# Patient Record
Sex: MALE | Race: WHITE | NOT HISPANIC OR LATINO | Employment: OTHER | URBAN - METROPOLITAN AREA
[De-identification: names, ages, dates, MRNs, and addresses within clinical notes are randomized per-mention and may not be internally consistent; named-entity substitution may affect disease eponyms.]

---

## 2017-11-21 ENCOUNTER — HOSPITAL ENCOUNTER (INPATIENT)
Facility: HOSPITAL | Age: 77
LOS: 6 days | Discharge: HOME HEALTH | End: 2017-11-27
Attending: EMERGENCY MEDICINE | Admitting: HOSPITALIST

## 2017-11-21 ENCOUNTER — APPOINTMENT (OUTPATIENT)
Dept: CT IMAGING | Facility: HOSPITAL | Age: 77
End: 2017-11-21

## 2017-11-21 ENCOUNTER — APPOINTMENT (OUTPATIENT)
Dept: GENERAL RADIOLOGY | Facility: HOSPITAL | Age: 77
End: 2017-11-21
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
## 2017-11-21 DIAGNOSIS — N17.9 ACUTE KIDNEY INJURY (HCC): ICD-10-CM

## 2017-11-21 DIAGNOSIS — L03.115 CELLULITIS OF RIGHT LEG: ICD-10-CM

## 2017-11-21 DIAGNOSIS — E11.22 TYPE 2 DIABETES MELLITUS WITH STAGE 3 CHRONIC KIDNEY DISEASE, WITHOUT LONG-TERM CURRENT USE OF INSULIN (HCC): ICD-10-CM

## 2017-11-21 DIAGNOSIS — R41.82 ALTERED MENTAL STATUS, UNSPECIFIED ALTERED MENTAL STATUS TYPE: ICD-10-CM

## 2017-11-21 DIAGNOSIS — S00.81XA ABRASION OF FACE, INITIAL ENCOUNTER: ICD-10-CM

## 2017-11-21 DIAGNOSIS — N18.30 TYPE 2 DIABETES MELLITUS WITH STAGE 3 CHRONIC KIDNEY DISEASE, WITHOUT LONG-TERM CURRENT USE OF INSULIN (HCC): ICD-10-CM

## 2017-11-21 DIAGNOSIS — A41.9 SEPSIS, DUE TO UNSPECIFIED ORGANISM: Primary | ICD-10-CM

## 2017-11-21 DIAGNOSIS — E11.65 POORLY CONTROLLED DIABETES MELLITUS (HCC): ICD-10-CM

## 2017-11-21 LAB
ALBUMIN SERPL-MCNC: 3.9 G/DL (ref 3.5–5.2)
ALBUMIN/GLOB SERPL: 1.1 G/DL
ALP SERPL-CCNC: 62 U/L (ref 40–129)
ALT SERPL W P-5'-P-CCNC: 17 U/L (ref 5–41)
AMPHET+METHAMPHET UR QL: NEGATIVE
AMPHETAMINES UR QL: NEGATIVE
ANION GAP SERPL CALCULATED.3IONS-SCNC: 18.4 MMOL/L
APTT PPP: 33.3 SECONDS (ref 24.3–38.1)
AST SERPL-CCNC: 34 U/L (ref 5–40)
BACTERIA UR QL AUTO: ABNORMAL /HPF
BARBITURATES UR QL SCN: NEGATIVE
BASOPHILS # BLD AUTO: 0.04 10*3/MM3 (ref 0–0.2)
BASOPHILS NFR BLD AUTO: 0.2 % (ref 0–2)
BENZODIAZ UR QL SCN: NEGATIVE
BILIRUB SERPL-MCNC: 0.6 MG/DL (ref 0.2–1.2)
BILIRUB UR QL STRIP: NEGATIVE
BUN BLD-MCNC: 15 MG/DL (ref 8–23)
BUN/CREAT SERPL: 8.1 (ref 7–25)
BUPRENORPHINE SERPL-MCNC: NEGATIVE NG/ML
CALCIUM SPEC-SCNC: 9.6 MG/DL (ref 8.8–10.5)
CANNABINOIDS SERPL QL: NEGATIVE
CHLORIDE SERPL-SCNC: 93 MMOL/L (ref 98–107)
CLARITY UR: CLEAR
CO2 SERPL-SCNC: 24.6 MMOL/L (ref 22–29)
COCAINE UR QL: NEGATIVE
COLOR UR: YELLOW
CREAT BLD-MCNC: 1.85 MG/DL (ref 0.76–1.27)
D-LACTATE SERPL-SCNC: 2.9 MMOL/L (ref 0.5–2)
D-LACTATE SERPL-SCNC: 3.1 MMOL/L (ref 0.5–2)
DEPRECATED RDW RBC AUTO: 41.8 FL (ref 37–54)
EOSINOPHIL # BLD AUTO: 0 10*3/MM3 (ref 0.1–0.3)
EOSINOPHIL NFR BLD AUTO: 0 % (ref 0–4)
ERYTHROCYTE [DISTWIDTH] IN BLOOD BY AUTOMATED COUNT: 13.2 % (ref 11.5–14.5)
FLUAV AG NPH QL: NEGATIVE
FLUBV AG NPH QL IA: NEGATIVE
GFR SERPL CREATININE-BSD FRML MDRD: 36 ML/MIN/1.73
GLOBULIN UR ELPH-MCNC: 3.7 GM/DL
GLUCOSE BLD-MCNC: 330 MG/DL (ref 65–99)
GLUCOSE BLDC GLUCOMTR-MCNC: 312 MG/DL (ref 70–130)
GLUCOSE UR STRIP-MCNC: ABNORMAL MG/DL
HCT VFR BLD AUTO: 43 % (ref 42–52)
HGB BLD-MCNC: 14.3 G/DL (ref 14–18)
HGB UR QL STRIP.AUTO: ABNORMAL
HOLD SPECIMEN: NORMAL
HOLD SPECIMEN: NORMAL
HYALINE CASTS UR QL AUTO: ABNORMAL /LPF
IMM GRANULOCYTES # BLD: 0.27 10*3/MM3 (ref 0–0.03)
IMM GRANULOCYTES NFR BLD: 1.2 % (ref 0–0.5)
INR PPP: 1.11 (ref 0.9–1.1)
KETONES UR QL STRIP: ABNORMAL
LEUKOCYTE ESTERASE UR QL STRIP.AUTO: NEGATIVE
LYMPHOCYTES # BLD AUTO: 0.5 10*3/MM3 (ref 0.6–4.8)
LYMPHOCYTES NFR BLD AUTO: 2.3 % (ref 20–45)
MCH RBC QN AUTO: 29.1 PG (ref 27–31)
MCHC RBC AUTO-ENTMCNC: 33.3 G/DL (ref 31–37)
MCV RBC AUTO: 87.6 FL (ref 80–94)
METHADONE UR QL SCN: NEGATIVE
MONOCYTES # BLD AUTO: 1.1 10*3/MM3 (ref 0–1)
MONOCYTES NFR BLD AUTO: 5 % (ref 3–8)
NEUTROPHILS # BLD AUTO: 19.97 10*3/MM3 (ref 1.5–8.3)
NEUTROPHILS NFR BLD AUTO: 91.3 % (ref 45–70)
NITRITE UR QL STRIP: NEGATIVE
NRBC BLD MANUAL-RTO: 0 /100 WBC (ref 0–0)
OPIATES UR QL: NEGATIVE
OXYCODONE UR QL SCN: NEGATIVE
PCP UR QL SCN: NEGATIVE
PH UR STRIP.AUTO: 8.5 [PH] (ref 4.5–8)
PLATELET # BLD AUTO: 169 10*3/MM3 (ref 140–500)
PMV BLD AUTO: 10.8 FL (ref 7.4–10.4)
POTASSIUM BLD-SCNC: 4.6 MMOL/L (ref 3.5–5.2)
PROCALCITONIN SERPL-MCNC: 2.33 NG/ML (ref 0.1–0.25)
PROPOXYPH UR QL: NEGATIVE
PROT SERPL-MCNC: 7.6 G/DL (ref 6–8.5)
PROT UR QL STRIP: ABNORMAL
PROTHROMBIN TIME: 14.3 SECONDS (ref 12.1–15)
RBC # BLD AUTO: 4.91 10*6/MM3 (ref 4.7–6.1)
RBC # UR: ABNORMAL /HPF
REF LAB TEST METHOD: ABNORMAL
SODIUM BLD-SCNC: 136 MMOL/L (ref 136–145)
SP GR UR STRIP: 1.02 (ref 1–1.03)
SQUAMOUS #/AREA URNS HPF: ABNORMAL /HPF
TRICYCLICS UR QL SCN: NEGATIVE
TROPONIN T SERPL-MCNC: 0.01 NG/ML (ref 0–0.03)
TSH SERPL DL<=0.05 MIU/L-ACNC: 2.95 MIU/ML (ref 0.27–4.2)
UROBILINOGEN UR QL STRIP: ABNORMAL
WBC NRBC COR # BLD: 21.88 10*3/MM3 (ref 4.8–10.8)
WBC UR QL AUTO: ABNORMAL /HPF
WHOLE BLOOD HOLD SPECIMEN: NORMAL

## 2017-11-21 PROCEDURE — 25010000002 TDAP 5-2.5-18.5 LF-MCG/0.5 SUSPENSION: Performed by: EMERGENCY MEDICINE

## 2017-11-21 PROCEDURE — 93010 ELECTROCARDIOGRAM REPORT: CPT | Performed by: INTERNAL MEDICINE

## 2017-11-21 PROCEDURE — 84484 ASSAY OF TROPONIN QUANT: CPT | Performed by: EMERGENCY MEDICINE

## 2017-11-21 PROCEDURE — 87186 SC STD MICRODIL/AGAR DIL: CPT | Performed by: EMERGENCY MEDICINE

## 2017-11-21 PROCEDURE — 25010000002 ENOXAPARIN PER 10 MG

## 2017-11-21 PROCEDURE — 84145 PROCALCITONIN (PCT): CPT | Performed by: HOSPITALIST

## 2017-11-21 PROCEDURE — 87798 DETECT AGENT NOS DNA AMP: CPT | Performed by: HOSPITALIST

## 2017-11-21 PROCEDURE — 71250 CT THORAX DX C-: CPT

## 2017-11-21 PROCEDURE — 85610 PROTHROMBIN TIME: CPT | Performed by: EMERGENCY MEDICINE

## 2017-11-21 PROCEDURE — 87040 BLOOD CULTURE FOR BACTERIA: CPT | Performed by: EMERGENCY MEDICINE

## 2017-11-21 PROCEDURE — 87086 URINE CULTURE/COLONY COUNT: CPT | Performed by: EMERGENCY MEDICINE

## 2017-11-21 PROCEDURE — 87581 M.PNEUMON DNA AMP PROBE: CPT | Performed by: HOSPITALIST

## 2017-11-21 PROCEDURE — 80053 COMPREHEN METABOLIC PANEL: CPT | Performed by: EMERGENCY MEDICINE

## 2017-11-21 PROCEDURE — 85730 THROMBOPLASTIN TIME PARTIAL: CPT | Performed by: EMERGENCY MEDICINE

## 2017-11-21 PROCEDURE — 80306 DRUG TEST PRSMV INSTRMNT: CPT | Performed by: EMERGENCY MEDICINE

## 2017-11-21 PROCEDURE — 85025 COMPLETE CBC W/AUTO DIFF WBC: CPT | Performed by: EMERGENCY MEDICINE

## 2017-11-21 PROCEDURE — 99291 CRITICAL CARE FIRST HOUR: CPT | Performed by: EMERGENCY MEDICINE

## 2017-11-21 PROCEDURE — 90715 TDAP VACCINE 7 YRS/> IM: CPT | Performed by: EMERGENCY MEDICINE

## 2017-11-21 PROCEDURE — 82962 GLUCOSE BLOOD TEST: CPT

## 2017-11-21 PROCEDURE — P9612 CATHETERIZE FOR URINE SPEC: HCPCS

## 2017-11-21 PROCEDURE — 25010000002 VANCOMYCIN PER 500 MG: Performed by: EMERGENCY MEDICINE

## 2017-11-21 PROCEDURE — 25010000002 CEFTRIAXONE PER 250 MG

## 2017-11-21 PROCEDURE — 72125 CT NECK SPINE W/O DYE: CPT

## 2017-11-21 PROCEDURE — 94799 UNLISTED PULMONARY SVC/PX: CPT

## 2017-11-21 PROCEDURE — 99285 EMERGENCY DEPT VISIT HI MDM: CPT

## 2017-11-21 PROCEDURE — 93005 ELECTROCARDIOGRAM TRACING: CPT | Performed by: EMERGENCY MEDICINE

## 2017-11-21 PROCEDURE — 87804 INFLUENZA ASSAY W/OPTIC: CPT | Performed by: EMERGENCY MEDICINE

## 2017-11-21 PROCEDURE — 83605 ASSAY OF LACTIC ACID: CPT | Performed by: EMERGENCY MEDICINE

## 2017-11-21 PROCEDURE — 99223 1ST HOSP IP/OBS HIGH 75: CPT | Performed by: INTERNAL MEDICINE

## 2017-11-21 PROCEDURE — 81001 URINALYSIS AUTO W/SCOPE: CPT | Performed by: EMERGENCY MEDICINE

## 2017-11-21 PROCEDURE — 87633 RESP VIRUS 12-25 TARGETS: CPT | Performed by: HOSPITALIST

## 2017-11-21 PROCEDURE — 71010 HC CHEST PA OR AP: CPT

## 2017-11-21 PROCEDURE — 84443 ASSAY THYROID STIM HORMONE: CPT | Performed by: EMERGENCY MEDICINE

## 2017-11-21 PROCEDURE — 70450 CT HEAD/BRAIN W/O DYE: CPT

## 2017-11-21 PROCEDURE — 74176 CT ABD & PELVIS W/O CONTRAST: CPT

## 2017-11-21 PROCEDURE — 87486 CHLMYD PNEUM DNA AMP PROBE: CPT | Performed by: HOSPITALIST

## 2017-11-21 PROCEDURE — 25010000002 PIPERACILLIN-TAZOBACTAM: Performed by: EMERGENCY MEDICINE

## 2017-11-21 PROCEDURE — 90471 IMMUNIZATION ADMIN: CPT | Performed by: EMERGENCY MEDICINE

## 2017-11-21 PROCEDURE — 87150 DNA/RNA AMPLIFIED PROBE: CPT | Performed by: EMERGENCY MEDICINE

## 2017-11-21 RX ORDER — SODIUM CHLORIDE 0.9 % (FLUSH) 0.9 %
10 SYRINGE (ML) INJECTION AS NEEDED
Status: DISCONTINUED | OUTPATIENT
Start: 2017-11-21 | End: 2017-11-27 | Stop reason: HOSPADM

## 2017-11-21 RX ORDER — ACETAMINOPHEN 325 MG/1
650 TABLET ORAL EVERY 6 HOURS PRN
Status: DISCONTINUED | OUTPATIENT
Start: 2017-11-21 | End: 2017-11-27 | Stop reason: HOSPADM

## 2017-11-21 RX ORDER — IPRATROPIUM BROMIDE AND ALBUTEROL SULFATE 2.5; .5 MG/3ML; MG/3ML
3 SOLUTION RESPIRATORY (INHALATION) EVERY 6 HOURS PRN
Status: DISCONTINUED | OUTPATIENT
Start: 2017-11-21 | End: 2017-11-27 | Stop reason: HOSPADM

## 2017-11-21 RX ORDER — NICOTINE POLACRILEX 4 MG
15 LOZENGE BUCCAL
Status: DISCONTINUED | OUTPATIENT
Start: 2017-11-21 | End: 2017-11-27 | Stop reason: HOSPADM

## 2017-11-21 RX ORDER — SODIUM CHLORIDE 0.9 % (FLUSH) 0.9 %
1-10 SYRINGE (ML) INJECTION AS NEEDED
Status: DISCONTINUED | OUTPATIENT
Start: 2017-11-21 | End: 2017-11-27 | Stop reason: HOSPADM

## 2017-11-21 RX ORDER — DEXTROSE MONOHYDRATE 25 G/50ML
25 INJECTION, SOLUTION INTRAVENOUS
Status: DISCONTINUED | OUTPATIENT
Start: 2017-11-21 | End: 2017-11-27 | Stop reason: HOSPADM

## 2017-11-21 RX ORDER — FUROSEMIDE 40 MG/1
40 TABLET ORAL 2 TIMES DAILY
Status: ON HOLD | COMMUNITY
End: 2017-11-27

## 2017-11-21 RX ORDER — PRAMIPEXOLE DIHYDROCHLORIDE 0.5 MG/1
0.5 TABLET ORAL 2 TIMES DAILY
Status: ON HOLD | COMMUNITY
End: 2019-08-28 | Stop reason: SDDI

## 2017-11-21 RX ORDER — GABAPENTIN 600 MG/1
600 TABLET ORAL DAILY
Status: ON HOLD | COMMUNITY
End: 2019-06-27

## 2017-11-21 RX ORDER — LISINOPRIL 20 MG/1
20 TABLET ORAL DAILY
Status: ON HOLD | COMMUNITY
End: 2019-08-28 | Stop reason: SDDI

## 2017-11-21 RX ORDER — DONEPEZIL HYDROCHLORIDE 5 MG/1
2.5 TABLET, FILM COATED ORAL NIGHTLY
Status: ON HOLD | COMMUNITY
End: 2019-08-27

## 2017-11-21 RX ORDER — ACETAMINOPHEN 650 MG/1
650 SUPPOSITORY RECTAL ONCE
Status: COMPLETED | OUTPATIENT
Start: 2017-11-21 | End: 2017-11-21

## 2017-11-21 RX ORDER — ONDANSETRON 2 MG/ML
4 INJECTION INTRAMUSCULAR; INTRAVENOUS EVERY 6 HOURS PRN
Status: DISCONTINUED | OUTPATIENT
Start: 2017-11-21 | End: 2017-11-27 | Stop reason: HOSPADM

## 2017-11-21 RX ORDER — SODIUM CHLORIDE 9 MG/ML
125 INJECTION, SOLUTION INTRAVENOUS CONTINUOUS
Status: DISCONTINUED | OUTPATIENT
Start: 2017-11-21 | End: 2017-11-23

## 2017-11-21 RX ORDER — SODIUM CHLORIDE 9 MG/ML
40 INJECTION, SOLUTION INTRAVENOUS AS NEEDED
Status: DISCONTINUED | OUTPATIENT
Start: 2017-11-21 | End: 2017-11-27 | Stop reason: HOSPADM

## 2017-11-21 RX ORDER — ERGOCALCIFEROL 1.25 MG/1
50000 CAPSULE ORAL WEEKLY
COMMUNITY
End: 2019-02-16

## 2017-11-21 RX ORDER — PREGABALIN 100 MG/1
150 CAPSULE ORAL 3 TIMES DAILY
Status: ON HOLD | COMMUNITY
End: 2019-10-15

## 2017-11-21 RX ORDER — LEVOTHYROXINE SODIUM 0.1 MG/1
88 TABLET ORAL DAILY
Status: ON HOLD | COMMUNITY
End: 2019-08-29 | Stop reason: SDUPTHER

## 2017-11-21 RX ORDER — PRAVASTATIN SODIUM 20 MG
20 TABLET ORAL DAILY
COMMUNITY
End: 2017-11-27 | Stop reason: HOSPADM

## 2017-11-21 RX ORDER — CEFTRIAXONE 1 G/1
INJECTION, POWDER, FOR SOLUTION INTRAMUSCULAR; INTRAVENOUS
Status: COMPLETED
Start: 2017-11-21 | End: 2017-11-21

## 2017-11-21 RX ADMIN — CEFTRIAXONE 1000 MG: 1 INJECTION, POWDER, FOR SOLUTION INTRAMUSCULAR; INTRAVENOUS at 22:46

## 2017-11-21 RX ADMIN — ACETAMINOPHEN 650 MG: 650 SUPPOSITORY RECTAL at 15:45

## 2017-11-21 RX ADMIN — VANCOMYCIN HYDROCHLORIDE 2000 MG: 1 INJECTION, POWDER, LYOPHILIZED, FOR SOLUTION INTRAVENOUS at 17:35

## 2017-11-21 RX ADMIN — SODIUM CHLORIDE 100 ML/HR: 9 INJECTION, SOLUTION INTRAVENOUS at 20:46

## 2017-11-21 RX ADMIN — SODIUM CHLORIDE 1000 ML: 9 INJECTION, SOLUTION INTRAVENOUS at 16:13

## 2017-11-21 RX ADMIN — TETANUS TOXOID, REDUCED DIPHTHERIA TOXOID AND ACELLULAR PERTUSSIS VACCINE, ADSORBED 0.5 ML: 5; 2.5; 8; 8; 2.5 SUSPENSION INTRAMUSCULAR at 17:49

## 2017-11-21 RX ADMIN — TAZOBACTAM SODIUM AND PIPERACILLIN SODIUM 4.5 G: .5; 4 INJECTION, POWDER, LYOPHILIZED, FOR SOLUTION INTRAVENOUS at 17:02

## 2017-11-21 RX ADMIN — ENOXAPARIN SODIUM 30 MG: 30 INJECTION SUBCUTANEOUS at 22:47

## 2017-11-21 NOTE — ED PROVIDER NOTES
Subjective   History of Present Illness  History of Present Illness    Chief complaint: Fall and fever    Location: Home    Quality/Severity:  MAXIMUM TEMPERATURE of 104.4    Timing/Onset/Duration: The patient fell this morning    Modifying Factors: The patient is unable to give any modifying factors as he is unresponsive    Associated Symptoms: Patient unable to give associated symptoms    Narrative: This 77-year-old white male went to the toilet 7:30 this morning.  His wife states that he stayed on the toilet all day until he fell off the toilet about 1430.  The patient was noted to have altered mental status by EMS.  His blood sugar was 370.  The patient is unable to give any other history, the wife is not present, and EMS his left.    PCP:  Gordy Correa      Review of Systems   Unable to perform ROS: Patient unresponsive        Medication List      Notice     You have not been prescribed any medications.        No past medical history on file.    Allergies not on file    No past surgical history on file.    No family history on file.    Social History     Social History   • Marital status: Unknown     Spouse name: N/A   • Number of children: N/A   • Years of education: N/A     Social History Main Topics   • Smoking status: Not on file   • Smokeless tobacco: Not on file   • Alcohol use Not on file   • Drug use: Not on file   • Sexual activity: Not on file     Other Topics Concern   • Not on file     Social History Narrative           Objective   Physical Exam   Constitutional: He appears well-developed and well-nourished. Distressed: tachypneic and unresponsive.   ED Triage Vitals:  Temp: 104.4 °F (40.2 °C) (11/21/17 1540)  Heart Rate: 126 (11/21/17 1540)  Resp: 28 (11/21/17 1540)  BP: 113/90 (11/21/17 1540)  SpO2: 91 % (11/21/17 1540)  Temp src: Rectal (11/21/17 1540)  Heart Rate Source: n/a  Patient Position: n/a  BP Location: n/a  FiO2 (%): n/a    The patient's vitals were reviewed by me.  Unless otherwise  noted they are within normal limits.  The patient is febrile with temperature of 104.4.  The patient is tachycardic with a heart rate of 126.  The patient is tachypneic with respiratory rate of 28.  The room air sats are 91%.     HENT:   Head: Normocephalic.   Right Ear: External ear normal.   Left Ear: External ear normal.   Nose: Nose normal.   There is abrasion to the left temple and left cheek.    The mucous membranes are dry.       Eyes: Conjunctivae and EOM are normal. Pupils are equal, round, and reactive to light. Right eye exhibits no discharge. Left eye exhibits no discharge.   Neck: Normal range of motion. Neck supple. No JVD present. No tracheal deviation present. No thyromegaly present.   No meningeal signs   Cardiovascular: Regular rhythm, normal heart sounds and intact distal pulses.  Exam reveals no gallop and no friction rub.    No murmur heard.  Pulmonary/Chest: Effort normal. No stridor. No respiratory distress. He has no wheezes. He has rales (right lung base). He exhibits no tenderness.   Abdominal: Soft. Bowel sounds are normal. He exhibits no distension and no mass. There is no tenderness. There is no rebound and no guarding. No hernia.   Green bruising to the epigastric region, likely due to insulin injection.   Musculoskeletal: Normal range of motion. He exhibits no edema or deformity.   There is red dry skin on the dorsal aspect of the left foot at the region of the MTP joints.  There is no purulent drainage.   Lymphadenopathy:     He has no cervical adenopathy.   Neurological:   The pupils are equal round and reactive to light.  The patient localizes pain.   Skin: Skin is dry. Rash (there is a petechial rash noted on the left arm) noted. He is not diaphoretic. No erythema. No pallor.   Nursing note and vitals reviewed.      Procedures         ED Course  ED Course   Comment By Time   The laboratory values were reviewed by me.  The glucose is 3:30.  The creatinines 1.5.  The chloride is 93.   The GFR 36.  The lactic acid is 3.1.  The white blood cell count is 21.88.  The neutrophil percent is 91%.  The absolute neutrophil count is 19,000.  The INR is 1.1.  The red blood cells in the urine are too numerous to count.  The white blood cells are 3-5, bacteria 1+, swims up to the cells 3-5.  The catheter was somewhat traumatic.  The urine glucose is 500.  The serum ketones are 40.  The urine blood is large.  The protein is greater than 300. Keven Steven MD 11/21 1639   The influenza is negative. Keven Steven MD 11/21 1646   The TSH is 2.95. Keven Steven MD 11/21 1725      4:40 PM, 11/21/17:  The EKG was obtained at 1638.  EKG was read by me at 1639.  EKG shows a sinus tachycardia with a rate of 114.  Is a normal axis with no hypertrophy.  The SC, and QRS intervals are unremarkable.  The QT intervals are unremarkable.  There is a normal axis with no hypertrophy.  There is no ectopy.  There are inferior Q waves noted.  There is no acute ST elevation.  There is some ST depression noted in V3 through the 5.    4:56 PM, 11/21/17:  Spoke with pharmacy regarding the loading dose of the vancomycin and they recommended 2 g IV.  The patient will also be given Zosyn 4.5 g IV.  The patient has sepsis of unknown source.    5:42 PM, 11/21/17:  The patient is awake and alert.  He has no complaints.  His vital signs were reviewed.  His temperature has improved.  He is less tachycardic and his blood pressure has improved.  Abdominal exam: Soft nontender no masses positive bowel sounds.  I am awaiting the CT abdomen and pelvis and CT chest report.    5:43 PM, 11/21/17: I spoke with the patient's wife.  She indicates patient has had a mild to moderate headache for last week.  He has otherwise not been complaining of anything.  He went to sit on the toilet today and when she went to check on him later in the afternoon he had fallen off the toilet.  He was awake and talking when she went to check on him.    6:44  PM, 11/21/17:  I spoke with Dr. Chaudhry, on-call for the hospitalists, she will admit the patient to [facility] telemetry.    The total critical care time spent on this patient exclusive of separately billable procedures was 32 minutes.        MDM  XR Chest 1 View    (Results Pending)   CT Head Without Contrast    (Results Pending)   CT Cervical Spine Without Contrast    (Results Pending)     Labs Reviewed   CBC WITH AUTO DIFFERENTIAL - Abnormal; Notable for the following:        Result Value    WBC 21.88 (*)     MPV 10.8 (*)     Neutrophil % 91.3 (*)     Lymphocyte % 2.3 (*)     Immature Grans % 1.2 (*)     Neutrophils, Absolute 19.97 (*)     Lymphocytes, Absolute 0.50 (*)     Monocytes, Absolute 1.10 (*)     Eosinophils, Absolute 0.00 (*)     Immature Grans, Absolute 0.27 (*)     All other components within normal limits   BLOOD CULTURE   BLOOD CULTURE   COMPREHENSIVE METABOLIC PANEL   LACTIC ACID, PLASMA   RAINBOW DRAW    Narrative:     The following orders were created for panel order [facility] Draw.  Procedure                               Abnormality         Status                     ---------                               -----------         ------                     Light Blue Top[[id number]]                                                              Green Top (Gel)[[id number]]                                  In process                 Lavender Top[[id number]]                                     In process                 Gold Top - SST[[id number]]                                                                Please view results for these tests on the individual orders.   URINALYSIS W/ CULTURE IF INDICATED   URINALYSIS, MICROSCOPIC ONLY   POCT GLUCOSE FINGERSTICK   CBC AND DIFFERENTIAL    Narrative:     The following orders were created for panel order CBC & Differential.  Procedure                               Abnormality         Status                     ---------                                -----------         ------                     CBC Auto Differential[[id number]]        Abnormal            Final result                 Please view results for these tests on the individual orders.   LIGHT BLUE TOP   GREEN TOP   LAVENDER TOP   GOLD TOP - SST     No results found.    Final diagnoses:   None         ED Medications:  Medications   sodium chloride 0.9 % flush 10 mL (not administered)   acetaminophen (TYLENOL) suppository 650 mg (650 mg Rectal Given 11/21/17 1545)       New Medications:     Medication List      Notice     You have not been prescribed any medications.        Stopped Medications:     Medication List      Notice     You have not been prescribed any medications.          Final diagnoses:   Sepsis, due to unspecified organism   Altered mental status, unspecified altered mental status type   Poorly controlled diabetes mellitus   Acute kidney injury   Cellulitis of right leg   Fall, initial encounter   Abrasion of face, initial encounter            Keven Steven MD  11/21/17 1642       Keven Steven MD  11/21/17 1657       Keven Steven MD  11/21/17 1744       Keven Steven MD  11/21/17 1745       Keven Steven MD  11/21/17 1844       Keven Steven MD  11/24/17 2026

## 2017-11-22 ENCOUNTER — APPOINTMENT (OUTPATIENT)
Dept: ULTRASOUND IMAGING | Facility: HOSPITAL | Age: 77
End: 2017-11-22

## 2017-11-22 ENCOUNTER — APPOINTMENT (OUTPATIENT)
Dept: CARDIOLOGY | Facility: HOSPITAL | Age: 77
End: 2017-11-22
Attending: INTERNAL MEDICINE

## 2017-11-22 PROBLEM — R79.89 ELEVATED PROCALCITONIN: Status: ACTIVE | Noted: 2017-11-22

## 2017-11-22 PROBLEM — D72.829 LEUKOCYTOSIS: Status: ACTIVE | Noted: 2017-11-22

## 2017-11-22 PROBLEM — E87.20 LACTIC ACID ACIDOSIS: Status: ACTIVE | Noted: 2017-11-22

## 2017-11-22 LAB
ANION GAP SERPL CALCULATED.3IONS-SCNC: 12.2 MMOL/L
B PERT DNA SPEC QL NAA+PROBE: NOT DETECTED
BACTERIA BLD CULT: ABNORMAL
BASOPHILS # BLD AUTO: 0.04 10*3/MM3 (ref 0–0.2)
BASOPHILS NFR BLD AUTO: 0.2 % (ref 0–2)
BH CV ECHO MEAS - ACS: 1.6 CM
BH CV ECHO MEAS - AI DEC SLOPE: 274 CM/SEC^2
BH CV ECHO MEAS - AI MAX PG: 57.1 MMHG
BH CV ECHO MEAS - AI MAX VEL: 377.5 CM/SEC
BH CV ECHO MEAS - AI P1/2T: 403.5 MSEC
BH CV ECHO MEAS - AO MAX PG (FULL): 8.2 MMHG
BH CV ECHO MEAS - AO MAX PG: 13 MMHG
BH CV ECHO MEAS - AO MEAN PG (FULL): 4 MMHG
BH CV ECHO MEAS - AO MEAN PG: 7 MMHG
BH CV ECHO MEAS - AO ROOT AREA (BSA CORRECTED): 1.7
BH CV ECHO MEAS - AO ROOT AREA: 15.9 CM^2
BH CV ECHO MEAS - AO ROOT DIAM: 4.5 CM
BH CV ECHO MEAS - AO V2 MAX: 178 CM/SEC
BH CV ECHO MEAS - AO V2 MEAN: 117 CM/SEC
BH CV ECHO MEAS - AO V2 VTI: 40 CM
BH CV ECHO MEAS - ASC AORTA: 3.4 CM
BH CV ECHO MEAS - AVA(I,A): 1.9 CM^2
BH CV ECHO MEAS - AVA(I,D): 1.9 CM^2
BH CV ECHO MEAS - AVA(V,A): 1.8 CM^2
BH CV ECHO MEAS - AVA(V,D): 1.8 CM^2
BH CV ECHO MEAS - BSA(HAYCOCK): 2.7 M^2
BH CV ECHO MEAS - BSA: 2.6 M^2
BH CV ECHO MEAS - BZI_BMI: 38.6 KILOGRAMS/M^2
BH CV ECHO MEAS - BZI_METRIC_HEIGHT: 188 CM
BH CV ECHO MEAS - BZI_METRIC_WEIGHT: 136.5 KG
BH CV ECHO MEAS - CONTRAST EF (2CH): 48.8 ML/M^2
BH CV ECHO MEAS - CONTRAST EF 4CH: 51.2 ML/M^2
BH CV ECHO MEAS - EDV(CUBED): 236 ML
BH CV ECHO MEAS - EDV(MOD-SP2): 171 ML
BH CV ECHO MEAS - EDV(MOD-SP4): 193 ML
BH CV ECHO MEAS - EDV(TEICH): 192.6 ML
BH CV ECHO MEAS - EF(CUBED): 61.9 %
BH CV ECHO MEAS - EF(MOD-SP2): 48.8 %
BH CV ECHO MEAS - EF(MOD-SP4): 51.2 %
BH CV ECHO MEAS - EF(TEICH): 52.5 %
BH CV ECHO MEAS - ESV(CUBED): 89.9 ML
BH CV ECHO MEAS - ESV(MOD-SP2): 87.5 ML
BH CV ECHO MEAS - ESV(MOD-SP4): 94.2 ML
BH CV ECHO MEAS - ESV(TEICH): 91.5 ML
BH CV ECHO MEAS - FS: 27.5 %
BH CV ECHO MEAS - IVS/LVPW: 1
BH CV ECHO MEAS - IVSD: 1.1 CM
BH CV ECHO MEAS - LA DIMENSION: 3.3 CM
BH CV ECHO MEAS - LA/AO: 0.73
BH CV ECHO MEAS - LAT PEAK E' VEL: 10 CM/SEC
BH CV ECHO MEAS - LV DIASTOLIC VOL/BSA (35-75): 74.6 ML/M^2
BH CV ECHO MEAS - LV MASS(C)D: 300.9 GRAMS
BH CV ECHO MEAS - LV MASS(C)DI: 116.4 GRAMS/M^2
BH CV ECHO MEAS - LV MAX PG: 3 MMHG
BH CV ECHO MEAS - LV MEAN PG: 2 MMHG
BH CV ECHO MEAS - LV SYSTOLIC VOL/BSA (12-30): 36.4 ML/M^2
BH CV ECHO MEAS - LV V1 MAX: 86.5 CM/SEC
BH CV ECHO MEAS - LV V1 MEAN: 58.2 CM/SEC
BH CV ECHO MEAS - LV V1 VTI: 21.1 CM
BH CV ECHO MEAS - LVIDD: 6.2 CM
BH CV ECHO MEAS - LVIDS: 4.5 CM
BH CV ECHO MEAS - LVLD AP2: 9.3 CM
BH CV ECHO MEAS - LVLD AP4: 9.6 CM
BH CV ECHO MEAS - LVLS AP2: 8.3 CM
BH CV ECHO MEAS - LVLS AP4: 8.9 CM
BH CV ECHO MEAS - LVOT AREA (M): 3.5 CM^2
BH CV ECHO MEAS - LVOT AREA: 3.5 CM^2
BH CV ECHO MEAS - LVOT DIAM: 2.1 CM
BH CV ECHO MEAS - LVPWD: 1.1 CM
BH CV ECHO MEAS - MED PEAK E' VEL: 6 CM/SEC
BH CV ECHO MEAS - MV A DUR: 0.2 SEC
BH CV ECHO MEAS - MV A MAX VEL: 105 CM/SEC
BH CV ECHO MEAS - MV DEC SLOPE: 528 CM/SEC^2
BH CV ECHO MEAS - MV DEC TIME: 0.22 SEC
BH CV ECHO MEAS - MV E MAX VEL: 78.1 CM/SEC
BH CV ECHO MEAS - MV E/A: 0.74
BH CV ECHO MEAS - MV MAX PG: 4.8 MMHG
BH CV ECHO MEAS - MV MEAN PG: 2 MMHG
BH CV ECHO MEAS - MV P1/2T MAX VEL: 95.7 CM/SEC
BH CV ECHO MEAS - MV P1/2T: 53.1 MSEC
BH CV ECHO MEAS - MV V2 MAX: 109 CM/SEC
BH CV ECHO MEAS - MV V2 MEAN: 59.2 CM/SEC
BH CV ECHO MEAS - MV V2 VTI: 30.6 CM
BH CV ECHO MEAS - MVA P1/2T LCG: 2.3 CM^2
BH CV ECHO MEAS - MVA(P1/2T): 4.1 CM^2
BH CV ECHO MEAS - MVA(VTI): 2.4 CM^2
BH CV ECHO MEAS - PA ACC TIME: 0.14 SEC
BH CV ECHO MEAS - PA MAX PG (FULL): 1.3 MMHG
BH CV ECHO MEAS - PA MAX PG: 3.1 MMHG
BH CV ECHO MEAS - PA PR(ACCEL): 14.2 MMHG
BH CV ECHO MEAS - PA V2 MAX: 87.7 CM/SEC
BH CV ECHO MEAS - PULM A REVS DUR: 0.14 SEC
BH CV ECHO MEAS - PULM A REVS VEL: 27 CM/SEC
BH CV ECHO MEAS - PULM DIAS VEL: 40.1 CM/SEC
BH CV ECHO MEAS - PULM S/D: 2.1
BH CV ECHO MEAS - PULM SYS VEL: 84.5 CM/SEC
BH CV ECHO MEAS - PVA(V,A): 4.3 CM^2
BH CV ECHO MEAS - PVA(V,D): 4.3 CM^2
BH CV ECHO MEAS - QP/QS: 0.99
BH CV ECHO MEAS - RV MAX PG: 1.8 MMHG
BH CV ECHO MEAS - RV MEAN PG: 1 MMHG
BH CV ECHO MEAS - RV V1 MAX: 66.5 CM/SEC
BH CV ECHO MEAS - RV V1 MEAN: 46 CM/SEC
BH CV ECHO MEAS - RV V1 VTI: 12.6 CM
BH CV ECHO MEAS - RVOT AREA: 5.7 CM^2
BH CV ECHO MEAS - RVOT DIAM: 2.7 CM
BH CV ECHO MEAS - SI(AO): 232.2 ML/M^2
BH CV ECHO MEAS - SI(CUBED): 56.5 ML/M^2
BH CV ECHO MEAS - SI(LVOT): 28.2 ML/M^2
BH CV ECHO MEAS - SI(MOD-SP2): 32.3 ML/M^2
BH CV ECHO MEAS - SI(MOD-SP4): 38.2 ML/M^2
BH CV ECHO MEAS - SI(TEICH): 39.1 ML/M^2
BH CV ECHO MEAS - SV(AO): 600.4 ML
BH CV ECHO MEAS - SV(CUBED): 146.1 ML
BH CV ECHO MEAS - SV(LVOT): 72.9 ML
BH CV ECHO MEAS - SV(MOD-SP2): 83.5 ML
BH CV ECHO MEAS - SV(MOD-SP4): 98.8 ML
BH CV ECHO MEAS - SV(RVOT): 72.1 ML
BH CV ECHO MEAS - SV(TEICH): 101.1 ML
BH CV ECHO MEAS - TAPSE (>1.6): 1.8 CM2
BH CV VAS BP RIGHT ARM: NORMAL MMHG
BH CV XLRA - RV BASE: 3.9 CM
BH CV XLRA - RV LENGTH: 7.8 CM
BH CV XLRA - RV MID: 3.2 CM
BH CV XLRA - TDI S': 14 CM/SEC
BUN BLD-MCNC: 17 MG/DL (ref 8–23)
BUN/CREAT SERPL: 10.4 (ref 7–25)
C PNEUM DNA NPH QL NAA+NON-PROBE: NOT DETECTED
CALCIUM SPEC-SCNC: 8.6 MG/DL (ref 8.8–10.5)
CHLORIDE SERPL-SCNC: 99 MMOL/L (ref 98–107)
CO2 SERPL-SCNC: 27.8 MMOL/L (ref 22–29)
CREAT BLD-MCNC: 1.64 MG/DL (ref 0.76–1.27)
D-LACTATE SERPL-SCNC: 2.8 MMOL/L (ref 0.5–2)
DEPRECATED RDW RBC AUTO: 43.6 FL (ref 37–54)
E/E' RATIO: 11
EOSINOPHIL # BLD AUTO: 0 10*3/MM3 (ref 0.1–0.3)
EOSINOPHIL NFR BLD AUTO: 0 % (ref 0–4)
ERYTHROCYTE [DISTWIDTH] IN BLOOD BY AUTOMATED COUNT: 13.4 % (ref 11.5–14.5)
FLUAV H1 2009 PAND RNA NPH QL NAA+PROBE: NOT DETECTED
FLUAV H1 HA GENE NPH QL NAA+PROBE: NOT DETECTED
FLUAV H3 RNA NPH QL NAA+PROBE: NOT DETECTED
FLUAV SUBTYP SPEC NAA+PROBE: NOT DETECTED
FLUBV RNA ISLT QL NAA+PROBE: NOT DETECTED
GFR SERPL CREATININE-BSD FRML MDRD: 41 ML/MIN/1.73
GLUCOSE BLD-MCNC: 312 MG/DL (ref 65–99)
GLUCOSE BLDC GLUCOMTR-MCNC: 208 MG/DL (ref 70–130)
GLUCOSE BLDC GLUCOMTR-MCNC: 238 MG/DL (ref 70–130)
GLUCOSE BLDC GLUCOMTR-MCNC: 264 MG/DL (ref 70–130)
GLUCOSE BLDC GLUCOMTR-MCNC: 294 MG/DL (ref 70–130)
HADV DNA SPEC NAA+PROBE: NOT DETECTED
HBA1C MFR BLD: 9.2 % (ref 4.8–5.6)
HCOV 229E RNA SPEC QL NAA+PROBE: NOT DETECTED
HCOV HKU1 RNA SPEC QL NAA+PROBE: NOT DETECTED
HCOV NL63 RNA SPEC QL NAA+PROBE: NOT DETECTED
HCOV OC43 RNA SPEC QL NAA+PROBE: NOT DETECTED
HCT VFR BLD AUTO: 39 % (ref 42–52)
HGB BLD-MCNC: 12.9 G/DL (ref 14–18)
HMPV RNA NPH QL NAA+NON-PROBE: NOT DETECTED
HPIV1 RNA SPEC QL NAA+PROBE: NOT DETECTED
HPIV2 RNA SPEC QL NAA+PROBE: NOT DETECTED
HPIV3 RNA NPH QL NAA+PROBE: NOT DETECTED
HPIV4 P GENE NPH QL NAA+PROBE: NOT DETECTED
IMM GRANULOCYTES # BLD: 0.22 10*3/MM3 (ref 0–0.03)
IMM GRANULOCYTES NFR BLD: 1.2 % (ref 0–0.5)
LEFT ATRIUM VOLUME INDEX: 30 ML/M2
LEFT ATRIUM VOLUME: 68 CM3
LV EF 2D ECHO EST: 52 %
LYMPHOCYTES # BLD AUTO: 0.82 10*3/MM3 (ref 0.6–4.8)
LYMPHOCYTES NFR BLD AUTO: 4.5 % (ref 20–45)
M PNEUMO IGG SER IA-ACNC: NOT DETECTED
MAXIMAL PREDICTED HEART RATE: 144 BPM
MCH RBC QN AUTO: 29.3 PG (ref 27–31)
MCHC RBC AUTO-ENTMCNC: 33.1 G/DL (ref 31–37)
MCV RBC AUTO: 88.6 FL (ref 80–94)
MONOCYTES # BLD AUTO: 0.91 10*3/MM3 (ref 0–1)
MONOCYTES NFR BLD AUTO: 5 % (ref 3–8)
NEUTROPHILS # BLD AUTO: 16.08 10*3/MM3 (ref 1.5–8.3)
NEUTROPHILS NFR BLD AUTO: 89.1 % (ref 45–70)
NRBC BLD MANUAL-RTO: 0 /100 WBC (ref 0–0)
PLATELET # BLD AUTO: 154 10*3/MM3 (ref 140–500)
PMV BLD AUTO: 10.8 FL (ref 7.4–10.4)
POTASSIUM BLD-SCNC: 4.1 MMOL/L (ref 3.5–5.2)
RBC # BLD AUTO: 4.4 10*6/MM3 (ref 4.7–6.1)
RHINOVIRUS RNA SPEC NAA+PROBE: NOT DETECTED
RSV RNA NPH QL NAA+NON-PROBE: NOT DETECTED
SODIUM BLD-SCNC: 139 MMOL/L (ref 136–145)
STRESS TARGET HR: 122 BPM
TROPONIN T SERPL-MCNC: 0.12 NG/ML (ref 0–0.03)
TROPONIN T SERPL-MCNC: 0.14 NG/ML (ref 0–0.03)
WBC NRBC COR # BLD: 18.07 10*3/MM3 (ref 4.8–10.8)

## 2017-11-22 PROCEDURE — 93010 ELECTROCARDIOGRAM REPORT: CPT | Performed by: INTERNAL MEDICINE

## 2017-11-22 PROCEDURE — 25010000002 ENOXAPARIN PER 10 MG: Performed by: HOSPITALIST

## 2017-11-22 PROCEDURE — 83036 HEMOGLOBIN GLYCOSYLATED A1C: CPT | Performed by: INTERNAL MEDICINE

## 2017-11-22 PROCEDURE — 84484 ASSAY OF TROPONIN QUANT: CPT | Performed by: HOSPITALIST

## 2017-11-22 PROCEDURE — 63710000001 INSULIN DETEMIR PER 5 UNITS

## 2017-11-22 PROCEDURE — 84484 ASSAY OF TROPONIN QUANT: CPT | Performed by: NURSE PRACTITIONER

## 2017-11-22 PROCEDURE — 25010000002 CEFTRIAXONE PER 250 MG: Performed by: HOSPITALIST

## 2017-11-22 PROCEDURE — 99233 SBSQ HOSP IP/OBS HIGH 50: CPT | Performed by: NURSE PRACTITIONER

## 2017-11-22 PROCEDURE — 85025 COMPLETE CBC W/AUTO DIFF WBC: CPT | Performed by: HOSPITALIST

## 2017-11-22 PROCEDURE — 80048 BASIC METABOLIC PNL TOTAL CA: CPT | Performed by: HOSPITALIST

## 2017-11-22 PROCEDURE — 93306 TTE W/DOPPLER COMPLETE: CPT | Performed by: INTERNAL MEDICINE

## 2017-11-22 PROCEDURE — 83605 ASSAY OF LACTIC ACID: CPT | Performed by: HOSPITALIST

## 2017-11-22 PROCEDURE — 93005 ELECTROCARDIOGRAM TRACING: CPT | Performed by: INTERNAL MEDICINE

## 2017-11-22 PROCEDURE — 63710000001 INSULIN DETEMIR PER 5 UNITS: Performed by: INTERNAL MEDICINE

## 2017-11-22 PROCEDURE — 93971 EXTREMITY STUDY: CPT

## 2017-11-22 PROCEDURE — 25010000002 VANCOMYCIN PER 500 MG: Performed by: HOSPITALIST

## 2017-11-22 PROCEDURE — 93306 TTE W/DOPPLER COMPLETE: CPT

## 2017-11-22 PROCEDURE — 63710000001 INSULIN ASPART PER 5 UNITS: Performed by: HOSPITALIST

## 2017-11-22 PROCEDURE — 76775 US EXAM ABDO BACK WALL LIM: CPT

## 2017-11-22 PROCEDURE — 82962 GLUCOSE BLOOD TEST: CPT

## 2017-11-22 PROCEDURE — 63710000001 INSULIN ASPART PER 5 UNITS

## 2017-11-22 PROCEDURE — 63710000001 INSULIN DETEMIR PER 5 UNITS: Performed by: NURSE PRACTITIONER

## 2017-11-22 PROCEDURE — 0399T ADULT TRANSTHORACIC ECHO COMPLETE W/ CONT IF NECESSARY PER PROTOCOL: CPT | Performed by: INTERNAL MEDICINE

## 2017-11-22 PROCEDURE — 0399T HC MYOCARDL STRAIN IMAG QUAN ASSMT PER SESS: CPT

## 2017-11-22 RX ORDER — DOCUSATE SODIUM 100 MG/1
100 CAPSULE, LIQUID FILLED ORAL 2 TIMES DAILY
Status: DISCONTINUED | OUTPATIENT
Start: 2017-11-22 | End: 2017-11-27 | Stop reason: HOSPADM

## 2017-11-22 RX ORDER — DONEPEZIL HYDROCHLORIDE 5 MG/1
5 TABLET, FILM COATED ORAL NIGHTLY
Status: DISCONTINUED | OUTPATIENT
Start: 2017-11-22 | End: 2017-11-27 | Stop reason: HOSPADM

## 2017-11-22 RX ORDER — LEVOTHYROXINE SODIUM 0.1 MG/1
100 TABLET ORAL DAILY
Status: DISCONTINUED | OUTPATIENT
Start: 2017-11-22 | End: 2017-11-27 | Stop reason: HOSPADM

## 2017-11-22 RX ORDER — LISINOPRIL 20 MG/1
20 TABLET ORAL DAILY
Status: DISCONTINUED | OUTPATIENT
Start: 2017-11-22 | End: 2017-11-27 | Stop reason: HOSPADM

## 2017-11-22 RX ORDER — ASPIRIN 325 MG
325 TABLET, DELAYED RELEASE (ENTERIC COATED) ORAL DAILY
Status: DISCONTINUED | OUTPATIENT
Start: 2017-11-22 | End: 2017-11-27 | Stop reason: HOSPADM

## 2017-11-22 RX ORDER — CHOLECALCIFEROL (VITAMIN D3) 1250 MCG
50000 CAPSULE ORAL WEEKLY
Status: DISCONTINUED | OUTPATIENT
Start: 2017-11-22 | End: 2017-11-27 | Stop reason: HOSPADM

## 2017-11-22 RX ORDER — GABAPENTIN 300 MG/1
600 CAPSULE ORAL EVERY 8 HOURS SCHEDULED
Status: DISCONTINUED | OUTPATIENT
Start: 2017-11-22 | End: 2017-11-27 | Stop reason: HOSPADM

## 2017-11-22 RX ORDER — ATORVASTATIN CALCIUM 10 MG/1
10 TABLET, FILM COATED ORAL DAILY
Status: DISCONTINUED | OUTPATIENT
Start: 2017-11-22 | End: 2017-11-27 | Stop reason: HOSPADM

## 2017-11-22 RX ADMIN — GABAPENTIN 600 MG: 300 CAPSULE ORAL at 13:57

## 2017-11-22 RX ADMIN — VANCOMYCIN HYDROCHLORIDE 2000 MG: 1 INJECTION, POWDER, LYOPHILIZED, FOR SOLUTION INTRAVENOUS at 14:59

## 2017-11-22 RX ADMIN — MICONAZOLE NITRATE: 20 CREAM TOPICAL at 20:54

## 2017-11-22 RX ADMIN — SODIUM CHLORIDE 125 ML/HR: 9 INJECTION, SOLUTION INTRAVENOUS at 07:02

## 2017-11-22 RX ADMIN — ACETAMINOPHEN 650 MG: 325 TABLET, FILM COATED ORAL at 20:48

## 2017-11-22 RX ADMIN — MICONAZOLE NITRATE: 20 CREAM TOPICAL at 13:57

## 2017-11-22 RX ADMIN — LINAGLIPTIN 5 MG: 5 TABLET, FILM COATED ORAL at 13:19

## 2017-11-22 RX ADMIN — ASPIRIN 325 MG: 325 TABLET, DELAYED RELEASE ORAL at 18:31

## 2017-11-22 RX ADMIN — INSULIN ASPART 4 UNITS: 100 INJECTION, SOLUTION INTRAVENOUS; SUBCUTANEOUS at 17:27

## 2017-11-22 RX ADMIN — INSULIN DETEMIR 20 UNITS: 100 INJECTION, SOLUTION SUBCUTANEOUS at 00:03

## 2017-11-22 RX ADMIN — INSULIN ASPART 6 UNITS: 100 INJECTION, SOLUTION INTRAVENOUS; SUBCUTANEOUS at 08:11

## 2017-11-22 RX ADMIN — ENOXAPARIN SODIUM 30 MG: 30 INJECTION SUBCUTANEOUS at 20:53

## 2017-11-22 RX ADMIN — INSULIN ASPART 4 UNITS: 100 INJECTION, SOLUTION INTRAVENOUS; SUBCUTANEOUS at 20:48

## 2017-11-22 RX ADMIN — ATORVASTATIN CALCIUM 10 MG: 10 TABLET, FILM COATED ORAL at 13:19

## 2017-11-22 RX ADMIN — INSULIN ASPART 7 UNITS: 100 INJECTION, SOLUTION INTRAVENOUS; SUBCUTANEOUS at 00:03

## 2017-11-22 RX ADMIN — INSULIN DETEMIR 20 UNITS: 100 INJECTION, SOLUTION SUBCUTANEOUS at 08:11

## 2017-11-22 RX ADMIN — DONEPEZIL HYDROCHLORIDE 5 MG: 5 TABLET, FILM COATED ORAL at 20:55

## 2017-11-22 RX ADMIN — SODIUM CHLORIDE 125 ML/HR: 9 INJECTION, SOLUTION INTRAVENOUS at 18:44

## 2017-11-22 RX ADMIN — OFLOXACIN 50000 UNITS: 300 TABLET, COATED ORAL at 13:18

## 2017-11-22 RX ADMIN — LISINOPRIL 20 MG: 20 TABLET ORAL at 13:18

## 2017-11-22 RX ADMIN — DOCUSATE SODIUM 100 MG: 100 CAPSULE, LIQUID FILLED ORAL at 13:19

## 2017-11-22 RX ADMIN — CEFTRIAXONE 1 G: 1 INJECTION, SOLUTION INTRAVENOUS at 20:49

## 2017-11-22 RX ADMIN — GABAPENTIN 600 MG: 300 CAPSULE ORAL at 23:17

## 2017-11-22 RX ADMIN — LEVOTHYROXINE SODIUM 100 MCG: 100 TABLET ORAL at 13:19

## 2017-11-22 RX ADMIN — DOCUSATE SODIUM 100 MG: 100 CAPSULE, LIQUID FILLED ORAL at 17:27

## 2017-11-22 RX ADMIN — INSULIN ASPART 6 UNITS: 100 INJECTION, SOLUTION INTRAVENOUS; SUBCUTANEOUS at 12:30

## 2017-11-22 RX ADMIN — POLYETHYLENE GLYCOL 3350 17 G: 17 POWDER, FOR SOLUTION ORAL at 13:19

## 2017-11-22 RX ADMIN — INSULIN DETEMIR 22 UNITS: 100 INJECTION, SOLUTION SUBCUTANEOUS at 20:48

## 2017-11-22 NOTE — NURSING NOTE
Consult received for sdti and rle redness.  Patient states he had a fall off the toilet.  After assessing buttocks/sacrum it appears there is a small bruised area but I would not consider this a DTI.  Z guard was ordered to apply to buttocks/sacrum for barrier protection as well as waffle cushion/mattress for pressure reduction.  Right foot and leg are with erythema.  I would recommend to continue what has been ordered, topical antifungal cream and IV abx for cellulitis.  Thank you for consult and if any questions, please do not hesitate to re-consult or give me a call.

## 2017-11-22 NOTE — SIGNIFICANT NOTE
11/22/17 1328   Rehab Treatment   Discipline occupational therapist   Rehab Evaluation   Evaluation Not Performed (pt receiving ultrasound, unavailable)

## 2017-11-22 NOTE — PROGRESS NOTES
"SERVICE: [facility] HOSPITALIST    CHIEF COMPLAINT: f/u sepsis/UTI/    SUBJECTIVE: The patient reports that his concerns today are a mild headache and left knee pain that is chronic. He reports the swelling in his legs is also chronic and that his right lower extremity has been reddened for \"a while\". He reports severe pain with walking that is unchanged from previous and that he was supposed to get LTKA. He reports he was constipated at home, took an OTC laxative and had multiple BMs yesterday with syncope while having a BM. He reported no prior symptoms/chest pain/soa. He reports he has COPD and chronic mild soa/dry cough that is unchanged from his baseline. He denies sore throat/neck pain/chest pain/n/v/d/abdominal pain or other new concerns.    OBJECTIVE:    /64 (BP Location: Left arm, Patient Position: Lying)  Pulse 74  Temp 98.8 °F (37.1 °C) (Oral)   Resp 20  Ht 74\" (188 cm)  Wt (!) 301 lb 5 oz (137 kg)  SpO2 98%  BMI 38.69 kg/m2    MEDS/LABS REVIEWED AND ORDERED    atorvastatin 10 mg Oral Daily   ceftriaxone 1 g Intravenous Q24H   docusate sodium 100 mg Oral BID   donepezil 5 mg Oral Nightly   enoxaparin 30 mg Subcutaneous Q24H   gabapentin 600 mg Oral Q8H   insulin aspart 0-9 Units Subcutaneous 4x Daily AC & at Bedtime   insulin detemir 22 Units Subcutaneous BID   levothyroxine 100 mcg Oral Daily   linagliptin 5 mg Oral Daily   lisinopril 20 mg Oral Daily   miconazole  Topical Q12H   polyethylene glycol 17 g Oral Daily   vancomycin 2,000 mg Intravenous Q18H   Vitamin D3 50,000 Units Oral Weekly     Physical Exam   Constitutional: He is oriented to person, place, and time. He appears well-developed and well-nourished.   obese   HENT:   Head: Normocephalic.   Left temporal area abrasion  No nuccal rigidity noted   Eyes: EOM are normal. Pupils are equal, round, and reactive to light.   Cardiovascular: Normal rate, regular rhythm and normal heart sounds.  Exam reveals no gallop and " no friction rub.    No murmur heard.  Pulmonary/Chest: Effort normal and breath sounds normal. No respiratory distress. He has no wheezes. He has no rales.   Abdominal: Soft. Bowel sounds are normal. He exhibits no distension. There is no tenderness.   Musculoskeletal:   R>L lower extremity 2+ edema, trace pitting   Neurological: He is alert and oriented to person, place, and time.   Skin: Skin is warm and dry.   Erythema RLE mid tibia to foot with lichenification and fungal appearance surrounding all toes, cracked without purulent drainage noted.    Psychiatric: He has a normal mood and affect. His behavior is normal.   Vitals reviewed.    LAB/DIAGNOSTICS:    Lab Results (last 24 hours)     Procedure Component Value Units Date/Time    CBC & Differential [[id number]] Collected:  11/21/17 1557    Specimen:  Blood Updated:  11/21/17 1604    Narrative:       The following orders were created for panel order CBC & Differential.  Procedure                               Abnormality         Status                     ---------                               -----------         ------                     CBC Auto Differential[[id number]]        Abnormal            Final result                 Please view results for these tests on the individual orders.    CBC Auto Differential [[id number]]  (Abnormal) Collected:  11/21/17 1557    Specimen:  Blood Updated:  11/21/17 1604     WBC 21.88 (H) 10*3/mm3      RBC 4.91 10*6/mm3      Hemoglobin 14.3 g/dL      Hematocrit 43.0 %      MCV 87.6 fL      MCH 29.1 pg      MCHC 33.3 g/dL      RDW 13.2 %      RDW-SD 41.8 fl      MPV 10.8 (H) fL      Platelets 169 10*3/mm3      Neutrophil % 91.3 (H) %      Lymphocyte % 2.3 (L) %      Monocyte % 5.0 %      Eosinophil % 0.0 %      Basophil % 0.2 %      Immature Grans % 1.2 (H) %      Neutrophils, Absolute 19.97 (H) 10*3/mm3      Lymphocytes, Absolute 0.50 (L) 10*3/mm3      Monocytes, Absolute 1.10 (H) 10*3/mm3      Eosinophils, Absolute 0.00 (L)  10*3/mm3      Basophils, Absolute 0.04 10*3/mm3      Immature Grans, Absolute 0.27 (H) 10*3/mm3      nRBC 0.0 /100 WBC     Protime-INR [[id number]]  (Abnormal) Collected:  11/21/17 1557    Specimen:  Blood Updated:  11/21/17 1624     Protime 14.3 Seconds      INR 1.11 (H)    Narrative:       Therapeutic Ranges for INR: 2.0-3.0 (PT 20-30)                              2.5-3.5 (PT 25-34)    aPTT [[id number]]  (Normal) Collected:  11/21/17 1557    Specimen:  Blood Updated:  11/21/17 1624     PTT 33.3 seconds     Narrative:       PTT = The equivalent PTT values for the therapeutic range of heparin levels at 0.1 to 0.7 U/ml are 53 to 110 seconds.    Urinalysis With / Culture If Indicated - Urine, Catheter [[id number]]  (Abnormal) Collected:  11/21/17 1552    Specimen:  Urine from Urine, Catheter Updated:  11/21/17 1624     Color, UA Yellow     Appearance, UA Clear     pH, UA 8.5 (H)     Specific Gravity, UA 1.020     Glucose,  mg/dL (2+) (A)     Ketones, UA 40 mg/dL (2+) (A)     Bilirubin, UA Negative     Blood, UA Large (3+) (A)     Protein, UA >=300 mg/dL (3+) (A)     Leuk Esterase, UA Negative     Nitrite, UA Negative     Urobilinogen, UA 0.2 E.U./dL    Urinalysis, Microscopic Only - Urine, Clean Catch [[id number]]  (Abnormal) Collected:  11/21/17 1552    Specimen:  Urine from Urine, Catheter Updated:  11/21/17 1624     RBC, UA Too Numerous to Count (A) /HPF      WBC, UA 3-5 (A) /HPF      Bacteria, UA 1+ (A) /HPF      Squamous Epithelial Cells, UA 3-6 (A) /HPF      Hyaline Casts, UA None Seen /LPF      Methodology Manual Light Microscopy    Lactic Acid, Plasma [[id number]]  (Abnormal) Collected:  11/21/17 1557    Specimen:  Blood Updated:  11/21/17 1631     Lactate 3.1 (C) mmol/L     Comprehensive Metabolic Panel [[id number]]  (Abnormal) Collected:  11/21/17 1555    Specimen:  Blood Updated:  11/21/17 1631     Glucose 330 (H) mg/dL      BUN 15 mg/dL      Creatinine 1.85 (H) mg/dL      Sodium 136 mmol/L      Potassium  4.6 mmol/L      Chloride 93 (L) mmol/L      CO2 24.6 mmol/L      Calcium 9.6 mg/dL      Total Protein 7.6 g/dL      Albumin 3.90 g/dL      ALT (SGPT) 17 U/L      AST (SGOT) 34 U/L       Specimen hemolyzed.  Results may be affected.        Alkaline Phosphatase 62 U/L      Total Bilirubin 0.6 mg/dL      eGFR Non African Amer 36 (L) mL/min/1.73      Globulin 3.7 gm/dL      A/G Ratio 1.1 g/dL      BUN/Creatinine Ratio 8.1     Anion Gap 18.4 mmol/L     Narrative:       The MDRD GFR formula is only valid for adults with stable renal function between ages 18 and 70.    Troponin [[id number]]  (Normal) Collected:  11/21/17 1557    Specimen:  Blood Updated:  11/21/17 1631     Troponin T 0.014 ng/mL     Narrative:       Troponin T Reference Ranges:  Less than 0.03 ng/mL:    Negative for AMI  0.03 to 0.09 ng/mL:      Indeterminant for AMI  Greater than 0.09 ng/mL: Positive for AMI    Influenza Antigen, Rapid - Swab, Nasopharynx [[id number]]  (Normal) Collected:  11/21/17 1610    Specimen:  Swab from Nasopharynx Updated:  11/21/17 1643     Influenza A Ag, EIA Negative     Influenza B Ag, EIA Negative    Kirby Draw [[id number]] Collected:  11/21/17 1557    Specimen:  Blood Updated:  11/21/17 1701    Narrative:       The following orders were created for panel order Kirby Draw.  Procedure                               Abnormality         Status                     ---------                               -----------         ------                     Light Blue Top[[id number]]                                                              Green Top (Gel)[[id number]]                                  Final result               Lavender Top[[id number]]                                     Final result               Gold Top - SST[[id number]]                                                                Please view results for these tests on the individual orders.    Green Top (Gel) [[id number]] Collected:  11/21/17 1557    Specimen:  Blood  Updated:  11/21/17 1701     Extra Tube Hold for add-ons.      Auto resulted.       Lavender Top [[id number]] Collected:  11/21/17 1557    Specimen:  Blood Updated:  11/21/17 1701     Extra Tube hold for add-on      Auto resulted       TSH [[id number]]  (Normal) Collected:  11/21/17 1557    Specimen:  Blood Updated:  11/21/17 1711     TSH 2.950 mIU/mL     Urine Drug Screen - [[id number]]  (Normal) Collected:  11/21/17 1756    Specimen:  Urine Updated:  11/21/17 1809     THC, Screen, Urine Negative     Phencyclidine (PCP), Urine Negative     Cocaine Screen, Urine Negative     Methamphetamine, Urine Negative     Opiate Screen Negative     Amphetamine Screen, Urine Negative     Benzodiazepine Screen, Urine Negative     Tricyclic Antidepressants Screen Negative     Methadone Screen, Urine Negative     Barbiturates Screen, Urine Negative     Oxycodone Screen, Urine Negative     Propoxyphene Screen Negative     Buprenorphine, Screen, Urine Negative    Narrative:       Urine drug screen results are to be used for medical purposes only.  They are not to be used for legal purposes such as employment testing.  Negative results do not necessarily mean the complete absence of a subtance, but rather that the result is less than the cutoff for that substance.  Positive results are unconfirmed and considered Preliminary Positive.  [facility] does not automatically confirm Postitive Unconfirmed results.  The physician may request (order) an Unconfirmed Positive result to be sent out for confirmation.      Negative Thresholds for Drugs Screened:    THC screen, urine                          50 ng/ml  Phenycyclidine (PCP), urine                25 ng/ml  Cocaine screen, urine                     150 ng/ml  Methamphetamine, urine                    500 ng/ml  Opiate screen, urine                      100 ng/ml  Amphetamine screen, urine                 500 ng/ml  Benzodiazepine screen, urine              150 ng/ml  Tricyclic  Antidepressants screen, urine   300 ng/ml  Methadone screen, urine                   200 ng/ml  Barbiturates screen, urine                200 ng/ml  Oxycodone screen, urine                   100 ng/ml  Propoxyphene screen, urine                300 ng/ml  Buprenorphine screen, urine                10 ng/ml    Lactic Acid, Reflex Timer [[id number]] Collected:  11/21/17 1557    Specimen:  Blood Updated:  11/21/17 1946     Extra Tube Hold for add-ons.      Auto resulted.       Lactic Acid, Reflex [[id number]]  (Abnormal) Collected:  11/21/17 2011    Specimen:  Blood Updated:  11/21/17 2050     Lactate 2.9 (C) mmol/L     POC Glucose Fingerstick [[id number]]  (Abnormal) Collected:  11/21/17 2107    Specimen:  Blood Updated:  11/21/17 2113     Glucose 312 (H) mg/dL     Narrative:       Meter: [id number] : [id number] Casi LIVINGSTON    Procalcitonin [[id number]]  (Abnormal) Collected:  11/21/17 2058    Specimen:  Blood Updated:  11/21/17 2130     Procalcitonin 2.33 (C) ng/mL     Narrative:       As a Marker for Sepsis (Non-Neonates):   1. <0.5 ng/mL represents a low risk of severe sepsis and/or septic shock.  2. >2 ng/mL represents a high risk of severe sepsis and/or septic shock.    As a Marker for Lower Respiratory Tract Infections that require antibiotic therapy:    PCT on Admission     Antibiotic Therapy       6-12 Hrs later  > 0.5                Strongly Recommended             >0.25 - <0.5         Recommended  0.1 - 0.25           Discouraged              Remeasure/reassess PCT  <0.1                 Strongly Discouraged     Remeasure/reassess PCT                     PCT values of < 0.5 ng/mL do not exclude an infection, because localized infections (without systemic signs) may be associated with such low concentrations, or a systemic infection in its initial stages (< 6 hours). Furthermore, increased PCT can occur without infection. PCT concentrations between 0.5 and 2.0 ng/mL should be interpreted taking  into account the patient's history. It is recommended to retest PCT within 6-24 hours if any concentrations < 2 ng/mL are obtained.    Blood Culture - Blood, [[id number]]  (Normal) Collected:  11/21/17 1606    Specimen:  Blood from Wrist, Right Updated:  11/22/17 0416     Blood Culture No growth at less than 24 hours    Blood Culture - Blood, [[id number]]  (Normal) Collected:  11/21/17 1557    Specimen:  Blood from Arm, Right Updated:  11/22/17 0416     Blood Culture No growth at less than 24 hours    Lactic Acid, Plasma [[id number]]  (Abnormal) Collected:  11/22/17 0415    Specimen:  Blood Updated:  11/22/17 0444     Lactate 2.8 (C) mmol/L     CBC Auto Differential [[id number]]  (Abnormal) Collected:  11/22/17 0415    Specimen:  Blood Updated:  11/22/17 0450     WBC 18.07 (H) 10*3/mm3      RBC 4.40 (L) 10*6/mm3      Hemoglobin 12.9 (L) g/dL      Hematocrit 39.0 (L) %      MCV 88.6 fL      MCH 29.3 pg      MCHC 33.1 g/dL      RDW 13.4 %      RDW-SD 43.6 fl      MPV 10.8 (H) fL      Platelets 154 10*3/mm3      Neutrophil % 89.1 (H) %      Lymphocyte % 4.5 (L) %      Monocyte % 5.0 %      Eosinophil % 0.0 %      Basophil % 0.2 %      Immature Grans % 1.2 (H) %      Neutrophils, Absolute 16.08 (H) 10*3/mm3      Lymphocytes, Absolute 0.82 10*3/mm3      Monocytes, Absolute 0.91 10*3/mm3      Eosinophils, Absolute 0.00 (L) 10*3/mm3      Basophils, Absolute 0.04 10*3/mm3      Immature Grans, Absolute 0.22 (H) 10*3/mm3      nRBC 0.0 /100 WBC     Hemoglobin A1c [[id number]]  (Abnormal) Collected:  11/22/17 0415    Specimen:  Blood Updated:  11/22/17 0529     Hemoglobin A1C 9.20 (H) %     Narrative:       Hemoglobin A1C Ranges:    Increased Risk for Diabetes  5.7% to 6.4%  Diabetes                     >= 6.5%  Diabetic Goal                < 7.0%    Basic Metabolic Panel [[id number]]  (Abnormal) Collected:  11/22/17 0415    Specimen:  Blood Updated:  11/22/17 0534     Glucose 312 (H) mg/dL      BUN 17 mg/dL      Creatinine 1.64 (H)  mg/dL      Sodium 139 mmol/L      Potassium 4.1 mmol/L      Chloride 99 mmol/L      CO2 27.8 mmol/L      Calcium 8.6 (L) mg/dL      eGFR Non African Amer 41 (L) mL/min/1.73      BUN/Creatinine Ratio 10.4     Anion Gap 12.2 mmol/L     Narrative:       The MDRD GFR formula is only valid for adults with stable renal function between ages 18 and 70.    Urine Culture - Urine, Urine, Clean Catch [[id number]]  (Normal) Collected:  11/21/17 1552    Specimen:  Urine from Urine, Catheter Updated:  11/22/17 0759     Urine Culture No growth    POC Glucose Fingerstick [[id number]]  (Abnormal) Collected:  11/22/17 0804    Specimen:  Blood Updated:  11/22/17 0810     Glucose 294 (H) mg/dL     Narrative:       Meter: [id number] : [id number] Allen Foxtany Nursing Assistant    Respiratory Panel, PCR - Swab, Nasopharynx [[id number]]  (Normal) Collected:  11/21/17 2014    Specimen:  Swab from Nasopharynx Updated:  11/22/17 0905     ADENOVIRUS, PCR Not Detected     Coronavirus 229E Not Detected     Coronavirus HKU1 Not Detected     Coronavirus NL63 Not Detected     Coronavirus OC43 Not Detected     Human Metapneumovirus Not Detected     Human Rhinovirus/Enterovirus Not Detected     Influenza B PCR Not Detected     Parainfluenza Virus 1 Not Detected     Parainfluenza Virus 2 Not Detected     Parainfluenza Virus 3 Not Detected     Parainfluenza Virus 4 Not Detected     Bordetella pertussis pcr Not Detected     Influenza 2009 H1N1 by PCR Not Detected     Chlamydophila pneumoniae PCR Not Detected     Mycoplasma pneumo by PCR Not Detected     Influenza A PCR Not Detected     Influenza A H3 Not Detected     Influenza A H1 Not Detected     RSV, PCR Not Detected    Blood Culture ID, PCR - Blood, [[id number]]  (Abnormal) Collected:  11/21/17 1557    Specimen:  Blood from Arm, Right Updated:  11/22/17 1041     BCID, PCR Streptococcus spp, not A, B, or pneumoniae. Identification by BCID PCR. (C)    POC Glucose Fingerstick [[id number]]   "(Abnormal) Collected:  11/22/17 1157    Specimen:  Blood Updated:  11/22/17 1206     Glucose 264 (H) mg/dL     Narrative:       Meter: [id number] : [id number] Americo Andres RN Validator    Troponin [[id number]]  (Abnormal) Collected:  11/22/17 1256    Specimen:  Blood Updated:  11/22/17 1334     Troponin T 0.141 (C) ng/mL     Narrative:       Troponin T Reference Ranges:  Less than 0.03 ng/mL:    Negative for AMI  0.03 to 0.09 ng/mL:      Indeterminant for AMI  Greater than 0.09 ng/mL: Positive for AMI        ASSESSMENT/PLAN:  1. Sepsis secondary to:  2. Possible UTI: prelim culture shows no growth  3. Possible bacteremia: 1/2 BC +strep species  4. Cellulitis RLE: consult wound RN, add miconazole cream   Check strep pneumo urinary antigen  Temp 104.4 in ER, WBCC trending down at 18.07  Procal 2.33, trend  Initially received doses of Vanc/Zosyn/Rocephin with fever resolved  Continue Vanc/Rocephin pending further culture data  Clinically much improved, only source of infection appears to be RLE that is not that impressive  Otherwise, has OA with multiple joint pains    5. Vasovagal syncope: consult cardiology  occurred while having a BM  CT head negative for acute findings  Mild headache today  PT/OT consulted    6. Positive troponin/Inferior infarct: noted on EKG, consult cardiology  Echo report:  · \"Left ventricular systolic function is normal. Estimated EF = 52%.  · Left ventricular diastolic dysfunction (grade I) consistent with impaired relaxation.  · calcification of the aortic valve  · Mild dilation of the aortic root is present.\"  EKG with new inferior changes  Troponin 0.141  Consult cardiology  Continue cardiac monitoring, no active chest pain    7. Uncontrolled DM2: A1C 9.2%, last A1C 12.4% per Dr. Mata  Add home levemir 20 units every 12 hours/tradjenta 5 mg daily (substitute for januvia)  Hold metformin and 70/30 insulin (reports he takes both 70/30 and levemir)  Accuchecks ac/hs with moderate dose " "SSI  CCC/renal diet    8. CKD 3: unknown baseline creatinine  Creatinine per Dr. Mata 1/4/17 was 1.4  Creatinine down from 1.85 on admit to 1.64 today, monitor, continue IVFs  Check renal ultrasound, reports h/o \"left kidney damage\"  Monitor    9. COPD: no acute issues on duonebs every 6 hours as needed    10. Hypothyroidism: TSH normal, add home levothyroxine 100 mcg daily    11. Hypertension: BP elevated, add home lisinopril 20 mg daily  Monitor renal function    12. Dementia: add home aricept 5 mg nightly  At baseline today per wife and son    13. Obesity:  Body mass index is 38.69 kg/(m^2).     14. Constipation: add miralax/colace  Monitor     "

## 2017-11-22 NOTE — PLAN OF CARE
Problem: Patient Care Overview (Adult)  Goal: Plan of Care Review  Outcome: Ongoing (interventions implemented as appropriate)    11/22/17 1625   Coping/Psychosocial Response Interventions   Plan Of Care Reviewed With patient;spouse;family   Patient Care Overview   Progress progress toward functional goals as expected   Outcome Evaluation   Outcome Summary/Follow up Plan Pt. up OOB in chair this shift. Multiple diagnostic studies done with negative results. Troponin slightly elevated at 0.014. Blood cx with gm+ coccxi in clusters. Removed from Droplet Isolation d/t neg. Respiratory panel. PT to work with post-Cardiology workup. Wound consult in for RLE. Pt. on IV abx.        Goal: Adult Individualization and Mutuality  Outcome: Ongoing (interventions implemented as appropriate)    11/22/17 1625   Mutuality/Individual Preferences   What Anxieties, Fears or Concerns Do You Have About Your Health or Care? States he doesn't understand the need for all the different diagnostic exams   Individualization   Patient Specific Preferences none stated         Problem: Sepsis (Adult)  Goal: Signs and Symptoms of Listed Potential Problems Will be Absent or Manageable (Sepsis)  Outcome: Ongoing (interventions implemented as appropriate)    11/22/17 1625   Sepsis   Problems Assessed (Sepsis) glycemic control impaired;hypoxia/hypoxemia;infection progression;situational response;hypoperfusion/hemodynamic instability   Problems Present (Sepsis) glycemic control, impaired;progression of infection         Problem: Fall Risk (Adult)  Goal: Identify Related Risk Factors and Signs and Symptoms  Outcome: Ongoing (interventions implemented as appropriate)    11/22/17 1625   Fall Risk   Fall Risk: Related Risk Factors age-related changes;confusion/agitation;fatigue/slow reaction;gait/mobility problems;history of falls;homeostatic imbalance;environment unfamiliar   Fall Risk: Signs and Symptoms presence of risk factors       Goal: Absence of  Falls  Outcome: Ongoing (interventions implemented as appropriate)    11/22/17 1625   Fall Risk (Adult)   Absence of Falls making progress toward outcome         Problem: Infection, Risk/Actual (Adult)  Goal: Identify Related Risk Factors and Signs and Symptoms  Outcome: Ongoing (interventions implemented as appropriate)    11/22/17 1625   Infection, Risk/Actual   Infection, Risk/Actual: Related Risk Factors age extremes;chronic illness/condition;exposure to microbes;skin integrity impairment;tissue perfusion altered   Signs and Symptoms (Infection, Risk/Actual) blood glucose changes;lab value changes;mental/behavioral changes;cultures positive;weakness       Goal: Infection Prevention/Resolution  Outcome: Ongoing (interventions implemented as appropriate)    11/22/17 1625   Infection, Risk/Actual (Adult)   Infection Prevention/Resolution making progress toward outcome         Problem: Confusion, Acute (Adult)  Goal: Identify Related Risk Factors and Signs and Symptoms  Outcome: Ongoing (interventions implemented as appropriate)    11/22/17 1625   Confusion, Acute   Related Risk Factors (Acute Confusion) advanced age;cognitive impairment;infection;mobility decreased;neurological impairment   Signs and Symptoms (Acute Confusion) acute onset of symptoms;disorientation;memory disturbed;perceptions disturbed;reasoning/planning disturbed;thought process diminished/disorganized;understanding disturbed       Goal: Cognitive/Functional Impairments Minimized  Outcome: Ongoing (interventions implemented as appropriate)    11/22/17 1625   Confusion, Acute (Adult)   Cognitive/Functional Impairments Minimized other (see comments)  (IV abx started, BS managed with insulin, Wound consult RLE)       Goal: Safety  Outcome: Ongoing (interventions implemented as appropriate)    11/22/17 1625   Confusion, Acute (Adult)   Safety making progress toward outcome

## 2017-11-22 NOTE — NURSING NOTE
"Discharge Planning Assessment   Temitope Castillo     Patient Name: Froilan Helton  MRN: [id number]  Today's Date: 11/22/2017    Admit Date: 11/21/2017          Discharge Needs Assessment       11/22/17 1431    Living Environment    Lives With spouse    Home Accessibility no concerns    Transportation Available car    Living Environment    Provides Primary Care For no one    Quality Of Family Relationships supportive    Able to Return to Prior Living Arrangements yes    Discharge Needs Assessment    Readmission Within The Last 30 Days no previous admission in last 30 days    Equipment Currently Used at Home cane, straight;walker, rolling    Discharge Planning Comments Spoke with patient at bedside, permission given to speak with family present. Face sheet verified. Patient states he is going to change his PCP back to Dr Yovanny Clarke in [facility], face sheet updated. Patient states he is indepndent of ADLs including driving prior to admission. He says he uses a straight cane but also has a rolling walker and scooter that he does not use. He denies use of  additional DME, home 02,cpap/bipap. He states he thinks he needs oxygen \" but only at night\".  Patients wife states she has used [facility] in the past, but the patient has not used HH or rehab services in the past. The patient does not have a living will and would like information about creating one, spiritual consult entered.Patient uses [facility] [place] and denies issues obtaining medications. He plans to return home at [place] and does not anticipate additional needs. Ashley RODRIGUES informed that patient thinks he may need oxygen at night she will  inform MD of patient concern. Will continue to follow.            Discharge Plan       11/22/17 1451    Case Management/Social Work Plan    Plan plan home, continue to assess needs    Additional Comments Spoke with patient at bedside, permission given to speak with family present. Face sheet verified. Patient states he " "is going to change his PCP back to Dr Akin Clarke in [facility]. Patient states he is indepndent of ADLs including driving prior to admission. He says he uses a straight cane but also has a rolling walker and scooter that he does not use. He denies use of  additional DME, home 02,cpap/bipap. He states he thinks he needs oxygen \" but only at night\".  Patients wife states she has used [facility] in the past, but the patient has not used HH or rehab services in the past. The patient does not have a living will and would like information about creating one, spiritual consult entered.Patient uses [facility] [place] and denies issues obtaining medications. He plans to return home at [place] and does not anticipate additional needs. Ashley RODRIGUES informed that patient thinks he may need oxygen at night she will  inform MD of patient concern. Will continue to follow        Discharge Placement     No information found                Demographic Summary       11/22/17 3228    Referral Information    Admission Type inpatient    Arrived From home or self-care    Referral Source admission list    Reason For Consult discharge planning    Record Reviewed medical record    Contact Information    Permission Granted to Share Information With ;family/designee    Primary Care Physician Information    Name Yovanny Clarke MD            Functional Status     None            Psychosocial     None            Abuse/Neglect     None            Legal     None            Substance Abuse     None            Patient Forms     None          Titi Ramirez, RAVI    "

## 2017-11-22 NOTE — NURSING NOTE
Called [facility] and requested medical records, medications, labs and diagnostics on pt. They are to fax records from the past year to [phone].     Ashley North RN 11/22/2017

## 2017-11-22 NOTE — H&P
Hospitalist Team      Patient Care Team:  LEATHA Darden as PCP - General (Nurse Practitioner)    History of Present Illness  History of Present Illness  Chief complaint: Fall and fever  Location: Home  Quality/Severity:  MAXIMUM TEMPERATURE of 104.4  Timing/Onset/Duration: The patient fell this morning  Modifying Factors: The patient is unable to give any modifying factors as he is unresponsive  Associated Symptoms: Patient unable to give associated symptoms  Narrative: This 77-year-old white male went to the toilet 7:30 this morning.  His wife states that he stayed on the toilet all day until he fell off the toilet about 1430.  The patient was noted to have altered mental status by EMS.  His blood sugar was 370.  The patient is unable to give any other history, the wife is not present, and EMS was not present initially in ED for interview per ED notes.        Tonight: pt still unable to give reliable history, simply repeats what I say and answers yes to every question asked. Exam nonfocal other than right lower extremity warmth and erythema with possible cellulitis but not terribly impressive. No abd tenderness or lung findings.   ROS unable to obtain    PCP:  Gordy Correa    Past Medical History:   Diagnosis Date   • Arthritis    • Asthma    • Cancer     skin   • COPD (chronic obstructive pulmonary disease)    • Diabetes mellitus    • Disease of thyroid gland    • Hypertension    • Renal disorder      Past Surgical History:   Procedure Laterality Date   • COLONOSCOPY     • JOINT REPLACEMENT      right   • REPLACEMENT TOTAL KNEE       History reviewed. No pertinent family history.  Social History   Substance Use Topics   • Smoking status: Former Smoker   • Smokeless tobacco: Never Used      Comment: quit 1993   • Alcohol use No     Prescriptions Prior to Admission   Medication Sig Dispense Refill Last Dose   • donepezil (ARICEPT) 5 MG tablet Take 5 mg by mouth Every Night.   11/20/2017 at 2000   • furosemide  "(LASIX) 40 MG tablet Take 40 mg by mouth 2 (Two) Times a Day.   11/20/2017 at 0700   • gabapentin (NEURONTIN) 600 MG tablet Take 600 mg by mouth 3 (Three) Times a Day.   11/20/2017 at 2000   • insulin detemir (LEVEMIR) 100 UNIT/ML injection Inject 22 Units under the skin 2 (Two) Times a Day.   11/20/2017 at Unknown time   • insulin NPH-insulin regular (humuLIN 70/30,novoLIN 70/30) (70-30) 100 UNIT/ML injection Inject 30 Units under the skin 2 (Two) Times a Day With Meals.   11/20/2017 at Unknown time   • levothyroxine (SYNTHROID, LEVOTHROID) 100 MCG tablet Take 100 mcg by mouth Daily.   11/20/2017 at Unknown time   • lisinopril (PRINIVIL,ZESTRIL) 20 MG tablet Take 20 mg by mouth Daily.   11/20/2017 at 0700   • metFORMIN (GLUCOPHAGE) 1000 MG tablet Take 1,000 mg by mouth 3 (Three) Times a Day With Meals.   11/20/2017 at Unknown time   • pramipexole (MIRAPEX) 0.5 MG tablet Take 0.5 mg by mouth 3 (Three) Times a Day.   11/20/2017 at 2000   • pravastatin (PRAVACHOL) 20 MG tablet Take 20 mg by mouth Daily.   11/20/2017 at 0700   • pregabalin (LYRICA) 100 MG capsule Take 100 mg by mouth 2 (Two) Times a Day.   11/20/2017 at 2000   • SITagliptin (JANUVIA) 100 MG tablet Take 100 mg by mouth Daily.   11/20/2017 at 0700   • vitamin D (ERGOCALCIFEROL) 60270 units capsule capsule Take 50,000 Units by mouth 1 (One) Time Per Week.   Unknown at Unknown time     Allergies:  Oxycontin [oxycodone hcl]    REVIEW OF SYSTEMS:  Please see the above history of present illness for pertinent positives and negatives.  The remainder of the patient's systems have been reviewed and are negative other than above.    Vital Signs  Temp:  [98.9 °F (37.2 °C)-104.4 °F (40.2 °C)] 98.9 °F (37.2 °C)  Heart Rate:  [] 93  Resp:  [20-28] 20  BP: (113-207)/() 149/74    Flowsheet Rows         First Filed Value    Admission Height  74\" (188 cm) Documented at 11/21/2017 1540    Admission Weight  (!)  302 lb 3.2 oz (137 kg) Documented at 11/21/2017 " 1540           Physical Exam:  Physical Exam   Constitutional: Patient appears well-developed and well-nourished and in no acute distress , confused and slow to respond.  HEENT:   Head: Normocephalic and atraumatic.   Eyes:  Pupils are equal, round, and reactive to light. EOM are intact. Sclera are anicteric and non-injected.  Mouth and Throat: Patient has moist mucous membranes. Oropharynx is clear of any erythema or exudate.     Neck: Neck supple. No JVD present. No thyromegaly present. No lymphadenopathy present.  Cardiovascular: Regular rate, regular rhythm, S1 normal and S2 normal.  Exam reveals no gallop and no friction rub.  No murmur heard.  Pulmonary/Chest: Lungs are clear to auscultation bilaterally. No respiratory distress. No wheezes. No rhonchi. No rales.   Abdominal: Soft. Bowel sounds are normal. No distension and no mass. There is no hepatosplenomegaly. There is no tenderness.   Musculoskeletal: Normal Muscle tone  Extremities: 1+ BLE edema. RLE warm and mildly red disproportionately. Pulses are palpable in all 4 extremities.  Neurological: Patient is alert and oriented to person, place, and time. Cranial nerves II-XII are grossly intact with no focal deficits. No dysarthria       Results Review:    I reviewed the patient's new clinical results.  Lab Results (most recent)     Procedure Component Value Units Date/Time    Blood Culture - Blood, [[id number]] Collected:  11/21/17 1557    Specimen:  Blood from Arm, Right Updated:  11/21/17 1602    CBC & Differential [[id number]] Collected:  11/21/17 1557    Specimen:  Blood Updated:  11/21/17 1604    Narrative:       The following orders were created for panel order CBC & Differential.  Procedure                               Abnormality         Status                     ---------                               -----------         ------                     CBC Auto Differential[[id number]]        Abnormal            Final result                 Please view  results for these tests on the individual orders.    CBC Auto Differential [[id number]]  (Abnormal) Collected:  11/21/17 1557    Specimen:  Blood Updated:  11/21/17 1604     WBC 21.88 (H) 10*3/mm3      RBC 4.91 10*6/mm3      Hemoglobin 14.3 g/dL      Hematocrit 43.0 %      MCV 87.6 fL      MCH 29.1 pg      MCHC 33.3 g/dL      RDW 13.2 %      RDW-SD 41.8 fl      MPV 10.8 (H) fL      Platelets 169 10*3/mm3      Neutrophil % 91.3 (H) %      Lymphocyte % 2.3 (L) %      Monocyte % 5.0 %      Eosinophil % 0.0 %      Basophil % 0.2 %      Immature Grans % 1.2 (H) %      Neutrophils, Absolute 19.97 (H) 10*3/mm3      Lymphocytes, Absolute 0.50 (L) 10*3/mm3      Monocytes, Absolute 1.10 (H) 10*3/mm3      Eosinophils, Absolute 0.00 (L) 10*3/mm3      Basophils, Absolute 0.04 10*3/mm3      Immature Grans, Absolute 0.27 (H) 10*3/mm3      nRBC 0.0 /100 WBC     Blood Culture - Blood, [[id number]] Collected:  11/21/17 1606    Specimen:  Blood from Wrist, Right Updated:  11/21/17 1606    Urine Culture - Urine, Urine, Clean Catch [[id number]] Collected:  11/21/17 1552    Specimen:  Urine from Urine, Catheter Updated:  11/21/17 1623    Protime-INR [[id number]]  (Abnormal) Collected:  11/21/17 1557    Specimen:  Blood Updated:  11/21/17 1624     Protime 14.3 Seconds      INR 1.11 (H)    Narrative:       Therapeutic Ranges for INR: 2.0-3.0 (PT 20-30)                              2.5-3.5 (PT 25-34)    aPTT [[id number]]  (Normal) Collected:  11/21/17 1557    Specimen:  Blood Updated:  11/21/17 1624     PTT 33.3 seconds     Narrative:       PTT = The equivalent PTT values for the therapeutic range of heparin levels at 0.1 to 0.7 U/ml are 53 to 110 seconds.    Urinalysis With / Culture If Indicated - Urine, Catheter [[id number]]  (Abnormal) Collected:  11/21/17 1552    Specimen:  Urine from Urine, Catheter Updated:  11/21/17 1624     Color, UA Yellow     Appearance, UA Clear     pH, UA 8.5 (H)     Specific Gravity, UA 1.020     Glucose,   mg/dL (2+) (A)     Ketones, UA 40 mg/dL (2+) (A)     Bilirubin, UA Negative     Blood, UA Large (3+) (A)     Protein, UA >=300 mg/dL (3+) (A)     Leuk Esterase, UA Negative     Nitrite, UA Negative     Urobilinogen, UA 0.2 E.U./dL    Urinalysis, Microscopic Only - Urine, Clean Catch [[id number]]  (Abnormal) Collected:  11/21/17 1552    Specimen:  Urine from Urine, Catheter Updated:  11/21/17 1624     RBC, UA Too Numerous to Count (A) /HPF      WBC, UA 3-5 (A) /HPF      Bacteria, UA 1+ (A) /HPF      Squamous Epithelial Cells, UA 3-6 (A) /HPF      Hyaline Casts, UA None Seen /LPF      Methodology Manual Light Microscopy    Lactic Acid, Plasma [[id number]]  (Abnormal) Collected:  11/21/17 1557    Specimen:  Blood Updated:  11/21/17 1631     Lactate 3.1 (C) mmol/L     Comprehensive Metabolic Panel [[id number]]  (Abnormal) Collected:  11/21/17 1557    Specimen:  Blood Updated:  11/21/17 1631     Glucose 330 (H) mg/dL      BUN 15 mg/dL      Creatinine 1.85 (H) mg/dL      Sodium 136 mmol/L      Potassium 4.6 mmol/L      Chloride 93 (L) mmol/L      CO2 24.6 mmol/L      Calcium 9.6 mg/dL      Total Protein 7.6 g/dL      Albumin 3.90 g/dL      ALT (SGPT) 17 U/L      AST (SGOT) 34 U/L       Specimen hemolyzed.  Results may be affected.        Alkaline Phosphatase 62 U/L      Total Bilirubin 0.6 mg/dL      eGFR Non African Amer 36 (L) mL/min/1.73      Globulin 3.7 gm/dL      A/G Ratio 1.1 g/dL      BUN/Creatinine Ratio 8.1     Anion Gap 18.4 mmol/L     Narrative:       The MDRD GFR formula is only valid for adults with stable renal function between ages 18 and 70.    Troponin [[id number]]  (Normal) Collected:  11/21/17 1557    Specimen:  Blood Updated:  11/21/17 1631     Troponin T 0.014 ng/mL     Narrative:       Troponin T Reference Ranges:  Less than 0.03 ng/mL:    Negative for AMI  0.03 to 0.09 ng/mL:      Indeterminant for AMI  Greater than 0.09 ng/mL: Positive for AMI    Influenza Antigen, Rapid - Swab, Nasopharynx  [[id number]]  (Normal) Collected:  11/21/17 1610    Specimen:  Swab from Nasopharynx Updated:  11/21/17 1643     Influenza A Ag, EIA Negative     Influenza B Ag, EIA Negative    [place] Draw [[id number]] Collected:  11/21/17 1557    Specimen:  Blood Updated:  11/21/17 1701    Narrative:       The following orders were created for panel order [place] Draw.  Procedure                               Abnormality         Status                     ---------                               -----------         ------                     Light Blue Top[[id number]]                                                              Green Top (Gel)[[id number]]                                  Final result               Lavender Top[[id number]]                                     Final result               Gold Top - SST[[id number]]                                                                Please view results for these tests on the individual orders.    Green Top (Gel) [[id number]] Collected:  11/21/17 1557    Specimen:  Blood Updated:  11/21/17 1701     Extra Tube Hold for add-ons.      Auto resulted.       Lavender Top [[id number]] Collected:  11/21/17 1557    Specimen:  Blood Updated:  11/21/17 1701     Extra Tube hold for add-on      Auto resulted       TSH [[id number]]  (Normal) Collected:  11/21/17 1557    Specimen:  Blood Updated:  11/21/17 1711     TSH 2.950 mIU/mL     Urine Drug Screen - [[id number]]  (Normal) Collected:  11/21/17 1756    Specimen:  Urine Updated:  11/21/17 1809     THC, Screen, Urine Negative     Phencyclidine (PCP), Urine Negative     Cocaine Screen, Urine Negative     Methamphetamine, Urine Negative     Opiate Screen Negative     Amphetamine Screen, Urine Negative     Benzodiazepine Screen, Urine Negative     Tricyclic Antidepressants Screen Negative     Methadone Screen, Urine Negative     Barbiturates Screen, Urine Negative     Oxycodone Screen, Urine Negative     Propoxyphene Screen Negative     Buprenorphine,  Screen, Urine Negative    Narrative:       Urine drug screen results are to be used for medical purposes only.  They are not to be used for legal purposes such as employment testing.  Negative results do not necessarily mean the complete absence of a subtance, but rather that the result is less than the cutoff for that substance.  Positive results are unconfirmed and considered Preliminary Positive.  [facility] does not automatically confirm Postitive Unconfirmed results.  The physician may request (order) an Unconfirmed Positive result to be sent out for confirmation.      Negative Thresholds for Drugs Screened:    THC screen, urine                          50 ng/ml  Phenycyclidine (PCP), urine                25 ng/ml  Cocaine screen, urine                     150 ng/ml  Methamphetamine, urine                    500 ng/ml  Opiate screen, urine                      100 ng/ml  Amphetamine screen, urine                 500 ng/ml  Benzodiazepine screen, urine              150 ng/ml  Tricyclic Antidepressants screen, urine   300 ng/ml  Methadone screen, urine                   200 ng/ml  Barbiturates screen, urine                200 ng/ml  Oxycodone screen, urine                   100 ng/ml  Propoxyphene screen, urine                300 ng/ml  Buprenorphine screen, urine                10 ng/ml    Lactic Acid, Reflex Timer [[id number]] Collected:  11/21/17 1557    Specimen:  Blood Updated:  11/21/17 1946     Extra Tube Hold for add-ons.      Auto resulted.       Lactic Acid, Reflex [[id number]]  (Abnormal) Collected:  11/21/17 2011    Specimen:  Blood Updated:  11/21/17 2050     Lactate 2.9 (C) mmol/L     Respiratory Panel, PCR - Swab, Nasopharynx [[id number]] Collected:  11/21/17 2014    Specimen:  Swab from Nasopharynx Updated:  11/21/17 2055    POC Glucose Fingerstick [[id number]]  (Abnormal) Collected:  11/21/17 2107    Specimen:  Blood Updated:  11/21/17 2113     Glucose 312 (H) mg/dL     Narrative:        Meter: [id number] : [id number] Casi LIV[place]    Procalcitonin [[id number]]  (Abnormal) Collected:  11/21/17 2058    Specimen:  Blood Updated:  11/21/17 2130     Procalcitonin 2.33 (C) ng/mL     Narrative:       As a Marker for Sepsis (Non-Neonates):   1. <0.5 ng/mL represents a low risk of severe sepsis and/or septic shock.  2. >2 ng/mL represents a high risk of severe sepsis and/or septic shock.    As a Marker for Lower Respiratory Tract Infections that require antibiotic therapy:    PCT on Admission     Antibiotic Therapy       6-12 Hrs later  > 0.5                Strongly Recommended             >0.25 - <0.5         Recommended  0.1 - 0.25           Discouraged              Remeasure/reassess PCT  <0.1                 Strongly Discouraged     Remeasure/reassess PCT                     PCT values of < 0.5 ng/mL do not exclude an infection, because localized infections (without systemic signs) may be associated with such low concentrations, or a systemic infection in its initial stages (< 6 hours). Furthermore, increased PCT can occur without infection. PCT concentrations between 0.5 and 2.0 ng/mL should be interpreted taking into account the patient's history. It is recommended to retest PCT within 6-24 hours if any concentrations < 2 ng/mL are obtained.          Imaging Results (most recent)     Procedure Component Value Units Date/Time    CT Cervical Spine Without Contrast [[id number]] Collected:  11/21/17 1641     Updated:  11/21/17 1706    Narrative:       CERVICAL CT     HISTORY:  Patient fell off toilet today to 2:30 this afternoon. Patient has  altered mental status and is a poor historian. Patient currently  unresponsive.     TECHNIQUE:  Axial images were obtained through the cervical spine without contrast.  Multiplanar reformats were obtained. No comparison. Radiation dose  reduction techniques were utilized, including automated exposure control  and exposure modulation based on body  size.     FINDINGS:  The exam is motion degraded. Alignment is within normal limits. There is  multilevel hypertrophic facet arthropathy. There are no acute fractures.  There are multilevel disc bulges and disc osteophyte complexes. The  findings are most pronounced at C6-7 with bilateral foraminal stenosis  and central canal narrowing.       Impression:       Motion degraded exam. Multilevel degenerative disease. No  acute fracture.     This report was finalized on 11/21/2017 4:47 PM by Dr. Jaylen Gudino MD.       CT Head Without Contrast [[id number]] Collected:  11/21/17 1630     Updated:  11/21/17 1707    Narrative:       Head CT     INDICATION: Patient sat on 12 old day until he fell off at 2:30 this  afternoon. Subsequent mental status changes. Patient unresponsive and  unable to give history.     FINDINGS: Axial images were obtained from the base to the vertex without  contrast. COMPARISON made with 06/13/2010. Radiation dose reduction  techniques were utilized, including automated exposure control and  exposure modulation based on body size.     Ventricular size and configuration are normal. No acute infarct or  hemorrhage is seen. No masses. Atherosclerotic calcifications are  present in the carotid siphons. No skull fracture.       Impression:       No acute findings.     This report was finalized on 11/21/2017 4:35 PM by Dr. Jaylen Gudino MD.       XR Chest 1 View [[id number]] Collected:  11/21/17 1641     Updated:  11/21/17 1708    Narrative:       Chest x-ray     INDICATION: Patient sat on toilet all day until he fell off at 2:30 this  afternoon. Mental status changes and fever. Limited history as patient  is poor historian.     FINDINGS: AP upright view of the chest is compared with 01/26/2012. Lung  volumes are low. There is mild left base atelectasis or infiltrate.  Lungs are otherwise clear and no pneumothorax. Mild cardiomegaly.       Impression:       Mild cardiomegaly. Low volume inspiration  mild infiltrate or  atelectasis at the left base.     This report was finalized on 11/21/2017 4:43 PM by Dr. Jaylen Gudino MD.       CT Abdomen Pelvis Without Contrast [[id number]] Resulted:  11/21/17 1755     Updated:  11/21/17 1755    CT Chest Without Contrast [[id number]] Resulted:  11/21/17 1755     Updated:  11/21/17 1755        reviewed  ECG/EMG Results (most recent)     Procedure Component Value Units Date/Time    ECG 12 Lead [[id number]] Collected:  11/21/17 1638     Updated:  11/21/17 1708    Narrative:       RR Interval= 526 ms  NM Interval= 168 ms  QRSD Interval= 104 ms  QT Interval= 336 ms  QTc Interval= 463 ms  Heart Rate= 114 ms  P Axis= 51 deg  QRS Axis= 1 deg  T Wave Axis= 29 deg  I: 40 Axis= -31 deg  T: 40 Axis= 68 deg  ST Axis= 152 deg  SINUS TACHYCARDIA ew compared to the previous ECG  INFERIOR INFARCT, AGE INDETERMINATE new compared to the previous ECG  Electronically Signed by:  Crow Chris ([facility]) 21-Nov-2017 17:03:13  Date and Time of Study: 2017-11-21 16:38:12            Assessment/Plan :    Sepsis: (without shock or hypotension), procal 2.3. Unclear source. CXR unremarkable, urine not terribly abnormal.  - vanc/zosyn, cultures pending  - NS at 125cc/hr  -repeat lactic in AM  - tylenol for fever  - echo as no obvious source for sepsis.    AMS: toxic metabolic encephalopathy vs infectious.  hold lyrica and other neuroleptic meds.  - abx as above and IVF and will see if he clears neurologically.  - if not or continues to spike fevers despite abx consult neurology for possible spinal tap if needed. No aura/HA/floaters/halos/photosensitivity per pt.    ROLA: likely 2/2 sepsis , IVF, repeat creat in AM.   Hold ACEi/metformin    DM with severe glucosuria  - SSI with detemir 20 units BID, adjust per AC/HS accuchecks  - A1c pending  - off metformin    HTN: PRN meds, labetalol vs hydral as Creat 1.8, unknown baseline, off ACEi 2/2 ROLA    H/o COPD: PRN nebs    dvt proph with lovenox 30mg sq daily  and SCDs.    HLD: pravastatin        I discussed the patients findings and my recommendations with patient and staff.    Sree Morley MD  11/21/17  9:31 PM    Time: 30 min

## 2017-11-22 NOTE — SIGNIFICANT NOTE
11/22/17 1327   Rehab Treatment   Discipline physical therapist   Rehab Evaluation   Evaluation Not Performed patient unavailable for evaluation  (Patient unavailable. Receiving a Kidney US. Will check back. )

## 2017-11-22 NOTE — PLAN OF CARE
Problem: Patient Care Overview (Adult)  Goal: Plan of Care Review  Outcome: Ongoing (interventions implemented as appropriate)    11/22/17 0530   Coping/Psychosocial Response Interventions   Plan Of Care Reviewed With patient;spouse;family   Patient Care Overview   Progress progress toward functional goals as expected       Goal: Adult Individualization and Mutuality  Outcome: Ongoing (interventions implemented as appropriate)  Goal: Discharge Needs Assessment  Outcome: Ongoing (interventions implemented as appropriate)    11/22/17 0530   Discharge Needs Assessment   Discharge Planning Comments Antibiotics given as scheduled. Denies pain. On bed alarm. NS infusing at 125. has remained afebrile this shift. on telemetry.          Problem: Sepsis (Adult)  Goal: Signs and Symptoms of Listed Potential Problems Will be Absent or Manageable (Sepsis)  Outcome: Ongoing (interventions implemented as appropriate)    Problem: Fall Risk (Adult)  Goal: Identify Related Risk Factors and Signs and Symptoms  Outcome: Ongoing (interventions implemented as appropriate)  Goal: Absence of Falls  Outcome: Ongoing (interventions implemented as appropriate)    11/22/17 0530   Fall Risk (Adult)   Absence of Falls making progress toward outcome         Problem: Infection, Risk/Actual (Adult)  Goal: Identify Related Risk Factors and Signs and Symptoms  Outcome: Ongoing (interventions implemented as appropriate)  Goal: Infection Prevention/Resolution  Outcome: Ongoing (interventions implemented as appropriate)    11/22/17 0530   Infection, Risk/Actual (Adult)   Infection Prevention/Resolution making progress toward outcome

## 2017-11-22 NOTE — PLAN OF CARE
"Problem: Patient Care Overview (Adult)  Goal: Discharge Needs Assessment  Outcome: Ongoing (interventions implemented as appropriate)    11/22/17 1431   Discharge Needs Assessment   Readmission Within The Last 30 Days no previous admission in last 30 days   Discharge Planning Comments Spoke with patient at bedside, permission given to speak with family present. Face sheet verified. Patient states he is going to change his PCP back to Dr Akin Clarke in [facility]. Patient states he is indepndent of ADLs including driving prior to admission. He says he uses a straight cane but also has a rolling walker and scooter that he does not use. He denies use of additional DME, home 02,cpap/bipap. He states he thinks he needs oxygen \" but only at night\". Patients wife states she has used [facility] in the past, but the patient has not used HH or rehab services in the past. The patient does not have a living will and would like information about creating one, spiritual consult entered.Patient uses [facility] [place] and denies issues obtaining medications. He plans to return home at [place] and does not anticipate additional needs. Ashley RODRIGUES informed that patient thinks he may need oxygen at night she will inform MD of patient concern. Will continue to follow.   Living Environment   Transportation Available car   Self-Care   Equipment Currently Used at Home cane, straight;walker, rolling           "

## 2017-11-22 NOTE — SIGNIFICANT NOTE
11/22/17 1354   Rehab Treatment   Discipline physical therapist   Rehab Evaluation   Evaluation Not Performed other (see comments)  (Hold per APRN pending cariology consult and troponin results. )

## 2017-11-23 LAB
ALBUMIN SERPL-MCNC: 2.9 G/DL (ref 3.5–5.2)
ALBUMIN/GLOB SERPL: 0.9 G/DL
ALP SERPL-CCNC: 44 U/L (ref 40–129)
ALT SERPL W P-5'-P-CCNC: 22 U/L (ref 5–41)
ANION GAP SERPL CALCULATED.3IONS-SCNC: 8.4 MMOL/L
AST SERPL-CCNC: 59 U/L (ref 5–40)
BACTERIA SPEC AEROBE CULT: NO GROWTH
BASOPHILS # BLD AUTO: 0.03 10*3/MM3 (ref 0–0.2)
BASOPHILS NFR BLD AUTO: 0.2 % (ref 0–2)
BILIRUB SERPL-MCNC: 0.4 MG/DL (ref 0.2–1.2)
BUN BLD-MCNC: 19 MG/DL (ref 8–23)
BUN/CREAT SERPL: 11.1 (ref 7–25)
CALCIUM SPEC-SCNC: 8.2 MG/DL (ref 8.8–10.5)
CHLORIDE SERPL-SCNC: 102 MMOL/L (ref 98–107)
CO2 SERPL-SCNC: 26.6 MMOL/L (ref 22–29)
CREAT BLD-MCNC: 1.71 MG/DL (ref 0.76–1.27)
D-LACTATE SERPL-SCNC: 1.3 MMOL/L (ref 0.5–2)
DEPRECATED RDW RBC AUTO: 44.2 FL (ref 37–54)
EOSINOPHIL # BLD AUTO: 0.36 10*3/MM3 (ref 0.1–0.3)
EOSINOPHIL NFR BLD AUTO: 2.9 % (ref 0–4)
ERYTHROCYTE [DISTWIDTH] IN BLOOD BY AUTOMATED COUNT: 13.5 % (ref 11.5–14.5)
GFR SERPL CREATININE-BSD FRML MDRD: 39 ML/MIN/1.73
GLOBULIN UR ELPH-MCNC: 3.1 GM/DL
GLUCOSE BLD-MCNC: 161 MG/DL (ref 65–99)
GLUCOSE BLDC GLUCOMTR-MCNC: 182 MG/DL (ref 70–130)
GLUCOSE BLDC GLUCOMTR-MCNC: 229 MG/DL (ref 70–130)
GLUCOSE BLDC GLUCOMTR-MCNC: 266 MG/DL (ref 70–130)
GLUCOSE BLDC GLUCOMTR-MCNC: 270 MG/DL (ref 70–130)
HCT VFR BLD AUTO: 34.2 % (ref 42–52)
HGB BLD-MCNC: 11.3 G/DL (ref 14–18)
IMM GRANULOCYTES # BLD: 0.06 10*3/MM3 (ref 0–0.03)
IMM GRANULOCYTES NFR BLD: 0.5 % (ref 0–0.5)
LYMPHOCYTES # BLD AUTO: 1.46 10*3/MM3 (ref 0.6–4.8)
LYMPHOCYTES NFR BLD AUTO: 11.6 % (ref 20–45)
MCH RBC QN AUTO: 29.6 PG (ref 27–31)
MCHC RBC AUTO-ENTMCNC: 33 G/DL (ref 31–37)
MCV RBC AUTO: 89.5 FL (ref 80–94)
MONOCYTES # BLD AUTO: 0.95 10*3/MM3 (ref 0–1)
MONOCYTES NFR BLD AUTO: 7.6 % (ref 3–8)
NEUTROPHILS # BLD AUTO: 9.7 10*3/MM3 (ref 1.5–8.3)
NEUTROPHILS NFR BLD AUTO: 77.2 % (ref 45–70)
NRBC BLD MANUAL-RTO: 0 /100 WBC (ref 0–0)
PLATELET # BLD AUTO: 148 10*3/MM3 (ref 140–500)
PMV BLD AUTO: 10.8 FL (ref 7.4–10.4)
POTASSIUM BLD-SCNC: 3.6 MMOL/L (ref 3.5–5.2)
PROCALCITONIN SERPL-MCNC: 7.81 NG/ML (ref 0.1–0.25)
PROT SERPL-MCNC: 6 G/DL (ref 6–8.5)
RBC # BLD AUTO: 3.82 10*6/MM3 (ref 4.7–6.1)
S PNEUM AG SPEC QL LA: NEGATIVE
SODIUM BLD-SCNC: 137 MMOL/L (ref 136–145)
TROPONIN T SERPL-MCNC: 0.07 NG/ML (ref 0–0.03)
TROPONIN T SERPL-MCNC: 0.1 NG/ML (ref 0–0.03)
WBC NRBC COR # BLD: 12.56 10*3/MM3 (ref 4.8–10.8)

## 2017-11-23 PROCEDURE — 63710000001 INSULIN DETEMIR PER 5 UNITS: Performed by: HOSPITALIST

## 2017-11-23 PROCEDURE — 83605 ASSAY OF LACTIC ACID: CPT | Performed by: HOSPITALIST

## 2017-11-23 PROCEDURE — 99222 1ST HOSP IP/OBS MODERATE 55: CPT | Performed by: INTERNAL MEDICINE

## 2017-11-23 PROCEDURE — 99232 SBSQ HOSP IP/OBS MODERATE 35: CPT | Performed by: HOSPITALIST

## 2017-11-23 PROCEDURE — 25010000002 ENOXAPARIN PER 10 MG: Performed by: HOSPITALIST

## 2017-11-23 PROCEDURE — 84145 PROCALCITONIN (PCT): CPT | Performed by: HOSPITALIST

## 2017-11-23 PROCEDURE — 25010000002 FUROSEMIDE PER 20 MG

## 2017-11-23 PROCEDURE — 63710000001 INSULIN ASPART PER 5 UNITS: Performed by: HOSPITALIST

## 2017-11-23 PROCEDURE — 82962 GLUCOSE BLOOD TEST: CPT

## 2017-11-23 PROCEDURE — 93010 ELECTROCARDIOGRAM REPORT: CPT | Performed by: INTERNAL MEDICINE

## 2017-11-23 PROCEDURE — 25010000002 VANCOMYCIN PER 500 MG: Performed by: HOSPITALIST

## 2017-11-23 PROCEDURE — 80053 COMPREHEN METABOLIC PANEL: CPT | Performed by: NURSE PRACTITIONER

## 2017-11-23 PROCEDURE — 93005 ELECTROCARDIOGRAM TRACING: CPT | Performed by: INTERNAL MEDICINE

## 2017-11-23 PROCEDURE — 87899 AGENT NOS ASSAY W/OPTIC: CPT | Performed by: HOSPITALIST

## 2017-11-23 PROCEDURE — 84484 ASSAY OF TROPONIN QUANT: CPT | Performed by: HOSPITALIST

## 2017-11-23 PROCEDURE — 25010000002 ERTAPENEM PER 500 MG: Performed by: HOSPITALIST

## 2017-11-23 PROCEDURE — 63710000001 INSULIN DETEMIR PER 5 UNITS: Performed by: NURSE PRACTITIONER

## 2017-11-23 PROCEDURE — 85025 COMPLETE CBC W/AUTO DIFF WBC: CPT | Performed by: NURSE PRACTITIONER

## 2017-11-23 PROCEDURE — 97116 GAIT TRAINING THERAPY: CPT

## 2017-11-23 PROCEDURE — 97162 PT EVAL MOD COMPLEX 30 MIN: CPT

## 2017-11-23 RX ORDER — FUROSEMIDE 10 MG/ML
INJECTION INTRAMUSCULAR; INTRAVENOUS
Status: COMPLETED
Start: 2017-11-23 | End: 2017-11-23

## 2017-11-23 RX ORDER — FUROSEMIDE 10 MG/ML
40 INJECTION INTRAMUSCULAR; INTRAVENOUS ONCE
Status: COMPLETED | OUTPATIENT
Start: 2017-11-23 | End: 2017-11-23

## 2017-11-23 RX ADMIN — INSULIN ASPART 2 UNITS: 100 INJECTION, SOLUTION INTRAVENOUS; SUBCUTANEOUS at 08:16

## 2017-11-23 RX ADMIN — ENOXAPARIN SODIUM 30 MG: 30 INJECTION SUBCUTANEOUS at 22:05

## 2017-11-23 RX ADMIN — LISINOPRIL 20 MG: 20 TABLET ORAL at 08:32

## 2017-11-23 RX ADMIN — LINAGLIPTIN 5 MG: 5 TABLET, FILM COATED ORAL at 08:32

## 2017-11-23 RX ADMIN — GABAPENTIN 600 MG: 300 CAPSULE ORAL at 06:47

## 2017-11-23 RX ADMIN — INSULIN ASPART 6 UNITS: 100 INJECTION, SOLUTION INTRAVENOUS; SUBCUTANEOUS at 22:03

## 2017-11-23 RX ADMIN — INSULIN DETEMIR 22 UNITS: 100 INJECTION, SOLUTION SUBCUTANEOUS at 08:16

## 2017-11-23 RX ADMIN — ASPIRIN 325 MG: 325 TABLET, DELAYED RELEASE ORAL at 08:32

## 2017-11-23 RX ADMIN — FUROSEMIDE 40 MG: 10 INJECTION, SOLUTION INTRAMUSCULAR; INTRAVENOUS at 15:12

## 2017-11-23 RX ADMIN — GABAPENTIN 600 MG: 300 CAPSULE ORAL at 22:03

## 2017-11-23 RX ADMIN — LEVOTHYROXINE SODIUM 100 MCG: 100 TABLET ORAL at 08:32

## 2017-11-23 RX ADMIN — MICONAZOLE NITRATE: 20 CREAM TOPICAL at 08:32

## 2017-11-23 RX ADMIN — FUROSEMIDE 40 MG: 10 INJECTION INTRAMUSCULAR; INTRAVENOUS at 15:12

## 2017-11-23 RX ADMIN — SODIUM CHLORIDE 1 G: 9 INJECTION, SOLUTION INTRAVENOUS at 16:16

## 2017-11-23 RX ADMIN — DONEPEZIL HYDROCHLORIDE 5 MG: 5 TABLET, FILM COATED ORAL at 22:04

## 2017-11-23 RX ADMIN — SODIUM CHLORIDE 125 ML/HR: 9 INJECTION, SOLUTION INTRAVENOUS at 03:37

## 2017-11-23 RX ADMIN — VANCOMYCIN HYDROCHLORIDE 2000 MG: 1 INJECTION, POWDER, LYOPHILIZED, FOR SOLUTION INTRAVENOUS at 08:22

## 2017-11-23 RX ADMIN — INSULIN DETEMIR 30 UNITS: 100 INJECTION, SOLUTION SUBCUTANEOUS at 22:02

## 2017-11-23 RX ADMIN — DOCUSATE SODIUM 100 MG: 100 CAPSULE, LIQUID FILLED ORAL at 08:32

## 2017-11-23 RX ADMIN — GABAPENTIN 600 MG: 300 CAPSULE ORAL at 14:38

## 2017-11-23 RX ADMIN — INSULIN ASPART 6 UNITS: 100 INJECTION, SOLUTION INTRAVENOUS; SUBCUTANEOUS at 12:05

## 2017-11-23 RX ADMIN — ATORVASTATIN CALCIUM 10 MG: 10 TABLET, FILM COATED ORAL at 08:32

## 2017-11-23 RX ADMIN — INSULIN ASPART 6 UNITS: 100 INJECTION, SOLUTION INTRAVENOUS; SUBCUTANEOUS at 17:14

## 2017-11-23 RX ADMIN — ACETAMINOPHEN 650 MG: 325 TABLET, FILM COATED ORAL at 08:38

## 2017-11-23 NOTE — PLAN OF CARE
Problem: Patient Care Overview (Adult)  Goal: Plan of Care Review    11/23/17 1112   Coping/Psychosocial Response Interventions   Plan Of Care Reviewed With patient   Outcome Evaluation   Outcome Summary/Follow up Plan PT note: Evaluation completed this am. Patient noted to have generalized weakness and c/o pain left hip/knee/hand/forehead after a fall at home and landing on left side. He also c/o chronic right knee pain s/p TKA 2 years ago. Patient reports that his knee has never stopped hurting and has limited his mobility and gait. Patient needed minimal assist of 2 to stand from chair (low surface) with RWX and he ambulated 80 feet with RWX with CGA of 1 and cueing. He will benefit from PT daily for functional mobility and gait training and therapeutic exercise for LE as tolerated. He may benefit from home health PT upon discharge home with spouse. Patient had been using a SPC at home prior to admission but feel he would benefit from a walker at this time until he gets stronger and feels better. Patient reports that he does have a RWX at home.

## 2017-11-23 NOTE — PLAN OF CARE
Problem: Inpatient Physical Therapy  Goal: Transfer Training Goal 1 STG- PT    11/23/17 1030   Transfer Training PT STG   Transfer Training PT STG, Date Established 11/23/17   Transfer Training PT STG, Time to Achieve 5 days   Transfer Training PT STG, Activity Type sit to stand/stand to sit   Transfer Training PT STG, [place] Level independent   Transfer Training PT STG, Assist Device walker, rolling       Goal: Gait Training Goal STG- PT    11/23/17 1030   Gait Training PT STG   Gait Training Goal PT STG, Date Established 11/23/17   Gait Training Goal PT STG, Time to Achieve 5 days   Gait Training Goal PT STG, [place] Level contact guard assist;supervision required   Gait Training Goal PT STG, Assist Device walker, rolling   Gait Training Goal PT STG, Distance to Achieve 120 feet   Gait Training Goal PT STG, Additional Goal so he can safely ambulate in his home.        Goal: Patient Education Goal STG- PT    11/23/17 1030   Patient Education PT STG   Patient Education PT STG, Date Established 11/23/17   Patient Education PT STG, Time to Achieve 5 days   Patient Education PT STG, Education Type HEP;gait;transfers   Patient Education PT STG, Education Understanding demonstrate adequately;verbalize understanding

## 2017-11-23 NOTE — THERAPY EVALUATION
Acute Care - Physical Therapy Initial Evaluation   [place]     Patient Name: Froilan Helton  : 1940  MRN: [id number]  Today's Date: 2017   Onset of Illness/Injury or Date of Surgery Date: 17  Date of Referral to PT: 17  Referring Physician: LEATHA Ford      Admit Date: 2017     Visit Dx:    ICD-10-CM ICD-9-CM   1. Sepsis, due to unspecified organism A41.9 038.9     995.91   2. Altered mental status, unspecified altered mental status type R41.82 780.97   3. Poorly controlled diabetes mellitus E11.65 250.00   4. Acute kidney injury N17.9 584.9   5. Cellulitis of right leg L03.115 682.6   6. Fall, initial encounter W19.XXXA E888.9   7. Abrasion of face, initial encounter S00.81XA 910.0     Patient Active Problem List   Diagnosis   • Sepsis   • COPD (chronic obstructive pulmonary disease)   • Diabetes mellitus   • Hypertension   • Acute renal failure   • Elevated procalcitonin   • Lactic acid acidosis   • Leukocytosis     Past Medical History:   Diagnosis Date   • Arthritis    • Asthma    • Cancer     skin   • COPD (chronic obstructive pulmonary disease)    • Diabetes mellitus    • Disease of thyroid gland    • Hypertension    • Renal disorder      Past Surgical History:   Procedure Laterality Date   • COLONOSCOPY     • JOINT REPLACEMENT      right   • REPLACEMENT TOTAL KNEE            PT ASSESSMENT (last 72 hours)      PT Evaluation       17 0938 17 1431    Rehab Evaluation    Document Type evaluation  -LN     Subjective Information agree to therapy;complains of;pain  -LN     Patient Effort, Rehab Treatment good  -LN     Symptoms Noted During/After Treatment increased pain  -LN     Symptoms Noted Comment Patient c/o pain in left hip and knee secondary to landing on left side with fall at home; also c/o pain in left hand and a little left forehead. He reports history of chronic pain right knee; s/p right TKA 2 years ago. He also reports that his hips feel weak.    -LN     General Information    Patient Profile Review yes  -LN     Onset of Illness/Injury or Date of Surgery Date 11/21/17  -LN     Referring Physician LEATHA Ford   Patient did have cardiologist consult this am and no contraindication to therapy noted and nursing okayed evaluation. -LN     General Observations Patient reclined in chair in his room with telemetry and pulse oximeter.   -LN     Pertinent History Of Current Problem Patient admitted to hospital on 11/21/2017 with altered mental status. Wife reported that he sat on toilet from 7:30 am until 1430 when she found him lying on the bathroom floor.  Blood sugar was found to be 340.  Patient diagnosed with sepsis.   -LN     Precautions/Limitations cardiac precautions  -LN     Prior Level of Function independent:;all household mobility;gait;transfer;bed mobility   Patient reports only ambulates short distances in home with SPC; has been limited with mobility and gait since having right TKA 2 years ago and has persistent knee pain.  -LN     Equipment Currently Used at Home cane, straight;walker, rolling;ramp;wheelchair, motorized   uses motorized w/c when at stores  -LN cane, straight;walker, rolling  -JA    Plans/Goals Discussed With patient;agreed upon  -LN     Risks Reviewed patient:;LOB;nausea/vomiting;dizziness;increased discomfort  -LN     Benefits Reviewed patient:;improve function;increase independence;increase strength;decrease risk of DVT;increase knowledge  -LN     Barriers to Rehab none identified  -LN     Living Environment    Lives With spouse  -LN spouse  -JA    Living Arrangements mobile home  -LN     Home Accessibility no concerns;ramps present at home  -LN no concerns  -JA    Transportation Available  car  -JA    Living Environment Comment has 3 small steps to enter home in back of house but has ramp that he uses.   -LN     Clinical Impression    Date of Referral to PT 11/22/17  -LN     PT Diagnosis generalized weakness; chronic pain   -LN     Patient/Family Goals Statement to get stronger and go home  -LN     Criteria for Skilled Therapeutic Interventions Met yes  -LN     Pathology/Pathophysiology Noted (Describe Specifically for Each System) musculoskeletal;cardiovascular  -LN     Impairments Found (describe specific impairments) gait, locomotion, and balance;ROM  -LN     Rehab Potential good, to achieve stated therapy goals  -LN     Vital Signs    Pre SpO2 (%) 97  -LN     O2 Delivery Pre Treatment room air  -LN     Post SpO2 (%) 99  -LN     O2 Delivery Post Treatment room air  -LN     Pre Patient Position Sitting  -LN     Post Patient Position Sitting  -LN     Pain Assessment    Pain Assessment --   not specifically rated  -LN     Pain Score --   not specifically rated  -LN     Pain Type Acute pain;Chronic pain  -LN     Pain Location Knee   bilateral knees, left hip, left hand, left forehead   Chronic pain right knee -LN     Multiple Pain Sites Yes  -LN     Cognitive Assessment/Intervention    Current Cognitive/Communication Assessment functional  -LN     Orientation Status oriented x 4  -LN     Follows Commands/Answers Questions 100% of the time;able to follow single-step instructions;needs cueing  -LN     Personal Safety WNL/WFL  -LN     Personal Safety Interventions fall prevention program maintained;gait belt;nonskid shoes/slippers when out of bed;supervised activity  -LN     ROM (Range of Motion)    General ROM Detail Bilateral UE WFL; Bilateral LE WFL with some stiffness reported in right knee.  -LN     MMT (Manual Muscle Testing)    General MMT Assessment Detail Bilateral UE grossly 3+/5; Bilateral LE 4/5 except hips 3+/5.   -LN     Bed Mobility, Assessment/Treatment    Bed Mobility, Comment not tested; patient up in chair. Patient reports that he does not sleep in a bed at home; sleeps in a recliner chair.  -LN     Transfer Assessment/Treatment    Transfers, Sit-Stand [place] minimum assist (75% patient effort);contact guard  "assist;2 person assist required;verbal cues required  -LN     Transfers, Stand-Sit [place] contact guard assist;2 person assist required;verbal cues required  -LN     Transfers, Sit-Stand-Sit, Assist Device rolling walker  -LN     Gait Assessment/Treatment    Gait, [place] Level contact guard assist;1 person + 1 person to manage equipment;verbal cues required  -LN     Gait, Assistive Device rolling walker  -LN     Gait, Distance (Feet) 80  -LN     Gait, Gait Deviations antalgic;sanjeev decreased  -LN     Gait, Impairments ROM decreased;strength decreased;pain  -LN     Gait, Comment Patient did c/o some increased pain in left hip and knee with ambulation. \"I hit my left side when I fell at home.\"   -LN     Stairs Assessment/Treatment    Stairs, Comment Patient has a ramp at home.   -LN     Positioning and Restraints    Pre-Treatment Position sitting in chair/recliner  -LN     Post Treatment Position chair  -LN     In Chair notified nsg;reclined;call light within reach;encouraged to call for assist;legs elevated  -LN       11/22/17 1400 11/22/17 1354    Rehab Evaluation    Evaluation Not Performed other (see comments)   Hold per APRN pending cariology consult and troponin results  -SD other (see comments)   Hold per APRN pending cariology consult and troponin results.   -BP      11/22/17 1328 11/22/17 1327    Rehab Evaluation    Evaluation Not Performed --   pt receiving ultrasound, unavailable  -JJ patient unavailable for evaluation   Patient unavailable. Receiving a Kidney US. Will check back.   -BP      11/21/17 2037 11/21/17 2024    General Information    Equipment Currently Used at Home  cane, straight  -DM    Living Environment    Lives With spouse  -DM     Living Arrangements mobile home  -DM     Home Accessibility no concerns  -DM     Stair Railings at Home none  -DM     Type of Financial/Environmental Concern none  -DM     Transportation Available car  -DM       User Key  (r) = Recorded By, (t) = " Taken By, (c) = Cosigned By    Initials Name Provider Type    DM Margoth Lance, RN Registered Nurse    ANDREIA Ramirez, RN Case Manager    SD Sebastian Wright, OTR Occupational Therapist    DOROTA Aponte, OTR Occupational Therapist    LN Juhi Proctor, PT Physical Therapist    BP Chloe Coffman, PT Physical Therapist          Physical Therapy Education     Title: PT OT SLP Therapies (Active)     Topic: Physical Therapy (Active)     Point: Mobility training (Done)    Learning Progress Summary    Learner Readiness Method Response Comment Documented by Status   Patient Acceptance E VU Education provided on functional mobility and gait with RWX.  11/23/17 1029 Done               Point: Precautions (Done)    Learning Progress Summary    Learner Readiness Method Response Comment Documented by Status   Patient Acceptance E VU Education provided on functional mobility and gait with RWX.  11/23/17 1029 Done                      User Key     Initials Effective Dates Name Provider Type Discipline    LN 06/22/16 -  Juhi Proctor, PT Physical Therapist PT                PT Recommendation and Plan  Anticipated Discharge Disposition: home with home health  PT Frequency: daily  Plan of Care Review  Plan Of Care Reviewed With: patient  Outcome Summary/Follow up Plan: PT note:  Evaluation completed this am. Patient noted to have generalized weakness and c/o pain left hip/knee/hand/forehead after a fall at home and landing on left side. He also c/o chronic right knee pain s/p TKA  2 years ago. Patient reports that his knee has never stopped hurting and has limited his mobility and gait. Patient needed  minimal assist of 2 to stand from chair (low surface) with RWX and he ambulated 80 feet with RWX with CGA of 1 and cueing. He will benefit from PT daily for functional mobility and gait training and therapeutic exercise for LE as tolerated. He may benefit from home health PT upon  discharge home with spouse.  Patient had been using a SPC at home prior to admission but feel he would benefit from a walker at this time until he gets stronger and feels better.  Patient reports that he does have a RWX at home.          IP PT Goals       11/23/17 1030          Transfer Training PT STG    Transfer Training PT STG, Date Established 11/23/17  -LN      Transfer Training PT STG, Time to Achieve 5 days  -LN      Transfer Training PT STG, Activity Type sit to stand/stand to sit  -LN      Transfer Training PT STG, [place] Level independent  -LN      Transfer Training PT STG, Assist Device walker, rolling  -LN      Gait Training PT STG    Gait Training Goal PT STG, Date Established 11/23/17  -LN      Gait Training Goal PT STG, Time to Achieve 5 days  -LN      Gait Training Goal PT STG, [place] Level contact guard assist;supervision required  -LN      Gait Training Goal PT STG, Assist Device walker, rolling  -LN      Gait Training Goal PT STG, Distance to Achieve 120 feet  -LN      Gait Training Goal PT STG, Additional Goal so he can safely ambulate in his home.   -LN      Patient Education PT STG    Patient Education PT STG, Date Established 11/23/17  -LN      Patient Education PT STG, Time to Achieve 5 days  -LN      Patient Education PT STG, Education Type HEP;gait;transfers  -LN      Patient Education PT STG, Education Understanding demonstrate adequately;verbalize understanding  -LN        User Key  (r) = Recorded By, (t) = Taken By, (c) = Cosigned By    Initials Name Provider Type    DEEPA Proctor, PT Physical Therapist                Outcome Measures       11/23/17 1000          How much help from another person do you currently need...    Turning from your back to your side while in flat bed without using bedrails? 3  -LN      Moving from lying on back to sitting on the side of a flat bed without bedrails? 2  -LN      Moving to and from a bed to a chair (including a wheelchair)?  3  -LN      Standing up from a chair using your arms (e.g., wheelchair, bedside chair)? 3  -LN      Climbing 3-5 steps with a railing? 2  -LN      To walk in hospital room? 3  -LN      AM-PAC 6 Clicks Score 16  -LN      Functional Assessment    Outcome Measure Options AM-PAC 6 Clicks Basic Mobility (PT)  -LN        User Key  (r) = Recorded By, (t) = Taken By, (c) = Cosigned By    Initials Name Provider Type    DEEPA Proctor PT Physical Therapist           Time Calculation:         PT Charges       11/23/17 1034          Time Calculation    Start Time 0935  -LN      Stop Time 0956  -LN      Time Calculation (min) 21 min  -LN        User Key  (r) = Recorded By, (t) = Taken By, (c) = Cosigned By    Initials Name Provider Type    DEEPA Proctor PT Physical Therapist          Therapy Charges for Today     Code Description Service Date Service Provider Modifiers Qty    46403670661 HC PT EVAL MOD COMPLEXITY 1 11/23/2017 Juhi Proctor, PT GP 1    79368873479 HC GAIT TRAINING EA 15 MIN 11/23/2017 Juhi Proctor, PT GP 1    77083384730 HC PT THER SUPP EA 15 MIN 11/23/2017 Juhi Proctor, PT GP 1          PT G-Codes  Outcome Measure Options: AM-PAC 6 Clicks Basic Mobility (PT)      Juhi Proctor, PT  11/23/2017

## 2017-11-23 NOTE — PLAN OF CARE
Problem: Patient Care Overview (Adult)  Goal: Plan of Care Review  Outcome: Ongoing (interventions implemented as appropriate)    11/23/17 0250   Coping/Psychosocial Response Interventions   Plan Of Care Reviewed With patient   Patient Care Overview   Progress progress toward functional goals as expected   Outcome Evaluation   Outcome Summary/Follow up Plan pt slept well in chair, had 100.2 fever at beginning of shift, gave 650 mg of Tylenol, and fever came down, VSS, no complaints of pain       Goal: Adult Individualization and Mutuality  Outcome: Ongoing (interventions implemented as appropriate)  Goal: Discharge Needs Assessment  Outcome: Ongoing (interventions implemented as appropriate)    Problem: Sepsis (Adult)  Goal: Signs and Symptoms of Listed Potential Problems Will be Absent or Manageable (Sepsis)  Outcome: Ongoing (interventions implemented as appropriate)    Problem: Fall Risk (Adult)  Goal: Identify Related Risk Factors and Signs and Symptoms  Outcome: Ongoing (interventions implemented as appropriate)  Goal: Absence of Falls  Outcome: Ongoing (interventions implemented as appropriate)    Problem: Infection, Risk/Actual (Adult)  Goal: Identify Related Risk Factors and Signs and Symptoms  Outcome: Ongoing (interventions implemented as appropriate)  Goal: Infection Prevention/Resolution  Outcome: Ongoing (interventions implemented as appropriate)    Problem: Confusion, Acute (Adult)  Goal: Identify Related Risk Factors and Signs and Symptoms  Outcome: Ongoing (interventions implemented as appropriate)  Goal: Cognitive/Functional Impairments Minimized  Outcome: Ongoing (interventions implemented as appropriate)  Goal: Safety  Outcome: Ongoing (interventions implemented as appropriate)

## 2017-11-23 NOTE — CONSULTS
Patient Name: Froilan Helton  :1940  77 y.o.    Date of Admission: 2017  Date of Consultation:  17  Encounter Provider: Darwin Monaco III, MD  Place of Service: [facility] CARDIOLOGY  Referring Provider: No ref. provider found  Patient Care Team:  LEATHA Darden as PCP - General (Nurse Practitioner)      Chief complaint: sepsis, syncope, troponin above reference    History of Present Illness:    This is a 77-year-old male who presented to the emergency room after being found on the floor of the toilet.  According the ER note, his wife stated that he went to the toilet at approximately 07:30.  She went to check on him at 1430 and found him lying on the floor the bathroom.  He had a hematoma on his forehead where he says he struck his forehead when he passed out while sitting on the toilet.  He also has some soreness in his left hand and he thinks he hit that too.  He doesn't recall passing out.  He denied any sensation of chest pain, pressure, tightness, squeezing, or heartburn.  He had no sensation of palpitations or heart racing.  On arrival in the emergency room he had a temperature of 104.4.  Lactic acid was elevated as was procalcitonin, consistent with acute infectious process.  His blood sugar was elevated at 370.  Initial troponin was undetectable, second troponin was 0.114, and we've been asked to consult.    Today patient is sitting up in a chair.  He denies any chest discomfort.  He states he's worried because he hasn't been given diuretics which he usually takes.  He states he has daily lower extremity edema and he takes Lasix twice a day.  He denies any shortness of breath.  No heart racing or palpitations.  No presyncope or syncope.  No orthopnea or PND.  He is currently afebrile.    Echo today:  Interpretation Summary   · Left ventricular systolic function is normal. Estimated EF = 52%.  · Left ventricular diastolic dysfunction (grade I)  consistent with impaired relaxation.  · calcification of the aortic valve  · Mild dilation of the aortic root is present.         Past Medical History:   Diagnosis Date   • Arthritis    • Asthma    • Cancer     skin   • COPD (chronic obstructive pulmonary disease)    • Diabetes mellitus    • Disease of thyroid gland    • Hypertension    • Renal disorder        Past Surgical History:   Procedure Laterality Date   • COLONOSCOPY     • JOINT REPLACEMENT      right   • REPLACEMENT TOTAL KNEE           Prior to Admission medications    Medication Sig Start Date End Date Taking? Authorizing Provider   donepezil (ARICEPT) 5 MG tablet Take 5 mg by mouth Every Night.   Yes Historical Provider, MD   furosemide (LASIX) 40 MG tablet Take 40 mg by mouth 2 (Two) Times a Day.   Yes Historical Provider, MD   gabapentin (NEURONTIN) 600 MG tablet Take 600 mg by mouth 3 (Three) Times a Day.   Yes Historical Provider, MD   insulin detemir (LEVEMIR) 100 UNIT/ML injection Inject 22 Units under the skin 2 (Two) Times a Day.   Yes Historical Provider, MD   insulin NPH-insulin regular (humuLIN 70/30,novoLIN 70/30) (70-30) 100 UNIT/ML injection Inject 30 Units under the skin 2 (Two) Times a Day With Meals.   Yes Historical Provider, MD   levothyroxine (SYNTHROID, LEVOTHROID) 100 MCG tablet Take 100 mcg by mouth Daily.   Yes Historical Provider, MD   lisinopril (PRINIVIL,ZESTRIL) 20 MG tablet Take 20 mg by mouth Daily.   Yes Historical Provider, MD   metFORMIN (GLUCOPHAGE) 1000 MG tablet Take 1,000 mg by mouth 3 (Three) Times a Day With Meals.   Yes Historical Provider, MD   pramipexole (MIRAPEX) 0.5 MG tablet Take 0.5 mg by mouth 3 (Three) Times a Day.   Yes Historical Provider, MD   pravastatin (PRAVACHOL) 20 MG tablet Take 20 mg by mouth Daily.   Yes Historical Provider, MD   pregabalin (LYRICA) 100 MG capsule Take 100 mg by mouth 2 (Two) Times a Day.   Yes Historical Provider, MD   SITagliptin (JANUVIA) 100 MG tablet Take 100 mg by mouth  Daily.   Yes Historical Provider, MD   vitamin D (ERGOCALCIFEROL) 11001 units capsule capsule Take 50,000 Units by mouth 1 (One) Time Per Week.    Historical Provider, MD       Allergies   Allergen Reactions   • Oxycontin [Oxycodone Hcl]      'Makes me crazy and stand on my head'       Social History     Social History   • Marital status: Unknown     Spouse name: N/A   • Number of children: N/A   • Years of education: N/A     Social History Main Topics   • Smoking status: Former Smoker   • Smokeless tobacco: Never Used      Comment: quit 1993   • Alcohol use No   • Drug use: No   • Sexual activity: Not Asked     Other Topics Concern   • None     Social History Narrative   • None       History reviewed. No pertinent family history.    REVIEW OF SYSTEMS:   All systems reviewed.  Pertinent positives identified in HPI.  All other systems are negative.      Objective:     Vitals:    11/22/17 0352 11/22/17 0656 11/22/17 1553 11/22/17 1900   BP: 132/68 134/64 146/77 132/61   BP Location: Left arm Left arm Right arm    Patient Position: Lying Lying Sitting    Pulse: 76 74 75 68   Resp: 20 20 18 18   Temp: 98.7 °F (37.1 °C) 98.8 °F (37.1 °C) 99.1 °F (37.3 °C) 100.2 °F (37.9 °C)   TempSrc: Oral Oral Oral Oral   SpO2: 97% 98% 98% 96%   Weight: (!) 301 lb 5 oz (137 kg)      Height:         Body mass index is 38.69 kg/(m^2).    General Appearance:    Alert, cooperative, in no acute distress   Head:    Normocephalic, without obvious abnormality, atraumatic   Eyes:            Lids and lashes normal, conjunctivae and sclerae normal, no   icterus, no pallor, corneas clear, PERRLA   Ears:    Ears appear intact with no abnormalities noted   Throat:   No oral lesions, no thrush, oral mucosa moist   Neck:   No adenopathy, supple, trachea midline, no thyromegaly, no   carotid bruit, no JVD   Back:     No kyphosis present, no scoliosis present, no skin lesions, erythema or scars, no tenderness to percussion or palpation, range of motion  normal   Lungs:     Clear to auscultation,respirations regular, even and unlabored    Heart:    Regular rhythm and normal rate, normal S1 and S2, no murmur, no gallop, no rub, no click   Chest Wall:    No abnormalities observed   Abdomen:     Normal bowel sounds, no masses, no organomegaly, soft        non-tender, non-distended, no guarding, no rebound  tenderness   Extremities:   Moves all extremities well, no edema, no cyanosis, no redness   Pulses:   Pulses palpable and equal bilaterally. Normal radial, carotid, femoral, dorsalis pedis and posterior tibial pulses bilaterally. Normal abdominal aorta   Skin:  Psychiatric:   No bleeding, bruising or rash    Alert and oriented x 3, normal mood and affect   Lab Review:       Results from last 7 days  Lab Units 11/22/17  0415 11/21/17  1557   SODIUM mmol/L 139 136   POTASSIUM mmol/L 4.1 4.6   CHLORIDE mmol/L 99 93*   CO2 mmol/L 27.8 24.6   BUN mg/dL 17 15   CREATININE mg/dL 1.64* 1.85*   CALCIUM mg/dL 8.6* 9.6   BILIRUBIN mg/dL  --  0.6   ALK PHOS U/L  --  62   ALT (SGPT) U/L  --  17   AST (SGOT) U/L  --  34   GLUCOSE mg/dL 312* 330*       Results from last 7 days  Lab Units 11/22/17  1805 11/22/17  1256 11/21/17  1557   TROPONIN T ng/mL 0.115* 0.141* 0.014       Results from last 7 days  Lab Units 11/22/17  0415   WBC 10*3/mm3 18.07*   HEMOGLOBIN g/dL 12.9*   HEMATOCRIT % 39.0*   PLATELETS 10*3/mm3 154       Results from last 7 days  Lab Units 11/21/17  1557   INR  1.11*   APTT seconds 33.3                       I personally viewed and interpreted the patient's EKG/Telemetry data.            Assessment and Plan:       Active Hospital Problems (** Indicates Principal Problem)    Diagnosis Date Noted   • **Sepsis [A41.9] 11/21/2017   • Elevated procalcitonin [R79.89] 11/22/2017   • Lactic acid acidosis [E87.2] 11/22/2017   • Leukocytosis [D72.829] 11/22/2017   • COPD (chronic obstructive pulmonary disease) [J44.9]    • Diabetes mellitus [E11.9]    • Hypertension [I10]     • Acute renal failure [N17.9]       Resolved Hospital Problems    Diagnosis Date Noted Date Resolved   No resolved problems to display.     1.  Acute infectious process with lactic acidosis and sepsis  2.  Elevated troponin-this is consistent with a non-ST segment elevation myocardial infarction.  As either could be a type II, secondary to the acute infection, or also represent underlying CAD.  At this point would simply pursue supportive therapy, the patient has no evidence of acute coronary syndrome.  EKG shows no evolution  3.  Patient reports a history of lower extremity edema.  Echocardiogram shows normal function.  This is consistent with chronic diastolic CHF  4.  Diabetes mellitus  5.  History of hypertension  6.  Acute renal insufficiency-diuretics are being held, IV fluid is being administered for his acute sepsis      Darwin Monaco III, MD  11/22/17  7:56 PM

## 2017-11-23 NOTE — PROGRESS NOTES
"Hospitalist Team      Patient Care Team:  LEATHA Darden as PCP - General (Nurse Practitioner)        Chief Complaint:  F/U sepsis and probable UTI, bacteremia and cellulitis, syncope    Subjective    Interval History and ROS:     Patient notes his legs have become extremely swollen and the RLE has increased redness. He notes weakness in his LEs which he states is not new (he notes multiple joint issues and injuries to his LEs in the past).  The patient had a temp to 100.2 yesterday evening but none since. He denies any CP, SOA, N/V/D or lightheadedness. He denies any significant cough or sore throat.       Objective    Vital Signs  Temp:  [97.4 °F (36.3 °C)-100.2 °F (37.9 °C)] 97.6 °F (36.4 °C)  Heart Rate:  [64-81] 68  Resp:  [18] 18  BP: (121-146)/(57-77) 132/62  Oxygen Therapy  SpO2: 95 %  Pulse Oximetry Type: Intermittent  O2 Device: room air  Body mass index is 38.69 kg/(m^2).    Flowsheet Rows         First Filed Value    Admission Height  74\" (188 cm) Documented at 11/21/2017 1540    Admission Weight  (!)  302 lb 3.2 oz (137 kg) Documented at 11/21/2017 1540          Physical Exam:  Constitutional: Patient appears well-developed, obese and in no acute distress   HEENT:   Head: Normocephalic, left temporal abrasion noted.   Eyes:  EOM are intact. Sclera are anicteric and non-injected.  Mouth and Throat: Patient has moist mucous membranes. Oropharynx is clear of any erythema or exudate.  Poor dentition.   Neck: Neck supple. No JVD noted. No lymphadenopathy present.  Cardiovascular: Regular rate, regular rhythm, S1 normal and S2 normal.  Exam reveals no gallop and no friction rub.  No murmur heard.  Pulmonary/Chest: Lungs are clear to auscultation bilaterally. No respiratory distress. No wheezes. No rhonchi. No rales.   Abdominal: Obese. Soft. Bowel sounds are normal. There is no tenderness.   Musculoskeletal: Normal Muscle tone  Extremities: 3+ bilateral LE pitting edema R>L. Pulses are palpable in all 4 " extremities.  Neurological: Patient is alert and oriented to person, place, and time. Cranial nerves II-XII are grossly intact with no focal deficits.  Skin: Skin is warm. Nails show no clubbing.  No cyanosis. Patient now with significant erythema of the RLE extending up to just below the knee and onto the dorsum of the right foot. (patient notes worsened from enmanuel day).  Small scabbed lesion on left chin with no surrounding erythema. Lichenification and fungal appearance surrounding toes.       Results Review:     I reviewed the patient's new clinical results.    Lab Results (last 24 hours)     Procedure Component Value Units Date/Time    Troponin [[id number]]  (Abnormal) Collected:  11/22/17 1256    Specimen:  Blood Updated:  11/22/17 1334     Troponin T 0.141 (C) ng/mL     Narrative:       Troponin T Reference Ranges:  Less than 0.03 ng/mL:    Negative for AMI  0.03 to 0.09 ng/mL:      Indeterminant for AMI  Greater than 0.09 ng/mL: Positive for AMI    Blood Culture - Blood, [[id number]]  (Normal) Collected:  11/21/17 1606    Specimen:  Blood from Wrist, Right Updated:  11/22/17 1616     Blood Culture No growth at 24 hours    POC Glucose Fingerstick [[id number]]  (Abnormal) Collected:  11/22/17 1652    Specimen:  Blood Updated:  11/22/17 1659     Glucose 238 (H) mg/dL     Narrative:       Meter: [id number] : [id number] Allen Menchaca Nursing Assistant    Troponin [[id number]]  (Abnormal) Collected:  11/22/17 1805    Specimen:  Blood Updated:  11/22/17 1836     Troponin T 0.115 (C) ng/mL     Narrative:       Troponin T Reference Ranges:  Less than 0.03 ng/mL:    Negative for AMI  0.03 to 0.09 ng/mL:      Indeterminant for AMI  Greater than 0.09 ng/mL: Positive for AMI    POC Glucose Fingerstick [[id number]]  (Abnormal) Collected:  11/22/17 2039    Specimen:  Blood Updated:  11/22/17 2046     Glucose 208 (H) mg/dL     Narrative:       Meter: [id number] : [id number] Pati LIVNIGSTON    Troponin  [[id number]]  (Abnormal) Collected:  11/22/17 9475    Specimen:  Blood Updated:  11/23/17 0026     Troponin T 0.099 (H) ng/mL     Narrative:       Troponin T Reference Ranges:  Less than 0.03 ng/mL:    Negative for AMI  0.03 to 0.09 ng/mL:      Indeterminant for AMI  Greater than 0.09 ng/mL: Positive for AMI    CBC & Differential [[id number]] Collected:  11/23/17 0420    Specimen:  Blood Updated:  11/23/17 0442    Narrative:       The following orders were created for panel order CBC & Differential.  Procedure                               Abnormality         Status                     ---------                               -----------         ------                     CBC Auto Differential[[id number]]        Abnormal            Final result                 Please view results for these tests on the individual orders.    CBC Auto Differential [[id number]]  (Abnormal) Collected:  11/23/17 0420    Specimen:  Blood Updated:  11/23/17 0442     WBC 12.56 (H) 10*3/mm3      RBC 3.82 (L) 10*6/mm3      Hemoglobin 11.3 (L) g/dL      Hematocrit 34.2 (L) %      MCV 89.5 fL      MCH 29.6 pg      MCHC 33.0 g/dL      RDW 13.5 %      RDW-SD 44.2 fl      MPV 10.8 (H) fL      Platelets 148 10*3/mm3      Neutrophil % 77.2 (H) %      Lymphocyte % 11.6 (L) %      Monocyte % 7.6 %      Eosinophil % 2.9 %      Basophil % 0.2 %      Immature Grans % 0.5 %      Neutrophils, Absolute 9.70 (H) 10*3/mm3      Lymphocytes, Absolute 1.46 10*3/mm3      Monocytes, Absolute 0.95 10*3/mm3      Eosinophils, Absolute 0.36 (H) 10*3/mm3      Basophils, Absolute 0.03 10*3/mm3      Immature Grans, Absolute 0.06 (H) 10*3/mm3      nRBC 0.0 /100 WBC     Comprehensive Metabolic Panel [[id number]]  (Abnormal) Collected:  11/23/17 0420    Specimen:  Blood Updated:  11/23/17 0512     Glucose 161 (H) mg/dL      BUN 19 mg/dL      Creatinine 1.71 (H) mg/dL      Sodium 137 mmol/L      Potassium 3.6 mmol/L      Chloride 102 mmol/L      CO2 26.6 mmol/L      Calcium 8.2  (L) mg/dL      Total Protein 6.0 g/dL      Albumin 2.90 (L) g/dL      ALT (SGPT) 22 U/L      AST (SGOT) 59 (H) U/L      Alkaline Phosphatase 44 U/L      Total Bilirubin 0.4 mg/dL      eGFR Non African Amer 39 (L) mL/min/1.73      Globulin 3.1 gm/dL      A/G Ratio 0.9 g/dL      BUN/Creatinine Ratio 11.1     Anion Gap 8.4 mmol/L     Narrative:       The MDRD GFR formula is only valid for adults with stable renal function between ages 18 and 70.    Blood Culture - Blood, [[id number]]  (Abnormal) Collected:  11/21/17 1557    Specimen:  Blood from Arm, Right Updated:  11/23/17 0722     Blood Culture Abnormal Stain (A)     Gram Stain Result Aerobic Bottle Gram positive cocci in chains    POC Glucose Fingerstick [[id number]]  (Abnormal) Collected:  11/23/17 0736    Specimen:  Blood Updated:  11/23/17 0743     Glucose 182 (H) mg/dL     Narrative:       Meter: [id number] : [id number] Meeta Finn NURSING ASSISTANT    Urine Culture - Urine, Urine, Clean Catch [[id number]]  (Normal) Collected:  11/21/17 1552    Specimen:  Urine from Urine, Catheter Updated:  11/23/17 1000     Urine Culture No growth    POC Glucose Fingerstick [[id number]]  (Abnormal) Collected:  11/23/17 1145    Specimen:  Blood Updated:  11/23/17 1151     Glucose 229 (H) mg/dL     Narrative:       Meter: [id number] : [id number] Meeta Finn NURSING ASSISTANT          Imaging Results (last 24 hours)     Procedure Component Value Units Date/Time    US Renal Bilateral [[id number]] Collected:  11/22/17 1505     Updated:  11/22/17 1508    Narrative:       Renal ultrasound     INDICATION: Elevated creatinine of 1.6. Sepsis. Fever of unknown origin.     FINDINGS: Sonographic evaluation is performed of the kidneys in multiple  planes. A comparison is made with CT abdomen pelvis from 11/21/2017.     There is fatty infiltration of the liver. Right kidney measures 10.6 cm  in [id number] length. Left kidney measures 11.2 cm in [id number]-hk-rhnx  length. Both are  morphologically normal and nonobstructed. Urinary  bladder is not fully distended but it does appear grossly normal.       Impression:       Normal renal ultrasound.     This report was finalized on 11/22/2017 3:06 PM by Dr. Jaylen Gudino MD.             ECG/EMG Results (most recent)     Procedure Component Value Units Date/Time    ECG 12 Lead [[id number]] Collected:  11/21/17 1638     Updated:  11/21/17 1708    Narrative:       RR Interval= 526 ms  CA Interval= 168 ms  QRSD Interval= 104 ms  QT Interval= 336 ms  QTc Interval= 463 ms  Heart Rate= 114 ms  P Axis= 51 deg  QRS Axis= 1 deg  T Wave Axis= 29 deg  I: 40 Axis= -31 deg  T: 40 Axis= 68 deg  ST Axis= 152 deg  SINUS TACHYCARDIA ew compared to the previous ECG  INFERIOR INFARCT, AGE INDETERMINATE new compared to the previous ECG  Electronically Signed by:  Crow Chris ([facility]) 21-Nov-2017 17:03:13  Date and Time of Study: 2017-11-21 16:38:12    Adult Transthoracic Echo Complete W/ Cont if Necessary Per Protocol [[id number]] Collected:  11/22/17 0716     Updated:  11/22/17 1101     BSA 2.6 m^2      IVSd 1.1 cm      LVIDd 6.2 cm      LVIDs 4.5 cm      LVPWd 1.1 cm      IVS/LVPW 1.0     FS 27.5 %      EDV(Teich) 192.6 ml      ESV(Teich) 91.5 ml      EF(Teich) 52.5 %      EDV(cubed) 236.0 ml      ESV(cubed) 89.9 ml      EF(cubed) 61.9 %      LV mass(C)d 300.9 grams      LV mass(C)dI 116.4 grams/m^2      SV(Teich) 101.1 ml      SI(Teich) 39.1 ml/m^2      SV(cubed) 146.1 ml      SI(cubed) 56.5 ml/m^2      Ao root diam 4.5 cm      Ao root area 15.9 cm^2      ACS 1.6 cm      LA dimension 3.3 cm      asc Aorta Diam 3.4 cm      LA/Ao 0.73     LVOT diam 2.1 cm      LVOT area 3.5 cm^2      LVOT area(traced) 3.5 cm^2      RVOT diam 2.7 cm      RVOT area 5.7 cm^2      LVLd ap4 9.6 cm      EDV(MOD-sp4) 193.0 ml      LVLs ap4 8.9 cm      ESV(MOD-sp4) 94.2 ml      EF(MOD-sp4) 51.2 %      LVLd ap2 9.3 cm      EDV(MOD-sp2) 171.0 ml      LVLs ap2 8.3 cm      ESV(MOD-sp2) 87.5  ml      EF(MOD-sp2) 48.8 %      SV(MOD-sp4) 98.8 ml      SI(MOD-sp4) 38.2 ml/m^2      SV(MOD-sp2) 83.5 ml      SI(MOD-sp2) 32.3 ml/m^2      Ao root area (BSA corrected) 1.7     Ao root area (BSA corrected) 48.8 ml/m^2      CONTRAST EF 4CH 51.2 ml/m^2      LV Diastolic corrected for BSA 74.6 ml/m^2      LV Systolic corrected for BSA 36.4 ml/m^2      MV A dur 0.2 sec      MV E max joao 78.1 cm/sec      MV A max joao 105.0 cm/sec      MV E/A 0.74     MV V2 max 109.0 cm/sec      MV max PG 4.8 mmHg      MV V2 mean 59.2 cm/sec      MV mean PG 2.0 mmHg      MV V2 VTI 30.6 cm      MVA(VTI) 2.4 cm^2      MV P1/2t max joao 95.7 cm/sec      MV P1/2t 53.1 msec      MVA(P1/2t) 4.1 cm^2      MV dec slope 528.0 cm/sec^2      MV dec time 0.22 sec      Ao pk joao 178.0 cm/sec      Ao max PG 13.0 mmHg      Ao max PG (full) 8.2 mmHg      Ao V2 mean 117.0 cm/sec      Ao mean PG 7.0 mmHg      Ao mean PG (full) 4.0 mmHg      Ao V2 VTI 40 cm      MAXX(I,A) 1.9 cm^2      MAXX(I,D) 1.9 cm^2      MAXX(V,A) 1.8 cm^2      MAXX(V,D) 1.8 cm^2      AI max joao 377.5 cm/sec      AI max PG 57.1 mmHg      AI dec slope 274.0 cm/sec^2      AI P1/2t 403.5 msec      LV V1 max PG 3.0 mmHg      LV V1 mean PG 2.0 mmHg      LV V1 max 86.5 cm/sec      LV V1 mean 58.2 cm/sec      LV V1 VTI 21.1 cm      SV(Ao) 600.4 ml      SI(Ao) 232.2 ml/m^2      SV(LVOT) 72.9 ml      SV(RVOT) 72.1 ml      SI(LVOT) 28.2 ml/m^2      PA V2 max 87.7 cm/sec      PA max PG 3.1 mmHg      PA max PG (full) 1.3 mmHg      BH CV ECHO BISHNU - PVA(V,A) 4.3 cm^2      BH CV ECHO BISHNU - PVA(V,D) 4.3 cm^2      PA acc time 0.14 sec      RV V1 max PG 1.8 mmHg      RV V1 mean PG 1.0 mmHg      RV V1 max 66.5 cm/sec      RV V1 mean 46.0 cm/sec      RV V1 VTI 12.6 cm      PA pr(Accel) 14.2 mmHg      Pulm Sys Joao 84.5 cm/sec      Pulm Fernandez Joao 40.1 cm/sec      Pulm S/D 2.1     Qp/Qs 0.99     Pulm A Revs Dur 0.14 sec      Pulm A Revs Joao 27.0 cm/sec      MVA P1/2T LCG 2.3 cm^2      BH CV ECHO BISHNU - BZI_BMI  38.6 kilograms/m^2       CV ECHO BISHNU - BSA(NATALY) 2.7 m^2       CV ECHO BISHNU - BZI_METRIC_WEIGHT 136.5 kg       CV ECHO BISHNU - BZI_METRIC_HEIGHT 188.0 cm      Target HR (85%) 122 bpm      Max. Pred. HR (100%) 144 bpm       CV VAS BP RIGHT /64 mmHg      TDI S' 14.00 cm/sec      RV Base 3.90 cm      RV Length 7.80 cm      RV Mid 3.20 cm      LA volume 68.0 cm3      E/E' ratio 11.0     Lat Peak E' Joao 10.0 cm/sec      Med Peak E' Joao 6.00 cm/sec      TAPSE (>1.6) 1.80 cm2      LA Volume Index 30.0 mL/m2      Echo EF Estimated 52 %     Narrative:       · Left ventricular systolic function is normal. Estimated EF = 52%.  · Left ventricular diastolic dysfunction (grade I) consistent with   impaired relaxation.  · calcification of the aortic valve  · Mild dilation of the aortic root is present.       ECG 12 Lead [[id number]] Collected:  11/22/17 1441     Updated:  11/22/17 1509    Narrative:       RR Interval= 822 ms  VT Interval= 188 ms  QRSD Interval= 106 ms  QT Interval= 416 ms  QTc Interval= 459 ms  Heart Rate= 73 ms  P Axis= 41 deg  QRS Axis= 9 deg  T Wave Axis= 107 deg  I: 40 Axis= -17 deg  T: 40 Axis= 61 deg  ST Axis= 158 deg  SINUS RHYTHM  PROBABLE INFERIOR INFARCT, OLD  LATERAL LEADS ARE ALSO INVOLVED  NO SIGNIFICANT CHANGE FROM PREVIOUS ECG  Electronically Signed by:  Aaron Abdi) ([facility]) 22-Nov-2017 15:06:34  Date and Time of Study: 2017-11-22 14:41:22    ECG 12 Lead [[id number]] Collected:  11/23/17 0824     Updated:  11/23/17 0829    Narrative:       RR Interval= 984 ms  VT Interval= 195 ms  QRSD Interval= 112 ms  QT Interval= 456 ms  QTc Interval= 460 ms  Heart Rate= 61 ms  P Axis= 25 deg  QRS Axis= 0 deg  T Wave Axis= 49 deg  I: 40 Axis= -16 deg  T: 40 Axis= 5 deg  ST Axis= 169 deg  SINUS RHYTHM  NONSPECIFIC INTRAVENTRICULAR CONDUCTION DELAY  MINIMAL ST DEPRESSION, LATERAL LEADS  Electronically Signed by:  Date and Time of Study: 2017-11-23 08:24:19          Medication Review:   I have  reviewed the patient's current medication list    Current Facility-Administered Medications:   •  acetaminophen (TYLENOL) tablet 650 mg, 650 mg, Oral, Q6H PRN, Terri Chaudhry MD, 650 mg at 11/23/17 0838  •  aspirin EC tablet 325 mg, 325 mg, Oral, Daily, Terri Chaudhry MD, 325 mg at 11/23/17 0832  •  atorvastatin (LIPITOR) tablet 10 mg, 10 mg, Oral, Daily, Liat Hood, APRN, 10 mg at 11/23/17 0832  •  cefTRIAXone (ROCEPHIN) IVPB 1 g/50ml dextrose (premix), 1 g, Intravenous, Q24H, Terri Chaudhry MD, Last Rate: 100 mL/hr at 11/22/17 2049, 1 g at 11/22/17 2049  •  dextrose (D50W) solution 25 g, 25 g, Intravenous, Q15 Min PRN, Terri Chaudhry MD  •  dextrose (GLUTOSE) oral gel 15 g, 15 g, Oral, Q15 Min PRN, Terri Chaudhry MD  •  docusate sodium (COLACE) capsule 100 mg, 100 mg, Oral, BID, Liat Hood, APRN, 100 mg at 11/23/17 0832  •  donepezil (ARICEPT) tablet 5 mg, 5 mg, Oral, Nightly, Liat Hood, APRN, 5 mg at 11/22/17 2055  •  enoxaparin (LOVENOX) syringe 30 mg, 30 mg, Subcutaneous, Q24H, Terri Chaudhry MD, 30 mg at 11/22/17 2053  •  gabapentin (NEURONTIN) capsule 600 mg, 600 mg, Oral, Q8H, Liat Hodo, APRN, 600 mg at 11/23/17 0647  •  glucagon (GLUCAGEN) injection 1 mg, 1 mg, Subcutaneous, Q15 Min PRN, Terri Chaudhry MD  •  insulin aspart (novoLOG) injection 0-9 Units, 0-9 Units, Subcutaneous, 4x Daily AC & at Bedtime, Terri Chaudhry MD, 6 Units at 11/23/17 1205  •  insulin detemir (LEVEMIR) injection 22 Units, 22 Units, Subcutaneous, BID, LEATHA Simpson, 22 Units at 11/23/17 0816  •  ipratropium-albuterol (DUO-NEB) nebulizer solution 3 mL, 3 mL, Nebulization, Q6H PRN, Sree Morley MD  •  levothyroxine (SYNTHROID, LEVOTHROID) tablet 100 mcg, 100 mcg, Oral, Daily, LEATHA Simpson, 100 mcg at 11/23/17 0832  •  linagliptin (TRADJENTA) tablet 5 mg, 5 mg,  Oral, Daily, Liat R Erwin, APRN, 5 mg at 11/23/17 0832  •  lisinopril (PRINIVIL,ZESTRIL) tablet 20 mg, 20 mg, Oral, Daily, Liat R Erwin, APRN, 20 mg at 11/23/17 0832  •  miconazole (MICOTIN) 2 % cream, , Topical, Q12H, Liat R Erwin, APRN  •  ondansetron (ZOFRAN) injection 4 mg, 4 mg, Intravenous, Q6H PRN, Terri Chaudhry MD  •  Pharmacy to dose vancomycin, , Does not apply, Continuous PRN, Liat R [place], APRN  •  polyethylene glycol 3350 powder (packet), 17 g, Oral, Daily, Liat R [place], APRN, 17 g at 11/22/17 1319  •  sodium chloride 0.9 % flush 1-10 mL, 1-10 mL, Intravenous, PRN, Terri Chaudhry MD  •  sodium chloride 0.9 % flush 10 mL, 10 mL, Intravenous, PRN, Keven Steven MD  •  sodium chloride 0.9 % infusion 40 mL, 40 mL, Intravenous, PRN, Terri Chaudhry MD  •  sodium chloride 0.9 % infusion, 125 mL/hr, Intravenous, Continuous, Sree Morley MD, Last Rate: 125 mL/hr at 11/23/17 0337, 125 mL/hr at 11/23/17 0337  •  vancomycin (VANCOCIN) 2,000 mg in sodium chloride 0.9 % 500 mL IVPB, 2,000 mg, Intravenous, Q18H, Terri Chaudhry MD, 2,000 mg at 11/23/17 0822  •  Vitamin D3 50,000 Units, 50,000 Units, Oral, Weekly, Liat R [place], APRN, 50,000 Units at 11/22/17 1318      Assessment/Plan     1. Sepsis secondary to Cellulitis with probable bacteremia: Patient is on Vanco and rocephin and his WBC is improving but clinically his cellulitis has worsened today.  One of blood cultures growing strep species, awaiting ID and sensetivities. Will change Rocephin to Invanz today and continue Vanco for now until blood cultures are final and will de-escalate.  Will D/C IVFs and patient is getting overloaded. Continue miconazole cream. Wound care has seen the patient . Will recheck PCT and lactic acid level. Monitor. LE doppler negative.    2. No UTI: Urine culture showed no growth.    3. NSTEMI:  Unclear if type II secondary to sepsis  vs CAD.  Cardiology is following, started patient on ASA yesterday.  troponins are trending down and EKG with no evolution.  Supportive care for now per cardiology. Monitor. Patient with no c/o CP. Patient on a statin.    4. Increased LE edema and Chronic diastolic CHF: I will D/C IVFs today as the patient has had a significant increase in edema here and will slowly start to diurese. O2 Sats good on RA and patient denies any SOA. Monitor.     5. Vasovagal syncope: occurred while having a BM  CT head negative for acute findings, HA resolved, cardiology saw patient today, see above.     6. Uncontrolled DM-2: A1C 9.2%, last A1C 12.4% per Dr. Mata  On home levemir 20 units every 12 hours and tradjenta 5 mg daily (substituted for januvia), will increase levemir dose as bedsides are elevated  Holding metformin (likely will not resume with patient's renal function) and 70/30 insulin (reports he takes both 70/30 and levemir at home).  Patient can be titrated up on a single insulin if needed and should not need both.  Continue Accuchecks ac/hs with moderate dose SSI  CCC/renal diet     7. CKD stage 3: Creatinine per Dr. Mata 1/4/17 was 1.4, no other records available.  Creatinine ranging form 1.6-1.8 here with IVFs.  May be a new baseline for this patient. BUN is NL, stopping IVFs today as noted above. Renal ultrasound NL.  Monitor     8. COPD: No acute issues on duonebs every 6 hours as needed     9. Hypothyroidism: TSH normal, On home levothyroxine 100 mcg daily     10. Hypertension: BP mostly WNL on lisinopril 20 mg daily.  Monitor renal function.     11. Dementia: On home aricept 5 mg nightly, Patient A&O x3 on exam today.     12. Obesity:  Body mass index is 38.69 kg/(m^2).      13. Constipation: Miralax and colace added here.  Monitor     14. Immobility and LE weakness: Patient notes chronic issues with LEs with DJD and previous injuries and now increased weakness with illness and edema.  PT/OT following and patient  will need HH for PT/OT at discharge.     15. DVT prophy: lovenox.    Plan for disposition: Home when able with HH.    Terri Chaudhry MD  11/23/17  1:11 PM

## 2017-11-24 ENCOUNTER — APPOINTMENT (OUTPATIENT)
Dept: GENERAL RADIOLOGY | Facility: HOSPITAL | Age: 77
End: 2017-11-24

## 2017-11-24 LAB
ANION GAP SERPL CALCULATED.3IONS-SCNC: 10.7 MMOL/L
BACTERIA SPEC AEROBE CULT: ABNORMAL
BACTERIA SPEC AEROBE CULT: ABNORMAL
BASOPHILS # BLD AUTO: 0.02 10*3/MM3 (ref 0–0.2)
BASOPHILS NFR BLD AUTO: 0.3 % (ref 0–2)
BUN BLD-MCNC: 16 MG/DL (ref 8–23)
BUN/CREAT SERPL: 10.7 (ref 7–25)
CALCIUM SPEC-SCNC: 8.3 MG/DL (ref 8.8–10.5)
CHLORIDE SERPL-SCNC: 102 MMOL/L (ref 98–107)
CO2 SERPL-SCNC: 24.3 MMOL/L (ref 22–29)
CREAT BLD-MCNC: 1.5 MG/DL (ref 0.76–1.27)
DEPRECATED RDW RBC AUTO: 42.5 FL (ref 37–54)
EOSINOPHIL # BLD AUTO: 0.42 10*3/MM3 (ref 0.1–0.3)
EOSINOPHIL NFR BLD AUTO: 5.4 % (ref 0–4)
ERYTHROCYTE [DISTWIDTH] IN BLOOD BY AUTOMATED COUNT: 13.2 % (ref 11.5–14.5)
GFR SERPL CREATININE-BSD FRML MDRD: 46 ML/MIN/1.73
GLUCOSE BLD-MCNC: 140 MG/DL (ref 65–99)
GLUCOSE BLDC GLUCOMTR-MCNC: 137 MG/DL (ref 70–130)
GLUCOSE BLDC GLUCOMTR-MCNC: 190 MG/DL (ref 70–130)
GLUCOSE BLDC GLUCOMTR-MCNC: 225 MG/DL (ref 70–130)
GLUCOSE BLDC GLUCOMTR-MCNC: 286 MG/DL (ref 70–130)
GRAM STN SPEC: ABNORMAL
HCT VFR BLD AUTO: 34.5 % (ref 42–52)
HGB BLD-MCNC: 11.6 G/DL (ref 14–18)
IMM GRANULOCYTES # BLD: 0.03 10*3/MM3 (ref 0–0.03)
IMM GRANULOCYTES NFR BLD: 0.4 % (ref 0–0.5)
LYMPHOCYTES # BLD AUTO: 1.13 10*3/MM3 (ref 0.6–4.8)
LYMPHOCYTES NFR BLD AUTO: 14.6 % (ref 20–45)
MAGNESIUM SERPL-MCNC: 1.9 MG/DL (ref 1.7–2.5)
MCH RBC QN AUTO: 29.6 PG (ref 27–31)
MCHC RBC AUTO-ENTMCNC: 33.6 G/DL (ref 31–37)
MCV RBC AUTO: 88 FL (ref 80–94)
MONOCYTES # BLD AUTO: 0.62 10*3/MM3 (ref 0–1)
MONOCYTES NFR BLD AUTO: 8 % (ref 3–8)
NEUTROPHILS # BLD AUTO: 5.54 10*3/MM3 (ref 1.5–8.3)
NEUTROPHILS NFR BLD AUTO: 71.3 % (ref 45–70)
NRBC BLD MANUAL-RTO: 0 /100 WBC (ref 0–0)
PLATELET # BLD AUTO: 140 10*3/MM3 (ref 140–500)
PMV BLD AUTO: 10.6 FL (ref 7.4–10.4)
POTASSIUM BLD-SCNC: 3.3 MMOL/L (ref 3.5–5.2)
RBC # BLD AUTO: 3.92 10*6/MM3 (ref 4.7–6.1)
SODIUM BLD-SCNC: 137 MMOL/L (ref 136–145)
WBC NRBC COR # BLD: 7.76 10*3/MM3 (ref 4.8–10.8)

## 2017-11-24 PROCEDURE — 97116 GAIT TRAINING THERAPY: CPT | Performed by: PHYSICAL THERAPIST

## 2017-11-24 PROCEDURE — 71020 HC CHEST PA AND LATERAL: CPT

## 2017-11-24 PROCEDURE — 85025 COMPLETE CBC W/AUTO DIFF WBC: CPT | Performed by: HOSPITALIST

## 2017-11-24 PROCEDURE — 63710000001 INSULIN DETEMIR PER 5 UNITS: Performed by: HOSPITALIST

## 2017-11-24 PROCEDURE — 25010000002 ENOXAPARIN PER 10 MG: Performed by: HOSPITALIST

## 2017-11-24 PROCEDURE — 25010000002 AMPICILLIN PER 500 MG: Performed by: INTERNAL MEDICINE

## 2017-11-24 PROCEDURE — 25010000002 FUROSEMIDE PER 20 MG: Performed by: HOSPITALIST

## 2017-11-24 PROCEDURE — 80048 BASIC METABOLIC PNL TOTAL CA: CPT | Performed by: HOSPITALIST

## 2017-11-24 PROCEDURE — 99232 SBSQ HOSP IP/OBS MODERATE 35: CPT | Performed by: HOSPITALIST

## 2017-11-24 PROCEDURE — 99232 SBSQ HOSP IP/OBS MODERATE 35: CPT | Performed by: INTERNAL MEDICINE

## 2017-11-24 PROCEDURE — 63710000001 INSULIN ASPART PER 5 UNITS: Performed by: HOSPITALIST

## 2017-11-24 PROCEDURE — 83735 ASSAY OF MAGNESIUM: CPT | Performed by: HOSPITALIST

## 2017-11-24 PROCEDURE — 25010000002 VANCOMYCIN PER 500 MG: Performed by: HOSPITALIST

## 2017-11-24 PROCEDURE — 97165 OT EVAL LOW COMPLEX 30 MIN: CPT

## 2017-11-24 PROCEDURE — 82962 GLUCOSE BLOOD TEST: CPT

## 2017-11-24 RX ORDER — CLOTRIMAZOLE 1 %
CREAM (GRAM) TOPICAL EVERY 12 HOURS SCHEDULED
Status: DISCONTINUED | OUTPATIENT
Start: 2017-11-24 | End: 2017-11-27 | Stop reason: HOSPADM

## 2017-11-24 RX ORDER — POTASSIUM CHLORIDE 20 MEQ/1
40 TABLET, EXTENDED RELEASE ORAL ONCE
Status: COMPLETED | OUTPATIENT
Start: 2017-11-24 | End: 2017-11-24

## 2017-11-24 RX ORDER — AMLODIPINE BESYLATE 2.5 MG/1
2.5 TABLET ORAL
Status: DISCONTINUED | OUTPATIENT
Start: 2017-11-24 | End: 2017-11-27 | Stop reason: HOSPADM

## 2017-11-24 RX ORDER — FUROSEMIDE 10 MG/ML
40 INJECTION INTRAMUSCULAR; INTRAVENOUS ONCE
Status: COMPLETED | OUTPATIENT
Start: 2017-11-24 | End: 2017-11-24

## 2017-11-24 RX ORDER — LEVOFLOXACIN 5 MG/ML
750 INJECTION, SOLUTION INTRAVENOUS EVERY 24 HOURS
Status: DISCONTINUED | OUTPATIENT
Start: 2017-11-24 | End: 2017-11-24

## 2017-11-24 RX ADMIN — AMPICILLIN SODIUM 1 G: 1 INJECTION, POWDER, FOR SOLUTION INTRAMUSCULAR; INTRAVENOUS at 17:35

## 2017-11-24 RX ADMIN — FUROSEMIDE 40 MG: 10 INJECTION, SOLUTION INTRAMUSCULAR; INTRAVENOUS at 09:26

## 2017-11-24 RX ADMIN — ASPIRIN 325 MG: 325 TABLET, DELAYED RELEASE ORAL at 09:23

## 2017-11-24 RX ADMIN — LEVOTHYROXINE SODIUM 100 MCG: 100 TABLET ORAL at 09:23

## 2017-11-24 RX ADMIN — LINAGLIPTIN 5 MG: 5 TABLET, FILM COATED ORAL at 09:23

## 2017-11-24 RX ADMIN — LISINOPRIL 20 MG: 20 TABLET ORAL at 09:23

## 2017-11-24 RX ADMIN — ATORVASTATIN CALCIUM 10 MG: 10 TABLET, FILM COATED ORAL at 09:23

## 2017-11-24 RX ADMIN — POTASSIUM CHLORIDE 40 MEQ: 1500 TABLET, EXTENDED RELEASE ORAL at 12:23

## 2017-11-24 RX ADMIN — CLOTRIMAZOLE: 1 CREAM TOPICAL at 11:53

## 2017-11-24 RX ADMIN — VANCOMYCIN HYDROCHLORIDE 2000 MG: 1 INJECTION, POWDER, LYOPHILIZED, FOR SOLUTION INTRAVENOUS at 02:11

## 2017-11-24 RX ADMIN — AMPICILLIN SODIUM 1 G: 1 INJECTION, POWDER, FOR SOLUTION INTRAMUSCULAR; INTRAVENOUS at 12:23

## 2017-11-24 RX ADMIN — GABAPENTIN 600 MG: 300 CAPSULE ORAL at 15:01

## 2017-11-24 RX ADMIN — GABAPENTIN 600 MG: 300 CAPSULE ORAL at 06:30

## 2017-11-24 RX ADMIN — INSULIN ASPART 6 UNITS: 100 INJECTION, SOLUTION INTRAVENOUS; SUBCUTANEOUS at 21:46

## 2017-11-24 RX ADMIN — INSULIN DETEMIR 30 UNITS: 100 INJECTION, SOLUTION SUBCUTANEOUS at 21:52

## 2017-11-24 RX ADMIN — INSULIN ASPART 2 UNITS: 100 INJECTION, SOLUTION INTRAVENOUS; SUBCUTANEOUS at 11:53

## 2017-11-24 RX ADMIN — ENOXAPARIN SODIUM 30 MG: 30 INJECTION SUBCUTANEOUS at 21:47

## 2017-11-24 RX ADMIN — GABAPENTIN 600 MG: 300 CAPSULE ORAL at 21:47

## 2017-11-24 RX ADMIN — ACETAMINOPHEN 650 MG: 325 TABLET, FILM COATED ORAL at 06:50

## 2017-11-24 RX ADMIN — INSULIN ASPART 4 UNITS: 100 INJECTION, SOLUTION INTRAVENOUS; SUBCUTANEOUS at 17:35

## 2017-11-24 RX ADMIN — MICONAZOLE NITRATE: 20 CREAM TOPICAL at 11:53

## 2017-11-24 RX ADMIN — INSULIN DETEMIR 30 UNITS: 100 INJECTION, SOLUTION SUBCUTANEOUS at 09:23

## 2017-11-24 RX ADMIN — FUROSEMIDE 40 MG: 10 INJECTION, SOLUTION INTRAMUSCULAR; INTRAVENOUS at 20:12

## 2017-11-24 RX ADMIN — AMLODIPINE BESYLATE 2.5 MG: 2.5 TABLET ORAL at 15:01

## 2017-11-24 RX ADMIN — CLOTRIMAZOLE: 1 CREAM TOPICAL at 20:13

## 2017-11-24 RX ADMIN — POTASSIUM CHLORIDE 40 MEQ: 1500 TABLET, EXTENDED RELEASE ORAL at 09:23

## 2017-11-24 RX ADMIN — DONEPEZIL HYDROCHLORIDE 5 MG: 5 TABLET, FILM COATED ORAL at 20:13

## 2017-11-24 NOTE — PLAN OF CARE
Problem: Patient Care Overview (Adult)  Goal: Plan of Care Review  Outcome: Ongoing (interventions implemented as appropriate)    Problem: Sepsis (Adult)  Goal: Signs and Symptoms of Listed Potential Problems Will be Absent or Manageable (Sepsis)  Outcome: Ongoing (interventions implemented as appropriate)    11/24/17 0324   Sepsis   Problems Assessed (Sepsis) glycemic control impaired;infection progression   Problems Present (Sepsis) glycemic control, impaired;progression of infection         Problem: Fall Risk (Adult)  Goal: Identify Related Risk Factors and Signs and Symptoms  Outcome: Ongoing (interventions implemented as appropriate)    11/24/17 0324   Fall Risk   Fall Risk: Related Risk Factors age-related changes;homeostatic imbalance   Fall Risk: Signs and Symptoms presence of risk factors       Goal: Absence of Falls  Outcome: Ongoing (interventions implemented as appropriate)    11/24/17 0324   Fall Risk (Adult)   Absence of Falls making progress toward outcome         Problem: Infection, Risk/Actual (Adult)  Goal: Identify Related Risk Factors and Signs and Symptoms  Outcome: Ongoing (interventions implemented as appropriate)    11/24/17 0324   Infection, Risk/Actual   Infection, Risk/Actual: Related Risk Factors age extremes;chronic illness/condition;exposure to microbes;skin integrity impairment;tissue perfusion altered   Signs and Symptoms (Infection, Risk/Actual) blood glucose changes;lab value changes;weakness

## 2017-11-24 NOTE — NURSING NOTE
Continued Stay Note  MICHAEL Kincaid     Patient Name: Froilan Helton  MRN: [id number]  Today's Date: 11/24/2017    Admit Date: 11/21/2017          Discharge Plan       11/24/17 1034    Case Management/Social Work Plan    Plan Plan remains going home with family assistance.     Additional Comments Patient in agreement with speaking to  with son and daughter in law at bedside.  He says the plans remains going home with assistance of family as needed.  He says he has a rolling walker at home;  encouraged patient to use per recommendation of physical therapist.  Patient says the M.D. said he would be here for next few days.  Gave patient home health list to review if he would need upon discharge.  He voices no other questions or concerns at this time.   will  continue to follow.               Discharge Codes     None            RAVI Luciano patient; referral made to [facility] in Pharmacy.

## 2017-11-24 NOTE — PLAN OF CARE
Problem: Patient Care Overview (Adult)  Goal: Adult Individualization and Mutuality  Outcome: Ongoing (interventions implemented as appropriate)    11/24/17 7306   Individualization   Patient Specific Preferences Likes to sleep in the recliner

## 2017-11-24 NOTE — PROGRESS NOTES
"Adult Nutrition  Assessment/PES    Patient Name:  Froilan Helton  YOB: 1940  MRN: [id number]  Admit Date:  11/21/2017    Assessment Date:  11/24/2017    Comments:  Pt declined education, expect no compliance at home with diet restrictions.  Spoke with  about living will request,  previously notified.   Recommend: D/C Renal restriction at this time, labs reviewed            Reason for Assessment       11/24/17 1343    Reason for Assessment    Reason For Assessment/Visit identified at risk by screening criteria   BG list 180, CHF likely per notes    Cardiac CHF;HTN    Endocrine DM    Infectious Disease Sepsis    Neurological Other (comment)   orientation limited     Pulmonary/Critical Care COPD    Renal CKD    Skin Cellulitis            Data Eval/ Notes       11/24/17 1345     Notes    Note Spoke w wife, pt, won, & DIL. Reports \"see food diet\". Checks BG at home, knows what hga1c means but thinks 9 is good b/c used to be 12. Pt loves salt per wife, pt declines edu. States everyone has their own \"BG\" level that need.            Nutrition/Diet History       11/24/17 1346    Nutrition/Diet History    Patient Reported Diet/Restrictions/Preferences regular            Anthropometrics       11/24/17 1347    Anthropometrics    RD Documented Current Weight  137 kg (302 lb 0.5 oz)    Anthropometrics (Special Considerations)    RD Calculated BMI (kg/m2) 38.6    Body Mass Index (BMI)    BMI Grade 35 - 39.9 - obesity - grade II            Labs/Tests/Procedures/Meds       11/24/17 1347    Labs/Tests/Procedures/Meds    Labs/Tests Review Reviewed;Hgb A1C;K+;Creat    Medication Review Reviewed, pertinent;Insulin            Physical Findings       11/24/17 1347    Physical Findings/Assessment    Additional Documentation Physical Appearance (Group)    Physical Appearance    Overall Physical Appearance obese            Estimated/Assessed Needs       11/24/17 1347    Calculation " "Measurements    Weight Used For Calculations 137 kg (302 lb 0.5 oz)    Height Used for Calculations 1.88 m (6' 2\")    Estimated/Assessed Energy Needs    Energy Need Method [place]    Age 77    RMR ([facility] [employer] [facility] Equation) 2169.75    Estimated Kcal Range  1919 kcal (mifflin - 250 kcal for obese, no activity factor)   216 gm CHO, 45% kcal     Estimated/Assessed Protein Needs    Weight Used for Protein Calculation 137 kg (302 lb 0.5 oz)    Protein (gm/kg) 0.8    0.8 Gm Protein (gm) 109.6    Estimated/Assessed Fluid Needs    Fluid Need Method Other (comment)   3991-7666 ml CHF guidelines            Nutrition Prescription Ordered       11/24/17 1349    Nutrition Prescription PO    Common Modifiers Cardiac;Consistent Carbohydrate;Renal            Evaluation of Received Nutrient/Fluid Intake       11/24/17 1349    Evaluation of Received Nutrient/Fluid Intake    Nutrition Delivered Fluid Evaluation    Fluid Intake Evaluation    Oral Fluid (mL) 920    Total Fluid Intake (mL) 920    % Fluid Needs 48 (upper end)    PO Evaluation    Number of Meals 6    % PO Intake 88            Problem/Interventions:        Problem 1       11/24/17 1350    Nutrition Diagnoses Problem 1    Problem 1 Knowledge Deficit    Etiology (related to) Medical Diagnosis    Signs/Symptoms (evidenced by) Report/Observation                    Intervention Goal       11/24/17 1350    Intervention Goal    General Provide information regarding MNT for treatment/condition    PO Maintain intake    Weight Appropriate weight loss            Nutrition Intervention       11/24/17 1350    Nutrition Intervention    RD/Tech Action Interview for preference;Follow Tx progress            Nutrition Prescription       11/24/17 1350    Other Orders    Other d/c Renal restriction not indicated at this time            Education/Evaluation       11/24/17 1351    Education    Education Education offered and refused   Pt & wife refuse edu, \"he loves salt\", wife is " willing to have materials left at bedside : Simple Rules Na+ & DM, HF sheet.     Monitor/Evaluation    Monitor Per protocol;I&O;PO intake;Pertinent labs;Weight;Symptoms    Education Follow-up Other (comment)   Expect no compliance, pt declines edu.         Electronically signed by:  Laura Varma RD  11/24/17 1:52 PM

## 2017-11-24 NOTE — PROGRESS NOTES
"Hospitalist Team      Patient Care Team:  LEATHA Darden as PCP - General (Nurse Practitioner)        Chief Complaint:  F/U sepsis and bacteremia and cellulitis, syncope    Subjective    Interval History and ROS:     Patient notes he is feeling ok today.  Notes swelling in legs in not much changed but redness in RLE has improved. Patient is eating well (although he does not like the food here) and denies any N/V. He is afebrile. Denies any SOA or CP. I spoke with Dr. Joe and patient has a dog that licks him frequently including his feet.      Objective    Vital Signs  Temp:  [97.5 °F (36.4 °C)-98.3 °F (36.8 °C)] 98.1 °F (36.7 °C)  Heart Rate:  [63-77] 65  Resp:  [18] 18  BP: (132-161)/(62-80) 161/80  Oxygen Therapy  SpO2: 97 %  Pulse Oximetry Type: Intermittent  O2 Device: room air    Flowsheet Rows         First Filed Value    Admission Height  74\" (188 cm) Documented at 11/21/2017 1540    Admission Weight  (!)  302 lb 3.2 oz (137 kg) Documented at 11/21/2017 1540            Physical Exam:  Constitutional: Patient appears well-developed, obese and in no acute distress   HEENT:   Head: Normocephalic, left frontal/temporal abrasion noted.   Eyes:  EOM are intact. Sclera are anicteric and non-injected.  Mouth and Throat: Patient has moist mucous membranes. Poor dentition.   Neck: Neck supple. No JVD noted. No lymphadenopathy present.  Cardiovascular: Regular rate, regular rhythm, S1 normal and S2 normal.  Exam reveals no gallop and no friction rub.  No murmur heard.  Pulmonary/Chest: Lungs are clear to auscultation bilaterally. No respiratory distress. No wheezes. No rhonchi. No rales.   Abdominal: Obese. Soft. Bowel sounds are normal. There is no tenderness.   Extremities: 2-3+ bilateral LE pitting edema. Pulses are palpable in all 4 extremities.  Neurological: Patient is alert and oriented to person, place, and time.   Skin: Skin is warm. Nails show no clubbing.  No cyanosis. Erythema of RLE has significantly " improved today, has regressed to the mid-lower chin and area on dorsum of foot is better, much lighter in color. Small scabbed lesion on left chin with minimal surrounding erythema. Lichenification and fungal appearance surrounding toes.     Results Review:     I reviewed the patient's new clinical results.    Lab Results (last 24 hours)     Procedure Component Value Units Date/Time    Urine Culture - Urine, Urine, Clean Catch [[id number]]  (Normal) Collected:  11/21/17 1552    Specimen:  Urine from Urine, Catheter Updated:  11/23/17 1000     Urine Culture No growth    POC Glucose Fingerstick [[id number]]  (Abnormal) Collected:  11/23/17 1145    Specimen:  Blood Updated:  11/23/17 1151     Glucose 229 (H) mg/dL     Narrative:       Meter: [id number] : [id number] Meeta Finn NURSING ASSISTANT    Troponin [[id number]]  (Abnormal) Collected:  11/23/17 1327    Specimen:  Blood Updated:  11/23/17 1400     Troponin T 0.068 (H) ng/mL     Narrative:       Troponin T Reference Ranges:  Less than 0.03 ng/mL:    Negative for AMI  0.03 to 0.09 ng/mL:      Indeterminant for AMI  Greater than 0.09 ng/mL: Positive for AMI    Blood Culture - Blood, [[id number]]  (Normal) Collected:  11/21/17 1606    Specimen:  Blood from Wrist, Right Updated:  11/23/17 1616     Blood Culture No growth at 2 days    Lactic Acid, Plasma [[id number]]  (Normal) Collected:  11/23/17 1605    Specimen:  Blood Updated:  11/23/17 1623     Lactate 1.3 mmol/L     S. Pneumo Ag Urine or CSF - Urine, Urine, Clean Catch [[id number]]  (Normal) Collected:  11/23/17 1552    Specimen:  Urine from Urine, Clean Catch Updated:  11/23/17 1627     Strep Pneumo Ag Negative    POC Glucose Fingerstick [[id number]]  (Abnormal) Collected:  11/23/17 1634    Specimen:  Blood Updated:  11/23/17 1642     Glucose 266 (H) mg/dL     Narrative:       Meter: [id number] : [id number] Meeta Finn NURSING ASSISTANT    Procalcitonin [[id number]]  (Abnormal) Collected:  11/23/17 1327     Specimen:  Blood Updated:  11/23/17 1708     Procalcitonin 7.81 (C) ng/mL     Narrative:       As a Marker for Sepsis (Non-Neonates):   1. <0.5 ng/mL represents a low risk of severe sepsis and/or septic shock.  2. >2 ng/mL represents a high risk of severe sepsis and/or septic shock.    As a Marker for Lower Respiratory Tract Infections that require antibiotic therapy:    PCT on Admission     Antibiotic Therapy       6-12 Hrs later  > 0.5                Strongly Recommended             >0.25 - <0.5         Recommended  0.1 - 0.25           Discouraged              Remeasure/reassess PCT  <0.1                 Strongly Discouraged     Remeasure/reassess PCT                     PCT values of < 0.5 ng/mL do not exclude an infection, because localized infections (without systemic signs) may be associated with such low concentrations, or a systemic infection in its initial stages (< 6 hours). Furthermore, increased PCT can occur without infection. PCT concentrations between 0.5 and 2.0 ng/mL should be interpreted taking into account the patient's history. It is recommended to retest PCT within 6-24 hours if any concentrations < 2 ng/mL are obtained.    POC Glucose Fingerstick [[id number]]  (Abnormal) Collected:  11/23/17 2006    Specimen:  Blood Updated:  11/23/17 2015     Glucose 270 (H) mg/dL     Narrative:       Meter: [id number] : [id number] Ishaan Piedra NURSING ASSISTANT    CBC & Differential [[id number]] Collected:  11/24/17 0623    Specimen:  Blood Updated:  11/24/17 0631    Narrative:       The following orders were created for panel order CBC & Differential.  Procedure                               Abnormality         Status                     ---------                               -----------         ------                     CBC Auto Differential[[id number]]        Abnormal            Final result                 Please view results for these tests on the individual orders.    CBC Auto Differential  [[id number]]  (Abnormal) Collected:  11/24/17 0623    Specimen:  Blood Updated:  11/24/17 0631     WBC 7.76 10*3/mm3      RBC 3.92 (L) 10*6/mm3      Hemoglobin 11.6 (L) g/dL      Hematocrit 34.5 (L) %      MCV 88.0 fL      MCH 29.6 pg      MCHC 33.6 g/dL      RDW 13.2 %      RDW-SD 42.5 fl      MPV 10.6 (H) fL      Platelets 140 10*3/mm3      Neutrophil % 71.3 (H) %      Lymphocyte % 14.6 (L) %      Monocyte % 8.0 %      Eosinophil % 5.4 (H) %      Basophil % 0.3 %      Immature Grans % 0.4 %      Neutrophils, Absolute 5.54 10*3/mm3      Lymphocytes, Absolute 1.13 10*3/mm3      Monocytes, Absolute 0.62 10*3/mm3      Eosinophils, Absolute 0.42 (H) 10*3/mm3      Basophils, Absolute 0.02 10*3/mm3      Immature Grans, Absolute 0.03 10*3/mm3      nRBC 0.0 /100 WBC     Blood Culture - Blood, [[id number]]  (Abnormal)  (Susceptibility) Collected:  11/21/17 1557    Specimen:  Blood from Arm, Right Updated:  11/24/17 0640     Blood Culture --      Streptococcus dysgalactiae ssp equisimilis (A)     Gram Stain Result Aerobic Bottle Gram positive cocci in chains    Susceptibility      Streptococcus dysgalactiae ssp equisimilis     ALINE     Ceftriaxone <=0.12 ug/ml Susceptible     Clindamycin <=0.25 ug/ml Susceptible     Erythromycin <=0.12 ug/ml Susceptible     Levofloxacin 0.5 ug/ml Susceptible     Penicillin G <=0.06 ug/ml Susceptible     Vancomycin 0.5 ug/ml Susceptible                    Basic Metabolic Panel [[id number]]  (Abnormal) Collected:  11/24/17 0622    Specimen:  Blood Updated:  11/24/17 0645     Glucose 140 (H) mg/dL      BUN 16 mg/dL      Creatinine 1.50 (H) mg/dL      Sodium 137 mmol/L      Potassium 3.3 (L) mmol/L      Chloride 102 mmol/L      CO2 24.3 mmol/L      Calcium 8.3 (L) mg/dL      eGFR Non African Amer 46 (L) mL/min/1.73      BUN/Creatinine Ratio 10.7     Anion Gap 10.7 mmol/L     Narrative:       The MDRD GFR formula is only valid for adults with stable renal function between ages 18 and 70.    POC  Glucose Fingerstick [[id number]]  (Abnormal) Collected:  11/24/17 0707    Specimen:  Blood Updated:  11/24/17 0714     Glucose 137 (H) mg/dL     Narrative:       Meter: [id number] : [id number] Meeta Finn NURSING ASSISTANT          Imaging Results (last 24 hours)     ** No results found for the last 24 hours. **        ECG/EMG Results (most recent)     Procedure Component Value Units Date/Time    ECG 12 Lead [[id number]] Collected:  11/21/17 1638     Updated:  11/21/17 1708    Narrative:       RR Interval= 526 ms  MD Interval= 168 ms  QRSD Interval= 104 ms  QT Interval= 336 ms  QTc Interval= 463 ms  Heart Rate= 114 ms  P Axis= 51 deg  QRS Axis= 1 deg  T Wave Axis= 29 deg  I: 40 Axis= -31 deg  T: 40 Axis= 68 deg  ST Axis= 152 deg  SINUS TACHYCARDIA ew compared to the previous ECG  INFERIOR INFARCT, AGE INDETERMINATE new compared to the previous ECG  Electronically Signed by:  Crow Chris ([facility]) 21-Nov-2017 17:03:13  Date and Time of Study: 2017-11-21 16:38:12    Adult Transthoracic Echo Complete W/ Cont if Necessary Per Protocol [[id number]] Collected:  11/22/17 0716     Updated:  11/22/17 1101     BSA 2.6 m^2      IVSd 1.1 cm      LVIDd 6.2 cm      LVIDs 4.5 cm      LVPWd 1.1 cm      IVS/LVPW 1.0     FS 27.5 %      EDV(Teich) 192.6 ml      ESV(Teich) 91.5 ml      EF(Teich) 52.5 %      EDV(cubed) 236.0 ml      ESV(cubed) 89.9 ml      EF(cubed) 61.9 %      LV mass(C)d 300.9 grams      LV mass(C)dI 116.4 grams/m^2      SV(Teich) 101.1 ml      SI(Teich) 39.1 ml/m^2      SV(cubed) 146.1 ml      SI(cubed) 56.5 ml/m^2      Ao root diam 4.5 cm      Ao root area 15.9 cm^2      ACS 1.6 cm      LA dimension 3.3 cm      asc Aorta Diam 3.4 cm      LA/Ao 0.73     LVOT diam 2.1 cm      LVOT area 3.5 cm^2      LVOT area(traced) 3.5 cm^2      RVOT diam 2.7 cm      RVOT area 5.7 cm^2      LVLd ap4 9.6 cm      EDV(MOD-sp4) 193.0 ml      LVLs ap4 8.9 cm      ESV(MOD-sp4) 94.2 ml      EF(MOD-sp4) 51.2 %      LVLd ap2 9.3 cm       EDV(MOD-sp2) 171.0 ml      LVLs ap2 8.3 cm      ESV(MOD-sp2) 87.5 ml      EF(MOD-sp2) 48.8 %      SV(MOD-sp4) 98.8 ml      SI(MOD-sp4) 38.2 ml/m^2      SV(MOD-sp2) 83.5 ml      SI(MOD-sp2) 32.3 ml/m^2      Ao root area (BSA corrected) 1.7     Ao root area (BSA corrected) 48.8 ml/m^2      CONTRAST EF 4CH 51.2 ml/m^2      LV Diastolic corrected for BSA 74.6 ml/m^2      LV Systolic corrected for BSA 36.4 ml/m^2      MV A dur 0.2 sec      MV E max joao 78.1 cm/sec      MV A max joao 105.0 cm/sec      MV E/A 0.74     MV V2 max 109.0 cm/sec      MV max PG 4.8 mmHg      MV V2 mean 59.2 cm/sec      MV mean PG 2.0 mmHg      MV V2 VTI 30.6 cm      MVA(VTI) 2.4 cm^2      MV P1/2t max joao 95.7 cm/sec      MV P1/2t 53.1 msec      MVA(P1/2t) 4.1 cm^2      MV dec slope 528.0 cm/sec^2      MV dec time 0.22 sec      Ao pk joao 178.0 cm/sec      Ao max PG 13.0 mmHg      Ao max PG (full) 8.2 mmHg      Ao V2 mean 117.0 cm/sec      Ao mean PG 7.0 mmHg      Ao mean PG (full) 4.0 mmHg      Ao V2 VTI 40 cm      MAXX(I,A) 1.9 cm^2      MAXX(I,D) 1.9 cm^2      MAXX(V,A) 1.8 cm^2      MAXX(V,D) 1.8 cm^2      AI max joao 377.5 cm/sec      AI max PG 57.1 mmHg      AI dec slope 274.0 cm/sec^2      AI P1/2t 403.5 msec      LV V1 max PG 3.0 mmHg      LV V1 mean PG 2.0 mmHg      LV V1 max 86.5 cm/sec      LV V1 mean 58.2 cm/sec      LV V1 VTI 21.1 cm      SV(Ao) 600.4 ml      SI(Ao) 232.2 ml/m^2      SV(LVOT) 72.9 ml      SV(RVOT) 72.1 ml      SI(LVOT) 28.2 ml/m^2      PA V2 max 87.7 cm/sec      PA max PG 3.1 mmHg      PA max PG (full) 1.3 mmHg      BH CV ECHO BISHNU - PVA(V,A) 4.3 cm^2      BH CV ECHO BISHNU - PVA(V,D) 4.3 cm^2      PA acc time 0.14 sec      RV V1 max PG 1.8 mmHg      RV V1 mean PG 1.0 mmHg      RV V1 max 66.5 cm/sec      RV V1 mean 46.0 cm/sec      RV V1 VTI 12.6 cm      PA pr(Accel) 14.2 mmHg      Pulm Sys Joao 84.5 cm/sec      Pulm Fernandez Joao 40.1 cm/sec      Pulm S/D 2.1     Qp/Qs 0.99     Pulm A Revs Dur 0.14 sec      Pulm A Revs Joao 27.0  cm/sec      MVA P1/2T LCG 2.3 cm^2       CV ECHO BISHNU - BZI_BMI 38.6 kilograms/m^2       CV ECHO BISHNU - BSA(HAYCOCK) 2.7 m^2       CV ECHO BISHNU - BZI_METRIC_WEIGHT 136.5 kg       CV ECHO BISHNU - BZI_METRIC_HEIGHT 188.0 cm      Target HR (85%) 122 bpm      Max. Pred. HR (100%) 144 bpm       CV VAS BP RIGHT /64 mmHg      TDI S' 14.00 cm/sec      RV Base 3.90 cm      RV Length 7.80 cm      RV Mid 3.20 cm      LA volume 68.0 cm3      E/E' ratio 11.0     Lat Peak E' Joao 10.0 cm/sec      Med Peak E' Joao 6.00 cm/sec      TAPSE (>1.6) 1.80 cm2      LA Volume Index 30.0 mL/m2      Echo EF Estimated 52 %     Narrative:       · Left ventricular systolic function is normal. Estimated EF = 52%.  · Left ventricular diastolic dysfunction (grade I) consistent with   impaired relaxation.  · calcification of the aortic valve  · Mild dilation of the aortic root is present.       ECG 12 Lead [[id number]] Collected:  11/22/17 1441     Updated:  11/22/17 1509    Narrative:       RR Interval= 822 ms  SD Interval= 188 ms  QRSD Interval= 106 ms  QT Interval= 416 ms  QTc Interval= 459 ms  Heart Rate= 73 ms  P Axis= 41 deg  QRS Axis= 9 deg  T Wave Axis= 107 deg  I: 40 Axis= -17 deg  T: 40 Axis= 61 deg  ST Axis= 158 deg  SINUS RHYTHM  PROBABLE INFERIOR INFARCT, OLD  LATERAL LEADS ARE ALSO INVOLVED  NO SIGNIFICANT CHANGE FROM PREVIOUS ECG  Electronically Signed by:  Aaron Abdi (MARCIO) ([facility]) 22-Nov-2017 15:06:34  Date and Time of Study: 2017-11-22 14:41:22    ECG 12 Lead [[id number]] Collected:  11/23/17 0824     Updated:  11/23/17 1911    Narrative:       RR Interval= 984 ms  SD Interval= 195 ms  QRSD Interval= 112 ms  QT Interval= 456 ms  QTc Interval= 460 ms  Heart Rate= 61 ms  P Axis= 25 deg  QRS Axis= 0 deg  T Wave Axis= 49 deg  I: 40 Axis= -16 deg  T: 40 Axis= 5 deg  ST Axis= 169 deg  SINUS RHYTHM  NONSPECIFIC INTRAVENTRICULAR CONDUCTION DELAY  NO SIGNIFICANT CHANGE FROM PREVIOUS ECG  Electronically Signed by:  Jannet  Sharri ([facility]) 23-Nov-2017 19:08:29  Date and Time of Study: 2017-11-23 08:24:19          Medication Review:   I have reviewed the patient's current medication list    Current Facility-Administered Medications:   •  acetaminophen (TYLENOL) tablet 650 mg, 650 mg, Oral, Q6H PRN, Terri Chaudhry MD, 650 mg at 11/24/17 0650  •  aspirin EC tablet 325 mg, 325 mg, Oral, Daily, Terri Chaudhry MD, 325 mg at 11/23/17 0832  •  atorvastatin (LIPITOR) tablet 10 mg, 10 mg, Oral, Daily, Liat Hodo, APRN, 10 mg at 11/23/17 0832  •  dextrose (D50W) solution 25 g, 25 g, Intravenous, Q15 Min PRN, Terri Chaudhry MD  •  dextrose (GLUTOSE) oral gel 15 g, 15 g, Oral, Q15 Min PRN, Terri Chaudhry MD  •  docusate sodium (COLACE) capsule 100 mg, 100 mg, Oral, BID, Liat Hood, APRN, 100 mg at 11/23/17 0832  •  donepezil (ARICEPT) tablet 5 mg, 5 mg, Oral, Nightly, Liat Hood, APRN, 5 mg at 11/23/17 2204  •  enoxaparin (LOVENOX) syringe 30 mg, 30 mg, Subcutaneous, Q24H, Terri Chaudhry MD, 30 mg at 11/23/17 2205  •  ertapenem (INVanz) 1 g in sodium chloride 0.9 % 100 mL IVPB, 1 g, Intravenous, Q24H, Terri Chaudhry MD, Stopped at 11/23/17 1800  •  gabapentin (NEURONTIN) capsule 600 mg, 600 mg, Oral, Q8H, Liat Hood APRN, 600 mg at 11/24/17 0630  •  glucagon (GLUCAGEN) injection 1 mg, 1 mg, Subcutaneous, Q15 Min PRN, Terri Chaudhry MD  •  insulin aspart (novoLOG) injection 0-9 Units, 0-9 Units, Subcutaneous, 4x Daily AC & at Bedtime, Terri Chaudhry MD, 6 Units at 11/23/17 2203  •  insulin detemir (LEVEMIR) injection 30 Units, 30 Units, Subcutaneous, BID, Terri Chaudhry MD, 30 Units at 11/23/17 2202  •  ipratropium-albuterol (DUO-NEB) nebulizer solution 3 mL, 3 mL, Nebulization, Q6H PRN, Sree Morley MD  •  levothyroxine (SYNTHROID, LEVOTHROID) tablet 100 mcg, 100 mcg, Oral, Daily,  Liat R [place], APRN, 100 mcg at 11/23/17 0832  •  linagliptin (TRADJENTA) tablet 5 mg, 5 mg, Oral, Daily, Liat R Erwin, APRN, 5 mg at 11/23/17 0832  •  lisinopril (PRINIVIL,ZESTRIL) tablet 20 mg, 20 mg, Oral, Daily, Liat R Erwin, APRN, 20 mg at 11/23/17 0832  •  miconazole (MICOTIN) 2 % cream, , Topical, Q12H, Liat R [place], APRN  •  ondansetron (ZOFRAN) injection 4 mg, 4 mg, Intravenous, Q6H PRN, Terri Chaudhry MD  •  Pharmacy Consult - Pharmacy to dose, , Does not apply, Continuous PRN, Terri Chaudhry MD  •  Pharmacy to dose vancomycin, , Does not apply, Continuous PRN, Liat R Erwin, APRN  •  polyethylene glycol 3350 powder (packet), 17 g, Oral, Daily, Liat R Erwin, APRN, 17 g at 11/22/17 1319  •  sodium chloride 0.9 % flush 1-10 mL, 1-10 mL, Intravenous, PRN, Terri Chaudhry MD  •  sodium chloride 0.9 % flush 10 mL, 10 mL, Intravenous, PRN, Keven Steven MD  •  sodium chloride 0.9 % infusion 40 mL, 40 mL, Intravenous, PRN, Terri Chaudhry MD  •  vancomycin (VANCOCIN) 2,000 mg in sodium chloride 0.9 % 500 mL IVPB, 2,000 mg, Intravenous, Q18H, Terri Chaudhry MD, Stopped at 11/24/17 0420  •  Vitamin D3 50,000 Units, 50,000 Units, Oral, Weekly, Liat R Erwin, APRN, 50,000 Units at 11/22/17 1318      Assessment/Plan     1. Sepsis secondary to Cellulitis with Strep dysgalactiae bacteremia: Patient is on Vanco and Invanz and cellulitis is much improved today. His WBC is NL this AM.  Blood culture with Strep dysgalactiae.  I consulted Dr. Joe as this bacteria is found more frequently in animals.  I spoke with Dr. Joe who has seen the patient and recommends IV ampicillin for 1-2 more days then change to po amoxil 500 mg TID to complete his Abx course.  Suspects the patient obtained the infection form his dog licking his feet which are cracked with tinea. Dr. Joe started clotrimazole cream to feet and groin so I  will D/C miconazole cream. Wound care has seen the patient . LE doppler negative. LA now NL.  PCT further elevated yesterday, will recheck in AM to trend.     2. No UTI: Urine culture showed no growth.     3. NSTEMI:  Unclear if type II secondary to sepsis vs CAD.  Cardiology is following, started patient on ASA.  Troponins are trending down and EKG with no evolution.  Supportive care for now per cardiology. Monitor. Patient with no c/o CP. Patient on a statin. Plan for stress test outpatient (per cardiology's recs).     4. Increased LE edema and suspected Chronic diastolic CHF: Patient is off IVFs.  Diuresing with IV lasix, resume po once edema is improving. O2 Sats good on RA and patient denies any SOA. Monitor.      5. Vasovagal syncope: occurred while having a BM  CT head negative for acute findings, HA resolved, cardiology saw patient, see above.      6. Uncontrolled DM-2: A1C 9.2%, last A1C 12.4% per Dr. Mata  On home levemir 30 units every 12 hours and tradjenta 5 mg daily (substituted for januvia), and blood sugars this AM are improved. Holding metformin (likely will not resume with patient's renal function) and 70/30 insulin (reports he takes both 70/30 and levemir at home).  Patient can be titrated up on a single insulin if needed and should not need both levemir and 70/30. Levemir dose increased yesterday.  Continue Accuchecks ac/hs with moderate dose SSI  CCC/renal diet      7. ROLA on CKD stage 3: Nearly resolved. Creatinine per Dr. Mata 1/4/17 was 1.4, was 1.85 on admission here. No other records available.  Creatinine better today s/p diuresis yesterday.  BUN is NL. Renal ultrasound NL.  Monitor      8. COPD: No acute issues, on duonebs every 6 hours as needed      9. Hypothyroidism: TSH normal, On home levothyroxine 100 mcg daily      10. Hypertension: BP with mild persistent elevation on home lisinopril 20 mg daily. Will add very low dose of Norvasc 2.5mg daily today.  Monitor.      11. Dementia: On  home aricept 5 mg nightly, Patient A&O x3 on exam today.      12. Obesity:  Body mass index is 38.69 kg/(m^2).       13. Constipation: Miralax and colace added here.  Monitor      14. Immobility and LE weakness: Patient notes chronic issues with LEs with DJD and previous injuries and now increased weakness with illness and edema.  PT/OT following and patient will need HH for PT/OT at discharge.     15. Hypokalemia: replace po, likely secondary to IV lasix, check mag. Monitor.     16. DVT prophy: lovenox.    Plan for disposition: Home with HH when able.    Terri Chaudhry MD  11/24/17  8:28 AM

## 2017-11-24 NOTE — THERAPY TREATMENT NOTE
Acute Care - Physical Therapy Treatment Note   [place]     Patient Name: Froilan Helton  : 1940  MRN: [id number]  Today's Date: 2017  Onset of Illness/Injury or Date of Surgery Date: 17  Date of Referral to PT: 17  Referring Physician: LEATHA Ford    Admit Date: 2017    Visit Dx:    ICD-10-CM ICD-9-CM   1. Sepsis, due to unspecified organism A41.9 038.9     995.91   2. Altered mental status, unspecified altered mental status type R41.82 780.97   3. Poorly controlled diabetes mellitus E11.65 250.00   4. Acute kidney injury N17.9 584.9   5. Cellulitis of right leg L03.115 682.6   6. Fall, initial encounter W19.XXXA E888.9   7. Abrasion of face, initial encounter S00.81XA 910.0     Patient Active Problem List   Diagnosis   • Sepsis   • COPD (chronic obstructive pulmonary disease)   • Diabetes mellitus   • Hypertension   • Acute renal failure   • Elevated procalcitonin   • Lactic acid acidosis   • Leukocytosis               Adult Rehabilitation Note       17 0925          Rehab Assessment/Intervention    Document Type therapy note (daily note)  -GC      Subjective Information agree to therapy;complains of;pain;swelling   pain left side of his body, swelling right LE  -GC      Patient Effort, Rehab Treatment good  -GC      Precautions/Limitations fall precautions  -GC      Recorded by [GC] Darrel Alves PT      Vital Signs    Pre SpO2 (%) 98  -GC      O2 Delivery Pre Treatment room air  -GC      Post SpO2 (%) 99  -GC      O2 Delivery Post Treatment room air  -GC      Pre Patient Position Sitting  -GC      Post Patient Position Sitting  -GC      Recorded by [GC] Darrel Alves PT      Pain Assessment    Pain Type Acute pain  -GC      Pain Location Knee   bilateral knees, left shoulder  -GC      Multiple Pain Sites Yes  -GC      Recorded by [GC] Darrel Alves PT      Cognitive Assessment/Intervention    Current Cognitive/Communication Assessment functional  -GC       Orientation Status oriented x 4  -GC      Follows Commands/Answers Questions 100% of the time;able to follow multi-step instructions  -      Personal Safety WNL/WFL  -      Personal Safety Interventions fall prevention program maintained;gait belt;nonskid shoes/slippers when out of bed  -GC      Recorded by [GC] Darrel Alves, PT      Transfer Assessment/Treatment    Transfers, Sit-Stand [place] contact guard assist  -GC      Transfers, Stand-Sit [place] contact guard assist  -GC      Transfers, Sit-Stand-Sit, Assist Device rolling walker  -      Recorded by [GC] Darrel Alves, PT      Gait Assessment/Treatment    Gait, [place] Level stand by assist  -GC      Gait, Assistive Device rolling walker  -GC      Gait, Distance (Feet) 120  -GC      Gait, Gait Deviations antalgic;sanjeev decreased  -GC      Recorded by [GC] Darrel Alves, PT      Positioning and Restraints    Pre-Treatment Position sitting in chair/recliner  -      Post Treatment Position chair  -GC      In Chair reclined;call light within reach;with nsg  -GC      Recorded by [GC] Darrel Alves, PT        User Key  (r) = Recorded By, (t) = Taken By, (c) = Cosigned By    Initials Name Effective Dates     Darrel Alves, PT 04/06/17 -                 IP PT Goals       11/23/17 1030          Transfer Training PT STG    Transfer Training PT STG, Date Established 11/23/17  -LN      Transfer Training PT STG, Time to Achieve 5 days  -LN      Transfer Training PT STG, Activity Type sit to stand/stand to sit  -LN      Transfer Training PT STG, [place] Level independent  -LN      Transfer Training PT STG, Assist Device walker, rolling  -LN      Gait Training PT STG    Gait Training Goal PT STG, Date Established 11/23/17  -LN      Gait Training Goal PT STG, Time to Achieve 5 days  -LN      Gait Training Goal PT STG, [place] Level contact guard assist;supervision required  -LN      Gait Training Goal PT STG, Assist Device walker,  rolling  -LN      Gait Training Goal PT STG, Distance to Achieve 120 feet  -LN      Gait Training Goal PT STG, Additional Goal so he can safely ambulate in his home.   -LN      Patient Education PT STG    Patient Education PT STG, Date Established 11/23/17  -LN      Patient Education PT STG, Time to Achieve 5 days  -LN      Patient Education PT STG, Education Type HEP;gait;transfers  -LN      Patient Education PT STG, Education Understanding demonstrate adequately;verbalize understanding  -LN        User Key  (r) = Recorded By, (t) = Taken By, (c) = Cosigned By    Initials Name Provider Type    LN Juhi Proctor, PT Physical Therapist          Physical Therapy Education     Title: PT OT SLP Therapies (Active)     Topic: Physical Therapy (Active)     Point: Mobility training (Done)    Learning Progress Summary    Learner Readiness Method Response Comment Documented by Status   Patient Acceptance E VU Education provided on functional mobility and gait with RWX. LN 11/23/17 1029 Done               Point: Precautions (Done)    Learning Progress Summary    Learner Readiness Method Response Comment Documented by Status   Patient Acceptance E VU Education provided on functional mobility and gait with RWX. LN 11/23/17 1029 Done                      User Key     Initials Effective Dates Name Provider Type Discipline    LN 06/22/16 -  Juhi Proctor, PT Physical Therapist PT                    PT Recommendation and Plan  Anticipated Discharge Disposition: home with home health  PT Frequency: daily             Outcome Measures       11/23/17 1000          How much help from another person do you currently need...    Turning from your back to your side while in flat bed without using bedrails? 3  -LN      Moving from lying on back to sitting on the side of a flat bed without bedrails? 2  -LN      Moving to and from a bed to a chair (including a wheelchair)? 3  -LN      Standing up from a chair using your arms  (e.g., wheelchair, bedside chair)? 3  -LN      Climbing 3-5 steps with a railing? 2  -LN      To walk in hospital room? 3  -LN      AM-PAC 6 Clicks Score 16  -LN      Functional Assessment    Outcome Measure Options AM-PAC 6 Clicks Basic Mobility (PT)  -LN        User Key  (r) = Recorded By, (t) = Taken By, (c) = Cosigned By    Initials Name Provider Type    LN Juhi Proctor, PT Physical Therapist           Time Calculation:         PT Charges       11/24/17 1006          Time Calculation    Start Time 0925  -      Stop Time 0914  -GC      Time Calculation (min) 1429 min  -GC        User Key  (r) = Recorded By, (t) = Taken By, (c) = Cosigned By    Initials Name Provider Type     Darrel Alves, PT Physical Therapist          Therapy Charges for Today     Code Description Service Date Service Provider Modifiers Qty    06175515799 HC GAIT TRAINING EA 15 MIN 11/24/2017 Darrel Alves, PT GP 1          PT G-Codes  Outcome Measure Options: AM-PAC 6 Clicks Basic Mobility (PT)    Darrel Alves, PT  11/24/2017

## 2017-11-24 NOTE — THERAPY EVALUATION
Acute Care - Occupational Therapy Initial Evaluation   [place]     Patient Name: Froilan Helton  : 1940  MRN: [id number]  Today's Date: 2017  Onset of Illness/Injury or Date of Surgery Date: 17  Date of Referral to OT: 17  Referring Physician: Liat DELGADILLO    Admit Date: 2017       ICD-10-CM ICD-9-CM   1. Sepsis, due to unspecified organism A41.9 038.9     995.91   2. Altered mental status, unspecified altered mental status type R41.82 780.97   3. Poorly controlled diabetes mellitus E11.65 250.00   4. Acute kidney injury N17.9 584.9   5. Cellulitis of right leg L03.115 682.6   6. Fall, initial encounter W19.XXXA E888.9   7. Abrasion of face, initial encounter S00.81XA 910.0     Patient Active Problem List   Diagnosis   • Sepsis   • COPD (chronic obstructive pulmonary disease)   • Diabetes mellitus   • Hypertension   • Acute renal failure   • Elevated procalcitonin   • Lactic acid acidosis   • Leukocytosis     Past Medical History:   Diagnosis Date   • Arthritis    • Asthma    • Cancer     skin   • COPD (chronic obstructive pulmonary disease)    • Diabetes mellitus    • Disease of thyroid gland    • Hypertension    • Renal disorder      Past Surgical History:   Procedure Laterality Date   • COLONOSCOPY     • JOINT REPLACEMENT      right   • REPLACEMENT TOTAL KNEE            OT ASSESSMENT FLOWSHEET (last 72 hours)      OT Evaluation       17 1049 17 0935 17 0925 17 0938 17 1431    Rehab Evaluation    Document Type  evaluation  -JJ therapy note (daily note)  -GC evaluation  -LN     Subjective Information  agree to therapy;complains of;pain  -JJ agree to therapy;complains of;pain;swelling   pain left side of his body, swelling right LE  -GC agree to therapy;complains of;pain  -LN     Patient Effort, Rehab Treatment  good  -JJ good  -GC good  -LN     Symptoms Noted During/After Treatment    increased pain  -LN     Symptoms Noted Comment    Patient  c/o pain in left hip and knee secondary to landing on left side with fall at home; also c/o pain in left hand and a little left forehead. He reports history of chronic pain right knee; s/p right TKA 2 years ago. He also reports that his hips feel weakn.  -LN     General Information    Patient Profile Review  yes  -J  yes  -LN     Onset of Illness/Injury or Date of Surgery Date  11/21/17  -JJ  11/21/17  -LN     Referring Physician  Liat DELGADILLO  -J  LEATHA Ford  -LN     General Observations  pt reclined in chair in room agreed to evaluation  -  Patient reclined in chair in his room with telemetry and pulse oximeter.   -LN     Pertinent History Of Current Problem  pt admitted to hospital on 11/21 with altered mental status. spouse reports pt sat on toilet at 730 am until 1430 when she found him laying on the bathroom floor. blood sugar 340 on arrival to hospital. pt dx with sepsis  -  Patient admitted to hospital on 11/21/2017 with altered mental status. Wife reported that he sat on toilet from 7:30 am until 1430 when she found him lying on the bathroom floor.  Blood sugar was found to be 340.  Patient diagnosed with sepsis.   -LN     Precautions/Limitations  fall precautions  -JJ fall precautions  -GC cardiac precautions  -LN     Prior Level of Function  independent:;all household mobility;transfer;ADL's;bed mobility   pt reports only amb short distances at home due to knee pain  -  independent:;all household mobility;gait;transfer;bed mobility   Patient reports only ambulates short distances in home.  -LN     Equipment Currently Used at Home  cane, straight;walker, rolling;ramp;wheelchair, motorized  -J  cane, straight;walker, rolling;ramp;wheelchair, motorized   uses motorized w/c when at stores  -LN cane, straight;walker, rolling  -JA    Plans/Goals Discussed With  patient;agreed upon  -JJ  patient;agreed upon  -LN     Risks Reviewed  patient:;increased discomfort  -J   patient:;LOB;nausea/vomiting;dizziness;increased discomfort  -LN     Benefits Reviewed  patient:;improve function;increase independence  -  patient:;improve function;increase independence;increase strength;decrease risk of DVT;increase knowledge  -     Barriers to Rehab  none identified  -J  none identified  -     Living Environment    Lives With  spouse  -  spouse  -LN spouse  -    Living Arrangements  mobile home  -  mobile home  -     Home Accessibility  no concerns;bed and bath on same level;ramps present at home  -J  no concerns;ramps present at home  -LN no concerns  -    Transportation Available     car  -    Living Environment Comment    has 3 small steps to enter home in back of house but has ramp that he uses.   -     Clinical Impression    Date of Referral to OT  11/22/17  -       OT Diagnosis  weakness  -       Prognosis  good  -       Impairments Found (describe specific impairments)  muscle performance  -       Patient/Family Goals Statement  pt states plan to go home with family upon discharge from the hospital  -       Criteria for Skilled Therapeutic Interventions Met  yes;treatment indicated  -       Rehab Potential  fair, will monitor progress closely  -       Therapy Frequency  2-3 times/wk  -       Predicted Duration of Therapy Intervention (days/wks)   during hospital stay x 3-4 days  -       Anticipated Equipment Needs At Discharge  --   pt may benefit from long handled sponge and reacher  -       Anticipated Discharge Disposition home with assist;home with home health  - home with assist;home with home health  -       Vital Signs    Pre SpO2 (%)  98  -JJ 98  -GC 97  -LN     O2 Delivery Pre Treatment  room air  -JJ room air  -GC room air  -LN     Post SpO2 (%)   99  -GC 99  -LN     O2 Delivery Post Treatment   room air  -GC room air  -LN     Pre Patient Position   Sitting  -GC Sitting  -LN     Post Patient Position   Sitting  -GC Sitting  -LN      Pain Assessment    Pain Assessment  0-10  -JJ  --   not specifically rated  -LN     Pain Score  --   not rated hurts with movement  -JJ  --   not specifically rated  -LN     Pain Type  Chronic pain;Acute pain   L knee chronic, L shoulder from fall  -JJ Acute pain  -GC Acute pain;Chronic pain  -LN     Pain Location  Knee   B knees, L shoulder  -JJ Knee   bilateral knees, left shoulder  -GC Knee   bilateral knees, left hip, left hand, left forehead  -LN     Multiple Pain Sites  Yes  -JJ Yes  -GC Yes  -LN     Cognitive Assessment/Intervention    Current Cognitive/Communication Assessment  functional  -JJ functional  -GC functional  -LN     Orientation Status  oriented x 4  -JJ oriented x 4  -GC oriented x 4  -LN     Follows Commands/Answers Questions  100% of the time;able to follow single-step instructions  -% of the time;able to follow multi-step instructions  -% of the time;able to follow single-step instructions;needs cueing  -LN     Personal Safety  WNL/WFL  -JJ WNL/WFL  -GC WNL/WFL  -LN     Personal Safety Interventions  gait belt;nonskid shoes/slippers when out of bed  -JJ fall prevention program maintained;gait belt;nonskid shoes/slippers when out of bed  -GC fall prevention program maintained;gait belt;nonskid shoes/slippers when out of bed;supervised activity  -LN     ROM (Range of Motion)    General ROM Detail  B UE AROM WFL   co pain in L shoulder w arom  -JJ  Bilateral UE WFL; Bilateral LE WFL with some stiffness reported in right knee.  -LN     MMT (Manual Muscle Testing)    General MMT Assessment Detail  B UE MMT WFL  -JJ  Bilateral UE grossly 3+/5; Bilateral LE 4/5 except hips 3+/5.   -LN     Bed Mobility, Assessment/Treatment    Bed Mobility, Comment  deferred up in chair, pt states sleeps in recliner chair at home  -JJ  not tested; patient up in chair. Patient reports that he does not sleep in a bed at home; sleeps in a recliner chair.  -LN     Transfer Assessment/Treatment    Transfers,  Sit-Stand [place]  contact guard assist  -JJ contact guard assist  -GC minimum assist (75% patient effort);contact guard assist;2 person assist required;verbal cues required  -LN     Transfers, Stand-Sit [place]  contact guard assist  -JJ contact guard assist  -GC contact guard assist;2 person assist required;verbal cues required  -LN     Transfers, Sit-Stand-Sit, Assist Device  rolling walker  -JJ rolling walker  -GC rolling walker  -LN     Transfer, Comment  requires verbal cues for hand placement  -       Functional Mobility    Functional Mobility- Ind. Level  contact guard assist  -       Functional Mobility- Device  rolling walker  -       Functional Mobility-Distance (Feet)  --   in hallway  -       Stairs Assessment/Treatment    Stairs, Comment    Patient has a ramp at home.   -LN     Lower Body Bathing Assessment/Training    LB Bathing Assess/Train, Comment  pt states has difficulty with washing feet  -       Lower Body Dressing Assessment/Training    LB Dressing Assess/Train, Comment  pt states does not wear socks and wears slip on shoes without difficulty  -       General Therapy Interventions    Planned Therapy Interventions  adaptive equipment training  -       Positioning and Restraints    Pre-Treatment Position  sitting in chair/recliner  -JJ sitting in chair/recliner  -GC sitting in chair/recliner  -LN     Post Treatment Position  chair  -JJ chair  -GC chair  -LN     In Chair  reclined;call light within reach;encouraged to call for assist  -JJ reclined;call light within reach;with nsg  -GC notified nsg;reclined;call light within reach;encouraged to call for assist;legs elevated  -LN       11/22/17 1400 11/22/17 1354 11/22/17 1328 11/22/17 1327 11/21/17 2037    Rehab Evaluation    Evaluation Not Performed other (see comments)   Hold per APRN pending cariology consult and troponin results  -SD other (see comments)   Hold per APRN pending cariology consult and troponin results.    -BP --   pt receiving ultrasound, unavailable  - patient unavailable for evaluation   Patient unavailable. Receiving a Kidney US. Will check back.   -BP     Living Environment    Lives With     spouse  -DM    Living Arrangements     mobile home  -DM    Home Accessibility     no concerns  -DM    Stair Railings at Home     none  -DM    Type of Financial/Environmental Concern     none  -DM    Transportation Available     car  -      11/21/17 2024                General Information    Equipment Currently Used at Home cane, straight  -DM        Functional Level Prior    Ambulation 1-->assistive equipment  -DM        Transferring 1-->assistive equipment  -DM        Toileting 1-->assistive equipment  -DM        Bathing 0-->independent  -DM        Dressing 0-->independent  -DM        Eating 0-->independent  -DM        Communication 0-->understands/communicates without difficulty  -DM        Swallowing 0-->swallows foods/liquids without difficulty  -DM          User Key  (r) = Recorded By, (t) = Taken By, (c) = Cosigned By    Initials Name Effective Dates    GC Darrel Alves, PT 04/06/17 -     DM Margoth Lance, RN 06/16/16 -     ANDREIA Ramirez, RN 02/24/17 -     SD Sebastian Wright, OTR 06/22/16 -     JJ Aislinn Aponte, OTR 06/22/16 -     LN Juhi Proctor, PT 06/22/16 -     BP Chloe Coffman, PT 12/01/15 -            Occupational Therapy Education     Title: PT OT SLP Therapies (Active)     Topic: Occupational Therapy (Done)     Point: ADL training (Done)    Description: Instruct learner(s) on proper safety adaptation and remediation techniques during self care or transfers.   Instruct in proper use of assistive devices.    Learning Progress Summary    Learner Readiness Method Response Comment Documented by Status   Patient Acceptance E VU pt educated on benefits of activity, and safety with functional transfers and adls J 11/24/17 1044 Done               Point: Precautions (Done)     Description: Instruct learner(s) on prescribed precautions during self-care and functional transfers.    Learning Progress Summary    Learner Readiness Method Response Comment Documented by Status   Patient Acceptance E VU pt educated on benefits of activity, and safety with functional transfers and adls  11/24/17 1044 Done                      User Key     Initials Effective Dates Name Provider Type Discipline     06/22/16 -  Aislinn Aponte, OTR Occupational Therapist OT                  OT Recommendation and Plan  Anticipated Equipment Needs At Discharge:  (pt may benefit from long handled sponge and reacher)  Anticipated Discharge Disposition: home with assist, home with home health  Planned Therapy Interventions: adaptive equipment training  Therapy Frequency: 2-3 times/wk  Plan of Care Review  Plan Of Care Reviewed With: patient  Outcome Summary/Follow up Plan: OT evaluation completed. pt requires cga for functional transfers and functional mobility with rolling walker. pt states has difficulty bathing les at home may benefit from long handled sponge and reacher for home safety. pt would benfit from additional skilled ot services to address adl retraining, education, and functional transfers with anticipated discharge home with home health          OT Goals       11/24/17 1045          Transfer Training OT STG    Transfer Training OT STG, Date Established 11/24/17  -      Transfer Training OT STG, Time to Achieve 4 days  -      Transfer Training OT STG, Activity Type toilet  -J      Transfer Training OT STG, [place] Level supervision required  -      Transfer Training OT STG, Assist Device walker, rolling;commode, bedside  -J      Patient Education OT STG    Patient Education OT STG, Date Established 11/24/17  -J      Patient Education OT STG, Time to Achieve 4 days  -      Patient Education OT STG, Education Type HEP   B UE AROM HEP  -JJ      Patient Education OT STG, Education  Understanding verbalizes understanding  -      Bathing OT STG    Bathing Goal OT STG, Date Established 11/24/17  -      Bathing Goal OT STG, Time to Achieve 4 days  -      Bathing Goal OT STG, Activity Type lower body bathing  -      Bathing Goal OT STG, [place] Level supervision required;verbal cues required  -      Bathing Goal OT STG, Assist Device sponge, long handled  -        User Key  (r) = Recorded By, (t) = Taken By, (c) = Cosigned By    Initials Name Provider Type    DOROTA Aponte, OTR Occupational Therapist                Outcome Measures       11/24/17 0930 11/23/17 1000       How much help from another person do you currently need...    Turning from your back to your side while in flat bed without using bedrails?  3  -LN     Moving from lying on back to sitting on the side of a flat bed without bedrails?  2  -LN     Moving to and from a bed to a chair (including a wheelchair)?  3  -LN     Standing up from a chair using your arms (e.g., wheelchair, bedside chair)?  3  -LN     Climbing 3-5 steps with a railing?  2  -LN     To walk in hospital room?  3  -LN     AM-PAC 6 Clicks Score  16  -LN     How much help from another is currently needed...    Putting on and taking off regular lower body clothing? 3  -JJ      Bathing (including washing, rinsing, and drying) 3  -JJ      Toileting (which includes using toilet bed pan or urinal) 3  -JJ      Putting on and taking off regular upper body clothing 4  -JJ      Taking care of personal grooming (such as brushing teeth) 4  -JJ      Eating meals 4  -JJ      Score 21  -J      Functional Assessment    Outcome Measure Options AM-PAC 6 Clicks Daily Activity (OT)  -J AM-PAC 6 Clicks Basic Mobility (PT)  -LN       User Key  (r) = Recorded By, (t) = Taken By, (c) = Cosigned By    Initials Name Provider Type    DOROTA Aponte, OTR Occupational Therapist    LN Juhi Proctor, PT Physical Therapist          Time Calculation:    OT Start Time: 0930    Therapy Charges for Today     Code Description Service Date Service Provider Modifiers Qty    57238030974 HC OT EVAL LOW COMPLEXITY 1 11/24/2017 Aislinn Aponte, OTR GO 1               Aislinn Aponte, OTR  11/24/2017

## 2017-11-24 NOTE — CONSULTS
LOS: 3 days   Patient Care Team:  LEATHA Darden as PCP - General (Nurse Practitioner)        Subjective       Attending MD : Terri Joshua*    Patient Complaints: fever R leg rash         History of Present Illness  :     Patient Denies:  NVD    PMH : Principal Problem:    Sepsis  Active Problems:    COPD (chronic obstructive pulmonary disease)    Diabetes mellitus    Hypertension    Acute renal failure    Elevated procalcitonin    Lactic acid acidosis    Leukocytosis      Review of Systems:    12 point ROS done     Objective     Vital Signs  Temp:  [97.5 °F (36.4 °C)-98.3 °F (36.8 °C)] 98.1 °F (36.7 °C)  Heart Rate:  [63-77] 65  Resp:  [18] 18  BP: (148-161)/(73-80) 161/80    Physical Exam:     General Appearance:    Alert, cooperative, in no acute distress   Head:    Normocephalic, without obvious abnormality, atraumatic   Eyes:            Lids and lashes normal, conjunctivae and sclerae normal, no   icterus, no pallor, corneas clear, PERRLA   Ears:    Ears appear intact with no abnormalities noted   Throat:   No oral lesions, no thrush, oral mucosa moist   Neck:   No adenopathy, supple, trachea midline, no thyromegaly, no   carotid bruit, no JVD   Back:     No kyphosis present, no scoliosis present, no skin lesions,      erythema or scars, no tenderness to percussion or                   palpation,   range of motion normal   Lungs:     Clear to auscultation,respirations regular, even and                  unlabored    Heart:    Regular rhythm and normal rate, normal S1 and S2, no            murmur, no gallop, no rub, no click   Chest Wall:    No abnormalities observed   Abdomen:     Normal bowel sounds, no masses, no organomegaly, soft        non-tender, non-distended, no guarding, no rebound                tenderness   Rectal:     Deferred   Extremities: R leg cellulitis and tenia noted    Pulses:    Skin:   No bleeding, bruising or rash   Lymph nodes:   No palpable adenopathy   Neurologic:    Cranial nerves 2 - 12 grossly intact, sensation intact, DTR       present and equal bilaterally            Results Review:    Lab Results (last 72 hours)     Procedure Component Value Units Date/Time    CBC & Differential [[id number]] Collected:  11/21/17 1557    Specimen:  Blood Updated:  11/21/17 1604    Narrative:       The following orders were created for panel order CBC & Differential.  Procedure                               Abnormality         Status                     ---------                               -----------         ------                     CBC Auto Differential[[id number]]        Abnormal            Final result                 Please view results for these tests on the individual orders.    CBC Auto Differential [[id number]]  (Abnormal) Collected:  11/21/17 1557    Specimen:  Blood Updated:  11/21/17 1604     WBC 21.88 (H) 10*3/mm3      RBC 4.91 10*6/mm3      Hemoglobin 14.3 g/dL      Hematocrit 43.0 %      MCV 87.6 fL      MCH 29.1 pg      MCHC 33.3 g/dL      RDW 13.2 %      RDW-SD 41.8 fl      MPV 10.8 (H) fL      Platelets 169 10*3/mm3      Neutrophil % 91.3 (H) %      Lymphocyte % 2.3 (L) %      Monocyte % 5.0 %      Eosinophil % 0.0 %      Basophil % 0.2 %      Immature Grans % 1.2 (H) %      Neutrophils, Absolute 19.97 (H) 10*3/mm3      Lymphocytes, Absolute 0.50 (L) 10*3/mm3      Monocytes, Absolute 1.10 (H) 10*3/mm3      Eosinophils, Absolute 0.00 (L) 10*3/mm3      Basophils, Absolute 0.04 10*3/mm3      Immature Grans, Absolute 0.27 (H) 10*3/mm3      nRBC 0.0 /100 WBC     Protime-INR [[id number]]  (Abnormal) Collected:  11/21/17 1557    Specimen:  Blood Updated:  11/21/17 1624     Protime 14.3 Seconds      INR 1.11 (H)    Narrative:       Therapeutic Ranges for INR: 2.0-3.0 (PT 20-30)                              2.5-3.5 (PT 25-34)    aPTT [[id number]]  (Normal) Collected:  11/21/17 1557    Specimen:  Blood Updated:  11/21/17 1624     PTT 33.3 seconds     Narrative:       PTT = The  equivalent PTT values for the therapeutic range of heparin levels at 0.1 to 0.7 U/ml are 53 to 110 seconds.    Urinalysis With / Culture If Indicated - Urine, Catheter [[id number]]  (Abnormal) Collected:  11/21/17 1552    Specimen:  Urine from Urine, Catheter Updated:  11/21/17 1624     Color, UA Yellow     Appearance, UA Clear     pH, UA 8.5 (H)     Specific Gravity, UA 1.020     Glucose,  mg/dL (2+) (A)     Ketones, UA 40 mg/dL (2+) (A)     Bilirubin, UA Negative     Blood, UA Large (3+) (A)     Protein, UA >=300 mg/dL (3+) (A)     Leuk Esterase, UA Negative     Nitrite, UA Negative     Urobilinogen, UA 0.2 E.U./dL    Urinalysis, Microscopic Only - Urine, Clean Catch [[id number]]  (Abnormal) Collected:  11/21/17 1552    Specimen:  Urine from Urine, Catheter Updated:  11/21/17 1624     RBC, UA Too Numerous to Count (A) /HPF      WBC, UA 3-5 (A) /HPF      Bacteria, UA 1+ (A) /HPF      Squamous Epithelial Cells, UA 3-6 (A) /HPF      Hyaline Casts, UA None Seen /LPF      Methodology Manual Light Microscopy    Lactic Acid, Plasma [[id number]]  (Abnormal) Collected:  11/21/17 1557    Specimen:  Blood Updated:  11/21/17 1631     Lactate 3.1 (C) mmol/L     Comprehensive Metabolic Panel [[id number]]  (Abnormal) Collected:  11/21/17 1557    Specimen:  Blood Updated:  11/21/17 1631     Glucose 330 (H) mg/dL      BUN 15 mg/dL      Creatinine 1.85 (H) mg/dL      Sodium 136 mmol/L      Potassium 4.6 mmol/L      Chloride 93 (L) mmol/L      CO2 24.6 mmol/L      Calcium 9.6 mg/dL      Total Protein 7.6 g/dL      Albumin 3.90 g/dL      ALT (SGPT) 17 U/L      AST (SGOT) 34 U/L       Specimen hemolyzed.  Results may be affected.        Alkaline Phosphatase 62 U/L      Total Bilirubin 0.6 mg/dL      eGFR Non African Amer 36 (L) mL/min/1.73      Globulin 3.7 gm/dL      A/G Ratio 1.1 g/dL      BUN/Creatinine Ratio 8.1     Anion Gap 18.4 mmol/L     Narrative:       The MDRD GFR formula is only valid for adults with stable renal  function between ages 18 and 70.    Troponin [[id number]]  (Normal) Collected:  11/21/17 1557    Specimen:  Blood Updated:  11/21/17 1631     Troponin T 0.014 ng/mL     Narrative:       Troponin T Reference Ranges:  Less than 0.03 ng/mL:    Negative for AMI  0.03 to 0.09 ng/mL:      Indeterminant for AMI  Greater than 0.09 ng/mL: Positive for AMI    Influenza Antigen, Rapid - Swab, Nasopharynx [[id number]]  (Normal) Collected:  11/21/17 1610    Specimen:  Swab from Nasopharynx Updated:  11/21/17 1643     Influenza A Ag, EIA Negative     Influenza B Ag, EIA Negative    [place] Draw [[id number]] Collected:  11/21/17 1557    Specimen:  Blood Updated:  11/21/17 1701    Narrative:       The following orders were created for panel order [place] Draw.  Procedure                               Abnormality         Status                     ---------                               -----------         ------                     Light Blue Top[[id number]]                                                              Green Top (Gel)[[id number]]                                  Final result               Lavender Top[[id number]]                                     Final result               Gold Top - SST[[id number]]                                                                Please view results for these tests on the individual orders.    Green Top (Gel) [[id number]] Collected:  11/21/17 1557    Specimen:  Blood Updated:  11/21/17 1701     Extra Tube Hold for add-ons.      Auto resulted.       Lavender Top [[id number]] Collected:  11/21/17 1557    Specimen:  Blood Updated:  11/21/17 1701     Extra Tube hold for add-on      Auto resulted       TSH [[id number]]  (Normal) Collected:  11/21/17 1557    Specimen:  Blood Updated:  11/21/17 1711     TSH 2.950 mIU/mL     Urine Drug Screen - [[id number]]  (Normal) Collected:  11/21/17 1756    Specimen:  Urine Updated:  11/21/17 1809     THC, Screen, Urine Negative     Phencyclidine (PCP), Urine  Negative     Cocaine Screen, Urine Negative     Methamphetamine, Urine Negative     Opiate Screen Negative     Amphetamine Screen, Urine Negative     Benzodiazepine Screen, Urine Negative     Tricyclic Antidepressants Screen Negative     Methadone Screen, Urine Negative     Barbiturates Screen, Urine Negative     Oxycodone Screen, Urine Negative     Propoxyphene Screen Negative     Buprenorphine, Screen, Urine Negative    Narrative:       Urine drug screen results are to be used for medical purposes only.  They are not to be used for legal purposes such as employment testing.  Negative results do not necessarily mean the complete absence of a subtance, but rather that the result is less than the cutoff for that substance.  Positive results are unconfirmed and considered Preliminary Positive.  [facility] does not automatically confirm Postitive Unconfirmed results.  The physician may request (order) an Unconfirmed Positive result to be sent out for confirmation.      Negative Thresholds for Drugs Screened:    THC screen, urine                          50 ng/ml  Phenycyclidine (PCP), urine                25 ng/ml  Cocaine screen, urine                     150 ng/ml  Methamphetamine, urine                    500 ng/ml  Opiate screen, urine                      100 ng/ml  Amphetamine screen, urine                 500 ng/ml  Benzodiazepine screen, urine              150 ng/ml  Tricyclic Antidepressants screen, urine   300 ng/ml  Methadone screen, urine                   200 ng/ml  Barbiturates screen, urine                200 ng/ml  Oxycodone screen, urine                   100 ng/ml  Propoxyphene screen, urine                300 ng/ml  Buprenorphine screen, urine                10 ng/ml    Lactic Acid, Reflex Timer [[id number]] Collected:  11/21/17 1557    Specimen:  Blood Updated:  11/21/17 1946     Extra Tube Hold for add-ons.      Auto resulted.       Lactic Acid, Reflex [[id number]]  (Abnormal)  Collected:  11/21/17 2011    Specimen:  Blood Updated:  11/21/17 2050     Lactate 2.9 (C) mmol/L     POC Glucose Fingerstick [[id number]]  (Abnormal) Collected:  11/21/17 2107    Specimen:  Blood Updated:  11/21/17 2113     Glucose 312 (H) mg/dL     Narrative:       Meter: [id number] : [id number] Casi LIVINSGTON    Procalcitonin [[id number]]  (Abnormal) Collected:  11/21/17 2058    Specimen:  Blood Updated:  11/21/17 2130     Procalcitonin 2.33 (C) ng/mL     Narrative:       As a Marker for Sepsis (Non-Neonates):   1. <0.5 ng/mL represents a low risk of severe sepsis and/or septic shock.  2. >2 ng/mL represents a high risk of severe sepsis and/or septic shock.    As a Marker for Lower Respiratory Tract Infections that require antibiotic therapy:    PCT on Admission     Antibiotic Therapy       6-12 Hrs later  > 0.5                Strongly Recommended             >0.25 - <0.5         Recommended  0.1 - 0.25           Discouraged              Remeasure/reassess PCT  <0.1                 Strongly Discouraged     Remeasure/reassess PCT                     PCT values of < 0.5 ng/mL do not exclude an infection, because localized infections (without systemic signs) may be associated with such low concentrations, or a systemic infection in its initial stages (< 6 hours). Furthermore, increased PCT can occur without infection. PCT concentrations between 0.5 and 2.0 ng/mL should be interpreted taking into account the patient's history. It is recommended to retest PCT within 6-24 hours if any concentrations < 2 ng/mL are obtained.    Lactic Acid, Plasma [[id number]]  (Abnormal) Collected:  11/22/17 0415    Specimen:  Blood Updated:  11/22/17 0444     Lactate 2.8 (C) mmol/L     CBC Auto Differential [[id number]]  (Abnormal) Collected:  11/22/17 0415    Specimen:  Blood Updated:  11/22/17 0450     WBC 18.07 (H) 10*3/mm3      RBC 4.40 (L) 10*6/mm3      Hemoglobin 12.9 (L) g/dL      Hematocrit 39.0 (L) %      MCV 88.6 fL       MCH 29.3 pg      MCHC 33.1 g/dL      RDW 13.4 %      RDW-SD 43.6 fl      MPV 10.8 (H) fL      Platelets 154 10*3/mm3      Neutrophil % 89.1 (H) %      Lymphocyte % 4.5 (L) %      Monocyte % 5.0 %      Eosinophil % 0.0 %      Basophil % 0.2 %      Immature Grans % 1.2 (H) %      Neutrophils, Absolute 16.08 (H) 10*3/mm3      Lymphocytes, Absolute 0.82 10*3/mm3      Monocytes, Absolute 0.91 10*3/mm3      Eosinophils, Absolute 0.00 (L) 10*3/mm3      Basophils, Absolute 0.04 10*3/mm3      Immature Grans, Absolute 0.22 (H) 10*3/mm3      nRBC 0.0 /100 WBC     Hemoglobin A1c [[id number]]  (Abnormal) Collected:  11/22/17 0415    Specimen:  Blood Updated:  11/22/17 0529     Hemoglobin A1C 9.20 (H) %     Narrative:       Hemoglobin A1C Ranges:    Increased Risk for Diabetes  5.7% to 6.4%  Diabetes                     >= 6.5%  Diabetic Goal                < 7.0%    Basic Metabolic Panel [[id number]]  (Abnormal) Collected:  11/22/17 0415    Specimen:  Blood Updated:  11/22/17 0534     Glucose 312 (H) mg/dL      BUN 17 mg/dL      Creatinine 1.64 (H) mg/dL      Sodium 139 mmol/L      Potassium 4.1 mmol/L      Chloride 99 mmol/L      CO2 27.8 mmol/L      Calcium 8.6 (L) mg/dL      eGFR Non African Amer 41 (L) mL/min/1.73      BUN/Creatinine Ratio 10.4     Anion Gap 12.2 mmol/L     Narrative:       The MDRD GFR formula is only valid for adults with stable renal function between ages 18 and 70.    POC Glucose Fingerstick [[id number]]  (Abnormal) Collected:  11/22/17 0804    Specimen:  Blood Updated:  11/22/17 0810     Glucose 294 (H) mg/dL     Narrative:       Meter: [id number] : [id number] Allen Menchaca Nursing Assistant    Respiratory Panel, PCR - Swab, Nasopharynx [[id number]]  (Normal) Collected:  11/21/17 2014    Specimen:  Swab from Nasopharynx Updated:  11/22/17 0905     ADENOVIRUS, PCR Not Detected     Coronavirus 229E Not Detected     Coronavirus HKU1 Not Detected     Coronavirus NL63 Not Detected     Coronavirus  OC43 Not Detected     Human Metapneumovirus Not Detected     Human Rhinovirus/Enterovirus Not Detected     Influenza B PCR Not Detected     Parainfluenza Virus 1 Not Detected     Parainfluenza Virus 2 Not Detected     Parainfluenza Virus 3 Not Detected     Parainfluenza Virus 4 Not Detected     Bordetella pertussis pcr Not Detected     Influenza 2009 H1N1 by PCR Not Detected     Chlamydophila pneumoniae PCR Not Detected     Mycoplasma pneumo by PCR Not Detected     Influenza A PCR Not Detected     Influenza A H3 Not Detected     Influenza A H1 Not Detected     RSV, PCR Not Detected    Blood Culture ID, PCR - Blood, [[id number]]  (Abnormal) Collected:  11/21/17 1557    Specimen:  Blood from Arm, Right Updated:  11/22/17 1041     BCID, PCR Streptococcus spp, not A, B, or pneumoniae. Identification by BCID PCR. (C)    POC Glucose Fingerstick [[id number]]  (Abnormal) Collected:  11/22/17 1157    Specimen:  Blood Updated:  11/22/17 1206     Glucose 264 (H) mg/dL     Narrative:       Meter: [id number] : [id number] Americo Andres RN Validator    Troponin [[id number]]  (Abnormal) Collected:  11/22/17 1256    Specimen:  Blood Updated:  11/22/17 1334     Troponin T 0.141 (C) ng/mL     Narrative:       Troponin T Reference Ranges:  Less than 0.03 ng/mL:    Negative for AMI  0.03 to 0.09 ng/mL:      Indeterminant for AMI  Greater than 0.09 ng/mL: Positive for AMI    POC Glucose Fingerstick [[id number]]  (Abnormal) Collected:  11/22/17 1652    Specimen:  Blood Updated:  11/22/17 1659     Glucose 238 (H) mg/dL     Narrative:       Meter: [id number] : [id number] Allen Menchaca Nursing Assistant    Troponin [[id number]]  (Abnormal) Collected:  11/22/17 1805    Specimen:  Blood Updated:  11/22/17 1836     Troponin T 0.115 (C) ng/mL     Narrative:       Troponin T Reference Ranges:  Less than 0.03 ng/mL:    Negative for AMI  0.03 to 0.09 ng/mL:      Indeterminant for AMI  Greater than 0.09 ng/mL: Positive for AMI    POC Glucose  Fingerstick [[id number]]  (Abnormal) Collected:  11/22/17 2039    Specimen:  Blood Updated:  11/22/17 2046     Glucose 208 (H) mg/dL     Narrative:       Meter: [id number] : [id number] Pati LIVINGSTON    Troponin [[id number]]  (Abnormal) Collected:  11/22/17 2355    Specimen:  Blood Updated:  11/23/17 0026     Troponin T 0.099 (H) ng/mL     Narrative:       Troponin T Reference Ranges:  Less than 0.03 ng/mL:    Negative for AMI  0.03 to 0.09 ng/mL:      Indeterminant for AMI  Greater than 0.09 ng/mL: Positive for AMI    CBC & Differential [[id number]] Collected:  11/23/17 0420    Specimen:  Blood Updated:  11/23/17 0442    Narrative:       The following orders were created for panel order CBC & Differential.  Procedure                               Abnormality         Status                     ---------                               -----------         ------                     CBC Auto Differential[[id number]]        Abnormal            Final result                 Please view results for these tests on the individual orders.    CBC Auto Differential [[id number]]  (Abnormal) Collected:  11/23/17 0420    Specimen:  Blood Updated:  11/23/17 0442     WBC 12.56 (H) 10*3/mm3      RBC 3.82 (L) 10*6/mm3      Hemoglobin 11.3 (L) g/dL      Hematocrit 34.2 (L) %      MCV 89.5 fL      MCH 29.6 pg      MCHC 33.0 g/dL      RDW 13.5 %      RDW-SD 44.2 fl      MPV 10.8 (H) fL      Platelets 148 10*3/mm3      Neutrophil % 77.2 (H) %      Lymphocyte % 11.6 (L) %      Monocyte % 7.6 %      Eosinophil % 2.9 %      Basophil % 0.2 %      Immature Grans % 0.5 %      Neutrophils, Absolute 9.70 (H) 10*3/mm3      Lymphocytes, Absolute 1.46 10*3/mm3      Monocytes, Absolute 0.95 10*3/mm3      Eosinophils, Absolute 0.36 (H) 10*3/mm3      Basophils, Absolute 0.03 10*3/mm3      Immature Grans, Absolute 0.06 (H) 10*3/mm3      nRBC 0.0 /100 WBC     Comprehensive Metabolic Panel [[id number]]  (Abnormal) Collected:  11/23/17 0425     Specimen:  Blood Updated:  11/23/17 0512     Glucose 161 (H) mg/dL      BUN 19 mg/dL      Creatinine 1.71 (H) mg/dL      Sodium 137 mmol/L      Potassium 3.6 mmol/L      Chloride 102 mmol/L      CO2 26.6 mmol/L      Calcium 8.2 (L) mg/dL      Total Protein 6.0 g/dL      Albumin 2.90 (L) g/dL      ALT (SGPT) 22 U/L      AST (SGOT) 59 (H) U/L      Alkaline Phosphatase 44 U/L      Total Bilirubin 0.4 mg/dL      eGFR Non African Amer 39 (L) mL/min/1.73      Globulin 3.1 gm/dL      A/G Ratio 0.9 g/dL      BUN/Creatinine Ratio 11.1     Anion Gap 8.4 mmol/L     Narrative:       The MDRD GFR formula is only valid for adults with stable renal function between ages 18 and 70.    POC Glucose Fingerstick [[id number]]  (Abnormal) Collected:  11/23/17 0736    Specimen:  Blood Updated:  11/23/17 0743     Glucose 182 (H) mg/dL     Narrative:       Meter: [id number] : [id number] Meeta Finn NURSING ASSISTANT    Urine Culture - Urine, Urine, Clean Catch [[id number]]  (Normal) Collected:  11/21/17 1552    Specimen:  Urine from Urine, Catheter Updated:  11/23/17 1000     Urine Culture No growth    POC Glucose Fingerstick [[id number]]  (Abnormal) Collected:  11/23/17 1145    Specimen:  Blood Updated:  11/23/17 1151     Glucose 229 (H) mg/dL     Narrative:       Meter: [id number] : [id number] Meeta Finn NURSING ASSISTANT    Troponin [[id number]]  (Abnormal) Collected:  11/23/17 1327    Specimen:  Blood Updated:  11/23/17 1400     Troponin T 0.068 (H) ng/mL     Narrative:       Troponin T Reference Ranges:  Less than 0.03 ng/mL:    Negative for AMI  0.03 to 0.09 ng/mL:      Indeterminant for AMI  Greater than 0.09 ng/mL: Positive for AMI    Blood Culture - Blood, [[id number]]  (Normal) Collected:  11/21/17 1606    Specimen:  Blood from Wrist, Right Updated:  11/23/17 1616     Blood Culture No growth at 2 days    Lactic Acid, Plasma [[id number]]  (Normal) Collected:  11/23/17 1602    Specimen:  Blood Updated:  11/23/17 162      Lactate 1.3 mmol/L     S. Pneumo Ag Urine or CSF - Urine, Urine, Clean Catch [[id number]]  (Normal) Collected:  11/23/17 1552    Specimen:  Urine from Urine, Clean Catch Updated:  11/23/17 1627     Strep Pneumo Ag Negative    POC Glucose Fingerstick [[id number]]  (Abnormal) Collected:  11/23/17 1634    Specimen:  Blood Updated:  11/23/17 1642     Glucose 266 (H) mg/dL     Narrative:       Meter: [id number] : [id number] Meeta Finn NURSING ASSISTANT    Procalcitonin [[id number]]  (Abnormal) Collected:  11/23/17 1327    Specimen:  Blood Updated:  11/23/17 1708     Procalcitonin 7.81 (C) ng/mL     Narrative:       As a Marker for Sepsis (Non-Neonates):   1. <0.5 ng/mL represents a low risk of severe sepsis and/or septic shock.  2. >2 ng/mL represents a high risk of severe sepsis and/or septic shock.    As a Marker for Lower Respiratory Tract Infections that require antibiotic therapy:    PCT on Admission     Antibiotic Therapy       6-12 Hrs later  > 0.5                Strongly Recommended             >0.25 - <0.5         Recommended  0.1 - 0.25           Discouraged              Remeasure/reassess PCT  <0.1                 Strongly Discouraged     Remeasure/reassess PCT                     PCT values of < 0.5 ng/mL do not exclude an infection, because localized infections (without systemic signs) may be associated with such low concentrations, or a systemic infection in its initial stages (< 6 hours). Furthermore, increased PCT can occur without infection. PCT concentrations between 0.5 and 2.0 ng/mL should be interpreted taking into account the patient's history. It is recommended to retest PCT within 6-24 hours if any concentrations < 2 ng/mL are obtained.    POC Glucose Fingerstick [[id number]]  (Abnormal) Collected:  11/23/17 2006    Specimen:  Blood Updated:  11/23/17 2015     Glucose 270 (H) mg/dL     Narrative:       Meter: [id number] : [id number] Ishaan Piedra NURSING ASSISTANT    CBC & Differential  [[id number]] Collected:  11/24/17 0623    Specimen:  Blood Updated:  11/24/17 0631    Narrative:       The following orders were created for panel order CBC & Differential.  Procedure                               Abnormality         Status                     ---------                               -----------         ------                     CBC Auto Differential[[id number]]        Abnormal            Final result                 Please view results for these tests on the individual orders.    CBC Auto Differential [[id number]]  (Abnormal) Collected:  11/24/17 0623    Specimen:  Blood Updated:  11/24/17 0631     WBC 7.76 10*3/mm3      RBC 3.92 (L) 10*6/mm3      Hemoglobin 11.6 (L) g/dL      Hematocrit 34.5 (L) %      MCV 88.0 fL      MCH 29.6 pg      MCHC 33.6 g/dL      RDW 13.2 %      RDW-SD 42.5 fl      MPV 10.6 (H) fL      Platelets 140 10*3/mm3      Neutrophil % 71.3 (H) %      Lymphocyte % 14.6 (L) %      Monocyte % 8.0 %      Eosinophil % 5.4 (H) %      Basophil % 0.3 %      Immature Grans % 0.4 %      Neutrophils, Absolute 5.54 10*3/mm3      Lymphocytes, Absolute 1.13 10*3/mm3      Monocytes, Absolute 0.62 10*3/mm3      Eosinophils, Absolute 0.42 (H) 10*3/mm3      Basophils, Absolute 0.02 10*3/mm3      Immature Grans, Absolute 0.03 10*3/mm3      nRBC 0.0 /100 WBC     Blood Culture - Blood, [[id number]]  (Abnormal)  (Susceptibility) Collected:  11/21/17 1557    Specimen:  Blood from Arm, Right Updated:  11/24/17 0640     Blood Culture --      Streptococcus dysgalactiae ssp equisimilis (A)     Gram Stain Result Aerobic Bottle Gram positive cocci in chains    Susceptibility      Streptococcus dysgalactiae ssp equisimilis     ALINE     Ceftriaxone <=0.12 ug/ml Susceptible     Clindamycin <=0.25 ug/ml Susceptible     Erythromycin <=0.12 ug/ml Susceptible     Levofloxacin 0.5 ug/ml Susceptible     Penicillin G <=0.06 ug/ml Susceptible     Vancomycin 0.5 ug/ml Susceptible                    Basic Metabolic Panel  [[id number]]  (Abnormal) Collected:  11/24/17 0622    Specimen:  Blood Updated:  11/24/17 0645     Glucose 140 (H) mg/dL      BUN 16 mg/dL      Creatinine 1.50 (H) mg/dL      Sodium 137 mmol/L      Potassium 3.3 (L) mmol/L      Chloride 102 mmol/L      CO2 24.3 mmol/L      Calcium 8.3 (L) mg/dL      eGFR Non African Amer 46 (L) mL/min/1.73      BUN/Creatinine Ratio 10.7     Anion Gap 10.7 mmol/L     Narrative:       The MDRD GFR formula is only valid for adults with stable renal function between ages 18 and 70.    POC Glucose Fingerstick [[id number]]  (Abnormal) Collected:  11/24/17 0707    Specimen:  Blood Updated:  11/24/17 0714     Glucose 137 (H) mg/dL     Narrative:       Meter: [id number] : [id number] Meeta Finn NURSING ASSISTANT              Imaging Results (last 72 hours)     Procedure Component Value Units Date/Time    CT Cervical Spine Without Contrast [[id number]] Collected:  11/21/17 1641     Updated:  11/21/17 1706    Narrative:       CERVICAL CT     HISTORY:  Patient fell off toilet today to 2:30 this afternoon. Patient has  altered mental status and is a poor historian. Patient currently  unresponsive.     TECHNIQUE:  Axial images were obtained through the cervical spine without contrast.  Multiplanar reformats were obtained. No comparison. Radiation dose  reduction techniques were utilized, including automated exposure control  and exposure modulation based on body size.     FINDINGS:  The exam is motion degraded. Alignment is within normal limits. There is  multilevel hypertrophic facet arthropathy. There are no acute fractures.  There are multilevel disc bulges and disc osteophyte complexes. The  findings are most pronounced at C6-7 with bilateral foraminal stenosis  and central canal narrowing.       Impression:       Motion degraded exam. Multilevel degenerative disease. No  acute fracture.     This report was finalized on 11/21/2017 4:47 PM by Dr. Jaylen Gudino MD.       CT Head Without  Contrast [[id number]] Collected:  11/21/17 1630     Updated:  11/21/17 1707    Narrative:       Head CT     INDICATION: Patient sat on 12 old day until he fell off at 2:30 this  afternoon. Subsequent mental status changes. Patient unresponsive and  unable to give history.     FINDINGS: Axial images were obtained from the base to the vertex without  contrast. COMPARISON made with 06/13/2010. Radiation dose reduction  techniques were utilized, including automated exposure control and  exposure modulation based on body size.     Ventricular size and configuration are normal. No acute infarct or  hemorrhage is seen. No masses. Atherosclerotic calcifications are  present in the carotid siphons. No skull fracture.       Impression:       No acute findings.     This report was finalized on 11/21/2017 4:35 PM by Dr. Jaylen Gudino MD.       XR Chest 1 View [[id number]] Collected:  11/21/17 1641     Updated:  11/21/17 1708    Narrative:       Chest x-ray     INDICATION: Patient sat on toilet all day until he fell off at 2:30 this  afternoon. Mental status changes and fever. Limited history as patient  is poor historian.     FINDINGS: AP upright view of the chest is compared with 01/26/2012. Lung  volumes are low. There is mild left base atelectasis or infiltrate.  Lungs are otherwise clear and no pneumothorax. Mild cardiomegaly.       Impression:       Mild cardiomegaly. Low volume inspiration mild infiltrate or  atelectasis at the left base.     This report was finalized on 11/21/2017 4:43 PM by Dr. Jaylen Gudino MD.       CT Chest Without Contrast [[id number]] Collected:  11/22/17 0633     Updated:  11/22/17 0640    Narrative:       CHEST CT     HISTORY:  Fever of unknown origin. Sepsis. Patient has history of COPD. Patient  sat on toilet all day and then fell off at 2:30 PM this afternoon.  Patient unresponsive and unable to give history.     TECHNIQUE:  Axial noncontrast images were obtained through the chest.  Multiplanar  reformats were obtained. No comparison chest CT. Radiation dose  reduction techniques were utilized, including automated exposure control  and exposure modulation based on body size.     FINDINGS:  There is atherosclerotic disease and coronary artery disease. No pleural  or pericardial effusion. No adenopathy. There is cardiomegaly. Lung  windows demonstrate emphysema. There is dependent atelectasis in both  lungs. There is more confluent left lower lobe atelectasis. No  convincing evidence of pneumonia. No fractures are identified. For  description of findings in the upper abdomen, please see the abdomen and  pelvis CT report dictated separately.       Impression:       1. Atherosclerotic disease and coronary artery disease.  2. The lungs are clear except for atelectasis as above. No convincing  pneumonia.     A preliminary report was provided by Dr. Roche at 1800 hours on  11/21/2017.     This report was finalized on 11/22/2017 6:38 AM by Dr. Jaylen Gudino MD.       CT Abdomen Pelvis Without Contrast [[id number]] Collected:  11/22/17 0639     Updated:  11/22/17 0647    Narrative:       CT ABDOMEN PELVIS     HISTORY:  Fever of unknown origin. Sepsis. Patient has history of COPD. Patient  sat on toilet all day and then fell off at 2:30 PM this afternoon.  Patient unresponsive and unable to give history        TECHNIQUE:  Axial noncontrast images were obtained through the abdomen and pelvis.  Multiplanar reformats were obtained. No comparison. Radiation dose  reduction techniques were utilized, including automated exposure control  and exposure modulation based on body size.     ABDOMEN FINDINGS:  For a description of findings in the lung bases, see the chest CT report  dictated separately. Gallstones are present within an otherwise  normal-appearing gallbladder. There is no biliary obstruction. There is  fatty infiltration of the liver. No renal or ureteral stones are seen.  There is no  hydronephrosis. Unenhanced solid organs otherwise are  normal. No free fluid or adenopathy is seen. There is atherosclerotic  disease, but there is no aortic aneurysm. The unopacified GI tract is  grossly normal.     PELVIS FINDINGS:  There is some fat stranding and skin thickening in the midline of the  ventral abdominal wall just above the umbilicus. Please correlate  clinically for etiology. This could reflect contusion or cellulitis.  Similar, but less pronounced, changes are noted just below the  umbilicus. Urinary bladder is normal. There are no lower ureteral  stones. There is no free fluid. The appendix is normal. The remainder of  the unopacified GI tract is normal as well. There is some fat stranding  in the right groin adjacent to the common femoral vasculature. This is  nonspecific but could indicate DVT. Consider follow-up with a Doppler  ultrasound. There is diffuse degenerative disease in the lumbar spine.       Impression:       1. Cholelithiasis.  2. No renal or ureteral stones. No hydronephrosis.  3. Hepatic steatosis.  4. Normal unopacified GI tract, including the appendix.  5. Fat stranding adjacent to the right common femoral vasculature. This  is nonspecific but could potentially indicate DVT. Consider follow-up  with right lower extremity venous Doppler ultrasound.  6. Areas of fat stranding in the ventral abdominal wall with skin  thickening above and below the umbilicus. Please correlate clinically  for evidence of contusion or cellulitis.     A preliminary report was provided by Dr. Roche at 1800 hours on  11/21/2017.     This report was finalized on 11/22/2017 6:45 AM by Dr. Jaylen Gudino MD.       US Venous Doppler Lower Extremity Right (duplex) [[id number]] Collected:  11/22/17 0858     Updated:  11/22/17 0901    Narrative:       Right lower and mid venous Doppler     INDICATION: Bilateral leg swelling right greater than left for 4 weeks.  Abnormal CT demonstrating some fat stranding  around the common femoral  vasculature on the right.     FINDINGS: Grayscale, color flow, and spectral Doppler waveform analysis  is performed of the right lower extremity venous system. All the  visualized venous structures demonstrate normal compressibility and  color flow. No superficial or deep venous thrombosis is seen. Calf vein  evaluation is limited due to patient body habitus.       Impression:       Negative right lower extremity venous Doppler. Please note  that there is limited visualization of the calf veins.     This report was finalized on 11/22/2017 8:59 AM by Dr. Jaylen Gudino MD.       US Renal Bilateral [[id number]] Collected:  11/22/17 1505     Updated:  11/22/17 1508    Narrative:       Renal ultrasound     INDICATION: Elevated creatinine of 1.6. Sepsis. Fever of unknown origin.     FINDINGS: Sonographic evaluation is performed of the kidneys in multiple  planes. A comparison is made with CT abdomen pelvis from 11/21/2017.     There is fatty infiltration of the liver. Right kidney measures 10.6 cm  in [id number] length. Left kidney measures 11.2 cm in aqsa-vf-mbsl  length. Both are morphologically normal and nonobstructed. Urinary  bladder is not fully distended but it does appear grossly normal.       Impression:       Normal renal ultrasound.     This report was finalized on 11/22/2017 3:06 PM by Dr. Jaylen Gudino MD.       XR Chest PA & Lateral [[id number]] Collected:  11/24/17 0910     Updated:  11/24/17 0913    Narrative:       INDICATION:  Positive smoking history. Shortness of air.     COMPARISON:  11/21/2017     FINDINGS: PA and lateral views of the chest.  Heart and mediastinal  contours are normal.  Mild linear basilar atelectasis is similar to the  prior study.  No pneumothorax or pleural effusion.       Impression:       No acute findings. Mild basilar atelectasis/scarring.     This report was finalized on 11/24/2017 9:11 AM by Dr. Lester Hugo MD.               Medication  Review:      Hospital Medications (active)       Dose Frequency Start End    acetaminophen (TYLENOL) tablet 650 mg 650 mg Every 6 Hours PRN 11/21/2017     Sig - Route: Take 2 tablets by mouth Every 6 (Six) Hours As Needed for Mild Pain  or Fever. - Oral    ampicillin 1 g IVPB in 100 mL NS 1 g Every 6 Hours 11/24/2017 11/29/2017    Sig - Route: Infuse 1 g into a venous catheter Every 6 (Six) Hours. - Intravenous    aspirin EC tablet 325 mg 325 mg Daily 11/22/2017     Sig - Route: Take 1 tablet by mouth Daily. - Oral    atorvastatin (LIPITOR) tablet 10 mg 10 mg Daily 11/22/2017     Sig - Route: Take 1 tablet by mouth Daily. - Oral    clotrimazole (LOTRIMIN) 1 % cream  Every 12 Hours Scheduled 11/24/2017     Sig - Route: Apply  topically Every 12 (Twelve) Hours. - Topical    dextrose (D50W) solution 25 g 25 g Every 15 Minutes PRN 11/21/2017     Sig - Route: Infuse 50 mL into a venous catheter Every 15 (Fifteen) Minutes As Needed for Low Blood Sugar (Blood Sugar Less Than 70, Patient Has IV Access - Unresponsive, NPO or Unable To Safely Swallow). - Intravenous    dextrose (GLUTOSE) oral gel 15 g 15 g Every 15 Minutes PRN 11/21/2017     Sig - Route: Take 15 g by mouth Every 15 (Fifteen) Minutes As Needed for Low Blood Sugar (Blood Sugar Less Than 70, Patient Alert, Is Not NPO & Can Safely Swallow). - Oral    docusate sodium (COLACE) capsule 100 mg 100 mg 2 Times Daily 11/22/2017     Sig - Route: Take 1 capsule by mouth 2 (Two) Times a Day. - Oral    donepezil (ARICEPT) tablet 5 mg 5 mg Nightly 11/22/2017     Sig - Route: Take 1 tablet by mouth Every Night. - Oral    enoxaparin (LOVENOX) syringe 30 mg 30 mg Every 24 Hours 11/21/2017     Sig - Route: Inject 0.3 mL under the skin Daily. - Subcutaneous    furosemide (LASIX) injection 40 mg 40 mg Once 11/23/2017 11/23/2017    Sig - Route: Infuse 4 mL into a venous catheter 1 (One) Time. - Intravenous    furosemide (LASIX) injection 40 mg 40 mg Once 11/24/2017 11/24/2017     Sig - Route: Infuse 4 mL into a venous catheter 1 (One) Time. - Intravenous    gabapentin (NEURONTIN) capsule 600 mg 600 mg Every 8 Hours Scheduled 11/22/2017     Sig - Route: Take 2 capsules by mouth Every 8 (Eight) Hours. - Oral    glucagon (GLUCAGEN) injection 1 mg 1 mg Every 15 Minutes PRN 11/21/2017     Sig - Route: Inject 1 mg under the skin Every 15 (Fifteen) Minutes As Needed (Blood Glucose Less Than 70 - Patient Without IV Access - Unresponsive, NPO or Unable To Safely Swallow). - Subcutaneous    insulin aspart (novoLOG) injection 0-9 Units 0-9 Units 4 Times Daily Before Meals & Nightly 11/21/2017     Sig - Route: Inject 0-9 Units under the skin 4 (Four) Times a Day Before Meals & at Bedtime. - Subcutaneous    insulin detemir (LEVEMIR) injection 30 Units 30 Units 2 Times Daily 11/23/2017     Sig - Route: Inject 30 Units under the skin 2 (Two) Times a Day. - Subcutaneous    ipratropium-albuterol (DUO-NEB) nebulizer solution 3 mL 3 mL Every 6 Hours PRN 11/21/2017     Sig - Route: Take 3 mL by nebulization Every 6 (Six) Hours As Needed for Wheezing or Shortness of Air. - Nebulization    levothyroxine (SYNTHROID, LEVOTHROID) tablet 100 mcg 100 mcg Daily 11/22/2017     Sig - Route: Take 1 tablet by mouth Daily. - Oral    linagliptin (TRADJENTA) tablet 5 mg 5 mg Daily 11/22/2017     Sig - Route: Take 1 tablet by mouth Daily. - Oral    lisinopril (PRINIVIL,ZESTRIL) tablet 20 mg 20 mg Daily 11/22/2017     Sig - Route: Take 1 tablet by mouth Daily. - Oral    miconazole (MICOTIN) 2 % cream  Every 12 Hours Scheduled 11/22/2017     Sig - Route: Apply  topically Every 12 (Twelve) Hours. - Topical    ondansetron (ZOFRAN) injection 4 mg 4 mg Every 6 Hours PRN 11/21/2017     Sig - Route: Infuse 2 mL into a venous catheter Every 6 (Six) Hours As Needed for Nausea or Vomiting. - Intravenous    Pharmacy Consult - Pharmacy to dose  Continuous PRN 11/24/2017     Sig - Route: Continuous As Needed for Consult. - Does not apply     polyethylene glycol 3350 powder (packet) 17 g Daily 11/22/2017     Sig - Route: Take 17 g by mouth Daily. - Oral    potassium chloride (K-DUR,KLOR-CON) CR tablet 40 mEq 40 mEq Once 11/24/2017 11/24/2017    Sig - Route: Take 2 tablets by mouth 1 (One) Time. - Oral    potassium chloride (K-DUR,KLOR-CON) CR tablet 40 mEq 40 mEq Once 11/24/2017     Sig - Route: Take 2 tablets by mouth 1 (One) Time. - Oral    sodium chloride 0.9 % flush 1-10 mL 1-10 mL As Needed 11/21/2017     Sig - Route: Infuse 1-10 mL into a venous catheter As Needed for Line Care. - Intravenous    sodium chloride 0.9 % flush 10 mL 10 mL As Needed 11/21/2017     Sig - Route: Infuse 10 mL into a venous catheter As Needed for Line Care. - Intravenous    Cosign for Ordering: Accepted by Keven Steven MD on 11/21/2017  6:55 PM    sodium chloride 0.9 % infusion 40 mL 40 mL As Needed 11/21/2017     Sig - Route: Infuse 40 mL into a venous catheter As Needed for Line Care. - Intravenous    Vitamin D3 50,000 Units 50,000 Units Weekly 11/22/2017     Sig - Route: Take 1 capsule by mouth 1 (One) Time Per Week. - Oral    ampicillin 1 g IVPB in 100 mL NS (Discontinued) 1 g Every 6 Hours 11/24/2017 11/24/2017    Sig - Route: Infuse 1 g into a venous catheter Every 6 (Six) Hours. - Intravenous    cefTRIAXone (ROCEPHIN) IVPB 1 g/50ml dextrose (premix) (Discontinued) 1 g Every 24 Hours 11/21/2017 11/23/2017    Sig - Route: Infuse 50 mL into a venous catheter Daily. - Intravenous    ertapenem (INVanz) 1 g in sodium chloride 0.9 % 100 mL IVPB (Discontinued) 1 g Every 24 Hours 11/23/2017 11/24/2017    Sig - Route: Infuse 1 g into a venous catheter Daily. - Intravenous    Cosign for Ordering: Accepted by Terri Chaudhry MD on 11/23/2017  5:35 PM    ertapenem (INVanz) 1 g in sodium chloride 0.9 % 50 mL IVPB (Discontinued) 1 g Every 24 Hours 11/23/2017 11/23/2017    Sig - Route: Infuse 1 g into a venous catheter Daily. - Intravenous    Reason for  Discontinue: Formulary change    insulin detemir (LEVEMIR) injection 22 Units (Discontinued) 22 Units 2 Times Daily 11/22/2017 11/23/2017    Sig - Route: Inject 22 Units under the skin 2 (Two) Times a Day. - Subcutaneous    levoFLOXacin (LEVAQUIN) 750 mg/150 mL D5W (premix) (LEVAQUIN) 750 mg (Discontinued) 750 mg Every 24 Hours 11/24/2017 11/24/2017    Sig - Route: Infuse 150 mL into a venous catheter Daily. - Intravenous    Pharmacy Consult - Pharmacy to dose (Discontinued)  Continuous PRN 11/23/2017 11/24/2017    Sig - Route: Continuous As Needed for Consult. - Does not apply    Reason for Discontinue: Therapy completed    Pharmacy to dose vancomycin (Discontinued)  Continuous PRN 11/22/2017 11/24/2017    Sig - Route: Continuous As Needed for Consult. - Does not apply    Reason for Discontinue: Therapy completed    sodium chloride 0.9 % infusion (Discontinued) 125 mL/hr Continuous 11/21/2017 11/23/2017    Sig - Route: Infuse 125 mL/hr into a venous catheter Continuous. - Intravenous    vancomycin (VANCOCIN) 2,000 mg in sodium chloride 0.9 % 500 mL IVPB (Discontinued) 2,000 mg Every 18 Hours 11/22/2017 11/24/2017    Sig - Route: Infuse 2,000 mg into a venous catheter Every 18 (Eighteen) Hours. - Intravenous    Cosign for Ordering: Required by [facility]         Assessment/Plan       Principal Problem:    Sepsis  Active Problems:    COPD (chronic obstructive pulmonary disease)    Diabetes mellitus    Hypertension    Acute renal failure    Elevated procalcitonin    Lactic acid acidosis    Leukocytosis  R leg Cellulitis  Bacteremia  Echo -  Tenia pedis and Cruris         Plan :    IV ampicillin for 1-2 more days then 10 days of PO amoxil 500 TID  Lotrimin cream to groin and feet  Thank you    Rocky Joe MD  11/24/17  11:12 AM

## 2017-11-24 NOTE — PLAN OF CARE
Problem: Patient Care Overview (Adult)  Goal: Plan of Care Review    11/24/17 1045   Coping/Psychosocial Response Interventions   Plan Of Care Reviewed With patient   Outcome Evaluation   Outcome Summary/Follow up Plan OT evaluation completed. pt requires cga for functional transfers and functional mobility with rolling walker. pt states has difficulty bathing les at home may benefit from long handled sponge and reacher for home safety. pt would benfit from additional skilled ot services to address adl retraining, education, and functional transfers with anticipated discharge home with home health         Problem: Inpatient Occupational Therapy  Goal: Transfer Training Goal 1 STG- OT    11/24/17 1045   Transfer Training OT STG   Transfer Training OT STG, Date Established 11/24/17   Transfer Training OT STG, Time to Achieve 4 days   Transfer Training OT STG, Activity Type toilet   Transfer Training OT STG, [place] Level supervision required   Transfer Training OT STG, Assist Device walker, rolling;commode, bedside       Goal: Patient Education Goal STG- OT    11/24/17 1045   Patient Education OT STG   Patient Education OT STG, Date Established 11/24/17   Patient Education OT STG, Time to Achieve 4 days   Patient Education OT STG, Education Type HEP  (B UE AROM HEP)   Patient Education OT STG, Education Understanding verbalizes understanding       Goal: Bathing Goal STG- OT    11/24/17 1045   Bathing OT STG   Bathing Goal OT STG, Date Established 11/24/17   Bathing Goal OT STG, Time to Achieve 4 days   Bathing Goal OT STG, Activity Type lower body bathing   Bathing Goal OT STG, [place] Level supervision required;verbal cues required   Bathing Goal OT STG, Assist Device sponge, long handled

## 2017-11-24 NOTE — PLAN OF CARE
Problem: Patient Care Overview (Adult)  Goal: Plan of Care Review  Outcome: Ongoing (interventions implemented as appropriate)    11/24/17 8735   Coping/Psychosocial Response Interventions   Plan Of Care Reviewed With patient   Patient Care Overview   Progress improving   Outcome Evaluation   Outcome Summary/Follow up Plan Patient started Ampicillin today as well as Norvasc. Education on medications provided. Blood sugars monitored. Wife at bedside.        Goal: Discharge Needs Assessment  Outcome: Ongoing (interventions implemented as appropriate)    Problem: Fall Risk (Adult)  Goal: Absence of Falls  Outcome: Ongoing (interventions implemented as appropriate)    Problem: Infection, Risk/Actual (Adult)  Goal: Infection Prevention/Resolution  Outcome: Ongoing (interventions implemented as appropriate)    Problem: Confusion, Acute (Adult)  Goal: Cognitive/Functional Impairments Minimized  Outcome: Ongoing (interventions implemented as appropriate)  Goal: Safety  Outcome: Ongoing (interventions implemented as appropriate)

## 2017-11-24 NOTE — PROGRESS NOTES
"    Patient Name: Froilan Helton  :1940  77 y.o.      Patient Care Team:  LEATHA Darden as PCP - General (Nurse Practitioner)    Chief Complaint: troponin above reference, sepsis    Interval History: No CP, SOB, feeling better, concerned about LE edema       Objective   Vital Signs  Temp:  [97.5 °F (36.4 °C)-98.3 °F (36.8 °C)] 98.1 °F (36.7 °C)  Heart Rate:  [63-77] 65  Resp:  [18] 18  BP: (132-161)/(62-80) 161/80    Intake/Output Summary (Last 24 hours) at 17 0745  Last data filed at 17 0420   Gross per 24 hour   Intake             3300 ml   Output                0 ml   Net             3300 ml     Flowsheet Rows         First Filed Value    Admission Height  74\" (188 cm) Documented at 2017 1540    Admission Weight  (!)  302 lb 3.2 oz (137 kg) Documented at 2017 1540          Physical Exam:   General Appearance:    Alert, cooperative, in no acute distress   Lungs:     Clear to auscultation.  Normal respiratory effort and rate.      Heart:    Regular rhythm and normal rate, normal S1 and S2, no murmurs, gallops or rubs.     Chest Wall:    No abnormalities observed   Abdomen:     Soft, nontender, positive bowel sounds.     Extremities:   no cyanosis, clubbing, 1+ edema.  No marked joint deformities.  Adequate musculoskeletal strength.       Results Review:      Results from last 7 days  Lab Units 17  0622   SODIUM mmol/L 137   POTASSIUM mmol/L 3.3*   CHLORIDE mmol/L 102   CO2 mmol/L 24.3   BUN mg/dL 16   CREATININE mg/dL 1.50*   GLUCOSE mg/dL 140*   CALCIUM mg/dL 8.3*       Results from last 7 days  Lab Units 17  1327 17  2355 17  1805   TROPONIN T ng/mL 0.068* 0.099* 0.115*       Results from last 7 days  Lab Units 17  0623   WBC 10*3/mm3 7.76   HEMOGLOBIN g/dL 11.6*   HEMATOCRIT % 34.5*   PLATELETS 10*3/mm3 140       Results from last 7 days  Lab Units 17  1557   INR  1.11*   APTT seconds 33.3                       Medication Review:     aspirin " 325 mg Oral Daily   atorvastatin 10 mg Oral Daily   docusate sodium 100 mg Oral BID   donepezil 5 mg Oral Nightly   enoxaparin 30 mg Subcutaneous Q24H   ertapenem 1 g Intravenous Q24H   gabapentin 600 mg Oral Q8H   insulin aspart 0-9 Units Subcutaneous 4x Daily AC & at Bedtime   insulin detemir 30 Units Subcutaneous BID   levothyroxine 100 mcg Oral Daily   linagliptin 5 mg Oral Daily   lisinopril 20 mg Oral Daily   miconazole  Topical Q12H   polyethylene glycol 17 g Oral Daily   vancomycin 2,000 mg Intravenous Q18H   Vitamin D3 50,000 Units Oral Weekly          Pharmacy Consult - Pharmacy to dose    Pharmacy to dose vancomycin        Assessment/Plan   Active Hospital Problems (** Indicates Principal Problem)    Diagnosis Date Noted   • **Sepsis [A41.9] 11/21/2017   • Elevated procalcitonin [R79.89] 11/22/2017   • Lactic acid acidosis [E87.2] 11/22/2017   • Leukocytosis [D72.829] 11/22/2017   • COPD (chronic obstructive pulmonary disease) [J44.9]    • Diabetes mellitus [E11.9]    • Hypertension [I10]    • Acute renal failure [N17.9]       Resolved Hospital Problems    Diagnosis Date Noted Date Resolved   No resolved problems to display.     1.  Acute infectious process with lactic acidosis and sepsis  2.  Elevated troponin-this is consistent with a non-ST segment elevation myocardial infarction.  This is most consistent with a type II MI, secondary to his acute sepsis, although I cannot exclude underlying CAD.  At this point would simply pursue supportive therapy, the patient has no evidence of acute coronary syndrome.  EKG shows no evolution.  Anticipate that patient will likely need a subsequent stress evaluation to evaluate for occult CAD.  Unless he shows signs of acute cardiac issues, we could anticipate this be performed as an outpatient.  3.  Patient reports a history of lower extremity edema.  Echocardiogram shows normal function.  This is consistent with chronic diastolic CHF  4.  Diabetes mellitus  5.   History of hypertension  6.  Acute renal insufficiency-diuretics are being held, IV fluid is being administered for his acute sepsis.  Improving  Darwin Monaco III, MD  [facility]  11/24/17  7:45 AM

## 2017-11-25 LAB
ANION GAP SERPL CALCULATED.3IONS-SCNC: 11 MMOL/L
BASOPHILS # BLD AUTO: 0.03 10*3/MM3 (ref 0–0.2)
BASOPHILS NFR BLD AUTO: 0.4 % (ref 0–2)
BUN BLD-MCNC: 17 MG/DL (ref 8–23)
BUN/CREAT SERPL: 10.6 (ref 7–25)
CALCIUM SPEC-SCNC: 9 MG/DL (ref 8.8–10.5)
CHLORIDE SERPL-SCNC: 99 MMOL/L (ref 98–107)
CO2 SERPL-SCNC: 27 MMOL/L (ref 22–29)
CREAT BLD-MCNC: 1.6 MG/DL (ref 0.76–1.27)
DEPRECATED RDW RBC AUTO: 41.9 FL (ref 37–54)
EOSINOPHIL # BLD AUTO: 0.5 10*3/MM3 (ref 0.1–0.3)
EOSINOPHIL NFR BLD AUTO: 6.1 % (ref 0–4)
ERYTHROCYTE [DISTWIDTH] IN BLOOD BY AUTOMATED COUNT: 13.2 % (ref 11.5–14.5)
GFR SERPL CREATININE-BSD FRML MDRD: 42 ML/MIN/1.73
GLUCOSE BLD-MCNC: 163 MG/DL (ref 65–99)
GLUCOSE BLDC GLUCOMTR-MCNC: 148 MG/DL (ref 70–130)
GLUCOSE BLDC GLUCOMTR-MCNC: 222 MG/DL (ref 70–130)
GLUCOSE BLDC GLUCOMTR-MCNC: 236 MG/DL (ref 70–130)
GLUCOSE BLDC GLUCOMTR-MCNC: 238 MG/DL (ref 70–130)
HCT VFR BLD AUTO: 37.3 % (ref 42–52)
HGB BLD-MCNC: 12.4 G/DL (ref 14–18)
IMM GRANULOCYTES # BLD: 0.06 10*3/MM3 (ref 0–0.03)
IMM GRANULOCYTES NFR BLD: 0.7 % (ref 0–0.5)
LYMPHOCYTES # BLD AUTO: 1.48 10*3/MM3 (ref 0.6–4.8)
LYMPHOCYTES NFR BLD AUTO: 18.1 % (ref 20–45)
MCH RBC QN AUTO: 29.2 PG (ref 27–31)
MCHC RBC AUTO-ENTMCNC: 33.2 G/DL (ref 31–37)
MCV RBC AUTO: 87.8 FL (ref 80–94)
MONOCYTES # BLD AUTO: 0.71 10*3/MM3 (ref 0–1)
MONOCYTES NFR BLD AUTO: 8.7 % (ref 3–8)
NEUTROPHILS # BLD AUTO: 5.4 10*3/MM3 (ref 1.5–8.3)
NEUTROPHILS NFR BLD AUTO: 66 % (ref 45–70)
NRBC BLD MANUAL-RTO: 0 /100 WBC (ref 0–0)
PLATELET # BLD AUTO: 187 10*3/MM3 (ref 140–500)
PMV BLD AUTO: 10.3 FL (ref 7.4–10.4)
POTASSIUM BLD-SCNC: 3.9 MMOL/L (ref 3.5–5.2)
PROCALCITONIN SERPL-MCNC: 3.27 NG/ML (ref 0.1–0.25)
RBC # BLD AUTO: 4.25 10*6/MM3 (ref 4.7–6.1)
SODIUM BLD-SCNC: 137 MMOL/L (ref 136–145)
WBC NRBC COR # BLD: 8.18 10*3/MM3 (ref 4.8–10.8)

## 2017-11-25 PROCEDURE — 97116 GAIT TRAINING THERAPY: CPT | Performed by: PHYSICAL THERAPIST

## 2017-11-25 PROCEDURE — 80048 BASIC METABOLIC PNL TOTAL CA: CPT | Performed by: HOSPITALIST

## 2017-11-25 PROCEDURE — 82962 GLUCOSE BLOOD TEST: CPT

## 2017-11-25 PROCEDURE — 94799 UNLISTED PULMONARY SVC/PX: CPT

## 2017-11-25 PROCEDURE — 84145 PROCALCITONIN (PCT): CPT | Performed by: HOSPITALIST

## 2017-11-25 PROCEDURE — 85025 COMPLETE CBC W/AUTO DIFF WBC: CPT | Performed by: HOSPITALIST

## 2017-11-25 PROCEDURE — 99232 SBSQ HOSP IP/OBS MODERATE 35: CPT | Performed by: HOSPITALIST

## 2017-11-25 PROCEDURE — 63710000001 INSULIN ASPART PER 5 UNITS: Performed by: HOSPITALIST

## 2017-11-25 PROCEDURE — 25010000002 AMPICILLIN PER 500 MG: Performed by: INTERNAL MEDICINE

## 2017-11-25 PROCEDURE — 25010000002 ENOXAPARIN PER 10 MG: Performed by: HOSPITALIST

## 2017-11-25 PROCEDURE — 63710000001 INSULIN DETEMIR PER 5 UNITS: Performed by: HOSPITALIST

## 2017-11-25 RX ORDER — PREGABALIN 50 MG/1
100 CAPSULE ORAL EVERY 12 HOURS SCHEDULED
Status: DISCONTINUED | OUTPATIENT
Start: 2017-11-25 | End: 2017-11-27 | Stop reason: HOSPADM

## 2017-11-25 RX ADMIN — CLOTRIMAZOLE: 1 CREAM TOPICAL at 09:38

## 2017-11-25 RX ADMIN — AMPICILLIN SODIUM 1 G: 1 INJECTION, POWDER, FOR SOLUTION INTRAMUSCULAR; INTRAVENOUS at 05:34

## 2017-11-25 RX ADMIN — DONEPEZIL HYDROCHLORIDE 5 MG: 5 TABLET, FILM COATED ORAL at 20:21

## 2017-11-25 RX ADMIN — PREGABALIN 100 MG: 50 CAPSULE ORAL at 12:03

## 2017-11-25 RX ADMIN — AMPICILLIN SODIUM 1 G: 1 INJECTION, POWDER, FOR SOLUTION INTRAMUSCULAR; INTRAVENOUS at 00:09

## 2017-11-25 RX ADMIN — GABAPENTIN 600 MG: 300 CAPSULE ORAL at 13:27

## 2017-11-25 RX ADMIN — GABAPENTIN 600 MG: 300 CAPSULE ORAL at 22:24

## 2017-11-25 RX ADMIN — AMPICILLIN SODIUM 1 G: 1 INJECTION, POWDER, FOR SOLUTION INTRAMUSCULAR; INTRAVENOUS at 23:30

## 2017-11-25 RX ADMIN — ASPIRIN 325 MG: 325 TABLET, DELAYED RELEASE ORAL at 09:36

## 2017-11-25 RX ADMIN — INSULIN ASPART 4 UNITS: 100 INJECTION, SOLUTION INTRAVENOUS; SUBCUTANEOUS at 20:20

## 2017-11-25 RX ADMIN — AMLODIPINE BESYLATE 2.5 MG: 2.5 TABLET ORAL at 09:36

## 2017-11-25 RX ADMIN — INSULIN ASPART 4 UNITS: 100 INJECTION, SOLUTION INTRAVENOUS; SUBCUTANEOUS at 17:17

## 2017-11-25 RX ADMIN — LINAGLIPTIN 5 MG: 5 TABLET, FILM COATED ORAL at 09:36

## 2017-11-25 RX ADMIN — AMPICILLIN SODIUM 1 G: 1 INJECTION, POWDER, FOR SOLUTION INTRAMUSCULAR; INTRAVENOUS at 17:17

## 2017-11-25 RX ADMIN — ATORVASTATIN CALCIUM 10 MG: 10 TABLET, FILM COATED ORAL at 09:36

## 2017-11-25 RX ADMIN — GABAPENTIN 600 MG: 300 CAPSULE ORAL at 05:34

## 2017-11-25 RX ADMIN — LISINOPRIL 20 MG: 20 TABLET ORAL at 09:35

## 2017-11-25 RX ADMIN — ENOXAPARIN SODIUM 30 MG: 30 INJECTION SUBCUTANEOUS at 20:20

## 2017-11-25 RX ADMIN — LEVOTHYROXINE SODIUM 100 MCG: 100 TABLET ORAL at 09:36

## 2017-11-25 RX ADMIN — INSULIN DETEMIR 30 UNITS: 100 INJECTION, SOLUTION SUBCUTANEOUS at 20:21

## 2017-11-25 RX ADMIN — AMPICILLIN SODIUM 1 G: 1 INJECTION, POWDER, FOR SOLUTION INTRAMUSCULAR; INTRAVENOUS at 12:04

## 2017-11-25 RX ADMIN — CLOTRIMAZOLE: 1 CREAM TOPICAL at 20:22

## 2017-11-25 RX ADMIN — INSULIN DETEMIR 30 UNITS: 100 INJECTION, SOLUTION SUBCUTANEOUS at 09:33

## 2017-11-25 RX ADMIN — PREGABALIN 100 MG: 50 CAPSULE ORAL at 20:19

## 2017-11-25 RX ADMIN — INSULIN ASPART 4 UNITS: 100 INJECTION, SOLUTION INTRAVENOUS; SUBCUTANEOUS at 12:03

## 2017-11-25 NOTE — PROGRESS NOTES
"Hospitalist Team      Patient Care Team:  LEATHA Darden as PCP - General (Nurse Practitioner)        Chief Complaint:  Sepsis, bacteremia and cellulitis with uncontrolled DM2 and noncompliance    Subjective    Interval History and ROS:     Patient States feeling better but coughing  Patient Complaints: cough,  States the redness from cellulitis is slowly receding  Patient Denies:  Production with his cough.  Thinks it came from his heat going out in his other room the other night  History taken from: patient and wife      Objective    Vital Signs  Temp:  [96.9 °F (36.1 °C)-98.2 °F (36.8 °C)] 96.9 °F (36.1 °C)  Heart Rate:  [65-75] 69  Resp:  [18] 18  BP: (134-195)/(48-74) 134/73  Oxygen Therapy  SpO2: 95 %  Pulse Oximetry Type: Continuous  O2 Device: room air  Flow (L/min): 1}  Flowsheet Rows         First Filed Value    Admission Height  74\" (188 cm) Documented at 11/21/2017 1540    Admission Weight  (!)  302 lb 3.2 oz (137 kg) Documented at 11/21/2017 1540        Body mass index is 38.05 kg/(m^2).    Physical Exam:    Physical Exam   Constitutional: Patient appears well-developed and well-nourished and in no acute distress .  Obese.    HEENT:   Head: Normocephalic and atraumatic.   Eyes:  Pupils are equal, round, and reactive to light. EOM are intact. Sclera are anicteric and non-injected.  Mouth and Throat: Patient has moist mucous membranes. Oropharynx is clear of any erythema or exudate.     Neck: Neck supple. No JVD present. No thyromegaly present. No lymphadenopathy present.  Cardiovascular: Regular rate, regular rhythm, S1 normal and S2 normal.  Exam reveals no gallop and no friction rub.  No murmur heard.  Pulmonary/Chest: Lungs are clear to auscultation bilaterally. No respiratory distress. No wheezes. No rhonchi. No rales.   Abdominal: Soft. Bowel sounds are normal. No distension and no mass. There is no hepatosplenomegaly. There is no tenderness.   Musculoskeletal: Normal Muscle tone  Extremities: No " edema. Pulses are palpable in all 4 extremities.  RLE is red but patient states improved.  LLE with a lesion red around it from a bite of some kind when in the yard.  Healing slowly.  Neurological: Patient is alert and oriented to person, place, and time. Cranial nerves II-XII are grossly intact with no focal deficits.  Skin: Skin is warm and red both lower extremities.  Nails show no clubbing.  No cyanosis          Results Review:     I reviewed the patient's new clinical results.    Lab Results (last 24 hours)     Procedure Component Value Units Date/Time    Magnesium [[id number]]  (Normal) Collected:  11/24/17 0622    Specimen:  Blood Updated:  11/24/17 1316     Magnesium 1.9 mg/dL     Blood Culture - Blood, [[id number]]  (Normal) Collected:  11/21/17 1606    Specimen:  Blood from Wrist, Right Updated:  11/24/17 1616     Blood Culture No growth at 3 days    POC Glucose Fingerstick [[id number]]  (Abnormal) Collected:  11/24/17 1612    Specimen:  Blood Updated:  11/24/17 1618     Glucose 225 (H) mg/dL     Narrative:       Meter: [id number] : [id number] Meeta Finn NURSING ASSISTANT    POC Glucose Fingerstick [[id number]]  (Abnormal) Collected:  11/24/17 2015    Specimen:  Blood Updated:  11/24/17 2021     Glucose 286 (H) mg/dL     Narrative:       Meter: [id number] : [id number] Casi LIVINGSTON    CBC & Differential [[id number]] Collected:  11/25/17 0419    Specimen:  Blood Updated:  11/25/17 4194    Narrative:       The following orders were created for panel order CBC & Differential.  Procedure                               Abnormality         Status                     ---------                               -----------         ------                     CBC Auto Differential[[id number]]        Abnormal            Final result                 Please view results for these tests on the individual orders.    CBC Auto Differential [[id number]]  (Abnormal) Collected:  11/25/17 0419    Specimen:  Blood  Updated:  11/25/17 0434     WBC 8.18 10*3/mm3      RBC 4.25 (L) 10*6/mm3      Hemoglobin 12.4 (L) g/dL      Hematocrit 37.3 (L) %      MCV 87.8 fL      MCH 29.2 pg      MCHC 33.2 g/dL      RDW 13.2 %      RDW-SD 41.9 fl      MPV 10.3 fL      Platelets 187 10*3/mm3      Neutrophil % 66.0 %      Lymphocyte % 18.1 (L) %      Monocyte % 8.7 (H) %      Eosinophil % 6.1 (H) %      Basophil % 0.4 %      Immature Grans % 0.7 (H) %      Neutrophils, Absolute 5.40 10*3/mm3      Lymphocytes, Absolute 1.48 10*3/mm3      Monocytes, Absolute 0.71 10*3/mm3      Eosinophils, Absolute 0.50 (H) 10*3/mm3      Basophils, Absolute 0.03 10*3/mm3      Immature Grans, Absolute 0.06 (H) 10*3/mm3      nRBC 0.0 /100 WBC     Procalcitonin [[id number]]  (Abnormal) Collected:  11/25/17 0419    Specimen:  Blood Updated:  11/25/17 0508     Procalcitonin 3.27 (C) ng/mL     Narrative:       As a Marker for Sepsis (Non-Neonates):   1. <0.5 ng/mL represents a low risk of severe sepsis and/or septic shock.  2. >2 ng/mL represents a high risk of severe sepsis and/or septic shock.    As a Marker for Lower Respiratory Tract Infections that require antibiotic therapy:    PCT on Admission     Antibiotic Therapy       6-12 Hrs later  > 0.5                Strongly Recommended             >0.25 - <0.5         Recommended  0.1 - 0.25           Discouraged              Remeasure/reassess PCT  <0.1                 Strongly Discouraged     Remeasure/reassess PCT                     PCT values of < 0.5 ng/mL do not exclude an infection, because localized infections (without systemic signs) may be associated with such low concentrations, or a systemic infection in its initial stages (< 6 hours). Furthermore, increased PCT can occur without infection. PCT concentrations between 0.5 and 2.0 ng/mL should be interpreted taking into account the patient's history. It is recommended to retest PCT within 6-24 hours if any concentrations < 2 ng/mL are obtained.    Basic  Metabolic Panel [[id number]]  (Abnormal) Collected:  11/25/17 0419    Specimen:  Blood Updated:  11/25/17 0515     Glucose 163 (H) mg/dL      BUN 17 mg/dL      Creatinine 1.60 (H) mg/dL      Sodium 137 mmol/L      Potassium 3.9 mmol/L      Chloride 99 mmol/L      CO2 27.0 mmol/L      Calcium 9.0 mg/dL      eGFR Non African Amer 42 (L) mL/min/1.73      BUN/Creatinine Ratio 10.6     Anion Gap 11.0 mmol/L     Narrative:       The MDRD GFR formula is only valid for adults with stable renal function between ages 18 and 70.    POC Glucose Fingerstick [[id number]]  (Abnormal) Collected:  11/25/17 0718    Specimen:  Blood Updated:  11/25/17 0731     Glucose 148 (H) mg/dL     Narrative:       Meter: [id number] : [id number] Meeta Finn NURSING ASSISTANT          Imaging Results (last 24 hours)     ** No results found for the last 24 hours. **          Xray not reviewed personally by physician.      ECG not reviewed personally by physician  ECG/EMG Results (most recent)     Procedure Component Value Units Date/Time    ECG 12 Lead [[id number]] Collected:  11/21/17 1638     Updated:  11/21/17 1708    Narrative:       RR Interval= 526 ms  CO Interval= 168 ms  QRSD Interval= 104 ms  QT Interval= 336 ms  QTc Interval= 463 ms  Heart Rate= 114 ms  P Axis= 51 deg  QRS Axis= 1 deg  T Wave Axis= 29 deg  I: 40 Axis= -31 deg  T: 40 Axis= 68 deg  ST Axis= 152 deg  SINUS TACHYCARDIA ew compared to the previous ECG  INFERIOR INFARCT, AGE INDETERMINATE new compared to the previous ECG  Electronically Signed by:  Crow Chris ([facility]) 21-Nov-2017 17:03:13  Date and Time of Study: 2017-11-21 16:38:12    Adult Transthoracic Echo Complete W/ Cont if Necessary Per Protocol [[id number]] Collected:  11/22/17 0716     Updated:  11/22/17 1101     BSA 2.6 m^2      IVSd 1.1 cm      LVIDd 6.2 cm      LVIDs 4.5 cm      LVPWd 1.1 cm      IVS/LVPW 1.0     FS 27.5 %      EDV(Teich) 192.6 ml      ESV(Teich) 91.5 ml      EF(Teich) 52.5 %      EDV(cubed)  236.0 ml      ESV(cubed) 89.9 ml      EF(cubed) 61.9 %      LV mass(C)d 300.9 grams      LV mass(C)dI 116.4 grams/m^2      SV(Teich) 101.1 ml      SI(Teich) 39.1 ml/m^2      SV(cubed) 146.1 ml      SI(cubed) 56.5 ml/m^2      Ao root diam 4.5 cm      Ao root area 15.9 cm^2      ACS 1.6 cm      LA dimension 3.3 cm      asc Aorta Diam 3.4 cm      LA/Ao 0.73     LVOT diam 2.1 cm      LVOT area 3.5 cm^2      LVOT area(traced) 3.5 cm^2      RVOT diam 2.7 cm      RVOT area 5.7 cm^2      LVLd ap4 9.6 cm      EDV(MOD-sp4) 193.0 ml      LVLs ap4 8.9 cm      ESV(MOD-sp4) 94.2 ml      EF(MOD-sp4) 51.2 %      LVLd ap2 9.3 cm      EDV(MOD-sp2) 171.0 ml      LVLs ap2 8.3 cm      ESV(MOD-sp2) 87.5 ml      EF(MOD-sp2) 48.8 %      SV(MOD-sp4) 98.8 ml      SI(MOD-sp4) 38.2 ml/m^2      SV(MOD-sp2) 83.5 ml      SI(MOD-sp2) 32.3 ml/m^2      Ao root area (BSA corrected) 1.7     Ao root area (BSA corrected) 48.8 ml/m^2      CONTRAST EF 4CH 51.2 ml/m^2      LV Diastolic corrected for BSA 74.6 ml/m^2      LV Systolic corrected for BSA 36.4 ml/m^2      MV A dur 0.2 sec      MV E max pacheco 78.1 cm/sec      MV A max pacheco 105.0 cm/sec      MV E/A 0.74     MV V2 max 109.0 cm/sec      MV max PG 4.8 mmHg      MV V2 mean 59.2 cm/sec      MV mean PG 2.0 mmHg      MV V2 VTI 30.6 cm      MVA(VTI) 2.4 cm^2      MV P1/2t max pacheco 95.7 cm/sec      MV P1/2t 53.1 msec      MVA(P1/2t) 4.1 cm^2      MV dec slope 528.0 cm/sec^2      MV dec time 0.22 sec      Ao pk pacheco 178.0 cm/sec      Ao max PG 13.0 mmHg      Ao max PG (full) 8.2 mmHg      Ao V2 mean 117.0 cm/sec      Ao mean PG 7.0 mmHg      Ao mean PG (full) 4.0 mmHg      Ao V2 VTI 40 cm      MAXX(I,A) 1.9 cm^2      MAXX(I,D) 1.9 cm^2      MAXX(V,A) 1.8 cm^2      MAXX(V,D) 1.8 cm^2      AI max pacheco 377.5 cm/sec      AI max PG 57.1 mmHg      AI dec slope 274.0 cm/sec^2      AI P1/2t 403.5 msec      LV V1 max PG 3.0 mmHg      LV V1 mean PG 2.0 mmHg      LV V1 max 86.5 cm/sec      LV V1 mean 58.2 cm/sec      LV V1 VTI  21.1 cm      SV(Ao) 600.4 ml      SI(Ao) 232.2 ml/m^2      SV(LVOT) 72.9 ml      SV(RVOT) 72.1 ml      SI(LVOT) 28.2 ml/m^2      PA V2 max 87.7 cm/sec      PA max PG 3.1 mmHg      PA max PG (full) 1.3 mmHg       CV ECHO BISHNU - PVA(V,A) 4.3 cm^2       CV ECHO BISHNU - PVA(V,D) 4.3 cm^2      PA acc time 0.14 sec      RV V1 max PG 1.8 mmHg      RV V1 mean PG 1.0 mmHg      RV V1 max 66.5 cm/sec      RV V1 mean 46.0 cm/sec      RV V1 VTI 12.6 cm      PA pr(Accel) 14.2 mmHg      Pulm Sys Joao 84.5 cm/sec      Pulm Fernandez Joao 40.1 cm/sec      Pulm S/D 2.1     Qp/Qs 0.99     Pulm A Revs Dur 0.14 sec      Pulm A Revs Joao 27.0 cm/sec      MVA P1/2T LCG 2.3 cm^2       CV ECHO BISHNU - BZI_BMI 38.6 kilograms/m^2       CV ECHO BISHNU - BSA(HAYCOCK) 2.7 m^2       CV ECHO BISHNU - BZI_METRIC_WEIGHT 136.5 kg       CV ECHO BISHNU - BZI_METRIC_HEIGHT 188.0 cm      Target HR (85%) 122 bpm      Max. Pred. HR (100%) 144 bpm       CV VAS BP RIGHT /64 mmHg      TDI S' 14.00 cm/sec      RV Base 3.90 cm      RV Length 7.80 cm      RV Mid 3.20 cm      LA volume 68.0 cm3      E/E' ratio 11.0     Lat Peak E' Joao 10.0 cm/sec      Med Peak E' Joao 6.00 cm/sec      TAPSE (>1.6) 1.80 cm2      LA Volume Index 30.0 mL/m2      Echo EF Estimated 52 %     Narrative:       · Left ventricular systolic function is normal. Estimated EF = 52%.  · Left ventricular diastolic dysfunction (grade I) consistent with   impaired relaxation.  · calcification of the aortic valve  · Mild dilation of the aortic root is present.       ECG 12 Lead [[id number]] Collected:  11/22/17 1441     Updated:  11/22/17 6045    Narrative:       RR Interval= 822 ms  AK Interval= 188 ms  QRSD Interval= 106 ms  QT Interval= 416 ms  QTc Interval= 459 ms  Heart Rate= 73 ms  P Axis= 41 deg  QRS Axis= 9 deg  T Wave Axis= 107 deg  I: 40 Axis= -17 deg  T: 40 Axis= 61 deg  ST Axis= 158 deg  SINUS RHYTHM  PROBABLE INFERIOR INFARCT, OLD  LATERAL LEADS ARE ALSO INVOLVED  NO SIGNIFICANT  CHANGE FROM PREVIOUS ECG  Electronically Signed by:  Aaron Abdi) ([facility]) 22-Nov-2017 15:06:34  Date and Time of Study: 2017-11-22 14:41:22    ECG 12 Lead [[id number]] Collected:  11/23/17 0824     Updated:  11/23/17 1911    Narrative:       RR Interval= 984 ms  IN Interval= 195 ms  QRSD Interval= 112 ms  QT Interval= 456 ms  QTc Interval= 460 ms  Heart Rate= 61 ms  P Axis= 25 deg  QRS Axis= 0 deg  T Wave Axis= 49 deg  I: 40 Axis= -16 deg  T: 40 Axis= 5 deg  ST Axis= 169 deg  SINUS RHYTHM  NONSPECIFIC INTRAVENTRICULAR CONDUCTION DELAY  NO SIGNIFICANT CHANGE FROM PREVIOUS ECG  Electronically Signed by:  Sharri Power ([facility]) 23-Nov-2017 19:08:29  Date and Time of Study: 2017-11-23 08:24:19          Medication Review:   I have reviewed the patient's current medication list    Current Facility-Administered Medications:   •  acetaminophen (TYLENOL) tablet 650 mg, 650 mg, Oral, Q6H PRN, Terri Chaudhry MD, 650 mg at 11/24/17 0650  •  amLODIPine (NORVASC) tablet 2.5 mg, 2.5 mg, Oral, Q24H, Terri Chaudhry MD, 2.5 mg at 11/25/17 0936  •  ampicillin 1 g IVPB in 100 mL NS, 1 g, Intravenous, Q6H, Rocky Joe MD, Stopped at 11/25/17 0610  •  aspirin EC tablet 325 mg, 325 mg, Oral, Daily, Terri Chaudhry MD, 325 mg at 11/25/17 0936  •  atorvastatin (LIPITOR) tablet 10 mg, 10 mg, Oral, Daily, Liat Hood, APRN, 10 mg at 11/25/17 0936  •  clotrimazole (LOTRIMIN) 1 % cream, , Topical, Q12H, Rocky Joe MD  •  dextrose (D50W) solution 25 g, 25 g, Intravenous, Q15 Min PRN, Terri Chaudhry MD  •  dextrose (GLUTOSE) oral gel 15 g, 15 g, Oral, Q15 Min PRN, Terri Chaudhry, MD  •  docusate sodium (COLACE) capsule 100 mg, 100 mg, Oral, BID, LEATHA Simpson, 100 mg at 11/23/17 0832  •  donepezil (ARICEPT) tablet 5 mg, 5 mg, Oral, Nightly, LEATHA Simpson, 5 mg at 11/24/17 2013  •  enoxaparin (LOVENOX) syringe 30 mg, 30 mg, Subcutaneous, Q24H,  Terri Chaudhry MD, 30 mg at 11/24/17 2147  •  gabapentin (NEURONTIN) capsule 600 mg, 600 mg, Oral, Q8H, Liat Hood, APRN, 600 mg at 11/25/17 0534  •  glucagon (GLUCAGEN) injection 1 mg, 1 mg, Subcutaneous, Q15 Min PRN, Terri Chaudhry MD  •  insulin aspart (novoLOG) injection 0-9 Units, 0-9 Units, Subcutaneous, 4x Daily AC & at Bedtime, Terri Chaudhry MD, 6 Units at 11/24/17 2146  •  insulin detemir (LEVEMIR) injection 30 Units, 30 Units, Subcutaneous, BID, Terri Chaudhry MD, 30 Units at 11/25/17 0933  •  ipratropium-albuterol (DUO-NEB) nebulizer solution 3 mL, 3 mL, Nebulization, Q6H PRN, Sere Morley MD  •  levothyroxine (SYNTHROID, LEVOTHROID) tablet 100 mcg, 100 mcg, Oral, Daily, Liat Hood, APRN, 100 mcg at 11/25/17 0936  •  linagliptin (TRADJENTA) tablet 5 mg, 5 mg, Oral, Daily, Liat Hood, APRN, 5 mg at 11/25/17 0936  •  lisinopril (PRINIVIL,ZESTRIL) tablet 20 mg, 20 mg, Oral, Daily, Liat Hood, APRN, 20 mg at 11/25/17 0935  •  ondansetron (ZOFRAN) injection 4 mg, 4 mg, Intravenous, Q6H PRN, Terri hCaudhry MD  •  polyethylene glycol 3350 powder (packet), 17 g, Oral, Daily, Liat Hood, APRN, 17 g at 11/22/17 1319  •  pregabalin (LYRICA) capsule 100 mg, 100 mg, Oral, Q12H, Sonya Daniel DO  •  sodium chloride 0.9 % flush 1-10 mL, 1-10 mL, Intravenous, PRN, Terri Chaudhry MD  •  sodium chloride 0.9 % flush 10 mL, 10 mL, Intravenous, PRN, Keven Steven MD  •  sodium chloride 0.9 % infusion 40 mL, 40 mL, Intravenous, PRN, Terri Chaudhry MD  •  Vitamin D3 50,000 Units, 50,000 Units, Oral, Weekly, Liat Hood, APRN, 50,000 Units at 11/22/17 1318      Assessment/Plan        Sepsis secondary to Cellulitis with Strep dysgalactiae bacteremia:  Continue antibiotics  NSTEMI:  Unclear if type II secondary to sepsis vs CAD.   Increased LE edema and suspected Chronic  diastolic CHF: Patient is off IVFs.    Vasovagal syncope  Uncontrolled DM-2:  ROLA on CKD stage 3  COPD  HYPOthyroidism  Hypertension  Dementia  Obesity  Constipation  Immobility and LE weakness/ deconditioning  Electrolyte imbalance        Non compliance/ Per nutritionist:    Pt declined education, expect no compliance at home with diet restrictions.  Spoke with  about living will request,  previously notified.   Recommend: D/C Renal restriction at this time, labs reviewed  I have discontinued renal restriction.        Plan for disposition:Home with home health when ready.  30 minutes of discussion concerning diabetes and diabetic diet.  We discussed infection and diabetes, diet and grocery store shopping, and weight loss.  Patient and wife understand.    Sonya Daniel DO  11/25/17  11:22 AM      Time: 35 min

## 2017-11-25 NOTE — PROGRESS NOTES
PHARMACY CONSULT :  [facility]    1/25 LABS:    SCr = 1.6    Est CrCl = 57.2ml/min    BUN = 16   Continue as ordered.    Thank-you,  Reshma Fontaine [facility].  11/25/17  7:35 AM.

## 2017-11-25 NOTE — THERAPY TREATMENT NOTE
Acute Care - Physical Therapy Treatment Note   [place]     Patient Name: Froilan Helton  : 1940  MRN: [id number]  Today's Date: 2017  Onset of Illness/Injury or Date of Surgery Date: 17  Date of Referral to PT: 17  Referring Physician: Liat DELGADILLO    Admit Date: 2017    Visit Dx:    ICD-10-CM ICD-9-CM   1. Sepsis, due to unspecified organism A41.9 038.9     995.91   2. Altered mental status, unspecified altered mental status type R41.82 780.97   3. Poorly controlled diabetes mellitus E11.65 250.00   4. Acute kidney injury N17.9 584.9   5. Cellulitis of right leg L03.115 682.6   6. Fall, initial encounter W19.XXXA E888.9   7. Abrasion of face, initial encounter S00.81XA 910.0     Patient Active Problem List   Diagnosis   • Sepsis   • COPD (chronic obstructive pulmonary disease)   • Diabetes mellitus   • Hypertension   • Acute renal failure   • Elevated procalcitonin   • Lactic acid acidosis   • Leukocytosis               Adult Rehabilitation Note       17 0855 17 0925       Rehab Assessment/Intervention    Discipline physical therapist  -GC      Document Type therapy note (daily note)  -GC therapy note (daily note)  -     Subjective Information agree to therapy;complains of;pain   Pain left leg  -GC agree to therapy;complains of;pain;swelling   pain left side of his body, swelling right LE  -GC     Patient Effort, Rehab Treatment good  -GC good  -GC     Precautions/Limitations fall precautions  -GC fall precautions  -GC     Recorded by [GC] Darrel Alves, PT [GC] Darrel Alves, PT     Vital Signs    Pre SpO2 (%) 98  -GC 98  -GC     O2 Delivery Pre Treatment room air  -GC room air  -GC     Post SpO2 (%) 98  -GC 99  -GC     O2 Delivery Post Treatment room air  -GC room air  -GC     Pre Patient Position Sitting  -GC Sitting  -GC     Post Patient Position Sitting  -GC Sitting  -GC     Recorded by [GC] Darrel Alves, PT [GC] Darrel Alves, PT     Pain Assessment     Pain Assessment 0-10  -GC      Pain Score 4  -GC      Pain Type Acute pain  -GC Acute pain  -GC     Pain Location Knee  -GC Knee   bilateral knees, left shoulder  -GC     Pain Orientation Left  -GC      Multiple Pain Sites  Yes  -GC     Recorded by [GC] Darrel Alves, PT [GC] Darrel Alves, PT     Cognitive Assessment/Intervention    Current Cognitive/Communication Assessment functional  -GC functional  -GC     Orientation Status oriented x 4  -GC oriented x 4  -GC     Follows Commands/Answers Questions 100% of the time;able to follow multi-step instructions  -% of the time;able to follow multi-step instructions  -     Personal Safety WNL/WFL  -GC WNL/WFL  -GC     Personal Safety Interventions fall prevention program maintained;gait belt;nonskid shoes/slippers when out of bed  -GC fall prevention program maintained;gait belt;nonskid shoes/slippers when out of bed  -     Recorded by [GC] Darrel Alves, PT [GC] Darrel Alves, PT     Bed Mobility, Assessment/Treatment    Bed Mobility, Comment deferred, pt up in chair  -      Recorded by [GC] Darrel Alves PT      Transfer Assessment/Treatment    Transfers, Sit-Stand Webb contact guard assist  -GC contact guard assist  -GC     Transfers, Stand-Sit Webb contact guard assist  -GC contact guard assist  -GC     Transfers, Sit-Stand-Sit, Assist Device rolling walker  - rolling walker  -     Recorded by [GC] Darrel Alves, PT [GC] Darrel Alves, PT     Gait Assessment/Treatment    Gait, Webb Level stand by assist  - stand by assist  -     Gait, Assistive Device rolling walker  - rolling walker  -     Gait, Distance (Feet) 120  -  -GC     Gait, Gait Deviations antalgic;sanjeev decreased  -GC antalgic;sanjeev decreased  -     Gait, Comment Pt also ambulated 20 feet to bathroom prior to the above mentioned walk  -      Recorded by [GC] Darrel Alves, PT [GC] Darrel Alves, PT     Positioning and Restraints     Pre-Treatment Position sitting in chair/recliner  -GC sitting in chair/recliner  -GC     Post Treatment Position chair  -GC chair  -GC     In Chair reclined;call light within reach;encouraged to call for assist;exit alarm on  -GC reclined;call light within reach;with nsg  -GC     Recorded by [GC] Darrel Alves, PT [GC] Darrel Alves, PT       User Key  (r) = Recorded By, (t) = Taken By, (c) = Cosigned By    Initials Name Effective Dates    GC Darrel Alves, PT 04/06/17 -                 IP PT Goals       11/23/17 1030          Transfer Training PT STG    Transfer Training PT STG, Date Established 11/23/17  -LN      Transfer Training PT STG, Time to Achieve 5 days  -LN      Transfer Training PT STG, Activity Type sit to stand/stand to sit  -LN      Transfer Training PT STG, Honor Level independent  -LN      Transfer Training PT STG, Assist Device walker, rolling  -LN      Gait Training PT STG    Gait Training Goal PT STG, Date Established 11/23/17  -LN      Gait Training Goal PT STG, Time to Achieve 5 days  -LN      Gait Training Goal PT STG, Honor Level contact guard assist;supervision required  -LN      Gait Training Goal PT STG, Assist Device walker, rolling  -LN      Gait Training Goal PT STG, Distance to Achieve 120 feet  -LN      Gait Training Goal PT STG, Additional Goal so he can safely ambulate in his home.   -LN      Patient Education PT STG    Patient Education PT STG, Date Established 11/23/17  -LN      Patient Education PT STG, Time to Achieve 5 days  -LN      Patient Education PT STG, Education Type HEP;gait;transfers  -LN      Patient Education PT STG, Education Understanding demonstrate adequately;verbalize understanding  -LN        User Key  (r) = Recorded By, (t) = Taken By, (c) = Cosigned By    Initials Name Provider Type    LN Juhi Proctor, PT Physical Therapist          Physical Therapy Education     Title: PT OT SLP Therapies (Active)     Topic: Physical Therapy (Active)      Point: Mobility training (Done)    Learning Progress Summary    Learner Readiness Method Response Comment Documented by Status   Patient Acceptance E VU   11/25/17 0926 Done    Acceptance E VU Education provided on functional mobility and gait with RWX. LN 11/23/17 1029 Done               Point: Precautions (Done)    Learning Progress Summary    Learner Readiness Method Response Comment Documented by Status   Patient Acceptance E VU   11/25/17 0926 Done    Acceptance E VU Education provided on functional mobility and gait with RWX. LN 11/23/17 1029 Done                      User Key     Initials Effective Dates Name Provider Type Discipline     04/06/17 -  Darrel Alves, PT Physical Therapist PT     06/22/16 -  Juhi Proctor, PT Physical Therapist PT                    PT Recommendation and Plan  Anticipated Discharge Disposition: home with home health  PT Frequency: daily             Outcome Measures       11/24/17 0930 11/23/17 1000       How much help from another person do you currently need...    Turning from your back to your side while in flat bed without using bedrails?  3  -LN     Moving from lying on back to sitting on the side of a flat bed without bedrails?  2  -LN     Moving to and from a bed to a chair (including a wheelchair)?  3  -LN     Standing up from a chair using your arms (e.g., wheelchair, bedside chair)?  3  -LN     Climbing 3-5 steps with a railing?  2  -LN     To walk in hospital room?  3  -LN     AM-PAC 6 Clicks Score  16  -LN     How much help from another is currently needed...    Putting on and taking off regular lower body clothing? 3  -JJ      Bathing (including washing, rinsing, and drying) 3  -JJ      Toileting (which includes using toilet bed pan or urinal) 3  -JJ      Putting on and taking off regular upper body clothing 4  -JJ      Taking care of personal grooming (such as brushing teeth) 4  -JJ      Eating meals 4  -JJ      Score 21  -JJ      Functional  Assessment    Outcome Measure Options AM-PAC 6 Clicks Daily Activity (OT)  -DOROTA AM-PAC 6 Clicks Basic Mobility (PT)  -LN       User Key  (r) = Recorded By, (t) = Taken By, (c) = Cosigned By    Initials Name Provider Type    JMICHELLE Aponte, OTR Occupational Therapist    LN Juhi Proctor, PT Physical Therapist           Time Calculation:         PT Charges       11/25/17 0926          Time Calculation    Start Time 0855  -GC      Stop Time 0916  -GC      Time Calculation (min) 21 min  -GC        User Key  (r) = Recorded By, (t) = Taken By, (c) = Cosigned By    Initials Name Provider Type     Darrel Alves, PT Physical Therapist          Therapy Charges for Today     Code Description Service Date Service Provider Modifiers Qty    83895607950 HC GAIT TRAINING EA 15 MIN 11/24/2017 Darrel Alves, PT GP 1    91538732305 HC GAIT TRAINING EA 15 MIN 11/25/2017 Darrel Alves, PT GP 1          PT G-Codes  Outcome Measure Options: AM-PAC 6 Clicks Daily Activity (OT)    Darrel Alves, PT  11/25/2017

## 2017-11-25 NOTE — PLAN OF CARE
Problem: Patient Care Overview (Adult)  Goal: Plan of Care Review  Outcome: Ongoing (interventions implemented as appropriate)    11/25/17 0230   Coping/Psychosocial Response Interventions   Plan Of Care Reviewed With patient   Patient Care Overview   Progress improving   Outcome Evaluation   Outcome Summary/Follow up Plan ampicillin well tolerated this shift. BP reading was high but when recheck manually it was much lower. Asymptomatic.        Goal: Adult Individualization and Mutuality  Outcome: Ongoing (interventions implemented as appropriate)    11/25/17 0230   Individualization   Patient Specific Preferences Likes Jello as snack         Problem: Sepsis (Adult)  Goal: Signs and Symptoms of Listed Potential Problems Will be Absent or Manageable (Sepsis)  Outcome: Ongoing (interventions implemented as appropriate)    11/25/17 0230   Sepsis   Problems Assessed (Sepsis) glycemic control impaired;infection progression   Problems Present (Sepsis) glycemic control, impaired;progression of infection         Problem: Fall Risk (Adult)  Goal: Absence of Falls  Outcome: Ongoing (interventions implemented as appropriate)    11/25/17 0230   Fall Risk (Adult)   Absence of Falls making progress toward outcome         Problem: Infection, Risk/Actual (Adult)  Goal: Identify Related Risk Factors and Signs and Symptoms  Outcome: Ongoing (interventions implemented as appropriate)    11/25/17 0230   Infection, Risk/Actual   Infection, Risk/Actual: Related Risk Factors age extremes;chronic illness/condition   Signs and Symptoms (Infection, Risk/Actual) blood glucose changes;edema;weakness       Goal: Infection Prevention/Resolution  Outcome: Ongoing (interventions implemented as appropriate)    11/25/17 0230   Infection, Risk/Actual (Adult)   Infection Prevention/Resolution making progress toward outcome         Problem: Confusion, Acute (Adult)  Goal: Cognitive/Functional Impairments Minimized  Outcome: Ongoing (interventions  implemented as appropriate)    11/25/17 3066   Confusion, Acute (Adult)   Cognitive/Functional Impairments Minimized making progress toward outcome

## 2017-11-26 LAB
BACTERIA SPEC AEROBE CULT: NORMAL
GLUCOSE BLDC GLUCOMTR-MCNC: 133 MG/DL (ref 70–130)
GLUCOSE BLDC GLUCOMTR-MCNC: 173 MG/DL (ref 70–130)
GLUCOSE BLDC GLUCOMTR-MCNC: 223 MG/DL (ref 70–130)
GLUCOSE BLDC GLUCOMTR-MCNC: 241 MG/DL (ref 70–130)

## 2017-11-26 PROCEDURE — 94799 UNLISTED PULMONARY SVC/PX: CPT

## 2017-11-26 PROCEDURE — 82962 GLUCOSE BLOOD TEST: CPT

## 2017-11-26 PROCEDURE — 63710000001 INSULIN DETEMIR PER 5 UNITS: Performed by: HOSPITALIST

## 2017-11-26 PROCEDURE — 99232 SBSQ HOSP IP/OBS MODERATE 35: CPT | Performed by: HOSPITALIST

## 2017-11-26 PROCEDURE — 63710000001 INSULIN ASPART PER 5 UNITS: Performed by: HOSPITALIST

## 2017-11-26 PROCEDURE — 25010000002 AMPICILLIN PER 500 MG: Performed by: INTERNAL MEDICINE

## 2017-11-26 PROCEDURE — 25010000002 ENOXAPARIN PER 10 MG: Performed by: HOSPITALIST

## 2017-11-26 RX ADMIN — PREGABALIN 100 MG: 50 CAPSULE ORAL at 08:45

## 2017-11-26 RX ADMIN — CLOTRIMAZOLE 1 APPLICATION: 1 CREAM TOPICAL at 08:46

## 2017-11-26 RX ADMIN — LINAGLIPTIN 5 MG: 5 TABLET, FILM COATED ORAL at 08:45

## 2017-11-26 RX ADMIN — ENOXAPARIN SODIUM 30 MG: 30 INJECTION SUBCUTANEOUS at 20:11

## 2017-11-26 RX ADMIN — AMPICILLIN SODIUM 1 G: 1 INJECTION, POWDER, FOR SOLUTION INTRAMUSCULAR; INTRAVENOUS at 18:44

## 2017-11-26 RX ADMIN — GABAPENTIN 600 MG: 300 CAPSULE ORAL at 14:48

## 2017-11-26 RX ADMIN — ATORVASTATIN CALCIUM 10 MG: 10 TABLET, FILM COATED ORAL at 08:45

## 2017-11-26 RX ADMIN — INSULIN ASPART 2 UNITS: 100 INJECTION, SOLUTION INTRAVENOUS; SUBCUTANEOUS at 18:44

## 2017-11-26 RX ADMIN — AMLODIPINE BESYLATE 2.5 MG: 2.5 TABLET ORAL at 08:45

## 2017-11-26 RX ADMIN — ASPIRIN 325 MG: 325 TABLET, DELAYED RELEASE ORAL at 08:45

## 2017-11-26 RX ADMIN — LISINOPRIL 20 MG: 20 TABLET ORAL at 08:45

## 2017-11-26 RX ADMIN — AMPICILLIN SODIUM 1 G: 1 INJECTION, POWDER, FOR SOLUTION INTRAMUSCULAR; INTRAVENOUS at 23:37

## 2017-11-26 RX ADMIN — AMPICILLIN SODIUM 1 G: 1 INJECTION, POWDER, FOR SOLUTION INTRAMUSCULAR; INTRAVENOUS at 05:22

## 2017-11-26 RX ADMIN — GABAPENTIN 600 MG: 300 CAPSULE ORAL at 06:59

## 2017-11-26 RX ADMIN — GABAPENTIN 600 MG: 300 CAPSULE ORAL at 21:54

## 2017-11-26 RX ADMIN — INSULIN DETEMIR 30 UNITS: 100 INJECTION, SOLUTION SUBCUTANEOUS at 08:45

## 2017-11-26 RX ADMIN — DONEPEZIL HYDROCHLORIDE 5 MG: 5 TABLET, FILM COATED ORAL at 20:12

## 2017-11-26 RX ADMIN — AMPICILLIN SODIUM 1 G: 1 INJECTION, POWDER, FOR SOLUTION INTRAMUSCULAR; INTRAVENOUS at 12:56

## 2017-11-26 RX ADMIN — INSULIN DETEMIR 30 UNITS: 100 INJECTION, SOLUTION SUBCUTANEOUS at 21:55

## 2017-11-26 RX ADMIN — CLOTRIMAZOLE: 1 CREAM TOPICAL at 20:12

## 2017-11-26 RX ADMIN — INSULIN ASPART 4 UNITS: 100 INJECTION, SOLUTION INTRAVENOUS; SUBCUTANEOUS at 21:54

## 2017-11-26 RX ADMIN — INSULIN ASPART 4 UNITS: 100 INJECTION, SOLUTION INTRAVENOUS; SUBCUTANEOUS at 12:56

## 2017-11-26 RX ADMIN — LEVOTHYROXINE SODIUM 100 MCG: 100 TABLET ORAL at 08:45

## 2017-11-26 RX ADMIN — PREGABALIN 100 MG: 50 CAPSULE ORAL at 20:11

## 2017-11-26 NOTE — PLAN OF CARE
Problem: Patient Care Overview (Adult)  Goal: Plan of Care Review  Outcome: Ongoing (interventions implemented as appropriate)    11/26/17 1803   Coping/Psychosocial Response Interventions   Plan Of Care Reviewed With patient   Patient Care Overview   Progress improving   Outcome Evaluation   Outcome Summary/Follow up Plan Patient up in chair all day. Orthostatic blood pressures taken and blood pressure elevated. Ampicillin given q6h. Patient states chronic edema in right leg. VSS. Possible discharge tomorrow.       Goal: Adult Individualization and Mutuality  Outcome: Ongoing (interventions implemented as appropriate)    11/26/17 1803   Individualization   Patient Specific Goals Go home   Patient Specific Interventions Possible discharge tomorrow       Goal: Discharge Needs Assessment  Outcome: Ongoing (interventions implemented as appropriate)    11/26/17 1803   Discharge Needs Assessment   Concerns To Be Addressed denies needs/concerns at this time         Problem: Sepsis (Adult)  Goal: Signs and Symptoms of Listed Potential Problems Will be Absent or Manageable (Sepsis)  Outcome: Ongoing (interventions implemented as appropriate)    11/26/17 1803   Sepsis   Problems Assessed (Sepsis) all   Problems Present (Sepsis) glycemic control, impaired         Problem: Fall Risk (Adult)  Goal: Identify Related Risk Factors and Signs and Symptoms  Outcome: Outcome(s) achieved Date Met:  11/26/17 11/26/17 0318   Fall Risk   Fall Risk: Related Risk Factors age-related changes;fatigue/slow reaction   Fall Risk: Signs and Symptoms presence of risk factors       Goal: Absence of Falls  Outcome: Ongoing (interventions implemented as appropriate)    11/26/17 1803   Fall Risk (Adult)   Absence of Falls making progress toward outcome         Problem: Infection, Risk/Actual (Adult)  Goal: Identify Related Risk Factors and Signs and Symptoms  Outcome: Outcome(s) achieved Date Met:  11/26/17 11/26/17 0318   Infection, Risk/Actual    Infection, Risk/Actual: Related Risk Factors age extremes;chronic illness/condition   Signs and Symptoms (Infection, Risk/Actual) blood glucose changes;weakness       Goal: Infection Prevention/Resolution  Outcome: Ongoing (interventions implemented as appropriate)    11/26/17 1803   Infection, Risk/Actual (Adult)   Infection Prevention/Resolution making progress toward outcome         Problem: Confusion, Acute (Adult)  Goal: Identify Related Risk Factors and Signs and Symptoms  Outcome: Outcome(s) achieved Date Met:  11/26/17 11/23/17 0250   Confusion, Acute   Related Risk Factors (Acute Confusion) advanced age;cognitive impairment;infection;mobility decreased;neurological impairment   Signs and Symptoms (Acute Confusion) acute onset of symptoms;disorientation;memory disturbed;perceptions disturbed;reasoning/planning disturbed;thought process diminished/disorganized;understanding disturbed       Goal: Cognitive/Functional Impairments Minimized  Outcome: Ongoing (interventions implemented as appropriate)    11/26/17 1803   Confusion, Acute (Adult)   Cognitive/Functional Impairments Minimized making progress toward outcome       Goal: Safety  Outcome: Ongoing (interventions implemented as appropriate)    11/26/17 1803   Confusion, Acute (Adult)   Safety making progress toward outcome         Problem: Pressure Ulcer Risk (Bernardino Scale) (Adult,Obstetrics,Pediatric)  Goal: Identify Related Risk Factors and Signs and Symptoms  Outcome: Outcome(s) achieved Date Met:  11/26/17 11/26/17 1803   Pressure Ulcer Risk (Bernardino Scale)   Related Risk Factors (Pressure Ulcer Risk (Bernardino Scale)) age extremes;nutritional deficiencies;mobility impaired       Goal: Skin Integrity  Outcome: Ongoing (interventions implemented as appropriate)    11/26/17 1803   Pressure Ulcer Risk (Bernardino Scale) (Adult,Obstetrics,Pediatric)   Skin Integrity making progress toward outcome

## 2017-11-26 NOTE — PLAN OF CARE
Problem: Patient Care Overview (Adult)  Goal: Plan of Care Review  Outcome: Ongoing (interventions implemented as appropriate)    11/25/17 1800   Coping/Psychosocial Response Interventions   Plan Of Care Reviewed With patient   Patient Care Overview   Progress improving   Outcome Evaluation   Outcome Summary/Follow up Plan Patient up in chair all day. IV ABX given as ordered. VSS. No complaints of pain.          Problem: Fall Risk (Adult)  Goal: Absence of Falls  Outcome: Ongoing (interventions implemented as appropriate)    Problem: Infection, Risk/Actual (Adult)  Goal: Infection Prevention/Resolution  Outcome: Ongoing (interventions implemented as appropriate)    Problem: Confusion, Acute (Adult)  Goal: Cognitive/Functional Impairments Minimized  Outcome: Ongoing (interventions implemented as appropriate)  Goal: Safety  Outcome: Ongoing (interventions implemented as appropriate)

## 2017-11-26 NOTE — PROGRESS NOTES
"Hospitalist Team      Patient Care Team:  LEATHA Darden as PCP - General (Nurse Practitioner)        Chief Complaint: Follow-up Strep cellulitis    Subjective    C/o of some dizziness only when standing.  Denies vertigo.  Denies chest pain.  Tolerating PO.    Objective    Vital Signs  Temp:  [97 °F (36.1 °C)-97.7 °F (36.5 °C)] 97.7 °F (36.5 °C)  Heart Rate:  [62-77] 69  Resp:  [14-18] 16  BP: (139-152)/(68-77) 152/74  Oxygen Therapy  SpO2: 96 %  Pulse Oximetry Type: Intermittent  O2 Device: room air  Flow (L/min): 1}    Flowsheet Rows         First Filed Value    Admission Height  74\" (188 cm) Documented at 11/21/2017 1540    Admission Weight  (!)  302 lb 3.2 oz (137 kg) Documented at 11/21/2017 1540          Physical Exam:    General Appearance:    Alert, cooperative, in no acute distress   Lungs:     Clear to auscultation,respirations regular, even and                   unlabored    Heart:    Regular rhythm and normal rate, normal S1 and S2, no            murmur, no gallop, no rub, no click   Abdomen:     Morbidly obese, soft and non-tender w/ active bowel sounds   Extremities:   Moves all extremities well, no cyanosis   Pulses:   Radial pulses palpable and equal bilaterally   Skin:   Crimson rash w/ associated edema right mid shin to foot.   Neurologic:   Cranial nerves 2 - 12 grossly intact         Results Review:     I reviewed the patient's new clinical results.  Lab Results (last 24 hours)     Procedure Component Value Units Date/Time    Blood Culture - Blood, [[id number]]  (Normal) Collected:  11/21/17 1606    Specimen:  Blood from Wrist, Right Updated:  11/25/17 1616     Blood Culture No growth at 4 days    POC Glucose Fingerstick [[id number]]  (Abnormal) Collected:  11/25/17 1617    Specimen:  Blood Updated:  11/25/17 1626     Glucose 236 (H) mg/dL     Narrative:       Meter: [id number] : [id number] Meeta Finn NURSING ASSISTANT    POC Glucose Fingerstick [[id number]]  (Abnormal) Collected:  " 11/25/17 1958    Specimen:  Blood Updated:  11/25/17 2004     Glucose 238 (H) mg/dL     Narrative:       Meter: [id number] : [id number] Edie Juarez EFREM    POC Glucose Fingerstick [[id number]]  (Abnormal) Collected:  11/26/17 0724    Specimen:  Blood Updated:  11/26/17 0729     Glucose 133 (H) mg/dL     Narrative:       Meter: [id number] : [id number] Mary Dorsey NURSING ASSISTANT    POC Glucose Fingerstick [[id number]]  (Abnormal) Collected:  11/26/17 1119    Specimen:  Blood Updated:  11/26/17 1125     Glucose 223 (H) mg/dL     Narrative:       Meter: [id number] : [id number] Guerrero Concepcion RN          Imaging Results (last 24 hours)     ** No results found for the last 24 hours. **              ECG/EMG Results (most recent)     Procedure Component Value Units Date/Time    ECG 12 Lead [[id number]] Collected:  11/21/17 1638     Updated:  11/21/17 1708    Narrative:       RR Interval= 526 ms  MA Interval= 168 ms  QRSD Interval= 104 ms  QT Interval= 336 ms  QTc Interval= 463 ms  Heart Rate= 114 ms  P Axis= 51 deg  QRS Axis= 1 deg  T Wave Axis= 29 deg  I: 40 Axis= -31 deg  T: 40 Axis= 68 deg  ST Axis= 152 deg  SINUS TACHYCARDIA ew compared to the previous ECG  INFERIOR INFARCT, AGE INDETERMINATE new compared to the previous ECG  Electronically Signed by:  [facility] 21-Nov-2017 17:03:13  Date and Time of Study: 2017-11-21 16:38:12    Adult Transthoracic Echo Complete W/ Cont if Necessary Per Protocol [[id number]] Collected:  11/22/17 0716     Updated:  11/22/17 1101     BSA 2.6 m^2      IVSd 1.1 cm      LVIDd 6.2 cm      LVIDs 4.5 cm      LVPWd 1.1 cm      IVS/LVPW 1.0     FS 27.5 %      EDV(Teich) 192.6 ml      ESV(Teich) 91.5 ml      EF(Teich) 52.5 %      EDV(cubed) 236.0 ml      ESV(cubed) 89.9 ml      EF(cubed) 61.9 %      LV mass(C)d 300.9 grams      LV mass(C)dI 116.4 grams/m^2      SV(Teich) 101.1 ml      SI(Teich) 39.1 ml/m^2      SV(cubed) 146.1 ml      SI(cubed) 56.5 ml/m^2      Ao root  diam 4.5 cm      Ao root area 15.9 cm^2      ACS 1.6 cm      LA dimension 3.3 cm      asc Aorta Diam 3.4 cm      LA/Ao 0.73     LVOT diam 2.1 cm      LVOT area 3.5 cm^2      LVOT area(traced) 3.5 cm^2      RVOT diam 2.7 cm      RVOT area 5.7 cm^2      LVLd ap4 9.6 cm      EDV(MOD-sp4) 193.0 ml      LVLs ap4 8.9 cm      ESV(MOD-sp4) 94.2 ml      EF(MOD-sp4) 51.2 %      LVLd ap2 9.3 cm      EDV(MOD-sp2) 171.0 ml      LVLs ap2 8.3 cm      ESV(MOD-sp2) 87.5 ml      EF(MOD-sp2) 48.8 %      SV(MOD-sp4) 98.8 ml      SI(MOD-sp4) 38.2 ml/m^2      SV(MOD-sp2) 83.5 ml      SI(MOD-sp2) 32.3 ml/m^2      Ao root area (BSA corrected) 1.7     Ao root area (BSA corrected) 48.8 ml/m^2      CONTRAST EF 4CH 51.2 ml/m^2      LV Diastolic corrected for BSA 74.6 ml/m^2      LV Systolic corrected for BSA 36.4 ml/m^2      MV A dur 0.2 sec      MV E max pacheco 78.1 cm/sec      MV A max pacheco 105.0 cm/sec      MV E/A 0.74     MV V2 max 109.0 cm/sec      MV max PG 4.8 mmHg      MV V2 mean 59.2 cm/sec      MV mean PG 2.0 mmHg      MV V2 VTI 30.6 cm      MVA(VTI) 2.4 cm^2      MV P1/2t max pacheco 95.7 cm/sec      MV P1/2t 53.1 msec      MVA(P1/2t) 4.1 cm^2      MV dec slope 528.0 cm/sec^2      MV dec time 0.22 sec      Ao pk pacheco 178.0 cm/sec      Ao max PG 13.0 mmHg      Ao max PG (full) 8.2 mmHg      Ao V2 mean 117.0 cm/sec      Ao mean PG 7.0 mmHg      Ao mean PG (full) 4.0 mmHg      Ao V2 VTI 40 cm      MAXX(I,A) 1.9 cm^2      MAXX(I,D) 1.9 cm^2      MAXX(V,A) 1.8 cm^2      MAXX(V,D) 1.8 cm^2      AI max pacheco 377.5 cm/sec      AI max PG 57.1 mmHg      AI dec slope 274.0 cm/sec^2      AI P1/2t 403.5 msec      LV V1 max PG 3.0 mmHg      LV V1 mean PG 2.0 mmHg      LV V1 max 86.5 cm/sec      LV V1 mean 58.2 cm/sec      LV V1 VTI 21.1 cm      SV(Ao) 600.4 ml      SI(Ao) 232.2 ml/m^2      SV(LVOT) 72.9 ml      SV(RVOT) 72.1 ml      SI(LVOT) 28.2 ml/m^2      PA V2 max 87.7 cm/sec      PA max PG 3.1 mmHg      PA max PG (full) 1.3 mmHg      BH CV ECHO BISHNU -  PVA(V,A) 4.3 cm^2       CV ECHO BISHNU - PVA(V,D) 4.3 cm^2      PA acc time 0.14 sec      RV V1 max PG 1.8 mmHg      RV V1 mean PG 1.0 mmHg      RV V1 max 66.5 cm/sec      RV V1 mean 46.0 cm/sec      RV V1 VTI 12.6 cm      PA pr(Accel) 14.2 mmHg      Pulm Sys Joao 84.5 cm/sec      Pulm Fernandez Joao 40.1 cm/sec      Pulm S/D 2.1     Qp/Qs 0.99     Pulm A Revs Dur 0.14 sec      Pulm A Revs Joao 27.0 cm/sec      MVA P1/2T LCG 2.3 cm^2       CV ECHO BISHNU - BZI_BMI 38.6 kilograms/m^2       CV ECHO BISHNU - BSA(HAYCOCK) 2.7 m^2       CV ECHO BISHNU - BZI_METRIC_WEIGHT 136.5 kg       CV ECHO BISHNU - BZI_METRIC_HEIGHT 188.0 cm      Target HR (85%) 122 bpm      Max. Pred. HR (100%) 144 bpm       CV VAS BP RIGHT /64 mmHg      TDI S' 14.00 cm/sec      RV Base 3.90 cm      RV Length 7.80 cm      RV Mid 3.20 cm      LA volume 68.0 cm3      E/E' ratio 11.0     Lat Peak E' Joao 10.0 cm/sec      Med Peak E' Joao 6.00 cm/sec      TAPSE (>1.6) 1.80 cm2      LA Volume Index 30.0 mL/m2      Echo EF Estimated 52 %     Narrative:       · Left ventricular systolic function is normal. Estimated EF = 52%.  · Left ventricular diastolic dysfunction (grade I) consistent with   impaired relaxation.  · calcification of the aortic valve  · Mild dilation of the aortic root is present.       ECG 12 Lead [[id number]] Collected:  11/22/17 1441     Updated:  11/22/17 1509    Narrative:       RR Interval= 822 ms  WV Interval= 188 ms  QRSD Interval= 106 ms  QT Interval= 416 ms  QTc Interval= 459 ms  Heart Rate= 73 ms  P Axis= 41 deg  QRS Axis= 9 deg  T Wave Axis= 107 deg  I: 40 Axis= -17 deg  T: 40 Axis= 61 deg  ST Axis= 158 deg  SINUS RHYTHM  PROBABLE INFERIOR INFARCT, OLD  LATERAL LEADS ARE ALSO INVOLVED  NO SIGNIFICANT CHANGE FROM PREVIOUS ECG  Electronically Signed by:  Aaron AbdiPHILIP) ([facility]) 22-Nov-2017 15:06:34  Date and Time of Study: 2017-11-22 14:41:22    ECG 12 Lead [[id number]] Collected:  11/23/17 0824     Updated:  11/23/17 1911     Narrative:       RR Interval= 984 ms  CT Interval= 195 ms  QRSD Interval= 112 ms  QT Interval= 456 ms  QTc Interval= 460 ms  Heart Rate= 61 ms  P Axis= 25 deg  QRS Axis= 0 deg  T Wave Axis= 49 deg  I: 40 Axis= -16 deg  T: 40 Axis= 5 deg  ST Axis= 169 deg  SINUS RHYTHM  NONSPECIFIC INTRAVENTRICULAR CONDUCTION DELAY  NO SIGNIFICANT CHANGE FROM PREVIOUS ECG  Electronically Signed by:  Sharri Power ([facility]) 23-Nov-2017 19:08:29  Date and Time of Study: 2017-11-23 08:24:19          Medication Review:   I have reviewed the patient's current medication list    Current Facility-Administered Medications:   •  acetaminophen (TYLENOL) tablet 650 mg, 650 mg, Oral, Q6H PRN, Terri Chaudhry MD, 650 mg at 11/24/17 0650  •  amLODIPine (NORVASC) tablet 2.5 mg, 2.5 mg, Oral, Q24H, Terri Chaudhry MD, 2.5 mg at 11/26/17 0845  •  ampicillin 1 g IVPB in 100 mL NS, 1 g, Intravenous, Q6H, Rocky Joe MD, Last Rate: 200 mL/hr at 11/26/17 0522, 1 g at 11/26/17 0522  •  aspirin EC tablet 325 mg, 325 mg, Oral, Daily, Terri Chaudhry MD, 325 mg at 11/26/17 0845  •  atorvastatin (LIPITOR) tablet 10 mg, 10 mg, Oral, Daily, Liat Hood APRN, 10 mg at 11/26/17 0845  •  clotrimazole (LOTRIMIN) 1 % cream, , Topical, Q12H, Rocky Joe MD, 1 application at 11/26/17 0846  •  dextrose (D50W) solution 25 g, 25 g, Intravenous, Q15 Min PRN, Terri Chaudhry MD  •  dextrose (GLUTOSE) oral gel 15 g, 15 g, Oral, Q15 Min PRN, Terri Chaudhry MD  •  docusate sodium (COLACE) capsule 100 mg, 100 mg, Oral, BID, Liat Hood APRN, 100 mg at 11/23/17 0832  •  donepezil (ARICEPT) tablet 5 mg, 5 mg, Oral, Nightly, LEATHA Simpson, 5 mg at 11/25/17 2021  •  enoxaparin (LOVENOX) syringe 30 mg, 30 mg, Subcutaneous, Q24H, Terri Chaudhry MD, 30 mg at 11/25/17 2020  •  gabapentin (NEURONTIN) capsule 600 mg, 600 mg, Oral, Q8H, LEATHA Simpson, 600 mg at 11/26/17  0659  •  glucagon (GLUCAGEN) injection 1 mg, 1 mg, Subcutaneous, Q15 Min PRN, Terri Chaudhry MD  •  insulin aspart (novoLOG) injection 0-9 Units, 0-9 Units, Subcutaneous, 4x Daily AC & at Bedtime, Terri Chaudhry MD, 4 Units at 11/25/17 2020  •  insulin detemir (LEVEMIR) injection 30 Units, 30 Units, Subcutaneous, BID, Terri Chaudhry MD, 30 Units at 11/26/17 0845  •  ipratropium-albuterol (DUO-NEB) nebulizer solution 3 mL, 3 mL, Nebulization, Q6H PRN, Sree Morley MD  •  levothyroxine (SYNTHROID, LEVOTHROID) tablet 100 mcg, 100 mcg, Oral, Daily, Liat Hood, APRN, 100 mcg at 11/26/17 0845  •  linagliptin (TRADJENTA) tablet 5 mg, 5 mg, Oral, Daily, Liat Hood, APRN, 5 mg at 11/26/17 0845  •  lisinopril (PRINIVIL,ZESTRIL) tablet 20 mg, 20 mg, Oral, Daily, Lait Hood, APRN, 20 mg at 11/26/17 0845  •  ondansetron (ZOFRAN) injection 4 mg, 4 mg, Intravenous, Q6H PRN, Terri Chaudhry MD  •  polyethylene glycol 3350 powder (packet), 17 g, Oral, Daily, Liat Hood, APRN, 17 g at 11/22/17 1319  •  pregabalin (LYRICA) capsule 100 mg, 100 mg, Oral, Q12H, Sonya Daniel, DO, 100 mg at 11/26/17 0845  •  sodium chloride 0.9 % flush 1-10 mL, 1-10 mL, Intravenous, PRN, Terri Chaudhry MD  •  sodium chloride 0.9 % flush 10 mL, 10 mL, Intravenous, PRN, Keven Steven MD  •  sodium chloride 0.9 % infusion 40 mL, 40 mL, Intravenous, PRN, Terri Chaudhry MD  •  Vitamin D3 50,000 Units, 50,000 Units, Oral, Weekly, LEATHA Simpson, 50,000 Units at 11/22/17 1318      Assessment/Plan     1.  Dizziness:  Suspect orthostasis.  Added check to vitals signs qshift.  Could also be due to some autonomic dysfunction from poor diabetes control.    2.  Strep cellulitis:  Continue ampicillin.  Will check CBC and inflammatory markers in the morning.    3.  Diabetes Mellitus, Type 2:  Bedsides not at goal.  Notes made of  patient non-compliance.  Will increase basal dosing.    4.  ROLA on CKD III:  Check BMP in AM.    5.  Morbid Obesity:  Appears to have declined dietary counseling.    6.  Hypothyroidism:  Continue current dose replacement.    7.  Chronic Diastolic Dysfunction:  Nothing acute.    8.  HTN:  Not at goal on exam, but trend at goal.  No change.    Plan for disposition: Home tomorrow.    Ozzie Briggs MD  11/26/17  12:44 PM

## 2017-11-26 NOTE — PLAN OF CARE
Problem: Patient Care Overview (Adult)  Goal: Plan of Care Review  Outcome: Ongoing (interventions implemented as appropriate)    11/26/17 0318   Coping/Psychosocial Response Interventions   Plan Of Care Reviewed With patient   Patient Care Overview   Progress improving   Outcome Evaluation   Outcome Summary/Follow up Plan Pt in the chair to sleep, ABX given as ordered, no pain        Goal: Adult Individualization and Mutuality  Outcome: Ongoing (interventions implemented as appropriate)    11/26/17 0318   Individualization   Patient Specific Goals Go home soon         Problem: Sepsis (Adult)  Goal: Signs and Symptoms of Listed Potential Problems Will be Absent or Manageable (Sepsis)  Outcome: Ongoing (interventions implemented as appropriate)    11/26/17 0318   Sepsis   Problems Assessed (Sepsis) glycemic control impaired;infection progression   Problems Present (Sepsis) glycemic control, impaired;progression of infection         Problem: Fall Risk (Adult)  Goal: Identify Related Risk Factors and Signs and Symptoms  Outcome: Ongoing (interventions implemented as appropriate)    11/26/17 0318   Fall Risk   Fall Risk: Related Risk Factors age-related changes;fatigue/slow reaction   Fall Risk: Signs and Symptoms presence of risk factors       Goal: Absence of Falls  Outcome: Ongoing (interventions implemented as appropriate)    11/26/17 0318   Fall Risk (Adult)   Absence of Falls making progress toward outcome         Problem: Infection, Risk/Actual (Adult)  Goal: Identify Related Risk Factors and Signs and Symptoms  Outcome: Ongoing (interventions implemented as appropriate)    11/26/17 0318   Infection, Risk/Actual   Infection, Risk/Actual: Related Risk Factors age extremes;chronic illness/condition   Signs and Symptoms (Infection, Risk/Actual) blood glucose changes;weakness       Goal: Infection Prevention/Resolution  Outcome: Ongoing (interventions implemented as appropriate)    11/26/17 0318   Infection,  Risk/Actual (Adult)   Infection Prevention/Resolution making progress toward outcome

## 2017-11-27 VITALS
HEIGHT: 74 IN | RESPIRATION RATE: 18 BRPM | TEMPERATURE: 97.4 F | HEART RATE: 72 BPM | BODY MASS INDEX: 38.12 KG/M2 | DIASTOLIC BLOOD PRESSURE: 78 MMHG | OXYGEN SATURATION: 96 % | WEIGHT: 297 LBS | SYSTOLIC BLOOD PRESSURE: 144 MMHG

## 2017-11-27 LAB
ANION GAP SERPL CALCULATED.3IONS-SCNC: 13 MMOL/L
BASOPHILS # BLD AUTO: 0.07 10*3/MM3 (ref 0–0.2)
BASOPHILS NFR BLD AUTO: 0.8 % (ref 0–2)
BUN BLD-MCNC: 16 MG/DL (ref 8–23)
BUN/CREAT SERPL: 11.6 (ref 7–25)
CALCIUM SPEC-SCNC: 9.1 MG/DL (ref 8.8–10.5)
CHLORIDE SERPL-SCNC: 98 MMOL/L (ref 98–107)
CO2 SERPL-SCNC: 25 MMOL/L (ref 22–29)
CREAT BLD-MCNC: 1.38 MG/DL (ref 0.76–1.27)
CRP SERPL-MCNC: 1.8 MG/DL (ref 0–0.5)
DEPRECATED RDW RBC AUTO: 41.6 FL (ref 37–54)
EOSINOPHIL # BLD AUTO: 0.44 10*3/MM3 (ref 0.1–0.3)
EOSINOPHIL NFR BLD AUTO: 5.1 % (ref 0–4)
ERYTHROCYTE [DISTWIDTH] IN BLOOD BY AUTOMATED COUNT: 13.1 % (ref 11.5–14.5)
ERYTHROCYTE [SEDIMENTATION RATE] IN BLOOD: 30 MM/HR (ref 0–20)
GFR SERPL CREATININE-BSD FRML MDRD: 50 ML/MIN/1.73
GLUCOSE BLD-MCNC: 239 MG/DL (ref 65–99)
GLUCOSE BLDC GLUCOMTR-MCNC: 175 MG/DL (ref 70–130)
HCT VFR BLD AUTO: 37.1 % (ref 42–52)
HGB BLD-MCNC: 12.2 G/DL (ref 14–18)
IMM GRANULOCYTES # BLD: 0.15 10*3/MM3 (ref 0–0.03)
IMM GRANULOCYTES NFR BLD: 1.7 % (ref 0–0.5)
LYMPHOCYTES # BLD AUTO: 1.42 10*3/MM3 (ref 0.6–4.8)
LYMPHOCYTES NFR BLD AUTO: 16.4 % (ref 20–45)
MCH RBC QN AUTO: 29.1 PG (ref 27–31)
MCHC RBC AUTO-ENTMCNC: 32.9 G/DL (ref 31–37)
MCV RBC AUTO: 88.5 FL (ref 80–94)
MONOCYTES # BLD AUTO: 0.81 10*3/MM3 (ref 0–1)
MONOCYTES NFR BLD AUTO: 9.4 % (ref 3–8)
NEUTROPHILS # BLD AUTO: 5.75 10*3/MM3 (ref 1.5–8.3)
NEUTROPHILS NFR BLD AUTO: 66.6 % (ref 45–70)
NRBC BLD MANUAL-RTO: 0 /100 WBC (ref 0–0)
PLATELET # BLD AUTO: 193 10*3/MM3 (ref 140–500)
PMV BLD AUTO: 10.5 FL (ref 7.4–10.4)
POTASSIUM BLD-SCNC: 3.8 MMOL/L (ref 3.5–5.2)
RBC # BLD AUTO: 4.19 10*6/MM3 (ref 4.7–6.1)
SODIUM BLD-SCNC: 136 MMOL/L (ref 136–145)
WBC NRBC COR # BLD: 8.64 10*3/MM3 (ref 4.8–10.8)

## 2017-11-27 PROCEDURE — 86140 C-REACTIVE PROTEIN: CPT | Performed by: HOSPITALIST

## 2017-11-27 PROCEDURE — 82962 GLUCOSE BLOOD TEST: CPT

## 2017-11-27 PROCEDURE — 97110 THERAPEUTIC EXERCISES: CPT

## 2017-11-27 PROCEDURE — 80048 BASIC METABOLIC PNL TOTAL CA: CPT | Performed by: HOSPITALIST

## 2017-11-27 PROCEDURE — 63710000001 INSULIN ASPART PER 5 UNITS: Performed by: HOSPITALIST

## 2017-11-27 PROCEDURE — 99239 HOSP IP/OBS DSCHRG MGMT >30: CPT | Performed by: NURSE PRACTITIONER

## 2017-11-27 PROCEDURE — 25010000002 AMPICILLIN PER 500 MG: Performed by: INTERNAL MEDICINE

## 2017-11-27 PROCEDURE — 85652 RBC SED RATE AUTOMATED: CPT | Performed by: HOSPITALIST

## 2017-11-27 PROCEDURE — 63710000001 INSULIN DETEMIR PER 5 UNITS: Performed by: HOSPITALIST

## 2017-11-27 PROCEDURE — 85025 COMPLETE CBC W/AUTO DIFF WBC: CPT | Performed by: HOSPITALIST

## 2017-11-27 PROCEDURE — 99232 SBSQ HOSP IP/OBS MODERATE 35: CPT | Performed by: INTERNAL MEDICINE

## 2017-11-27 RX ORDER — FUROSEMIDE 40 MG/1
40 TABLET ORAL DAILY
Qty: 30 TABLET | Refills: 0 | Status: SHIPPED | OUTPATIENT
Start: 2017-11-27 | End: 2018-08-03

## 2017-11-27 RX ORDER — METOPROLOL SUCCINATE 25 MG/1
12.5 TABLET, EXTENDED RELEASE ORAL NIGHTLY
Status: DISCONTINUED | OUTPATIENT
Start: 2017-11-27 | End: 2017-11-27 | Stop reason: HOSPADM

## 2017-11-27 RX ORDER — ATORVASTATIN CALCIUM 10 MG/1
10 TABLET, FILM COATED ORAL DAILY
Qty: 30 TABLET | Refills: 0 | Status: ON HOLD | OUTPATIENT
Start: 2017-11-28 | End: 2019-08-27 | Stop reason: SDDI

## 2017-11-27 RX ORDER — AMLODIPINE BESYLATE 2.5 MG/1
2.5 TABLET ORAL
Qty: 30 TABLET | Refills: 0 | Status: SHIPPED | OUTPATIENT
Start: 2017-11-28 | End: 2019-08-29 | Stop reason: HOSPADM

## 2017-11-27 RX ORDER — METOPROLOL SUCCINATE 25 MG/1
12.5 TABLET, EXTENDED RELEASE ORAL NIGHTLY
Qty: 30 TABLET | Refills: 0 | Status: ON HOLD | OUTPATIENT
Start: 2017-11-27 | End: 2019-05-08 | Stop reason: SDUPTHER

## 2017-11-27 RX ORDER — AMOXICILLIN 500 MG/1
500 CAPSULE ORAL 3 TIMES DAILY
Qty: 30 CAPSULE | Refills: 0 | Status: SHIPPED | OUTPATIENT
Start: 2017-11-27 | End: 2018-08-03

## 2017-11-27 RX ORDER — PSEUDOEPHEDRINE HCL 30 MG
100 TABLET ORAL 2 TIMES DAILY
Qty: 60 CAPSULE | Refills: 0 | Status: ON HOLD | OUTPATIENT
Start: 2017-11-27 | End: 2019-10-15

## 2017-11-27 RX ORDER — L.ACID,PARA/B.BIFIDUM/S.THERM 8B CELL
1 CAPSULE ORAL DAILY
Qty: 30 CAPSULE | Refills: 0 | Status: ON HOLD | OUTPATIENT
Start: 2017-11-27 | End: 2019-10-15

## 2017-11-27 RX ORDER — CLOTRIMAZOLE 1 %
CREAM (GRAM) TOPICAL EVERY 12 HOURS SCHEDULED
Qty: 60 G | Refills: 0 | Status: ON HOLD | OUTPATIENT
Start: 2017-11-27 | End: 2019-08-27

## 2017-11-27 RX ADMIN — GABAPENTIN 600 MG: 300 CAPSULE ORAL at 06:16

## 2017-11-27 RX ADMIN — LEVOTHYROXINE SODIUM 100 MCG: 100 TABLET ORAL at 08:44

## 2017-11-27 RX ADMIN — LINAGLIPTIN 5 MG: 5 TABLET, FILM COATED ORAL at 08:44

## 2017-11-27 RX ADMIN — PREGABALIN 100 MG: 50 CAPSULE ORAL at 08:44

## 2017-11-27 RX ADMIN — CLOTRIMAZOLE: 1 CREAM TOPICAL at 09:55

## 2017-11-27 RX ADMIN — AMPICILLIN SODIUM 1 G: 1 INJECTION, POWDER, FOR SOLUTION INTRAMUSCULAR; INTRAVENOUS at 05:20

## 2017-11-27 RX ADMIN — DOCUSATE SODIUM 100 MG: 100 CAPSULE, LIQUID FILLED ORAL at 08:44

## 2017-11-27 RX ADMIN — ATORVASTATIN CALCIUM 10 MG: 10 TABLET, FILM COATED ORAL at 08:44

## 2017-11-27 RX ADMIN — LISINOPRIL 20 MG: 20 TABLET ORAL at 08:44

## 2017-11-27 RX ADMIN — ASPIRIN 325 MG: 325 TABLET, DELAYED RELEASE ORAL at 08:44

## 2017-11-27 RX ADMIN — INSULIN ASPART 2 UNITS: 100 INJECTION, SOLUTION INTRAVENOUS; SUBCUTANEOUS at 08:39

## 2017-11-27 RX ADMIN — AMLODIPINE BESYLATE 2.5 MG: 2.5 TABLET ORAL at 08:44

## 2017-11-27 RX ADMIN — INSULIN DETEMIR 30 UNITS: 100 INJECTION, SOLUTION SUBCUTANEOUS at 08:39

## 2017-11-27 NOTE — PROGRESS NOTES
Nutrition Services    Patient Name:  Froilan Helton  YOB: 1940  MRN: [id number]  Admit Date:  11/21/2017    Education provided per wife request via RN.   Edu on cardiac, DM, CHF & NA+. See education note/careplan.    Card provided for questions.    Electronically signed by:  Laura Varma RD  11/27/17 11:50 AM

## 2017-11-27 NOTE — DISCHARGE INSTR - APPOINTMENTS
Patient has discharge follow-up on December 12,2017@2:20pm.  Dr. Yovanny Clarke MD  Internist in [place], [place]  Address: [address] [place], [place] [address]  Phone: [phone]

## 2017-11-27 NOTE — NURSING NOTE
Case Management Discharge Note         Discharge Placement     Facility/Agency Request Status Selected? Address Phone Number Fax Number    [facility] Accepted    Yes [address] [place] [address], [facility] [address] [phone]             Discharge Codes: 06  Discharged/transferred to home under care of organized home health service organization in anticipation of skilled care

## 2017-11-27 NOTE — PLAN OF CARE
Problem: Patient Care Overview (Adult)  Goal: Plan of Care Review  Outcome: Ongoing (interventions implemented as appropriate)    11/27/17 1018   Coping/Psychosocial Response Interventions   Plan Of Care Reviewed With patient   Outcome Evaluation   Outcome Summary/Follow up Plan PT: pt requires SBA x1 with sit to/from stand transfers, verbal cues for proper hand placement. pt complains of lightheadedness/dizziness upon coming to standing position but only lasts a few seconds. pt ambulates x285 feet SBA x1 with use of rolling walker, verbal cues for proper positioning within walker. Recommend home health PT upon return home, no assistive device needed at this time since pt already has equipment at home.

## 2017-11-27 NOTE — DISCHARGE SUMMARY
Froilan Helton  1940  [id number]    Hospitalists Discharge Summary    Date of Admission: 11/21/2017  Date of Discharge:  11/27/2017    Primary Discharge Diagnoses:   1. Sepsis secondary to RLE Cellulitis with Strep dysgalactiae bacteremia    2. NSTEMI Type II  3. Vasovagal syncope    Secondary Discharge Diagnoses:   4. Mild orthostatic hypotension  5. Increased LE edema and suspected Chronic diastolic CHF    6. Uncontrolled DM-2  7. ROLA on CKD stage 3    8. COPD    9. Hypothyroidism    10. Hypertension    11. Dementia    12. Obesity  13. Constipation    14. Immobility and LE weakness  15. Hypokalemia  16. No UTI  PCP  Patient Care Team:  LEATHA Darden as PCP - General (Nurse Practitioner)    Consults:   Consults     Date and Time Order Name Status Description    11/24/2017 0934 Inpatient Consult to Infectious Diseases Completed     11/22/2017 1336 Inpatient Consult to Cardiology Completed         Operations and Procedures Performed:     Ct Abdomen Pelvis Without Contrast    Result Date: 11/22/2017  Narrative: CT ABDOMEN PELVIS  HISTORY: Fever of unknown origin. Sepsis. Patient has history of COPD. Patient sat on toilet all day and then fell off at 2:30 PM this afternoon. Patient unresponsive and unable to give history    TECHNIQUE: Axial noncontrast images were obtained through the abdomen and pelvis. Multiplanar reformats were obtained. No comparison. Radiation dose reduction techniques were utilized, including automated exposure control and exposure modulation based on body size.  ABDOMEN FINDINGS: For a description of findings in the lung bases, see the chest CT report dictated separately. Gallstones are present within an otherwise normal-appearing gallbladder. There is no biliary obstruction. There is fatty infiltration of the liver. No renal or ureteral stones are seen. There is no hydronephrosis. Unenhanced solid organs otherwise are normal. No free fluid or adenopathy is seen. There is atherosclerotic  disease, but there is no aortic aneurysm. The unopacified GI tract is grossly normal.  PELVIS FINDINGS: There is some fat stranding and skin thickening in the midline of the ventral abdominal wall just above the umbilicus. Please correlate clinically for etiology. This could reflect contusion or cellulitis. Similar, but less pronounced, changes are noted just below the umbilicus. Urinary bladder is normal. There are no lower ureteral stones. There is no free fluid. The appendix is normal. The remainder of the unopacified GI tract is normal as well. There is some fat stranding in the right groin adjacent to the common femoral vasculature. This is nonspecific but could indicate DVT. Consider follow-up with a Doppler ultrasound. There is diffuse degenerative disease in the lumbar spine.      Impression: 1. Cholelithiasis. 2. No renal or ureteral stones. No hydronephrosis. 3. Hepatic steatosis. 4. Normal unopacified GI tract, including the appendix. 5. Fat stranding adjacent to the right common femoral vasculature. This is nonspecific but could potentially indicate DVT. Consider follow-up with right lower extremity venous Doppler ultrasound. 6. Areas of fat stranding in the ventral abdominal wall with skin thickening above and below the umbilicus. Please correlate clinically for evidence of contusion or cellulitis.  A preliminary report was provided by Dr. Roche at 1800 hours on 11/21/2017.  This report was finalized on 11/22/2017 6:45 AM by Dr. Jaylen Gudino MD.      Ct Head Without Contrast    Result Date: 11/21/2017  Narrative: Head CT  INDICATION: Patient sat on 12 old day until he fell off at 2:30 this afternoon. Subsequent mental status changes. Patient unresponsive and unable to give history.  FINDINGS: Axial images were obtained from the base to the vertex without contrast. COMPARISON made with 06/13/2010. Radiation dose reduction techniques were utilized, including automated exposure control and exposure  modulation based on body size.  Ventricular size and configuration are normal. No acute infarct or hemorrhage is seen. No masses. Atherosclerotic calcifications are present in the carotid siphons. No skull fracture.      Impression: No acute findings.  This report was finalized on 11/21/2017 4:35 PM by Dr. Jaylen Gudino MD.      Ct Chest Without Contrast    Result Date: 11/22/2017  Narrative: CHEST CT  HISTORY: Fever of unknown origin. Sepsis. Patient has history of COPD. Patient sat on toilet all day and then fell off at 2:30 PM this afternoon. Patient unresponsive and unable to give history.  TECHNIQUE: Axial noncontrast images were obtained through the chest. Multiplanar reformats were obtained. No comparison chest CT. Radiation dose reduction techniques were utilized, including automated exposure control and exposure modulation based on body size.  FINDINGS: There is atherosclerotic disease and coronary artery disease. No pleural or pericardial effusion. No adenopathy. There is cardiomegaly. Lung windows demonstrate emphysema. There is dependent atelectasis in both lungs. There is more confluent left lower lobe atelectasis. No convincing evidence of pneumonia. No fractures are identified. For description of findings in the upper abdomen, please see the abdomen and pelvis CT report dictated separately.      Impression: 1. Atherosclerotic disease and coronary artery disease. 2. The lungs are clear except for atelectasis as above. No convincing pneumonia.  A preliminary report was provided by Dr. Roche at 1800 hours on 11/21/2017.  This report was finalized on 11/22/2017 6:38 AM by Dr. Jaylen Gudino MD.      Ct Cervical Spine Without Contrast    Result Date: 11/21/2017  Narrative: CERVICAL CT  HISTORY: Patient fell off toilet today to 2:30 this afternoon. Patient has altered mental status and is a poor historian. Patient currently unresponsive.  TECHNIQUE: Axial images were obtained through the cervical spine  without contrast. Multiplanar reformats were obtained. No comparison. Radiation dose reduction techniques were utilized, including automated exposure control and exposure modulation based on body size.  FINDINGS: The exam is motion degraded. Alignment is within normal limits. There is multilevel hypertrophic facet arthropathy. There are no acute fractures. There are multilevel disc bulges and disc osteophyte complexes. The findings are most pronounced at C6-7 with bilateral foraminal stenosis and central canal narrowing.      Impression: Motion degraded exam. Multilevel degenerative disease. No acute fracture.  This report was finalized on 11/21/2017 4:47 PM by Dr. Jaylen Gudino MD.      Xr Chest 1 View    Result Date: 11/21/2017  Narrative: Chest x-ray  INDICATION: Patient sat on toilet all day until he fell off at 2:30 this afternoon. Mental status changes and fever. Limited history as patient is poor historian.  FINDINGS: AP upright view of the chest is compared with 01/26/2012. Lung volumes are low. There is mild left base atelectasis or infiltrate. Lungs are otherwise clear and no pneumothorax. Mild cardiomegaly.      Impression: Mild cardiomegaly. Low volume inspiration mild infiltrate or atelectasis at the left base.  This report was finalized on 11/21/2017 4:43 PM by Dr. Jaylen Gudino MD.      Us Renal Bilateral    Result Date: 11/22/2017  Narrative: Renal ultrasound  INDICATION: Elevated creatinine of 1.6. Sepsis. Fever of unknown origin.  FINDINGS: Sonographic evaluation is performed of the kidneys in multiple planes. A comparison is made with CT abdomen pelvis from 11/21/2017.  There is fatty infiltration of the liver. Right kidney measures 10.6 cm in asjm-eq-edpc length. Left kidney measures 11.2 cm in bqky-ni-ojay length. Both are morphologically normal and nonobstructed. Urinary bladder is not fully distended but it does appear grossly normal.      Impression: Normal renal ultrasound.  This report  was finalized on 11/22/2017 3:06 PM by Dr. Jaylen Gudino MD.      Us Venous Doppler Lower Extremity Right (duplex)    Result Date: 11/22/2017  Narrative: Right lower and mid venous Doppler  INDICATION: Bilateral leg swelling right greater than left for 4 weeks. Abnormal CT demonstrating some fat stranding around the common femoral vasculature on the right.  FINDINGS: Grayscale, color flow, and spectral Doppler waveform analysis is performed of the right lower extremity venous system. All the visualized venous structures demonstrate normal compressibility and color flow. No superficial or deep venous thrombosis is seen. Calf vein evaluation is limited due to patient body habitus.      Impression: Negative right lower extremity venous Doppler. Please note that there is limited visualization of the calf veins.  This report was finalized on 11/22/2017 8:59 AM by Dr. Jaylen Gudino MD.      Xr Chest Pa & Lateral    Result Date: 11/24/2017  Narrative: INDICATION:  Positive smoking history. Shortness of air.  COMPARISON:  11/21/2017  FINDINGS: PA and lateral views of the chest.  Heart and mediastinal contours are normal.  Mild linear basilar atelectasis is similar to the prior study.  No pneumothorax or pleural effusion.      Impression: No acute findings. Mild basilar atelectasis/scarring.  This report was finalized on 11/24/2017 9:11 AM by Dr. Lester Hugo MD.      Allergies:  is allergic to oxycontin [oxycodone hcl].    Joseph  Gabapentin 11/2017, lyrica 10/2017 per report of 11/21/17    Discharge Medications:   Froilan Helton   Home Medication Instructions ELIANA:[id number]    Printed on:11/27/17 1007   Medication Information                      amLODIPine (NORVASC) 2.5 MG tablet  Take 1 tablet by mouth Daily.             amoxicillin (AMOXIL) 500 MG capsule  Take 1 capsule by mouth 3 (Three) Times a Day.             aspirin  MG EC tablet  Take 1 tablet by mouth Daily.             atorvastatin (LIPITOR) 10 MG  "tablet  Take 1 tablet by mouth Daily.             clotrimazole (LOTRIMIN) 1 % cream  Apply  topically Every 12 (Twelve) Hours.             docusate sodium 100 MG capsule  Take 100 mg by mouth 2 (Two) Times a Day.             donepezil (ARICEPT) 5 MG tablet  Take 5 mg by mouth Every Night.             furosemide (LASIX) 40 MG tablet  Take 1 tablet by mouth Daily.             gabapentin (NEURONTIN) 600 MG tablet  Take 600 mg by mouth 3 (Three) Times a Day.             insulin detemir (LEVEMIR) 100 UNIT/ML injection  Inject 30 Units under the skin 2 (Two) Times a Day.             lactobacillus acidophilus (RISAQUAD) capsule capsule  Take 1 capsule by mouth Daily.             levothyroxine (SYNTHROID, LEVOTHROID) 100 MCG tablet  Take 100 mcg by mouth Daily.             lisinopril (PRINIVIL,ZESTRIL) 20 MG tablet  Take 20 mg by mouth Daily.             metoprolol succinate XL (TOPROL-XL) 25 MG 24 hr tablet  Take 0.5 tablets by mouth Every Night.             polyethylene glycol (MIRALAX) pack packet  Take 17 g by mouth Daily.             pramipexole (MIRAPEX) 0.5 MG tablet  Take 0.5 mg by mouth 3 (Three) Times a Day.             pregabalin (LYRICA) 100 MG capsule  Take 100 mg by mouth 2 (Two) Times a Day.             SITagliptin (JANUVIA) 100 MG tablet  Take 100 mg by mouth Daily.             vitamin D (ERGOCALCIFEROL) 17570 units capsule capsule  Take 50,000 Units by mouth 1 (One) Time Per Week.               History of Present Illness: Taken from [facility] on admit:  \"Chief complaint: Fall and fever  Location: Home  Quality/Severity:  MAXIMUM TEMPERATURE of 104.4  Timing/Onset/Duration: The patient fell this morning  Modifying Factors: The patient is unable to give any modifying factors as he is unresponsive  Associated Symptoms: Patient unable to give associated symptoms  Narrative: This 77-year-old white male went to the toilet 7:30 this morning.  His wife states that he stayed on the toilet all day until he fell off the " "toilet about 1430.  The patient was noted to have altered mental status by EMS.  His blood sugar was 370.  The patient is unable to give any other history, the wife is not present, and EMS was not present initially in ED for interview per ED notes.     Tonight: pt still unable to give reliable history, simply repeats what I say and answers yes to every question asked. Exam nonfocal other than right lower extremity warmth and erythema with possible cellulitis but not terribly impressive. No abd tenderness or lung findings.   ROS unable to obtain.\"    Hospital Course  1. Sepsis secondary to RLE Cellulitis with Strep dysgalactiae bacteremia: ID followed  -Initially on IV antibiotics  -Will change today to oral amoxicillin 500 mg 3 times daily x 10 days  -Procalcitonin trending down with WBCC normal and afebrile  -Sed rate/CRP also trending down  -Per notes from Dr. Joe, he suspects the patient obtained the infection form  his dog licking his feet which are cracked with tinea.   -Continue clotrimazole cream to feet, continue wound care at home with HH  -add probiotic  F/U Gordy Correa, LEATHA 1 week      2. NSTEMI Type II:   3. Vasovagal syncope: Cardiology followed  -Syncope occurred while having a BM  -CT head negative for acute findings, HA resolved  -Caused by sepsis versus CAD versus combo of both  -No chest pain, supportive care recommended with outpatient stress test in  future once clear of infection  -Started on  mg daily and changed to atorvastatin 10 mg nightly  secondary to renal function   F/U Dr. Monaco 4 weeks  F/U Gordy Correa, LEATHA 1 week    4. Mild orthostatic hypotension:  Likely secondary to autonomic dysfunction secondary to uncontrolled DM2  Dizziness only with standing, then resolves  SBP remains at or slightly above goal with standing, no intervention for now.   Education regarding sitting up and waiting prior to standing    5. Increased LE edema and suspected Chronic diastolic CHF:   Echo " "this admit:  · \"Left ventricular systolic function is normal. Estimated EF = 52%.  · Left ventricular diastolic dysfunction (grade I) consistent with impaired relaxation.  · calcification of the aortic valve   Mild dilation of the aortic root is present\"  Received IV lasix dosing with good diuresis, continue daily lasix 40 mg  As per number 2,3   F/U Dr. Monaco 4 weeks      6. Uncontrolled DM-2: A1C 9.2%, last A1C 12.4% per Dr. Mata  -The patient reported that he took both 7030 insulin and Levemir at home as ell  as Januvia and metformin  -While here the patient remained on Levemir 30 units twice daily and Tradjenta  5 mg daily (substituted for januvia)  -AM glucose remained variable, will continue this dosing, continue to hold  metformin and likely adjust after infection is cleared  -Continue [facility] diet  F/U LEATHA Darden 1 week    7. ROLA on CKD stage 3:  Resolved with hydration  Baseline creatinine per Dr. Mata 1/4/17 was 1.4, 1.85 on admit, now 1.38  F/U LEATHA Darden 1 week      8. COPD: No acute issues, on duonebs every 6 hours as needed      9. Hypothyroidism: TSH normal on home levothyroxine 100 mcg daily      10. Hypertension: As per number 4, BP will likely need to stay on higher side to accommodate orthostasis  Overall at goal on home lisinopril 20 mg daily/ Norvasc 2.5mg daily  Metoprolol succinate 12.5 mg nightly started today per cardiology  Continue to monitor closely at home by HH  F/U LEATHA Darden with BP log  F/U Dr. Monaco 4 weeks  Monitor.      11. Dementia: no acute issue on home aricept 5 mg nightly      12. Obesity:  Body mass index is 38.13 kg/(m^2).   Nutrition followed, continued on [facility] diet with prostat twice daily  Continue monitoring by HH    13. Constipation: Miralax and colace added here, continue.  F/U LEATHA Darden      14. 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 " Hypokalemia: resolved with oral replacement  Likely secondary to IV lasix    16. No UTI: Urine culture showed no growth.      Last Lab Results:   Lab Results (most recent)     Procedure Component Value Units Date/Time    CBC & Differential [[id number]] Collected:  11/21/17 1557    Specimen:  Blood Updated:  11/21/17 1604    Narrative:       The following orders were created for panel order CBC & Differential.  Procedure                               Abnormality         Status                     ---------                               -----------         ------                     CBC Auto Differential[[id number]]        Abnormal            Final result                 Please view results for these tests on the individual orders.    CBC Auto Differential [[id number]]  (Abnormal) Collected:  11/21/17 1557    Specimen:  Blood Updated:  11/21/17 1604     WBC 21.88 (H) 10*3/mm3      RBC 4.91 10*6/mm3      Hemoglobin 14.3 g/dL      Hematocrit 43.0 %      MCV 87.6 fL      MCH 29.1 pg      MCHC 33.3 g/dL      RDW 13.2 %      RDW-SD 41.8 fl      MPV 10.8 (H) fL      Platelets 169 10*3/mm3      Neutrophil % 91.3 (H) %      Lymphocyte % 2.3 (L) %      Monocyte % 5.0 %      Eosinophil % 0.0 %      Basophil % 0.2 %      Immature Grans % 1.2 (H) %      Neutrophils, Absolute 19.97 (H) 10*3/mm3      Lymphocytes, Absolute 0.50 (L) 10*3/mm3      Monocytes, Absolute 1.10 (H) 10*3/mm3      Eosinophils, Absolute 0.00 (L) 10*3/mm3      Basophils, Absolute 0.04 10*3/mm3      Immature Grans, Absolute 0.27 (H) 10*3/mm3      nRBC 0.0 /100 WBC     Protime-INR [[id number]]  (Abnormal) Collected:  11/21/17 1557    Specimen:  Blood Updated:  11/21/17 1624     Protime 14.3 Seconds      INR 1.11 (H)    Narrative:       Therapeutic Ranges for INR: 2.0-3.0 (PT 20-30)                              2.5-3.5 (PT 25-34)    aPTT [[id number]]  (Normal) Collected:  11/21/17 1557    Specimen:  Blood Updated:  11/21/17 1624     PTT 33.3 seconds     Narrative:        PTT = The equivalent PTT values for the therapeutic range of heparin levels at 0.1 to 0.7 U/ml are 53 to 110 seconds.    Urinalysis With / Culture If Indicated - Urine, Catheter [[id number]]  (Abnormal) Collected:  11/21/17 1552    Specimen:  Urine from Urine, Catheter Updated:  11/21/17 1624     Color, UA Yellow     Appearance, UA Clear     pH, UA 8.5 (H)     Specific Gravity, UA 1.020     Glucose,  mg/dL (2+) (A)     Ketones, UA 40 mg/dL (2+) (A)     Bilirubin, UA Negative     Blood, UA Large (3+) (A)     Protein, UA >=300 mg/dL (3+) (A)     Leuk Esterase, UA Negative     Nitrite, UA Negative     Urobilinogen, UA 0.2 E.U./dL    Urinalysis, Microscopic Only - Urine, Clean Catch [[id number]]  (Abnormal) Collected:  11/21/17 1552    Specimen:  Urine from Urine, Catheter Updated:  11/21/17 1624     RBC, UA Too Numerous to Count (A) /HPF      WBC, UA 3-5 (A) /HPF      Bacteria, UA 1+ (A) /HPF      Squamous Epithelial Cells, UA 3-6 (A) /HPF      Hyaline Casts, UA None Seen /LPF      Methodology Manual Light Microscopy    Lactic Acid, Plasma [[id number]]  (Abnormal) Collected:  11/21/17 1557    Specimen:  Blood Updated:  11/21/17 1631     Lactate 3.1 (C) mmol/L     Comprehensive Metabolic Panel [[id number]]  (Abnormal) Collected:  11/21/17 1557    Specimen:  Blood Updated:  11/21/17 1631     Glucose 330 (H) mg/dL      BUN 15 mg/dL      Creatinine 1.85 (H) mg/dL      Sodium 136 mmol/L      Potassium 4.6 mmol/L      Chloride 93 (L) mmol/L      CO2 24.6 mmol/L      Calcium 9.6 mg/dL      Total Protein 7.6 g/dL      Albumin 3.90 g/dL      ALT (SGPT) 17 U/L      AST (SGOT) 34 U/L       Specimen hemolyzed.  Results may be affected.        Alkaline Phosphatase 62 U/L      Total Bilirubin 0.6 mg/dL      eGFR Non African Amer 36 (L) mL/min/1.73      Globulin 3.7 gm/dL      A/G Ratio 1.1 g/dL      BUN/Creatinine Ratio 8.1     Anion Gap 18.4 mmol/L     Narrative:       The MDRD GFR formula is only valid for adults with stable  renal function between ages 18 and 70.    Troponin [[id number]]  (Normal) Collected:  11/21/17 1557    Specimen:  Blood Updated:  11/21/17 1631     Troponin T 0.014 ng/mL     Narrative:       Troponin T Reference Ranges:  Less than 0.03 ng/mL:    Negative for AMI  0.03 to 0.09 ng/mL:      Indeterminant for AMI  Greater than 0.09 ng/mL: Positive for AMI    Influenza Antigen, Rapid - Swab, Nasopharynx [[id number]]  (Normal) Collected:  11/21/17 1610    Specimen:  Swab from Nasopharynx Updated:  11/21/17 1643     Influenza A Ag, EIA Negative     Influenza B Ag, EIA Negative    [place] Draw [[id number]] Collected:  11/21/17 1557    Specimen:  Blood Updated:  11/21/17 1701    Narrative:       The following orders were created for panel order [place] Draw.  Procedure                               Abnormality         Status                     ---------                               -----------         ------                     Light Blue Top[[id number]]                                                              Green Top (Gel)[[id number]]                                  Final result               Lavender Top[[id number]]                                     Final result               Gold Top - SST[[id number]]                                                                Please view results for these tests on the individual orders.    Green Top (Gel) [[id number]] Collected:  11/21/17 1557    Specimen:  Blood Updated:  11/21/17 1701     Extra Tube Hold for add-ons.      Auto resulted.       Lavender Top [[id number]] Collected:  11/21/17 1557    Specimen:  Blood Updated:  11/21/17 1701     Extra Tube hold for add-on      Auto resulted       TSH [[id number]]  (Normal) Collected:  11/21/17 1557    Specimen:  Blood Updated:  11/21/17 1711     TSH 2.950 mIU/mL     Urine Drug Screen - [[id number]]  (Normal) Collected:  11/21/17 1756    Specimen:  Urine Updated:  11/21/17 1809     THC, Screen, Urine Negative     Phencyclidine (PCP),  Urine Negative     Cocaine Screen, Urine Negative     Methamphetamine, Urine Negative     Opiate Screen Negative     Amphetamine Screen, Urine Negative     Benzodiazepine Screen, Urine Negative     Tricyclic Antidepressants Screen Negative     Methadone Screen, Urine Negative     Barbiturates Screen, Urine Negative     Oxycodone Screen, Urine Negative     Propoxyphene Screen Negative     Buprenorphine, Screen, Urine Negative    Narrative:       Urine drug screen results are to be used for medical purposes only.  They are not to be used for legal purposes such as employment testing.  Negative results do not necessarily mean the complete absence of a subtance, but rather that the result is less than the cutoff for that substance.  Positive results are unconfirmed and considered Preliminary Positive.  [facility] does not automatically confirm Postitive Unconfirmed results.  The physician may request (order) an Unconfirmed Positive result to be sent out for confirmation.      Negative Thresholds for Drugs Screened:    THC screen, urine                          50 ng/ml  Phenycyclidine (PCP), urine                25 ng/ml  Cocaine screen, urine                     150 ng/ml  Methamphetamine, urine                    500 ng/ml  Opiate screen, urine                      100 ng/ml  Amphetamine screen, urine                 500 ng/ml  Benzodiazepine screen, urine              150 ng/ml  Tricyclic Antidepressants screen, urine   300 ng/ml  Methadone screen, urine                   200 ng/ml  Barbiturates screen, urine                200 ng/ml  Oxycodone screen, urine                   100 ng/ml  Propoxyphene screen, urine                300 ng/ml  Buprenorphine screen, urine                10 ng/ml    Lactic Acid, Reflex Timer [[id number]] Collected:  11/21/17 1557    Specimen:  Blood Updated:  11/21/17 1946     Extra Tube Hold for add-ons.      Auto resulted.       Lactic Acid, Reflex [[id number]]  (Abnormal)  Collected:  11/21/17 2011    Specimen:  Blood Updated:  11/21/17 2050     Lactate 2.9 (C) mmol/L     POC Glucose Fingerstick [[id number]]  (Abnormal) Collected:  11/21/17 2107    Specimen:  Blood Updated:  11/21/17 2113     Glucose 312 (H) mg/dL     Narrative:       Meter: [id number] : [id number] Casi LIV[place]    Procalcitonin [[id number]]  (Abnormal) Collected:  11/21/17 2058    Specimen:  Blood Updated:  11/21/17 2130     Procalcitonin 2.33 (C) ng/mL     Narrative:       As a Marker for Sepsis (Non-Neonates):   1. <0.5 ng/mL represents a low risk of severe sepsis and/or septic shock.  2. >2 ng/mL represents a high risk of severe sepsis and/or septic shock.    As a Marker for Lower Respiratory Tract Infections that require antibiotic therapy:    PCT on Admission     Antibiotic Therapy       6-12 Hrs later  > 0.5                Strongly Recommended             >0.25 - <0.5         Recommended  0.1 - 0.25           Discouraged              Remeasure/reassess PCT  <0.1                 Strongly Discouraged     Remeasure/reassess PCT                     PCT values of < 0.5 ng/mL do not exclude an infection, because localized infections (without systemic signs) may be associated with such low concentrations, or a systemic infection in its initial stages (< 6 hours). Furthermore, increased PCT can occur without infection. PCT concentrations between 0.5 and 2.0 ng/mL should be interpreted taking into account the patient's history. It is recommended to retest PCT within 6-24 hours if any concentrations < 2 ng/mL are obtained.    Lactic Acid, Plasma [[id number]]  (Abnormal) Collected:  11/22/17 0415    Specimen:  Blood Updated:  11/22/17 0444     Lactate 2.8 (C) mmol/L     CBC Auto Differential [[id number]]  (Abnormal) Collected:  11/22/17 0415    Specimen:  Blood Updated:  11/22/17 0450     WBC 18.07 (H) 10*3/mm3      RBC 4.40 (L) 10*6/mm3      Hemoglobin 12.9 (L) g/dL      Hematocrit 39.0 (L) %      MCV 88.6 fL       MCH 29.3 pg      MCHC 33.1 g/dL      RDW 13.4 %      RDW-SD 43.6 fl      MPV 10.8 (H) fL      Platelets 154 10*3/mm3      Neutrophil % 89.1 (H) %      Lymphocyte % 4.5 (L) %      Monocyte % 5.0 %      Eosinophil % 0.0 %      Basophil % 0.2 %      Immature Grans % 1.2 (H) %      Neutrophils, Absolute 16.08 (H) 10*3/mm3      Lymphocytes, Absolute 0.82 10*3/mm3      Monocytes, Absolute 0.91 10*3/mm3      Eosinophils, Absolute 0.00 (L) 10*3/mm3      Basophils, Absolute 0.04 10*3/mm3      Immature Grans, Absolute 0.22 (H) 10*3/mm3      nRBC 0.0 /100 WBC     Hemoglobin A1c [[id number]]  (Abnormal) Collected:  11/22/17 0415    Specimen:  Blood Updated:  11/22/17 0529     Hemoglobin A1C 9.20 (H) %     Narrative:       Hemoglobin A1C Ranges:    Increased Risk for Diabetes  5.7% to 6.4%  Diabetes                     >= 6.5%  Diabetic Goal                < 7.0%    Basic Metabolic Panel [[id number]]  (Abnormal) Collected:  11/22/17 0415    Specimen:  Blood Updated:  11/22/17 0534     Glucose 312 (H) mg/dL      BUN 17 mg/dL      Creatinine 1.64 (H) mg/dL      Sodium 139 mmol/L      Potassium 4.1 mmol/L      Chloride 99 mmol/L      CO2 27.8 mmol/L      Calcium 8.6 (L) mg/dL      eGFR Non African Amer 41 (L) mL/min/1.73      BUN/Creatinine Ratio 10.4     Anion Gap 12.2 mmol/L     Narrative:       The MDRD GFR formula is only valid for adults with stable renal function between ages 18 and 70.    POC Glucose Fingerstick [[id number]]  (Abnormal) Collected:  11/22/17 0804    Specimen:  Blood Updated:  11/22/17 0810     Glucose 294 (H) mg/dL     Narrative:       Meter: [id number] : [id number] Allen Menchaca Nursing Assistant    Respiratory Panel, PCR - Swab, Nasopharynx [[id number]]  (Normal) Collected:  11/21/17 2014    Specimen:  Swab from Nasopharynx Updated:  11/22/17 0905     ADENOVIRUS, PCR Not Detected     Coronavirus 229E Not Detected     Coronavirus HKU1 Not Detected     Coronavirus NL63 Not Detected     Coronavirus  OC43 Not Detected     Human Metapneumovirus Not Detected     Human Rhinovirus/Enterovirus Not Detected     Influenza B PCR Not Detected     Parainfluenza Virus 1 Not Detected     Parainfluenza Virus 2 Not Detected     Parainfluenza Virus 3 Not Detected     Parainfluenza Virus 4 Not Detected     Bordetella pertussis pcr Not Detected     Influenza 2009 H1N1 by PCR Not Detected     Chlamydophila pneumoniae PCR Not Detected     Mycoplasma pneumo by PCR Not Detected     Influenza A PCR Not Detected     Influenza A H3 Not Detected     Influenza A H1 Not Detected     RSV, PCR Not Detected    Blood Culture ID, PCR - Blood, [[id number]]  (Abnormal) Collected:  11/21/17 1557    Specimen:  Blood from Arm, Right Updated:  11/22/17 1041     BCID, PCR Streptococcus spp, not A, B, or pneumoniae. Identification by BCID PCR. (C)    POC Glucose Fingerstick [[id number]]  (Abnormal) Collected:  11/22/17 1157    Specimen:  Blood Updated:  11/22/17 1206     Glucose 264 (H) mg/dL     Narrative:       Meter: [id number] : [id number] Americo Andres RN Validator    Troponin [[id number]]  (Abnormal) Collected:  11/22/17 1256    Specimen:  Blood Updated:  11/22/17 1334     Troponin T 0.141 (C) ng/mL     Narrative:       Troponin T Reference Ranges:  Less than 0.03 ng/mL:    Negative for AMI  0.03 to 0.09 ng/mL:      Indeterminant for AMI  Greater than 0.09 ng/mL: Positive for AMI    POC Glucose Fingerstick [[id number]]  (Abnormal) Collected:  11/22/17 1652    Specimen:  Blood Updated:  11/22/17 1659     Glucose 238 (H) mg/dL     Narrative:       Meter: [id number] : [id number] Allen Menchaca Nursing Assistant    Troponin [[id number]]  (Abnormal) Collected:  11/22/17 1805    Specimen:  Blood Updated:  11/22/17 1836     Troponin T 0.115 (C) ng/mL     Narrative:       Troponin T Reference Ranges:  Less than 0.03 ng/mL:    Negative for AMI  0.03 to 0.09 ng/mL:      Indeterminant for AMI  Greater than 0.09 ng/mL: Positive for AMI    POC Glucose  Fingerstick [[id number]]  (Abnormal) Collected:  11/22/17 2039    Specimen:  Blood Updated:  11/22/17 2046     Glucose 208 (H) mg/dL     Narrative:       Meter: [id number] : [id number] Pati LIVINGSTON    Troponin [[id number]]  (Abnormal) Collected:  11/22/17 2355    Specimen:  Blood Updated:  11/23/17 0026     Troponin T 0.099 (H) ng/mL     Narrative:       Troponin T Reference Ranges:  Less than 0.03 ng/mL:    Negative for AMI  0.03 to 0.09 ng/mL:      Indeterminant for AMI  Greater than 0.09 ng/mL: Positive for AMI    CBC & Differential [[id number]] Collected:  11/23/17 0420    Specimen:  Blood Updated:  11/23/17 0442    Narrative:       The following orders were created for panel order CBC & Differential.  Procedure                               Abnormality         Status                     ---------                               -----------         ------                     CBC Auto Differential[[id number]]        Abnormal            Final result                 Please view results for these tests on the individual orders.    CBC Auto Differential [[id number]]  (Abnormal) Collected:  11/23/17 0420    Specimen:  Blood Updated:  11/23/17 0442     WBC 12.56 (H) 10*3/mm3      RBC 3.82 (L) 10*6/mm3      Hemoglobin 11.3 (L) g/dL      Hematocrit 34.2 (L) %      MCV 89.5 fL      MCH 29.6 pg      MCHC 33.0 g/dL      RDW 13.5 %      RDW-SD 44.2 fl      MPV 10.8 (H) fL      Platelets 148 10*3/mm3      Neutrophil % 77.2 (H) %      Lymphocyte % 11.6 (L) %      Monocyte % 7.6 %      Eosinophil % 2.9 %      Basophil % 0.2 %      Immature Grans % 0.5 %      Neutrophils, Absolute 9.70 (H) 10*3/mm3      Lymphocytes, Absolute 1.46 10*3/mm3      Monocytes, Absolute 0.95 10*3/mm3      Eosinophils, Absolute 0.36 (H) 10*3/mm3      Basophils, Absolute 0.03 10*3/mm3      Immature Grans, Absolute 0.06 (H) 10*3/mm3      nRBC 0.0 /100 WBC     Comprehensive Metabolic Panel [[id number]]  (Abnormal) Collected:  11/23/17 0424     Specimen:  Blood Updated:  11/23/17 0512     Glucose 161 (H) mg/dL      BUN 19 mg/dL      Creatinine 1.71 (H) mg/dL      Sodium 137 mmol/L      Potassium 3.6 mmol/L      Chloride 102 mmol/L      CO2 26.6 mmol/L      Calcium 8.2 (L) mg/dL      Total Protein 6.0 g/dL      Albumin 2.90 (L) g/dL      ALT (SGPT) 22 U/L      AST (SGOT) 59 (H) U/L      Alkaline Phosphatase 44 U/L      Total Bilirubin 0.4 mg/dL      eGFR Non African Amer 39 (L) mL/min/1.73      Globulin 3.1 gm/dL      A/G Ratio 0.9 g/dL      BUN/Creatinine Ratio 11.1     Anion Gap 8.4 mmol/L     Narrative:       The MDRD GFR formula is only valid for adults with stable renal function between ages 18 and 70.    POC Glucose Fingerstick [[id number]]  (Abnormal) Collected:  11/23/17 0736    Specimen:  Blood Updated:  11/23/17 0743     Glucose 182 (H) mg/dL     Narrative:       Meter: [id number] : [id number] Meeta Finn NURSING ASSISTANT    Urine Culture - Urine, Urine, Clean Catch [[id number]]  (Normal) Collected:  11/21/17 1552    Specimen:  Urine from Urine, Catheter Updated:  11/23/17 1000     Urine Culture No growth    POC Glucose Fingerstick [[id number]]  (Abnormal) Collected:  11/23/17 1145    Specimen:  Blood Updated:  11/23/17 1151     Glucose 229 (H) mg/dL     Narrative:       Meter: [id number] : [id number] Meeta Finn NURSING ASSISTANT    Troponin [[id number]]  (Abnormal) Collected:  11/23/17 1327    Specimen:  Blood Updated:  11/23/17 1400     Troponin T 0.068 (H) ng/mL     Narrative:       Troponin T Reference Ranges:  Less than 0.03 ng/mL:    Negative for AMI  0.03 to 0.09 ng/mL:      Indeterminant for AMI  Greater than 0.09 ng/mL: Positive for AMI    Lactic Acid, Plasma [[id number]]  (Normal) Collected:  11/23/17 1605    Specimen:  Blood Updated:  11/23/17 1623     Lactate 1.3 mmol/L     S. Pneumo Ag Urine or CSF - Urine, Urine, Clean Catch [[id number]]  (Normal) Collected:  11/23/17 1552    Specimen:  Urine from Urine, Clean Catch Updated:   11/23/17 1627     Strep Pneumo Ag Negative    POC Glucose Fingerstick [[id number]]  (Abnormal) Collected:  11/23/17 1634    Specimen:  Blood Updated:  11/23/17 1642     Glucose 266 (H) mg/dL     Narrative:       Meter: [id number] : [id number] Meeta Finn NURSING ASSISTANT    Procalcitonin [[id number]]  (Abnormal) Collected:  11/23/17 1327    Specimen:  Blood Updated:  11/23/17 1708     Procalcitonin 7.81 (C) ng/mL     Narrative:       As a Marker for Sepsis (Non-Neonates):   1. <0.5 ng/mL represents a low risk of severe sepsis and/or septic shock.  2. >2 ng/mL represents a high risk of severe sepsis and/or septic shock.    As a Marker for Lower Respiratory Tract Infections that require antibiotic therapy:    PCT on Admission     Antibiotic Therapy       6-12 Hrs later  > 0.5                Strongly Recommended             >0.25 - <0.5         Recommended  0.1 - 0.25           Discouraged              Remeasure/reassess PCT  <0.1                 Strongly Discouraged     Remeasure/reassess PCT                     PCT values of < 0.5 ng/mL do not exclude an infection, because localized infections (without systemic signs) may be associated with such low concentrations, or a systemic infection in its initial stages (< 6 hours). Furthermore, increased PCT can occur without infection. PCT concentrations between 0.5 and 2.0 ng/mL should be interpreted taking into account the patient's history. It is recommended to retest PCT within 6-24 hours if any concentrations < 2 ng/mL are obtained.    POC Glucose Fingerstick [[id number]]  (Abnormal) Collected:  11/23/17 2006    Specimen:  Blood Updated:  11/23/17 2015     Glucose 270 (H) mg/dL     Narrative:       Meter: [id number] : [id number] Ishaan Piedra NURSING ASSISTANT    CBC & Differential [[id number]] Collected:  11/24/17 0623    Specimen:  Blood Updated:  11/24/17 0631    Narrative:       The following orders were created for panel order CBC & Differential.  Procedure                                Abnormality         Status                     ---------                               -----------         ------                     CBC Auto Differential[[id number]]        Abnormal            Final result                 Please view results for these tests on the individual orders.    CBC Auto Differential [[id number]]  (Abnormal) Collected:  11/24/17 0623    Specimen:  Blood Updated:  11/24/17 0631     WBC 7.76 10*3/mm3      RBC 3.92 (L) 10*6/mm3      Hemoglobin 11.6 (L) g/dL      Hematocrit 34.5 (L) %      MCV 88.0 fL      MCH 29.6 pg      MCHC 33.6 g/dL      RDW 13.2 %      RDW-SD 42.5 fl      MPV 10.6 (H) fL      Platelets 140 10*3/mm3      Neutrophil % 71.3 (H) %      Lymphocyte % 14.6 (L) %      Monocyte % 8.0 %      Eosinophil % 5.4 (H) %      Basophil % 0.3 %      Immature Grans % 0.4 %      Neutrophils, Absolute 5.54 10*3/mm3      Lymphocytes, Absolute 1.13 10*3/mm3      Monocytes, Absolute 0.62 10*3/mm3      Eosinophils, Absolute 0.42 (H) 10*3/mm3      Basophils, Absolute 0.02 10*3/mm3      Immature Grans, Absolute 0.03 10*3/mm3      nRBC 0.0 /100 WBC     Blood Culture - Blood, [[id number]]  (Abnormal)  (Susceptibility) Collected:  11/21/17 1557    Specimen:  Blood from Arm, Right Updated:  11/24/17 0640     Blood Culture --      Streptococcus dysgalactiae ssp equisimilis (A)     Gram Stain Result Aerobic Bottle Gram positive cocci in chains    Susceptibility      Streptococcus dysgalactiae ssp equisimilis     ALINE     Ceftriaxone <=0.12 ug/ml Susceptible     Clindamycin <=0.25 ug/ml Susceptible     Erythromycin <=0.12 ug/ml Susceptible     Levofloxacin 0.5 ug/ml Susceptible     Penicillin G <=0.06 ug/ml Susceptible     Vancomycin 0.5 ug/ml Susceptible                    Basic Metabolic Panel [[id number]]  (Abnormal) Collected:  11/24/17 0622    Specimen:  Blood Updated:  11/24/17 0645     Glucose 140 (H) mg/dL      BUN 16 mg/dL      Creatinine 1.50 (H) mg/dL      Sodium 137  mmol/L      Potassium 3.3 (L) mmol/L      Chloride 102 mmol/L      CO2 24.3 mmol/L      Calcium 8.3 (L) mg/dL      eGFR Non African Amer 46 (L) mL/min/1.73      BUN/Creatinine Ratio 10.7     Anion Gap 10.7 mmol/L     Narrative:       The MDRD GFR formula is only valid for adults with stable renal function between ages 18 and 70.    POC Glucose Fingerstick [[id number]]  (Abnormal) Collected:  11/24/17 0707    Specimen:  Blood Updated:  11/24/17 0714     Glucose 137 (H) mg/dL     Narrative:       Meter: [id number] : [id number] Meeta Wattie NURSING ASSISTANT    POC Glucose Fingerstick [[id number]]  (Abnormal) Collected:  11/24/17 1115    Specimen:  Blood Updated:  11/24/17 1122     Glucose 190 (H) mg/dL     Narrative:       Meter: [id number] : [id number] Meeta Wattie NURSING ASSISTANT    Magnesium [[id number]]  (Normal) Collected:  11/24/17 0622    Specimen:  Blood Updated:  11/24/17 1316     Magnesium 1.9 mg/dL     POC Glucose Fingerstick [[id number]]  (Abnormal) Collected:  11/24/17 1612    Specimen:  Blood Updated:  11/24/17 1618     Glucose 225 (H) mg/dL     Narrative:       Meter: [id number] : [id number] Meeta Wattie NURSING ASSISTANT    POC Glucose Fingerstick [[id number]]  (Abnormal) Collected:  11/24/17 2015    Specimen:  Blood Updated:  11/24/17 2021     Glucose 286 (H) mg/dL     Narrative:       Meter: [id number] : [id number] Casi LIVINGSTON    CBC & Differential [[id number]] Collected:  11/25/17 0419    Specimen:  Blood Updated:  11/25/17 0434    Narrative:       The following orders were created for panel order CBC & Differential.  Procedure                               Abnormality         Status                     ---------                               -----------         ------                     CBC Auto Differential[[id number]]        Abnormal            Final result                 Please view results for these tests on the individual orders.    CBC Auto Differential  [[id number]]  (Abnormal) Collected:  11/25/17 0419    Specimen:  Blood Updated:  11/25/17 0434     WBC 8.18 10*3/mm3      RBC 4.25 (L) 10*6/mm3      Hemoglobin 12.4 (L) g/dL      Hematocrit 37.3 (L) %      MCV 87.8 fL      MCH 29.2 pg      MCHC 33.2 g/dL      RDW 13.2 %      RDW-SD 41.9 fl      MPV 10.3 fL      Platelets 187 10*3/mm3      Neutrophil % 66.0 %      Lymphocyte % 18.1 (L) %      Monocyte % 8.7 (H) %      Eosinophil % 6.1 (H) %      Basophil % 0.4 %      Immature Grans % 0.7 (H) %      Neutrophils, Absolute 5.40 10*3/mm3      Lymphocytes, Absolute 1.48 10*3/mm3      Monocytes, Absolute 0.71 10*3/mm3      Eosinophils, Absolute 0.50 (H) 10*3/mm3      Basophils, Absolute 0.03 10*3/mm3      Immature Grans, Absolute 0.06 (H) 10*3/mm3      nRBC 0.0 /100 WBC     Procalcitonin [[id number]]  (Abnormal) Collected:  11/25/17 0419    Specimen:  Blood Updated:  11/25/17 0508     Procalcitonin 3.27 (C) ng/mL     Narrative:       As a Marker for Sepsis (Non-Neonates):   1. <0.5 ng/mL represents a low risk of severe sepsis and/or septic shock.  2. >2 ng/mL represents a high risk of severe sepsis and/or septic shock.    As a Marker for Lower Respiratory Tract Infections that require antibiotic therapy:    PCT on Admission     Antibiotic Therapy       6-12 Hrs later  > 0.5                Strongly Recommended             >0.25 - <0.5         Recommended  0.1 - 0.25           Discouraged              Remeasure/reassess PCT  <0.1                 Strongly Discouraged     Remeasure/reassess PCT                     PCT values of < 0.5 ng/mL do not exclude an infection, because localized infections (without systemic signs) may be associated with such low concentrations, or a systemic infection in its initial stages (< 6 hours). Furthermore, increased PCT can occur without infection. PCT concentrations between 0.5 and 2.0 ng/mL should be interpreted taking into account the patient's history. It is recommended to retest PCT within  6-24 hours if any concentrations < 2 ng/mL are obtained.    Basic Metabolic Panel [[id number]]  (Abnormal) Collected:  11/25/17 0419    Specimen:  Blood Updated:  11/25/17 0515     Glucose 163 (H) mg/dL      BUN 17 mg/dL      Creatinine 1.60 (H) mg/dL      Sodium 137 mmol/L      Potassium 3.9 mmol/L      Chloride 99 mmol/L      CO2 27.0 mmol/L      Calcium 9.0 mg/dL      eGFR Non African Amer 42 (L) mL/min/1.73      BUN/Creatinine Ratio 10.6     Anion Gap 11.0 mmol/L     Narrative:       The MDRD GFR formula is only valid for adults with stable renal function between ages 18 and 70.    POC Glucose Fingerstick [[id number]]  (Abnormal) Collected:  11/25/17 0718    Specimen:  Blood Updated:  11/25/17 0731     Glucose 148 (H) mg/dL     Narrative:       Meter: [id number] : [id number] Meeta Finn NURSING ASSISTANT    POC Glucose Fingerstick [[id number]]  (Abnormal) Collected:  11/25/17 1152    Specimen:  Blood Updated:  11/25/17 1159     Glucose 222 (H) mg/dL     Narrative:       Meter: [id number] : [id number] Meeta Finn NURSING ASSISTANT    POC Glucose Fingerstick [[id number]]  (Abnormal) Collected:  11/25/17 1617    Specimen:  Blood Updated:  11/25/17 1626     Glucose 236 (H) mg/dL     Narrative:       Meter: [id number] : [id number] Meeta Finn NURSING ASSISTANT    POC Glucose Fingerstick [[id number]]  (Abnormal) Collected:  11/25/17 1958    Specimen:  Blood Updated:  11/25/17 2004     Glucose 238 (H) mg/dL     Narrative:       Meter: [id number] : [id number] Edie Juarez CNA    POC Glucose Fingerstick [[id number]]  (Abnormal) Collected:  11/26/17 0724    Specimen:  Blood Updated:  11/26/17 0729     Glucose 133 (H) mg/dL     Narrative:       Meter: [id number] : [id number] Mary Dorsey NURSING ASSISTANT    POC Glucose Fingerstick [[id number]]  (Abnormal) Collected:  11/26/17 1119    Specimen:  Blood Updated:  11/26/17 1125     Glucose 223 (H) mg/dL     Narrative:       Meter: [id number] :  [id number] Guerrero Concepcion RN    Blood Culture - Blood, [[id number]]  (Normal) Collected:  11/21/17 1606    Specimen:  Blood from Wrist, Right Updated:  11/26/17 1616     Blood Culture No growth at 5 days    POC Glucose Fingerstick [[id number]]  (Abnormal) Collected:  11/26/17 1657    Specimen:  Blood Updated:  11/26/17 1703     Glucose 173 (H) mg/dL     Narrative:       Meter: [id number] : [id number] Guerrero Concepcion RN    POC Glucose Fingerstick [[id number]]  (Abnormal) Collected:  11/26/17 2053    Specimen:  Blood Updated:  11/26/17 2102     Glucose 241 (H) mg/dL     Narrative:       Meter: [id number] : [id number] Lance KELLY    CBC & Differential [[id number]] Collected:  11/27/17 0400    Specimen:  Blood Updated:  11/27/17 0452    Narrative:       The following orders were created for panel order CBC & Differential.  Procedure                               Abnormality         Status                     ---------                               -----------         ------                     CBC Auto Differential[[id number]]        Abnormal            Final result                 Please view results for these tests on the individual orders.    CBC Auto Differential [[id number]]  (Abnormal) Collected:  11/27/17 0400    Specimen:  Blood Updated:  11/27/17 0452     WBC 8.64 10*3/mm3      RBC 4.19 (L) 10*6/mm3      Hemoglobin 12.2 (L) g/dL      Hematocrit 37.1 (L) %      MCV 88.5 fL      MCH 29.1 pg      MCHC 32.9 g/dL      RDW 13.1 %      RDW-SD 41.6 fl      MPV 10.5 (H) fL      Platelets 193 10*3/mm3      Neutrophil % 66.6 %      Lymphocyte % 16.4 (L) %      Monocyte % 9.4 (H) %      Eosinophil % 5.1 (H) %      Basophil % 0.8 %      Immature Grans % 1.7 (H) %      Neutrophils, Absolute 5.75 10*3/mm3      Lymphocytes, Absolute 1.42 10*3/mm3      Monocytes, Absolute 0.81 10*3/mm3      Eosinophils, Absolute 0.44 (H) 10*3/mm3      Basophils, Absolute 0.07 10*3/mm3      Immature Grans, Absolute 0.15 (H) 10*3/mm3      nRBC 0.0  /100 WBC     Basic Metabolic Panel [[id number]]  (Abnormal) Collected:  11/27/17 0400    Specimen:  Blood Updated:  11/27/17 0536     Glucose 239 (H) mg/dL      BUN 16 mg/dL      Creatinine 1.38 (H) mg/dL      Sodium 136 mmol/L      Potassium 3.8 mmol/L      Chloride 98 mmol/L      CO2 25.0 mmol/L      Calcium 9.1 mg/dL      eGFR Non African Amer 50 (L) mL/min/1.73      BUN/Creatinine Ratio 11.6     Anion Gap 13.0 mmol/L     Narrative:       The MDRD GFR formula is only valid for adults with stable renal function between ages 18 and 70.    Sedimentation Rate [[id number]]  (Abnormal) Collected:  11/27/17 0400    Specimen:  Blood Updated:  11/27/17 0545     Sed Rate 30 (H) mm/hr     C-reactive Protein [[id number]]  (Abnormal) Collected:  11/27/17 0400    Specimen:  Blood Updated:  11/27/17 0715     C-Reactive Protein 1.80 (H) mg/dL         Imaging Results (most recent)     Procedure Component Value Units Date/Time    CT Cervical Spine Without Contrast [[id number]] Collected:  11/21/17 1641     Updated:  11/21/17 1706    Narrative:       CERVICAL CT     HISTORY:  Patient fell off toilet today to 2:30 this afternoon. Patient has  altered mental status and is a poor historian. Patient currently  unresponsive.     TECHNIQUE:  Axial images were obtained through the cervical spine without contrast.  Multiplanar reformats were obtained. No comparison. Radiation dose  reduction techniques were utilized, including automated exposure control  and exposure modulation based on body size.     FINDINGS:  The exam is motion degraded. Alignment is within normal limits. There is  multilevel hypertrophic facet arthropathy. There are no acute fractures.  There are multilevel disc bulges and disc osteophyte complexes. The  findings are most pronounced at C6-7 with bilateral foraminal stenosis  and central canal narrowing.       Impression:       Motion degraded exam. Multilevel degenerative disease. No  acute fracture.     This report was  finalized on 11/21/2017 4:47 PM by Dr. Jaylen Gudino MD.       CT Head Without Contrast [[id number]] Collected:  11/21/17 1630     Updated:  11/21/17 1707    Narrative:       Head CT     INDICATION: Patient sat on 12 old day until he fell off at 2:30 this  afternoon. Subsequent mental status changes. Patient unresponsive and  unable to give history.     FINDINGS: Axial images were obtained from the base to the vertex without  contrast. COMPARISON made with 06/13/2010. Radiation dose reduction  techniques were utilized, including automated exposure control and  exposure modulation based on body size.     Ventricular size and configuration are normal. No acute infarct or  hemorrhage is seen. No masses. Atherosclerotic calcifications are  present in the carotid siphons. No skull fracture.       Impression:       No acute findings.     This report was finalized on 11/21/2017 4:35 PM by Dr. Jaylen Gudino MD.       XR Chest 1 View [[id number]] Collected:  11/21/17 1641     Updated:  11/21/17 1708    Narrative:       Chest x-ray     INDICATION: Patient sat on toilet all day until he fell off at 2:30 this  afternoon. Mental status changes and fever. Limited history as patient  is poor historian.     FINDINGS: AP upright view of the chest is compared with 01/26/2012. Lung  volumes are low. There is mild left base atelectasis or infiltrate.  Lungs are otherwise clear and no pneumothorax. Mild cardiomegaly.       Impression:       Mild cardiomegaly. Low volume inspiration mild infiltrate or  atelectasis at the left base.     This report was finalized on 11/21/2017 4:43 PM by Dr. Jaylen Gudino MD.       CT Chest Without Contrast [[id number]] Collected:  11/22/17 0633     Updated:  11/22/17 0640    Narrative:       CHEST CT     HISTORY:  Fever of unknown origin. Sepsis. Patient has history of COPD. Patient  sat on toilet all day and then fell off at 2:30 PM this afternoon.  Patient unresponsive and unable to give  history.     TECHNIQUE:  Axial noncontrast images were obtained through the chest. Multiplanar  reformats were obtained. No comparison chest CT. Radiation dose  reduction techniques were utilized, including automated exposure control  and exposure modulation based on body size.     FINDINGS:  There is atherosclerotic disease and coronary artery disease. No pleural  or pericardial effusion. No adenopathy. There is cardiomegaly. Lung  windows demonstrate emphysema. There is dependent atelectasis in both  lungs. There is more confluent left lower lobe atelectasis. No  convincing evidence of pneumonia. No fractures are identified. For  description of findings in the upper abdomen, please see the abdomen and  pelvis CT report dictated separately.       Impression:       1. Atherosclerotic disease and coronary artery disease.  2. The lungs are clear except for atelectasis as above. No convincing  pneumonia.     A preliminary report was provided by Dr. Roche at 1800 hours on  11/21/2017.     This report was finalized on 11/22/2017 6:38 AM by Dr. Jaylen Gudino MD.       CT Abdomen Pelvis Without Contrast [[id number]] Collected:  11/22/17 0639     Updated:  11/22/17 0647    Narrative:       CT ABDOMEN PELVIS     HISTORY:  Fever of unknown origin. Sepsis. Patient has history of COPD. Patient  sat on toilet all day and then fell off at 2:30 PM this afternoon.  Patient unresponsive and unable to give history        TECHNIQUE:  Axial noncontrast images were obtained through the abdomen and pelvis.  Multiplanar reformats were obtained. No comparison. Radiation dose  reduction techniques were utilized, including automated exposure control  and exposure modulation based on body size.     ABDOMEN FINDINGS:  For a description of findings in the lung bases, see the chest CT report  dictated separately. Gallstones are present within an otherwise  normal-appearing gallbladder. There is no biliary obstruction. There is  fatty  infiltration of the liver. No renal or ureteral stones are seen.  There is no hydronephrosis. Unenhanced solid organs otherwise are  normal. No free fluid or adenopathy is seen. There is atherosclerotic  disease, but there is no aortic aneurysm. The unopacified GI tract is  grossly normal.     PELVIS FINDINGS:  There is some fat stranding and skin thickening in the midline of the  ventral abdominal wall just above the umbilicus. Please correlate  clinically for etiology. This could reflect contusion or cellulitis.  Similar, but less pronounced, changes are noted just below the  umbilicus. Urinary bladder is normal. There are no lower ureteral  stones. There is no free fluid. The appendix is normal. The remainder of  the unopacified GI tract is normal as well. There is some fat stranding  in the right groin adjacent to the common femoral vasculature. This is  nonspecific but could indicate DVT. Consider follow-up with a Doppler  ultrasound. There is diffuse degenerative disease in the lumbar spine.       Impression:       1. Cholelithiasis.  2. No renal or ureteral stones. No hydronephrosis.  3. Hepatic steatosis.  4. Normal unopacified GI tract, including the appendix.  5. Fat stranding adjacent to the right common femoral vasculature. This  is nonspecific but could potentially indicate DVT. Consider follow-up  with right lower extremity venous Doppler ultrasound.  6. Areas of fat stranding in the ventral abdominal wall with skin  thickening above and below the umbilicus. Please correlate clinically  for evidence of contusion or cellulitis.     A preliminary report was provided by Dr. Roche at 1800 hours on  11/21/2017.     This report was finalized on 11/22/2017 6:45 AM by Dr. Jaylen Gudino MD.       US Venous Doppler Lower Extremity Right (duplex) [[id number]] Collected:  11/22/17 0858     Updated:  11/22/17 0901    Narrative:       Right lower and mid venous Doppler     INDICATION: Bilateral leg swelling right  greater than left for 4 weeks.  Abnormal CT demonstrating some fat stranding around the common femoral  vasculature on the right.     FINDINGS: Grayscale, color flow, and spectral Doppler waveform analysis  is performed of the right lower extremity venous system. All the  visualized venous structures demonstrate normal compressibility and  color flow. No superficial or deep venous thrombosis is seen. Calf vein  evaluation is limited due to patient body habitus.       Impression:       Negative right lower extremity venous Doppler. Please note  that there is limited visualization of the calf veins.     This report was finalized on 11/22/2017 8:59 AM by Dr. Jaylen Gudino MD.       US Renal Bilateral [[id number]] Collected:  11/22/17 1505     Updated:  11/22/17 1508    Narrative:       Renal ultrasound     INDICATION: Elevated creatinine of 1.6. Sepsis. Fever of unknown origin.     FINDINGS: Sonographic evaluation is performed of the kidneys in multiple  planes. A comparison is made with CT abdomen pelvis from 11/21/2017.     There is fatty infiltration of the liver. Right kidney measures 10.6 cm  in [id number] length. Left kidney measures 11.2 cm in [id number]-[id number]  length. Both are morphologically normal and nonobstructed. Urinary  bladder is not fully distended but it does appear grossly normal.       Impression:       Normal renal ultrasound.     This report was finalized on 11/22/2017 3:06 PM by Dr. Jaylen Gudino MD.       XR Chest PA & Lateral [[id number]] Collected:  11/24/17 0910     Updated:  11/24/17 0913    Narrative:       INDICATION:  Positive smoking history. Shortness of air.     COMPARISON:  11/21/2017     FINDINGS: PA and lateral views of the chest.  Heart and mediastinal  contours are normal.  Mild linear basilar atelectasis is similar to the  prior study.  No pneumothorax or pleural effusion.       Impression:       No acute findings. Mild basilar atelectasis/scarring.     This report was  finalized on 11/24/2017 9:11 AM by Dr. Lester Hugo MD.           PROCEDURES: NONE    Condition on Discharge:  Stable    Physical Exam at Discharge  Vital Signs  Temp:  [97.4 °F (36.3 °C)-98.1 °F (36.7 °C)] 97.4 °F (36.3 °C)  Heart Rate:  [63-78] 72  Resp:  [16-18] 18  BP: (136-184)/(67-82) 144/78    Physical Exam:  Physical Exam   Constitutional: Patient appears well-developed and well-nourished and in no acute distress, obese  HEENT:   Head: Normocephalic and atraumatic.   Eyes:  Pupils are equal, round, and reactive to light. EOM are intact. Sclera are anicteric and non-injected.  Mouth and Throat: Patient has moist mucous membranes. Oropharynx is clear of any erythema or exudate.     Neck: Neck supple. No JVD present. No thyromegaly present. No lymphadenopathy present.  Cardiovascular: Regular rate, regular rhythm, S1 normal and S2 normal.  Exam reveals no gallop and no friction rub.  No murmur heard.  Pulmonary/Chest: Lungs are clear to auscultation bilaterally. No respiratory distress. No wheezes. No rhonchi. No rales.   Abdominal: Obese, Soft. Bowel sounds are normal. No distension and no mass. There is no hepatosplenomegaly. There is no tenderness.   Musculoskeletal: Normal Muscle tone  Extremities:3+ bilateral LE edema, R>L. Pulses are palpable in all 4 extremities.  Neurological: Patient is alert and oriented to person, place, and time. Cranial nerves II-XII are grossly intact with no focal deficits.  Skin: Skin is warm. Red macular rash noted RLE mid shin to foot, mild erythema lower left leg as well, improving slowly. Nails show no clubbing. Bottom of feet/toes with excoriation, flakiness and cracked skin with several scabbed areas    Discharge Disposition  Home    Visiting Nurse:    Yes     Home PT/OT:  Yes     Home Safety Evaluation:  Yes     DME  None new    Discharge Diet:         Dietary Orders            Start     Ordered    11/27/17 0800  Dietary Nutrition Supplement: Prostat 101  Daily With  Breakfast & Lunch     Question:  Select Supplement:  Answer:  Prostat 101    11/27/17 0717    11/25/17 1124  Diet Regular; Cardiac, Consistent Carbohydrate  Diet Effective Now     Question Answer Comment   Diet Texture / Consistency Regular    Common Modifiers Cardiac    Common Modifiers Consistent Carbohydrate        11/25/17 1123        Activity at Discharge:  As tolerated    Pre-discharge education  Diabetic, Cardiac, Wound Care, medications, follow up    Follow-up Appointments  No future appointments.  Additional Instructions for the Follow-ups that You Need to Schedule     Discharge Follow-up with PCP    As directed    Follow Up Details:  1 week       Discharge Follow-up with Specified Provider: Dr. Monaco; 1 Month    As directed    To:  Dr. Monaco   Follow Up:  1 Month                 Test Results Pending at Discharge: NONE     LEATHA Hernandez  11/27/17  10:07 AM    Time: Discharge over 30 min (if over 30 minutes give explanation as to why it took greater than 30 minutes)  Secondary to:  Coordination of care/follow up  Medication reconciliation  D/W patient and family

## 2017-11-27 NOTE — PROGRESS NOTES
LOS: 6 days   Patient Care Team:  LEATHA Darden as PCP - General (Nurse Practitioner)    Chief Complaint:  F/u htn     Interval History:      No cp, breathing stable. Mild dizziness, no nausea. No tachy or palps.     Objective   Vital Signs  Temp:  [97.4 °F (36.3 °C)-98.1 °F (36.7 °C)] 97.4 °F (36.3 °C)  Heart Rate:  [63-78] 72  Resp:  [16-18] 18  BP: (136-184)/(67-82) 144/78    Intake/Output Summary (Last 24 hours) at 11/27/17 0808  Last data filed at 11/27/17 0520   Gross per 24 hour   Intake             1360 ml   Output                0 ml   Net             1360 ml       Comfortable NAD  Neck supple, no JVD or thyromegaly appreciated  S1/S2 RRR, no m/r/g  Lungs CTA B, normal effort  Abdomen S/NT/ND (+) BS, no HSM appreciated  Extremities warm, no clubbing, cyanosis, 2+ LE edema with erythema  No visible or palpable skin lesions  A/Ox4, mood and affect appropriate    Results Review:        Results from last 7 days  Lab Units 11/27/17  0400 11/25/17  0419 11/24/17  0622   SODIUM mmol/L 136 137 137   POTASSIUM mmol/L 3.8 3.9 3.3*   CHLORIDE mmol/L 98 99 102   CO2 mmol/L 25.0 27.0 24.3   BUN mg/dL 16 17 16   CREATININE mg/dL 1.38* 1.60* 1.50*   GLUCOSE mg/dL 239* 163* 140*   CALCIUM mg/dL 9.1 9.0 8.3*       Results from last 7 days  Lab Units 11/23/17  1327 11/22/17  2355 11/22/17  1805   TROPONIN T ng/mL 0.068* 0.099* 0.115*       Results from last 7 days  Lab Units 11/27/17  0400 11/25/17  0419 11/24/17  0623   WBC 10*3/mm3 8.64 8.18 7.76   HEMOGLOBIN g/dL 12.2* 12.4* 11.6*   HEMATOCRIT % 37.1* 37.3* 34.5*   PLATELETS 10*3/mm3 193 187 140       Results from last 7 days  Lab Units 11/21/17  1557   INR  1.11*   APTT seconds 33.3           Results from last 7 days  Lab Units 11/24/17  0622   MAGNESIUM mg/dL 1.9           I reviewed the patient's new clinical results.  I personally viewed and interpreted the patient's EKG/Telemetry data        Medication Review:     amLODIPine 2.5 mg Oral Q24H   ampicillin 1 g  Intravenous Q6H   aspirin 325 mg Oral Daily   atorvastatin 10 mg Oral Daily   clotrimazole  Topical Q12H   docusate sodium 100 mg Oral BID   donepezil 5 mg Oral Nightly   enoxaparin 30 mg Subcutaneous Q24H   gabapentin 600 mg Oral Q8H   insulin aspart 0-9 Units Subcutaneous 4x Daily AC & at Bedtime   insulin detemir 30 Units Subcutaneous BID   levothyroxine 100 mcg Oral Daily   linagliptin 5 mg Oral Daily   lisinopril 20 mg Oral Daily   polyethylene glycol 17 g Oral Daily   pregabalin 100 mg Oral Q12H   Vitamin D3 50,000 Units Oral Weekly            Assessment/Plan     Principal Problem:    Sepsis  Active Problems:    COPD (chronic obstructive pulmonary disease)    Diabetes mellitus    Hypertension    Acute renal failure    Elevated procalcitonin    Lactic acid acidosis    Leukocytosis       1.  Dizziness:  mildly orthostatic.  Could also be due to some autonomic dysfunction from poor diabetes control.     2.  Strep cellulitis:  Continue ampicillin.       3.  Diabetes Mellitus, Type 2: per medicine.      4.  ROLA on CKD III:  resolving.      5.  Morbid Obesity:  Appears to have declined dietary counseling.     6.  Hypothyroidism:  Continue current dose replacement.     7.  Chronic Diastolic Dysfunction:  Nothing acute.     8.  HTN:  add small dose of toprol xl to take at night.  Should not worsen orthostasis.     9. Troponin positive - stress as outpt.     F/u with DR. Monaco in 1 month.     Aline Lopez MD  11/27/17  8:08 AM

## 2017-11-27 NOTE — PLAN OF CARE
Problem: Inpatient Physical Therapy  Goal: Transfer Training Goal 1 STG- PT  Outcome: Unable to achieve outcome(s) by discharge Date Met:  11/27/17 11/23/17 1030 11/27/17 1114   Transfer Training PT STG   Transfer Training PT STG, Date Established 11/23/17 --    Transfer Training PT STG, Time to Achieve 5 days --    Transfer Training PT STG, Activity Type sit to stand/stand to sit --    Transfer Training PT STG, [place] Level independent --    Transfer Training PT STG, Assist Device walker, rolling --    Transfer Training PT STG, Date Goal Reviewed --  11/27/17   Transfer Training PT STG, Outcome --  goal not met  (pt requires SBA)   Transfer Training PT STG, Reason Goal Not Met --  discharged from facility       Goal: Gait Training Goal STG- PT  Outcome: Outcome(s) achieved Date Met:  11/27/17 11/23/17 1030 11/27/17 1114   Gait Training PT STG   Gait Training Goal PT STG, Date Established 11/23/17 --    Gait Training Goal PT STG, Time to Achieve 5 days --    Gait Training Goal PT STG, [place] Level contact guard assist;supervision required --    Gait Training Goal PT STG, Assist Device walker, rolling --    Gait Training Goal PT STG, Distance to Achieve 120 feet --    Gait Training Goal PT STG, Additional Goal so he can safely ambulate in his home.  --    Gait Training Goal PT STG, Date Goal Reviewed --  11/27/17   Gait Training Goal PT STG, Outcome --  goal met       Goal: Patient Education Goal STG- PT  Outcome: Outcome(s) achieved Date Met:  11/27/17 11/23/17 1030 11/27/17 1114   Patient Education PT STG   Patient Education PT STG, Date Established 11/23/17 --    Patient Education PT STG, Time to Achieve 5 days --    Patient Education PT STG, Education Type HEP;gait;transfers --    Patient Education PT STG, Education Understanding demonstrate adequately;verbalize understanding --    Patient Education PT STG, Date Goal Reviewed --  11/27/17   Patient Education PT STG Outcome --  goal met

## 2017-11-27 NOTE — PLAN OF CARE
Problem: Patient Care Overview (Adult)  Goal: Plan of Care Review  Outcome: Ongoing (interventions implemented as appropriate)    11/27/17 0213   Coping/Psychosocial Response Interventions   Plan Of Care Reviewed With patient   Outcome Evaluation   Outcome Summary/Follow up Plan Patient sleeping in chair. Ampicillin given q6 hrs. No reports of pain       Goal: Adult Individualization and Mutuality  Outcome: Ongoing (interventions implemented as appropriate)    11/27/17 0213   Individualization   Patient Specific Preferences Likes to tell stories    Patient Specific Goals D/C IV site         Problem: Sepsis (Adult)  Goal: Signs and Symptoms of Listed Potential Problems Will be Absent or Manageable (Sepsis)  Outcome: Ongoing (interventions implemented as appropriate)    11/27/17 0213   Sepsis   Problems Assessed (Sepsis) all   Problems Present (Sepsis) glycemic control, impaired         Problem: Fall Risk (Adult)  Goal: Absence of Falls  Outcome: Ongoing (interventions implemented as appropriate)    11/27/17 0213   Fall Risk (Adult)   Absence of Falls making progress toward outcome         Problem: Infection, Risk/Actual (Adult)  Goal: Infection Prevention/Resolution  Outcome: Ongoing (interventions implemented as appropriate)    11/27/17 0213   Infection, Risk/Actual (Adult)   Infection Prevention/Resolution making progress toward outcome         Problem: Pressure Ulcer Risk (Bernardino Scale) (Adult,Obstetrics,Pediatric)  Goal: Skin Integrity  Outcome: Ongoing (interventions implemented as appropriate)    11/27/17 0213   Pressure Ulcer Risk (Bernardino Scale) (Adult,Obstetrics,Pediatric)   Skin Integrity making progress toward outcome

## 2017-11-27 NOTE — THERAPY TREATMENT NOTE
Acute Care - Physical Therapy Treatment Note   [place]     Patient Name: Froilan Helton  : 1940  MRN: [id number]  Today's Date: 2017  Onset of Illness/Injury or Date of Surgery Date: 17  Date of Referral to PT: 17  Referring Physician: Liat DELGADILLO    Admit Date: 2017    Visit Dx:    ICD-10-CM ICD-9-CM   1. Sepsis, due to unspecified organism A41.9 038.9     995.91   2. Altered mental status, unspecified altered mental status type R41.82 780.97   3. Poorly controlled diabetes mellitus E11.65 250.00   4. Acute kidney injury N17.9 584.9   5. Cellulitis of right leg L03.115 682.6   6. Fall, initial encounter W19.XXXA E888.9   7. Abrasion of face, initial encounter S00.81XA 910.0   8. Type 2 diabetes mellitus with stage 3 chronic kidney disease, without long-term current use of insulin E11.22 250.40    N18.3 585.3     Patient Active Problem List   Diagnosis   • Sepsis   • COPD (chronic obstructive pulmonary disease)   • Diabetes mellitus   • Hypertension   • Acute renal failure   • Elevated procalcitonin   • Lactic acid acidosis   • Leukocytosis               Adult Rehabilitation Note       17 0855 17 0855       Rehab Assessment/Intervention    Discipline physical therapist  -ONEAL COSME JW2 physical therapist  -     Document Type therapy note (daily note)  -ONEAL COSME JW2 therapy note (daily note)  -GC     Subjective Information agree to therapy;no complaints  -ONEAL COSME JW2 agree to therapy;complains of;pain   Pain left leg  -GC     Patient Effort, Rehab Treatment good  -ONEAL COSME JW2 good  -GC     Precautions/Limitations fall precautions  -ONEAL COSME JW2 fall precautions  -GC     Recorded by [ONEAL COSME JW2] Zoila Estrella, PT (r) Cruzito Lombardi, PT Student (t) Zoila Estrella, PT (c) [GC] Darrel Alves, PT     Vital Signs    Pre SpO2 (%)  98  -GC     O2 Delivery Pre Treatment  room air  -GC     Post SpO2 (%)  98  -GC     O2 Delivery Post Treatment  room air  -GC     Pre Patient Position   Sitting  -GC     Post Patient Position  Sitting  -GC     Recorded by  [GC] Darrel Alves, PT     Pain Assessment    Pain Assessment 0-10  -ONEAL COSME,JW2 0-10  -GC     Pain Score 0  -ONEAL COSME,JW2 4  -GC     Post Pain Score 5  -ONEAL COSME,JW2      Pain Type Chronic pain  -ONEAL COSME,JW2 Acute pain  -     Pain Location Knee  -BA,ONEAL,JW2 Knee  -GC     Pain Orientation Left  -ONEAL COSME,JW2 Left  -GC     Recorded by [BA,ONEAL,JW2] Zoila Estrella, PT (r) Cruzito Lombardi, PT Student (t) Zoila Estrella, PT (c) [GC] Darrel Alves, PT     Cognitive Assessment/Intervention    Current Cognitive/Communication Assessment  functional  -GC     Orientation Status  oriented x 4  -GC     Follows Commands/Answers Questions  100% of the time;able to follow multi-step instructions  -     Personal Safety  WNL/WFL  -GC     Personal Safety Interventions gait belt;nonskid shoes/slippers when out of bed;fall prevention program maintained  -BA,ONEAL,JW2 fall prevention program maintained;gait belt;nonskid shoes/slippers when out of bed  -GC     Recorded by [BA,ONEAL,JW2] Zoila Estrella, PT (r) Cruzito Lombardi, PT Student (t) Zoila Estrella, PT (c) [GC] Darrel Alves, PT     Bed Mobility, Assessment/Treatment    Bed Mobility, Comment deferred - up in chair  -ONEAL COSME,JW2 deferred, pt up in chair  -GC     Recorded by [BA,ONEAL,JW2] Zoila Estrella, PT (r) Cruzito Lombardi, PT Student (t) Zoila Estrella, PT (c) [GC] Darrel Alves, PT     Transfer Assessment/Treatment    Transfers, Sit-Stand Victor stand by assist  -ONEAL COSME,JW2 contact guard assist  -     Transfers, Stand-Sit Victor stand by assist;verbal cues required  -ONEAL COSME,JW2 contact guard assist  -     Transfers, Sit-Stand-Sit, Assist Device rolling walker  -ONEAL COSME,JW2 rolling walker  -     Recorded by [BA,ONEAL,JW2] Zoila Estrella, PT (r) Cruzito Lombardi PT Student (t) Zoila Estrella, PT (c) [GC] Darrel Alves, PT     Gait Assessment/Treatment    Gait, [place] Level stand by assist;verbal cues required  -BA,ONEAL,JW2  stand by assist  -GC     Gait, Assistive Device rolling walker  -ONEAL COSME,JW2 rolling walker  -GC     Gait, Distance (Feet) 285  -BA,ONEAL,JW2 120  -GC     Gait, Gait Deviations forward flexed posture;step length decreased;limb motion velocity decreased  -BA,ONEAL,JW2 antalgic;sanjeev decreased  -GC     Gait, Comment verbal cues for proper positioning within walker  -BA,ONEAL,JW2 Pt also ambulated 20 feet to bathroom prior to the above mentioned walk  -GC     Recorded by [BA,ONEAL,JW2] Zoila Estrella, PT (r) Cruzito Lombardi, PT Student (t) Zoila Estrella, PT (c) [GC] Darrel Alves, PT     Positioning and Restraints    Pre-Treatment Position sitting in chair/recliner  -BAONEAL,BA2 sitting in chair/recliner  -GC     Post Treatment Position chair  -BAONEAL,JW2 chair  -GC     In Chair sitting;call light within reach;encouraged to call for assist  -BAONEAL,BA2 reclined;call light within reach;encouraged to call for assist;exit alarm on  -GC     Recorded by [BAONEAL,JW2] Zoila Estrella, PT (r) Cruzito Lombardi, PT Student (t) Zoila Estrella, PT (c) [GC] Darrel Alves, PT       User Key  (r) = Recorded By, (t) = Taken By, (c) = Cosigned By    Initials Name Effective Dates     Darrel Alves, PT 04/06/17 -     BA Estrella, PT 12/01/15 -     ONEAL Lombardi, PT Student 10/12/17 -                 IP PT Goals       11/23/17 1030          Transfer Training PT STG    Transfer Training PT STG, Date Established 11/23/17  -LN      Transfer Training PT STG, Time to Achieve 5 days  -LN      Transfer Training PT STG, Activity Type sit to stand/stand to sit  -LN      Transfer Training PT STG, [place] Level independent  -LN      Transfer Training PT STG, Assist Device walker, rolling  -LN      Gait Training PT STG    Gait Training Goal PT STG, Date Established 11/23/17  -LN      Gait Training Goal PT STG, Time to Achieve 5 days  -LN      Gait Training Goal PT STG, [place] Level contact guard assist;supervision required  -LN      Gait Training  Goal PT STG, Assist Device walker, rolling  -LN      Gait Training Goal PT STG, Distance to Achieve 120 feet  -LN      Gait Training Goal PT STG, Additional Goal so he can safely ambulate in his home.   -LN      Patient Education PT STG    Patient Education PT STG, Date Established 11/23/17  -LN      Patient Education PT STG, Time to Achieve 5 days  -LN      Patient Education PT STG, Education Type HEP;gait;transfers  -LN      Patient Education PT STG, Education Understanding demonstrate adequately;verbalize understanding  -LN        User Key  (r) = Recorded By, (t) = Taken By, (c) = Cosigned By    Initials Name Provider Type    LN Juhi Proctor, PT Physical Therapist          Physical Therapy Education     Title: PT OT SLP Therapies (Active)     Topic: Physical Therapy (Active)     Point: Mobility training (Done)    Learning Progress Summary    Learner Readiness Method Response Comment Documented by Status   Patient Acceptance E VU,DU   11/27/17 1018 Done    Acceptance E VU   11/25/17 0926 Done    Acceptance E VU Education provided on functional mobility and gait with RWX.  11/23/17 1029 Done               Point: Precautions (Done)    Learning Progress Summary    Learner Readiness Method Response Comment Documented by Status   Patient Acceptance E VU   11/25/17 0926 Done    Acceptance E VU Education provided on functional mobility and gait with RWX.  11/23/17 1029 Done                      User Key     Initials Effective Dates Name Provider Type Discipline     04/06/17 -  Darrel Alves, PT Physical Therapist PT     06/22/16 -  Juhi Proctor, PT Physical Therapist PT     10/12/17 -  Cruzito Lombardi, PT Student PT Student PT                    PT Recommendation and Plan  Anticipated Discharge Disposition: home with home health  PT Frequency: daily  Plan of Care Review  Plan Of Care Reviewed With: patient  Outcome Summary/Follow up Plan: PT: pt requires SBA x1 with sit to/from stand  transfers, verbal cues for proper hand placement. pt complains of lightheadedness/dizziness upon coming to standing position but only lasts a few seconds. pt ambulates x285 feet SBA x1 with use of rolling walker, verbal cues for proper positioning within walker. Recommend home health PT upon return home, no assistive device needed at this time since pt already has equipment at home.           Outcome Measures       11/27/17 1000          How much help from another person do you currently need...    Turning from your back to your side while in flat bed without using bedrails? 3  -JW (r) ONEAL (t) JW (c)      Moving from lying on back to sitting on the side of a flat bed without bedrails? 3  -JW (r) ONEAL (t) JW (c)      Moving to and from a bed to a chair (including a wheelchair)? 3  -JW (r) ONEAL (t) JW (c)      Standing up from a chair using your arms (e.g., wheelchair, bedside chair)? 3  -JW (r) ONEAL (t) JW (c)      Climbing 3-5 steps with a railing? 2  -JW (r) ONEAL (t) JW (c)      To walk in hospital room? 3  -JW (r) ONEAL (t) JW (c)      AM-PAC 6 Clicks Score 17  -JW (r) ONEAL (t)      Functional Assessment    Outcome Measure Options AM-PAC 6 Clicks Basic Mobility (PT)  -JW (r) ONEAL (t) JW (c)        User Key  (r) = Recorded By, (t) = Taken By, (c) = Cosigned By    Initials Name Provider Type    BA Estrella PT Physical Therapist    ONEAL Lombardi, PT Student PT Student           Time Calculation:         PT Charges       11/27/17 1023          Time Calculation    Start Time 0855  -JW (r) ONEAL (t) JW (c)      Stop Time 0915  -JW (r) ONEAL (t) JW (c)      Time Calculation (min) 20 min  -JW (r) ONEAL (t)      PT Received On 11/27/17  -JW (r) ONEAL (t) JW (c)        User Key  (r) = Recorded By, (t) = Taken By, (c) = Cosigned By    Initials Name Provider Type    BA Estrella PT Physical Therapist    ONEAL Lombardi, PT Student PT Student          Therapy Charges for Today     Code Description Service Date Service Provider Modifiers  Qty    [id number]  PT THER PROC EA 15 MIN 11/27/2017 Cruzito Lombardi, PT Student GP 1          PT G-Codes  Outcome Measure Options: AM-PAC 6 Clicks Basic Mobility (PT)    Cruzito Lombardi PT Student  11/27/2017

## 2017-11-27 NOTE — NURSING NOTE
Continued Stay Note  [facility]     Patient Name: Froilan Helton  MRN: [id number]  Today's Date: 11/27/2017    Admit Date: 11/21/2017          Discharge Plan       11/27/17 0840    Case Management/Social Work Plan    Plan Plan home with home health services.    Additional Comments After reviewing home health list referral made to Therese @[facility] [place] [facility] per patient's request.  Family will transport patient home.  He says he has a rolling walker at home.  Updated Important Message from Medicare with patient.  Patient voices no other questions or concerns at this time.               Discharge Codes     None            Kristen Simms RN

## 2017-11-30 LAB — GLUCOSE BLDC GLUCOMTR-MCNC: 316 MG/DL (ref 70–130)

## 2017-12-06 ENCOUNTER — TELEPHONE (OUTPATIENT)
Dept: SOCIAL WORK | Facility: HOSPITAL | Age: 77
End: 2017-12-06

## 2017-12-06 NOTE — TELEPHONE ENCOUNTER
Received call from Therese at [facility].  PCP wanted to see patient prior to giving a referral for home health.  Patient saw PCP on 12/4.  No referral made to home health.  Sharon called PCP on 12/5 and he states home health is up to patient.  Sharon called patient on 12/6 and he does not feel the need for home health at this time.

## 2017-12-08 ENCOUNTER — TRANSCRIBE ORDERS (OUTPATIENT)
Dept: ADMINISTRATIVE | Facility: HOSPITAL | Age: 77
End: 2017-12-08

## 2017-12-08 DIAGNOSIS — G44.52 NEW DAILY PERSISTENT HEADACHE: Primary | ICD-10-CM

## 2017-12-18 ENCOUNTER — HOSPITAL ENCOUNTER (OUTPATIENT)
Dept: CT IMAGING | Facility: HOSPITAL | Age: 77
Discharge: HOME | End: 2017-12-18
Admitting: NURSE PRACTITIONER

## 2017-12-18 DIAGNOSIS — G44.52 NEW DAILY PERSISTENT HEADACHE: ICD-10-CM

## 2017-12-18 PROCEDURE — 70450 CT HEAD/BRAIN W/O DYE: CPT

## 2018-01-10 ENCOUNTER — OFFICE VISIT (OUTPATIENT)
Dept: CARDIOLOGY | Facility: CLINIC | Age: 78
End: 2018-01-10

## 2018-01-10 VITALS
SYSTOLIC BLOOD PRESSURE: 130 MMHG | DIASTOLIC BLOOD PRESSURE: 62 MMHG | BODY MASS INDEX: 37.73 KG/M2 | WEIGHT: 294 LBS | HEART RATE: 64 BPM | HEIGHT: 74 IN

## 2018-01-10 DIAGNOSIS — E11.22 TYPE 2 DIABETES MELLITUS WITH STAGE 3 CHRONIC KIDNEY DISEASE, WITHOUT LONG-TERM CURRENT USE OF INSULIN (HCC): ICD-10-CM

## 2018-01-10 DIAGNOSIS — I21.4 NSTEMI (NON-ST ELEVATED MYOCARDIAL INFARCTION) (HCC): Primary | ICD-10-CM

## 2018-01-10 DIAGNOSIS — N18.30 TYPE 2 DIABETES MELLITUS WITH STAGE 3 CHRONIC KIDNEY DISEASE, WITHOUT LONG-TERM CURRENT USE OF INSULIN (HCC): ICD-10-CM

## 2018-01-10 DIAGNOSIS — I10 ESSENTIAL HYPERTENSION: ICD-10-CM

## 2018-01-10 PROCEDURE — 99213 OFFICE O/P EST LOW 20 MIN: CPT | Performed by: INTERNAL MEDICINE

## 2018-01-10 PROCEDURE — 93000 ELECTROCARDIOGRAM COMPLETE: CPT | Performed by: INTERNAL MEDICINE

## 2018-01-10 NOTE — PROGRESS NOTES
Subjective:     Encounter Date:01/10/2018      Patient ID: Froilan Helton is a 77 y.o. male.    Chief Complaint:  History of Present Illness    Dear Dr. Clarke,    The pleasure of seeing this patient in the office today for follow-up after his recent hospitalization.    He presented with an episode of syncope and was found to have sepsis.  He did have low level troponin positivity there was felt to be consistent with a non-ST segment elevation myocardial infarction, either type I or type II.  Because of his acute illness, and the lack of any evidence of acute coronary syndrome, it was recommended that they focus on treating his acute infectious process and then he will be seen to arrange for an outpatient stress test.    He did have an echocardiogram that showed normal function:  Echo today:  Interpretation Summary   · Left ventricular systolic function is normal. Estimated EF = 52%.  · Left ventricular diastolic dysfunction (grade I) consistent with impaired relaxation.  · calcification of the aortic valve  · Mild dilation of the aortic root is present.     He states that he has still felt very weak but he still didn't have any cardiac issues.  He's had no chest pain, pressure, or tightness with activity.  He has had a you episodes of some epigastric and lower precordial discomfort.  It is not associated with activity or exercise.  There is no associated nausea, vomiting, or diaphoresis.    The following portions of the patient's history were reviewed and updated as appropriate: allergies, current medications, past family history, past medical history, past social history, past surgical history and problem list.    Past Medical History:   Diagnosis Date   • Arthritis    • Asthma    • Cancer     skin   • COPD (chronic obstructive pulmonary disease)    • Diabetes mellitus    • Disease of thyroid gland    • Hypertension    • Renal disorder        Past Surgical History:   Procedure Laterality Date   • COLONOSCOPY      • JOINT REPLACEMENT      right   • REPLACEMENT TOTAL KNEE         Social History     Social History   • Marital status: Unknown     Spouse name: N/A   • Number of children: N/A   • Years of education: N/A     Occupational History   • Not on file.     Social History Main Topics   • Smoking status: Former Smoker   • Smokeless tobacco: Never Used      Comment: quit 1993   • Alcohol use No   • Drug use: No   • Sexual activity: Not on file     Other Topics Concern   • Not on file     Social History Narrative       Review of Systems   Constitution: Negative for chills, decreased appetite, fever and night sweats.   HENT: Negative for ear discharge, ear pain, hearing loss, nosebleeds and sore throat.    Eyes: Negative for blurred vision, double vision and pain.   Cardiovascular: Negative for cyanosis.   Respiratory: Negative for hemoptysis and sputum production.    Endocrine: Negative for cold intolerance and heat intolerance.   Hematologic/Lymphatic: Negative for adenopathy.   Skin: Negative for dry skin, itching, nail changes, rash and suspicious lesions.   Musculoskeletal: Positive for back pain, joint pain and joint swelling. Negative for arthritis, gout, muscle cramps, muscle weakness, myalgias and neck pain.   Gastrointestinal: Negative for anorexia, bowel incontinence, constipation, diarrhea, dysphagia, hematemesis and jaundice.   Genitourinary: Negative for bladder incontinence, dysuria, flank pain, frequency, hematuria and nocturia.   Neurological: Negative for focal weakness, numbness, paresthesias and seizures.   Psychiatric/Behavioral: Negative for altered mental status, hallucinations, hypervigilance, suicidal ideas and thoughts of violence.   Allergic/Immunologic: Negative for persistent infections.         ECG 12 Lead  Date/Time: 1/10/2018 1:35 PM  Performed by: KUMAR BLEVINS III  Authorized by: KUMAR BLEVINS III   Comparison: compared with previous ECG   Similar to previous ECG  Rhythm: sinus  "rhythm  Rate: normal  Conduction: conduction normal  ST Segments: ST segments normal  T Waves: T waves normal  QRS axis: normal  Other: no other findings  Clinical impression: normal ECG               Objective:     Vitals:    01/10/18 1159   BP: 130/62   Pulse: 64   Weight: 133 kg (294 lb)   Height: 188 cm (74.02\")         Physical Exam   Constitutional: He is oriented to person, place, and time. He appears well-developed and well-nourished. No distress.   HENT:   Head: Normocephalic and atraumatic.   Nose: Nose normal.   Mouth/Throat: Oropharynx is clear and moist.   Eyes: Conjunctivae and EOM are normal. Pupils are equal, round, and reactive to light. Right eye exhibits no discharge. Left eye exhibits no discharge.   Neck: Normal range of motion. Neck supple. No tracheal deviation present. No thyromegaly present.   Cardiovascular: Normal rate, regular rhythm, S1 normal, S2 normal, normal heart sounds and normal pulses.  Exam reveals no S3.    Pulmonary/Chest: Effort normal and breath sounds normal. No stridor. No respiratory distress. He exhibits no tenderness.   Abdominal: Soft. Bowel sounds are normal. He exhibits no distension and no mass. There is no tenderness. There is no rebound and no guarding.   Musculoskeletal: Normal range of motion. He exhibits no tenderness or deformity.   Lymphadenopathy:     He has no cervical adenopathy.   Neurological: He is alert and oriented to person, place, and time. He has normal reflexes.   Skin: Skin is warm and dry. No rash noted. He is not diaphoretic. No erythema.   Psychiatric: He has a normal mood and affect. Thought content normal.       Lab Review:             Performed        Assessment:          Diagnosis Plan   1. NSTEMI (non-ST elevated myocardial infarction)  Stress Test With Myocardial Perfusion One Day    ECG 12 Lead   2. Essential hypertension  Stress Test With Myocardial Perfusion One Day    ECG 12 Lead   3. Type 2 diabetes mellitus with stage 3 chronic " kidney disease, without long-term current use of insulin  ECG 12 Lead          Plan:       This patient had elevated troponin levels in the setting of an acute infectious illness.  Since discharge he has had no overt cardiac issues, although he has had some precordial and epigastric discomfort.  He recovered from his acute sepsis, and we [facility].  Thank you very much for allowing us to participate in the care of this pleasant patient.  Please don't hesitate to call if I can be of assistance in any way.      Current Outpatient Prescriptions:   •  amLODIPine (NORVASC) 2.5 MG tablet, Take 1 tablet by mouth Daily., Disp: 30 tablet, Rfl: 0  •  amoxicillin (AMOXIL) 500 MG capsule, Take 1 capsule by mouth 3 (Three) Times a Day., Disp: 30 capsule, Rfl: 0  •  aspirin  MG EC tablet, Take 1 tablet by mouth Daily., Disp: 30 tablet, Rfl: 0  •  atorvastatin (LIPITOR) 10 MG tablet, Take 1 tablet by mouth Daily., Disp: 30 tablet, Rfl: 0  •  clotrimazole (LOTRIMIN) 1 % cream, Apply  topically Every 12 (Twelve) Hours., Disp: 60 g, Rfl: 0  •  docusate sodium 100 MG capsule, Take 100 mg by mouth 2 (Two) Times a Day., Disp: 60 capsule, Rfl: 0  •  donepezil (ARICEPT) 5 MG tablet, Take 5 mg by mouth Every Night., Disp: , Rfl:   •  furosemide (LASIX) 40 MG tablet, Take 1 tablet by mouth Daily., Disp: 30 tablet, Rfl: 0  •  gabapentin (NEURONTIN) 600 MG tablet, Take 600 mg by mouth 3 (Three) Times a Day., Disp: , Rfl:   •  insulin detemir (LEVEMIR) 100 UNIT/ML injection, Inject 30 Units under the skin 2 (Two) Times a Day., Disp: 10 mL, Rfl: 0  •  lactobacillus acidophilus (RISAQUAD) capsule capsule, Take 1 capsule by mouth Daily., Disp: 30 capsule, Rfl: 0  •  levothyroxine (SYNTHROID, LEVOTHROID) 100 MCG tablet, Take 100 mcg by mouth Daily., Disp: , Rfl:   •  lisinopril (PRINIVIL,ZESTRIL) 20 MG tablet, Take 20 mg by mouth Daily., Disp: , Rfl:   •  metoprolol succinate XL (TOPROL-XL) 25 MG 24 hr tablet,  Take 0.5 tablets by mouth Every Night., Disp: 30 tablet, Rfl: 0  •  polyethylene glycol (MIRALAX) pack packet, Take 17 g by mouth Daily., Disp: 30 each, Rfl: 0  •  pramipexole (MIRAPEX) 0.5 MG tablet, Take 0.5 mg by mouth 3 (Three) Times a Day., Disp: , Rfl:   •  pregabalin (LYRICA) 100 MG capsule, Take 100 mg by mouth 2 (Two) Times a Day., Disp: , Rfl:   •  SITagliptin (JANUVIA) 100 MG tablet, Take 100 mg by mouth Daily., Disp: , Rfl:   •  vitamin D (ERGOCALCIFEROL) 91684 units capsule capsule, Take 50,000 Units by mouth 1 (One) Time Per Week., Disp: , Rfl:

## 2018-01-15 ENCOUNTER — APPOINTMENT (OUTPATIENT)
Dept: GENERAL RADIOLOGY | Facility: HOSPITAL | Age: 78
End: 2018-01-15

## 2018-01-15 ENCOUNTER — HOSPITAL ENCOUNTER (EMERGENCY)
Facility: HOSPITAL | Age: 78
Discharge: HOME | End: 2018-01-15
Attending: EMERGENCY MEDICINE | Admitting: EMERGENCY MEDICINE

## 2018-01-15 VITALS
SYSTOLIC BLOOD PRESSURE: 153 MMHG | HEIGHT: 72 IN | HEART RATE: 73 BPM | BODY MASS INDEX: 40.63 KG/M2 | DIASTOLIC BLOOD PRESSURE: 76 MMHG | WEIGHT: 300 LBS | TEMPERATURE: 97.9 F | RESPIRATION RATE: 20 BRPM | OXYGEN SATURATION: 96 %

## 2018-01-15 DIAGNOSIS — M25.511 ACUTE PAIN OF RIGHT SHOULDER: Primary | ICD-10-CM

## 2018-01-15 DIAGNOSIS — J44.9 CHRONIC OBSTRUCTIVE PULMONARY DISEASE, UNSPECIFIED COPD TYPE (HCC): ICD-10-CM

## 2018-01-15 DIAGNOSIS — L03.115 CELLULITIS OF RIGHT LEG: ICD-10-CM

## 2018-01-15 PROCEDURE — 94640 AIRWAY INHALATION TREATMENT: CPT

## 2018-01-15 PROCEDURE — 99284 EMERGENCY DEPT VISIT MOD MDM: CPT

## 2018-01-15 PROCEDURE — 73030 X-RAY EXAM OF SHOULDER: CPT

## 2018-01-15 PROCEDURE — 99284 EMERGENCY DEPT VISIT MOD MDM: CPT | Performed by: EMERGENCY MEDICINE

## 2018-01-15 RX ORDER — ACETAMINOPHEN 500 MG
500 TABLET ORAL ONCE
Status: COMPLETED | OUTPATIENT
Start: 2018-01-15 | End: 2018-01-15

## 2018-01-15 RX ORDER — CEPHALEXIN 500 MG/1
500 CAPSULE ORAL 4 TIMES DAILY
Qty: 28 CAPSULE | Refills: 0 | Status: SHIPPED | OUTPATIENT
Start: 2018-01-15 | End: 2018-01-22

## 2018-01-15 RX ORDER — IPRATROPIUM BROMIDE AND ALBUTEROL SULFATE 2.5; .5 MG/3ML; MG/3ML
3 SOLUTION RESPIRATORY (INHALATION) ONCE
Status: COMPLETED | OUTPATIENT
Start: 2018-01-15 | End: 2018-01-15

## 2018-01-15 RX ADMIN — ACETAMINOPHEN 500 MG: 500 TABLET, FILM COATED ORAL at 17:59

## 2018-01-15 RX ADMIN — IPRATROPIUM BROMIDE AND ALBUTEROL SULFATE 3 ML: .5; 3 SOLUTION RESPIRATORY (INHALATION) at 19:12

## 2018-01-16 NOTE — ED PROVIDER NOTES
Subjective   History of Present Illness  History of Present Illness    Chief complaint: Fall, right shoulder pain    Location: Right shoulder    Quality/Severity:  Moderate pain    Timing/Duration: Occurred about 30 minutes prior to arrival    Modifying Factors: Worse with movement of the arm    Narrative: This patient arrives via EMS for evaluation after an accidental fall.  He went outside to feed his birds this afternoon but accidentally slipped on an icy surface.  This caused him to fall to the ground immediately and strike the right shoulder against the ground.  He says he did not hit his head or have any loss of consciousness.  Unfortunately because of his shoulder pain, he was unable to push himself back up right and stand on his own.  He sat on the ground for several minutes and called for his wife.  When she finally heard him she would not to check on him but she could not get him up either.  Therefore they called EMS and paramedics arrived and loaded him on a cot.  He denies any pain to his chest or abdomen or lower legs.  He says his pain is really only in his right shoulder area.  He is right-hand dominant.  He notes that while he is here he also wants to have his right lower leg looked at.  He tells me that in the past couple days he has had some return of redness in the lower leg.  About one month ago he was in the hospital for cellulitis of the same leg.  He is not currently taking any antibiotics.  He has not had any fevers.    Associated Symptoms: As above    Review of Systems   Constitutional: Negative for activity change, diaphoresis, fatigue and fever.   HENT: Negative.    Respiratory: Positive for wheezing. Negative for cough and shortness of breath.    Cardiovascular: Positive for leg swelling (both). Negative for chest pain.   Gastrointestinal: Negative for abdominal pain, diarrhea and vomiting.   Musculoskeletal: Positive for arthralgias and myalgias. Negative for gait problem.   Skin:  Positive for rash (redness in leg). Negative for color change.   Neurological: Negative for syncope and headaches.   Psychiatric/Behavioral: Negative for confusion.   All other systems reviewed and are negative.      Past Medical History:   Diagnosis Date   • Arthritis    • Asthma    • Cancer     skin   • COPD (chronic obstructive pulmonary disease)    • Diabetes mellitus    • Disease of thyroid gland    • Hypertension    • Renal disorder        Allergies   Allergen Reactions   • Oxycontin [Oxycodone Hcl]      'Makes me crazy and stand on my head'       Past Surgical History:   Procedure Laterality Date   • COLONOSCOPY     • JOINT REPLACEMENT      right   • REPLACEMENT TOTAL KNEE         History reviewed. No pertinent family history.    Social History     Social History   • Marital status: Unknown     Spouse name: N/A   • Number of children: N/A   • Years of education: N/A     Social History Main Topics   • Smoking status: Former Smoker   • Smokeless tobacco: Never Used      Comment: quit 1993   • Alcohol use No   • Drug use: No   • Sexual activity: Not Asked     Other Topics Concern   • None     Social History Narrative       ED Triage Vitals   Temp Heart Rate Resp BP SpO2   01/15/18 1737 01/15/18 1737 01/15/18 1737 01/15/18 1737 01/15/18 1737   97.9 °F (36.6 °C) 92 22 125/84 89 %      Temp src Heart Rate Source Patient Position BP Location FiO2 (%)   01/15/18 1737 -- -- -- --   Oral             Objective   Physical Exam   Constitutional: He is oriented to person, place, and time. He appears well-developed and well-nourished. No distress.   HENT:   Head: Normocephalic and atraumatic.   Eyes: EOM are normal. Pupils are equal, round, and reactive to light. Right eye exhibits no discharge. Left eye exhibits no discharge.   Neck: Normal range of motion. Neck supple. No tracheal deviation present.   Cardiovascular: Normal rate, regular rhythm, normal heart sounds and intact distal pulses.    Pulmonary/Chest: Effort  normal. No respiratory distress. He has wheezes (scattered bilaterally).   Normal rate and effort.     Abdominal: Soft. He exhibits no mass. There is no tenderness. There is no rebound and no guarding. No hernia.   Musculoskeletal: Normal range of motion. He exhibits tenderness. He exhibits no edema or deformity.   Moderate tenderness noted throughout the entire right shoulder.  No gross deformity evident.  Patient has some decreased range of motion with abduction and external rotation due to his discomfort.  The remainder of the arm is normal in appearance.  There is normal distal sensation throughout the entire right arm.  Radial pulse is palpable and normal.   Neurological: He is alert and oriented to person, place, and time. He exhibits normal muscle tone.   Skin: Skin is warm and dry. No rash noted. He is not diaphoretic. No erythema. No pallor.   Psychiatric: He has a normal mood and affect. His behavior is normal. Judgment and thought content normal.   Nursing note and vitals reviewed.    RADIOLOGY        Study: X-ray right shoulder    Findings: No acute fracture or dislocation    Interpreted contemporaneously with treatment by myself, independently viewed by me        Procedures         ED Course  ED Course   Comment By Time   I have reviewed the x-ray.  There does not appear to be any acute osseous injury to me.  The patient does not appear to be in any distress.  While he was here being evaluated for his shoulder injury, he also wanted to have his right lower leg looked at.  He said there is some redness returning in that area.  Indeed the clinical findings were consistent with cellulitis so I wrote a prescription for him regarding that.  Finally, it was noted that his oxygenation was borderline without nasal cannula oxygen here.  He has a long history of COPD.  He tells me that he was once instructed to take oxygen at home but he refused it.  Therefore I suspect that a saturation of 90% on room air is  probably around his baseline.  I did give him 1 DuoNeb treatment here and that cleared out some of his wheezes.  He had no signs of respiratory distress at all.  I did not feel like there was any need for further diagnostics at that time.  We discharged him home in good condition.  I encouraged him to follow up with his primary care doctor for repeat evaluation this week of his leg and COPD type of concerns. Iam Charlton MD 01/16 1729                  MDM  Number of Diagnoses or Management Options  Acute pain of right shoulder:   Cellulitis of right leg:   Chronic obstructive pulmonary disease, unspecified COPD type:      Amount and/or Complexity of Data Reviewed  Tests in the radiology section of CPT®: ordered and reviewed  Independent visualization of images, tracings, or specimens: yes    Risk of Complications, Morbidity, and/or Mortality  Presenting problems: moderate  Diagnostic procedures: low  Management options: moderate        Final diagnoses:   Acute pain of right shoulder   Cellulitis of right leg   Chronic obstructive pulmonary disease, unspecified COPD type            Iam Charlton MD  01/16/18 0721

## 2018-01-16 NOTE — DISCHARGE INSTRUCTIONS
Take antibiotics as directed.  Rest your right shoulder in the sling that we provided.  Follow up with her doctors for further evaluation as instructed.  Please return to the emergency room for any worsening pain, swelling, redness, fevers, weakness, numbness or any other concerns.    Cellulitis, Adult  Introduction  Cellulitis is a skin infection. The infected area is usually red and sore. This condition occurs most often in the arms and lower legs. It is very important to get treated for this condition.  Follow these instructions at home:  · Take over-the-counter and prescription medicines only as told by your doctor.  · If you were prescribed an antibiotic medicine, take it as told by your doctor. Do not stop taking the antibiotic even if you start to feel better.  · Drink enough fluid to keep your pee (urine) clear or pale yellow.  · Do not touch or rub the infected area.  · Raise (elevate) the infected area above the level of your heart while you are sitting or lying down.  · Place warm or cold wet cloths (warm or cold compresses) on the infected area. Do this as told by your doctor.  · Keep all follow-up visits as told by your doctor. This is important. These visits let your doctor make sure your infection is not getting worse.  Contact a doctor if:  · You have a fever.  · Your symptoms do not get better after 1-2 days of treatment.  · Your bone or joint under the infected area starts to hurt after the skin has healed.  · Your infection comes back. This can happen in the same area or another area.  · You have a swollen bump in the infected area.  · You have new symptoms.  · You feel ill and also have muscle aches and pains.  Get help right away if:  · Your symptoms get worse.  · You feel very sleepy.  · You throw up (vomit) or have watery poop (diarrhea) for a long time.  · There are red streaks coming from the infected area.  · Your red area gets larger.  · Your red area turns darker.  This information is not  intended to replace advice given to you by your health care provider. Make sure you discuss any questions you have with your health care provider.  Document Released: 06/05/2009 Document Revised: 05/25/2017 Document Reviewed: 10/26/2016  © 2017 [employer]

## 2018-03-08 ENCOUNTER — HOSPITAL ENCOUNTER (EMERGENCY)
Facility: HOSPITAL | Age: 78
Discharge: HOME | End: 2018-03-09
Attending: EMERGENCY MEDICINE | Admitting: EMERGENCY MEDICINE

## 2018-03-08 ENCOUNTER — APPOINTMENT (OUTPATIENT)
Dept: GENERAL RADIOLOGY | Facility: HOSPITAL | Age: 78
End: 2018-03-08

## 2018-03-08 DIAGNOSIS — J44.1 COPD WITH ACUTE EXACERBATION (HCC): ICD-10-CM

## 2018-03-08 DIAGNOSIS — J20.9 ACUTE BRONCHITIS, UNSPECIFIED ORGANISM: Primary | ICD-10-CM

## 2018-03-08 DIAGNOSIS — I50.9 ACUTE ON CHRONIC CONGESTIVE HEART FAILURE, UNSPECIFIED CONGESTIVE HEART FAILURE TYPE: ICD-10-CM

## 2018-03-08 LAB
ALBUMIN SERPL-MCNC: 3.8 G/DL (ref 3.5–5.2)
ALBUMIN/GLOB SERPL: 1.3 G/DL
ALP SERPL-CCNC: 77 U/L (ref 40–129)
ALT SERPL W P-5'-P-CCNC: 15 U/L (ref 5–41)
ANION GAP SERPL CALCULATED.3IONS-SCNC: 12 MMOL/L
AST SERPL-CCNC: 12 U/L (ref 5–40)
BASOPHILS # BLD AUTO: 0.03 10*3/MM3 (ref 0–0.2)
BASOPHILS NFR BLD AUTO: 0.3 % (ref 0–2)
BILIRUB SERPL-MCNC: 0.7 MG/DL (ref 0.2–1.2)
BUN BLD-MCNC: 22 MG/DL (ref 8–23)
BUN/CREAT SERPL: 15.2 (ref 7–25)
CALCIUM SPEC-SCNC: 9.6 MG/DL (ref 8.8–10.5)
CHLORIDE SERPL-SCNC: 97 MMOL/L (ref 98–107)
CO2 SERPL-SCNC: 25 MMOL/L (ref 22–29)
CREAT BLD-MCNC: 1.45 MG/DL (ref 0.76–1.27)
D-LACTATE SERPL-SCNC: 1.4 MMOL/L (ref 0.5–2)
DEPRECATED RDW RBC AUTO: 41.4 FL (ref 37–54)
EOSINOPHIL # BLD AUTO: 0.18 10*3/MM3 (ref 0.1–0.3)
EOSINOPHIL NFR BLD AUTO: 1.5 % (ref 0–4)
ERYTHROCYTE [DISTWIDTH] IN BLOOD BY AUTOMATED COUNT: 13.4 % (ref 11.5–14.5)
FLUAV AG NPH QL: NEGATIVE
FLUBV AG NPH QL IA: NEGATIVE
GFR SERPL CREATININE-BSD FRML MDRD: 47 ML/MIN/1.73
GLOBULIN UR ELPH-MCNC: 2.9 GM/DL
GLUCOSE BLD-MCNC: 162 MG/DL (ref 65–99)
HCT VFR BLD AUTO: 43.7 % (ref 42–52)
HGB BLD-MCNC: 14.3 G/DL (ref 14–18)
IMM GRANULOCYTES # BLD: 0.06 10*3/MM3 (ref 0–0.03)
IMM GRANULOCYTES NFR BLD: 0.5 % (ref 0–0.5)
LYMPHOCYTES # BLD AUTO: 1.18 10*3/MM3 (ref 0.6–4.8)
LYMPHOCYTES NFR BLD AUTO: 9.9 % (ref 20–45)
MCH RBC QN AUTO: 28 PG (ref 27–31)
MCHC RBC AUTO-ENTMCNC: 32.7 G/DL (ref 31–37)
MCV RBC AUTO: 85.5 FL (ref 80–94)
MONOCYTES # BLD AUTO: 1.71 10*3/MM3 (ref 0–1)
MONOCYTES NFR BLD AUTO: 14.3 % (ref 3–8)
NEUTROPHILS # BLD AUTO: 8.78 10*3/MM3 (ref 1.5–8.3)
NEUTROPHILS NFR BLD AUTO: 73.5 % (ref 45–70)
NRBC BLD MANUAL-RTO: 0 /100 WBC (ref 0–0)
NT-PROBNP SERPL-MCNC: 2214 PG/ML (ref 5–450)
PLATELET # BLD AUTO: 219 10*3/MM3 (ref 140–500)
PMV BLD AUTO: 10.1 FL (ref 7.4–10.4)
POTASSIUM BLD-SCNC: 4.4 MMOL/L (ref 3.5–5.2)
PROT SERPL-MCNC: 6.7 G/DL (ref 6–8.5)
RBC # BLD AUTO: 5.11 10*6/MM3 (ref 4.7–6.1)
SODIUM BLD-SCNC: 134 MMOL/L (ref 136–145)
TROPONIN T SERPL-MCNC: 0.03 NG/ML (ref 0–0.03)
WBC NRBC COR # BLD: 11.94 10*3/MM3 (ref 4.8–10.8)

## 2018-03-08 PROCEDURE — 94640 AIRWAY INHALATION TREATMENT: CPT

## 2018-03-08 PROCEDURE — 94799 UNLISTED PULMONARY SVC/PX: CPT

## 2018-03-08 PROCEDURE — 87804 INFLUENZA ASSAY W/OPTIC: CPT | Performed by: EMERGENCY MEDICINE

## 2018-03-08 PROCEDURE — 83880 ASSAY OF NATRIURETIC PEPTIDE: CPT | Performed by: EMERGENCY MEDICINE

## 2018-03-08 PROCEDURE — 99284 EMERGENCY DEPT VISIT MOD MDM: CPT | Performed by: EMERGENCY MEDICINE

## 2018-03-08 PROCEDURE — 93005 ELECTROCARDIOGRAM TRACING: CPT | Performed by: EMERGENCY MEDICINE

## 2018-03-08 PROCEDURE — 99284 EMERGENCY DEPT VISIT MOD MDM: CPT

## 2018-03-08 PROCEDURE — 71046 X-RAY EXAM CHEST 2 VIEWS: CPT

## 2018-03-08 PROCEDURE — 83605 ASSAY OF LACTIC ACID: CPT | Performed by: EMERGENCY MEDICINE

## 2018-03-08 PROCEDURE — 93010 ELECTROCARDIOGRAM REPORT: CPT | Performed by: INTERNAL MEDICINE

## 2018-03-08 PROCEDURE — 80053 COMPREHEN METABOLIC PANEL: CPT | Performed by: EMERGENCY MEDICINE

## 2018-03-08 PROCEDURE — 85025 COMPLETE CBC W/AUTO DIFF WBC: CPT | Performed by: EMERGENCY MEDICINE

## 2018-03-08 PROCEDURE — 84484 ASSAY OF TROPONIN QUANT: CPT | Performed by: EMERGENCY MEDICINE

## 2018-03-08 RX ORDER — SODIUM CHLORIDE 0.9 % (FLUSH) 0.9 %
10 SYRINGE (ML) INJECTION AS NEEDED
Status: DISCONTINUED | OUTPATIENT
Start: 2018-03-08 | End: 2018-03-09 | Stop reason: HOSPADM

## 2018-03-08 RX ORDER — IPRATROPIUM BROMIDE AND ALBUTEROL SULFATE 2.5; .5 MG/3ML; MG/3ML
3 SOLUTION RESPIRATORY (INHALATION) ONCE
Status: COMPLETED | OUTPATIENT
Start: 2018-03-08 | End: 2018-03-08

## 2018-03-08 RX ADMIN — IPRATROPIUM BROMIDE AND ALBUTEROL SULFATE 3 ML: .5; 3 SOLUTION RESPIRATORY (INHALATION) at 23:15

## 2018-03-08 RX ADMIN — IPRATROPIUM BROMIDE AND ALBUTEROL SULFATE 3 ML: .5; 3 SOLUTION RESPIRATORY (INHALATION) at 23:45

## 2018-03-09 VITALS
DIASTOLIC BLOOD PRESSURE: 70 MMHG | HEART RATE: 96 BPM | RESPIRATION RATE: 16 BRPM | WEIGHT: 300 LBS | BODY MASS INDEX: 39.76 KG/M2 | HEIGHT: 73 IN | OXYGEN SATURATION: 94 % | SYSTOLIC BLOOD PRESSURE: 153 MMHG | TEMPERATURE: 98.3 F

## 2018-03-09 RX ORDER — ALBUTEROL SULFATE 90 UG/1
2 AEROSOL, METERED RESPIRATORY (INHALATION) EVERY 4 HOURS PRN
Qty: 1 INHALER | Refills: 0 | Status: SHIPPED | OUTPATIENT
Start: 2018-03-09 | End: 2018-08-03

## 2018-03-09 RX ORDER — AZITHROMYCIN 250 MG/1
500 TABLET, FILM COATED ORAL ONCE
Status: COMPLETED | OUTPATIENT
Start: 2018-03-09 | End: 2018-03-09

## 2018-03-09 RX ORDER — AZITHROMYCIN 250 MG/1
TABLET, FILM COATED ORAL
Qty: 4 TABLET | Refills: 0 | Status: SHIPPED | OUTPATIENT
Start: 2018-03-09 | End: 2018-08-03

## 2018-03-09 RX ORDER — BENZONATATE 200 MG/1
200 CAPSULE ORAL 3 TIMES DAILY PRN
Qty: 21 CAPSULE | Refills: 0 | Status: SHIPPED | OUTPATIENT
Start: 2018-03-09 | End: 2018-03-16

## 2018-03-09 RX ORDER — BENZONATATE 100 MG/1
200 CAPSULE ORAL ONCE
Status: COMPLETED | OUTPATIENT
Start: 2018-03-09 | End: 2018-03-09

## 2018-03-09 RX ORDER — ALBUTEROL SULFATE 1.25 MG/3ML
1 SOLUTION RESPIRATORY (INHALATION) EVERY 6 HOURS PRN
Qty: 30 VIAL | Refills: 0 | Status: SHIPPED | OUTPATIENT
Start: 2018-03-09 | End: 2018-08-03

## 2018-03-09 RX ADMIN — BENZONATATE 200 MG: 100 CAPSULE ORAL at 00:15

## 2018-03-09 RX ADMIN — AZITHROMYCIN 500 MG: 250 TABLET, FILM COATED ORAL at 00:09

## 2018-03-09 NOTE — ED PROVIDER NOTES
"Subjective   Patient is a 77 y.o. male presenting with URI.   History provided by:  Patient  URI   Presenting symptoms: congestion, cough and fever    Presenting symptoms: no ear pain and no sore throat    Congestion:     Location:  Nasal  Cough:     Cough characteristics:  Productive    Sputum characteristics:  Green    Severity:  Moderate    Timing:  Intermittent    Chronicity:  New  Severity: subjective.  Associated symptoms: no arthralgias, no headaches, no myalgias, no neck pain, no sinus pain, no sneezing, no swollen glands and no wheezing    Risk factors: being elderly, chronic respiratory disease and diabetes mellitus    Risk factors: no recent illness, no recent travel and no sick contacts    Risk factors comment:  CAD - \"mild heart attack in November\" - 2017.  No CABG or stint    HPI Narrative:Mr. Froilan Helton is a 78 yo WM who presents stating I think I have pneumonia.  Patient reports onset of productive cough and shortness of breath 2-3 days ago.  However triage she told the nurse symptoms started 1 week ago.  He was seen by his PMD 3 days ago.  He was not prescribed any medications.  Patient has had subjective fever as well as chills.  The cough is productive with green sputum.  Associated shortness of breath.  Patient has a history of COPD.  He is prescribed an albuterol inhaler however he ran out of medication yesterday.  Patient states he feels he needs to stay in the hospital due to his shortness of breath to \"get better\". Patient presents for evaluation.        Review of Systems   Constitutional: Positive for fever. Negative for chills and diaphoresis.   HENT: Positive for congestion. Negative for ear pain, sinus pain, sneezing and sore throat.    Eyes: Negative for pain and discharge.   Respiratory: Positive for cough. Negative for chest tightness, shortness of breath, wheezing and stridor.    Cardiovascular: Negative for chest pain, palpitations and leg swelling.   Gastrointestinal: Negative for " abdominal pain, diarrhea, nausea and vomiting.   Genitourinary: Negative for dysuria, flank pain, frequency and hematuria.   Musculoskeletal: Negative for arthralgias, back pain, myalgias, neck pain and neck stiffness.   Skin: Negative for color change, pallor and rash.   Neurological: Negative for dizziness, seizures, syncope and headaches.   Psychiatric/Behavioral: Negative for agitation and confusion. The patient is not nervous/anxious.    All other systems reviewed and are negative.      Past Medical History:   Diagnosis Date   • Arthritis    • Asthma    • Cancer     skin   • COPD (chronic obstructive pulmonary disease)    • Diabetes mellitus    • Disease of thyroid gland    • Hypertension    • Renal disorder        Allergies   Allergen Reactions   • Oxycontin [Oxycodone Hcl]      'Makes me crazy and stand on my head'       Past Surgical History:   Procedure Laterality Date   • COLONOSCOPY     • JOINT REPLACEMENT      right   • REPLACEMENT TOTAL KNEE         History reviewed. No pertinent family history.    Social History     Social History   • Marital status: Unknown     Social History Main Topics   • Smoking status: Former Smoker   • Smokeless tobacco: Never Used      Comment: quit 1993   • Alcohol use No   • Drug use: No           Objective   Physical Exam   Constitutional: He is oriented to person, place, and time. He appears well-developed and well-nourished. No distress.   76 yo WM lying in bed. Patient is speaking full sentences without any difficulty.  He is not tachypneic. Mildly obese.  He appears in good health reach.  He is accompanied by his wife.   HENT:   Head: Normocephalic and atraumatic.   Right Ear: External ear normal.   Left Ear: External ear normal.   Nose: Nose normal.   Mouth/Throat: Oropharynx is clear and moist.   Eyes: Conjunctivae and EOM are normal. Pupils are equal, round, and reactive to light.   Neck: Normal range of motion. Neck supple.   Cardiovascular: Normal rate, regular  rhythm, normal heart sounds and intact distal pulses.  Exam reveals no gallop and no friction rub.    No murmur heard.  Pulmonary/Chest: Effort normal and breath sounds normal. No stridor. No respiratory distress. He has no wheezes. He has no rales.   Abdominal: Soft. He exhibits no distension. There is no tenderness.   Musculoskeletal: Normal range of motion. He exhibits no edema.   Neurological: He is alert and oriented to person, place, and time. No cranial nerve deficit.   Skin: Skin is warm and dry. No rash noted. He is not diaphoretic. No erythema.   Psychiatric: He has a normal mood and affect. His behavior is normal.   Nursing note and vitals reviewed.      ECG 12 Lead    Date/Time: 3/8/2018 11:09 PM  Performed by: MARCIE RAMIREZ  Authorized by: MARCIE RAMIREZ   Interpreted by physician  Comparison: compared with previous ECG from 1/10/2018  Similar to previous ECG  Rhythm: sinus rhythm  Rate: normal  QRS axis: left  ST Depression: I and aVL  T depression: aVL, aVF and V6  Other findings: LVH  Q waves: III and aVF  Clinical impression: abnormal ECG               ED Course  ED Course   Comment By Time   03/08/18  11:00 PM  Patient reports SOA for 2-3 days.  He thinks he has pneumonia and that he needs to be hospitalized.  Patient has had subjective fever.  Productive cough.  Lungs are clear to auscultation.  I will obtain full set of lab work including EKG and chest x-ray.  Given breathing treatments ×2.  Patient has metered-dose inhaler at home however he ran out of medication yesterday. Marcie Ramirez MD 03/08 2301 03/09/18  12:03 AM  The patient has increased air movement after mini neb ×2.  Lab work shows minimal leukocytosis with white count 11.9 4K, chronic renal dysfunction with BUN 22 and creatinine 1.45.  Hyperglycemia with glucose 162.  Troponin is unremarkable.  Lactic acid is negative.  Flu shot was negative.  ProBNP is elevated at 2214.  Chest x-ray shows left basilar atelectasis  versus small platelike infiltrate.  I will start oral antibiotics tonight.  Will refill patient's prescriptions for albuterol metered-dose inhaler and solution.  Prescribed cough medication.  Also instructed patient to take additional dose of Lasix for the next 2 days. Anurag Stover MD 03/09 0004      Labs Reviewed   COMPREHENSIVE METABOLIC PANEL - Abnormal; Notable for the following:        Result Value    Glucose 162 (*)     Creatinine 1.45 (*)     Sodium 134 (*)     Chloride 97 (*)     eGFR Non  Amer 47 (*)     All other components within normal limits    Narrative:     The MDRD GFR formula is only valid for adults with stable renal function between ages 18 and 70.   BNP (IN-HOUSE) - Abnormal; Notable for the following:     proBNP 2214.0 (*)     All other components within normal limits    Narrative:     Among patients with dyspnea, NT-proBNP is highly sensitive for the detection of acute congestive heart failure. In addition NT-proBNP of <300 pg/ml effectively rules out acute congestive heart failure with 99% negative predictive value.   CBC WITH AUTO DIFFERENTIAL - Abnormal; Notable for the following:     WBC 11.94 (*)     Neutrophil % 73.5 (*)     Lymphocyte % 9.9 (*)     Monocyte % 14.3 (*)     Neutrophils, Absolute 8.78 (*)     Monocytes, Absolute 1.71 (*)     Immature Grans, Absolute 0.06 (*)     All other components within normal limits   INFLUENZA ANTIGEN, RAPID - Normal   LACTIC ACID, PLASMA - Normal   TROPONIN (IN-HOUSE) - Normal    Narrative:     Troponin T Reference Ranges:  Less than 0.03 ng/mL:    Negative for AMI  0.03 to 0.09 ng/mL:      Indeterminant for AMI  Greater than 0.09 ng/mL: Positive for AMI   CBC AND DIFFERENTIAL    Narrative:     The following orders were created for panel order CBC & Differential.  Procedure                               Abnormality         Status                     ---------                               -----------         ------                     CBC  Auto Differential[[id number]]        Abnormal            Final result                 Please view results for these tests on the individual orders.     My differential diagnosis for dyspnea includes but is not limited to:  Asthma, COPD, pneumonia, pulmonary embolus, acute respiratory distress syndrome, pneumothorax, pleural effusion, pulmonary fibrosis, congestive heart failure, myocardial infarction, DKA, uremia, acidosis, sepsis, anemia, drug related, hyperventilation, CNS disease            MDM  Number of Diagnoses or Management Options  Acute bronchitis, unspecified organism: new and requires workup  Acute on chronic congestive heart failure, unspecified congestive heart failure type: new and requires workup  COPD with acute exacerbation: new and requires workup     Amount and/or Complexity of Data Reviewed  Clinical lab tests: reviewed and ordered  Tests in the radiology section of CPT®: reviewed and ordered  Tests in the medicine section of CPT®: reviewed and ordered  Independent visualization of images, tracings, or specimens: yes    Risk of Complications, Morbidity, and/or Mortality  Presenting problems: moderate  Diagnostic procedures: high  Management options: moderate    Patient Progress  Patient progress: improved      Final diagnoses:   Acute bronchitis, unspecified organism   COPD with acute exacerbation   Acute on chronic congestive heart failure, unspecified congestive heart failure type              Anurag Stover MD  03/09/18 0036

## 2018-03-09 NOTE — DISCHARGE INSTRUCTIONS
"Medication as directed.  Use albuterol mini nebulizer 4 times daily for the next 3-4 days.  Increase Lasix dosage one additional tablet per the next 2 days then return to normal dosage.  Plenty of rest.  Tylenol or ibuprofen as needed for fever.  Follow-up with PMD as above.  Return to ED for worsening symptoms, medical emergency.    Hypertension  Hypertension, commonly called high blood pressure, is when the force of blood pumping through the arteries is too strong. The arteries are the blood vessels that carry blood from the heart throughout the body. Hypertension forces the heart to work harder to pump blood and may cause arteries to become narrow or stiff. Having untreated or uncontrolled hypertension can cause heart attacks, strokes, kidney disease, and other problems.  A blood pressure reading consists of a higher number over a lower number. Ideally, your blood pressure should be below 120/80. The first (\"top\") number is called the systolic pressure. It is a measure of the pressure in your arteries as your heart beats. The second (\"bottom\") number is called the diastolic pressure. It is a measure of the pressure in your arteries as the heart relaxes.  What are the causes?  The cause of this condition is not known.  What increases the risk?  Some risk factors for high blood pressure are under your control. Others are not.  Factors you can change   · Smoking.  · Having type 2 diabetes mellitus, high cholesterol, or both.  · Not getting enough exercise or physical activity.  · Being overweight.  · Having too much fat, sugar, calories, or salt (sodium) in your diet.  · Drinking too much alcohol.  Factors that are difficult or impossible to change   · Having chronic kidney disease.  · Having a family history of high blood pressure.  · Age. Risk increases with age.  · Race. You may be at higher risk if you are -American.  · Gender. Men are at higher risk than women before age 45. After age 65, women are at " higher risk than men.  · Having obstructive sleep apnea.  · Stress.  What are the signs or symptoms?  Extremely high blood pressure (hypertensive crisis) may cause:  · Headache.  · Anxiety.  · Shortness of breath.  · Nosebleed.  · Nausea and vomiting.  · Severe chest pain.  · Jerky movements you cannot control (seizures).  How is this diagnosed?  This condition is diagnosed by measuring your blood pressure while you are seated, with your arm resting on a surface. The cuff of the blood pressure monitor will be placed directly against the skin of your upper arm at the level of your heart. It should be measured at least twice using the same arm. Certain conditions can cause a difference in blood pressure between your right and left arms.  Certain factors can cause blood pressure readings to be lower or higher than normal (elevated) for a short period of time:  · When your blood pressure is higher when you are in a health care provider's office than when you are at home, this is called white coat hypertension. Most people with this condition do not need medicines.  · When your blood pressure is higher at home than when you are in a health care provider's office, this is called masked hypertension. Most people with this condition may need medicines to control blood pressure.  If you have a high blood pressure reading during one visit or you have normal blood pressure with other risk factors:  · You may be asked to return on a different day to have your blood pressure checked again.  · You may be asked to monitor your blood pressure at home for 1 week or longer.  If you are diagnosed with hypertension, you may have other blood or imaging tests to help your health care provider understand your overall risk for other conditions.  How is this treated?  This condition is treated by making healthy lifestyle changes, such as eating healthy foods, exercising more, and reducing your alcohol intake. Your health care provider may  prescribe medicine if lifestyle changes are not enough to get your blood pressure under control, and if:  · Your systolic blood pressure is above 130.  · Your diastolic blood pressure is above 80.  Your personal target blood pressure may vary depending on your medical conditions, your age, and other factors.  Follow these instructions at home:  Eating and drinking   · Eat a diet that is high in fiber and potassium, and low in sodium, added sugar, and fat. An example eating plan is called the DASH (Dietary Approaches to Stop Hypertension) diet. To eat this way:  ¨ Eat plenty of fresh fruits and vegetables. Try to fill half of your plate at each meal with fruits and vegetables.  ¨ Eat whole grains, such as whole wheat pasta, brown rice, or whole grain bread. Fill about one quarter of your plate with whole grains.  ¨ Eat or drink low-fat dairy products, such as skim milk or low-fat yogurt.  ¨ Avoid fatty cuts of meat, processed or cured meats, and poultry with skin. Fill about one quarter of your plate with lean proteins, such as fish, chicken without skin, beans, eggs, and tofu.  ¨ Avoid premade and processed foods. These tend to be higher in sodium, added sugar, and fat.  · Reduce your daily sodium intake. Most people with hypertension should eat less than 1,500 mg of sodium a day.  · Limit alcohol intake to no more than 1 drink a day for nonpregnant women and 2 drinks a day for men. One drink equals 12 oz of beer, 5 oz of wine, or 1½ oz of hard liquor.  Lifestyle   · Work with your health care provider to maintain a healthy body weight or to lose weight. Ask what an ideal weight is for you.  · Get at least 30 minutes of exercise that causes your heart to beat faster (aerobic exercise) most days of the week. Activities may include walking, swimming, or biking.  · Include exercise to strengthen your muscles (resistance exercise), such as pilates or lifting weights, as part of your weekly exercise routine. Try to do  these types of exercises for 30 minutes at least 3 days a week.  · Do not use any products that contain nicotine or tobacco, such as cigarettes and e-cigarettes. If you need help quitting, ask your health care provider.  · Monitor your blood pressure at home as told by your health care provider.  · Keep all follow-up visits as told by your health care provider. This is important.  Medicines   · Take over-the-counter and prescription medicines only as told by your health care provider. Follow directions carefully. Blood pressure medicines must be taken as prescribed.  · Do not skip doses of blood pressure medicine. Doing this puts you at risk for problems and can make the medicine less effective.  · Ask your health care provider about side effects or reactions to medicines that you should watch for.  Contact a health care provider if:  · You think you are having a reaction to a medicine you are taking.  · You have headaches that keep coming back (recurring).  · You feel dizzy.  · You have swelling in your ankles.  · You have trouble with your vision.  Get help right away if:  · You develop a severe headache or confusion.  · You have unusual weakness or numbness.  · You feel faint.  · You have severe pain in your chest or abdomen.  · You vomit repeatedly.  · You have trouble breathing.  Summary  · Hypertension is when the force of blood pumping through your arteries is too strong. If this condition is not controlled, it may put you at risk for serious complications.  · Your personal target blood pressure may vary depending on your medical conditions, your age, and other factors. For most people, a normal blood pressure is less than 120/80.  · Hypertension is treated with lifestyle changes, medicines, or a combination of both. Lifestyle changes include weight loss, eating a healthy, low-sodium diet, exercising more, and limiting alcohol.  This information is not intended to replace advice given to you by your health  care provider. Make sure you discuss any questions you have with your health care provider.  Document Released: 12/18/2006 Document Revised: 11/15/2017 Document Reviewed: 11/15/2017  [employer] Interactive Patient Education © 2017 [employer]

## 2018-08-03 ENCOUNTER — APPOINTMENT (OUTPATIENT)
Dept: GENERAL RADIOLOGY | Facility: HOSPITAL | Age: 78
End: 2018-08-03

## 2018-08-03 ENCOUNTER — APPOINTMENT (OUTPATIENT)
Dept: CT IMAGING | Facility: HOSPITAL | Age: 78
End: 2018-08-03

## 2018-08-03 ENCOUNTER — HOSPITAL ENCOUNTER (EMERGENCY)
Facility: HOSPITAL | Age: 78
Discharge: HOME | End: 2018-08-03
Attending: EMERGENCY MEDICINE | Admitting: EMERGENCY MEDICINE

## 2018-08-03 VITALS
HEART RATE: 85 BPM | OXYGEN SATURATION: 93 % | SYSTOLIC BLOOD PRESSURE: 151 MMHG | WEIGHT: 315 LBS | RESPIRATION RATE: 20 BRPM | BODY MASS INDEX: 41.75 KG/M2 | TEMPERATURE: 98.6 F | HEIGHT: 73 IN | DIASTOLIC BLOOD PRESSURE: 82 MMHG

## 2018-08-03 DIAGNOSIS — I50.9 ACUTE ON CHRONIC CONGESTIVE HEART FAILURE, UNSPECIFIED CONGESTIVE HEART FAILURE TYPE: Primary | ICD-10-CM

## 2018-08-03 DIAGNOSIS — J18.9 PNEUMONIA OF LEFT LUNG DUE TO INFECTIOUS ORGANISM, UNSPECIFIED PART OF LUNG: ICD-10-CM

## 2018-08-03 LAB
ALBUMIN SERPL-MCNC: 3.8 G/DL (ref 3.5–5.2)
ALBUMIN/GLOB SERPL: 1.3 G/DL
ALP SERPL-CCNC: 69 U/L (ref 40–129)
ALT SERPL W P-5'-P-CCNC: 14 U/L (ref 5–41)
ANION GAP SERPL CALCULATED.3IONS-SCNC: 13 MMOL/L
AST SERPL-CCNC: 15 U/L (ref 5–40)
BASOPHILS # BLD AUTO: 0.03 10*3/MM3 (ref 0–0.2)
BASOPHILS NFR BLD AUTO: 0.3 % (ref 0–2)
BILIRUB SERPL-MCNC: 0.3 MG/DL (ref 0.2–1.2)
BUN BLD-MCNC: 21 MG/DL (ref 8–23)
BUN/CREAT SERPL: 13.2 (ref 7–25)
CALCIUM SPEC-SCNC: 9.1 MG/DL (ref 8.8–10.5)
CHLORIDE SERPL-SCNC: 99 MMOL/L (ref 98–107)
CO2 SERPL-SCNC: 27 MMOL/L (ref 22–29)
CREAT BLD-MCNC: 1.59 MG/DL (ref 0.76–1.27)
D DIMER PPP FEU-MCNC: 0.69 MCGFEU/ML (ref 0–0.46)
DEPRECATED RDW RBC AUTO: 43.8 FL (ref 37–54)
EOSINOPHIL # BLD AUTO: 0.24 10*3/MM3 (ref 0.1–0.3)
EOSINOPHIL NFR BLD AUTO: 2.4 % (ref 0–4)
ERYTHROCYTE [DISTWIDTH] IN BLOOD BY AUTOMATED COUNT: 13.5 % (ref 11.5–14.5)
GFR SERPL CREATININE-BSD FRML MDRD: 43 ML/MIN/1.73
GLOBULIN UR ELPH-MCNC: 3 GM/DL
GLUCOSE BLD-MCNC: 259 MG/DL (ref 65–99)
HCT VFR BLD AUTO: 38.2 % (ref 42–52)
HGB BLD-MCNC: 12.4 G/DL (ref 14–18)
HOLD SPECIMEN: NORMAL
HOLD SPECIMEN: NORMAL
IMM GRANULOCYTES # BLD: 0.06 10*3/MM3 (ref 0–0.03)
IMM GRANULOCYTES NFR BLD: 0.6 % (ref 0–0.5)
LYMPHOCYTES # BLD AUTO: 1.22 10*3/MM3 (ref 0.6–4.8)
LYMPHOCYTES NFR BLD AUTO: 12.2 % (ref 20–45)
MCH RBC QN AUTO: 28.8 PG (ref 27–31)
MCHC RBC AUTO-ENTMCNC: 32.5 G/DL (ref 31–37)
MCV RBC AUTO: 88.6 FL (ref 80–94)
MONOCYTES # BLD AUTO: 0.89 10*3/MM3 (ref 0–1)
MONOCYTES NFR BLD AUTO: 8.9 % (ref 3–8)
NEUTROPHILS # BLD AUTO: 7.53 10*3/MM3 (ref 1.5–8.3)
NEUTROPHILS NFR BLD AUTO: 75.6 % (ref 45–70)
NRBC BLD MANUAL-RTO: 0 /100 WBC (ref 0–0)
NT-PROBNP SERPL-MCNC: 2338 PG/ML (ref 5–450)
PLATELET # BLD AUTO: 173 10*3/MM3 (ref 140–500)
PMV BLD AUTO: 10.9 FL (ref 7.4–10.4)
POTASSIUM BLD-SCNC: 4 MMOL/L (ref 3.5–5.2)
PROT SERPL-MCNC: 6.8 G/DL (ref 6–8.5)
RBC # BLD AUTO: 4.31 10*6/MM3 (ref 4.7–6.1)
SODIUM BLD-SCNC: 139 MMOL/L (ref 136–145)
TROPONIN T SERPL-MCNC: 0.01 NG/ML (ref 0–0.03)
WBC NRBC COR # BLD: 9.97 10*3/MM3 (ref 4.8–10.8)
WHOLE BLOOD HOLD SPECIMEN: NORMAL
WHOLE BLOOD HOLD SPECIMEN: NORMAL

## 2018-08-03 PROCEDURE — 99284 EMERGENCY DEPT VISIT MOD MDM: CPT | Performed by: EMERGENCY MEDICINE

## 2018-08-03 PROCEDURE — 96374 THER/PROPH/DIAG INJ IV PUSH: CPT

## 2018-08-03 PROCEDURE — 71046 X-RAY EXAM CHEST 2 VIEWS: CPT

## 2018-08-03 PROCEDURE — 84484 ASSAY OF TROPONIN QUANT: CPT | Performed by: EMERGENCY MEDICINE

## 2018-08-03 PROCEDURE — 85379 FIBRIN DEGRADATION QUANT: CPT | Performed by: EMERGENCY MEDICINE

## 2018-08-03 PROCEDURE — 0 IOPAMIDOL PER 1 ML: Performed by: EMERGENCY MEDICINE

## 2018-08-03 PROCEDURE — 25010000002 FUROSEMIDE PER 20 MG: Performed by: EMERGENCY MEDICINE

## 2018-08-03 PROCEDURE — 93010 ELECTROCARDIOGRAM REPORT: CPT | Performed by: INTERNAL MEDICINE

## 2018-08-03 PROCEDURE — 83880 ASSAY OF NATRIURETIC PEPTIDE: CPT | Performed by: EMERGENCY MEDICINE

## 2018-08-03 PROCEDURE — 99285 EMERGENCY DEPT VISIT HI MDM: CPT

## 2018-08-03 PROCEDURE — 71275 CT ANGIOGRAPHY CHEST: CPT

## 2018-08-03 PROCEDURE — 93005 ELECTROCARDIOGRAM TRACING: CPT | Performed by: EMERGENCY MEDICINE

## 2018-08-03 PROCEDURE — 80053 COMPREHEN METABOLIC PANEL: CPT | Performed by: EMERGENCY MEDICINE

## 2018-08-03 PROCEDURE — 85025 COMPLETE CBC W/AUTO DIFF WBC: CPT | Performed by: EMERGENCY MEDICINE

## 2018-08-03 RX ORDER — LEVOFLOXACIN 750 MG/1
750 TABLET ORAL EVERY 24 HOURS
Status: COMPLETED | OUTPATIENT
Start: 2018-08-03 | End: 2018-08-03

## 2018-08-03 RX ORDER — SODIUM CHLORIDE 0.9 % (FLUSH) 0.9 %
10 SYRINGE (ML) INJECTION AS NEEDED
Status: DISCONTINUED | OUTPATIENT
Start: 2018-08-03 | End: 2018-08-03 | Stop reason: HOSPADM

## 2018-08-03 RX ORDER — LEVOFLOXACIN 750 MG/1
750 TABLET ORAL DAILY
Qty: 10 TABLET | Refills: 0 | Status: SHIPPED | OUTPATIENT
Start: 2018-08-03 | End: 2018-08-24

## 2018-08-03 RX ORDER — ALBUTEROL SULFATE 1.25 MG/3ML
1 SOLUTION RESPIRATORY (INHALATION) EVERY 6 HOURS PRN
Qty: 30 VIAL | Refills: 0 | Status: SHIPPED | OUTPATIENT
Start: 2018-08-03 | End: 2019-05-08 | Stop reason: HOSPADM

## 2018-08-03 RX ORDER — FUROSEMIDE 40 MG/1
40 TABLET ORAL DAILY
Qty: 30 TABLET | Refills: 0 | Status: SHIPPED | OUTPATIENT
Start: 2018-08-03 | End: 2019-08-29 | Stop reason: HOSPADM

## 2018-08-03 RX ORDER — POTASSIUM CHLORIDE 750 MG/1
10 TABLET, FILM COATED, EXTENDED RELEASE ORAL 2 TIMES DAILY
Qty: 6 TABLET | Refills: 0 | Status: SHIPPED | OUTPATIENT
Start: 2018-08-03 | End: 2019-02-16

## 2018-08-03 RX ORDER — FUROSEMIDE 10 MG/ML
40 INJECTION INTRAMUSCULAR; INTRAVENOUS ONCE
Status: COMPLETED | OUTPATIENT
Start: 2018-08-03 | End: 2018-08-03

## 2018-08-03 RX ADMIN — FUROSEMIDE 40 MG: 10 INJECTION, SOLUTION INTRAMUSCULAR; INTRAVENOUS at 18:45

## 2018-08-03 RX ADMIN — LEVOFLOXACIN 750 MG: 750 TABLET, FILM COATED ORAL at 19:48

## 2018-08-03 RX ADMIN — IOPAMIDOL 127 ML: 755 INJECTION, SOLUTION INTRAVENOUS at 18:40

## 2018-08-03 RX ADMIN — Medication 10 ML: at 19:06

## 2018-08-03 NOTE — ED PROVIDER NOTES
"Subjective   History of Present Illness  History of Present Illness    Chief complaint: Shortness of air    Location: Home    Quality/Severity:  Moderate    Timing/Onset/Duration: Gradual onset over last few days    Modifying Factors: Worse with exertion, better with rest    Associated Symptoms: She complains of a frontal headache, that is chronic.  It is no worse than usual.  No sore throat or earache.  He has nasal congestion.  No chest pain.  The patient does have shortness of breath.  He has paroxysmal nocturnal dyspnea.  Sleeps sitting up in a chair.  The patient states that he feels like his abdomen is \"drawing\".  No diarrhea or burning when he urinates.  No nausea or vomiting.  The patient's feet are more swollen.  Patient complains of some intermittent blurred vision that he attributes to the fact that his blood sugars high.    Narrative: This 77-year-old white male presents with increasing shortness of breath.  His been going on for a few days.  He complains of paroxysmal nocturnal dyspnea.  He sleeps sitting up in a chair.  Patient denies any chest pain.  There is been no fever.  He's had a cough productive of yellowish sputum.  The patient complains of \"abdominal drawing\".  He has no abdominal pain now.  No diarrhea or burning when he urinates.  No nausea or vomiting.  Feet are swollen.  He has a headache in the frontal region is chronic and no worse than usual.  Does have nasal congestion.    PCP:Josiane    Cardiology:  Carlos Enrique      Review of Systems   Constitutional: Negative for chills and fever.   HENT: Positive for congestion. Negative for ear pain and sore throat.    Eyes: Positive for visual disturbance. Negative for discharge and redness.   Respiratory: Positive for shortness of breath. Negative for cough, chest tightness and wheezing.    Cardiovascular: Positive for leg swelling. Negative for chest pain and palpitations.   Gastrointestinal: Negative for abdominal pain, blood in stool, constipation, " diarrhea, nausea and vomiting.   Endocrine: Negative for polyuria.   Genitourinary: Positive for frequency. Negative for decreased urine volume, difficulty urinating, dysuria, hematuria and urgency.   Musculoskeletal: Negative for back pain.   Skin: Negative for rash.   Neurological: Positive for headaches. Negative for dizziness, speech difficulty, weakness, light-headedness and numbness.   Hematological: Negative for adenopathy.   Psychiatric/Behavioral: Negative.  Negative for agitation and confusion.        Medication List      ASK your doctor about these medications    * albuterol 1.25 MG/3ML nebulizer solution  Commonly known as:  ACCUNEB  Take 3 mL by nebulization Every 6 (Six) Hours As Needed for Wheezing or   Shortness of Air (or cough).     * albuterol 108 (90 Base) MCG/ACT inhaler  Commonly known as:  PROVENTIL HFA;VENTOLIN HFA  Inhale 2 puffs Every 4 (Four) Hours As Needed for Wheezing or Shortness of   Air.     amLODIPine 2.5 MG tablet  Commonly known as:  NORVASC  Take 1 tablet by mouth Daily.     amoxicillin 500 MG capsule  Commonly known as:  AMOXIL  Take 1 capsule by mouth 3 (Three) Times a Day.     aspirin 325 MG EC tablet  Take 1 tablet by mouth Daily.     atorvastatin 10 MG tablet  Commonly known as:  LIPITOR  Take 1 tablet by mouth Daily.     azithromycin 250 MG tablet  Commonly known as:  ZITHROMAX Z-MARILEE  Take 1 tablet daily for 4 days.     clotrimazole 1 % cream  Commonly known as:  LOTRIMIN  Apply  topically Every 12 (Twelve) Hours.     docusate sodium 100 MG capsule  Take 100 mg by mouth 2 (Two) Times a Day.     donepezil 5 MG tablet  Commonly known as:  ARICEPT     furosemide 40 MG tablet  Commonly known as:  LASIX  Take 1 tablet by mouth Daily.     gabapentin 600 MG tablet  Commonly known as:  NEURONTIN     insulin aspart 100 UNIT/ML injection  Commonly known as:  novoLOG     insulin detemir 100 UNIT/ML injection  Commonly known as:  LEVEMIR  Inject 30 Units under the skin 2 (Two) Times a  Day.     insulin NPH-insulin regular (70-30) 100 UNIT/ML injection  Commonly known as:  humuLIN 70/30,novoLIN 70/30     lactobacillus acidophilus capsule capsule  Take 1 capsule by mouth Daily.     levothyroxine 100 MCG tablet  Commonly known as:  SYNTHROID, LEVOTHROID     lisinopril 20 MG tablet  Commonly known as:  PRINIVIL,ZESTRIL     metoprolol succinate XL 25 MG 24 hr tablet  Commonly known as:  TOPROL-XL  Take 0.5 tablets by mouth Every Night.     polyethylene glycol pack packet  Commonly known as:  MIRALAX  Take 17 g by mouth Daily.     pramipexole 0.5 MG tablet  Commonly known as:  MIRAPEX     pregabalin 100 MG capsule  Commonly known as:  LYRICA     SITagliptin 100 MG tablet  Commonly known as:  JANUVIA     vitamin D 71984 units capsule capsule  Commonly known as:  ERGOCALCIFEROL        * This list has 2 medication(s) that are the same as other medications   prescribed for you. Read the directions carefully, and ask your doctor or   other care provider to review them with you.              Past Medical History:   Diagnosis Date   • Arthritis    • Asthma    • Cancer (CMS/HCC)     skin   • COPD (chronic obstructive pulmonary disease) (CMS/HCC)    • Diabetes mellitus (CMS/HCC)    • Disease of thyroid gland    • Hypertension    • Renal disorder        Allergies   Allergen Reactions   • Oxycontin [Oxycodone Hcl]      'Makes me crazy and stand on my head'       Past Surgical History:   Procedure Laterality Date   • COLONOSCOPY     • JOINT REPLACEMENT      right   • REPLACEMENT TOTAL KNEE         History reviewed. No pertinent family history.    Social History     Social History   • Marital status: Unknown     Social History Main Topics   • Smoking status: Former Smoker   • Smokeless tobacco: Never Used      Comment: quit 1993   • Alcohol use No   • Drug use: No     Other Topics Concern   • Not on file           Objective   Physical Exam   Constitutional: He is oriented to person, place, and time. He appears  well-developed and well-nourished. No distress.   ED Triage Vitals  Temp: 98.6 °F (37 °C) (08/03/18 1703)  Heart Rate: 101 (08/03/18 1703)  Resp: 26 (08/03/18 1703)  BP: 166/84 (08/03/18 1705)  SpO2: 95 % (08/03/18 1703)  Temp src: Temporal Art (08/03/18 1703)  Heart Rate Source: Monitor (08/03/18 1703)  Patient Position: Lying (08/03/18 1705)  BP Location: Right arm (08/03/18 1705)  FiO2 (%): n/a    The patient's vitals were reviewed by me.  Unless otherwise noted they are within normal limits.  The patient is hypertensive with a blood pressure 151/74.  He has a hypertension.  The patient is to get adequate rest for rate of 26.  Saturations are 93% on room air.  The patient is afebrile.     HENT:   Head: Normocephalic and atraumatic.   Right Ear: External ear normal.   Left Ear: External ear normal.   Nose: Nose normal.   Mouth/Throat: Oropharynx is clear and moist.   Eyes: Pupils are equal, round, and reactive to light. Conjunctivae and EOM are normal. Right eye exhibits no discharge. Left eye exhibits no discharge. No scleral icterus.   Neck: Normal range of motion. Neck supple. No JVD present. No tracheal deviation present. No thyromegaly present.   Cardiovascular: Normal rate, regular rhythm, normal heart sounds and intact distal pulses.  Exam reveals no gallop and no friction rub.    No murmur heard.  Pulmonary/Chest: Effort normal. No stridor. No respiratory distress. He has no wheezes. He has rales (Fine rales noted in the bases.). He exhibits no tenderness.   Abdominal: Soft. Bowel sounds are normal. He exhibits no distension and no mass. There is no tenderness. There is no rebound and no guarding. No hernia.   Musculoskeletal: Normal range of motion. He exhibits edema (There is 3+ pitting edema in the feet and legs bilaterally.). He exhibits no tenderness or deformity.   Lymphadenopathy:     He has no cervical adenopathy.   Neurological: He is alert and oriented to person, place, and time. No cranial nerve  deficit or sensory deficit. He exhibits normal muscle tone. Coordination normal.   Skin: Skin is warm and dry. No rash noted. He is not diaphoretic. No erythema. No pallor.   Psychiatric: His behavior is normal.   Nursing note and vitals reviewed.      Procedures           ED Course  ED Course as of Aug 03 1926   Fri Aug 03, 2018   1825 The laboratory values were reviewed by me.  The d-dimer was 0.69.  The glucose to 59.  The creatinine 1.59.  GFR 43.  The BNP is 2338.  The hemoglobin is 12.4.  The hematocrit is 30.  The neutrophil percent 75%.  Laboratory values are otherwise unremarkable.  [RC]      ED Course User Index  [RC] Keven Steven MD      5:33 PM, 08/03/18:  The EKG was obtained at 1730.  EKG was read by me at 1732.  EKG shows a normal sinus rhythm with rate of 90.  There is a normal axis with no hypertrophy.  The MA is prolonged at 208 ms.  The QRS is 102 ms.  QT interval is 372 ms.  There is no acute ST elevation or depression.  There are PACs.  There is inferior Q waves.  His borderline R wave progression anteriorly.  The EKG today was compared to an EKG dated March 8, 2018.  It is essentially unchanged.    7:29 PM, 08/03/18:  Patient was reassessed.  He feels better.  Vital signs were reviewed and are stable.  Exam: Clear to auscultation equal bilaterally.  Patient states that he had urinary output with the Lasix but he urinated while he was having a bowel movement.  The urine was not measured.    7:29 PM, 08/03/18:  Patient's diagnosis of congestive heart failure and pneumonia was discussed with the patient.  Patient is afebrile.  The CAT scan is showing infiltrates.  He has rails on exam and swelling in his feet.  I will increase his Lasix dose.  I will also place him on an antibiotic to cover him for pneumonia.  Patient should follow-up with  and Dr. Monaco next week.  Vision should return to emergency department if there is increasing shortness of breath, chest pain, worse in any way  at all.  The patient states that he is out of his albuterol nebulizer.  All the patient's questions were answered he will be discharged in good condition.          MDM    XR Chest 2 View    (Results Pending)     Labs Reviewed   RAINBOW DRAW    Narrative:     The following orders were created for panel order [facility] Draw.  Procedure                               Abnormality         Status                     ---------                               -----------         ------                     Light Blue Top[[id number]]                                                              Green Top (Gel)[[id number]]                                                             Lavender Top[[id number]]                                                                Gold Top - SST[[id number]]                                                                Please view results for these tests on the individual orders.   COMPREHENSIVE METABOLIC PANEL   BNP (IN-HOUSE)   TROPONIN (IN-HOUSE)   CBC WITH AUTO DIFFERENTIAL   CBC AND DIFFERENTIAL    Narrative:     The following orders were created for panel order CBC & Differential.  Procedure                               Abnormality         Status                     ---------                               -----------         ------                     CBC Auto Differential[[id number]]                                                         Please view results for these tests on the individual orders.   LIGHT BLUE TOP   GREEN TOP   LAVENDER TOP   GOLD TOP - SST     No results found.    Final diagnoses:   Acute on chronic congestive heart failure, unspecified congestive heart failure type (CMS/[facility])   Pneumonia of left lung due to infectious organism, unspecified part of lung         ED Medications:  Medications   sodium chloride 0.9 % flush 10 mL (not administered)       New Medications:     Medication List      ASK your doctor about these medications    * albuterol 1.25 MG/3ML nebulizer  solution  Commonly known as:  ACCUNEB  Take 3 mL by nebulization Every 6 (Six) Hours As Needed for Wheezing or   Shortness of Air (or cough).     * albuterol 108 (90 Base) MCG/ACT inhaler  Commonly known as:  PROVENTIL HFA;VENTOLIN HFA  Inhale 2 puffs Every 4 (Four) Hours As Needed for Wheezing or Shortness of   Air.     amLODIPine 2.5 MG tablet  Commonly known as:  NORVASC  Take 1 tablet by mouth Daily.     amoxicillin 500 MG capsule  Commonly known as:  AMOXIL  Take 1 capsule by mouth 3 (Three) Times a Day.     aspirin 325 MG EC tablet  Take 1 tablet by mouth Daily.     atorvastatin 10 MG tablet  Commonly known as:  LIPITOR  Take 1 tablet by mouth Daily.     azithromycin 250 MG tablet  Commonly known as:  ZITHROMAX Z-MARILEE  Take 1 tablet daily for 4 days.     clotrimazole 1 % cream  Commonly known as:  LOTRIMIN  Apply  topically Every 12 (Twelve) Hours.     docusate sodium 100 MG capsule  Take 100 mg by mouth 2 (Two) Times a Day.     donepezil 5 MG tablet  Commonly known as:  ARICEPT     furosemide 40 MG tablet  Commonly known as:  LASIX  Take 1 tablet by mouth Daily.     gabapentin 600 MG tablet  Commonly known as:  NEURONTIN     insulin aspart 100 UNIT/ML injection  Commonly known as:  novoLOG     insulin detemir 100 UNIT/ML injection  Commonly known as:  LEVEMIR  Inject 30 Units under the skin 2 (Two) Times a Day.     insulin NPH-insulin regular (70-30) 100 UNIT/ML injection  Commonly known as:  humuLIN 70/30,novoLIN 70/30     lactobacillus acidophilus capsule capsule  Take 1 capsule by mouth Daily.     levothyroxine 100 MCG tablet  Commonly known as:  SYNTHROID, LEVOTHROID     lisinopril 20 MG tablet  Commonly known as:  PRINIVIL,ZESTRIL     metoprolol succinate XL 25 MG 24 hr tablet  Commonly known as:  TOPROL-XL  Take 0.5 tablets by mouth Every Night.     polyethylene glycol pack packet  Commonly known as:  MIRALAX  Take 17 g by mouth Daily.     pramipexole 0.5 MG tablet  Commonly known as:  MIRAPEX      pregabalin 100 MG capsule  Commonly known as:  LYRICA     SITagliptin 100 MG tablet  Commonly known as:  JANUVIA     vitamin D 72261 units capsule capsule  Commonly known as:  ERGOCALCIFEROL        * This list has 2 medication(s) that are the same as other medications   prescribed for you. Read the directions carefully, and ask your doctor or   other care provider to review them with you.              Stopped Medications:     Medication List      ASK your doctor about these medications    * albuterol 1.25 MG/3ML nebulizer solution  Commonly known as:  ACCUNEB  Take 3 mL by nebulization Every 6 (Six) Hours As Needed for Wheezing or   Shortness of Air (or cough).     * albuterol 108 (90 Base) MCG/ACT inhaler  Commonly known as:  PROVENTIL HFA;VENTOLIN HFA  Inhale 2 puffs Every 4 (Four) Hours As Needed for Wheezing or Shortness of   Air.     amLODIPine 2.5 MG tablet  Commonly known as:  NORVASC  Take 1 tablet by mouth Daily.     amoxicillin 500 MG capsule  Commonly known as:  AMOXIL  Take 1 capsule by mouth 3 (Three) Times a Day.     aspirin 325 MG EC tablet  Take 1 tablet by mouth Daily.     atorvastatin 10 MG tablet  Commonly known as:  LIPITOR  Take 1 tablet by mouth Daily.     azithromycin 250 MG tablet  Commonly known as:  ZITHROMAX Z-MARILEE  Take 1 tablet daily for 4 days.     clotrimazole 1 % cream  Commonly known as:  LOTRIMIN  Apply  topically Every 12 (Twelve) Hours.     docusate sodium 100 MG capsule  Take 100 mg by mouth 2 (Two) Times a Day.     donepezil 5 MG tablet  Commonly known as:  ARICEPT     furosemide 40 MG tablet  Commonly known as:  LASIX  Take 1 tablet by mouth Daily.     gabapentin 600 MG tablet  Commonly known as:  NEURONTIN     insulin aspart 100 UNIT/ML injection  Commonly known as:  novoLOG     insulin detemir 100 UNIT/ML injection  Commonly known as:  LEVEMIR  Inject 30 Units under the skin 2 (Two) Times a Day.     insulin NPH-insulin regular (70-30) 100 UNIT/ML injection  Commonly known  as:  humuLIN 70/30,novoLIN 70/30     lactobacillus acidophilus capsule capsule  Take 1 capsule by mouth Daily.     levothyroxine 100 MCG tablet  Commonly known as:  SYNTHROID, LEVOTHROID     lisinopril 20 MG tablet  Commonly known as:  PRINIVIL,ZESTRIL     metoprolol succinate XL 25 MG 24 hr tablet  Commonly known as:  TOPROL-XL  Take 0.5 tablets by mouth Every Night.     polyethylene glycol pack packet  Commonly known as:  MIRALAX  Take 17 g by mouth Daily.     pramipexole 0.5 MG tablet  Commonly known as:  MIRAPEX     pregabalin 100 MG capsule  Commonly known as:  LYRICA     SITagliptin 100 MG tablet  Commonly known as:  JANUVIA     vitamin D 98769 units capsule capsule  Commonly known as:  ERGOCALCIFEROL        * This list has 2 medication(s) that are the same as other medications   prescribed for you. Read the directions carefully, and ask your doctor or   other care provider to review them with you.                Final diagnoses:   Acute on chronic congestive heart failure, unspecified congestive heart failure type (CMS/HCC)   Pneumonia of left lung due to infectious organism, unspecified part of lung            Keven Steven MD  08/03/18 1938

## 2018-08-09 ENCOUNTER — OFFICE VISIT (OUTPATIENT)
Dept: CARDIOLOGY | Facility: CLINIC | Age: 78
End: 2018-08-09

## 2018-08-09 VITALS
HEIGHT: 73 IN | HEART RATE: 85 BPM | BODY MASS INDEX: 38.57 KG/M2 | SYSTOLIC BLOOD PRESSURE: 150 MMHG | DIASTOLIC BLOOD PRESSURE: 80 MMHG | WEIGHT: 291 LBS

## 2018-08-09 DIAGNOSIS — R06.02 SOB (SHORTNESS OF BREATH): Primary | ICD-10-CM

## 2018-08-09 DIAGNOSIS — I10 ESSENTIAL HYPERTENSION: Chronic | ICD-10-CM

## 2018-08-09 DIAGNOSIS — I50.32 CHRONIC DIASTOLIC CHF (CONGESTIVE HEART FAILURE) (HCC): ICD-10-CM

## 2018-08-09 PROCEDURE — 99214 OFFICE O/P EST MOD 30 MIN: CPT | Performed by: INTERNAL MEDICINE

## 2018-08-09 NOTE — PROGRESS NOTES
Subjective:     Encounter Date:08/9/2018      Patient ID: Froilan Helton is a 77 y.o. male.    Chief Complaint:SOB  History of Present Illness    Dear Dr. Clarke,    The pleasure of seeing this patient in the office today for follow-up after his recent ER visit.    He presented with shortness of breath.  He had some increased weight gain.  Lab work, chest x-ray, and CT scan of his chest were performed.  He was diagnosed with both pneumonia, for which she was placed on antibiotics, as well as acute on chronic diastolic heart failure.  Furosemide was increased and he was scheduled for follow-up appointment.    Patient states she's been going to the bathroom a lot and is lost weight.  According to his weight in the emergency room, which was 317 pounds using the bed scale, and his weight today in our office at 291 pounds, he is lost 28 pounds in total.  He states that he was in your office on Tuesday and weight about 300.  He still feels short of breath.  He still has a cough.  He still having frequent chills but as far as he can tell he is not having any fever.  He has a cough that's nonproductive.  He still has lower extremity edema; he's had that chronically for a long time.  No feeling of palpitations or heart racing.    He did have an echocardiogram 11/2017 that showed normal function:  Echo today:  Interpretation Summary   · Left ventricular systolic function is normal. Estimated EF = 52%.  · Left ventricular diastolic dysfunction (grade I) consistent with impaired relaxation.  · calcification of the aortic valve  · Mild dilation of the aortic root is present.     When I saw him in January 2018 we have ordered a stress Cardiolite with Lexiscan.  He did not follow through on that.    The following portions of the patient's history were reviewed and updated as appropriate: allergies, current medications, past family history, past medical history, past social history, past surgical history and problem  list.    Past Medical History:   Diagnosis Date   • Arthritis    • Asthma    • Cancer (CMS/HCC)     skin   • COPD (chronic obstructive pulmonary disease) (CMS/HCC)    • Diabetes mellitus (CMS/HCC)    • Disease of thyroid gland    • Hypertension    • Renal disorder        Past Surgical History:   Procedure Laterality Date   • COLONOSCOPY     • JOINT REPLACEMENT      right   • REPLACEMENT TOTAL KNEE         Social History     Social History   • Marital status: Unknown     Spouse name: N/A   • Number of children: N/A   • Years of education: N/A     Occupational History   • Not on file.     Social History Main Topics   • Smoking status: Former Smoker   • Smokeless tobacco: Never Used      Comment: quit 1993   • Alcohol use No   • Drug use: No   • Sexual activity: Not on file     Other Topics Concern   • Not on file     Social History Narrative   • No narrative on file       Review of Systems   Constitution: Negative for chills, decreased appetite, fever and night sweats.   HENT: Negative for ear discharge, ear pain, hearing loss, nosebleeds and sore throat.    Eyes: Negative for blurred vision, double vision and pain.   Cardiovascular: Negative for cyanosis.   Respiratory: Negative for hemoptysis and sputum production.    Endocrine: Negative for cold intolerance and heat intolerance.   Hematologic/Lymphatic: Negative for adenopathy.   Skin: Negative for dry skin, itching, nail changes, rash and suspicious lesions.   Musculoskeletal: Positive for back pain, joint pain and joint swelling. Negative for arthritis, gout, muscle cramps, muscle weakness, myalgias and neck pain.   Gastrointestinal: Negative for anorexia, bowel incontinence, constipation, diarrhea, dysphagia, hematemesis and jaundice.   Genitourinary: Negative for bladder incontinence, dysuria, flank pain, frequency, hematuria and nocturia.   Neurological: Negative for focal weakness, numbness, paresthesias and seizures.   Psychiatric/Behavioral: Negative for  "altered mental status, hallucinations, hypervigilance, suicidal ideas and thoughts of violence.   Allergic/Immunologic: Negative for persistent infections.       Procedures       Objective:     Vitals:    08/09/18 1306   BP: 150/80   Pulse: 85   Weight: 132 kg (291 lb)   Height: 185.4 cm (73\")         Physical Exam   Constitutional: He is oriented to person, place, and time. He appears well-developed and well-nourished. No distress.   HENT:   Head: Normocephalic and atraumatic.   Nose: Nose normal.   Mouth/Throat: Oropharynx is clear and moist.   Eyes: Pupils are equal, round, and reactive to light. Conjunctivae and EOM are normal. Right eye exhibits no discharge. Left eye exhibits no discharge.   Neck: Normal range of motion. Neck supple. No tracheal deviation present. No thyromegaly present.   Cardiovascular: Normal rate, regular rhythm, S1 normal, S2 normal, normal heart sounds and normal pulses.  Exam reveals no S3.    Pulmonary/Chest: Effort normal and breath sounds normal. No stridor. No respiratory distress. He exhibits no tenderness.   Abdominal: Soft. Bowel sounds are normal. He exhibits no distension and no mass. There is no tenderness. There is no rebound and no guarding.   Musculoskeletal: Normal range of motion. He exhibits no tenderness or deformity.   Lymphadenopathy:     He has no cervical adenopathy.   Neurological: He is alert and oriented to person, place, and time. He has normal reflexes.   Skin: Skin is warm and dry. No rash noted. He is not diaphoretic. No erythema.   Psychiatric: He has a normal mood and affect. Thought content normal.       Lab Review:     Results from last 7 days  Lab Units 08/03/18  1719   SODIUM mmol/L 139   POTASSIUM mmol/L 4.0   CHLORIDE mmol/L 99   CO2 mmol/L 27.0   BUN mg/dL 21   CREATININE mg/dL 1.59*   GLUCOSE mg/dL 259*   CALCIUM mg/dL 9.1           Performed        Assessment:          Diagnosis Plan   1. SOB (shortness of breath)     2. Chronic diastolic CHF " (congestive heart failure) (CMS/[facility])     3. Essential hypertension            Plan:       This patient has shortness breath that appear to be multifactorial.  He still having chills, cough, and some shortness of breath.  He is diuresed significantly.  I would have him continue his current medical regimen and then I will see him back in 3-4 weeks.  At this point were not given a reorder the stress test, but we will consider that at his next visit.    Thank you very much for allowing us to participate in the care of this pleasant patient.  Please don't hesitate to call if I can be of assistance in any way.      Current Outpatient Prescriptions:   •  albuterol (ACCUNEB) 1.25 MG/3ML nebulizer solution, Take 3 mL by nebulization Every 6 (Six) Hours As Needed for Wheezing., Disp: 30 vial, Rfl: 0  •  amLODIPine (NORVASC) 2.5 MG tablet, Take 1 tablet by mouth Daily., Disp: 30 tablet, Rfl: 0  •  aspirin  MG EC tablet, Take 1 tablet by mouth Daily., Disp: 30 tablet, Rfl: 0  •  atorvastatin (LIPITOR) 10 MG tablet, Take 1 tablet by mouth Daily., Disp: 30 tablet, Rfl: 0  •  clotrimazole (LOTRIMIN) 1 % cream, Apply  topically Every 12 (Twelve) Hours., Disp: 60 g, Rfl: 0  •  docusate sodium 100 MG capsule, Take 100 mg by mouth 2 (Two) Times a Day., Disp: 60 capsule, Rfl: 0  •  donepezil (ARICEPT) 5 MG tablet, Take 5 mg by mouth Every Night., Disp: , Rfl:   •  furosemide (LASIX) 40 MG tablet, Take 1 tablet by mouth Daily. Take 1 tablet by mouth twice per day for 2 days. Then resume 1 per day, Disp: 30 tablet, Rfl: 0  •  gabapentin (NEURONTIN) 600 MG tablet, Take 600 mg by mouth 3 (Three) Times a Day., Disp: , Rfl:   •  insulin aspart (novoLOG) 100 UNIT/ML injection, Inject  under the skin into the appropriate area as directed 3 (Three) Times a Day Before Meals., Disp: , Rfl:   •  insulin detemir (LEVEMIR) 100 UNIT/ML injection, Inject 30 Units under the skin 2 (Two) Times a Day., Disp: 10 mL, Rfl: 0  •  insulin NPH-insulin  regular (humuLIN 70/30,novoLIN 70/30) (70-30) 100 UNIT/ML injection, Inject  under the skin into the appropriate area as directed 2 (Two) Times a Day With Meals., Disp: , Rfl:   •  lactobacillus acidophilus (RISAQUAD) capsule capsule, Take 1 capsule by mouth Daily., Disp: 30 capsule, Rfl: 0  •  levoFLOXacin (LEVAQUIN) 750 MG tablet, Take 1 tablet by mouth Daily., Disp: 10 tablet, Rfl: 0  •  levothyroxine (SYNTHROID, LEVOTHROID) 100 MCG tablet, Take 100 mcg by mouth Daily., Disp: , Rfl:   •  lisinopril (PRINIVIL,ZESTRIL) 20 MG tablet, Take 20 mg by mouth Daily., Disp: , Rfl:   •  metoprolol succinate XL (TOPROL-XL) 25 MG 24 hr tablet, Take 0.5 tablets by mouth Every Night., Disp: 30 tablet, Rfl: 0  •  polyethylene glycol (MIRALAX) pack packet, Take 17 g by mouth Daily., Disp: 30 each, Rfl: 0  •  potassium chloride (K-DUR) 10 MEQ CR tablet, Take 1 tablet by mouth 2 (Two) Times a Day., Disp: 6 tablet, Rfl: 0  •  pramipexole (MIRAPEX) 0.5 MG tablet, Take 0.5 mg by mouth 3 (Three) Times a Day., Disp: , Rfl:   •  pregabalin (LYRICA) 100 MG capsule, Take 100 mg by mouth 2 (Two) Times a Day., Disp: , Rfl:   •  SITagliptin (JANUVIA) 100 MG tablet, Take 100 mg by mouth Daily., Disp: , Rfl:   •  vitamin D (ERGOCALCIFEROL) 11342 units capsule capsule, Take 50,000 Units by mouth 1 (One) Time Per Week., Disp: , Rfl:

## 2018-08-24 ENCOUNTER — OFFICE VISIT (OUTPATIENT)
Dept: ORTHOPEDIC SURGERY | Facility: CLINIC | Age: 78
End: 2018-08-24

## 2018-08-24 VITALS
WEIGHT: 294 LBS | BODY MASS INDEX: 38.97 KG/M2 | HEART RATE: 59 BPM | SYSTOLIC BLOOD PRESSURE: 148 MMHG | HEIGHT: 73 IN | DIASTOLIC BLOOD PRESSURE: 80 MMHG

## 2018-08-24 DIAGNOSIS — S99.911A INJURY OF RIGHT ANKLE, INITIAL ENCOUNTER: ICD-10-CM

## 2018-08-24 DIAGNOSIS — M17.12 PRIMARY OSTEOARTHRITIS OF LEFT KNEE: ICD-10-CM

## 2018-08-24 DIAGNOSIS — R52 PAIN: Primary | ICD-10-CM

## 2018-08-24 PROCEDURE — 99213 OFFICE O/P EST LOW 20 MIN: CPT | Performed by: NURSE PRACTITIONER

## 2018-08-24 PROCEDURE — 73610 X-RAY EXAM OF ANKLE: CPT | Performed by: NURSE PRACTITIONER

## 2018-08-24 PROCEDURE — 20610 DRAIN/INJ JOINT/BURSA W/O US: CPT | Performed by: NURSE PRACTITIONER

## 2018-08-24 PROCEDURE — 73562 X-RAY EXAM OF KNEE 3: CPT | Performed by: NURSE PRACTITIONER

## 2018-08-24 NOTE — PROGRESS NOTES
Subjective:     Patient ID: Froilan Helton is a 77 y.o. male.    Chief Complaint:  Right ankle pain, left knee pain    History of Present Illness    Mr. Helton presents to clinic with concerns of pain at right ankle, medial aspect for the last one week and left knee pain, lateral joint line. Began noticing symptoms at right ankle after working on  and hit the medial aspect of ankle. He has noted increased pain, swelling and wound after hitting ankle. Increased pain noted with ambulating and minimal relief with rest.  Rates pain at an 8-9 out of a 10 aching and throbbing in nature.  He is unable to take any oral NSAIDs secondary to significant cardiac history.  He would also like to be seen for pain and he is experiencing at the medial joint line of the left knee.  Increased pain noted with all ambulatory activities and standing for long periods of time.  Denies that the knee is locking, buckling or giving away.  Rates pain at the knee at an 8-9 out of a 10 again aching mainly in nature.  He is unable to tolerate any steroids secondary to significant rise in glucose in the past with previous joint injections.  Denies that he has tried Visco supplement injections at the left knee in past.  Denies presence of numbness and tingling radiating down left lower stomach.  Denies other concerns present this time.     Social History     Occupational History   • Not on file.     Social History Main Topics   • Smoking status: Former Smoker   • Smokeless tobacco: Never Used      Comment: quit 1993   • Alcohol use No   • Drug use: No   • Sexual activity: Defer      Past Medical History:   Diagnosis Date   • Arthritis    • Asthma    • Cancer (CMS/HCC)     skin   • COPD (chronic obstructive pulmonary disease) (CMS/HCC)    • Diabetes mellitus (CMS/HCC)    • Disease of thyroid gland    • Hypertension    • Renal disorder      Past Surgical History:   Procedure Laterality Date   • COLONOSCOPY     • JOINT REPLACEMENT      right   •  "REPLACEMENT TOTAL KNEE         History reviewed. No pertinent family history.      Review of Systems   Constitutional: Negative for chills, diaphoresis, fever and unexpected weight change.   HENT: Negative for hearing loss, nosebleeds, sore throat and tinnitus.    Eyes: Negative for pain and visual disturbance.   Respiratory: Negative for cough, shortness of breath and wheezing.    Cardiovascular: Negative for chest pain and palpitations.   Gastrointestinal: Negative for abdominal pain, diarrhea, nausea and vomiting.   Endocrine: Negative for cold intolerance, heat intolerance and polydipsia.   Genitourinary: Negative for difficulty urinating, dysuria and hematuria.   Musculoskeletal: Positive for arthralgias and myalgias. Negative for joint swelling.   Skin: Positive for wound. Negative for rash.   Allergic/Immunologic: Negative for environmental allergies.   Neurological: Negative for dizziness, syncope and numbness.   Hematological: Does not bruise/bleed easily.   Psychiatric/Behavioral: Negative for dysphoric mood and sleep disturbance. The patient is not nervous/anxious.            Objective:  Physical Exam    Vital signs reviewed.   General: No acute distress.  Eyes: conjunctiva clear; pupils equally round and reactive  ENT: external ears and nose atraumatic; oropharynx clear  CV: no peripheral edema  Resp: normal respiratory effort  Skin: no rashes or wounds; normal turgor  Psych: mood and affect appropriate; recent and remote memory intact    Vitals:    08/24/18 1405   BP: 148/80   BP Location: Right arm   Pulse: 59   Weight: 133 kg (294 lb)   Height: 185.4 cm (73\")     1    08/24/18  1405   Weight: 133 kg (294 lb)     Body mass index is 38.79 kg/m².     Right Ankle Exam   Swelling: moderate    Tenderness   The patient is experiencing tenderness in the medial malleolus.        Range of Motion   Dorsiflexion: 20   Plantar flexion: 40   Inversion: 30   Eversion: 20     Muscle Strength   Dorsiflexion:  " 4/5  Plantar flexion:  4/5  Anterior tibial:  4/5  Posterior tibial:  4/5  Gastrocsoleus:  4/5  Peroneal muscle:  4/5    Tests   Anterior drawer: negative  Varus tilt: negative    Other   Erythema: absent  Scars: absent  Sensation: normal  Pulse: present       Left Knee Exam     Tenderness   The patient is experiencing tenderness in the lateral joint line.    Range of Motion   Extension: 5   Flexion: 110     Tests   Benson:  Medial - negative Lateral - negative  Lachman:  Anterior - 1+    Posterior - negative  Varus: negative  Valgus: negative    Other   Erythema: absent  Sensation: normal  Pulse: present  Swelling: moderate  Effusion: effusion present    Comments:  Positive crepitus throughout arc of motion              Imaging:  Left Knee X-Ray  Indication: Pain    AP, Lateral, and [place] views    Findings:  No fracture  Osteophytes at superior and inferior patellar poles  Normal soft tissues  Severe osteoarthritis medial joint line,patellofemoral joint line narrowing    No prior studies were available for comparison.  Right Ankle X-Ray  Indication: Pain  Views: AP, Lateral, [place]  Findings:  No fracture  No bony lesion  Soft tissues normal  Significant osteoarthritic changes medial and lateral malleolus, calcaneal osteophytes    No prior studies available for comparison.      Assessment:       1. Pain    2. Injury of right ankle, initial encounter    3. Primary osteoarthritis of left knee          Plan:  1.  Discussed plan of care with patient.  Wishes to proceed with CT scan of the right ankle to evaluate for acute fracture.  2.  Discussed with patient to rest, elevate ice right ankle for swelling and pain.  3.  Wishes proceed with Visco supplement injection.  He does not wish to proceed with steroids secondary to significant history of diabetes mellitus and significant rise in glucose after previous steroid injections into the joint in the past.  We'll plan to see him back in 1 week for second injection.   Patient verbalized understanding of all information agrees plan of care.  Denies other concerns present this time.  Large Joint Arthrocentesis  Date/Time: 8/24/2018 2:48 PM  Consent given by: patient  Site marked: site marked  Timeout: Immediately prior to procedure a time out was called to verify the correct patient, procedure, equipment, support staff and site/side marked as required   Supporting Documentation  Indications: pain   Procedure Details  Location: knee - L knee  Preparation: Patient was prepped and draped in the usual sterile fashion  Needle size: 22 G  Approach: anterolateral  Medications administered: 30 mg Hyaluronan 30 MG/2ML  Patient tolerance: patient tolerated the procedure well with no immediate complications            Orders:  Orders Placed This Encounter   Procedures   • Large Joint Arthrocentesis   • XR Ankle 3+ View Right   • XR Knee 3 View Left       I ordered and reviewed the MICHAEL today.     Dictated utilizing Dragon dictation

## 2018-08-27 PROBLEM — S99.911A INJURY OF RIGHT ANKLE: Status: ACTIVE | Noted: 2018-08-27

## 2018-08-27 PROBLEM — M17.12 PRIMARY OSTEOARTHRITIS OF LEFT KNEE: Status: ACTIVE | Noted: 2018-08-27

## 2018-08-31 ENCOUNTER — CLINICAL SUPPORT (OUTPATIENT)
Dept: ORTHOPEDIC SURGERY | Facility: CLINIC | Age: 78
End: 2018-08-31

## 2018-08-31 DIAGNOSIS — R52 PAIN: Primary | ICD-10-CM

## 2018-08-31 DIAGNOSIS — M17.12 PRIMARY OSTEOARTHRITIS OF LEFT KNEE: ICD-10-CM

## 2018-08-31 PROCEDURE — 20610 DRAIN/INJ JOINT/BURSA W/O US: CPT | Performed by: NURSE PRACTITIONER

## 2018-09-06 ENCOUNTER — OFFICE VISIT (OUTPATIENT)
Dept: CARDIOLOGY | Facility: CLINIC | Age: 78
End: 2018-09-06

## 2018-09-06 VITALS
HEIGHT: 73 IN | BODY MASS INDEX: 38.97 KG/M2 | SYSTOLIC BLOOD PRESSURE: 140 MMHG | WEIGHT: 294 LBS | HEART RATE: 81 BPM | DIASTOLIC BLOOD PRESSURE: 78 MMHG

## 2018-09-06 DIAGNOSIS — R06.02 SOB (SHORTNESS OF BREATH): ICD-10-CM

## 2018-09-06 DIAGNOSIS — R07.2 PRECORDIAL PAIN: ICD-10-CM

## 2018-09-06 DIAGNOSIS — I10 ESSENTIAL HYPERTENSION: Primary | Chronic | ICD-10-CM

## 2018-09-06 DIAGNOSIS — I21.4 NSTEMI (NON-ST ELEVATED MYOCARDIAL INFARCTION) (HCC): ICD-10-CM

## 2018-09-06 PROCEDURE — 99214 OFFICE O/P EST MOD 30 MIN: CPT | Performed by: INTERNAL MEDICINE

## 2018-09-06 NOTE — PROGRESS NOTES
Subjective:     Encounter Date:09/6/2018      Patient ID: Froilan Helton is a 77 y.o. male.    Chief Complaint:SOB  History of Present Illness    Dear Dr. Clarke,    The pleasure of seeing this patient in the office today for follow-up.    He has been having a cough productive of greenish sputum.  Left shortness of breath although he is still having some shortness of breath.  A little bit of sharp chest pain.  He is also having some heavy pressure like a weight in his chest causing chest discomfort.  His weights been pretty stable.  He still feels weak and still feels fatigued.  Overall he is just not feeling much better.  He is finished his antibiotic therapy for his pneumonia.    He did have an echocardiogram 11/2017 that showed normal function:  Echo today:  Interpretation Summary   · Left ventricular systolic function is normal. Estimated EF = 52%.  · Left ventricular diastolic dysfunction (grade I) consistent with impaired relaxation.  · calcification of the aortic valve  · Mild dilation of the aortic root is present.     When I saw him in January 2018 we have ordered a stress Cardiolite with Lexiscan.  He did not follow through on that.    The following portions of the patient's history were reviewed and updated as appropriate: allergies, current medications, past family history, past medical history, past social history, past surgical history and problem list.    Past Medical History:   Diagnosis Date   • Arthritis    • Asthma    • Cancer (CMS/HCC)     skin   • COPD (chronic obstructive pulmonary disease) (CMS/HCC)    • Diabetes mellitus (CMS/HCC)    • Disease of thyroid gland    • Hypertension    • Renal disorder        Past Surgical History:   Procedure Laterality Date   • COLONOSCOPY     • JOINT REPLACEMENT      right   • REPLACEMENT TOTAL KNEE         Social History     Social History   • Marital status: Unknown     Spouse name: N/A   • Number of children: N/A   • Years of education: N/A  "    Occupational History   • Not on file.     Social History Main Topics   • Smoking status: Former Smoker   • Smokeless tobacco: Never Used      Comment: quit 1993   • Alcohol use No   • Drug use: No   • Sexual activity: Defer     Other Topics Concern   • Not on file     Social History Narrative   • No narrative on file       Review of Systems   Constitution: Negative for chills, decreased appetite, fever and night sweats.   HENT: Negative for ear discharge, ear pain, hearing loss, nosebleeds and sore throat.    Eyes: Negative for blurred vision, double vision and pain.   Cardiovascular: Negative for cyanosis.   Respiratory: Negative for hemoptysis and sputum production.    Endocrine: Negative for cold intolerance and heat intolerance.   Hematologic/Lymphatic: Negative for adenopathy.   Skin: Negative for dry skin, itching, nail changes, rash and suspicious lesions.   Musculoskeletal: Positive for back pain, joint pain and joint swelling. Negative for arthritis, gout, muscle cramps, muscle weakness, myalgias and neck pain.   Gastrointestinal: Negative for anorexia, bowel incontinence, constipation, diarrhea, dysphagia, hematemesis and jaundice.   Genitourinary: Negative for bladder incontinence, dysuria, flank pain, frequency, hematuria and nocturia.   Neurological: Negative for focal weakness, numbness, paresthesias and seizures.   Psychiatric/Behavioral: Negative for altered mental status, hallucinations, hypervigilance, suicidal ideas and thoughts of violence.   Allergic/Immunologic: Negative for persistent infections.       Procedures       Objective:     Vitals:    09/06/18 1308   BP: 140/78   Pulse: 81   Weight: 133 kg (294 lb)   Height: 185.4 cm (73\")         Physical Exam   Constitutional: He is oriented to person, place, and time. He appears well-developed and well-nourished. No distress.   HENT:   Head: Normocephalic and atraumatic.   Nose: Nose normal.   Mouth/Throat: Oropharynx is clear and moist. "   Eyes: Pupils are equal, round, and reactive to light. Conjunctivae and EOM are normal. Right eye exhibits no discharge. Left eye exhibits no discharge.   Neck: Normal range of motion. Neck supple. No tracheal deviation present. No thyromegaly present.   Cardiovascular: Normal rate, regular rhythm, S1 normal, S2 normal, normal heart sounds and normal pulses.  Exam reveals no S3.    Pulmonary/Chest: Effort normal and breath sounds normal. No stridor. No respiratory distress. He exhibits no tenderness.   Abdominal: Soft. Bowel sounds are normal. He exhibits no distension and no mass. There is no tenderness. There is no rebound and no guarding.   Musculoskeletal: Normal range of motion. He exhibits no tenderness or deformity.   Lymphadenopathy:     He has no cervical adenopathy.   Neurological: He is alert and oriented to person, place, and time. He has normal reflexes.   Skin: Skin is warm and dry. No rash noted. He is not diaphoretic. No erythema.   Psychiatric: He has a normal mood and affect. Thought content normal.       Lab Review:             Performed        Assessment:          Diagnosis Plan   1. Essential hypertension  Stress Test With Myocardial Perfusion One Day   2. NSTEMI (non-ST elevated myocardial infarction) (CMS/HCC)  Stress Test With Myocardial Perfusion One Day   3. Precordial pain  Stress Test With Myocardial Perfusion One Day   4. SOB (shortness of breath)  Stress Test With Myocardial Perfusion One Day          Plan:       This patient has shortness breath is multifactorial.  His pneumonia has appeared to resolve although it sounds like he still has some bronchitis.  He scheduled to see you soon for that.  We previously wanted to get a Cardiolite stress test but needed to hold off with the pneumonia.  I think were ready to go at this point.  I am concerned about the chest pressure that he is continuing to experience.  We will arrange for the Lexiscan Cardiolite and then proceed  accordingly.  Thank you very much for allowing us to participate in the care of this pleasant patient.  Please don't hesitate to call if I can be of assistance in any way.      Current Outpatient Prescriptions:   •  albuterol (ACCUNEB) 1.25 MG/3ML nebulizer solution, Take 3 mL by nebulization Every 6 (Six) Hours As Needed for Wheezing., Disp: 30 vial, Rfl: 0  •  amLODIPine (NORVASC) 2.5 MG tablet, Take 1 tablet by mouth Daily., Disp: 30 tablet, Rfl: 0  •  clotrimazole (LOTRIMIN) 1 % cream, Apply  topically Every 12 (Twelve) Hours., Disp: 60 g, Rfl: 0  •  donepezil (ARICEPT) 5 MG tablet, Take 5 mg by mouth Every Night., Disp: , Rfl:   •  furosemide (LASIX) 40 MG tablet, Take 1 tablet by mouth Daily. Take 1 tablet by mouth twice per day for 2 days. Then resume 1 per day (Patient taking differently: Take 40 mg by mouth 2 (Two) Times a Day. Take 1 tablet by mouth twice per day for 2 days. Then resume 1 per day), Disp: 30 tablet, Rfl: 0  •  insulin aspart (novoLOG) 100 UNIT/ML injection, Inject  under the skin into the appropriate area as directed 3 (Three) Times a Day Before Meals., Disp: , Rfl:   •  insulin detemir (LEVEMIR) 100 UNIT/ML injection, Inject 30 Units under the skin 2 (Two) Times a Day., Disp: 10 mL, Rfl: 0  •  insulin NPH-insulin regular (humuLIN 70/30,novoLIN 70/30) (70-30) 100 UNIT/ML injection, Inject  under the skin into the appropriate area as directed 2 (Two) Times a Day With Meals., Disp: , Rfl:   •  lisinopril (PRINIVIL,ZESTRIL) 20 MG tablet, Take 20 mg by mouth Daily., Disp: , Rfl:   •  metoprolol succinate XL (TOPROL-XL) 25 MG 24 hr tablet, Take 0.5 tablets by mouth Every Night., Disp: 30 tablet, Rfl: 0  •  potassium chloride (K-DUR) 10 MEQ CR tablet, Take 1 tablet by mouth 2 (Two) Times a Day., Disp: 6 tablet, Rfl: 0  •  pramipexole (MIRAPEX) 0.5 MG tablet, Take 0.5 mg by mouth 3 (Three) Times a Day., Disp: , Rfl:   •  pregabalin (LYRICA) 100 MG capsule, Take 100 mg by mouth 2 (Two) Times a  Day., Disp: , Rfl:   •  SITagliptin (JANUVIA) 100 MG tablet, Take 100 mg by mouth Daily., Disp: , Rfl:   •  aspirin  MG EC tablet, Take 1 tablet by mouth Daily., Disp: 30 tablet, Rfl: 0  •  atorvastatin (LIPITOR) 10 MG tablet, Take 1 tablet by mouth Daily., Disp: 30 tablet, Rfl: 0  •  docusate sodium 100 MG capsule, Take 100 mg by mouth 2 (Two) Times a Day., Disp: 60 capsule, Rfl: 0  •  gabapentin (NEURONTIN) 600 MG tablet, Take 600 mg by mouth 3 (Three) Times a Day., Disp: , Rfl:   •  lactobacillus acidophilus (RISAQUAD) capsule capsule, Take 1 capsule by mouth Daily., Disp: 30 capsule, Rfl: 0  •  levothyroxine (SYNTHROID, LEVOTHROID) 100 MCG tablet, Take 100 mcg by mouth Daily., Disp: , Rfl:   •  polyethylene glycol (MIRALAX) pack packet, Take 17 g by mouth Daily., Disp: 30 each, Rfl: 0  •  vitamin D (ERGOCALCIFEROL) 33536 units capsule capsule, Take 50,000 Units by mouth 1 (One) Time Per Week., Disp: , Rfl:

## 2018-09-07 ENCOUNTER — HOSPITAL ENCOUNTER (OUTPATIENT)
Dept: CT IMAGING | Facility: HOSPITAL | Age: 78
Discharge: HOME | End: 2018-09-07
Admitting: NURSE PRACTITIONER

## 2018-09-07 DIAGNOSIS — S99.911A INJURY OF RIGHT ANKLE, INITIAL ENCOUNTER: ICD-10-CM

## 2018-09-07 PROCEDURE — 73700 CT LOWER EXTREMITY W/O DYE: CPT

## 2018-09-10 ENCOUNTER — OFFICE VISIT (OUTPATIENT)
Dept: ORTHOPEDIC SURGERY | Facility: CLINIC | Age: 78
End: 2018-09-10

## 2018-09-10 DIAGNOSIS — M17.12 PRIMARY OSTEOARTHRITIS OF LEFT KNEE: Primary | ICD-10-CM

## 2018-09-10 PROCEDURE — 20610 DRAIN/INJ JOINT/BURSA W/O US: CPT | Performed by: NURSE PRACTITIONER

## 2018-09-10 RX ORDER — CLOTRIMAZOLE AND BETAMETHASONE DIPROPIONATE 10; .64 MG/G; MG/G
CREAM TOPICAL EVERY 12 HOURS SCHEDULED
Qty: 45 G | Refills: 0 | Status: SHIPPED | OUTPATIENT
Start: 2018-09-10 | End: 2019-08-29 | Stop reason: HOSPADM

## 2018-09-10 NOTE — PROGRESS NOTES
Subjective:     Patient ID: Froilan Helton is a 77 y.o. male.    Chief Complaint:    History of Present Illness  Froilan Helton presents for Visco supplement injection #3.       Social History     Occupational History   • Not on file.     Social History Main Topics   • Smoking status: Former Smoker   • Smokeless tobacco: Never Used      Comment: quit 1993   • Alcohol use No   • Drug use: No   • Sexual activity: Defer      Past Medical History:   Diagnosis Date   • Arthritis    • Asthma    • Cancer (CMS/[facility])     skin   • COPD (chronic obstructive pulmonary disease) (CMS/HCC)    • Diabetes mellitus (CMS/HCC)    • Disease of thyroid gland    • Hypertension    • Renal disorder      Past Surgical History:   Procedure Laterality Date   • COLONOSCOPY     • JOINT REPLACEMENT      right   • REPLACEMENT TOTAL KNEE         No family history on file.      Review of Systems   Constitutional: Negative for chills, diaphoresis, fever and unexpected weight change.   HENT: Negative for hearing loss, nosebleeds, sore throat and tinnitus.    Eyes: Negative for pain and visual disturbance.   Respiratory: Negative for cough, shortness of breath and wheezing.    Cardiovascular: Negative for chest pain and palpitations.   Gastrointestinal: Negative for abdominal pain, diarrhea, nausea and vomiting.   Endocrine: Negative for cold intolerance, heat intolerance and polydipsia.   Genitourinary: Negative for difficulty urinating, dysuria and hematuria.   Musculoskeletal: Positive for arthralgias. Negative for joint swelling and myalgias.   Skin: Negative for rash and wound.   Allergic/Immunologic: Negative for environmental allergies.   Neurological: Negative for dizziness, syncope and numbness.   Hematological: Does not bruise/bleed easily.   Psychiatric/Behavioral: Negative for dysphoric mood and sleep disturbance. The patient is not nervous/anxious.            Objective:  There were no vitals filed for this visit.  There were no vitals filed for  this visit.  There is no height or weight on file to calculate BMI.     Ortho Exam    No change since last visit    Imaging:    Assessment:       1. Primary osteoarthritis of left knee          Plan:    Orders:  Orders Placed This Encounter   Procedures   • Large Joint Arthrocentesis     Large Joint Arthrocentesis  Date/Time: 9/10/2018 1:16 PM  Consent given by: patient  Site marked: site marked  Timeout: Immediately prior to procedure a time out was called to verify the correct patient, procedure, equipment, support staff and site/side marked as required   Supporting Documentation  Indications: pain   Procedure Details  Location: knee - L knee  Preparation: Patient was prepped and draped in the usual sterile fashion  Needle size: 22 G  Approach: anterolateral  Medications administered: 30 mg Hyaluronan 30 MG/2ML  Patient tolerance: patient tolerated the procedure well with no immediate complications        Patient presented today for viscosupplement injection.  This is the third injection for the left knee.  We will see patient back in six weeks.  We reviewed risks benefits and alternatives of the procedure and patient wished to proceed today and had all questions answered.

## 2018-09-17 ENCOUNTER — HOSPITAL ENCOUNTER (OUTPATIENT)
Dept: NUCLEAR MEDICINE | Facility: HOSPITAL | Age: 78
Discharge: HOME | End: 2018-09-17
Attending: INTERNAL MEDICINE

## 2018-09-17 ENCOUNTER — HOSPITAL ENCOUNTER (OUTPATIENT)
Dept: CARDIOLOGY | Facility: HOSPITAL | Age: 78
Discharge: HOME | End: 2018-09-17
Attending: INTERNAL MEDICINE

## 2018-09-17 DIAGNOSIS — R07.2 PRECORDIAL PAIN: ICD-10-CM

## 2018-09-17 DIAGNOSIS — R06.02 SOB (SHORTNESS OF BREATH): ICD-10-CM

## 2018-09-17 DIAGNOSIS — I21.4 NSTEMI (NON-ST ELEVATED MYOCARDIAL INFARCTION) (HCC): ICD-10-CM

## 2018-09-17 DIAGNOSIS — I10 ESSENTIAL HYPERTENSION: Chronic | ICD-10-CM

## 2018-09-17 PROCEDURE — 0 TECHNETIUM SESTAMIBI: Performed by: INTERNAL MEDICINE

## 2018-09-17 PROCEDURE — 93016 CV STRESS TEST SUPVJ ONLY: CPT | Performed by: INTERNAL MEDICINE

## 2018-09-17 PROCEDURE — 93018 CV STRESS TEST I&R ONLY: CPT | Performed by: INTERNAL MEDICINE

## 2018-09-17 PROCEDURE — 25010000002 REGADENOSON 0.4 MG/5ML SOLUTION: Performed by: INTERNAL MEDICINE

## 2018-09-17 PROCEDURE — A9500 TC99M SESTAMIBI: HCPCS | Performed by: INTERNAL MEDICINE

## 2018-09-17 PROCEDURE — 78452 HT MUSCLE IMAGE SPECT MULT: CPT

## 2018-09-17 PROCEDURE — 78452 HT MUSCLE IMAGE SPECT MULT: CPT | Performed by: INTERNAL MEDICINE

## 2018-09-17 PROCEDURE — 93017 CV STRESS TEST TRACING ONLY: CPT

## 2018-09-17 RX ADMIN — REGADENOSON 0.4 MG: 0.08 INJECTION, SOLUTION INTRAVENOUS at 10:06

## 2018-09-17 RX ADMIN — TECHNETIUM TC 99M SESTAMIBI 1 DOSE: 1 INJECTION INTRAVENOUS at 07:45

## 2018-09-17 RX ADMIN — TECHNETIUM TC 99M SESTAMIBI 1 DOSE: 1 INJECTION INTRAVENOUS at 10:06

## 2018-09-18 LAB
BH CV NUCLEAR PRIOR STUDY: 3
BH CV STRESS BP STAGE 1: NORMAL
BH CV STRESS COMMENTS STAGE 1: NORMAL
BH CV STRESS DOSE REGADENOSON STAGE 1: 0.4
BH CV STRESS DURATION MIN STAGE 1: 0
BH CV STRESS DURATION SEC STAGE 1: 30
BH CV STRESS HR STAGE 1: 63
BH CV STRESS O2 STAGE 1: 95
BH CV STRESS PROTOCOL 1: NORMAL
BH CV STRESS RECOVERY BP: NORMAL MMHG
BH CV STRESS RECOVERY HR: 69 BPM
BH CV STRESS RECOVERY O2: 99 %
BH CV STRESS STAGE 1: 1
LV EF NUC BP: 42 %
MAXIMAL PREDICTED HEART RATE: 144 BPM
PERCENT MAX PREDICTED HR: 53.47 %
STRESS BASELINE BP: NORMAL MMHG
STRESS BASELINE HR: 62 BPM
STRESS O2 SAT REST: 95 %
STRESS PERCENT HR: 63 %
STRESS POST ESTIMATED WORKLOAD: 1 METS
STRESS POST EXERCISE DUR MIN: 0 MIN
STRESS POST EXERCISE DUR SEC: 30 SEC
STRESS POST O2 SAT PEAK: 99 %
STRESS POST PEAK BP: NORMAL MMHG
STRESS POST PEAK HR: 77 BPM
STRESS TARGET HR: 122 BPM

## 2018-09-22 ENCOUNTER — APPOINTMENT (OUTPATIENT)
Dept: GENERAL RADIOLOGY | Facility: HOSPITAL | Age: 78
End: 2018-09-22

## 2018-09-22 ENCOUNTER — HOSPITAL ENCOUNTER (EMERGENCY)
Facility: HOSPITAL | Age: 78
Discharge: HOME | End: 2018-09-22
Attending: EMERGENCY MEDICINE | Admitting: EMERGENCY MEDICINE

## 2018-09-22 VITALS
TEMPERATURE: 97.9 F | BODY MASS INDEX: 38.83 KG/M2 | HEART RATE: 81 BPM | DIASTOLIC BLOOD PRESSURE: 74 MMHG | SYSTOLIC BLOOD PRESSURE: 184 MMHG | WEIGHT: 293 LBS | RESPIRATION RATE: 18 BRPM | HEIGHT: 73 IN | OXYGEN SATURATION: 96 %

## 2018-09-22 DIAGNOSIS — J20.9 ACUTE BRONCHITIS, UNSPECIFIED ORGANISM: Primary | ICD-10-CM

## 2018-09-22 LAB
ALBUMIN SERPL-MCNC: 4 G/DL (ref 3.5–5.2)
ALBUMIN/GLOB SERPL: 1.2 G/DL
ALP SERPL-CCNC: 76 U/L (ref 40–129)
ALT SERPL W P-5'-P-CCNC: 15 U/L (ref 5–41)
ANION GAP SERPL CALCULATED.3IONS-SCNC: 12 MMOL/L
APTT PPP: 29.6 SECONDS (ref 24.3–38.1)
AST SERPL-CCNC: 16 U/L (ref 5–40)
BASOPHILS # BLD AUTO: 0.03 10*3/MM3 (ref 0–0.2)
BASOPHILS NFR BLD AUTO: 0.3 % (ref 0–2)
BILIRUB SERPL-MCNC: 0.2 MG/DL (ref 0.2–1.2)
BUN BLD-MCNC: 23 MG/DL (ref 8–23)
BUN/CREAT SERPL: 13.6 (ref 7–25)
CALCIUM SPEC-SCNC: 9.6 MG/DL (ref 8.8–10.5)
CHLORIDE SERPL-SCNC: 96 MMOL/L (ref 98–107)
CO2 SERPL-SCNC: 29 MMOL/L (ref 22–29)
CREAT BLD-MCNC: 1.69 MG/DL (ref 0.76–1.27)
D-LACTATE SERPL-SCNC: 1.8 MMOL/L (ref 0.5–2)
DEPRECATED RDW RBC AUTO: 40.3 FL (ref 37–54)
EOSINOPHIL # BLD AUTO: 0.52 10*3/MM3 (ref 0.1–0.3)
EOSINOPHIL NFR BLD AUTO: 5.9 % (ref 0–4)
ERYTHROCYTE [DISTWIDTH] IN BLOOD BY AUTOMATED COUNT: 13.2 % (ref 11.5–14.5)
GFR SERPL CREATININE-BSD FRML MDRD: 40 ML/MIN/1.73
GLOBULIN UR ELPH-MCNC: 3.4 GM/DL
GLUCOSE BLD-MCNC: 147 MG/DL (ref 65–99)
HCT VFR BLD AUTO: 39.8 % (ref 42–52)
HGB BLD-MCNC: 13.4 G/DL (ref 14–18)
IMM GRANULOCYTES # BLD: 0.05 10*3/MM3 (ref 0–0.03)
IMM GRANULOCYTES NFR BLD: 0.6 % (ref 0–0.5)
INR PPP: 1.04 (ref 0.9–1.1)
LIPASE SERPL-CCNC: 58 U/L (ref 13–60)
LYMPHOCYTES # BLD AUTO: 1.75 10*3/MM3 (ref 0.6–4.8)
LYMPHOCYTES NFR BLD AUTO: 20 % (ref 20–45)
MCH RBC QN AUTO: 28.6 PG (ref 27–31)
MCHC RBC AUTO-ENTMCNC: 33.7 G/DL (ref 31–37)
MCV RBC AUTO: 85 FL (ref 80–94)
MONOCYTES # BLD AUTO: 0.85 10*3/MM3 (ref 0–1)
MONOCYTES NFR BLD AUTO: 9.7 % (ref 3–8)
NEUTROPHILS # BLD AUTO: 5.55 10*3/MM3 (ref 1.5–8.3)
NEUTROPHILS NFR BLD AUTO: 63.5 % (ref 45–70)
NRBC BLD MANUAL-RTO: 0 /100 WBC (ref 0–0)
NT-PROBNP SERPL-MCNC: 1540 PG/ML (ref 5–450)
PLATELET # BLD AUTO: 212 10*3/MM3 (ref 140–500)
PMV BLD AUTO: 10.6 FL (ref 7.4–10.4)
POTASSIUM BLD-SCNC: 3.9 MMOL/L (ref 3.5–5.2)
PROT SERPL-MCNC: 7.4 G/DL (ref 6–8.5)
PROTHROMBIN TIME: 13.6 SECONDS (ref 12.1–15)
RBC # BLD AUTO: 4.68 10*6/MM3 (ref 4.7–6.1)
SODIUM BLD-SCNC: 137 MMOL/L (ref 136–145)
TROPONIN T SERPL-MCNC: 0.01 NG/ML (ref 0–0.03)
WBC NRBC COR # BLD: 8.75 10*3/MM3 (ref 4.8–10.8)

## 2018-09-22 PROCEDURE — 80053 COMPREHEN METABOLIC PANEL: CPT | Performed by: EMERGENCY MEDICINE

## 2018-09-22 PROCEDURE — 85610 PROTHROMBIN TIME: CPT | Performed by: EMERGENCY MEDICINE

## 2018-09-22 PROCEDURE — 85730 THROMBOPLASTIN TIME PARTIAL: CPT | Performed by: EMERGENCY MEDICINE

## 2018-09-22 PROCEDURE — 83690 ASSAY OF LIPASE: CPT | Performed by: EMERGENCY MEDICINE

## 2018-09-22 PROCEDURE — 99282 EMERGENCY DEPT VISIT SF MDM: CPT | Performed by: EMERGENCY MEDICINE

## 2018-09-22 PROCEDURE — 93005 ELECTROCARDIOGRAM TRACING: CPT | Performed by: EMERGENCY MEDICINE

## 2018-09-22 PROCEDURE — 93010 ELECTROCARDIOGRAM REPORT: CPT | Performed by: INTERNAL MEDICINE

## 2018-09-22 PROCEDURE — 94640 AIRWAY INHALATION TREATMENT: CPT

## 2018-09-22 PROCEDURE — 71046 X-RAY EXAM CHEST 2 VIEWS: CPT

## 2018-09-22 PROCEDURE — 84484 ASSAY OF TROPONIN QUANT: CPT | Performed by: EMERGENCY MEDICINE

## 2018-09-22 PROCEDURE — 63710000001 PREDNISONE PER 1 MG: Performed by: EMERGENCY MEDICINE

## 2018-09-22 PROCEDURE — 99283 EMERGENCY DEPT VISIT LOW MDM: CPT

## 2018-09-22 PROCEDURE — 85025 COMPLETE CBC W/AUTO DIFF WBC: CPT | Performed by: EMERGENCY MEDICINE

## 2018-09-22 PROCEDURE — 83880 ASSAY OF NATRIURETIC PEPTIDE: CPT | Performed by: EMERGENCY MEDICINE

## 2018-09-22 PROCEDURE — 83605 ASSAY OF LACTIC ACID: CPT | Performed by: EMERGENCY MEDICINE

## 2018-09-22 RX ORDER — PREDNISONE 20 MG/1
60 TABLET ORAL ONCE
Status: COMPLETED | OUTPATIENT
Start: 2018-09-22 | End: 2018-09-22

## 2018-09-22 RX ORDER — AZITHROMYCIN 250 MG/1
TABLET, FILM COATED ORAL
Qty: 4 TABLET | Refills: 0 | Status: SHIPPED | OUTPATIENT
Start: 2018-09-22 | End: 2019-02-16

## 2018-09-22 RX ORDER — SODIUM CHLORIDE 0.9 % (FLUSH) 0.9 %
10 SYRINGE (ML) INJECTION AS NEEDED
Status: DISCONTINUED | OUTPATIENT
Start: 2018-09-22 | End: 2018-09-23 | Stop reason: HOSPADM

## 2018-09-22 RX ORDER — ALBUTEROL SULFATE 0.63 MG/3ML
1 SOLUTION RESPIRATORY (INHALATION) EVERY 6 HOURS PRN
Qty: 25 AMPULE | Refills: 0 | Status: ON HOLD | OUTPATIENT
Start: 2018-09-22 | End: 2019-08-27 | Stop reason: SDDI

## 2018-09-22 RX ORDER — PREDNISONE 20 MG/1
20 TABLET ORAL 3 TIMES DAILY
Qty: 15 TABLET | Refills: 0 | Status: SHIPPED | OUTPATIENT
Start: 2018-09-22 | End: 2019-02-16

## 2018-09-22 RX ORDER — AZITHROMYCIN 250 MG/1
500 TABLET, FILM COATED ORAL ONCE
Status: COMPLETED | OUTPATIENT
Start: 2018-09-22 | End: 2018-09-22

## 2018-09-22 RX ORDER — IPRATROPIUM BROMIDE AND ALBUTEROL SULFATE 2.5; .5 MG/3ML; MG/3ML
3 SOLUTION RESPIRATORY (INHALATION) ONCE
Status: COMPLETED | OUTPATIENT
Start: 2018-09-22 | End: 2018-09-22

## 2018-09-22 RX ADMIN — IPRATROPIUM BROMIDE AND ALBUTEROL SULFATE 3 ML: .5; 3 SOLUTION RESPIRATORY (INHALATION) at 21:14

## 2018-09-22 RX ADMIN — PREDNISONE 60 MG: 20 TABLET ORAL at 22:17

## 2018-09-22 RX ADMIN — AZITHROMYCIN 500 MG: 250 TABLET, FILM COATED ORAL at 22:17

## 2018-09-23 NOTE — ED PROVIDER NOTES
Subjective   History of Present Illness  History of Present Illness    Chief complaint: Shortness of breath    Location: Not applicable    Quality/Severity:  Moderate    Timing/Duration: 4 days    Modifying Factors: Dyspnea worse with exertion    Associated Symptoms: Increased pedal edema and generalized malaise with loss of appetite    Narrative: The patient is a 77-year-old white male who presents as noted above.  The patient does have a known history of COPD and asthma.  Patient was admitted August 3 to our facility with pneumonia in both the left lower and left upper lobes.  The patient did have a nuclear stress test on September 6 that did not show any ischemia and an ejection fraction of 42%.    Review of Systems   Constitutional: Positive for appetite change and fatigue. Negative for fever.   HENT: Negative for congestion.    Respiratory: Positive for cough, shortness of breath and wheezing.    Cardiovascular: Positive for leg swelling. Negative for chest pain and palpitations.   Gastrointestinal: Negative for abdominal pain, diarrhea, nausea and vomiting.   Genitourinary: Negative for dysuria, flank pain, hematuria and urgency.   Musculoskeletal: Negative for back pain.   Skin: Negative for rash.   Neurological: Positive for weakness. Negative for dizziness and headaches.   Psychiatric/Behavioral: Negative for confusion.   All other systems reviewed and are negative.      Past Medical History:   Diagnosis Date   • Arthritis    • Asthma    • Cancer (CMS/HCC)     skin   • COPD (chronic obstructive pulmonary disease) (CMS/HCC)    • Diabetes mellitus (CMS/HCC)    • Disease of thyroid gland    • Hypertension    • Renal disorder        Allergies   Allergen Reactions   • Oxycontin [Oxycodone Hcl]      'Makes me crazy and stand on my head'       Past Surgical History:   Procedure Laterality Date   • COLONOSCOPY     • JOINT REPLACEMENT      right   • REPLACEMENT TOTAL KNEE         History reviewed. No pertinent  family history.    Social History     Social History   • Marital status: Unknown     Social History Main Topics   • Smoking status: Former Smoker   • Smokeless tobacco: Never Used      Comment: quit 1993   • Alcohol use No   • Drug use: No   • Sexual activity: Defer     Other Topics Concern   • Not on file           Objective   Physical Exam   Constitutional: He is oriented to person, place, and time. He appears well-developed and well-nourished.   HENT:   Head: Normocephalic and atraumatic.   Eyes: Conjunctivae and EOM are normal.   Neck: Normal range of motion. Neck supple.   Cardiovascular: Normal rate, regular rhythm and normal heart sounds.    No murmur heard.  Pulmonary/Chest: Breath sounds normal. No respiratory distress. He has no wheezes. He has no rales.   Mild shortness of breath noted with exertion.   Abdominal: Soft. Bowel sounds are normal. He exhibits no distension. There is no tenderness.   Musculoskeletal: Normal range of motion. He exhibits no edema or tenderness.   Neurological: He is alert and oriented to person, place, and time.   Skin: Skin is warm and dry. No rash noted.   Psychiatric: He has a normal mood and affect. His behavior is normal.   Nursing note and vitals reviewed.      Final diagnoses:   Acute bronchitis, unspecified organism       Procedures           ED Course  ED Course as of Sep 22 2257   Sat Sep 22, 2018   2132 EKG was reviewed at 2111 hrs. and showed a normal sinus rhythm with a rate of 82 bpm.  Poor R-wave progression.  Probable LVH and a nonspecific intraventricular action delay.  Inferior Q waves.  No ectopy.  [ML]   6368 My contemporaneous wet read on the chest x-ray showed findings consistent with COPD without any acute findings.  [ML]   2250 Patient was reassured that there were no acute findings, however the patient will be covered with antibiotics due to his underlying lung disease.  Treatment plan, expectations and warnings discussed in detail.  Patient strongly  advised to follow-up with his primary care provider this week.  [ML]      ED Course User Index  [ML] Akin Rosa MD                  MDM  Number of Diagnoses or Management Options  Acute bronchitis, unspecified organism: new and requires workup     Amount and/or Complexity of Data Reviewed  Clinical lab tests: ordered and reviewed  Tests in the radiology section of CPT®: ordered and reviewed  Review and summarize past medical records: yes  Independent visualization of images, tracings, or specimens: yes    Risk of Complications, Morbidity, and/or Mortality  Presenting problems: high  Diagnostic procedures: high  Management options: moderate    Patient Progress  Patient progress: stable    Labs this visit  Lab Results (last 24 hours)     Procedure Component Value Units Date/Time    CBC & Differential [[id number]] Collected:  09/22/18 2107    Specimen:  Blood Updated:  09/22/18 2114    Narrative:       The following orders were created for panel order CBC & Differential.  Procedure                               Abnormality         Status                     ---------                               -----------         ------                     CBC Auto Differential[[id number]]        Abnormal            Final result                 Please view results for these tests on the individual orders.    Comprehensive Metabolic Panel [[id number]]  (Abnormal) Collected:  09/22/18 2107    Specimen:  Blood Updated:  09/22/18 2132     Glucose 147 (H) mg/dL      BUN 23 mg/dL      Creatinine 1.69 (H) mg/dL      Sodium 137 mmol/L      Potassium 3.9 mmol/L      Chloride 96 (L) mmol/L      CO2 29.0 mmol/L      Calcium 9.6 mg/dL      Total Protein 7.4 g/dL      Albumin 4.00 g/dL      ALT (SGPT) 15 U/L      AST (SGOT) 16 U/L      Alkaline Phosphatase 76 U/L      Total Bilirubin 0.2 mg/dL      eGFR Non African Amer 40 (L) mL/min/1.73      Globulin 3.4 gm/dL      A/G Ratio 1.2 g/dL      BUN/Creatinine Ratio 13.6     Anion Gap 12.0  mmol/L     Narrative:       The MDRD GFR formula is only valid for adults with stable renal function between ages 18 and 70.    Protime-INR [[id number]]  (Normal) Collected:  09/22/18 2107    Specimen:  Blood Updated:  09/22/18 2129     Protime 13.6 Seconds      INR 1.04    Narrative:       Therapeutic Ranges for INR: 2.0-3.0 (PT 20-30)                              2.5-3.5 (PT 25-34)    aPTT [[id number]]  (Normal) Collected:  09/22/18 2107    Specimen:  Blood Updated:  09/22/18 2129     PTT 29.6 seconds     Narrative:       PTT = The equivalent PTT values for the therapeutic range of heparin levels at 0.1 to 0.7 U/ml are 53 to 110 seconds.    Lipase [[id number]]  (Normal) Collected:  09/22/18 2107    Specimen:  Blood Updated:  09/22/18 2132     Lipase 58 U/L     Troponin [[id number]]  (Normal) Collected:  09/22/18 2107    Specimen:  Blood Updated:  09/22/18 2130     Troponin T 0.015 ng/mL     Narrative:       Troponin T Reference Ranges:  Less than 0.03 ng/mL:    Negative for AMI  0.03 to 0.09 ng/mL:      Indeterminant for AMI  Greater than 0.09 ng/mL: Positive for AMI    BNP [[id number]]  (Abnormal) Collected:  09/22/18 2107    Specimen:  Blood Updated:  09/22/18 2156     proBNP 1,540.0 (H) pg/mL     Narrative:       Among patients with dyspnea, NT-proBNP is highly sensitive for the detection of acute congestive heart failure. In addition NT-proBNP of <300 pg/ml effectively rules out acute congestive heart failure with 99% negative predictive value.    Lactic Acid, Plasma [[id number]]  (Normal) Collected:  09/22/18 2107    Specimen:  Blood Updated:  09/22/18 2130     Lactate 1.8 mmol/L     CBC Auto Differential [[id number]]  (Abnormal) Collected:  09/22/18 2107    Specimen:  Blood Updated:  09/22/18 2114     WBC 8.75 10*3/mm3      RBC 4.68 (L) 10*6/mm3      Hemoglobin 13.4 (L) g/dL      Hematocrit 39.8 (L) %      MCV 85.0 fL      MCH 28.6 pg      MCHC 33.7 g/dL      RDW 13.2 %      RDW-SD 40.3 fl      MPV 10.6 (H) fL       Platelets 212 10*3/mm3      Neutrophil % 63.5 %      Lymphocyte % 20.0 %      Monocyte % 9.7 (H) %      Eosinophil % 5.9 (H) %      Basophil % 0.3 %      Immature Grans % 0.6 (H) %      Neutrophils, Absolute 5.55 10*3/mm3      Lymphocytes, Absolute 1.75 10*3/mm3      Monocytes, Absolute 0.85 10*3/mm3      Eosinophils, Absolute 0.52 (H) 10*3/mm3      Basophils, Absolute 0.03 10*3/mm3      Immature Grans, Absolute 0.05 (H) 10*3/mm3      nRBC 0.0 /100 WBC         Prescribed on discharge             Medication List      New Prescriptions    azithromycin 250 MG tablet  Commonly known as:  ZITHROMAX  1 tablet daily for 4 days.     predniSONE 20 MG tablet  Commonly known as:  DELTASONE  Take 1 tablet by mouth 3 (Three) Times a Day.        Changed    * albuterol 1.25 MG/3ML nebulizer solution  Commonly known as:  ACCUNEB  Take 3 mL by nebulization Every 6 (Six) Hours As Needed for Wheezing.  What changed:  Another medication with the same name was added. Make sure   you understand how and when to take each.     * albuterol 0.63 MG/3ML nebulizer solution  Commonly known as:  ACCUNEB  Take 3 mL by nebulization Every 6 (Six) Hours As Needed for Wheezing.  What changed:  You were already taking a medication with the same name,   and this prescription was added. Make sure you understand how and when to   take each.     furosemide 40 MG tablet  Commonly known as:  LASIX  Take 1 tablet by mouth Daily. Take 1 tablet by mouth twice per day for 2   days. Then resume 1 per day  What changed:  when to take this  additional instructions        * This list has 2 medication(s) that are the same as other medications   prescribed for you. Read the directions carefully, and ask your doctor or   other care provider to review them with you.              All lab results, imaging results and other tests were reviewed by Akin Rosa MD and unless otherwise specified were found to be unremarkable.      Final diagnoses:   Acute bronchitis,  unspecified organism            Akin Rosa MD  09/22/18 8427

## 2019-02-16 ENCOUNTER — APPOINTMENT (OUTPATIENT)
Dept: GENERAL RADIOLOGY | Facility: HOSPITAL | Age: 79
End: 2019-02-16

## 2019-02-16 ENCOUNTER — HOSPITAL ENCOUNTER (INPATIENT)
Facility: HOSPITAL | Age: 79
LOS: 3 days | Discharge: HOME | End: 2019-02-19
Attending: EMERGENCY MEDICINE | Admitting: HOSPITALIST

## 2019-02-16 DIAGNOSIS — J18.9 PNEUMONIA OF BOTH LOWER LOBES DUE TO INFECTIOUS ORGANISM: Primary | ICD-10-CM

## 2019-02-16 DIAGNOSIS — J44.1 COPD EXACERBATION (HCC): ICD-10-CM

## 2019-02-16 DIAGNOSIS — N28.9 RENAL INSUFFICIENCY: ICD-10-CM

## 2019-02-16 DIAGNOSIS — E11.65 POORLY CONTROLLED DIABETES MELLITUS (HCC): ICD-10-CM

## 2019-02-16 DIAGNOSIS — R77.8 ELEVATED TROPONIN: ICD-10-CM

## 2019-02-16 LAB
ALBUMIN SERPL-MCNC: 3.6 G/DL (ref 3.5–5.2)
ALBUMIN/GLOB SERPL: 1.2 G/DL
ALP SERPL-CCNC: 78 U/L (ref 40–129)
ALT SERPL W P-5'-P-CCNC: 11 U/L (ref 5–41)
ANION GAP SERPL CALCULATED.3IONS-SCNC: 13.4 MMOL/L
AST SERPL-CCNC: 14 U/L (ref 5–40)
BASOPHILS # BLD AUTO: 0.03 10*3/MM3 (ref 0–0.2)
BASOPHILS NFR BLD AUTO: 0.4 % (ref 0–1.5)
BILIRUB SERPL-MCNC: 0.2 MG/DL (ref 0.2–1.2)
BUN BLD-MCNC: 21 MG/DL (ref 8–23)
BUN/CREAT SERPL: 11.5 (ref 7–25)
CALCIUM SPEC-SCNC: 8.9 MG/DL (ref 8.8–10.5)
CHLORIDE SERPL-SCNC: 89 MMOL/L (ref 98–107)
CO2 SERPL-SCNC: 25.6 MMOL/L (ref 22–29)
CREAT BLD-MCNC: 1.82 MG/DL (ref 0.76–1.27)
DEPRECATED RDW RBC AUTO: 42.7 FL (ref 37–54)
EOSINOPHIL # BLD AUTO: 0.93 10*3/MM3 (ref 0–0.4)
EOSINOPHIL NFR BLD AUTO: 10.9 % (ref 0.3–6.2)
ERYTHROCYTE [DISTWIDTH] IN BLOOD BY AUTOMATED COUNT: 14.1 % (ref 12.3–15.4)
FLUAV AG NPH QL: NEGATIVE
FLUBV AG NPH QL IA: NEGATIVE
GFR SERPL CREATININE-BSD FRML MDRD: 36 ML/MIN/1.73
GLOBULIN UR ELPH-MCNC: 3 GM/DL
GLUCOSE BLD-MCNC: 530 MG/DL (ref 65–99)
GLUCOSE BLD-MCNC: 597 MG/DL (ref 65–99)
GLUCOSE BLDC GLUCOMTR-MCNC: 577 MG/DL (ref 70–130)
HBA1C MFR BLD: 13 % (ref 4.8–5.6)
HCT VFR BLD AUTO: 39.4 % (ref 37.5–51)
HGB BLD-MCNC: 13 G/DL (ref 13–17.7)
IMM GRANULOCYTES # BLD AUTO: 0.04 10*3/MM3 (ref 0–0.05)
IMM GRANULOCYTES NFR BLD AUTO: 0.5 % (ref 0–0.5)
LYMPHOCYTES # BLD AUTO: 1.24 10*3/MM3 (ref 0.7–3.1)
LYMPHOCYTES NFR BLD AUTO: 14.5 % (ref 19.6–45.3)
MCH RBC QN AUTO: 27.4 PG (ref 26.6–33)
MCHC RBC AUTO-ENTMCNC: 33 G/DL (ref 31.5–35.7)
MCV RBC AUTO: 83.1 FL (ref 79–97)
MONOCYTES # BLD AUTO: 0.68 10*3/MM3 (ref 0.1–0.9)
MONOCYTES NFR BLD AUTO: 8 % (ref 5–12)
NEUTROPHILS # BLD AUTO: 5.62 10*3/MM3 (ref 1.4–7)
NEUTROPHILS NFR BLD AUTO: 65.7 % (ref 42.7–76)
NRBC BLD AUTO-RTO: 0 /100 WBC (ref 0–0)
NT-PROBNP SERPL-MCNC: 586 PG/ML (ref 5–450)
PLATELET # BLD AUTO: 204 10*3/MM3 (ref 140–450)
PMV BLD AUTO: 11.1 FL (ref 6–12)
POTASSIUM BLD-SCNC: 4 MMOL/L (ref 3.5–5.2)
PROCALCITONIN SERPL-MCNC: 0.09 NG/ML (ref 0.1–0.25)
PROT SERPL-MCNC: 6.6 G/DL (ref 6–8.5)
RBC # BLD AUTO: 4.74 10*6/MM3 (ref 4.14–5.8)
S PYO AG THROAT QL: NEGATIVE
SODIUM BLD-SCNC: 128 MMOL/L (ref 136–145)
TROPONIN T SERPL-MCNC: 0.03 NG/ML (ref 0–0.03)
TROPONIN T SERPL-MCNC: 0.04 NG/ML (ref 0–0.03)
WBC NRBC COR # BLD: 8.54 10*3/MM3 (ref 3.4–10.8)

## 2019-02-16 PROCEDURE — 94799 UNLISTED PULMONARY SVC/PX: CPT

## 2019-02-16 PROCEDURE — 99284 EMERGENCY DEPT VISIT MOD MDM: CPT | Performed by: EMERGENCY MEDICINE

## 2019-02-16 PROCEDURE — 83036 HEMOGLOBIN GLYCOSYLATED A1C: CPT | Performed by: NURSE PRACTITIONER

## 2019-02-16 PROCEDURE — 99223 1ST HOSP IP/OBS HIGH 75: CPT | Performed by: NURSE PRACTITIONER

## 2019-02-16 PROCEDURE — 84443 ASSAY THYROID STIM HORMONE: CPT | Performed by: NURSE PRACTITIONER

## 2019-02-16 PROCEDURE — 94640 AIRWAY INHALATION TREATMENT: CPT

## 2019-02-16 PROCEDURE — 63710000001 INSULIN DETEMIR PER 5 UNITS: Performed by: NURSE PRACTITIONER

## 2019-02-16 PROCEDURE — 84484 ASSAY OF TROPONIN QUANT: CPT | Performed by: EMERGENCY MEDICINE

## 2019-02-16 PROCEDURE — 80053 COMPREHEN METABOLIC PANEL: CPT | Performed by: EMERGENCY MEDICINE

## 2019-02-16 PROCEDURE — 87804 INFLUENZA ASSAY W/OPTIC: CPT | Performed by: EMERGENCY MEDICINE

## 2019-02-16 PROCEDURE — 84484 ASSAY OF TROPONIN QUANT: CPT | Performed by: NURSE PRACTITIONER

## 2019-02-16 PROCEDURE — 25010000002 AZITHROMYCIN: Performed by: EMERGENCY MEDICINE

## 2019-02-16 PROCEDURE — 83880 ASSAY OF NATRIURETIC PEPTIDE: CPT | Performed by: EMERGENCY MEDICINE

## 2019-02-16 PROCEDURE — 87081 CULTURE SCREEN ONLY: CPT | Performed by: EMERGENCY MEDICINE

## 2019-02-16 PROCEDURE — 63710000001 INSULIN REGULAR HUMAN PER 5 UNITS: Performed by: NURSE PRACTITIONER

## 2019-02-16 PROCEDURE — 93010 ELECTROCARDIOGRAM REPORT: CPT | Performed by: INTERNAL MEDICINE

## 2019-02-16 PROCEDURE — 85025 COMPLETE CBC W/AUTO DIFF WBC: CPT | Performed by: EMERGENCY MEDICINE

## 2019-02-16 PROCEDURE — 71046 X-RAY EXAM CHEST 2 VIEWS: CPT

## 2019-02-16 PROCEDURE — 25010000002 CEFTRIAXONE SODIUM-DEXTROSE 1-3.74 GM-%(50ML) RECONSTITUTED SOLUTION: Performed by: EMERGENCY MEDICINE

## 2019-02-16 PROCEDURE — 87880 STREP A ASSAY W/OPTIC: CPT | Performed by: EMERGENCY MEDICINE

## 2019-02-16 PROCEDURE — 84145 PROCALCITONIN (PCT): CPT | Performed by: NURSE PRACTITIONER

## 2019-02-16 PROCEDURE — 93005 ELECTROCARDIOGRAM TRACING: CPT | Performed by: EMERGENCY MEDICINE

## 2019-02-16 PROCEDURE — 25010000002 ENOXAPARIN PER 10 MG: Performed by: HOSPITALIST

## 2019-02-16 PROCEDURE — 63710000001 INSULIN REGULAR HUMAN PER 5 UNITS: Performed by: EMERGENCY MEDICINE

## 2019-02-16 PROCEDURE — 99284 EMERGENCY DEPT VISIT MOD MDM: CPT

## 2019-02-16 PROCEDURE — 25010000002 METHYLPREDNISOLONE PER 125 MG: Performed by: EMERGENCY MEDICINE

## 2019-02-16 PROCEDURE — 87040 BLOOD CULTURE FOR BACTERIA: CPT | Performed by: EMERGENCY MEDICINE

## 2019-02-16 PROCEDURE — 82962 GLUCOSE BLOOD TEST: CPT

## 2019-02-16 PROCEDURE — 82947 ASSAY GLUCOSE BLOOD QUANT: CPT | Performed by: NURSE PRACTITIONER

## 2019-02-16 RX ORDER — DOCUSATE SODIUM 100 MG/1
100 CAPSULE, LIQUID FILLED ORAL 2 TIMES DAILY
Status: DISCONTINUED | OUTPATIENT
Start: 2019-02-16 | End: 2019-02-19 | Stop reason: HOSPADM

## 2019-02-16 RX ORDER — ALBUTEROL SULFATE 2.5 MG/3ML
2.5 SOLUTION RESPIRATORY (INHALATION) ONCE
Status: COMPLETED | OUTPATIENT
Start: 2019-02-16 | End: 2019-02-16

## 2019-02-16 RX ORDER — GUAIFENESIN 600 MG/1
600 TABLET, EXTENDED RELEASE ORAL 2 TIMES DAILY
Status: DISCONTINUED | OUTPATIENT
Start: 2019-02-16 | End: 2019-02-19 | Stop reason: HOSPADM

## 2019-02-16 RX ORDER — CHOLECALCIFEROL (VITAMIN D3) 125 MCG
5 CAPSULE ORAL NIGHTLY PRN
Status: DISCONTINUED | OUTPATIENT
Start: 2019-02-16 | End: 2019-02-19 | Stop reason: HOSPADM

## 2019-02-16 RX ORDER — NICOTINE POLACRILEX 4 MG
15 LOZENGE BUCCAL
Status: DISCONTINUED | OUTPATIENT
Start: 2019-02-16 | End: 2019-02-19 | Stop reason: HOSPADM

## 2019-02-16 RX ORDER — GABAPENTIN 300 MG/1
600 CAPSULE ORAL NIGHTLY
Status: DISCONTINUED | OUTPATIENT
Start: 2019-02-16 | End: 2019-02-19 | Stop reason: HOSPADM

## 2019-02-16 RX ORDER — DONEPEZIL HYDROCHLORIDE 5 MG/1
2.5 TABLET, FILM COATED ORAL NIGHTLY
Status: DISCONTINUED | OUTPATIENT
Start: 2019-02-16 | End: 2019-02-19 | Stop reason: HOSPADM

## 2019-02-16 RX ORDER — SENNA AND DOCUSATE SODIUM 50; 8.6 MG/1; MG/1
2 TABLET, FILM COATED ORAL 2 TIMES DAILY PRN
Status: DISCONTINUED | OUTPATIENT
Start: 2019-02-16 | End: 2019-02-19 | Stop reason: HOSPADM

## 2019-02-16 RX ORDER — DOCUSATE SODIUM 100 MG/1
100 CAPSULE, LIQUID FILLED ORAL 2 TIMES DAILY PRN
Status: DISCONTINUED | OUTPATIENT
Start: 2019-02-16 | End: 2019-02-19 | Stop reason: HOSPADM

## 2019-02-16 RX ORDER — DEXTROSE MONOHYDRATE 25 G/50ML
25 INJECTION, SOLUTION INTRAVENOUS
Status: DISCONTINUED | OUTPATIENT
Start: 2019-02-16 | End: 2019-02-19 | Stop reason: HOSPADM

## 2019-02-16 RX ORDER — SODIUM CHLORIDE 0.9 % (FLUSH) 0.9 %
3-10 SYRINGE (ML) INJECTION AS NEEDED
Status: DISCONTINUED | OUTPATIENT
Start: 2019-02-16 | End: 2019-02-19 | Stop reason: HOSPADM

## 2019-02-16 RX ORDER — METHYLPREDNISOLONE SODIUM SUCCINATE 125 MG/2ML
125 INJECTION, POWDER, LYOPHILIZED, FOR SOLUTION INTRAMUSCULAR; INTRAVENOUS ONCE
Status: COMPLETED | OUTPATIENT
Start: 2019-02-16 | End: 2019-02-16

## 2019-02-16 RX ORDER — BISACODYL 10 MG
10 SUPPOSITORY, RECTAL RECTAL DAILY PRN
Status: DISCONTINUED | OUTPATIENT
Start: 2019-02-16 | End: 2019-02-19 | Stop reason: HOSPADM

## 2019-02-16 RX ORDER — BISACODYL 5 MG/1
5 TABLET, DELAYED RELEASE ORAL DAILY PRN
Status: DISCONTINUED | OUTPATIENT
Start: 2019-02-16 | End: 2019-02-19 | Stop reason: HOSPADM

## 2019-02-16 RX ORDER — IPRATROPIUM BROMIDE AND ALBUTEROL SULFATE 2.5; .5 MG/3ML; MG/3ML
3 SOLUTION RESPIRATORY (INHALATION)
Status: DISCONTINUED | OUTPATIENT
Start: 2019-02-16 | End: 2019-02-19 | Stop reason: HOSPADM

## 2019-02-16 RX ORDER — FAMOTIDINE 20 MG/1
40 TABLET, FILM COATED ORAL DAILY
Status: DISCONTINUED | OUTPATIENT
Start: 2019-02-17 | End: 2019-02-19 | Stop reason: HOSPADM

## 2019-02-16 RX ORDER — NITROGLYCERIN 0.4 MG/1
0.4 TABLET SUBLINGUAL
Status: DISCONTINUED | OUTPATIENT
Start: 2019-02-16 | End: 2019-02-19 | Stop reason: HOSPADM

## 2019-02-16 RX ORDER — NYSTATIN 100000 U/G
OINTMENT TOPICAL EVERY 12 HOURS SCHEDULED
Status: DISCONTINUED | OUTPATIENT
Start: 2019-02-16 | End: 2019-02-19 | Stop reason: HOSPADM

## 2019-02-16 RX ORDER — METHYLPREDNISOLONE SODIUM SUCCINATE 40 MG/ML
40 INJECTION, POWDER, LYOPHILIZED, FOR SOLUTION INTRAMUSCULAR; INTRAVENOUS EVERY 8 HOURS
Status: DISCONTINUED | OUTPATIENT
Start: 2019-02-17 | End: 2019-02-17

## 2019-02-16 RX ORDER — SODIUM CHLORIDE 9 MG/ML
40 INJECTION, SOLUTION INTRAVENOUS AS NEEDED
Status: DISCONTINUED | OUTPATIENT
Start: 2019-02-16 | End: 2019-02-19 | Stop reason: HOSPADM

## 2019-02-16 RX ORDER — ONDANSETRON 2 MG/ML
4 INJECTION INTRAMUSCULAR; INTRAVENOUS EVERY 6 HOURS PRN
Status: DISCONTINUED | OUTPATIENT
Start: 2019-02-16 | End: 2019-02-19 | Stop reason: HOSPADM

## 2019-02-16 RX ORDER — ACETAMINOPHEN 325 MG/1
650 TABLET ORAL EVERY 4 HOURS PRN
Status: DISCONTINUED | OUTPATIENT
Start: 2019-02-16 | End: 2019-02-19 | Stop reason: HOSPADM

## 2019-02-16 RX ORDER — ONDANSETRON 4 MG/1
4 TABLET, ORALLY DISINTEGRATING ORAL EVERY 6 HOURS PRN
Status: DISCONTINUED | OUTPATIENT
Start: 2019-02-16 | End: 2019-02-19 | Stop reason: HOSPADM

## 2019-02-16 RX ORDER — PREGABALIN 50 MG/1
100 CAPSULE ORAL 3 TIMES DAILY
Status: DISCONTINUED | OUTPATIENT
Start: 2019-02-16 | End: 2019-02-19 | Stop reason: HOSPADM

## 2019-02-16 RX ORDER — CEFTRIAXONE 1 G/50ML
1 INJECTION, SOLUTION INTRAVENOUS ONCE
Status: COMPLETED | OUTPATIENT
Start: 2019-02-16 | End: 2019-02-16

## 2019-02-16 RX ORDER — AMLODIPINE BESYLATE 2.5 MG/1
2.5 TABLET ORAL
Status: DISCONTINUED | OUTPATIENT
Start: 2019-02-17 | End: 2019-02-19 | Stop reason: HOSPADM

## 2019-02-16 RX ORDER — METOPROLOL SUCCINATE 25 MG/1
12.5 TABLET, EXTENDED RELEASE ORAL NIGHTLY
Status: DISCONTINUED | OUTPATIENT
Start: 2019-02-16 | End: 2019-02-19 | Stop reason: HOSPADM

## 2019-02-16 RX ORDER — SODIUM CHLORIDE 0.9 % (FLUSH) 0.9 %
3 SYRINGE (ML) INJECTION EVERY 12 HOURS SCHEDULED
Status: DISCONTINUED | OUTPATIENT
Start: 2019-02-16 | End: 2019-02-19 | Stop reason: HOSPADM

## 2019-02-16 RX ORDER — IPRATROPIUM BROMIDE AND ALBUTEROL SULFATE 2.5; .5 MG/3ML; MG/3ML
3 SOLUTION RESPIRATORY (INHALATION) ONCE
Status: COMPLETED | OUTPATIENT
Start: 2019-02-16 | End: 2019-02-16

## 2019-02-16 RX ORDER — ONDANSETRON 4 MG/1
4 TABLET, FILM COATED ORAL EVERY 6 HOURS PRN
Status: DISCONTINUED | OUTPATIENT
Start: 2019-02-16 | End: 2019-02-19 | Stop reason: HOSPADM

## 2019-02-16 RX ORDER — PRAMIPEXOLE DIHYDROCHLORIDE 0.25 MG/1
0.5 TABLET ORAL 2 TIMES DAILY
Status: DISCONTINUED | OUTPATIENT
Start: 2019-02-16 | End: 2019-02-19 | Stop reason: HOSPADM

## 2019-02-16 RX ADMIN — ALBUTEROL SULFATE 2.5 MG: 2.5 SOLUTION RESPIRATORY (INHALATION) at 18:08

## 2019-02-16 RX ADMIN — AZITHROMYCIN MONOHYDRATE 500 MG: 500 INJECTION, POWDER, LYOPHILIZED, FOR SOLUTION INTRAVENOUS at 18:19

## 2019-02-16 RX ADMIN — METHYLPREDNISOLONE SODIUM SUCCINATE 125 MG: 125 INJECTION, POWDER, FOR SOLUTION INTRAMUSCULAR; INTRAVENOUS at 17:43

## 2019-02-16 RX ADMIN — PRAMIPEXOLE DIHYDROCHLORIDE 0.5 MG: 0.25 TABLET ORAL at 23:00

## 2019-02-16 RX ADMIN — ALBUTEROL SULFATE 2.5 MG: 2.5 SOLUTION RESPIRATORY (INHALATION) at 18:01

## 2019-02-16 RX ADMIN — HUMAN INSULIN 10 UNITS: 100 INJECTION, SOLUTION SUBCUTANEOUS at 22:43

## 2019-02-16 RX ADMIN — METOPROLOL SUCCINATE 12.5 MG: 25 TABLET, EXTENDED RELEASE ORAL at 23:01

## 2019-02-16 RX ADMIN — IPRATROPIUM BROMIDE AND ALBUTEROL SULFATE 3 ML: .5; 3 SOLUTION RESPIRATORY (INHALATION) at 22:36

## 2019-02-16 RX ADMIN — ENOXAPARIN SODIUM 40 MG: 40 INJECTION, SOLUTION INTRAVENOUS; SUBCUTANEOUS at 23:02

## 2019-02-16 RX ADMIN — INSULIN DETEMIR 30 UNITS: 100 INJECTION, SOLUTION SUBCUTANEOUS at 22:44

## 2019-02-16 RX ADMIN — IPRATROPIUM BROMIDE AND ALBUTEROL SULFATE 3 ML: .5; 3 SOLUTION RESPIRATORY (INHALATION) at 17:54

## 2019-02-16 RX ADMIN — PREGABALIN 100 MG: 50 CAPSULE ORAL at 22:59

## 2019-02-16 RX ADMIN — GUAIFENESIN 600 MG: 600 TABLET, EXTENDED RELEASE ORAL at 23:00

## 2019-02-16 RX ADMIN — DONEPEZIL HYDROCHLORIDE 2.5 MG: 5 TABLET, FILM COATED ORAL at 23:02

## 2019-02-16 RX ADMIN — CEFTRIAXONE 1 G: 1 INJECTION, SOLUTION INTRAVENOUS at 18:47

## 2019-02-16 RX ADMIN — HUMAN INSULIN 10 UNITS: 100 INJECTION, SOLUTION SUBCUTANEOUS at 18:41

## 2019-02-16 NOTE — ED PROVIDER NOTES
Subjective   History of Present Illness  History of Present Illness    Chief complaint: Shortness of breath and cough    Location: Home    Quality/Severity: Productive of brownish sputum    Timing/Onset/Duration: Gradual onset since the day before yesterday    Modifying Factors: Nothing makes it better, is getting more    Associated Symptoms: Patient complains of subjective fever.  No chills.  Patient complains of sore throat since he has been coughing.  No earache.  He has clear nasal congestion.  No chest pain.  He does have shortness of breath.  No abdominal pain.  No diarrhea or burning when he urinates.  He has swelling in his feet that is actually better than it has been before.  He complains of redness and cracks dry skin on the dorsal aspect of the feet.    Narrative: This 78-year-old white male with a history of COPD, a former smoker, presents with shortness of breath and cough.  The patient thinks he may have pneumonia.  Symptoms started the day before yesterday.  The patient saw his primary care provider yesterday was started on 2 L of oxygen as needed.  The patient quit smoking in 1993.    PCP:SHELTON Clarke      Review of Systems   Constitutional: Positive for fever. Negative for chills.   HENT: Positive for sore throat. Negative for ear pain.    Eyes: Negative for discharge and redness.   Respiratory: Positive for cough and shortness of breath. Negative for chest tightness.    Cardiovascular: Positive for leg swelling. Negative for chest pain and palpitations.   Gastrointestinal: Negative for abdominal pain, diarrhea, nausea and vomiting.   Genitourinary: Negative for difficulty urinating, dysuria, flank pain and frequency.   Musculoskeletal: Negative for back pain and neck stiffness.   Skin: Positive for rash. Negative for wound.   Neurological: Negative for headaches.   Hematological: Negative for adenopathy.   Psychiatric/Behavioral: Negative.  Negative for confusion.        Medication List      ASK  your doctor about these medications    * albuterol 1.25 MG/3ML nebulizer solution  Commonly known as:  ACCUNEB  Take 3 mL by nebulization Every 6 (Six) Hours As Needed for Wheezing.     * albuterol 0.63 MG/3ML nebulizer solution  Commonly known as:  ACCUNEB  Take 3 mL by nebulization Every 6 (Six) Hours As Needed for Wheezing.     amLODIPine 2.5 MG tablet  Commonly known as:  NORVASC  Take 1 tablet by mouth Daily.     aspirin 325 MG EC tablet  Take 1 tablet by mouth Daily.     atorvastatin 10 MG tablet  Commonly known as:  LIPITOR  Take 1 tablet by mouth Daily.     clotrimazole 1 % cream  Commonly known as:  LOTRIMIN  Apply  topically Every 12 (Twelve) Hours.     clotrimazole-betamethasone 1-0.05 % cream  Commonly known as:  LOTRISONE  Apply  topically to the appropriate area as directed Every 12 (Twelve)   Hours.     docusate sodium 100 MG capsule  Take 100 mg by mouth 2 (Two) Times a Day.     donepezil 5 MG tablet  Commonly known as:  ARICEPT     furosemide 40 MG tablet  Commonly known as:  LASIX  Take 1 tablet by mouth Daily. Take 1 tablet by mouth twice per day for 2   days. Then resume 1 per day     gabapentin 600 MG tablet  Commonly known as:  NEURONTIN     insulin aspart 100 UNIT/ML injection  Commonly known as:  novoLOG     insulin detemir 100 UNIT/ML injection  Commonly known as:  LEVEMIR  Inject 30 Units under the skin 2 (Two) Times a Day.     insulin NPH-insulin regular (70-30) 100 UNIT/ML injection  Commonly known as:  humuLIN 70/30,novoLIN 70/30     lactobacillus acidophilus capsule capsule  Take 1 capsule by mouth Daily.     levothyroxine 100 MCG tablet  Commonly known as:  SYNTHROID, LEVOTHROID     lisinopril 20 MG tablet  Commonly known as:  PRINIVIL,ZESTRIL     metoprolol succinate XL 25 MG 24 hr tablet  Commonly known as:  TOPROL-XL  Take 0.5 tablets by mouth Every Night.     polyethylene glycol pack packet  Commonly known as:  MIRALAX  Take 17 g by mouth Daily.     potassium chloride 10 MEQ CR  tablet  Commonly known as:  K-DUR  Take 1 tablet by mouth 2 (Two) Times a Day.     pramipexole 0.5 MG tablet  Commonly known as:  MIRAPEX     predniSONE 20 MG tablet  Commonly known as:  DELTASONE  Take 1 tablet by mouth 3 (Three) Times a Day.     pregabalin 100 MG capsule  Commonly known as:  LYRICA     SITagliptin 100 MG tablet  Commonly known as:  JANUVIA     vitamin D 05651 units capsule capsule  Commonly known as:  ERGOCALCIFEROL         * This list has 2 medication(s) that are the same as other medications   prescribed for you. Read the directions carefully, and ask your doctor or   other care provider to review them with you.              Past Medical History:   Diagnosis Date   • Arthritis    • Asthma    • Cancer (CMS/HCC)     skin   • COPD (chronic obstructive pulmonary disease) (CMS/HCC)    • Diabetes mellitus (CMS/HCC)    • Disease of thyroid gland    • Hypertension    • Renal disorder        Allergies   Allergen Reactions   • Oxycontin [Oxycodone Hcl]      'Makes me crazy and stand on my head'       Past Surgical History:   Procedure Laterality Date   • COLONOSCOPY     • JOINT REPLACEMENT      right   • REPLACEMENT TOTAL KNEE         No family history on file.    Social History     Socioeconomic History   • Marital status: Unknown     Spouse name: Not on file   • Number of children: Not on file   • Years of education: Not on file   • Highest education level: Not on file   Tobacco Use   • Smoking status: Former Smoker   • Smokeless tobacco: Never Used   • Tobacco comment: quit 1993   Substance and Sexual Activity   • Alcohol use: No   • Drug use: No   • Sexual activity: Defer           Objective   Physical Exam   Constitutional: He is oriented to person, place, and time. He appears well-developed and well-nourished. No distress.   ED Triage Vitals  Temp: 98.5 °F (36.9 °C) (02/16/19 1642)  Heart Rate: 105 (02/16/19 1642)  Resp: 18 (02/16/19 1642)  BP: 138/92 (02/16/19 1656)  SpO2: 95 % (02/16/19  1642)  Temp src: Oral (02/16/19 1642)  Heart Rate Source: n/a  Patient Position: Sitting (02/16/19 1642)  BP Location: Right arm (02/16/19 1642)  FiO2 (%): n/a    The patient's vitals were reviewed by me.  Unless otherwise noted they are within normal limits.  The patient is mildly tachycardic with heart rate of 105.     HENT:   Head: Normocephalic and atraumatic.   Right Ear: External ear normal.   Left Ear: External ear normal.   Nose: Nose normal.   Mouth/Throat: Oropharynx is clear and moist. No oropharyngeal exudate.   Eyes: Conjunctivae and EOM are normal. Pupils are equal, round, and reactive to light. Right eye exhibits no discharge. Left eye exhibits no discharge. No scleral icterus.   Neck: Normal range of motion. Neck supple. No JVD present. No tracheal deviation present. No thyromegaly present.   Cardiovascular: Normal rate, regular rhythm, normal heart sounds and intact distal pulses. Exam reveals no gallop and no friction rub.   No murmur heard.  Pulmonary/Chest: Effort normal. No stridor. No respiratory distress. He has wheezes (Bilateral expiratory wheezes). He has no rales. He exhibits no tenderness.   Abdominal: Soft. Bowel sounds are normal. He exhibits no distension and no mass. There is no tenderness. There is no rebound and no guarding. No hernia.   Musculoskeletal: Normal range of motion. He exhibits edema. He exhibits no deformity.   There is three plus pitting edema bilaterally.  There is 2+ dorsalis pedis pulse bilaterally.   Lymphadenopathy:     He has no cervical adenopathy.   Neurological: He is alert and oriented to person, place, and time.   Skin: Skin is warm and dry. Rash (There is dry cracked skin on the dorsal aspect of both feet.  There is mild erythema.) noted. He is not diaphoretic. No erythema. No pallor.   Psychiatric: His behavior is normal.   Nursing note and vitals reviewed.      Procedures           ED Course  ED Course as of Feb 16 1818   Sat Feb 16, 2019   1806 The  laboratory values were reviewed by me.  The serum glucose is 530.  The creatinine is 1.82.  The sodium is 128.  The chloride is 89.  GFR is 36.  Troponin is elevated at 0.037.  The proBNP is 586.  Laboratory values are otherwise unremarkable.  [RC]   1811 Results for JOSHUA FERGUSON (MRN [id number]) as of 2/16/2019 18:10    9/22/2018 21:07  Troponin T: 0.015    [RC]   1812 Results for JOSHUA FERGUSON (MRN [id number]) as of 2/16/2019 18:11    9/22/2018 21:07  proBNP: 1,540.0 (H)  Glucose: 147 (H)  Sodium: 137  Potassium: 3.9  CO2: 29.0  Chloride: 96 (L)  Anion Gap: 12.0  Creatinine: 1.69 (H)  BUN: 23  BUN/Creatinine Ratio: 13.6  Calcium: 9.6  eGFR Non African Am: 40 (L)  Alkaline Phosphatase: 76  Total Protein: 7.4  ALT (SGPT): 15  AST (SGOT): 16  Total Bilirubin: 0.2  Albumin: 4.00  Globulin: 3.4  A/G Ratio: 1.2    [RC]      ED Course User Index  [RC] Keven Steven MD      5:24 PM, 02/16/19:  EKG was obtained at 1719.  EKG was read by me at 1721.  EKG shows a normal sinus rhythm with a rate of 87.  There is a normal axis with left ventricular hypertrophy.  There are inferior Q waves.  There is poor anterior R wave progression.  There is no ectopy.  There is a secondary repolarization abnormality.  There is no acute ST elevation or depression.  The EKG today was compared to an EKG dated September 22, 2018.  It is essentially unchanged.    6:19 PM, 02/16/19:  The patient was reassessed.  He feels better.  His vital signs were reviewed and are stable.  Lung exam: Clear to auscultation equal bilaterally.  His saturations have dropped to 93% on room air.    6:24 PM, 02/16/19:  The case was discussed with Dr. Briggs, on-call for the hospitalist, he will admit the patient.    6:25 PM, 02/16/19:  The patient's diagnosis of pneumonia with COPD exacerbation, renal insufficiency, poorly controlled diabetes and mildly elevated troponin was discussed with him.  The patient will be admitted for breathing treatments, oxygen as  needed, antibiotics, and control of his diabetes.  All the patient's questions were answered he will be admitted in stable condition.        [facility]    No orders to display     Labs Reviewed - No data to display  No results found.    Final diagnoses:   Pneumonia of both lower lobes due to infectious organism (CMS/[facility])   COPD exacerbation (CMS/[facility])   Renal insufficiency   Elevated troponin   Poorly controlled diabetes mellitus (CMS/[facility])         ED Medications:  Medications - No data to display    New Medications:     Medication List      ASK your doctor about these medications    * albuterol 1.25 MG/3ML nebulizer solution  Commonly known as:  ACCUNEB  Take 3 mL by nebulization Every 6 (Six) Hours As Needed for Wheezing.     * albuterol 0.63 MG/3ML nebulizer solution  Commonly known as:  ACCUNEB  Take 3 mL by nebulization Every 6 (Six) Hours As Needed for Wheezing.     amLODIPine 2.5 MG tablet  Commonly known as:  NORVASC  Take 1 tablet by mouth Daily.     aspirin 325 MG EC tablet  Take 1 tablet by mouth Daily.     atorvastatin 10 MG tablet  Commonly known as:  LIPITOR  Take 1 tablet by mouth Daily.     clotrimazole 1 % cream  Commonly known as:  LOTRIMIN  Apply  topically Every 12 (Twelve) Hours.     clotrimazole-betamethasone 1-0.05 % cream  Commonly known as:  LOTRISONE  Apply  topically to the appropriate area as directed Every 12 (Twelve)   Hours.     docusate sodium 100 MG capsule  Take 100 mg by mouth 2 (Two) Times a Day.     donepezil 5 MG tablet  Commonly known as:  ARICEPT     furosemide 40 MG tablet  Commonly known as:  LASIX  Take 1 tablet by mouth Daily. Take 1 tablet by mouth twice per day for 2   days. Then resume 1 per day     gabapentin 600 MG tablet  Commonly known as:  NEURONTIN     insulin aspart 100 UNIT/ML injection  Commonly known as:  novoLOG     insulin detemir 100 UNIT/ML injection  Commonly known as:  LEVEMIR  Inject 30 Units under the skin 2 (Two) Times a Day.     insulin NPH-insulin regular  (70-30) 100 UNIT/ML injection  Commonly known as:  humuLIN 70/30,novoLIN 70/30     lactobacillus acidophilus capsule capsule  Take 1 capsule by mouth Daily.     levothyroxine 100 MCG tablet  Commonly known as:  SYNTHROID, LEVOTHROID     lisinopril 20 MG tablet  Commonly known as:  PRINIVIL,ZESTRIL     metoprolol succinate XL 25 MG 24 hr tablet  Commonly known as:  TOPROL-XL  Take 0.5 tablets by mouth Every Night.     polyethylene glycol pack packet  Commonly known as:  MIRALAX  Take 17 g by mouth Daily.     potassium chloride 10 MEQ CR tablet  Commonly known as:  K-DUR  Take 1 tablet by mouth 2 (Two) Times a Day.     pramipexole 0.5 MG tablet  Commonly known as:  MIRAPEX     predniSONE 20 MG tablet  Commonly known as:  DELTASONE  Take 1 tablet by mouth 3 (Three) Times a Day.     pregabalin 100 MG capsule  Commonly known as:  LYRICA     SITagliptin 100 MG tablet  Commonly known as:  JANUVIA     vitamin D 71570 units capsule capsule  Commonly known as:  ERGOCALCIFEROL         * This list has 2 medication(s) that are the same as other medications   prescribed for you. Read the directions carefully, and ask your doctor or   other care provider to review them with you.              Stopped Medications:     Medication List      ASK your doctor about these medications    * albuterol 1.25 MG/3ML nebulizer solution  Commonly known as:  ACCUNEB  Take 3 mL by nebulization Every 6 (Six) Hours As Needed for Wheezing.     * albuterol 0.63 MG/3ML nebulizer solution  Commonly known as:  ACCUNEB  Take 3 mL by nebulization Every 6 (Six) Hours As Needed for Wheezing.     amLODIPine 2.5 MG tablet  Commonly known as:  NORVASC  Take 1 tablet by mouth Daily.     aspirin 325 MG EC tablet  Take 1 tablet by mouth Daily.     atorvastatin 10 MG tablet  Commonly known as:  LIPITOR  Take 1 tablet by mouth Daily.     clotrimazole 1 % cream  Commonly known as:  LOTRIMIN  Apply  topically Every 12 (Twelve) Hours.     clotrimazole-betamethasone  1-0.05 % cream  Commonly known as:  LOTRISONE  Apply  topically to the appropriate area as directed Every 12 (Twelve)   Hours.     docusate sodium 100 MG capsule  Take 100 mg by mouth 2 (Two) Times a Day.     donepezil 5 MG tablet  Commonly known as:  ARICEPT     furosemide 40 MG tablet  Commonly known as:  LASIX  Take 1 tablet by mouth Daily. Take 1 tablet by mouth twice per day for 2   days. Then resume 1 per day     gabapentin 600 MG tablet  Commonly known as:  NEURONTIN     insulin aspart 100 UNIT/ML injection  Commonly known as:  novoLOG     insulin detemir 100 UNIT/ML injection  Commonly known as:  LEVEMIR  Inject 30 Units under the skin 2 (Two) Times a Day.     insulin NPH-insulin regular (70-30) 100 UNIT/ML injection  Commonly known as:  humuLIN 70/30,novoLIN 70/30     lactobacillus acidophilus capsule capsule  Take 1 capsule by mouth Daily.     levothyroxine 100 MCG tablet  Commonly known as:  SYNTHROID, LEVOTHROID     lisinopril 20 MG tablet  Commonly known as:  PRINIVIL,ZESTRIL     metoprolol succinate XL 25 MG 24 hr tablet  Commonly known as:  TOPROL-XL  Take 0.5 tablets by mouth Every Night.     polyethylene glycol pack packet  Commonly known as:  MIRALAX  Take 17 g by mouth Daily.     potassium chloride 10 MEQ CR tablet  Commonly known as:  K-DUR  Take 1 tablet by mouth 2 (Two) Times a Day.     pramipexole 0.5 MG tablet  Commonly known as:  MIRAPEX     predniSONE 20 MG tablet  Commonly known as:  DELTASONE  Take 1 tablet by mouth 3 (Three) Times a Day.     pregabalin 100 MG capsule  Commonly known as:  LYRICA     SITagliptin 100 MG tablet  Commonly known as:  JANUVIA     vitamin D 54933 units capsule capsule  Commonly known as:  ERGOCALCIFEROL         * This list has 2 medication(s) that are the same as other medications   prescribed for you. Read the directions carefully, and ask your doctor or   other care provider to review them with you.                Final diagnoses:   Pneumonia of both lower  lobes due to infectious organism (CMS/HCC)   COPD exacerbation (CMS/HCC)   Renal insufficiency   Elevated troponin   Poorly controlled diabetes mellitus (CMS/HCC)            Keven Steven MD  02/16/19 [id number]

## 2019-02-17 PROBLEM — J18.9 PNEUMONIA OF BOTH LOWER LOBES DUE TO INFECTIOUS ORGANISM: Status: ACTIVE | Noted: 2019-02-17

## 2019-02-17 LAB
ACETONE BLD QL: NEGATIVE
ANION GAP SERPL CALCULATED.3IONS-SCNC: 21.4 MMOL/L
B PARAPERT DNA SPEC QL NAA+PROBE: NOT DETECTED
B PERT DNA SPEC QL NAA+PROBE: NOT DETECTED
BASOPHILS # BLD AUTO: 0.01 10*3/MM3 (ref 0–0.2)
BASOPHILS NFR BLD AUTO: 0.1 % (ref 0–1.5)
BUN BLD-MCNC: 24 MG/DL (ref 8–23)
BUN/CREAT SERPL: 11.8 (ref 7–25)
C PNEUM DNA NPH QL NAA+NON-PROBE: NOT DETECTED
CALCIUM SPEC-SCNC: 9.3 MG/DL (ref 8.8–10.5)
CHLORIDE SERPL-SCNC: 89 MMOL/L (ref 98–107)
CO2 SERPL-SCNC: 17.6 MMOL/L (ref 22–29)
CREAT BLD-MCNC: 2.04 MG/DL (ref 0.76–1.27)
DEPRECATED RDW RBC AUTO: 43.2 FL (ref 37–54)
EOSINOPHIL # BLD AUTO: 0.01 10*3/MM3 (ref 0–0.4)
EOSINOPHIL NFR BLD AUTO: 0.1 % (ref 0.3–6.2)
ERYTHROCYTE [DISTWIDTH] IN BLOOD BY AUTOMATED COUNT: 14.2 % (ref 12.3–15.4)
FLUAV H1 2009 PAND RNA NPH QL NAA+PROBE: NOT DETECTED
FLUAV H1 HA GENE NPH QL NAA+PROBE: NOT DETECTED
FLUAV H3 RNA NPH QL NAA+PROBE: NOT DETECTED
FLUAV SUBTYP SPEC NAA+PROBE: NOT DETECTED
FLUBV RNA ISLT QL NAA+PROBE: NOT DETECTED
GFR SERPL CREATININE-BSD FRML MDRD: 32 ML/MIN/1.73
GLUCOSE BLD-MCNC: 533 MG/DL (ref 65–99)
GLUCOSE BLD-MCNC: 595 MG/DL (ref 65–99)
GLUCOSE BLD-MCNC: 673 MG/DL (ref 65–99)
GLUCOSE BLD-MCNC: 762 MG/DL (ref 65–99)
GLUCOSE BLDC GLUCOMTR-MCNC: 462 MG/DL (ref 70–130)
GLUCOSE BLDC GLUCOMTR-MCNC: 474 MG/DL (ref 70–130)
GLUCOSE BLDC GLUCOMTR-MCNC: 481 MG/DL (ref 70–130)
GLUCOSE BLDC GLUCOMTR-MCNC: 500 MG/DL (ref 70–130)
GLUCOSE BLDC GLUCOMTR-MCNC: 574 MG/DL (ref 70–130)
GLUCOSE BLDC GLUCOMTR-MCNC: 594 MG/DL (ref 70–130)
GLUCOSE BLDC GLUCOMTR-MCNC: >599 MG/DL (ref 70–130)
HADV DNA SPEC NAA+PROBE: NOT DETECTED
HCOV 229E RNA SPEC QL NAA+PROBE: NOT DETECTED
HCOV HKU1 RNA SPEC QL NAA+PROBE: NOT DETECTED
HCOV NL63 RNA SPEC QL NAA+PROBE: NOT DETECTED
HCOV OC43 RNA SPEC QL NAA+PROBE: NOT DETECTED
HCT VFR BLD AUTO: 42.5 % (ref 37.5–51)
HGB BLD-MCNC: 13.4 G/DL (ref 13–17.7)
HMPV RNA NPH QL NAA+NON-PROBE: NOT DETECTED
HPIV1 RNA SPEC QL NAA+PROBE: NOT DETECTED
HPIV2 RNA SPEC QL NAA+PROBE: NOT DETECTED
HPIV3 RNA NPH QL NAA+PROBE: NOT DETECTED
HPIV4 P GENE NPH QL NAA+PROBE: NOT DETECTED
IMM GRANULOCYTES # BLD AUTO: 0.13 10*3/MM3 (ref 0–0.05)
IMM GRANULOCYTES NFR BLD AUTO: 1.1 % (ref 0–0.5)
L PNEUMO1 AG UR QL IA: NEGATIVE
LYMPHOCYTES # BLD AUTO: 0.55 10*3/MM3 (ref 0.7–3.1)
LYMPHOCYTES NFR BLD AUTO: 4.8 % (ref 19.6–45.3)
M PNEUMO IGG SER IA-ACNC: NOT DETECTED
MCH RBC QN AUTO: 26.7 PG (ref 26.6–33)
MCHC RBC AUTO-ENTMCNC: 31.5 G/DL (ref 31.5–35.7)
MCV RBC AUTO: 84.7 FL (ref 79–97)
MONOCYTES # BLD AUTO: 0.13 10*3/MM3 (ref 0.1–0.9)
MONOCYTES NFR BLD AUTO: 1.1 % (ref 5–12)
NEUTROPHILS # BLD AUTO: 10.62 10*3/MM3 (ref 1.4–7)
NEUTROPHILS NFR BLD AUTO: 92.8 % (ref 42.7–76)
NRBC BLD AUTO-RTO: 0 /100 WBC (ref 0–0)
PLATELET # BLD AUTO: 238 10*3/MM3 (ref 140–450)
PMV BLD AUTO: 10.9 FL (ref 6–12)
POTASSIUM BLD-SCNC: 4.4 MMOL/L (ref 3.5–5.2)
RBC # BLD AUTO: 5.02 10*6/MM3 (ref 4.14–5.8)
RHINOVIRUS RNA SPEC NAA+PROBE: NOT DETECTED
RSV RNA NPH QL NAA+NON-PROBE: NOT DETECTED
S PNEUM AG SPEC QL LA: NEGATIVE
SODIUM BLD-SCNC: 128 MMOL/L (ref 136–145)
T4 FREE SERPL-MCNC: 0.9 NG/DL (ref 0.93–1.7)
TSH SERPL DL<=0.05 MIU/L-ACNC: 5.19 MIU/ML (ref 0.27–4.2)
WBC NRBC COR # BLD: 11.45 10*3/MM3 (ref 3.4–10.8)

## 2019-02-17 PROCEDURE — 63710000001 INSULIN REGULAR HUMAN PER 5 UNITS: Performed by: NURSE PRACTITIONER

## 2019-02-17 PROCEDURE — 82009 KETONE BODYS QUAL: CPT | Performed by: NURSE PRACTITIONER

## 2019-02-17 PROCEDURE — 94799 UNLISTED PULMONARY SVC/PX: CPT

## 2019-02-17 PROCEDURE — 82962 GLUCOSE BLOOD TEST: CPT

## 2019-02-17 PROCEDURE — 82947 ASSAY GLUCOSE BLOOD QUANT: CPT | Performed by: HOSPITALIST

## 2019-02-17 PROCEDURE — 84439 ASSAY OF FREE THYROXINE: CPT | Performed by: HOSPITALIST

## 2019-02-17 PROCEDURE — 93010 ELECTROCARDIOGRAM REPORT: CPT | Performed by: INTERNAL MEDICINE

## 2019-02-17 PROCEDURE — 93005 ELECTROCARDIOGRAM TRACING: CPT | Performed by: NURSE PRACTITIONER

## 2019-02-17 PROCEDURE — 82947 ASSAY GLUCOSE BLOOD QUANT: CPT | Performed by: NURSE PRACTITIONER

## 2019-02-17 PROCEDURE — 63710000001 INSULIN ASPART PER 5 UNITS: Performed by: NURSE PRACTITIONER

## 2019-02-17 PROCEDURE — 87899 AGENT NOS ASSAY W/OPTIC: CPT | Performed by: NURSE PRACTITIONER

## 2019-02-17 PROCEDURE — 25010000002 ENOXAPARIN PER 10 MG: Performed by: HOSPITALIST

## 2019-02-17 PROCEDURE — 99232 SBSQ HOSP IP/OBS MODERATE 35: CPT | Performed by: HOSPITALIST

## 2019-02-17 PROCEDURE — 87631 RESP VIRUS 3-5 TARGETS: CPT | Performed by: NURSE PRACTITIONER

## 2019-02-17 PROCEDURE — 63710000001 INSULIN DETEMIR PER 5 UNITS: Performed by: NURSE PRACTITIONER

## 2019-02-17 PROCEDURE — 85025 COMPLETE CBC W/AUTO DIFF WBC: CPT | Performed by: NURSE PRACTITIONER

## 2019-02-17 PROCEDURE — 87486 CHLMYD PNEUM DNA AMP PROBE: CPT | Performed by: NURSE PRACTITIONER

## 2019-02-17 PROCEDURE — 80048 BASIC METABOLIC PNL TOTAL CA: CPT | Performed by: NURSE PRACTITIONER

## 2019-02-17 PROCEDURE — 87581 M.PNEUMON DNA AMP PROBE: CPT | Performed by: NURSE PRACTITIONER

## 2019-02-17 PROCEDURE — 87798 DETECT AGENT NOS DNA AMP: CPT | Performed by: NURSE PRACTITIONER

## 2019-02-17 RX ORDER — LEVOTHYROXINE SODIUM 0.1 MG/1
100 TABLET ORAL
Status: DISCONTINUED | OUTPATIENT
Start: 2019-02-17 | End: 2019-02-19 | Stop reason: HOSPADM

## 2019-02-17 RX ORDER — BUDESONIDE 0.5 MG/2ML
0.5 INHALANT ORAL
Status: DISCONTINUED | OUTPATIENT
Start: 2019-02-17 | End: 2019-02-19 | Stop reason: HOSPADM

## 2019-02-17 RX ORDER — CEFTRIAXONE 1 G/50ML
1 INJECTION, SOLUTION INTRAVENOUS EVERY 24 HOURS
Status: DISCONTINUED | OUTPATIENT
Start: 2019-02-17 | End: 2019-02-17

## 2019-02-17 RX ORDER — BUDESONIDE 0.5 MG/2ML
INHALANT ORAL
Status: COMPLETED
Start: 2019-02-17 | End: 2019-02-17

## 2019-02-17 RX ORDER — SODIUM CHLORIDE 9 MG/ML
INJECTION, SOLUTION INTRAVENOUS
Status: COMPLETED
Start: 2019-02-17 | End: 2019-02-17

## 2019-02-17 RX ADMIN — INSULIN ASPART 24 UNITS: 100 INJECTION, SOLUTION INTRAVENOUS; SUBCUTANEOUS at 01:45

## 2019-02-17 RX ADMIN — NYSTATIN: 100000 OINTMENT TOPICAL at 08:39

## 2019-02-17 RX ADMIN — IPRATROPIUM BROMIDE AND ALBUTEROL SULFATE 3 ML: .5; 3 SOLUTION RESPIRATORY (INHALATION) at 15:29

## 2019-02-17 RX ADMIN — GUAIFENESIN 600 MG: 600 TABLET, EXTENDED RELEASE ORAL at 08:39

## 2019-02-17 RX ADMIN — INSULIN ASPART 24 UNITS: 100 INJECTION, SOLUTION INTRAVENOUS; SUBCUTANEOUS at 08:32

## 2019-02-17 RX ADMIN — AMLODIPINE BESYLATE 2.5 MG: 2.5 TABLET ORAL at 08:39

## 2019-02-17 RX ADMIN — METOPROLOL SUCCINATE 12.5 MG: 25 TABLET, EXTENDED RELEASE ORAL at 20:28

## 2019-02-17 RX ADMIN — PREGABALIN 100 MG: 50 CAPSULE ORAL at 20:20

## 2019-02-17 RX ADMIN — DOCUSATE SODIUM 100 MG: 100 CAPSULE, LIQUID FILLED ORAL at 20:25

## 2019-02-17 RX ADMIN — HUMAN INSULIN 15 UNITS: 100 INJECTION, SOLUTION SUBCUTANEOUS at 04:25

## 2019-02-17 RX ADMIN — DONEPEZIL HYDROCHLORIDE 2.5 MG: 5 TABLET, FILM COATED ORAL at 20:26

## 2019-02-17 RX ADMIN — PREGABALIN 100 MG: 50 CAPSULE ORAL at 15:03

## 2019-02-17 RX ADMIN — GUAIFENESIN 600 MG: 600 TABLET, EXTENDED RELEASE ORAL at 20:25

## 2019-02-17 RX ADMIN — HUMAN INSULIN 10 UNITS: 100 INJECTION, SOLUTION SUBCUTANEOUS at 01:46

## 2019-02-17 RX ADMIN — INSULIN DETEMIR 50 UNITS: 100 INJECTION, SOLUTION SUBCUTANEOUS at 20:17

## 2019-02-17 RX ADMIN — SODIUM CHLORIDE 500 ML: 9 INJECTION, SOLUTION INTRAVENOUS at 05:49

## 2019-02-17 RX ADMIN — Medication 30 ML: at 15:04

## 2019-02-17 RX ADMIN — PRAMIPEXOLE DIHYDROCHLORIDE 0.5 MG: 0.25 TABLET ORAL at 20:27

## 2019-02-17 RX ADMIN — SODIUM CHLORIDE, PRESERVATIVE FREE 3 ML: 5 INJECTION INTRAVENOUS at 08:37

## 2019-02-17 RX ADMIN — IPRATROPIUM BROMIDE AND ALBUTEROL SULFATE 3 ML: .5; 3 SOLUTION RESPIRATORY (INHALATION) at 11:20

## 2019-02-17 RX ADMIN — INSULIN HUMAN 30 UNITS: 500 INJECTION, SOLUTION SUBCUTANEOUS at 17:05

## 2019-02-17 RX ADMIN — PRAMIPEXOLE DIHYDROCHLORIDE 0.5 MG: 0.25 TABLET ORAL at 08:39

## 2019-02-17 RX ADMIN — IPRATROPIUM BROMIDE AND ALBUTEROL SULFATE 3 ML: .5; 3 SOLUTION RESPIRATORY (INHALATION) at 07:22

## 2019-02-17 RX ADMIN — NYSTATIN: 100000 OINTMENT TOPICAL at 20:21

## 2019-02-17 RX ADMIN — BUDESONIDE 0.5 MG: 0.5 INHALANT RESPIRATORY (INHALATION) at 07:22

## 2019-02-17 RX ADMIN — PREGABALIN 100 MG: 50 CAPSULE ORAL at 08:44

## 2019-02-17 RX ADMIN — INSULIN DETEMIR 50 UNITS: 100 INJECTION, SOLUTION SUBCUTANEOUS at 08:32

## 2019-02-17 RX ADMIN — BUDESONIDE 0.5 MG: 0.5 INHALANT RESPIRATORY (INHALATION) at 19:29

## 2019-02-17 RX ADMIN — SODIUM CHLORIDE, PRESERVATIVE FREE 3 ML: 5 INJECTION INTRAVENOUS at 20:24

## 2019-02-17 RX ADMIN — GABAPENTIN 600 MG: 300 CAPSULE ORAL at 20:20

## 2019-02-17 RX ADMIN — FAMOTIDINE 40 MG: 20 TABLET, FILM COATED ORAL at 08:39

## 2019-02-17 RX ADMIN — LEVOTHYROXINE SODIUM 100 MCG: 100 TABLET ORAL at 08:39

## 2019-02-17 RX ADMIN — ENOXAPARIN SODIUM 40 MG: 40 INJECTION, SOLUTION INTRAVENOUS; SUBCUTANEOUS at 20:27

## 2019-02-17 RX ADMIN — LINAGLIPTIN 5 MG: 5 TABLET, FILM COATED ORAL at 08:39

## 2019-02-17 RX ADMIN — DOCUSATE SODIUM 100 MG: 100 CAPSULE, LIQUID FILLED ORAL at 08:39

## 2019-02-17 RX ADMIN — IPRATROPIUM BROMIDE AND ALBUTEROL SULFATE 3 ML: .5; 3 SOLUTION RESPIRATORY (INHALATION) at 19:29

## 2019-02-17 RX ADMIN — INSULIN ASPART 24 UNITS: 100 INJECTION, SOLUTION INTRAVENOUS; SUBCUTANEOUS at 11:39

## 2019-02-17 RX ADMIN — INSULIN HUMAN 10 UNITS: 500 INJECTION, SOLUTION SUBCUTANEOUS at 20:18

## 2019-02-17 NOTE — PLAN OF CARE
Problem: Patient Care Overview  Goal: Plan of Care Review  Outcome: Ongoing (interventions implemented as appropriate)   02/17/19 0052   Coping/Psychosocial   Plan of Care Reviewed With patient   OTHER   Outcome Summary continue nebs

## 2019-02-17 NOTE — PROGRESS NOTES
"SERVICE: [facility] HOSPITALIST    CHIEF COMPLAINT: f/u soa, hyperglycemia    SUBJECTIVE: The patient notes that he is feeling better.  He states that usually at home he changes his insulins around a lot depending on his sugar and that when it is over 600 he takes multiple kinds of insulin, but does not alter his diet.  He notes his breathing is improved.  He otherwise denies f/c/chest pain/n/v/d/abdominal pain or other new concerns.    OBJECTIVE:    /69 (BP Location: Right arm, Patient Position: Sitting)   Pulse 80   Temp 97.6 °F (36.4 °C) (Oral)   Resp 20   Ht 182.9 cm (72\")   Wt 134 kg (295 lb)   SpO2 95%   BMI 40.01 kg/m²     MEDS/LABS REVIEWED AND ORDERED    amLODIPine 2.5 mg Oral Q24H   azithromycin 500 mg Intravenous Q24H   budesonide 0.5 mg Nebulization BID - RT   ceftriaxone 1 g Intravenous Q24H   docusate sodium 100 mg Oral BID   donepezil 2.5 mg Oral Nightly   enoxaparin 40 mg Subcutaneous Q24H   famotidine 40 mg Oral Daily   gabapentin 600 mg Oral Nightly   guaiFENesin 600 mg Oral BID   insulin aspart 0-24 Units Subcutaneous 4x Daily AC & at Bedtime   insulin detemir 50 Units Subcutaneous Q12H   insulin regular      ipratropium-albuterol 3 mL Nebulization 4x Daily - RT   levothyroxine 100 mcg Oral Q AM   linagliptin 5 mg Oral Daily   metoprolol succinate XL 12.5 mg Oral Nightly   nystatin  Topical Q12H   pramipexole 0.5 mg Oral BID   pregabalin 100 mg Oral TID   sodium chloride 500 mL Intravenous Once   sodium chloride 3 mL Intravenous Q12H     Physical Exam   Constitutional: He is oriented to person, place, and time. He appears well-developed and well-nourished.   Obese   HENT:   Head: Normocephalic and atraumatic.   Eyes: EOM are normal. Pupils are equal, round, and reactive to light.   Cardiovascular: Normal rate and regular rhythm.   Pulmonary/Chest: Effort normal.   Diminished throughout   Abdominal: Soft. Bowel sounds are normal. He exhibits no distension. There is no " tenderness. There is no guarding.   Obese   Musculoskeletal:   2+ lower extremity nonpitting edema   Neurological: He is alert and oriented to person, place, and time.   Skin: Skin is warm and dry.   Bilateral feet with scaly, cracked, scabbed appearance with toenails with fungal overgrowth   Psychiatric:   Calm   Vitals reviewed.    LAB/DIAGNOSTICS:    Lab Results (last 24 hours)     Procedure Component Value Units Date/Time    POC Glucose Once [[id number]]  (Abnormal) Collected:  02/17/19 0545    Specimen:  Blood Updated:  02/17/19 0555     Glucose 594 mg/dL     Acetone [[id number]]  (Normal) Collected:  02/17/19 0502    Specimen:  Blood Updated:  02/17/19 0507     Acetone Negative    Basic Metabolic Panel [[id number]]  (Abnormal) Collected:  02/17/19 0340    Specimen:  Blood Updated:  02/17/19 0414     Glucose 673 mg/dL      BUN 24 mg/dL      Creatinine 2.04 mg/dL      Sodium 128 mmol/L      Potassium 4.4 mmol/L      Chloride 89 mmol/L      CO2 17.6 mmol/L      Calcium 9.3 mg/dL      eGFR Non African Amer 32 mL/min/1.73      BUN/Creatinine Ratio 11.8     Anion Gap 21.4 mmol/L     Narrative:       The MDRD GFR formula is only valid for adults with stable renal function between ages 18 and 70.    CBC & Differential [[id number]] Collected:  02/17/19 0340    Specimen:  Blood Updated:  02/17/19 0343    Narrative:       The following orders were created for panel order CBC & Differential.  Procedure                               Abnormality         Status                     ---------                               -----------         ------                     CBC Auto Differential[[id number]]        Abnormal            Final result                 Please view results for these tests on the individual orders.    CBC Auto Differential [[id number]]  (Abnormal) Collected:  02/17/19 0340    Specimen:  Blood Updated:  02/17/19 0343     WBC 11.45 10*3/mm3      RBC 5.02 10*6/mm3      Hemoglobin 13.4 g/dL      Hematocrit 42.5 %       MCV 84.7 fL      MCH 26.7 pg      MCHC 31.5 g/dL      RDW 14.2 %      RDW-SD 43.2 fl      MPV 10.9 fL      Platelets 238 10*3/mm3      Neutrophil % 92.8 %      Lymphocyte % 4.8 %      Monocyte % 1.1 %      Eosinophil % 0.1 %      Basophil % 0.1 %      Immature Grans % 1.1 %      Neutrophils, Absolute 10.62 10*3/mm3      Lymphocytes, Absolute 0.55 10*3/mm3      Monocytes, Absolute 0.13 10*3/mm3      Eosinophils, Absolute 0.01 10*3/mm3      Basophils, Absolute 0.01 10*3/mm3      Immature Grans, Absolute 0.13 10*3/mm3      nRBC 0.0 /100 WBC     POC Glucose Once [[id number]]  (Abnormal) Collected:  02/17/19 0320    Specimen:  Blood Updated:  02/17/19 0334     Glucose >599 mg/dL     TSH [[id number]]  (Abnormal) Collected:  02/16/19 2155    Specimen:  Blood Updated:  02/17/19 0112     TSH 5.190 mIU/mL     Glucose, Random [[id number]]  (Abnormal) Collected:  02/17/19 0023    Specimen:  Blood Updated:  02/17/19 0056     Glucose 762 mg/dL     POC Glucose Once [[id number]]  (Abnormal) Collected:  02/17/19 0013    Specimen:  Blood Updated:  02/17/19 0028     Glucose >599 mg/dL     Glucose, Random [[id number]]  (Abnormal) Collected:  02/16/19 2155    Specimen:  Blood Updated:  02/16/19 2232     Glucose 597 mg/dL     POC Glucose Once [[id number]]  (Abnormal) Collected:  02/16/19 2148    Specimen:  Blood Updated:  02/16/19 2207     Glucose 577 mg/dL     Procalcitonin [[id number]]  (Abnormal) Collected:  02/16/19 1726    Specimen:  Blood Updated:  02/16/19 2112     Procalcitonin 0.09 ng/mL     Narrative:       As a Marker for Sepsis (Non-Neonates):   1. <0.5 ng/mL represents a low risk of severe sepsis and/or septic shock.  2. >2 ng/mL represents a high risk of severe sepsis and/or septic shock.    As a Marker for Lower Respiratory Tract Infections that require antibiotic therapy:    PCT on Admission     Antibiotic Therapy       6-12 Hrs later  > 0.5                Strongly Recommended             >0.25 - <0.5         Recommended  0.1 -  0.25           Discouraged              Remeasure/reassess PCT  <0.1                 Strongly Discouraged     Remeasure/reassess PCT                     PCT values of < 0.5 ng/mL do not exclude an infection, because localized infections (without systemic signs) may be associated with such low concentrations, or a systemic infection in its initial stages (< 6 hours). Furthermore, increased PCT can occur without infection. PCT concentrations between 0.5 and 2.0 ng/mL should be interpreted taking into account the patient's history. It is recommended to retest PCT within 6-24 hours if any concentrations < 2 ng/mL are obtained.    Hemoglobin A1c [[id number]]  (Abnormal) Collected:  02/16/19 1717    Specimen:  Blood Updated:  02/16/19 2104     Hemoglobin A1C 13.00 %     Narrative:       Hemoglobin A1C Ranges:    Increased Risk for Diabetes  5.7% to 6.4%  Diabetes                     >= 6.5%  Diabetic Goal                < 7.0%    Troponin [[id number]]  (Abnormal) Collected:  02/16/19 1958    Specimen:  Blood Updated:  02/16/19 2025     Troponin T 0.034 ng/mL     Narrative:       Troponin T Reference Range:  <= 0.03 ng/mL-   Negative for AMI  >0.03 ng/mL-     Abnormal for myocardial necrosis.  Clinicians would have to utilize clinical acumen, EKG, Troponin and serial changes to determine if it is an Acute Myocardial Infarction or myocardial injury due to an underlying chronic condition.     Comprehensive Metabolic Panel [[id number]]  (Abnormal) Collected:  02/16/19 1726    Specimen:  Blood Updated:  02/16/19 1801     Glucose 530 mg/dL      BUN 21 mg/dL      Creatinine 1.82 mg/dL      Sodium 128 mmol/L      Potassium 4.0 mmol/L      Chloride 89 mmol/L      CO2 25.6 mmol/L      Calcium 8.9 mg/dL      Total Protein 6.6 g/dL      Albumin 3.60 g/dL      ALT (SGPT) 11 U/L      AST (SGOT) 14 U/L      Alkaline Phosphatase 78 U/L      Total Bilirubin 0.2 mg/dL      eGFR Non African Amer 36 mL/min/1.73      Globulin 3.0 gm/dL      A/G  Ratio 1.2 g/dL      BUN/Creatinine Ratio 11.5     Anion Gap 13.4 mmol/L     Narrative:       The MDRD GFR formula is only valid for adults with stable renal function between ages 18 and 70.    BNP [[id number]]  (Abnormal) Collected:  02/16/19 1717    Specimen:  Blood Updated:  02/16/19 1759     proBNP 586.0 pg/mL     Narrative:       Among patients with dyspnea, NT-proBNP is highly sensitive for the detection of acute congestive heart failure. In addition NT-proBNP of <300 pg/ml effectively rules out acute congestive heart failure with 99% negative predictive value.    Troponin [[id number]]  (Abnormal) Collected:  02/16/19 1726    Specimen:  Blood Updated:  02/16/19 1750     Troponin T 0.037 ng/mL     Narrative:       Troponin T Reference Range:  <= 0.03 ng/mL-   Negative for AMI  >0.03 ng/mL-     Abnormal for myocardial necrosis.  Clinicians would have to utilize clinical acumen, EKG, Troponin and serial changes to determine if it is an Acute Myocardial Infarction or myocardial injury due to an underlying chronic condition.     Influenza Antigen, Rapid - Swab, Nasopharynx [[id number]]  (Normal) Collected:  02/16/19 1658    Specimen:  Swab from Nasopharynx Updated:  02/16/19 1740     Influenza A Ag, EIA Negative     Influenza B Ag, EIA Negative    Rapid Strep A Screen - Swab, Throat [[id number]]  (Normal) Collected:  02/16/19 1658    Specimen:  Swab from Throat Updated:  02/16/19 1739     Strep A Ag Negative    Beta Strep Culture, Throat - Swab, Throat [[id number]] Collected:  02/16/19 1658    Specimen:  Swab from Throat Updated:  02/16/19 1739    Blood Culture - Blood, Blood, Venous Line [[id number]] Collected:  02/16/19 1717    Specimen:  Blood, Venous Line Updated:  02/16/19 1736    CBC & Differential [[id number]] Collected:  02/16/19 1717    Specimen:  Blood Updated:  02/16/19 1728    Narrative:       The following orders were created for panel order CBC & Differential.  Procedure                                Abnormality         Status                     ---------                               -----------         ------                     CBC Auto Differential[[id number]]        Abnormal            Final result                 Please view results for these tests on the individual orders.    CBC Auto Differential [[id number]]  (Abnormal) Collected:  02/16/19 1717    Specimen:  Blood Updated:  02/16/19 1728     WBC 8.54 10*3/mm3      RBC 4.74 10*6/mm3      Hemoglobin 13.0 g/dL      Hematocrit 39.4 %      MCV 83.1 fL      MCH 27.4 pg      MCHC 33.0 g/dL      RDW 14.1 %      RDW-SD 42.7 fl      MPV 11.1 fL      Platelets 204 10*3/mm3      Neutrophil % 65.7 %      Lymphocyte % 14.5 %      Monocyte % 8.0 %      Eosinophil % 10.9 %      Basophil % 0.4 %      Immature Grans % 0.5 %      Neutrophils, Absolute 5.62 10*3/mm3      Lymphocytes, Absolute 1.24 10*3/mm3      Monocytes, Absolute 0.68 10*3/mm3      Eosinophils, Absolute 0.93 10*3/mm3      Basophils, Absolute 0.03 10*3/mm3      Immature Grans, Absolute 0.04 10*3/mm3      nRBC 0.0 /100 WBC         ECG 12 Lead   Preliminary Result   HEART RATE= 80  bpm   RR Interval= 752  ms   TN Interval= 238  ms   P Horizontal Axis= 19  deg   P Front Axis= 26  deg   QRSD Interval= 106  ms   QT Interval= 391  ms   QRS Axis= -9  deg   T Wave Axis= 107  deg   - ABNORMAL ECG -   Sinus rhythm   Atrial premature complexes   Prolonged TN interval   Probable LVH with secondary repol abnrm   Inferior infarct, old   Electronically Signed By:    Date and Time of Study: 2019-02-17 06:01:50      ECG 12 Lead   Preliminary Result   HEART RATE= 87  bpm   RR Interval= 692  ms   TN Interval= 179  ms   P Horizontal Axis= -7  deg   P Front Axis= 34  deg   QRSD Interval= 107  ms   QT Interval= 380  ms   QRS Axis= -15  deg   T Wave Axis= 151  deg   - ABNORMAL ECG -   Sinus rhythm   Probable LVH with secondary repol abnrm   Inferior infarct, old   Anterior Q waves, possibly due to LVH   Electronically Signed By:     Date and Time of Study: 2019-02-16 17:19:10        Results for orders placed during the hospital encounter of 11/21/17   Adult Transthoracic Echo Complete W/ Cont if Necessary Per Protocol    Narrative · Left ventricular systolic function is normal. Estimated EF = 52%.  · Left ventricular diastolic dysfunction (grade I) consistent with   impaired relaxation.  · calcification of the aortic valve  · Mild dilation of the aortic root is present.        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: A1c 13%  Continue Accu-Cheks AC/at bedtime and high dose sliding scale insulin  Increase to Levemir 50 units twice daily   Continue home gabapentin/lyrica  Staff verifying med rec (unclear if on novolog 70/30 and levemir?)  Admits to noncompliance  Multiple insulin boluses, d/c IV steroids  Giving IVF bolus now  Monitor     Chronically elevated troponin: suspect secondary to CKD, remains 0.034, recheck in am  H/O NSTEMI type II: no current acute issues  Chronic diastolic CHF:  Stress testing done on 9/2018 with EF 42%  Serial troponins, EKG pending this am  Consult cardiology if needed  Continue to hold lasix linically euvolemic     Hypertension with history of orthostatic hypotension:  Previously suspected autonomic dysfunction secondary to uncontrolled diabetes mellitus  Continue home amlodipine, metoprolol     Acute kidney injury on CKD 3: creatinine baseline approximately 1.6, currently 2.04  Giving IVF bolus now  HOLD lasix for now     Hyponatremia: secondary to hyperglycemia     Hypothyroidism: add home levothyroxine, meds under review  TSH mildly  elevated, patient notes that he was not taking his thyroid medication, add previous home dose for now     Immobility, chronic lower extremity edema and weakness     Fungal infection bilateral feet, secondary to uncontrolled diabetes:  Add nystatin ointment     Dementia: continue home Aricept     CAD/hyperlipidemia: continue home aspirin 325 mg daily, Lipitor 10 mg daily     Restless legs syndrome: continue home Mirapex    PLAN FOR DISPOSITION: Home when able  MEDICATIONS BEING REVIEWED FOR ACCURACY    LEATHA Ford  Hospitalist, [facility]  02/17/19  6:31 AM

## 2019-02-17 NOTE — H&P
"[facility] HOSPITALIST     Abdullahi Clarke MD    CHIEF COMPLAINT: soa    HISTORY OF PRESENT ILLNESS:  Patient is a 78-year-old male that presented through the emergency department secondary to 2 days of increasing shortness of air and cough productive of large amount of dark brown, thick mucus. Some coughing past 5-7 days. He notes that he thinks he has pneumonia because \"I have been out in the rain a bunch of times in the last week\". He notes going to Abdullahi Clarke MD 2 days ago and being given rx for oxygen that he wears \"when I need it\". He has been using his nebulizer more often in past 2-4 days and using the oxygen on and off. He reports being given additional diuretic for his chronic LE edema 2 days ago as well. He notes his weight is actually down recently.    He has also had intermittent episodes of \"stabbing\" chest pain, lasting up to 10 minutes, occurring while at rest, unchanged with movement/breathing/exertion/rest.    He and wife note that he is noncompliant with diet and that his glucose has been running high and can vary 150 to 400 quickly.    He has a h/o COPD/chronic LE edema/chronic dCHF/NSTEMI/vasovagal syncope/uncontrolled DM2/CKD3/hypothyroidism/hypertension/obesity/dementia/anxiety/immobility and LE weakness/noncompliance.     He otherwise denies f/c/headache/lightheadedness/syncopal sensation/n/v/d/abdominal pain/recent illness/sick exposures/change in bowel or bladder habits/bloody emesis or bloody stools/change in medications or any other new concerns.    Past Medical History:   Diagnosis Date   • Arthritis    • Asthma    • Cancer (CMS/HCC)     skin   • Cataract    • COPD (chronic obstructive pulmonary disease) (CMS/HCC)    • Diabetes mellitus (CMS/HCC)    • Disease of thyroid gland    • Hypertension    • Kidney stone    • Myocardial infarct, old    • Renal disorder      Past Surgical History:   Procedure Laterality Date   • COLONOSCOPY     • EYE " SURGERY     • JOINT REPLACEMENT      right   • KIDNEY STONE SURGERY     • REPLACEMENT TOTAL KNEE     • TOE SURGERY       Family History   Problem Relation Age of Onset   • COPD Mother    • Diabetes Father      Social History     Tobacco Use   • Smoking status: Former Smoker   • Smokeless tobacco: Never Used   • Tobacco comment: quit 1993   Substance Use Topics   • Alcohol use: No   • Drug use: No     Medications Prior to Admission   Medication Sig Dispense Refill Last Dose   • albuterol (ACCUNEB) 0.63 MG/3ML nebulizer solution Take 3 mL by nebulization Every 6 (Six) Hours As Needed for Wheezing. 25 ampule 0 2/16/2019 at Unknown time   • amLODIPine (NORVASC) 2.5 MG tablet Take 1 tablet by mouth Daily. 30 tablet 0 Taking   • clotrimazole (LOTRIMIN) 1 % cream Apply  topically Every 12 (Twelve) Hours. 60 g 0 Taking   • docusate sodium 100 MG capsule Take 100 mg by mouth 2 (Two) Times a Day. (Patient taking differently: Take 100 mg by mouth 2 (Two) Times a Day As Needed.) 60 capsule 0 Past Week at Unknown time   • donepezil (ARICEPT) 5 MG tablet Take 2.5 mg by mouth Every Night.   2/16/2019 at Unknown time   • furosemide (LASIX) 40 MG tablet Take 1 tablet by mouth Daily. Take 1 tablet by mouth twice per day for 2 days. Then resume 1 per day (Patient taking differently: Take 40 mg by mouth Daily.) 30 tablet 0 2/16/2019 at Unknown time   • gabapentin (NEURONTIN) 600 MG tablet Take 600 mg by mouth Daily.   Past Month at Unknown time   • insulin detemir (LEVEMIR) 100 UNIT/ML injection Inject 30 Units under the skin 2 (Two) Times a Day. 10 mL 0 2/16/2019 at Unknown time   • insulin NPH-insulin regular (humuLIN 70/30,novoLIN 70/30) (70-30) 100 UNIT/ML injection Inject 30 Units under the skin into the appropriate area as directed 2 (Two) Times a Day With Meals.   2/16/2019 at Unknown time   • metoprolol succinate XL (TOPROL-XL) 25 MG 24 hr tablet Take 0.5 tablets by mouth Every Night. 30 tablet 0 2/16/2019 at Unknown time   •  "pramipexole (MIRAPEX) 0.5 MG tablet Take 0.5 mg by mouth 2 (Two) Times a Day.   2/15/2019 at Unknown time   • pregabalin (LYRICA) 100 MG capsule Take 100 mg by mouth 3 (Three) Times a Day.   2/16/2019 at Unknown time   • SITagliptin (JANUVIA) 100 MG tablet Take 100 mg by mouth Daily.   2/16/2019 at Unknown time   • albuterol (ACCUNEB) 1.25 MG/3ML nebulizer solution Take 3 mL by nebulization Every 6 (Six) Hours As Needed for Wheezing. 30 vial 0    • aspirin  MG EC tablet Take 1 tablet by mouth Daily. 30 tablet 0    • atorvastatin (LIPITOR) 10 MG tablet Take 1 tablet by mouth Daily. 30 tablet 0 Taking   • clotrimazole-betamethasone (LOTRISONE) 1-0.05 % cream Apply  topically to the appropriate area as directed Every 12 (Twelve) Hours. 45 g 0    • lactobacillus acidophilus (RISAQUAD) capsule capsule Take 1 capsule by mouth Daily. 30 capsule 0    • levothyroxine (SYNTHROID, LEVOTHROID) 100 MCG tablet Take 100 mcg by mouth Daily.      • lisinopril (PRINIVIL,ZESTRIL) 20 MG tablet Take 20 mg by mouth Daily.      • polyethylene glycol (MIRALAX) pack packet Take 17 g by mouth Daily. 30 each 0 More than a month at Unknown time     Allergies:  Oxycontin [oxycodone hcl]    Immunization History   Administered Date(s) Administered   • Tdap 11/21/2017     REVIEW OF SYSTEMS:  Please see the above history of present illness for pertinent positives and negatives.  The remainder of the patient's systems have been reviewed and are negative.     Vital Signs  Temp:  [97.6 °F (36.4 °C)-98.5 °F (36.9 °C)] 97.6 °F (36.4 °C)  Heart Rate:  [] 108  Resp:  [16-20] 20  BP: (138-158)/(67-92) 157/67    Flowsheet Rows      First Filed Value   Admission Height  182.9 cm (72\") Documented at 02/16/2019 1642   Admission Weight  134 kg (295 lb) Documented at 02/16/2019 1642           Physical Exam:  Physical Exam   Constitutional: Patient appears well-developed and well-nourished and in no acute distress, obese  HEENT:   Head: Normocephalic " and atraumatic.   Eyes:  Pupils are equal, round, and reactive to light. EOM are intact. Sclera are anicteric and non-injected.  Mouth and Throat: Patient has moist mucous membranes. Oropharynx is clear of any erythema or exudate. Poor dentition, multiple missing teeth    Cardiovascular: Regular rate, regular rhythm, S1 normal and S2 normal.  Exam reveals no gallop and no friction rub.  No murmur heard.  Pulmonary/Chest: Lungs diminished throughout with scattered expiratory wheezes, greatest in upper lobes   Abdominal: Obese, Soft. Bowel sounds are normal. No distension and no mass. There is no hepatosplenomegaly. There is no tenderness.   Musculoskeletal: unable to determine secondary to habitus  Extremities:2+ bilateral LE nonpitting edema. Pulses are palpable in all 4 extremities.  Neurological: Patient is alert and oriented to person, place, and time. Cranial nerves II-XII are grossly intact with no focal deficits.  Skin: Skin is warm. Excoriated/flaking/scabbed areas bilateral feet/toes, no drainage. Nails with fungal overgrowth.  No cyanosis or erythema.    Emotional Behavior:    Judgement and Insight: fair   Mental Status:  Alertness  alert   Memory:  fair   Mood and Affect:         Depression  none               Anxiety  none    Debilities:   Physical Weakness  Chronic, obesity/LE edema   Handicaps  none   Disabilities  none   Agitation  none     Results Review:    I reviewed the patient's new clinical results.  Lab Results (most recent)     Procedure Component Value Units Date/Time    Comprehensive Metabolic Panel [[id number]]  (Abnormal) Collected:  02/16/19 1726    Specimen:  Blood Updated:  02/16/19 1801     Glucose 530 mg/dL      BUN 21 mg/dL      Creatinine 1.82 mg/dL      Sodium 128 mmol/L      Potassium 4.0 mmol/L      Chloride 89 mmol/L      CO2 25.6 mmol/L      Calcium 8.9 mg/dL      Total Protein 6.6 g/dL      Albumin 3.60 g/dL      ALT (SGPT) 11 U/L      AST (SGOT) 14 U/L      Alkaline Phosphatase  78 U/L      Total Bilirubin 0.2 mg/dL      eGFR Non African Amer 36 mL/min/1.73      Globulin 3.0 gm/dL      A/G Ratio 1.2 g/dL      BUN/Creatinine Ratio 11.5     Anion Gap 13.4 mmol/L     Narrative:       The MDRD GFR formula is only valid for adults with stable renal function between ages 18 and 70.    BNP [[id number]]  (Abnormal) Collected:  02/16/19 1717    Specimen:  Blood Updated:  02/16/19 1759     proBNP 586.0 pg/mL     Narrative:       Among patients with dyspnea, NT-proBNP is highly sensitive for the detection of acute congestive heart failure. In addition NT-proBNP of <300 pg/ml effectively rules out acute congestive heart failure with 99% negative predictive value.    Troponin [[id number]]  (Abnormal) Collected:  02/16/19 1726    Specimen:  Blood Updated:  02/16/19 1750     Troponin T 0.037 ng/mL     Narrative:       Troponin T Reference Range:  <= 0.03 ng/mL-   Negative for AMI  >0.03 ng/mL-     Abnormal for myocardial necrosis.  Clinicians would have to utilize clinical acumen, EKG, Troponin and serial changes to determine if it is an Acute Myocardial Infarction or myocardial injury due to an underlying chronic condition.     Influenza Antigen, Rapid - Swab, Nasopharynx [[id number]]  (Normal) Collected:  02/16/19 1658    Specimen:  Swab from Nasopharynx Updated:  02/16/19 1740     Influenza A Ag, EIA Negative     Influenza B Ag, EIA Negative    Rapid Strep A Screen - Swab, Throat [[id number]]  (Normal) Collected:  02/16/19 1658    Specimen:  Swab from Throat Updated:  02/16/19 1739     Strep A Ag Negative    Beta Strep Culture, Throat - Swab, Throat [[id number]] Collected:  02/16/19 1658    Specimen:  Swab from Throat Updated:  02/16/19 1739    Blood Culture - Blood, Blood, Venous Line [[id number]] Collected:  02/16/19 1717    Specimen:  Blood, Venous Line Updated:  02/16/19 1736    CBC & Differential [[id number]] Collected:  02/16/19 1717    Specimen:  Blood Updated:  02/16/19 1728    Narrative:       The  following orders were created for panel order CBC & Differential.  Procedure                               Abnormality         Status                     ---------                               -----------         ------                     CBC Auto Differential[[id number]]        Abnormal            Final result                 Please view results for these tests on the individual orders.    CBC Auto Differential [[id number]]  (Abnormal) Collected:  02/16/19 1717    Specimen:  Blood Updated:  02/16/19 1728     WBC 8.54 10*3/mm3      RBC 4.74 10*6/mm3      Hemoglobin 13.0 g/dL      Hematocrit 39.4 %      MCV 83.1 fL      MCH 27.4 pg      MCHC 33.0 g/dL      RDW 14.1 %      RDW-SD 42.7 fl      MPV 11.1 fL      Platelets 204 10*3/mm3      Neutrophil % 65.7 %      Lymphocyte % 14.5 %      Monocyte % 8.0 %      Eosinophil % 10.9 %      Basophil % 0.4 %      Immature Grans % 0.5 %      Neutrophils, Absolute 5.62 10*3/mm3      Lymphocytes, Absolute 1.24 10*3/mm3      Monocytes, Absolute 0.68 10*3/mm3      Eosinophils, Absolute 0.93 10*3/mm3      Basophils, Absolute 0.03 10*3/mm3      Immature Grans, Absolute 0.04 10*3/mm3      nRBC 0.0 /100 WBC           Imaging Results (most recent)     Procedure Component Value Units Date/Time    XR Chest 2 View [[id number]] Resulted:  02/16/19 1809     Updated:  02/16/19 1809        reviewed    ECG/EMG Results (most recent)     Procedure Component Value Units Date/Time    ECG 12 Lead [[id number]] Collected:  02/16/19 1719     Updated:  02/16/19 1738    Narrative:       HEART RATE= 87  bpm  RR Interval= 692  ms  NE Interval= 179  ms  P Horizontal Axis= -7  deg  P Front Axis= 34  deg  QRSD Interval= 107  ms  QT Interval= 380  ms  QRS Axis= -15  deg  T Wave Axis= 151  deg  - ABNORMAL ECG -  Sinus rhythm  Probable LVH with secondary repol abnrm  Inferior infarct, old  Anterior Q waves, possibly due to LVH  Electronically Signed By:   Date and Time of Study: 2019-02-16 17:19:10         reviewed    Assessment/Plan   R/O CAP:  AECOPD:  Received IV Solu-Medrol in ER, continue 40 mg IV every 8 hours  Continue a azithromycin and Rocephin pending culture data  Add duo nebs 4 times daily  Oxygen to keep sats 88-92%  Add Acapella, I-S, Mucinex  Check respiratory viral panel, sputum culture, pro-calcitonin    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: A1c 13%  Add Accu-Cheks AC/at bedtime and high dose sliding scale insulin  Give Levemir 30 units twice daily per home regimen  Allow home gabapentin or lyrica  Staff verifying med rec (unclear if on novolog 70/30 and levemir?)  Admits to noncompliance  Monitor    Elevated troponin: suspect secondary to CKD, remains 0.034, recheck in am  H/O NSTEMI type II: no current acute issues  Chronic diastolic CHF:  Stress testing done on 9/2018 with EF 42%  Serial troponins, EKG in am  Consult cardiology if needed  Hold Lasix for tonight, clinically euvolemic    Hypertension with history of orthostatic hypotension:  Previously suspected autonomic dysfunction secondary to uncontrolled diabetes mellitus  Continue home amlodipine, metoprolol    CKD 3: creatinine baseline approximately 1.6, currently 1.82  HOLD lasix for now    Hyponatremia: secondary to hyperglycemia    Hypothyroidism: allow home levothyroxine, meds under review    Immobility, chronic lower extremity edema and weakness    Fungal infection bilateral feet, secondary to uncontrolled diabetes:  Add nystatin ointment    Dementia: Allow home Aricept    CAD/hyperlipidemia: Allow home aspirin 325 mg daily, Lipitor 10 mg daily    Restless legs syndrome: Allow home Mirapex    I discussed the patients findings and my recommendations with patient and his wife at bedside.     Liat Hood, LEATHA  02/16/19  10:36 PM

## 2019-02-17 NOTE — NURSING NOTE
Discharge Planning Assessment  MICHAEL Kincaid     Patient Name: Froilan Helton  MRN: [id number]  Today's Date: 2/17/2019    Admit Date: 2/16/2019    Discharge Needs Assessment     Row Name 02/17/19 1117       Living Environment    Lives With  spouse    Name(s) of Who Lives With Patient  Lina, wife.    Current Living Arrangements  home/apartment/condo    Primary Care Provided by  self    Provides Primary Care For  no one, unable/limited ability to care for self    Family Caregiver if Needed  child(nba), adult;spouse    Quality of Family Relationships  unable to assess    Able to Return to Prior Arrangements  other (see comments) will follow to assist with any d/c needs       Resource/Environmental Concerns    Resource/Environmental Concerns  none    Transportation Concerns  car, none       Transition Planning    Patient/Family Anticipates Transition to  home with family    Transportation Anticipated  car, drives self       Discharge Needs Assessment    Concerns to be Addressed  compliance issue    Equipment Needed After Discharge  cane, straight;wheelchair, power;nebulizer;oxygen        Discharge Plan     Row Name 02/17/19 9034       Plan    Plan  Plan is home.   will continue to assess d/c needs.    Patient/Family in Agreement with Plan  unable to assess    Plan Comments  Patient in agreement with speaking to  at bedside.  He is up in chair.  Verified home address, phone number and PCP.  He lives in a one level home with his wife.  He has three sons that live locally and able to assist as needed.  He is independent with ADLs and still driving.  He has a nebulizer, scooter, rollator, rolling walker, and straight cane.  He recently gotten home oxygen from [facility].  He does not have CPAP/BiPAP or home health services.  He has a Living Will and is scanned into the medical record.  Patient says he plans on filling scripts at [facility] in [facility] [place];  changed in medical record.  He says the  plan is home when medically stable.   will continue follow.          Destination      No service coordination in this encounter.      Durable Medical Equipment      No service coordination in this encounter.      Dialysis/Infusion      No service coordination in this encounter.      Home Medical Care      No service coordination in this encounter.      Community Resources      No service coordination in this encounter.          Demographic Summary     Row Name 02/17/19 1116       Contact Information    Permission Granted to Share Info With      Row Name 02/17/19 1114       General Information    Admission Type  inpatient    Arrived From  home    Referral Source  admission list    Reason for Consult  discharge planning        Functional Status    No documentation.       Psychosocial    No documentation.       Abuse/Neglect    No documentation.       Legal    No documentation.       Substance Abuse    No documentation.       Patient Forms    No documentation.           Kristen Simms RN

## 2019-02-17 NOTE — CONSULTS
"Adult Nutrition  Assessment/PES    Patient Name:  Froilan Helton  YOB: 1940  MRN: [id number]  Admit Date:  2/16/2019    Assessment Date:  2/17/2019    Comments:  Pt. tolerating diet.  Pt. deferred diet education despite RD explaining benefits of following diet and consequences of not following.     Reason for Assessment     Row Name 02/17/19 1715          Reason for Assessment    Reason For Assessment  nurse/nurse practitioner consult     Diagnosis  -- pneumonia, exacerbation of COPD, hyperglycemia with history of diabetes, history of CHF, hypothyroidism, dementia, hyponatremia secondary to hyperglycemia     Identified At Risk by Screening Criteria  MST SCORE 2+         Nutrition/Diet History     Row Name 02/17/19 1729          Nutrition/Diet History    Typical Food/Fluid Intake  follows a regular diet, states, \"wouldn't do any good to go over information\", is not interested depsite high blood sugar and history of CHF, but does report that he has cut back on salt         Anthropometrics     Row Name 02/17/19 1716          Anthropometrics    Height  182.9 cm (72.01\")     Weight  -- 290# 2-17-19        Admit Weight    Admit Weight  134 kg (295 lb)        Ideal Body Weight (IBW)    Ideal Body Weight (IBW) (kg)  82.09        Body Mass Index (BMI)    BMI Assessment  BMI 35-39.9: obesity grade II 39.3          Labs/Tests/Procedures/Meds     Row Name 02/17/19 1717          Labs/Procedures/Meds    Lab Results Reviewed  reviewed     Lab Results Comments  glucose 400's-600's        Medications    Pertinent Medications Reviewed  reviewed     Pertinent Medications Comments  lasix on hold per APRN, steroid         Physical Findings     Row Name 02/17/19 1732 02/17/19 1718       Physical Findings    Overall Physical Appearance  --     Skin  other (see comments) fungal infection bilateral feet/toes  --        Estimated/Assessed Needs     Row Name 02/17/19 1720 02/17/19 1716       Calculation Measurements    Height  " "--  182.9 cm (72.01\")       Estimated/Assessed Needs    Additional Documentation  Calorie Requirements (Group);Protein Requirements (Group);Chouteau-St. Jeor Equation (Group);Fluid Requirements (Group)  --       Calorie Requirements    Estimated Calorie Need Method  Chouteau-St Jeor with no activity factor   --    Estimated Calorie Requirement Comment  2,081 estimated carbohydrate needs=234 grams  --       Protein Requirements    Est Protein Requirement Amount (gms/kg)  0.8 gm protein  [id number]  --       Fluid Requirements    Estimated Fluid Requirement Method  other (see comments) 1,400-1,900 based upon history of CHF  --        Nutrition Prescription Ordered     Row Name 02/17/19 1721          Nutrition Prescription PO    Current PO Diet  --     Common Modifiers  Cardiac;Consistent Carbohydrate         Evaluation of Received Nutrient/Fluid Intake     Row Name 02/17/19 1721 02/17/19 1716       Calculation Measurements    Height  --  182.9 cm (72.01\")       Fluid Intake Evaluation    Oral Fluid (mL)  -- insufficient data  --       PO Evaluation    Number of Meals  2  --    % PO Intake  100%  --        Evaluation of Prescribed Nutrient/Fluid Intake     Row Name 02/17/19 1716          Calculation Measurements    Height  182.9 cm (72.01\")             Problem/Interventions:  Problem 1     Row Name 02/17/19 1721          Nutrition Diagnoses Problem 1    Problem 1  Overweight/Obesity     Etiology (related to)  Factors Affecting Nutrition     Signs/Symptoms (evidenced by)  BMI     BMI  35 - 39.9                 Intervention Goal     Row Name 02/17/19 1722          Intervention Goal    General  --     PO  PO intake (%)     PO Intake %  80 % or greater of meals         Nutrition Intervention     Row Name 02/17/19 1722          Nutrition Intervention    RD/Tech Action  Interview for preference;Encourage intake;Follow Tx progress           Education/Evaluation     Row Name 02/17/19 1723          Education    Education  Education " offered and refused;No discharge needs identified at this time     Provided education regarding  --        Monitor/Evaluation    Monitor  I&O;Pertinent labs;Weight;Skin status     Education Follow-up  --           Electronically signed by:  Tara Young RD  02/17/19 5:40 PM

## 2019-02-17 NOTE — PROGRESS NOTES
2300: glucose over 500, given 10 units regular insulin    0200 am: Glucose over 700, given regular 10 units   Continue high dose SSI, change levemir to 50 units twice daily    0420 am: glucose continues over 600 despite repeated doses of insulin IV and subcutaneous.  HOLD solumedrol dose until noon  Given 15 units regular insulin now    0500: lab with CO2 now down to 17.6, glucose 673  D/C IV steroid, change to budesonide nebs only  Check acetone  Give 500 ml fluid bolus then recheck lab

## 2019-02-17 NOTE — PLAN OF CARE
Problem: Patient Care Overview  Goal: Discharge Needs Assessment  Outcome: Ongoing (interventions implemented as appropriate)   02/16/19 2030 02/17/19 1117   Discharge Needs Assessment   Concerns to be Addressed --  compliance issue   Patient/Family Anticipates Transition to --  home with family   Transportation Concerns --  car, none   Transportation Anticipated --  car, drives self   Equipment Needed After Discharge --  cane, straight;wheelchair, power;nebulizer;oxygen   Disability   Equipment Currently Used at Home cane, straight;wheelchair, motorized --

## 2019-02-18 ENCOUNTER — APPOINTMENT (OUTPATIENT)
Dept: ULTRASOUND IMAGING | Facility: HOSPITAL | Age: 79
End: 2019-02-18

## 2019-02-18 LAB
ANION GAP SERPL CALCULATED.3IONS-SCNC: 11 MMOL/L
BACTERIA SPEC AEROBE CULT: NORMAL
BASOPHILS # BLD AUTO: 0.03 10*3/MM3 (ref 0–0.2)
BASOPHILS NFR BLD AUTO: 0.2 % (ref 0–1.5)
BUN BLD-MCNC: 24 MG/DL (ref 8–23)
BUN/CREAT SERPL: 14 (ref 7–25)
CALCIUM SPEC-SCNC: 8.8 MG/DL (ref 8.8–10.5)
CHLORIDE SERPL-SCNC: 94 MMOL/L (ref 98–107)
CO2 SERPL-SCNC: 27 MMOL/L (ref 22–29)
CREAT BLD-MCNC: 1.72 MG/DL (ref 0.76–1.27)
DEPRECATED RDW RBC AUTO: 42.9 FL (ref 37–54)
EOSINOPHIL # BLD AUTO: 0.21 10*3/MM3 (ref 0–0.4)
EOSINOPHIL NFR BLD AUTO: 1.5 % (ref 0.3–6.2)
ERYTHROCYTE [DISTWIDTH] IN BLOOD BY AUTOMATED COUNT: 14.3 % (ref 12.3–15.4)
GFR SERPL CREATININE-BSD FRML MDRD: 39 ML/MIN/1.73
GLUCOSE BLD-MCNC: 248 MG/DL (ref 65–99)
GLUCOSE BLDC GLUCOMTR-MCNC: 133 MG/DL (ref 70–130)
GLUCOSE BLDC GLUCOMTR-MCNC: 200 MG/DL (ref 70–130)
GLUCOSE BLDC GLUCOMTR-MCNC: 256 MG/DL (ref 70–130)
GLUCOSE BLDC GLUCOMTR-MCNC: 299 MG/DL (ref 70–130)
HCT VFR BLD AUTO: 37.1 % (ref 37.5–51)
HGB BLD-MCNC: 12.1 G/DL (ref 13–17.7)
IMM GRANULOCYTES # BLD AUTO: 0.06 10*3/MM3 (ref 0–0.05)
IMM GRANULOCYTES NFR BLD AUTO: 0.4 % (ref 0–0.5)
LYMPHOCYTES # BLD AUTO: 1.64 10*3/MM3 (ref 0.7–3.1)
LYMPHOCYTES NFR BLD AUTO: 11.7 % (ref 19.6–45.3)
MCH RBC QN AUTO: 27 PG (ref 26.6–33)
MCHC RBC AUTO-ENTMCNC: 32.6 G/DL (ref 31.5–35.7)
MCV RBC AUTO: 82.8 FL (ref 79–97)
MONOCYTES # BLD AUTO: 1.01 10*3/MM3 (ref 0.1–0.9)
MONOCYTES NFR BLD AUTO: 7.2 % (ref 5–12)
NEUTROPHILS # BLD AUTO: 11.12 10*3/MM3 (ref 1.4–7)
NEUTROPHILS NFR BLD AUTO: 79 % (ref 42.7–76)
NRBC BLD AUTO-RTO: 0 /100 WBC (ref 0–0)
PLATELET # BLD AUTO: 196 10*3/MM3 (ref 140–450)
PMV BLD AUTO: 10.1 FL (ref 6–12)
POTASSIUM BLD-SCNC: 4 MMOL/L (ref 3.5–5.2)
PROCALCITONIN SERPL-MCNC: 0.11 NG/ML (ref 0.1–0.25)
RBC # BLD AUTO: 4.48 10*6/MM3 (ref 4.14–5.8)
SODIUM BLD-SCNC: 132 MMOL/L (ref 136–145)
WBC NRBC COR # BLD: 14.07 10*3/MM3 (ref 3.4–10.8)

## 2019-02-18 PROCEDURE — 80048 BASIC METABOLIC PNL TOTAL CA: CPT | Performed by: NURSE PRACTITIONER

## 2019-02-18 PROCEDURE — 25010000002 ENOXAPARIN PER 10 MG: Performed by: HOSPITALIST

## 2019-02-18 PROCEDURE — 93971 EXTREMITY STUDY: CPT

## 2019-02-18 PROCEDURE — 87070 CULTURE OTHR SPECIMN AEROBIC: CPT | Performed by: NURSE PRACTITIONER

## 2019-02-18 PROCEDURE — 94799 UNLISTED PULMONARY SVC/PX: CPT

## 2019-02-18 PROCEDURE — 87205 SMEAR GRAM STAIN: CPT | Performed by: NURSE PRACTITIONER

## 2019-02-18 PROCEDURE — 85025 COMPLETE CBC W/AUTO DIFF WBC: CPT | Performed by: NURSE PRACTITIONER

## 2019-02-18 PROCEDURE — 99232 SBSQ HOSP IP/OBS MODERATE 35: CPT | Performed by: HOSPITALIST

## 2019-02-18 PROCEDURE — 84145 PROCALCITONIN (PCT): CPT | Performed by: HOSPITALIST

## 2019-02-18 PROCEDURE — 63710000001 INSULIN DETEMIR PER 5 UNITS: Performed by: INTERNAL MEDICINE

## 2019-02-18 PROCEDURE — 63710000001 INSULIN DETEMIR PER 5 UNITS: Performed by: NURSE PRACTITIONER

## 2019-02-18 PROCEDURE — 82962 GLUCOSE BLOOD TEST: CPT

## 2019-02-18 RX ADMIN — DONEPEZIL HYDROCHLORIDE 2.5 MG: 5 TABLET, FILM COATED ORAL at 21:39

## 2019-02-18 RX ADMIN — Medication 30 ML: at 10:52

## 2019-02-18 RX ADMIN — PRAMIPEXOLE DIHYDROCHLORIDE 0.5 MG: 0.25 TABLET ORAL at 21:38

## 2019-02-18 RX ADMIN — GUAIFENESIN 600 MG: 600 TABLET, EXTENDED RELEASE ORAL at 21:39

## 2019-02-18 RX ADMIN — SODIUM CHLORIDE, PRESERVATIVE FREE 3 ML: 5 INJECTION INTRAVENOUS at 21:38

## 2019-02-18 RX ADMIN — LEVOTHYROXINE SODIUM 100 MCG: 100 TABLET ORAL at 05:57

## 2019-02-18 RX ADMIN — BUDESONIDE 0.5 MG: 0.5 INHALANT RESPIRATORY (INHALATION) at 19:30

## 2019-02-18 RX ADMIN — IPRATROPIUM BROMIDE AND ALBUTEROL SULFATE 3 ML: .5; 3 SOLUTION RESPIRATORY (INHALATION) at 13:57

## 2019-02-18 RX ADMIN — INSULIN HUMAN 30 UNITS: 500 INJECTION, SOLUTION SUBCUTANEOUS at 08:37

## 2019-02-18 RX ADMIN — IPRATROPIUM BROMIDE AND ALBUTEROL SULFATE 3 ML: .5; 3 SOLUTION RESPIRATORY (INHALATION) at 19:35

## 2019-02-18 RX ADMIN — PREGABALIN 100 MG: 50 CAPSULE ORAL at 08:45

## 2019-02-18 RX ADMIN — NYSTATIN: 100000 OINTMENT TOPICAL at 08:39

## 2019-02-18 RX ADMIN — NYSTATIN: 100000 OINTMENT TOPICAL at 21:44

## 2019-02-18 RX ADMIN — METOPROLOL SUCCINATE 12.5 MG: 25 TABLET, EXTENDED RELEASE ORAL at 21:39

## 2019-02-18 RX ADMIN — GABAPENTIN 600 MG: 300 CAPSULE ORAL at 21:39

## 2019-02-18 RX ADMIN — INSULIN DETEMIR 10 UNITS: 100 INJECTION, SOLUTION SUBCUTANEOUS at 21:51

## 2019-02-18 RX ADMIN — PRAMIPEXOLE DIHYDROCHLORIDE 0.5 MG: 0.25 TABLET ORAL at 08:39

## 2019-02-18 RX ADMIN — PREGABALIN 100 MG: 50 CAPSULE ORAL at 17:12

## 2019-02-18 RX ADMIN — IPRATROPIUM BROMIDE AND ALBUTEROL SULFATE 3 ML: .5; 3 SOLUTION RESPIRATORY (INHALATION) at 09:07

## 2019-02-18 RX ADMIN — ENOXAPARIN SODIUM 40 MG: 40 INJECTION, SOLUTION INTRAVENOUS; SUBCUTANEOUS at 21:38

## 2019-02-18 RX ADMIN — MICONAZOLE NITRATE 1 APPLICATION: 20 CREAM TOPICAL at 21:44

## 2019-02-18 RX ADMIN — LINAGLIPTIN 5 MG: 5 TABLET, FILM COATED ORAL at 08:38

## 2019-02-18 RX ADMIN — PREGABALIN 100 MG: 50 CAPSULE ORAL at 21:39

## 2019-02-18 RX ADMIN — AMLODIPINE BESYLATE 2.5 MG: 2.5 TABLET ORAL at 08:36

## 2019-02-18 RX ADMIN — INSULIN HUMAN 30 UNITS: 500 INJECTION, SOLUTION SUBCUTANEOUS at 17:12

## 2019-02-18 RX ADMIN — GUAIFENESIN 600 MG: 600 TABLET, EXTENDED RELEASE ORAL at 08:36

## 2019-02-18 RX ADMIN — SODIUM CHLORIDE, PRESERVATIVE FREE 3 ML: 5 INJECTION INTRAVENOUS at 08:39

## 2019-02-18 RX ADMIN — DOCUSATE SODIUM 100 MG: 100 CAPSULE, LIQUID FILLED ORAL at 08:36

## 2019-02-18 RX ADMIN — FAMOTIDINE 40 MG: 20 TABLET, FILM COATED ORAL at 08:38

## 2019-02-18 RX ADMIN — INSULIN DETEMIR 50 UNITS: 100 INJECTION, SOLUTION SUBCUTANEOUS at 08:34

## 2019-02-18 RX ADMIN — BUDESONIDE 0.5 MG: 0.5 INHALANT RESPIRATORY (INHALATION) at 09:07

## 2019-02-18 RX ADMIN — INSULIN HUMAN 30 UNITS: 500 INJECTION, SOLUTION SUBCUTANEOUS at 12:11

## 2019-02-18 RX ADMIN — MICONAZOLE NITRATE 1 APPLICATION: 20 CREAM TOPICAL at 14:14

## 2019-02-18 NOTE — PROGRESS NOTES
"Hospitalist Team      Patient Care Team:  Abdullahi Clarke MD as PCP - General (Internal Medicine)  Abdullahi Clarke MD as PCP - Claims Attributed        Chief Complaint: Follow-up Uncontrolled Diabetes    Subjective    Complained of some indigestion that went away when he ate lunch.  He denies chest pain and dyspnea.  Wife notes that he has been having episodes of confusion over the past several months.  He does not remember becoming angry w/ me last night when I would not allow him to have a regular Coke.  She does not believe he in compliant w/ his medications.    Objective    Vital Signs  Temp:  [96.5 °F (35.8 °C)-97.9 °F (36.6 °C)] 96.5 °F (35.8 °C)  Heart Rate:  [72-97] 76  Resp:  [15-20] 20  BP: (128-156)/(62-68) 128/63  Oxygen Therapy  SpO2: 95 %  Pulse Oximetry Type: Intermittent  Device (Oxygen Therapy): room air}    Flowsheet Rows      First Filed Value   Admission Height  182.9 cm (72\") Documented at 02/16/2019 1642   Admission Weight  134 kg (295 lb) Documented at 02/16/2019 1642          Physical Exam:    General Appearance:    Alert, cooperative, in no acute distress   Lungs:     Clear to auscultation,respirations regular, even and                  unlabored    Heart:    Regular rhythm and normal rate, normal S1 and S2, no            murmur, no gallop, no rub, no click   Abdomen:     Obese, soft, and non-tender w/ active bowel sounds   Extremities:   Moves all extremities well, no edema, no cyanosis; R>L asymmetry   Pulses:   Radial pulses palpable and equal bilaterally   Neurologic:   Cranial nerves 2 - 12 grossly intact       Results Review:     I reviewed the patient's new clinical results.    Lab Results (last 24 hours)     Procedure Component Value Units Date/Time    POC Glucose Once [[id number]]  (Abnormal) Collected:  02/18/19 1127    Specimen:  Blood Updated:  02/18/19 1152     Glucose 299 mg/dL     Beta Strep Culture, Throat - Swab, Throat [[id number]]  (Normal) Collected:  " 02/16/19 1658    Specimen:  Swab from Throat Updated:  02/18/19 0847     Throat Culture, Beta Strep No Beta Hemolytic Streptococcus Isolated    Narrative:       Group A Strep incidence is low in adults. Positive culture for Beta hemolytic Streptococcus species can reflect colonization and not true infection. Please correlate clinically.    Basic Metabolic Panel [[id number]]  (Abnormal) Collected:  02/18/19 0747    Specimen:  Blood Updated:  02/18/19 0811     Glucose 248 mg/dL      BUN 24 mg/dL      Creatinine 1.72 mg/dL      Sodium 132 mmol/L      Potassium 4.0 mmol/L      Chloride 94 mmol/L      CO2 27.0 mmol/L      Calcium 8.8 mg/dL      eGFR Non African Amer 39 mL/min/1.73      BUN/Creatinine Ratio 14.0     Anion Gap 11.0 mmol/L     Narrative:       The MDRD GFR formula is only valid for adults with stable renal function between ages 18 and 70.    CBC & Differential [[id number]] Collected:  02/18/19 0747    Specimen:  Blood Updated:  02/18/19 0756    Narrative:       The following orders were created for panel order CBC & Differential.  Procedure                               Abnormality         Status                     ---------                               -----------         ------                     CBC Auto Differential[[id number]]        Abnormal            Final result                 Please view results for these tests on the individual orders.    CBC Auto Differential [[id number]]  (Abnormal) Collected:  02/18/19 0747    Specimen:  Blood Updated:  02/18/19 0756     WBC 14.07 10*3/mm3      RBC 4.48 10*6/mm3      Hemoglobin 12.1 g/dL      Hematocrit 37.1 %      MCV 82.8 fL      MCH 27.0 pg      MCHC 32.6 g/dL      RDW 14.3 %      RDW-SD 42.9 fl      MPV 10.1 fL      Platelets 196 10*3/mm3      Neutrophil % 79.0 %      Lymphocyte % 11.7 %      Monocyte % 7.2 %      Eosinophil % 1.5 %      Basophil % 0.2 %      Immature Grans % 0.4 %      Neutrophils, Absolute 11.12 10*3/mm3      Lymphocytes, Absolute 1.64  10*3/mm3      Monocytes, Absolute 1.01 10*3/mm3      Eosinophils, Absolute 0.21 10*3/mm3      Basophils, Absolute 0.03 10*3/mm3      Immature Grans, Absolute 0.06 10*3/mm3      nRBC 0.0 /100 WBC     POC Glucose Once [[id number]]  (Abnormal) Collected:  02/18/19 0724    Specimen:  Blood Updated:  02/18/19 0733     Glucose 256 mg/dL     Legionella Antigen, Urine - Urine, Urine, Clean Catch [[id number]]  (Normal) Collected:  02/17/19 2311    Specimen:  Urine, Clean Catch Updated:  02/17/19 2341     LEGIONELLA ANTIGEN, URINE Negative    S. Pneumo Ag Urine or CSF - Urine, Urine, Clean Catch [[id number]]  (Normal) Collected:  02/17/19 2311    Specimen:  Urine, Clean Catch Updated:  02/17/19 2341     Strep Pneumo Ag Negative    Respiratory Panel, PCR - Swab, Nasopharynx [[id number]]  (Normal) Collected:  02/17/19 0649    Specimen:  Swab from Nasopharynx Updated:  02/17/19 2136     ADENOVIRUS, PCR Not Detected     Coronavirus 229E Not Detected     Coronavirus HKU1 Not Detected     Coronavirus NL63 Not Detected     Coronavirus OC43 Not Detected     Human Metapneumovirus Not Detected     Human Rhinovirus/Enterovirus Not Detected     Influenza B PCR Not Detected     Parainfluenza Virus 1 Not Detected     Parainfluenza Virus 2 Not Detected     Parainfluenza Virus 3 Not Detected     Parainfluenza Virus 4 Not Detected     Bordetella pertussis pcr Not Detected     Influenza A H1 2009 PCR Not Detected     Chlamydophila pneumoniae PCR Not Detected     Mycoplasma pneumo by PCR Not Detected     Influenza A PCR Not Detected     Influenza A H3 Not Detected     Influenza A H1 Not Detected     RSV, PCR Not Detected     Bordetella parapertussis PCR Not Detected    POC Glucose Once [[id number]]  (Abnormal) Collected:  02/17/19 2004    Specimen:  Blood Updated:  02/17/19 2012     Glucose 462 mg/dL     T4, Free [[id number]]  (Abnormal) Collected:  02/17/19 1042    Specimen:  Blood Updated:  02/17/19 1757     Free T4 0.90 ng/dL     Blood Culture -  Blood, Blood, Venous Line [[id number]] Collected:  02/16/19 1717    Specimen:  Blood, Venous Line Updated:  02/17/19 1745     Blood Culture No growth at 24 hours    POC Glucose Once [[id number]]  (Abnormal) Collected:  02/17/19 1653    Specimen:  Blood Updated:  02/17/19 1700     Glucose 481 mg/dL     POC Glucose Once [[id number]]  (Abnormal) Collected:  02/17/19 1651    Specimen:  Blood Updated:  02/17/19 1700     Glucose 474 mg/dL           Imaging Results (last 24 hours)     Procedure Component Value Units Date/Time    US Venous Doppler Lower Extremity Right (duplex) [[id number]] Collected:  02/18/19 1044     Updated:  02/18/19 1047    Narrative:       VENOUS DOPPLER ULTRASOUND, RIGHT LOWER EXTREMITY, 2/18/2019     HISTORY:   78-year-old male with four day history shortness of air. Chronic right  leg edema/swelling.     TECHNIQUE:   Venous Doppler ultrasound examination of the right leg was performed  using grey-scale, spectral Doppler, and color flow Doppler ultrasound  imaging.     FINDINGS:   The examination is negative.  There is no evidence of deep venous  thrombosis from the groin to the lower calf. The greater saphenous vein  is also patent.       Impression:       Negative examination.  No evidence of right lower extremity DVT.     This report was finalized on 2/18/2019 10:45 AM by Dr. Edgardo Adams MD.               Medication Review:   I have reviewed the patient's current medication list    Current Facility-Administered Medications:   •  acetaminophen (TYLENOL) tablet 650 mg, 650 mg, Oral, Q4H PRN, Liat Hood R, APRN  •  amLODIPine (NORVASC) tablet 2.5 mg, 2.5 mg, Oral, Q24H, Liat Hood, APRN, 2.5 mg at 02/18/19 0836  •  bisacodyl (DULCOLAX) EC tablet 5 mg, 5 mg, Oral, Daily PRN, Liat Hood R, APRN  •  bisacodyl (DULCOLAX) suppository 10 mg, 10 mg, Rectal, Daily PRN, Liat Hood R, APRN  •  bismuth subsalicylate (PEPTO BISMOL) 262 MG/15ML suspension 30 mL, 30 mL, Oral, Q6H  PRN, Ozzie Briggs MD, 30 mL at 02/18/19 1052  •  budesonide (PULMICORT) nebulizer solution 0.5 mg, 0.5 mg, Nebulization, BID - RT, Liat Hood, APRN, 0.5 mg at 02/18/19 0907  •  dextrose (D50W) 25 g/ 50mL Intravenous Solution 25 g, 25 g, Intravenous, Q15 Min PRN, Liat Hood APRN  •  dextrose (GLUTOSE) oral gel 15 g, 15 g, Oral, Q15 Min PRN, Liat Hood, APRN  •  docusate sodium (COLACE) capsule 100 mg, 100 mg, Oral, BID PRN, Liat Hood APRN  •  docusate sodium (COLACE) capsule 100 mg, 100 mg, Oral, BID, ErwinLiat velázquez, APRN, 100 mg at 02/18/19 0836  •  donepezil (ARICEPT) tablet 2.5 mg, 2.5 mg, Oral, Nightly, Liat Hood, APRN, 2.5 mg at 02/17/19 2026  •  enoxaparin (LOVENOX) syringe 40 mg, 40 mg, Subcutaneous, Q24H, Ozzie Briggs MD, 40 mg at 02/17/19 2027  •  famotidine (PEPCID) tablet 40 mg, 40 mg, Oral, Daily, Liat Hood, APRN, 40 mg at 02/18/19 0838  •  gabapentin (NEURONTIN) capsule 600 mg, 600 mg, Oral, Nightly, Liat Hood, APRN, 600 mg at 02/17/19 2020  •  glucagon (GLUCAGEN) injection 1 mg, 1 mg, Subcutaneous, PRN, Liat Hood R, APRN  •  guaiFENesin (MUCINEX) 12 hr tablet 600 mg, 600 mg, Oral, BID, Liat Hood R, APRN, 600 mg at 02/18/19 0836  •  insulin detemir (LEVEMIR) injection 50 Units, 50 Units, Subcutaneous, Q12H, Liat Hood, APRN, 50 Units at 02/18/19 0834  •  insulin regular (HumuLIN R) CONCENTRATED injection, 30 Units, Subcutaneous, TID AC, Ozzie Briggs MD, 30 Units at 02/18/19 1211  •  insulin regular (HumuLIN R) CONCENTRATED injection, 10 Units, Subcutaneous, Nightly, Ozzie Briggs MD, 10 Units at 02/17/19 2018  •  ipratropium-albuterol (DUO-NEB) nebulizer solution 3 mL, 3 mL, Nebulization, 4x Daily - RT, Liat Hood APRN, 3 mL at 02/18/19 0907  •  levothyroxine (SYNTHROID, LEVOTHROID) tablet 100 mcg, 100 mcg, Oral, Q AM, Liat Hood APRN, 100 mcg at 02/18/19 0557  •  linagliptin (TRADJENTA)  tablet 5 mg, 5 mg, Oral, Daily, Liat Hood APRN, 5 mg at 02/18/19 0838  •  magnesium hydroxide (MILK OF MAGNESIA) suspension 2400 mg/10mL 10 mL, 10 mL, Oral, Daily PRN, Liat Hood APRN  •  melatonin tablet 5 mg, 5 mg, Oral, Nightly PRN, Liat Hood APRN  •  metoprolol succinate XL (TOPROL-XL) 24 hr tablet 12.5 mg, 12.5 mg, Oral, Nightly, Liat Hood APRN, 12.5 mg at 02/17/19 2028  •  miconazole (MICOTIN) 2 % cream 1 application, 1 application, Topical, Q12H, Ozzie Briggs MD  •  nitroglycerin (NITROSTAT) SL tablet 0.4 mg, 0.4 mg, Sublingual, Q5 Min PRN, Liat Hood APRN  •  nystatin (MYCOSTATIN) ointment, , Topical, Q12H, Liat Hood APREUSEBIO  •  ondansetron (ZOFRAN) tablet 4 mg, 4 mg, Oral, Q6H PRN **OR** ondansetron ODT (ZOFRAN-ODT) disintegrating tablet 4 mg, 4 mg, Oral, Q6H PRN **OR** ondansetron (ZOFRAN) injection 4 mg, 4 mg, Intravenous, Q6H PRN, Liat Hood APREUSEBIO  •  Pharmacy Consult - Pharmacy to dose, , Does not apply, Continuous PRN, Ozzie Briggs MD  •  Pharmacy to Dose enoxaparin (LOVENOX), , Does not apply, Continuous PRN, Liat Hood APRN  •  pramipexole (MIRAPEX) tablet 0.5 mg, 0.5 mg, Oral, BID, Liat Hood APRN, 0.5 mg at 02/18/19 0839  •  pregabalin (LYRICA) capsule 100 mg, 100 mg, Oral, TID, Liat Hood APRN, 100 mg at 02/18/19 0845  •  sennosides-docusate sodium (SENOKOT-S) 8.6-50 MG tablet 2 tablet, 2 tablet, Oral, BID PRN, Liat Hood R, APRN  •  sodium chloride 0.9 % flush 3 mL, 3 mL, Intravenous, Q12H, ErwinLiat velázquez R, APRN, 3 mL at 02/18/19 0839  •  sodium chloride 0.9 % flush 3-10 mL, 3-10 mL, Intravenous, PRN, Liat Hood R, APRN  •  sodium chloride 0.9 % infusion 40 mL, 40 mL, Intravenous, PRN, Liat Hood R, APRN      Assessment/Plan     1. Uncontrolled Diabetes Mellitus: Better control with initiation of U500.  Reviewed w/ clinical pharmacist, and will continue to titrate.  Given A1c, he  will likely be discharged on U500.  However, patient is non-compliant per wife.    2. Hypothyroidism: TSH and Free T4 are not normal.  The pattern is c/w his history of medication non-compliance.  Will continue his home dose.  May need home health to help w/ med management.  Will consult case management.    3.  HTN: At goal on exam.  Continue to monitor on current regimen.    4.  ROLA on CKD III: Creatinine near baseline.  Repeat BMP in AM.    5.  Dementia: continue home Aricept.  Sounds as though he may have declined a little from baseline.  Recommend further O/P testing to assess for therapy effectiveness.  Could also be non-compliance as well.    6.  Immobility, chronic LE edema and weakness: Doppler negative.    7.  CAD: No reported chest pain.  Continue home regimen.    Plan for disposition: Home w/ help    Ozzie Briggs MD  02/18/19  1:38 PM

## 2019-02-18 NOTE — PLAN OF CARE
Problem: Patient Care Overview  Goal: Individualization and Mutuality  Outcome: Ongoing (interventions implemented as appropriate)    Goal: Interprofessional Rounds/Family Conf  Outcome: Ongoing (interventions implemented as appropriate)      Problem: Fall Risk (Adult)  Goal: Absence of Fall  Outcome: Ongoing (interventions implemented as appropriate)      Problem: Skin Injury Risk (Adult)  Goal: Identify Related Risk Factors and Signs and Symptoms  Outcome: Ongoing (interventions implemented as appropriate)    Goal: Skin Health and Integrity  Outcome: Ongoing (interventions implemented as appropriate)

## 2019-02-18 NOTE — NURSING NOTE
Continued Stay Note  MICHAEL Kincaid     Patient Name: Froilan Helton  MRN: [id number]  Today's Date: 2/18/2019    Admit Date: 2/16/2019    Discharge Plan     Row Name 02/18/19 1250       Plan    Plan Comments  [facility] patient; referrals made to [facility] in pharmacy and [facility] in respiratory.         Discharge Codes    No documentation.       Per Lovely @[facility] patient has oxygen with their company.  Patient has privately purchased a portable oxygen concentrator; 2 liters/NC.        Kristen Simms RN

## 2019-02-18 NOTE — PHARMACY RECOMMENDATION
Dr. RT Briggs requests pharmacy consult regarding patient's insulin regimen.  Patient is 78 year old male.  Weight 132 kg; ht 182.9 cm; BMI=39.57 kg/m2; Hemoglobin A1C= 13%;  SCr=1.72 mg/dl; CrCl (est) 50 ml/min; blood glucose levels 462-595+ mg/dl since admission.  Upon changing patient's insulin regimen to insulin detemir 50 units bid plus insulin U-500 30 units tid and 10 units at bedtime, blood glucose ranging 200-256 mg/dl.    Per chart, patient reports poor adherence with diet and medications, so would recommend simplifying the regimen as much as possible to promote compliance. However, due to high blood glucose levels, the patient needs both basal and bolus insulin.  Insulin U-500 approximates basal/bolus, as it has characteristics of both rapid and intermediate insulin, and offers the advantage of decreasing the number of injections to three times per day.    Would recommend giving (approximately) the daily insulin requirement as insulin U-500, in divided doses three times daily. (currently the patient is receiving 200 units daily as a combination of insulin detemir and insulin U-500).   Recommend Humulin U-500 as an insulin pen, 60 units three times daily given 30 minutes before meals.  Instruct patient that he must eat three regular meals, and check his blood glucose before meals and at bedtime.  Mr. Helton should follow up with his primary care provider for adjustment of insulin based on response.  The sitagliptin po 100 mg daily may be continued as well on discharge (note: patient is currently receiving linagliptin 5 mg po daily as therapeutic interchange for the sitagliptin).  Thank you very much for the opportunity to take care of Mr. Helton.

## 2019-02-19 ENCOUNTER — APPOINTMENT (OUTPATIENT)
Dept: GENERAL RADIOLOGY | Facility: HOSPITAL | Age: 79
End: 2019-02-19

## 2019-02-19 VITALS
RESPIRATION RATE: 18 BRPM | HEART RATE: 71 BPM | HEIGHT: 72 IN | SYSTOLIC BLOOD PRESSURE: 134 MMHG | DIASTOLIC BLOOD PRESSURE: 65 MMHG | TEMPERATURE: 96.9 F | OXYGEN SATURATION: 97 % | BODY MASS INDEX: 39.52 KG/M2 | WEIGHT: 291.8 LBS

## 2019-02-19 LAB
ANION GAP SERPL CALCULATED.3IONS-SCNC: 9.1 MMOL/L
BUN BLD-MCNC: 25 MG/DL (ref 8–23)
BUN/CREAT SERPL: 18.1 (ref 7–25)
CALCIUM SPEC-SCNC: 8.6 MG/DL (ref 8.8–10.5)
CHLORIDE SERPL-SCNC: 98 MMOL/L (ref 98–107)
CO2 SERPL-SCNC: 26.9 MMOL/L (ref 22–29)
CREAT BLD-MCNC: 1.38 MG/DL (ref 0.76–1.27)
GFR SERPL CREATININE-BSD FRML MDRD: 50 ML/MIN/1.73
GLUCOSE BLD-MCNC: 132 MG/DL (ref 65–99)
GLUCOSE BLDC GLUCOMTR-MCNC: 139 MG/DL (ref 70–130)
GLUCOSE BLDC GLUCOMTR-MCNC: 251 MG/DL (ref 70–130)
POTASSIUM BLD-SCNC: 3.7 MMOL/L (ref 3.5–5.2)
SODIUM BLD-SCNC: 134 MMOL/L (ref 136–145)

## 2019-02-19 PROCEDURE — 94799 UNLISTED PULMONARY SVC/PX: CPT

## 2019-02-19 PROCEDURE — 80048 BASIC METABOLIC PNL TOTAL CA: CPT | Performed by: HOSPITALIST

## 2019-02-19 PROCEDURE — 72190 X-RAY EXAM OF PELVIS: CPT

## 2019-02-19 PROCEDURE — 99239 HOSP IP/OBS DSCHRG MGMT >30: CPT | Performed by: INTERNAL MEDICINE

## 2019-02-19 PROCEDURE — 82962 GLUCOSE BLOOD TEST: CPT

## 2019-02-19 PROCEDURE — 63710000001 INSULIN DETEMIR PER 5 UNITS: Performed by: NURSE PRACTITIONER

## 2019-02-19 RX ORDER — BUDESONIDE AND FORMOTEROL FUMARATE DIHYDRATE 160; 4.5 UG/1; UG/1
2 AEROSOL RESPIRATORY (INHALATION)
Qty: 1 INHALER | Refills: 0 | Status: SHIPPED | OUTPATIENT
Start: 2019-02-19 | End: 2022-01-01 | Stop reason: HOSPADM

## 2019-02-19 RX ADMIN — LEVOTHYROXINE SODIUM 100 MCG: 100 TABLET ORAL at 08:49

## 2019-02-19 RX ADMIN — LINAGLIPTIN 5 MG: 5 TABLET, FILM COATED ORAL at 08:49

## 2019-02-19 RX ADMIN — GUAIFENESIN 600 MG: 600 TABLET, EXTENDED RELEASE ORAL at 08:49

## 2019-02-19 RX ADMIN — FAMOTIDINE 40 MG: 20 TABLET, FILM COATED ORAL at 08:49

## 2019-02-19 RX ADMIN — IPRATROPIUM BROMIDE AND ALBUTEROL SULFATE 3 ML: .5; 3 SOLUTION RESPIRATORY (INHALATION) at 09:12

## 2019-02-19 RX ADMIN — AMLODIPINE BESYLATE 2.5 MG: 2.5 TABLET ORAL at 08:49

## 2019-02-19 RX ADMIN — PRAMIPEXOLE DIHYDROCHLORIDE 0.5 MG: 0.25 TABLET ORAL at 08:49

## 2019-02-19 RX ADMIN — BUDESONIDE 0.5 MG: 0.5 INHALANT RESPIRATORY (INHALATION) at 09:13

## 2019-02-19 RX ADMIN — INSULIN DETEMIR 50 UNITS: 100 INJECTION, SOLUTION SUBCUTANEOUS at 08:45

## 2019-02-19 RX ADMIN — NYSTATIN 1 APPLICATION: 100000 OINTMENT TOPICAL at 08:50

## 2019-02-19 RX ADMIN — DOCUSATE SODIUM 100 MG: 100 CAPSULE, LIQUID FILLED ORAL at 08:49

## 2019-02-19 RX ADMIN — SODIUM CHLORIDE, PRESERVATIVE FREE 3 ML: 5 INJECTION INTRAVENOUS at 08:50

## 2019-02-19 RX ADMIN — MICONAZOLE NITRATE 1 APPLICATION: 20 CREAM TOPICAL at 08:50

## 2019-02-19 RX ADMIN — PREGABALIN 100 MG: 50 CAPSULE ORAL at 08:49

## 2019-02-19 RX ADMIN — IPRATROPIUM BROMIDE AND ALBUTEROL SULFATE 3 ML: .5; 3 SOLUTION RESPIRATORY (INHALATION) at 12:28

## 2019-02-19 NOTE — DISCHARGE SUMMARY
JOSHUA FRIEDBRIAN  1940  [id number]    Hospitalists Admission/Discharge Summary    Date of Admission: 2/16/2019  Date of Discharge:  2/19/2019    Primary Discharge Diagnoses:AECOPD  Uncontrolled type II DM    Secondary Discharge Diagnoses:   Chronically elevated troponin   Chronic diastolic HF  HTN  ROLA on CKD    PCP  Abdullahi Clarke MD    Consults:   Consults     No orders found from 1/18/2019 to 2/17/2019.          Family History   Problem Relation Age of Onset   • COPD Mother    • Diabetes Father      Past Medical History:   Diagnosis Date   • Arthritis    • Asthma    • Cancer (CMS/HCC)     skin   • Cataract    • COPD (chronic obstructive pulmonary disease) (CMS/HCC)    • Diabetes mellitus (CMS/HCC)    • Disease of thyroid gland    • Hypertension    • Kidney stone    • Myocardial infarct, old    • Renal disorder      Past Surgical History:   Procedure Laterality Date   • COLONOSCOPY     • EYE SURGERY     • JOINT REPLACEMENT      right   • KIDNEY STONE SURGERY     • REPLACEMENT TOTAL KNEE     • TOE SURGERY       Social History     Socioeconomic History   • Marital status: Unknown     Spouse name: Not on file   • Number of children: Not on file   • Years of education: Not on file   • Highest education level: Not on file   Social Needs   • Financial resource strain: Not on file   • Food insecurity - worry: Not on file   • Food insecurity - inability: Not on file   • Transportation needs - medical: Not on file   • Transportation needs - non-medical: Not on file   Occupational History   • Not on file   Tobacco Use   • Smoking status: Former Smoker   • Smokeless tobacco: Never Used   • Tobacco comment: quit 1993   Substance and Sexual Activity   • Alcohol use: No   • Drug use: No   • Sexual activity: Defer   Other Topics Concern   • Not on file   Social History Narrative   • Not on file     Allergies   Allergen Reactions   • Oxycontin [Oxycodone Hcl]      'Makes me crazy and stand on my head'           Discharge Medications      New Medications      Instructions Start Date   budesonide-formoterol 160-4.5 MCG/ACT inhaler  Commonly known as:  SYMBICORT   2 puffs, Inhalation, 2 Times Daily - RT         Changes to Medications      Instructions Start Date   docusate sodium 100 MG capsule  What changed:    · when to take this  · reasons to take this   100 mg, Oral, 2 Times Daily      furosemide 40 MG tablet  Commonly known as:  LASIX  What changed:  additional instructions   40 mg, Oral, Daily, Take 1 tablet by mouth twice per day for 2 days. Then resume 1 per day         Continue These Medications      Instructions Start Date   albuterol 1.25 MG/3ML nebulizer solution  Commonly known as:  ACCUNEB   1 ampule, Nebulization, Every 6 Hours PRN      albuterol 0.63 MG/3ML nebulizer solution  Commonly known as:  ACCUNEB   1 ampule, Nebulization, Every 6 Hours PRN      amLODIPine 2.5 MG tablet  Commonly known as:  NORVASC   2.5 mg, Oral, Every 24 Hours Scheduled      aspirin 325 MG EC tablet   325 mg, Oral, Daily      atorvastatin 10 MG tablet  Commonly known as:  LIPITOR   10 mg, Oral, Daily      clotrimazole 1 % cream  Commonly known as:  LOTRIMIN   Topical, Every 12 Hours Scheduled      clotrimazole-betamethasone 1-0.05 % cream  Commonly known as:  LOTRISONE   Topical, Every 12 Hours Scheduled      donepezil 5 MG tablet  Commonly known as:  ARICEPT   2.5 mg, Oral, Nightly      gabapentin 600 MG tablet  Commonly known as:  NEURONTIN   600 mg, Oral, Daily      insulin detemir 100 UNIT/ML injection  Commonly known as:  LEVEMIR   30 Units, Subcutaneous, 2 Times Daily      insulin NPH-insulin regular (70-30) 100 UNIT/ML injection  Commonly known as:  humuLIN 70/30,novoLIN 70/30   30 Units, Subcutaneous, 2 Times Daily With Meals      lactobacillus acidophilus capsule capsule   1 capsule, Oral, Daily      levothyroxine 100 MCG tablet  Commonly known as:  SYNTHROID, LEVOTHROID   100 mcg, Oral, Daily      lisinopril 20 MG  tablet  Commonly known as:  PRINIVIL,ZESTRIL   20 mg, Oral, Daily      metoprolol succinate XL 25 MG 24 hr tablet  Commonly known as:  TOPROL-XL   12.5 mg, Oral, Nightly      polyethylene glycol pack packet  Commonly known as:  MIRALAX   17 g, Oral, Daily      pramipexole 0.5 MG tablet  Commonly known as:  MIRAPEX   0.5 mg, Oral, 2 Times Daily      pregabalin 100 MG capsule  Commonly known as:  LYRICA   100 mg, Oral, 3 Times Daily      SITagliptin 100 MG tablet  Commonly known as:  JANUVIA   100 mg, Oral, Daily                   CC: shortness of breath    History of Present Illness:  Patient is a 78-year-old male that presented through the emergency department secondary to 2 days of increasing shortness of air and cough productive of large amount of dark brown, thick mucus. 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: A1c 13%  Continue Accu-Cheks AC/at bedtime and high dose sliding scale insulin  BG more controlled at d/c  High BG here likely reactive to steroids  -resume home insuline with adjustments made by primary as outpt  -suspect non-compliance and issue and BG would be more controlled if he was compliant with regimen     Chronically elevated troponin: suspect secondary to CKD, remains 0.034, recheck in am  H/O NSTEMI type II: no current acute issues  Chronic diastolic CHF:  Stress testing done on 9/2018 with EF 42%  -troponins trended down     Hypertension with history of orthostatic hypotension:  Previously suspected autonomic dysfunction secondary to uncontrolled diabetes mellitus  Continue home amlodipine, metoprolol     Acute kidney injury on CKD 3:   -resovled Cr 1.3 at d.c       Hyponatremia: secondary to hyperglycemia  -resolved      Hypothyroidism:   -resume home synthoid and repeat levels as outpt  -suspect pt non-compliance led to elevated readings here      Immobility, chronic lower extremity edema and weakness     Fungal infection bilateral feet, secondary to uncontrolled diabetes:  Add nystatin ointment     Dementia: continue home Aricept     CAD/hyperlipidemia: continue home aspirin 325 mg daily, Lipitor 10 mg daily     Restless legs syndrome: continue home Mirapex     He was treated and stabilized based on the issues as noted above. He refused home health care. He was stable for d/c home but his non-adherence makes him high risk for re-admission.       Lab Results (last 72 hours)     Procedure Component Value Units Date/Time    POC Glucose Once [[id number]]  (Abnormal) Collected:  02/19/19 1137    Specimen:  Blood Updated:  02/19/19 1159     Glucose 251 mg/dL     Respiratory Culture - Sputum, Cough [[id number]] Collected:  02/18/19 2156    Specimen:  Sputum from Cough Updated:  02/19/19 1053     Respiratory Culture Light growth (2+) Normal Respiratory [facility]     Gram Stain Few (2+) Epithelial cells per low power field      Rare (1+) WBCs seen      Moderate (3+) Mixed bacterial morphotypes seen on Gram Stain    POC Glucose Once [[id number]]  (Abnormal) Collected:  02/19/19 0722    Specimen:  Blood Updated:  02/19/19 0731     Glucose 139 mg/dL     Basic Metabolic Panel [[id number]]  (Abnormal) Collected:  02/19/19 0504    Specimen:  Blood Updated:  02/19/19 0542     Glucose 132 mg/dL      BUN 25 mg/dL      Creatinine 1.38 mg/dL      Sodium 134 mmol/L      Potassium 3.7 mmol/L      Chloride 98 mmol/L      CO2 26.9 mmol/L      Calcium 8.6 mg/dL      eGFR Non African Amer 50 mL/min/1.73      BUN/Creatinine Ratio 18.1     Anion Gap 9.1 mmol/L     Narrative:       The MDRD GFR formula is only valid for adults with stable renal function between ages 18 and 70.    POC Glucose Once  [[id number]]  (Abnormal) Collected:  02/18/19 2035    Specimen:  Blood Updated:  02/18/19 2041     Glucose 133 mg/dL     Blood Culture - Blood, Blood, Venous Line [[id number]] Collected:  02/16/19 1717    Specimen:  Blood, Venous Line Updated:  02/18/19 1745     Blood Culture No growth at 2 days    POC Glucose Once [[id number]]  (Abnormal) Collected:  02/18/19 1625    Specimen:  Blood Updated:  02/18/19 1633     Glucose 200 mg/dL     Procalcitonin [[id number]]  (Normal) Collected:  02/18/19 0747    Specimen:  Blood Updated:  02/18/19 1421     Procalcitonin 0.11 ng/mL     Narrative:       As a Marker for Sepsis (Non-Neonates):   1. <0.5 ng/mL represents a low risk of severe sepsis and/or septic shock.  2. >2 ng/mL represents a high risk of severe sepsis and/or septic shock.    As a Marker for Lower Respiratory Tract Infections that require antibiotic therapy:    PCT on Admission     Antibiotic Therapy       6-12 Hrs later  > 0.5                Strongly Recommended             >0.25 - <0.5         Recommended  0.1 - 0.25           Discouraged              Remeasure/reassess PCT  <0.1                 Strongly Discouraged     Remeasure/reassess PCT                     PCT values of < 0.5 ng/mL do not exclude an infection, because localized infections (without systemic signs) may be associated with such low concentrations, or a systemic infection in its initial stages (< 6 hours). Furthermore, increased PCT can occur without infection. PCT concentrations between 0.5 and 2.0 ng/mL should be interpreted taking into account the patient's history. It is recommended to retest PCT within 6-24 hours if any concentrations < 2 ng/mL are obtained.    POC Glucose Once [[id number]]  (Abnormal) Collected:  02/18/19 1127    Specimen:  Blood Updated:  02/18/19 1152     Glucose 299 mg/dL     Beta Strep Culture, Throat - Swab, Throat [[id number]]  (Normal) Collected:  02/16/19 1658    Specimen:  Swab from Throat Updated:  02/18/19 0870      Throat Culture, Beta Strep No Beta Hemolytic Streptococcus Isolated    Narrative:       Group A Strep incidence is low in adults. Positive culture for Beta hemolytic Streptococcus species can reflect colonization and not true infection. Please correlate clinically.    Basic Metabolic Panel [[id number]]  (Abnormal) Collected:  02/18/19 0747    Specimen:  Blood Updated:  02/18/19 0811     Glucose 248 mg/dL      BUN 24 mg/dL      Creatinine 1.72 mg/dL      Sodium 132 mmol/L      Potassium 4.0 mmol/L      Chloride 94 mmol/L      CO2 27.0 mmol/L      Calcium 8.8 mg/dL      eGFR Non African Amer 39 mL/min/1.73      BUN/Creatinine Ratio 14.0     Anion Gap 11.0 mmol/L     Narrative:       The MDRD GFR formula is only valid for adults with stable renal function between ages 18 and 70.    CBC & Differential [[id number]] Collected:  02/18/19 0747    Specimen:  Blood Updated:  02/18/19 0756    Narrative:       The following orders were created for panel order CBC & Differential.  Procedure                               Abnormality         Status                     ---------                               -----------         ------                     CBC Auto Differential[[id number]]        Abnormal            Final result                 Please view results for these tests on the individual orders.    CBC Auto Differential [[id number]]  (Abnormal) Collected:  02/18/19 0747    Specimen:  Blood Updated:  02/18/19 0756     WBC 14.07 10*3/mm3      RBC 4.48 10*6/mm3      Hemoglobin 12.1 g/dL      Hematocrit 37.1 %      MCV 82.8 fL      MCH 27.0 pg      MCHC 32.6 g/dL      RDW 14.3 %      RDW-SD 42.9 fl      MPV 10.1 fL      Platelets 196 10*3/mm3      Neutrophil % 79.0 %      Lymphocyte % 11.7 %      Monocyte % 7.2 %      Eosinophil % 1.5 %      Basophil % 0.2 %      Immature Grans % 0.4 %      Neutrophils, Absolute 11.12 10*3/mm3      Lymphocytes, Absolute 1.64 10*3/mm3      Monocytes, Absolute 1.01 10*3/mm3      Eosinophils, Absolute  0.21 10*3/mm3      Basophils, Absolute 0.03 10*3/mm3      Immature Grans, Absolute 0.06 10*3/mm3      nRBC 0.0 /100 WBC     POC Glucose Once [[id number]]  (Abnormal) Collected:  02/18/19 0724    Specimen:  Blood Updated:  02/18/19 0733     Glucose 256 mg/dL     Legionella Antigen, Urine - Urine, Urine, Clean Catch [[id number]]  (Normal) Collected:  02/17/19 2311    Specimen:  Urine, Clean Catch Updated:  02/17/19 2341     LEGIONELLA ANTIGEN, URINE Negative    S. Pneumo Ag Urine or CSF - Urine, Urine, Clean Catch [[id number]]  (Normal) Collected:  02/17/19 2311    Specimen:  Urine, Clean Catch Updated:  02/17/19 2341     Strep Pneumo Ag Negative    Respiratory Panel, PCR - Swab, Nasopharynx [[id number]]  (Normal) Collected:  02/17/19 0649    Specimen:  Swab from Nasopharynx Updated:  02/17/19 2136     ADENOVIRUS, PCR Not Detected     Coronavirus 229E Not Detected     Coronavirus HKU1 Not Detected     Coronavirus NL63 Not Detected     Coronavirus OC43 Not Detected     Human Metapneumovirus Not Detected     Human Rhinovirus/Enterovirus Not Detected     Influenza B PCR Not Detected     Parainfluenza Virus 1 Not Detected     Parainfluenza Virus 2 Not Detected     Parainfluenza Virus 3 Not Detected     Parainfluenza Virus 4 Not Detected     Bordetella pertussis pcr Not Detected     Influenza A H1 2009 PCR Not Detected     Chlamydophila pneumoniae PCR Not Detected     Mycoplasma pneumo by PCR Not Detected     Influenza A PCR Not Detected     Influenza A H3 Not Detected     Influenza A H1 Not Detected     RSV, PCR Not Detected     Bordetella parapertussis PCR Not Detected    POC Glucose Once [[id number]]  (Abnormal) Collected:  02/17/19 2004    Specimen:  Blood Updated:  02/17/19 2012     Glucose 462 mg/dL     T4, Free [[id number]]  (Abnormal) Collected:  02/17/19 1042    Specimen:  Blood Updated:  02/17/19 1757     Free T4 0.90 ng/dL     POC Glucose Once [[id number]]  (Abnormal) Collected:  02/17/19 1653    Specimen:  Blood  Updated:  02/17/19 1700     Glucose 481 mg/dL     POC Glucose Once [[id number]]  (Abnormal) Collected:  02/17/19 1651    Specimen:  Blood Updated:  02/17/19 1700     Glucose 474 mg/dL     POC Glucose Once [[id number]]  (Abnormal) Collected:  02/17/19 1141    Specimen:  Blood Updated:  02/17/19 1149     Glucose 574 mg/dL     Glucose, Random [[id number]]  (Abnormal) Collected:  02/17/19 1042    Specimen:  Blood Updated:  02/17/19 1104     Glucose 595 mg/dL     POC Glucose Once [[id number]]  (Abnormal) Collected:  02/17/19 1028    Specimen:  Blood Updated:  02/17/19 1037     Glucose >599 mg/dL     POC Glucose Once [[id number]]  (Abnormal) Collected:  02/17/19 1027    Specimen:  Blood Updated:  02/17/19 1037     Glucose >599 mg/dL     Glucose, Random [[id number]]  (Abnormal) Collected:  02/17/19 0753    Specimen:  Blood Updated:  02/17/19 0823     Glucose 533 mg/dL     POC Glucose Once [[id number]]  (Abnormal) Collected:  02/17/19 0720    Specimen:  Blood Updated:  02/17/19 0727     Glucose 500 mg/dL     POC Glucose Once [[id number]]  (Abnormal) Collected:  02/17/19 0545    Specimen:  Blood Updated:  02/17/19 0555     Glucose 594 mg/dL     Acetone [[id number]]  (Normal) Collected:  02/17/19 0502    Specimen:  Blood Updated:  02/17/19 0507     Acetone Negative    Basic Metabolic Panel [[id number]]  (Abnormal) Collected:  02/17/19 0340    Specimen:  Blood Updated:  02/17/19 0414     Glucose 673 mg/dL      BUN 24 mg/dL      Creatinine 2.04 mg/dL      Sodium 128 mmol/L      Potassium 4.4 mmol/L      Chloride 89 mmol/L      CO2 17.6 mmol/L      Calcium 9.3 mg/dL      eGFR Non African Amer 32 mL/min/1.73      BUN/Creatinine Ratio 11.8     Anion Gap 21.4 mmol/L     Narrative:       The MDRD GFR formula is only valid for adults with stable renal function between ages 18 and 70.    CBC & Differential [[id number]] Collected:  02/17/19 0340    Specimen:  Blood Updated:  02/17/19 0343    Narrative:       The following orders were created for  panel order CBC & Differential.  Procedure                               Abnormality         Status                     ---------                               -----------         ------                     CBC Auto Differential[[id number]]        Abnormal            Final result                 Please view results for these tests on the individual orders.    CBC Auto Differential [[id number]]  (Abnormal) Collected:  02/17/19 0340    Specimen:  Blood Updated:  02/17/19 0343     WBC 11.45 10*3/mm3      RBC 5.02 10*6/mm3      Hemoglobin 13.4 g/dL      Hematocrit 42.5 %      MCV 84.7 fL      MCH 26.7 pg      MCHC 31.5 g/dL      RDW 14.2 %      RDW-SD 43.2 fl      MPV 10.9 fL      Platelets 238 10*3/mm3      Neutrophil % 92.8 %      Lymphocyte % 4.8 %      Monocyte % 1.1 %      Eosinophil % 0.1 %      Basophil % 0.1 %      Immature Grans % 1.1 %      Neutrophils, Absolute 10.62 10*3/mm3      Lymphocytes, Absolute 0.55 10*3/mm3      Monocytes, Absolute 0.13 10*3/mm3      Eosinophils, Absolute 0.01 10*3/mm3      Basophils, Absolute 0.01 10*3/mm3      Immature Grans, Absolute 0.13 10*3/mm3      nRBC 0.0 /100 WBC     POC Glucose Once [[id number]]  (Abnormal) Collected:  02/17/19 0320    Specimen:  Blood Updated:  02/17/19 0334     Glucose >599 mg/dL     TSH [[id number]]  (Abnormal) Collected:  02/16/19 2155    Specimen:  Blood Updated:  02/17/19 0112     TSH 5.190 mIU/mL     Glucose, Random [[id number]]  (Abnormal) Collected:  02/17/19 0023    Specimen:  Blood Updated:  02/17/19 0056     Glucose 762 mg/dL     POC Glucose Once [[id number]]  (Abnormal) Collected:  02/17/19 0013    Specimen:  Blood Updated:  02/17/19 0028     Glucose >599 mg/dL     Glucose, Random [[id number]]  (Abnormal) Collected:  02/16/19 2155    Specimen:  Blood Updated:  02/16/19 2232     Glucose 597 mg/dL     POC Glucose Once [[id number]]  (Abnormal) Collected:  02/16/19 2148    Specimen:  Blood Updated:  02/16/19 2207     Glucose 577 mg/dL     Procalcitonin  [[id number]]  (Abnormal) Collected:  02/16/19 1726    Specimen:  Blood Updated:  02/16/19 2112     Procalcitonin 0.09 ng/mL     Narrative:       As a Marker for Sepsis (Non-Neonates):   1. <0.5 ng/mL represents a low risk of severe sepsis and/or septic shock.  2. >2 ng/mL represents a high risk of severe sepsis and/or septic shock.    As a Marker for Lower Respiratory Tract Infections that require antibiotic therapy:    PCT on Admission     Antibiotic Therapy       6-12 Hrs later  > 0.5                Strongly Recommended             >0.25 - <0.5         Recommended  0.1 - 0.25           Discouraged              Remeasure/reassess PCT  <0.1                 Strongly Discouraged     Remeasure/reassess PCT                     PCT values of < 0.5 ng/mL do not exclude an infection, because localized infections (without systemic signs) may be associated with such low concentrations, or a systemic infection in its initial stages (< 6 hours). Furthermore, increased PCT can occur without infection. PCT concentrations between 0.5 and 2.0 ng/mL should be interpreted taking into account the patient's history. It is recommended to retest PCT within 6-24 hours if any concentrations < 2 ng/mL are obtained.    Hemoglobin A1c [[id number]]  (Abnormal) Collected:  02/16/19 1717    Specimen:  Blood Updated:  02/16/19 2104     Hemoglobin A1C 13.00 %     Narrative:       Hemoglobin A1C Ranges:    Increased Risk for Diabetes  5.7% to 6.4%  Diabetes                     >= 6.5%  Diabetic Goal                < 7.0%    Troponin [[id number]]  (Abnormal) Collected:  02/16/19 1958    Specimen:  Blood Updated:  02/16/19 2025     Troponin T 0.034 ng/mL     Narrative:       Troponin T Reference Range:  <= 0.03 ng/mL-   Negative for AMI  >0.03 ng/mL-     Abnormal for myocardial necrosis.  Clinicians would have to utilize clinical acumen, EKG, Troponin and serial changes to determine if it is an Acute Myocardial Infarction or myocardial injury due to an  underlying chronic condition.     Comprehensive Metabolic Panel [[id number]]  (Abnormal) Collected:  02/16/19 1726    Specimen:  Blood Updated:  02/16/19 1801     Glucose 530 mg/dL      BUN 21 mg/dL      Creatinine 1.82 mg/dL      Sodium 128 mmol/L      Potassium 4.0 mmol/L      Chloride 89 mmol/L      CO2 25.6 mmol/L      Calcium 8.9 mg/dL      Total Protein 6.6 g/dL      Albumin 3.60 g/dL      ALT (SGPT) 11 U/L      AST (SGOT) 14 U/L      Alkaline Phosphatase 78 U/L      Total Bilirubin 0.2 mg/dL      eGFR Non African Amer 36 mL/min/1.73      Globulin 3.0 gm/dL      A/G Ratio 1.2 g/dL      BUN/Creatinine Ratio 11.5     Anion Gap 13.4 mmol/L     Narrative:       The MDRD GFR formula is only valid for adults with stable renal function between ages 18 and 70.    BNP [[id number]]  (Abnormal) Collected:  02/16/19 1717    Specimen:  Blood Updated:  02/16/19 1759     proBNP 586.0 pg/mL     Narrative:       Among patients with dyspnea, NT-proBNP is highly sensitive for the detection of acute congestive heart failure. In addition NT-proBNP of <300 pg/ml effectively rules out acute congestive heart failure with 99% negative predictive value.    Troponin [[id number]]  (Abnormal) Collected:  02/16/19 1726    Specimen:  Blood Updated:  02/16/19 1750     Troponin T 0.037 ng/mL     Narrative:       Troponin T Reference Range:  <= 0.03 ng/mL-   Negative for AMI  >0.03 ng/mL-     Abnormal for myocardial necrosis.  Clinicians would have to utilize clinical acumen, EKG, Troponin and serial changes to determine if it is an Acute Myocardial Infarction or myocardial injury due to an underlying chronic condition.     Influenza Antigen, Rapid - Swab, Nasopharynx [[id number]]  (Normal) Collected:  02/16/19 1658    Specimen:  Swab from Nasopharynx Updated:  02/16/19 1740     Influenza A Ag, EIA Negative     Influenza B Ag, EIA Negative    Rapid Strep A Screen - Swab, Throat [[id number]]  (Normal) Collected:  02/16/19 1658    Specimen:  Swab  from Throat Updated:  02/16/19 1739     Strep A Ag Negative    CBC & Differential [[id number]] Collected:  02/16/19 1717    Specimen:  Blood Updated:  02/16/19 1728    Narrative:       The following orders were created for panel order CBC & Differential.  Procedure                               Abnormality         Status                     ---------                               -----------         ------                     CBC Auto Differential[[id number]]        Abnormal            Final result                 Please view results for these tests on the individual orders.    CBC Auto Differential [[id number]]  (Abnormal) Collected:  02/16/19 1717    Specimen:  Blood Updated:  02/16/19 1728     WBC 8.54 10*3/mm3      RBC 4.74 10*6/mm3      Hemoglobin 13.0 g/dL      Hematocrit 39.4 %      MCV 83.1 fL      MCH 27.4 pg      MCHC 33.0 g/dL      RDW 14.1 %      RDW-SD 42.7 fl      MPV 11.1 fL      Platelets 204 10*3/mm3      Neutrophil % 65.7 %      Lymphocyte % 14.5 %      Monocyte % 8.0 %      Eosinophil % 10.9 %      Basophil % 0.4 %      Immature Grans % 0.5 %      Neutrophils, Absolute 5.62 10*3/mm3      Lymphocytes, Absolute 1.24 10*3/mm3      Monocytes, Absolute 0.68 10*3/mm3      Eosinophils, Absolute 0.93 10*3/mm3      Basophils, Absolute 0.03 10*3/mm3      Immature Grans, Absolute 0.04 10*3/mm3      nRBC 0.0 /100 WBC         Imaging Results (most recent)     Procedure Component Value Units Date/Time    XR Pelvis 3+ View [[id number]] Collected:  02/19/19 0624     Updated:  02/19/19 0628    Narrative:       PELVIS, 2/19/2018         HISTORY:  78-year-old male hospital inpatient with posterior pelvic pain after a  fall today.     TECHNIQUE:  AP pelvis radiographs obtained portably at the bedside.     FINDINGS:  No visible pelvic fracture. No diastasis of the symphysis pubis or  sacroiliac joints.     If there is ongoing tailbone pain, consider sacrococcygeal spine series  which includes the sacrum and lateral  projection.       Impression:       1. Negative pelvis.  2. Requested initial stat report to the ordering service from Dr. Melchor Becker at 0247 hours on 2/19/2019.     This report was finalized on 2/19/2019 6:26 AM by Dr. Edgardo Adams MD.       US Venous Doppler Lower Extremity Right (duplex) [[id number]] Collected:  02/18/19 1044     Updated:  02/18/19 1047    Narrative:       VENOUS DOPPLER ULTRASOUND, RIGHT LOWER EXTREMITY, 2/18/2019     HISTORY:   78-year-old male with four day history shortness of air. Chronic right  leg edema/swelling.     TECHNIQUE:   Venous Doppler ultrasound examination of the right leg was performed  using grey-scale, spectral Doppler, and color flow Doppler ultrasound  imaging.     FINDINGS:   The examination is negative.  There is no evidence of deep venous  thrombosis from the groin to the lower calf. The greater saphenous vein  is also patent.       Impression:       Negative examination.  No evidence of right lower extremity DVT.     This report was finalized on 2/18/2019 10:45 AM by Dr. Edgardo Adams MD.       XR Chest 2 View [[id number]] Collected:  02/17/19 0843     Updated:  02/17/19 0847    Narrative:       CHEST X-RAY, 2/16/2019         HISTORY:  78-year-old male in the ED complaining of 3 day history of productive  cough and fever.     TECHNIQUE:  PA and lateral upright chest x-ray.     COMPARISON:  *  Chest x-ray, 8/3/2018.     FINDINGS:  No definite active disease in the chest. Mild left basilar scarring.  Lungs appear clear. No pleural effusion. Heart size and pulmonary  vascularity are normal.       Impression:       No active disease.     This report was finalized on 2/17/2019 8:45 AM by Dr. Edgardo Adams MD.               PROCEDURES      Condition on Discharge:  Stable     Physical Exam at Discharge  Temp:  [96.9 °F (36.1 °C)-98.6 °F (37 °C)] 96.9 °F (36.1 °C)  Heart Rate:  [70-92] 71  Resp:  [16-20] 18  BP: (134-163)/(65-75) 134/65    Physical  Exam:  Physical Exam   Constitutional: Patient appears well-developed and well-nourished and in no acute distress   HEENT:   Head: Normocephalic and atraumatic.   Eyes:  Pupils are equal, round, and reactive to light. EOM are intact. Sclera are anicteric and non-injected.  Mouth and Throat: Patient has moist mucous membranes. Oropharynx is clear of any erythema or exudate.     Neck: Neck supple. No JVD present. No thyromegaly present. No lymphadenopathy present.  Cardiovascular: Regular rate, regular rhythm, S1 normal and S2 normal.  Exam reveals no gallop and no friction rub.  No murmur heard.  Pulmonary/Chest: Lungs are clear to auscultation bilaterally. No respiratory distress. No wheezes. No rhonchi. No rales.   Abdominal: Soft. Bowel sounds are normal. No distension and no mass. There is no hepatosplenomegaly. There is no tenderness.   Musculoskeletal: Normal Muscle tone  Extremities: No edema. Pulses are palpable in all 4 extremities.  Neurological: Patient is alert and oriented to person, place, and time. Cranial nerves II-XII are grossly intact with no focal deficits.  Skin: Skin is warm. No rash noted. Nails show no clubbing.  No cyanosis or erythema.      Discharge Disposition  Home with family         Discharge Diet:    Dietary Orders (From admission, onward)    Start     Ordered    02/16/19 2005  Diet Regular; Cardiac, Consistent Carbohydrate, Renal  Diet Effective Now     Question Answer Comment   Diet Texture / Consistency Regular    Common Modifiers Cardiac    Common Modifiers Consistent Carbohydrate    Common Modifiers Renal        02/16/19 2004          Activity at Discharge:  As tolerated          Follow-up Appointments  No future appointments.  Additional Instructions for the Follow-ups that You Need to Schedule     Discharge Follow-up with PCP   As directed       Currently Documented PCP:    Abdullahi Clarke MD    PCP Phone Number:    [phone]     Follow Up Details:  primary in one  week             No future appointments.      Test Results Pending at Discharge   Order Current Status    Blood Culture - Blood, Blood, Venous Line Preliminary result    Respiratory Culture - Sputum, Cough Preliminary result           Santiago Powers DO  02/19/19  1:37 PM    Time: 36 minutes cordinating care with nurse patients and entering orders.

## 2019-02-19 NOTE — NURSING NOTE
Spoke with wife, informed of patients mental state at this time, with agitation, non compliance, and threats toward staff. Also made her aware of patients claim to have fallen unwitnessed earlier. She stated that patient had been acting different and agitated at times recently, stating that her son had informed her that he felt something was different and going on with the patient. Informed her that she would be updated if need be.

## 2019-02-19 NOTE — NURSING NOTE
Continued Stay Note  MICHAEL Kincaid     Patient Name: Froilan Helton  MRN: [id number]  Today's Date: 2/19/2019    Admit Date: 2/16/2019    Discharge Plan     Row Name 02/19/19 1647       Plan    Final Discharge Disposition Code  01 - home or self-care    Final Note  patient discharged home to self care.         Discharge Codes    No documentation.       Expected Discharge Date and Time     Expected Discharge Date Expected Discharge Time    Feb 19, 2019             Kristen Simms RN

## 2019-02-19 NOTE — PLAN OF CARE
Problem: Patient Care Overview  Goal: Plan of Care Review  Outcome: Ongoing (interventions implemented as appropriate)   02/18/19 1931   Coping/Psychosocial   Plan of Care Reviewed With patient   Plan of Care Review   Progress improving     Goal: Individualization and Mutuality  Outcome: Ongoing (interventions implemented as appropriate)      Problem: Pneumonia (Adult)  Intervention: Maximize Oxygenation/Ventilation/Perfusion   02/18/19 1931   Respiratory Interventions   Airway/Ventilation Management airway patency maintained;pulmonary hygiene promoted   Incentive Spirometer (IS)   $ Incentive Spirometry yes   Administration (IS) self-administered   Patient Tolerance (IS) good

## 2019-02-19 NOTE — NURSING NOTE
"Continued Stay Note  MICHAEL Kincaid     Patient Name: Froilan Helton  MRN: [id number]  Today's Date: 2/19/2019    Admit Date: 2/16/2019    Discharge Plan     Row Name 02/19/19 0925       Plan    Plan Comments  Patient in agreement with speaking to .  He is up in chair.  Updated Important Message from Medicare.  Patient says he thinks he is going home today.  Discussed home health with patient and he refuses services.  Explained the role and benefits of home health services \" I don't need home health.  My wife can help me\".   will continue to follow.         Discharge Codes    No documentation.             Kristen Simms RN    "

## 2019-02-19 NOTE — DISCHARGE INSTRUCTIONS
FOLLOW UP APPOINTMENT WITH DR. LUND ON Tuesday February 26 AT 2:50 PM -  [facility] [address]    PLEASE CALL AND VERIFY TIME AND DATE AND CALL WITH ANY QUESTIONS

## 2019-02-19 NOTE — PLAN OF CARE
Problem: Patient Care Overview  Goal: Plan of Care Review  Outcome: Ongoing (interventions implemented as appropriate)   02/19/19 3781   Coping/Psychosocial   Plan of Care Reviewed With patient   OTHER   Outcome Summary VSS, denies pain, IV S/L, tolerating diet, BG at bedtime 133, Levemir only given 10 units per order, sputum sent to lab, ambulates with cane, stated last night fell in room, no injuries noted, CT of pelvis completed, negative, episode of agitation, non compliace and verbally/threatening to staff, improved now    Plan of Care Review   Progress no change     Goal: Individualization and Mutuality  Outcome: Ongoing (interventions implemented as appropriate)      Problem: Fall Risk (Adult)  Goal: Identify Related Risk Factors and Signs and Symptoms  Outcome: Outcome(s) achieved Date Met: 02/19/19    Goal: Absence of Fall  Outcome: Unable to achieve outcome(s) by discharge Date Met: 02/19/19      Problem: Skin Injury Risk (Adult)  Goal: Identify Related Risk Factors and Signs and Symptoms  Outcome: Outcome(s) achieved Date Met: 02/19/19    Goal: Skin Health and Integrity  Outcome: Ongoing (interventions implemented as appropriate)      Problem: Pneumonia (Adult)  Goal: Signs and Symptoms of Listed Potential Problems Will be Absent, Minimized or Managed (Pneumonia)  Outcome: Ongoing (interventions implemented as appropriate)

## 2019-02-19 NOTE — SIGNIFICANT NOTE
Notified Dr. Gunn of Patient stating that he fell (unwitnessed), no injuries noted. See new orders.

## 2019-02-19 NOTE — NURSING NOTE
Pt. Became very agitated r/t bed alarm placement. Pt. Refuses to use call light to call out for help, has become unsteady when ambulating, reminded patient that the alarm is for his safety so that he does not fall and harm his self. Patient became verbally abusive and threatening toward staff, stating he was going to use his cane to knock us in the head and choke us. Security was called in to room to de-escalate the patients actions. Will continue to monitor and will notify family.

## 2019-02-20 ENCOUNTER — READMISSION MANAGEMENT (OUTPATIENT)
Dept: CALL CENTER | Facility: HOSPITAL | Age: 79
End: 2019-02-20

## 2019-02-20 LAB
BACTERIA SPEC RESP CULT: NORMAL
GRAM STN SPEC: NORMAL

## 2019-02-20 NOTE — OUTREACH NOTE
Prep Survey      Responses   Facility patient discharged from?  [facility]   Is LACE score < 7 ?  No   Is patient eligible?  Yes   Discharge diagnosis  AECOPD,    Uncontrolled type II DM   Does the patient have one of the following disease processes/diagnoses(primary or secondary)?  COPD/Pneumonia   Does the patient have Home health ordered?  No   Is there a DME ordered?  No   Prep survey completed?  Yes          Huma Tracy RN

## 2019-02-21 ENCOUNTER — READMISSION MANAGEMENT (OUTPATIENT)
Dept: CALL CENTER | Facility: HOSPITAL | Age: 79
End: 2019-02-21

## 2019-02-21 LAB — BACTERIA SPEC AEROBE CULT: NORMAL

## 2019-02-21 NOTE — OUTREACH NOTE
COPD/PN Week 1 Survey      Responses   Facility patient discharged from?  [facility]   Does the patient have one of the following disease processes/diagnoses(primary or secondary)?  COPD/Pneumonia   Is there a successful TCM telephone encounter documented?  No   Was the primary reason for admission:  Pneumonia   Week 1 attempt successful?  No   Unsuccessful attempts  Attempt 1          Clair Morales RN

## 2019-02-22 ENCOUNTER — READMISSION MANAGEMENT (OUTPATIENT)
Dept: CALL CENTER | Facility: HOSPITAL | Age: 79
End: 2019-02-22

## 2019-02-22 NOTE — OUTREACH NOTE
COPD/PN Week 1 Survey      Responses   Facility patient discharged from?  [facility]   Does the patient have one of the following disease processes/diagnoses(primary or secondary)?  COPD/Pneumonia   Is there a successful TCM telephone encounter documented?  No   Was the primary reason for admission:  Pneumonia   Week 1 attempt successful?  Yes   Call start time  1252   Call end time  1259   Discharge diagnosis  AECOPD,    Uncontrolled type II DM   Meds reviewed with patient/caregiver?  Yes   Is the patient having any side effects they believe may be caused by any medication additions or changes?  No   Does the patient have all medications ordered at discharge?  Yes   Is the patient taking all medications as directed (includes completed medication regime)?  Yes   Medication comments  Symbicort is helping some he says.   Does the patient have a primary care provider?   Yes   Does the patient have an appointment with their PCP or pulmonologist within 7 days of discharge?  Yes   Has the patient kept scheduled appointments due by today?  Yes   Has home health visited the patient within 72 hours of discharge?  N/A   Psychosocial issues?  No   Did the patient receive a copy of their discharge instructions?  Yes   Nursing interventions  Reviewed instructions with patient   What is the patient's perception of their health status since discharge?  Improving   Nursing Interventions  Nurse provided patient education   Are the patient's immunizations up to date?   Yes   Nursing interventions  Educated on importance of maintaining up to date immunizations as advised by provider   Is the patient/caregiver able to teach back the hierarchy of who to call/visit for symptoms/problems? PCP, Specialist, Home health nurse, Urgent Care, ED, 911  Yes   Additional teach back comments  Discussed daily weights, BS was 130-180 but he is watching it, no complaints of shortness of breath.   Is the patient able to teach back COPD zones?  Yes    Nursing interventions  Education provided on various zones   Patient reports what zone on this call?  Green Zone   Green Zone  Appetite is good, Usual amount of phlegm/mucus without difficulty coughing up, Usual activity and exercise level, Reports doing well, Breathing without shortness of breath   Green Zone interventions:  Take daily medications   Week 1 call completed?  Yes          Tiffanie Cordoba RN

## 2019-03-02 ENCOUNTER — READMISSION MANAGEMENT (OUTPATIENT)
Dept: CALL CENTER | Facility: HOSPITAL | Age: 79
End: 2019-03-02

## 2019-03-02 NOTE — OUTREACH NOTE
COPD/PN Week 2 Survey      Responses   Facility patient discharged from?  [facility]   Does the patient have one of the following disease processes/diagnoses(primary or secondary)?  COPD/Pneumonia   Was the primary reason for admission:  Pneumonia   Week 2 attempt successful?  No   Unsuccessful attempts  Attempt 1          Demario Keene RN

## 2019-03-04 ENCOUNTER — READMISSION MANAGEMENT (OUTPATIENT)
Dept: CALL CENTER | Facility: HOSPITAL | Age: 79
End: 2019-03-04

## 2019-03-04 NOTE — OUTREACH NOTE
COPD/PN Week 2 Survey      Responses   Facility patient discharged from?  [facility]   Does the patient have one of the following disease processes/diagnoses(primary or secondary)?  COPD/Pneumonia   Was the primary reason for admission:  Pneumonia   Week 2 attempt successful?  Yes   Call start time  1244   Call end time  1249   Discharge diagnosis  AECOPD,    Uncontrolled type II DM   Medication alerts for this patient  two new pills --one capsule and pill for sinuses   Meds reviewed with patient/caregiver?  Yes   Is the patient taking all medications as directed (includes completed medication regime)?  Yes   Has the patient kept scheduled appointments due by today?  Yes   What is the patient's perception of their health status since discharge?  New symptoms unrelated to diagnosis [has cold and sinus infection type symptoms]   Nursing Interventions  Nurse provided patient education   Is the patient/caregiver able to teach back the hierarchy of who to call/visit for symptoms/problems? PCP, Specialist, Home health nurse, Urgent Care, ED, 911  Yes   Is the patient/caregiver able to teach back signs and symptoms of worsening condition:  Fever/chills, Shortness of breath, Chest pain   Is the patient/caregiver able to teach back importance of completing antibiotic course of treatment?  Yes   Week 2 call completed?  Yes          Beata Anne RN

## 2019-03-12 ENCOUNTER — READMISSION MANAGEMENT (OUTPATIENT)
Dept: CALL CENTER | Facility: HOSPITAL | Age: 79
End: 2019-03-12

## 2019-03-12 NOTE — OUTREACH NOTE
COPD/PN Week 3 Survey      Responses   Facility patient discharged from?  [facility]   Does the patient have one of the following disease processes/diagnoses(primary or secondary)?  COPD/Pneumonia   Was the primary reason for admission:  Pneumonia   Week 3 attempt successful?  No   Unsuccessful attempts  Attempt 1          Clair Morales, RN

## 2019-03-13 ENCOUNTER — READMISSION MANAGEMENT (OUTPATIENT)
Dept: CALL CENTER | Facility: HOSPITAL | Age: 79
End: 2019-03-13

## 2019-03-20 ENCOUNTER — READMISSION MANAGEMENT (OUTPATIENT)
Dept: CALL CENTER | Facility: HOSPITAL | Age: 79
End: 2019-03-20

## 2019-03-20 NOTE — OUTREACH NOTE
COPD/PN Week 4 Survey      Responses   Facility patient discharged from?  [facility]   Does the patient have one of the following disease processes/diagnoses(primary or secondary)?  COPD/Pneumonia   Was the primary reason for admission:  Pneumonia   Week 4 attempt successful?  Yes   Call start time  1021   Rescheduled  Rescheduled-pt requested          Claudine Hdz RN

## 2019-03-21 ENCOUNTER — READMISSION MANAGEMENT (OUTPATIENT)
Dept: CALL CENTER | Facility: HOSPITAL | Age: 79
End: 2019-03-21

## 2019-03-21 NOTE — OUTREACH NOTE
COPD/PN Week 4 Survey      Responses   Facility patient discharged from?  [facility]   Does the patient have one of the following disease processes/diagnoses(primary or secondary)?  COPD/Pneumonia   Week 4 attempt successful?  Yes   Call start time  1450   Call end time  1454   Meds reviewed with patient/caregiver?  Yes   Is the patient taking all medications as directed (includes completed medication regime)?  Yes   Medication comments  new meds for him started, he states, still using oxygen,  feeling better   Has the patient kept scheduled appointments due by today?  Yes   Is the patient still receiving Home Health Services?  No   What is the patient's perception of their health status since discharge?  Improving   Is the patient able to teach back COPD zones?  Yes   Nursing interventions  Education provided on various zones   Patient reports what zone on this call?  Green Zone   Green Zone  Reports doing well   Green Zone interventions:  Take daily medications, Continue regular exercise/diet plan, Use oxygen as prescribed, Avoid indoor/outdoor triggers   Is the patient/caregiver able to teach back signs and symptoms of worsening condition:  Fever/chills, Shortness of breath, Chest pain   Is the patient/caregiver able to teach back importance of completing antibiotic course of treatment?  Yes   Week 4 call completed?  Yes   Would the patient like one additional call?  No   Graduated  Yes   Did the patient feel the follow up calls were helpful during their recovery period?  Yes   Wrap up additional comments  He is doing well, has gout is his only issue.           Clair Das RN

## 2019-05-05 ENCOUNTER — APPOINTMENT (OUTPATIENT)
Dept: GENERAL RADIOLOGY | Facility: HOSPITAL | Age: 79
End: 2019-05-05

## 2019-05-05 ENCOUNTER — HOSPITAL ENCOUNTER (INPATIENT)
Facility: HOSPITAL | Age: 79
LOS: 1 days | Discharge: HOME | End: 2019-05-08
Attending: EMERGENCY MEDICINE | Admitting: INTERNAL MEDICINE

## 2019-05-05 DIAGNOSIS — R77.8 ELEVATED TROPONIN: ICD-10-CM

## 2019-05-05 DIAGNOSIS — I50.9 ACUTE ON CHRONIC CONGESTIVE HEART FAILURE, UNSPECIFIED HEART FAILURE TYPE (HCC): ICD-10-CM

## 2019-05-05 DIAGNOSIS — I10 HYPERTENSION, UNSPECIFIED TYPE: ICD-10-CM

## 2019-05-05 DIAGNOSIS — J44.9 CHRONIC OBSTRUCTIVE PULMONARY DISEASE, UNSPECIFIED COPD TYPE (HCC): ICD-10-CM

## 2019-05-05 DIAGNOSIS — R06.00 DYSPNEA, UNSPECIFIED TYPE: Primary | ICD-10-CM

## 2019-05-05 LAB
ALBUMIN SERPL-MCNC: 4.1 G/DL (ref 3.5–5.2)
ALBUMIN/GLOB SERPL: 1.4 G/DL
ALP SERPL-CCNC: 64 U/L (ref 39–117)
ALT SERPL W P-5'-P-CCNC: 24 U/L (ref 1–41)
ANION GAP SERPL CALCULATED.3IONS-SCNC: 13.4 MMOL/L
AST SERPL-CCNC: 31 U/L (ref 1–40)
BASOPHILS # BLD AUTO: 0.03 10*3/MM3 (ref 0–0.2)
BASOPHILS NFR BLD AUTO: 0.3 % (ref 0–1.5)
BILIRUB SERPL-MCNC: 0.6 MG/DL (ref 0.2–1.2)
BUN BLD-MCNC: 23 MG/DL (ref 8–23)
BUN/CREAT SERPL: 12.5 (ref 7–25)
CALCIUM SPEC-SCNC: 9.1 MG/DL (ref 8.6–10.5)
CHLORIDE SERPL-SCNC: 95 MMOL/L (ref 98–107)
CO2 SERPL-SCNC: 25.6 MMOL/L (ref 22–29)
CREAT BLD-MCNC: 1.84 MG/DL (ref 0.76–1.27)
D-LACTATE SERPL-SCNC: 1 MMOL/L (ref 0.5–2)
DEPRECATED RDW RBC AUTO: 45.1 FL (ref 37–54)
EOSINOPHIL # BLD AUTO: 0.26 10*3/MM3 (ref 0–0.4)
EOSINOPHIL NFR BLD AUTO: 2.4 % (ref 0.3–6.2)
ERYTHROCYTE [DISTWIDTH] IN BLOOD BY AUTOMATED COUNT: 14.8 % (ref 12.3–15.4)
FLUAV AG NPH QL: NEGATIVE
FLUBV AG NPH QL IA: NEGATIVE
GFR SERPL CREATININE-BSD FRML MDRD: 36 ML/MIN/1.73
GLOBULIN UR ELPH-MCNC: 3 GM/DL
GLUCOSE BLD-MCNC: 210 MG/DL (ref 65–99)
HCT VFR BLD AUTO: 38.5 % (ref 37.5–51)
HGB BLD-MCNC: 11.9 G/DL (ref 13–17.7)
IMM GRANULOCYTES # BLD AUTO: 0.07 10*3/MM3 (ref 0–0.05)
IMM GRANULOCYTES NFR BLD AUTO: 0.6 % (ref 0–0.5)
LYMPHOCYTES # BLD AUTO: 0.91 10*3/MM3 (ref 0.7–3.1)
LYMPHOCYTES NFR BLD AUTO: 8.4 % (ref 19.6–45.3)
MCH RBC QN AUTO: 26 PG (ref 26.6–33)
MCHC RBC AUTO-ENTMCNC: 30.9 G/DL (ref 31.5–35.7)
MCV RBC AUTO: 84.2 FL (ref 79–97)
MONOCYTES # BLD AUTO: 1.02 10*3/MM3 (ref 0.1–0.9)
MONOCYTES NFR BLD AUTO: 9.5 % (ref 5–12)
NEUTROPHILS # BLD AUTO: 8.49 10*3/MM3 (ref 1.7–7)
NEUTROPHILS NFR BLD AUTO: 78.8 % (ref 42.7–76)
NRBC BLD AUTO-RTO: 0 /100 WBC (ref 0–0.2)
NT-PROBNP SERPL-MCNC: 7405 PG/ML (ref 5–1800)
PLATELET # BLD AUTO: 230 10*3/MM3 (ref 140–450)
PMV BLD AUTO: 10.7 FL (ref 6–12)
POTASSIUM BLD-SCNC: 3.7 MMOL/L (ref 3.5–5.2)
PROCALCITONIN SERPL-MCNC: 0.05 NG/ML (ref 0.1–0.25)
PROT SERPL-MCNC: 7.1 G/DL (ref 6–8.5)
RBC # BLD AUTO: 4.57 10*6/MM3 (ref 4.14–5.8)
SODIUM BLD-SCNC: 134 MMOL/L (ref 136–145)
TROPONIN T SERPL-MCNC: 0.03 NG/ML (ref 0–0.03)
WBC NRBC COR # BLD: 10.78 10*3/MM3 (ref 3.4–10.8)

## 2019-05-05 PROCEDURE — 99284 EMERGENCY DEPT VISIT MOD MDM: CPT

## 2019-05-05 PROCEDURE — 93010 ELECTROCARDIOGRAM REPORT: CPT | Performed by: INTERNAL MEDICINE

## 2019-05-05 PROCEDURE — 71046 X-RAY EXAM CHEST 2 VIEWS: CPT

## 2019-05-05 PROCEDURE — 84484 ASSAY OF TROPONIN QUANT: CPT | Performed by: EMERGENCY MEDICINE

## 2019-05-05 PROCEDURE — 99284 EMERGENCY DEPT VISIT MOD MDM: CPT | Performed by: EMERGENCY MEDICINE

## 2019-05-05 PROCEDURE — 84145 PROCALCITONIN (PCT): CPT | Performed by: EMERGENCY MEDICINE

## 2019-05-05 PROCEDURE — G0378 HOSPITAL OBSERVATION PER HR: HCPCS

## 2019-05-05 PROCEDURE — 93005 ELECTROCARDIOGRAM TRACING: CPT | Performed by: EMERGENCY MEDICINE

## 2019-05-05 PROCEDURE — 80053 COMPREHEN METABOLIC PANEL: CPT | Performed by: EMERGENCY MEDICINE

## 2019-05-05 PROCEDURE — 83880 ASSAY OF NATRIURETIC PEPTIDE: CPT | Performed by: EMERGENCY MEDICINE

## 2019-05-05 PROCEDURE — 87804 INFLUENZA ASSAY W/OPTIC: CPT | Performed by: EMERGENCY MEDICINE

## 2019-05-05 PROCEDURE — 85025 COMPLETE CBC W/AUTO DIFF WBC: CPT | Performed by: EMERGENCY MEDICINE

## 2019-05-05 PROCEDURE — 83605 ASSAY OF LACTIC ACID: CPT | Performed by: EMERGENCY MEDICINE

## 2019-05-05 RX ORDER — ASPIRIN 325 MG
325 TABLET ORAL ONCE
Status: COMPLETED | OUTPATIENT
Start: 2019-05-05 | End: 2019-05-05

## 2019-05-05 RX ORDER — SODIUM CHLORIDE 0.9 % (FLUSH) 0.9 %
10 SYRINGE (ML) INJECTION AS NEEDED
Status: DISCONTINUED | OUTPATIENT
Start: 2019-05-05 | End: 2019-05-08 | Stop reason: HOSPADM

## 2019-05-05 RX ADMIN — ASPIRIN 325 MG: 325 TABLET, COATED ORAL at 22:33

## 2019-05-06 ENCOUNTER — APPOINTMENT (OUTPATIENT)
Dept: CARDIOLOGY | Facility: HOSPITAL | Age: 79
End: 2019-05-06

## 2019-05-06 ENCOUNTER — APPOINTMENT (OUTPATIENT)
Dept: GENERAL RADIOLOGY | Facility: HOSPITAL | Age: 79
End: 2019-05-06

## 2019-05-06 LAB
ANION GAP SERPL CALCULATED.3IONS-SCNC: 12.9 MMOL/L
APTT PPP: 40.5 SECONDS (ref 24.3–38.1)
B PARAPERT DNA SPEC QL NAA+PROBE: NOT DETECTED
B PERT DNA SPEC QL NAA+PROBE: NOT DETECTED
BACTERIA SPEC RESP CULT: NORMAL
BH CV ECHO MEAS - AO MAX PG (FULL): 9.6 MMHG
BH CV ECHO MEAS - AO MAX PG: 11.7 MMHG
BH CV ECHO MEAS - AO MEAN PG (FULL): 5 MMHG
BH CV ECHO MEAS - AO MEAN PG: 6 MMHG
BH CV ECHO MEAS - AO ROOT AREA (BSA CORRECTED): 1.6
BH CV ECHO MEAS - AO ROOT AREA: 13.2 CM^2
BH CV ECHO MEAS - AO ROOT DIAM: 4.1 CM
BH CV ECHO MEAS - AO V2 MAX: 171 CM/SEC
BH CV ECHO MEAS - AO V2 MEAN: 116 CM/SEC
BH CV ECHO MEAS - AO V2 VTI: 33.2 CM
BH CV ECHO MEAS - AVA(I,A): 2.6 CM^2
BH CV ECHO MEAS - AVA(I,D): 2.6 CM^2
BH CV ECHO MEAS - AVA(V,A): 2.3 CM^2
BH CV ECHO MEAS - AVA(V,D): 2.3 CM^2
BH CV ECHO MEAS - BSA(HAYCOCK): 2.7 M^2
BH CV ECHO MEAS - BSA: 2.6 M^2
BH CV ECHO MEAS - BZI_BMI: 39.6 KILOGRAMS/M^2
BH CV ECHO MEAS - BZI_METRIC_HEIGHT: 185.4 CM
BH CV ECHO MEAS - BZI_METRIC_WEIGHT: 136.1 KG
BH CV ECHO MEAS - EDV(CUBED): 208.5 ML
BH CV ECHO MEAS - EDV(MOD-SP2): 197 ML
BH CV ECHO MEAS - EDV(MOD-SP4): 191 ML
BH CV ECHO MEAS - EDV(TEICH): 175.2 ML
BH CV ECHO MEAS - EF(CUBED): 48 %
BH CV ECHO MEAS - EF(MOD-BP): 41 %
BH CV ECHO MEAS - EF(MOD-SP2): 43.7 %
BH CV ECHO MEAS - EF(MOD-SP4): 37.7 %
BH CV ECHO MEAS - EF(TEICH): 39.5 %
BH CV ECHO MEAS - ESV(CUBED): 108.5 ML
BH CV ECHO MEAS - ESV(MOD-SP2): 111 ML
BH CV ECHO MEAS - ESV(MOD-SP4): 119 ML
BH CV ECHO MEAS - ESV(TEICH): 106 ML
BH CV ECHO MEAS - FS: 19.6 %
BH CV ECHO MEAS - IVS/LVPW: 1.1
BH CV ECHO MEAS - IVSD: 0.96 CM
BH CV ECHO MEAS - LAT PEAK E' VEL: 8 CM/SEC
BH CV ECHO MEAS - LV DIASTOLIC VOL/BSA (35-75): 74.7 ML/M^2
BH CV ECHO MEAS - LV MASS(C)D: 219.7 GRAMS
BH CV ECHO MEAS - LV MASS(C)DI: 85.9 GRAMS/M^2
BH CV ECHO MEAS - LV MAX PG: 2.1 MMHG
BH CV ECHO MEAS - LV MEAN PG: 1 MMHG
BH CV ECHO MEAS - LV SYSTOLIC VOL/BSA (12-30): 46.5 ML/M^2
BH CV ECHO MEAS - LV V1 MAX: 72.7 CM/SEC
BH CV ECHO MEAS - LV V1 MEAN: 49.2 CM/SEC
BH CV ECHO MEAS - LV V1 VTI: 16.4 CM
BH CV ECHO MEAS - LVIDD: 5.9 CM
BH CV ECHO MEAS - LVIDS: 4.8 CM
BH CV ECHO MEAS - LVLD AP2: 9.6 CM
BH CV ECHO MEAS - LVLD AP4: 9.6 CM
BH CV ECHO MEAS - LVLS AP2: 8.4 CM
BH CV ECHO MEAS - LVLS AP4: 8.5 CM
BH CV ECHO MEAS - LVOT AREA (M): 5.3 CM^2
BH CV ECHO MEAS - LVOT AREA: 5.3 CM^2
BH CV ECHO MEAS - LVOT DIAM: 2.6 CM
BH CV ECHO MEAS - LVPWD: 0.89 CM
BH CV ECHO MEAS - MED PEAK E' VEL: 4 CM/SEC
BH CV ECHO MEAS - MV A DUR: 0.11 SEC
BH CV ECHO MEAS - MV A MAX VEL: 68.8 CM/SEC
BH CV ECHO MEAS - MV DEC SLOPE: 381 CM/SEC^2
BH CV ECHO MEAS - MV DEC TIME: 190 SEC
BH CV ECHO MEAS - MV E MAX VEL: 80.1 CM/SEC
BH CV ECHO MEAS - MV E/A: 1.2
BH CV ECHO MEAS - MV MAX PG: 5.3 MMHG
BH CV ECHO MEAS - MV MEAN PG: 2 MMHG
BH CV ECHO MEAS - MV P1/2T MAX VEL: 119 CM/SEC
BH CV ECHO MEAS - MV P1/2T: 91.5 MSEC
BH CV ECHO MEAS - MV V2 MAX: 115 CM/SEC
BH CV ECHO MEAS - MV V2 MEAN: 71.3 CM/SEC
BH CV ECHO MEAS - MV V2 VTI: 35.5 CM
BH CV ECHO MEAS - MVA P1/2T LCG: 1.8 CM^2
BH CV ECHO MEAS - MVA(P1/2T): 2.4 CM^2
BH CV ECHO MEAS - MVA(VTI): 2.5 CM^2
BH CV ECHO MEAS - PA ACC TIME: 0.09 SEC
BH CV ECHO MEAS - PA MAX PG (FULL): -0.31 MMHG
BH CV ECHO MEAS - PA MAX PG: 1.9 MMHG
BH CV ECHO MEAS - PA PR(ACCEL): 40.8 MMHG
BH CV ECHO MEAS - PA V2 MAX: 69.2 CM/SEC
BH CV ECHO MEAS - RAP SYSTOLE: 8 MMHG
BH CV ECHO MEAS - RV MAX PG: 2.2 MMHG
BH CV ECHO MEAS - RV MEAN PG: 1 MMHG
BH CV ECHO MEAS - RV V1 MAX: 74.6 CM/SEC
BH CV ECHO MEAS - RV V1 MEAN: 50.7 CM/SEC
BH CV ECHO MEAS - RV V1 VTI: 13.4 CM
BH CV ECHO MEAS - SI(AO): 171.5 ML/M^2
BH CV ECHO MEAS - SI(CUBED): 39.1 ML/M^2
BH CV ECHO MEAS - SI(LVOT): 34.1 ML/M^2
BH CV ECHO MEAS - SI(MOD-SP2): 33.6 ML/M^2
BH CV ECHO MEAS - SI(MOD-SP4): 28.2 ML/M^2
BH CV ECHO MEAS - SI(TEICH): 27.1 ML/M^2
BH CV ECHO MEAS - SV(AO): 438.3 ML
BH CV ECHO MEAS - SV(CUBED): 100 ML
BH CV ECHO MEAS - SV(LVOT): 87.1 ML
BH CV ECHO MEAS - SV(MOD-SP2): 86 ML
BH CV ECHO MEAS - SV(MOD-SP4): 72 ML
BH CV ECHO MEAS - SV(TEICH): 69.3 ML
BH CV ECHO MEAS - TAPSE (>1.6): 3 CM2
BH CV ECHO MEASUREMENTS AVERAGE E/E' RATIO: 13.35
BH CV XLRA - RV BASE: 3.2 CM
BH CV XLRA - TDI S': 12 CM/SEC
BUN BLD-MCNC: 23 MG/DL (ref 8–23)
BUN/CREAT SERPL: 13.8 (ref 7–25)
C PNEUM DNA NPH QL NAA+NON-PROBE: NOT DETECTED
CALCIUM SPEC-SCNC: 8.7 MG/DL (ref 8.6–10.5)
CHLORIDE SERPL-SCNC: 97 MMOL/L (ref 98–107)
CHOLEST SERPL-MCNC: 205 MG/DL (ref 0–200)
CO2 SERPL-SCNC: 28.1 MMOL/L (ref 22–29)
CREAT BLD-MCNC: 1.67 MG/DL (ref 0.76–1.27)
FLUAV H1 2009 PAND RNA NPH QL NAA+PROBE: NOT DETECTED
FLUAV H1 HA GENE NPH QL NAA+PROBE: NOT DETECTED
FLUAV H3 RNA NPH QL NAA+PROBE: NOT DETECTED
FLUAV SUBTYP SPEC NAA+PROBE: NOT DETECTED
FLUBV RNA ISLT QL NAA+PROBE: NOT DETECTED
GFR SERPL CREATININE-BSD FRML MDRD: 40 ML/MIN/1.73
GLUCOSE BLD-MCNC: 248 MG/DL (ref 65–99)
GLUCOSE BLDC GLUCOMTR-MCNC: 223 MG/DL (ref 70–130)
GLUCOSE BLDC GLUCOMTR-MCNC: 253 MG/DL (ref 70–130)
GLUCOSE BLDC GLUCOMTR-MCNC: 255 MG/DL (ref 70–130)
GLUCOSE BLDC GLUCOMTR-MCNC: 337 MG/DL (ref 70–130)
GLUCOSE BLDC GLUCOMTR-MCNC: 349 MG/DL (ref 70–130)
GRAM STN SPEC: NORMAL
HADV DNA SPEC NAA+PROBE: NOT DETECTED
HBA1C MFR BLD: 10 % (ref 4.8–5.6)
HCOV 229E RNA SPEC QL NAA+PROBE: NOT DETECTED
HCOV HKU1 RNA SPEC QL NAA+PROBE: NOT DETECTED
HCOV NL63 RNA SPEC QL NAA+PROBE: NOT DETECTED
HCOV OC43 RNA SPEC QL NAA+PROBE: NOT DETECTED
HDLC SERPL-MCNC: 36 MG/DL (ref 40–60)
HMPV RNA NPH QL NAA+NON-PROBE: NOT DETECTED
HPIV1 RNA SPEC QL NAA+PROBE: NOT DETECTED
HPIV2 RNA SPEC QL NAA+PROBE: NOT DETECTED
HPIV3 RNA NPH QL NAA+PROBE: NOT DETECTED
HPIV4 P GENE NPH QL NAA+PROBE: NOT DETECTED
INR PPP: 1.17 (ref 0.9–1.1)
LDLC SERPL CALC-MCNC: 130 MG/DL (ref 0–100)
LDLC/HDLC SERPL: 3.62 {RATIO}
LEFT ATRIUM VOLUME INDEX: 18 ML/M2
LV EF 2D ECHO EST: 50 %
M PNEUMO IGG SER IA-ACNC: NOT DETECTED
MAGNESIUM SERPL-MCNC: 1.7 MG/DL (ref 1.6–2.4)
POTASSIUM BLD-SCNC: 3.7 MMOL/L (ref 3.5–5.2)
PROTHROMBIN TIME: 14.6 SECONDS (ref 12.1–15)
RHINOVIRUS RNA SPEC NAA+PROBE: DETECTED
RSV RNA NPH QL NAA+NON-PROBE: NOT DETECTED
SODIUM BLD-SCNC: 138 MMOL/L (ref 136–145)
TRIGL SERPL-MCNC: 193 MG/DL (ref 0–150)
TROPONIN T SERPL-MCNC: 0.02 NG/ML (ref 0–0.03)
TROPONIN T SERPL-MCNC: 0.03 NG/ML (ref 0–0.03)
TSH SERPL DL<=0.05 MIU/L-ACNC: 7.45 MIU/ML (ref 0.27–4.2)
VLDLC SERPL-MCNC: 38.6 MG/DL (ref 8–32)

## 2019-05-06 PROCEDURE — 87205 SMEAR GRAM STAIN: CPT | Performed by: INTERNAL MEDICINE

## 2019-05-06 PROCEDURE — 93010 ELECTROCARDIOGRAM REPORT: CPT | Performed by: INTERNAL MEDICINE

## 2019-05-06 PROCEDURE — 99214 OFFICE O/P EST MOD 30 MIN: CPT | Performed by: NURSE PRACTITIONER

## 2019-05-06 PROCEDURE — 93306 TTE W/DOPPLER COMPLETE: CPT

## 2019-05-06 PROCEDURE — 83735 ASSAY OF MAGNESIUM: CPT | Performed by: INTERNAL MEDICINE

## 2019-05-06 PROCEDURE — 63710000001 INSULIN ASPART PER 5 UNITS: Performed by: INTERNAL MEDICINE

## 2019-05-06 PROCEDURE — 93005 ELECTROCARDIOGRAM TRACING: CPT | Performed by: INTERNAL MEDICINE

## 2019-05-06 PROCEDURE — 25010000002 FUROSEMIDE PER 20 MG: Performed by: INTERNAL MEDICINE

## 2019-05-06 PROCEDURE — G0378 HOSPITAL OBSERVATION PER HR: HCPCS

## 2019-05-06 PROCEDURE — 94640 AIRWAY INHALATION TREATMENT: CPT

## 2019-05-06 PROCEDURE — 83036 HEMOGLOBIN GLYCOSYLATED A1C: CPT | Performed by: INTERNAL MEDICINE

## 2019-05-06 PROCEDURE — 85610 PROTHROMBIN TIME: CPT | Performed by: INTERNAL MEDICINE

## 2019-05-06 PROCEDURE — 87486 CHLMYD PNEUM DNA AMP PROBE: CPT | Performed by: INTERNAL MEDICINE

## 2019-05-06 PROCEDURE — 63710000001 PREDNISONE PER 1 MG: Performed by: INTERNAL MEDICINE

## 2019-05-06 PROCEDURE — 87798 DETECT AGENT NOS DNA AMP: CPT | Performed by: INTERNAL MEDICINE

## 2019-05-06 PROCEDURE — 82962 GLUCOSE BLOOD TEST: CPT

## 2019-05-06 PROCEDURE — 87633 RESP VIRUS 12-25 TARGETS: CPT | Performed by: INTERNAL MEDICINE

## 2019-05-06 PROCEDURE — 93306 TTE W/DOPPLER COMPLETE: CPT | Performed by: INTERNAL MEDICINE

## 2019-05-06 PROCEDURE — 94799 UNLISTED PULMONARY SVC/PX: CPT

## 2019-05-06 PROCEDURE — 84484 ASSAY OF TROPONIN QUANT: CPT | Performed by: INTERNAL MEDICINE

## 2019-05-06 PROCEDURE — 99220 PR INITIAL OBSERVATION CARE/DAY 70 MINUTES: CPT | Performed by: INTERNAL MEDICINE

## 2019-05-06 PROCEDURE — 25010000002 AZITHROMYCIN PER 500 MG

## 2019-05-06 PROCEDURE — 84443 ASSAY THYROID STIM HORMONE: CPT | Performed by: INTERNAL MEDICINE

## 2019-05-06 PROCEDURE — 80048 BASIC METABOLIC PNL TOTAL CA: CPT | Performed by: INTERNAL MEDICINE

## 2019-05-06 PROCEDURE — 87581 M.PNEUMON DNA AMP PROBE: CPT | Performed by: INTERNAL MEDICINE

## 2019-05-06 PROCEDURE — 25010000002 HEPARIN (PORCINE) PER 1000 UNITS: Performed by: INTERNAL MEDICINE

## 2019-05-06 PROCEDURE — 73560 X-RAY EXAM OF KNEE 1 OR 2: CPT

## 2019-05-06 PROCEDURE — 80061 LIPID PANEL: CPT | Performed by: INTERNAL MEDICINE

## 2019-05-06 PROCEDURE — 85730 THROMBOPLASTIN TIME PARTIAL: CPT | Performed by: INTERNAL MEDICINE

## 2019-05-06 RX ORDER — PREDNISONE 20 MG/1
40 TABLET ORAL
Status: DISCONTINUED | OUTPATIENT
Start: 2019-05-06 | End: 2019-05-07

## 2019-05-06 RX ORDER — NITROGLYCERIN 0.4 MG/1
0.4 TABLET SUBLINGUAL
Status: DISCONTINUED | OUTPATIENT
Start: 2019-05-06 | End: 2019-05-08 | Stop reason: HOSPADM

## 2019-05-06 RX ORDER — HEPARIN SODIUM 5000 [USP'U]/ML
5000 INJECTION, SOLUTION INTRAVENOUS; SUBCUTANEOUS EVERY 12 HOURS SCHEDULED
Status: DISCONTINUED | OUTPATIENT
Start: 2019-05-06 | End: 2019-05-08 | Stop reason: HOSPADM

## 2019-05-06 RX ORDER — DEXTROSE MONOHYDRATE 25 G/50ML
25 INJECTION, SOLUTION INTRAVENOUS
Status: DISCONTINUED | OUTPATIENT
Start: 2019-05-06 | End: 2019-05-08 | Stop reason: HOSPADM

## 2019-05-06 RX ORDER — SODIUM CHLORIDE 0.9 % (FLUSH) 0.9 %
3 SYRINGE (ML) INJECTION EVERY 12 HOURS SCHEDULED
Status: DISCONTINUED | OUTPATIENT
Start: 2019-05-06 | End: 2019-05-08 | Stop reason: HOSPADM

## 2019-05-06 RX ORDER — SODIUM CHLORIDE 0.9 % (FLUSH) 0.9 %
3-10 SYRINGE (ML) INJECTION AS NEEDED
Status: DISCONTINUED | OUTPATIENT
Start: 2019-05-06 | End: 2019-05-08 | Stop reason: HOSPADM

## 2019-05-06 RX ORDER — ROSUVASTATIN CALCIUM 10 MG/1
40 TABLET, COATED ORAL NIGHTLY
Status: DISCONTINUED | OUTPATIENT
Start: 2019-05-06 | End: 2019-05-06

## 2019-05-06 RX ORDER — ROSUVASTATIN CALCIUM 5 MG/1
40 TABLET, COATED ORAL DAILY
Status: DISCONTINUED | OUTPATIENT
Start: 2019-05-07 | End: 2019-05-08 | Stop reason: HOSPADM

## 2019-05-06 RX ORDER — IPRATROPIUM BROMIDE AND ALBUTEROL SULFATE 2.5; .5 MG/3ML; MG/3ML
3 SOLUTION RESPIRATORY (INHALATION)
Status: DISCONTINUED | OUTPATIENT
Start: 2019-05-06 | End: 2019-05-07

## 2019-05-06 RX ORDER — MULTIPLE VITAMINS W/ MINERALS TAB 9MG-400MCG
1 TAB ORAL DAILY
Status: DISCONTINUED | OUTPATIENT
Start: 2019-05-06 | End: 2019-05-06

## 2019-05-06 RX ORDER — DOXYCYCLINE 100 MG/1
100 CAPSULE ORAL EVERY 12 HOURS SCHEDULED
Status: DISCONTINUED | OUTPATIENT
Start: 2019-05-07 | End: 2019-05-07

## 2019-05-06 RX ORDER — ONDANSETRON 2 MG/ML
4 INJECTION INTRAMUSCULAR; INTRAVENOUS EVERY 6 HOURS PRN
Status: DISCONTINUED | OUTPATIENT
Start: 2019-05-06 | End: 2019-05-08 | Stop reason: HOSPADM

## 2019-05-06 RX ORDER — FUROSEMIDE 10 MG/ML
40 INJECTION INTRAMUSCULAR; INTRAVENOUS EVERY 6 HOURS
Status: COMPLETED | OUTPATIENT
Start: 2019-05-06 | End: 2019-05-06

## 2019-05-06 RX ORDER — AZITHROMYCIN 500 MG/1
INJECTION, POWDER, LYOPHILIZED, FOR SOLUTION INTRAVENOUS
Status: COMPLETED
Start: 2019-05-06 | End: 2019-05-06

## 2019-05-06 RX ORDER — BISACODYL 5 MG/1
5 TABLET, DELAYED RELEASE ORAL DAILY PRN
Status: DISCONTINUED | OUTPATIENT
Start: 2019-05-06 | End: 2019-05-08 | Stop reason: HOSPADM

## 2019-05-06 RX ORDER — ACETAMINOPHEN 325 MG/1
650 TABLET ORAL EVERY 4 HOURS PRN
Status: DISCONTINUED | OUTPATIENT
Start: 2019-05-06 | End: 2019-05-08 | Stop reason: HOSPADM

## 2019-05-06 RX ORDER — SODIUM CHLORIDE 9 MG/ML
40 INJECTION, SOLUTION INTRAVENOUS AS NEEDED
Status: DISCONTINUED | OUTPATIENT
Start: 2019-05-06 | End: 2019-05-08 | Stop reason: HOSPADM

## 2019-05-06 RX ORDER — FUROSEMIDE 10 MG/ML
40 INJECTION INTRAMUSCULAR; INTRAVENOUS ONCE
Status: COMPLETED | OUTPATIENT
Start: 2019-05-06 | End: 2019-05-06

## 2019-05-06 RX ORDER — NICOTINE POLACRILEX 4 MG
15 LOZENGE BUCCAL
Status: DISCONTINUED | OUTPATIENT
Start: 2019-05-06 | End: 2019-05-08 | Stop reason: HOSPADM

## 2019-05-06 RX ORDER — SODIUM CHLORIDE 9 MG/ML
INJECTION, SOLUTION INTRAVENOUS
Status: DISPENSED
Start: 2019-05-06 | End: 2019-05-06

## 2019-05-06 RX ADMIN — IPRATROPIUM BROMIDE AND ALBUTEROL SULFATE 3 ML: .5; 3 SOLUTION RESPIRATORY (INHALATION) at 19:30

## 2019-05-06 RX ADMIN — HEPARIN SODIUM 5000 UNITS: 5000 INJECTION, SOLUTION INTRAVENOUS; SUBCUTANEOUS at 01:20

## 2019-05-06 RX ADMIN — INSULIN ASPART 6 UNITS: 100 INJECTION, SOLUTION INTRAVENOUS; SUBCUTANEOUS at 13:19

## 2019-05-06 RX ADMIN — FUROSEMIDE 40 MG: 10 INJECTION, SOLUTION INTRAVENOUS at 21:01

## 2019-05-06 RX ADMIN — HEPARIN SODIUM 5000 UNITS: 5000 INJECTION, SOLUTION INTRAVENOUS; SUBCUTANEOUS at 21:00

## 2019-05-06 RX ADMIN — HEPARIN SODIUM 5000 UNITS: 5000 INJECTION, SOLUTION INTRAVENOUS; SUBCUTANEOUS at 09:37

## 2019-05-06 RX ADMIN — SODIUM CHLORIDE, PRESERVATIVE FREE 3 ML: 5 INJECTION INTRAVENOUS at 21:00

## 2019-05-06 RX ADMIN — PREDNISONE 40 MG: 20 TABLET ORAL at 09:37

## 2019-05-06 RX ADMIN — INSULIN ASPART 7 UNITS: 100 INJECTION, SOLUTION INTRAVENOUS; SUBCUTANEOUS at 21:01

## 2019-05-06 RX ADMIN — IPRATROPIUM BROMIDE AND ALBUTEROL SULFATE 3 ML: .5; 3 SOLUTION RESPIRATORY (INHALATION) at 07:26

## 2019-05-06 RX ADMIN — FUROSEMIDE 40 MG: 10 INJECTION, SOLUTION INTRAVENOUS at 13:12

## 2019-05-06 RX ADMIN — SODIUM CHLORIDE, PRESERVATIVE FREE 3 ML: 5 INJECTION INTRAVENOUS at 01:19

## 2019-05-06 RX ADMIN — FUROSEMIDE 40 MG: 10 INJECTION, SOLUTION INTRAVENOUS at 01:19

## 2019-05-06 RX ADMIN — INSULIN ASPART 7 UNITS: 100 INJECTION, SOLUTION INTRAVENOUS; SUBCUTANEOUS at 17:28

## 2019-05-06 RX ADMIN — INSULIN ASPART 4 UNITS: 100 INJECTION, SOLUTION INTRAVENOUS; SUBCUTANEOUS at 01:34

## 2019-05-06 RX ADMIN — ACETAMINOPHEN 650 MG: 325 TABLET, FILM COATED ORAL at 17:36

## 2019-05-06 RX ADMIN — AZITHROMYCIN 500 MG: 500 INJECTION, POWDER, LYOPHILIZED, FOR SOLUTION INTRAVENOUS at 01:19

## 2019-05-06 RX ADMIN — SODIUM CHLORIDE, PRESERVATIVE FREE 3 ML: 5 INJECTION INTRAVENOUS at 09:37

## 2019-05-06 RX ADMIN — INSULIN ASPART 6 UNITS: 100 INJECTION, SOLUTION INTRAVENOUS; SUBCUTANEOUS at 09:38

## 2019-05-06 NOTE — SIGNIFICANT NOTE
05/06/19 0405   Provider Notification   Reason for Communication Critical lab value  (troponin 0.031)   Provider Name [facility]   Notification Route Other (Comment)   Response Other (Comment)  (labs trending down as expected)

## 2019-05-06 NOTE — NURSING NOTE
Continued Stay Note  MICHAEL Kincaid     Patient Name: Froilan Helton  MRN: [id number]  Today's Date: 5/6/2019    Admit Date: 5/5/2019    Discharge Plan     Row Name 05/06/19 0907       Plan    Plan Comments  LACE referral made to Kirsten in pharmacy and Nikia in respiratory.        Discharge Codes    No documentation.             Elizabeth Arguello RN

## 2019-05-06 NOTE — SIGNIFICANT NOTE
"   05/06/19 1111   Rehab Time/Intention   Evaluation Not Performed patient/family declined evaluation  (pt declines therapy evaluation.  encouraged patient to attempt mobility, however pt declines stating \"I am just planning on resting right now\" offered to return later for evaluation however pt continues to decline stating \"it won't do much good, I am doing fine\"  Encouraged pt to continue ambulation with nursing staff as able.)   Rehab Treatment   Discipline physical therapist     "

## 2019-05-06 NOTE — CONSULTS
Patient Name: Froilan Helton  :1940  78 y.o.    Date of Admission: 2019  Date of Consultation:  19  Encounter Provider: LEATHA Chairez  Place of Service: [facility] CARDIOLOGY  Referring Provider: No ref. provider found  Patient Care Team:  Abdullahi Clarke MD as PCP - General (Internal Medicine)  Abdullahi Clarke MD as PCP - Claims Attributed      Chief complaint: elevated troponin    History of Present Illness:    71-year-old male with a past history of elevated troponins, chronic diastolic heart failure, hypertension, CKD stage III, COPD, diabetes type 2, hypothyroidism, acute COPD exacerbation.  He states that he has been increasingly short of breath for the prior week.  He has chronic lower extremity edema that has not changed.    He is resting comfortably in a recliner.  He denies any chest pain.  No acute distress noticed.  He states his breathing is much improved.  There is no use of accessory muscles.  He states his lower extremity edema is the same and that it never goes down.  He states he was told that is due to his medicine that he takes for his neuropathy.    He states he uses a nebulizer at home as needed and also takes Cymbalta for his COPD.  He also has oxygen at home to use as needed.    Past Medical History:   Diagnosis Date   • Arthritis    • Asthma    • Cancer (CMS/[facility])     skin   • Cataract    • COPD (chronic obstructive pulmonary disease) (CMS/[facility])    • Diabetes mellitus (CMS/[facility])    • Disease of thyroid gland    • Hypertension    • Kidney stone    • Myocardial infarct, old    • Renal disorder        Past Surgical History:   Procedure Laterality Date   • COLONOSCOPY     • EYE SURGERY     • JOINT REPLACEMENT      right   • KIDNEY STONE SURGERY     • REPLACEMENT TOTAL KNEE     • TOE SURGERY           Prior to Admission medications    Medication Sig Start Date End Date Taking? Authorizing Provider   albuterol (ACCUNEB) 0.63  MG/3ML nebulizer solution Take 3 mL by nebulization Every 6 (Six) Hours As Needed for Wheezing. 9/22/18  Yes Akin Rosa MD   amLODIPine (NORVASC) 2.5 MG tablet Take 1 tablet by mouth Daily. 11/28/17  Yes Liat Hood APRN   atorvastatin (LIPITOR) 10 MG tablet Take 1 tablet by mouth Daily. 11/28/17  Yes Liat Hood APRN   budesonide-formoterol (SYMBICORT) 160-4.5 MCG/ACT inhaler Inhale 2 puffs 2 (Two) Times a Day. 2/19/19  Yes Santiago Powers DO   docusate sodium 100 MG capsule Take 100 mg by mouth 2 (Two) Times a Day.  Patient taking differently: Take 100 mg by mouth 2 (Two) Times a Day As Needed. 11/27/17  Yes Liat Hood APRN   albuterol (ACCUNEB) 1.25 MG/3ML nebulizer solution Take 3 mL by nebulization Every 6 (Six) Hours As Needed for Wheezing. 8/3/18   Keven Steven MD   aspirin  MG EC tablet Take 1 tablet by mouth Daily. 11/28/17   Liat Hood APRN   clotrimazole (LOTRIMIN) 1 % cream Apply  topically Every 12 (Twelve) Hours. 11/27/17   Liat Hood APRN   clotrimazole-betamethasone (LOTRISONE) 1-0.05 % cream Apply  topically to the appropriate area as directed Every 12 (Twelve) Hours. 9/10/18   Brandi Moses APRN   donepezil (ARICEPT) 5 MG tablet Take 2.5 mg by mouth Every Night.    Provider, MD Carmina   furosemide (LASIX) 40 MG tablet Take 1 tablet by mouth Daily. Take 1 tablet by mouth twice per day for 2 days. Then resume 1 per day  Patient taking differently: Take 40 mg by mouth Daily. 8/3/18   Keven Steven MD   gabapentin (NEURONTIN) 600 MG tablet Take 600 mg by mouth Daily.    ProviderCarmina MD   insulin detemir (LEVEMIR) 100 UNIT/ML injection Inject 30 Units under the skin 2 (Two) Times a Day. 11/27/17   Liat Hood APRN   insulin NPH-insulin regular (humuLIN 70/30,novoLIN 70/30) (70-30) 100 UNIT/ML injection Inject 30 Units under the skin into the appropriate area as directed 2 (Two) Times a Day With Meals.     Carmina Jain MD   lactobacillus acidophilus (RISAQUAD) capsule capsule Take 1 capsule by mouth Daily. 11/27/17   Liat Hood APRN   levothyroxine (SYNTHROID, LEVOTHROID) 100 MCG tablet Take 100 mcg by mouth Daily.    Carmina Jain MD   lisinopril (PRINIVIL,ZESTRIL) 20 MG tablet Take 20 mg by mouth Daily.    Carmina Jain MD   metoprolol succinate XL (TOPROL-XL) 25 MG 24 hr tablet Take 0.5 tablets by mouth Every Night. 11/27/17   Liat Hood APRN   polyethylene glycol (MIRALAX) pack packet Take 17 g by mouth Daily. 11/28/17   Liat Hood APRN   pramipexole (MIRAPEX) 0.5 MG tablet Take 0.5 mg by mouth 2 (Two) Times a Day.    Carmina Jain MD   pregabalin (LYRICA) 100 MG capsule Take 100 mg by mouth 3 (Three) Times a Day.    Carmina Jain MD   SITagliptin (JANUVIA) 100 MG tablet Take 100 mg by mouth Daily.    Carmina Jain MD       Allergies   Allergen Reactions   • Oxycontin [Oxycodone Hcl]      'Makes me crazy and stand on my head'       Social History     Socioeconomic History   • Marital status: Unknown     Spouse name: Not on file   • Number of children: Not on file   • Years of education: Not on file   • Highest education level: Not on file   Tobacco Use   • Smoking status: Former Smoker   • Smokeless tobacco: Never Used   • Tobacco comment: quit 1993   Substance and Sexual Activity   • Alcohol use: No   • Drug use: No   • Sexual activity: Defer       Family History   Problem Relation Age of Onset   • COPD Mother    • Diabetes Father        REVIEW OF SYSTEMS:   All systems reviewed.  Pertinent positives identified in HPI.  All other systems are negative.      Objective:     Vitals:    05/06/19 0355 05/06/19 0500 05/06/19 0726 05/06/19 0900   BP: 135/64 159/78     BP Location: Right arm Right arm     Patient Position: Sitting Sitting     Pulse: 99 98 96    Resp: 20 19 18    Temp: 98.7 °F (37.1 °C) 98.3 °F (36.8 °C)     TempSrc: Oral Oral      SpO2: 99% 98% 97%    Weight:    135 kg (297 lb 4 oz)   Height:         Body mass index is 39.22 kg/m².    General Appearance:    Alert, cooperative, in no acute distress   Head:    Normocephalic, without obvious abnormality, atraumatic   Eyes:            Lids and lashes normal, conjunctivae and sclerae normal, no   icterus, no pallor, corneas clear, PERRLA   Ears:    Ears appear intact with no abnormalities noted   Throat:   No oral lesions, no thrush, oral mucosa moist   Neck:   No adenopathy, supple, trachea midline, no thyromegaly, no   carotid bruit, no JVD   Back:     No kyphosis present, no scoliosis present, no skin lesions, erythema or scars, no tenderness to percussion or palpation, range of motion normal   Lungs:    Faint crackles bilateral bases,respirations regular, even and unlabored    Heart:    Regular rhythm and normal rate, normal S1 and S2, no murmur, no gallop, no rub, no click   Chest Wall:    No abnormalities observed   Abdomen:     Normal bowel sounds, no masses, no organomegaly, soft        non-tender, non-distended, no guarding, no rebound  tenderness   Extremities:   Moves all extremities well, 2-3+ pedal edema, no pitting, no cyanosis, no redness   Pulses:   Pulses palpable and equal bilaterally. Normal radial, carotid, femoral, dorsalis pedis and posterior tibial pulses bilaterally. Normal abdominal aorta   Skin:  Psychiatric:   No bleeding, bruising or rash    Alert and oriented x 3, normal mood and affect   Lab Review:     Results from last 7 days   Lab Units 05/06/19  1013 05/05/19  2131   SODIUM mmol/L 138 134*   POTASSIUM mmol/L 3.7 3.7   CHLORIDE mmol/L 97* 95*   CO2 mmol/L 28.1 25.6   BUN mg/dL 23 23   CREATININE mg/dL 1.67* 1.84*   CALCIUM mg/dL 8.7 9.1   BILIRUBIN mg/dL  --  0.6   ALK PHOS U/L  --  64   ALT (SGPT) U/L  --  24   AST (SGOT) U/L  --  31   GLUCOSE mg/dL 248* 210*     Results from last 7 days   Lab Units 05/06/19  1013 05/06/19  0331 05/05/19  2131   TROPONIN T ng/mL  0.024 0.031* 0.033*     Results from last 7 days   Lab Units 05/05/19  2131   WBC 10*3/mm3 10.78   HEMOGLOBIN g/dL 11.9*   HEMATOCRIT % 38.5   PLATELETS 10*3/mm3 230     Results from last 7 days   Lab Units 05/06/19  0053   INR  1.17*   APTT seconds 40.5*     Results from last 7 days   Lab Units 05/06/19  0152   MAGNESIUM mg/dL 1.7     Results from last 7 days   Lab Units 05/06/19  0053   CHOLESTEROL mg/dL 205*   TRIGLYCERIDES mg/dL 193*   HDL CHOL mg/dL 36*   LDL CHOL mg/dL 130*                         Assessment and Plan:    1.  Elevated troponin -  History of elevated troponin in the past.  Serial troponin shows level now normal.  Denies chest pain/angina.  EKG shows no acute ACS.  Read/interpreted by RM. On heparin SC for DVT prophylasix.    2.  CHF exacerbation - elevated BNP of 7,405.0.  Received one dose of IV lasix.  Await echo    3. Elevated creatinine - noted 1.84 on admission, now 1.67    4.  COPD exacerbation - Receiving nebulizer treatments and predisone PO.     5.  Hyperlipidemia - labs noted, was on Lipitor per home medications.    6. DM - on insulin     Await echo    Eduardo Benedict, LEATHA  05/06/19  11:13 AM

## 2019-05-06 NOTE — PLAN OF CARE
Problem: Patient Care Overview  Goal: Discharge Needs Assessment  Outcome: Ongoing (interventions implemented as appropriate)   05/06/19 1017   Discharge Needs Assessment   Readmission Within the Last 30 Days no previous admission in last 30 days   Concerns to be Addressed no discharge needs identified   Concerns Comments none   Patient/Family Anticipates Transition to home with family   Patient/Family Anticipated Services at Transition none   Transportation Concerns (none)   Transportation Anticipated car, drives self;family or friend will provide  (pt states he still drives but his wife can provide ride home)   Anticipated Changes Related to Illness none   Equipment Needed After Discharge none   Outpatient/Agency/Support Group Needs (none)   Discharge Facility/Level of Care Needs (none)   Offered/Gave Vendor List yes  (offered community resources but pt denies the need for them at this time.)   Patient's Choice of Community Agency(s) (Pt states he used home health after his knee surgery out of [place].)   Current Discharge Risk (none)   Discharge Coordination/Progress Per pt her plans on returning home at discharge with wife to help if needed.   Disability   Equipment Currently Used at Home ramp;commode;oxygen;nebulizer;cane, quad;glucometer  (Uses [employer])

## 2019-05-06 NOTE — NURSING NOTE
Discharge Planning Assessment  MICHAEL Kincaid     Patient Name: Froilan Helton  MRN: [id number]  Today's Date: 5/6/2019    Admit Date: 5/5/2019    Discharge Needs Assessment     Row Name 05/06/19 1017       Living Environment    Lives With  spouse    Name(s) of Who Lives With Patient  Lina Helton, wife    Current Living Arrangements  home/apartment/condo Manufactured home with ramp accessable entry.    Duration at Residence  -- 17 years    Potentially Unsafe Housing Conditions  -- none    Primary Care Provided by  self    Provides Primary Care For  no one    Caregiving Concerns  Pt voiced no caregining concerns at this time.    Family Caregiver if Needed  spouse    Family Caregiver Names  Lina    Quality of Family Relationships  unable to assess    Able to Return to Prior Arrangements  yes    Living Arrangement Comments  Per pt he plans on returning home at discharge.       Resource/Environmental Concerns    Resource/Environmental Concerns  none    Transportation Concerns  -- none       Transition Planning    Patient/Family Anticipates Transition to  home with family    Patient/Family Anticipated Services at Transition  none    Transportation Anticipated  car, drives self;family or friend will provide pt states he still drives but his wife can provide ride home       Discharge Needs Assessment    Readmission Within the Last 30 Days  no previous admission in last 30 days    Concerns to be Addressed  no discharge needs identified    Concerns Comments  none    Equipment Currently Used at Home  ramp;commode;oxygen;nebulizer;cane, quad;glucometer Uses [employer]    Anticipated Changes Related to Illness  none    Equipment Needed After Discharge  none    Outpatient/Agency/Support Group Needs  -- none    Discharge Facility/Level of Care Needs  -- none    Offered/Gave Vendor List  yes offered community resources but pt denies the need for them at this time.    Patient's Choice of Community Agency(s)  -- Pt states he used home  health after his knee surgery out of [place].    Current Discharge Risk  -- none    Discharge Coordination/Progress  Per pt her plans on returning home at discharge with wife to help if needed.        Discharge Plan     Row Name 05/06/19 1022       Plan    Plan  Home    Patient/Family in Agreement with Plan  yes    Plan Comments  Into room introduced self and role of CM. Discussed discharge disposition. Patient states that the info on his face sheet is correct. He also states that he uses [facility] in [facility] and has no problem picking up his medications or paying for them. He also states that he does have a living will and that it is on file here. Patient states he lives with his wife in a manufactured home with a ramp to gain entry. He also states that he is independant with ADL's at home. Patient states that he still drives but his wife will be able to provide ride home at discharge. Patient states that his DME's include oxygen through [employer], nebulizer, ramp, quad cane BSC and a glucometer. He states he does not anticiapte needing any other equipment at discharge. Patient states he has used a home health agency after his knee surgery and it was out of [place] but does not remember the name. Offered community resources but patient denies the need for them at this time. Patient states that he plans on returning home with his wife to help at discharge. Patient had no other questions or concerns at this time. CM will continue to follow for needs.    Row Name 05/06/19 0907       Plan    Plan Comments  [facility] referral made to Kirsten in pharmacy and Nikia in respiratory.        Destination      No service coordination in this encounter.      Durable Medical Equipment      No service coordination in this encounter.      Dialysis/Infusion      No service coordination in this encounter.      Home Medical Care      No service coordination in this encounter.      Therapy      No service coordination in  this encounter.      Community Resources      No service coordination in this encounter.          Demographic Summary     Row Name 05/06/19 1016       General Information    Admission Type  observation    Arrived From  home    Referral Source  admission list    Reason for Consult  discharge planning    Preferred Language  English     Used During This Interaction  no       Contact Information    Permission Granted to Share Info With          Functional Status    No documentation.       Psychosocial    No documentation.       Abuse/Neglect    No documentation.       Legal    No documentation.       Substance Abuse    No documentation.       Patient Forms    No documentation.           Elizabeth Arguello RN

## 2019-05-06 NOTE — PROGRESS NOTES
Pt seen after midnight.    Creatinine improved with diuresis, suggestive of cardiorenal ROLA.   Patient had positive JVD, and bibasilar crackles, with 2+ pitting edema up to knees bilaterally lower extremities   We will continue diuresis, have ordered 40 mg IV Lasix every 6 for 2 doses.  Will reorder diuretics in a.m. if patient still appears fluid overloaded    Patient counseled on low-salt diet.    Appreciate cardiology's input, have reviewed their note.  Patient states that he has been stressed up in [place] since his stress test, done here September 2018.  Which was also negative.  Awaiting echo results, but will not make patient n.p.o. tonight, doubt patient will be stressed again tomorrow.

## 2019-05-06 NOTE — ED NOTES
Awaiting Dr. Jones to see pt in ED before transferring to Med Surg. Patient and wife updated     Bernarda Parikh RN  05/05/19 2797

## 2019-05-06 NOTE — PLAN OF CARE
Problem: Patient Care Overview  Goal: Plan of Care Review  Outcome: Ongoing (interventions implemented as appropriate)   05/06/19 0437   Coping/Psychosocial   Plan of Care Reviewed With patient   Plan of Care Review   Progress improving     Goal: Individualization and Mutuality  Outcome: Ongoing (interventions implemented as appropriate)      Problem: Chronic Obstructive Pulmonary Disease (Adult)  Intervention: Optimize Oxygenation/Ventilation/Perfusion   05/06/19 0437   Respiratory Interventions   Airway/Ventilation Management airway patency maintained;humidification applied;pulmonary hygiene promoted   Breathing Techniques/Airway Clearance pursed-lip breathing encouraged   Positioning   Head of Bed (HOB) other (see comments)  (up in chair)

## 2019-05-06 NOTE — ED PROVIDER NOTES
Subjective   History of Present Illness  History of Present Illness    Chief complaint: dyspnea    Location: chest    Quality/Severity:  Moderate symptoms    Timing/Duration: present for 3 days    Modifying Factors:  Worse with activities    Narrative: Patient presents for evaluation of worsening dyspnea symptoms.  He says that for the past 3 days he has had worsening shortness of breath feeling with some occasional cough and congestion as well.  He denies fevers.  He denies chest pain at this time.  He sleeps in his recliner at night because he says he is unable to lay back in bed.  He has oxygen nasal cannula available to him at home and he says he usually only needs to use it occasionally but in recent days he has been using it all day long.  He says his feet and legs feel swollen but no more swollen than usual baseline for him.  He does have a history of COPD and CHF.  He has been taking his medications as directed.    Associated Symptoms: As above  Review of Systems   Constitutional: Positive for activity change (decreased) and fatigue. Negative for chills, diaphoresis and fever.   HENT: Positive for congestion. Negative for facial swelling.    Respiratory: Positive for cough and shortness of breath. Negative for stridor.    Cardiovascular: Positive for palpitations and leg swelling. Negative for chest pain.   Gastrointestinal: Negative for abdominal pain and vomiting.   Genitourinary: Negative for dysuria and flank pain.   Musculoskeletal: Positive for myalgias.   Skin: Negative for color change and rash.   Neurological: Positive for weakness. Negative for syncope and headaches.   All other systems reviewed and are negative.      Past Medical History:   Diagnosis Date   • Arthritis    • Asthma    • Cancer (CMS/HCC)     skin   • Cataract    • COPD (chronic obstructive pulmonary disease) (CMS/HCC)    • Diabetes mellitus (CMS/HCC)    • Disease of thyroid gland    • Hypertension    • Kidney stone    • Myocardial  infarct, old    • Renal disorder        Allergies   Allergen Reactions   • Oxycontin [Oxycodone Hcl]      'Makes me crazy and stand on my head'       Past Surgical History:   Procedure Laterality Date   • COLONOSCOPY     • EYE SURGERY     • JOINT REPLACEMENT      right   • KIDNEY STONE SURGERY     • REPLACEMENT TOTAL KNEE     • TOE SURGERY         Family History   Problem Relation Age of Onset   • COPD Mother    • Diabetes Father        Social History     Socioeconomic History   • Marital status: Unknown     Spouse name: Not on file   • Number of children: Not on file   • Years of education: Not on file   • Highest education level: Not on file   Tobacco Use   • Smoking status: Former Smoker   • Smokeless tobacco: Never Used   • Tobacco comment: quit 1993   Substance and Sexual Activity   • Alcohol use: No   • Drug use: No   • Sexual activity: Defer       ED Triage Vitals [05/05/19 2057]   Temp Heart Rate Resp BP SpO2   98.5 °F (36.9 °C) 106 22 (!) 189/101 94 %      Temp src Heart Rate Source Patient Position BP Location FiO2 (%)   Oral Apical Sitting Right arm --         Objective   Physical Exam   Constitutional: He is oriented to person, place, and time. He appears well-developed and well-nourished.  Non-toxic appearance. No distress.   HENT:   Head: Normocephalic and atraumatic.   Eyes: EOM are normal. Pupils are equal, round, and reactive to light. Right eye exhibits no discharge. Left eye exhibits no discharge.   Neck: Normal range of motion. Neck supple.   Cardiovascular: Normal rate, regular rhythm, normal heart sounds and intact distal pulses.   No murmur heard.  Pulmonary/Chest: Effort normal. No stridor. No respiratory distress. He has decreased breath sounds. He has no wheezes. He has no rhonchi. He has no rales. He exhibits no mass and no tenderness.   Diminished breath sounds in both bases.  Only slightly increased work of breathing noted at rest   Abdominal: Soft. He exhibits no mass. There is no  tenderness. There is no rebound.   Musculoskeletal: Normal range of motion. He exhibits no deformity.        Right lower leg: He exhibits edema.        Left lower leg: He exhibits edema.   Neurological: He is alert and oriented to person, place, and time.   Skin: Skin is warm and dry. No rash noted. No erythema.   Psychiatric: He has a normal mood and affect. His behavior is normal. Judgment and thought content normal.   Nursing note and vitals reviewed.    EKG           EKG time/Interp time: 2121/2121  Rhythm/Rate: Likely sinus rhythm, 103bpm  P waves and TN: difficult to assess b/c of baseline artifact  QRS, axis: 108 ms, normal axis  ST and T waves: No acute ischemic changes notable    Independently interpreted by me contemporaneously with treatment    EKG           EKG time/Interp time: 2214/2216  Rhythm/Rate: nsr, 100 bpm  P waves and TN: p waves present, 206ms  QRS, axis: 110ms, normal axis   ST and T waves: no acute ischemic changes     Independently interpreted by me contemporaneously with treatment    Results for orders placed or performed during the hospital encounter of 05/05/19   Influenza Antigen, Rapid - Swab, Nasopharynx   Result Value Ref Range    Influenza A Ag, EIA Negative Negative    Influenza B Ag, EIA Negative Negative   Comprehensive Metabolic Panel   Result Value Ref Range    Glucose 210 (H) 65 - 99 mg/dL    BUN 23 8 - 23 mg/dL    Creatinine 1.84 (H) 0.76 - 1.27 mg/dL    Sodium 134 (L) 136 - 145 mmol/L    Potassium 3.7 3.5 - 5.2 mmol/L    Chloride 95 (L) 98 - 107 mmol/L    CO2 25.6 22.0 - 29.0 mmol/L    Calcium 9.1 8.6 - 10.5 mg/dL    Total Protein 7.1 6.0 - 8.5 g/dL    Albumin 4.10 3.50 - 5.20 g/dL    ALT (SGPT) 24 1 - 41 U/L    AST (SGOT) 31 1 - 40 U/L    Alkaline Phosphatase 64 39 - 117 U/L    Total Bilirubin 0.6 0.2 - 1.2 mg/dL    eGFR Non African Amer 36 (L) >60 mL/min/1.73    Globulin 3.0 gm/dL    A/G Ratio 1.4 g/dL    BUN/Creatinine Ratio 12.5 7.0 - 25.0    Anion Gap 13.4 mmol/L    Troponin   Result Value Ref Range    Troponin T 0.033 (C) 0.000-<0.030 ng/mL   BNP   Result Value Ref Range    proBNP 7,405.0 (H) 5.0-1,800.0 pg/mL   Lactic Acid, Plasma   Result Value Ref Range    Lactate 1.0 0.5 - 2.0 mmol/L   Procalcitonin   Result Value Ref Range    Procalcitonin 0.05 (L) 0.10 - 0.25 ng/mL   CBC Auto Differential   Result Value Ref Range    WBC 10.78 3.40 - 10.80 10*3/mm3    RBC 4.57 4.14 - 5.80 10*6/mm3    Hemoglobin 11.9 (L) 13.0 - 17.7 g/dL    Hematocrit 38.5 37.5 - 51.0 %    MCV 84.2 79.0 - 97.0 fL    MCH 26.0 (L) 26.6 - 33.0 pg    MCHC 30.9 (L) 31.5 - 35.7 g/dL    RDW 14.8 12.3 - 15.4 %    RDW-SD 45.1 37.0 - 54.0 fl    MPV 10.7 6.0 - 12.0 fL    Platelets 230 140 - 450 10*3/mm3    Neutrophil % 78.8 (H) 42.7 - 76.0 %    Lymphocyte % 8.4 (L) 19.6 - 45.3 %    Monocyte % 9.5 5.0 - 12.0 %    Eosinophil % 2.4 0.3 - 6.2 %    Basophil % 0.3 0.0 - 1.5 %    Immature Grans % 0.6 (H) 0.0 - 0.5 %    Neutrophils, Absolute 8.49 (H) 1.70 - 7.00 10*3/mm3    Lymphocytes, Absolute 0.91 0.70 - 3.10 10*3/mm3    Monocytes, Absolute 1.02 (H) 0.10 - 0.90 10*3/mm3    Eosinophils, Absolute 0.26 0.00 - 0.40 10*3/mm3    Basophils, Absolute 0.03 0.00 - 0.20 10*3/mm3    Immature Grans, Absolute 0.07 (H) 0.00 - 0.05 10*3/mm3    nRBC 0.0 0.0 - 0.2 /100 WBC       RADIOLOGY        Study: Chest x-ray    Findings: IMPRESSION:   Mild interstitial prominence which is probably chronic. When allowing for changes in technique, I do not think there is been any significant change from the prior study.    Minimal left base atelectasis stable as well.    Otherwise normal    Interpreted contemporaneously with treatment by Dr. Henderson, independently viewed by me      Procedures           ED Course  ED Course as of May 05 2328   Sun May 05, 2019   2324 I have reviewed the EKG and labs and chest x-ray from today's visit.  Patient's troponin is slightly elevated here but that may be a chronic finding for him as it seems comparable to previous  troponin values in his record.  His creatinine also slightly elevated at 1.8 but again that does not appear to be far off his baseline.  His proBNP value, however, is elevated above his typical baseline and that is of uncertain significance.  The chest x-ray does not seem to indicate significant volume overload or pulmonary edema changes.  I think it is most likely that the patient's dyspnea symptoms are probably multifactorial with some component of COPD and CHF both.  Additionally, he may be having some respiratory viral process going on with cough and congestion contributing to the picture.  His saturations are essentially normal here but he is wearing nasal cannula oxygen.  It is clear that the patient and his wife are both uncomfortable with the idea of returning home while he still feels short of breath.  Therefore I think is reasonable to observe him tonight for some nebulized treatments and perhaps IV steroids or IV diuretics or a balanced approach with both.  I will defer the treatment plan to the admitting hospitalist for tonight.  Overnight observation will also allow opportunity to follow any trend with troponin values.  There does not appear to be any need for antibiotics at this time.  [RE]      ED Course User Index  [RE] Iam Charlton MD                  MDM  Number of Diagnoses or Management Options  Acute on chronic congestive heart failure, unspecified heart failure type (CMS/HCC):   Chronic obstructive pulmonary disease, unspecified COPD type (CMS/HCC):   Dyspnea, unspecified type:   Elevated troponin:   Hypertension, unspecified type:      Amount and/or Complexity of Data Reviewed  Clinical lab tests: reviewed and ordered  Tests in the radiology section of CPT®: reviewed and ordered  Decide to obtain previous medical records or to obtain history from someone other than the patient: yes  Review and summarize past medical records: yes  Discuss the patient with other providers: yes (  Robert  )  Independent visualization of images, tracings, or specimens: yes    Risk of Complications, Morbidity, and/or Mortality  Presenting problems: moderate  Diagnostic procedures: moderate  Management options: moderate          Final diagnoses:   Dyspnea, unspecified type   Chronic obstructive pulmonary disease, unspecified COPD type (CMS/HCC)   Acute on chronic congestive heart failure, unspecified heart failure type (CMS/HCC)   Elevated troponin   Hypertension, unspecified type            Iam Charlton MD  05/05/19 7606       Iam Charlton MD  05/05/19 8366

## 2019-05-06 NOTE — H&P
[facility] MEDICAL GROUP HOSPITALIST     Abdullahi Clarke MD    CHIEF COMPLAINT: Shortness of air    HISTORY OF PRESENT ILLNESS:    71-year-old male with a past medical history of elevated troponins, chronic diastolic heart failure, hypertension, ROLA on CKD, CKD stage III, COPD, diabetes type 2, hypothyroidism, acute COPD exacerbations.  He comes today to [facility] emergency department with a chief complaint of shortness of breath.  He expressed that his symptoms started 1 week prior to admission and they have been worse through the last week.  He is able to walk a couple steps without shortness of breath the shortness of breath is accompanied with productive green cough, wheezing, chills.  He sleeps in a recliner and has symptoms of orthopnea, he denies any changes in lower extremity edema, weight gain chest pain or sick contacts.    In the emergency department he received a dose of aspirin 325 mg p.o., and an nebulizations, the chest x-ray does not show any consolidations but it shows crowding of the pulmonary vasculature.  Bedside ultrasound was performed by me and did show several B-lines.      Past Medical History:   Diagnosis Date   • Arthritis    • Asthma    • Cancer (CMS/HCC)     skin   • Cataract    • COPD (chronic obstructive pulmonary disease) (CMS/HCC)    • Diabetes mellitus (CMS/HCC)    • Disease of thyroid gland    • Hypertension    • Kidney stone    • Myocardial infarct, old    • Renal disorder      Past Surgical History:   Procedure Laterality Date   • COLONOSCOPY     • EYE SURGERY     • JOINT REPLACEMENT      right   • KIDNEY STONE SURGERY     • REPLACEMENT TOTAL KNEE     • TOE SURGERY       Family History   Problem Relation Age of Onset   • COPD Mother    • Diabetes Father      Social History     Tobacco Use   • Smoking status: Former Smoker   • Smokeless tobacco: Never Used   • Tobacco comment: quit 1993   Substance Use Topics   • Alcohol use: No   • Drug use: No      Medications Prior to Admission   Medication Sig Dispense Refill Last Dose   • albuterol (ACCUNEB) 0.63 MG/3ML nebulizer solution Take 3 mL by nebulization Every 6 (Six) Hours As Needed for Wheezing. 25 ampule 0 5/5/2019 at 1700   • amLODIPine (NORVASC) 2.5 MG tablet Take 1 tablet by mouth Daily. 30 tablet 0 5/5/2019 at Unknown time   • atorvastatin (LIPITOR) 10 MG tablet Take 1 tablet by mouth Daily. 30 tablet 0 5/5/2019 at Unknown time   • budesonide-formoterol (SYMBICORT) 160-4.5 MCG/ACT inhaler Inhale 2 puffs 2 (Two) Times a Day. 1 inhaler 0 5/5/2019 at Unknown time   • docusate sodium 100 MG capsule Take 100 mg by mouth 2 (Two) Times a Day. (Patient taking differently: Take 100 mg by mouth 2 (Two) Times a Day As Needed.) 60 capsule 0 5/4/2019 at Unknown time   • albuterol (ACCUNEB) 1.25 MG/3ML nebulizer solution Take 3 mL by nebulization Every 6 (Six) Hours As Needed for Wheezing. 30 vial 0    • aspirin  MG EC tablet Take 1 tablet by mouth Daily. 30 tablet 0    • clotrimazole (LOTRIMIN) 1 % cream Apply  topically Every 12 (Twelve) Hours. 60 g 0 Taking   • clotrimazole-betamethasone (LOTRISONE) 1-0.05 % cream Apply  topically to the appropriate area as directed Every 12 (Twelve) Hours. 45 g 0    • donepezil (ARICEPT) 5 MG tablet Take 2.5 mg by mouth Every Night.   2/16/2019 at Unknown time   • furosemide (LASIX) 40 MG tablet Take 1 tablet by mouth Daily. Take 1 tablet by mouth twice per day for 2 days. Then resume 1 per day (Patient taking differently: Take 40 mg by mouth Daily.) 30 tablet 0 2/16/2019 at Unknown time   • gabapentin (NEURONTIN) 600 MG tablet Take 600 mg by mouth Daily.   Past Month at Unknown time   • insulin detemir (LEVEMIR) 100 UNIT/ML injection Inject 30 Units under the skin 2 (Two) Times a Day. 10 mL 0 2/16/2019 at Unknown time   • insulin NPH-insulin regular (humuLIN 70/30,novoLIN 70/30) (70-30) 100 UNIT/ML injection Inject 30 Units under the skin into the appropriate area as  directed 2 (Two) Times a Day With Meals.   2/16/2019 at Unknown time   • lactobacillus acidophilus (RISAQUAD) capsule capsule Take 1 capsule by mouth Daily. 30 capsule 0    • levothyroxine (SYNTHROID, LEVOTHROID) 100 MCG tablet Take 100 mcg by mouth Daily.      • lisinopril (PRINIVIL,ZESTRIL) 20 MG tablet Take 20 mg by mouth Daily.      • metoprolol succinate XL (TOPROL-XL) 25 MG 24 hr tablet Take 0.5 tablets by mouth Every Night. 30 tablet 0 2/16/2019 at Unknown time   • polyethylene glycol (MIRALAX) pack packet Take 17 g by mouth Daily. 30 each 0 More than a month at Unknown time   • pramipexole (MIRAPEX) 0.5 MG tablet Take 0.5 mg by mouth 2 (Two) Times a Day.   2/15/2019 at Unknown time   • pregabalin (LYRICA) 100 MG capsule Take 100 mg by mouth 3 (Three) Times a Day.   2/16/2019 at Unknown time   • SITagliptin (JANUVIA) 100 MG tablet Take 100 mg by mouth Daily.   2/16/2019 at Unknown time     Allergies:  Oxycontin [oxycodone hcl]    REVIEW OF SYSTEMS:  Please see the above history of present illness for pertinent positives and negatives.  The remainder of the patient's systems have been reviewed and are negative.    Constitutional: Positive for fatigue, malaise.  HENT: Negative for sore throat and trouble swallowing.    Respiratory: Positive for shortness of breath, cough, wheezing.  Cardiovascular: Negative for chest pain, palpitations, positive for lower extremity edema  Gastrointestinal: Negative for abdominal pain, diarrhea, nausea, vomiting,.  Genitourinary: Negative for dysuria tenesmus positive for nocturia  Musculoskeletal: Negative for joint stiffness, joint pain, muscular pain,  Skin: Negative for rash and wound.   Neurological: Negative for dizziness, syncope and headaches.   Psychiatric/Behavioral: Negative for confusion. The patient is not nervous/anxious.          Vital Signs  Temp:  [97 °F (36.1 °C)-98.5 °F (36.9 °C)] 97 °F (36.1 °C)  Heart Rate:  [] 102  Resp:  [18-22] 18  BP:  "(145-189)/() 186/118  Oxygen Therapy  SpO2: 93 %  Pulse Oximetry Type: Intermittent  Device (Oxygen Therapy): nasal cannula  Flow (L/min): 2}  Body mass index is 39.58 kg/m².  Flowsheet Rows      First Filed Value   Admission Height  185.4 cm (73\") Documented at 05/05/2019 2057   Admission Weight  136 kg (300 lb) Documented at 05/05/2019 2057             Physical Exam:  Physical Exam   Constitutional:  Obese male, chronically ill, in no acute distress, cooperative and talkative  HEENT:   Head: Normocephalic and atraumatic.   Eyes:  Pupils are equal, round, and reactive to light. EOM are intact. Sclerae are anicteric and noninjected.  Mouth and Throat: Patient has moist mucous membranes.  Oropharynx show oral thrush.  Neck: Neck supple. No JVD present. No thyromegaly present. No lymphadenopathy present.  Cardiovascular: Cardiovascular exam showed distant heart sounds with S1 and S2 present, regular no murmurs.  Pulmonary/Chest: Distant breath sounds, bilateral crackles on bases with Rales.  Bedside ultrasound was performed and he did show significant presence of B-lines (as per the blue ultrasound protocol this could be associated with fluid overload )  Abdominal: Soft. Bowel sounds are normal. No distension and no mass. There is no hepatosplenomegaly. There is no tenderness.   Musculoskeletal: Normal muscle tone.  In the back is noticed edema as well.  Extremities: Edema +3 pitting.  Neurological: Patient is alert and oriented to person, place, and time. Cranial nerves II-XII are grossly intact with no focal deficits.  Skin: Skin is warm. No rash noted. Nails show no clubbing.  No cyanosis or erythema.    Emotional Behavior:    Judgement and Insight: Normal   Mental Status:  Alertness within normal limits   Memory: Normal limits   Mood and Affect:         Depression none               Anxiety anxious due to the diagnosis    Debilities:   Physical Weakness secondary to his chronic conditions   Handicaps " none   Disabilities none   Agitation none     Results Review:    I reviewed the patient's new clinical results.  Lab Results (most recent)     Procedure Component Value Units Date/Time    Hemoglobin A1c [[id number]] Collected:  05/06/19 0053    Specimen:  Blood Updated:  05/06/19 0053    Protime-INR [[id number]] Collected:  05/06/19 0053    Specimen:  Blood Updated:  05/06/19 0053    aPTT [[id number]] Collected:  05/06/19 0053    Specimen:  Blood Updated:  05/06/19 0053    Lipid Panel [[id number]] Collected:  05/06/19 0053    Specimen:  Blood Updated:  05/06/19 0053    BNP [[id number]]  (Abnormal) Collected:  05/05/19 2131    Specimen:  Blood Updated:  05/05/19 2244     proBNP 7,405.0 pg/mL     Narrative:       Among patients with dyspnea, NT-proBNP is highly sensitive for the detection of acute congestive heart failure. In addition NT-proBNP of <300 pg/ml effectively rules out acute congestive heart failure with 99% negative predictive value.    Comprehensive Metabolic Panel [[id number]]  (Abnormal) Collected:  05/05/19 2131    Specimen:  Blood Updated:  05/05/19 2210     Glucose 210 mg/dL      BUN 23 mg/dL      Creatinine 1.84 mg/dL      Sodium 134 mmol/L      Potassium 3.7 mmol/L      Chloride 95 mmol/L      CO2 25.6 mmol/L      Calcium 9.1 mg/dL      Total Protein 7.1 g/dL      Albumin 4.10 g/dL      ALT (SGPT) 24 U/L      AST (SGOT) 31 U/L      Alkaline Phosphatase 64 U/L      Total Bilirubin 0.6 mg/dL      eGFR Non African Amer 36 mL/min/1.73      Globulin 3.0 gm/dL      A/G Ratio 1.4 g/dL      BUN/Creatinine Ratio 12.5     Anion Gap 13.4 mmol/L     Narrative:       GFR Normal >60  Chronic Kidney Disease <60  Kidney Failure <15    Troponin [[id number]]  (Abnormal) Collected:  05/05/19 2131    Specimen:  Blood Updated:  05/05/19 2207     Troponin T 0.033 ng/mL     Narrative:       Troponin T Reference Range:  <= 0.03 ng/mL-   Negative for AMI  >0.03 ng/mL-     Abnormal for myocardial necrosis.  Clinicians would have  to utilize clinical acumen, EKG, Troponin and serial changes to determine if it is an Acute Myocardial Infarction or myocardial injury due to an underlying chronic condition.     Procalcitonin [[id number]]  (Abnormal) Collected:  05/05/19 2131    Specimen:  Blood Updated:  05/05/19 2206     Procalcitonin 0.05 ng/mL     Narrative:       As a Marker for Sepsis (Non-Neonates):   1. <0.5 ng/mL represents a low risk of severe sepsis and/or septic shock.  2. >2 ng/mL represents a high risk of severe sepsis and/or septic shock.    As a Marker for Lower Respiratory Tract Infections that require antibiotic therapy:    PCT on Admission     Antibiotic Therapy       6-12 Hrs later  > 0.5                Strongly Recommended             >0.25 - <0.5         Recommended  0.1 - 0.25           Discouraged              Remeasure/reassess PCT  <0.1                 Strongly Discouraged     Remeasure/reassess PCT                     PCT values of < 0.5 ng/mL do not exclude an infection, because localized infections (without systemic signs) may be associated with such low concentrations, or a systemic infection in its initial stages (< 6 hours). Furthermore, increased PCT can occur without infection. PCT concentrations between 0.5 and 2.0 ng/mL should be interpreted taking into account the patient's history. It is recommended to retest PCT within 6-24 hours if any concentrations < 2 ng/mL are obtained.    Influenza Antigen, Rapid - Swab, Nasopharynx [[id number]]  (Normal) Collected:  05/05/19 2131    Specimen:  Swab from Nasopharynx Updated:  05/05/19 2156     Influenza A Ag, EIA Negative     Influenza B Ag, EIA Negative    Lactic Acid, Plasma [[id number]]  (Normal) Collected:  05/05/19 2131    Specimen:  Blood Updated:  05/05/19 2152     Lactate 1.0 mmol/L     CBC & Differential [[id number]] Collected:  05/05/19 2131    Specimen:  Blood Updated:  05/05/19 2138    Narrative:       The following orders were created for panel order CBC &  Differential.  Procedure                               Abnormality         Status                     ---------                               -----------         ------                     CBC Auto Differential[[id number]]        Abnormal            Final result                 Please view results for these tests on the individual orders.    CBC Auto Differential [[id number]]  (Abnormal) Collected:  05/05/19 2131    Specimen:  Blood Updated:  05/05/19 2138     WBC 10.78 10*3/mm3      RBC 4.57 10*6/mm3      Hemoglobin 11.9 g/dL      Hematocrit 38.5 %      MCV 84.2 fL      MCH 26.0 pg      MCHC 30.9 g/dL      RDW 14.8 %      RDW-SD 45.1 fl      MPV 10.7 fL      Platelets 230 10*3/mm3      Neutrophil % 78.8 %      Lymphocyte % 8.4 %      Monocyte % 9.5 %      Eosinophil % 2.4 %      Basophil % 0.3 %      Immature Grans % 0.6 %      Neutrophils, Absolute 8.49 10*3/mm3      Lymphocytes, Absolute 0.91 10*3/mm3      Monocytes, Absolute 1.02 10*3/mm3      Eosinophils, Absolute 0.26 10*3/mm3      Basophils, Absolute 0.03 10*3/mm3      Immature Grans, Absolute 0.07 10*3/mm3      nRBC 0.0 /100 WBC           Imaging Results (most recent)     Procedure Component Value Units Date/Time    XR Chest 2 View [[id number]] Collected:  05/05/19 2203     Updated:  05/05/19 2205    Narrative:       CR Chest 2 Vws    INDICATION:    Shortness of air suggest today with history of COPD    COMPARISON:    2/16/2019    FINDINGS:   PA and lateral view of the chest  art size is normal. There is mild faint interstitial prominence. No effusions are identified. There is no focal infiltrate. There are stable left base atelectasis        Impression:       Mild interstitial prominence which is probably chronic. When allowing for changes in technique, I do not think there is been any significant change from the prior study.    Minimal left base atelectasis stable as well.    Otherwise normal    Signer Name: Fei Henderson MD   Signed: 5/5/2019 10:03 PM    Workstation Name: [id number]           reviewed    ECG/EMG Results (most recent)     Procedure Component Value Units Date/Time    ECG 12 Lead [[id number]] Collected:  05/05/19 2121     Updated:  05/05/19 2123    Narrative:       HEART RATE= 103  bpm  RR Interval= 583  ms  WI Interval=   ms  P Horizontal Axis=   deg  P Front Axis=   deg  QRSD Interval= 108  ms  QT Interval= 390  ms  QRS Axis= 5  deg  T Wave Axis= 13  deg  - ABNORMAL ECG -  Atrial fibrillation  Minimal ST depression, lateral leads  Prolonged QT interval  Electronically Signed By:   Date and Time of Study: 2019-05-05 21:21:49    ECG 12 Lead [[id number]] Collected:  05/05/19 2214     Updated:  05/05/19 2216    Narrative:       HEART RATE= 100  bpm  RR Interval= 608  ms  WI Interval= 206  ms  P Horizontal Axis= 31  deg  P Front Axis= 62  deg  QRSD Interval= 110  ms  QT Interval= 376  ms  QRS Axis= 10  deg  T Wave Axis= 42  deg  - ABNORMAL ECG -  Sinus rhythm  Paired ventricular premature complexes  Nonspecific repol abnormality, lateral leads  Electronically Signed By:   Date and Time of Study: 2019-05-05 22:14:15        reviewed    Assessment/Plan     71-year-old male with a past medical history of elevated troponins, chronic diastolic heart failure, hypertension, ROLA on CKD, CKD stage III, COPD, diabetes type 2, hypothyroidism, acute COPD exacerbations.  He comes today to [facility] emergency department with a chief complaint of shortness of breath.     Heart failure exacerbation.  Based on the history, physical examination and the laboratory, I concluded that this patient could have heart failure exacerbation.   The patient has a history of elevated troponins in the past and denies any chest pain.  The EKG does not show any signs of ST segment elevation.  I have ordered troponins every 6 hours x3 and EKG every 6 hours x3 will follow and if it is necessary we will consult cardiology.  He has an echocardiogram from November 22, 2017 that did show  ejection fraction of 52%, left ventricular diastolic dysfunction grade 1, calcification of the aortic valve, mild dilation of the aortic root.  BMP on admission is more than 7000, bedside ultrasound of the chest show in innumerable B-lines which are compatible with heart failure.  Echocardiogram was ordered.  Lasix 40 mg IV one-time dose was ordered, will follow a strict I's and O's and daily weights.    Cardiorenal syndrome.  Could be related to CHF exacerbation BUN/creatinine ratio is within normal limits corroborates probably cardiorenal syndrome.  We will continue with diuresis and follow the creatinine level.    Acute COPD exacerbation  Based on the history of the patient, he could also had a COPD exacerbation.  For which we ordered a procalcitonin level, sputum culture, viral panel.  We will start the patient on azithromycin 500 IV, prednisone 40 mg p.o and duo nebs every 6 hours.    Hypertension.  At the moment of the H&P his blood pressure was 140/90.  If the blood pressure increased we will treat with hydralazine IV as needed and the Lasix IV.    Diabetes mellitus type 2.  The patient does not know his home medication.  We will start him on insulin sliding scale moderate dose he has an A1c of 13% last admission.    CKD.  Based on her his previous labs he is creatinine baseline is between 1.3-1.6.  We will follow urine output and will consider continue diuresis.    Hypothyroidism  He has a documented TSH 5.19 in the month of February 2019.  We will restart his home dose levothyroxine when the wife brings to his home medications list.    Diet: N.p.o. for the elevated troponins, if the troponins remain stable we will fit the patient in the morning.  DVT prophylaxis: Heparin every 12 hours 5000 units due to impaired renal function.  CODE STATUS: He is DNR/DNI as per patient he expressed that he has a living will.  Pending: Follow lab studies, follow troponin, follow EKG, med rec reconciliation they wife  expressed that she will bring the list of medications tomorrow in a.m.      I discussed the patients findings and my recommendations with patient and wife.     Maxx Jones MD  05/06/19  12:57 AM    Time: More than 50% of time spent in counseling and coordination of care:  Total face-to-face/floor time 35 min.  Time spent in counseling 35 min. Counseling included the following topics: Gnosis, daily weights, follow-up with cardiology after discharge

## 2019-05-06 NOTE — NURSING NOTE
Pt is unsure of medications; wife to bring list in am,  Dr. Jones is aware of blood pressure and medication list not being able to be completed. To give lasix IV and recheck blood pressure.

## 2019-05-07 PROBLEM — R77.8 ELEVATED TROPONIN: Status: ACTIVE | Noted: 2019-05-07

## 2019-05-07 PROBLEM — R79.89 ELEVATED PROCALCITONIN: Status: RESOLVED | Noted: 2017-11-22 | Resolved: 2019-05-07

## 2019-05-07 PROBLEM — D72.829 LEUKOCYTOSIS: Status: RESOLVED | Noted: 2017-11-22 | Resolved: 2019-05-07

## 2019-05-07 PROBLEM — E87.20 LACTIC ACID ACIDOSIS: Status: RESOLVED | Noted: 2017-11-22 | Resolved: 2019-05-07

## 2019-05-07 PROBLEM — S99.911A INJURY OF RIGHT ANKLE: Status: RESOLVED | Noted: 2018-08-27 | Resolved: 2019-05-07

## 2019-05-07 PROBLEM — I50.33 ACUTE ON CHRONIC DIASTOLIC CHF (CONGESTIVE HEART FAILURE) (HCC): Status: ACTIVE | Noted: 2019-05-07

## 2019-05-07 PROBLEM — J18.9 PNEUMONIA OF BOTH LOWER LOBES DUE TO INFECTIOUS ORGANISM: Status: RESOLVED | Noted: 2019-02-17 | Resolved: 2019-05-07

## 2019-05-07 PROBLEM — A41.9 SEPSIS (HCC): Status: RESOLVED | Noted: 2017-11-21 | Resolved: 2019-05-07

## 2019-05-07 PROBLEM — I13.10 CARDIORENAL SYNDROME WITH RENAL FAILURE: Status: ACTIVE | Noted: 2019-05-07

## 2019-05-07 PROBLEM — I21.4 NSTEMI (NON-ST ELEVATED MYOCARDIAL INFARCTION) (HCC): Chronic | Status: RESOLVED | Noted: 2018-01-10 | Resolved: 2019-05-07

## 2019-05-07 PROBLEM — N18.9 CKD (CHRONIC KIDNEY DISEASE): Status: ACTIVE | Noted: 2019-05-07

## 2019-05-07 LAB
ANION GAP SERPL CALCULATED.3IONS-SCNC: 12.5 MMOL/L
BASOPHILS # BLD AUTO: 0.01 10*3/MM3 (ref 0–0.2)
BASOPHILS NFR BLD AUTO: 0.1 % (ref 0–1.5)
BUN BLD-MCNC: 27 MG/DL (ref 8–23)
BUN/CREAT SERPL: 14.7 (ref 7–25)
CALCIUM SPEC-SCNC: 8.9 MG/DL (ref 8.6–10.5)
CHLORIDE SERPL-SCNC: 97 MMOL/L (ref 98–107)
CO2 SERPL-SCNC: 27.5 MMOL/L (ref 22–29)
CREAT BLD-MCNC: 1.84 MG/DL (ref 0.76–1.27)
DEPRECATED RDW RBC AUTO: 45.1 FL (ref 37–54)
EOSINOPHIL # BLD AUTO: 0.01 10*3/MM3 (ref 0–0.4)
EOSINOPHIL NFR BLD AUTO: 0.1 % (ref 0.3–6.2)
ERYTHROCYTE [DISTWIDTH] IN BLOOD BY AUTOMATED COUNT: 14.8 % (ref 12.3–15.4)
GFR SERPL CREATININE-BSD FRML MDRD: 36 ML/MIN/1.73
GLUCOSE BLD-MCNC: 275 MG/DL (ref 65–99)
GLUCOSE BLDC GLUCOMTR-MCNC: 293 MG/DL (ref 70–130)
GLUCOSE BLDC GLUCOMTR-MCNC: 392 MG/DL (ref 70–130)
GLUCOSE BLDC GLUCOMTR-MCNC: 410 MG/DL (ref 70–130)
GLUCOSE BLDC GLUCOMTR-MCNC: 441 MG/DL (ref 70–130)
GLUCOSE BLDC GLUCOMTR-MCNC: 464 MG/DL (ref 70–130)
GLUCOSE BLDC GLUCOMTR-MCNC: 497 MG/DL (ref 70–130)
GLUCOSE BLDC GLUCOMTR-MCNC: 509 MG/DL (ref 70–130)
GLUCOSE BLDC GLUCOMTR-MCNC: 520 MG/DL (ref 70–130)
HCT VFR BLD AUTO: 35.9 % (ref 37.5–51)
HGB BLD-MCNC: 11.1 G/DL (ref 13–17.7)
IMM GRANULOCYTES # BLD AUTO: 0.05 10*3/MM3 (ref 0–0.05)
IMM GRANULOCYTES NFR BLD AUTO: 0.6 % (ref 0–0.5)
LYMPHOCYTES # BLD AUTO: 0.8 10*3/MM3 (ref 0.7–3.1)
LYMPHOCYTES NFR BLD AUTO: 9.8 % (ref 19.6–45.3)
MCH RBC QN AUTO: 26.2 PG (ref 26.6–33)
MCHC RBC AUTO-ENTMCNC: 30.9 G/DL (ref 31.5–35.7)
MCV RBC AUTO: 84.9 FL (ref 79–97)
MONOCYTES # BLD AUTO: 0.85 10*3/MM3 (ref 0.1–0.9)
MONOCYTES NFR BLD AUTO: 10.4 % (ref 5–12)
NEUTROPHILS # BLD AUTO: 6.48 10*3/MM3 (ref 1.7–7)
NEUTROPHILS NFR BLD AUTO: 79 % (ref 42.7–76)
NRBC BLD AUTO-RTO: 0 /100 WBC (ref 0–0.2)
PLATELET # BLD AUTO: 217 10*3/MM3 (ref 140–450)
PMV BLD AUTO: 11.4 FL (ref 6–12)
POTASSIUM BLD-SCNC: 4.4 MMOL/L (ref 3.5–5.2)
RBC # BLD AUTO: 4.23 10*6/MM3 (ref 4.14–5.8)
SODIUM BLD-SCNC: 137 MMOL/L (ref 136–145)
WBC NRBC COR # BLD: 8.2 10*3/MM3 (ref 3.4–10.8)

## 2019-05-07 PROCEDURE — 25010000002 HEPARIN (PORCINE) PER 1000 UNITS: Performed by: INTERNAL MEDICINE

## 2019-05-07 PROCEDURE — 94799 UNLISTED PULMONARY SVC/PX: CPT

## 2019-05-07 PROCEDURE — 63710000001 INSULIN ASPART PER 5 UNITS: Performed by: INTERNAL MEDICINE

## 2019-05-07 PROCEDURE — 63710000001 INSULIN DETEMIR PER 5 UNITS: Performed by: INTERNAL MEDICINE

## 2019-05-07 PROCEDURE — 85025 COMPLETE CBC W/AUTO DIFF WBC: CPT | Performed by: INTERNAL MEDICINE

## 2019-05-07 PROCEDURE — 82962 GLUCOSE BLOOD TEST: CPT

## 2019-05-07 PROCEDURE — 80048 BASIC METABOLIC PNL TOTAL CA: CPT | Performed by: INTERNAL MEDICINE

## 2019-05-07 PROCEDURE — 99214 OFFICE O/P EST MOD 30 MIN: CPT | Performed by: INTERNAL MEDICINE

## 2019-05-07 PROCEDURE — 99232 SBSQ HOSP IP/OBS MODERATE 35: CPT | Performed by: INTERNAL MEDICINE

## 2019-05-07 PROCEDURE — 25010000002 FUROSEMIDE PER 20 MG: Performed by: INTERNAL MEDICINE

## 2019-05-07 PROCEDURE — 63710000001 PREDNISONE PER 1 MG: Performed by: INTERNAL MEDICINE

## 2019-05-07 PROCEDURE — 94640 AIRWAY INHALATION TREATMENT: CPT

## 2019-05-07 RX ORDER — FUROSEMIDE 10 MG/ML
80 INJECTION INTRAMUSCULAR; INTRAVENOUS EVERY 8 HOURS
Status: COMPLETED | OUTPATIENT
Start: 2019-05-07 | End: 2019-05-07

## 2019-05-07 RX ORDER — IPRATROPIUM BROMIDE AND ALBUTEROL SULFATE 2.5; .5 MG/3ML; MG/3ML
3 SOLUTION RESPIRATORY (INHALATION) EVERY 6 HOURS PRN
Status: DISCONTINUED | OUTPATIENT
Start: 2019-05-07 | End: 2019-05-08 | Stop reason: HOSPADM

## 2019-05-07 RX ORDER — BUDESONIDE AND FORMOTEROL FUMARATE DIHYDRATE 160; 4.5 UG/1; UG/1
2 AEROSOL RESPIRATORY (INHALATION)
Status: DISCONTINUED | OUTPATIENT
Start: 2019-05-07 | End: 2019-05-08 | Stop reason: HOSPADM

## 2019-05-07 RX ORDER — BUDESONIDE AND FORMOTEROL FUMARATE DIHYDRATE 160; 4.5 UG/1; UG/1
AEROSOL RESPIRATORY (INHALATION)
Status: DISPENSED
Start: 2019-05-07 | End: 2019-05-08

## 2019-05-07 RX ADMIN — INSULIN ASPART 9 UNITS: 100 INJECTION, SOLUTION INTRAVENOUS; SUBCUTANEOUS at 17:11

## 2019-05-07 RX ADMIN — IPRATROPIUM BROMIDE AND ALBUTEROL SULFATE 3 ML: .5; 3 SOLUTION RESPIRATORY (INHALATION) at 19:06

## 2019-05-07 RX ADMIN — BUDESONIDE AND FORMOTEROL FUMARATE DIHYDRATE 2 PUFF: 160; 4.5 AEROSOL RESPIRATORY (INHALATION) at 21:13

## 2019-05-07 RX ADMIN — IPRATROPIUM BROMIDE AND ALBUTEROL SULFATE 3 ML: .5; 3 SOLUTION RESPIRATORY (INHALATION) at 07:52

## 2019-05-07 RX ADMIN — SODIUM CHLORIDE, PRESERVATIVE FREE 3 ML: 5 INJECTION INTRAVENOUS at 08:49

## 2019-05-07 RX ADMIN — METOPROLOL TARTRATE 25 MG: 25 TABLET ORAL at 11:33

## 2019-05-07 RX ADMIN — IPRATROPIUM BROMIDE AND ALBUTEROL SULFATE 3 ML: .5; 3 SOLUTION RESPIRATORY (INHALATION) at 13:14

## 2019-05-07 RX ADMIN — HEPARIN SODIUM 5000 UNITS: 5000 INJECTION, SOLUTION INTRAVENOUS; SUBCUTANEOUS at 08:48

## 2019-05-07 RX ADMIN — METOPROLOL TARTRATE 25 MG: 25 TABLET ORAL at 22:40

## 2019-05-07 RX ADMIN — ROSUVASTATIN CALCIUM 40 MG: 5 TABLET, FILM COATED ORAL at 08:48

## 2019-05-07 RX ADMIN — FUROSEMIDE 80 MG: 10 INJECTION, SOLUTION INTRAVENOUS at 21:05

## 2019-05-07 RX ADMIN — DOXYCYCLINE 100 MG: 100 CAPSULE ORAL at 08:48

## 2019-05-07 RX ADMIN — INSULIN DETEMIR 25 UNITS: 100 INJECTION, SOLUTION SUBCUTANEOUS at 21:02

## 2019-05-07 RX ADMIN — INSULIN ASPART 8 UNITS: 100 INJECTION, SOLUTION INTRAVENOUS; SUBCUTANEOUS at 12:10

## 2019-05-07 RX ADMIN — INSULIN ASPART 6 UNITS: 100 INJECTION, SOLUTION INTRAVENOUS; SUBCUTANEOUS at 08:47

## 2019-05-07 RX ADMIN — FUROSEMIDE 80 MG: 10 INJECTION, SOLUTION INTRAVENOUS at 11:33

## 2019-05-07 RX ADMIN — HEPARIN SODIUM 5000 UNITS: 5000 INJECTION, SOLUTION INTRAVENOUS; SUBCUTANEOUS at 21:04

## 2019-05-07 RX ADMIN — INSULIN ASPART 14 UNITS: 100 INJECTION, SOLUTION INTRAVENOUS; SUBCUTANEOUS at 21:02

## 2019-05-07 RX ADMIN — PREDNISONE 40 MG: 20 TABLET ORAL at 08:48

## 2019-05-07 RX ADMIN — SODIUM CHLORIDE, PRESERVATIVE FREE 3 ML: 5 INJECTION INTRAVENOUS at 21:03

## 2019-05-07 NOTE — PROGRESS NOTES
LOS: 0 days   Patient Care Team:  Abdullahi Clarke MD as PCP - General (Internal Medicine)  Abdullahi Clarke MD as PCP - Claims Attributed    Interval History: Good UOP according to him, but not at baseline.  Still more swollen/short of breath than usual.      Objective   Vital Signs  Temp:  [97.7 °F (36.5 °C)-98.1 °F (36.7 °C)] 98.1 °F (36.7 °C)  Heart Rate:  [] 78  Resp:  [16-20] 16  BP: (139-161)/(65-84) 161/84    Intake/Output Summary (Last 24 hours) at 5/7/2019 0923  Last data filed at 5/7/2019 0900  Gross per 24 hour   Intake 1080 ml   Output 2350 ml   Net -1270 ml           Physical Exam   Constitutional: He is oriented to person, place, and time.   obese   HENT:   Head: Normocephalic.   Nose: Nose normal.   Mouth/Throat: Oropharynx is clear and moist.   Eyes: Conjunctivae and EOM are normal. Pupils are equal, round, and reactive to light.   Neck: Normal range of motion. JVD present.   Cannot assess JVD due to body habitus   Cardiovascular: Normal rate, regular rhythm and intact distal pulses. Frequent extrasystoles are present.   Murmur heard.   Systolic murmur is present with a grade of 1/6.  Pulmonary/Chest: Effort normal and breath sounds normal.   Abdominal: Soft.   Cannot feel organs or aorta   Musculoskeletal: Normal range of motion. He exhibits edema (2+).   Neurological: He is alert and oriented to person, place, and time. No cranial nerve deficit.   Skin: Skin is warm and dry. No erythema.   Psychiatric: He has a normal mood and affect. His behavior is normal. Judgment and thought content normal.   Vitals reviewed.      Results Review:      Results from last 7 days   Lab Units 05/07/19  0326 05/06/19  1013 05/05/19  2131   SODIUM mmol/L 137 138 134*   POTASSIUM mmol/L 4.4 3.7 3.7   CHLORIDE mmol/L 97* 97* 95*   CO2 mmol/L 27.5 28.1 25.6   BUN mg/dL 27* 23 23   CREATININE mg/dL 1.84* 1.67* 1.84*   GLUCOSE mg/dL 275* 248* 210*   CALCIUM mg/dL 8.9 8.7 9.1     Results from  last 7 days   Lab Units 05/06/19  1013 05/06/19  0331 05/05/19  2131   TROPONIN T ng/mL 0.024 0.031* 0.033*     Results from last 7 days   Lab Units 05/07/19  0326 05/05/19  2131   WBC 10*3/mm3 8.20 10.78   HEMOGLOBIN g/dL 11.1* 11.9*   HEMATOCRIT % 35.9* 38.5   PLATELETS 10*3/mm3 217 230     Results from last 7 days   Lab Units 05/06/19  0053   INR  1.17*   APTT seconds 40.5*     Results from last 7 days   Lab Units 05/06/19  0053   CHOLESTEROL mg/dL 205*     Results from last 7 days   Lab Units 05/06/19  0152   MAGNESIUM mg/dL 1.7     Results from last 7 days   Lab Units 05/06/19  0053   CHOLESTEROL mg/dL 205*   TRIGLYCERIDES mg/dL 193*   HDL CHOL mg/dL 36*   LDL CHOL mg/dL 130*       I reviewed the patient's new clinical results.  I personally viewed and interpreted the patient's EKG/Telemetry data        Medication Review:     doxycycline 100 mg Oral Q12H   heparin (porcine) 5,000 Units Subcutaneous Q12H   insulin aspart 0-9 Units Subcutaneous 4x Daily AC & at Bedtime   ipratropium-albuterol 3 mL Nebulization Q6H - RT   predniSONE 40 mg Oral Daily With Breakfast   rosuvastatin 40 mg Oral Daily   sodium chloride 3 mL Intravenous Q12H            Assessment/Plan     1.  Acute on chronic diastolic CHF (congestive heart failure) (CMS/[facility])  2.  Elevated troponin  3.  CKD (chronic kidney disease)  4.  Frequent ectopics  5.  COPD (chronic obstructive pulmonary disease) (CMS/[facility])  6.  Hypertension  7.  Diabetes mellitus (CMS/[facility])    He is still volume overloaded (now with more R>L findings on exam).  He no longer has rales but has pedal edema and JVD.    Continue IV diuretics.  Start low dose metoprolol for very frequent ectopics.    He's had two normal nuclear stress tests in the last eight months; I agree it does not need repeating at this time.  His Tn was minimally elevated in the setting of volume overload and CKD.    Watch renal function with diuresis.    Sebastian Davenport MD  05/07/19  9:23 AM

## 2019-05-07 NOTE — PLAN OF CARE
Problem: Patient Care Overview  Goal: Plan of Care Review  Outcome: Ongoing (interventions implemented as appropriate)   05/07/19 0455   Coping/Psychosocial   Plan of Care Reviewed With patient   Plan of Care Review   Progress improving   OTHER   Outcome Summary VSS, pt states he is feeling better     Goal: Individualization and Mutuality  Outcome: Ongoing (interventions implemented as appropriate)   05/07/19 0455   Individualization   Patient Specific Preferences none voiced       Problem: Fall Risk (Adult)  Goal: Identify Related Risk Factors and Signs and Symptoms  Outcome: Outcome(s) achieved Date Met: 05/07/19 05/07/19 0455   Fall Risk (Adult)   Related Risk Factors (Fall Risk) environment unfamiliar   Signs and Symptoms (Fall Risk) presence of risk factors     Goal: Absence of Fall  Outcome: Ongoing (interventions implemented as appropriate)   05/07/19 0455   Fall Risk (Adult)   Absence of Fall making progress toward outcome       Problem: Infection, Risk/Actual (Adult)  Goal: Identify Related Risk Factors and Signs and Symptoms  Outcome: Outcome(s) achieved Date Met: 05/07/19 05/07/19 0455   Infection, Risk/Actual (Adult)   Related Risk Factors (Infection, Risk/Actual) other (see comments)   Signs and Symptoms (Infection, Risk/Actual) blood glucose changes     Goal: Infection Prevention/Resolution  Outcome: Ongoing (interventions implemented as appropriate)   05/07/19 0455   Infection, Risk/Actual (Adult)   Infection Prevention/Resolution making progress toward outcome       Problem: Arrhythmia/Dysrhythmia (Symptomatic) (Adult)  Goal: Signs and Symptoms of Listed Potential Problems Will be Absent, Minimized or Managed (Arrhythmia/Dysrhythmia)  Outcome: Ongoing (interventions implemented as appropriate)   05/07/19 0455   Goal/Outcome Evaluation   Problems Assessed (Arrhythmia/Dysrhythmia) all   Problems Present (Dysrhythmia) situational response       Problem: Chronic Obstructive Pulmonary Disease  (Adult)  Goal: Signs and Symptoms of Listed Potential Problems Will be Absent, Minimized or Managed (Chronic Obstructive Pulmonary Disease)  Outcome: Ongoing (interventions implemented as appropriate)   05/07/19 0367   Goal/Outcome Evaluation   Problems Assessed (Chronic Obstructive Pulmonary Disease (COPD)) all   Problems Present (COPD, Bronch/Emphy) situational response

## 2019-05-08 ENCOUNTER — APPOINTMENT (OUTPATIENT)
Dept: ULTRASOUND IMAGING | Facility: HOSPITAL | Age: 79
End: 2019-05-08

## 2019-05-08 VITALS
DIASTOLIC BLOOD PRESSURE: 64 MMHG | WEIGHT: 289 LBS | OXYGEN SATURATION: 97 % | SYSTOLIC BLOOD PRESSURE: 137 MMHG | HEART RATE: 58 BPM | BODY MASS INDEX: 38.3 KG/M2 | TEMPERATURE: 97.6 F | RESPIRATION RATE: 18 BRPM | HEIGHT: 73 IN

## 2019-05-08 LAB
ANION GAP SERPL CALCULATED.3IONS-SCNC: 13.8 MMOL/L
BUN BLD-MCNC: 39 MG/DL (ref 8–23)
BUN/CREAT SERPL: 20.1 (ref 7–25)
CALCIUM SPEC-SCNC: 8.9 MG/DL (ref 8.6–10.5)
CHLORIDE SERPL-SCNC: 91 MMOL/L (ref 98–107)
CO2 SERPL-SCNC: 29.2 MMOL/L (ref 22–29)
CREAT BLD-MCNC: 1.94 MG/DL (ref 0.76–1.27)
GFR SERPL CREATININE-BSD FRML MDRD: 34 ML/MIN/1.73
GLUCOSE BLD-MCNC: 321 MG/DL (ref 65–99)
GLUCOSE BLDC GLUCOMTR-MCNC: 304 MG/DL (ref 70–130)
GLUCOSE BLDC GLUCOMTR-MCNC: 308 MG/DL (ref 70–130)
GLUCOSE BLDC GLUCOMTR-MCNC: 320 MG/DL (ref 70–130)
POTASSIUM BLD-SCNC: 3.9 MMOL/L (ref 3.5–5.2)
SODIUM BLD-SCNC: 134 MMOL/L (ref 136–145)

## 2019-05-08 PROCEDURE — 25010000002 HEPARIN (PORCINE) PER 1000 UNITS: Performed by: INTERNAL MEDICINE

## 2019-05-08 PROCEDURE — 94799 UNLISTED PULMONARY SVC/PX: CPT

## 2019-05-08 PROCEDURE — 99233 SBSQ HOSP IP/OBS HIGH 50: CPT | Performed by: NURSE PRACTITIONER

## 2019-05-08 PROCEDURE — 80048 BASIC METABOLIC PNL TOTAL CA: CPT | Performed by: INTERNAL MEDICINE

## 2019-05-08 PROCEDURE — 99238 HOSP IP/OBS DSCHRG MGMT 30/<: CPT | Performed by: INTERNAL MEDICINE

## 2019-05-08 PROCEDURE — 63710000001 INSULIN DETEMIR PER 5 UNITS: Performed by: INTERNAL MEDICINE

## 2019-05-08 PROCEDURE — 82962 GLUCOSE BLOOD TEST: CPT

## 2019-05-08 PROCEDURE — 63710000001 INSULIN ASPART PER 5 UNITS: Performed by: INTERNAL MEDICINE

## 2019-05-08 PROCEDURE — 93970 EXTREMITY STUDY: CPT

## 2019-05-08 RX ORDER — METOPROLOL SUCCINATE 25 MG/1
25 TABLET, EXTENDED RELEASE ORAL NIGHTLY
Qty: 30 TABLET | Refills: 0 | Status: SHIPPED | OUTPATIENT
Start: 2019-05-08 | End: 2019-06-28 | Stop reason: HOSPADM

## 2019-05-08 RX ADMIN — METOPROLOL TARTRATE 25 MG: 25 TABLET ORAL at 08:46

## 2019-05-08 RX ADMIN — INSULIN DETEMIR 20 UNITS: 100 INJECTION, SOLUTION SUBCUTANEOUS at 11:37

## 2019-05-08 RX ADMIN — INSULIN ASPART 10 UNITS: 100 INJECTION, SOLUTION INTRAVENOUS; SUBCUTANEOUS at 12:12

## 2019-05-08 RX ADMIN — ROSUVASTATIN CALCIUM 40 MG: 5 TABLET, FILM COATED ORAL at 08:46

## 2019-05-08 RX ADMIN — HEPARIN SODIUM 5000 UNITS: 5000 INJECTION, SOLUTION INTRAVENOUS; SUBCUTANEOUS at 08:46

## 2019-05-08 RX ADMIN — SODIUM CHLORIDE, PRESERVATIVE FREE 3 ML: 5 INJECTION INTRAVENOUS at 08:47

## 2019-05-08 RX ADMIN — INSULIN ASPART 10 UNITS: 100 INJECTION, SOLUTION INTRAVENOUS; SUBCUTANEOUS at 08:45

## 2019-05-08 RX ADMIN — BUDESONIDE AND FORMOTEROL FUMARATE DIHYDRATE 2 PUFF: 160; 4.5 AEROSOL RESPIRATORY (INHALATION) at 08:27

## 2019-05-08 NOTE — DISCHARGE SUMMARY
"  Froilan Helton  1940  [id number]      Hospitalists Discharge Summary    Date of Admission: 5/5/2019  Date of Discharge:  5/8/2019    Primary Discharge Diagnoses: Acute on chronic diastolic Heart Failure POA    Secondary Discharge Diagnoses:   Acute Rhinoviral Respiratory tract infection POA  NSTEMI type II due to HF exacerbation vs. Chronically elevated troponin POA  Frequent PVC's w/ occasional bigeminy POA  COPD w/o exacerbation POA  Essential HTN POA  Uncontrolled DM type II on chronic insulin POA  Hypothyroidism uncontrolled POA  Stage III CKD       PCP  Patient Care Team:  Abdullahi Clarke MD as PCP - General (Internal Medicine)  Abdullahi Clarke MD as PCP - Claims Attributed    Consults:   Consults     Date and Time Order Name Status Description    5/6/2019 0912 Inpatient Cardiology Consult Completed           CC:  Shortness of air      History of Present Illness:  As per H&P: \"71-year-old male with a past medical history of elevated troponins, chronic diastolic heart failure, hypertension, ROLA on CKD, CKD stage III, COPD, diabetes type 2, hypothyroidism, acute COPD exacerbations.  He comes today to [facility] emergency department with a chief complaint of shortness of breath.  He expressed that his symptoms started 1 week prior to admission and they have been worse through the last week.  He is able to walk a couple steps without shortness of breath the shortness of breath is accompanied with productive green cough, wheezing, chills.  He sleeps in a recliner and has symptoms of orthopnea, he denies any changes in lower extremity edema, weight gain chest pain or sick contacts.     In the emergency department he received a dose of aspirin 325 mg p.o., and an nebulizations, the chest x-ray does not show any consolidations but it shows crowding of the pulmonary vasculature.  Bedside ultrasound was performed by me and did show several B-lines.\"    Hospital Course    Home lipitor not " increased, pt thinks he has a statin intolerance    Increased betablocker, due to bigemmy and increased ectopy    Pt was positive fir rhinovirus,  diuresied until his cr bumped slightly. Told to home diuretic until the next day.     He had trops of 0.033 on admission, which was also seen back in February when he was admitted for HF. Per cards, could be chronic elevation, but he did trend down to 0.024 with diuresis, so possibly a type II NSTEMI as well.    Unclear if pt's medication list correct, did not bring in list, unable to give history himself. Only change is to betablocker.     Recommended home health for medication management, but PT declined.    Physical Exam at Discharge  Vital Signs  Temp:  [97.4 °F (36.3 °C)-98 °F (36.7 °C)] 97.6 °F (36.4 °C)  Heart Rate:  [58-98] 58  Resp:  [18] 18  BP: (114-167)/(64-77) 137/64    Physical Exam:  Physical Exam   Constitutional: No distress.   Eyes: Pupils are equal, round, and reactive to light.   Neck:   Unable to visual JVD   Cardiovascular: Normal rate, regular rhythm and normal heart sounds.   No murmur heard.  Pulmonary/Chest: Effort normal and breath sounds normal. No stridor. No respiratory distress. He has no wheezes.   Very minimal bibasliar crackles   Abdominal: Soft. Bowel sounds are normal. He exhibits no distension. There is no tenderness. There is no guarding.   Musculoskeletal:   Much improved b/l LE edema, now trace to 1+   Neurological: He is alert.   Skin: Skin is warm and dry. No rash noted. He is not diaphoretic. No erythema.   Psychiatric: He has a normal mood and affect. His behavior is normal.   Nursing note and vitals reviewed.      Operations and Procedures Performed:      Allergies:  is allergic to oxycontin [oxycodone hcl].    [facility]  not reviewed    Discharge Medications:     Discharge Medications      Changes to Medications      Instructions Start Date   albuterol 0.63 MG/3ML nebulizer solution  Commonly known as:  ACCUNEB  What changed:   Another medication with the same name was removed. Continue taking this medication, and follow the directions you see here.   1 ampule, Nebulization, Every 6 Hours PRN      docusate sodium 100 MG capsule  What changed:    · when to take this  · reasons to take this   100 mg, Oral, 2 Times Daily      furosemide 40 MG tablet  Commonly known as:  LASIX  What changed:  additional instructions   40 mg, Oral, Daily, Take 1 tablet by mouth twice per day for 2 days. Then resume 1 per day      metoprolol succinate XL 25 MG 24 hr tablet  Commonly known as:  TOPROL-XL  What changed:  how much to take   25 mg, Oral, Nightly         Continue These Medications      Instructions Start Date   amLODIPine 2.5 MG tablet  Commonly known as:  NORVASC   2.5 mg, Oral, Every 24 Hours Scheduled      aspirin 325 MG EC tablet   325 mg, Oral, Daily      atorvastatin 10 MG tablet  Commonly known as:  LIPITOR   10 mg, Oral, Daily      budesonide-formoterol 160-4.5 MCG/ACT inhaler  Commonly known as:  SYMBICORT   2 puffs, Inhalation, 2 Times Daily - RT      clotrimazole 1 % cream  Commonly known as:  LOTRIMIN   Topical, Every 12 Hours Scheduled      clotrimazole-betamethasone 1-0.05 % cream  Commonly known as:  LOTRISONE   Topical, Every 12 Hours Scheduled      donepezil 5 MG tablet  Commonly known as:  ARICEPT   2.5 mg, Oral, Nightly      gabapentin 600 MG tablet  Commonly known as:  NEURONTIN   600 mg, Oral, Daily      insulin detemir 100 UNIT/ML injection  Commonly known as:  LEVEMIR   30 Units, Subcutaneous, 2 Times Daily      insulin NPH-insulin regular (70-30) 100 UNIT/ML injection  Commonly known as:  humuLIN 70/30,novoLIN 70/30   30 Units, Subcutaneous, 2 Times Daily With Meals      lactobacillus acidophilus capsule capsule   1 capsule, Oral, Daily      levothyroxine 100 MCG tablet  Commonly known as:  SYNTHROID, LEVOTHROID   100 mcg, Oral, Daily      lisinopril 20 MG tablet  Commonly known as:  PRINIVIL,ZESTRIL   20 mg, Oral, Daily       polyethylene glycol pack packet  Commonly known as:  MIRALAX   17 g, Oral, Daily      pramipexole 0.5 MG tablet  Commonly known as:  MIRAPEX   0.5 mg, Oral, 2 Times Daily      pregabalin 100 MG capsule  Commonly known as:  LYRICA   100 mg, Oral, 3 Times Daily      SITagliptin 100 MG tablet  Commonly known as:  JANUVIA   100 mg, Oral, Daily             Last Lab Results:   Lab Results (last 72 hours)     Procedure Component Value Units Date/Time    POC Glucose Once [[id number]]  (Abnormal) Collected:  05/08/19 1101    Specimen:  Blood Updated:  05/08/19 1206     Glucose 308 mg/dL     POC Glucose Once [[id number]]  (Abnormal) Collected:  05/08/19 0713    Specimen:  Blood Updated:  05/08/19 0721     Glucose 304 mg/dL     Basic Metabolic Panel [[id number]]  (Abnormal) Collected:  05/08/19 0429    Specimen:  Blood Updated:  05/08/19 0521     Glucose 321 mg/dL      BUN 39 mg/dL      Creatinine 1.94 mg/dL      Sodium 134 mmol/L      Potassium 3.9 mmol/L      Chloride 91 mmol/L      CO2 29.2 mmol/L      Calcium 8.9 mg/dL      eGFR Non African Amer 34 mL/min/1.73      BUN/Creatinine Ratio 20.1     Anion Gap 13.8 mmol/L     Narrative:       GFR Normal >60  Chronic Kidney Disease <60  Kidney Failure <15    POC Glucose Once [[id number]]  (Abnormal) Collected:  05/08/19 0148    Specimen:  Blood Updated:  05/08/19 0155     Glucose 320 mg/dL     POC Glucose Once [[id number]]  (Abnormal) Collected:  05/07/19 2210    Specimen:  Blood Updated:  05/07/19 2217     Glucose 410 mg/dL     POC Glucose Once [[id number]]  (Abnormal) Collected:  05/07/19 2016    Specimen:  Blood Updated:  05/07/19 2023     Glucose 441 mg/dL     POC Glucose Once [[id number]]  (Abnormal) Collected:  05/07/19 1840    Specimen:  Blood Updated:  05/07/19 1849     Glucose 464 mg/dL     POC Glucose Once [[id number]]  (Abnormal) Collected:  05/07/19 1838    Specimen:  Blood Updated:  05/07/19 1849     Glucose 509 mg/dL     POC Glucose Once [[id number]]  (Abnormal) Collected:   05/07/19 1659    Specimen:  Blood Updated:  05/07/19 1709     Glucose 497 mg/dL     POC Glucose Once [[id number]]  (Abnormal) Collected:  05/07/19 1657    Specimen:  Blood Updated:  05/07/19 1709     Glucose 520 mg/dL     POC Glucose Once [[id number]]  (Abnormal) Collected:  05/07/19 1158    Specimen:  Blood Updated:  05/07/19 1209     Glucose 392 mg/dL     POC Glucose Once [[id number]]  (Abnormal) Collected:  05/07/19 0723    Specimen:  Blood Updated:  05/07/19 0737     Glucose 293 mg/dL     Basic Metabolic Panel [[id number]]  (Abnormal) Collected:  05/07/19 0326    Specimen:  Blood Updated:  05/07/19 0555     Glucose 275 mg/dL      BUN 27 mg/dL      Creatinine 1.84 mg/dL      Sodium 137 mmol/L      Potassium 4.4 mmol/L      Chloride 97 mmol/L      CO2 27.5 mmol/L      Calcium 8.9 mg/dL      eGFR Non African Amer 36 mL/min/1.73      BUN/Creatinine Ratio 14.7     Anion Gap 12.5 mmol/L     Narrative:       GFR Normal >60  Chronic Kidney Disease <60  Kidney Failure <15    CBC & Differential [[id number]] Collected:  05/07/19 0326    Specimen:  Blood Updated:  05/07/19 0532    Narrative:       The following orders were created for panel order CBC & Differential.  Procedure                               Abnormality         Status                     ---------                               -----------         ------                     CBC Auto Differential[[id number]]        Abnormal            Final result                 Please view results for these tests on the individual orders.    CBC Auto Differential [[id number]]  (Abnormal) Collected:  05/07/19 0326    Specimen:  Blood Updated:  05/07/19 0532     WBC 8.20 10*3/mm3      RBC 4.23 10*6/mm3      Hemoglobin 11.1 g/dL      Hematocrit 35.9 %      MCV 84.9 fL      MCH 26.2 pg      MCHC 30.9 g/dL      RDW 14.8 %      RDW-SD 45.1 fl      MPV 11.4 fL      Platelets 217 10*3/mm3      Neutrophil % 79.0 %      Lymphocyte % 9.8 %      Monocyte % 10.4 %      Eosinophil % 0.1 %       Basophil % 0.1 %      Immature Grans % 0.6 %      Neutrophils, Absolute 6.48 10*3/mm3      Lymphocytes, Absolute 0.80 10*3/mm3      Monocytes, Absolute 0.85 10*3/mm3      Eosinophils, Absolute 0.01 10*3/mm3      Basophils, Absolute 0.01 10*3/mm3      Immature Grans, Absolute 0.05 10*3/mm3      nRBC 0.0 /100 WBC     POC Glucose Once [[id number]]  (Abnormal) Collected:  05/06/19 2043    Specimen:  Blood Updated:  05/06/19 2050     Glucose 349 mg/dL     POC Glucose Once [[id number]]  (Abnormal) Collected:  05/06/19 1651    Specimen:  Blood Updated:  05/06/19 1659     Glucose 337 mg/dL     Respiratory Culture - Sputum, Cough [[id number]] Collected:  05/06/19 0946    Specimen:  Sputum from Cough Updated:  05/06/19 1343     Respiratory Culture Rejected     Gram Stain Moderate (3+) Gram positive cocci in pairs, chains and clusters      Moderate (3+) Epithelial cells per low power field      Rare (1+) WBCs seen      Specimen rejected based upon microscopic exam of gram stain    Narrative:       Specimen rejected due to microscopic exam of gram stain.    POC Glucose Once [[id number]]  (Abnormal) Collected:  05/06/19 1148    Specimen:  Blood Updated:  05/06/19 1201     Glucose 253 mg/dL     Respiratory Panel, PCR - Swab, Nasopharynx [[id number]]  (Abnormal) Collected:  05/06/19 0122    Specimen:  Swab from Nasopharynx Updated:  05/06/19 1133     ADENOVIRUS, PCR Not Detected     Coronavirus 229E Not Detected     Coronavirus HKU1 Not Detected     Coronavirus NL63 Not Detected     Coronavirus OC43 Not Detected     Human Metapneumovirus Not Detected     Human Rhinovirus/Enterovirus Detected     Influenza B PCR Not Detected     Parainfluenza Virus 1 Not Detected     Parainfluenza Virus 2 Not Detected     Parainfluenza Virus 3 Not Detected     Parainfluenza Virus 4 Not Detected     Bordetella pertussis pcr Not Detected     Influenza A H1 2009 PCR Not Detected     Chlamydophila pneumoniae PCR Not Detected     Mycoplasma pneumo by PCR Not  Detected     Influenza A PCR Not Detected     Influenza A H3 Not Detected     Influenza A H1 Not Detected     RSV, PCR Not Detected     Bordetella parapertussis PCR Not Detected    Troponin [[id number]]  (Normal) Collected:  05/06/19 1013    Specimen:  Blood Updated:  05/06/19 1050     Troponin T 0.024 ng/mL     Narrative:       Troponin T Reference Range:  <= 0.03 ng/mL-   Negative for AMI  >0.03 ng/mL-     Abnormal for myocardial necrosis.  Clinicians would have to utilize clinical acumen, EKG, Troponin and serial changes to determine if it is an Acute Myocardial Infarction or myocardial injury due to an underlying chronic condition.     Basic Metabolic Panel [[id number]]  (Abnormal) Collected:  05/06/19 1013    Specimen:  Blood Updated:  05/06/19 1048     Glucose 248 mg/dL      BUN 23 mg/dL      Creatinine 1.67 mg/dL      Sodium 138 mmol/L      Potassium 3.7 mmol/L      Chloride 97 mmol/L      CO2 28.1 mmol/L      Calcium 8.7 mg/dL      eGFR Non African Amer 40 mL/min/1.73      BUN/Creatinine Ratio 13.8     Anion Gap 12.9 mmol/L     Narrative:       GFR Normal >60  Chronic Kidney Disease <60  Kidney Failure <15    TSH [[id number]]  (Abnormal) Collected:  05/06/19 0331    Specimen:  Blood Updated:  05/06/19 0942     TSH 7.450 mIU/mL     POC Glucose Once [[id number]]  (Abnormal) Collected:  05/06/19 0843    Specimen:  Blood Updated:  05/06/19 0851     Glucose 255 mg/dL     Troponin [[id number]]  (Abnormal) Collected:  05/06/19 0331    Specimen:  Blood Updated:  05/06/19 0404     Troponin T 0.031 ng/mL     Narrative:       Troponin T Reference Range:  <= 0.03 ng/mL-   Negative for AMI  >0.03 ng/mL-     Abnormal for myocardial necrosis.  Clinicians would have to utilize clinical acumen, EKG, Troponin and serial changes to determine if it is an Acute Myocardial Infarction or myocardial injury due to an underlying chronic condition.     Hemoglobin A1c [[id number]]  (Abnormal) Collected:  05/06/19 0053    Specimen:  Blood  Updated:  05/06/19 0300     Hemoglobin A1C 10.00 %     Narrative:       Hemoglobin A1C Ranges:    Increased Risk for Diabetes  5.7% to 6.4%  Diabetes                     >= 6.5%  Diabetic Goal                < 7.0%    Magnesium [[id number]]  (Normal) Collected:  05/06/19 0152    Specimen:  Blood Updated:  05/06/19 0222     Magnesium 1.7 mg/dL     POC Glucose Once [[id number]]  (Abnormal) Collected:  05/06/19 0109    Specimen:  Blood Updated:  05/06/19 0115     Glucose 223 mg/dL     Lipid Panel [[id number]]  (Abnormal) Collected:  05/06/19 0053    Specimen:  Blood Updated:  05/06/19 0105     Total Cholesterol 205 mg/dL      Triglycerides 193 mg/dL      HDL Cholesterol 36 mg/dL      LDL Cholesterol  130 mg/dL      VLDL Cholesterol 38.6 mg/dL      LDL/HDL Ratio 3.62    Narrative:       Cholesterol Reference Ranges  (U.S. Department of Health and Human Services ATP III Classifications)    Desirable          <200 mg/dL  Borderline High    200-239 mg/dL  High Risk          >240 mg/dL      Triglyceride Reference Ranges  (U.S. Department of Health and Human Services ATP III Classifications)    Normal           <150 mg/dL  Borderline High  150-199 mg/dL  High             200-499 mg/dL  Very High        >500 mg/dL    HDL Reference Ranges  (U.S. Department of Health and Human Services ATP III Classifcations)    Low     <40 mg/dl (major risk factor for CHD)  High    >60 mg/dl ('negative' risk factor for CHD)        LDL Reference Ranges  (U.S. Department of Health and Human Services ATP III Classifcations)    Optimal          <100 mg/dL  Near Optimal     100-129 mg/dL  Borderline High  130-159 mg/dL  High             160-189 mg/dL  Very High        >189 mg/dL    Protime-INR [[id number]]  (Abnormal) Collected:  05/06/19 0053    Specimen:  Blood Updated:  05/06/19 0102     Protime 14.6 Seconds      INR 1.17    Narrative:       Therapeutic Ranges for INR: 2.0-3.0 (PT 20-30)                              2.5-3.5 (PT 25-34)    aPTT  [[id number]]  (Abnormal) Collected:  05/06/19 0053    Specimen:  Blood Updated:  05/06/19 0102     PTT 40.5 seconds     Narrative:       PTT = The equivalent PTT values for the therapeutic range of heparin levels at 0.1 to 0.7 U/ml are 53 to 110 seconds.    BNP [[id number]]  (Abnormal) Collected:  05/05/19 2131    Specimen:  Blood Updated:  05/05/19 2244     proBNP 7,405.0 pg/mL     Narrative:       Among patients with dyspnea, NT-proBNP is highly sensitive for the detection of acute congestive heart failure. In addition NT-proBNP of <300 pg/ml effectively rules out acute congestive heart failure with 99% negative predictive value.    Comprehensive Metabolic Panel [[id number]]  (Abnormal) Collected:  05/05/19 2131    Specimen:  Blood Updated:  05/05/19 2210     Glucose 210 mg/dL      BUN 23 mg/dL      Creatinine 1.84 mg/dL      Sodium 134 mmol/L      Potassium 3.7 mmol/L      Chloride 95 mmol/L      CO2 25.6 mmol/L      Calcium 9.1 mg/dL      Total Protein 7.1 g/dL      Albumin 4.10 g/dL      ALT (SGPT) 24 U/L      AST (SGOT) 31 U/L      Alkaline Phosphatase 64 U/L      Total Bilirubin 0.6 mg/dL      eGFR Non African Amer 36 mL/min/1.73      Globulin 3.0 gm/dL      A/G Ratio 1.4 g/dL      BUN/Creatinine Ratio 12.5     Anion Gap 13.4 mmol/L     Narrative:       GFR Normal >60  Chronic Kidney Disease <60  Kidney Failure <15    Troponin [[id number]]  (Abnormal) Collected:  05/05/19 2131    Specimen:  Blood Updated:  05/05/19 2207     Troponin T 0.033 ng/mL     Narrative:       Troponin T Reference Range:  <= 0.03 ng/mL-   Negative for AMI  >0.03 ng/mL-     Abnormal for myocardial necrosis.  Clinicians would have to utilize clinical acumen, EKG, Troponin and serial changes to determine if it is an Acute Myocardial Infarction or myocardial injury due to an underlying chronic condition.     Procalcitonin [[id number]]  (Abnormal) Collected:  05/05/19 2131    Specimen:  Blood Updated:  05/05/19 2206     Procalcitonin 0.05 ng/mL      Narrative:       As a Marker for Sepsis (Non-Neonates):   1. <0.5 ng/mL represents a low risk of severe sepsis and/or septic shock.  2. >2 ng/mL represents a high risk of severe sepsis and/or septic shock.    As a Marker for Lower Respiratory Tract Infections that require antibiotic therapy:    PCT on Admission     Antibiotic Therapy       6-12 Hrs later  > 0.5                Strongly Recommended             >0.25 - <0.5         Recommended  0.1 - 0.25           Discouraged              Remeasure/reassess PCT  <0.1                 Strongly Discouraged     Remeasure/reassess PCT                     PCT values of < 0.5 ng/mL do not exclude an infection, because localized infections (without systemic signs) may be associated with such low concentrations, or a systemic infection in its initial stages (< 6 hours). Furthermore, increased PCT can occur without infection. PCT concentrations between 0.5 and 2.0 ng/mL should be interpreted taking into account the patient's history. It is recommended to retest PCT within 6-24 hours if any concentrations < 2 ng/mL are obtained.    Influenza Antigen, Rapid - Swab, Nasopharynx [[id number]]  (Normal) Collected:  05/05/19 2131    Specimen:  Swab from Nasopharynx Updated:  05/05/19 2156     Influenza A Ag, EIA Negative     Influenza B Ag, EIA Negative    Lactic Acid, Plasma [[id number]]  (Normal) Collected:  05/05/19 2131    Specimen:  Blood Updated:  05/05/19 2152     Lactate 1.0 mmol/L     CBC & Differential [[id number]] Collected:  05/05/19 2131    Specimen:  Blood Updated:  05/05/19 2138    Narrative:       The following orders were created for panel order CBC & Differential.  Procedure                               Abnormality         Status                     ---------                               -----------         ------                     CBC Auto Differential[[id number]]        Abnormal            Final result                 Please view results for these tests on the  individual orders.    CBC Auto Differential [[id number]]  (Abnormal) Collected:  05/05/19 2131    Specimen:  Blood Updated:  05/05/19 2138     WBC 10.78 10*3/mm3      RBC 4.57 10*6/mm3      Hemoglobin 11.9 g/dL      Hematocrit 38.5 %      MCV 84.2 fL      MCH 26.0 pg      MCHC 30.9 g/dL      RDW 14.8 %      RDW-SD 45.1 fl      MPV 10.7 fL      Platelets 230 10*3/mm3      Neutrophil % 78.8 %      Lymphocyte % 8.4 %      Monocyte % 9.5 %      Eosinophil % 2.4 %      Basophil % 0.3 %      Immature Grans % 0.6 %      Neutrophils, Absolute 8.49 10*3/mm3      Lymphocytes, Absolute 0.91 10*3/mm3      Monocytes, Absolute 1.02 10*3/mm3      Eosinophils, Absolute 0.26 10*3/mm3      Basophils, Absolute 0.03 10*3/mm3      Immature Grans, Absolute 0.07 10*3/mm3      nRBC 0.0 /100 WBC         Xr Chest 2 View    Result Date: 5/5/2019  Narrative: CR Chest 2 Vws INDICATION:  Shortness of air suggest today with history of COPD COMPARISON:  2/16/2019 FINDINGS: PA and lateral view of the chest  art size is normal. There is mild faint interstitial prominence. No effusions are identified. There is no focal infiltrate. There are stable left base atelectasis      Impression: Mild interstitial prominence which is probably chronic. When allowing for changes in technique, I do not think there is been any significant change from the prior study. Minimal left base atelectasis stable as well. Otherwise normal Signer Name: Fei Henderson MD  Signed: 5/5/2019 10:03 PM  Workstation Name: [email or web address]     Xr Knee 1 Or 2 View Right    Result Date: 5/6/2019  Narrative: RIGHT KNEE 2 VIEWS 05/06/2019  HISTORY: Right knee pain and swelling for one month. History of right knee arthroplasty. No known trauma.  FINDINGS: 2 views of the right knee demonstrate no fracture. Right total knee arthroplasty appears well seated. The bones are normally mineralized. There is no joint effusion.      Impression: Right knee arthroplasty appears well seated. No evidence of  fracture.  This report was finalized on 5/6/2019 2:24 PM by Dr. Enrike Roche MD.      Us Venous Doppler Lower Extremity Bilateral (duplex)    Result Date: 5/8/2019  Narrative: VENOUS DOPPLER ULTRASOUND, BILATERAL LOWER EXTREMITIES, 05/08/2019  HISTORY: Bilateral lower extremity edema, swelling right greater than left for the past 3 years. No known injury.  TECHNIQUE: Venous Doppler ultrasound examination of both legs was performed using grey-scale, spectral Doppler, and color flow Doppler ultrasound imaging.  FINDINGS: The examination is negative.  There is no evidence of deep venous thrombosis from the groin to the lower calf bilaterally. The greater saphenous veins are also patent.      Impression: Negative examination.  No evidence of lower extremity DVT on the right or left.  This report was finalized on 5/8/2019 11:58 AM by Dr. Enrike Roche MD.        Condition on Discharge:  Good    Discharge Disposition  To home    Visiting Nurse:    No     Home PT/OT:  No     Home Safety Evaluation:  No     DME  None    Discharge Diet:      Dietary Orders (From admission, onward)    Start     Ordered    05/06/19 1204  Diet Regular; Cardiac, Low Sodium; 1,500 mg Na  Diet Effective Now     Question Answer Comment   Diet Texture / Consistency Regular    Common Modifiers Cardiac    Common Modifiers Low Sodium    Low Sodium Restriction: 1,500 mg Na        05/06/19 1204          Activity at Discharge:  activity as tolerated      Pre-discharge education      Follow-up Appointments  No future appointments.      Test Results Pending at Discharge       Pia Figueredo MD  05/08/19  12:43 PM    Time: Discharge < 30 min (if over 30 minutes give explanation as to why it took greater than 30 minutes)

## 2019-05-08 NOTE — PLAN OF CARE
Problem: Chronic Obstructive Pulmonary Disease (Adult)  Intervention: Optimize Oxygenation/Ventilation/Perfusion  Instructed patient on proper use of spacer/holding chamber. Encouraged deep breathing and cough.

## 2019-05-08 NOTE — NURSING NOTE
Case Management Discharge Note    Final Note: Discharged home.    Destination      No service has been selected for the patient.      Durable Medical Equipment      No service has been selected for the patient.      Dialysis/Infusion      No service has been selected for the patient.      Home Medical Care      No service has been selected for the patient.      Therapy      No service has been selected for the patient.      Community Resources      No service has been selected for the patient.             Final Discharge Disposition Code: 01 - home or self-care

## 2019-05-08 NOTE — PROGRESS NOTES
"    Patient Name: Froilan Helton  :1940  78 y.o.      Patient Care Team:  Abdullahi Clarke MD as PCP - General (Internal Medicine)  Abdullahi Clarke MD as PCP - Claims Attributed    Chief Complaint: Acute on chronic CHF    Interval History: up in chair, feels better, denies SOA, LE edema improved, L>R pedal edema.  Good Urine OP over night       Objective   Vital Signs  Temp:  [97.4 °F (36.3 °C)-98.2 °F (36.8 °C)] 97.9 °F (36.6 °C)  Heart Rate:  [64-98] 73  Resp:  [18] 18  BP: (114-167)/(66-77) 167/77    Intake/Output Summary (Last 24 hours) at 2019 0935  Last data filed at 2019 0917  Gross per 24 hour   Intake 960 ml   Output 3900 ml   Net -2940 ml     Flowsheet Rows      First Filed Value   Admission Height  185.4 cm (73\") Documented at 2019   Admission Weight  136 kg (300 lb) Documented at 2019          Physical Exam:   General Appearance:    Alert, cooperative, in no acute distress   Lungs:     Clear to auscultation.  Normal respiratory effort and rate.      Heart:    Regular rhythm and normal rate, normal S1 and S2, no murmur heard, gallops or rubs.     Chest Wall:    No abnormalities observed   Abdomen:     Soft, nontender, positive bowel sounds.     Extremities:   no cyanosis, clubbing  +1 LE edema, L>R, No marked joint deformities.  Adequate musculoskeletal strength.       Results Review:    Results from last 7 days   Lab Units 19  0429   SODIUM mmol/L 134*   POTASSIUM mmol/L 3.9   CHLORIDE mmol/L 91*   CO2 mmol/L 29.2*   BUN mg/dL 39*   CREATININE mg/dL 1.94*   GLUCOSE mg/dL 321*   CALCIUM mg/dL 8.9     Results from last 7 days   Lab Units 19  1013 19  0331 19  2131   TROPONIN T ng/mL 0.024 0.031* 0.033*     Results from last 7 days   Lab Units 19  0326   WBC 10*3/mm3 8.20   HEMOGLOBIN g/dL 11.1*   HEMATOCRIT % 35.9*   PLATELETS 10*3/mm3 217     Results from last 7 days   Lab Units 19  0053   INR  1.17*   APTT seconds " 40.5*     Results from last 7 days   Lab Units 05/06/19  0152   MAGNESIUM mg/dL 1.7     Results from last 7 days   Lab Units 05/06/19  0053   CHOLESTEROL mg/dL 205*   TRIGLYCERIDES mg/dL 193*   HDL CHOL mg/dL 36*   LDL CHOL mg/dL 130*               Medication Review:     budesonide-formoterol 2 puff Inhalation BID - RT   heparin (porcine) 5,000 Units Subcutaneous Q12H   insulin aspart 0-14 Units Subcutaneous 4x Daily AC & at Bedtime   insulin detemir 25 Units Subcutaneous Nightly   metoprolol tartrate 25 mg Oral Q12H   rosuvastatin 40 mg Oral Daily   sodium chloride 3 mL Intravenous Q12H             Assessment/Plan   1.  Acute on chronic diastolic CHF - improved with IV diuresis. Resume home lasix dose of 40 mg BID.      2.  Elevated troponin - resolved    3.  CKD - creatinine noted at 1.94, up slightly from 1.84 yesterday after the IV diuretics    4.  Frequent ectopics - now on metoprolol    5.  COPD    6. HTN - Dry cough, on lisinopril 20 mg at home.  Recommend changing to valsartan 160 mg daily. (Dosing per pharmacist with closest conversion)    7.  DM - high glucose, increased with steroids.  Defer to hospitalist    LEATHA Chairez  [facility]  05/08/19  9:35 AM

## 2019-05-08 NOTE — PROGRESS NOTES
"SERVICE: [facility] HOSPITALIST    CONSULTANTS:  [facility]    CHIEF COMPLAINT: Dyspnea    SUBJECTIVE:  Patient's breathing is much improved.,  Was having PND prior to admission, but denies any PND last night.  Though patient is unable to lie flat, secondary to chronic lumbar back pain.  Used to sleep on side but no longer able due to right knee TKA not allowing patient to bend the knee fully and sleep in a comfortable side position.    Patient agrees lower extremity edema has improved.  Continues to deny chest pain and palpitations    Review of systems is positive for a chronic dry cough, and is negative for nausea or vomiting, or fever chills.    OBJECTIVE:    /67 (BP Location: Right arm, Patient Position: Lying)   Pulse 88   Temp 97.4 °F (36.3 °C) (Oral)   Resp 18   Ht 185.4 cm (73\")   Wt 132 kg (291 lb 8 oz)   SpO2 96%   BMI 38.46 kg/m²     MEDS/LABS REVIEWED AND ORDERED      doxycycline 100 mg Oral Q12H   furosemide 80 mg Intravenous Q8H   heparin (porcine) 5,000 Units Subcutaneous Q12H   insulin aspart 0-9 Units Subcutaneous 4x Daily AC & at Bedtime   ipratropium-albuterol 3 mL Nebulization Q6H - RT   metoprolol tartrate 25 mg Oral Q12H   predniSONE 40 mg Oral Daily With Breakfast   rosuvastatin 40 mg Oral Daily   sodium chloride 3 mL Intravenous Q12H       Physical Exam   Constitutional: He is oriented to person, place, and time. No distress.   Eyes: Pupils are equal, round, and reactive to light.   Neck: No tracheal deviation present.   JVD unable to be visualized   Cardiovascular: Normal rate and regular rhythm. Exam reveals no friction rub.   No longer with gallop   Pulmonary/Chest: Effort normal. No stridor. No respiratory distress. He has no wheezes. He has rales.   Much improved but still minimally present bibasilar crackles   Abdominal: Soft. Bowel sounds are normal. He exhibits no distension. There is no tenderness. There is no guarding.   Musculoskeletal: He exhibits edema. "   Improved but still present 1+ pitting edema to midshin, with 2+ at ankles   Neurological: He is alert and oriented to person, place, and time. No cranial nerve deficit.   Skin: Skin is warm and dry. He is not diaphoretic. No erythema.   Psychiatric: He has a normal mood and affect. His behavior is normal.   Nursing note and vitals reviewed.      LAB/DIAGNOSTICS:    Lab Results (last 24 hours)     Procedure Component Value Units Date/Time    POC Glucose Once [[id number]]  (Abnormal) Collected:  05/07/19 2016    Specimen:  Blood Updated:  05/07/19 2023     Glucose 441 mg/dL     POC Glucose Once [[id number]]  (Abnormal) Collected:  05/07/19 1840    Specimen:  Blood Updated:  05/07/19 1849     Glucose 464 mg/dL     POC Glucose Once [[id number]]  (Abnormal) Collected:  05/07/19 1838    Specimen:  Blood Updated:  05/07/19 1849     Glucose 509 mg/dL     POC Glucose Once [[id number]]  (Abnormal) Collected:  05/07/19 1659    Specimen:  Blood Updated:  05/07/19 1709     Glucose 497 mg/dL     POC Glucose Once [[id number]]  (Abnormal) Collected:  05/07/19 1657    Specimen:  Blood Updated:  05/07/19 1709     Glucose 520 mg/dL     POC Glucose Once [[id number]]  (Abnormal) Collected:  05/07/19 1158    Specimen:  Blood Updated:  05/07/19 1209     Glucose 392 mg/dL     POC Glucose Once [[id number]]  (Abnormal) Collected:  05/07/19 0723    Specimen:  Blood Updated:  05/07/19 0737     Glucose 293 mg/dL     Basic Metabolic Panel [[id number]]  (Abnormal) Collected:  05/07/19 0326    Specimen:  Blood Updated:  05/07/19 0555     Glucose 275 mg/dL      BUN 27 mg/dL      Creatinine 1.84 mg/dL      Sodium 137 mmol/L      Potassium 4.4 mmol/L      Chloride 97 mmol/L      CO2 27.5 mmol/L      Calcium 8.9 mg/dL      eGFR Non African Amer 36 mL/min/1.73      BUN/Creatinine Ratio 14.7     Anion Gap 12.5 mmol/L     Narrative:       GFR Normal >60  Chronic Kidney Disease <60  Kidney Failure <15    CBC & Differential [[id number]] Collected:  05/07/19 0326     Specimen:  Blood Updated:  05/07/19 0532    Narrative:       The following orders were created for panel order CBC & Differential.  Procedure                               Abnormality         Status                     ---------                               -----------         ------                     CBC Auto Differential[[id number]]        Abnormal            Final result                 Please view results for these tests on the individual orders.    CBC Auto Differential [[id number]]  (Abnormal) Collected:  05/07/19 0326    Specimen:  Blood Updated:  05/07/19 0532     WBC 8.20 10*3/mm3      RBC 4.23 10*6/mm3      Hemoglobin 11.1 g/dL      Hematocrit 35.9 %      MCV 84.9 fL      MCH 26.2 pg      MCHC 30.9 g/dL      RDW 14.8 %      RDW-SD 45.1 fl      MPV 11.4 fL      Platelets 217 10*3/mm3      Neutrophil % 79.0 %      Lymphocyte % 9.8 %      Monocyte % 10.4 %      Eosinophil % 0.1 %      Basophil % 0.1 %      Immature Grans % 0.6 %      Neutrophils, Absolute 6.48 10*3/mm3      Lymphocytes, Absolute 0.80 10*3/mm3      Monocytes, Absolute 0.85 10*3/mm3      Eosinophils, Absolute 0.01 10*3/mm3      Basophils, Absolute 0.01 10*3/mm3      Immature Grans, Absolute 0.05 10*3/mm3      nRBC 0.0 /100 WBC     POC Glucose Once [[id number]]  (Abnormal) Collected:  05/06/19 2043    Specimen:  Blood Updated:  05/06/19 2050     Glucose 349 mg/dL         Results for orders placed during the hospital encounter of 05/05/19   Adult Transthoracic Echo Complete With Contrast if Necessary Per Protocol    Narrative · Left ventricular systolic function is low normal. Calculated EF = 41.0%.   Estimated EF was in disagreement with the calculated EF. Estimated EF =   50%. Normal left ventricular cavity size noted. All left ventricular wall   segments contract normally. Left ventricular wall thickness is consistent   with hypertrophy. Sigmoid-shaped ventricular septum is present. Left   ventricular diastolic function is normal.  ·  Frequent ectopics/bigeminy affect LVEF calculation. Regional wall motion   cannot be adequately assessed and the patient declined contrast.  · The aortic valve is abnormal in structure. A functionally bicuspid   valve. There is moderate calcification of the aortic valve.Mild aortic   valve regurgitation is present. Mild aortic valve stenosis is present.        Xr Chest 2 View    Result Date: 5/5/2019  Mild interstitial prominence which is probably chronic. When allowing for changes in technique, I do not think there is been any significant change from the prior study. Minimal left base atelectasis stable as well. Otherwise normal Signer Name: Fei Henderson MD  Signed: 5/5/2019 10:03 PM  Workstation Name: [id number]     Xr Knee 1 Or 2 View Right    Result Date: 5/6/2019  Right knee arthroplasty appears well seated. No evidence of fracture.  This report was finalized on 5/6/2019 2:24 PM by Dr. Enrike Roche MD.        ASSESSMENT/PLAN:    ROLA on CKD due to cardiorenal from acute on chronic diastolic heart failure  -Patient much improved on 40 mg of IV Lasix ordered yesterday, cardiology repeated Lasix at 80 mg x 2 doses today, patient was examined status post at least 1 of those doses  -Check BMP in a.m., suspect that patient will have a contraction alkalosis, or other findings suggestive of being euvolemic  -Likely discharge in a.m.  -Mild troponin elevation believed to be chronic due to heart failure though acute component could be related to acute renal failure    Frequent PVCs and bigeminy  -Cards added low-dose metoprolol appreciate their assistance  -No further stress testing needed at this time    COPD not in acute exacerbation  -Patient is currently denying productive cough  -Will de-escalate steroids and stop doxycycline  -We will continue as needed DuoNeb's  -Have restarted patient's home Symbicort    HTN  -Patient usually on lisinopril at home, but now complaining of a chronic dry bothersome cough, would  appreciate cardiology's input on change to ARB at discharge  -Though antihypertensive therapy is currently being held due to systolic blood pressures of the 110s    DM type II on chronic insulin  -Home regimen unclear, patient does not know it off the top of his head, and chart shows both 7030 and detemir  -Patient has been uncontrolled on sliding scale 0 to 9 units  -We will give him 25 units of Levemir tonight, and continue sliding scale tomorrow  -Suspect some hyperglycemia due to patient's steroids, but those were discontinued today  -A1c 10    Hypothyroidism  -TSH 7.4, suspect patient is not taking the levothyroxine he should  -Will attempt to get patient set up with home health for at least medication management, but may benefit from disease management    Dispo, possibly tomorrow if patient continues to clinically improve, believe patient would benefit from home health

## 2019-05-08 NOTE — NURSING NOTE
"Continued Stay Note  MICHAEL Kincaid     Patient Name: Froilan Helton  MRN: [id number]  Today's Date: 5/8/2019    Admit Date: 5/5/2019    Discharge Plan     Row Name 05/08/19 1300       Plan    Plan Comments  Discussed referral from MD for home health. Patient states \"I told them earlier I don't want home health\". Declines the need for home helath to follow at discharge.         Discharge Codes    No documentation.       Expected Discharge Date and Time     Expected Discharge Date Expected Discharge Time    May 8, 2019             Elizabeth Arguello RN    "

## 2019-05-09 ENCOUNTER — READMISSION MANAGEMENT (OUTPATIENT)
Dept: CALL CENTER | Facility: HOSPITAL | Age: 79
End: 2019-05-09

## 2019-05-09 NOTE — OUTREACH NOTE
Prep Survey      Responses   Facility patient discharged from?  [facility]   Is LACE score < 7 ?  No   Is patient eligible?  Yes   Discharge diagnosis  Heart failure exacerbation, COPD   Does the patient have one of the following disease processes/diagnoses(primary or secondary)?  CHF   Does the patient have Home health ordered?  No   What is the Home health agency?   declined    Is there a DME ordered?  No   Prep survey completed?  Yes          Renu Malik RN

## 2019-05-10 ENCOUNTER — READMISSION MANAGEMENT (OUTPATIENT)
Dept: CALL CENTER | Facility: HOSPITAL | Age: 79
End: 2019-05-10

## 2019-05-10 NOTE — OUTREACH NOTE
CHF Week 1 Survey      Responses   Facility patient discharged from?  [facility]   Does the patient have one of the following disease processes/diagnoses(primary or secondary)?  CHF   Is there a successful TCM telephone encounter documented?  No   CHF Week 1 attempt successful?  No   Unsuccessful attempts  Attempt 1          Ruby Ibrahim RN

## 2019-05-11 ENCOUNTER — READMISSION MANAGEMENT (OUTPATIENT)
Dept: CALL CENTER | Facility: HOSPITAL | Age: 79
End: 2019-05-11

## 2019-05-11 NOTE — OUTREACH NOTE
CHF Week 1 Survey      Responses   Facility patient discharged from?  [facility]   Does the patient have one of the following disease processes/diagnoses(primary or secondary)?  CHF   Is there a successful TCM telephone encounter documented?  No   CHF Week 1 attempt successful?  Yes   Call start time  1643   Call end time  1646   Discharge diagnosis  Heart failure exacerbation, COPD   Is patient permission given to speak with other caregiver?  Yes   List who call center can speak with  wife   Meds reviewed with patient/caregiver?  Yes   Is the patient having any side effects they believe may be caused by any medication additions or changes?  No   Does the patient have all medications ordered at discharge?  Yes   Is the patient taking all medications as directed (includes completed medication regime)?  Yes   Does the patient have a primary care provider?   Yes   Does the patient have an appointment with their PCP within 7 days of discharge?  Yes   Has the patient kept scheduled appointments due by today?  N/A   Psychosocial issues?  No   Comments  weather is causing SOA per pt.    Did the patient receive a copy of their discharge instructions?  Yes   Nursing interventions  Reviewed instructions with patient   What is the patient's perception of their health status since discharge?  Same   Nursing interventions  Nurse provided patient education   Is the patient weighing daily?  No   Does the patient have scales?  Yes   Daily weight interventions  Education provided on importance of daily weight   Is the patient able to teach back Heart Failure diet management?  Yes   Is the patient able to teach back Heart Failure Zones?  Yes   Is the patient/caregiver able to teach back the hierarchy of who to call/visit for symptoms/problems? PCP, Specialist, Home health nurse, Urgent Care, ED, 911  Yes    CHF Week 1 call completed?  Yes          Bhavna Barraza RN

## 2019-05-20 ENCOUNTER — READMISSION MANAGEMENT (OUTPATIENT)
Dept: CALL CENTER | Facility: HOSPITAL | Age: 79
End: 2019-05-20

## 2019-05-20 NOTE — OUTREACH NOTE
CHF Week 2 Survey      Responses   Facility patient discharged from?  [facility]   Does the patient have one of the following disease processes/diagnoses(primary or secondary)?  CHF   Week 2 attempt successful?  Yes   Call start time  1830   Call end time  1835   Discharge diagnosis  Heart failure exacerbation, COPD   Is patient permission given to speak with other caregiver?  Yes   List who call center can speak with  wife   Meds reviewed with patient/caregiver?  Yes   Is the patient having any side effects they believe may be caused by any medication additions or changes?  No   Does the patient have all medications ordered at discharge?  Yes   Is the patient taking all medications as directed (includes completed medication regime)?  Yes   Does the patient have a primary care provider?   Yes   Does the patient have an appointment with their PCP within 7 days of discharge?  Yes   Has the patient kept scheduled appointments due by today?  N/A   Has home health visited the patient within 72 hours of discharge?  N/A   Psychosocial issues?  No   Comments  Patient reports he has been having headaches and sinus pressure. Encouraged him to go to the PCP. He verbalized understanding.   Did the patient receive a copy of their discharge instructions?  Yes   Nursing interventions  Reviewed instructions with patient   What is the patient's perception of their health status since discharge?  New symptoms unrelated to diagnosis   Nursing interventions  Nurse provided patient education   Is the patient weighing daily?  No   Does the patient have scales?  Yes   Daily weight interventions  Education provided on importance of daily weight   Is the patient able to teach back Heart Failure diet management?  Yes   Is the patient able to teach back Heart Failure Zones?  Yes   Is the patient able to teach back signs and symptoms of worsening condition? (i.e. weight gain, shortness of air, etc.)  Yes   Is the patient/caregiver able to teach  back the hierarchy of who to call/visit for symptoms/problems? PCP, Specialist, Home health nurse, Urgent Care, ED, 911  Yes   CHF Week 2 call completed?  Yes          Pan Brewer RN

## 2019-05-30 ENCOUNTER — READMISSION MANAGEMENT (OUTPATIENT)
Dept: CALL CENTER | Facility: HOSPITAL | Age: 79
End: 2019-05-30

## 2019-05-30 NOTE — OUTREACH NOTE
CHF Week 3 Survey      Responses   Facility patient discharged from?  [facility]   Does the patient have one of the following disease processes/diagnoses(primary or secondary)?  CHF   Week 3 attempt successful?  Yes   Call start time  1640   Call end time  1645   Discharge diagnosis  Heart failure exacerbation, COPD   Is patient permission given to speak with other caregiver?  Yes   List who call center can speak with  wife   Meds reviewed with patient/caregiver?  Yes   Is the patient having any side effects they believe may be caused by any medication additions or changes?  No   Does the patient have all medications ordered at discharge?  Yes   Is the patient taking all medications as directed (includes completed medication regime)?  Yes   Does the patient have a primary care provider?   Yes   Does the patient have an appointment with their PCP within 7 days of discharge?  Yes   Has the patient kept scheduled appointments due by today?  Yes   Has home health visited the patient within 72 hours of discharge?  N/A   Psychosocial issues?  No   Did the patient receive a copy of their discharge instructions?  Yes   Nursing interventions  Reviewed instructions with patient   What is the patient's perception of their health status since discharge?  Improving   Nursing interventions  Nurse provided patient education   Is the patient weighing daily?  No   Does the patient have scales?  Yes   Daily weight interventions  Education provided on importance of daily weight   Is the patient able to teach back Heart Failure diet management?  Yes   Is the patient able to teach back Heart Failure Zones?  Yes   Is the patient able to teach back signs and symptoms of worsening condition? (i.e. weight gain, shortness of air, etc.)  Yes   Is the patient/caregiver able to teach back the hierarchy of who to call/visit for symptoms/problems? PCP, Specialist, Home health nurse, Urgent Care, ED, 911  Yes   CHF Week 3 call completed?  Yes           Pan Brewer RN

## 2019-06-06 ENCOUNTER — READMISSION MANAGEMENT (OUTPATIENT)
Dept: CALL CENTER | Facility: HOSPITAL | Age: 79
End: 2019-06-06

## 2019-06-06 NOTE — OUTREACH NOTE
CHF Week 4 Survey      Responses   Facility patient discharged from?  [facility]   Does the patient have one of the following disease processes/diagnoses(primary or secondary)?  CHF   Week 4 attempt successful?  No          Beata Anne RN

## 2019-06-27 ENCOUNTER — HOSPITAL ENCOUNTER (OUTPATIENT)
Facility: HOSPITAL | Age: 79
Setting detail: OBSERVATION
Discharge: HOME | End: 2019-06-28
Attending: EMERGENCY MEDICINE | Admitting: HOSPITALIST

## 2019-06-27 ENCOUNTER — APPOINTMENT (OUTPATIENT)
Dept: GENERAL RADIOLOGY | Facility: HOSPITAL | Age: 79
End: 2019-06-27

## 2019-06-27 DIAGNOSIS — R77.8 ELEVATED TROPONIN: ICD-10-CM

## 2019-06-27 DIAGNOSIS — R06.02 SHORTNESS OF BREATH: Primary | ICD-10-CM

## 2019-06-27 DIAGNOSIS — R53.83 OTHER FATIGUE: ICD-10-CM

## 2019-06-27 DIAGNOSIS — N18.9 CHRONIC KIDNEY DISEASE, UNSPECIFIED CKD STAGE: ICD-10-CM

## 2019-06-27 DIAGNOSIS — R94.31 EKG ABNORMALITIES: ICD-10-CM

## 2019-06-27 LAB
ALBUMIN SERPL-MCNC: 3.8 G/DL (ref 3.5–5.2)
ALBUMIN/GLOB SERPL: 1.4 G/DL
ALP SERPL-CCNC: 58 U/L (ref 39–117)
ALT SERPL W P-5'-P-CCNC: 17 U/L (ref 1–41)
ANION GAP SERPL CALCULATED.3IONS-SCNC: 14.2 MMOL/L (ref 5–15)
AST SERPL-CCNC: 20 U/L (ref 1–40)
BASOPHILS # BLD AUTO: 0.02 10*3/MM3 (ref 0–0.2)
BASOPHILS NFR BLD AUTO: 0.2 % (ref 0–1.5)
BILIRUB SERPL-MCNC: 0.4 MG/DL (ref 0.2–1.2)
BUN BLD-MCNC: 29 MG/DL (ref 8–23)
BUN/CREAT SERPL: 14.9 (ref 7–25)
CALCIUM SPEC-SCNC: 9.5 MG/DL (ref 8.6–10.5)
CHLORIDE SERPL-SCNC: 98 MMOL/L (ref 98–107)
CO2 SERPL-SCNC: 25.8 MMOL/L (ref 22–29)
CREAT BLD-MCNC: 1.94 MG/DL (ref 0.76–1.27)
DEPRECATED RDW RBC AUTO: 43.1 FL (ref 37–54)
EOSINOPHIL # BLD AUTO: 0.22 10*3/MM3 (ref 0–0.4)
EOSINOPHIL NFR BLD AUTO: 2.7 % (ref 0.3–6.2)
ERYTHROCYTE [DISTWIDTH] IN BLOOD BY AUTOMATED COUNT: 14.6 % (ref 12.3–15.4)
GFR SERPL CREATININE-BSD FRML MDRD: 34 ML/MIN/1.73
GLOBULIN UR ELPH-MCNC: 2.8 GM/DL
GLUCOSE BLD-MCNC: 334 MG/DL (ref 65–99)
GLUCOSE BLDC GLUCOMTR-MCNC: 338 MG/DL (ref 70–130)
HCT VFR BLD AUTO: 40 % (ref 37.5–51)
HGB BLD-MCNC: 12.7 G/DL (ref 13–17.7)
IMM GRANULOCYTES # BLD AUTO: 0.03 10*3/MM3 (ref 0–0.05)
IMM GRANULOCYTES NFR BLD AUTO: 0.4 % (ref 0–0.5)
LYMPHOCYTES # BLD AUTO: 1.49 10*3/MM3 (ref 0.7–3.1)
LYMPHOCYTES NFR BLD AUTO: 18.6 % (ref 19.6–45.3)
MCH RBC QN AUTO: 26.3 PG (ref 26.6–33)
MCHC RBC AUTO-ENTMCNC: 31.8 G/DL (ref 31.5–35.7)
MCV RBC AUTO: 82.8 FL (ref 79–97)
MONOCYTES # BLD AUTO: 0.9 10*3/MM3 (ref 0.1–0.9)
MONOCYTES NFR BLD AUTO: 11.2 % (ref 5–12)
NEUTROPHILS # BLD AUTO: 5.35 10*3/MM3 (ref 1.7–7)
NEUTROPHILS NFR BLD AUTO: 66.9 % (ref 42.7–76)
NRBC BLD AUTO-RTO: 0 /100 WBC (ref 0–0.2)
NT-PROBNP SERPL-MCNC: 2374 PG/ML (ref 5–1800)
PLATELET # BLD AUTO: 204 10*3/MM3 (ref 140–450)
PMV BLD AUTO: 11.4 FL (ref 6–12)
POTASSIUM BLD-SCNC: 4.2 MMOL/L (ref 3.5–5.2)
PROT SERPL-MCNC: 6.6 G/DL (ref 6–8.5)
RBC # BLD AUTO: 4.83 10*6/MM3 (ref 4.14–5.8)
SODIUM BLD-SCNC: 138 MMOL/L (ref 136–145)
TROPONIN T SERPL-MCNC: 0.04 NG/ML (ref 0–0.03)
WBC NRBC COR # BLD: 8.01 10*3/MM3 (ref 3.4–10.8)

## 2019-06-27 PROCEDURE — 99284 EMERGENCY DEPT VISIT MOD MDM: CPT | Performed by: EMERGENCY MEDICINE

## 2019-06-27 PROCEDURE — G0378 HOSPITAL OBSERVATION PER HR: HCPCS

## 2019-06-27 PROCEDURE — 63710000001 INSULIN ASPART PER 5 UNITS: Performed by: INTERNAL MEDICINE

## 2019-06-27 PROCEDURE — 93005 ELECTROCARDIOGRAM TRACING: CPT

## 2019-06-27 PROCEDURE — 84484 ASSAY OF TROPONIN QUANT: CPT | Performed by: EMERGENCY MEDICINE

## 2019-06-27 PROCEDURE — 80053 COMPREHEN METABOLIC PANEL: CPT | Performed by: EMERGENCY MEDICINE

## 2019-06-27 PROCEDURE — 71046 X-RAY EXAM CHEST 2 VIEWS: CPT

## 2019-06-27 PROCEDURE — 94640 AIRWAY INHALATION TREATMENT: CPT

## 2019-06-27 PROCEDURE — 99284 EMERGENCY DEPT VISIT MOD MDM: CPT

## 2019-06-27 PROCEDURE — 25010000002 HEPARIN (PORCINE) PER 1000 UNITS: Performed by: INTERNAL MEDICINE

## 2019-06-27 PROCEDURE — 85025 COMPLETE CBC W/AUTO DIFF WBC: CPT | Performed by: EMERGENCY MEDICINE

## 2019-06-27 PROCEDURE — 83880 ASSAY OF NATRIURETIC PEPTIDE: CPT | Performed by: EMERGENCY MEDICINE

## 2019-06-27 PROCEDURE — 93005 ELECTROCARDIOGRAM TRACING: CPT | Performed by: EMERGENCY MEDICINE

## 2019-06-27 PROCEDURE — 93010 ELECTROCARDIOGRAM REPORT: CPT | Performed by: INTERNAL MEDICINE

## 2019-06-27 PROCEDURE — 96372 THER/PROPH/DIAG INJ SC/IM: CPT

## 2019-06-27 PROCEDURE — 99220 PR INITIAL OBSERVATION CARE/DAY 70 MINUTES: CPT | Performed by: INTERNAL MEDICINE

## 2019-06-27 PROCEDURE — 82962 GLUCOSE BLOOD TEST: CPT

## 2019-06-27 RX ORDER — DEXTROSE MONOHYDRATE 25 G/50ML
25 INJECTION, SOLUTION INTRAVENOUS
Status: DISCONTINUED | OUTPATIENT
Start: 2019-06-27 | End: 2019-06-28 | Stop reason: HOSPADM

## 2019-06-27 RX ORDER — DONEPEZIL HYDROCHLORIDE 5 MG/1
2.5 TABLET, FILM COATED ORAL NIGHTLY
Status: DISCONTINUED | OUTPATIENT
Start: 2019-06-27 | End: 2019-06-28 | Stop reason: HOSPADM

## 2019-06-27 RX ORDER — NICOTINE POLACRILEX 4 MG
15 LOZENGE BUCCAL
Status: DISCONTINUED | OUTPATIENT
Start: 2019-06-27 | End: 2019-06-28 | Stop reason: HOSPADM

## 2019-06-27 RX ORDER — NITROGLYCERIN 0.4 MG/1
0.4 TABLET SUBLINGUAL
Status: DISCONTINUED | OUTPATIENT
Start: 2019-06-27 | End: 2019-06-27

## 2019-06-27 RX ORDER — ACETAMINOPHEN 325 MG/1
650 TABLET ORAL EVERY 4 HOURS PRN
Status: DISCONTINUED | OUTPATIENT
Start: 2019-06-27 | End: 2019-06-28 | Stop reason: HOSPADM

## 2019-06-27 RX ORDER — ALBUTEROL SULFATE 2.5 MG/3ML
0.63 SOLUTION RESPIRATORY (INHALATION) EVERY 6 HOURS PRN
Status: DISCONTINUED | OUTPATIENT
Start: 2019-06-27 | End: 2019-06-28 | Stop reason: HOSPADM

## 2019-06-27 RX ORDER — L.ACID,PARA/B.BIFIDUM/S.THERM 8B CELL
1 CAPSULE ORAL DAILY
Status: DISCONTINUED | OUTPATIENT
Start: 2019-06-28 | End: 2019-06-28 | Stop reason: HOSPADM

## 2019-06-27 RX ORDER — SODIUM CHLORIDE 0.9 % (FLUSH) 0.9 %
3-10 SYRINGE (ML) INJECTION AS NEEDED
Status: DISCONTINUED | OUTPATIENT
Start: 2019-06-27 | End: 2019-06-28 | Stop reason: HOSPADM

## 2019-06-27 RX ORDER — FUROSEMIDE 40 MG/1
40 TABLET ORAL DAILY
Status: DISCONTINUED | OUTPATIENT
Start: 2019-06-28 | End: 2019-06-28 | Stop reason: HOSPADM

## 2019-06-27 RX ORDER — LEVOTHYROXINE SODIUM 0.1 MG/1
100 TABLET ORAL
Status: DISCONTINUED | OUTPATIENT
Start: 2019-06-28 | End: 2019-06-28 | Stop reason: HOSPADM

## 2019-06-27 RX ORDER — PRAMIPEXOLE DIHYDROCHLORIDE 0.25 MG/1
0.5 TABLET ORAL 2 TIMES DAILY
Status: DISCONTINUED | OUTPATIENT
Start: 2019-06-27 | End: 2019-06-28 | Stop reason: HOSPADM

## 2019-06-27 RX ORDER — ATORVASTATIN CALCIUM 10 MG/1
10 TABLET, FILM COATED ORAL DAILY
Status: DISCONTINUED | OUTPATIENT
Start: 2019-06-28 | End: 2019-06-28 | Stop reason: HOSPADM

## 2019-06-27 RX ORDER — ONDANSETRON 2 MG/ML
4 INJECTION INTRAMUSCULAR; INTRAVENOUS EVERY 6 HOURS PRN
Status: DISCONTINUED | OUTPATIENT
Start: 2019-06-27 | End: 2019-06-28 | Stop reason: HOSPADM

## 2019-06-27 RX ORDER — ASPIRIN 325 MG
325 TABLET ORAL ONCE
Status: COMPLETED | OUTPATIENT
Start: 2019-06-27 | End: 2019-06-27

## 2019-06-27 RX ORDER — BUDESONIDE AND FORMOTEROL FUMARATE DIHYDRATE 160; 4.5 UG/1; UG/1
2 AEROSOL RESPIRATORY (INHALATION)
Status: DISCONTINUED | OUTPATIENT
Start: 2019-06-27 | End: 2019-06-28 | Stop reason: HOSPADM

## 2019-06-27 RX ORDER — LISINOPRIL 20 MG/1
20 TABLET ORAL DAILY
Status: DISCONTINUED | OUTPATIENT
Start: 2019-06-28 | End: 2019-06-28 | Stop reason: HOSPADM

## 2019-06-27 RX ORDER — CLOTRIMAZOLE AND BETAMETHASONE DIPROPIONATE 10; .64 MG/G; MG/G
CREAM TOPICAL EVERY 12 HOURS SCHEDULED
Status: DISCONTINUED | OUTPATIENT
Start: 2019-06-27 | End: 2019-06-28 | Stop reason: HOSPADM

## 2019-06-27 RX ORDER — HEPARIN SODIUM 5000 [USP'U]/ML
5000 INJECTION, SOLUTION INTRAVENOUS; SUBCUTANEOUS EVERY 12 HOURS SCHEDULED
Status: DISCONTINUED | OUTPATIENT
Start: 2019-06-27 | End: 2019-06-28 | Stop reason: HOSPADM

## 2019-06-27 RX ORDER — METOPROLOL SUCCINATE 25 MG/1
25 TABLET, EXTENDED RELEASE ORAL NIGHTLY
Status: DISCONTINUED | OUTPATIENT
Start: 2019-06-27 | End: 2019-06-28

## 2019-06-27 RX ORDER — NITROGLYCERIN 0.4 MG/1
0.4 TABLET SUBLINGUAL
Status: DISCONTINUED | OUTPATIENT
Start: 2019-06-27 | End: 2019-06-28 | Stop reason: HOSPADM

## 2019-06-27 RX ORDER — DOCUSATE SODIUM 100 MG/1
100 CAPSULE, LIQUID FILLED ORAL 2 TIMES DAILY PRN
Status: DISCONTINUED | OUTPATIENT
Start: 2019-06-27 | End: 2019-06-28 | Stop reason: HOSPADM

## 2019-06-27 RX ORDER — ASPIRIN 325 MG
325 TABLET, DELAYED RELEASE (ENTERIC COATED) ORAL DAILY
Status: DISCONTINUED | OUTPATIENT
Start: 2019-06-28 | End: 2019-06-28 | Stop reason: HOSPADM

## 2019-06-27 RX ORDER — ONDANSETRON 4 MG/1
4 TABLET, FILM COATED ORAL EVERY 6 HOURS PRN
Status: DISCONTINUED | OUTPATIENT
Start: 2019-06-27 | End: 2019-06-28 | Stop reason: HOSPADM

## 2019-06-27 RX ORDER — SODIUM CHLORIDE 0.9 % (FLUSH) 0.9 %
10 SYRINGE (ML) INJECTION AS NEEDED
Status: DISCONTINUED | OUTPATIENT
Start: 2019-06-27 | End: 2019-06-28 | Stop reason: HOSPADM

## 2019-06-27 RX ORDER — SODIUM CHLORIDE 0.9 % (FLUSH) 0.9 %
3 SYRINGE (ML) INJECTION EVERY 12 HOURS SCHEDULED
Status: DISCONTINUED | OUTPATIENT
Start: 2019-06-27 | End: 2019-06-28 | Stop reason: HOSPADM

## 2019-06-27 RX ORDER — PREGABALIN 75 MG/1
150 CAPSULE ORAL 2 TIMES DAILY
Status: DISCONTINUED | OUTPATIENT
Start: 2019-06-27 | End: 2019-06-28 | Stop reason: HOSPADM

## 2019-06-27 RX ORDER — SODIUM CHLORIDE 9 MG/ML
40 INJECTION, SOLUTION INTRAVENOUS AS NEEDED
Status: DISCONTINUED | OUTPATIENT
Start: 2019-06-27 | End: 2019-06-28 | Stop reason: HOSPADM

## 2019-06-27 RX ORDER — AMLODIPINE BESYLATE 2.5 MG/1
2.5 TABLET ORAL
Status: DISCONTINUED | OUTPATIENT
Start: 2019-06-28 | End: 2019-06-28 | Stop reason: HOSPADM

## 2019-06-27 RX ADMIN — INSULIN ASPART 10 UNITS: 100 INJECTION, SOLUTION INTRAVENOUS; SUBCUTANEOUS at 23:53

## 2019-06-27 RX ADMIN — ASPIRIN 325 MG: 325 TABLET, FILM COATED ORAL at 18:43

## 2019-06-27 RX ADMIN — PREGABALIN 150 MG: 75 CAPSULE ORAL at 23:51

## 2019-06-27 RX ADMIN — NITROGLYCERIN 0.4 MG: 0.4 TABLET SUBLINGUAL at 18:43

## 2019-06-27 RX ADMIN — PRAMIPEXOLE DIHYDROCHLORIDE 0.5 MG: 0.25 TABLET ORAL at 23:51

## 2019-06-27 RX ADMIN — BUDESONIDE AND FORMOTEROL FUMARATE DIHYDRATE 2 PUFF: 160; 4.5 AEROSOL RESPIRATORY (INHALATION) at 23:36

## 2019-06-27 RX ADMIN — DONEPEZIL HYDROCHLORIDE 2.5 MG: 5 TABLET, FILM COATED ORAL at 23:50

## 2019-06-27 RX ADMIN — SODIUM CHLORIDE, PRESERVATIVE FREE 3 ML: 5 INJECTION INTRAVENOUS at 23:45

## 2019-06-27 RX ADMIN — HEPARIN SODIUM 5000 UNITS: 5000 INJECTION, SOLUTION INTRAVENOUS; SUBCUTANEOUS at 23:55

## 2019-06-27 RX ADMIN — INSULIN DETEMIR 15 UNITS: 100 INJECTION, SOLUTION SUBCUTANEOUS at 23:52

## 2019-06-28 VITALS
SYSTOLIC BLOOD PRESSURE: 138 MMHG | HEIGHT: 73 IN | HEART RATE: 68 BPM | DIASTOLIC BLOOD PRESSURE: 71 MMHG | BODY MASS INDEX: 40.87 KG/M2 | OXYGEN SATURATION: 93 % | RESPIRATION RATE: 20 BRPM | TEMPERATURE: 97.9 F | WEIGHT: 308.4 LBS

## 2019-06-28 LAB
ANION GAP SERPL CALCULATED.3IONS-SCNC: 13.6 MMOL/L (ref 5–15)
BASOPHILS # BLD AUTO: 0.03 10*3/MM3 (ref 0–0.2)
BASOPHILS NFR BLD AUTO: 0.5 % (ref 0–1.5)
BUN BLD-MCNC: 29 MG/DL (ref 8–23)
BUN/CREAT SERPL: 16.6 (ref 7–25)
CALCIUM SPEC-SCNC: 9.1 MG/DL (ref 8.6–10.5)
CHLORIDE SERPL-SCNC: 101 MMOL/L (ref 98–107)
CO2 SERPL-SCNC: 24.4 MMOL/L (ref 22–29)
CREAT BLD-MCNC: 1.75 MG/DL (ref 0.76–1.27)
DEPRECATED RDW RBC AUTO: 44.4 FL (ref 37–54)
EOSINOPHIL # BLD AUTO: 0.28 10*3/MM3 (ref 0–0.4)
EOSINOPHIL NFR BLD AUTO: 4.3 % (ref 0.3–6.2)
ERYTHROCYTE [DISTWIDTH] IN BLOOD BY AUTOMATED COUNT: 14.6 % (ref 12.3–15.4)
GFR SERPL CREATININE-BSD FRML MDRD: 38 ML/MIN/1.73
GLUCOSE BLD-MCNC: 255 MG/DL (ref 65–99)
GLUCOSE BLDC GLUCOMTR-MCNC: 250 MG/DL (ref 70–130)
GLUCOSE BLDC GLUCOMTR-MCNC: 263 MG/DL (ref 70–130)
HCT VFR BLD AUTO: 35.8 % (ref 37.5–51)
HGB BLD-MCNC: 11.3 G/DL (ref 13–17.7)
IMM GRANULOCYTES # BLD AUTO: 0.03 10*3/MM3 (ref 0–0.05)
IMM GRANULOCYTES NFR BLD AUTO: 0.5 % (ref 0–0.5)
LYMPHOCYTES # BLD AUTO: 1.23 10*3/MM3 (ref 0.7–3.1)
LYMPHOCYTES NFR BLD AUTO: 18.7 % (ref 19.6–45.3)
MAGNESIUM SERPL-MCNC: 1.9 MG/DL (ref 1.6–2.4)
MCH RBC QN AUTO: 26.3 PG (ref 26.6–33)
MCHC RBC AUTO-ENTMCNC: 31.6 G/DL (ref 31.5–35.7)
MCV RBC AUTO: 83.4 FL (ref 79–97)
MONOCYTES # BLD AUTO: 0.86 10*3/MM3 (ref 0.1–0.9)
MONOCYTES NFR BLD AUTO: 13.1 % (ref 5–12)
NEUTROPHILS # BLD AUTO: 4.14 10*3/MM3 (ref 1.7–7)
NEUTROPHILS NFR BLD AUTO: 62.9 % (ref 42.7–76)
NRBC BLD AUTO-RTO: 0 /100 WBC (ref 0–0.2)
PLATELET # BLD AUTO: 158 10*3/MM3 (ref 140–450)
PMV BLD AUTO: 11.8 FL (ref 6–12)
POTASSIUM BLD-SCNC: 3.9 MMOL/L (ref 3.5–5.2)
RBC # BLD AUTO: 4.29 10*6/MM3 (ref 4.14–5.8)
SODIUM BLD-SCNC: 139 MMOL/L (ref 136–145)
TROPONIN T SERPL-MCNC: 0.03 NG/ML (ref 0–0.03)
TROPONIN T SERPL-MCNC: 0.04 NG/ML (ref 0–0.03)
TROPONIN T SERPL-MCNC: 0.04 NG/ML (ref 0–0.03)
WBC NRBC COR # BLD: 6.57 10*3/MM3 (ref 3.4–10.8)

## 2019-06-28 PROCEDURE — 99214 OFFICE O/P EST MOD 30 MIN: CPT | Performed by: INTERNAL MEDICINE

## 2019-06-28 PROCEDURE — 25010000002 HEPARIN (PORCINE) PER 1000 UNITS: Performed by: INTERNAL MEDICINE

## 2019-06-28 PROCEDURE — 84484 ASSAY OF TROPONIN QUANT: CPT | Performed by: INTERNAL MEDICINE

## 2019-06-28 PROCEDURE — 63710000001 INSULIN DETEMIR PER 5 UNITS: Performed by: INTERNAL MEDICINE

## 2019-06-28 PROCEDURE — 94799 UNLISTED PULMONARY SVC/PX: CPT

## 2019-06-28 PROCEDURE — 96372 THER/PROPH/DIAG INJ SC/IM: CPT

## 2019-06-28 PROCEDURE — G0378 HOSPITAL OBSERVATION PER HR: HCPCS

## 2019-06-28 PROCEDURE — 94640 AIRWAY INHALATION TREATMENT: CPT

## 2019-06-28 PROCEDURE — 99217 PR OBSERVATION CARE DISCHARGE MANAGEMENT: CPT | Performed by: HOSPITALIST

## 2019-06-28 PROCEDURE — 80048 BASIC METABOLIC PNL TOTAL CA: CPT | Performed by: INTERNAL MEDICINE

## 2019-06-28 PROCEDURE — 63710000001 INSULIN ASPART PER 5 UNITS: Performed by: INTERNAL MEDICINE

## 2019-06-28 PROCEDURE — 82962 GLUCOSE BLOOD TEST: CPT

## 2019-06-28 PROCEDURE — 83735 ASSAY OF MAGNESIUM: CPT | Performed by: INTERNAL MEDICINE

## 2019-06-28 PROCEDURE — 85025 COMPLETE CBC W/AUTO DIFF WBC: CPT | Performed by: INTERNAL MEDICINE

## 2019-06-28 RX ORDER — METOPROLOL SUCCINATE 50 MG/1
50 TABLET, EXTENDED RELEASE ORAL DAILY
Status: DISCONTINUED | OUTPATIENT
Start: 2019-06-29 | End: 2019-06-28 | Stop reason: HOSPADM

## 2019-06-28 RX ORDER — GUAIFENESIN 600 MG/1
600 TABLET, EXTENDED RELEASE ORAL EVERY 12 HOURS SCHEDULED
Status: DISCONTINUED | OUTPATIENT
Start: 2019-06-28 | End: 2019-06-28 | Stop reason: HOSPADM

## 2019-06-28 RX ORDER — CALCIUM CARBONATE 200(500)MG
2 TABLET,CHEWABLE ORAL 3 TIMES DAILY PRN
Status: DISCONTINUED | OUTPATIENT
Start: 2019-06-28 | End: 2019-06-28 | Stop reason: HOSPADM

## 2019-06-28 RX ORDER — METOPROLOL SUCCINATE 50 MG/1
50 TABLET, EXTENDED RELEASE ORAL DAILY
Qty: 30 TABLET | Refills: 0 | Status: SHIPPED | OUTPATIENT
Start: 2019-06-29 | End: 2019-08-29 | Stop reason: HOSPADM

## 2019-06-28 RX ORDER — CALCIUM CARBONATE 200(500)MG
TABLET,CHEWABLE ORAL
Status: COMPLETED
Start: 2019-06-28 | End: 2019-06-28

## 2019-06-28 RX ORDER — FLUTICASONE PROPIONATE 50 MCG
2 SPRAY, SUSPENSION (ML) NASAL DAILY
Qty: 1 BOTTLE | Refills: 0 | Status: SHIPPED | OUTPATIENT
Start: 2019-06-29 | End: 2022-01-01 | Stop reason: HOSPADM

## 2019-06-28 RX ORDER — GUAIFENESIN 600 MG/1
600 TABLET, EXTENDED RELEASE ORAL EVERY 12 HOURS SCHEDULED
Status: ON HOLD
Start: 2019-06-28 | End: 2019-10-15

## 2019-06-28 RX ORDER — FLUTICASONE PROPIONATE 50 MCG
2 SPRAY, SUSPENSION (ML) NASAL DAILY
Status: DISCONTINUED | OUTPATIENT
Start: 2019-06-28 | End: 2019-06-28 | Stop reason: HOSPADM

## 2019-06-28 RX ADMIN — SODIUM CHLORIDE, PRESERVATIVE FREE 3 ML: 5 INJECTION INTRAVENOUS at 08:44

## 2019-06-28 RX ADMIN — INSULIN ASPART 8 UNITS: 100 INJECTION, SOLUTION INTRAVENOUS; SUBCUTANEOUS at 08:41

## 2019-06-28 RX ADMIN — ACETAMINOPHEN 650 MG: 325 TABLET, FILM COATED ORAL at 14:40

## 2019-06-28 RX ADMIN — ANTACID TABLETS 2 TABLET: 500 TABLET, CHEWABLE ORAL at 12:29

## 2019-06-28 RX ADMIN — PRAMIPEXOLE DIHYDROCHLORIDE 0.5 MG: 0.25 TABLET ORAL at 08:42

## 2019-06-28 RX ADMIN — ATORVASTATIN CALCIUM 10 MG: 10 TABLET, FILM COATED ORAL at 08:42

## 2019-06-28 RX ADMIN — INSULIN ASPART 8 UNITS: 100 INJECTION, SOLUTION INTRAVENOUS; SUBCUTANEOUS at 12:32

## 2019-06-28 RX ADMIN — ASPIRIN 325 MG: 325 TABLET, DELAYED RELEASE ORAL at 08:42

## 2019-06-28 RX ADMIN — HEPARIN SODIUM 5000 UNITS: 5000 INJECTION, SOLUTION INTRAVENOUS; SUBCUTANEOUS at 08:43

## 2019-06-28 RX ADMIN — PREGABALIN 150 MG: 75 CAPSULE ORAL at 08:49

## 2019-06-28 RX ADMIN — GUAIFENESIN 600 MG: 600 TABLET, EXTENDED RELEASE ORAL at 12:28

## 2019-06-28 RX ADMIN — FLUTICASONE PROPIONATE 2 SPRAY: 50 SPRAY, METERED NASAL at 12:27

## 2019-06-28 RX ADMIN — LEVOTHYROXINE SODIUM 100 MCG: 100 TABLET ORAL at 06:45

## 2019-06-28 RX ADMIN — METOPROLOL SUCCINATE 25 MG: 25 TABLET, EXTENDED RELEASE ORAL at 08:42

## 2019-06-28 RX ADMIN — ALBUTEROL SULFATE 0.63 MG: 2.5 SOLUTION RESPIRATORY (INHALATION) at 14:59

## 2019-06-28 RX ADMIN — BUDESONIDE AND FORMOTEROL FUMARATE DIHYDRATE 2 PUFF: 160; 4.5 AEROSOL RESPIRATORY (INHALATION) at 09:34

## 2019-06-28 RX ADMIN — LISINOPRIL 20 MG: 20 TABLET ORAL at 08:42

## 2019-06-28 RX ADMIN — Medication 2 TABLET: at 12:29

## 2019-06-28 RX ADMIN — FUROSEMIDE 40 MG: 40 TABLET ORAL at 08:43

## 2019-06-28 RX ADMIN — Medication 1 CAPSULE: at 08:42

## 2019-06-28 RX ADMIN — INSULIN DETEMIR 15 UNITS: 100 INJECTION, SOLUTION SUBCUTANEOUS at 08:41

## 2019-06-28 RX ADMIN — CLOTRIMAZOLE AND BETAMETHASONE DIPROPIONATE: 10; .5 CREAM TOPICAL at 08:43

## 2019-06-28 RX ADMIN — AMLODIPINE BESYLATE 2.5 MG: 2.5 TABLET ORAL at 08:42

## 2019-06-29 ENCOUNTER — READMISSION MANAGEMENT (OUTPATIENT)
Dept: CALL CENTER | Facility: HOSPITAL | Age: 79
End: 2019-06-29

## 2019-06-29 NOTE — OUTREACH NOTE
Prep Survey      Responses   Facility patient discharged from?  [facility]   Is LACE score < 7 ?  No   Is patient eligible?  Yes   Discharge diagnosis  Fatigue, weakness, Dyspnea on exertion, head congestion,Chronically elevated troponin, Chronic diastolic CHF, COPD/asthma DM II    Does the patient have one of the following disease processes/diagnoses(primary or secondary)?  COPD/Pneumonia [also hx CHF]   Does the patient have Home health ordered?  No   Is there a DME ordered?  No   Comments regarding appointments  See AVS   Prep survey completed?  Yes          Yaima Solano RN

## 2019-06-30 ENCOUNTER — READMISSION MANAGEMENT (OUTPATIENT)
Dept: CALL CENTER | Facility: HOSPITAL | Age: 79
End: 2019-06-30

## 2019-06-30 NOTE — OUTREACH NOTE
COPD/PN Week 1 Survey      Responses   Facility patient discharged from?  [facility]   Does the patient have one of the following disease processes/diagnoses(primary or secondary)?  COPD/Pneumonia   Is there a successful TCM telephone encounter documented?  No   Was the primary reason for admission:  Pneumonia   Week 1 attempt successful?  Yes   Call start time  3486   Rescheduled  Rescheduled-pt requested [wife is attenint a family renunion]          Angelina Roche RN

## 2019-07-01 ENCOUNTER — READMISSION MANAGEMENT (OUTPATIENT)
Dept: CALL CENTER | Facility: HOSPITAL | Age: 79
End: 2019-07-01

## 2019-07-01 NOTE — OUTREACH NOTE
COPD/PN Week 1 Survey      Responses   Facility patient discharged from?  [facility]   Does the patient have one of the following disease processes/diagnoses(primary or secondary)?  COPD/Pneumonia   Is there a successful TCM telephone encounter documented?  No   Was the primary reason for admission:  COPD exacerbation   Week 1 attempt successful?  Yes   Call start time  1229   Call end time  1232   Discharge diagnosis  Fatigue, weakness, Dyspnea on exertion, head congestion,Chronically elevated troponin, Chronic diastolic CHF, COPD/asthma DM II    Is patient permission given to speak with other caregiver?  Yes   List who call center can speak with  Gaudencio Rangel    [facility] reviewed with patient/caregiver?  Yes   Is the patient having any side effects they believe may be caused by any medication additions or changes?  No   Does the patient have all medications ordered at discharge?  Yes   Is the patient taking all medications as directed (includes completed medication regime)?  Yes   Does the patient have a primary care provider?   Yes   Does the patient have an appointment with their PCP or pulmonologist within 7 days of discharge?  Yes   Has the patient kept scheduled appointments due by today?  N/A   Psychosocial issues?  No   Did the patient receive a copy of their discharge instructions?  Yes   Nursing interventions  Reviewed instructions with patient   What is the patient's perception of their health status since discharge?  Improving   Nursing Interventions  Nurse provided patient education   Are the patient's immunizations up to date?   Yes   Nursing interventions  Educated on importance of maintaining up to date immunizations as advised by provider   Is the patient/caregiver able to teach back the hierarchy of who to call/visit for symptoms/problems? PCP, Specialist, Home health nurse, Urgent Care, ED, 911  Yes   Is the patient able to teach back COPD zones?  Yes   Nursing interventions  Education provided on  various zones   Patient reports what zone on this call?  Green Zone   Green Zone  Reports doing well, Breathing without shortness of breath, Usual activity and exercise level, Sleeping well, Appetite is good, Usual amount of phlegm/mucus without difficulty coughing up   Green Zone interventions:  Take daily medications, Continue regular exercise/diet plan, Avoid indoor/outdoor triggers, Use oxygen as prescribed   Week 1 call completed?  Yes          Lala Blake RN

## 2019-07-10 ENCOUNTER — READMISSION MANAGEMENT (OUTPATIENT)
Dept: CALL CENTER | Facility: HOSPITAL | Age: 79
End: 2019-07-10

## 2019-07-10 NOTE — OUTREACH NOTE
COPD/PN Week 2 Survey      Responses   Facility patient discharged from?  [facility]   Does the patient have one of the following disease processes/diagnoses(primary or secondary)?  COPD/Pneumonia   Was the primary reason for admission:  COPD exacerbation   Week 2 attempt successful?  Yes   Call start time  1249   Call end time  1252   Medication alerts for this patient  taking medications as  directed   Meds reviewed with patient/caregiver?  Yes   Is the patient taking all medications as directed (includes completed medication regime)?  Yes   Comments regarding appointments  saw the doctor on Monday   Has the patient kept scheduled appointments due by today?  Yes   What is the patient's perception of their health status since discharge?  Same   Is the patient able to teach back COPD zones?  -- [unable to finish is going to his cousin's ]   Week 2 call completed?  Yes   Wrap up additional comments  call was brief, on the  way to his cousin's           Angelina Roche RN

## 2019-07-17 ENCOUNTER — READMISSION MANAGEMENT (OUTPATIENT)
Dept: CALL CENTER | Facility: HOSPITAL | Age: 79
End: 2019-07-17

## 2019-07-17 NOTE — OUTREACH NOTE
COPD/PN Week 3 Survey      Responses   Facility patient discharged from?  [facility]   Does the patient have one of the following disease processes/diagnoses(primary or secondary)?  COPD/Pneumonia   Was the primary reason for admission:  COPD exacerbation   Week 3 attempt successful?  No   Unsuccessful attempts  Attempt 1          Leia Cárdenas RN

## 2019-07-18 ENCOUNTER — READMISSION MANAGEMENT (OUTPATIENT)
Dept: CALL CENTER | Facility: HOSPITAL | Age: 79
End: 2019-07-18

## 2019-07-18 NOTE — OUTREACH NOTE
COPD/PN Week 3 Survey      Responses   Facility patient discharged from?  [facility]   Does the patient have one of the following disease processes/diagnoses(primary or secondary)?  COPD/Pneumonia   Was the primary reason for admission:  COPD exacerbation   Week 3 attempt successful?  Yes   Call start time  1555   Call end time  1557   Discharge diagnosis  Fatigue, weakness, Dyspnea on exertion, head congestion,Chronically elevated troponin, Chronic diastolic CHF, COPD/asthma DM II    List who call center can speak with  Lina- Wife    Person spoke with today (if not patient) and relationship  Lina- Wife    Meds reviewed with patient/caregiver?  Yes   Is the patient having any side effects they believe may be caused by any medication additions or changes?  No   Does the patient have all medications ordered at discharge?  Yes   Is the patient taking all medications as directed (includes completed medication regime)?  Yes   Comments regarding appointments  saw the doctor on Monday   Does the patient have a primary care provider?   Yes   Does the patient have an appointment with their PCP or pulmonologist within 7 days of discharge?  Yes   Has the patient kept scheduled appointments due by today?  Yes   Has home health visited the patient within 72 hours of discharge?  Unsure   Psychosocial issues?  No   Did the patient receive a copy of their discharge instructions?  Yes   Nursing interventions  Reviewed instructions with patient   What is the patient's perception of their health status since discharge?  Improving   Nursing Interventions  Nurse provided patient education   Are the patient's immunizations up to date?   Yes   Nursing interventions  Educated on importance of maintaining up to date immunizations as advised by provider   Is the patient/caregiver able to teach back the hierarchy of who to call/visit for symptoms/problems? PCP, Specialist, Home health nurse, Urgent Care, ED, 911  Yes   Is the patient able  to teach back COPD zones?  No   Nursing interventions  Education provided on various zones   Patient reports what zone on this call?  Green Zone   Green Zone  Reports doing well, Breathing without shortness of breath, Usual activity and exercise level, Sleeping well, Appetite is good, Usual amount of phlegm/mucus without difficulty coughing up   Green Zone interventions:  Take daily medications, Continue regular exercise/diet plan, Avoid indoor/outdoor triggers, Use oxygen as prescribed   Week 3 call completed?  Yes          Agustin Avila, RN

## 2019-07-25 ENCOUNTER — READMISSION MANAGEMENT (OUTPATIENT)
Dept: CALL CENTER | Facility: HOSPITAL | Age: 79
End: 2019-07-25

## 2019-07-25 NOTE — OUTREACH NOTE
COPD/PN Week 4 Survey      Responses   Facility patient discharged from?  [facility]   Does the patient have one of the following disease processes/diagnoses(primary or secondary)?  COPD/Pneumonia   Was the primary reason for admission:  COPD exacerbation   Week 4 attempt successful?  Yes   Call start time  1354   Call end time  1359   Discharge diagnosis  Fatigue, weakness, Dyspnea on exertion, head congestion,Chronically elevated troponin, Chronic diastolic CHF, COPD/asthma DM II    Is patient permission given to speak with other caregiver?  Yes   List who call center can speak with  Lina- Wife    Person spoke with today (if not patient) and relationship  Lina- Wife    Meds reviewed with patient/caregiver?  Yes   Is the patient having any side effects they believe may be caused by any medication additions or changes?  No   Is the patient taking all medications as directed (includes completed medication regime)?  Yes   Has the patient kept scheduled appointments due by today?  Yes   Is the patient still receiving Home Health Services?  N/A   Psychosocial issues?  No   What is the patient's perception of their health status since discharge?  Improving   Nursing Interventions  Nurse provided patient education   Are the patient's immunizations up to date?   Yes   Nursing interventions  Educated on importance of maintaining up to date immunizations as advised by provider   Is the patient/caregiver able to teach back the hierarchy of who to call/visit for symptoms/problems? PCP, Specialist, Home health nurse, Urgent Care, ED, 911  Yes   Is the patient able to teach back COPD zones?  Yes   Nursing interventions  Education provided on various zones   Patient reports what zone on this call?  Green Zone   Green Zone  Reports doing well, Breathing without shortness of breath, Usual activity and exercise level, Sleeping well, Appetite is good, Usual amount of phlegm/mucus without difficulty coughing up   Green Zone  interventions:  Take daily medications, Continue regular exercise/diet plan, Avoid indoor/outdoor triggers, Use oxygen as prescribed   Week 4 call completed?  Yes   Would the patient like one additional call?  No   Graduated  Yes   Did the patient feel the follow up calls were helpful during their recovery period?  Yes   Was the number of calls appropriate?  Yes          Pan Brewer RN

## 2019-08-14 ENCOUNTER — HOSPITAL ENCOUNTER (INPATIENT)
Facility: HOSPITAL | Age: 79
LOS: 14 days | Discharge: HOME HEALTH | End: 2019-08-29
Attending: EMERGENCY MEDICINE | Admitting: HOSPITALIST

## 2019-08-14 ENCOUNTER — APPOINTMENT (OUTPATIENT)
Dept: CT IMAGING | Facility: HOSPITAL | Age: 79
End: 2019-08-14

## 2019-08-14 ENCOUNTER — APPOINTMENT (OUTPATIENT)
Dept: GENERAL RADIOLOGY | Facility: HOSPITAL | Age: 79
End: 2019-08-14

## 2019-08-14 DIAGNOSIS — J96.01 ACUTE RESPIRATORY FAILURE WITH HYPOXIA (HCC): ICD-10-CM

## 2019-08-14 DIAGNOSIS — R40.2421 GLASGOW COMA SCALE TOTAL SCORE 9-12, IN THE FIELD (EMT OR AMBULANCE): Primary | ICD-10-CM

## 2019-08-14 DIAGNOSIS — R13.10 DYSPHAGIA, UNSPECIFIED TYPE: ICD-10-CM

## 2019-08-14 DIAGNOSIS — J18.9 HCAP (HEALTHCARE-ASSOCIATED PNEUMONIA): ICD-10-CM

## 2019-08-14 DIAGNOSIS — I50.1 PULMONARY EDEMA CARDIAC CAUSE (HCC): ICD-10-CM

## 2019-08-14 LAB
ALBUMIN SERPL-MCNC: 4.1 G/DL (ref 3.5–5.2)
ALBUMIN/GLOB SERPL: 1.1 G/DL
ALP SERPL-CCNC: 63 U/L (ref 39–117)
ALT SERPL W P-5'-P-CCNC: 18 U/L (ref 1–41)
ANION GAP SERPL CALCULATED.3IONS-SCNC: 19.7 MMOL/L (ref 5–15)
ARTERIAL PATENCY WRIST A: POSITIVE
AST SERPL-CCNC: 23 U/L (ref 1–40)
ATMOSPHERIC PRESS: 738 MMHG
BASE EXCESS BLDA CALC-SCNC: -0.9 MMOL/L (ref 0–2)
BASOPHILS # BLD AUTO: 0.07 10*3/MM3 (ref 0–0.2)
BASOPHILS NFR BLD AUTO: 0.3 % (ref 0–1.5)
BDY SITE: ABNORMAL
BILIRUB SERPL-MCNC: 0.9 MG/DL (ref 0.2–1.2)
BODY TEMPERATURE: 98.6 C
BUN BLD-MCNC: 25 MG/DL (ref 8–23)
BUN/CREAT SERPL: 13.5 (ref 7–25)
CALCIUM SPEC-SCNC: 9.7 MG/DL (ref 8.6–10.5)
CHLORIDE SERPL-SCNC: 95 MMOL/L (ref 98–107)
CO2 SERPL-SCNC: 24.3 MMOL/L (ref 22–29)
CREAT BLD-MCNC: 1.85 MG/DL (ref 0.76–1.27)
D-LACTATE SERPL-SCNC: 2.8 MMOL/L (ref 0.5–2)
DEPRECATED RDW RBC AUTO: 44.4 FL (ref 37–54)
EOSINOPHIL # BLD AUTO: 0.06 10*3/MM3 (ref 0–0.4)
EOSINOPHIL NFR BLD AUTO: 0.2 % (ref 0.3–6.2)
EPAP: 6
ERYTHROCYTE [DISTWIDTH] IN BLOOD BY AUTOMATED COUNT: 14.8 % (ref 12.3–15.4)
ERYTHROCYTE [SEDIMENTATION RATE] IN BLOOD: 9 MM/HR (ref 0–20)
GFR SERPL CREATININE-BSD FRML MDRD: 36 ML/MIN/1.73
GLOBULIN UR ELPH-MCNC: 3.8 GM/DL
GLUCOSE BLD-MCNC: 303 MG/DL (ref 65–99)
HCO3 BLDA-SCNC: 25.6 MMOL/L (ref 20–26)
HCT VFR BLD AUTO: 43.8 % (ref 37.5–51)
HGB BLD-MCNC: 13.4 G/DL (ref 13–17.7)
HGB BLDA-MCNC: 13 G/DL (ref 14–18)
HOROWITZ INDEX BLD+IHG-RTO: 40 %
IMM GRANULOCYTES # BLD AUTO: 0.29 10*3/MM3 (ref 0–0.05)
IMM GRANULOCYTES NFR BLD AUTO: 1.2 % (ref 0–0.5)
INR PPP: 1.2 (ref 0.9–1.1)
IPAP: 12
LYMPHOCYTES # BLD AUTO: 0.93 10*3/MM3 (ref 0.7–3.1)
LYMPHOCYTES NFR BLD AUTO: 3.8 % (ref 19.6–45.3)
MAGNESIUM SERPL-MCNC: 1.8 MG/DL (ref 1.6–2.4)
MCH RBC QN AUTO: 25.5 PG (ref 26.6–33)
MCHC RBC AUTO-ENTMCNC: 30.6 G/DL (ref 31.5–35.7)
MCV RBC AUTO: 83.3 FL (ref 79–97)
MODALITY: ABNORMAL
MONOCYTES # BLD AUTO: 1.97 10*3/MM3 (ref 0.1–0.9)
MONOCYTES NFR BLD AUTO: 8 % (ref 5–12)
NEUTROPHILS # BLD AUTO: 21.32 10*3/MM3 (ref 1.7–7)
NEUTROPHILS NFR BLD AUTO: 86.5 % (ref 42.7–76)
NRBC BLD AUTO-RTO: 0 /100 WBC (ref 0–0.2)
NT-PROBNP SERPL-MCNC: 4429 PG/ML (ref 5–1800)
PCO2 BLDA: 48.6 MM HG (ref 35–45)
PCO2 TEMP ADJ BLD: 48.6 MM HG (ref 35–45)
PH BLDA: 7.33 PH UNITS (ref 7.35–7.45)
PH, TEMP CORRECTED: 7.33 PH UNITS (ref 7.35–7.45)
PLATELET # BLD AUTO: 195 10*3/MM3 (ref 140–450)
PMV BLD AUTO: 11.4 FL (ref 6–12)
PO2 BLDA: 86.5 MM HG (ref 83–108)
PO2 TEMP ADJ BLD: 86.5 MM HG (ref 83–108)
POTASSIUM BLD-SCNC: 4.5 MMOL/L (ref 3.5–5.2)
PROCALCITONIN SERPL-MCNC: 0.83 NG/ML (ref 0.1–0.25)
PROT SERPL-MCNC: 7.9 G/DL (ref 6–8.5)
PROTHROMBIN TIME: 14.9 SECONDS (ref 12.1–15)
RBC # BLD AUTO: 5.26 10*6/MM3 (ref 4.14–5.8)
SAO2 % BLDCOA: 96.2 % (ref 94–99)
SET MECH RESP RATE: 14
SODIUM BLD-SCNC: 139 MMOL/L (ref 136–145)
TROPONIN T SERPL-MCNC: 0.03 NG/ML (ref 0–0.03)
VENTILATOR MODE: ABNORMAL
WBC NRBC COR # BLD: 24.64 10*3/MM3 (ref 3.4–10.8)

## 2019-08-14 PROCEDURE — 87040 BLOOD CULTURE FOR BACTERIA: CPT | Performed by: EMERGENCY MEDICINE

## 2019-08-14 PROCEDURE — 82803 BLOOD GASES ANY COMBINATION: CPT

## 2019-08-14 PROCEDURE — 94799 UNLISTED PULMONARY SVC/PX: CPT

## 2019-08-14 PROCEDURE — 87186 SC STD MICRODIL/AGAR DIL: CPT | Performed by: EMERGENCY MEDICINE

## 2019-08-14 PROCEDURE — 87150 DNA/RNA AMPLIFIED PROBE: CPT | Performed by: EMERGENCY MEDICINE

## 2019-08-14 PROCEDURE — 80053 COMPREHEN METABOLIC PANEL: CPT | Performed by: EMERGENCY MEDICINE

## 2019-08-14 PROCEDURE — 94660 CPAP INITIATION&MGMT: CPT

## 2019-08-14 PROCEDURE — 86140 C-REACTIVE PROTEIN: CPT | Performed by: EMERGENCY MEDICINE

## 2019-08-14 PROCEDURE — 87147 CULTURE TYPE IMMUNOLOGIC: CPT | Performed by: EMERGENCY MEDICINE

## 2019-08-14 PROCEDURE — 84145 PROCALCITONIN (PCT): CPT | Performed by: EMERGENCY MEDICINE

## 2019-08-14 PROCEDURE — 93005 ELECTROCARDIOGRAM TRACING: CPT | Performed by: EMERGENCY MEDICINE

## 2019-08-14 PROCEDURE — 84484 ASSAY OF TROPONIN QUANT: CPT | Performed by: EMERGENCY MEDICINE

## 2019-08-14 PROCEDURE — 83880 ASSAY OF NATRIURETIC PEPTIDE: CPT | Performed by: EMERGENCY MEDICINE

## 2019-08-14 PROCEDURE — 85025 COMPLETE CBC W/AUTO DIFF WBC: CPT | Performed by: EMERGENCY MEDICINE

## 2019-08-14 PROCEDURE — 85652 RBC SED RATE AUTOMATED: CPT | Performed by: EMERGENCY MEDICINE

## 2019-08-14 PROCEDURE — 70450 CT HEAD/BRAIN W/O DYE: CPT

## 2019-08-14 PROCEDURE — 85610 PROTHROMBIN TIME: CPT | Performed by: EMERGENCY MEDICINE

## 2019-08-14 PROCEDURE — 99291 CRITICAL CARE FIRST HOUR: CPT

## 2019-08-14 PROCEDURE — 93010 ELECTROCARDIOGRAM REPORT: CPT | Performed by: INTERNAL MEDICINE

## 2019-08-14 PROCEDURE — 71045 X-RAY EXAM CHEST 1 VIEW: CPT

## 2019-08-14 PROCEDURE — 83605 ASSAY OF LACTIC ACID: CPT | Performed by: EMERGENCY MEDICINE

## 2019-08-14 PROCEDURE — 83735 ASSAY OF MAGNESIUM: CPT | Performed by: EMERGENCY MEDICINE

## 2019-08-14 PROCEDURE — 36600 WITHDRAWAL OF ARTERIAL BLOOD: CPT

## 2019-08-14 RX ORDER — NITROGLYCERIN 20 MG/100ML
5-200 INJECTION INTRAVENOUS
Status: DISCONTINUED | OUTPATIENT
Start: 2019-08-14 | End: 2019-08-15

## 2019-08-14 RX ORDER — FUROSEMIDE 10 MG/ML
80 INJECTION INTRAMUSCULAR; INTRAVENOUS ONCE
Status: COMPLETED | OUTPATIENT
Start: 2019-08-14 | End: 2019-08-15

## 2019-08-14 RX ORDER — ALBUTEROL SULFATE 2.5 MG/3ML
2.5 SOLUTION RESPIRATORY (INHALATION) ONCE
Status: DISCONTINUED | OUTPATIENT
Start: 2019-08-14 | End: 2019-08-15

## 2019-08-14 RX ADMIN — NITROGLYCERIN 40 MCG/MIN: 20 INJECTION INTRAVENOUS at 22:18

## 2019-08-15 ENCOUNTER — APPOINTMENT (OUTPATIENT)
Dept: GENERAL RADIOLOGY | Facility: HOSPITAL | Age: 79
End: 2019-08-15

## 2019-08-15 ENCOUNTER — APPOINTMENT (OUTPATIENT)
Dept: CARDIOLOGY | Facility: HOSPITAL | Age: 79
End: 2019-08-15

## 2019-08-15 PROBLEM — R40.2420 GLASGOW COMA SCALE TOTAL SCORE 9-12: Status: ACTIVE | Noted: 2019-08-15

## 2019-08-15 PROBLEM — J96.01 ACUTE RESPIRATORY FAILURE WITH HYPOXIA (HCC): Status: ACTIVE | Noted: 2019-08-15

## 2019-08-15 LAB
ALBUMIN SERPL-MCNC: 3.1 G/DL (ref 3.5–5.2)
ALBUMIN SERPL-MCNC: 3.4 G/DL (ref 3.5–5.2)
ALBUMIN/GLOB SERPL: 0.9 G/DL
ALBUMIN/GLOB SERPL: 1.1 G/DL
ALP SERPL-CCNC: 43 U/L (ref 39–117)
ALP SERPL-CCNC: 45 U/L (ref 39–117)
ALT SERPL W P-5'-P-CCNC: 14 U/L (ref 1–41)
ALT SERPL W P-5'-P-CCNC: 19 U/L (ref 1–41)
ANION GAP SERPL CALCULATED.3IONS-SCNC: 14 MMOL/L (ref 5–15)
ANION GAP SERPL CALCULATED.3IONS-SCNC: 15.4 MMOL/L (ref 5–15)
ARTERIAL PATENCY WRIST A: POSITIVE
ARTERIAL PATENCY WRIST A: POSITIVE
AST SERPL-CCNC: 26 U/L (ref 1–40)
AST SERPL-CCNC: 60 U/L (ref 1–40)
ATMOSPHERIC PRESS: 737 MMHG
ATMOSPHERIC PRESS: 738 MMHG
BACTERIA BLD CULT: ABNORMAL
BACTERIA UR QL AUTO: ABNORMAL /HPF
BASE EXCESS BLDA CALC-SCNC: 1.4 MMOL/L (ref 0–2)
BASE EXCESS BLDA CALC-SCNC: 2.1 MMOL/L (ref 0–2)
BASOPHILS # BLD AUTO: 0.04 10*3/MM3 (ref 0–0.2)
BASOPHILS # BLD AUTO: 0.06 10*3/MM3 (ref 0–0.2)
BASOPHILS NFR BLD AUTO: 0.3 % (ref 0–1.5)
BASOPHILS NFR BLD AUTO: 0.3 % (ref 0–1.5)
BDY SITE: ABNORMAL
BDY SITE: ABNORMAL
BH CV ECHO MEAS - AI DEC SLOPE: 195 CM/SEC^2
BH CV ECHO MEAS - AI MAX PG: 53.1 MMHG
BH CV ECHO MEAS - AI MAX VEL: 364.5 CM/SEC
BH CV ECHO MEAS - AI P1/2T: 547.5 MSEC
BH CV ECHO MEAS - AO MAX PG (FULL): 9.4 MMHG
BH CV ECHO MEAS - AO MAX PG: 11.3 MMHG
BH CV ECHO MEAS - AO MEAN PG (FULL): 6 MMHG
BH CV ECHO MEAS - AO MEAN PG: 7 MMHG
BH CV ECHO MEAS - AO ROOT AREA (BSA CORRECTED): 1.6
BH CV ECHO MEAS - AO ROOT AREA: 13.2 CM^2
BH CV ECHO MEAS - AO ROOT DIAM: 4.1 CM
BH CV ECHO MEAS - AO V2 MAX: 168 CM/SEC
BH CV ECHO MEAS - AO V2 MEAN: 129 CM/SEC
BH CV ECHO MEAS - AO V2 VTI: 36.3 CM
BH CV ECHO MEAS - ASC AORTA: 3.2 CM
BH CV ECHO MEAS - AVA(I,A): 2 CM^2
BH CV ECHO MEAS - AVA(I,D): 2 CM^2
BH CV ECHO MEAS - AVA(V,A): 1.9 CM^2
BH CV ECHO MEAS - AVA(V,D): 1.9 CM^2
BH CV ECHO MEAS - BSA(HAYCOCK): 2.7 M^2
BH CV ECHO MEAS - BSA: 2.5 M^2
BH CV ECHO MEAS - BZI_BMI: 40.1 KILOGRAMS/M^2
BH CV ECHO MEAS - BZI_METRIC_HEIGHT: 182.9 CM
BH CV ECHO MEAS - BZI_METRIC_WEIGHT: 134.3 KG
BH CV ECHO MEAS - EDV(CUBED): 198.2 ML
BH CV ECHO MEAS - EDV(MOD-SP2): 165 ML
BH CV ECHO MEAS - EDV(MOD-SP4): 235 ML
BH CV ECHO MEAS - EDV(TEICH): 168.5 ML
BH CV ECHO MEAS - EF(CUBED): 52.1 %
BH CV ECHO MEAS - EF(MOD-BP): 50 %
BH CV ECHO MEAS - EF(MOD-SP2): 45.6 %
BH CV ECHO MEAS - EF(MOD-SP4): 53.6 %
BH CV ECHO MEAS - EF(TEICH): 43.4 %
BH CV ECHO MEAS - ESV(CUBED): 94.8 ML
BH CV ECHO MEAS - ESV(MOD-SP2): 89.7 ML
BH CV ECHO MEAS - ESV(MOD-SP4): 109 ML
BH CV ECHO MEAS - ESV(TEICH): 95.4 ML
BH CV ECHO MEAS - FS: 21.8 %
BH CV ECHO MEAS - IVS/LVPW: 0.96
BH CV ECHO MEAS - IVSD: 0.97 CM
BH CV ECHO MEAS - LAT PEAK E' VEL: 7 CM/SEC
BH CV ECHO MEAS - LV DIASTOLIC VOL/BSA (35-75): 93.4 ML/M^2
BH CV ECHO MEAS - LV MASS(C)D: 231.5 GRAMS
BH CV ECHO MEAS - LV MASS(C)DI: 92 GRAMS/M^2
BH CV ECHO MEAS - LV MAX PG: 1.9 MMHG
BH CV ECHO MEAS - LV MEAN PG: 1 MMHG
BH CV ECHO MEAS - LV SYSTOLIC VOL/BSA (12-30): 43.3 ML/M^2
BH CV ECHO MEAS - LV V1 MAX: 69.5 CM/SEC
BH CV ECHO MEAS - LV V1 MEAN: 53.7 CM/SEC
BH CV ECHO MEAS - LV V1 VTI: 16.2 CM
BH CV ECHO MEAS - LVIDD: 5.8 CM
BH CV ECHO MEAS - LVIDS: 4.6 CM
BH CV ECHO MEAS - LVLD AP2: 9.1 CM
BH CV ECHO MEAS - LVLD AP4: 9.1 CM
BH CV ECHO MEAS - LVLS AP2: 7.7 CM
BH CV ECHO MEAS - LVLS AP4: 8.2 CM
BH CV ECHO MEAS - LVOT AREA (M): 4.5 CM^2
BH CV ECHO MEAS - LVOT AREA: 4.5 CM^2
BH CV ECHO MEAS - LVOT DIAM: 2.4 CM
BH CV ECHO MEAS - LVPWD: 1 CM
BH CV ECHO MEAS - MED PEAK E' VEL: 4 CM/SEC
BH CV ECHO MEAS - MV A DUR: 0.12 SEC
BH CV ECHO MEAS - MV A MAX VEL: 67.4 CM/SEC
BH CV ECHO MEAS - MV DEC SLOPE: 224.5 CM/SEC^2
BH CV ECHO MEAS - MV DEC TIME: 232 SEC
BH CV ECHO MEAS - MV E MAX VEL: 78.1 CM/SEC
BH CV ECHO MEAS - MV E/A: 1.2
BH CV ECHO MEAS - MV MAX PG: 3.7 MMHG
BH CV ECHO MEAS - MV MEAN PG: 2 MMHG
BH CV ECHO MEAS - MV P1/2T MAX VEL: 95.2 CM/SEC
BH CV ECHO MEAS - MV P1/2T: 124.1 MSEC
BH CV ECHO MEAS - MV V2 MAX: 95.9 CM/SEC
BH CV ECHO MEAS - MV V2 MEAN: 62 CM/SEC
BH CV ECHO MEAS - MV V2 VTI: 36.2 CM
BH CV ECHO MEAS - MVA P1/2T LCG: 2.3 CM^2
BH CV ECHO MEAS - MVA(P1/2T): 1.8 CM^2
BH CV ECHO MEAS - MVA(VTI): 2 CM^2
BH CV ECHO MEAS - PA ACC TIME: 0.09 SEC
BH CV ECHO MEAS - PA MAX PG (FULL): 0.69 MMHG
BH CV ECHO MEAS - PA MAX PG: 2.8 MMHG
BH CV ECHO MEAS - PA PR(ACCEL): 39.4 MMHG
BH CV ECHO MEAS - PA V2 MAX: 84.2 CM/SEC
BH CV ECHO MEAS - RAP SYSTOLE: 8 MMHG
BH CV ECHO MEAS - RV MAX PG: 2.1 MMHG
BH CV ECHO MEAS - RV MEAN PG: 1 MMHG
BH CV ECHO MEAS - RV V1 MAX: 73.2 CM/SEC
BH CV ECHO MEAS - RV V1 MEAN: 48.5 CM/SEC
BH CV ECHO MEAS - RV V1 VTI: 13.8 CM
BH CV ECHO MEAS - SI(AO): 190.4 ML/M^2
BH CV ECHO MEAS - SI(CUBED): 41.1 ML/M^2
BH CV ECHO MEAS - SI(LVOT): 29.1 ML/M^2
BH CV ECHO MEAS - SI(MOD-SP2): 29.9 ML/M^2
BH CV ECHO MEAS - SI(MOD-SP4): 50.1 ML/M^2
BH CV ECHO MEAS - SI(TEICH): 29.1 ML/M^2
BH CV ECHO MEAS - SV(AO): 479.3 ML
BH CV ECHO MEAS - SV(CUBED): 103.3 ML
BH CV ECHO MEAS - SV(LVOT): 73.3 ML
BH CV ECHO MEAS - SV(MOD-SP2): 75.3 ML
BH CV ECHO MEAS - SV(MOD-SP4): 126 ML
BH CV ECHO MEAS - SV(TEICH): 73.2 ML
BH CV ECHO MEAS - TAPSE (>1.6): 2.5 CM2
BH CV ECHO MEASUREMENTS AVERAGE E/E' RATIO: 14.2
BH CV XLRA - RV BASE: 4.3 CM
BH CV XLRA - TDI S': 11 CM/SEC
BILIRUB SERPL-MCNC: 0.6 MG/DL (ref 0.2–1.2)
BILIRUB SERPL-MCNC: 0.9 MG/DL (ref 0.2–1.2)
BILIRUB UR QL STRIP: NEGATIVE
BODY TEMPERATURE: 98.6 C
BODY TEMPERATURE: 98.6 C
BUN BLD-MCNC: 27 MG/DL (ref 8–23)
BUN BLD-MCNC: 37 MG/DL (ref 8–23)
BUN/CREAT SERPL: 11.2 (ref 7–25)
BUN/CREAT SERPL: 12.9 (ref 7–25)
CALCIUM SPEC-SCNC: 8.7 MG/DL (ref 8.6–10.5)
CALCIUM SPEC-SCNC: 8.7 MG/DL (ref 8.6–10.5)
CHLORIDE SERPL-SCNC: 100 MMOL/L (ref 98–107)
CHLORIDE SERPL-SCNC: 99 MMOL/L (ref 98–107)
CLARITY UR: CLEAR
CO2 SERPL-SCNC: 24.6 MMOL/L (ref 22–29)
CO2 SERPL-SCNC: 25 MMOL/L (ref 22–29)
COLOR UR: YELLOW
CREAT BLD-MCNC: 2.41 MG/DL (ref 0.76–1.27)
CREAT BLD-MCNC: 2.86 MG/DL (ref 0.76–1.27)
CRP SERPL-MCNC: 1.98 MG/DL (ref 0–0.5)
D-LACTATE SERPL-SCNC: 3 MMOL/L (ref 0.5–2)
DEPRECATED RDW RBC AUTO: 45.7 FL (ref 37–54)
DEPRECATED RDW RBC AUTO: 45.8 FL (ref 37–54)
EOSINOPHIL # BLD AUTO: 0 10*3/MM3 (ref 0–0.4)
EOSINOPHIL # BLD AUTO: 0.02 10*3/MM3 (ref 0–0.4)
EOSINOPHIL NFR BLD AUTO: 0 % (ref 0.3–6.2)
EOSINOPHIL NFR BLD AUTO: 0.1 % (ref 0.3–6.2)
ERYTHROCYTE [DISTWIDTH] IN BLOOD BY AUTOMATED COUNT: 14.9 % (ref 12.3–15.4)
ERYTHROCYTE [DISTWIDTH] IN BLOOD BY AUTOMATED COUNT: 15.2 % (ref 12.3–15.4)
GAS FLOW AIRWAY: 0 LPM
GFR SERPL CREATININE-BSD FRML MDRD: 22 ML/MIN/1.73
GFR SERPL CREATININE-BSD FRML MDRD: 26 ML/MIN/1.73
GLOBULIN UR ELPH-MCNC: 3.1 GM/DL
GLOBULIN UR ELPH-MCNC: 3.3 GM/DL
GLUCOSE BLD-MCNC: 292 MG/DL (ref 65–99)
GLUCOSE BLD-MCNC: 355 MG/DL (ref 65–99)
GLUCOSE BLDC GLUCOMTR-MCNC: 279 MG/DL (ref 70–130)
GLUCOSE BLDC GLUCOMTR-MCNC: 314 MG/DL (ref 70–130)
GLUCOSE BLDC GLUCOMTR-MCNC: 348 MG/DL (ref 70–130)
GLUCOSE UR STRIP-MCNC: ABNORMAL MG/DL
HBA1C MFR BLD: 9.4 % (ref 4.8–5.6)
HCO3 BLDA-SCNC: 25.5 MMOL/L (ref 20–26)
HCO3 BLDA-SCNC: 25.5 MMOL/L (ref 20–26)
HCT VFR BLD AUTO: 35.6 % (ref 37.5–51)
HCT VFR BLD AUTO: 37.2 % (ref 37.5–51)
HGB BLD-MCNC: 11 G/DL (ref 13–17.7)
HGB BLD-MCNC: 11.3 G/DL (ref 13–17.7)
HGB BLDA-MCNC: 10.2 G/DL (ref 14–18)
HGB BLDA-MCNC: 11.2 G/DL (ref 14–18)
HGB UR QL STRIP.AUTO: ABNORMAL
HOLD SPECIMEN: NORMAL
HOROWITZ INDEX BLD+IHG-RTO: 30 %
HOROWITZ INDEX BLD+IHG-RTO: 30 %
HYALINE CASTS UR QL AUTO: ABNORMAL /LPF
IMM GRANULOCYTES # BLD AUTO: 0.16 10*3/MM3 (ref 0–0.05)
IMM GRANULOCYTES # BLD AUTO: 0.35 10*3/MM3 (ref 0–0.05)
IMM GRANULOCYTES NFR BLD AUTO: 1 % (ref 0–0.5)
IMM GRANULOCYTES NFR BLD AUTO: 1.5 % (ref 0–0.5)
KETONES UR QL STRIP: ABNORMAL
L PNEUMO1 AG UR QL IA: NEGATIVE
LEFT ATRIUM VOLUME INDEX: 21 ML/M2
LEUKOCYTE ESTERASE UR QL STRIP.AUTO: NEGATIVE
LV EF 2D ECHO EST: 50 %
LYMPHOCYTES # BLD AUTO: 0.49 10*3/MM3 (ref 0.7–3.1)
LYMPHOCYTES # BLD AUTO: 0.85 10*3/MM3 (ref 0.7–3.1)
LYMPHOCYTES NFR BLD AUTO: 3.2 % (ref 19.6–45.3)
LYMPHOCYTES NFR BLD AUTO: 3.7 % (ref 19.6–45.3)
MAGNESIUM SERPL-MCNC: 1.6 MG/DL (ref 1.6–2.4)
MAXIMAL PREDICTED HEART RATE: 143 BPM
MCH RBC QN AUTO: 25.6 PG (ref 26.6–33)
MCH RBC QN AUTO: 25.9 PG (ref 26.6–33)
MCHC RBC AUTO-ENTMCNC: 30.4 G/DL (ref 31.5–35.7)
MCHC RBC AUTO-ENTMCNC: 30.9 G/DL (ref 31.5–35.7)
MCV RBC AUTO: 83.8 FL (ref 79–97)
MCV RBC AUTO: 84.2 FL (ref 79–97)
MODALITY: ABNORMAL
MODALITY: ABNORMAL
MONOCYTES # BLD AUTO: 0.81 10*3/MM3 (ref 0.1–0.9)
MONOCYTES # BLD AUTO: 1.97 10*3/MM3 (ref 0.1–0.9)
MONOCYTES NFR BLD AUTO: 5.2 % (ref 5–12)
MONOCYTES NFR BLD AUTO: 8.5 % (ref 5–12)
NEUTROPHILS # BLD AUTO: 13.98 10*3/MM3 (ref 1.7–7)
NEUTROPHILS # BLD AUTO: 19.94 10*3/MM3 (ref 1.7–7)
NEUTROPHILS NFR BLD AUTO: 86 % (ref 42.7–76)
NEUTROPHILS NFR BLD AUTO: 90.2 % (ref 42.7–76)
NITRITE UR QL STRIP: NEGATIVE
NOTIFIED BY: ABNORMAL
NOTIFIED WHO: ABNORMAL
NRBC BLD AUTO-RTO: 0 /100 WBC (ref 0–0.2)
NRBC BLD AUTO-RTO: 0 /100 WBC (ref 0–0.2)
PCO2 BLDA: 34.6 MM HG (ref 35–45)
PCO2 BLDA: 37.3 MM HG (ref 35–45)
PCO2 TEMP ADJ BLD: 34.6 MM HG (ref 35–45)
PCO2 TEMP ADJ BLD: 37.3 MM HG (ref 35–45)
PEEP RESPIRATORY: 5 CM[H2O]
PEEP RESPIRATORY: 5 CM[H2O]
PH BLDA: 7.44 PH UNITS (ref 7.35–7.45)
PH BLDA: 7.48 PH UNITS (ref 7.35–7.45)
PH UR STRIP.AUTO: 7.5 [PH] (ref 4.5–8)
PH, TEMP CORRECTED: 7.44 PH UNITS (ref 7.35–7.45)
PH, TEMP CORRECTED: 7.48 PH UNITS (ref 7.35–7.45)
PHOSPHATE SERPL-MCNC: 2 MG/DL (ref 2.5–4.5)
PLATELET # BLD AUTO: 143 10*3/MM3 (ref 140–450)
PLATELET # BLD AUTO: 165 10*3/MM3 (ref 140–450)
PMV BLD AUTO: 11.5 FL (ref 6–12)
PMV BLD AUTO: 12.1 FL (ref 6–12)
PO2 BLDA: 103 MM HG (ref 83–108)
PO2 BLDA: 65.6 MM HG (ref 83–108)
PO2 TEMP ADJ BLD: 103 MM HG (ref 83–108)
PO2 TEMP ADJ BLD: 65.6 MM HG (ref 83–108)
POTASSIUM BLD-SCNC: 4.6 MMOL/L (ref 3.5–5.2)
POTASSIUM BLD-SCNC: 4.7 MMOL/L (ref 3.5–5.2)
POTASSIUM BLD-SCNC: 4.8 MMOL/L (ref 3.5–5.2)
PROT SERPL-MCNC: 6.4 G/DL (ref 6–8.5)
PROT SERPL-MCNC: 6.5 G/DL (ref 6–8.5)
PROT UR QL STRIP: ABNORMAL
PSV: 4 CMH2O
RBC # BLD AUTO: 4.25 10*6/MM3 (ref 4.14–5.8)
RBC # BLD AUTO: 4.42 10*6/MM3 (ref 4.14–5.8)
RBC # UR: ABNORMAL /HPF
REF LAB TEST METHOD: ABNORMAL
S PNEUM AG SPEC QL LA: NEGATIVE
SAO2 % BLDCOA: 94.1 % (ref 94–99)
SAO2 % BLDCOA: 98.9 % (ref 94–99)
SET MECH RESP RATE: 16
SODIUM BLD-SCNC: 138 MMOL/L (ref 136–145)
SODIUM BLD-SCNC: 140 MMOL/L (ref 136–145)
SP GR UR STRIP: 1.02 (ref 1–1.03)
SQUAMOUS #/AREA URNS HPF: ABNORMAL /HPF
STRESS TARGET HR: 122 BPM
TROPONIN T SERPL-MCNC: 0.18 NG/ML (ref 0–0.03)
TROPONIN T SERPL-MCNC: 0.4 NG/ML (ref 0–0.03)
TROPONIN T SERPL-MCNC: 0.81 NG/ML (ref 0–0.03)
TROPONIN T SERPL-MCNC: 1.1 NG/ML (ref 0–0.03)
UROBILINOGEN UR QL STRIP: ABNORMAL
VENTILATOR MODE: ABNORMAL
VENTILATOR MODE: AC
VT ON VENT VENT: 600 ML
WBC NRBC COR # BLD: 15.5 10*3/MM3 (ref 3.4–10.8)
WBC NRBC COR # BLD: 23.17 10*3/MM3 (ref 3.4–10.8)
WBC UR QL AUTO: ABNORMAL /HPF

## 2019-08-15 PROCEDURE — 71045 X-RAY EXAM CHEST 1 VIEW: CPT

## 2019-08-15 PROCEDURE — 94799 UNLISTED PULMONARY SVC/PX: CPT

## 2019-08-15 PROCEDURE — 84484 ASSAY OF TROPONIN QUANT: CPT | Performed by: HOSPITALIST

## 2019-08-15 PROCEDURE — 81001 URINALYSIS AUTO W/SCOPE: CPT | Performed by: EMERGENCY MEDICINE

## 2019-08-15 PROCEDURE — 94002 VENT MGMT INPAT INIT DAY: CPT

## 2019-08-15 PROCEDURE — 63710000001 INSULIN DETEMIR PER 5 UNITS: Performed by: HOSPITALIST

## 2019-08-15 PROCEDURE — 82803 BLOOD GASES ANY COMBINATION: CPT

## 2019-08-15 PROCEDURE — 82962 GLUCOSE BLOOD TEST: CPT

## 2019-08-15 PROCEDURE — 87899 AGENT NOS ASSAY W/OPTIC: CPT | Performed by: HOSPITALIST

## 2019-08-15 PROCEDURE — 93005 ELECTROCARDIOGRAM TRACING: CPT | Performed by: HOSPITALIST

## 2019-08-15 PROCEDURE — C1751 CATH, INF, PER/CENT/MIDLINE: HCPCS

## 2019-08-15 PROCEDURE — 93306 TTE W/DOPPLER COMPLETE: CPT

## 2019-08-15 PROCEDURE — 36600 WITHDRAWAL OF ARTERIAL BLOOD: CPT

## 2019-08-15 PROCEDURE — 63710000001 INSULIN ASPART PER 5 UNITS: Performed by: HOSPITALIST

## 2019-08-15 PROCEDURE — 25010000002 ENOXAPARIN PER 10 MG: Performed by: HOSPITALIST

## 2019-08-15 PROCEDURE — 25010000002 FENTANYL CITRATE (PF) 100 MCG/2ML SOLUTION

## 2019-08-15 PROCEDURE — 93010 ELECTROCARDIOGRAM REPORT: CPT | Performed by: INTERNAL MEDICINE

## 2019-08-15 PROCEDURE — 93005 ELECTROCARDIOGRAM TRACING: CPT | Performed by: EMERGENCY MEDICINE

## 2019-08-15 PROCEDURE — 80053 COMPREHEN METABOLIC PANEL: CPT | Performed by: HOSPITALIST

## 2019-08-15 PROCEDURE — 83735 ASSAY OF MAGNESIUM: CPT | Performed by: HOSPITALIST

## 2019-08-15 PROCEDURE — 02HV33Z INSERTION OF INFUSION DEVICE INTO SUPERIOR VENA CAVA, PERCUTANEOUS APPROACH: ICD-10-PCS | Performed by: HOSPITALIST

## 2019-08-15 PROCEDURE — 85025 COMPLETE CBC W/AUTO DIFF WBC: CPT | Performed by: HOSPITALIST

## 2019-08-15 PROCEDURE — 25010000002 VANCOMYCIN 1 G RECONSTITUTED SOLUTION 1 EACH VIAL: Performed by: EMERGENCY MEDICINE

## 2019-08-15 PROCEDURE — 25010000002 PROPOFOL 10 MG/ML EMULSION

## 2019-08-15 PROCEDURE — 99291 CRITICAL CARE FIRST HOUR: CPT | Performed by: HOSPITALIST

## 2019-08-15 PROCEDURE — 87086 URINE CULTURE/COLONY COUNT: CPT | Performed by: HOSPITALIST

## 2019-08-15 PROCEDURE — 25010000002 CEFEPIME PER 500 MG: Performed by: HOSPITALIST

## 2019-08-15 PROCEDURE — 93306 TTE W/DOPPLER COMPLETE: CPT | Performed by: INTERNAL MEDICINE

## 2019-08-15 PROCEDURE — 84132 ASSAY OF SERUM POTASSIUM: CPT | Performed by: HOSPITALIST

## 2019-08-15 PROCEDURE — 25010000002 MAGNESIUM SULFATE IN D5W 1G/100ML (PREMIX) 1-5 GM/100ML-% SOLUTION: Performed by: HOSPITALIST

## 2019-08-15 PROCEDURE — 31500 INSERT EMERGENCY AIRWAY: CPT

## 2019-08-15 PROCEDURE — 99292 CRITICAL CARE ADDL 30 MIN: CPT | Performed by: EMERGENCY MEDICINE

## 2019-08-15 PROCEDURE — 5A1935Z RESPIRATORY VENTILATION, LESS THAN 24 CONSECUTIVE HOURS: ICD-10-PCS | Performed by: INTERNAL MEDICINE

## 2019-08-15 PROCEDURE — 25010000002 FUROSEMIDE PER 20 MG: Performed by: EMERGENCY MEDICINE

## 2019-08-15 PROCEDURE — 25010000002 DOBUTAMINE PER 250 MG: Performed by: HOSPITALIST

## 2019-08-15 PROCEDURE — 25010000002 DOBUTAMINE PER 250 MG

## 2019-08-15 PROCEDURE — 83605 ASSAY OF LACTIC ACID: CPT | Performed by: EMERGENCY MEDICINE

## 2019-08-15 PROCEDURE — 99292 CRITICAL CARE ADDL 30 MIN: CPT | Performed by: HOSPITALIST

## 2019-08-15 PROCEDURE — 84100 ASSAY OF PHOSPHORUS: CPT | Performed by: HOSPITALIST

## 2019-08-15 PROCEDURE — 25010000002 ENOXAPARIN PER 10 MG

## 2019-08-15 PROCEDURE — 51702 INSERT TEMP BLADDER CATH: CPT

## 2019-08-15 PROCEDURE — 99291 CRITICAL CARE FIRST HOUR: CPT | Performed by: EMERGENCY MEDICINE

## 2019-08-15 PROCEDURE — 99223 1ST HOSP IP/OBS HIGH 75: CPT | Performed by: INTERNAL MEDICINE

## 2019-08-15 PROCEDURE — 25010000002 PERFLUTREN (DEFINITY) 8.476 MG IN SODIUM CHLORIDE 0.9 % 10 ML INJECTION: Performed by: HOSPITALIST

## 2019-08-15 PROCEDURE — 25010000002 CEFEPIME PER 500 MG

## 2019-08-15 PROCEDURE — 83036 HEMOGLOBIN GLYCOSYLATED A1C: CPT | Performed by: HOSPITALIST

## 2019-08-15 PROCEDURE — 31500 INSERT EMERGENCY AIRWAY: CPT | Performed by: EMERGENCY MEDICINE

## 2019-08-15 PROCEDURE — 94640 AIRWAY INHALATION TREATMENT: CPT

## 2019-08-15 PROCEDURE — 0BH17EZ INSERTION OF ENDOTRACHEAL AIRWAY INTO TRACHEA, VIA NATURAL OR ARTIFICIAL OPENING: ICD-10-PCS | Performed by: INTERNAL MEDICINE

## 2019-08-15 PROCEDURE — 25010000002 SUCCINYLCHOLINE PER 20 MG

## 2019-08-15 RX ORDER — POTASSIUM CHLORIDE 20 MEQ/1
40 TABLET, EXTENDED RELEASE ORAL AS NEEDED
Status: DISCONTINUED | OUTPATIENT
Start: 2019-08-15 | End: 2019-08-29 | Stop reason: HOSPADM

## 2019-08-15 RX ORDER — SODIUM CHLORIDE 0.9 % (FLUSH) 0.9 %
3 SYRINGE (ML) INJECTION EVERY 12 HOURS SCHEDULED
Status: DISCONTINUED | OUTPATIENT
Start: 2019-08-15 | End: 2019-08-29

## 2019-08-15 RX ORDER — SUCCINYLCHOLINE CHLORIDE 20 MG/ML
INJECTION INTRAMUSCULAR; INTRAVENOUS
Status: COMPLETED
Start: 2019-08-15 | End: 2019-08-15

## 2019-08-15 RX ORDER — ALBUTEROL SULFATE 90 UG/1
AEROSOL, METERED RESPIRATORY (INHALATION)
Status: DISPENSED
Start: 2019-08-15 | End: 2019-08-15

## 2019-08-15 RX ORDER — SODIUM CHLORIDE 0.9 % (FLUSH) 0.9 %
20 SYRINGE (ML) INJECTION AS NEEDED
Status: DISCONTINUED | OUTPATIENT
Start: 2019-08-15 | End: 2019-08-29

## 2019-08-15 RX ORDER — ETOMIDATE 2 MG/ML
INJECTION INTRAVENOUS
Status: COMPLETED
Start: 2019-08-15 | End: 2019-08-15

## 2019-08-15 RX ORDER — SODIUM CHLORIDE 9 MG/ML
INJECTION, SOLUTION INTRAVENOUS
Status: DISPENSED
Start: 2019-08-15 | End: 2019-08-15

## 2019-08-15 RX ORDER — FENTANYL CITRATE 50 UG/ML
75 INJECTION, SOLUTION INTRAMUSCULAR; INTRAVENOUS
Status: DISCONTINUED | OUTPATIENT
Start: 2019-08-15 | End: 2019-08-16

## 2019-08-15 RX ORDER — POTASSIUM CHLORIDE 1.5 G/1.77G
40 POWDER, FOR SOLUTION ORAL AS NEEDED
Status: DISCONTINUED | OUTPATIENT
Start: 2019-08-15 | End: 2019-08-29 | Stop reason: HOSPADM

## 2019-08-15 RX ORDER — SODIUM CHLORIDE 0.9 % (FLUSH) 0.9 %
10 SYRINGE (ML) INJECTION EVERY 12 HOURS SCHEDULED
Status: DISCONTINUED | OUTPATIENT
Start: 2019-08-15 | End: 2019-08-29 | Stop reason: HOSPADM

## 2019-08-15 RX ORDER — ACETAMINOPHEN 325 MG/1
650 TABLET ORAL EVERY 4 HOURS PRN
Status: DISCONTINUED | OUTPATIENT
Start: 2019-08-15 | End: 2019-08-29 | Stop reason: HOSPADM

## 2019-08-15 RX ORDER — LEVOTHYROXINE SODIUM ANHYDROUS 100 UG/5ML
50 INJECTION, POWDER, LYOPHILIZED, FOR SOLUTION INTRAVENOUS
Status: DISCONTINUED | OUTPATIENT
Start: 2019-08-15 | End: 2019-08-16

## 2019-08-15 RX ORDER — ALBUTEROL SULFATE 90 UG/1
6 AEROSOL, METERED RESPIRATORY (INHALATION)
Status: DISCONTINUED | OUTPATIENT
Start: 2019-08-16 | End: 2019-08-16

## 2019-08-15 RX ORDER — ACETAMINOPHEN 650 MG/1
650 SUPPOSITORY RECTAL EVERY 4 HOURS PRN
Status: DISCONTINUED | OUTPATIENT
Start: 2019-08-15 | End: 2019-08-29 | Stop reason: HOSPADM

## 2019-08-15 RX ORDER — PROPOFOL 10 MG/ML
VIAL (ML) INTRAVENOUS
Status: COMPLETED
Start: 2019-08-15 | End: 2019-08-15

## 2019-08-15 RX ORDER — MAGNESIUM SULFATE HEPTAHYDRATE 40 MG/ML
2 INJECTION, SOLUTION INTRAVENOUS AS NEEDED
Status: DISCONTINUED | OUTPATIENT
Start: 2019-08-15 | End: 2019-08-29 | Stop reason: HOSPADM

## 2019-08-15 RX ORDER — SODIUM CHLORIDE 9 MG/ML
40 INJECTION, SOLUTION INTRAVENOUS AS NEEDED
Status: DISCONTINUED | OUTPATIENT
Start: 2019-08-15 | End: 2019-08-29

## 2019-08-15 RX ORDER — NICOTINE POLACRILEX 4 MG
15 LOZENGE BUCCAL
Status: DISCONTINUED | OUTPATIENT
Start: 2019-08-15 | End: 2019-08-29 | Stop reason: HOSPADM

## 2019-08-15 RX ORDER — FENTANYL CITRATE 50 UG/ML
100 INJECTION, SOLUTION INTRAMUSCULAR; INTRAVENOUS ONCE
Status: COMPLETED | OUTPATIENT
Start: 2019-08-15 | End: 2019-08-15

## 2019-08-15 RX ORDER — SODIUM CHLORIDE, SODIUM LACTATE, POTASSIUM CHLORIDE, CALCIUM CHLORIDE 600; 310; 30; 20 MG/100ML; MG/100ML; MG/100ML; MG/100ML
100 INJECTION, SOLUTION INTRAVENOUS CONTINUOUS
Status: DISCONTINUED | OUTPATIENT
Start: 2019-08-15 | End: 2019-08-16

## 2019-08-15 RX ORDER — SODIUM CHLORIDE 0.9 % (FLUSH) 0.9 %
10 SYRINGE (ML) INJECTION AS NEEDED
Status: DISCONTINUED | OUTPATIENT
Start: 2019-08-15 | End: 2019-08-29 | Stop reason: HOSPADM

## 2019-08-15 RX ORDER — FENTANYL CITRATE 50 UG/ML
INJECTION, SOLUTION INTRAMUSCULAR; INTRAVENOUS
Status: COMPLETED
Start: 2019-08-15 | End: 2019-08-15

## 2019-08-15 RX ORDER — ALBUTEROL SULFATE 90 UG/1
6 AEROSOL, METERED RESPIRATORY (INHALATION)
Status: DISCONTINUED | OUTPATIENT
Start: 2019-08-15 | End: 2019-08-15

## 2019-08-15 RX ORDER — DEXMEDETOMIDINE HYDROCHLORIDE 4 UG/ML
.2-1.5 INJECTION, SOLUTION INTRAVENOUS
Status: DISCONTINUED | OUTPATIENT
Start: 2019-08-15 | End: 2019-08-15

## 2019-08-15 RX ORDER — PANTOPRAZOLE SODIUM 40 MG/10ML
40 INJECTION, POWDER, LYOPHILIZED, FOR SOLUTION INTRAVENOUS
Status: DISCONTINUED | OUTPATIENT
Start: 2019-08-15 | End: 2019-08-16

## 2019-08-15 RX ORDER — DOBUTAMINE HYDROCHLORIDE 100 MG/100ML
INJECTION INTRAVENOUS
Status: COMPLETED
Start: 2019-08-15 | End: 2019-08-15

## 2019-08-15 RX ORDER — LEVOTHYROXINE SODIUM ANHYDROUS 100 UG/5ML
INJECTION, POWDER, LYOPHILIZED, FOR SOLUTION INTRAVENOUS
Status: COMPLETED
Start: 2019-08-15 | End: 2019-08-15

## 2019-08-15 RX ORDER — VANCOMYCIN HYDROCHLORIDE 1 G/20ML
INJECTION, POWDER, LYOPHILIZED, FOR SOLUTION INTRAVENOUS
Status: DISPENSED
Start: 2019-08-15 | End: 2019-08-15

## 2019-08-15 RX ORDER — MAGNESIUM SULFATE 1 G/100ML
1 INJECTION INTRAVENOUS AS NEEDED
Status: DISCONTINUED | OUTPATIENT
Start: 2019-08-15 | End: 2019-08-29 | Stop reason: HOSPADM

## 2019-08-15 RX ORDER — SODIUM CHLORIDE 0.9 % (FLUSH) 0.9 %
3-10 SYRINGE (ML) INJECTION AS NEEDED
Status: DISCONTINUED | OUTPATIENT
Start: 2019-08-15 | End: 2019-08-29 | Stop reason: HOSPADM

## 2019-08-15 RX ORDER — CEFEPIME HYDROCHLORIDE 2 G/1
INJECTION, POWDER, FOR SOLUTION INTRAVENOUS
Status: COMPLETED
Start: 2019-08-15 | End: 2019-08-15

## 2019-08-15 RX ORDER — SUCCINYLCHOLINE CHLORIDE 20 MG/ML
100 INJECTION INTRAMUSCULAR; INTRAVENOUS ONCE
Status: COMPLETED | OUTPATIENT
Start: 2019-08-15 | End: 2019-08-15

## 2019-08-15 RX ORDER — DEXMEDETOMIDINE HYDROCHLORIDE 100 UG/ML
INJECTION, SOLUTION INTRAVENOUS
Status: DISPENSED
Start: 2019-08-15 | End: 2019-08-15

## 2019-08-15 RX ORDER — ASPIRIN 300 MG/1
300 SUPPOSITORY RECTAL DAILY
Status: DISCONTINUED | OUTPATIENT
Start: 2019-08-15 | End: 2019-08-17

## 2019-08-15 RX ORDER — DEXMEDETOMIDINE HYDROCHLORIDE 4 UG/ML
INJECTION, SOLUTION INTRAVENOUS
Status: COMPLETED
Start: 2019-08-15 | End: 2019-08-15

## 2019-08-15 RX ORDER — POTASSIUM CHLORIDE 7.45 MG/ML
10 INJECTION INTRAVENOUS
Status: DISCONTINUED | OUTPATIENT
Start: 2019-08-15 | End: 2019-08-29 | Stop reason: HOSPADM

## 2019-08-15 RX ORDER — ETOMIDATE 2 MG/ML
10 INJECTION INTRAVENOUS ONCE
Status: COMPLETED | OUTPATIENT
Start: 2019-08-15 | End: 2019-08-15

## 2019-08-15 RX ORDER — MAGNESIUM SULFATE HEPTAHYDRATE 40 MG/ML
4 INJECTION, SOLUTION INTRAVENOUS AS NEEDED
Status: DISCONTINUED | OUTPATIENT
Start: 2019-08-15 | End: 2019-08-29 | Stop reason: HOSPADM

## 2019-08-15 RX ORDER — DEXTROSE MONOHYDRATE 25 G/50ML
25 INJECTION, SOLUTION INTRAVENOUS
Status: DISCONTINUED | OUTPATIENT
Start: 2019-08-15 | End: 2019-08-29 | Stop reason: HOSPADM

## 2019-08-15 RX ADMIN — SODIUM CHLORIDE, POTASSIUM CHLORIDE, SODIUM LACTATE AND CALCIUM CHLORIDE 100 ML/HR: 600; 310; 30; 20 INJECTION, SOLUTION INTRAVENOUS at 22:49

## 2019-08-15 RX ADMIN — INSULIN DETEMIR 20 UNITS: 100 INJECTION, SOLUTION SUBCUTANEOUS at 21:23

## 2019-08-15 RX ADMIN — MAGNESIUM SULFATE IN DEXTROSE 1 G: 10 INJECTION, SOLUTION INTRAVENOUS at 12:22

## 2019-08-15 RX ADMIN — FENTANYL CITRATE 100 MCG: 50 INJECTION, SOLUTION INTRAMUSCULAR; INTRAVENOUS at 01:01

## 2019-08-15 RX ADMIN — SODIUM CHLORIDE, PRESERVATIVE FREE 3 ML: 5 INJECTION INTRAVENOUS at 21:22

## 2019-08-15 RX ADMIN — DOBUTAMINE IN DEXTROSE 2 MCG/KG/MIN: 100 INJECTION, SOLUTION INTRAVENOUS at 03:41

## 2019-08-15 RX ADMIN — ETOMIDATE 10 MG: 2 INJECTION INTRAVENOUS at 00:43

## 2019-08-15 RX ADMIN — ENOXAPARIN SODIUM 130 MG: 150 INJECTION SUBCUTANEOUS at 19:56

## 2019-08-15 RX ADMIN — DEXMEDETOMIDINE HYDROCHLORIDE 0.2 MCG/KG/HR: 400 INJECTION INTRAVENOUS at 01:38

## 2019-08-15 RX ADMIN — IPRATROPIUM BROMIDE 6 PUFF: 17 AEROSOL, METERED RESPIRATORY (INHALATION) at 07:46

## 2019-08-15 RX ADMIN — PERFLUTREN 3 ML: 6.52 INJECTION, SUSPENSION INTRAVENOUS at 13:20

## 2019-08-15 RX ADMIN — SODIUM CHLORIDE 500 ML: 9 INJECTION, SOLUTION INTRAVENOUS at 03:09

## 2019-08-15 RX ADMIN — INSULIN ASPART 8 UNITS: 100 INJECTION, SOLUTION INTRAVENOUS; SUBCUTANEOUS at 19:55

## 2019-08-15 RX ADMIN — LEVOTHYROXINE SODIUM ANHYDROUS 50 MCG: 100 INJECTION, POWDER, LYOPHILIZED, FOR SOLUTION INTRAVENOUS at 06:10

## 2019-08-15 RX ADMIN — FUROSEMIDE 80 MG: 10 INJECTION, SOLUTION INTRAMUSCULAR; INTRAVENOUS at 00:11

## 2019-08-15 RX ADMIN — CEFEPIME 2000 MG: 2 INJECTION, POWDER, FOR SOLUTION INTRAVENOUS at 01:01

## 2019-08-15 RX ADMIN — ETOMIDATE 10 MG: 2 INJECTION, SOLUTION INTRAVENOUS at 00:43

## 2019-08-15 RX ADMIN — ALBUTEROL SULFATE 6 PUFF: 90 AEROSOL, METERED RESPIRATORY (INHALATION) at 07:45

## 2019-08-15 RX ADMIN — SODIUM CHLORIDE, PRESERVATIVE FREE 10 ML: 5 INJECTION INTRAVENOUS at 21:22

## 2019-08-15 RX ADMIN — SODIUM PHOSPHATE, MONOBASIC, MONOHYDRATE 15 MMOL: 276; 142 INJECTION, SOLUTION INTRAVENOUS at 15:30

## 2019-08-15 RX ADMIN — ALBUTEROL SULFATE 6 PUFF: 90 AEROSOL, METERED RESPIRATORY (INHALATION) at 16:32

## 2019-08-15 RX ADMIN — SUCCINYLCHOLINE CHLORIDE 100 MG: 20 INJECTION INTRAMUSCULAR; INTRAVENOUS at 00:45

## 2019-08-15 RX ADMIN — VANCOMYCIN HYDROCHLORIDE 2000 MG: 1 INJECTION, POWDER, LYOPHILIZED, FOR SOLUTION INTRAVENOUS at 02:18

## 2019-08-15 RX ADMIN — DEXMEDETOMIDINE HYDROCHLORIDE 0.9 MCG/KG/HR: 400 INJECTION INTRAVENOUS at 09:40

## 2019-08-15 RX ADMIN — SUCCINYLCHOLINE CHLORIDE 100 MG: 20 INJECTION, SOLUTION INTRAMUSCULAR; INTRAVENOUS; PARENTERAL at 00:45

## 2019-08-15 RX ADMIN — DEXMEDETOMIDINE HYDROCHLORIDE 0.8 MCG/KG/HR: 400 INJECTION INTRAVENOUS at 11:47

## 2019-08-15 RX ADMIN — MAGNESIUM SULFATE IN DEXTROSE 1 G: 10 INJECTION, SOLUTION INTRAVENOUS at 12:51

## 2019-08-15 RX ADMIN — IPRATROPIUM BROMIDE 6 PUFF: 17 AEROSOL, METERED RESPIRATORY (INHALATION) at 12:42

## 2019-08-15 RX ADMIN — PANTOPRAZOLE SODIUM 40 MG: 40 INJECTION, POWDER, FOR SOLUTION INTRAVENOUS at 06:10

## 2019-08-15 RX ADMIN — INSULIN DETEMIR 20 UNITS: 100 INJECTION, SOLUTION SUBCUTANEOUS at 09:40

## 2019-08-15 RX ADMIN — INSULIN ASPART 8 UNITS: 100 INJECTION, SOLUTION INTRAVENOUS; SUBCUTANEOUS at 06:10

## 2019-08-15 RX ADMIN — DEXMEDETOMIDINE HYDROCHLORIDE 0.9 MCG/KG/HR: 400 INJECTION INTRAVENOUS at 14:18

## 2019-08-15 RX ADMIN — INSULIN ASPART 8 UNITS: 100 INJECTION, SOLUTION INTRAVENOUS; SUBCUTANEOUS at 13:00

## 2019-08-15 RX ADMIN — PROPOFOL 30 MCG/KG/MIN: 10 INJECTION, EMULSION INTRAVENOUS at 00:48

## 2019-08-15 RX ADMIN — CEFEPIME 2 G: 2 INJECTION, POWDER, FOR SOLUTION INTRAVENOUS at 12:21

## 2019-08-15 RX ADMIN — ENOXAPARIN SODIUM 130 MG: 100 INJECTION SUBCUTANEOUS at 06:27

## 2019-08-15 RX ADMIN — SODIUM CHLORIDE, PRESERVATIVE FREE 10 ML: 5 INJECTION INTRAVENOUS at 21:21

## 2019-08-15 RX ADMIN — ALBUTEROL SULFATE 6 PUFF: 90 AEROSOL, METERED RESPIRATORY (INHALATION) at 12:42

## 2019-08-15 RX ADMIN — DEXMEDETOMIDINE HYDROCHLORIDE 0.6 MCG/KG/HR: 400 INJECTION INTRAVENOUS at 05:20

## 2019-08-15 RX ADMIN — DOBUTAMINE IN DEXTROSE 4 MCG/KG/MIN: 100 INJECTION, SOLUTION INTRAVENOUS at 11:47

## 2019-08-15 RX ADMIN — ASPIRIN 300 MG: 300 SUPPOSITORY RECTAL at 06:23

## 2019-08-15 RX ADMIN — IPRATROPIUM BROMIDE 6 PUFF: 17 AEROSOL, METERED RESPIRATORY (INHALATION) at 16:31

## 2019-08-16 ENCOUNTER — APPOINTMENT (OUTPATIENT)
Dept: MRI IMAGING | Facility: HOSPITAL | Age: 79
End: 2019-08-16

## 2019-08-16 PROBLEM — B95.5 STREPTOCOCCAL BACTEREMIA: Status: ACTIVE | Noted: 2019-08-16

## 2019-08-16 PROBLEM — A41.89 SEPSIS, GRAM POSITIVE (HCC): Status: ACTIVE | Noted: 2019-08-16

## 2019-08-16 PROBLEM — R78.81 STREPTOCOCCAL BACTEREMIA: Status: ACTIVE | Noted: 2019-08-16

## 2019-08-16 PROBLEM — R40.2420 GLASGOW COMA SCALE TOTAL SCORE 9-12: Status: RESOLVED | Noted: 2019-08-15 | Resolved: 2019-08-16

## 2019-08-16 LAB
ANION GAP SERPL CALCULATED.3IONS-SCNC: 11.3 MMOL/L (ref 5–15)
ANION GAP SERPL CALCULATED.3IONS-SCNC: 14.6 MMOL/L (ref 5–15)
BACTERIA SPEC AEROBE CULT: NO GROWTH
BASOPHILS # BLD AUTO: 0.03 10*3/MM3 (ref 0–0.2)
BASOPHILS NFR BLD AUTO: 0.2 % (ref 0–1.5)
BUN BLD-MCNC: 33 MG/DL (ref 8–23)
BUN BLD-MCNC: 39 MG/DL (ref 8–23)
BUN/CREAT SERPL: 14.5 (ref 7–25)
BUN/CREAT SERPL: 15.8 (ref 7–25)
CALCIUM SPEC-SCNC: 8.5 MG/DL (ref 8.6–10.5)
CALCIUM SPEC-SCNC: 8.8 MG/DL (ref 8.6–10.5)
CHLORIDE SERPL-SCNC: 102 MMOL/L (ref 98–107)
CHLORIDE SERPL-SCNC: 102 MMOL/L (ref 98–107)
CO2 SERPL-SCNC: 22.4 MMOL/L (ref 22–29)
CO2 SERPL-SCNC: 25.7 MMOL/L (ref 22–29)
CREAT BLD-MCNC: 2.09 MG/DL (ref 0.76–1.27)
CREAT BLD-MCNC: 2.69 MG/DL (ref 0.76–1.27)
DEPRECATED RDW RBC AUTO: 46.4 FL (ref 37–54)
EOSINOPHIL # BLD AUTO: 0.04 10*3/MM3 (ref 0–0.4)
EOSINOPHIL NFR BLD AUTO: 0.3 % (ref 0.3–6.2)
ERYTHROCYTE [DISTWIDTH] IN BLOOD BY AUTOMATED COUNT: 15.2 % (ref 12.3–15.4)
GFR SERPL CREATININE-BSD FRML MDRD: 23 ML/MIN/1.73
GFR SERPL CREATININE-BSD FRML MDRD: 31 ML/MIN/1.73
GLUCOSE BLD-MCNC: 147 MG/DL (ref 65–99)
GLUCOSE BLD-MCNC: 219 MG/DL (ref 65–99)
GLUCOSE BLDC GLUCOMTR-MCNC: 165 MG/DL (ref 70–130)
GLUCOSE BLDC GLUCOMTR-MCNC: 172 MG/DL (ref 70–130)
GLUCOSE BLDC GLUCOMTR-MCNC: 206 MG/DL (ref 70–130)
GLUCOSE BLDC GLUCOMTR-MCNC: 214 MG/DL (ref 70–130)
GLUCOSE BLDC GLUCOMTR-MCNC: 244 MG/DL (ref 70–130)
HCT VFR BLD AUTO: 32.4 % (ref 37.5–51)
HGB BLD-MCNC: 10 G/DL (ref 13–17.7)
IMM GRANULOCYTES # BLD AUTO: 0.16 10*3/MM3 (ref 0–0.05)
IMM GRANULOCYTES NFR BLD AUTO: 1.1 % (ref 0–0.5)
LYMPHOCYTES # BLD AUTO: 0.82 10*3/MM3 (ref 0.7–3.1)
LYMPHOCYTES NFR BLD AUTO: 5.8 % (ref 19.6–45.3)
MAGNESIUM SERPL-MCNC: 2 MG/DL (ref 1.6–2.4)
MCH RBC QN AUTO: 25.9 PG (ref 26.6–33)
MCHC RBC AUTO-ENTMCNC: 30.9 G/DL (ref 31.5–35.7)
MCV RBC AUTO: 83.9 FL (ref 79–97)
MONOCYTES # BLD AUTO: 1.08 10*3/MM3 (ref 0.1–0.9)
MONOCYTES NFR BLD AUTO: 7.7 % (ref 5–12)
NEUTROPHILS # BLD AUTO: 11.96 10*3/MM3 (ref 1.7–7)
NEUTROPHILS NFR BLD AUTO: 84.9 % (ref 42.7–76)
NRBC BLD AUTO-RTO: 0 /100 WBC (ref 0–0.2)
PHOSPHATE SERPL-MCNC: 3.2 MG/DL (ref 2.5–4.5)
PLAT MORPH BLD: NORMAL
PLATELET # BLD AUTO: 131 10*3/MM3 (ref 140–450)
PMV BLD AUTO: 12 FL (ref 6–12)
POTASSIUM BLD-SCNC: 4 MMOL/L (ref 3.5–5.2)
POTASSIUM BLD-SCNC: 4.3 MMOL/L (ref 3.5–5.2)
PROCALCITONIN SERPL-MCNC: 15.78 NG/ML (ref 0.1–0.25)
RBC # BLD AUTO: 3.86 10*6/MM3 (ref 4.14–5.8)
RBC MORPH BLD: NORMAL
SODIUM BLD-SCNC: 139 MMOL/L (ref 136–145)
SODIUM BLD-SCNC: 139 MMOL/L (ref 136–145)
WBC MORPH BLD: NORMAL
WBC NRBC COR # BLD: 14.09 10*3/MM3 (ref 3.4–10.8)

## 2019-08-16 PROCEDURE — 94799 UNLISTED PULMONARY SVC/PX: CPT

## 2019-08-16 PROCEDURE — 63710000001 INSULIN ASPART PER 5 UNITS: Performed by: INTERNAL MEDICINE

## 2019-08-16 PROCEDURE — 93010 ELECTROCARDIOGRAM REPORT: CPT | Performed by: INTERNAL MEDICINE

## 2019-08-16 PROCEDURE — 80048 BASIC METABOLIC PNL TOTAL CA: CPT | Performed by: INTERNAL MEDICINE

## 2019-08-16 PROCEDURE — 63710000001 INSULIN DETEMIR PER 5 UNITS: Performed by: HOSPITALIST

## 2019-08-16 PROCEDURE — 85007 BL SMEAR W/DIFF WBC COUNT: CPT | Performed by: HOSPITALIST

## 2019-08-16 PROCEDURE — 93005 ELECTROCARDIOGRAM TRACING: CPT | Performed by: INTERNAL MEDICINE

## 2019-08-16 PROCEDURE — 25010000002 ENOXAPARIN PER 10 MG: Performed by: HOSPITALIST

## 2019-08-16 PROCEDURE — 84100 ASSAY OF PHOSPHORUS: CPT | Performed by: INTERNAL MEDICINE

## 2019-08-16 PROCEDURE — 99232 SBSQ HOSP IP/OBS MODERATE 35: CPT | Performed by: INTERNAL MEDICINE

## 2019-08-16 PROCEDURE — 25010000002 VANCOMYCIN 1 G RECONSTITUTED SOLUTION 1 EACH VIAL: Performed by: HOSPITALIST

## 2019-08-16 PROCEDURE — 92610 EVALUATE SWALLOWING FUNCTION: CPT

## 2019-08-16 PROCEDURE — 63710000001 INSULIN ASPART PER 5 UNITS: Performed by: HOSPITALIST

## 2019-08-16 PROCEDURE — 84145 PROCALCITONIN (PCT): CPT | Performed by: INTERNAL MEDICINE

## 2019-08-16 PROCEDURE — 83735 ASSAY OF MAGNESIUM: CPT | Performed by: INTERNAL MEDICINE

## 2019-08-16 PROCEDURE — 63710000001 INSULIN DETEMIR PER 5 UNITS: Performed by: INTERNAL MEDICINE

## 2019-08-16 PROCEDURE — 85025 COMPLETE CBC W/AUTO DIFF WBC: CPT | Performed by: HOSPITALIST

## 2019-08-16 PROCEDURE — 70551 MRI BRAIN STEM W/O DYE: CPT

## 2019-08-16 PROCEDURE — 25010000002 CEFTRIAXONE PER 250 MG

## 2019-08-16 PROCEDURE — 99233 SBSQ HOSP IP/OBS HIGH 50: CPT | Performed by: INTERNAL MEDICINE

## 2019-08-16 PROCEDURE — 80048 BASIC METABOLIC PNL TOTAL CA: CPT | Performed by: HOSPITALIST

## 2019-08-16 PROCEDURE — 82962 GLUCOSE BLOOD TEST: CPT

## 2019-08-16 PROCEDURE — 25010000002 CEFEPIME PER 500 MG: Performed by: HOSPITALIST

## 2019-08-16 RX ORDER — ALBUTEROL SULFATE 2.5 MG/3ML
SOLUTION RESPIRATORY (INHALATION)
Status: COMPLETED
Start: 2019-08-16 | End: 2019-08-16

## 2019-08-16 RX ORDER — CEFTRIAXONE 1 G/1
INJECTION, POWDER, FOR SOLUTION INTRAMUSCULAR; INTRAVENOUS
Status: COMPLETED
Start: 2019-08-16 | End: 2019-08-16

## 2019-08-16 RX ORDER — L.ACID,PARA/B.BIFIDUM/S.THERM 8B CELL
1 CAPSULE ORAL DAILY
Status: DISCONTINUED | OUTPATIENT
Start: 2019-08-17 | End: 2019-08-29 | Stop reason: HOSPADM

## 2019-08-16 RX ORDER — LISINOPRIL 5 MG/1
5 TABLET ORAL DAILY
Status: DISCONTINUED | OUTPATIENT
Start: 2019-08-17 | End: 2019-08-21

## 2019-08-16 RX ORDER — AMLODIPINE BESYLATE 2.5 MG/1
2.5 TABLET ORAL
Status: DISCONTINUED | OUTPATIENT
Start: 2019-08-17 | End: 2019-08-24

## 2019-08-16 RX ORDER — FLUCONAZOLE 2 MG/ML
400 INJECTION, SOLUTION INTRAVENOUS DAILY
Status: DISCONTINUED | OUTPATIENT
Start: 2019-08-17 | End: 2019-08-18 | Stop reason: DRUGHIGH

## 2019-08-16 RX ORDER — PREGABALIN 75 MG/1
150 CAPSULE ORAL 2 TIMES DAILY
Status: DISCONTINUED | OUTPATIENT
Start: 2019-08-16 | End: 2019-08-29 | Stop reason: HOSPADM

## 2019-08-16 RX ORDER — METOPROLOL SUCCINATE 50 MG/1
50 TABLET, EXTENDED RELEASE ORAL DAILY
Status: DISCONTINUED | OUTPATIENT
Start: 2019-08-17 | End: 2019-08-16

## 2019-08-16 RX ORDER — LISINOPRIL 20 MG/1
20 TABLET ORAL DAILY
Status: DISCONTINUED | OUTPATIENT
Start: 2019-08-17 | End: 2019-08-16

## 2019-08-16 RX ORDER — METOPROLOL SUCCINATE 25 MG/1
25 TABLET, EXTENDED RELEASE ORAL DAILY
Status: DISCONTINUED | OUTPATIENT
Start: 2019-08-17 | End: 2019-08-16

## 2019-08-16 RX ORDER — BUDESONIDE AND FORMOTEROL FUMARATE DIHYDRATE 160; 4.5 UG/1; UG/1
2 AEROSOL RESPIRATORY (INHALATION)
Status: DISCONTINUED | OUTPATIENT
Start: 2019-08-16 | End: 2019-08-17

## 2019-08-16 RX ORDER — ALBUTEROL SULFATE 2.5 MG/3ML
2.5 SOLUTION RESPIRATORY (INHALATION)
Status: DISCONTINUED | OUTPATIENT
Start: 2019-08-16 | End: 2019-08-19

## 2019-08-16 RX ORDER — SODIUM CHLORIDE 9 MG/ML
INJECTION, SOLUTION INTRAVENOUS
Status: COMPLETED
Start: 2019-08-16 | End: 2019-08-16

## 2019-08-16 RX ORDER — ALBUTEROL SULFATE 2.5 MG/3ML
0.63 SOLUTION RESPIRATORY (INHALATION) EVERY 6 HOURS PRN
Status: DISCONTINUED | OUTPATIENT
Start: 2019-08-16 | End: 2019-08-27

## 2019-08-16 RX ORDER — ATORVASTATIN CALCIUM 10 MG/1
10 TABLET, FILM COATED ORAL DAILY
Status: DISCONTINUED | OUTPATIENT
Start: 2019-08-17 | End: 2019-08-29 | Stop reason: HOSPADM

## 2019-08-16 RX ORDER — LEVOTHYROXINE SODIUM 0.1 MG/1
100 TABLET ORAL
Status: DISCONTINUED | OUTPATIENT
Start: 2019-08-17 | End: 2019-08-20

## 2019-08-16 RX ADMIN — INSULIN ASPART 4 UNITS: 100 INJECTION, SOLUTION INTRAVENOUS; SUBCUTANEOUS at 05:56

## 2019-08-16 RX ADMIN — ASPIRIN 300 MG: 300 SUPPOSITORY RECTAL at 09:42

## 2019-08-16 RX ADMIN — ALBUTEROL SULFATE 2.5 MG: 2.5 SOLUTION RESPIRATORY (INHALATION) at 20:41

## 2019-08-16 RX ADMIN — CEFEPIME 2 G: 2 INJECTION, POWDER, FOR SOLUTION INTRAVENOUS at 00:33

## 2019-08-16 RX ADMIN — INSULIN ASPART 4 UNITS: 100 INJECTION, SOLUTION INTRAVENOUS; SUBCUTANEOUS at 21:26

## 2019-08-16 RX ADMIN — SODIUM CHLORIDE, PRESERVATIVE FREE 10 ML: 5 INJECTION INTRAVENOUS at 21:02

## 2019-08-16 RX ADMIN — SODIUM CHLORIDE, PRESERVATIVE FREE 3 ML: 5 INJECTION INTRAVENOUS at 21:08

## 2019-08-16 RX ADMIN — ALBUTEROL SULFATE 2.5 MG: 2.5 SOLUTION RESPIRATORY (INHALATION) at 08:56

## 2019-08-16 RX ADMIN — SODIUM CHLORIDE, PRESERVATIVE FREE 10 ML: 5 INJECTION INTRAVENOUS at 09:44

## 2019-08-16 RX ADMIN — LEVOTHYROXINE SODIUM ANHYDROUS 50 MCG: 100 INJECTION, POWDER, LYOPHILIZED, FOR SOLUTION INTRAVENOUS at 05:57

## 2019-08-16 RX ADMIN — PREGABALIN 150 MG: 75 CAPSULE ORAL at 20:54

## 2019-08-16 RX ADMIN — ENOXAPARIN SODIUM 130 MG: 150 INJECTION SUBCUTANEOUS at 05:56

## 2019-08-16 RX ADMIN — SODIUM CHLORIDE, PRESERVATIVE FREE 3 ML: 5 INJECTION INTRAVENOUS at 09:46

## 2019-08-16 RX ADMIN — METOPROLOL TARTRATE 25 MG: 25 TABLET, FILM COATED ORAL at 23:15

## 2019-08-16 RX ADMIN — ENOXAPARIN SODIUM 130 MG: 150 INJECTION SUBCUTANEOUS at 17:51

## 2019-08-16 RX ADMIN — CEFTRIAXONE 1000 MG: 1 INJECTION, POWDER, FOR SOLUTION INTRAMUSCULAR; INTRAVENOUS at 21:12

## 2019-08-16 RX ADMIN — INSULIN DETEMIR 20 UNITS: 100 INJECTION, SOLUTION SUBCUTANEOUS at 09:42

## 2019-08-16 RX ADMIN — INSULIN ASPART 4 UNITS: 100 INJECTION, SOLUTION INTRAVENOUS; SUBCUTANEOUS at 13:17

## 2019-08-16 RX ADMIN — SODIUM CHLORIDE, PRESERVATIVE FREE 20 ML: 5 INJECTION INTRAVENOUS at 09:45

## 2019-08-16 RX ADMIN — SODIUM CHLORIDE, PRESERVATIVE FREE 10 ML: 5 INJECTION INTRAVENOUS at 21:03

## 2019-08-16 RX ADMIN — SODIUM CHLORIDE, POTASSIUM CHLORIDE, SODIUM LACTATE AND CALCIUM CHLORIDE 100 ML/HR: 600; 310; 30; 20 INJECTION, SOLUTION INTRAVENOUS at 13:14

## 2019-08-16 RX ADMIN — PANTOPRAZOLE SODIUM 40 MG: 40 INJECTION, POWDER, FOR SOLUTION INTRAVENOUS at 05:57

## 2019-08-16 RX ADMIN — VANCOMYCIN HYDROCHLORIDE 2000 MG: 1 INJECTION, POWDER, LYOPHILIZED, FOR SOLUTION INTRAVENOUS at 02:18

## 2019-08-16 RX ADMIN — INSULIN ASPART 4 UNITS: 100 INJECTION, SOLUTION INTRAVENOUS; SUBCUTANEOUS at 00:33

## 2019-08-16 RX ADMIN — SODIUM CHLORIDE 100 ML: 9 INJECTION, SOLUTION INTRAVENOUS at 21:13

## 2019-08-16 RX ADMIN — CEFEPIME 2 G: 2 INJECTION, POWDER, FOR SOLUTION INTRAVENOUS at 11:17

## 2019-08-16 RX ADMIN — INSULIN DETEMIR 25 UNITS: 100 INJECTION, SOLUTION SUBCUTANEOUS at 20:52

## 2019-08-17 PROBLEM — I48.91 ATRIAL FIBRILLATION WITH RVR (HCC): Status: ACTIVE | Noted: 2019-08-17

## 2019-08-17 LAB
ANION GAP SERPL CALCULATED.3IONS-SCNC: 12.9 MMOL/L (ref 5–15)
BACTERIA SPEC AEROBE CULT: ABNORMAL
BACTERIA SPEC AEROBE CULT: ABNORMAL
BASOPHILS # BLD AUTO: 0.03 10*3/MM3 (ref 0–0.2)
BASOPHILS NFR BLD AUTO: 0.3 % (ref 0–1.5)
BUN BLD-MCNC: 37 MG/DL (ref 8–23)
BUN/CREAT SERPL: 15.7 (ref 7–25)
CALCIUM SPEC-SCNC: 8.9 MG/DL (ref 8.6–10.5)
CHLORIDE SERPL-SCNC: 105 MMOL/L (ref 98–107)
CO2 SERPL-SCNC: 26.1 MMOL/L (ref 22–29)
CREAT BLD-MCNC: 2.36 MG/DL (ref 0.76–1.27)
DEPRECATED RDW RBC AUTO: 46.5 FL (ref 37–54)
EOSINOPHIL # BLD AUTO: 0.36 10*3/MM3 (ref 0–0.4)
EOSINOPHIL NFR BLD AUTO: 3.3 % (ref 0.3–6.2)
ERYTHROCYTE [DISTWIDTH] IN BLOOD BY AUTOMATED COUNT: 15.2 % (ref 12.3–15.4)
GFR SERPL CREATININE-BSD FRML MDRD: 27 ML/MIN/1.73
GLUCOSE BLD-MCNC: 111 MG/DL (ref 65–99)
GLUCOSE BLDC GLUCOMTR-MCNC: 102 MG/DL (ref 70–130)
GLUCOSE BLDC GLUCOMTR-MCNC: 178 MG/DL (ref 70–130)
GLUCOSE BLDC GLUCOMTR-MCNC: 201 MG/DL (ref 70–130)
GLUCOSE BLDC GLUCOMTR-MCNC: 227 MG/DL (ref 70–130)
GRAM STN SPEC: ABNORMAL
HCT VFR BLD AUTO: 31.8 % (ref 37.5–51)
HGB BLD-MCNC: 9.9 G/DL (ref 13–17.7)
IMM GRANULOCYTES # BLD AUTO: 0.06 10*3/MM3 (ref 0–0.05)
IMM GRANULOCYTES NFR BLD AUTO: 0.6 % (ref 0–0.5)
ISOLATED FROM: ABNORMAL
ISOLATED FROM: ABNORMAL
LYMPHOCYTES # BLD AUTO: 1.44 10*3/MM3 (ref 0.7–3.1)
LYMPHOCYTES NFR BLD AUTO: 13.3 % (ref 19.6–45.3)
MCH RBC QN AUTO: 26.1 PG (ref 26.6–33)
MCHC RBC AUTO-ENTMCNC: 31.1 G/DL (ref 31.5–35.7)
MCV RBC AUTO: 83.9 FL (ref 79–97)
MONOCYTES # BLD AUTO: 0.63 10*3/MM3 (ref 0.1–0.9)
MONOCYTES NFR BLD AUTO: 5.8 % (ref 5–12)
NEUTROPHILS # BLD AUTO: 8.32 10*3/MM3 (ref 1.7–7)
NEUTROPHILS NFR BLD AUTO: 76.7 % (ref 42.7–76)
NRBC BLD AUTO-RTO: 0 /100 WBC (ref 0–0.2)
PLATELET # BLD AUTO: 130 10*3/MM3 (ref 140–450)
PMV BLD AUTO: 12 FL (ref 6–12)
POTASSIUM BLD-SCNC: 3.9 MMOL/L (ref 3.5–5.2)
RBC # BLD AUTO: 3.79 10*6/MM3 (ref 4.14–5.8)
SODIUM BLD-SCNC: 144 MMOL/L (ref 136–145)
STREP GROUPING: ABNORMAL
STREP GROUPING: ABNORMAL
T-UPTAKE NFR SERPL: 0.98 TBI (ref 0.8–1.3)
T4 SERPL-MCNC: 5.35 MCG/DL (ref 4.5–11.7)
TROPONIN T SERPL-MCNC: 1.01 NG/ML (ref 0–0.03)
TSH SERPL DL<=0.05 MIU/L-ACNC: 6.72 MIU/ML (ref 0.27–4.2)
WBC NRBC COR # BLD: 10.84 10*3/MM3 (ref 3.4–10.8)

## 2019-08-17 PROCEDURE — 84479 ASSAY OF THYROID (T3 OR T4): CPT | Performed by: INTERNAL MEDICINE

## 2019-08-17 PROCEDURE — 82962 GLUCOSE BLOOD TEST: CPT

## 2019-08-17 PROCEDURE — 99233 SBSQ HOSP IP/OBS HIGH 50: CPT | Performed by: INTERNAL MEDICINE

## 2019-08-17 PROCEDURE — 63710000001 INSULIN ASPART PER 5 UNITS: Performed by: INTERNAL MEDICINE

## 2019-08-17 PROCEDURE — 84484 ASSAY OF TROPONIN QUANT: CPT | Performed by: INTERNAL MEDICINE

## 2019-08-17 PROCEDURE — 85025 COMPLETE CBC W/AUTO DIFF WBC: CPT | Performed by: HOSPITALIST

## 2019-08-17 PROCEDURE — 93010 ELECTROCARDIOGRAM REPORT: CPT | Performed by: INTERNAL MEDICINE

## 2019-08-17 PROCEDURE — 80048 BASIC METABOLIC PNL TOTAL CA: CPT | Performed by: HOSPITALIST

## 2019-08-17 PROCEDURE — 84443 ASSAY THYROID STIM HORMONE: CPT | Performed by: INTERNAL MEDICINE

## 2019-08-17 PROCEDURE — 25010000002 FLUCONAZOLE PER 200 MG: Performed by: INTERNAL MEDICINE

## 2019-08-17 PROCEDURE — 93005 ELECTROCARDIOGRAM TRACING: CPT | Performed by: INTERNAL MEDICINE

## 2019-08-17 PROCEDURE — 94799 UNLISTED PULMONARY SVC/PX: CPT

## 2019-08-17 PROCEDURE — 63710000001 INSULIN DETEMIR PER 5 UNITS: Performed by: INTERNAL MEDICINE

## 2019-08-17 PROCEDURE — 99232 SBSQ HOSP IP/OBS MODERATE 35: CPT | Performed by: INTERNAL MEDICINE

## 2019-08-17 PROCEDURE — 25010000002 AMIODARONE IN DEXTROSE 5% 360-4.14 MG/200ML-% SOLUTION: Performed by: INTERNAL MEDICINE

## 2019-08-17 PROCEDURE — 25010000002 AMIODARONE IN DEXTROSE 5% 150-4.21 MG/100ML-% SOLUTION: Performed by: INTERNAL MEDICINE

## 2019-08-17 PROCEDURE — 84436 ASSAY OF TOTAL THYROXINE: CPT | Performed by: INTERNAL MEDICINE

## 2019-08-17 PROCEDURE — 94640 AIRWAY INHALATION TREATMENT: CPT

## 2019-08-17 PROCEDURE — 25010000002 CEFTRIAXONE: Performed by: INTERNAL MEDICINE

## 2019-08-17 PROCEDURE — 25010000002 ENOXAPARIN PER 10 MG: Performed by: HOSPITALIST

## 2019-08-17 PROCEDURE — 92526 ORAL FUNCTION THERAPY: CPT

## 2019-08-17 RX ORDER — ASPIRIN 325 MG
325 TABLET, DELAYED RELEASE (ENTERIC COATED) ORAL DAILY
Status: DISCONTINUED | OUTPATIENT
Start: 2019-08-17 | End: 2019-08-19

## 2019-08-17 RX ORDER — ARFORMOTEROL TARTRATE 15 UG/2ML
15 SOLUTION RESPIRATORY (INHALATION)
Status: DISCONTINUED | OUTPATIENT
Start: 2019-08-17 | End: 2019-08-29 | Stop reason: HOSPADM

## 2019-08-17 RX ORDER — MINERAL OIL, PETROLATUM 425; 568 MG/G; MG/G
OINTMENT OPHTHALMIC
Status: DISCONTINUED | OUTPATIENT
Start: 2019-08-17 | End: 2019-08-29 | Stop reason: HOSPADM

## 2019-08-17 RX ORDER — BUDESONIDE 0.5 MG/2ML
0.5 INHALANT ORAL
Status: DISCONTINUED | OUTPATIENT
Start: 2019-08-17 | End: 2019-08-29 | Stop reason: HOSPADM

## 2019-08-17 RX ADMIN — SODIUM CHLORIDE, PRESERVATIVE FREE 10 ML: 5 INJECTION INTRAVENOUS at 10:43

## 2019-08-17 RX ADMIN — ENOXAPARIN SODIUM 130 MG: 150 INJECTION SUBCUTANEOUS at 18:59

## 2019-08-17 RX ADMIN — ENOXAPARIN SODIUM 130 MG: 150 INJECTION SUBCUTANEOUS at 06:34

## 2019-08-17 RX ADMIN — AMIODARONE HYDROCHLORIDE 150 MG: 1.5 INJECTION, SOLUTION INTRAVENOUS at 08:19

## 2019-08-17 RX ADMIN — ALBUTEROL SULFATE 2.5 MG: 2.5 SOLUTION RESPIRATORY (INHALATION) at 19:52

## 2019-08-17 RX ADMIN — PREGABALIN 150 MG: 75 CAPSULE ORAL at 22:07

## 2019-08-17 RX ADMIN — INSULIN DETEMIR 25 UNITS: 100 INJECTION, SOLUTION SUBCUTANEOUS at 21:03

## 2019-08-17 RX ADMIN — LEVOTHYROXINE SODIUM 100 MCG: 100 TABLET ORAL at 11:59

## 2019-08-17 RX ADMIN — INSULIN ASPART 8 UNITS: 100 INJECTION, SOLUTION INTRAVENOUS; SUBCUTANEOUS at 21:04

## 2019-08-17 RX ADMIN — ASPIRIN 325 MG: 325 TABLET, DELAYED RELEASE ORAL at 15:47

## 2019-08-17 RX ADMIN — ATORVASTATIN CALCIUM 10 MG: 10 TABLET, FILM COATED ORAL at 10:43

## 2019-08-17 RX ADMIN — METOPROLOL TARTRATE 25 MG: 25 TABLET, FILM COATED ORAL at 10:43

## 2019-08-17 RX ADMIN — Medication 1 CAPSULE: at 10:44

## 2019-08-17 RX ADMIN — ARFORMOTEROL TARTRATE 15 MCG: 15 SOLUTION RESPIRATORY (INHALATION) at 20:01

## 2019-08-17 RX ADMIN — BUDESONIDE 0.5 MG: 0.5 SUSPENSION RESPIRATORY (INHALATION) at 19:55

## 2019-08-17 RX ADMIN — LISINOPRIL 5 MG: 5 TABLET ORAL at 10:44

## 2019-08-17 RX ADMIN — AMLODIPINE BESYLATE 2.5 MG: 2.5 TABLET ORAL at 10:44

## 2019-08-17 RX ADMIN — INSULIN DETEMIR 25 UNITS: 100 INJECTION, SOLUTION SUBCUTANEOUS at 10:38

## 2019-08-17 RX ADMIN — PREGABALIN 150 MG: 75 CAPSULE ORAL at 10:56

## 2019-08-17 RX ADMIN — SODIUM CHLORIDE, PRESERVATIVE FREE 10 ML: 5 INJECTION INTRAVENOUS at 22:09

## 2019-08-17 RX ADMIN — SODIUM CHLORIDE, PRESERVATIVE FREE 10 ML: 5 INJECTION INTRAVENOUS at 10:59

## 2019-08-17 RX ADMIN — AMIODARONE HYDROCHLORIDE 0.5 MG/MIN: 1.8 INJECTION, SOLUTION INTRAVENOUS at 08:37

## 2019-08-17 RX ADMIN — INSULIN ASPART 4 UNITS: 100 INJECTION, SOLUTION INTRAVENOUS; SUBCUTANEOUS at 12:39

## 2019-08-17 RX ADMIN — METOPROLOL TARTRATE 25 MG: 25 TABLET, FILM COATED ORAL at 22:07

## 2019-08-17 RX ADMIN — ALBUTEROL SULFATE 2.5 MG: 2.5 SOLUTION RESPIRATORY (INHALATION) at 09:51

## 2019-08-17 RX ADMIN — BUDESONIDE AND FORMOTEROL FUMARATE DIHYDRATE 2 PUFF: 160; 4.5 AEROSOL RESPIRATORY (INHALATION) at 10:00

## 2019-08-17 RX ADMIN — SODIUM CHLORIDE, PRESERVATIVE FREE 3 ML: 5 INJECTION INTRAVENOUS at 22:10

## 2019-08-17 RX ADMIN — AMIODARONE HYDROCHLORIDE 0.5 MG/MIN: 1.8 INJECTION, SOLUTION INTRAVENOUS at 19:21

## 2019-08-17 RX ADMIN — FLUCONAZOLE, SODIUM CHLORIDE 400 MG: 2 INJECTION INTRAVENOUS at 10:57

## 2019-08-17 RX ADMIN — INSULIN ASPART 8 UNITS: 100 INJECTION, SOLUTION INTRAVENOUS; SUBCUTANEOUS at 18:55

## 2019-08-17 RX ADMIN — SODIUM CHLORIDE, PRESERVATIVE FREE 10 ML: 5 INJECTION INTRAVENOUS at 22:08

## 2019-08-17 RX ADMIN — SODIUM CHLORIDE, PRESERVATIVE FREE 10 ML: 5 INJECTION INTRAVENOUS at 15:49

## 2019-08-17 RX ADMIN — CEFTRIAXONE 2 G: 2 INJECTION, POWDER, FOR SOLUTION INTRAMUSCULAR; INTRAVENOUS at 22:06

## 2019-08-18 ENCOUNTER — APPOINTMENT (OUTPATIENT)
Dept: ULTRASOUND IMAGING | Facility: HOSPITAL | Age: 79
End: 2019-08-18

## 2019-08-18 LAB
ALBUMIN SERPL-MCNC: 2.9 G/DL (ref 3.5–5.2)
ALBUMIN SERPL-MCNC: 3 G/DL (ref 3.5–5.2)
ANION GAP SERPL CALCULATED.3IONS-SCNC: 11.7 MMOL/L (ref 5–15)
ANION GAP SERPL CALCULATED.3IONS-SCNC: 14 MMOL/L (ref 5–15)
BASOPHILS # BLD AUTO: 0.02 10*3/MM3 (ref 0–0.2)
BASOPHILS NFR BLD AUTO: 0.3 % (ref 0–1.5)
BUN BLD-MCNC: 36 MG/DL (ref 8–23)
BUN BLD-MCNC: 37 MG/DL (ref 8–23)
BUN/CREAT SERPL: 15 (ref 7–25)
BUN/CREAT SERPL: 16.2 (ref 7–25)
CALCIUM SPEC-SCNC: 8.7 MG/DL (ref 8.6–10.5)
CALCIUM SPEC-SCNC: 8.9 MG/DL (ref 8.6–10.5)
CHLORIDE SERPL-SCNC: 100 MMOL/L (ref 98–107)
CHLORIDE SERPL-SCNC: 98 MMOL/L (ref 98–107)
CO2 SERPL-SCNC: 24 MMOL/L (ref 22–29)
CO2 SERPL-SCNC: 26.3 MMOL/L (ref 22–29)
CREAT BLD-MCNC: 2.28 MG/DL (ref 0.76–1.27)
CREAT BLD-MCNC: 2.4 MG/DL (ref 0.76–1.27)
DEPRECATED RDW RBC AUTO: 47.7 FL (ref 37–54)
EOSINOPHIL # BLD AUTO: 0.34 10*3/MM3 (ref 0–0.4)
EOSINOPHIL NFR BLD AUTO: 4.3 % (ref 0.3–6.2)
ERYTHROCYTE [DISTWIDTH] IN BLOOD BY AUTOMATED COUNT: 15.2 % (ref 12.3–15.4)
GFR SERPL CREATININE-BSD FRML MDRD: 26 ML/MIN/1.73
GFR SERPL CREATININE-BSD FRML MDRD: 28 ML/MIN/1.73
GLUCOSE BLD-MCNC: 103 MG/DL (ref 65–99)
GLUCOSE BLD-MCNC: 194 MG/DL (ref 65–99)
GLUCOSE BLDC GLUCOMTR-MCNC: 171 MG/DL (ref 70–130)
GLUCOSE BLDC GLUCOMTR-MCNC: 185 MG/DL (ref 70–130)
GLUCOSE BLDC GLUCOMTR-MCNC: 192 MG/DL (ref 70–130)
HCT VFR BLD AUTO: 32.7 % (ref 37.5–51)
HGB BLD-MCNC: 9.9 G/DL (ref 13–17.7)
IMM GRANULOCYTES # BLD AUTO: 0.04 10*3/MM3 (ref 0–0.05)
IMM GRANULOCYTES NFR BLD AUTO: 0.5 % (ref 0–0.5)
LYMPHOCYTES # BLD AUTO: 1.29 10*3/MM3 (ref 0.7–3.1)
LYMPHOCYTES NFR BLD AUTO: 16.2 % (ref 19.6–45.3)
MCH RBC QN AUTO: 25.6 PG (ref 26.6–33)
MCHC RBC AUTO-ENTMCNC: 30.3 G/DL (ref 31.5–35.7)
MCV RBC AUTO: 84.7 FL (ref 79–97)
MONOCYTES # BLD AUTO: 0.99 10*3/MM3 (ref 0.1–0.9)
MONOCYTES NFR BLD AUTO: 12.4 % (ref 5–12)
NEUTROPHILS # BLD AUTO: 5.29 10*3/MM3 (ref 1.7–7)
NEUTROPHILS NFR BLD AUTO: 66.3 % (ref 42.7–76)
NRBC BLD AUTO-RTO: 0 /100 WBC (ref 0–0.2)
PHOSPHATE SERPL-MCNC: 3.1 MG/DL (ref 2.5–4.5)
PHOSPHATE SERPL-MCNC: 3.9 MG/DL (ref 2.5–4.5)
PLATELET # BLD AUTO: 130 10*3/MM3 (ref 140–450)
PMV BLD AUTO: 12.2 FL (ref 6–12)
POTASSIUM BLD-SCNC: 3.8 MMOL/L (ref 3.5–5.2)
POTASSIUM BLD-SCNC: 4.2 MMOL/L (ref 3.5–5.2)
RBC # BLD AUTO: 3.86 10*6/MM3 (ref 4.14–5.8)
SODIUM BLD-SCNC: 136 MMOL/L (ref 136–145)
SODIUM BLD-SCNC: 138 MMOL/L (ref 136–145)
WBC NRBC COR # BLD: 7.97 10*3/MM3 (ref 3.4–10.8)

## 2019-08-18 PROCEDURE — 93005 ELECTROCARDIOGRAM TRACING: CPT | Performed by: INTERNAL MEDICINE

## 2019-08-18 PROCEDURE — 25010000002 FLUCONAZOLE PER 200 MG: Performed by: INTERNAL MEDICINE

## 2019-08-18 PROCEDURE — 94799 UNLISTED PULMONARY SVC/PX: CPT

## 2019-08-18 PROCEDURE — 63710000001 INSULIN ASPART PER 5 UNITS: Performed by: INTERNAL MEDICINE

## 2019-08-18 PROCEDURE — 85025 COMPLETE CBC W/AUTO DIFF WBC: CPT | Performed by: HOSPITALIST

## 2019-08-18 PROCEDURE — 80069 RENAL FUNCTION PANEL: CPT | Performed by: INTERNAL MEDICINE

## 2019-08-18 PROCEDURE — 93010 ELECTROCARDIOGRAM REPORT: CPT | Performed by: INTERNAL MEDICINE

## 2019-08-18 PROCEDURE — 99291 CRITICAL CARE FIRST HOUR: CPT | Performed by: INTERNAL MEDICINE

## 2019-08-18 PROCEDURE — 97162 PT EVAL MOD COMPLEX 30 MIN: CPT

## 2019-08-18 PROCEDURE — 25010000002 AMIODARONE IN DEXTROSE 5% 360-4.14 MG/200ML-% SOLUTION: Performed by: INTERNAL MEDICINE

## 2019-08-18 PROCEDURE — 25010000002 ENOXAPARIN PER 10 MG: Performed by: HOSPITALIST

## 2019-08-18 PROCEDURE — 76775 US EXAM ABDO BACK WALL LIM: CPT

## 2019-08-18 PROCEDURE — 82962 GLUCOSE BLOOD TEST: CPT

## 2019-08-18 PROCEDURE — 25010000002 CEFTRIAXONE: Performed by: INTERNAL MEDICINE

## 2019-08-18 PROCEDURE — 63710000001 INSULIN DETEMIR PER 5 UNITS: Performed by: INTERNAL MEDICINE

## 2019-08-18 PROCEDURE — 97116 GAIT TRAINING THERAPY: CPT

## 2019-08-18 RX ORDER — FLUCONAZOLE 2 MG/ML
200 INJECTION, SOLUTION INTRAVENOUS DAILY
Status: COMPLETED | OUTPATIENT
Start: 2019-08-19 | End: 2019-08-23

## 2019-08-18 RX ORDER — AMLODIPINE BESYLATE 5 MG/1
TABLET ORAL
Status: DISPENSED
Start: 2019-08-18 | End: 2019-08-19

## 2019-08-18 RX ADMIN — AMLODIPINE BESYLATE 2.5 MG: 2.5 TABLET ORAL at 17:24

## 2019-08-18 RX ADMIN — SODIUM CHLORIDE, PRESERVATIVE FREE 10 ML: 5 INJECTION INTRAVENOUS at 10:31

## 2019-08-18 RX ADMIN — INSULIN ASPART 4 UNITS: 100 INJECTION, SOLUTION INTRAVENOUS; SUBCUTANEOUS at 12:29

## 2019-08-18 RX ADMIN — INSULIN DETEMIR 25 UNITS: 100 INJECTION, SOLUTION SUBCUTANEOUS at 20:56

## 2019-08-18 RX ADMIN — ARFORMOTEROL TARTRATE 15 MCG: 15 SOLUTION RESPIRATORY (INHALATION) at 19:52

## 2019-08-18 RX ADMIN — SODIUM CHLORIDE, PRESERVATIVE FREE 10 ML: 5 INJECTION INTRAVENOUS at 20:13

## 2019-08-18 RX ADMIN — METOPROLOL TARTRATE 25 MG: 25 TABLET, FILM COATED ORAL at 20:11

## 2019-08-18 RX ADMIN — ASPIRIN 325 MG: 325 TABLET, DELAYED RELEASE ORAL at 10:27

## 2019-08-18 RX ADMIN — BUDESONIDE 0.5 MG: 0.5 SUSPENSION RESPIRATORY (INHALATION) at 19:42

## 2019-08-18 RX ADMIN — AMIODARONE HYDROCHLORIDE 0.5 MG/MIN: 1.8 INJECTION, SOLUTION INTRAVENOUS at 18:56

## 2019-08-18 RX ADMIN — SODIUM CHLORIDE, POTASSIUM CHLORIDE, SODIUM LACTATE AND CALCIUM CHLORIDE 500 ML: 600; 310; 30; 20 INJECTION, SOLUTION INTRAVENOUS at 12:15

## 2019-08-18 RX ADMIN — INSULIN ASPART 4 UNITS: 100 INJECTION, SOLUTION INTRAVENOUS; SUBCUTANEOUS at 17:25

## 2019-08-18 RX ADMIN — ENOXAPARIN SODIUM 130 MG: 150 INJECTION SUBCUTANEOUS at 10:25

## 2019-08-18 RX ADMIN — PREGABALIN 150 MG: 75 CAPSULE ORAL at 17:24

## 2019-08-18 RX ADMIN — PREGABALIN 150 MG: 75 CAPSULE ORAL at 20:12

## 2019-08-18 RX ADMIN — METOPROLOL TARTRATE 25 MG: 25 TABLET, FILM COATED ORAL at 10:29

## 2019-08-18 RX ADMIN — SODIUM CHLORIDE, PRESERVATIVE FREE 10 ML: 5 INJECTION INTRAVENOUS at 20:12

## 2019-08-18 RX ADMIN — BUDESONIDE 0.5 MG: 0.5 SUSPENSION RESPIRATORY (INHALATION) at 09:22

## 2019-08-18 RX ADMIN — Medication 1 CAPSULE: at 10:27

## 2019-08-18 RX ADMIN — CEFTRIAXONE 2 G: 2 INJECTION, POWDER, FOR SOLUTION INTRAMUSCULAR; INTRAVENOUS at 20:17

## 2019-08-18 RX ADMIN — SODIUM CHLORIDE, PRESERVATIVE FREE 3 ML: 5 INJECTION INTRAVENOUS at 10:31

## 2019-08-18 RX ADMIN — SODIUM CHLORIDE, PRESERVATIVE FREE 3 ML: 5 INJECTION INTRAVENOUS at 20:14

## 2019-08-18 RX ADMIN — SODIUM CHLORIDE, PRESERVATIVE FREE 10 ML: 5 INJECTION INTRAVENOUS at 10:30

## 2019-08-18 RX ADMIN — INSULIN ASPART 4 UNITS: 100 INJECTION, SOLUTION INTRAVENOUS; SUBCUTANEOUS at 20:56

## 2019-08-18 RX ADMIN — ARFORMOTEROL TARTRATE 15 MCG: 15 SOLUTION RESPIRATORY (INHALATION) at 09:22

## 2019-08-18 RX ADMIN — ENOXAPARIN SODIUM 130 MG: 150 INJECTION SUBCUTANEOUS at 17:56

## 2019-08-18 RX ADMIN — INSULIN DETEMIR 25 UNITS: 100 INJECTION, SOLUTION SUBCUTANEOUS at 10:56

## 2019-08-18 RX ADMIN — FLUCONAZOLE, SODIUM CHLORIDE 400 MG: 2 INJECTION INTRAVENOUS at 10:42

## 2019-08-18 RX ADMIN — ATORVASTATIN CALCIUM 10 MG: 10 TABLET, FILM COATED ORAL at 10:42

## 2019-08-18 RX ADMIN — LEVOTHYROXINE SODIUM 100 MCG: 100 TABLET ORAL at 06:14

## 2019-08-18 RX ADMIN — SODIUM CHLORIDE, PRESERVATIVE FREE 10 ML: 5 INJECTION INTRAVENOUS at 20:14

## 2019-08-18 RX ADMIN — LISINOPRIL 5 MG: 5 TABLET ORAL at 10:28

## 2019-08-18 RX ADMIN — ALBUTEROL SULFATE 2.5 MG: 2.5 SOLUTION RESPIRATORY (INHALATION) at 19:43

## 2019-08-18 RX ADMIN — AMIODARONE HYDROCHLORIDE 0.5 MG/MIN: 1.8 INJECTION, SOLUTION INTRAVENOUS at 07:43

## 2019-08-18 RX ADMIN — ALBUTEROL SULFATE 2.5 MG: 2.5 SOLUTION RESPIRATORY (INHALATION) at 07:35

## 2019-08-19 LAB
ANION GAP SERPL CALCULATED.3IONS-SCNC: 11.6 MMOL/L (ref 5–15)
BASOPHILS # BLD AUTO: 0.02 10*3/MM3 (ref 0–0.2)
BASOPHILS NFR BLD AUTO: 0.3 % (ref 0–1.5)
BUN BLD-MCNC: 37 MG/DL (ref 8–23)
BUN/CREAT SERPL: 15.9 (ref 7–25)
CALCIUM SPEC-SCNC: 8.7 MG/DL (ref 8.6–10.5)
CHLORIDE SERPL-SCNC: 100 MMOL/L (ref 98–107)
CO2 SERPL-SCNC: 25.4 MMOL/L (ref 22–29)
CREAT BLD-MCNC: 2.33 MG/DL (ref 0.76–1.27)
DEPRECATED RDW RBC AUTO: 45.7 FL (ref 37–54)
EOSINOPHIL # BLD AUTO: 0.31 10*3/MM3 (ref 0–0.4)
EOSINOPHIL NFR BLD AUTO: 4.6 % (ref 0.3–6.2)
ERYTHROCYTE [DISTWIDTH] IN BLOOD BY AUTOMATED COUNT: 14.8 % (ref 12.3–15.4)
GFR SERPL CREATININE-BSD FRML MDRD: 27 ML/MIN/1.73
GLUCOSE BLD-MCNC: 218 MG/DL (ref 65–99)
GLUCOSE BLDC GLUCOMTR-MCNC: 152 MG/DL (ref 70–130)
GLUCOSE BLDC GLUCOMTR-MCNC: 176 MG/DL (ref 70–130)
GLUCOSE BLDC GLUCOMTR-MCNC: 180 MG/DL (ref 70–130)
GLUCOSE BLDC GLUCOMTR-MCNC: 215 MG/DL (ref 70–130)
HCT VFR BLD AUTO: 31.7 % (ref 37.5–51)
HGB BLD-MCNC: 9.8 G/DL (ref 13–17.7)
IMM GRANULOCYTES # BLD AUTO: 0.05 10*3/MM3 (ref 0–0.05)
IMM GRANULOCYTES NFR BLD AUTO: 0.7 % (ref 0–0.5)
INR PPP: 1.18 (ref 0.9–1.1)
LYMPHOCYTES # BLD AUTO: 1.28 10*3/MM3 (ref 0.7–3.1)
LYMPHOCYTES NFR BLD AUTO: 19.1 % (ref 19.6–45.3)
MCH RBC QN AUTO: 26.1 PG (ref 26.6–33)
MCHC RBC AUTO-ENTMCNC: 30.9 G/DL (ref 31.5–35.7)
MCV RBC AUTO: 84.3 FL (ref 79–97)
MONOCYTES # BLD AUTO: 0.88 10*3/MM3 (ref 0.1–0.9)
MONOCYTES NFR BLD AUTO: 13.1 % (ref 5–12)
NEUTROPHILS # BLD AUTO: 4.17 10*3/MM3 (ref 1.7–7)
NEUTROPHILS NFR BLD AUTO: 62.2 % (ref 42.7–76)
NRBC BLD AUTO-RTO: 0 /100 WBC (ref 0–0.2)
PLATELET # BLD AUTO: 122 10*3/MM3 (ref 140–450)
PMV BLD AUTO: 12.3 FL (ref 6–12)
POTASSIUM BLD-SCNC: 3.9 MMOL/L (ref 3.5–5.2)
PROTHROMBIN TIME: 14.7 SECONDS (ref 12.1–15)
RBC # BLD AUTO: 3.76 10*6/MM3 (ref 4.14–5.8)
SODIUM BLD-SCNC: 137 MMOL/L (ref 136–145)
WBC NRBC COR # BLD: 6.71 10*3/MM3 (ref 3.4–10.8)

## 2019-08-19 PROCEDURE — 85610 PROTHROMBIN TIME: CPT | Performed by: INTERNAL MEDICINE

## 2019-08-19 PROCEDURE — 99232 SBSQ HOSP IP/OBS MODERATE 35: CPT | Performed by: INTERNAL MEDICINE

## 2019-08-19 PROCEDURE — 94799 UNLISTED PULMONARY SVC/PX: CPT

## 2019-08-19 PROCEDURE — 99232 SBSQ HOSP IP/OBS MODERATE 35: CPT | Performed by: HOSPITALIST

## 2019-08-19 PROCEDURE — 97110 THERAPEUTIC EXERCISES: CPT

## 2019-08-19 PROCEDURE — 63710000001 INSULIN DETEMIR PER 5 UNITS: Performed by: INTERNAL MEDICINE

## 2019-08-19 PROCEDURE — 85025 COMPLETE CBC W/AUTO DIFF WBC: CPT | Performed by: HOSPITALIST

## 2019-08-19 PROCEDURE — 25010000002 AMIODARONE IN DEXTROSE 5% 360-4.14 MG/200ML-% SOLUTION: Performed by: INTERNAL MEDICINE

## 2019-08-19 PROCEDURE — 82962 GLUCOSE BLOOD TEST: CPT

## 2019-08-19 PROCEDURE — 63710000001 INSULIN DETEMIR PER 5 UNITS: Performed by: HOSPITALIST

## 2019-08-19 PROCEDURE — 25010000002 CEFTRIAXONE SODIUM-DEXTROSE 2-2.22 GM-%(50ML) RECONSTITUTED SOLUTION: Performed by: INTERNAL MEDICINE

## 2019-08-19 PROCEDURE — 25010000002 ENOXAPARIN PER 10 MG: Performed by: HOSPITALIST

## 2019-08-19 PROCEDURE — 63710000001 INSULIN ASPART PER 5 UNITS: Performed by: INTERNAL MEDICINE

## 2019-08-19 PROCEDURE — 80048 BASIC METABOLIC PNL TOTAL CA: CPT | Performed by: HOSPITALIST

## 2019-08-19 PROCEDURE — 25010000002 FLUCONAZOLE PER 200 MG: Performed by: INTERNAL MEDICINE

## 2019-08-19 RX ORDER — LEVOTHYROXINE SODIUM 0.05 MG/1
TABLET ORAL
Status: COMPLETED
Start: 2019-08-19 | End: 2019-08-19

## 2019-08-19 RX ORDER — AMIODARONE HYDROCHLORIDE 200 MG/1
200 TABLET ORAL EVERY 12 HOURS SCHEDULED
Status: DISCONTINUED | OUTPATIENT
Start: 2019-08-19 | End: 2019-08-29 | Stop reason: HOSPADM

## 2019-08-19 RX ORDER — WARFARIN SODIUM 5 MG/1
5 TABLET ORAL
Status: DISCONTINUED | OUTPATIENT
Start: 2019-08-19 | End: 2019-08-20

## 2019-08-19 RX ORDER — CEFTRIAXONE 2 G/50ML
2 INJECTION, SOLUTION INTRAVENOUS EVERY 24 HOURS
Status: DISCONTINUED | OUTPATIENT
Start: 2019-08-19 | End: 2019-08-20

## 2019-08-19 RX ORDER — ASPIRIN 81 MG/1
81 TABLET ORAL DAILY
Status: DISCONTINUED | OUTPATIENT
Start: 2019-08-20 | End: 2019-08-29 | Stop reason: HOSPADM

## 2019-08-19 RX ADMIN — AMLODIPINE BESYLATE 2.5 MG: 2.5 TABLET ORAL at 08:05

## 2019-08-19 RX ADMIN — ENOXAPARIN SODIUM 130 MG: 150 INJECTION SUBCUTANEOUS at 17:35

## 2019-08-19 RX ADMIN — SODIUM CHLORIDE, PRESERVATIVE FREE 10 ML: 5 INJECTION INTRAVENOUS at 08:07

## 2019-08-19 RX ADMIN — SODIUM CHLORIDE, PRESERVATIVE FREE 3 ML: 5 INJECTION INTRAVENOUS at 08:08

## 2019-08-19 RX ADMIN — LISINOPRIL 5 MG: 5 TABLET ORAL at 08:06

## 2019-08-19 RX ADMIN — INSULIN ASPART 4 UNITS: 100 INJECTION, SOLUTION INTRAVENOUS; SUBCUTANEOUS at 17:36

## 2019-08-19 RX ADMIN — METOPROLOL TARTRATE 25 MG: 25 TABLET, FILM COATED ORAL at 20:10

## 2019-08-19 RX ADMIN — INSULIN DETEMIR 30 UNITS: 100 INJECTION, SOLUTION SUBCUTANEOUS at 21:24

## 2019-08-19 RX ADMIN — ATORVASTATIN CALCIUM 10 MG: 10 TABLET, FILM COATED ORAL at 08:04

## 2019-08-19 RX ADMIN — SODIUM CHLORIDE 500 ML: 9 INJECTION, SOLUTION INTRAVENOUS at 17:20

## 2019-08-19 RX ADMIN — Medication 1 CAPSULE: at 08:04

## 2019-08-19 RX ADMIN — INSULIN DETEMIR 25 UNITS: 100 INJECTION, SOLUTION SUBCUTANEOUS at 08:16

## 2019-08-19 RX ADMIN — ASPIRIN 325 MG: 325 TABLET, DELAYED RELEASE ORAL at 08:03

## 2019-08-19 RX ADMIN — SODIUM CHLORIDE, PRESERVATIVE FREE 10 ML: 5 INJECTION INTRAVENOUS at 20:09

## 2019-08-19 RX ADMIN — AMIODARONE HYDROCHLORIDE 0.5 MG/MIN: 1.8 INJECTION, SOLUTION INTRAVENOUS at 06:06

## 2019-08-19 RX ADMIN — ENOXAPARIN SODIUM 130 MG: 150 INJECTION SUBCUTANEOUS at 06:06

## 2019-08-19 RX ADMIN — LEVOTHYROXINE SODIUM 100 MCG: 50 TABLET ORAL at 06:06

## 2019-08-19 RX ADMIN — SODIUM CHLORIDE, PRESERVATIVE FREE 3 ML: 5 INJECTION INTRAVENOUS at 20:10

## 2019-08-19 RX ADMIN — SODIUM CHLORIDE, PRESERVATIVE FREE 10 ML: 5 INJECTION INTRAVENOUS at 08:08

## 2019-08-19 RX ADMIN — AMIODARONE HYDROCHLORIDE 200 MG: 200 TABLET ORAL at 10:03

## 2019-08-19 RX ADMIN — PREGABALIN 150 MG: 75 CAPSULE ORAL at 20:10

## 2019-08-19 RX ADMIN — METOPROLOL TARTRATE 25 MG: 25 TABLET, FILM COATED ORAL at 08:05

## 2019-08-19 RX ADMIN — WARFARIN SODIUM 5 MG: 5 TABLET ORAL at 17:37

## 2019-08-19 RX ADMIN — BUDESONIDE 0.5 MG: 0.5 SUSPENSION RESPIRATORY (INHALATION) at 21:17

## 2019-08-19 RX ADMIN — AMIODARONE HYDROCHLORIDE 200 MG: 200 TABLET ORAL at 20:10

## 2019-08-19 RX ADMIN — FLUCONAZOLE IN SODIUM CHLORIDE 200 MG: 2 INJECTION, SOLUTION INTRAVENOUS at 08:06

## 2019-08-19 RX ADMIN — ARFORMOTEROL TARTRATE 15 MCG: 15 SOLUTION RESPIRATORY (INHALATION) at 21:17

## 2019-08-19 RX ADMIN — INSULIN ASPART 4 UNITS: 100 INJECTION, SOLUTION INTRAVENOUS; SUBCUTANEOUS at 06:57

## 2019-08-19 RX ADMIN — PREGABALIN 150 MG: 75 CAPSULE ORAL at 08:11

## 2019-08-19 RX ADMIN — INSULIN ASPART 4 UNITS: 100 INJECTION, SOLUTION INTRAVENOUS; SUBCUTANEOUS at 21:24

## 2019-08-19 RX ADMIN — LEVOTHYROXINE SODIUM 100 MCG: 100 TABLET ORAL at 06:06

## 2019-08-19 RX ADMIN — INSULIN ASPART 8 UNITS: 100 INJECTION, SOLUTION INTRAVENOUS; SUBCUTANEOUS at 11:13

## 2019-08-19 RX ADMIN — CEFTRIAXONE 2 G: 2 INJECTION, SOLUTION INTRAVENOUS at 20:10

## 2019-08-20 ENCOUNTER — APPOINTMENT (OUTPATIENT)
Dept: GENERAL RADIOLOGY | Facility: HOSPITAL | Age: 79
End: 2019-08-20

## 2019-08-20 LAB
25(OH)D3 SERPL-MCNC: 26.5 NG/ML (ref 30–100)
ALBUMIN SERPL-MCNC: 3.1 G/DL (ref 3.5–5.2)
ALBUMIN/GLOB SERPL: 1.1 G/DL
ALP SERPL-CCNC: 55 U/L (ref 39–117)
ALT SERPL W P-5'-P-CCNC: 32 U/L (ref 1–41)
ANION GAP SERPL CALCULATED.3IONS-SCNC: 11.6 MMOL/L (ref 5–15)
AST SERPL-CCNC: 38 U/L (ref 1–40)
BILIRUB SERPL-MCNC: 0.2 MG/DL (ref 0.2–1.2)
BUN BLD-MCNC: 38 MG/DL (ref 8–23)
BUN/CREAT SERPL: 16.2 (ref 7–25)
CALCIUM SPEC-SCNC: 8.7 MG/DL (ref 8.6–10.5)
CHLORIDE SERPL-SCNC: 104 MMOL/L (ref 98–107)
CO2 SERPL-SCNC: 25.4 MMOL/L (ref 22–29)
CREAT BLD-MCNC: 2.34 MG/DL (ref 0.76–1.27)
FERRITIN SERPL-MCNC: 99 NG/ML (ref 30–400)
FOLATE SERPL-MCNC: 9.29 NG/ML (ref 4.78–24.2)
GFR SERPL CREATININE-BSD FRML MDRD: 27 ML/MIN/1.73
GLOBULIN UR ELPH-MCNC: 2.9 GM/DL
GLUCOSE BLD-MCNC: 114 MG/DL (ref 65–99)
GLUCOSE BLDC GLUCOMTR-MCNC: 106 MG/DL (ref 70–130)
GLUCOSE BLDC GLUCOMTR-MCNC: 177 MG/DL (ref 70–130)
GLUCOSE BLDC GLUCOMTR-MCNC: 189 MG/DL (ref 70–130)
GLUCOSE BLDC GLUCOMTR-MCNC: 214 MG/DL (ref 70–130)
INR PPP: 1.21 (ref 0.9–1.1)
IRON 24H UR-MRATE: 28 MCG/DL (ref 59–158)
IRON SATN MFR SERPL: 11 % (ref 20–50)
POTASSIUM BLD-SCNC: 4.2 MMOL/L (ref 3.5–5.2)
PROT SERPL-MCNC: 6 G/DL (ref 6–8.5)
PROTHROMBIN TIME: 15 SECONDS (ref 12.1–15)
SODIUM BLD-SCNC: 141 MMOL/L (ref 136–145)
TIBC SERPL-MCNC: 252 MCG/DL (ref 298–536)
UIBC SERPL-MCNC: 224 MCG/DL (ref 112–346)
VIT B12 BLD-MCNC: 417 PG/ML (ref 211–946)

## 2019-08-20 PROCEDURE — 99233 SBSQ HOSP IP/OBS HIGH 50: CPT | Performed by: NURSE PRACTITIONER

## 2019-08-20 PROCEDURE — 63710000001 INSULIN ASPART PER 5 UNITS: Performed by: INTERNAL MEDICINE

## 2019-08-20 PROCEDURE — 94799 UNLISTED PULMONARY SVC/PX: CPT

## 2019-08-20 PROCEDURE — 85610 PROTHROMBIN TIME: CPT | Performed by: INTERNAL MEDICINE

## 2019-08-20 PROCEDURE — 82728 ASSAY OF FERRITIN: CPT | Performed by: NURSE PRACTITIONER

## 2019-08-20 PROCEDURE — 73610 X-RAY EXAM OF ANKLE: CPT

## 2019-08-20 PROCEDURE — 97116 GAIT TRAINING THERAPY: CPT

## 2019-08-20 PROCEDURE — 25010000002 CEFTRIAXONE SODIUM-DEXTROSE 2-2.22 GM-%(50ML) RECONSTITUTED SOLUTION: Performed by: NURSE PRACTITIONER

## 2019-08-20 PROCEDURE — 82607 VITAMIN B-12: CPT | Performed by: NURSE PRACTITIONER

## 2019-08-20 PROCEDURE — 83550 IRON BINDING TEST: CPT | Performed by: NURSE PRACTITIONER

## 2019-08-20 PROCEDURE — 82306 VITAMIN D 25 HYDROXY: CPT | Performed by: NURSE PRACTITIONER

## 2019-08-20 PROCEDURE — 82962 GLUCOSE BLOOD TEST: CPT

## 2019-08-20 PROCEDURE — 83540 ASSAY OF IRON: CPT | Performed by: NURSE PRACTITIONER

## 2019-08-20 PROCEDURE — 25010000002 FLUCONAZOLE PER 200 MG: Performed by: INTERNAL MEDICINE

## 2019-08-20 PROCEDURE — 80053 COMPREHEN METABOLIC PANEL: CPT | Performed by: HOSPITALIST

## 2019-08-20 PROCEDURE — 82746 ASSAY OF FOLIC ACID SERUM: CPT | Performed by: NURSE PRACTITIONER

## 2019-08-20 PROCEDURE — 94762 N-INVAS EAR/PLS OXIMTRY CONT: CPT

## 2019-08-20 PROCEDURE — 25010000002 ENOXAPARIN PER 10 MG: Performed by: HOSPITALIST

## 2019-08-20 PROCEDURE — 83930 ASSAY OF BLOOD OSMOLALITY: CPT | Performed by: NURSE PRACTITIONER

## 2019-08-20 PROCEDURE — 63710000001 INSULIN DETEMIR PER 5 UNITS: Performed by: HOSPITALIST

## 2019-08-20 RX ORDER — WARFARIN SODIUM 7.5 MG/1
7.5 TABLET ORAL
Status: DISCONTINUED | OUTPATIENT
Start: 2019-08-20 | End: 2019-08-21

## 2019-08-20 RX ORDER — CEFDINIR 300 MG/1
300 CAPSULE ORAL EVERY 12 HOURS SCHEDULED
Status: DISCONTINUED | OUTPATIENT
Start: 2019-08-20 | End: 2019-08-20

## 2019-08-20 RX ORDER — CEFTRIAXONE 2 G/50ML
2 INJECTION, SOLUTION INTRAVENOUS EVERY 24 HOURS
Status: DISCONTINUED | OUTPATIENT
Start: 2019-08-20 | End: 2019-08-25

## 2019-08-20 RX ORDER — LEVOTHYROXINE SODIUM 0.12 MG/1
125 TABLET ORAL
Status: DISCONTINUED | OUTPATIENT
Start: 2019-08-21 | End: 2019-08-29 | Stop reason: HOSPADM

## 2019-08-20 RX ORDER — DOXYCYCLINE HYCLATE 50 MG/1
324 CAPSULE, GELATIN COATED ORAL 2 TIMES DAILY WITH MEALS
Status: DISCONTINUED | OUTPATIENT
Start: 2019-08-20 | End: 2019-08-26

## 2019-08-20 RX ADMIN — INSULIN DETEMIR 30 UNITS: 100 INJECTION, SOLUTION SUBCUTANEOUS at 21:53

## 2019-08-20 RX ADMIN — ATORVASTATIN CALCIUM 10 MG: 10 TABLET, FILM COATED ORAL at 08:10

## 2019-08-20 RX ADMIN — BUDESONIDE 0.5 MG: 0.5 SUSPENSION RESPIRATORY (INHALATION) at 07:47

## 2019-08-20 RX ADMIN — LEVOTHYROXINE SODIUM 100 MCG: 100 TABLET ORAL at 05:41

## 2019-08-20 RX ADMIN — INSULIN ASPART 4 UNITS: 100 INJECTION, SOLUTION INTRAVENOUS; SUBCUTANEOUS at 11:14

## 2019-08-20 RX ADMIN — INSULIN ASPART 4 UNITS: 100 INJECTION, SOLUTION INTRAVENOUS; SUBCUTANEOUS at 17:39

## 2019-08-20 RX ADMIN — SODIUM CHLORIDE, PRESERVATIVE FREE 10 ML: 5 INJECTION INTRAVENOUS at 21:00

## 2019-08-20 RX ADMIN — ASPIRIN 81 MG: 81 TABLET, COATED ORAL at 08:18

## 2019-08-20 RX ADMIN — ARFORMOTEROL TARTRATE 15 MCG: 15 SOLUTION RESPIRATORY (INHALATION) at 07:47

## 2019-08-20 RX ADMIN — ENOXAPARIN SODIUM 130 MG: 150 INJECTION SUBCUTANEOUS at 05:41

## 2019-08-20 RX ADMIN — BUDESONIDE 0.5 MG: 0.5 SUSPENSION RESPIRATORY (INHALATION) at 20:12

## 2019-08-20 RX ADMIN — CEFDINIR 300 MG: 300 CAPSULE ORAL at 11:13

## 2019-08-20 RX ADMIN — PREGABALIN 150 MG: 75 CAPSULE ORAL at 08:08

## 2019-08-20 RX ADMIN — ALBUTEROL SULFATE 0.63 MG: 2.5 SOLUTION RESPIRATORY (INHALATION) at 11:36

## 2019-08-20 RX ADMIN — INSULIN ASPART 8 UNITS: 100 INJECTION, SOLUTION INTRAVENOUS; SUBCUTANEOUS at 21:52

## 2019-08-20 RX ADMIN — ENOXAPARIN SODIUM 130 MG: 150 INJECTION SUBCUTANEOUS at 17:40

## 2019-08-20 RX ADMIN — SODIUM CHLORIDE, PRESERVATIVE FREE 10 ML: 5 INJECTION INTRAVENOUS at 08:19

## 2019-08-20 RX ADMIN — ARFORMOTEROL TARTRATE 15 MCG: 15 SOLUTION RESPIRATORY (INHALATION) at 20:12

## 2019-08-20 RX ADMIN — PREGABALIN 150 MG: 75 CAPSULE ORAL at 21:51

## 2019-08-20 RX ADMIN — CEFTRIAXONE 2 G: 2 INJECTION, SOLUTION INTRAVENOUS at 21:52

## 2019-08-20 RX ADMIN — INSULIN DETEMIR 30 UNITS: 100 INJECTION, SOLUTION SUBCUTANEOUS at 08:07

## 2019-08-20 RX ADMIN — SODIUM CHLORIDE, PRESERVATIVE FREE 10 ML: 5 INJECTION INTRAVENOUS at 08:20

## 2019-08-20 RX ADMIN — LISINOPRIL 5 MG: 5 TABLET ORAL at 08:13

## 2019-08-20 RX ADMIN — AMIODARONE HYDROCHLORIDE 200 MG: 200 TABLET ORAL at 21:52

## 2019-08-20 RX ADMIN — METOPROLOL TARTRATE 25 MG: 25 TABLET, FILM COATED ORAL at 08:15

## 2019-08-20 RX ADMIN — ACETAMINOPHEN 650 MG: 325 TABLET, FILM COATED ORAL at 22:09

## 2019-08-20 RX ADMIN — AMLODIPINE BESYLATE 2.5 MG: 2.5 TABLET ORAL at 08:16

## 2019-08-20 RX ADMIN — METOPROLOL TARTRATE 25 MG: 25 TABLET, FILM COATED ORAL at 21:52

## 2019-08-20 RX ADMIN — SODIUM CHLORIDE, PRESERVATIVE FREE 3 ML: 5 INJECTION INTRAVENOUS at 08:21

## 2019-08-20 RX ADMIN — AMIODARONE HYDROCHLORIDE 200 MG: 200 TABLET ORAL at 08:11

## 2019-08-20 RX ADMIN — Medication 1 CAPSULE: at 08:09

## 2019-08-20 RX ADMIN — WARFARIN SODIUM 7.5 MG: 7.5 TABLET ORAL at 17:40

## 2019-08-20 RX ADMIN — FLUCONAZOLE IN SODIUM CHLORIDE 200 MG: 2 INJECTION, SOLUTION INTRAVENOUS at 08:19

## 2019-08-20 RX ADMIN — SODIUM CHLORIDE, PRESERVATIVE FREE 20 ML: 5 INJECTION INTRAVENOUS at 08:20

## 2019-08-20 RX ADMIN — SODIUM CHLORIDE, PRESERVATIVE FREE 3 ML: 5 INJECTION INTRAVENOUS at 21:00

## 2019-08-21 ENCOUNTER — APPOINTMENT (OUTPATIENT)
Dept: GENERAL RADIOLOGY | Facility: HOSPITAL | Age: 79
End: 2019-08-21

## 2019-08-21 LAB
ANION GAP SERPL CALCULATED.3IONS-SCNC: 12.2 MMOL/L (ref 5–15)
BASOPHILS # BLD AUTO: 0.03 10*3/MM3 (ref 0–0.2)
BASOPHILS NFR BLD AUTO: 0.4 % (ref 0–1.5)
BUN BLD-MCNC: 40 MG/DL (ref 8–23)
BUN/CREAT SERPL: 15.7 (ref 7–25)
CALCIUM SPEC-SCNC: 9.1 MG/DL (ref 8.6–10.5)
CHLORIDE SERPL-SCNC: 103 MMOL/L (ref 98–107)
CHLORIDE UR-SCNC: 64 MMOL/L
CO2 SERPL-SCNC: 24.8 MMOL/L (ref 22–29)
CREAT BLD-MCNC: 2.55 MG/DL (ref 0.76–1.27)
DEPRECATED RDW RBC AUTO: 47.2 FL (ref 37–54)
EOSINOPHIL # BLD AUTO: 0.3 10*3/MM3 (ref 0–0.4)
EOSINOPHIL NFR BLD AUTO: 3.6 % (ref 0.3–6.2)
ERYTHROCYTE [DISTWIDTH] IN BLOOD BY AUTOMATED COUNT: 15 % (ref 12.3–15.4)
GFR SERPL CREATININE-BSD FRML MDRD: 25 ML/MIN/1.73
GLUCOSE BLD-MCNC: 127 MG/DL (ref 65–99)
GLUCOSE BLDC GLUCOMTR-MCNC: 108 MG/DL (ref 70–130)
GLUCOSE BLDC GLUCOMTR-MCNC: 131 MG/DL (ref 70–130)
GLUCOSE BLDC GLUCOMTR-MCNC: 153 MG/DL (ref 70–130)
GLUCOSE BLDC GLUCOMTR-MCNC: 201 MG/DL (ref 70–130)
HCT VFR BLD AUTO: 32.8 % (ref 37.5–51)
HGB BLD-MCNC: 9.7 G/DL (ref 13–17.7)
IMM GRANULOCYTES # BLD AUTO: 0.09 10*3/MM3 (ref 0–0.05)
IMM GRANULOCYTES NFR BLD AUTO: 1.1 % (ref 0–0.5)
INR PPP: 1.25 (ref 0.9–1.1)
LYMPHOCYTES # BLD AUTO: 1.45 10*3/MM3 (ref 0.7–3.1)
LYMPHOCYTES NFR BLD AUTO: 17.2 % (ref 19.6–45.3)
MCH RBC QN AUTO: 25.4 PG (ref 26.6–33)
MCHC RBC AUTO-ENTMCNC: 29.6 G/DL (ref 31.5–35.7)
MCV RBC AUTO: 85.9 FL (ref 79–97)
MONOCYTES # BLD AUTO: 1.03 10*3/MM3 (ref 0.1–0.9)
MONOCYTES NFR BLD AUTO: 12.2 % (ref 5–12)
NEUTROPHILS # BLD AUTO: 5.52 10*3/MM3 (ref 1.7–7)
NEUTROPHILS NFR BLD AUTO: 65.5 % (ref 42.7–76)
NRBC BLD AUTO-RTO: 0 /100 WBC (ref 0–0.2)
OSMOLALITY SERPL: 301 MOSM/KG (ref 280–301)
OSMOLALITY UR: 296 MOSM/KG
PLATELET # BLD AUTO: 168 10*3/MM3 (ref 140–450)
PMV BLD AUTO: 13.3 FL (ref 6–12)
POTASSIUM BLD-SCNC: 4.2 MMOL/L (ref 3.5–5.2)
POTASSIUM UR-SCNC: 10.7 MMOL/L
PROTHROMBIN TIME: 15.4 SECONDS (ref 12.1–15)
RBC # BLD AUTO: 3.82 10*6/MM3 (ref 4.14–5.8)
SODIUM BLD-SCNC: 140 MMOL/L (ref 136–145)
SODIUM UR-SCNC: 75 MMOL/L
WBC NRBC COR # BLD: 8.42 10*3/MM3 (ref 3.4–10.8)

## 2019-08-21 PROCEDURE — 25010000002 CEFTRIAXONE SODIUM-DEXTROSE 2-2.22 GM-%(50ML) RECONSTITUTED SOLUTION: Performed by: NURSE PRACTITIONER

## 2019-08-21 PROCEDURE — 97116 GAIT TRAINING THERAPY: CPT

## 2019-08-21 PROCEDURE — 84133 ASSAY OF URINE POTASSIUM: CPT | Performed by: NURSE PRACTITIONER

## 2019-08-21 PROCEDURE — 94799 UNLISTED PULMONARY SVC/PX: CPT

## 2019-08-21 PROCEDURE — 25010000002 ENOXAPARIN PER 10 MG: Performed by: HOSPITALIST

## 2019-08-21 PROCEDURE — 99233 SBSQ HOSP IP/OBS HIGH 50: CPT | Performed by: NURSE PRACTITIONER

## 2019-08-21 PROCEDURE — 84300 ASSAY OF URINE SODIUM: CPT | Performed by: NURSE PRACTITIONER

## 2019-08-21 PROCEDURE — 85610 PROTHROMBIN TIME: CPT | Performed by: INTERNAL MEDICINE

## 2019-08-21 PROCEDURE — 93005 ELECTROCARDIOGRAM TRACING: CPT | Performed by: INTERNAL MEDICINE

## 2019-08-21 PROCEDURE — 25010000002 FLUCONAZOLE PER 200 MG: Performed by: INTERNAL MEDICINE

## 2019-08-21 PROCEDURE — 93010 ELECTROCARDIOGRAM REPORT: CPT | Performed by: INTERNAL MEDICINE

## 2019-08-21 PROCEDURE — 63710000001 INSULIN DETEMIR PER 5 UNITS: Performed by: HOSPITALIST

## 2019-08-21 PROCEDURE — 83935 ASSAY OF URINE OSMOLALITY: CPT | Performed by: NURSE PRACTITIONER

## 2019-08-21 PROCEDURE — 63710000001 INSULIN ASPART PER 5 UNITS: Performed by: INTERNAL MEDICINE

## 2019-08-21 PROCEDURE — 80048 BASIC METABOLIC PNL TOTAL CA: CPT | Performed by: NURSE PRACTITIONER

## 2019-08-21 PROCEDURE — 82436 ASSAY OF URINE CHLORIDE: CPT | Performed by: NURSE PRACTITIONER

## 2019-08-21 PROCEDURE — 71046 X-RAY EXAM CHEST 2 VIEWS: CPT

## 2019-08-21 PROCEDURE — 82962 GLUCOSE BLOOD TEST: CPT

## 2019-08-21 PROCEDURE — 85025 COMPLETE CBC W/AUTO DIFF WBC: CPT | Performed by: NURSE PRACTITIONER

## 2019-08-21 RX ORDER — LANOLIN ALCOHOL/MO/W.PET/CERES
1000 CREAM (GRAM) TOPICAL DAILY
Status: DISCONTINUED | OUTPATIENT
Start: 2019-08-21 | End: 2019-08-29 | Stop reason: HOSPADM

## 2019-08-21 RX ORDER — ALUMINA, MAGNESIA, AND SIMETHICONE 2400; 2400; 240 MG/30ML; MG/30ML; MG/30ML
15 SUSPENSION ORAL EVERY 6 HOURS PRN
Status: DISCONTINUED | OUTPATIENT
Start: 2019-08-21 | End: 2019-08-29 | Stop reason: HOSPADM

## 2019-08-21 RX ORDER — BUMETANIDE 0.25 MG/ML
1 INJECTION INTRAMUSCULAR; INTRAVENOUS ONCE
Status: COMPLETED | OUTPATIENT
Start: 2019-08-21 | End: 2019-08-21

## 2019-08-21 RX ORDER — WARFARIN SODIUM 10 MG/1
10 TABLET ORAL
Status: DISCONTINUED | OUTPATIENT
Start: 2019-08-21 | End: 2019-08-23

## 2019-08-21 RX ORDER — MELATONIN
2000 DAILY
Status: DISCONTINUED | OUTPATIENT
Start: 2019-08-21 | End: 2019-08-29 | Stop reason: HOSPADM

## 2019-08-21 RX ORDER — BENZONATATE 100 MG/1
200 CAPSULE ORAL 3 TIMES DAILY PRN
Status: DISCONTINUED | OUTPATIENT
Start: 2019-08-21 | End: 2019-08-29 | Stop reason: HOSPADM

## 2019-08-21 RX ADMIN — FERROUS GLUCONATE TAB 324 MG (37.5 MG ELEMENTAL IRON) 324 MG: 324 (37.5 FE) TAB at 09:00

## 2019-08-21 RX ADMIN — ALUMINUM HYDROXIDE, MAGNESIUM HYDROXIDE, AND DIMETHICONE 15 ML: 400; 400; 40 SUSPENSION ORAL at 17:02

## 2019-08-21 RX ADMIN — METOPROLOL TARTRATE 25 MG: 25 TABLET, FILM COATED ORAL at 20:52

## 2019-08-21 RX ADMIN — INSULIN DETEMIR 30 UNITS: 100 INJECTION, SOLUTION SUBCUTANEOUS at 09:04

## 2019-08-21 RX ADMIN — WARFARIN SODIUM 10 MG: 10 TABLET ORAL at 17:04

## 2019-08-21 RX ADMIN — AMLODIPINE BESYLATE 2.5 MG: 2.5 TABLET ORAL at 09:03

## 2019-08-21 RX ADMIN — BUDESONIDE 0.5 MG: 0.5 SUSPENSION RESPIRATORY (INHALATION) at 20:06

## 2019-08-21 RX ADMIN — SODIUM CHLORIDE, PRESERVATIVE FREE 10 ML: 5 INJECTION INTRAVENOUS at 09:06

## 2019-08-21 RX ADMIN — PREGABALIN 150 MG: 75 CAPSULE ORAL at 09:00

## 2019-08-21 RX ADMIN — LEVOTHYROXINE SODIUM 125 MCG: 125 TABLET ORAL at 06:05

## 2019-08-21 RX ADMIN — AMIODARONE HYDROCHLORIDE 200 MG: 200 TABLET ORAL at 09:03

## 2019-08-21 RX ADMIN — SODIUM CHLORIDE, PRESERVATIVE FREE 10 ML: 5 INJECTION INTRAVENOUS at 20:55

## 2019-08-21 RX ADMIN — METOPROLOL TARTRATE 25 MG: 25 TABLET, FILM COATED ORAL at 09:01

## 2019-08-21 RX ADMIN — PREGABALIN 150 MG: 75 CAPSULE ORAL at 21:03

## 2019-08-21 RX ADMIN — ACETAMINOPHEN 650 MG: 325 TABLET, FILM COATED ORAL at 16:11

## 2019-08-21 RX ADMIN — Medication 1 CAPSULE: at 09:01

## 2019-08-21 RX ADMIN — ARFORMOTEROL TARTRATE 15 MCG: 15 SOLUTION RESPIRATORY (INHALATION) at 20:06

## 2019-08-21 RX ADMIN — VITAMIN D, TAB 1000IU (100/BT) 2000 UNITS: 25 TAB at 09:00

## 2019-08-21 RX ADMIN — ATORVASTATIN CALCIUM 10 MG: 10 TABLET, FILM COATED ORAL at 09:01

## 2019-08-21 RX ADMIN — AMIODARONE HYDROCHLORIDE 200 MG: 200 TABLET ORAL at 20:51

## 2019-08-21 RX ADMIN — CYANOCOBALAMIN TAB 1000 MCG 1000 MCG: 1000 TAB at 09:00

## 2019-08-21 RX ADMIN — INSULIN ASPART 16 UNITS: 100 INJECTION, SOLUTION INTRAVENOUS; SUBCUTANEOUS at 12:29

## 2019-08-21 RX ADMIN — SODIUM CHLORIDE, PRESERVATIVE FREE 3 ML: 5 INJECTION INTRAVENOUS at 09:05

## 2019-08-21 RX ADMIN — SODIUM CHLORIDE, PRESERVATIVE FREE 10 ML: 5 INJECTION INTRAVENOUS at 09:05

## 2019-08-21 RX ADMIN — ASPIRIN 81 MG: 81 TABLET, COATED ORAL at 09:00

## 2019-08-21 RX ADMIN — FLUCONAZOLE IN SODIUM CHLORIDE 200 MG: 2 INJECTION, SOLUTION INTRAVENOUS at 09:00

## 2019-08-21 RX ADMIN — ENOXAPARIN SODIUM 130 MG: 150 INJECTION SUBCUTANEOUS at 06:05

## 2019-08-21 RX ADMIN — BUMETANIDE 1 MG: 0.25 INJECTION INTRAMUSCULAR; INTRAVENOUS at 09:55

## 2019-08-21 RX ADMIN — CEFTRIAXONE 2 G: 2 INJECTION, SOLUTION INTRAVENOUS at 21:04

## 2019-08-21 RX ADMIN — INSULIN DETEMIR 30 UNITS: 100 INJECTION, SOLUTION SUBCUTANEOUS at 21:06

## 2019-08-21 RX ADMIN — ARFORMOTEROL TARTRATE 15 MCG: 15 SOLUTION RESPIRATORY (INHALATION) at 08:06

## 2019-08-21 RX ADMIN — INSULIN ASPART 4 UNITS: 100 INJECTION, SOLUTION INTRAVENOUS; SUBCUTANEOUS at 17:02

## 2019-08-21 RX ADMIN — FERROUS GLUCONATE TAB 324 MG (37.5 MG ELEMENTAL IRON) 324 MG: 324 (37.5 FE) TAB at 17:05

## 2019-08-21 RX ADMIN — SODIUM CHLORIDE, PRESERVATIVE FREE 10 ML: 5 INJECTION INTRAVENOUS at 20:54

## 2019-08-21 RX ADMIN — BUDESONIDE 0.5 MG: 0.5 SUSPENSION RESPIRATORY (INHALATION) at 08:06

## 2019-08-22 LAB
ANION GAP SERPL CALCULATED.3IONS-SCNC: 12.3 MMOL/L (ref 5–15)
BUN BLD-MCNC: 42 MG/DL (ref 8–23)
BUN/CREAT SERPL: 17.4 (ref 7–25)
CALCIUM SPEC-SCNC: 9 MG/DL (ref 8.6–10.5)
CHLORIDE SERPL-SCNC: 103 MMOL/L (ref 98–107)
CO2 SERPL-SCNC: 23.7 MMOL/L (ref 22–29)
CREAT BLD-MCNC: 2.42 MG/DL (ref 0.76–1.27)
GFR SERPL CREATININE-BSD FRML MDRD: 26 ML/MIN/1.73
GLUCOSE BLD-MCNC: 126 MG/DL (ref 65–99)
GLUCOSE BLDC GLUCOMTR-MCNC: 111 MG/DL (ref 70–130)
GLUCOSE BLDC GLUCOMTR-MCNC: 135 MG/DL (ref 70–130)
GLUCOSE BLDC GLUCOMTR-MCNC: 146 MG/DL (ref 70–130)
GLUCOSE BLDC GLUCOMTR-MCNC: 167 MG/DL (ref 70–130)
INR PPP: 1.27 (ref 0.9–1.1)
POTASSIUM BLD-SCNC: 4.1 MMOL/L (ref 3.5–5.2)
PROTHROMBIN TIME: 15.6 SECONDS (ref 12.1–15)
SODIUM BLD-SCNC: 139 MMOL/L (ref 136–145)

## 2019-08-22 PROCEDURE — 25010000002 ENOXAPARIN PER 10 MG: Performed by: HOSPITALIST

## 2019-08-22 PROCEDURE — 25010000002 CEFTRIAXONE SODIUM-DEXTROSE 2-2.22 GM-%(50ML) RECONSTITUTED SOLUTION: Performed by: NURSE PRACTITIONER

## 2019-08-22 PROCEDURE — 63710000001 INSULIN DETEMIR PER 5 UNITS: Performed by: HOSPITALIST

## 2019-08-22 PROCEDURE — 94618 PULMONARY STRESS TESTING: CPT

## 2019-08-22 PROCEDURE — 99232 SBSQ HOSP IP/OBS MODERATE 35: CPT | Performed by: INTERNAL MEDICINE

## 2019-08-22 PROCEDURE — 99233 SBSQ HOSP IP/OBS HIGH 50: CPT | Performed by: INTERNAL MEDICINE

## 2019-08-22 PROCEDURE — 82962 GLUCOSE BLOOD TEST: CPT

## 2019-08-22 PROCEDURE — 94799 UNLISTED PULMONARY SVC/PX: CPT

## 2019-08-22 PROCEDURE — 63710000001 INSULIN ASPART PER 5 UNITS: Performed by: INTERNAL MEDICINE

## 2019-08-22 PROCEDURE — 25010000002 FLUCONAZOLE PER 200 MG: Performed by: INTERNAL MEDICINE

## 2019-08-22 PROCEDURE — 85610 PROTHROMBIN TIME: CPT | Performed by: INTERNAL MEDICINE

## 2019-08-22 PROCEDURE — 80048 BASIC METABOLIC PNL TOTAL CA: CPT | Performed by: NURSE PRACTITIONER

## 2019-08-22 RX ORDER — BUMETANIDE 1 MG/1
1 TABLET ORAL ONCE
Status: COMPLETED | OUTPATIENT
Start: 2019-08-22 | End: 2019-08-22

## 2019-08-22 RX ORDER — BUMETANIDE 0.25 MG/ML
1 INJECTION INTRAMUSCULAR; INTRAVENOUS ONCE
Status: DISCONTINUED | OUTPATIENT
Start: 2019-08-22 | End: 2019-08-22

## 2019-08-22 RX ADMIN — SODIUM CHLORIDE, PRESERVATIVE FREE 10 ML: 5 INJECTION INTRAVENOUS at 13:08

## 2019-08-22 RX ADMIN — SODIUM CHLORIDE, PRESERVATIVE FREE 10 ML: 5 INJECTION INTRAVENOUS at 21:48

## 2019-08-22 RX ADMIN — SODIUM CHLORIDE, PRESERVATIVE FREE 3 ML: 5 INJECTION INTRAVENOUS at 13:09

## 2019-08-22 RX ADMIN — PREGABALIN 150 MG: 75 CAPSULE ORAL at 21:31

## 2019-08-22 RX ADMIN — INSULIN ASPART 4 UNITS: 100 INJECTION, SOLUTION INTRAVENOUS; SUBCUTANEOUS at 18:10

## 2019-08-22 RX ADMIN — ATORVASTATIN CALCIUM 10 MG: 10 TABLET, FILM COATED ORAL at 09:26

## 2019-08-22 RX ADMIN — AMIODARONE HYDROCHLORIDE 200 MG: 200 TABLET ORAL at 09:25

## 2019-08-22 RX ADMIN — Medication 1 CAPSULE: at 09:26

## 2019-08-22 RX ADMIN — FERROUS GLUCONATE TAB 324 MG (37.5 MG ELEMENTAL IRON) 324 MG: 324 (37.5 FE) TAB at 17:59

## 2019-08-22 RX ADMIN — ENOXAPARIN SODIUM 130 MG: 150 INJECTION SUBCUTANEOUS at 05:31

## 2019-08-22 RX ADMIN — ACETAMINOPHEN 650 MG: 325 TABLET, FILM COATED ORAL at 01:07

## 2019-08-22 RX ADMIN — AMLODIPINE BESYLATE 2.5 MG: 2.5 TABLET ORAL at 09:25

## 2019-08-22 RX ADMIN — ASPIRIN 81 MG: 81 TABLET, COATED ORAL at 09:26

## 2019-08-22 RX ADMIN — WARFARIN SODIUM 10 MG: 10 TABLET ORAL at 17:59

## 2019-08-22 RX ADMIN — ENOXAPARIN SODIUM 130 MG: 150 INJECTION SUBCUTANEOUS at 17:59

## 2019-08-22 RX ADMIN — FERROUS GLUCONATE TAB 324 MG (37.5 MG ELEMENTAL IRON) 324 MG: 324 (37.5 FE) TAB at 09:25

## 2019-08-22 RX ADMIN — ARFORMOTEROL TARTRATE 15 MCG: 15 SOLUTION RESPIRATORY (INHALATION) at 08:07

## 2019-08-22 RX ADMIN — SODIUM CHLORIDE, PRESERVATIVE FREE 10 ML: 5 INJECTION INTRAVENOUS at 09:24

## 2019-08-22 RX ADMIN — AMIODARONE HYDROCHLORIDE 200 MG: 200 TABLET ORAL at 21:32

## 2019-08-22 RX ADMIN — CEFTRIAXONE 2 G: 2 INJECTION, SOLUTION INTRAVENOUS at 21:33

## 2019-08-22 RX ADMIN — BUDESONIDE 0.5 MG: 0.5 SUSPENSION RESPIRATORY (INHALATION) at 19:57

## 2019-08-22 RX ADMIN — INSULIN DETEMIR 30 UNITS: 100 INJECTION, SOLUTION SUBCUTANEOUS at 21:34

## 2019-08-22 RX ADMIN — PREGABALIN 150 MG: 75 CAPSULE ORAL at 13:07

## 2019-08-22 RX ADMIN — LEVOTHYROXINE SODIUM 125 MCG: 125 TABLET ORAL at 05:31

## 2019-08-22 RX ADMIN — BUMETANIDE 1 MG: 1 TABLET ORAL at 13:25

## 2019-08-22 RX ADMIN — BUDESONIDE 0.5 MG: 0.5 SUSPENSION RESPIRATORY (INHALATION) at 08:07

## 2019-08-22 RX ADMIN — METOPROLOL TARTRATE 25 MG: 25 TABLET, FILM COATED ORAL at 09:25

## 2019-08-22 RX ADMIN — SODIUM CHLORIDE, PRESERVATIVE FREE 3 ML: 5 INJECTION INTRAVENOUS at 21:48

## 2019-08-22 RX ADMIN — CYANOCOBALAMIN TAB 1000 MCG 1000 MCG: 1000 TAB at 09:26

## 2019-08-22 RX ADMIN — ARFORMOTEROL TARTRATE 15 MCG: 15 SOLUTION RESPIRATORY (INHALATION) at 19:57

## 2019-08-22 RX ADMIN — BENZONATATE 200 MG: 100 CAPSULE ORAL at 01:07

## 2019-08-22 RX ADMIN — FLUCONAZOLE IN SODIUM CHLORIDE 200 MG: 2 INJECTION, SOLUTION INTRAVENOUS at 09:24

## 2019-08-22 RX ADMIN — SODIUM CHLORIDE, PRESERVATIVE FREE 10 ML: 5 INJECTION INTRAVENOUS at 21:47

## 2019-08-22 RX ADMIN — ALUMINUM HYDROXIDE, MAGNESIUM HYDROXIDE, AND DIMETHICONE 15 ML: 400; 400; 40 SUSPENSION ORAL at 15:05

## 2019-08-22 RX ADMIN — VITAMIN D, TAB 1000IU (100/BT) 2000 UNITS: 25 TAB at 09:44

## 2019-08-22 RX ADMIN — ACETAMINOPHEN 650 MG: 325 TABLET, FILM COATED ORAL at 11:53

## 2019-08-22 RX ADMIN — METOPROLOL TARTRATE 25 MG: 25 TABLET, FILM COATED ORAL at 21:32

## 2019-08-23 LAB
ANION GAP SERPL CALCULATED.3IONS-SCNC: 14 MMOL/L (ref 5–15)
BUN BLD-MCNC: 38 MG/DL (ref 8–23)
BUN/CREAT SERPL: 18.2 (ref 7–25)
CALCIUM SPEC-SCNC: 9.4 MG/DL (ref 8.6–10.5)
CHLORIDE SERPL-SCNC: 102 MMOL/L (ref 98–107)
CO2 SERPL-SCNC: 25 MMOL/L (ref 22–29)
CREAT BLD-MCNC: 2.09 MG/DL (ref 0.76–1.27)
GFR SERPL CREATININE-BSD FRML MDRD: 31 ML/MIN/1.73
GLUCOSE BLD-MCNC: 110 MG/DL (ref 65–99)
GLUCOSE BLDC GLUCOMTR-MCNC: 154 MG/DL (ref 70–130)
GLUCOSE BLDC GLUCOMTR-MCNC: 210 MG/DL (ref 70–130)
INR PPP: 3.07 (ref 0.9–1.1)
POTASSIUM BLD-SCNC: 4.2 MMOL/L (ref 3.5–5.2)
PROTHROMBIN TIME: 31.2 SECONDS (ref 12.1–15)
SODIUM BLD-SCNC: 141 MMOL/L (ref 136–145)

## 2019-08-23 PROCEDURE — 82962 GLUCOSE BLOOD TEST: CPT

## 2019-08-23 PROCEDURE — 25010000002 CEFTRIAXONE SODIUM-DEXTROSE 2-2.22 GM-%(50ML) RECONSTITUTED SOLUTION: Performed by: NURSE PRACTITIONER

## 2019-08-23 PROCEDURE — 25010000002 ENOXAPARIN PER 10 MG: Performed by: HOSPITALIST

## 2019-08-23 PROCEDURE — 85610 PROTHROMBIN TIME: CPT | Performed by: INTERNAL MEDICINE

## 2019-08-23 PROCEDURE — 80048 BASIC METABOLIC PNL TOTAL CA: CPT | Performed by: INTERNAL MEDICINE

## 2019-08-23 PROCEDURE — 99232 SBSQ HOSP IP/OBS MODERATE 35: CPT | Performed by: HOSPITALIST

## 2019-08-23 PROCEDURE — 97116 GAIT TRAINING THERAPY: CPT

## 2019-08-23 PROCEDURE — 99233 SBSQ HOSP IP/OBS HIGH 50: CPT | Performed by: NURSE PRACTITIONER

## 2019-08-23 PROCEDURE — 63710000001 INSULIN DETEMIR PER 5 UNITS: Performed by: HOSPITALIST

## 2019-08-23 PROCEDURE — 63710000001 INSULIN ASPART PER 5 UNITS: Performed by: INTERNAL MEDICINE

## 2019-08-23 PROCEDURE — 25010000002 FLUCONAZOLE PER 200 MG: Performed by: INTERNAL MEDICINE

## 2019-08-23 PROCEDURE — 94799 UNLISTED PULMONARY SVC/PX: CPT

## 2019-08-23 RX ORDER — BUMETANIDE 0.25 MG/ML
1 INJECTION INTRAMUSCULAR; INTRAVENOUS EVERY 12 HOURS
Status: COMPLETED | OUTPATIENT
Start: 2019-08-23 | End: 2019-08-23

## 2019-08-23 RX ORDER — WARFARIN SODIUM 2.5 MG/1
2.5 TABLET ORAL
Status: COMPLETED | OUTPATIENT
Start: 2019-08-23 | End: 2019-08-23

## 2019-08-23 RX ADMIN — CYANOCOBALAMIN TAB 1000 MCG 1000 MCG: 1000 TAB at 10:24

## 2019-08-23 RX ADMIN — INSULIN DETEMIR 30 UNITS: 100 INJECTION, SOLUTION SUBCUTANEOUS at 21:34

## 2019-08-23 RX ADMIN — WARFARIN SODIUM 2.5 MG: 2.5 TABLET ORAL at 22:02

## 2019-08-23 RX ADMIN — SODIUM CHLORIDE, PRESERVATIVE FREE 10 ML: 5 INJECTION INTRAVENOUS at 21:00

## 2019-08-23 RX ADMIN — LEVOTHYROXINE SODIUM 125 MCG: 125 TABLET ORAL at 06:39

## 2019-08-23 RX ADMIN — SODIUM CHLORIDE, PRESERVATIVE FREE 3 ML: 5 INJECTION INTRAVENOUS at 21:43

## 2019-08-23 RX ADMIN — ARFORMOTEROL TARTRATE 15 MCG: 15 SOLUTION RESPIRATORY (INHALATION) at 19:48

## 2019-08-23 RX ADMIN — SODIUM CHLORIDE, PRESERVATIVE FREE 10 ML: 5 INJECTION INTRAVENOUS at 21:28

## 2019-08-23 RX ADMIN — ASPIRIN 81 MG: 81 TABLET, COATED ORAL at 10:22

## 2019-08-23 RX ADMIN — SODIUM CHLORIDE, PRESERVATIVE FREE 10 ML: 5 INJECTION INTRAVENOUS at 10:23

## 2019-08-23 RX ADMIN — ARFORMOTEROL TARTRATE 15 MCG: 15 SOLUTION RESPIRATORY (INHALATION) at 08:13

## 2019-08-23 RX ADMIN — FERROUS GLUCONATE TAB 324 MG (37.5 MG ELEMENTAL IRON) 324 MG: 324 (37.5 FE) TAB at 10:25

## 2019-08-23 RX ADMIN — METOPROLOL TARTRATE 25 MG: 25 TABLET, FILM COATED ORAL at 21:31

## 2019-08-23 RX ADMIN — FERROUS GLUCONATE TAB 324 MG (37.5 MG ELEMENTAL IRON) 324 MG: 324 (37.5 FE) TAB at 22:00

## 2019-08-23 RX ADMIN — BUDESONIDE 0.5 MG: 0.5 SUSPENSION RESPIRATORY (INHALATION) at 19:48

## 2019-08-23 RX ADMIN — INSULIN ASPART 4 UNITS: 100 INJECTION, SOLUTION INTRAVENOUS; SUBCUTANEOUS at 21:34

## 2019-08-23 RX ADMIN — SODIUM CHLORIDE, PRESERVATIVE FREE 3 ML: 5 INJECTION INTRAVENOUS at 10:28

## 2019-08-23 RX ADMIN — SODIUM CHLORIDE, PRESERVATIVE FREE 10 ML: 5 INJECTION INTRAVENOUS at 10:49

## 2019-08-23 RX ADMIN — CEFTRIAXONE 2 G: 2 INJECTION, SOLUTION INTRAVENOUS at 20:49

## 2019-08-23 RX ADMIN — AMIODARONE HYDROCHLORIDE 200 MG: 200 TABLET ORAL at 10:24

## 2019-08-23 RX ADMIN — BUDESONIDE 0.5 MG: 0.5 SUSPENSION RESPIRATORY (INHALATION) at 08:13

## 2019-08-23 RX ADMIN — SODIUM CHLORIDE, PRESERVATIVE FREE 10 ML: 5 INJECTION INTRAVENOUS at 10:28

## 2019-08-23 RX ADMIN — Medication 1 CAPSULE: at 10:21

## 2019-08-23 RX ADMIN — AMLODIPINE BESYLATE 2.5 MG: 2.5 TABLET ORAL at 10:26

## 2019-08-23 RX ADMIN — PREGABALIN 150 MG: 75 CAPSULE ORAL at 11:07

## 2019-08-23 RX ADMIN — INSULIN ASPART 3 UNITS: 100 INJECTION, SOLUTION INTRAVENOUS; SUBCUTANEOUS at 17:27

## 2019-08-23 RX ADMIN — BUMETANIDE 1 MG: 0.25 INJECTION INTRAMUSCULAR; INTRAVENOUS at 21:58

## 2019-08-23 RX ADMIN — VITAMIN D, TAB 1000IU (100/BT) 2000 UNITS: 25 TAB at 10:23

## 2019-08-23 RX ADMIN — FLUCONAZOLE IN SODIUM CHLORIDE 200 MG: 2 INJECTION, SOLUTION INTRAVENOUS at 10:24

## 2019-08-23 RX ADMIN — INSULIN DETEMIR 30 UNITS: 100 INJECTION, SOLUTION SUBCUTANEOUS at 11:08

## 2019-08-23 RX ADMIN — BUMETANIDE 1 MG: 0.25 INJECTION INTRAMUSCULAR; INTRAVENOUS at 10:24

## 2019-08-23 RX ADMIN — ATORVASTATIN CALCIUM 10 MG: 10 TABLET, FILM COATED ORAL at 10:21

## 2019-08-23 RX ADMIN — METOPROLOL TARTRATE 25 MG: 25 TABLET, FILM COATED ORAL at 10:22

## 2019-08-23 RX ADMIN — PREGABALIN 150 MG: 75 CAPSULE ORAL at 21:28

## 2019-08-23 RX ADMIN — AMIODARONE HYDROCHLORIDE 200 MG: 200 TABLET ORAL at 21:30

## 2019-08-23 RX ADMIN — ENOXAPARIN SODIUM 130 MG: 150 INJECTION SUBCUTANEOUS at 06:38

## 2019-08-24 LAB
ANION GAP SERPL CALCULATED.3IONS-SCNC: 9.9 MMOL/L (ref 5–15)
BASOPHILS # BLD AUTO: 0.03 10*3/MM3 (ref 0–0.2)
BASOPHILS NFR BLD AUTO: 0.5 % (ref 0–1.5)
BUN BLD-MCNC: 37 MG/DL (ref 8–23)
BUN/CREAT SERPL: 18.5 (ref 7–25)
CALCIUM SPEC-SCNC: 9 MG/DL (ref 8.6–10.5)
CHLORIDE SERPL-SCNC: 104 MMOL/L (ref 98–107)
CO2 SERPL-SCNC: 29.1 MMOL/L (ref 22–29)
CREAT BLD-MCNC: 2 MG/DL (ref 0.76–1.27)
DEPRECATED RDW RBC AUTO: 47.4 FL (ref 37–54)
EOSINOPHIL # BLD AUTO: 0.33 10*3/MM3 (ref 0–0.4)
EOSINOPHIL NFR BLD AUTO: 5 % (ref 0.3–6.2)
ERYTHROCYTE [DISTWIDTH] IN BLOOD BY AUTOMATED COUNT: 15.4 % (ref 12.3–15.4)
GFR SERPL CREATININE-BSD FRML MDRD: 33 ML/MIN/1.73
GLUCOSE BLD-MCNC: 87 MG/DL (ref 65–99)
GLUCOSE BLDC GLUCOMTR-MCNC: 107 MG/DL (ref 70–130)
GLUCOSE BLDC GLUCOMTR-MCNC: 164 MG/DL (ref 70–130)
GLUCOSE BLDC GLUCOMTR-MCNC: 225 MG/DL (ref 70–130)
GLUCOSE BLDC GLUCOMTR-MCNC: 297 MG/DL (ref 70–130)
HCT VFR BLD AUTO: 29.9 % (ref 37.5–51)
HGB BLD-MCNC: 9.2 G/DL (ref 13–17.7)
IMM GRANULOCYTES # BLD AUTO: 0.08 10*3/MM3 (ref 0–0.05)
IMM GRANULOCYTES NFR BLD AUTO: 1.2 % (ref 0–0.5)
INR PPP: 1.58 (ref 0.9–1.1)
LYMPHOCYTES # BLD AUTO: 1.43 10*3/MM3 (ref 0.7–3.1)
LYMPHOCYTES NFR BLD AUTO: 21.6 % (ref 19.6–45.3)
MCH RBC QN AUTO: 26.1 PG (ref 26.6–33)
MCHC RBC AUTO-ENTMCNC: 30.8 G/DL (ref 31.5–35.7)
MCV RBC AUTO: 84.7 FL (ref 79–97)
MONOCYTES # BLD AUTO: 0.99 10*3/MM3 (ref 0.1–0.9)
MONOCYTES NFR BLD AUTO: 14.9 % (ref 5–12)
NEUTROPHILS # BLD AUTO: 3.77 10*3/MM3 (ref 1.7–7)
NEUTROPHILS NFR BLD AUTO: 56.8 % (ref 42.7–76)
NRBC BLD AUTO-RTO: 0 /100 WBC (ref 0–0.2)
PLATELET # BLD AUTO: 236 10*3/MM3 (ref 140–450)
PMV BLD AUTO: 11.8 FL (ref 6–12)
POTASSIUM BLD-SCNC: 3.9 MMOL/L (ref 3.5–5.2)
PROTHROMBIN TIME: 18.5 SECONDS (ref 12.1–15)
RBC # BLD AUTO: 3.53 10*6/MM3 (ref 4.14–5.8)
SODIUM BLD-SCNC: 143 MMOL/L (ref 136–145)
WBC NRBC COR # BLD: 6.63 10*3/MM3 (ref 3.4–10.8)

## 2019-08-24 PROCEDURE — 94799 UNLISTED PULMONARY SVC/PX: CPT

## 2019-08-24 PROCEDURE — 82962 GLUCOSE BLOOD TEST: CPT

## 2019-08-24 PROCEDURE — 25010000002 CEFTRIAXONE SODIUM-DEXTROSE 2-2.22 GM-%(50ML) RECONSTITUTED SOLUTION: Performed by: HOSPITALIST

## 2019-08-24 PROCEDURE — 97110 THERAPEUTIC EXERCISES: CPT

## 2019-08-24 PROCEDURE — 99233 SBSQ HOSP IP/OBS HIGH 50: CPT | Performed by: HOSPITALIST

## 2019-08-24 PROCEDURE — 85025 COMPLETE CBC W/AUTO DIFF WBC: CPT | Performed by: HOSPITALIST

## 2019-08-24 PROCEDURE — 87040 BLOOD CULTURE FOR BACTERIA: CPT | Performed by: HOSPITALIST

## 2019-08-24 PROCEDURE — 63710000001 INSULIN ASPART PER 5 UNITS: Performed by: INTERNAL MEDICINE

## 2019-08-24 PROCEDURE — 63710000001 INSULIN DETEMIR PER 5 UNITS: Performed by: HOSPITALIST

## 2019-08-24 PROCEDURE — 85610 PROTHROMBIN TIME: CPT | Performed by: INTERNAL MEDICINE

## 2019-08-24 PROCEDURE — 80048 BASIC METABOLIC PNL TOTAL CA: CPT | Performed by: NURSE PRACTITIONER

## 2019-08-24 PROCEDURE — 25010000002 ENOXAPARIN PER 10 MG: Performed by: HOSPITALIST

## 2019-08-24 PROCEDURE — 99232 SBSQ HOSP IP/OBS MODERATE 35: CPT | Performed by: INTERNAL MEDICINE

## 2019-08-24 RX ORDER — BUMETANIDE 0.25 MG/ML
1 INJECTION INTRAMUSCULAR; INTRAVENOUS EVERY 12 HOURS
Status: DISCONTINUED | OUTPATIENT
Start: 2019-08-24 | End: 2019-08-29

## 2019-08-24 RX ORDER — WARFARIN SODIUM 7.5 MG/1
7.5 TABLET ORAL
Status: DISCONTINUED | OUTPATIENT
Start: 2019-08-24 | End: 2019-08-29

## 2019-08-24 RX ORDER — CARVEDILOL 6.25 MG/1
6.25 TABLET ORAL 2 TIMES DAILY WITH MEALS
Status: DISCONTINUED | OUTPATIENT
Start: 2019-08-24 | End: 2019-08-29 | Stop reason: HOSPADM

## 2019-08-24 RX ADMIN — SODIUM CHLORIDE, PRESERVATIVE FREE 10 ML: 5 INJECTION INTRAVENOUS at 08:46

## 2019-08-24 RX ADMIN — INSULIN ASPART 12 UNITS: 100 INJECTION, SOLUTION INTRAVENOUS; SUBCUTANEOUS at 12:47

## 2019-08-24 RX ADMIN — FERROUS GLUCONATE TAB 324 MG (37.5 MG ELEMENTAL IRON) 324 MG: 324 (37.5 FE) TAB at 17:53

## 2019-08-24 RX ADMIN — CARVEDILOL 6.25 MG: 6.25 TABLET, FILM COATED ORAL at 17:53

## 2019-08-24 RX ADMIN — INSULIN ASPART 4 UNITS: 100 INJECTION, SOLUTION INTRAVENOUS; SUBCUTANEOUS at 17:54

## 2019-08-24 RX ADMIN — BUDESONIDE 0.5 MG: 0.5 SUSPENSION RESPIRATORY (INHALATION) at 19:53

## 2019-08-24 RX ADMIN — BUMETANIDE 1 MG: 0.25 INJECTION INTRAMUSCULAR; INTRAVENOUS at 22:10

## 2019-08-24 RX ADMIN — INSULIN DETEMIR 30 UNITS: 100 INJECTION, SOLUTION SUBCUTANEOUS at 22:30

## 2019-08-24 RX ADMIN — METOPROLOL TARTRATE 25 MG: 25 TABLET, FILM COATED ORAL at 08:43

## 2019-08-24 RX ADMIN — SODIUM CHLORIDE, PRESERVATIVE FREE 10 ML: 5 INJECTION INTRAVENOUS at 22:09

## 2019-08-24 RX ADMIN — BUDESONIDE 0.5 MG: 0.5 SUSPENSION RESPIRATORY (INHALATION) at 07:48

## 2019-08-24 RX ADMIN — WARFARIN SODIUM 7.5 MG: 7.5 TABLET ORAL at 17:54

## 2019-08-24 RX ADMIN — ATORVASTATIN CALCIUM 10 MG: 10 TABLET, FILM COATED ORAL at 08:42

## 2019-08-24 RX ADMIN — SODIUM CHLORIDE, PRESERVATIVE FREE 20 ML: 5 INJECTION INTRAVENOUS at 22:08

## 2019-08-24 RX ADMIN — VITAMIN D, TAB 1000IU (100/BT) 2000 UNITS: 25 TAB at 08:42

## 2019-08-24 RX ADMIN — INSULIN DETEMIR 30 UNITS: 100 INJECTION, SOLUTION SUBCUTANEOUS at 08:43

## 2019-08-24 RX ADMIN — FERROUS GLUCONATE TAB 324 MG (37.5 MG ELEMENTAL IRON) 324 MG: 324 (37.5 FE) TAB at 08:42

## 2019-08-24 RX ADMIN — SODIUM CHLORIDE, PRESERVATIVE FREE 3 ML: 5 INJECTION INTRAVENOUS at 22:03

## 2019-08-24 RX ADMIN — INSULIN ASPART 8 UNITS: 100 INJECTION, SOLUTION INTRAVENOUS; SUBCUTANEOUS at 22:23

## 2019-08-24 RX ADMIN — ARFORMOTEROL TARTRATE 15 MCG: 15 SOLUTION RESPIRATORY (INHALATION) at 07:47

## 2019-08-24 RX ADMIN — ARFORMOTEROL TARTRATE 15 MCG: 15 SOLUTION RESPIRATORY (INHALATION) at 19:53

## 2019-08-24 RX ADMIN — AMIODARONE HYDROCHLORIDE 200 MG: 200 TABLET ORAL at 22:04

## 2019-08-24 RX ADMIN — SODIUM CHLORIDE, PRESERVATIVE FREE 10 ML: 5 INJECTION INTRAVENOUS at 22:12

## 2019-08-24 RX ADMIN — BUMETANIDE 1 MG: 0.25 INJECTION INTRAMUSCULAR; INTRAVENOUS at 08:43

## 2019-08-24 RX ADMIN — MICONAZOLE NITRATE 1 APPLICATION: 20 CREAM TOPICAL at 22:11

## 2019-08-24 RX ADMIN — AMLODIPINE BESYLATE 2.5 MG: 2.5 TABLET ORAL at 08:42

## 2019-08-24 RX ADMIN — PREGABALIN 150 MG: 75 CAPSULE ORAL at 22:22

## 2019-08-24 RX ADMIN — CYANOCOBALAMIN TAB 1000 MCG 1000 MCG: 1000 TAB at 08:43

## 2019-08-24 RX ADMIN — ENOXAPARIN SODIUM 140 MG: 150 INJECTION SUBCUTANEOUS at 08:43

## 2019-08-24 RX ADMIN — AMIODARONE HYDROCHLORIDE 200 MG: 200 TABLET ORAL at 08:43

## 2019-08-24 RX ADMIN — ASPIRIN 81 MG: 81 TABLET, COATED ORAL at 08:42

## 2019-08-24 RX ADMIN — SODIUM CHLORIDE, PRESERVATIVE FREE 3 ML: 5 INJECTION INTRAVENOUS at 08:46

## 2019-08-24 RX ADMIN — LEVOTHYROXINE SODIUM 125 MCG: 125 TABLET ORAL at 05:58

## 2019-08-24 RX ADMIN — Medication 1 CAPSULE: at 08:43

## 2019-08-24 RX ADMIN — CEFTRIAXONE 2 G: 2 INJECTION, SOLUTION INTRAVENOUS at 22:22

## 2019-08-24 RX ADMIN — SODIUM CHLORIDE, PRESERVATIVE FREE 10 ML: 5 INJECTION INTRAVENOUS at 22:13

## 2019-08-24 RX ADMIN — PREGABALIN 150 MG: 75 CAPSULE ORAL at 08:42

## 2019-08-25 LAB
ANION GAP SERPL CALCULATED.3IONS-SCNC: 10.1 MMOL/L (ref 5–15)
BUN BLD-MCNC: 34 MG/DL (ref 8–23)
BUN/CREAT SERPL: 17.7 (ref 7–25)
CALCIUM SPEC-SCNC: 9 MG/DL (ref 8.6–10.5)
CHLORIDE SERPL-SCNC: 104 MMOL/L (ref 98–107)
CO2 SERPL-SCNC: 27.9 MMOL/L (ref 22–29)
CREAT BLD-MCNC: 1.92 MG/DL (ref 0.76–1.27)
GFR SERPL CREATININE-BSD FRML MDRD: 34 ML/MIN/1.73
GLUCOSE BLD-MCNC: 157 MG/DL (ref 65–99)
GLUCOSE BLDC GLUCOMTR-MCNC: 149 MG/DL (ref 70–130)
GLUCOSE BLDC GLUCOMTR-MCNC: 152 MG/DL (ref 70–130)
GLUCOSE BLDC GLUCOMTR-MCNC: 193 MG/DL (ref 70–130)
GLUCOSE BLDC GLUCOMTR-MCNC: 202 MG/DL (ref 70–130)
HCT VFR BLD AUTO: 32.9 % (ref 37.5–51)
HGB BLD-MCNC: 9.5 G/DL (ref 13–17.7)
INR PPP: 1.69 (ref 0.9–1.1)
POTASSIUM BLD-SCNC: 4.5 MMOL/L (ref 3.5–5.2)
PROTHROMBIN TIME: 19.5 SECONDS (ref 12.1–15)
SODIUM BLD-SCNC: 142 MMOL/L (ref 136–145)

## 2019-08-25 PROCEDURE — 99233 SBSQ HOSP IP/OBS HIGH 50: CPT | Performed by: INTERNAL MEDICINE

## 2019-08-25 PROCEDURE — 82962 GLUCOSE BLOOD TEST: CPT

## 2019-08-25 PROCEDURE — 94799 UNLISTED PULMONARY SVC/PX: CPT

## 2019-08-25 PROCEDURE — 85018 HEMOGLOBIN: CPT | Performed by: HOSPITALIST

## 2019-08-25 PROCEDURE — 85610 PROTHROMBIN TIME: CPT | Performed by: INTERNAL MEDICINE

## 2019-08-25 PROCEDURE — 63710000001 INSULIN DETEMIR PER 5 UNITS: Performed by: HOSPITALIST

## 2019-08-25 PROCEDURE — 25010000002 ENOXAPARIN PER 10 MG

## 2019-08-25 PROCEDURE — 80048 BASIC METABOLIC PNL TOTAL CA: CPT | Performed by: HOSPITALIST

## 2019-08-25 PROCEDURE — 99232 SBSQ HOSP IP/OBS MODERATE 35: CPT | Performed by: HOSPITALIST

## 2019-08-25 PROCEDURE — 63710000001 INSULIN ASPART PER 5 UNITS: Performed by: INTERNAL MEDICINE

## 2019-08-25 PROCEDURE — 85014 HEMATOCRIT: CPT | Performed by: HOSPITALIST

## 2019-08-25 RX ORDER — CEFDINIR 300 MG/1
300 CAPSULE ORAL EVERY 12 HOURS SCHEDULED
Status: DISCONTINUED | OUTPATIENT
Start: 2019-08-25 | End: 2019-08-29 | Stop reason: HOSPADM

## 2019-08-25 RX ADMIN — INSULIN DETEMIR 30 UNITS: 100 INJECTION, SOLUTION SUBCUTANEOUS at 22:10

## 2019-08-25 RX ADMIN — LEVOTHYROXINE SODIUM 125 MCG: 125 TABLET ORAL at 07:14

## 2019-08-25 RX ADMIN — MICONAZOLE NITRATE 1 APPLICATION: 20 CREAM TOPICAL at 22:07

## 2019-08-25 RX ADMIN — SODIUM CHLORIDE, PRESERVATIVE FREE 10 ML: 5 INJECTION INTRAVENOUS at 22:08

## 2019-08-25 RX ADMIN — ENOXAPARIN SODIUM 40 MG: 40 INJECTION SUBCUTANEOUS at 18:12

## 2019-08-25 RX ADMIN — BUMETANIDE 1 MG: 0.25 INJECTION INTRAMUSCULAR; INTRAVENOUS at 08:46

## 2019-08-25 RX ADMIN — INSULIN ASPART 8 UNITS: 100 INJECTION, SOLUTION INTRAVENOUS; SUBCUTANEOUS at 12:23

## 2019-08-25 RX ADMIN — SODIUM CHLORIDE, PRESERVATIVE FREE 3 ML: 5 INJECTION INTRAVENOUS at 22:09

## 2019-08-25 RX ADMIN — ARFORMOTEROL TARTRATE 15 MCG: 15 SOLUTION RESPIRATORY (INHALATION) at 07:58

## 2019-08-25 RX ADMIN — CARVEDILOL 6.25 MG: 6.25 TABLET, FILM COATED ORAL at 17:57

## 2019-08-25 RX ADMIN — PREGABALIN 150 MG: 75 CAPSULE ORAL at 22:02

## 2019-08-25 RX ADMIN — AMIODARONE HYDROCHLORIDE 200 MG: 200 TABLET ORAL at 08:45

## 2019-08-25 RX ADMIN — SODIUM CHLORIDE, PRESERVATIVE FREE 3 ML: 5 INJECTION INTRAVENOUS at 08:51

## 2019-08-25 RX ADMIN — MICONAZOLE NITRATE 1 APPLICATION: 20 CREAM TOPICAL at 08:47

## 2019-08-25 RX ADMIN — INSULIN ASPART 4 UNITS: 100 INJECTION, SOLUTION INTRAVENOUS; SUBCUTANEOUS at 22:03

## 2019-08-25 RX ADMIN — ARFORMOTEROL TARTRATE 15 MCG: 15 SOLUTION RESPIRATORY (INHALATION) at 19:34

## 2019-08-25 RX ADMIN — BUDESONIDE 0.5 MG: 0.5 SUSPENSION RESPIRATORY (INHALATION) at 19:34

## 2019-08-25 RX ADMIN — ATORVASTATIN CALCIUM 10 MG: 10 TABLET, FILM COATED ORAL at 08:45

## 2019-08-25 RX ADMIN — WARFARIN SODIUM 7.5 MG: 7.5 TABLET ORAL at 17:56

## 2019-08-25 RX ADMIN — CEFDINIR 300 MG: 300 CAPSULE ORAL at 22:06

## 2019-08-25 RX ADMIN — VITAMIN D, TAB 1000IU (100/BT) 2000 UNITS: 25 TAB at 08:46

## 2019-08-25 RX ADMIN — SODIUM CHLORIDE, PRESERVATIVE FREE 10 ML: 5 INJECTION INTRAVENOUS at 08:49

## 2019-08-25 RX ADMIN — BUMETANIDE 1 MG: 0.25 INJECTION INTRAMUSCULAR; INTRAVENOUS at 22:07

## 2019-08-25 RX ADMIN — FERROUS GLUCONATE TAB 324 MG (37.5 MG ELEMENTAL IRON) 324 MG: 324 (37.5 FE) TAB at 08:45

## 2019-08-25 RX ADMIN — CARVEDILOL 6.25 MG: 6.25 TABLET, FILM COATED ORAL at 08:47

## 2019-08-25 RX ADMIN — FERROUS GLUCONATE TAB 324 MG (37.5 MG ELEMENTAL IRON) 324 MG: 324 (37.5 FE) TAB at 17:57

## 2019-08-25 RX ADMIN — VITAMIN D, TAB 1000IU (100/BT) 2000 UNITS: 25 TAB at 08:48

## 2019-08-25 RX ADMIN — INSULIN DETEMIR 30 UNITS: 100 INJECTION, SOLUTION SUBCUTANEOUS at 08:29

## 2019-08-25 RX ADMIN — Medication 1 CAPSULE: at 08:45

## 2019-08-25 RX ADMIN — AMIODARONE HYDROCHLORIDE 200 MG: 200 TABLET ORAL at 22:05

## 2019-08-25 RX ADMIN — INSULIN ASPART 4 UNITS: 100 INJECTION, SOLUTION INTRAVENOUS; SUBCUTANEOUS at 17:49

## 2019-08-25 RX ADMIN — SODIUM CHLORIDE, PRESERVATIVE FREE 10 ML: 5 INJECTION INTRAVENOUS at 08:50

## 2019-08-25 RX ADMIN — CYANOCOBALAMIN TAB 1000 MCG 1000 MCG: 1000 TAB at 08:49

## 2019-08-25 RX ADMIN — PREGABALIN 150 MG: 75 CAPSULE ORAL at 09:00

## 2019-08-25 RX ADMIN — ASPIRIN 81 MG: 81 TABLET, COATED ORAL at 08:45

## 2019-08-25 RX ADMIN — BUDESONIDE 0.5 MG: 0.5 SUSPENSION RESPIRATORY (INHALATION) at 07:58

## 2019-08-25 RX ADMIN — SODIUM CHLORIDE, PRESERVATIVE FREE 10 ML: 5 INJECTION INTRAVENOUS at 22:10

## 2019-08-26 LAB
ANION GAP SERPL CALCULATED.3IONS-SCNC: 11.5 MMOL/L (ref 5–15)
BUN BLD-MCNC: 36 MG/DL (ref 8–23)
BUN/CREAT SERPL: 17.4 (ref 7–25)
CALCIUM SPEC-SCNC: 9.2 MG/DL (ref 8.6–10.5)
CHLORIDE SERPL-SCNC: 100 MMOL/L (ref 98–107)
CO2 SERPL-SCNC: 27.5 MMOL/L (ref 22–29)
CREAT BLD-MCNC: 2.07 MG/DL (ref 0.76–1.27)
GFR SERPL CREATININE-BSD FRML MDRD: 31 ML/MIN/1.73
GLUCOSE BLD-MCNC: 115 MG/DL (ref 65–99)
GLUCOSE BLDC GLUCOMTR-MCNC: 129 MG/DL (ref 70–130)
GLUCOSE BLDC GLUCOMTR-MCNC: 179 MG/DL (ref 70–130)
GLUCOSE BLDC GLUCOMTR-MCNC: 191 MG/DL (ref 70–130)
GLUCOSE BLDC GLUCOMTR-MCNC: 233 MG/DL (ref 70–130)
INR PPP: 1.85 (ref 0.9–1.1)
POTASSIUM BLD-SCNC: 4.1 MMOL/L (ref 3.5–5.2)
PROTHROMBIN TIME: 20.9 SECONDS (ref 12.1–15)
SODIUM BLD-SCNC: 139 MMOL/L (ref 136–145)

## 2019-08-26 PROCEDURE — 94799 UNLISTED PULMONARY SVC/PX: CPT

## 2019-08-26 PROCEDURE — 85610 PROTHROMBIN TIME: CPT | Performed by: INTERNAL MEDICINE

## 2019-08-26 PROCEDURE — 63710000001 INSULIN DETEMIR PER 5 UNITS: Performed by: HOSPITALIST

## 2019-08-26 PROCEDURE — 99233 SBSQ HOSP IP/OBS HIGH 50: CPT | Performed by: INTERNAL MEDICINE

## 2019-08-26 PROCEDURE — 63710000001 INSULIN ASPART PER 5 UNITS: Performed by: INTERNAL MEDICINE

## 2019-08-26 PROCEDURE — 80048 BASIC METABOLIC PNL TOTAL CA: CPT | Performed by: HOSPITALIST

## 2019-08-26 PROCEDURE — 99232 SBSQ HOSP IP/OBS MODERATE 35: CPT | Performed by: INTERNAL MEDICINE

## 2019-08-26 PROCEDURE — 82962 GLUCOSE BLOOD TEST: CPT

## 2019-08-26 PROCEDURE — 25010000002 ENOXAPARIN PER 10 MG: Performed by: HOSPITALIST

## 2019-08-26 RX ADMIN — AMIODARONE HYDROCHLORIDE 200 MG: 200 TABLET ORAL at 21:24

## 2019-08-26 RX ADMIN — ARFORMOTEROL TARTRATE 15 MCG: 15 SOLUTION RESPIRATORY (INHALATION) at 08:02

## 2019-08-26 RX ADMIN — INSULIN DETEMIR 30 UNITS: 100 INJECTION, SOLUTION SUBCUTANEOUS at 21:24

## 2019-08-26 RX ADMIN — ARFORMOTEROL TARTRATE 15 MCG: 15 SOLUTION RESPIRATORY (INHALATION) at 19:40

## 2019-08-26 RX ADMIN — CARVEDILOL 6.25 MG: 6.25 TABLET, FILM COATED ORAL at 17:49

## 2019-08-26 RX ADMIN — FERROUS GLUCONATE TAB 324 MG (37.5 MG ELEMENTAL IRON) 324 MG: 324 (37.5 FE) TAB at 08:50

## 2019-08-26 RX ADMIN — SODIUM CHLORIDE, PRESERVATIVE FREE 10 ML: 5 INJECTION INTRAVENOUS at 09:11

## 2019-08-26 RX ADMIN — CARVEDILOL 6.25 MG: 6.25 TABLET, FILM COATED ORAL at 09:46

## 2019-08-26 RX ADMIN — AMIODARONE HYDROCHLORIDE 200 MG: 200 TABLET ORAL at 08:49

## 2019-08-26 RX ADMIN — Medication 1 CAPSULE: at 08:50

## 2019-08-26 RX ADMIN — ENOXAPARIN SODIUM 40 MG: 40 INJECTION SUBCUTANEOUS at 17:49

## 2019-08-26 RX ADMIN — VITAMIN D, TAB 1000IU (100/BT) 2000 UNITS: 25 TAB at 08:50

## 2019-08-26 RX ADMIN — CYANOCOBALAMIN TAB 1000 MCG 1000 MCG: 1000 TAB at 09:09

## 2019-08-26 RX ADMIN — MICONAZOLE NITRATE 1 APPLICATION: 20 CREAM TOPICAL at 21:25

## 2019-08-26 RX ADMIN — ATORVASTATIN CALCIUM 10 MG: 10 TABLET, FILM COATED ORAL at 08:50

## 2019-08-26 RX ADMIN — CEFDINIR 300 MG: 300 CAPSULE ORAL at 08:50

## 2019-08-26 RX ADMIN — BUMETANIDE 1 MG: 0.25 INJECTION INTRAMUSCULAR; INTRAVENOUS at 21:24

## 2019-08-26 RX ADMIN — SODIUM CHLORIDE, PRESERVATIVE FREE 10 ML: 5 INJECTION INTRAVENOUS at 09:13

## 2019-08-26 RX ADMIN — BUMETANIDE 1 MG: 0.25 INJECTION INTRAMUSCULAR; INTRAVENOUS at 09:10

## 2019-08-26 RX ADMIN — INSULIN ASPART 4 UNITS: 100 INJECTION, SOLUTION INTRAVENOUS; SUBCUTANEOUS at 12:19

## 2019-08-26 RX ADMIN — ASPIRIN 81 MG: 81 TABLET, COATED ORAL at 08:49

## 2019-08-26 RX ADMIN — MICONAZOLE NITRATE 1 APPLICATION: 20 CREAM TOPICAL at 08:41

## 2019-08-26 RX ADMIN — INSULIN ASPART 8 UNITS: 100 INJECTION, SOLUTION INTRAVENOUS; SUBCUTANEOUS at 21:36

## 2019-08-26 RX ADMIN — SODIUM CHLORIDE, PRESERVATIVE FREE 3 ML: 5 INJECTION INTRAVENOUS at 09:13

## 2019-08-26 RX ADMIN — INSULIN DETEMIR 30 UNITS: 100 INJECTION, SOLUTION SUBCUTANEOUS at 08:55

## 2019-08-26 RX ADMIN — INSULIN ASPART 4 UNITS: 100 INJECTION, SOLUTION INTRAVENOUS; SUBCUTANEOUS at 17:48

## 2019-08-26 RX ADMIN — LEVOTHYROXINE SODIUM 125 MCG: 125 TABLET ORAL at 06:38

## 2019-08-26 RX ADMIN — PREGABALIN 150 MG: 75 CAPSULE ORAL at 21:24

## 2019-08-26 RX ADMIN — BUDESONIDE 0.5 MG: 0.5 SUSPENSION RESPIRATORY (INHALATION) at 08:02

## 2019-08-26 RX ADMIN — CEFDINIR 300 MG: 300 CAPSULE ORAL at 21:24

## 2019-08-26 RX ADMIN — WARFARIN SODIUM 7.5 MG: 7.5 TABLET ORAL at 17:49

## 2019-08-26 RX ADMIN — PREGABALIN 150 MG: 75 CAPSULE ORAL at 08:50

## 2019-08-26 RX ADMIN — BUDESONIDE 0.5 MG: 0.5 SUSPENSION RESPIRATORY (INHALATION) at 19:40

## 2019-08-26 RX ADMIN — SODIUM CHLORIDE, PRESERVATIVE FREE 10 ML: 5 INJECTION INTRAVENOUS at 21:25

## 2019-08-26 RX ADMIN — SODIUM CHLORIDE, PRESERVATIVE FREE 10 ML: 5 INJECTION INTRAVENOUS at 09:12

## 2019-08-27 LAB
ALBUMIN SERPL-MCNC: 3.2 G/DL (ref 3.5–5.2)
ANION GAP SERPL CALCULATED.3IONS-SCNC: 11.2 MMOL/L (ref 5–15)
BUN BLD-MCNC: 38 MG/DL (ref 8–23)
BUN/CREAT SERPL: 17.8 (ref 7–25)
CALCIUM SPEC-SCNC: 9 MG/DL (ref 8.6–10.5)
CHLORIDE SERPL-SCNC: 101 MMOL/L (ref 98–107)
CO2 SERPL-SCNC: 28.8 MMOL/L (ref 22–29)
CREAT BLD-MCNC: 2.13 MG/DL (ref 0.76–1.27)
GFR SERPL CREATININE-BSD FRML MDRD: 30 ML/MIN/1.73
GLUCOSE BLD-MCNC: 135 MG/DL (ref 65–99)
GLUCOSE BLDC GLUCOMTR-MCNC: 146 MG/DL (ref 70–130)
GLUCOSE BLDC GLUCOMTR-MCNC: 181 MG/DL (ref 70–130)
GLUCOSE BLDC GLUCOMTR-MCNC: 191 MG/DL (ref 70–130)
GLUCOSE BLDC GLUCOMTR-MCNC: 224 MG/DL (ref 70–130)
INR PPP: 2.06 (ref 0.9–1.1)
PHOSPHATE SERPL-MCNC: 3.7 MG/DL (ref 2.5–4.5)
POTASSIUM BLD-SCNC: 4.4 MMOL/L (ref 3.5–5.2)
PROTHROMBIN TIME: 22.8 SECONDS (ref 12.1–15)
SODIUM BLD-SCNC: 141 MMOL/L (ref 136–145)

## 2019-08-27 PROCEDURE — 85610 PROTHROMBIN TIME: CPT | Performed by: INTERNAL MEDICINE

## 2019-08-27 PROCEDURE — 94799 UNLISTED PULMONARY SVC/PX: CPT

## 2019-08-27 PROCEDURE — 99232 SBSQ HOSP IP/OBS MODERATE 35: CPT | Performed by: INTERNAL MEDICINE

## 2019-08-27 PROCEDURE — 63710000001 INSULIN ASPART PER 5 UNITS: Performed by: INTERNAL MEDICINE

## 2019-08-27 PROCEDURE — 80069 RENAL FUNCTION PANEL: CPT | Performed by: INTERNAL MEDICINE

## 2019-08-27 PROCEDURE — 63710000001 INSULIN DETEMIR PER 5 UNITS: Performed by: HOSPITALIST

## 2019-08-27 PROCEDURE — 82962 GLUCOSE BLOOD TEST: CPT

## 2019-08-27 PROCEDURE — 99232 SBSQ HOSP IP/OBS MODERATE 35: CPT | Performed by: NURSE PRACTITIONER

## 2019-08-27 RX ORDER — ALBUTEROL SULFATE 2.5 MG/3ML
0.63 SOLUTION RESPIRATORY (INHALATION) EVERY 4 HOURS PRN
Status: DISCONTINUED | OUTPATIENT
Start: 2019-08-27 | End: 2019-08-29 | Stop reason: HOSPADM

## 2019-08-27 RX ADMIN — CYANOCOBALAMIN TAB 1000 MCG 1000 MCG: 1000 TAB at 08:42

## 2019-08-27 RX ADMIN — BUDESONIDE 0.5 MG: 0.5 SUSPENSION RESPIRATORY (INHALATION) at 20:40

## 2019-08-27 RX ADMIN — LEVOTHYROXINE SODIUM 125 MCG: 125 TABLET ORAL at 05:56

## 2019-08-27 RX ADMIN — CEFDINIR 300 MG: 300 CAPSULE ORAL at 21:02

## 2019-08-27 RX ADMIN — MICONAZOLE NITRATE 1 APPLICATION: 20 CREAM TOPICAL at 21:02

## 2019-08-27 RX ADMIN — SODIUM CHLORIDE, PRESERVATIVE FREE 3 ML: 5 INJECTION INTRAVENOUS at 08:42

## 2019-08-27 RX ADMIN — SODIUM CHLORIDE, PRESERVATIVE FREE 10 ML: 5 INJECTION INTRAVENOUS at 08:40

## 2019-08-27 RX ADMIN — ATORVASTATIN CALCIUM 10 MG: 10 TABLET, FILM COATED ORAL at 08:36

## 2019-08-27 RX ADMIN — VITAMIN D, TAB 1000IU (100/BT) 2000 UNITS: 25 TAB at 08:38

## 2019-08-27 RX ADMIN — BUMETANIDE 1 MG: 0.25 INJECTION INTRAMUSCULAR; INTRAVENOUS at 08:33

## 2019-08-27 RX ADMIN — SODIUM CHLORIDE, PRESERVATIVE FREE 3 ML: 5 INJECTION INTRAVENOUS at 21:09

## 2019-08-27 RX ADMIN — SODIUM CHLORIDE, PRESERVATIVE FREE 10 ML: 5 INJECTION INTRAVENOUS at 21:08

## 2019-08-27 RX ADMIN — ASPIRIN 81 MG: 81 TABLET, COATED ORAL at 08:36

## 2019-08-27 RX ADMIN — INSULIN DETEMIR 30 UNITS: 100 INJECTION, SOLUTION SUBCUTANEOUS at 08:50

## 2019-08-27 RX ADMIN — PREGABALIN 150 MG: 75 CAPSULE ORAL at 08:48

## 2019-08-27 RX ADMIN — ARFORMOTEROL TARTRATE 15 MCG: 15 SOLUTION RESPIRATORY (INHALATION) at 20:39

## 2019-08-27 RX ADMIN — AMIODARONE HYDROCHLORIDE 200 MG: 200 TABLET ORAL at 21:01

## 2019-08-27 RX ADMIN — WARFARIN SODIUM 7.5 MG: 7.5 TABLET ORAL at 17:25

## 2019-08-27 RX ADMIN — INSULIN DETEMIR 30 UNITS: 100 INJECTION, SOLUTION SUBCUTANEOUS at 21:09

## 2019-08-27 RX ADMIN — CARVEDILOL 6.25 MG: 6.25 TABLET, FILM COATED ORAL at 08:37

## 2019-08-27 RX ADMIN — AMIODARONE HYDROCHLORIDE 200 MG: 200 TABLET ORAL at 08:35

## 2019-08-27 RX ADMIN — INSULIN ASPART 4 UNITS: 100 INJECTION, SOLUTION INTRAVENOUS; SUBCUTANEOUS at 17:18

## 2019-08-27 RX ADMIN — CEFDINIR 300 MG: 300 CAPSULE ORAL at 08:37

## 2019-08-27 RX ADMIN — INSULIN ASPART 4 UNITS: 100 INJECTION, SOLUTION INTRAVENOUS; SUBCUTANEOUS at 21:04

## 2019-08-27 RX ADMIN — SODIUM CHLORIDE, PRESERVATIVE FREE 10 ML: 5 INJECTION INTRAVENOUS at 21:07

## 2019-08-27 RX ADMIN — CARVEDILOL 6.25 MG: 6.25 TABLET, FILM COATED ORAL at 17:19

## 2019-08-27 RX ADMIN — MICONAZOLE NITRATE 1 APPLICATION: 20 CREAM TOPICAL at 08:42

## 2019-08-27 RX ADMIN — INSULIN ASPART 4 UNITS: 100 INJECTION, SOLUTION INTRAVENOUS; SUBCUTANEOUS at 12:40

## 2019-08-27 RX ADMIN — BUMETANIDE 1 MG: 0.25 INJECTION INTRAMUSCULAR; INTRAVENOUS at 21:01

## 2019-08-27 RX ADMIN — PREGABALIN 150 MG: 75 CAPSULE ORAL at 20:59

## 2019-08-27 RX ADMIN — BUDESONIDE 0.5 MG: 0.5 SUSPENSION RESPIRATORY (INHALATION) at 08:42

## 2019-08-27 RX ADMIN — ACETAMINOPHEN 650 MG: 325 TABLET, FILM COATED ORAL at 21:00

## 2019-08-27 RX ADMIN — ARFORMOTEROL TARTRATE 15 MCG: 15 SOLUTION RESPIRATORY (INHALATION) at 08:46

## 2019-08-27 RX ADMIN — Medication 1 CAPSULE: at 08:39

## 2019-08-28 ENCOUNTER — APPOINTMENT (OUTPATIENT)
Dept: ULTRASOUND IMAGING | Facility: HOSPITAL | Age: 79
End: 2019-08-28

## 2019-08-28 LAB
ANION GAP SERPL CALCULATED.3IONS-SCNC: 11.2 MMOL/L (ref 5–15)
BUN BLD-MCNC: 35 MG/DL (ref 8–23)
BUN/CREAT SERPL: 18.9 (ref 7–25)
CALCIUM SPEC-SCNC: 9.7 MG/DL (ref 8.6–10.5)
CHLORIDE SERPL-SCNC: 99 MMOL/L (ref 98–107)
CO2 SERPL-SCNC: 29.8 MMOL/L (ref 22–29)
CREAT BLD-MCNC: 1.85 MG/DL (ref 0.76–1.27)
GFR SERPL CREATININE-BSD FRML MDRD: 36 ML/MIN/1.73
GLUCOSE BLD-MCNC: 138 MG/DL (ref 65–99)
GLUCOSE BLDC GLUCOMTR-MCNC: 118 MG/DL (ref 70–130)
GLUCOSE BLDC GLUCOMTR-MCNC: 173 MG/DL (ref 70–130)
GLUCOSE BLDC GLUCOMTR-MCNC: 216 MG/DL (ref 70–130)
GLUCOSE BLDC GLUCOMTR-MCNC: 295 MG/DL (ref 70–130)
INR PPP: 2.19 (ref 0.9–1.1)
POTASSIUM BLD-SCNC: 4.6 MMOL/L (ref 3.5–5.2)
PROTHROMBIN TIME: 23.9 SECONDS (ref 12.1–15)
SODIUM BLD-SCNC: 140 MMOL/L (ref 136–145)

## 2019-08-28 PROCEDURE — 63710000001 INSULIN DETEMIR PER 5 UNITS: Performed by: HOSPITALIST

## 2019-08-28 PROCEDURE — 63710000001 INSULIN ASPART PER 5 UNITS: Performed by: INTERNAL MEDICINE

## 2019-08-28 PROCEDURE — 85610 PROTHROMBIN TIME: CPT | Performed by: INTERNAL MEDICINE

## 2019-08-28 PROCEDURE — 99232 SBSQ HOSP IP/OBS MODERATE 35: CPT | Performed by: HOSPITALIST

## 2019-08-28 PROCEDURE — 82962 GLUCOSE BLOOD TEST: CPT

## 2019-08-28 PROCEDURE — 94799 UNLISTED PULMONARY SVC/PX: CPT

## 2019-08-28 PROCEDURE — 93971 EXTREMITY STUDY: CPT

## 2019-08-28 PROCEDURE — 99232 SBSQ HOSP IP/OBS MODERATE 35: CPT | Performed by: INTERNAL MEDICINE

## 2019-08-28 PROCEDURE — 80048 BASIC METABOLIC PNL TOTAL CA: CPT | Performed by: NURSE PRACTITIONER

## 2019-08-28 RX ORDER — ALPRAZOLAM 0.25 MG/1
0.25 TABLET ORAL NIGHTLY PRN
COMMUNITY
End: 2021-01-01 | Stop reason: HOSPADM

## 2019-08-28 RX ORDER — TAMSULOSIN HYDROCHLORIDE 0.4 MG/1
0.4 CAPSULE ORAL DAILY
Status: DISCONTINUED | OUTPATIENT
Start: 2019-08-28 | End: 2019-08-29 | Stop reason: HOSPADM

## 2019-08-28 RX ORDER — POTASSIUM CHLORIDE 750 MG/1
TABLET, FILM COATED, EXTENDED RELEASE ORAL 2 TIMES DAILY
Status: ON HOLD | COMMUNITY
End: 2019-08-29 | Stop reason: SDUPTHER

## 2019-08-28 RX ORDER — COLCHICINE 0.6 MG/1
0.6 TABLET ORAL 2 TIMES DAILY
COMMUNITY
End: 2019-08-29 | Stop reason: HOSPADM

## 2019-08-28 RX ORDER — TRAMADOL HYDROCHLORIDE 50 MG/1
50 TABLET ORAL EVERY 8 HOURS PRN
Status: ON HOLD | COMMUNITY
End: 2019-10-15

## 2019-08-28 RX ORDER — BUMETANIDE 1 MG/1
1 TABLET ORAL 2 TIMES DAILY
COMMUNITY
End: 2019-11-27 | Stop reason: HOSPADM

## 2019-08-28 RX ORDER — NYSTATIN 100000 [USP'U]/G
POWDER TOPICAL 2 TIMES DAILY
COMMUNITY
End: 2020-07-02 | Stop reason: HOSPADM

## 2019-08-28 RX ADMIN — AMIODARONE HYDROCHLORIDE 200 MG: 200 TABLET ORAL at 09:16

## 2019-08-28 RX ADMIN — INSULIN ASPART 8 UNITS: 100 INJECTION, SOLUTION INTRAVENOUS; SUBCUTANEOUS at 21:01

## 2019-08-28 RX ADMIN — MICONAZOLE NITRATE 1 APPLICATION: 20 CREAM TOPICAL at 09:19

## 2019-08-28 RX ADMIN — SODIUM CHLORIDE, PRESERVATIVE FREE 10 ML: 5 INJECTION INTRAVENOUS at 21:04

## 2019-08-28 RX ADMIN — SODIUM CHLORIDE, PRESERVATIVE FREE 10 ML: 5 INJECTION INTRAVENOUS at 09:19

## 2019-08-28 RX ADMIN — CEFDINIR 300 MG: 300 CAPSULE ORAL at 21:03

## 2019-08-28 RX ADMIN — TAMSULOSIN HYDROCHLORIDE 0.4 MG: 0.4 CAPSULE ORAL at 14:23

## 2019-08-28 RX ADMIN — MICONAZOLE NITRATE 1 APPLICATION: 20 CREAM TOPICAL at 21:03

## 2019-08-28 RX ADMIN — WARFARIN SODIUM 7.5 MG: 7.5 TABLET ORAL at 17:32

## 2019-08-28 RX ADMIN — Medication 1 CAPSULE: at 09:19

## 2019-08-28 RX ADMIN — AMIODARONE HYDROCHLORIDE 200 MG: 200 TABLET ORAL at 21:02

## 2019-08-28 RX ADMIN — SODIUM CHLORIDE, PRESERVATIVE FREE 10 ML: 5 INJECTION INTRAVENOUS at 21:05

## 2019-08-28 RX ADMIN — BUMETANIDE 1 MG: 0.25 INJECTION INTRAMUSCULAR; INTRAVENOUS at 21:02

## 2019-08-28 RX ADMIN — BUMETANIDE 1 MG: 0.25 INJECTION INTRAMUSCULAR; INTRAVENOUS at 09:18

## 2019-08-28 RX ADMIN — BUDESONIDE 0.5 MG: 0.5 SUSPENSION RESPIRATORY (INHALATION) at 08:44

## 2019-08-28 RX ADMIN — CARVEDILOL 6.25 MG: 6.25 TABLET, FILM COATED ORAL at 17:30

## 2019-08-28 RX ADMIN — LEVOTHYROXINE SODIUM 125 MCG: 125 TABLET ORAL at 06:34

## 2019-08-28 RX ADMIN — ACETAMINOPHEN 650 MG: 325 TABLET, FILM COATED ORAL at 21:00

## 2019-08-28 RX ADMIN — CYANOCOBALAMIN TAB 1000 MCG 1000 MCG: 1000 TAB at 09:20

## 2019-08-28 RX ADMIN — PREGABALIN 150 MG: 75 CAPSULE ORAL at 21:13

## 2019-08-28 RX ADMIN — ASPIRIN 81 MG: 81 TABLET, COATED ORAL at 09:17

## 2019-08-28 RX ADMIN — BUDESONIDE 0.5 MG: 0.5 SUSPENSION RESPIRATORY (INHALATION) at 20:07

## 2019-08-28 RX ADMIN — VITAMIN D, TAB 1000IU (100/BT) 2000 UNITS: 25 TAB at 09:31

## 2019-08-28 RX ADMIN — ATORVASTATIN CALCIUM 10 MG: 10 TABLET, FILM COATED ORAL at 09:17

## 2019-08-28 RX ADMIN — CARVEDILOL 6.25 MG: 6.25 TABLET, FILM COATED ORAL at 09:18

## 2019-08-28 RX ADMIN — PREGABALIN 150 MG: 75 CAPSULE ORAL at 09:30

## 2019-08-28 RX ADMIN — CEFDINIR 300 MG: 300 CAPSULE ORAL at 09:18

## 2019-08-28 RX ADMIN — INSULIN ASPART 12 UNITS: 100 INJECTION, SOLUTION INTRAVENOUS; SUBCUTANEOUS at 12:27

## 2019-08-28 RX ADMIN — SODIUM CHLORIDE, PRESERVATIVE FREE 3 ML: 5 INJECTION INTRAVENOUS at 21:06

## 2019-08-28 RX ADMIN — INSULIN ASPART 4 UNITS: 100 INJECTION, SOLUTION INTRAVENOUS; SUBCUTANEOUS at 17:30

## 2019-08-28 RX ADMIN — ARFORMOTEROL TARTRATE 15 MCG: 15 SOLUTION RESPIRATORY (INHALATION) at 20:07

## 2019-08-28 RX ADMIN — INSULIN DETEMIR 30 UNITS: 100 INJECTION, SOLUTION SUBCUTANEOUS at 09:32

## 2019-08-28 RX ADMIN — ARFORMOTEROL TARTRATE 15 MCG: 15 SOLUTION RESPIRATORY (INHALATION) at 08:44

## 2019-08-28 RX ADMIN — INSULIN DETEMIR 30 UNITS: 100 INJECTION, SOLUTION SUBCUTANEOUS at 21:12

## 2019-08-29 ENCOUNTER — APPOINTMENT (OUTPATIENT)
Dept: CT IMAGING | Facility: HOSPITAL | Age: 79
End: 2019-08-29

## 2019-08-29 VITALS
HEIGHT: 72 IN | OXYGEN SATURATION: 90 % | SYSTOLIC BLOOD PRESSURE: 125 MMHG | DIASTOLIC BLOOD PRESSURE: 66 MMHG | RESPIRATION RATE: 20 BRPM | TEMPERATURE: 97.5 F | BODY MASS INDEX: 40.61 KG/M2 | WEIGHT: 299.8 LBS | HEART RATE: 59 BPM

## 2019-08-29 LAB
ANION GAP SERPL CALCULATED.3IONS-SCNC: 7.7 MMOL/L (ref 5–15)
BACTERIA SPEC AEROBE CULT: NORMAL
BACTERIA SPEC AEROBE CULT: NORMAL
BUN BLD-MCNC: 36 MG/DL (ref 8–23)
BUN/CREAT SERPL: 19.4 (ref 7–25)
CALCIUM SPEC-SCNC: 9.3 MG/DL (ref 8.6–10.5)
CHLORIDE SERPL-SCNC: 99 MMOL/L (ref 98–107)
CO2 SERPL-SCNC: 33.3 MMOL/L (ref 22–29)
CREAT BLD-MCNC: 1.86 MG/DL (ref 0.76–1.27)
GFR SERPL CREATININE-BSD FRML MDRD: 35 ML/MIN/1.73
GLUCOSE BLD-MCNC: 197 MG/DL (ref 65–99)
GLUCOSE BLDC GLUCOMTR-MCNC: 171 MG/DL (ref 70–130)
GLUCOSE BLDC GLUCOMTR-MCNC: 233 MG/DL (ref 70–130)
HCT VFR BLD AUTO: 31.6 % (ref 37.5–51)
HGB BLD-MCNC: 9.4 G/DL (ref 13–17.7)
INR PPP: 2.48 (ref 0.9–1.1)
POTASSIUM BLD-SCNC: 4.7 MMOL/L (ref 3.5–5.2)
PROTHROMBIN TIME: 26.4 SECONDS (ref 12.1–15)
SODIUM BLD-SCNC: 140 MMOL/L (ref 136–145)

## 2019-08-29 PROCEDURE — 99239 HOSP IP/OBS DSCHRG MGMT >30: CPT | Performed by: HOSPITALIST

## 2019-08-29 PROCEDURE — 99232 SBSQ HOSP IP/OBS MODERATE 35: CPT | Performed by: NURSE PRACTITIONER

## 2019-08-29 PROCEDURE — 63710000001 INSULIN DETEMIR PER 5 UNITS: Performed by: HOSPITALIST

## 2019-08-29 PROCEDURE — 85014 HEMATOCRIT: CPT | Performed by: HOSPITALIST

## 2019-08-29 PROCEDURE — 63710000001 INSULIN ASPART PER 5 UNITS: Performed by: INTERNAL MEDICINE

## 2019-08-29 PROCEDURE — 85610 PROTHROMBIN TIME: CPT | Performed by: INTERNAL MEDICINE

## 2019-08-29 PROCEDURE — 94618 PULMONARY STRESS TESTING: CPT

## 2019-08-29 PROCEDURE — 80048 BASIC METABOLIC PNL TOTAL CA: CPT | Performed by: HOSPITALIST

## 2019-08-29 PROCEDURE — 70450 CT HEAD/BRAIN W/O DYE: CPT

## 2019-08-29 PROCEDURE — 82962 GLUCOSE BLOOD TEST: CPT

## 2019-08-29 PROCEDURE — 94799 UNLISTED PULMONARY SVC/PX: CPT

## 2019-08-29 PROCEDURE — 72125 CT NECK SPINE W/O DYE: CPT

## 2019-08-29 PROCEDURE — 85018 HEMOGLOBIN: CPT | Performed by: HOSPITALIST

## 2019-08-29 RX ORDER — WARFARIN SODIUM 5 MG/1
5 TABLET ORAL
Status: DISCONTINUED | OUTPATIENT
Start: 2019-08-29 | End: 2019-08-29

## 2019-08-29 RX ORDER — BUMETANIDE 1 MG/1
1 TABLET ORAL 2 TIMES DAILY
Status: DISCONTINUED | OUTPATIENT
Start: 2019-08-29 | End: 2019-08-29 | Stop reason: HOSPADM

## 2019-08-29 RX ORDER — ALBUTEROL SULFATE 0.63 MG/3ML
1 SOLUTION RESPIRATORY (INHALATION) EVERY 6 HOURS PRN
Qty: 25 AMPULE | Refills: 0 | Status: ON HOLD | OUTPATIENT
Start: 2019-08-29 | End: 2019-10-15

## 2019-08-29 RX ORDER — WARFARIN SODIUM 6 MG/1
6 TABLET ORAL NIGHTLY
Qty: 30 TABLET | Refills: 0 | Status: SHIPPED | OUTPATIENT
Start: 2019-08-29 | End: 2019-11-08 | Stop reason: SDDI

## 2019-08-29 RX ORDER — ATORVASTATIN CALCIUM 10 MG/1
10 TABLET, FILM COATED ORAL DAILY
Qty: 30 TABLET | Refills: 0 | Status: SHIPPED | OUTPATIENT
Start: 2019-08-29 | End: 2020-07-02 | Stop reason: HOSPADM

## 2019-08-29 RX ORDER — AMIODARONE HYDROCHLORIDE 200 MG/1
200 TABLET ORAL EVERY 12 HOURS SCHEDULED
Qty: 60 TABLET | Refills: 0 | Status: SHIPPED | OUTPATIENT
Start: 2019-08-29 | End: 2020-07-02 | Stop reason: HOSPADM

## 2019-08-29 RX ORDER — TAMSULOSIN HYDROCHLORIDE 0.4 MG/1
0.4 CAPSULE ORAL DAILY
Qty: 30 CAPSULE | Refills: 0 | Status: SHIPPED | OUTPATIENT
Start: 2019-08-30 | End: 2022-01-01 | Stop reason: HOSPADM

## 2019-08-29 RX ORDER — WARFARIN SODIUM 3 MG/1
6 TABLET ORAL
Status: DISCONTINUED | OUTPATIENT
Start: 2019-08-29 | End: 2019-08-29 | Stop reason: HOSPADM

## 2019-08-29 RX ORDER — CARVEDILOL 6.25 MG/1
6.25 TABLET ORAL 2 TIMES DAILY WITH MEALS
Qty: 60 TABLET | Refills: 0 | Status: SHIPPED | OUTPATIENT
Start: 2019-08-29 | End: 2021-01-01

## 2019-08-29 RX ORDER — LEVOTHYROXINE SODIUM 0.12 MG/1
125 TABLET ORAL DAILY
Qty: 30 TABLET | Refills: 0 | Status: ON HOLD | OUTPATIENT
Start: 2019-08-29 | End: 2021-01-01

## 2019-08-29 RX ORDER — POTASSIUM CHLORIDE 750 MG/1
10 TABLET, FILM COATED, EXTENDED RELEASE ORAL DAILY
Status: ON HOLD
Start: 2019-08-29 | End: 2019-10-15

## 2019-08-29 RX ORDER — CEFDINIR 300 MG/1
300 CAPSULE ORAL EVERY 12 HOURS SCHEDULED
Qty: 12 CAPSULE | Refills: 0 | Status: SHIPPED | OUTPATIENT
Start: 2019-08-29 | End: 2019-09-04

## 2019-08-29 RX ORDER — ASPIRIN 81 MG/1
81 TABLET ORAL DAILY
Start: 2019-08-29 | End: 2019-09-10

## 2019-08-29 RX ADMIN — Medication 1 CAPSULE: at 08:28

## 2019-08-29 RX ADMIN — ATORVASTATIN CALCIUM 10 MG: 10 TABLET, FILM COATED ORAL at 08:27

## 2019-08-29 RX ADMIN — BUDESONIDE 0.5 MG: 0.5 SUSPENSION RESPIRATORY (INHALATION) at 08:04

## 2019-08-29 RX ADMIN — ACETAMINOPHEN 650 MG: 325 TABLET, FILM COATED ORAL at 06:36

## 2019-08-29 RX ADMIN — AMIODARONE HYDROCHLORIDE 200 MG: 200 TABLET ORAL at 08:28

## 2019-08-29 RX ADMIN — MICONAZOLE NITRATE 1 APPLICATION: 20 CREAM TOPICAL at 08:32

## 2019-08-29 RX ADMIN — CYANOCOBALAMIN TAB 1000 MCG 1000 MCG: 1000 TAB at 12:26

## 2019-08-29 RX ADMIN — BUMETANIDE 1 MG: 0.25 INJECTION INTRAMUSCULAR; INTRAVENOUS at 08:29

## 2019-08-29 RX ADMIN — LEVOTHYROXINE SODIUM 125 MCG: 125 TABLET ORAL at 06:37

## 2019-08-29 RX ADMIN — INSULIN ASPART 8 UNITS: 100 INJECTION, SOLUTION INTRAVENOUS; SUBCUTANEOUS at 12:26

## 2019-08-29 RX ADMIN — INSULIN ASPART 4 UNITS: 100 INJECTION, SOLUTION INTRAVENOUS; SUBCUTANEOUS at 08:27

## 2019-08-29 RX ADMIN — PREGABALIN 150 MG: 75 CAPSULE ORAL at 08:27

## 2019-08-29 RX ADMIN — VITAMIN D, TAB 1000IU (100/BT) 2000 UNITS: 25 TAB at 08:28

## 2019-08-29 RX ADMIN — CEFDINIR 300 MG: 300 CAPSULE ORAL at 08:27

## 2019-08-29 RX ADMIN — CARVEDILOL 6.25 MG: 6.25 TABLET, FILM COATED ORAL at 08:28

## 2019-08-29 RX ADMIN — ARFORMOTEROL TARTRATE 15 MCG: 15 SOLUTION RESPIRATORY (INHALATION) at 08:04

## 2019-08-29 RX ADMIN — ASPIRIN 81 MG: 81 TABLET, COATED ORAL at 08:27

## 2019-08-29 RX ADMIN — TAMSULOSIN HYDROCHLORIDE 0.4 MG: 0.4 CAPSULE ORAL at 08:27

## 2019-08-29 RX ADMIN — INSULIN DETEMIR 30 UNITS: 100 INJECTION, SOLUTION SUBCUTANEOUS at 08:26

## 2019-08-30 ENCOUNTER — READMISSION MANAGEMENT (OUTPATIENT)
Dept: CALL CENTER | Facility: HOSPITAL | Age: 79
End: 2019-08-30

## 2019-08-30 NOTE — OUTREACH NOTE
Prep Survey      Responses   Facility patient discharged from?  [facility]   Is LACE score < 7 ?  No   Is patient eligible?  Yes   Discharge diagnosis  Acute hypoxic, hypercapnic resp. failure, d/t CHF and possible pneumonia, suspected sepsis d/t pneumonia, cardiogenic shock, A/C systolic CHF, hypertensive urgency, acute metabolic encephalopathy, chronically elevated troponin, COPD   Does the patient have one of the following disease processes/diagnoses(primary or secondary)?  COPD/Pneumonia [and CHF]   Does the patient have Home health ordered?  No   What is the Home health agency?   Pt declined HH   Is there a DME ordered?  No   Comments regarding appointments  See AVS   Prep survey completed?  Yes          Yaima Solano RN

## 2019-09-02 ENCOUNTER — LAB (OUTPATIENT)
Dept: LAB | Facility: HOSPITAL | Age: 79
End: 2019-09-02

## 2019-09-02 ENCOUNTER — TRANSCRIBE ORDERS (OUTPATIENT)
Dept: ADMINISTRATIVE | Facility: HOSPITAL | Age: 79
End: 2019-09-02

## 2019-09-02 DIAGNOSIS — I48.91 ATRIAL FIBRILLATION, UNSPECIFIED TYPE (HCC): Primary | ICD-10-CM

## 2019-09-02 DIAGNOSIS — I48.91 ATRIAL FIBRILLATION, UNSPECIFIED TYPE (HCC): ICD-10-CM

## 2019-09-02 LAB
INR PPP: 2.15 (ref 0.9–1.1)
PROTHROMBIN TIME: 23.6 SECONDS (ref 12.1–15)

## 2019-09-02 PROCEDURE — 36415 COLL VENOUS BLD VENIPUNCTURE: CPT

## 2019-09-02 PROCEDURE — 85610 PROTHROMBIN TIME: CPT

## 2019-09-04 ENCOUNTER — TELEPHONE (OUTPATIENT)
Dept: PHARMACY | Facility: HOSPITAL | Age: 79
End: 2019-09-04

## 2019-09-04 ENCOUNTER — READMISSION MANAGEMENT (OUTPATIENT)
Dept: CALL CENTER | Facility: HOSPITAL | Age: 79
End: 2019-09-04

## 2019-09-06 DIAGNOSIS — I48.91 ATRIAL FIBRILLATION WITH RVR (HCC): Primary | ICD-10-CM

## 2019-09-10 ENCOUNTER — TELEPHONE (OUTPATIENT)
Dept: PHARMACY | Facility: HOSPITAL | Age: 79
End: 2019-09-10

## 2019-09-10 ENCOUNTER — OFFICE VISIT (OUTPATIENT)
Dept: CARDIOLOGY | Facility: CLINIC | Age: 79
End: 2019-09-10

## 2019-09-10 VITALS
BODY MASS INDEX: 39.87 KG/M2 | SYSTOLIC BLOOD PRESSURE: 138 MMHG | WEIGHT: 300.8 LBS | HEART RATE: 55 BPM | HEIGHT: 73 IN | DIASTOLIC BLOOD PRESSURE: 64 MMHG

## 2019-09-10 DIAGNOSIS — E11.22 TYPE 2 DIABETES MELLITUS WITH STAGE 3 CHRONIC KIDNEY DISEASE, WITHOUT LONG-TERM CURRENT USE OF INSULIN (HCC): Primary | ICD-10-CM

## 2019-09-10 DIAGNOSIS — I50.33 ACUTE ON CHRONIC DIASTOLIC CHF (CONGESTIVE HEART FAILURE) (HCC): ICD-10-CM

## 2019-09-10 DIAGNOSIS — I10 ESSENTIAL HYPERTENSION: Chronic | ICD-10-CM

## 2019-09-10 DIAGNOSIS — N18.30 TYPE 2 DIABETES MELLITUS WITH STAGE 3 CHRONIC KIDNEY DISEASE, WITHOUT LONG-TERM CURRENT USE OF INSULIN (HCC): Primary | ICD-10-CM

## 2019-09-10 DIAGNOSIS — I48.91 ATRIAL FIBRILLATION WITH RVR (HCC): ICD-10-CM

## 2019-09-10 DIAGNOSIS — J44.9 CHRONIC OBSTRUCTIVE PULMONARY DISEASE, UNSPECIFIED COPD TYPE (HCC): ICD-10-CM

## 2019-09-10 DIAGNOSIS — N18.30 STAGE 3 CHRONIC KIDNEY DISEASE (HCC): ICD-10-CM

## 2019-09-10 DIAGNOSIS — E66.01 MORBIDLY OBESE (HCC): ICD-10-CM

## 2019-09-10 PROCEDURE — 99214 OFFICE O/P EST MOD 30 MIN: CPT | Performed by: NURSE PRACTITIONER

## 2019-09-10 PROCEDURE — 93000 ELECTROCARDIOGRAM COMPLETE: CPT | Performed by: NURSE PRACTITIONER

## 2019-09-10 RX ORDER — NAPROXEN SODIUM 220 MG
TABLET ORAL
Refills: 3 | COMMUNITY
Start: 2019-09-08

## 2019-09-10 RX ORDER — LORATADINE 10 MG/1
TABLET ORAL
Refills: 0 | COMMUNITY
Start: 2019-08-07 | End: 2020-07-02 | Stop reason: HOSPADM

## 2019-09-10 NOTE — PROGRESS NOTES
Subjective:     Encounter Date:09/10/2019      Patient ID: Froilan Helton is a 78 y.o. male.    Chief Complaint: f/u hospital   History of Present Illness      78-year-old male admitted to the [facility] in [facility] on 8[facility],  after presenting to the emergency room with respiratory arrest.  Wife and reportedly became confused and short of breath at home.     He was seen when he was hospitalized in June 2019.  He was seen by Dr. Lopez in the hospital.  He had an echocardiogram performed 5/6/2019 showed ejection fraction 50% with sigmoid septum, normal diastolic function, bicuspid aortic valve with mild aortic valvular stenosis and mild aortic insufficiency.  Lower extremity venous duplex done 5/8/2019 showed no DVT.      Has a history of chronic renal failure as well as chronically elevated troponin.  Prior values have been 0.035, 0.037, 0.039 back in June.     Because of his respiratory failure he was intubated and he was transferred to the ICU.  Laboratory data demonstrated a lactic acidosis with a lactate of 2.8.  Procalcitonin was elevated 0.83.  White count was elevated 24,640 initial troponin was 0.03.  Blood sugar was elevated at 303.  Creatinine was higher than usual at 1.85.     Because of confusion and decreased responsivity on initial arrival he had a head CT performed in the ER.  images  Reviewed and no acute abnormality was seen.  He also had a chest x-ray obtained in the ER which revealed Bilateral interstitial infiltrates that could either indicate pneumonia or heart failure.  Airspace disease was greater on the right side    Echocardiogram revealed  Normal function, normal valvular structure and function, no pulmonary hypertension.  No evidence of a cardiac contribution to the current situation.  Normal ejection fraction, consistent with physical exam.     He had a complicated stay.  He was treated for severe sepsis secondary to strep dysgalactiae bacteremia from recurrent right lower  extremity cellulitis.  He was seen by ID.  He had a PICC line for antibiotic therapy.  He was discharged home with Lotrimin cream twice a day for 10 days with home health to follow for wound care.    He also had acute kidney injury on chronic kidney disease stage III/IV suspected secondary to sepsis, volume overload, with possible urinary retention.  Renal ultrasound was negative for acute findings.  His lisinopril was discontinued.  He was diuresed with IV Bumex and switch to Bumex 1 mg p.o. twice daily for home.  Flomax was added for post void residuals which were borderline at 200-300s.  Renal function returned to his baseline.    He went into atrial fibrillation with RVR and STEMI type II secondary to sepsis.  Echo as above.  He was rate controlled on oral amiodarone.  His metoprolol was changed to Coreg 6.25 mg twice daily and his amlodipine was discontinued secondary to possible relation to his lower extremity edema.  He was started on Coumadin, he is not a candidate for no wax secondary to obesity and renal dysfunction.  He will be followed in our medication management clinic.    Acute hypoxic respiratory failure, COPD-asthma without exacerbation.  Nocturnal hypoxemia with suspected PETER.  Pulmonary signed off on patient's respiratory status that was stable.  Initially he required mechanical ventilation.  He was discharged home on Symbicort.  Overnight oximetry completed and the patient required 1 L per nasal cannula, walking oximetry showed no O2 requirement with walking and patient on room air at rest.    He has acute on chronic anemia with iron profile consistent with anemia of chronic disease, low iron and percent saturation but also low TIBC and normal ferritin.  His iron supplementation was discontinued.  To be monitored as outpatient.    Uncontrolled insulin-dependent diabetes type 2 with hyperglycemia with an A1c on admission of 9.4%.  Patient was discharged home with his insulin and Januvia.    He is  also treated for hypothyroid and his dose was adjusted.    Metabolic encephalopathy secondary to sepsis and infection resolved.  Patient was back to his baseline.    Interpretation Summary 9/18     · Myocardial perfusion imaging indicates a normal myocardial perfusion study with no evidence of ischemia.  · Impressions are consistent with a low risk study.  · Left ventricular ejection fraction is mildly reduced (Calculated EF = 42%).          He presents today, 9/10/2019, in follow-up to his recent hospitalization.  He did not bring a list of his medications nor did he bring his medications.  Upon going over everything it appears that he is taking both his Coreg and the metoprolol, or so he feels.  He also thinks he is taking his Lasix and Bumex.  He denies any palpitations.  He does have chronic shortness of air, chronic lower extremity edema, chronic fatigue.  His bilateral lower extremities are wrapped.  Home health is seeing him for nursing and wound care.  He also has physical therapy coming to his house.  He still complains of some dizziness and lightheadedness that is not new.  He states he has some chest discomfort at times.  It does not appear that he got enrolled in our medical management clinic but had a therapeutic PT/INR on 9/2/2019 of 2.15.  I will enroll him in our medical management clinic I will have home health check PT/INR and a BMP this week.  He is also requested a new endocrinologist.  I have put in a referral through the [facility] [place] system for this.  He states he has not been taking the baby aspirin for quite some time because it makes him ill.    The following portions of the patient's history were reviewed and updated as appropriate: allergies, current medications, past family history, past medical history, past social history, past surgical history and problem list.    Review of Systems   Constitution: Negative for weakness and malaise/fatigue.   HENT: Negative for congestion, hoarse voice and  sore throat.    Eyes: Negative for blurred vision, double vision, photophobia, vision loss in left eye and vision loss in right eye.   Cardiovascular: Negative for chest pain, dyspnea on exertion, irregular heartbeat, leg swelling, near-syncope, orthopnea, palpitations, paroxysmal nocturnal dyspnea and syncope.   Respiratory: Negative for cough, hemoptysis, shortness of breath, sleep disturbances due to breathing, snoring, sputum production and wheezing.    Endocrine: Negative.    Hematologic/Lymphatic: Does not bruise/bleed easily.   Skin: Negative for color change, dry skin, poor wound healing and rash.   Musculoskeletal: Negative for back pain, falls, gout, joint pain, joint swelling, muscle cramps and muscle weakness.   Gastrointestinal: Negative for abdominal pain, constipation, diarrhea, dysphagia, melena, nausea and vomiting.   Neurological: Negative for excessive daytime sleepiness, dizziness, headaches, light-headedness, loss of balance, numbness, paresthesias, seizures and vertigo.   Psychiatric/Behavioral: Negative for depression and substance abuse. The patient is not nervous/anxious.        ECG 12 Lead  Date/Time: 9/10/2019 5:08 PM  Performed by: Shilpi Benedict APRN  Authorized by: Shilpi Benedict APRN   Comparison: compared with previous ECG from 8/21/2019  Similar to previous ECG  Rhythm: sinus rhythm  Rate: normal  Conduction: incomplete left bundle branch block and 1st degree AV block  QRS axis: normal    Clinical impression: abnormal EKG               Objective:         Physical Exam   Constitutional: He is oriented to person, place, and time. Vital signs are normal.  Morbidly obese   HENT:   Head: Normocephalic and atraumatic.   Right Ear: Hearing normal.   Left Ear: Hearing normal.   Eyes: Conjunctivae and lids are normal.   Neck: Normal range of motion. Neck supple. No JVD present. Carotid bruit is not present. No thyromegaly present.   Cardiovascular: Normal rate, regular rhythm, S1 normal, S2  "normal, normal heart sounds and intact distal pulses.  PMI is not displaced.  Exam reveals no gallop.    No murmur heard.  Pulses:       Carotid pulses are 2+ on the right side, and 2+ on the left side.       Radial pulses are 2+ on the right side, and 2+ on the left side.        Dorsalis pedis pulses are 2+ on the right side, and 2+ on the left side.        Posterior tibial pulses are 2+ on the right side, and 2+ on the left side.   Pulmonary/Chest: Effort normal and breath sounds normal. No respiratory distress. He has no wheezes. He has no rhonchi. He has no rales. He exhibits no tenderness.   Abdominal: Soft. Normal appearance and bowel sounds are normal. He exhibits no distension, no abdominal bruit and no mass. There is no tenderness.   Musculoskeletal: Normal range of motion.  Uses a walker for ambulation  Exhibits 2+ bilateral lower extremity edema  Lymphadenopathy:     He has no cervical adenopathy.   Neurological: He is alert and oriented to person, place, and time. No cranial nerve deficit. Coordination and gait normal.   Oriented to person, place and time.   Skin:  No rash noted. He is not diaphoretic. No cyanosis. Nails show no clubbing. Bilateral lower extremities wrapped.  Right leg erythematous  Psychiatric: He has a normal mood and affect. His speech is normal and behavior is normal. Judgment and thought content normal. Cognition and memory are normal.     Vitals:    09/10/19 1440   BP: 138/64   Pulse: 55   Weight: (!) 136 kg (300 lb 12.8 oz)   Height: 185.4 cm (73\")           Lab Review:       Assessment:          Diagnosis Plan   1. Type 2 diabetes mellitus with stage 3 chronic kidney disease, without long-term current use of insulin (CMS/[facility])  Ambulatory Referral to Endocrinology   2. Acute on chronic diastolic CHF (congestive heart failure) (CMS/[facility])     3. Essential hypertension     4. Atrial fibrillation with RVR (CMS/[facility])     5. Chronic obstructive pulmonary disease, unspecified COPD type " (CMS/[facility])     6. Stage 3 chronic kidney disease (CMS/[facility])     7. Morbidly obese (CMS/[facility])            Plan:       1.  Type 2 diabetes-I have made a referral through the [facility] system for a new endocrinologist per patient request    2.  Acute on chronic diastolic CHF- chronic shortness of breath multifactorial.  Chronic lower extremity edema.  He thinks he is taking both his Bumex and his Lasix.  I will have home health check a BMP this week.  Heart Failure  Assessment  • NYHA class III-B - There is significant limitation of physical activity. The patient is comfortable at rest, but minimal activity causes fatigue, palpitations or shortness of breath.  • Beta blocker prescribed  • Diuretics prescribed    Plan  • The patient has received heart failure education on the following topics: dietary sodium restriction, medication instructions, symptom management and weight monitoring    Subjective/Objective  • The patient reports dyspnea  • Physical exam findings positive for peripheral edema.   • Physical exam findings negative for rales.      3.  Essential hypertension-controlled    4.  Atrial fibrillation with RVR-now in sinus rhythm rate controlled.  He is on amiodarone 200 twice daily, Coreg 6.25 mg twice daily and warfarin.  He is was not set up in our medication management clinic, I will make that referral.  I will have home health check a PT/INR this week.  Atrial Fibrillation and Atrial Flutter  Assessment  • The patient has paroxysmal atrial fibrillation  • This is non-valvular in etiology  • The patient's CHADS2-VASc score is 5  • A [id number] score of 2 or more is considered a high risk for a thromboembolic event  • Warfarin prescribed    Plan  • Attempt to maintain sinus rhythm  • Continue warfarin for antithrombotic therapy, bleeding issues discussed  • Continue beta blocker and diltiazem for rhythm control  • Continue beta blocker and diltiazem for rate control    5.  COPD- chronic shortness of breath.   Overnight oximetry qualified patient for 1 L nasal cannula at at bedtime.  He did not qualify for continuous O2 while awake.    6.  Stage III chronic kidney disease-patient has not had a BMP since discharge I will have home health check that    7.  Morbid obesity-patient would benefit from a weight loss program.    Patient thinks he is taking both metoprolol and carvedilol as well as Lasix and Bumex.  I printed all the list of his discharge medications and went over each medication with him and gave him a list to take home.  I have asked him to closely look at his medicine and to only take those that I highlighted.    RTO in 8 weeks with LEATHA Dye      Current Outpatient Medications:   •  albuterol (ACCUNEB) 0.63 MG/3ML nebulizer solution, Take 3 mL by nebulization Every 6 (Six) Hours As Needed for Wheezing., Disp: 25 ampule, Rfl: 0  •  ALPRAZolam (XANAX) 0.25 MG tablet, Take 0.25 mg by mouth At Night As Needed for Anxiety., Disp: , Rfl:   •  amiodarone (PACERONE) 200 MG tablet, Take 1 tablet by mouth Every 12 (Twelve) Hours., Disp: 60 tablet, Rfl: 0  •  atorvastatin (LIPITOR) 10 MG tablet, Take 1 tablet by mouth Daily., Disp: 30 tablet, Rfl: 0  •  budesonide-formoterol (SYMBICORT) 160-4.5 MCG/ACT inhaler, Inhale 2 puffs 2 (Two) Times a Day., Disp: 1 inhaler, Rfl: 0  •  carvedilol (COREG) 6.25 MG tablet, Take 1 tablet by mouth 2 (Two) Times a Day With Meals., Disp: 60 tablet, Rfl: 0  •  cholecalciferol 2000 units tablet, Take 2,000 Units by mouth Daily., Disp: 30 each, Rfl: 0  •  docusate sodium 100 MG capsule, Take 100 mg by mouth 2 (Two) Times a Day. (Patient taking differently: Take 100 mg by mouth 2 (Two) Times a Day As Needed.), Disp: 60 capsule, Rfl: 0  •  fluticasone (FLONASE) 50 MCG/ACT nasal spray, 2 sprays by Each Nare route Daily., Disp: 1 bottle, Rfl: 0  •  insulin aspart (novoLOG) 100 UNIT/ML injection, Inject 1-14 Units under the skin into the appropriate area as directed 3 (Three) Times a  "Day Before Meals. As directed per sliding scale, Disp: , Rfl:   •  Insulin Syringe 30G X 5/16\" 1 ML misc, U UTD WITH INSULIN UP TO 6 TIMES A DAY, Disp: , Rfl: 3  •  lactobacillus acidophilus (RISAQUAD) capsule capsule, Take 1 capsule by mouth Daily., Disp: 30 capsule, Rfl: 0  •  levothyroxine (SYNTHROID, LEVOTHROID) 125 MCG tablet, Take 1 tablet by mouth Daily., Disp: 30 tablet, Rfl: 0  •  loratadine (CLARITIN) 10 MG tablet, TK 1 T PO D PRN, Disp: , Rfl: 0  •  LYRICA 150 MG capsule, TK 1 C PO TID, Disp: , Rfl: 3  •  nystatin (MYCOSTATIN) 689497 UNIT/GM powder, Apply  topically to the appropriate area as directed 2 (Two) Times a Day., Disp: , Rfl:   •  O2 (OXYGEN), Inhale 1 L/min every night at bedtime., Disp: 1 L, Rfl: 0  •  pregabalin (LYRICA) 100 MG capsule, Take 150 mg by mouth 3 (Three) Times a Day., Disp: , Rfl:   •  SITagliptin (JANUVIA) 100 MG tablet, Take 100 mg by mouth Daily., Disp: , Rfl:   •  tamsulosin (FLOMAX) 0.4 MG capsule 24 hr capsule, Take 1 capsule by mouth Daily., Disp: 30 capsule, Rfl: 0  •  traMADol (ULTRAM) 50 MG tablet, Take 50 mg by mouth Every 8 (Eight) Hours As Needed for Moderate Pain ., Disp: , Rfl:   •  vitamin B-12 (VITAMIN B-12) 1000 MCG tablet, Take 1 tablet by mouth Daily., Disp: 60 tablet, Rfl: 0  •  warfarin (COUMADIN) 6 MG tablet, Take 1 tablet by mouth Every Night., Disp: 30 tablet, Rfl: 0  •  bumetanide (BUMEX) 1 MG tablet, Take 1 mg by mouth 2 (Two) Times a Day., Disp: , Rfl:   •  guaiFENesin (MUCINEX) 600 MG 12 hr tablet, Take 1 tablet by mouth Every 12 (Twelve) Hours. Over the counter, Disp: , Rfl:   •  insulin detemir (LEVEMIR) 100 UNIT/ML injection, Inject 30 Units under the skin 2 (Two) Times a Day., Disp: 10 mL, Rfl: 0  •  polyethylene glycol (MIRALAX) pack packet, Take 17 g by mouth Daily., Disp: 30 each, Rfl: 0  •  potassium chloride (K-DUR) 10 MEQ CR tablet, Take 1 tablet by mouth Daily., Disp: , Rfl:          "

## 2019-09-10 NOTE — TELEPHONE ENCOUNTER
Trying to contact patient for appointment in [facility] but no answer at phone number and could not leave message.

## 2019-09-11 ENCOUNTER — READMISSION MANAGEMENT (OUTPATIENT)
Dept: CALL CENTER | Facility: HOSPITAL | Age: 79
End: 2019-09-11

## 2019-09-11 ENCOUNTER — OFFICE VISIT (OUTPATIENT)
Dept: SURGERY | Facility: CLINIC | Age: 79
End: 2019-09-11

## 2019-09-11 VITALS
RESPIRATION RATE: 18 BRPM | BODY MASS INDEX: 38.83 KG/M2 | DIASTOLIC BLOOD PRESSURE: 76 MMHG | HEIGHT: 73 IN | WEIGHT: 293 LBS | SYSTOLIC BLOOD PRESSURE: 142 MMHG

## 2019-09-11 DIAGNOSIS — L97.211 VENOUS STASIS ULCER OF RIGHT CALF LIMITED TO BREAKDOWN OF SKIN WITHOUT VARICOSE VEINS (HCC): ICD-10-CM

## 2019-09-11 DIAGNOSIS — R60.0 LOWER EXTREMITY EDEMA: Primary | ICD-10-CM

## 2019-09-11 DIAGNOSIS — I87.2 VENOUS STASIS ULCER OF RIGHT CALF LIMITED TO BREAKDOWN OF SKIN WITHOUT VARICOSE VEINS (HCC): ICD-10-CM

## 2019-09-11 PROCEDURE — 99202 OFFICE O/P NEW SF 15 MIN: CPT | Performed by: SURGERY

## 2019-09-11 NOTE — PROGRESS NOTES
PATIENT INFORMATION  Froilan Helton       - 1940    CHIEF COMPLAINT  Chief Complaint   Patient presents with   • Wound Check   right leg wound x 4 weeks, states he is on an antibiotic but he cannot remember the name. States he is nauseous     HISTORY OF PRESENT ILLNESS  HPI he was reviewed for 4 lower extremity swelling and wounds of his right lower extremity also has a blister on his left lower extremity he has chronic lower extremity swelling and he was recently hospitalized and intubated for confusion respiratory failure and kidney disease.  They have been putting Lotrimin on the open wounds.  He does not use any compression.  He says he is on antibiotic and he does not know which one.  Home health does come out to see him.        REVIEW OF SYSTEMS  Review of Systems  Otherwise negative    ACTIVE PROBLEMS  Patient Active Problem List    Diagnosis   • Morbidly obese (CMS/HCC) [E66.01]   • Atrial fibrillation with RVR (CMS/HCC) [I48.91]   • Sepsis, Gram positive (CMS/HCC) [A41.89]   • Streptococcal bacteremia [R78.81, B95.5]   • Acute respiratory failure with hypoxia (CMS/HCC) [J96.01]   • CKD (chronic kidney disease) [N18.9]   • Acute on chronic diastolic CHF (congestive heart failure) (CMS/HCC) [I50.33]   • Elevated troponin [R74.8]   • Cardiorenal syndrome with renal failure [I13.10, N19]   • Dyspnea [R06.00]   • Primary osteoarthritis of left knee [M17.12]   • Leukocytosis [D72.829]   • COPD (chronic obstructive pulmonary disease) (CMS/[facility]) [J44.9]   • Diabetes mellitus (CMS/[facility]) [E11.9]   • Hypertension [I10]         PAST MEDICAL HISTORY  Past Medical History:   Diagnosis Date   • Arthritis    • Asthma    • Cancer (CMS/[facility])     skin   • Cataract    • COPD (chronic obstructive pulmonary disease) (CMS/[facility])    • Diabetes mellitus (CMS/[facility])    • Disease of thyroid gland    • Hypertension    • Kidney stone    • Myocardial infarct, old    • Renal disorder          SURGICAL HISTORY  Past Surgical History:    Procedure Laterality Date   • COLONOSCOPY     • EYE SURGERY     • JOINT REPLACEMENT      right   • KIDNEY STONE SURGERY     • REPLACEMENT TOTAL KNEE     • TOE SURGERY           FAMILY HISTORY  Family History   Problem Relation Age of Onset   • COPD Mother    • Diabetes Father          SOCIAL HISTORY  Social History     Occupational History   • Not on file   Tobacco Use   • Smoking status: Former Smoker   • Smokeless tobacco: Never Used   • Tobacco comment: quit 1993   Substance and Sexual Activity   • Alcohol use: No   • Drug use: No   • Sexual activity: Defer       Debilities/Disabilities Identified: None    Emotional Behavior: Appropriate    CURRENT MEDICATIONS    Current Outpatient Medications:   •  albuterol (ACCUNEB) 0.63 MG/3ML nebulizer solution, Take 3 mL by nebulization Every 6 (Six) Hours As Needed for Wheezing., Disp: 25 ampule, Rfl: 0  •  ALPRAZolam (XANAX) 0.25 MG tablet, Take 0.25 mg by mouth At Night As Needed for Anxiety., Disp: , Rfl:   •  amiodarone (PACERONE) 200 MG tablet, Take 1 tablet by mouth Every 12 (Twelve) Hours., Disp: 60 tablet, Rfl: 0  •  atorvastatin (LIPITOR) 10 MG tablet, Take 1 tablet by mouth Daily., Disp: 30 tablet, Rfl: 0  •  budesonide-formoterol (SYMBICORT) 160-4.5 MCG/ACT inhaler, Inhale 2 puffs 2 (Two) Times a Day., Disp: 1 inhaler, Rfl: 0  •  bumetanide (BUMEX) 1 MG tablet, Take 1 mg by mouth 2 (Two) Times a Day., Disp: , Rfl:   •  carvedilol (COREG) 6.25 MG tablet, Take 1 tablet by mouth 2 (Two) Times a Day With Meals., Disp: 60 tablet, Rfl: 0  •  cholecalciferol 2000 units tablet, Take 2,000 Units by mouth Daily., Disp: 30 each, Rfl: 0  •  docusate sodium 100 MG capsule, Take 100 mg by mouth 2 (Two) Times a Day. (Patient taking differently: Take 100 mg by mouth 2 (Two) Times a Day As Needed.), Disp: 60 capsule, Rfl: 0  •  fluticasone (FLONASE) 50 MCG/ACT nasal spray, 2 sprays by Each Nare route Daily., Disp: 1 bottle, Rfl: 0  •  guaiFENesin (MUCINEX) 600 MG 12 hr tablet,  "Take 1 tablet by mouth Every 12 (Twelve) Hours. Over the counter, Disp: , Rfl:   •  insulin aspart (novoLOG) 100 UNIT/ML injection, Inject 1-14 Units under the skin into the appropriate area as directed 3 (Three) Times a Day Before Meals. As directed per sliding scale, Disp: , Rfl:   •  insulin detemir (LEVEMIR) 100 UNIT/ML injection, Inject 30 Units under the skin 2 (Two) Times a Day., Disp: 10 mL, Rfl: 0  •  Insulin Syringe 30G X 5/16\" 1 ML misc, U UTD WITH INSULIN UP TO 6 TIMES A DAY, Disp: , Rfl: 3  •  lactobacillus acidophilus (RISAQUAD) capsule capsule, Take 1 capsule by mouth Daily., Disp: 30 capsule, Rfl: 0  •  levothyroxine (SYNTHROID, LEVOTHROID) 125 MCG tablet, Take 1 tablet by mouth Daily., Disp: 30 tablet, Rfl: 0  •  loratadine (CLARITIN) 10 MG tablet, TK 1 T PO D PRN, Disp: , Rfl: 0  •  LYRICA 150 MG capsule, TK 1 C PO TID, Disp: , Rfl: 3  •  nystatin (MYCOSTATIN) 169928 UNIT/GM powder, Apply  topically to the appropriate area as directed 2 (Two) Times a Day., Disp: , Rfl:   •  O2 (OXYGEN), Inhale 1 L/min every night at bedtime., Disp: 1 L, Rfl: 0  •  polyethylene glycol (MIRALAX) pack packet, Take 17 g by mouth Daily., Disp: 30 each, Rfl: 0  •  potassium chloride (K-DUR) 10 MEQ CR tablet, Take 1 tablet by mouth Daily., Disp: , Rfl:   •  pregabalin (LYRICA) 100 MG capsule, Take 150 mg by mouth 3 (Three) Times a Day., Disp: , Rfl:   •  SITagliptin (JANUVIA) 100 MG tablet, Take 100 mg by mouth Daily., Disp: , Rfl:   •  tamsulosin (FLOMAX) 0.4 MG capsule 24 hr capsule, Take 1 capsule by mouth Daily., Disp: 30 capsule, Rfl: 0  •  traMADol (ULTRAM) 50 MG tablet, Take 50 mg by mouth Every 8 (Eight) Hours As Needed for Moderate Pain ., Disp: , Rfl:   •  vitamin B-12 (VITAMIN B-12) 1000 MCG tablet, Take 1 tablet by mouth Daily., Disp: 60 tablet, Rfl: 0  •  warfarin (COUMADIN) 6 MG tablet, Take 1 tablet by mouth Every Night., Disp: 30 tablet, Rfl: 0    ALLERGIES  Oxycontin [oxycodone hcl]    VITALS  Vitals:    " "09/11/19 1421   BP: 142/76   Resp: 18   Weight: 133 kg (293 lb)   Height: 185.4 cm (73\")       LAST RESULTS   Lab on 09/02/2019   Component Date Value Ref Range Status   • Protime 09/02/2019 23.6* 12.1 - 15.0 Seconds Final   • INR 09/02/2019 2.15* 0.90 - 1.10 Final     Xr Ankle 3+ View Right    Result Date: 8/20/2019  Narrative: Right ankle 3 views 08/20/2019  HISTORY: Right ankle pain and swelling since 08/14/2019. Symptoms progressively worsening, infection. Severe pain right ankle with ambulation.  FINDINGS: 3 views of the right ankle demonstrate no fracture. The ankle mortise is intact and the bones are normally mineralized. No lytic or erosive process is seen. Small plantar calcaneal spur. Extensive soft tissue swelling is seen about the right ankle and right foot. No foreign body is seen.      Impression: Extensive soft tissue swelling about the right ankle and right foot. No evidence of fracture or radiopaque foreign body. No lytic or erosive process identified.  This report was finalized on 8/20/2019 12:05 PM by Dr. Enrike Roche MD.      Ct Head Without Contrast    Result Date: 8/29/2019  Narrative: CT HEAD, NONCONTRAST, 08/29/2019  HISTORY: 78-year-old male with recent head injury. Fell 2 days ago striking anterior head. He complains of a headache, neck pain and left upper extremity numbness.  TECHNIQUE:  CT imaging of the head without IV contrast Radiation dose reduction techniques included automated exposure control or exposure modulation based on body size. Radiation audit for CT and nuclear cardiology exams in the last 12 months: 2.  COMPARISON: *  CT head, 08/14/2019.  FINDINGS:  No acute intracranial abnormality is demonstrated. No skull fracture.  Mild generalized age-appropriate cerebral volume loss. Mild diffuse low-attenuation white matter changes, nonspecific but most typically seen in the setting of chronic small vessel disease. These findings are stable.  No evidence of intracranial " hemorrhage, mass, mass effect, cerebral edema, hydrocephalus or additional abnormality.       Impression: 1. No acute intracranial abnormality. 2. Stable diffuse chronic changes as noted above. 3. No change since 08/14/2019.  This report was finalized on 8/29/2019 1:39 PM by Dr. Edgardo Adams MD.      Ct Head Without Contrast    Result Date: 8/15/2019  Narrative: CT Head WO: 8/15/2019 12:54 AM HISTORY: Shaking and confusion began immediately prior to arrival. TECHNIQUE: Axial unenhanced head CT. Radiation dose reduction techniques included automated exposure control or exposure modulation based on body size. Radiation audit for number of CT and nuclear cardiology exams performed in the last year: 1.  COMPARISON: Head CT December 2017 FINDINGS: There is mild motion degradation. Allowing for this, there is no intracranial hemorrhage, mass, or infarct. No hydrocephalus or extra-axial fluid collection. There are senescent changes, including volume loss and nonspecific white matter change, but no acute abnormality is seen. The skull base, calvarium, and extracranial soft tissues are normal.     Impression: Senescent changes without acute abnormality. Signer Name: Melchor Becker MD  Signed: 8/15/2019 12:39 AM  Workstation Name: [email or web address]  [facility]    Ct Cervical Spine Without Contrast    Result Date: 8/29/2019  Narrative: CT CERVICAL SPINE, 08/29/2019  HISTORY: 78-year-old male with recent head injury. Fell 2 days ago striking anterior head. He complains of a headache, neck pain and left upper history numbness.  TECHNIQUE:  Axial CT images of the cervical spine from the skull base to the lower margin of T1 with multiplanar image reconstruction. Radiation dose reduction techniques included automated exposure control or exposure modulation based on body size. Radiation audit for CT and nuclear cardiology exams in the last 12 months: 2.  FINDINGS:  No fracture or other acute osseous  abnormality is demonstrated.  Multilevel degenerative disc disease, greatest at C2-3, C4-5 and C6-7. Advanced bilateral degenerative facet arthropathy throughout the cervical spine, greatest at C2-3, C3-4, C4-5 and C5-6. Cervical vertebral alignment is normal. No posttraumatic soft tissue abnormality is visible within the neck.       Impression: 1. No acute osseous abnormality. 2. Multilevel degenerative changes as noted.  This report was finalized on 8/29/2019 1:42 PM by Dr. Edgardo Adams MD.      Mri Brain Without Contrast    Result Date: 8/16/2019  Narrative: MRI Brain WO INDICATION: New onset of confusion. Acute respiratory failure with hypoxia. TECHNIQUE: MRI of the brain without IV contrast. COMPARISON:  None available. FINDINGS: Diffusion-weighted images are normal with no evidence of recent infarct. The routine brain images show atrophy. No white matter signal abnormalities are seen. There is no evidence of mass lesion, hemorrhage or edema. No midline shift is noted. Extra-axial structures are unremarkable. No extra-axial fluid collections are seen.     Impression: Atrophy. No acute findings. Signer Name: Jaylen Bhardwaj MD  Signed: 8/16/2019 2:36 PM  Workstation Name: [id number]  [facility]    Xr Chest 1 View    Result Date: 8/15/2019  Narrative: AP PORTABLE CHEST, 08/15/2019  HISTORY: PIC catheter placement  COMPARISON: Early the same day  TECHNIQUE: AP portable chest x-ray.  FINDINGS: The heart size and vascularity are normal. There is some left base atelectasis. Endotracheal tube tip is 3 cm above the mony. PIC catheter tip is in the lower superior vena cava.      Impression: The PIC catheter tip appears to be in the lower superior vena cava.  There is obscuration of left hemidiaphragm suggesting left base atelectasis or infiltrate  This report was finalized on 8/15/2019 11:49 AM by Dr. Fei Henderson MD.      Xr Chest 1 View    Result Date: 8/15/2019  Narrative: CR Chest 1 Vw  INDICATION: Postintubation COMPARISON:  8/14/2019 at 10:29 PM FINDINGS: Single portable AP view(s) of the chest. ET tube now in place, tip about 3 cm above the mony. Improved lung aeration bilaterally, though thorax. No dense consolidation. Signer Name: Melchor Becker MD  Signed: 8/15/2019 1:20 AM  Workstation [email or web address]  [facility]    Xr Chest 1 View    Result Date: 8/14/2019  Narrative: CR Chest 1 Vw INDICATION: Severe dyspnea short of air and shaking the began just prior to arrival. COMPARISON:  Chest x-ray 6/27/2019 FINDINGS: Single portable AP view(s) of the chest.  Cardiac silhouette is mildly enlarged. Lung volumes are low. There is interval development of bilateral infiltrates with a central and basilar predominance asymmetrically worse on the right side. There is central vascular congestion. Findings are most concerning for patchy pulmonary edema in the setting of moderate congestive failure. Aspiration or pneumonia to be excluded with clinical correlation and follow-up to complete resolution. No definite pleural effusion. No pneumothorax.     Impression: 1. Interval worsening in the appearance the chest with development of bilateral infiltrates with a central to bibasilar predominance. Findings are probably due to moderate congestive failure with pulmonary edema but follow-up to complete clearing is recommended to exclude underlying aspiration or pneumonia. Airspace disease is asymmetrically worse on the right than left. Cardiac silhouette is enlarged. Signer Name: Libertad Kam MD  Signed: 8/14/2019 10:47 PM  [facility]: [email or web address]  [facility]    Us Renal Bilateral    Result Date: 8/18/2019  Narrative: US Renal Comp 18 2019 INDICATION: Acute renal insufficiency with abnormal renal function tests on 8/18/2019 with elevated BUN of 36, elevated creatinine 2.4, abnormally low GFR 26. Metabolic encephalopathy and respiratory failure with  hypoxia. COMPARISON: None available. FINDINGS: KIDNEYS:  The right kidney measures 11 cm. The left kidney measures 11.8 cm. Renal cortical thickness and echogenicity is normal.  No hydronephrosis. URINARY BLADDER:  The bladder is unremarkable.     Impression: Negative renal ultrasound. No hydronephrosis. Signer Name: Enrike Roche MD  Signed: 8/18/2019 11:51 AM  Workstation Name: [id number]  [facility] Venous Doppler Upper Extremity Right (duplex)    Result Date: 8/28/2019  Narrative: EXAM: VENOUS DOPPLER ULTRASOUND, RIGHT UPPER EXTREMITY, 08/28/2019  HISTORY: 78-year-old male with two day history right arm pain. PICC removed yesterday.  TECHNIQUE: Venous Doppler ultrasound examination of the right upper extremity was performed using grey-scale, spectral Doppler, and color flow Doppler ultrasound imaging.  FINDINGS: The examination is negative.  There is no evidence of deep venous thrombosis in the internal jugular vein, subclavian vein, axillary vein or brachial veins.  There is no visible superficial venous thrombosis within the cephalic or basilic veins.      Impression: Negative examination.  No evidence of right upper extremity venous thrombosis.  This report was finalized on 8/28/2019 2:32 PM by Dr. Edgardo Adams MD.      Xr Chest Pa & Lateral    Result Date: 8/21/2019  Narrative: CHEST X-RAY, 08/21/2019     HISTORY: 78-year-old male hospital inpatient with severe sepsis, streptococcal bacteremia, right leg cellulitis. Acute respiratory failure requiring mechanical ventilation, off ventilator 08/15/2019. Acute kidney injury. Metabolic encephalopathy.  TECHNIQUE: AP portable upright chest x-ray.  FINDINGS: The patient has been extubated since yesterday. Shallow lung expansion today with crowding of vascular markings in the perihilar regions and lung bases. No visible airspace consolidation or pleural effusion. Mild cardiomegaly is stable. PICC in good position.       Impression: 1. Shallow lung expansion following extubation. Crowding of vascular markings. 2. Otherwise stable chest x-ray. 3. PICC in good position.  This report was finalized on 8/21/2019 9:12 AM by Dr. Edgardo Adams MD.        PHYSICAL EXAM  Physical Exam reviewed multiple notes from his most recent hospitalization his discharge summary from Dr. Tobin and multiple notes from Dr. Joe  He has tanning of his right lower extremity skin he has massive edema of both lower extremities he has an intact blister on his left calf and he has multiple areas of superficial skin loss and eschar on his right lower extremity.  I feel this is consistent with venous stasis disease from his massive edema.  We placed him in Silvadene cream and bilateral Ace bandages today.    ASSESSMENT  Venous stasis disease lower extremity edema      PLAN  To keep his legs elevated as much as possible.  Daily dressing changes with the Silvadene to the open areas and Ace bandages bilaterally.  He can get in the shower.  We will contact home health to help them with this.  We gave him a tube of Silvadene today in the office.  I will see him back in 2 weeks

## 2019-09-11 NOTE — OUTREACH NOTE
COPD/PN Week 2 Survey      Responses   Facility patient discharged from?  [facility]   Does the patient have one of the following disease processes/diagnoses(primary or secondary)?  COPD/Pneumonia   Was the primary reason for admission:  COPD exacerbation   Week 2 attempt successful?  Yes   Call start time  1456   Call end time  1459   Discharge diagnosis  Acute hypoxic, hypercapnic resp. failure, d/t CHF and possible pneumonia, suspected sepsis d/t pneumonia, cardiogenic shock, A/C systolic CHF, hypertensive urgency, acute metabolic encephalopathy, chronically elevated troponin, COPD   Is patient permission given to speak with other caregiver?  Yes   Person spoke with today (if not patient) and relationship  Lina Spouse [phone]    Meds reviewed with patient/caregiver?  Yes   Is the patient having any side effects they believe may be caused by any medication additions or changes?  No   Does the patient have all medications ordered at discharge?  Yes   Is the patient taking all medications as directed (includes completed medication regime)?  Yes   Does the patient have a primary care provider?   Yes   Does the patient have an appointment with their PCP or pulmonologist within 7 days of discharge?  Yes   Has the patient kept scheduled appointments due by today?  Yes   Comments  Seen Surgeon today.   Has home health visited the patient within 72 hours of discharge?  N/A   Home health comments  HH will be coming out after all to check on his legs.   Psychosocial issues?  No   Did the patient receive a copy of their discharge instructions?  Yes   Nursing interventions  Reviewed instructions with patient   What is the patient's perception of their health status since discharge?  Improving   Nursing Interventions  Nurse provided patient education   Is the patient/caregiver able to teach back the hierarchy of who to call/visit for symptoms/problems? PCP, Specialist, Home health nurse, Urgent Care, ED, 911  Yes   Is the  patient able to teach back COPD zones?  Yes   Patient reports what zone on this call?  Green Zone   Green Zone  Reports doing well, Breathing without shortness of breath, Usual activity and exercise level, Usual amount of phlegm/mucus without difficulty coughing up, Sleeping well, Appetite is good   Green Zone interventions:  Take daily medications, Continue regular exercise/diet plan, Avoid indoor/outdoor triggers   Is the patient/caregiver able to teach back signs and symptoms of worsening condition:  Fever/chills, Shortness of breath, Chest pain   Is the patient/caregiver able to teach back importance of completing antibiotic course of treatment?  Yes   Week 2 call completed?  Yes          Demario Keene RN

## 2019-09-12 ENCOUNTER — LAB REQUISITION (OUTPATIENT)
Dept: LAB | Facility: HOSPITAL | Age: 79
End: 2019-09-12

## 2019-09-12 ENCOUNTER — ANTICOAGULATION VISIT (OUTPATIENT)
Dept: PHARMACY | Facility: HOSPITAL | Age: 79
End: 2019-09-12

## 2019-09-12 ENCOUNTER — TELEPHONE (OUTPATIENT)
Dept: SURGERY | Facility: CLINIC | Age: 79
End: 2019-09-12

## 2019-09-12 DIAGNOSIS — L03.115 CELLULITIS OF RIGHT LOWER EXTREMITY: ICD-10-CM

## 2019-09-12 DIAGNOSIS — I48.91 ATRIAL FIBRILLATION WITH RVR (HCC): ICD-10-CM

## 2019-09-12 LAB
ANION GAP SERPL CALCULATED.3IONS-SCNC: 10.2 MMOL/L (ref 5–15)
BUN BLD-MCNC: 28 MG/DL (ref 8–23)
BUN/CREAT SERPL: 14.5 (ref 7–25)
CALCIUM SPEC-SCNC: 9.5 MG/DL (ref 8.6–10.5)
CHLORIDE SERPL-SCNC: 101 MMOL/L (ref 98–107)
CO2 SERPL-SCNC: 26.8 MMOL/L (ref 22–29)
CREAT BLD-MCNC: 1.93 MG/DL (ref 0.76–1.27)
GFR SERPL CREATININE-BSD FRML MDRD: 34 ML/MIN/1.73
GLUCOSE BLD-MCNC: 169 MG/DL (ref 65–99)
INR PPP: 1.4
POTASSIUM BLD-SCNC: 4.6 MMOL/L (ref 3.5–5.2)
SODIUM BLD-SCNC: 138 MMOL/L (ref 136–145)

## 2019-09-12 PROCEDURE — 80048 BASIC METABOLIC PNL TOTAL CA: CPT | Performed by: INTERNAL MEDICINE

## 2019-09-12 NOTE — TELEPHONE ENCOUNTER
Brandi Driscoll from [facility] called and states that the patient told her that he is NOT to shower and that he needs home health to come see him everyday for 2 weeks until he comes back to see us.    I read the note to her and told her that per Dr. Cotton's note he is to keep his legs elevated as much as possible.  Daily dressing changes with the Silvadene to the open areas and Ace bandages bilaterally.  He can get in the shower.  We will contact home health to help them with this.  We gave him a tube of Silvadene today in the office.  I will see him back in 2 weeks.    She states they are treating him 2 x a week right now and they will continue to do so unless Dr. Cotton wants them out to see him daily    She also states that he will need a script for the Silvadene cream send to his pharmacy because what we sent home with him will not last for 2 weeks

## 2019-09-12 NOTE — PROGRESS NOTES
"Received INR from [facility]: INR 1.4 (9/12/19).    Spoke with nurse chris Hernandez/ [facility]. She indicates pt was upset that [facility] is unable to visit his home daily; however, she reports his insurance will only cover home health visits twice weekly. As a result, she indicated pt responded \"Then don't come at all\". Mary reports pt has been discharged from [facility] per his request.     Spoke with Mr. Helton by phone. He is frustrated with the number of medications he is taking, and does not understand why so many are needed. I attempted to explain their uses, emphasizing warfarin's utility in the prevention of CVA associated with Afib and the importance of monitoring his INR to ensure safety/effectiveness. Attempted to adjust his warfarin dose based on today's INR and schedule him for his next INR check; however, pt refused, endorsing plan to continue taking dose he is currently taking (6 mg daily) and quit taking upon finishing his 30-day supply. Upon attempting to persuade Mr. Helton otherwise, he again refused and ended phone call.    Alerting the above to Cardiology. Mr. Helton's refusal for care limits the [facility]'s ability to monitor/manage his warfarin. Please advise.   "

## 2019-09-16 NOTE — TELEPHONE ENCOUNTER
Home health needs to see him daily until he is competent and doing his dressing changes.  I sent his prescription

## 2019-09-18 ENCOUNTER — ANTICOAGULATION VISIT (OUTPATIENT)
Dept: PHARMACY | Facility: HOSPITAL | Age: 79
End: 2019-09-18

## 2019-09-18 ENCOUNTER — LAB (OUTPATIENT)
Dept: LAB | Facility: HOSPITAL | Age: 79
End: 2019-09-18

## 2019-09-18 DIAGNOSIS — I48.91 ATRIAL FIBRILLATION WITH RVR (HCC): Primary | ICD-10-CM

## 2019-09-18 DIAGNOSIS — I48.91 ATRIAL FIBRILLATION WITH RVR (HCC): ICD-10-CM

## 2019-09-18 LAB
INR PPP: 1.07 (ref 0.9–1.1)
PROTHROMBIN TIME: 13.6 SECONDS (ref 12.1–15)

## 2019-09-18 PROCEDURE — 85610 PROTHROMBIN TIME: CPT

## 2019-09-18 PROCEDURE — 36415 COLL VENOUS BLD VENIPUNCTURE: CPT

## 2019-09-18 NOTE — PROGRESS NOTES
Anticoagulation Clinic Progress Note    Anticoagulation Summary  As of 2019    INR goal:   2.0-3.0   TTR:   0.0 %   INR used for dosin.07! (2019)   Warfarin maintenance plan:   No maintenance plan   Next INR check:   2019   Target end date:       Indications    Atrial fibrillation with RVR (CMS/[facility]) [I48.91]             Anticoagulation Episode Summary     INR check location:       Preferred lab:       Send INR reminders to:   [facility]    Comments:   **[facility]**      Anticoagulation Care Providers     Provider Role Specialty Phone number    Darwin Monaco III, MD Referring Cardiology [phone]          Clinic Interview:  Patient Findings     Negatives:   Signs/symptoms of thrombosis, Signs/symptoms of bleeding,   Laboratory test error suspected, Change in health, Change in alcohol use,   Change in activity, Upcoming invasive procedure, Emergency department   visit, Upcoming dental procedure, Missed doses, Extra doses, Change in   medications, Change in diet/appetite, Hospital admission, Bruising, Other   complaints    Comments:   Reports taking 6 mg daily. Denies missed doses.      Clinical Outcomes     Negatives:   Major bleeding event, Thromboembolic event,   Anticoagulation-related hospital admission, Anticoagulation-related ED   visit, Anticoagulation-related fatality    Comments:   Reports taking 6 mg daily. Denies missed doses.        Education:  Froilan Helton is a new start in the [facility].     He expressed verbal consent and agreement to receive care through the [facility] under the current collaborative care agreement with [facility].     They have been instructed to call if any changes in medications, doses, concerns, etc. Patient expresses understanding and has no further questions at this time.    INR History:  1.07 ()    Plan:  1. INR is Subtherapeutic today- see above in Anticoagulation Summary.   Will  instruct Froilan Helton to Change their warfarin regimen- see above in Anticoagulation Summary.  2. Follow-up in 5 days  3. Patient declines warfarin refills.  4. Verbal and any requested printed information provided. Patient expresses understanding and has no further questions at this time.    Lester Scott [facility]

## 2019-09-18 NOTE — PROGRESS NOTES
Long discussion with patient.  He did dismiss home health but he states it was not over his warfarin therapy it was over his wound therapy.    I explained the need for timely blood checks for his warfarin therapy and why he needed warfarin and how that helped to protect him against a stroke with his atrial fibrillation.    He states he will come to [facility] for his blood work.  I have put in a standing order for his PT/INR.  I told him that he would be receiving a call from our medication management clinic and that his warfarin dose may be changed in order to stay in his therapeutic range.

## 2019-09-20 ENCOUNTER — READMISSION MANAGEMENT (OUTPATIENT)
Dept: CALL CENTER | Facility: HOSPITAL | Age: 79
End: 2019-09-20

## 2019-09-20 NOTE — OUTREACH NOTE
COPD/PN Week 3 Survey      Responses   Facility patient discharged from?  [facility]   Does the patient have one of the following disease processes/diagnoses(primary or secondary)?  COPD/Pneumonia   Was the primary reason for admission:  COPD exacerbation   Week 3 attempt successful?  Yes   Call start time  1536   Call end time  1539   Discharge diagnosis  Acute hypoxic, hypercapnic resp. failure, d/t CHF and possible pneumonia, suspected sepsis d/t pneumonia, cardiogenic shock, A/C systolic CHF, hypertensive urgency, acute metabolic encephalopathy, chronically elevated troponin, COPD   Is patient permission given to speak with other caregiver?  Yes   Person spoke with today (if not patient) and relationship  Lina Spouse [phone]    Meds reviewed with patient/caregiver?  Yes   Is the patient having any side effects they believe may be caused by any medication additions or changes?  No   Does the patient have all medications ordered at discharge?  Yes   Is the patient taking all medications as directed (includes completed medication regime)?  Yes   Does the patient have a primary care provider?   Yes   Does the patient have an appointment with their PCP or pulmonologist within 7 days of discharge?  Yes   Has the patient kept scheduled appointments due by today?  Yes   Has home health visited the patient within 72 hours of discharge?  N/A   Psychosocial issues?  No   Did the patient receive a copy of their discharge instructions?  Yes   Nursing interventions  Reviewed instructions with patient   What is the patient's perception of their health status since discharge?  Improving   Nursing Interventions  Nurse provided patient education   Are the patient's immunizations up to date?   Yes   Nursing interventions  Educated on importance of maintaining up to date immunizations as advised by provider   Is the patient/caregiver able to teach back the hierarchy of who to call/visit for symptoms/problems? PCP, Specialist,  Home health nurse, Urgent Care, ED, 911  Yes   Additional teach back comments  Appetite is better, dizziness improved, driving now.  Walking more.   Is the patient able to teach back COPD zones?  Yes   Nursing interventions  Education provided on various zones   Patient reports what zone on this call?  Green Zone   Green Zone  Reports doing well, Breathing without shortness of breath, Usual activity and exercise level, Usual amount of phlegm/mucus without difficulty coughing up, Sleeping well, Appetite is good   Green Zone interventions:  Take daily medications   Week 3 call completed?  Yes          Tiffanie Cordoba RN

## 2019-09-25 ENCOUNTER — ANTICOAGULATION VISIT (OUTPATIENT)
Dept: PHARMACY | Facility: HOSPITAL | Age: 79
End: 2019-09-25

## 2019-09-25 ENCOUNTER — LAB (OUTPATIENT)
Dept: LAB | Facility: HOSPITAL | Age: 79
End: 2019-09-25

## 2019-09-25 DIAGNOSIS — I48.91 ATRIAL FIBRILLATION WITH RVR (HCC): ICD-10-CM

## 2019-09-25 LAB
INR PPP: 1.23 (ref 0.9–1.1)
PROTHROMBIN TIME: 15.2 SECONDS (ref 12.1–15)

## 2019-09-25 PROCEDURE — 36415 COLL VENOUS BLD VENIPUNCTURE: CPT

## 2019-09-25 PROCEDURE — 85610 PROTHROMBIN TIME: CPT

## 2019-09-25 NOTE — PROGRESS NOTES
Anticoagulation Clinic Progress Note    Anticoagulation Summary  As of 2019    INR goal:   2.0-3.0   TTR:   0.0 % (1 wk)   INR used for dosin.23! (2019)   Warfarin maintenance plan:   6 mg every Mon, Fri; 9 mg all other days   Weekly warfarin total:   57 mg   Plan last modified:   Lester Scott, [facility] (2019)   Next INR check:   10/2/2019   Target end date:       Indications    Atrial fibrillation with RVR (CMS/HCC) [I48.91]             Anticoagulation Episode Summary     INR check location:       Preferred lab:       Send INR reminders to:    [facility]ESSEN[facility] POOL    Comments:   **[facility]**      Anticoagulation Care Providers     Provider Role Specialty Phone number    Darwin Monaco III, MD Referring Cardiology [phone]            Clinic Interview:  Patient Findings     Positives:   Other complaints    Negatives:   Signs/symptoms of thrombosis, Signs/symptoms of bleeding,   Laboratory test error suspected, Change in health, Change in alcohol use,   Change in activity, Upcoming invasive procedure, Emergency department   visit, Upcoming dental procedure, Missed doses, Extra doses, Change in   medications, Change in diet/appetite, Hospital admission, Bruising    Comments:   Pt only took 9 mg 2x in past wk rather than every other day      Clinical Outcomes     Negatives:   Major bleeding event, Thromboembolic event,   Anticoagulation-related hospital admission, Anticoagulation-related ED   visit, Anticoagulation-related fatality    Comments:   Pt only took 9 mg 2x in past wk rather than every other day        INR History:  Anticoagulation Monitoring 2019   INR 1.07 1.23   INR Date 2019   INR Goal 2.0-3.0 2.0-3.0   Last Week Total 42 mg 48 mg   Next Week Total 39 mg 57 mg   Sun 9 mg () 9 mg   Mon - 6 mg   Tue - 9 mg   Wed 9 mg () 9 mg   Thu 6 mg () 9 mg   Fri 9 mg () 6 mg   Sat 6 mg () 9 mg   Visit Report - -       Plan:  1. INR is  Subtherapeutic today- see above in Anticoagulation Summary.   Will instruct Froilan Helton to Increase their warfarin regimen- see above in Anticoagulation Summary.  2. Follow up in 1 week  3. They have been instructed to call if any changes in medications, doses, concerns, etc. Patient expresses understanding and has no further questions at this time.    Lester Scott [facility]

## 2019-09-30 ENCOUNTER — READMISSION MANAGEMENT (OUTPATIENT)
Dept: CALL CENTER | Facility: HOSPITAL | Age: 79
End: 2019-09-30

## 2019-09-30 NOTE — OUTREACH NOTE
COPD/PN Week 4 Survey      Responses   Facility patient discharged from?  [facility]   Does the patient have one of the following disease processes/diagnoses(primary or secondary)?  COPD/Pneumonia   Was the primary reason for admission:  COPD exacerbation   Week 4 attempt successful?  Yes   Call start time  1019   Call end time  1028   Discharge diagnosis  Acute hypoxic, hypercapnic resp. failure, d/t CHF and possible pneumonia, suspected sepsis d/t pneumonia, cardiogenic shock, A/C systolic CHF, hypertensive urgency, acute metabolic encephalopathy, chronically elevated troponin, COPD   Meds reviewed with patient/caregiver?  Yes   Is the patient taking all medications as directed (includes completed medication regime)?  Yes   Has the patient kept scheduled appointments due by today?  Yes   Is the patient still receiving Home Health Services?  No   What is the patient's perception of their health status since discharge?  Returned to baseline/stable   Additional teach back comments  BS sugar this am 124   Is the patient able to teach back COPD zones?  Yes   Patient reports what zone on this call?  Green Zone   Week 4 call completed?  Yes   Would the patient like one additional call?  No   Graduated  Yes   Did the patient feel the follow up calls were helpful during their recovery period?  Yes   Was the number of calls appropriate?  Yes          Irma Gao RN

## 2019-10-02 ENCOUNTER — LAB (OUTPATIENT)
Dept: LAB | Facility: HOSPITAL | Age: 79
End: 2019-10-02

## 2019-10-02 ENCOUNTER — ANTICOAGULATION VISIT (OUTPATIENT)
Dept: PHARMACY | Facility: HOSPITAL | Age: 79
End: 2019-10-02

## 2019-10-02 DIAGNOSIS — I48.91 ATRIAL FIBRILLATION WITH RVR (HCC): ICD-10-CM

## 2019-10-02 LAB
INR PPP: 1.45 (ref 0.9–1.1)
PROTHROMBIN TIME: 17.3 SECONDS (ref 12.1–15)

## 2019-10-02 PROCEDURE — 36415 COLL VENOUS BLD VENIPUNCTURE: CPT

## 2019-10-02 PROCEDURE — 85610 PROTHROMBIN TIME: CPT

## 2019-10-02 NOTE — PROGRESS NOTES
Anticoagulation Clinic Progress Note    Anticoagulation Summary  As of 10/2/2019    INR goal:   2.0-3.0   TTR:   0.0 % (2 wk)   INR used for dosin.45! (10/2/2019)   Warfarin maintenance plan:   6 mg every Mon, Fri; 9 mg all other days   Weekly warfarin total:   57 mg   No change documented:   Lester Scott RPH   Plan last modified:   Lester Scott RPH (2019)   Next INR check:   10/9/2019   Target end date:       Indications    Atrial fibrillation with RVR (CMS/HCC) [I48.91]             Anticoagulation Episode Summary     INR check location:       Preferred lab:       Send INR reminders to:    YESSENIA CHASE CLINICAL POOL    Comments:   **[facility]**      Anticoagulation Care Providers     Provider Role Specialty Phone number    Darwin Monaco III, MD Referring Cardiology [phone]            Clinic Interview:  Patient Findings     Positives:   Missed doses    Negatives:   Signs/symptoms of thrombosis, Signs/symptoms of bleeding,   Laboratory test error suspected, Change in health, Change in alcohol use,   Change in activity, Upcoming invasive procedure, Emergency department   visit, Upcoming dental procedure, Extra doses, Change in medications,   Change in diet/appetite, Hospital admission, Bruising, Other complaints    Comments:   Reports missed doses -. Refuses to use pill box.      Clinical Outcomes     Negatives:   Major bleeding event, Thromboembolic event,   Anticoagulation-related hospital admission, Anticoagulation-related ED   visit, Anticoagulation-related fatality    Comments:   Reports missed doses -. Refuses to use pill box.        INR History:  Anticoagulation Monitoring 2019 2019 10/2/2019   INR 1.07 1.23 1.45   INR Date 2019 2019 10/2/2019   INR Goal 2.0-3.0 2.0-3.0 2.0-3.0   Trend - - Same   Last Week Total 42 mg 48 mg 42 mg   Next Week Total 39 mg 57 mg 57 mg   Sun 9 mg () 9 mg 9 mg   Mon - 6 mg 6 mg   Tue - 9 mg 9 mg   Wed 9 mg () 9 mg 9 mg    Thu 6 mg (9/19) 9 mg 9 mg   Fri 9 mg (9/20) 6 mg 6 mg   Sat 6 mg (9/21) 9 mg 9 mg   Visit Report - - -   Some recent data might be hidden       Plan:  1. INR is Subtherapeutic today- see above in Anticoagulation Summary.   Will instruct Froilan Henryross to Continue their warfarin regimen (will not give boost dose to avoid dosing confusion) - see above in Anticoagulation Summary.  2. Follow up in 1 week  3. They have been instructed to call if any changes in medications, doses, concerns, etc. Patient expresses understanding and has no further questions at this time.    Lester Scott [facility]

## 2019-10-03 ENCOUNTER — APPOINTMENT (OUTPATIENT)
Dept: GENERAL RADIOLOGY | Facility: HOSPITAL | Age: 79
End: 2019-10-03

## 2019-10-03 ENCOUNTER — HOSPITAL ENCOUNTER (EMERGENCY)
Facility: HOSPITAL | Age: 79
Discharge: HOME | End: 2019-10-03
Attending: EMERGENCY MEDICINE | Admitting: EMERGENCY MEDICINE

## 2019-10-03 VITALS
HEIGHT: 73 IN | OXYGEN SATURATION: 94 % | HEART RATE: 82 BPM | WEIGHT: 304.5 LBS | TEMPERATURE: 97.8 F | RESPIRATION RATE: 18 BRPM | BODY MASS INDEX: 40.36 KG/M2 | SYSTOLIC BLOOD PRESSURE: 145 MMHG | DIASTOLIC BLOOD PRESSURE: 85 MMHG

## 2019-10-03 DIAGNOSIS — N18.30 STAGE 3 CHRONIC KIDNEY DISEASE (HCC): ICD-10-CM

## 2019-10-03 DIAGNOSIS — I50.32 CHRONIC DIASTOLIC CONGESTIVE HEART FAILURE (HCC): ICD-10-CM

## 2019-10-03 DIAGNOSIS — R06.02 SHORTNESS OF BREATH: Primary | ICD-10-CM

## 2019-10-03 LAB
ALBUMIN SERPL-MCNC: 3.9 G/DL (ref 3.5–5.2)
ALBUMIN/GLOB SERPL: 1.2 G/DL
ALP SERPL-CCNC: 74 U/L (ref 39–117)
ALT SERPL W P-5'-P-CCNC: 9 U/L (ref 1–41)
ANION GAP SERPL CALCULATED.3IONS-SCNC: 12.1 MMOL/L (ref 5–15)
AST SERPL-CCNC: 13 U/L (ref 1–40)
BASOPHILS # BLD AUTO: 0.06 10*3/MM3 (ref 0–0.2)
BASOPHILS NFR BLD AUTO: 0.9 % (ref 0–1.5)
BILIRUB SERPL-MCNC: 0.3 MG/DL (ref 0.2–1.2)
BUN BLD-MCNC: 41 MG/DL (ref 8–23)
BUN/CREAT SERPL: 16 (ref 7–25)
CALCIUM SPEC-SCNC: 9.4 MG/DL (ref 8.6–10.5)
CHLORIDE SERPL-SCNC: 99 MMOL/L (ref 98–107)
CO2 SERPL-SCNC: 27.9 MMOL/L (ref 22–29)
CREAT BLD-MCNC: 2.56 MG/DL (ref 0.76–1.27)
DEPRECATED RDW RBC AUTO: 47.1 FL (ref 37–54)
EOSINOPHIL # BLD AUTO: 0.33 10*3/MM3 (ref 0–0.4)
EOSINOPHIL NFR BLD AUTO: 4.9 % (ref 0.3–6.2)
ERYTHROCYTE [DISTWIDTH] IN BLOOD BY AUTOMATED COUNT: 15.3 % (ref 12.3–15.4)
GFR SERPL CREATININE-BSD FRML MDRD: 24 ML/MIN/1.73
GLOBULIN UR ELPH-MCNC: 3.2 GM/DL
GLUCOSE BLD-MCNC: 326 MG/DL (ref 65–99)
HCT VFR BLD AUTO: 34.6 % (ref 37.5–51)
HGB BLD-MCNC: 10.4 G/DL (ref 13–17.7)
IMM GRANULOCYTES # BLD AUTO: 0.03 10*3/MM3 (ref 0–0.05)
IMM GRANULOCYTES NFR BLD AUTO: 0.4 % (ref 0–0.5)
INR PPP: 1.69 (ref 0.9–1.1)
LYMPHOCYTES # BLD AUTO: 1.02 10*3/MM3 (ref 0.7–3.1)
LYMPHOCYTES NFR BLD AUTO: 15.1 % (ref 19.6–45.3)
MCH RBC QN AUTO: 25.3 PG (ref 26.6–33)
MCHC RBC AUTO-ENTMCNC: 30.1 G/DL (ref 31.5–35.7)
MCV RBC AUTO: 84.2 FL (ref 79–97)
MONOCYTES # BLD AUTO: 0.66 10*3/MM3 (ref 0.1–0.9)
MONOCYTES NFR BLD AUTO: 9.8 % (ref 5–12)
NEUTROPHILS # BLD AUTO: 4.66 10*3/MM3 (ref 1.7–7)
NEUTROPHILS NFR BLD AUTO: 68.9 % (ref 42.7–76)
NRBC BLD AUTO-RTO: 0 /100 WBC (ref 0–0.2)
NT-PROBNP SERPL-MCNC: 4443 PG/ML (ref 5–1800)
PLATELET # BLD AUTO: 200 10*3/MM3 (ref 140–450)
PMV BLD AUTO: 11.7 FL (ref 6–12)
POTASSIUM BLD-SCNC: 5 MMOL/L (ref 3.5–5.2)
PROT SERPL-MCNC: 7.1 G/DL (ref 6–8.5)
PROTHROMBIN TIME: 19.5 SECONDS (ref 12.1–15)
RBC # BLD AUTO: 4.11 10*6/MM3 (ref 4.14–5.8)
SODIUM BLD-SCNC: 139 MMOL/L (ref 136–145)
TROPONIN T SERPL-MCNC: 0.04 NG/ML (ref 0–0.03)
WBC NRBC COR # BLD: 6.76 10*3/MM3 (ref 3.4–10.8)

## 2019-10-03 PROCEDURE — 93005 ELECTROCARDIOGRAM TRACING: CPT | Performed by: EMERGENCY MEDICINE

## 2019-10-03 PROCEDURE — 99283 EMERGENCY DEPT VISIT LOW MDM: CPT

## 2019-10-03 PROCEDURE — 84484 ASSAY OF TROPONIN QUANT: CPT | Performed by: EMERGENCY MEDICINE

## 2019-10-03 PROCEDURE — 93010 ELECTROCARDIOGRAM REPORT: CPT | Performed by: INTERNAL MEDICINE

## 2019-10-03 PROCEDURE — 85025 COMPLETE CBC W/AUTO DIFF WBC: CPT | Performed by: EMERGENCY MEDICINE

## 2019-10-03 PROCEDURE — 85610 PROTHROMBIN TIME: CPT | Performed by: EMERGENCY MEDICINE

## 2019-10-03 PROCEDURE — 83880 ASSAY OF NATRIURETIC PEPTIDE: CPT | Performed by: EMERGENCY MEDICINE

## 2019-10-03 PROCEDURE — 99284 EMERGENCY DEPT VISIT MOD MDM: CPT | Performed by: EMERGENCY MEDICINE

## 2019-10-03 PROCEDURE — 71046 X-RAY EXAM CHEST 2 VIEWS: CPT

## 2019-10-03 PROCEDURE — 80053 COMPREHEN METABOLIC PANEL: CPT | Performed by: EMERGENCY MEDICINE

## 2019-10-03 NOTE — ED PROVIDER NOTES
"Subjective     History provided by:  Patient, spouse and medical records    History of Present Illness    · Chief complaint: Shortness of breath    · Location: Lungs    · Quality/Severity: The patient reports dyspnea on exertion stating he \"wore out\" he was walking to the bathroom and back.    · Timing/Onset: Apparently is chronic, the patient will not exactly articulate how long is been going on.    · Modifying Factors: Exertion and laying flat exacerbates the shortness of breath.  Sitting up and resting alleviates the shortness of breath.    · Associated symptoms: He states he has some chest pain today that was in the middle of his chest\" pounding\".  He will not articulate how long it lasted.    · Narrative: The patient is a 78-year-old white male with a history of A. fib, congestive heart failure and COPD.  He presents complaining of shortness of breath.  He states the biggest problem is he cannot walk very far without \"wore out\".    Review of Systems   Constitutional: Negative for activity change, appetite change, chills, diaphoresis and fever.   HENT: Negative for congestion, ear pain, facial swelling, rhinorrhea, sinus pressure, sinus pain, sore throat, trouble swallowing and voice change.    Eyes: Negative for pain and visual disturbance.   Respiratory: Positive for cough ( Occasionally productive of brown sputum) and shortness of breath.    Cardiovascular: Positive for leg swelling ( Chronic both legs and feet and ankles).   Gastrointestinal: Negative for abdominal pain, diarrhea, nausea and vomiting.   Genitourinary: Negative for dysuria and frequency.   Musculoskeletal: Negative for joint swelling, neck pain and neck stiffness.   Skin: Positive for wound ( Chronic left lower leg).   Neurological: Negative for dizziness, seizures, syncope, weakness, light-headedness and headaches.   Psychiatric/Behavioral: Negative for confusion, hallucinations and sleep disturbance. The patient is not nervous/anxious.  " "    Past Medical History:   Diagnosis Date   • Arthritis    • Asthma    • Cancer (CMS/HCC)     skin   • Cataract    • COPD (chronic obstructive pulmonary disease) (CMS/HCC)    • Diabetes mellitus (CMS/HCC)    • Disease of thyroid gland    • Hypertension    • Kidney stone    • Myocardial infarct, old    • Renal disorder      /74   Pulse 78   Temp 97.8 °F (36.6 °C) (Oral)   Resp 18   Ht 185.4 cm (72.99\")   Wt (!) 138 kg (304 lb 8 oz)   SpO2 95%   BMI 40.18 kg/m²     Past Medical History:   Diagnosis Date   • Arthritis    • Asthma    • Cancer (CMS/HCC)     skin   • Cataract    • COPD (chronic obstructive pulmonary disease) (CMS/HCC)    • Diabetes mellitus (CMS/HCC)    • Disease of thyroid gland    • Hypertension    • Kidney stone    • Myocardial infarct, old    • Renal disorder        Allergies   Allergen Reactions   • Oxycontin [Oxycodone Hcl]      'Makes me crazy and stand on my head'       Past Surgical History:   Procedure Laterality Date   • COLONOSCOPY     • EYE SURGERY     • JOINT REPLACEMENT      right   • KIDNEY STONE SURGERY     • REPLACEMENT TOTAL KNEE     • TOE SURGERY         Family History   Problem Relation Age of Onset   • COPD Mother    • Diabetes Father        Social History     Socioeconomic History   • Marital status: Unknown     Spouse name: Not on file   • Number of children: Not on file   • Years of education: Not on file   • Highest education level: Not on file   Tobacco Use   • Smoking status: Former Smoker   • Smokeless tobacco: Never Used   • Tobacco comment: quit 1993   Substance and Sexual Activity   • Alcohol use: No   • Drug use: No   • Sexual activity: Defer           Objective   Physical Exam   Constitutional: He is oriented to person, place, and time. He appears well-developed and well-nourished. No distress.   The patient appears in no acute distress and does not appear toxic.  Review of his vital signs: He is afebrile with a temperature 97.8, respirations are normal 18 " with a normal oxygen saturation 97% on room air, blood pressure normal 156/84, heart rate normal 78.   HENT:   Head: Normocephalic and atraumatic.   Nose: Nose normal.   Mouth/Throat: Oropharynx is clear and moist. No oropharyngeal exudate.   Eyes: EOM are normal. Pupils are equal, round, and reactive to light. Right eye exhibits no discharge. Left eye exhibits no discharge. No scleral icterus.   Neck: Normal range of motion. Neck supple. No JVD present. No thyromegaly present.   Cardiovascular: Normal rate, regular rhythm and normal heart sounds.   No murmur heard.  Pulmonary/Chest: Effort normal and breath sounds normal. No stridor. He has no wheezes. He has no rales. He exhibits no tenderness.   Abdominal: Soft. Bowel sounds are normal. He exhibits no distension. There is no tenderness.   Musculoskeletal: Normal range of motion. He exhibits edema. He exhibits no tenderness or deformity.   The patient has 3+ chronic appearing nonpitting edema of his legs, feet and ankles.   Lymphadenopathy:     He has no cervical adenopathy.   Neurological: He is alert and oriented to person, place, and time. No cranial nerve deficit or sensory deficit. He exhibits normal muscle tone. Coordination normal.   No focal motor sensory deficit   Skin: Skin is warm and dry. Capillary refill takes less than 2 seconds. He is not diaphoretic.   The patient has chronic venous stasis skin changes involving both lower legs, ankles and feet.  He has a chronic appearing ulcerated wound on his mid left shin with a centimeter to a peripheral erythema.   Psychiatric: He has a normal mood and affect. His behavior is normal. Judgment and thought content normal.   Nursing note and vitals reviewed.      Procedures           ED Course  ED Course as of Oct 03 2108   Thu Oct 03, 2019   1935 My interpretation of the patient's EKG tracing performed at 19:32 is atrial fibrillation with a rate of 84, normal axis, nonspecific interventricular conduction delay  with a QRS duration of 119 ms, no acute ST segment elevation or depression consistent with ischemia, prolonged QT interval.  The only change in the tracing compared to tracing 9/10/2019 is the patient is in A. fib this time as opposed to sinus rhythm.  [TP]   2105 Review of the patient's test results: His chest x-ray was interpreted by me and the radiologist as no acute cardiopulmonary pathology.  His CMP had an elevated blood sugar 326, normal sodium and potassium.  His BUN was elevated at 41 and creatinine 2.56 with a diminished GFR 24 which is an increase from 9/12/2014 when his BUN was 28 and creatinine 1.93 with a GFR 43.  This is likely due to the Bumex diuretic he is currently taking.  His CBC had a normal white count of 6.76.  He was anemic with a hemoglobin of 10.4 hematocrit 34.6.  His cardiac troponin was elevated 0.037 which is baseline for his chronically elevated troponin.  His proBNP was elevated at 4443 which is actually a little lower than he was last May.  His INR was slightly subtherapeutic at 1.7.  [TP]   2107 The patient's lungs are clear and his oxygen saturation is 97%.  I instructed him to hold his Bumex until Monday.  He is to follow-up with his PCP.  [TP]      ED Course User Index  [TP] Melchor Elliott MD                  MDM  Number of Diagnoses or Management Options  Chronic diastolic congestive heart failure (CMS/HCC): established and improving  Shortness of breath: established and improving  Stage 3 chronic kidney disease (CMS/HCC): established and worsening     Amount and/or Complexity of Data Reviewed  Clinical lab tests: ordered and reviewed  Tests in the radiology section of CPT®: reviewed  Tests in the medicine section of CPT®: ordered and reviewed  Decide to obtain previous medical records or to obtain history from someone other than the patient: yes  Independent visualization of images, tracings, or specimens: yes    Risk of Complications, Morbidity, and/or Mortality  Presenting  problems: high  Diagnostic procedures: high  Management options: high  General comments: My differential diagnosis for dyspnea includes but is not limited to:  Asthma, COPD, pneumonia, pulmonary embolus, acute respiratory distress syndrome, pneumothorax, pleural effusion, pulmonary fibrosis, congestive heart failure, myocardial infarction, DKA, uremia, acidosis, sepsis, anemia, drug related, hyperventilation, CNS disease    Patient Progress  Patient progress: stable      Final diagnoses:   Shortness of breath   Chronic diastolic congestive heart failure (CMS/[facility])   Stage 3 chronic kidney disease (CMS/[facility])             Labs Reviewed   COMPREHENSIVE METABOLIC PANEL - Abnormal; Notable for the following components:       Result Value    Glucose 326 (*)     BUN 41 (*)     Creatinine 2.56 (*)     eGFR Non  Amer 24 (*)     All other components within normal limits    Narrative:     GFR Normal >60  Chronic Kidney Disease <60  Kidney Failure <15   PROTIME-INR - Abnormal; Notable for the following components:    Protime 19.5 (*)     INR 1.69 (*)     All other components within normal limits    Narrative:     Therapeutic Ranges for INR: 2.0-3.0 (PT 20-30)                              2.5-3.5 (PT 25-34)   BNP (IN-HOUSE) - Abnormal; Notable for the following components:    proBNP 4,443.0 (*)     All other components within normal limits    Narrative:     Among patients with dyspnea, NT-proBNP is highly sensitive for the detection of acute congestive heart failure. In addition NT-proBNP of <300 pg/ml effectively rules out acute congestive heart failure with 99% negative predictive value.   TROPONIN (IN-HOUSE) - Abnormal; Notable for the following components:    Troponin T 0.037 (*)     All other components within normal limits    Narrative:     Troponin T Reference Range:  <= 0.03 ng/mL-   Negative for AMI  >0.03 ng/mL-     Abnormal for myocardial necrosis.  Clinicians would have to utilize clinical acumen, EKG, Troponin  and serial changes to determine if it is an Acute Myocardial Infarction or myocardial injury due to an underlying chronic condition.    CBC WITH AUTO DIFFERENTIAL - Abnormal; Notable for the following components:    RBC 4.11 (*)     Hemoglobin 10.4 (*)     Hematocrit 34.6 (*)     MCH 25.3 (*)     MCHC 30.1 (*)     Lymphocyte % 15.1 (*)     All other components within normal limits   CBC AND DIFFERENTIAL    Narrative:     The following orders were created for panel order CBC & Differential.  Procedure                               Abnormality         Status                     ---------                               -----------         ------                     CBC Auto Differential[[id number]]        Abnormal            Final result                 Please view results for these tests on the individual orders.     XR Chest 2 View   Final Result             Medication List      Changed    docusate sodium 100 MG capsule  Commonly known as:  COLACE  Take 100 mg by mouth 2 (Two) Times a Day.  What changed:    when to take this  reasons to take this               Melchor Elliott MD  10/03/19 [id number]

## 2019-10-04 NOTE — ED NOTES
Pt states he's had chronic chest pain since being discharged 3 wks ago, Has been feeling weak. States both legs are really swollen and leaking some. Pt has quarter size open sore right lower tib fib area. Presently no drainage. Wife at bedside     Nida Gamboa RN  10/03/19 2007

## 2019-10-11 ENCOUNTER — LAB (OUTPATIENT)
Dept: LAB | Facility: HOSPITAL | Age: 79
End: 2019-10-11

## 2019-10-11 ENCOUNTER — ANTICOAGULATION VISIT (OUTPATIENT)
Dept: PHARMACY | Facility: HOSPITAL | Age: 79
End: 2019-10-11

## 2019-10-11 DIAGNOSIS — I48.91 ATRIAL FIBRILLATION WITH RVR (HCC): ICD-10-CM

## 2019-10-11 LAB
INR PPP: 1.36 (ref 0.9–1.1)
PROTHROMBIN TIME: 16.4 SECONDS (ref 12.1–15)

## 2019-10-11 PROCEDURE — 36415 COLL VENOUS BLD VENIPUNCTURE: CPT

## 2019-10-11 PROCEDURE — 85610 PROTHROMBIN TIME: CPT

## 2019-10-15 ENCOUNTER — APPOINTMENT (OUTPATIENT)
Dept: GENERAL RADIOLOGY | Facility: HOSPITAL | Age: 79
End: 2019-10-15

## 2019-10-15 ENCOUNTER — HOSPITAL ENCOUNTER (INPATIENT)
Facility: HOSPITAL | Age: 79
LOS: 2 days | Discharge: HOME | End: 2019-10-21
Attending: EMERGENCY MEDICINE | Admitting: HOSPITALIST

## 2019-10-15 DIAGNOSIS — L03.115 CELLULITIS OF RIGHT LOWER EXTREMITY: Primary | ICD-10-CM

## 2019-10-15 LAB
ALBUMIN SERPL-MCNC: 4 G/DL (ref 3.5–5.2)
ALBUMIN/GLOB SERPL: 1.1 G/DL
ALP SERPL-CCNC: 77 U/L (ref 39–117)
ALT SERPL W P-5'-P-CCNC: 9 U/L (ref 1–41)
ANION GAP SERPL CALCULATED.3IONS-SCNC: 10.4 MMOL/L (ref 5–15)
APTT PPP: 34 SECONDS (ref 24.3–38.1)
AST SERPL-CCNC: 15 U/L (ref 1–40)
BASOPHILS # BLD AUTO: 0.03 10*3/MM3 (ref 0–0.2)
BASOPHILS NFR BLD AUTO: 0.5 % (ref 0–1.5)
BILIRUB SERPL-MCNC: 0.3 MG/DL (ref 0.2–1.2)
BUN BLD-MCNC: 31 MG/DL (ref 8–23)
BUN/CREAT SERPL: 12.8 (ref 7–25)
CALCIUM SPEC-SCNC: 9.5 MG/DL (ref 8.6–10.5)
CHLORIDE SERPL-SCNC: 101 MMOL/L (ref 98–107)
CO2 SERPL-SCNC: 26.6 MMOL/L (ref 22–29)
CREAT BLD-MCNC: 2.42 MG/DL (ref 0.76–1.27)
D-LACTATE SERPL-SCNC: 1.3 MMOL/L (ref 0.5–2)
DEPRECATED RDW RBC AUTO: 46.9 FL (ref 37–54)
EOSINOPHIL # BLD AUTO: 0.44 10*3/MM3 (ref 0–0.4)
EOSINOPHIL NFR BLD AUTO: 7.2 % (ref 0.3–6.2)
ERYTHROCYTE [DISTWIDTH] IN BLOOD BY AUTOMATED COUNT: 15.5 % (ref 12.3–15.4)
GFR SERPL CREATININE-BSD FRML MDRD: 26 ML/MIN/1.73
GLOBULIN UR ELPH-MCNC: 3.6 GM/DL
GLUCOSE BLD-MCNC: 211 MG/DL (ref 65–99)
GLUCOSE BLDC GLUCOMTR-MCNC: 236 MG/DL (ref 70–130)
HBA1C MFR BLD: 8.7 % (ref 4.8–5.6)
HCT VFR BLD AUTO: 34.1 % (ref 37.5–51)
HGB BLD-MCNC: 10.1 G/DL (ref 13–17.7)
IMM GRANULOCYTES # BLD AUTO: 0.03 10*3/MM3 (ref 0–0.05)
IMM GRANULOCYTES NFR BLD AUTO: 0.5 % (ref 0–0.5)
INR PPP: 1.58 (ref 0.9–1.1)
INR PPP: 1.62 (ref 0.9–1.1)
LYMPHOCYTES # BLD AUTO: 1.09 10*3/MM3 (ref 0.7–3.1)
LYMPHOCYTES NFR BLD AUTO: 17.9 % (ref 19.6–45.3)
MAGNESIUM SERPL-MCNC: 1.8 MG/DL (ref 1.6–2.4)
MCH RBC QN AUTO: 24.6 PG (ref 26.6–33)
MCHC RBC AUTO-ENTMCNC: 29.6 G/DL (ref 31.5–35.7)
MCV RBC AUTO: 83.2 FL (ref 79–97)
MONOCYTES # BLD AUTO: 0.9 10*3/MM3 (ref 0.1–0.9)
MONOCYTES NFR BLD AUTO: 14.8 % (ref 5–12)
NEUTROPHILS # BLD AUTO: 3.59 10*3/MM3 (ref 1.7–7)
NEUTROPHILS NFR BLD AUTO: 59.1 % (ref 42.7–76)
NRBC BLD AUTO-RTO: 0 /100 WBC (ref 0–0.2)
NT-PROBNP SERPL-MCNC: 3728 PG/ML (ref 5–1800)
PLATELET # BLD AUTO: 192 10*3/MM3 (ref 140–450)
PMV BLD AUTO: 11.3 FL (ref 6–12)
POTASSIUM BLD-SCNC: 4.4 MMOL/L (ref 3.5–5.2)
PROT SERPL-MCNC: 7.6 G/DL (ref 6–8.5)
PROTHROMBIN TIME: 18.5 SECONDS (ref 12.1–15)
PROTHROMBIN TIME: 18.9 SECONDS (ref 12.1–15)
RBC # BLD AUTO: 4.1 10*6/MM3 (ref 4.14–5.8)
SODIUM BLD-SCNC: 138 MMOL/L (ref 136–145)
TROPONIN T SERPL-MCNC: 0.04 NG/ML (ref 0–0.03)
WBC NRBC COR # BLD: 6.08 10*3/MM3 (ref 3.4–10.8)

## 2019-10-15 PROCEDURE — 84484 ASSAY OF TROPONIN QUANT: CPT | Performed by: PHYSICIAN ASSISTANT

## 2019-10-15 PROCEDURE — 25010000002 CEFTRIAXONE SODIUM-DEXTROSE 2-2.22 GM-%(50ML) RECONSTITUTED SOLUTION: Performed by: PHYSICIAN ASSISTANT

## 2019-10-15 PROCEDURE — 82962 GLUCOSE BLOOD TEST: CPT

## 2019-10-15 PROCEDURE — 85025 COMPLETE CBC W/AUTO DIFF WBC: CPT | Performed by: PHYSICIAN ASSISTANT

## 2019-10-15 PROCEDURE — 36415 COLL VENOUS BLD VENIPUNCTURE: CPT

## 2019-10-15 PROCEDURE — 63710000001 INSULIN DETEMIR PER 5 UNITS: Performed by: HOSPITALIST

## 2019-10-15 PROCEDURE — 83605 ASSAY OF LACTIC ACID: CPT | Performed by: PHYSICIAN ASSISTANT

## 2019-10-15 PROCEDURE — 63710000001 INSULIN ASPART PER 5 UNITS: Performed by: HOSPITALIST

## 2019-10-15 PROCEDURE — 83036 HEMOGLOBIN GLYCOSYLATED A1C: CPT | Performed by: HOSPITALIST

## 2019-10-15 PROCEDURE — 94640 AIRWAY INHALATION TREATMENT: CPT

## 2019-10-15 PROCEDURE — 85610 PROTHROMBIN TIME: CPT | Performed by: HOSPITALIST

## 2019-10-15 PROCEDURE — 99284 EMERGENCY DEPT VISIT MOD MDM: CPT | Performed by: PHYSICIAN ASSISTANT

## 2019-10-15 PROCEDURE — 99222 1ST HOSP IP/OBS MODERATE 55: CPT | Performed by: HOSPITALIST

## 2019-10-15 PROCEDURE — 71045 X-RAY EXAM CHEST 1 VIEW: CPT

## 2019-10-15 PROCEDURE — 87040 BLOOD CULTURE FOR BACTERIA: CPT | Performed by: PHYSICIAN ASSISTANT

## 2019-10-15 PROCEDURE — 93010 ELECTROCARDIOGRAM REPORT: CPT | Performed by: INTERNAL MEDICINE

## 2019-10-15 PROCEDURE — 83735 ASSAY OF MAGNESIUM: CPT | Performed by: PHYSICIAN ASSISTANT

## 2019-10-15 PROCEDURE — G0378 HOSPITAL OBSERVATION PER HR: HCPCS

## 2019-10-15 PROCEDURE — 80053 COMPREHEN METABOLIC PANEL: CPT | Performed by: PHYSICIAN ASSISTANT

## 2019-10-15 PROCEDURE — 93005 ELECTROCARDIOGRAM TRACING: CPT | Performed by: PHYSICIAN ASSISTANT

## 2019-10-15 PROCEDURE — 85730 THROMBOPLASTIN TIME PARTIAL: CPT | Performed by: PHYSICIAN ASSISTANT

## 2019-10-15 PROCEDURE — 83880 ASSAY OF NATRIURETIC PEPTIDE: CPT | Performed by: PHYSICIAN ASSISTANT

## 2019-10-15 PROCEDURE — 99285 EMERGENCY DEPT VISIT HI MDM: CPT

## 2019-10-15 PROCEDURE — 85610 PROTHROMBIN TIME: CPT | Performed by: PHYSICIAN ASSISTANT

## 2019-10-15 RX ORDER — SODIUM CHLORIDE 0.9 % (FLUSH) 0.9 %
10 SYRINGE (ML) INJECTION AS NEEDED
Status: DISCONTINUED | OUTPATIENT
Start: 2019-10-15 | End: 2019-10-21 | Stop reason: HOSPADM

## 2019-10-15 RX ORDER — SODIUM CHLORIDE 9 MG/ML
40 INJECTION, SOLUTION INTRAVENOUS AS NEEDED
Status: DISCONTINUED | OUTPATIENT
Start: 2019-10-15 | End: 2019-10-21 | Stop reason: HOSPADM

## 2019-10-15 RX ORDER — SODIUM CHLORIDE 9 MG/ML
INJECTION, SOLUTION INTRAVENOUS
Status: DISPENSED
Start: 2019-10-15 | End: 2019-10-16

## 2019-10-15 RX ORDER — WARFARIN SODIUM 7.5 MG/1
7.5 TABLET ORAL NIGHTLY
Status: DISCONTINUED | OUTPATIENT
Start: 2019-10-15 | End: 2019-10-16

## 2019-10-15 RX ORDER — IPRATROPIUM BROMIDE AND ALBUTEROL SULFATE 2.5; .5 MG/3ML; MG/3ML
3 SOLUTION RESPIRATORY (INHALATION) EVERY 4 HOURS PRN
COMMUNITY
End: 2022-01-01 | Stop reason: HOSPADM

## 2019-10-15 RX ORDER — IPRATROPIUM BROMIDE AND ALBUTEROL SULFATE 2.5; .5 MG/3ML; MG/3ML
3 SOLUTION RESPIRATORY (INHALATION) EVERY 4 HOURS PRN
Status: DISCONTINUED | OUTPATIENT
Start: 2019-10-15 | End: 2019-10-21 | Stop reason: HOSPADM

## 2019-10-15 RX ORDER — WARFARIN SODIUM 1 MG/1
TABLET ORAL
Status: COMPLETED
Start: 2019-10-15 | End: 2019-10-15

## 2019-10-15 RX ORDER — CLINDAMYCIN PHOSPHATE 600 MG/50ML
600 INJECTION INTRAVENOUS EVERY 8 HOURS
Status: DISCONTINUED | OUTPATIENT
Start: 2019-10-15 | End: 2019-10-17

## 2019-10-15 RX ORDER — PREGABALIN 75 MG/1
150 CAPSULE ORAL ONCE
Status: COMPLETED | OUTPATIENT
Start: 2019-10-15 | End: 2019-10-15

## 2019-10-15 RX ORDER — BUDESONIDE AND FORMOTEROL FUMARATE DIHYDRATE 160; 4.5 UG/1; UG/1
2 AEROSOL RESPIRATORY (INHALATION)
Status: DISCONTINUED | OUTPATIENT
Start: 2019-10-15 | End: 2019-10-21 | Stop reason: HOSPADM

## 2019-10-15 RX ORDER — ACETAMINOPHEN 325 MG/1
650 TABLET ORAL EVERY 4 HOURS PRN
Status: DISCONTINUED | OUTPATIENT
Start: 2019-10-15 | End: 2019-10-21 | Stop reason: HOSPADM

## 2019-10-15 RX ORDER — ATORVASTATIN CALCIUM 10 MG/1
10 TABLET, FILM COATED ORAL DAILY
Status: DISCONTINUED | OUTPATIENT
Start: 2019-10-16 | End: 2019-10-21 | Stop reason: HOSPADM

## 2019-10-15 RX ORDER — CLINDAMYCIN PHOSPHATE 600 MG/50ML
INJECTION INTRAVENOUS
Status: COMPLETED
Start: 2019-10-15 | End: 2019-10-15

## 2019-10-15 RX ORDER — LANOLIN ALCOHOL/MO/W.PET/CERES
1000 CREAM (GRAM) TOPICAL DAILY
Status: DISCONTINUED | OUTPATIENT
Start: 2019-10-16 | End: 2019-10-21 | Stop reason: HOSPADM

## 2019-10-15 RX ORDER — BUMETANIDE 1 MG/1
1 TABLET ORAL 2 TIMES DAILY
Status: DISCONTINUED | OUTPATIENT
Start: 2019-10-15 | End: 2019-10-21 | Stop reason: HOSPADM

## 2019-10-15 RX ORDER — LEVOTHYROXINE SODIUM 0.12 MG/1
125 TABLET ORAL
Status: DISCONTINUED | OUTPATIENT
Start: 2019-10-16 | End: 2019-10-18

## 2019-10-15 RX ORDER — CARVEDILOL 6.25 MG/1
6.25 TABLET ORAL 2 TIMES DAILY WITH MEALS
Status: DISCONTINUED | OUTPATIENT
Start: 2019-10-15 | End: 2019-10-21 | Stop reason: HOSPADM

## 2019-10-15 RX ORDER — ACETAMINOPHEN 650 MG/1
650 SUPPOSITORY RECTAL EVERY 4 HOURS PRN
Status: DISCONTINUED | OUTPATIENT
Start: 2019-10-15 | End: 2019-10-21 | Stop reason: HOSPADM

## 2019-10-15 RX ORDER — CETIRIZINE HYDROCHLORIDE 10 MG/1
10 TABLET ORAL DAILY PRN
Status: DISCONTINUED | OUTPATIENT
Start: 2019-10-15 | End: 2019-10-21 | Stop reason: HOSPADM

## 2019-10-15 RX ORDER — FLUTICASONE PROPIONATE 50 MCG
2 SPRAY, SUSPENSION (ML) NASAL DAILY
Status: DISCONTINUED | OUTPATIENT
Start: 2019-10-16 | End: 2019-10-21 | Stop reason: HOSPADM

## 2019-10-15 RX ORDER — NICOTINE POLACRILEX 4 MG
15 LOZENGE BUCCAL
Status: DISCONTINUED | OUTPATIENT
Start: 2019-10-15 | End: 2019-10-21 | Stop reason: HOSPADM

## 2019-10-15 RX ORDER — WARFARIN SODIUM 5 MG/1
TABLET ORAL
Status: COMPLETED
Start: 2019-10-15 | End: 2019-10-15

## 2019-10-15 RX ORDER — CEFTRIAXONE 2 G/50ML
2 INJECTION, SOLUTION INTRAVENOUS ONCE
Status: COMPLETED | OUTPATIENT
Start: 2019-10-15 | End: 2019-10-15

## 2019-10-15 RX ORDER — AMIODARONE HYDROCHLORIDE 200 MG/1
200 TABLET ORAL EVERY 12 HOURS SCHEDULED
Status: DISCONTINUED | OUTPATIENT
Start: 2019-10-15 | End: 2019-10-21 | Stop reason: HOSPADM

## 2019-10-15 RX ORDER — ALPRAZOLAM 0.25 MG/1
0.25 TABLET ORAL NIGHTLY PRN
Status: DISCONTINUED | OUTPATIENT
Start: 2019-10-15 | End: 2019-10-21 | Stop reason: HOSPADM

## 2019-10-15 RX ORDER — TAMSULOSIN HYDROCHLORIDE 0.4 MG/1
0.4 CAPSULE ORAL DAILY
Status: DISCONTINUED | OUTPATIENT
Start: 2019-10-16 | End: 2019-10-21 | Stop reason: HOSPADM

## 2019-10-15 RX ORDER — PREGABALIN 75 MG/1
150 CAPSULE ORAL EVERY 8 HOURS SCHEDULED
Status: DISCONTINUED | OUTPATIENT
Start: 2019-10-15 | End: 2019-10-16

## 2019-10-15 RX ORDER — ACETAMINOPHEN 160 MG/5ML
650 SOLUTION ORAL EVERY 4 HOURS PRN
Status: DISCONTINUED | OUTPATIENT
Start: 2019-10-15 | End: 2019-10-21 | Stop reason: HOSPADM

## 2019-10-15 RX ORDER — NYSTATIN 100000 [USP'U]/G
POWDER TOPICAL EVERY 12 HOURS SCHEDULED
Status: DISCONTINUED | OUTPATIENT
Start: 2019-10-15 | End: 2019-10-21 | Stop reason: HOSPADM

## 2019-10-15 RX ORDER — PREGABALIN 50 MG/1
CAPSULE ORAL
Status: COMPLETED
Start: 2019-10-15 | End: 2019-10-15

## 2019-10-15 RX ORDER — SODIUM CHLORIDE 0.9 % (FLUSH) 0.9 %
10 SYRINGE (ML) INJECTION EVERY 12 HOURS SCHEDULED
Status: DISCONTINUED | OUTPATIENT
Start: 2019-10-15 | End: 2019-10-21 | Stop reason: HOSPADM

## 2019-10-15 RX ORDER — DEXTROSE MONOHYDRATE 25 G/50ML
25 INJECTION, SOLUTION INTRAVENOUS
Status: DISCONTINUED | OUTPATIENT
Start: 2019-10-15 | End: 2019-10-21 | Stop reason: HOSPADM

## 2019-10-15 RX ORDER — MELATONIN
2000 DAILY
Status: DISCONTINUED | OUTPATIENT
Start: 2019-10-16 | End: 2019-10-21 | Stop reason: HOSPADM

## 2019-10-15 RX ADMIN — CLINDAMYCIN PHOSPHATE 600 MG: 600 INJECTION, SOLUTION INTRAVENOUS at 21:42

## 2019-10-15 RX ADMIN — CEFTRIAXONE 2 G: 2 INJECTION, SOLUTION INTRAVENOUS at 17:06

## 2019-10-15 RX ADMIN — BUDESONIDE AND FORMOTEROL FUMARATE DIHYDRATE 2 PUFF: 160; 4.5 AEROSOL RESPIRATORY (INHALATION) at 21:51

## 2019-10-15 RX ADMIN — WARFARIN SODIUM 7.5 MG: 5 TABLET ORAL at 21:48

## 2019-10-15 RX ADMIN — BUMETANIDE 1 MG: 1 TABLET ORAL at 21:47

## 2019-10-15 RX ADMIN — INSULIN ASPART 5 UNITS: 100 INJECTION, SOLUTION INTRAVENOUS; SUBCUTANEOUS at 22:09

## 2019-10-15 RX ADMIN — AMIODARONE HYDROCHLORIDE 200 MG: 200 TABLET ORAL at 21:53

## 2019-10-15 RX ADMIN — INSULIN DETEMIR 30 UNITS: 100 INJECTION, SOLUTION SUBCUTANEOUS at 21:56

## 2019-10-15 RX ADMIN — SODIUM CHLORIDE, PRESERVATIVE FREE 10 ML: 5 INJECTION INTRAVENOUS at 21:56

## 2019-10-15 RX ADMIN — PREGABALIN 150 MG: 50 CAPSULE ORAL at 21:47

## 2019-10-15 RX ADMIN — CLINDAMYCIN IN 5 PERCENT DEXTROSE 600 MG: 12 INJECTION, SOLUTION INTRAVENOUS at 21:42

## 2019-10-15 RX ADMIN — CARVEDILOL 6.25 MG: 6.25 TABLET, FILM COATED ORAL at 21:47

## 2019-10-15 NOTE — PROGRESS NOTES
Anticoagulation Clinic Progress Note    Anticoagulation Summary  As of 10/11/2019    INR goal:   2.0-3.0   TTR:   0.0 % (3.3 wk)   INR used for dosin.36! (10/11/2019)   Warfarin maintenance plan:   9 mg every day   Weekly warfarin total:   63 mg   Plan last modified:   Lester Scott RPH (10/15/2019)   Next INR check:   10/22/2019   Target end date:       Indications    Atrial fibrillation with RVR (CMS/HCC) [I48.91]             Anticoagulation Episode Summary     INR check location:       Preferred lab:       Send INR reminders to:    [facility]    Comments:   **[facility]**      Anticoagulation Care Providers     Provider Role Specialty Phone number    Darwin Monaco III, MD Referring Cardiology [phone]          Clinic Interview:  Patient Findings     Positives:   Bruising, Other complaints    Negatives:   Signs/symptoms of thrombosis, Signs/symptoms of bleeding,   Laboratory test error suspected, Change in health, Change in alcohol use,   Change in activity, Upcoming invasive procedure, Emergency department   visit, Upcoming dental procedure, Missed doses, Extra doses, Change in   medications, Change in diet/appetite, Hospital admission    Comments:   Unsuccessful in reaching until 10/15. Denies missed doses.   Reports correct dose. Reports some increase in bruising. Notes   itching/rash that reminds him of his previous bout of shingles; pt will   call PCP for eval.      Clinical Outcomes     Negatives:   Major bleeding event, Thromboembolic event,   Anticoagulation-related hospital admission, Anticoagulation-related ED   visit, Anticoagulation-related fatality    Comments:   Unsuccessful in reaching until 10/15. Denies missed doses.   Reports correct dose. Reports some increase in bruising. Notes   itching/rash that reminds him of his previous bout of shingles; pt will   call PCP for eval.        INR History:  Anticoagulation Monitoring 2019 10/2/2019 10/11/2019   INR 1.23  1.45 1.36   INR Date 9/25/2019 10/2/2019 10/11/2019   INR Goal 2.0-3.0 2.0-3.0 2.0-3.0   Trend - Same Up   Last Week Total 48 mg 42 mg 57 mg   Next Week Total 57 mg 57 mg 63 mg   Sun 9 mg 9 mg 9 mg   Mon 6 mg 6 mg 9 mg   Tue 9 mg 9 mg 9 mg   Wed 9 mg 9 mg 9 mg   Thu 9 mg 9 mg 9 mg   Fri 6 mg 6 mg 9 mg   Sat 9 mg 9 mg 9 mg   Visit Report - - -   Some recent data might be hidden       Plan:  1. INR was Subtherapeutic 10/11/19- see above in Anticoagulation Summary.   Will instruct Froilan Helton to Increase their warfarin regimen (will not boost as have been unsuccessful reaching until today, which is 4 days after lab draw and uncertain what today's INR is)- see above in Anticoagulation Summary.  2. Follow up in 1 week  3. Pt has agreed to only be called if INR out of range. They have been instructed to call if any changes in medications, doses, concerns, etc. Patient expresses understanding and has no further questions at this time.    Lester Scott Former[facility]

## 2019-10-16 LAB
ANION GAP SERPL CALCULATED.3IONS-SCNC: 10.9 MMOL/L (ref 5–15)
BASOPHILS # BLD AUTO: 0.03 10*3/MM3 (ref 0–0.2)
BASOPHILS NFR BLD AUTO: 0.6 % (ref 0–1.5)
BUN BLD-MCNC: 30 MG/DL (ref 8–23)
BUN/CREAT SERPL: 13.3 (ref 7–25)
CALCIUM SPEC-SCNC: 8.9 MG/DL (ref 8.6–10.5)
CHLORIDE SERPL-SCNC: 102 MMOL/L (ref 98–107)
CO2 SERPL-SCNC: 29.1 MMOL/L (ref 22–29)
CREAT BLD-MCNC: 2.25 MG/DL (ref 0.76–1.27)
DEPRECATED RDW RBC AUTO: 47.4 FL (ref 37–54)
EOSINOPHIL # BLD AUTO: 0.41 10*3/MM3 (ref 0–0.4)
EOSINOPHIL NFR BLD AUTO: 8.9 % (ref 0.3–6.2)
ERYTHROCYTE [DISTWIDTH] IN BLOOD BY AUTOMATED COUNT: 15.6 % (ref 12.3–15.4)
GFR SERPL CREATININE-BSD FRML MDRD: 28 ML/MIN/1.73
GLUCOSE BLD-MCNC: 177 MG/DL (ref 65–99)
GLUCOSE BLDC GLUCOMTR-MCNC: 186 MG/DL (ref 70–130)
GLUCOSE BLDC GLUCOMTR-MCNC: 194 MG/DL (ref 70–130)
GLUCOSE BLDC GLUCOMTR-MCNC: 229 MG/DL (ref 70–130)
GLUCOSE BLDC GLUCOMTR-MCNC: 239 MG/DL (ref 70–130)
HCT VFR BLD AUTO: 32.8 % (ref 37.5–51)
HGB BLD-MCNC: 9.3 G/DL (ref 13–17.7)
IMM GRANULOCYTES # BLD AUTO: 0.02 10*3/MM3 (ref 0–0.05)
IMM GRANULOCYTES NFR BLD AUTO: 0.4 % (ref 0–0.5)
INR PPP: 1.46 (ref 0.9–1.1)
LYMPHOCYTES # BLD AUTO: 0.88 10*3/MM3 (ref 0.7–3.1)
LYMPHOCYTES NFR BLD AUTO: 19 % (ref 19.6–45.3)
MCH RBC QN AUTO: 23.7 PG (ref 26.6–33)
MCHC RBC AUTO-ENTMCNC: 28.4 G/DL (ref 31.5–35.7)
MCV RBC AUTO: 83.7 FL (ref 79–97)
MONOCYTES # BLD AUTO: 0.73 10*3/MM3 (ref 0.1–0.9)
MONOCYTES NFR BLD AUTO: 15.8 % (ref 5–12)
NEUTROPHILS # BLD AUTO: 2.55 10*3/MM3 (ref 1.7–7)
NEUTROPHILS NFR BLD AUTO: 55.3 % (ref 42.7–76)
NRBC BLD AUTO-RTO: 0 /100 WBC (ref 0–0.2)
PLATELET # BLD AUTO: 177 10*3/MM3 (ref 140–450)
PMV BLD AUTO: 11.5 FL (ref 6–12)
POTASSIUM BLD-SCNC: 4.5 MMOL/L (ref 3.5–5.2)
PROTHROMBIN TIME: 17.4 SECONDS (ref 12.1–15)
RBC # BLD AUTO: 3.92 10*6/MM3 (ref 4.14–5.8)
SODIUM BLD-SCNC: 142 MMOL/L (ref 136–145)
T4 FREE SERPL-MCNC: 0.83 NG/DL (ref 0.93–1.7)
TSH SERPL DL<=0.05 MIU/L-ACNC: 10.16 UIU/ML (ref 0.27–4.2)
WBC NRBC COR # BLD: 4.62 10*3/MM3 (ref 3.4–10.8)

## 2019-10-16 PROCEDURE — 25010000002 CEFTRIAXONE SODIUM-DEXTROSE 1-3.74 GM-%(50ML) RECONSTITUTED SOLUTION: Performed by: HOSPITALIST

## 2019-10-16 PROCEDURE — 63710000001 INSULIN ASPART PER 5 UNITS: Performed by: HOSPITALIST

## 2019-10-16 PROCEDURE — 99232 SBSQ HOSP IP/OBS MODERATE 35: CPT | Performed by: HOSPITALIST

## 2019-10-16 PROCEDURE — 84439 ASSAY OF FREE THYROXINE: CPT | Performed by: HOSPITALIST

## 2019-10-16 PROCEDURE — 85610 PROTHROMBIN TIME: CPT | Performed by: HOSPITALIST

## 2019-10-16 PROCEDURE — G0378 HOSPITAL OBSERVATION PER HR: HCPCS

## 2019-10-16 PROCEDURE — 82962 GLUCOSE BLOOD TEST: CPT

## 2019-10-16 PROCEDURE — 84443 ASSAY THYROID STIM HORMONE: CPT | Performed by: HOSPITALIST

## 2019-10-16 PROCEDURE — 94799 UNLISTED PULMONARY SVC/PX: CPT

## 2019-10-16 PROCEDURE — 85025 COMPLETE CBC W/AUTO DIFF WBC: CPT | Performed by: HOSPITALIST

## 2019-10-16 PROCEDURE — 63710000001 INSULIN DETEMIR PER 5 UNITS: Performed by: HOSPITALIST

## 2019-10-16 PROCEDURE — 80048 BASIC METABOLIC PNL TOTAL CA: CPT | Performed by: HOSPITALIST

## 2019-10-16 RX ORDER — HYDROXYZINE HYDROCHLORIDE 25 MG/1
25 TABLET, FILM COATED ORAL 3 TIMES DAILY PRN
Status: DISCONTINUED | OUTPATIENT
Start: 2019-10-16 | End: 2019-10-21 | Stop reason: HOSPADM

## 2019-10-16 RX ORDER — PREGABALIN 75 MG/1
150 CAPSULE ORAL EVERY 12 HOURS SCHEDULED
Status: DISCONTINUED | OUTPATIENT
Start: 2019-10-16 | End: 2019-10-20

## 2019-10-16 RX ORDER — LEVOTHYROXINE SODIUM 0.1 MG/1
TABLET ORAL
Status: COMPLETED
Start: 2019-10-16 | End: 2019-10-16

## 2019-10-16 RX ORDER — PREGABALIN 50 MG/1
CAPSULE ORAL
Status: DISCONTINUED
Start: 2019-10-16 | End: 2019-10-16 | Stop reason: WASHOUT

## 2019-10-16 RX ORDER — CLINDAMYCIN PHOSPHATE 600 MG/50ML
INJECTION INTRAVENOUS
Status: COMPLETED
Start: 2019-10-16 | End: 2019-10-16

## 2019-10-16 RX ORDER — LEVOTHYROXINE SODIUM 0.03 MG/1
TABLET ORAL
Status: COMPLETED
Start: 2019-10-16 | End: 2019-10-16

## 2019-10-16 RX ORDER — PETROLATUM 42 G/100G
OINTMENT TOPICAL
Status: DISCONTINUED | OUTPATIENT
Start: 2019-10-16 | End: 2019-10-21

## 2019-10-16 RX ORDER — CEFTRIAXONE 1 G/50ML
1 INJECTION, SOLUTION INTRAVENOUS EVERY 24 HOURS
Status: DISCONTINUED | OUTPATIENT
Start: 2019-10-16 | End: 2019-10-20

## 2019-10-16 RX ADMIN — BUMETANIDE 1 MG: 1 TABLET ORAL at 21:12

## 2019-10-16 RX ADMIN — SODIUM CHLORIDE, PRESERVATIVE FREE 10 ML: 5 INJECTION INTRAVENOUS at 21:15

## 2019-10-16 RX ADMIN — INSULIN ASPART 5 UNITS: 100 INJECTION, SOLUTION INTRAVENOUS; SUBCUTANEOUS at 12:25

## 2019-10-16 RX ADMIN — CARVEDILOL 6.25 MG: 6.25 TABLET, FILM COATED ORAL at 17:34

## 2019-10-16 RX ADMIN — INSULIN DETEMIR 30 UNITS: 100 INJECTION, SOLUTION SUBCUTANEOUS at 10:19

## 2019-10-16 RX ADMIN — LEVOTHYROXINE SODIUM 125 MCG: 125 TABLET ORAL at 06:34

## 2019-10-16 RX ADMIN — FLUTICASONE PROPIONATE 2 SPRAY: 50 SPRAY, METERED NASAL at 10:19

## 2019-10-16 RX ADMIN — SILVER SULFADIAZINE: 10 CREAM TOPICAL at 08:43

## 2019-10-16 RX ADMIN — INSULIN ASPART 5 UNITS: 100 INJECTION, SOLUTION INTRAVENOUS; SUBCUTANEOUS at 17:32

## 2019-10-16 RX ADMIN — VITAMIN D, TAB 1000IU (100/BT) 2000 UNITS: 25 TAB at 08:42

## 2019-10-16 RX ADMIN — ATORVASTATIN CALCIUM 10 MG: 10 TABLET, FILM COATED ORAL at 08:42

## 2019-10-16 RX ADMIN — AMIODARONE HYDROCHLORIDE 200 MG: 200 TABLET ORAL at 08:42

## 2019-10-16 RX ADMIN — TAMSULOSIN HYDROCHLORIDE 0.4 MG: 0.4 CAPSULE ORAL at 08:42

## 2019-10-16 RX ADMIN — INSULIN ASPART 3 UNITS: 100 INJECTION, SOLUTION INTRAVENOUS; SUBCUTANEOUS at 21:41

## 2019-10-16 RX ADMIN — BUDESONIDE AND FORMOTEROL FUMARATE DIHYDRATE 2 PUFF: 160; 4.5 AEROSOL RESPIRATORY (INHALATION) at 21:33

## 2019-10-16 RX ADMIN — WARFARIN SODIUM 9 MG: 4 TABLET ORAL at 21:13

## 2019-10-16 RX ADMIN — NYSTATIN: 100000 POWDER TOPICAL at 21:20

## 2019-10-16 RX ADMIN — LINAGLIPTIN 5 MG: 5 TABLET, FILM COATED ORAL at 08:42

## 2019-10-16 RX ADMIN — BUMETANIDE 1 MG: 1 TABLET ORAL at 08:42

## 2019-10-16 RX ADMIN — CYANOCOBALAMIN TAB 1000 MCG 1000 MCG: 1000 TAB at 08:42

## 2019-10-16 RX ADMIN — CLINDAMYCIN IN 5 PERCENT DEXTROSE 600 MG: 12 INJECTION, SOLUTION INTRAVENOUS at 13:49

## 2019-10-16 RX ADMIN — INSULIN DETEMIR 30 UNITS: 100 INJECTION, SOLUTION SUBCUTANEOUS at 21:40

## 2019-10-16 RX ADMIN — INSULIN ASPART 3 UNITS: 100 INJECTION, SOLUTION INTRAVENOUS; SUBCUTANEOUS at 08:37

## 2019-10-16 RX ADMIN — CLINDAMYCIN IN 5 PERCENT DEXTROSE 600 MG: 12 INJECTION, SOLUTION INTRAVENOUS at 21:16

## 2019-10-16 RX ADMIN — AMIODARONE HYDROCHLORIDE 200 MG: 200 TABLET ORAL at 21:19

## 2019-10-16 RX ADMIN — CEFTRIAXONE 1 G: 1 INJECTION, SOLUTION INTRAVENOUS at 17:34

## 2019-10-16 RX ADMIN — PREGABALIN 150 MG: 75 CAPSULE ORAL at 21:11

## 2019-10-16 RX ADMIN — NYSTATIN: 100000 POWDER TOPICAL at 08:47

## 2019-10-16 RX ADMIN — CARVEDILOL 6.25 MG: 6.25 TABLET, FILM COATED ORAL at 08:42

## 2019-10-16 RX ADMIN — CLINDAMYCIN IN 5 PERCENT DEXTROSE 600 MG: 12 INJECTION, SOLUTION INTRAVENOUS at 06:34

## 2019-10-16 RX ADMIN — BUDESONIDE AND FORMOTEROL FUMARATE DIHYDRATE 2 PUFF: 160; 4.5 AEROSOL RESPIRATORY (INHALATION) at 09:46

## 2019-10-16 RX ADMIN — SODIUM CHLORIDE, PRESERVATIVE FREE 10 ML: 5 INJECTION INTRAVENOUS at 08:47

## 2019-10-16 RX ADMIN — PETROLATUM: 42 OINTMENT TOPICAL at 13:50

## 2019-10-17 LAB
GLUCOSE BLDC GLUCOMTR-MCNC: 178 MG/DL (ref 70–130)
GLUCOSE BLDC GLUCOMTR-MCNC: 180 MG/DL (ref 70–130)
GLUCOSE BLDC GLUCOMTR-MCNC: 203 MG/DL (ref 70–130)
GLUCOSE BLDC GLUCOMTR-MCNC: 242 MG/DL (ref 70–130)
INR PPP: 1.57 (ref 0.9–1.1)
PROTHROMBIN TIME: 18.4 SECONDS (ref 12.1–15)

## 2019-10-17 PROCEDURE — 87205 SMEAR GRAM STAIN: CPT | Performed by: INTERNAL MEDICINE

## 2019-10-17 PROCEDURE — 87070 CULTURE OTHR SPECIMN AEROBIC: CPT | Performed by: INTERNAL MEDICINE

## 2019-10-17 PROCEDURE — 85610 PROTHROMBIN TIME: CPT | Performed by: HOSPITALIST

## 2019-10-17 PROCEDURE — 94799 UNLISTED PULMONARY SVC/PX: CPT

## 2019-10-17 PROCEDURE — G0378 HOSPITAL OBSERVATION PER HR: HCPCS

## 2019-10-17 PROCEDURE — 99231 SBSQ HOSP IP/OBS SF/LOW 25: CPT | Performed by: HOSPITALIST

## 2019-10-17 PROCEDURE — 82962 GLUCOSE BLOOD TEST: CPT

## 2019-10-17 PROCEDURE — 25010000002 CEFTRIAXONE SODIUM-DEXTROSE 1-3.74 GM-%(50ML) RECONSTITUTED SOLUTION: Performed by: HOSPITALIST

## 2019-10-17 PROCEDURE — 63710000001 INSULIN ASPART PER 5 UNITS: Performed by: HOSPITALIST

## 2019-10-17 PROCEDURE — 63710000001 INSULIN DETEMIR PER 5 UNITS: Performed by: HOSPITALIST

## 2019-10-17 PROCEDURE — 87186 SC STD MICRODIL/AGAR DIL: CPT | Performed by: INTERNAL MEDICINE

## 2019-10-17 RX ADMIN — CARVEDILOL 6.25 MG: 6.25 TABLET, FILM COATED ORAL at 09:02

## 2019-10-17 RX ADMIN — WARFARIN SODIUM 9 MG: 4 TABLET ORAL at 21:25

## 2019-10-17 RX ADMIN — CARVEDILOL 6.25 MG: 6.25 TABLET, FILM COATED ORAL at 17:40

## 2019-10-17 RX ADMIN — INSULIN DETEMIR 30 UNITS: 100 INJECTION, SOLUTION SUBCUTANEOUS at 21:47

## 2019-10-17 RX ADMIN — FLUTICASONE PROPIONATE 2 SPRAY: 50 SPRAY, METERED NASAL at 09:01

## 2019-10-17 RX ADMIN — AMIODARONE HYDROCHLORIDE 200 MG: 200 TABLET ORAL at 09:01

## 2019-10-17 RX ADMIN — BUMETANIDE 1 MG: 1 TABLET ORAL at 09:02

## 2019-10-17 RX ADMIN — BUDESONIDE AND FORMOTEROL FUMARATE DIHYDRATE 2 PUFF: 160; 4.5 AEROSOL RESPIRATORY (INHALATION) at 09:28

## 2019-10-17 RX ADMIN — PETROLATUM: 42 OINTMENT TOPICAL at 09:04

## 2019-10-17 RX ADMIN — CLINDAMYCIN IN 5 PERCENT DEXTROSE 600 MG: 12 INJECTION, SOLUTION INTRAVENOUS at 05:29

## 2019-10-17 RX ADMIN — BUDESONIDE AND FORMOTEROL FUMARATE DIHYDRATE 2 PUFF: 160; 4.5 AEROSOL RESPIRATORY (INHALATION) at 20:59

## 2019-10-17 RX ADMIN — SILVER SULFADIAZINE: 10 CREAM TOPICAL at 09:01

## 2019-10-17 RX ADMIN — SODIUM CHLORIDE, PRESERVATIVE FREE 10 ML: 5 INJECTION INTRAVENOUS at 09:03

## 2019-10-17 RX ADMIN — PREGABALIN 150 MG: 75 CAPSULE ORAL at 09:02

## 2019-10-17 RX ADMIN — INSULIN ASPART 3 UNITS: 100 INJECTION, SOLUTION INTRAVENOUS; SUBCUTANEOUS at 08:54

## 2019-10-17 RX ADMIN — TAMSULOSIN HYDROCHLORIDE 0.4 MG: 0.4 CAPSULE ORAL at 09:02

## 2019-10-17 RX ADMIN — ATORVASTATIN CALCIUM 10 MG: 10 TABLET, FILM COATED ORAL at 09:02

## 2019-10-17 RX ADMIN — AMIODARONE HYDROCHLORIDE 200 MG: 200 TABLET ORAL at 21:20

## 2019-10-17 RX ADMIN — BUMETANIDE 1 MG: 1 TABLET ORAL at 21:21

## 2019-10-17 RX ADMIN — INSULIN ASPART 5 UNITS: 100 INJECTION, SOLUTION INTRAVENOUS; SUBCUTANEOUS at 17:39

## 2019-10-17 RX ADMIN — CLINDAMYCIN IN 5 PERCENT DEXTROSE 600 MG: 12 INJECTION, SOLUTION INTRAVENOUS at 13:42

## 2019-10-17 RX ADMIN — PREGABALIN 150 MG: 75 CAPSULE ORAL at 21:24

## 2019-10-17 RX ADMIN — CEFTRIAXONE 1 G: 1 INJECTION, SOLUTION INTRAVENOUS at 17:39

## 2019-10-17 RX ADMIN — INSULIN ASPART 3 UNITS: 100 INJECTION, SOLUTION INTRAVENOUS; SUBCUTANEOUS at 12:59

## 2019-10-17 RX ADMIN — NYSTATIN: 100000 POWDER TOPICAL at 09:03

## 2019-10-17 RX ADMIN — SODIUM CHLORIDE, PRESERVATIVE FREE 10 ML: 5 INJECTION INTRAVENOUS at 21:25

## 2019-10-17 RX ADMIN — CYANOCOBALAMIN TAB 1000 MCG 1000 MCG: 1000 TAB at 09:02

## 2019-10-17 RX ADMIN — VITAMIN D, TAB 1000IU (100/BT) 2000 UNITS: 25 TAB at 09:02

## 2019-10-17 RX ADMIN — INSULIN DETEMIR 30 UNITS: 100 INJECTION, SOLUTION SUBCUTANEOUS at 08:58

## 2019-10-17 RX ADMIN — LEVOTHYROXINE SODIUM 125 MCG: 125 TABLET ORAL at 05:29

## 2019-10-17 RX ADMIN — NYSTATIN: 100000 POWDER TOPICAL at 21:25

## 2019-10-17 RX ADMIN — INSULIN ASPART 5 UNITS: 100 INJECTION, SOLUTION INTRAVENOUS; SUBCUTANEOUS at 21:21

## 2019-10-17 RX ADMIN — LINAGLIPTIN 5 MG: 5 TABLET, FILM COATED ORAL at 09:02

## 2019-10-18 LAB
ANION GAP SERPL CALCULATED.3IONS-SCNC: 9 MMOL/L (ref 5–15)
BASOPHILS # BLD AUTO: 0.02 10*3/MM3 (ref 0–0.2)
BASOPHILS NFR BLD AUTO: 0.3 % (ref 0–1.5)
BUN BLD-MCNC: 36 MG/DL (ref 8–23)
BUN/CREAT SERPL: 15.1 (ref 7–25)
CALCIUM SPEC-SCNC: 9.4 MG/DL (ref 8.6–10.5)
CHLORIDE SERPL-SCNC: 95 MMOL/L (ref 98–107)
CO2 SERPL-SCNC: 29 MMOL/L (ref 22–29)
CREAT BLD-MCNC: 2.38 MG/DL (ref 0.76–1.27)
DEPRECATED RDW RBC AUTO: 45.1 FL (ref 37–54)
EOSINOPHIL # BLD AUTO: 0.59 10*3/MM3 (ref 0–0.4)
EOSINOPHIL NFR BLD AUTO: 9.3 % (ref 0.3–6.2)
ERYTHROCYTE [DISTWIDTH] IN BLOOD BY AUTOMATED COUNT: 15.4 % (ref 12.3–15.4)
GFR SERPL CREATININE-BSD FRML MDRD: 27 ML/MIN/1.73
GLUCOSE BLD-MCNC: 146 MG/DL (ref 65–99)
GLUCOSE BLDC GLUCOMTR-MCNC: 149 MG/DL (ref 70–130)
GLUCOSE BLDC GLUCOMTR-MCNC: 166 MG/DL (ref 70–130)
GLUCOSE BLDC GLUCOMTR-MCNC: 174 MG/DL (ref 70–130)
GLUCOSE BLDC GLUCOMTR-MCNC: 256 MG/DL (ref 70–130)
HCT VFR BLD AUTO: 32.2 % (ref 37.5–51)
HGB BLD-MCNC: 9.7 G/DL (ref 13–17.7)
IMM GRANULOCYTES # BLD AUTO: 0.02 10*3/MM3 (ref 0–0.05)
IMM GRANULOCYTES NFR BLD AUTO: 0.3 % (ref 0–0.5)
INR PPP: 1.55 (ref 0.9–1.1)
LYMPHOCYTES # BLD AUTO: 1.24 10*3/MM3 (ref 0.7–3.1)
LYMPHOCYTES NFR BLD AUTO: 19.5 % (ref 19.6–45.3)
MCH RBC QN AUTO: 24.3 PG (ref 26.6–33)
MCHC RBC AUTO-ENTMCNC: 30.1 G/DL (ref 31.5–35.7)
MCV RBC AUTO: 80.7 FL (ref 79–97)
MONOCYTES # BLD AUTO: 0.83 10*3/MM3 (ref 0.1–0.9)
MONOCYTES NFR BLD AUTO: 13.1 % (ref 5–12)
NEUTROPHILS # BLD AUTO: 3.65 10*3/MM3 (ref 1.7–7)
NEUTROPHILS NFR BLD AUTO: 57.5 % (ref 42.7–76)
NRBC BLD AUTO-RTO: 0 /100 WBC (ref 0–0.2)
PLATELET # BLD AUTO: 209 10*3/MM3 (ref 140–450)
PMV BLD AUTO: 11.4 FL (ref 6–12)
POTASSIUM BLD-SCNC: 4.5 MMOL/L (ref 3.5–5.2)
PROTHROMBIN TIME: 18.2 SECONDS (ref 12.1–15)
RBC # BLD AUTO: 3.99 10*6/MM3 (ref 4.14–5.8)
SODIUM BLD-SCNC: 133 MMOL/L (ref 136–145)
WBC NRBC COR # BLD: 6.35 10*3/MM3 (ref 3.4–10.8)

## 2019-10-18 PROCEDURE — G0378 HOSPITAL OBSERVATION PER HR: HCPCS

## 2019-10-18 PROCEDURE — 99232 SBSQ HOSP IP/OBS MODERATE 35: CPT | Performed by: HOSPITALIST

## 2019-10-18 PROCEDURE — 63710000001 INSULIN DETEMIR PER 5 UNITS: Performed by: HOSPITALIST

## 2019-10-18 PROCEDURE — 82962 GLUCOSE BLOOD TEST: CPT

## 2019-10-18 PROCEDURE — 94799 UNLISTED PULMONARY SVC/PX: CPT

## 2019-10-18 PROCEDURE — 85610 PROTHROMBIN TIME: CPT | Performed by: HOSPITALIST

## 2019-10-18 PROCEDURE — 25010000002 CEFTRIAXONE SODIUM-DEXTROSE 1-3.74 GM-%(50ML) RECONSTITUTED SOLUTION: Performed by: HOSPITALIST

## 2019-10-18 PROCEDURE — 80048 BASIC METABOLIC PNL TOTAL CA: CPT | Performed by: NURSE PRACTITIONER

## 2019-10-18 PROCEDURE — 85025 COMPLETE CBC W/AUTO DIFF WBC: CPT | Performed by: NURSE PRACTITIONER

## 2019-10-18 PROCEDURE — 63710000001 INSULIN ASPART PER 5 UNITS: Performed by: HOSPITALIST

## 2019-10-18 RX ORDER — WARFARIN SODIUM 10 MG/1
10 TABLET ORAL NIGHTLY
Status: DISCONTINUED | OUTPATIENT
Start: 2019-10-18 | End: 2019-10-19

## 2019-10-18 RX ADMIN — INSULIN ASPART 3 UNITS: 100 INJECTION, SOLUTION INTRAVENOUS; SUBCUTANEOUS at 12:43

## 2019-10-18 RX ADMIN — ACETAMINOPHEN 650 MG: 325 TABLET, FILM COATED ORAL at 11:12

## 2019-10-18 RX ADMIN — HYDROXYZINE HYDROCHLORIDE 25 MG: 25 TABLET, FILM COATED ORAL at 11:12

## 2019-10-18 RX ADMIN — ACETAMINOPHEN 650 MG: 325 TABLET, FILM COATED ORAL at 15:18

## 2019-10-18 RX ADMIN — AMIODARONE HYDROCHLORIDE 200 MG: 200 TABLET ORAL at 21:05

## 2019-10-18 RX ADMIN — INSULIN ASPART 8 UNITS: 100 INJECTION, SOLUTION INTRAVENOUS; SUBCUTANEOUS at 17:42

## 2019-10-18 RX ADMIN — CYANOCOBALAMIN TAB 1000 MCG 1000 MCG: 1000 TAB at 08:39

## 2019-10-18 RX ADMIN — SODIUM CHLORIDE, PRESERVATIVE FREE 10 ML: 5 INJECTION INTRAVENOUS at 21:06

## 2019-10-18 RX ADMIN — SODIUM CHLORIDE, PRESERVATIVE FREE 10 ML: 5 INJECTION INTRAVENOUS at 08:40

## 2019-10-18 RX ADMIN — ATORVASTATIN CALCIUM 10 MG: 10 TABLET, FILM COATED ORAL at 08:37

## 2019-10-18 RX ADMIN — BUDESONIDE AND FORMOTEROL FUMARATE DIHYDRATE 2 PUFF: 160; 4.5 AEROSOL RESPIRATORY (INHALATION) at 11:10

## 2019-10-18 RX ADMIN — PREGABALIN 150 MG: 75 CAPSULE ORAL at 08:40

## 2019-10-18 RX ADMIN — BUDESONIDE AND FORMOTEROL FUMARATE DIHYDRATE 2 PUFF: 160; 4.5 AEROSOL RESPIRATORY (INHALATION) at 20:14

## 2019-10-18 RX ADMIN — TAMSULOSIN HYDROCHLORIDE 0.4 MG: 0.4 CAPSULE ORAL at 08:39

## 2019-10-18 RX ADMIN — NYSTATIN: 100000 POWDER TOPICAL at 21:07

## 2019-10-18 RX ADMIN — SILVER SULFADIAZINE: 10 CREAM TOPICAL at 15:03

## 2019-10-18 RX ADMIN — PETROLATUM: 42 OINTMENT TOPICAL at 15:04

## 2019-10-18 RX ADMIN — LINAGLIPTIN 5 MG: 5 TABLET, FILM COATED ORAL at 08:38

## 2019-10-18 RX ADMIN — BUMETANIDE 1 MG: 1 TABLET ORAL at 08:37

## 2019-10-18 RX ADMIN — INSULIN DETEMIR 30 UNITS: 100 INJECTION, SOLUTION SUBCUTANEOUS at 21:11

## 2019-10-18 RX ADMIN — CARVEDILOL 6.25 MG: 6.25 TABLET, FILM COATED ORAL at 17:33

## 2019-10-18 RX ADMIN — PREGABALIN 150 MG: 75 CAPSULE ORAL at 21:05

## 2019-10-18 RX ADMIN — CARVEDILOL 6.25 MG: 6.25 TABLET, FILM COATED ORAL at 08:35

## 2019-10-18 RX ADMIN — INSULIN ASPART 3 UNITS: 100 INJECTION, SOLUTION INTRAVENOUS; SUBCUTANEOUS at 21:05

## 2019-10-18 RX ADMIN — VITAMIN D, TAB 1000IU (100/BT) 2000 UNITS: 25 TAB at 08:38

## 2019-10-18 RX ADMIN — BUMETANIDE 1 MG: 1 TABLET ORAL at 21:05

## 2019-10-18 RX ADMIN — AMIODARONE HYDROCHLORIDE 200 MG: 200 TABLET ORAL at 08:35

## 2019-10-18 RX ADMIN — CEFTRIAXONE 1 G: 1 INJECTION, SOLUTION INTRAVENOUS at 17:36

## 2019-10-18 RX ADMIN — WARFARIN SODIUM 10 MG: 5 TABLET ORAL at 21:05

## 2019-10-18 RX ADMIN — LEVOTHYROXINE SODIUM 125 MCG: 125 TABLET ORAL at 06:04

## 2019-10-18 RX ADMIN — FLUTICASONE PROPIONATE 2 SPRAY: 50 SPRAY, METERED NASAL at 08:38

## 2019-10-18 RX ADMIN — INSULIN DETEMIR 30 UNITS: 100 INJECTION, SOLUTION SUBCUTANEOUS at 08:47

## 2019-10-19 PROBLEM — A41.89 SEPSIS, GRAM POSITIVE (HCC): Status: RESOLVED | Noted: 2019-08-16 | Resolved: 2019-10-19

## 2019-10-19 PROBLEM — B95.5 STREPTOCOCCAL BACTEREMIA: Status: RESOLVED | Noted: 2019-08-16 | Resolved: 2019-10-19

## 2019-10-19 PROBLEM — R78.81 STREPTOCOCCAL BACTEREMIA: Status: RESOLVED | Noted: 2019-08-16 | Resolved: 2019-10-19

## 2019-10-19 PROBLEM — D72.829 LEUKOCYTOSIS: Status: RESOLVED | Noted: 2017-11-22 | Resolved: 2019-10-19

## 2019-10-19 LAB
ALBUMIN SERPL-MCNC: 3.8 G/DL (ref 3.5–5.2)
ALBUMIN/GLOB SERPL: 1.2 G/DL
ALP SERPL-CCNC: 66 U/L (ref 39–117)
ALT SERPL W P-5'-P-CCNC: 8 U/L (ref 1–41)
ANION GAP SERPL CALCULATED.3IONS-SCNC: 10.6 MMOL/L (ref 5–15)
AST SERPL-CCNC: 13 U/L (ref 1–40)
BACTERIA SPEC AEROBE CULT: ABNORMAL
BILIRUB SERPL-MCNC: 0.2 MG/DL (ref 0.2–1.2)
BUN BLD-MCNC: 39 MG/DL (ref 8–23)
BUN/CREAT SERPL: 16.3 (ref 7–25)
CALCIUM SPEC-SCNC: 9.4 MG/DL (ref 8.6–10.5)
CHLORIDE SERPL-SCNC: 100 MMOL/L (ref 98–107)
CO2 SERPL-SCNC: 29.4 MMOL/L (ref 22–29)
CREAT BLD-MCNC: 2.4 MG/DL (ref 0.76–1.27)
GFR SERPL CREATININE-BSD FRML MDRD: 26 ML/MIN/1.73
GLOBULIN UR ELPH-MCNC: 3.2 GM/DL
GLUCOSE BLD-MCNC: 146 MG/DL (ref 65–99)
GLUCOSE BLDC GLUCOMTR-MCNC: 157 MG/DL (ref 70–130)
GLUCOSE BLDC GLUCOMTR-MCNC: 166 MG/DL (ref 70–130)
GLUCOSE BLDC GLUCOMTR-MCNC: 171 MG/DL (ref 70–130)
GLUCOSE BLDC GLUCOMTR-MCNC: 172 MG/DL (ref 70–130)
GRAM STN SPEC: ABNORMAL
GRAM STN SPEC: ABNORMAL
INR PPP: 1.49 (ref 0.9–1.1)
POTASSIUM BLD-SCNC: 4.5 MMOL/L (ref 3.5–5.2)
PROT SERPL-MCNC: 7 G/DL (ref 6–8.5)
PROTHROMBIN TIME: 17.7 SECONDS (ref 12.1–15)
SODIUM BLD-SCNC: 140 MMOL/L (ref 136–145)

## 2019-10-19 PROCEDURE — 25010000002 CEFTRIAXONE SODIUM-DEXTROSE 1-3.74 GM-%(50ML) RECONSTITUTED SOLUTION: Performed by: HOSPITALIST

## 2019-10-19 PROCEDURE — 63710000001 INSULIN ASPART PER 5 UNITS: Performed by: HOSPITALIST

## 2019-10-19 PROCEDURE — 85610 PROTHROMBIN TIME: CPT | Performed by: HOSPITALIST

## 2019-10-19 PROCEDURE — 82962 GLUCOSE BLOOD TEST: CPT

## 2019-10-19 PROCEDURE — 63710000001 INSULIN DETEMIR PER 5 UNITS: Performed by: HOSPITALIST

## 2019-10-19 PROCEDURE — 80053 COMPREHEN METABOLIC PANEL: CPT | Performed by: HOSPITALIST

## 2019-10-19 PROCEDURE — 94799 UNLISTED PULMONARY SVC/PX: CPT

## 2019-10-19 RX ORDER — WARFARIN SODIUM 3 MG/1
6 TABLET ORAL NIGHTLY
Status: DISCONTINUED | OUTPATIENT
Start: 2019-10-19 | End: 2019-10-21 | Stop reason: HOSPADM

## 2019-10-19 RX ORDER — WARFARIN SODIUM 5 MG/1
TABLET ORAL
Status: COMPLETED
Start: 2019-10-19 | End: 2019-10-19

## 2019-10-19 RX ORDER — ALUMINA, MAGNESIA, AND SIMETHICONE 2400; 2400; 240 MG/30ML; MG/30ML; MG/30ML
15 SUSPENSION ORAL EVERY 12 HOURS PRN
Status: DISCONTINUED | OUTPATIENT
Start: 2019-10-19 | End: 2019-10-21 | Stop reason: HOSPADM

## 2019-10-19 RX ORDER — WARFARIN SODIUM 1 MG/1
TABLET ORAL
Status: COMPLETED
Start: 2019-10-19 | End: 2019-10-19

## 2019-10-19 RX ADMIN — NYSTATIN: 100000 POWDER TOPICAL at 21:35

## 2019-10-19 RX ADMIN — CYANOCOBALAMIN TAB 1000 MCG 1000 MCG: 1000 TAB at 08:15

## 2019-10-19 RX ADMIN — ATORVASTATIN CALCIUM 10 MG: 10 TABLET, FILM COATED ORAL at 08:12

## 2019-10-19 RX ADMIN — VITAMIN D, TAB 1000IU (100/BT) 2000 UNITS: 25 TAB at 08:13

## 2019-10-19 RX ADMIN — INSULIN ASPART 3 UNITS: 100 INJECTION, SOLUTION INTRAVENOUS; SUBCUTANEOUS at 08:25

## 2019-10-19 RX ADMIN — WARFARIN SODIUM 6 MG: 3 TABLET ORAL at 22:55

## 2019-10-19 RX ADMIN — WARFARIN SODIUM 6 MG: 5 TABLET ORAL at 22:55

## 2019-10-19 RX ADMIN — CEFTRIAXONE 1 G: 1 INJECTION, SOLUTION INTRAVENOUS at 17:49

## 2019-10-19 RX ADMIN — AMIODARONE HYDROCHLORIDE 200 MG: 200 TABLET ORAL at 21:35

## 2019-10-19 RX ADMIN — ACETAMINOPHEN 650 MG: 325 TABLET, FILM COATED ORAL at 08:20

## 2019-10-19 RX ADMIN — BUMETANIDE 1 MG: 1 TABLET ORAL at 21:35

## 2019-10-19 RX ADMIN — HYDROXYZINE HYDROCHLORIDE 25 MG: 25 TABLET, FILM COATED ORAL at 16:23

## 2019-10-19 RX ADMIN — SODIUM CHLORIDE, PRESERVATIVE FREE 10 ML: 5 INJECTION INTRAVENOUS at 21:33

## 2019-10-19 RX ADMIN — ALUMINUM HYDROXIDE, MAGNESIUM HYDROXIDE, AND DIMETHICONE 15 ML: 400; 400; 40 SUSPENSION ORAL at 13:26

## 2019-10-19 RX ADMIN — BUDESONIDE AND FORMOTEROL FUMARATE DIHYDRATE 2 PUFF: 160; 4.5 AEROSOL RESPIRATORY (INHALATION) at 09:38

## 2019-10-19 RX ADMIN — FLUTICASONE PROPIONATE 2 SPRAY: 50 SPRAY, METERED NASAL at 08:13

## 2019-10-19 RX ADMIN — TAMSULOSIN HYDROCHLORIDE 0.4 MG: 0.4 CAPSULE ORAL at 08:15

## 2019-10-19 RX ADMIN — LINAGLIPTIN 5 MG: 5 TABLET, FILM COATED ORAL at 08:14

## 2019-10-19 RX ADMIN — INSULIN ASPART 3 UNITS: 100 INJECTION, SOLUTION INTRAVENOUS; SUBCUTANEOUS at 21:36

## 2019-10-19 RX ADMIN — ACETAMINOPHEN 650 MG: 325 TABLET, FILM COATED ORAL at 12:45

## 2019-10-19 RX ADMIN — BUMETANIDE 1 MG: 1 TABLET ORAL at 08:12

## 2019-10-19 RX ADMIN — INSULIN ASPART 3 UNITS: 100 INJECTION, SOLUTION INTRAVENOUS; SUBCUTANEOUS at 17:46

## 2019-10-19 RX ADMIN — LEVOTHYROXINE SODIUM 137 MCG: 25 TABLET ORAL at 06:00

## 2019-10-19 RX ADMIN — BUDESONIDE AND FORMOTEROL FUMARATE DIHYDRATE 2 PUFF: 160; 4.5 AEROSOL RESPIRATORY (INHALATION) at 20:42

## 2019-10-19 RX ADMIN — INSULIN DETEMIR 30 UNITS: 100 INJECTION, SOLUTION SUBCUTANEOUS at 08:22

## 2019-10-19 RX ADMIN — HYDROXYZINE HYDROCHLORIDE 25 MG: 25 TABLET, FILM COATED ORAL at 08:19

## 2019-10-19 RX ADMIN — SODIUM CHLORIDE, PRESERVATIVE FREE 10 ML: 5 INJECTION INTRAVENOUS at 08:15

## 2019-10-19 RX ADMIN — CARVEDILOL 6.25 MG: 6.25 TABLET, FILM COATED ORAL at 08:12

## 2019-10-19 RX ADMIN — CARVEDILOL 6.25 MG: 6.25 TABLET, FILM COATED ORAL at 17:47

## 2019-10-19 RX ADMIN — PREGABALIN 150 MG: 75 CAPSULE ORAL at 21:35

## 2019-10-19 RX ADMIN — PREGABALIN 150 MG: 75 CAPSULE ORAL at 08:14

## 2019-10-19 RX ADMIN — INSULIN DETEMIR 30 UNITS: 100 INJECTION, SOLUTION SUBCUTANEOUS at 21:36

## 2019-10-19 RX ADMIN — INSULIN ASPART 3 UNITS: 100 INJECTION, SOLUTION INTRAVENOUS; SUBCUTANEOUS at 12:46

## 2019-10-19 RX ADMIN — AMIODARONE HYDROCHLORIDE 200 MG: 200 TABLET ORAL at 08:11

## 2019-10-20 LAB
ANION GAP SERPL CALCULATED.3IONS-SCNC: 9.9 MMOL/L (ref 5–15)
BACTERIA SPEC AEROBE CULT: NORMAL
BACTERIA SPEC AEROBE CULT: NORMAL
BASOPHILS # BLD AUTO: 0.03 10*3/MM3 (ref 0–0.2)
BASOPHILS NFR BLD AUTO: 0.5 % (ref 0–1.5)
BUN BLD-MCNC: 43 MG/DL (ref 8–23)
BUN/CREAT SERPL: 17.3 (ref 7–25)
CALCIUM SPEC-SCNC: 9.2 MG/DL (ref 8.6–10.5)
CHLORIDE SERPL-SCNC: 97 MMOL/L (ref 98–107)
CO2 SERPL-SCNC: 27.1 MMOL/L (ref 22–29)
CREAT BLD-MCNC: 2.48 MG/DL (ref 0.76–1.27)
DEPRECATED RDW RBC AUTO: 46.5 FL (ref 37–54)
EOSINOPHIL # BLD AUTO: 0.53 10*3/MM3 (ref 0–0.4)
EOSINOPHIL NFR BLD AUTO: 8 % (ref 0.3–6.2)
ERYTHROCYTE [DISTWIDTH] IN BLOOD BY AUTOMATED COUNT: 15.6 % (ref 12.3–15.4)
GFR SERPL CREATININE-BSD FRML MDRD: 25 ML/MIN/1.73
GLUCOSE BLD-MCNC: 139 MG/DL (ref 65–99)
GLUCOSE BLDC GLUCOMTR-MCNC: 161 MG/DL (ref 70–130)
GLUCOSE BLDC GLUCOMTR-MCNC: 162 MG/DL (ref 70–130)
GLUCOSE BLDC GLUCOMTR-MCNC: 191 MG/DL (ref 70–130)
GLUCOSE BLDC GLUCOMTR-MCNC: 236 MG/DL (ref 70–130)
HCT VFR BLD AUTO: 31.8 % (ref 37.5–51)
HGB BLD-MCNC: 9.3 G/DL (ref 13–17.7)
IMM GRANULOCYTES # BLD AUTO: 0.04 10*3/MM3 (ref 0–0.05)
IMM GRANULOCYTES NFR BLD AUTO: 0.6 % (ref 0–0.5)
INR PPP: 1.58 (ref 0.9–1.1)
LYMPHOCYTES # BLD AUTO: 1.03 10*3/MM3 (ref 0.7–3.1)
LYMPHOCYTES NFR BLD AUTO: 15.6 % (ref 19.6–45.3)
MCH RBC QN AUTO: 23.8 PG (ref 26.6–33)
MCHC RBC AUTO-ENTMCNC: 29.2 G/DL (ref 31.5–35.7)
MCV RBC AUTO: 81.3 FL (ref 79–97)
MONOCYTES # BLD AUTO: 0.91 10*3/MM3 (ref 0.1–0.9)
MONOCYTES NFR BLD AUTO: 13.8 % (ref 5–12)
NEUTROPHILS # BLD AUTO: 4.05 10*3/MM3 (ref 1.7–7)
NEUTROPHILS NFR BLD AUTO: 61.5 % (ref 42.7–76)
NRBC BLD AUTO-RTO: 0 /100 WBC (ref 0–0.2)
PLATELET # BLD AUTO: 183 10*3/MM3 (ref 140–450)
PMV BLD AUTO: 11.2 FL (ref 6–12)
POTASSIUM BLD-SCNC: 4.7 MMOL/L (ref 3.5–5.2)
PROTHROMBIN TIME: 18.5 SECONDS (ref 12.1–15)
RBC # BLD AUTO: 3.91 10*6/MM3 (ref 4.14–5.8)
SODIUM BLD-SCNC: 134 MMOL/L (ref 136–145)
WBC NRBC COR # BLD: 6.59 10*3/MM3 (ref 3.4–10.8)

## 2019-10-20 PROCEDURE — 63710000001 INSULIN DETEMIR PER 5 UNITS: Performed by: HOSPITALIST

## 2019-10-20 PROCEDURE — 63710000001 INSULIN ASPART PER 5 UNITS: Performed by: HOSPITALIST

## 2019-10-20 PROCEDURE — 80048 BASIC METABOLIC PNL TOTAL CA: CPT | Performed by: INTERNAL MEDICINE

## 2019-10-20 PROCEDURE — 99232 SBSQ HOSP IP/OBS MODERATE 35: CPT | Performed by: INTERNAL MEDICINE

## 2019-10-20 PROCEDURE — 94799 UNLISTED PULMONARY SVC/PX: CPT

## 2019-10-20 PROCEDURE — 85610 PROTHROMBIN TIME: CPT | Performed by: HOSPITALIST

## 2019-10-20 PROCEDURE — 85025 COMPLETE CBC W/AUTO DIFF WBC: CPT | Performed by: INTERNAL MEDICINE

## 2019-10-20 PROCEDURE — 25010000002 CEFTRIAXONE SODIUM-DEXTROSE 1-3.74 GM-%(50ML) RECONSTITUTED SOLUTION: Performed by: HOSPITALIST

## 2019-10-20 PROCEDURE — 82962 GLUCOSE BLOOD TEST: CPT

## 2019-10-20 RX ORDER — CLINDAMYCIN PHOSPHATE 600 MG/50ML
INJECTION INTRAVENOUS
Status: COMPLETED
Start: 2019-10-20 | End: 2019-10-20

## 2019-10-20 RX ORDER — CLINDAMYCIN PHOSPHATE 600 MG/50ML
600 INJECTION INTRAVENOUS EVERY 8 HOURS
Status: DISCONTINUED | OUTPATIENT
Start: 2019-10-20 | End: 2019-10-21 | Stop reason: HOSPADM

## 2019-10-20 RX ORDER — PREGABALIN 50 MG/1
100 CAPSULE ORAL 3 TIMES DAILY
Status: DISCONTINUED | OUTPATIENT
Start: 2019-10-20 | End: 2019-10-21 | Stop reason: HOSPADM

## 2019-10-20 RX ADMIN — NYSTATIN: 100000 POWDER TOPICAL at 20:01

## 2019-10-20 RX ADMIN — CEFTRIAXONE 1 G: 1 INJECTION, SOLUTION INTRAVENOUS at 16:30

## 2019-10-20 RX ADMIN — BUDESONIDE AND FORMOTEROL FUMARATE DIHYDRATE 2 PUFF: 160; 4.5 AEROSOL RESPIRATORY (INHALATION) at 08:55

## 2019-10-20 RX ADMIN — FLUTICASONE PROPIONATE 2 SPRAY: 50 SPRAY, METERED NASAL at 08:26

## 2019-10-20 RX ADMIN — INSULIN ASPART 3 UNITS: 100 INJECTION, SOLUTION INTRAVENOUS; SUBCUTANEOUS at 12:26

## 2019-10-20 RX ADMIN — AMIODARONE HYDROCHLORIDE 200 MG: 200 TABLET ORAL at 08:26

## 2019-10-20 RX ADMIN — PREGABALIN 100 MG: 50 CAPSULE ORAL at 20:59

## 2019-10-20 RX ADMIN — CLINDAMYCIN IN 5 PERCENT DEXTROSE 600 MG: 12 INJECTION, SOLUTION INTRAVENOUS at 20:00

## 2019-10-20 RX ADMIN — CARVEDILOL 6.25 MG: 6.25 TABLET, FILM COATED ORAL at 08:21

## 2019-10-20 RX ADMIN — BUMETANIDE 1 MG: 1 TABLET ORAL at 20:01

## 2019-10-20 RX ADMIN — INSULIN ASPART 5 UNITS: 100 INJECTION, SOLUTION INTRAVENOUS; SUBCUTANEOUS at 21:00

## 2019-10-20 RX ADMIN — INSULIN DETEMIR 30 UNITS: 100 INJECTION, SOLUTION SUBCUTANEOUS at 22:09

## 2019-10-20 RX ADMIN — LINAGLIPTIN 5 MG: 5 TABLET, FILM COATED ORAL at 08:24

## 2019-10-20 RX ADMIN — CARVEDILOL 6.25 MG: 6.25 TABLET, FILM COATED ORAL at 17:28

## 2019-10-20 RX ADMIN — SODIUM CHLORIDE, PRESERVATIVE FREE 10 ML: 5 INJECTION INTRAVENOUS at 20:06

## 2019-10-20 RX ADMIN — INSULIN DETEMIR 30 UNITS: 100 INJECTION, SOLUTION SUBCUTANEOUS at 08:24

## 2019-10-20 RX ADMIN — ATORVASTATIN CALCIUM 10 MG: 10 TABLET, FILM COATED ORAL at 08:21

## 2019-10-20 RX ADMIN — WARFARIN SODIUM 6 MG: 3 TABLET ORAL at 20:01

## 2019-10-20 RX ADMIN — BUMETANIDE 1 MG: 1 TABLET ORAL at 08:22

## 2019-10-20 RX ADMIN — PREGABALIN 100 MG: 50 CAPSULE ORAL at 16:30

## 2019-10-20 RX ADMIN — CLINDAMYCIN PHOSPHATE 600 MG: 600 INJECTION, SOLUTION INTRAVENOUS at 20:00

## 2019-10-20 RX ADMIN — TAMSULOSIN HYDROCHLORIDE 0.4 MG: 0.4 CAPSULE ORAL at 08:21

## 2019-10-20 RX ADMIN — INSULIN ASPART 3 UNITS: 100 INJECTION, SOLUTION INTRAVENOUS; SUBCUTANEOUS at 17:28

## 2019-10-20 RX ADMIN — BUDESONIDE AND FORMOTEROL FUMARATE DIHYDRATE 2 PUFF: 160; 4.5 AEROSOL RESPIRATORY (INHALATION) at 20:42

## 2019-10-20 RX ADMIN — CYANOCOBALAMIN TAB 1000 MCG 1000 MCG: 1000 TAB at 08:21

## 2019-10-20 RX ADMIN — VITAMIN D, TAB 1000IU (100/BT) 2000 UNITS: 25 TAB at 08:21

## 2019-10-20 RX ADMIN — NYSTATIN: 100000 POWDER TOPICAL at 08:23

## 2019-10-20 RX ADMIN — PETROLATUM: 42 OINTMENT TOPICAL at 08:26

## 2019-10-20 RX ADMIN — SODIUM CHLORIDE, PRESERVATIVE FREE 10 ML: 5 INJECTION INTRAVENOUS at 08:22

## 2019-10-20 RX ADMIN — INSULIN ASPART 3 UNITS: 100 INJECTION, SOLUTION INTRAVENOUS; SUBCUTANEOUS at 08:22

## 2019-10-20 RX ADMIN — AMIODARONE HYDROCHLORIDE 200 MG: 200 TABLET ORAL at 20:01

## 2019-10-20 RX ADMIN — PREGABALIN 150 MG: 75 CAPSULE ORAL at 08:23

## 2019-10-20 RX ADMIN — SILVER SULFADIAZINE: 10 CREAM TOPICAL at 10:30

## 2019-10-20 RX ADMIN — LEVOTHYROXINE SODIUM 137 MCG: 25 TABLET ORAL at 05:38

## 2019-10-21 VITALS
HEIGHT: 73 IN | RESPIRATION RATE: 16 BRPM | OXYGEN SATURATION: 95 % | HEART RATE: 73 BPM | BODY MASS INDEX: 39.95 KG/M2 | TEMPERATURE: 97.2 F | DIASTOLIC BLOOD PRESSURE: 62 MMHG | WEIGHT: 301.44 LBS | SYSTOLIC BLOOD PRESSURE: 110 MMHG

## 2019-10-21 LAB
ANION GAP SERPL CALCULATED.3IONS-SCNC: 10.6 MMOL/L (ref 5–15)
BUN BLD-MCNC: 48 MG/DL (ref 8–23)
BUN/CREAT SERPL: 20.9 (ref 7–25)
CALCIUM SPEC-SCNC: 9.2 MG/DL (ref 8.6–10.5)
CHLORIDE SERPL-SCNC: 100 MMOL/L (ref 98–107)
CO2 SERPL-SCNC: 27.4 MMOL/L (ref 22–29)
CREAT BLD-MCNC: 2.3 MG/DL (ref 0.76–1.27)
GFR SERPL CREATININE-BSD FRML MDRD: 28 ML/MIN/1.73
GLUCOSE BLD-MCNC: 188 MG/DL (ref 65–99)
GLUCOSE BLDC GLUCOMTR-MCNC: 157 MG/DL (ref 70–130)
GLUCOSE BLDC GLUCOMTR-MCNC: 241 MG/DL (ref 70–130)
INR PPP: 1.51 (ref 0.9–1.1)
POTASSIUM BLD-SCNC: 4.6 MMOL/L (ref 3.5–5.2)
PROTHROMBIN TIME: 17.8 SECONDS (ref 12.1–15)
SODIUM BLD-SCNC: 138 MMOL/L (ref 136–145)

## 2019-10-21 PROCEDURE — 99238 HOSP IP/OBS DSCHRG MGMT 30/<: CPT | Performed by: HOSPITALIST

## 2019-10-21 PROCEDURE — 80048 BASIC METABOLIC PNL TOTAL CA: CPT | Performed by: INTERNAL MEDICINE

## 2019-10-21 PROCEDURE — 85610 PROTHROMBIN TIME: CPT | Performed by: HOSPITALIST

## 2019-10-21 PROCEDURE — 63710000001 INSULIN DETEMIR PER 5 UNITS: Performed by: HOSPITALIST

## 2019-10-21 PROCEDURE — 82962 GLUCOSE BLOOD TEST: CPT

## 2019-10-21 PROCEDURE — 63710000001 INSULIN ASPART PER 5 UNITS: Performed by: HOSPITALIST

## 2019-10-21 PROCEDURE — 94799 UNLISTED PULMONARY SVC/PX: CPT

## 2019-10-21 RX ORDER — MINOCYCLINE HYDROCHLORIDE 100 MG/1
100 CAPSULE ORAL 2 TIMES DAILY
Qty: 20 CAPSULE | Refills: 0 | Status: SHIPPED | OUTPATIENT
Start: 2019-10-21 | End: 2019-10-31

## 2019-10-21 RX ORDER — CLINDAMYCIN PHOSPHATE 600 MG/50ML
INJECTION INTRAVENOUS
Status: COMPLETED
Start: 2019-10-21 | End: 2019-10-21

## 2019-10-21 RX ORDER — PETROLATUM 42 G/100G
OINTMENT TOPICAL AS NEEDED
Start: 2019-10-21 | End: 2022-01-01 | Stop reason: HOSPADM

## 2019-10-21 RX ORDER — ACETAMINOPHEN 325 MG/1
650 TABLET ORAL EVERY 4 HOURS PRN
Start: 2019-10-21 | End: 2022-01-01 | Stop reason: HOSPADM

## 2019-10-21 RX ORDER — PETROLATUM 42 G/100G
OINTMENT TOPICAL AS NEEDED
Status: DISCONTINUED | OUTPATIENT
Start: 2019-10-21 | End: 2019-10-21 | Stop reason: HOSPADM

## 2019-10-21 RX ADMIN — CYANOCOBALAMIN TAB 1000 MCG 1000 MCG: 1000 TAB at 09:20

## 2019-10-21 RX ADMIN — LINAGLIPTIN 5 MG: 5 TABLET, FILM COATED ORAL at 09:20

## 2019-10-21 RX ADMIN — CLINDAMYCIN IN 5 PERCENT DEXTROSE 600 MG: 12 INJECTION, SOLUTION INTRAVENOUS at 02:56

## 2019-10-21 RX ADMIN — BUMETANIDE 1 MG: 1 TABLET ORAL at 09:20

## 2019-10-21 RX ADMIN — FLUTICASONE PROPIONATE 2 SPRAY: 50 SPRAY, METERED NASAL at 09:20

## 2019-10-21 RX ADMIN — CLINDAMYCIN PHOSPHATE 600 MG: 600 INJECTION, SOLUTION INTRAVENOUS at 02:56

## 2019-10-21 RX ADMIN — VITAMIN D, TAB 1000IU (100/BT) 2000 UNITS: 25 TAB at 09:20

## 2019-10-21 RX ADMIN — SODIUM CHLORIDE, PRESERVATIVE FREE 10 ML: 5 INJECTION INTRAVENOUS at 09:19

## 2019-10-21 RX ADMIN — PREGABALIN 100 MG: 50 CAPSULE ORAL at 09:20

## 2019-10-21 RX ADMIN — INSULIN DETEMIR 30 UNITS: 100 INJECTION, SOLUTION SUBCUTANEOUS at 08:18

## 2019-10-21 RX ADMIN — LEVOTHYROXINE SODIUM 137 MCG: 25 TABLET ORAL at 06:07

## 2019-10-21 RX ADMIN — CLINDAMYCIN IN 5 PERCENT DEXTROSE 600 MG: 12 INJECTION, SOLUTION INTRAVENOUS at 12:15

## 2019-10-21 RX ADMIN — PREGABALIN 100 MG: 50 CAPSULE ORAL at 15:53

## 2019-10-21 RX ADMIN — INSULIN ASPART 3 UNITS: 100 INJECTION, SOLUTION INTRAVENOUS; SUBCUTANEOUS at 08:18

## 2019-10-21 RX ADMIN — TAMSULOSIN HYDROCHLORIDE 0.4 MG: 0.4 CAPSULE ORAL at 09:20

## 2019-10-21 RX ADMIN — BUDESONIDE AND FORMOTEROL FUMARATE DIHYDRATE 2 PUFF: 160; 4.5 AEROSOL RESPIRATORY (INHALATION) at 09:15

## 2019-10-21 RX ADMIN — AMIODARONE HYDROCHLORIDE 200 MG: 200 TABLET ORAL at 09:20

## 2019-10-21 RX ADMIN — CARVEDILOL 6.25 MG: 6.25 TABLET, FILM COATED ORAL at 09:21

## 2019-10-21 RX ADMIN — PETROLATUM: 42 OINTMENT TOPICAL at 12:15

## 2019-10-21 RX ADMIN — NYSTATIN: 100000 POWDER TOPICAL at 08:20

## 2019-10-21 RX ADMIN — INSULIN ASPART 5 UNITS: 100 INJECTION, SOLUTION INTRAVENOUS; SUBCUTANEOUS at 12:57

## 2019-10-21 RX ADMIN — ATORVASTATIN CALCIUM 10 MG: 10 TABLET, FILM COATED ORAL at 09:20

## 2019-10-22 ENCOUNTER — READMISSION MANAGEMENT (OUTPATIENT)
Dept: CALL CENTER | Facility: HOSPITAL | Age: 79
End: 2019-10-22

## 2019-10-22 NOTE — OUTREACH NOTE
Prep Survey      Responses   Facility patient discharged from?  [facility]   Is LACE score < 7 ?  No   Is patient eligible?  Yes   Discharge diagnosis  Cellulitis of RLE, uncontrolled DM II, diabetic neuropathy, chronic diastolic CHF, chronic afbi. CKD III, HLD, Hypothyroidism, chronic anemia, Vit. D. Def.   Does the patient have one of the following disease processes/diagnoses(primary or secondary)?  Other   Does the patient have Home health ordered?  Yes   [facility]    Is there a DME ordered?  No   What DME was ordered?  Has an O2 concentrator that he bought on [employer]   Comments regarding appointments  Pt to schedule follow up [facility] PCP   Prep survey completed?  Yes          Yaima Solano RN

## 2019-10-24 ENCOUNTER — READMISSION MANAGEMENT (OUTPATIENT)
Dept: CALL CENTER | Facility: HOSPITAL | Age: 79
End: 2019-10-24

## 2019-10-24 NOTE — OUTREACH NOTE
Medical Week 1 Survey      Responses   Facility patient discharged from?  [facility]   Does the patient have one of the following disease processes/diagnoses(primary or secondary)?  Other   Is there a successful TCM telephone encounter documented?  No   Week 1 attempt successful?  Yes   Call start time  1518   Call end time  1524   Discharge diagnosis  Cellulitis of RLE, uncontrolled DM II, diabetic neuropathy, chronic diastolic CHF, chronic afbi. CKD III, HLD, Hypothyroidism, chronic anemia, Vit. D. Def.   Meds reviewed with patient/caregiver?  Yes   Is the patient having any side effects they believe may be caused by any medication additions or changes?  No   Does the patient have all medications ordered at discharge?  Yes   Is the patient taking all medications as directed (includes completed medication regime)?  Yes   Does the patient have a primary care provider?   Yes   Does the patient have an appointment with their PCP within 7 days of discharge?  Greater than 7 days   Comments regarding PCP  Appointment with PCP is 10/29 or 10/30   What is preventing the patient from scheduling follow up appointments within 7 days of discharge?  Earlier appointment not available   Nursing Interventions  Educated patient on importance of making appointment   Has the patient kept scheduled appointments due by today?  N/A   What is the Home health agency?   [facility]   Has home health visited the patient within 72 hours of discharge?  Unsure   What DME was ordered?  Has an O2 concentrator that he bought on [employer]   Has all DME been delivered?  Yes   Psychosocial issues?  No   Did the patient receive a copy of their discharge instructions?  Yes   What is the patient's perception of their health status since discharge?  Improving [Leg is not as red but he still blisters.]   Is the patient/caregiver able to teach back signs and symptoms related to disease process for when to call PCP?  Yes   Is the patient/caregiver able to teach  back signs and symptoms related to disease process for when to call 911?  Yes   Is the patient/caregiver able to teach back the hierarchy of who to call/visit for symptoms/problems? PCP, Specialist, Home health nurse, Urgent Care, ED, 911  Yes   Additional teach back comments  Flu vaccine-already has received it   Week 1 call completed?  Yes   Wrap up additional comments  Pt reports his BS's have been running 150-175          Kassy Torres RN

## 2019-10-28 ENCOUNTER — ANTICOAGULATION VISIT (OUTPATIENT)
Dept: PHARMACY | Facility: HOSPITAL | Age: 79
End: 2019-10-28

## 2019-10-28 ENCOUNTER — LAB (OUTPATIENT)
Dept: LAB | Facility: HOSPITAL | Age: 79
End: 2019-10-28

## 2019-10-28 DIAGNOSIS — I48.91 ATRIAL FIBRILLATION WITH RVR (HCC): ICD-10-CM

## 2019-10-28 LAB
INR PPP: 1.43 (ref 0.9–1.1)
PROTHROMBIN TIME: 17.1 SECONDS (ref 12.1–15)

## 2019-10-28 PROCEDURE — 36415 COLL VENOUS BLD VENIPUNCTURE: CPT

## 2019-10-28 PROCEDURE — 85610 PROTHROMBIN TIME: CPT

## 2019-10-28 NOTE — PROGRESS NOTES
Anticoagulation Clinic Progress Note    Anticoagulation Summary  As of 10/28/2019    INR goal:   2.0-3.0   TTR:   0.0 % (1 mo)   INR used for dosin.43! (10/28/2019)   Warfarin maintenance plan:   9 mg every day   Weekly warfarin total:   63 mg   Plan last modified:   Lester Scott RPH (10/15/2019)   Next INR check:   2019   Target end date:       Indications    Atrial fibrillation with RVR (CMS/HCC) [I48.91]             Anticoagulation Episode Summary     INR check location:       Preferred lab:       Send INR reminders to:   [facility] CLINICAL POOL    Comments:   **[facility]**      Anticoagulation Care Providers     Provider Role Specialty Phone number    Darwin Monaco III, MD Referring Cardiology [phone]          INR History:  Anticoagulation Monitoring 10/2/2019 10/11/2019 10/28/2019   INR 1.45 1.36 1.43   INR Date 10/2/2019 10/11/2019 10/28/2019   INR Goal 2.0-3.0 2.0-3.0 2.0-3.0   Trend Same Up Same   Last Week Total 42 mg 57 mg 54 mg   Next Week Total 57 mg 63 mg 63 mg   Sun 9 mg 9 mg 9 mg   Mon 6 mg 9 mg 9 mg   Tue 9 mg 9 mg 9 mg   Wed 9 mg 9 mg 9 mg   Thu 9 mg 9 mg 9 mg   Fri 6 mg 9 mg 9 mg   Sat 9 mg 9 mg 9 mg   Visit Report - - -   Some recent data might be hidden       Plan:  1. INR is Subtherapeutic today and pt states he missed a dose last week- see above in Anticoagulation Summary.   Will instruct Froilan Helton to Increase their warfarin regimen but he is resistant to taking any higher warfarin dosing.- see above in Anticoagulation Summary.  2. Follow up with INR on .  Will contact LENORA ZHANG to discuss if INR ,2 as likely that pt will not be therapeutic at current dosing.  3. Patient expresses understanding and has no further questions at this time.    Anayeli Gutierrez Formerly [facility]

## 2019-10-31 ENCOUNTER — READMISSION MANAGEMENT (OUTPATIENT)
Dept: CALL CENTER | Facility: HOSPITAL | Age: 79
End: 2019-10-31

## 2019-10-31 NOTE — OUTREACH NOTE
"Medical Week 2 Survey      Responses   Facility patient discharged from?  [facility]   Does the patient have one of the following disease processes/diagnoses(primary or secondary)?  Other   Week 2 attempt successful?  Yes   Call start time  0951   Discharge diagnosis  Cellulitis of RLE, uncontrolled DM II, diabetic neuropathy, chronic diastolic CHF, chronic afbi. CKD III, HLD, Hypothyroidism, chronic anemia, Vit. D. Def.   Call end time  0955   Person spoke with today (if not patient) and relationship  Lina-spouse   Meds reviewed with patient/caregiver?  Yes   Is the patient taking all medications as directed (includes completed medication regime)?  Yes   Does the patient have a primary care provider?   Yes   Comments regarding PCP  Pt saw PCP 10/29/19   Has the patient kept scheduled appointments due by today?  Yes   What is the Home health agency?   [facility]   Home health comments  RN called [facility] and spoke with Marly. She reported patient is current and the nurse was in the home last week and pt is set for a visit today.   What DME was ordered?  Pt uses O2 PRN   Has all DME been delivered?  Yes   Psychosocial issues?  No   What is the patient's perception of their health status since discharge?  Same [Lina reports, \"His knees are getting weaker. He's been falling quite a bit.\"]   Additional teach back comments  Flu vaccine-already has received it   Week 2 Call Completed?  Yes   Wrap up additional comments  Wife has been in the hospital having a pacemaker placed.           Kassy Torres RN  "

## 2019-11-04 ENCOUNTER — ANTICOAGULATION VISIT (OUTPATIENT)
Dept: PHARMACY | Facility: HOSPITAL | Age: 79
End: 2019-11-04

## 2019-11-04 ENCOUNTER — TELEPHONE (OUTPATIENT)
Dept: PHARMACY | Facility: HOSPITAL | Age: 79
End: 2019-11-04

## 2019-11-04 ENCOUNTER — LAB (OUTPATIENT)
Dept: LAB | Facility: HOSPITAL | Age: 79
End: 2019-11-04

## 2019-11-04 DIAGNOSIS — I48.91 ATRIAL FIBRILLATION WITH RVR (HCC): ICD-10-CM

## 2019-11-04 LAB
INR PPP: 1.26 (ref 0.9–1.1)
PROTHROMBIN TIME: 15.5 SECONDS (ref 12.1–15)

## 2019-11-04 PROCEDURE — 85610 PROTHROMBIN TIME: CPT

## 2019-11-04 PROCEDURE — 36415 COLL VENOUS BLD VENIPUNCTURE: CPT

## 2019-11-04 NOTE — PROGRESS NOTES
Anticoagulation Clinic Progress Note    Anticoagulation Summary  As of 2019    INR goal:   2.0-3.0   TTR:   0.0 % (1.3 mo)   INR used for dosin.26! (2019)   Warfarin maintenance plan:   9 mg every day   Weekly warfarin total:   63 mg   No change documented:   Lester Scott RPH   Plan last modified:   Lester Scott RPH (10/15/2019)   Next INR check:   2019   Target end date:       Indications    Atrial fibrillation with RVR (CMS/HCC) [I48.91]             Anticoagulation Episode Summary     INR check location:       Preferred lab:       Send INR reminders to:    [facility] PO[facility]    Comments:   **[facility]**      Anticoagulation Care Providers     Provider Role Specialty Phone number    Darwin Monaco III, MD Referring Cardiology [phone]            Clinic Interview:  Patient Findings     Positives:   Missed doses, Change in medications, Other complaints    Negatives:   Signs/symptoms of thrombosis, Signs/symptoms of bleeding,   Laboratory test error suspected, Change in health, Change in alcohol use,   Change in activity, Upcoming invasive procedure, Emergency department   visit, Upcoming dental procedure, Extra doses, Change in diet/appetite,   Hospital admission, Bruising    Comments:   Reports 2 missed doses. Finished minocycline last night. Pt   refuses dose change and indicates he will continue 9 mg daily. He is   adamant that he will discontinue upon finishing current warfarin supply   expected to be completed in <1 wk.       Clinical Outcomes     Negatives:   Major bleeding event, Thromboembolic event,   Anticoagulation-related hospital admission, Anticoagulation-related ED   visit, Anticoagulation-related fatality    Comments:   Reports 2 missed doses. Finished minocycline last night. Pt   refuses dose change and indicates he will continue 9 mg daily. He is   adamant that he will discontinue upon finishing current warfarin supply   expected to be completed in <1 wk.          INR History:  Anticoagulation Monitoring 10/11/2019 10/28/2019 11/4/2019   INR 1.36 1.43 1.26   INR Date 10/11/2019 10/28/2019 11/4/2019   INR Goal 2.0-3.0 2.0-3.0 2.0-3.0   Trend Up Same Same   Last Week Total 57 mg 54 mg 45 mg   Next Week Total 63 mg 63 mg 63 mg   Sun 9 mg 9 mg -   Mon 9 mg 9 mg -   Tue 9 mg 9 mg -   Wed 9 mg 9 mg -   Thu 9 mg 9 mg -   Fri 9 mg 9 mg -   Sat 9 mg 9 mg -   Visit Report - - -   Some recent data might be hidden       Plan:  1. INR is Subtherapeutic today- see above in Anticoagulation Summary.   Attempted to instruct Froilan Helton to increase their warfarin regimen; however, pt is resistant to changing dose or continuing warfarin beyond this week.  2. Follow up TBD based on Cardiology input in light of pt's resistance to warfarin therapy.  3. They have been instructed to call if any changes in medications, doses, concerns, etc. Patient expresses understanding and has no further questions at this time.    Lester Scott [facility]

## 2019-11-07 ENCOUNTER — HOSPITAL ENCOUNTER (EMERGENCY)
Facility: HOSPITAL | Age: 79
Discharge: HOME | End: 2019-11-07
Attending: EMERGENCY MEDICINE | Admitting: EMERGENCY MEDICINE

## 2019-11-07 ENCOUNTER — READMISSION MANAGEMENT (OUTPATIENT)
Dept: CALL CENTER | Facility: HOSPITAL | Age: 79
End: 2019-11-07

## 2019-11-07 VITALS
HEART RATE: 74 BPM | TEMPERATURE: 98.1 F | RESPIRATION RATE: 20 BRPM | DIASTOLIC BLOOD PRESSURE: 68 MMHG | HEIGHT: 73 IN | BODY MASS INDEX: 40.82 KG/M2 | SYSTOLIC BLOOD PRESSURE: 122 MMHG | OXYGEN SATURATION: 96 % | WEIGHT: 308 LBS

## 2019-11-07 DIAGNOSIS — S81.811A SKIN TEAR OF RIGHT LOWER LEG WITHOUT COMPLICATION, INITIAL ENCOUNTER: Primary | ICD-10-CM

## 2019-11-07 DIAGNOSIS — S91.112A LACERATION OF LEFT GREAT TOE WITHOUT FOREIGN BODY PRESENT OR DAMAGE TO NAIL, INITIAL ENCOUNTER: ICD-10-CM

## 2019-11-07 PROCEDURE — 99283 EMERGENCY DEPT VISIT LOW MDM: CPT

## 2019-11-07 PROCEDURE — 12002 RPR S/N/AX/GEN/TRNK2.6-7.5CM: CPT | Performed by: EMERGENCY MEDICINE

## 2019-11-07 RX ORDER — LIDOCAINE HYDROCHLORIDE 10 MG/ML
0.5 INJECTION, SOLUTION EPIDURAL; INFILTRATION; INTRACAUDAL; PERINEURAL ONCE
Status: COMPLETED | OUTPATIENT
Start: 2019-11-07 | End: 2019-11-07

## 2019-11-07 RX ORDER — LIDOCAINE HYDROCHLORIDE 10 MG/ML
INJECTION, SOLUTION EPIDURAL; INFILTRATION; INTRACAUDAL; PERINEURAL
Status: COMPLETED
Start: 2019-11-07 | End: 2019-11-07

## 2019-11-07 RX ADMIN — LIDOCAINE HYDROCHLORIDE 0.5 ML: 10 INJECTION, SOLUTION EPIDURAL; INFILTRATION; INTRACAUDAL; PERINEURAL at 00:18

## 2019-11-07 NOTE — OUTREACH NOTE
Medical Week 3 Survey      Responses   Facility patient discharged from?  [facility]   Does the patient have one of the following disease processes/diagnoses(primary or secondary)?  Other   Week 3 attempt successful?  Yes   Call start time  1413   Call end time  1421   Discharge diagnosis  Cellulitis of RLE, uncontrolled DM II, diabetic neuropathy, chronic diastolic CHF, chronic afbi. CKD III, HLD, Hypothyroidism, chronic anemia, Vit. D. Def.   Is patient permission given to speak with other caregiver?  Yes   List who call center can speak with  Lina, wife   Person spoke with today (if not patient) and relationship  Lina-spouse   Meds reviewed with patient/caregiver?  Yes   Is the patient having any side effects they believe may be caused by any medication additions or changes?  No   Does the patient have all medications ordered at discharge?  Yes   Is the patient taking all medications as directed (includes completed medication regime)?  Yes   Does the patient have a primary care provider?   Yes   Has the patient kept scheduled appointments due by today?  Yes   [facility] agency?   [facility]    Home health comments  Continued HH.    Psychosocial issues?  No   Did the patient receive a copy of their discharge instructions?  Yes   Nursing interventions  Reviewed instructions with patient   What is the patient's perception of their health status since discharge?  New symptoms unrelated to diagnosis [Patient with recent ER visit due to fall / laceration. ]   Is the patient/caregiver able to teach back signs and symptoms related to disease process for when to call PCP?  Yes   Is the patient/caregiver able to teach back signs and symptoms related to disease process for when to call 911?  Yes   Is the patient/caregiver able to teach back the hierarchy of who to call/visit for symptoms/problems? PCP, Specialist, Home health nurse, Urgent Care, ED, 911  Yes   Week 3 Call Completed?  Yes          Yaima NEFF  Ariela, RN

## 2019-11-07 NOTE — ED NOTES
Pt presents to ER via EMS from home with c/o fall in bathroom when knees gave out on him.     Pt has previous wounds to bilateral LE that is being treated by a wound nurse. Pt has ace bandages to both legs. L leg has specialty calcium dressing that is not removed per Dr Elliott. However no injury noted undeneath dressing. R leg has new laceration to mid shin 1-2 inches distal to existing wound that is covered with medicated dressing. Area cleaned with NS and leg re wrapped with kerlex and ace bandages.   Laceration with skin flap to pad of L great toe. Pt says feet and legs are dull to touch, but that is his baseline. Cap refill < 3 seconds.      Bleeding controlled upon arrival to ED. Dried blood present to R leg dressing and L great toe.    L toe numbed by Dr Elliott and sutured. Area cleaned and dressed.      Isi Munguia, RN  11/07/19 0026

## 2019-11-07 NOTE — ED PROVIDER NOTES
Subjective     History provided by:  Patient and EMS personnel    History of Present Illness    · Chief complaint: Fall    · Location: Lacerations to the right shin and in the left great toe    · Quality/Severity: The patient has wounds on his right shin and left great toe that are bleeding.    · Timing/Onset: Fall occurred an hour ago.    · Modifying Factors: Fall occurred because his legs gave out on him.    · Associated symptoms: He had no loss of consciousness and did not strike his head.    · Narrative: The patient is a 78-year-old white male who states that his legs gave out on him in the bathtub and he fell injuring his right anterior lower leg and left great toe.  He is under the care of the wound center for his chronic wounds on his left lower leg.  He has chronic edema of his lower extremities.    Review of Systems   Constitutional: Negative for chills and fever.   HENT: Negative for congestion, ear pain, nosebleeds, rhinorrhea, sinus pain, sore throat and voice change.    Eyes: Negative for pain and visual disturbance.   Respiratory: Negative for cough and shortness of breath.    Cardiovascular: Negative for chest pain.   Gastrointestinal: Negative for abdominal pain, diarrhea, nausea and vomiting.   Genitourinary: Negative for difficulty urinating.   Musculoskeletal: Negative for back pain, neck pain and neck stiffness.   Skin: Positive for wound ( Right lower leg shin and left great toe).   Neurological: Negative for dizziness, seizures, weakness, light-headedness, numbness and headaches.   Psychiatric/Behavioral: Negative for confusion.     Past Medical History:   Diagnosis Date   • Arthritis    • Asthma    • Cancer (CMS/HCC)     skin   • Cataract    • COPD (chronic obstructive pulmonary disease) (CMS/HCC)    • Diabetes mellitus (CMS/HCC)    • Disease of thyroid gland    • Hypertension    • Kidney stone    • Myocardial infarct, old    • Renal disorder      /68   Pulse 74   Temp 98.1 °F (36.7  "°C)   Resp 20   Ht 185.4 cm (73\")   Wt (!) 140 kg (308 lb)   SpO2 96%   BMI 40.64 kg/m²     Past Medical History:   Diagnosis Date   • Arthritis    • Asthma    • Cancer (CMS/HCC)     skin   • Cataract    • COPD (chronic obstructive pulmonary disease) (CMS/HCC)    • Diabetes mellitus (CMS/HCC)    • Disease of thyroid gland    • Hypertension    • Kidney stone    • Myocardial infarct, old    • Renal disorder        Allergies   Allergen Reactions   • Oxycontin [Oxycodone Hcl]      'Makes me crazy and stand on my head'       Past Surgical History:   Procedure Laterality Date   • COLONOSCOPY     • EYE SURGERY     • JOINT REPLACEMENT      right   • KIDNEY STONE SURGERY     • REPLACEMENT TOTAL KNEE     • TOE SURGERY         Family History   Problem Relation Age of Onset   • COPD Mother    • Diabetes Father        Social History     Socioeconomic History   • Marital status: Unknown     Spouse name: Not on file   • Number of children: Not on file   • Years of education: Not on file   • Highest education level: Not on file   Tobacco Use   • Smoking status: Former Smoker   • Smokeless tobacco: Never Used   • Tobacco comment: quit 1993   Substance and Sexual Activity   • Alcohol use: No   • Drug use: No   • Sexual activity: Defer           Objective   Physical Exam   Constitutional: He is oriented to person, place, and time. He appears well-developed and well-nourished. No distress.   The patient appears in no acute distress.   Musculoskeletal:   The patient has 2 superficial skin tears of the the right anterior shin that are nonsense suturable.  He has a flap laceration of the plantar surface of the left great toe that is 3 cm in length and involves the epidermis only.   Neurological: He is alert and oriented to person, place, and time. No cranial nerve deficit or sensory deficit. He exhibits normal muscle tone.   Skin: Skin is warm and dry. Capillary refill takes less than 2 seconds. No rash noted. He is not diaphoretic. " No erythema. No pallor.   Psychiatric: He has a normal mood and affect. His behavior is normal. Judgment and thought content normal.   Nursing note and vitals reviewed.      Laceration Repair  Date/Time: 11/7/2019 12:30 AM  Performed by: Melchor Elliott MD  Authorized by: Melchor Elliott MD     Consent:     Consent obtained:  Verbal    Consent given by:  Patient    Risks discussed:  Infection, pain, poor wound healing and need for additional repair    Alternatives discussed:  No treatment  Anesthesia (see MAR for exact dosages):     Anesthesia method:  Local infiltration    Local anesthetic:  Lidocaine 1% w/o epi  Laceration details:     Location:  Toe    Toe location:  L big toe    Length (cm):  3    Depth (mm):  1  Repair type:     Repair type:  Simple  Pre-procedure details:     Preparation:  Patient was prepped and draped in usual sterile fashion  Exploration:     Wound exploration: entire depth of wound probed and visualized      Wound extent: no areolar tissue violation noted, no fascia violation noted, no foreign bodies/material noted, no muscle damage noted, no nerve damage noted, no tendon damage noted, no underlying fracture noted and no vascular damage noted      Contaminated: no    Treatment:     Area cleansed with:  Hibiclens    Amount of cleaning:  Standard    Irrigation solution:  Sterile saline    Irrigation method:  Pressure wash    Visualized foreign bodies/material removed: no    Skin repair:     Repair method:  Sutures    Suture size:  5-0    Suture material:  Nylon    Suture technique:  Simple interrupted    Number of sutures:  7  Approximation:     Approximation:  Close  Post-procedure details:     Dressing:  Antibiotic ointment and adhesive bandage    Patient tolerance of procedure:  Tolerated well, no immediate complications               ED Course  ED Course as of Nov 07 0126   Thu Nov 07, 2019   0045 The patient is skin tears on his right anterior lower leg were cleansed with Hibiclens  and dressed with bacitracin.  I repaired his left great toe laceration.  He was instructed wound care instructions and that the stitches should be removed in a week.  [TP]      ED Course User Index  [TP] Melchor Elliott MD                  MDM  Number of Diagnoses or Management Options  Laceration of left great toe without foreign body present or damage to nail, initial encounter: new and does not require workup  Skin tear of right lower leg without complication, initial encounter: new and does not require workup  Risk of Complications, Morbidity, and/or Mortality  Presenting problems: moderate  Diagnostic procedures: minimal  Management options: moderate    Patient Progress  Patient progress: improved      Final diagnoses:   Skin tear of right lower leg without complication, initial encounter   Laceration of left great toe without foreign body present or damage to nail, initial encounter             Labs Reviewed - No data to display  No orders to display          Medication List      No changes were made to your prescriptions during this visit.            Melchor Elliott MD  11/07/19 0126

## 2019-11-08 ENCOUNTER — TELEPHONE (OUTPATIENT)
Dept: CARDIOLOGY | Facility: CLINIC | Age: 79
End: 2019-11-08

## 2019-11-08 ENCOUNTER — ANTICOAGULATION VISIT (OUTPATIENT)
Dept: PHARMACY | Facility: HOSPITAL | Age: 79
End: 2019-11-08

## 2019-11-08 ENCOUNTER — TELEPHONE (OUTPATIENT)
Dept: PHARMACY | Facility: HOSPITAL | Age: 79
End: 2019-11-08

## 2019-11-08 DIAGNOSIS — I48.91 ATRIAL FIBRILLATION WITH RVR (HCC): ICD-10-CM

## 2019-11-08 NOTE — TELEPHONE ENCOUNTER
This is the second time I have received a note from pharmacy about this patient taking his warfarin.  I thought I had him convinced to take his warfarin as prescribed and why his dose changes.    What is your opinion on obesity and the DOACs?  He would most likely be more compliant with one of the alternatives.

## 2019-11-08 NOTE — TELEPHONE ENCOUNTER
Spoke to patient.  Long discussion about his warfarin.  Will switch to Eliquis 5 mg bid.  His INR was 1.26 on 11/4/19.  I have instructed him to stop his warfarin today and not to take it any more.  I gave him instructions for the Eliquis multiple times.  He voiced understanding.  RX sent to [facility] in [place].      Please discharge from Anticoagulation clinic management.

## 2019-11-08 NOTE — TELEPHONE ENCOUNTER
Function has been as much of concern as his weight, but his GFR is greater than 15 so he would be a candidate for Eliquis and at this point probably best to switch him over

## 2019-11-08 NOTE — TELEPHONE ENCOUNTER
Leia Yeung called today because patient told her that he his no longer going to take the Warfarin and he will just take an Aspirin. She informed him of all the risked and also informed the wife of all the risk and he said he is not taking it. This is Just an FYI    DB/

## 2019-11-08 NOTE — PROGRESS NOTES
"This visit is for documentation purposes only: Patient is no longer on warfarin. See 11/4/19 Telephone Note:    \"Spoke to patient.  Long discussion about his warfarin.  Will switch to Eliquis 5 mg bid.  His INR was 1.26 on 11/4/19.  I have instructed him to stop his warfarin today and not to take it any more.  I gave him instructions for the Eliquis multiple times.  He voiced understanding.  RX sent to [facility] in [place].\" - LEATHA Chairez    No longer following in the [facility]. It has been a pleasure being part of his care.  "

## 2019-11-08 NOTE — TELEPHONE ENCOUNTER
Leia at [facility] called to report that patient if refusing to take his warfarin. She also called [facility] to report this.  We have previously documented his refusal also and Shilpi ZHANG is currently attempting to contact patient with alternative plan---please see telephone encounter dated 11/4/19.

## 2019-11-15 ENCOUNTER — READMISSION MANAGEMENT (OUTPATIENT)
Dept: CALL CENTER | Facility: HOSPITAL | Age: 79
End: 2019-11-15

## 2019-11-15 NOTE — OUTREACH NOTE
Medical Week 4 Survey      Responses   Facility patient discharged from?  [place]   Does the patient have one of the following disease processes/diagnoses(primary or secondary)?  Other   Week 4 attempt successful?  No          Claudine Hdz RN

## 2019-11-18 ENCOUNTER — HOSPITAL ENCOUNTER (EMERGENCY)
Facility: HOSPITAL | Age: 79
Discharge: HOME | End: 2019-11-18
Attending: EMERGENCY MEDICINE | Admitting: EMERGENCY MEDICINE

## 2019-11-18 ENCOUNTER — APPOINTMENT (OUTPATIENT)
Dept: CT IMAGING | Facility: HOSPITAL | Age: 79
End: 2019-11-18

## 2019-11-18 VITALS
RESPIRATION RATE: 18 BRPM | HEIGHT: 73 IN | WEIGHT: 301 LBS | SYSTOLIC BLOOD PRESSURE: 137 MMHG | HEART RATE: 91 BPM | OXYGEN SATURATION: 95 % | DIASTOLIC BLOOD PRESSURE: 83 MMHG | BODY MASS INDEX: 39.89 KG/M2 | TEMPERATURE: 98.1 F

## 2019-11-18 DIAGNOSIS — I83.019 VENOUS STASIS ULCERS OF BOTH LOWER EXTREMITIES (HCC): ICD-10-CM

## 2019-11-18 DIAGNOSIS — R29.898 WEAKNESS OF BOTH LOWER EXTREMITIES: ICD-10-CM

## 2019-11-18 DIAGNOSIS — L97.919 VENOUS STASIS ULCERS OF BOTH LOWER EXTREMITIES (HCC): ICD-10-CM

## 2019-11-18 DIAGNOSIS — N39.0 ACUTE UTI: Primary | ICD-10-CM

## 2019-11-18 DIAGNOSIS — I83.029 VENOUS STASIS ULCERS OF BOTH LOWER EXTREMITIES (HCC): ICD-10-CM

## 2019-11-18 DIAGNOSIS — L97.929 VENOUS STASIS ULCERS OF BOTH LOWER EXTREMITIES (HCC): ICD-10-CM

## 2019-11-18 LAB
ALBUMIN SERPL-MCNC: 3.7 G/DL (ref 3.5–5.2)
ALBUMIN/GLOB SERPL: 1 G/DL
ALP SERPL-CCNC: 78 U/L (ref 39–117)
ALT SERPL W P-5'-P-CCNC: 11 U/L (ref 1–41)
ANION GAP SERPL CALCULATED.3IONS-SCNC: 11.5 MMOL/L (ref 5–15)
AST SERPL-CCNC: 17 U/L (ref 1–40)
BACTERIA UR QL AUTO: ABNORMAL /HPF
BASOPHILS # BLD AUTO: 0.03 10*3/MM3 (ref 0–0.2)
BASOPHILS NFR BLD AUTO: 0.5 % (ref 0–1.5)
BILIRUB SERPL-MCNC: 0.3 MG/DL (ref 0.2–1.2)
BILIRUB UR QL STRIP: NEGATIVE
BUN BLD-MCNC: 42 MG/DL (ref 8–23)
BUN/CREAT SERPL: 15 (ref 7–25)
CALCIUM SPEC-SCNC: 8.9 MG/DL (ref 8.6–10.5)
CHLORIDE SERPL-SCNC: 103 MMOL/L (ref 98–107)
CLARITY UR: CLEAR
CO2 SERPL-SCNC: 26.5 MMOL/L (ref 22–29)
COLOR UR: YELLOW
CREAT BLD-MCNC: 2.8 MG/DL (ref 0.76–1.27)
DEPRECATED RDW RBC AUTO: 50.5 FL (ref 37–54)
EOSINOPHIL # BLD AUTO: 0.43 10*3/MM3 (ref 0–0.4)
EOSINOPHIL NFR BLD AUTO: 7.1 % (ref 0.3–6.2)
ERYTHROCYTE [DISTWIDTH] IN BLOOD BY AUTOMATED COUNT: 17.2 % (ref 12.3–15.4)
GFR SERPL CREATININE-BSD FRML MDRD: 22 ML/MIN/1.73
GLOBULIN UR ELPH-MCNC: 3.7 GM/DL
GLUCOSE BLD-MCNC: 203 MG/DL (ref 65–99)
GLUCOSE UR STRIP-MCNC: ABNORMAL MG/DL
HCT VFR BLD AUTO: 33.5 % (ref 37.5–51)
HGB BLD-MCNC: 9.5 G/DL (ref 13–17.7)
HGB UR QL STRIP.AUTO: ABNORMAL
HYALINE CASTS UR QL AUTO: ABNORMAL /LPF
IMM GRANULOCYTES # BLD AUTO: 0.03 10*3/MM3 (ref 0–0.05)
IMM GRANULOCYTES NFR BLD AUTO: 0.5 % (ref 0–0.5)
KETONES UR QL STRIP: NEGATIVE
LEUKOCYTE ESTERASE UR QL STRIP.AUTO: ABNORMAL
LYMPHOCYTES # BLD AUTO: 0.86 10*3/MM3 (ref 0.7–3.1)
LYMPHOCYTES NFR BLD AUTO: 14.2 % (ref 19.6–45.3)
MCH RBC QN AUTO: 23.3 PG (ref 26.6–33)
MCHC RBC AUTO-ENTMCNC: 28.4 G/DL (ref 31.5–35.7)
MCV RBC AUTO: 82.1 FL (ref 79–97)
MONOCYTES # BLD AUTO: 0.69 10*3/MM3 (ref 0.1–0.9)
MONOCYTES NFR BLD AUTO: 11.4 % (ref 5–12)
NEUTROPHILS # BLD AUTO: 4.03 10*3/MM3 (ref 1.7–7)
NEUTROPHILS NFR BLD AUTO: 66.3 % (ref 42.7–76)
NITRITE UR QL STRIP: NEGATIVE
NRBC BLD AUTO-RTO: 0 /100 WBC (ref 0–0.2)
PH UR STRIP.AUTO: 5.5 [PH] (ref 4.5–8)
PLATELET # BLD AUTO: 167 10*3/MM3 (ref 140–450)
PMV BLD AUTO: 11.8 FL (ref 6–12)
POTASSIUM BLD-SCNC: 5.1 MMOL/L (ref 3.5–5.2)
PROT SERPL-MCNC: 7.4 G/DL (ref 6–8.5)
PROT UR QL STRIP: ABNORMAL
RBC # BLD AUTO: 4.08 10*6/MM3 (ref 4.14–5.8)
RBC # UR: ABNORMAL /HPF
REF LAB TEST METHOD: ABNORMAL
SODIUM BLD-SCNC: 141 MMOL/L (ref 136–145)
SP GR UR STRIP: 1.02 (ref 1–1.03)
SQUAMOUS #/AREA URNS HPF: ABNORMAL /HPF
UROBILINOGEN UR QL STRIP: ABNORMAL
WBC NRBC COR # BLD: 6.07 10*3/MM3 (ref 3.4–10.8)
WBC UR QL AUTO: ABNORMAL /HPF

## 2019-11-18 PROCEDURE — 81001 URINALYSIS AUTO W/SCOPE: CPT | Performed by: EMERGENCY MEDICINE

## 2019-11-18 PROCEDURE — 85025 COMPLETE CBC W/AUTO DIFF WBC: CPT | Performed by: EMERGENCY MEDICINE

## 2019-11-18 PROCEDURE — 99284 EMERGENCY DEPT VISIT MOD MDM: CPT | Performed by: EMERGENCY MEDICINE

## 2019-11-18 PROCEDURE — 80053 COMPREHEN METABOLIC PANEL: CPT | Performed by: EMERGENCY MEDICINE

## 2019-11-18 PROCEDURE — 99284 EMERGENCY DEPT VISIT MOD MDM: CPT

## 2019-11-18 PROCEDURE — 99283 EMERGENCY DEPT VISIT LOW MDM: CPT

## 2019-11-18 PROCEDURE — 70450 CT HEAD/BRAIN W/O DYE: CPT

## 2019-11-18 RX ORDER — SODIUM CHLORIDE 0.9 % (FLUSH) 0.9 %
10 SYRINGE (ML) INJECTION AS NEEDED
Status: DISCONTINUED | OUTPATIENT
Start: 2019-11-18 | End: 2019-11-18 | Stop reason: HOSPADM

## 2019-11-18 RX ORDER — CEFUROXIME AXETIL 250 MG/1
250 TABLET ORAL 2 TIMES DAILY
Qty: 10 TABLET | Refills: 0 | Status: SHIPPED | OUTPATIENT
Start: 2019-11-18 | End: 2019-11-27 | Stop reason: HOSPADM

## 2019-11-18 RX ORDER — CEFUROXIME AXETIL 250 MG/1
250 TABLET ORAL ONCE
Status: COMPLETED | OUTPATIENT
Start: 2019-11-18 | End: 2019-11-18

## 2019-11-18 RX ADMIN — CEFUROXIME AXETIL 250 MG: 250 TABLET, FILM COATED ORAL at 17:00

## 2019-11-18 NOTE — DISCHARGE INSTRUCTIONS
Medications as directed.  Continue wound care per your wound care specialist.  Follow-up with your PCP as above.  Return to ED for medical emergencies.

## 2019-11-19 ENCOUNTER — APPOINTMENT (OUTPATIENT)
Dept: GENERAL RADIOLOGY | Facility: HOSPITAL | Age: 79
End: 2019-11-19

## 2019-11-19 ENCOUNTER — HOSPITAL ENCOUNTER (EMERGENCY)
Facility: HOSPITAL | Age: 79
Discharge: HOME | End: 2019-11-19
Attending: EMERGENCY MEDICINE | Admitting: EMERGENCY MEDICINE

## 2019-11-19 VITALS
DIASTOLIC BLOOD PRESSURE: 69 MMHG | WEIGHT: 308.4 LBS | RESPIRATION RATE: 16 BRPM | OXYGEN SATURATION: 91 % | TEMPERATURE: 97.8 F | HEART RATE: 87 BPM | HEIGHT: 73 IN | SYSTOLIC BLOOD PRESSURE: 152 MMHG | BODY MASS INDEX: 40.87 KG/M2

## 2019-11-19 DIAGNOSIS — M25.562 ACUTE PAIN OF LEFT KNEE: Primary | ICD-10-CM

## 2019-11-19 DIAGNOSIS — N18.9 CHRONIC RENAL IMPAIRMENT, UNSPECIFIED CKD STAGE: ICD-10-CM

## 2019-11-19 DIAGNOSIS — D64.9 CHRONIC ANEMIA: ICD-10-CM

## 2019-11-19 LAB
ALBUMIN SERPL-MCNC: 3.7 G/DL (ref 3.5–5.2)
ALBUMIN/GLOB SERPL: 0.9 G/DL
ALP SERPL-CCNC: 78 U/L (ref 39–117)
ALT SERPL W P-5'-P-CCNC: 8 U/L (ref 1–41)
ANION GAP SERPL CALCULATED.3IONS-SCNC: 13.1 MMOL/L (ref 5–15)
AST SERPL-CCNC: 16 U/L (ref 1–40)
BACTERIA UR QL AUTO: ABNORMAL /HPF
BASOPHILS # BLD AUTO: 0.05 10*3/MM3 (ref 0–0.2)
BASOPHILS NFR BLD AUTO: 0.7 % (ref 0–1.5)
BILIRUB SERPL-MCNC: 0.4 MG/DL (ref 0.2–1.2)
BILIRUB UR QL STRIP: NEGATIVE
BUN BLD-MCNC: 44 MG/DL (ref 8–23)
BUN/CREAT SERPL: 15.9 (ref 7–25)
CALCIUM SPEC-SCNC: 9.3 MG/DL (ref 8.6–10.5)
CHLORIDE SERPL-SCNC: 102 MMOL/L (ref 98–107)
CLARITY UR: CLEAR
CO2 SERPL-SCNC: 23.9 MMOL/L (ref 22–29)
COLOR UR: YELLOW
CREAT BLD-MCNC: 2.76 MG/DL (ref 0.76–1.27)
DEPRECATED RDW RBC AUTO: 50 FL (ref 37–54)
EOSINOPHIL # BLD AUTO: 0.49 10*3/MM3 (ref 0–0.4)
EOSINOPHIL NFR BLD AUTO: 7.3 % (ref 0.3–6.2)
ERYTHROCYTE [DISTWIDTH] IN BLOOD BY AUTOMATED COUNT: 17.1 % (ref 12.3–15.4)
GFR SERPL CREATININE-BSD FRML MDRD: 22 ML/MIN/1.73
GLOBULIN UR ELPH-MCNC: 4.1 GM/DL
GLUCOSE BLD-MCNC: 199 MG/DL (ref 65–99)
GLUCOSE UR STRIP-MCNC: ABNORMAL MG/DL
HCT VFR BLD AUTO: 34.9 % (ref 37.5–51)
HGB BLD-MCNC: 9.9 G/DL (ref 13–17.7)
HGB UR QL STRIP.AUTO: ABNORMAL
HYALINE CASTS UR QL AUTO: ABNORMAL /LPF
IMM GRANULOCYTES # BLD AUTO: 0.01 10*3/MM3 (ref 0–0.05)
IMM GRANULOCYTES NFR BLD AUTO: 0.1 % (ref 0–0.5)
KETONES UR QL STRIP: NEGATIVE
LEUKOCYTE ESTERASE UR QL STRIP.AUTO: NEGATIVE
LYMPHOCYTES # BLD AUTO: 1.02 10*3/MM3 (ref 0.7–3.1)
LYMPHOCYTES NFR BLD AUTO: 15.3 % (ref 19.6–45.3)
MCH RBC QN AUTO: 23.1 PG (ref 26.6–33)
MCHC RBC AUTO-ENTMCNC: 28.4 G/DL (ref 31.5–35.7)
MCV RBC AUTO: 81.4 FL (ref 79–97)
MONOCYTES # BLD AUTO: 0.93 10*3/MM3 (ref 0.1–0.9)
MONOCYTES NFR BLD AUTO: 13.9 % (ref 5–12)
NEUTROPHILS # BLD AUTO: 4.17 10*3/MM3 (ref 1.7–7)
NEUTROPHILS NFR BLD AUTO: 62.7 % (ref 42.7–76)
NITRITE UR QL STRIP: NEGATIVE
PH UR STRIP.AUTO: <=5 [PH] (ref 4.5–8)
PLATELET # BLD AUTO: 170 10*3/MM3 (ref 140–450)
PMV BLD AUTO: 11.8 FL (ref 6–12)
POTASSIUM BLD-SCNC: 4.7 MMOL/L (ref 3.5–5.2)
PROT SERPL-MCNC: 7.8 G/DL (ref 6–8.5)
PROT UR QL STRIP: ABNORMAL
RBC # BLD AUTO: 4.29 10*6/MM3 (ref 4.14–5.8)
RBC # UR: ABNORMAL /HPF
REF LAB TEST METHOD: ABNORMAL
SODIUM BLD-SCNC: 139 MMOL/L (ref 136–145)
SP GR UR STRIP: 1.02 (ref 1–1.03)
SQUAMOUS #/AREA URNS HPF: ABNORMAL /HPF
UROBILINOGEN UR QL STRIP: ABNORMAL
WBC NRBC COR # BLD: 6.67 10*3/MM3 (ref 3.4–10.8)
WBC UR QL AUTO: ABNORMAL /HPF

## 2019-11-19 PROCEDURE — 99284 EMERGENCY DEPT VISIT MOD MDM: CPT

## 2019-11-19 PROCEDURE — 73502 X-RAY EXAM HIP UNI 2-3 VIEWS: CPT

## 2019-11-19 PROCEDURE — 81001 URINALYSIS AUTO W/SCOPE: CPT | Performed by: EMERGENCY MEDICINE

## 2019-11-19 PROCEDURE — 85025 COMPLETE CBC W/AUTO DIFF WBC: CPT | Performed by: EMERGENCY MEDICINE

## 2019-11-19 PROCEDURE — 80053 COMPREHEN METABOLIC PANEL: CPT | Performed by: EMERGENCY MEDICINE

## 2019-11-19 PROCEDURE — 73562 X-RAY EXAM OF KNEE 3: CPT

## 2019-11-19 PROCEDURE — 99284 EMERGENCY DEPT VISIT MOD MDM: CPT | Performed by: EMERGENCY MEDICINE

## 2019-11-19 NOTE — ED NOTES
Applied Non-adherent pads on top of the wounds and then wrapped the non-adherent pads with Koband.      Paula Vides  11/19/19 8848

## 2019-11-19 NOTE — ED PROVIDER NOTES
Subjective     Fall   Mechanism of injury comment:  Trip/lost balance using walker this am  Injury location: left knee/left hip/left shoulder.  Current pain details:     Pain quality:  Dull    Pain Severity:  Moderate    Pain timing:  Constant  Associated symptoms: no abdominal pain, no back pain, no chest pain, no difficulty breathing, no headaches, no loss of consciousness, no nausea, no neck pain and no vomiting        Review of Systems   Cardiovascular: Negative for chest pain.   Gastrointestinal: Negative for abdominal pain, nausea and vomiting.   Musculoskeletal: Negative for back pain and neck pain.   Neurological: Negative for loss of consciousness and headaches.   All other systems reviewed and are negative.      Past Medical History:   Diagnosis Date   • Arthritis    • Asthma    • Cancer (CMS/HCC)     skin   • Cataract    • COPD (chronic obstructive pulmonary disease) (CMS/HCC)    • Diabetes mellitus (CMS/HCC)    • Disease of thyroid gland    • Hypertension    • Kidney stone    • Myocardial infarct, old    • Renal disorder        Allergies   Allergen Reactions   • Oxycontin [Oxycodone Hcl]      'Makes me crazy and stand on my head'       Past Surgical History:   Procedure Laterality Date   • COLONOSCOPY     • EYE SURGERY     • JOINT REPLACEMENT      right   • KIDNEY STONE SURGERY     • REPLACEMENT TOTAL KNEE     • TOE SURGERY         Family History   Problem Relation Age of Onset   • COPD Mother    • Diabetes Father        Social History     Socioeconomic History   • Marital status: Unknown     Spouse name: Not on file   • Number of children: Not on file   • Years of education: Not on file   • Highest education level: Not on file   Tobacco Use   • Smoking status: Former Smoker   • Smokeless tobacco: Never Used   • Tobacco comment: quit 1993   Substance and Sexual Activity   • Alcohol use: No   • Drug use: No   • Sexual activity: Defer           Objective   Physical Exam   Constitutional: He is oriented to  person, place, and time. He appears well-developed and well-nourished. No distress.   HENT:   Head: Normocephalic and atraumatic.   Nose: Nose normal.   Mouth/Throat: Oropharynx is clear and moist. No oropharyngeal exudate.   Eyes: Conjunctivae and EOM are normal. Pupils are equal, round, and reactive to light. Right eye exhibits no discharge. Left eye exhibits no discharge. No scleral icterus.   Neck: Normal range of motion. Neck supple. No JVD present.   Cardiovascular: Normal rate, regular rhythm, normal heart sounds and intact distal pulses. Exam reveals no gallop and no friction rub.   No murmur heard.  Pulmonary/Chest: Effort normal and breath sounds normal. No stridor. No respiratory distress. He has no wheezes. He has no rales. He exhibits no tenderness.   Abdominal: Soft. Bowel sounds are normal. He exhibits no distension and no mass. There is no tenderness. There is no guarding.   Musculoskeletal: Normal range of motion. He exhibits edema and tenderness. He exhibits no deformity.   Left big toe pedal surface lac 2cm healing clean dry  kamron le superficial ulcerations clean and dry   Lymphadenopathy:     He has no cervical adenopathy.   Neurological: He is alert and oriented to person, place, and time. No cranial nerve deficit or sensory deficit. He exhibits normal muscle tone.   General weakness  No focal deficit   Skin: Skin is warm. Capillary refill takes less than 2 seconds. He is not diaphoretic.   Psychiatric: He has a normal mood and affect.   Nursing note and vitals reviewed.      Procedures           ED Course        Reval, able to get up and use walker with minimal assistance  Knee brace applied by johnnie Auguste with plan to f/u home health and pcp          MDM  Number of Diagnoses or Management Options  Acute pain of left knee:   Chronic anemia:   Chronic renal impairment, unspecified CKD stage:      Amount and/or Complexity of Data Reviewed  Clinical lab tests: reviewed and ordered  Tests in the  radiology section of CPT®: ordered and reviewed    Risk of Complications, Morbidity, and/or Mortality  Presenting problems: moderate  Diagnostic procedures: moderate  Management options: moderate    Patient Progress  Patient progress: stable      Final diagnoses:   Acute pain of left knee   Chronic anemia   Chronic renal impairment, unspecified CKD stage              Fredy Kline MD  11/19/19 1200

## 2019-11-20 ENCOUNTER — APPOINTMENT (OUTPATIENT)
Dept: CT IMAGING | Facility: HOSPITAL | Age: 79
End: 2019-11-20

## 2019-11-20 ENCOUNTER — HOSPITAL ENCOUNTER (EMERGENCY)
Facility: HOSPITAL | Age: 79
Discharge: ANOTHER HOSPITAL | End: 2019-11-20
Attending: EMERGENCY MEDICINE | Admitting: EMERGENCY MEDICINE

## 2019-11-20 ENCOUNTER — HOSPITAL ENCOUNTER (INPATIENT)
Facility: HOSPITAL | Age: 79
LOS: 7 days | Discharge: HOME HEALTH | End: 2019-11-27
Attending: INTERNAL MEDICINE | Admitting: INTERNAL MEDICINE

## 2019-11-20 VITALS
RESPIRATION RATE: 18 BRPM | WEIGHT: 315 LBS | HEIGHT: 73 IN | SYSTOLIC BLOOD PRESSURE: 146 MMHG | OXYGEN SATURATION: 92 % | HEART RATE: 87 BPM | TEMPERATURE: 97.6 F | DIASTOLIC BLOOD PRESSURE: 99 MMHG | BODY MASS INDEX: 41.75 KG/M2

## 2019-11-20 DIAGNOSIS — R53.1 GENERALIZED WEAKNESS: Primary | ICD-10-CM

## 2019-11-20 DIAGNOSIS — R29.6 FREQUENT FALLS: ICD-10-CM

## 2019-11-20 PROBLEM — R53.81 PHYSICAL DEBILITY: Status: ACTIVE | Noted: 2019-11-20

## 2019-11-20 LAB
ANION GAP SERPL CALCULATED.3IONS-SCNC: 15.8 MMOL/L (ref 5–15)
BASOPHILS # BLD AUTO: 0.04 10*3/MM3 (ref 0–0.2)
BASOPHILS NFR BLD AUTO: 0.6 % (ref 0–1.5)
BUN BLD-MCNC: 47 MG/DL (ref 8–23)
BUN/CREAT SERPL: 15.2 (ref 7–25)
CALCIUM SPEC-SCNC: 9.6 MG/DL (ref 8.6–10.5)
CHLORIDE SERPL-SCNC: 101 MMOL/L (ref 98–107)
CO2 SERPL-SCNC: 25.2 MMOL/L (ref 22–29)
CREAT BLD-MCNC: 3.1 MG/DL (ref 0.76–1.27)
CRP SERPL-MCNC: 1.86 MG/DL (ref 0–0.5)
DEPRECATED RDW RBC AUTO: 50 FL (ref 37–54)
EOSINOPHIL # BLD AUTO: 0.5 10*3/MM3 (ref 0–0.4)
EOSINOPHIL NFR BLD AUTO: 7 % (ref 0.3–6.2)
ERYTHROCYTE [DISTWIDTH] IN BLOOD BY AUTOMATED COUNT: 17.2 % (ref 12.3–15.4)
ERYTHROCYTE [SEDIMENTATION RATE] IN BLOOD: 27 MM/HR (ref 0–20)
GFR SERPL CREATININE-BSD FRML MDRD: 20 ML/MIN/1.73
GLUCOSE BLD-MCNC: 165 MG/DL (ref 65–99)
GLUCOSE BLDC GLUCOMTR-MCNC: 115 MG/DL (ref 70–130)
GLUCOSE BLDC GLUCOMTR-MCNC: 233 MG/DL (ref 70–130)
HBA1C MFR BLD: 8.1 % (ref 4.8–5.6)
HCT VFR BLD AUTO: 35.9 % (ref 37.5–51)
HGB BLD-MCNC: 10.3 G/DL (ref 13–17.7)
IMM GRANULOCYTES # BLD AUTO: 0.02 10*3/MM3 (ref 0–0.05)
IMM GRANULOCYTES NFR BLD AUTO: 0.3 % (ref 0–0.5)
LYMPHOCYTES # BLD AUTO: 0.92 10*3/MM3 (ref 0.7–3.1)
LYMPHOCYTES NFR BLD AUTO: 12.8 % (ref 19.6–45.3)
MCH RBC QN AUTO: 23.3 PG (ref 26.6–33)
MCHC RBC AUTO-ENTMCNC: 28.7 G/DL (ref 31.5–35.7)
MCV RBC AUTO: 81.2 FL (ref 79–97)
MONOCYTES # BLD AUTO: 0.79 10*3/MM3 (ref 0.1–0.9)
MONOCYTES NFR BLD AUTO: 11 % (ref 5–12)
NEUTROPHILS # BLD AUTO: 4.9 10*3/MM3 (ref 1.7–7)
NEUTROPHILS NFR BLD AUTO: 68.3 % (ref 42.7–76)
NRBC BLD AUTO-RTO: 0 /100 WBC (ref 0–0.2)
PLATELET # BLD AUTO: 186 10*3/MM3 (ref 140–450)
PMV BLD AUTO: 10.6 FL (ref 6–12)
POTASSIUM BLD-SCNC: 5 MMOL/L (ref 3.5–5.2)
RBC # BLD AUTO: 4.42 10*6/MM3 (ref 4.14–5.8)
SODIUM BLD-SCNC: 142 MMOL/L (ref 136–145)
T4 FREE SERPL-MCNC: 1.03 NG/DL (ref 0.93–1.7)
TSH SERPL DL<=0.05 MIU/L-ACNC: 15.29 UIU/ML (ref 0.27–4.2)
WBC NRBC COR # BLD: 7.17 10*3/MM3 (ref 3.4–10.8)

## 2019-11-20 PROCEDURE — 85025 COMPLETE CBC W/AUTO DIFF WBC: CPT | Performed by: INTERNAL MEDICINE

## 2019-11-20 PROCEDURE — 94799 UNLISTED PULMONARY SVC/PX: CPT

## 2019-11-20 PROCEDURE — 63710000001 INSULIN DETEMIR PER 5 UNITS: Performed by: INTERNAL MEDICINE

## 2019-11-20 PROCEDURE — 82962 GLUCOSE BLOOD TEST: CPT

## 2019-11-20 PROCEDURE — 85652 RBC SED RATE AUTOMATED: CPT | Performed by: INTERNAL MEDICINE

## 2019-11-20 PROCEDURE — 80048 BASIC METABOLIC PNL TOTAL CA: CPT | Performed by: INTERNAL MEDICINE

## 2019-11-20 PROCEDURE — 99306 1ST NF CARE HIGH MDM 50: CPT | Performed by: INTERNAL MEDICINE

## 2019-11-20 PROCEDURE — 84439 ASSAY OF FREE THYROXINE: CPT | Performed by: INTERNAL MEDICINE

## 2019-11-20 PROCEDURE — 84443 ASSAY THYROID STIM HORMONE: CPT | Performed by: INTERNAL MEDICINE

## 2019-11-20 PROCEDURE — 63710000001 INSULIN ASPART PER 5 UNITS: Performed by: INTERNAL MEDICINE

## 2019-11-20 PROCEDURE — 86140 C-REACTIVE PROTEIN: CPT | Performed by: INTERNAL MEDICINE

## 2019-11-20 PROCEDURE — 99283 EMERGENCY DEPT VISIT LOW MDM: CPT

## 2019-11-20 PROCEDURE — 70450 CT HEAD/BRAIN W/O DYE: CPT

## 2019-11-20 PROCEDURE — 83036 HEMOGLOBIN GLYCOSYLATED A1C: CPT | Performed by: INTERNAL MEDICINE

## 2019-11-20 PROCEDURE — 99284 EMERGENCY DEPT VISIT MOD MDM: CPT | Performed by: EMERGENCY MEDICINE

## 2019-11-20 RX ORDER — LEVOTHYROXINE SODIUM 0.12 MG/1
125 TABLET ORAL DAILY
Status: DISCONTINUED | OUTPATIENT
Start: 2019-11-20 | End: 2019-11-27 | Stop reason: HOSPADM

## 2019-11-20 RX ORDER — BISACODYL 5 MG/1
5 TABLET, DELAYED RELEASE ORAL DAILY PRN
Status: DISCONTINUED | OUTPATIENT
Start: 2019-11-20 | End: 2019-11-27 | Stop reason: HOSPADM

## 2019-11-20 RX ORDER — NYSTATIN 100000 [USP'U]/G
POWDER TOPICAL EVERY 12 HOURS SCHEDULED
Status: DISCONTINUED | OUTPATIENT
Start: 2019-11-20 | End: 2019-11-27 | Stop reason: HOSPADM

## 2019-11-20 RX ORDER — TAMSULOSIN HYDROCHLORIDE 0.4 MG/1
0.4 CAPSULE ORAL DAILY
Status: DISCONTINUED | OUTPATIENT
Start: 2019-11-20 | End: 2019-11-27 | Stop reason: HOSPADM

## 2019-11-20 RX ORDER — ATORVASTATIN CALCIUM 10 MG/1
10 TABLET, FILM COATED ORAL DAILY
Status: DISCONTINUED | OUTPATIENT
Start: 2019-11-20 | End: 2019-11-27 | Stop reason: HOSPADM

## 2019-11-20 RX ORDER — HYDROCODONE BITARTRATE AND ACETAMINOPHEN 5; 325 MG/1; MG/1
1 TABLET ORAL EVERY 6 HOURS PRN
Status: DISCONTINUED | OUTPATIENT
Start: 2019-11-20 | End: 2019-11-27 | Stop reason: HOSPADM

## 2019-11-20 RX ORDER — DOCUSATE SODIUM 100 MG/1
100 CAPSULE, LIQUID FILLED ORAL 2 TIMES DAILY PRN
Status: DISCONTINUED | OUTPATIENT
Start: 2019-11-20 | End: 2019-11-27 | Stop reason: HOSPADM

## 2019-11-20 RX ORDER — CHOLECALCIFEROL (VITAMIN D3) 125 MCG
5 CAPSULE ORAL NIGHTLY PRN
Status: DISCONTINUED | OUTPATIENT
Start: 2019-11-20 | End: 2019-11-27 | Stop reason: HOSPADM

## 2019-11-20 RX ORDER — SENNA AND DOCUSATE SODIUM 50; 8.6 MG/1; MG/1
2 TABLET, FILM COATED ORAL 2 TIMES DAILY PRN
Status: DISCONTINUED | OUTPATIENT
Start: 2019-11-20 | End: 2019-11-27 | Stop reason: HOSPADM

## 2019-11-20 RX ORDER — FLUTICASONE PROPIONATE 50 MCG
2 SPRAY, SUSPENSION (ML) NASAL DAILY
Status: DISCONTINUED | OUTPATIENT
Start: 2019-11-20 | End: 2019-11-27 | Stop reason: HOSPADM

## 2019-11-20 RX ORDER — PREGABALIN 75 MG/1
150 CAPSULE ORAL EVERY 12 HOURS SCHEDULED
Status: DISCONTINUED | OUTPATIENT
Start: 2019-11-20 | End: 2019-11-27 | Stop reason: HOSPADM

## 2019-11-20 RX ORDER — PETROLATUM 42 G/100G
OINTMENT TOPICAL
Status: DISCONTINUED | OUTPATIENT
Start: 2019-11-20 | End: 2019-11-20 | Stop reason: CLARIF

## 2019-11-20 RX ORDER — CETIRIZINE HYDROCHLORIDE 10 MG/1
5 TABLET ORAL DAILY
Status: DISCONTINUED | OUTPATIENT
Start: 2019-11-20 | End: 2019-11-27 | Stop reason: HOSPADM

## 2019-11-20 RX ORDER — ALPRAZOLAM 0.25 MG/1
0.25 TABLET ORAL NIGHTLY PRN
Status: DISCONTINUED | OUTPATIENT
Start: 2019-11-20 | End: 2019-11-27 | Stop reason: HOSPADM

## 2019-11-20 RX ORDER — FUROSEMIDE 10 MG/ML
40 INJECTION INTRAMUSCULAR; INTRAVENOUS ONCE
Status: DISCONTINUED | OUTPATIENT
Start: 2019-11-20 | End: 2019-11-20

## 2019-11-20 RX ORDER — NICOTINE POLACRILEX 4 MG
15 LOZENGE BUCCAL
Status: DISCONTINUED | OUTPATIENT
Start: 2019-11-20 | End: 2019-11-27 | Stop reason: HOSPADM

## 2019-11-20 RX ORDER — DEXTROSE MONOHYDRATE 25 G/50ML
25 INJECTION, SOLUTION INTRAVENOUS
Status: DISCONTINUED | OUTPATIENT
Start: 2019-11-20 | End: 2019-11-27 | Stop reason: HOSPADM

## 2019-11-20 RX ORDER — ACETAMINOPHEN 325 MG/1
650 TABLET ORAL EVERY 4 HOURS PRN
Status: DISCONTINUED | OUTPATIENT
Start: 2019-11-20 | End: 2019-11-27 | Stop reason: HOSPADM

## 2019-11-20 RX ORDER — BUDESONIDE AND FORMOTEROL FUMARATE DIHYDRATE 160; 4.5 UG/1; UG/1
2 AEROSOL RESPIRATORY (INHALATION)
Status: DISCONTINUED | OUTPATIENT
Start: 2019-11-20 | End: 2019-11-27 | Stop reason: HOSPADM

## 2019-11-20 RX ORDER — PETROLATUM 420 MG/G
OINTMENT TOPICAL
Status: DISCONTINUED | OUTPATIENT
Start: 2019-11-20 | End: 2019-11-27 | Stop reason: HOSPADM

## 2019-11-20 RX ORDER — BUMETANIDE 0.25 MG/ML
2 INJECTION INTRAMUSCULAR; INTRAVENOUS ONCE
Status: COMPLETED | OUTPATIENT
Start: 2019-11-20 | End: 2019-11-20

## 2019-11-20 RX ORDER — ONDANSETRON 2 MG/ML
4 INJECTION INTRAMUSCULAR; INTRAVENOUS EVERY 6 HOURS PRN
Status: DISCONTINUED | OUTPATIENT
Start: 2019-11-20 | End: 2019-11-27 | Stop reason: HOSPADM

## 2019-11-20 RX ORDER — CEFUROXIME AXETIL 250 MG/1
250 TABLET ORAL 2 TIMES DAILY
Status: COMPLETED | OUTPATIENT
Start: 2019-11-20 | End: 2019-11-23

## 2019-11-20 RX ORDER — ONDANSETRON 4 MG/1
4 TABLET, FILM COATED ORAL EVERY 6 HOURS PRN
Status: DISCONTINUED | OUTPATIENT
Start: 2019-11-20 | End: 2019-11-27 | Stop reason: HOSPADM

## 2019-11-20 RX ORDER — IPRATROPIUM BROMIDE AND ALBUTEROL SULFATE 2.5; .5 MG/3ML; MG/3ML
3 SOLUTION RESPIRATORY (INHALATION) EVERY 4 HOURS PRN
Status: DISCONTINUED | OUTPATIENT
Start: 2019-11-20 | End: 2019-11-27 | Stop reason: HOSPADM

## 2019-11-20 RX ORDER — CARVEDILOL 6.25 MG/1
6.25 TABLET ORAL 2 TIMES DAILY WITH MEALS
Status: DISCONTINUED | OUTPATIENT
Start: 2019-11-20 | End: 2019-11-27 | Stop reason: HOSPADM

## 2019-11-20 RX ORDER — LANOLIN ALCOHOL/MO/W.PET/CERES
1000 CREAM (GRAM) TOPICAL DAILY
Status: DISCONTINUED | OUTPATIENT
Start: 2019-11-20 | End: 2019-11-27 | Stop reason: HOSPADM

## 2019-11-20 RX ORDER — BUMETANIDE 1 MG/1
1 TABLET ORAL 2 TIMES DAILY
Status: DISCONTINUED | OUTPATIENT
Start: 2019-11-21 | End: 2019-11-22

## 2019-11-20 RX ORDER — AMIODARONE HYDROCHLORIDE 200 MG/1
200 TABLET ORAL EVERY 12 HOURS SCHEDULED
Status: DISCONTINUED | OUTPATIENT
Start: 2019-11-20 | End: 2019-11-22

## 2019-11-20 RX ADMIN — AMIODARONE HYDROCHLORIDE 200 MG: 200 TABLET ORAL at 21:53

## 2019-11-20 RX ADMIN — PETROLATUM 454 APPLICATION: 420 OINTMENT TOPICAL at 17:57

## 2019-11-20 RX ADMIN — TAMSULOSIN HYDROCHLORIDE 0.4 MG: 0.4 CAPSULE ORAL at 17:57

## 2019-11-20 RX ADMIN — LINAGLIPTIN 5 MG: 5 TABLET, FILM COATED ORAL at 17:57

## 2019-11-20 RX ADMIN — INSULIN ASPART 3 UNITS: 100 INJECTION, SOLUTION INTRAVENOUS; SUBCUTANEOUS at 18:55

## 2019-11-20 RX ADMIN — PREGABALIN 150 MG: 75 CAPSULE ORAL at 21:57

## 2019-11-20 RX ADMIN — BUMETANIDE 2 MG: 0.25 INJECTION, SOLUTION INTRAMUSCULAR; INTRAVENOUS at 18:53

## 2019-11-20 RX ADMIN — CETIRIZINE HYDROCHLORIDE 5 MG: 10 TABLET, FILM COATED ORAL at 17:57

## 2019-11-20 RX ADMIN — FLUTICASONE PROPIONATE 2 SPRAY: 50 SPRAY, METERED NASAL at 17:57

## 2019-11-20 RX ADMIN — ATORVASTATIN CALCIUM 10 MG: 10 TABLET, FILM COATED ORAL at 17:57

## 2019-11-20 RX ADMIN — TUBERCULIN PURIFIED PROTEIN DERIVATIVE 5 UNITS: 5 INJECTION INTRADERMAL at 16:09

## 2019-11-20 RX ADMIN — INSULIN DETEMIR 30 UNITS: 100 INJECTION, SOLUTION SUBCUTANEOUS at 21:59

## 2019-11-20 RX ADMIN — INSULIN ASPART 5 UNITS: 100 INJECTION, SOLUTION INTRAVENOUS; SUBCUTANEOUS at 22:01

## 2019-11-20 RX ADMIN — CARVEDILOL 6.25 MG: 6.25 TABLET, FILM COATED ORAL at 17:57

## 2019-11-20 RX ADMIN — CYANOCOBALAMIN TAB 1000 MCG 1000 MCG: 1000 TAB at 18:53

## 2019-11-20 RX ADMIN — BUDESONIDE AND FORMOTEROL FUMARATE DIHYDRATE 2 PUFF: 160; 4.5 AEROSOL RESPIRATORY (INHALATION) at 21:52

## 2019-11-20 RX ADMIN — LEVOTHYROXINE SODIUM 125 MCG: 125 TABLET ORAL at 17:56

## 2019-11-20 RX ADMIN — APIXABAN 5 MG: 2.5 TABLET, FILM COATED ORAL at 21:53

## 2019-11-20 RX ADMIN — CEFUROXIME AXETIL 250 MG: 250 TABLET ORAL at 21:53

## 2019-11-20 NOTE — NURSING NOTE
Continued Stay Note  MICHAEL Kincaid     Patient Name: Froilan Helton  MRN: [id number]  Today's Date: 11/20/2019    Admit Date: 11/20/2019    Discharge Plan     Row Name 11/20/19 1420       Plan    Plan Comments  Spoke with Mr Hetlon at bedside.  He has fallen several times in the last few days and has been in the ER for such.  He would like to get some rehab for strengthening.  His preference is [facility] rehab.  [facility]hab [facility]          Discharge Codes    No documentation.             Yaima Mora RN

## 2019-11-20 NOTE — ED PROVIDER NOTES
Subjective     History provided by:  Patient and medical records    History of Present Illness    · Chief complaint: Falls    · Location: Occurred at home    · Quality/Severity: The patient states that his legs are weak when walking and give out on him causing him to fall.    · Timing/Onset: He fell at 05:45 this morning and again at 11 AM this morning.    · Modifying Factors: Patient falls because his legs are weak and give out on him.  He requires EMS lift assistance to get back up.    · Associated symptoms: He is currently without other complaints.  He does report poor hitting his head on the floor this morning when he fell.    · Narrative: The patient is a 79-year-old white male very well-known to this emergency department after numerous falls in the past.  He fell twice this morning both times requiring EMS lift assistance.  He states he did hit his head this morning when he fell once.  He denies any new injuries.  The patient request rehab placement.  He has a history of chronic edema of the lower extremities.    Review of Systems   Constitutional: Negative for activity change, appetite change, chills, diaphoresis, fatigue and fever.   HENT: Negative for congestion, ear pain, rhinorrhea, sinus pain, sore throat, tinnitus, trouble swallowing and voice change.    Eyes: Negative for pain and visual disturbance.   Respiratory: Negative for cough and shortness of breath.    Cardiovascular: Positive for leg swelling ( Chronic lower extremity).   Gastrointestinal: Negative for abdominal pain, diarrhea, nausea and vomiting.   Genitourinary: Negative for difficulty urinating, dysuria, flank pain, frequency and urgency.   Musculoskeletal: Positive for gait problem ( Due to generalized weakness).   Neurological: Positive for weakness ( Generalized). Negative for dizziness, tremors, seizures and headaches.   Psychiatric/Behavioral: Negative for agitation and confusion.     Past Medical History:   Diagnosis Date   •  "Arthritis    • Asthma    • Cancer (CMS/HCC)     skin   • Cataract    • COPD (chronic obstructive pulmonary disease) (CMS/HCC)    • Diabetes mellitus (CMS/HCC)    • Disease of thyroid gland    • Hypertension    • Kidney stone    • Myocardial infarct, old    • Renal disorder      /82   Pulse 88   Temp 97.6 °F (36.4 °C) (Oral)   Resp 18   Ht 185.4 cm (73\")   Wt (!) 144 kg (317 lb 8 oz)   SpO2 92%   BMI 41.89 kg/m²     Past Medical History:   Diagnosis Date   • Arthritis    • Asthma    • Cancer (CMS/HCC)     skin   • Cataract    • COPD (chronic obstructive pulmonary disease) (CMS/HCC)    • Diabetes mellitus (CMS/HCC)    • Disease of thyroid gland    • Hypertension    • Kidney stone    • Myocardial infarct, old    • Renal disorder        Allergies   Allergen Reactions   • Oxycontin [Oxycodone Hcl]      'Makes me crazy and stand on my head'       Past Surgical History:   Procedure Laterality Date   • COLONOSCOPY     • EYE SURGERY     • JOINT REPLACEMENT      right   • KIDNEY STONE SURGERY     • REPLACEMENT TOTAL KNEE     • TOE SURGERY         Family History   Problem Relation Age of Onset   • COPD Mother    • Diabetes Father        Social History     Socioeconomic History   • Marital status: Unknown     Spouse name: Not on file   • Number of children: Not on file   • Years of education: Not on file   • Highest education level: Not on file   Tobacco Use   • Smoking status: Former Smoker   • Smokeless tobacco: Never Used   • Tobacco comment: quit 1993   Substance and Sexual Activity   • Alcohol use: No   • Drug use: No   • Sexual activity: Defer           Objective   Physical Exam   Constitutional: He is oriented to person, place, and time. He appears well-developed and well-nourished. No distress.   The patient appears in no acute distress.  He appears generally weak.  Review of his vital signs: He is afebrile with a temperature 97.6, blood pressure slightly elevated 141/82, heart rate slightly increased 88, " respirations slightly increased 18 with a oxygen saturation of 92% which is baseline for him.   HENT:   Head: Normocephalic and atraumatic.   Nose: Nose normal.   Mouth/Throat: Oropharynx is clear and moist. No oropharyngeal exudate.   Eyes: Conjunctivae and EOM are normal. Pupils are equal, round, and reactive to light. Right eye exhibits no discharge. Left eye exhibits no discharge. No scleral icterus.   Neck: Normal range of motion. Neck supple. No JVD present. No thyromegaly present.   Cardiovascular: Normal rate, regular rhythm and normal heart sounds.   No murmur heard.  Pulmonary/Chest: Effort normal and breath sounds normal. He has no wheezes. He has no rales. He exhibits no tenderness.   Abdominal: Soft. Bowel sounds are normal. He exhibits no distension. There is no tenderness.   Musculoskeletal: Normal range of motion. He exhibits edema. He exhibits no tenderness or deformity.   4+ nonpitting edema of the feet, ankles and legs.   Lymphadenopathy:     He has no cervical adenopathy.   Neurological: He is alert and oriented to person, place, and time. No cranial nerve deficit or sensory deficit. He exhibits normal muscle tone. Coordination normal.   No focal motor sensory deficit   Skin: Skin is warm and dry. Capillary refill takes less than 2 seconds. No rash noted. He is not diaphoretic.   Venous stasis skin changes of a lower legs with few small blisters.   Psychiatric: He has a normal mood and affect. His behavior is normal. Judgment and thought content normal.   Nursing note and vitals reviewed.      Procedures           ED Course  ED Course as of Nov 20 1411   Wed Nov 20, 2019   1407 The patient CT the head showed nothing acute.  His fingerstick glucose was normal at 115.  [TP]   1407 The patient was evaluated by Yaima Mora, discharge planner, and arrangement was made for the patient to be admitted to the TCU.  19:04 patient discussed with Olga Dee, hospitalist, who accepted the patient needs  admission to the TCU on behalf of Dr. Figueredo.  [TP]      ED Course User Index  [TP] Melchor Elliott MD                  MDM  Number of Diagnoses or Management Options  Frequent falls: established and worsening  Generalized weakness: established and worsening     Amount and/or Complexity of Data Reviewed  Clinical lab tests: ordered and reviewed  Tests in the radiology section of CPT®: ordered and reviewed  Review and summarize past medical records: yes  Discuss the patient with other providers: yes  Independent visualization of images, tracings, or specimens: yes    Risk of Complications, Morbidity, and/or Mortality  Presenting problems: high  Diagnostic procedures: moderate  Management options: moderate  General comments: My differential diagnosis includes but is not limited to generalized weakness, CVA, TIA, acute MI, GI bleed, urinary tract infection, systemic infections including sepsis, alcohol abuse, drug abuse including prescription and street drug.    Patient Progress  Patient progress: stable      Final diagnoses:   Generalized weakness   Frequent falls             Labs Reviewed   POCT GLUCOSE FINGERSTICK - Normal     CT Head Without Contrast   ED Interpretation   1. No acute intracranial abnormality.   2. Stable diffuse chronic changes as noted above.   3. No change since 11/18/2019.       This report was finalized on 11/20/2019 1:31 PM by Dr. Edgardo Adams MD.          Final Result   1. No acute intracranial abnormality.   2. Stable diffuse chronic changes as noted above.   3. No change since 11/18/2019.       This report was finalized on 11/20/2019 1:31 PM by Dr. Edgardo Adams MD.                 Medication List      No changes were made to your prescriptions during this visit.            Melchor Elliott MD  11/20/19 0898

## 2019-11-20 NOTE — ED NOTES
Yaima Mora with case management here to speak with pt and wife.     Jeanne Duarte RN  11/20/19 3349

## 2019-11-20 NOTE — ED NOTES
Staff x 2 assisted pt to [facility].  Pt has difficulty standing and pivoting, getting out of bed.       Jeanne Duarte, RN  11/20/19 9196

## 2019-11-20 NOTE — ED PROVIDER NOTES
"Andrew Helton is a 80 yo WM who presents fall x2.  Patient has fallen twice in the past 24 hours.  He has not sustained any injury.  However he states he wants to \"figure out why I am falling\".  During both falls patient was transferring from standing to a sitting position.  Patient uses a walker for ambulation.  Patient presents for evaluation.        History provided by:  Patient      Review of Systems   Constitutional: Negative for fever.   HENT: Negative for rhinorrhea.    Eyes: Negative for redness.   Respiratory: Negative for cough.    Cardiovascular: Negative for chest pain.   Gastrointestinal: Negative for abdominal pain.   Genitourinary: Negative for dysuria.   Musculoskeletal: Negative for back pain.   Skin: Negative for rash.        Chronic stasis ulcers bilateral lower extremities   Neurological: Negative for syncope.   All other systems reviewed and are negative.      Past Medical History:   Diagnosis Date   • Arthritis    • Asthma    • Cancer (CMS/[facility])     skin   • Cataract    • COPD (chronic obstructive pulmonary disease) (CMS/[facility])    • Diabetes mellitus (CMS/[facility])    • Disease of thyroid gland    • Hypertension    • Kidney stone    • Myocardial infarct, old    • Renal disorder        Allergies   Allergen Reactions   • Oxycontin [Oxycodone Hcl]      'Makes me crazy and stand on my head'       Past Surgical History:   Procedure Laterality Date   • COLONOSCOPY     • EYE SURGERY     • JOINT REPLACEMENT      right   • KIDNEY STONE SURGERY     • REPLACEMENT TOTAL KNEE     • TOE SURGERY         Family History   Problem Relation Age of Onset   • COPD Mother    • Diabetes Father        Social History     Socioeconomic History   • Marital status: Unknown     Spouse name: Not on file   • Number of children: Not on file   • Years of education: Not on file   • Highest education level: Not on file   Tobacco Use   • Smoking status: Former Smoker   • Smokeless tobacco: Never Used   • Tobacco comment: quit " 1993   Substance and Sexual Activity   • Alcohol use: No   • Drug use: No   • Sexual activity: Defer           Objective   Physical Exam   Constitutional: He is oriented to person, place, and time. He appears well-developed and well-nourished. No distress.   79-year-old white male laying in bed.  Patient is obese.  He appears in fair overall health.  Vital signs unremarkable.  Patient is accompanied by his wife.   HENT:   Head: Normocephalic and atraumatic.   Right Ear: External ear normal.   Left Ear: External ear normal.   Nose: Nose normal.   Mouth/Throat: Oropharynx is clear and moist.   Eyes: Conjunctivae and EOM are normal. Pupils are equal, round, and reactive to light.   Neck: Normal range of motion. Neck supple.   Cardiovascular: Normal rate, regular rhythm, normal heart sounds and intact distal pulses. Exam reveals no gallop and no friction rub.   No murmur heard.  Pulmonary/Chest: Effort normal and breath sounds normal. No stridor. No respiratory distress. He has no wheezes. He has no rales.   Abdominal: Soft. He exhibits no distension. There is no tenderness.   Musculoskeletal: Normal range of motion. He exhibits no edema.   Neurological: He is alert and oriented to person, place, and time. He has normal reflexes. No cranial nerve deficit.   Skin: Skin is warm and dry. No rash noted. He is not diaphoretic. There is erythema.        Psychiatric: He has a normal mood and affect. His behavior is normal.   Nursing note and vitals reviewed.      Procedures           ED Course  ED Course as of Nov 19 2243   Mon Nov 18, 2019   1429 Patient thinks he struck his head during 1 of his falls.  No loss of consciousness.  I will obtain CT head as well as a CBC and CMP.  [SS]   1536 CBC shows anemia with hemoglobin 9.5 and hematocrit 33.5.  This is chronic.  CMP notable for blood sugar of 203.  Renal dysfunction with BUN 42 and creatinine 2.80.  This is again at baseline.  Awaiting CT and UA results.  [SS]   1538 CT  shows atrophy.  Nothing acute.  [SS]   1618 Patient is lower extremities on bandaged.  He has chronic stasis ulcers with surrounding erythema.  This is chronic.  It is improving per patient's wife.  Will apply antibiotic cream and redress.  Awaiting UA.Patient asked why he has fallen twice in the past 24 hours.  Both times patient has been transferring.  Wife reports physical therapy was recommended for Mr. geiger.  He has refused PT.  I explained to him that PT could indeed help increase his leg strength and prevent future falls.  Patient seems uninterested in my opinion.  [SS]   1640 UA shows trace bacteria with 6-12 WBCs and 3-5 RBCs.  3-6 squamous epithelial cells present per high-powered field.  Leukocyte small.  While this is a contaminated sample and I feel covering with antibiotics is indicated.  Prescribing Ceftin.  Giving first dose in the ER.Discussed at length with patient all results, diagnoses, treatment and follow-up.  Will DC home.Prescription  1-Ceftin  [SS]      ED Course User Index  [SS] Anurag Stover MD      Labs Reviewed   COMPREHENSIVE METABOLIC PANEL - Abnormal; Notable for the following components:       Result Value    Glucose 203 (*)     BUN 42 (*)     Creatinine 2.80 (*)     eGFR Non  Amer 22 (*)     All other components within normal limits    Narrative:     GFR Normal >60  Chronic Kidney Disease <60  Kidney Failure <15   URINALYSIS W/ MICROSCOPIC IF INDICATED (NO CULTURE) - Abnormal; Notable for the following components:    Glucose,  mg/dL (Trace) (*)     Blood, UA Moderate (2+) (*)     Protein, UA >=300 mg/dL (3+) (*)     Leuk Esterase, UA Small (1+) (*)     All other components within normal limits   CBC WITH AUTO DIFFERENTIAL - Abnormal; Notable for the following components:    RBC 4.08 (*)     Hemoglobin 9.5 (*)     Hematocrit 33.5 (*)     MCH 23.3 (*)     MCHC 28.4 (*)     RDW 17.2 (*)     Lymphocyte % 14.2 (*)     Eosinophil % 7.1 (*)     Eosinophils, Absolute 0.43  (*)     All other components within normal limits   URINALYSIS, MICROSCOPIC ONLY - Abnormal; Notable for the following components:    RBC, UA 3-5 (*)     WBC, UA 6-12 (*)     Bacteria, UA Trace (*)     Squamous Epithelial Cells, UA 3-6 (*)     All other components within normal limits   CBC AND DIFFERENTIAL    Narrative:     The following orders were created for panel order CBC & Differential.  Procedure                               Abnormality         Status                     ---------                               -----------         ------                     CBC Auto Differential[[id number]]        Abnormal            Final result                 Please view results for these tests on the individual orders.     Xr Knee 3 View Left    Result Date: 11/19/2019  Narrative: LEFT KNEE 3 VIEWS 11/19/2019  HISTORY: Left knee pain status post fall today.  FINDINGS: 3 views of the left knee demonstrate no fracture. There is degenerative change with moderate narrowing of the medial compartment of the knee and there is narrowing of the patellofemoral joint. Small osteophytes border the medial compartment and are seen along the posterior aspect of the patella. The bones are somewhat osteopenic. There is no joint effusion.      Impression: Degenerative changes left knee. No acute abnormality.  This report was finalized on 11/19/2019 11:24 AM by Dr. Enrike Roche MD.      Ct Head Without Contrast    Result Date: 11/18/2019  Narrative: HEAD CT WITHOUT CONTRAST 11/18/2019  HISTORY: Lower extremity weakness and buckling resulting in multiple falls over the past 3 days including 2 falls today. Diabetes and hypertension.  TECHNIQUE: Multiple axial images were obtained from the skull base to vertex without intravenous contrast administration. Radiation dose reduction techniques included automated exposure control or exposure modulation based on body size. Radiation audit for CT and nuclear cardiology exams in the last 12 months:  3.  FINDINGS: There is generalized enlargement of the ventricles and sulci characteristic of atrophy. Is no midline shift. There is no mass or mass effect, hemorrhage or acute infarct. The visualized paranasal sinuses are clear.      Impression: Atrophy. No acute intracranial abnormality.  This report was finalized on 11/18/2019 3:29 PM by Dr. Enrike Roche MD.      Xr Hip With Or Without Pelvis 2 - 3 View Left    Result Date: 11/19/2019  Narrative: PELVIS AND LEFT HIP 3 VIEWS 11/19/2019  HISTORY: Pelvic pain and left hip pain status post fall this morning.  FINDINGS: 3 views of the pelvis and left hip demonstrate no fracture. The hip joints are normally maintained. There is some osteophytic spurring about each acetabulum. There is no soft tissue abnormality.      Impression: Mild degenerative change involving the hips bilaterally. No acute abnormality.  This report was finalized on 11/19/2019 11:25 AM by Dr. Enrike Roche MD.      My differential diagnosis includes but is not limited to generalized weakness, CVA, TIA, acute MI, GI bleed, urinary tract infection, systemic infections including sepsis, alcohol abuse, drug abuse including prescription and street drug.              MDM    Final diagnoses:   Acute UTI   Weakness of both lower extremities   Venous stasis ulcers of both lower extremities (CMS/HCC)              Anurag Stover MD  11/19/19 1555

## 2019-11-21 ENCOUNTER — APPOINTMENT (OUTPATIENT)
Dept: MRI IMAGING | Facility: HOSPITAL | Age: 79
End: 2019-11-21

## 2019-11-21 PROBLEM — I48.91 ATRIAL FIBRILLATION WITH RVR: Status: RESOLVED | Noted: 2019-08-17 | Resolved: 2019-11-21

## 2019-11-21 PROBLEM — I13.10 CARDIORENAL SYNDROME WITH RENAL FAILURE: Status: RESOLVED | Noted: 2019-05-07 | Resolved: 2019-11-21

## 2019-11-21 PROBLEM — L03.115 CELLULITIS OF RIGHT LOWER EXTREMITY: Status: RESOLVED | Noted: 2019-10-15 | Resolved: 2019-11-21

## 2019-11-21 PROBLEM — J96.01 ACUTE RESPIRATORY FAILURE WITH HYPOXIA: Status: RESOLVED | Noted: 2019-08-15 | Resolved: 2019-11-21

## 2019-11-21 PROBLEM — I50.32 CHRONIC DIASTOLIC (CONGESTIVE) HEART FAILURE (HCC): Status: ACTIVE | Noted: 2019-05-07

## 2019-11-21 PROBLEM — R77.8 ELEVATED TROPONIN: Status: RESOLVED | Noted: 2019-05-07 | Resolved: 2019-11-21

## 2019-11-21 LAB
ANION GAP SERPL CALCULATED.3IONS-SCNC: 11.8 MMOL/L (ref 5–15)
BUN BLD-MCNC: 47 MG/DL (ref 8–23)
BUN/CREAT SERPL: 16.5 (ref 7–25)
CALCIUM SPEC-SCNC: 8.9 MG/DL (ref 8.6–10.5)
CHLORIDE SERPL-SCNC: 101 MMOL/L (ref 98–107)
CO2 SERPL-SCNC: 25.2 MMOL/L (ref 22–29)
CREAT BLD-MCNC: 2.85 MG/DL (ref 0.76–1.27)
GFR SERPL CREATININE-BSD FRML MDRD: 22 ML/MIN/1.73
GLUCOSE BLD-MCNC: 177 MG/DL (ref 65–99)
GLUCOSE BLDC GLUCOMTR-MCNC: 165 MG/DL (ref 70–130)
GLUCOSE BLDC GLUCOMTR-MCNC: 170 MG/DL (ref 70–130)
GLUCOSE BLDC GLUCOMTR-MCNC: 209 MG/DL (ref 70–130)
GLUCOSE BLDC GLUCOMTR-MCNC: 230 MG/DL (ref 70–130)
GLUCOSE BLDC GLUCOMTR-MCNC: 241 MG/DL (ref 70–130)
POTASSIUM BLD-SCNC: 4.7 MMOL/L (ref 3.5–5.2)
SODIUM BLD-SCNC: 138 MMOL/L (ref 136–145)

## 2019-11-21 PROCEDURE — 73721 MRI JNT OF LWR EXTRE W/O DYE: CPT

## 2019-11-21 PROCEDURE — 97110 THERAPEUTIC EXERCISES: CPT

## 2019-11-21 PROCEDURE — 97161 PT EVAL LOW COMPLEX 20 MIN: CPT

## 2019-11-21 PROCEDURE — 94762 N-INVAS EAR/PLS OXIMTRY CONT: CPT

## 2019-11-21 PROCEDURE — 63710000001 INSULIN DETEMIR PER 5 UNITS: Performed by: NURSE PRACTITIONER

## 2019-11-21 PROCEDURE — 93005 ELECTROCARDIOGRAM TRACING: CPT | Performed by: NURSE PRACTITIONER

## 2019-11-21 PROCEDURE — 80048 BASIC METABOLIC PNL TOTAL CA: CPT | Performed by: INTERNAL MEDICINE

## 2019-11-21 PROCEDURE — 82962 GLUCOSE BLOOD TEST: CPT

## 2019-11-21 PROCEDURE — 97530 THERAPEUTIC ACTIVITIES: CPT

## 2019-11-21 PROCEDURE — 63710000001 INSULIN ASPART PER 5 UNITS: Performed by: INTERNAL MEDICINE

## 2019-11-21 PROCEDURE — 99310 SBSQ NF CARE HIGH MDM 45: CPT | Performed by: NURSE PRACTITIONER

## 2019-11-21 PROCEDURE — 99222 1ST HOSP IP/OBS MODERATE 55: CPT | Performed by: ORTHOPAEDIC SURGERY

## 2019-11-21 PROCEDURE — 97165 OT EVAL LOW COMPLEX 30 MIN: CPT

## 2019-11-21 PROCEDURE — 93010 ELECTROCARDIOGRAM REPORT: CPT | Performed by: INTERNAL MEDICINE

## 2019-11-21 PROCEDURE — 63710000001 INSULIN DETEMIR PER 5 UNITS: Performed by: INTERNAL MEDICINE

## 2019-11-21 RX ADMIN — CARVEDILOL 6.25 MG: 6.25 TABLET, FILM COATED ORAL at 09:53

## 2019-11-21 RX ADMIN — APIXABAN 5 MG: 2.5 TABLET, FILM COATED ORAL at 09:53

## 2019-11-21 RX ADMIN — NYSTATIN: 100000 POWDER TOPICAL at 09:54

## 2019-11-21 RX ADMIN — CYANOCOBALAMIN TAB 1000 MCG 1000 MCG: 1000 TAB at 09:54

## 2019-11-21 RX ADMIN — BUMETANIDE 1 MG: 1 TABLET ORAL at 21:14

## 2019-11-21 RX ADMIN — INSULIN DETEMIR 35 UNITS: 100 INJECTION, SOLUTION SUBCUTANEOUS at 21:04

## 2019-11-21 RX ADMIN — INSULIN ASPART 5 UNITS: 100 INJECTION, SOLUTION INTRAVENOUS; SUBCUTANEOUS at 12:08

## 2019-11-21 RX ADMIN — PREGABALIN 150 MG: 75 CAPSULE ORAL at 09:59

## 2019-11-21 RX ADMIN — AMIODARONE HYDROCHLORIDE 200 MG: 200 TABLET ORAL at 21:14

## 2019-11-21 RX ADMIN — INSULIN ASPART 3 UNITS: 100 INJECTION, SOLUTION INTRAVENOUS; SUBCUTANEOUS at 17:42

## 2019-11-21 RX ADMIN — BUDESONIDE AND FORMOTEROL FUMARATE DIHYDRATE 2 PUFF: 160; 4.5 AEROSOL RESPIRATORY (INHALATION) at 09:52

## 2019-11-21 RX ADMIN — CETIRIZINE HYDROCHLORIDE 5 MG: 10 TABLET, FILM COATED ORAL at 09:54

## 2019-11-21 RX ADMIN — APIXABAN 2.5 MG: 2.5 TABLET, FILM COATED ORAL at 21:14

## 2019-11-21 RX ADMIN — FLUTICASONE PROPIONATE 2 SPRAY: 50 SPRAY, METERED NASAL at 09:52

## 2019-11-21 RX ADMIN — CARVEDILOL 6.25 MG: 6.25 TABLET, FILM COATED ORAL at 17:46

## 2019-11-21 RX ADMIN — CEFUROXIME AXETIL 250 MG: 250 TABLET ORAL at 09:53

## 2019-11-21 RX ADMIN — INSULIN DETEMIR 30 UNITS: 100 INJECTION, SOLUTION SUBCUTANEOUS at 09:34

## 2019-11-21 RX ADMIN — LEVOTHYROXINE SODIUM 125 MCG: 125 TABLET ORAL at 09:54

## 2019-11-21 RX ADMIN — AMIODARONE HYDROCHLORIDE 200 MG: 200 TABLET ORAL at 09:54

## 2019-11-21 RX ADMIN — LINAGLIPTIN 5 MG: 5 TABLET, FILM COATED ORAL at 09:53

## 2019-11-21 RX ADMIN — ACETAMINOPHEN 650 MG: 325 TABLET, FILM COATED ORAL at 21:15

## 2019-11-21 RX ADMIN — PREGABALIN 150 MG: 75 CAPSULE ORAL at 21:13

## 2019-11-21 RX ADMIN — INSULIN ASPART 5 UNITS: 100 INJECTION, SOLUTION INTRAVENOUS; SUBCUTANEOUS at 21:14

## 2019-11-21 RX ADMIN — INSULIN ASPART 5 UNITS: 100 INJECTION, SOLUTION INTRAVENOUS; SUBCUTANEOUS at 09:54

## 2019-11-21 RX ADMIN — TAMSULOSIN HYDROCHLORIDE 0.4 MG: 0.4 CAPSULE ORAL at 09:53

## 2019-11-21 RX ADMIN — BUMETANIDE 1 MG: 1 TABLET ORAL at 09:53

## 2019-11-21 RX ADMIN — CEFUROXIME AXETIL 250 MG: 250 TABLET ORAL at 21:14

## 2019-11-21 RX ADMIN — ATORVASTATIN CALCIUM 10 MG: 10 TABLET, FILM COATED ORAL at 09:53

## 2019-11-21 RX ADMIN — NYSTATIN: 100000 POWDER TOPICAL at 21:12

## 2019-11-21 RX ADMIN — BUDESONIDE AND FORMOTEROL FUMARATE DIHYDRATE 2 PUFF: 160; 4.5 AEROSOL RESPIRATORY (INHALATION) at 21:13

## 2019-11-22 LAB
ANION GAP SERPL CALCULATED.3IONS-SCNC: 10.9 MMOL/L (ref 5–15)
BUN BLD-MCNC: 53 MG/DL (ref 8–23)
BUN/CREAT SERPL: 17 (ref 7–25)
CALCIUM SPEC-SCNC: 8.9 MG/DL (ref 8.6–10.5)
CHLORIDE SERPL-SCNC: 102 MMOL/L (ref 98–107)
CO2 SERPL-SCNC: 27.1 MMOL/L (ref 22–29)
CREAT BLD-MCNC: 3.11 MG/DL (ref 0.76–1.27)
GFR SERPL CREATININE-BSD FRML MDRD: 20 ML/MIN/1.73
GLUCOSE BLD-MCNC: 187 MG/DL (ref 65–99)
GLUCOSE BLDC GLUCOMTR-MCNC: 160 MG/DL (ref 70–130)
GLUCOSE BLDC GLUCOMTR-MCNC: 188 MG/DL (ref 70–130)
GLUCOSE BLDC GLUCOMTR-MCNC: 255 MG/DL (ref 70–130)
GLUCOSE BLDC GLUCOMTR-MCNC: 298 MG/DL (ref 70–130)
POTASSIUM BLD-SCNC: 4.7 MMOL/L (ref 3.5–5.2)
PROT UR-MCNC: 47 MG/DL
SODIUM BLD-SCNC: 140 MMOL/L (ref 136–145)

## 2019-11-22 PROCEDURE — 99232 SBSQ HOSP IP/OBS MODERATE 35: CPT | Performed by: ORTHOPAEDIC SURGERY

## 2019-11-22 PROCEDURE — 63710000001 INSULIN DETEMIR PER 5 UNITS: Performed by: NURSE PRACTITIONER

## 2019-11-22 PROCEDURE — 80048 BASIC METABOLIC PNL TOTAL CA: CPT | Performed by: HOSPITALIST

## 2019-11-22 PROCEDURE — 0S9D3ZZ DRAINAGE OF LEFT KNEE JOINT, PERCUTANEOUS APPROACH: ICD-10-PCS | Performed by: ORTHOPAEDIC SURGERY

## 2019-11-22 PROCEDURE — 20610 DRAIN/INJ JOINT/BURSA W/O US: CPT | Performed by: ORTHOPAEDIC SURGERY

## 2019-11-22 PROCEDURE — 97530 THERAPEUTIC ACTIVITIES: CPT

## 2019-11-22 PROCEDURE — 84156 ASSAY OF PROTEIN URINE: CPT | Performed by: INTERNAL MEDICINE

## 2019-11-22 PROCEDURE — 63710000001 INSULIN ASPART PER 5 UNITS: Performed by: INTERNAL MEDICINE

## 2019-11-22 PROCEDURE — 94799 UNLISTED PULMONARY SVC/PX: CPT

## 2019-11-22 PROCEDURE — 63710000001 INSULIN ASPART PER 5 UNITS: Performed by: NURSE PRACTITIONER

## 2019-11-22 PROCEDURE — 97110 THERAPEUTIC EXERCISES: CPT

## 2019-11-22 PROCEDURE — 82570 ASSAY OF URINE CREATININE: CPT | Performed by: INTERNAL MEDICINE

## 2019-11-22 PROCEDURE — 99222 1ST HOSP IP/OBS MODERATE 55: CPT | Performed by: INTERNAL MEDICINE

## 2019-11-22 PROCEDURE — 99309 SBSQ NF CARE MODERATE MDM 30: CPT | Performed by: NURSE PRACTITIONER

## 2019-11-22 PROCEDURE — 82962 GLUCOSE BLOOD TEST: CPT

## 2019-11-22 RX ORDER — BUMETANIDE 0.25 MG/ML
2 INJECTION INTRAMUSCULAR; INTRAVENOUS ONCE
Status: COMPLETED | OUTPATIENT
Start: 2019-11-22 | End: 2019-11-22

## 2019-11-22 RX ORDER — BUMETANIDE 0.25 MG/ML
1 INJECTION INTRAMUSCULAR; INTRAVENOUS EVERY 12 HOURS
Status: DISCONTINUED | OUTPATIENT
Start: 2019-11-22 | End: 2019-11-22

## 2019-11-22 RX ORDER — BUMETANIDE 0.25 MG/ML
4 INJECTION INTRAMUSCULAR; INTRAVENOUS EVERY 8 HOURS
Status: COMPLETED | OUTPATIENT
Start: 2019-11-22 | End: 2019-11-23

## 2019-11-22 RX ADMIN — CYANOCOBALAMIN TAB 1000 MCG 1000 MCG: 1000 TAB at 07:58

## 2019-11-22 RX ADMIN — ATORVASTATIN CALCIUM 10 MG: 10 TABLET, FILM COATED ORAL at 07:58

## 2019-11-22 RX ADMIN — INSULIN ASPART 3 UNITS: 100 INJECTION, SOLUTION INTRAVENOUS; SUBCUTANEOUS at 08:00

## 2019-11-22 RX ADMIN — INSULIN DETEMIR 35 UNITS: 100 INJECTION, SOLUTION SUBCUTANEOUS at 21:14

## 2019-11-22 RX ADMIN — CARVEDILOL 6.25 MG: 6.25 TABLET, FILM COATED ORAL at 17:28

## 2019-11-22 RX ADMIN — PREGABALIN 150 MG: 75 CAPSULE ORAL at 08:12

## 2019-11-22 RX ADMIN — ACETAMINOPHEN 650 MG: 325 TABLET, FILM COATED ORAL at 21:26

## 2019-11-22 RX ADMIN — NYSTATIN: 100000 POWDER TOPICAL at 08:16

## 2019-11-22 RX ADMIN — AMIODARONE HYDROCHLORIDE 200 MG: 200 TABLET ORAL at 07:59

## 2019-11-22 RX ADMIN — LEVOTHYROXINE SODIUM 125 MCG: 125 TABLET ORAL at 06:22

## 2019-11-22 RX ADMIN — BUDESONIDE AND FORMOTEROL FUMARATE DIHYDRATE 2 PUFF: 160; 4.5 AEROSOL RESPIRATORY (INHALATION) at 21:25

## 2019-11-22 RX ADMIN — APIXABAN 2.5 MG: 2.5 TABLET, FILM COATED ORAL at 21:26

## 2019-11-22 RX ADMIN — CEFUROXIME AXETIL 250 MG: 250 TABLET ORAL at 07:59

## 2019-11-22 RX ADMIN — BUDESONIDE AND FORMOTEROL FUMARATE DIHYDRATE 2 PUFF: 160; 4.5 AEROSOL RESPIRATORY (INHALATION) at 08:40

## 2019-11-22 RX ADMIN — PREGABALIN 150 MG: 75 CAPSULE ORAL at 21:25

## 2019-11-22 RX ADMIN — CEFUROXIME AXETIL 250 MG: 250 TABLET ORAL at 21:26

## 2019-11-22 RX ADMIN — NYSTATIN: 100000 POWDER TOPICAL at 21:25

## 2019-11-22 RX ADMIN — BUMETANIDE 1 MG: 1 TABLET ORAL at 08:12

## 2019-11-22 RX ADMIN — INSULIN DETEMIR 35 UNITS: 100 INJECTION, SOLUTION SUBCUTANEOUS at 08:08

## 2019-11-22 RX ADMIN — CARVEDILOL 6.25 MG: 6.25 TABLET, FILM COATED ORAL at 08:00

## 2019-11-22 RX ADMIN — INSULIN ASPART 3 UNITS: 100 INJECTION, SOLUTION INTRAVENOUS; SUBCUTANEOUS at 12:37

## 2019-11-22 RX ADMIN — APIXABAN 2.5 MG: 2.5 TABLET, FILM COATED ORAL at 07:57

## 2019-11-22 RX ADMIN — TAMSULOSIN HYDROCHLORIDE 0.4 MG: 0.4 CAPSULE ORAL at 07:57

## 2019-11-22 RX ADMIN — CETIRIZINE HYDROCHLORIDE 5 MG: 10 TABLET, FILM COATED ORAL at 07:59

## 2019-11-22 RX ADMIN — BUMETANIDE 4 MG: 0.25 INJECTION INTRAMUSCULAR; INTRAVENOUS at 17:14

## 2019-11-22 RX ADMIN — INSULIN ASPART 12 UNITS: 100 INJECTION, SOLUTION INTRAVENOUS; SUBCUTANEOUS at 21:18

## 2019-11-22 RX ADMIN — BUMETANIDE 2 MG: 0.25 INJECTION INTRAMUSCULAR; INTRAVENOUS at 12:35

## 2019-11-22 RX ADMIN — INSULIN ASPART 12 UNITS: 100 INJECTION, SOLUTION INTRAVENOUS; SUBCUTANEOUS at 17:22

## 2019-11-23 LAB
ALBUMIN SERPL-MCNC: 3.5 G/DL (ref 3.5–5.2)
ANION GAP SERPL CALCULATED.3IONS-SCNC: 15.7 MMOL/L (ref 5–15)
BUN BLD-MCNC: 53 MG/DL (ref 8–23)
BUN/CREAT SERPL: 19.1 (ref 7–25)
CALCIUM SPEC-SCNC: 9 MG/DL (ref 8.6–10.5)
CHLORIDE SERPL-SCNC: 98 MMOL/L (ref 98–107)
CO2 SERPL-SCNC: 24.3 MMOL/L (ref 22–29)
CREAT BLD-MCNC: 2.78 MG/DL (ref 0.76–1.27)
CREAT UR-MCNC: 33.4 MG/DL
GFR SERPL CREATININE-BSD FRML MDRD: 22 ML/MIN/1.73
GLUCOSE BLD-MCNC: 250 MG/DL (ref 65–99)
GLUCOSE BLDC GLUCOMTR-MCNC: 225 MG/DL (ref 70–130)
GLUCOSE BLDC GLUCOMTR-MCNC: 269 MG/DL (ref 70–130)
GLUCOSE BLDC GLUCOMTR-MCNC: 284 MG/DL (ref 70–130)
GLUCOSE BLDC GLUCOMTR-MCNC: 287 MG/DL (ref 70–130)
GLUCOSE BLDC GLUCOMTR-MCNC: 301 MG/DL (ref 70–130)
MAGNESIUM SERPL-MCNC: 1.9 MG/DL (ref 1.6–2.4)
PHOSPHATE SERPL-MCNC: 4.6 MG/DL (ref 2.5–4.5)
POTASSIUM BLD-SCNC: 4.9 MMOL/L (ref 3.5–5.2)
SODIUM BLD-SCNC: 138 MMOL/L (ref 136–145)
URATE SERPL-MCNC: 13.6 MG/DL (ref 3.4–7)

## 2019-11-23 PROCEDURE — 82962 GLUCOSE BLOOD TEST: CPT

## 2019-11-23 PROCEDURE — 84550 ASSAY OF BLOOD/URIC ACID: CPT | Performed by: INTERNAL MEDICINE

## 2019-11-23 PROCEDURE — 63710000001 INSULIN DETEMIR PER 5 UNITS: Performed by: NURSE PRACTITIONER

## 2019-11-23 PROCEDURE — 94799 UNLISTED PULMONARY SVC/PX: CPT

## 2019-11-23 PROCEDURE — 63710000001 INSULIN ASPART PER 5 UNITS: Performed by: NURSE PRACTITIONER

## 2019-11-23 PROCEDURE — 80069 RENAL FUNCTION PANEL: CPT | Performed by: INTERNAL MEDICINE

## 2019-11-23 PROCEDURE — 83735 ASSAY OF MAGNESIUM: CPT | Performed by: INTERNAL MEDICINE

## 2019-11-23 RX ORDER — BUMETANIDE 0.25 MG/ML
4 INJECTION INTRAMUSCULAR; INTRAVENOUS EVERY 8 HOURS
Status: COMPLETED | OUTPATIENT
Start: 2019-11-23 | End: 2019-11-24

## 2019-11-23 RX ADMIN — NYSTATIN: 100000 POWDER TOPICAL at 09:07

## 2019-11-23 RX ADMIN — LEVOTHYROXINE SODIUM 125 MCG: 125 TABLET ORAL at 06:22

## 2019-11-23 RX ADMIN — ATORVASTATIN CALCIUM 10 MG: 10 TABLET, FILM COATED ORAL at 09:03

## 2019-11-23 RX ADMIN — APIXABAN 2.5 MG: 2.5 TABLET, FILM COATED ORAL at 09:03

## 2019-11-23 RX ADMIN — INSULIN ASPART 12 UNITS: 100 INJECTION, SOLUTION INTRAVENOUS; SUBCUTANEOUS at 20:28

## 2019-11-23 RX ADMIN — BUMETANIDE 4 MG: 0.25 INJECTION INTRAMUSCULAR; INTRAVENOUS at 09:03

## 2019-11-23 RX ADMIN — CARVEDILOL 6.25 MG: 6.25 TABLET, FILM COATED ORAL at 17:39

## 2019-11-23 RX ADMIN — ACETAMINOPHEN 650 MG: 325 TABLET, FILM COATED ORAL at 20:28

## 2019-11-23 RX ADMIN — MELATONIN TAB 5 MG 5 MG: 5 TAB at 20:27

## 2019-11-23 RX ADMIN — CEFUROXIME AXETIL 250 MG: 250 TABLET ORAL at 09:03

## 2019-11-23 RX ADMIN — INSULIN DETEMIR 35 UNITS: 100 INJECTION, SOLUTION SUBCUTANEOUS at 09:39

## 2019-11-23 RX ADMIN — APIXABAN 2.5 MG: 2.5 TABLET, FILM COATED ORAL at 20:27

## 2019-11-23 RX ADMIN — BUMETANIDE 4 MG: 0.25 INJECTION INTRAMUSCULAR; INTRAVENOUS at 17:39

## 2019-11-23 RX ADMIN — INSULIN ASPART 12 UNITS: 100 INJECTION, SOLUTION INTRAVENOUS; SUBCUTANEOUS at 09:04

## 2019-11-23 RX ADMIN — FLUTICASONE PROPIONATE 2 SPRAY: 50 SPRAY, METERED NASAL at 09:02

## 2019-11-23 RX ADMIN — PREGABALIN 150 MG: 75 CAPSULE ORAL at 09:07

## 2019-11-23 RX ADMIN — INSULIN ASPART 12 UNITS: 100 INJECTION, SOLUTION INTRAVENOUS; SUBCUTANEOUS at 17:39

## 2019-11-23 RX ADMIN — PETROLATUM 454 APPLICATION: 420 OINTMENT TOPICAL at 09:04

## 2019-11-23 RX ADMIN — BUDESONIDE AND FORMOTEROL FUMARATE DIHYDRATE 2 PUFF: 160; 4.5 AEROSOL RESPIRATORY (INHALATION) at 20:34

## 2019-11-23 RX ADMIN — CARVEDILOL 6.25 MG: 6.25 TABLET, FILM COATED ORAL at 09:03

## 2019-11-23 RX ADMIN — BUMETANIDE 4 MG: 0.25 INJECTION INTRAMUSCULAR; INTRAVENOUS at 00:14

## 2019-11-23 RX ADMIN — CYANOCOBALAMIN TAB 1000 MCG 1000 MCG: 1000 TAB at 09:03

## 2019-11-23 RX ADMIN — CETIRIZINE HYDROCHLORIDE 5 MG: 10 TABLET, FILM COATED ORAL at 09:02

## 2019-11-23 RX ADMIN — INSULIN DETEMIR 35 UNITS: 100 INJECTION, SOLUTION SUBCUTANEOUS at 20:23

## 2019-11-23 RX ADMIN — BUDESONIDE AND FORMOTEROL FUMARATE DIHYDRATE 2 PUFF: 160; 4.5 AEROSOL RESPIRATORY (INHALATION) at 09:02

## 2019-11-23 RX ADMIN — PREGABALIN 150 MG: 75 CAPSULE ORAL at 20:28

## 2019-11-23 RX ADMIN — TAMSULOSIN HYDROCHLORIDE 0.4 MG: 0.4 CAPSULE ORAL at 09:05

## 2019-11-23 RX ADMIN — NYSTATIN: 100000 POWDER TOPICAL at 20:27

## 2019-11-23 RX ADMIN — INSULIN ASPART 16 UNITS: 100 INJECTION, SOLUTION INTRAVENOUS; SUBCUTANEOUS at 11:57

## 2019-11-24 LAB
ALBUMIN SERPL-MCNC: 3.5 G/DL (ref 3.5–5.2)
ANION GAP SERPL CALCULATED.3IONS-SCNC: 13.8 MMOL/L (ref 5–15)
BUN BLD-MCNC: 62 MG/DL (ref 8–23)
BUN/CREAT SERPL: 20.7 (ref 7–25)
CALCIUM SPEC-SCNC: 9 MG/DL (ref 8.6–10.5)
CHLORIDE SERPL-SCNC: 96 MMOL/L (ref 98–107)
CO2 SERPL-SCNC: 28.2 MMOL/L (ref 22–29)
CREAT BLD-MCNC: 3 MG/DL (ref 0.76–1.27)
GFR SERPL CREATININE-BSD FRML MDRD: 20 ML/MIN/1.73
GLUCOSE BLD-MCNC: 246 MG/DL (ref 65–99)
GLUCOSE BLDC GLUCOMTR-MCNC: 233 MG/DL (ref 70–130)
GLUCOSE BLDC GLUCOMTR-MCNC: 238 MG/DL (ref 70–130)
GLUCOSE BLDC GLUCOMTR-MCNC: 254 MG/DL (ref 70–130)
GLUCOSE BLDC GLUCOMTR-MCNC: 270 MG/DL (ref 70–130)
MAGNESIUM SERPL-MCNC: 1.9 MG/DL (ref 1.6–2.4)
PHOSPHATE SERPL-MCNC: 4.1 MG/DL (ref 2.5–4.5)
POTASSIUM BLD-SCNC: 4.7 MMOL/L (ref 3.5–5.2)
SODIUM BLD-SCNC: 138 MMOL/L (ref 136–145)
URATE SERPL-MCNC: 14.1 MG/DL (ref 3.4–7)

## 2019-11-24 PROCEDURE — 80069 RENAL FUNCTION PANEL: CPT | Performed by: INTERNAL MEDICINE

## 2019-11-24 PROCEDURE — 82962 GLUCOSE BLOOD TEST: CPT

## 2019-11-24 PROCEDURE — 63710000001 INSULIN DETEMIR PER 5 UNITS: Performed by: NURSE PRACTITIONER

## 2019-11-24 PROCEDURE — 83735 ASSAY OF MAGNESIUM: CPT | Performed by: INTERNAL MEDICINE

## 2019-11-24 PROCEDURE — 63710000001 INSULIN ASPART PER 5 UNITS: Performed by: NURSE PRACTITIONER

## 2019-11-24 PROCEDURE — 84550 ASSAY OF BLOOD/URIC ACID: CPT | Performed by: INTERNAL MEDICINE

## 2019-11-24 RX ADMIN — NYSTATIN: 100000 POWDER TOPICAL at 08:29

## 2019-11-24 RX ADMIN — NYSTATIN: 100000 POWDER TOPICAL at 20:51

## 2019-11-24 RX ADMIN — BUDESONIDE AND FORMOTEROL FUMARATE DIHYDRATE 2 PUFF: 160; 4.5 AEROSOL RESPIRATORY (INHALATION) at 20:51

## 2019-11-24 RX ADMIN — FLUTICASONE PROPIONATE 2 SPRAY: 50 SPRAY, METERED NASAL at 08:29

## 2019-11-24 RX ADMIN — CARVEDILOL 6.25 MG: 6.25 TABLET, FILM COATED ORAL at 08:30

## 2019-11-24 RX ADMIN — INSULIN DETEMIR 35 UNITS: 100 INJECTION, SOLUTION SUBCUTANEOUS at 08:19

## 2019-11-24 RX ADMIN — APIXABAN 2.5 MG: 2.5 TABLET, FILM COATED ORAL at 08:30

## 2019-11-24 RX ADMIN — PREGABALIN 150 MG: 75 CAPSULE ORAL at 20:51

## 2019-11-24 RX ADMIN — CYANOCOBALAMIN TAB 1000 MCG 1000 MCG: 1000 TAB at 08:30

## 2019-11-24 RX ADMIN — INSULIN DETEMIR 35 UNITS: 100 INJECTION, SOLUTION SUBCUTANEOUS at 20:51

## 2019-11-24 RX ADMIN — APIXABAN 2.5 MG: 2.5 TABLET, FILM COATED ORAL at 20:51

## 2019-11-24 RX ADMIN — CETIRIZINE HYDROCHLORIDE 5 MG: 10 TABLET, FILM COATED ORAL at 08:30

## 2019-11-24 RX ADMIN — BUMETANIDE 4 MG: 0.25 INJECTION INTRAMUSCULAR; INTRAVENOUS at 00:39

## 2019-11-24 RX ADMIN — PETROLATUM 454 APPLICATION: 420 OINTMENT TOPICAL at 08:30

## 2019-11-24 RX ADMIN — INSULIN ASPART 12 UNITS: 100 INJECTION, SOLUTION INTRAVENOUS; SUBCUTANEOUS at 17:26

## 2019-11-24 RX ADMIN — TAMSULOSIN HYDROCHLORIDE 0.4 MG: 0.4 CAPSULE ORAL at 08:30

## 2019-11-24 RX ADMIN — ATORVASTATIN CALCIUM 10 MG: 10 TABLET, FILM COATED ORAL at 08:30

## 2019-11-24 RX ADMIN — PREGABALIN 150 MG: 75 CAPSULE ORAL at 08:30

## 2019-11-24 RX ADMIN — INSULIN ASPART 8 UNITS: 100 INJECTION, SOLUTION INTRAVENOUS; SUBCUTANEOUS at 20:52

## 2019-11-24 RX ADMIN — CARVEDILOL 6.25 MG: 6.25 TABLET, FILM COATED ORAL at 17:27

## 2019-11-24 RX ADMIN — BUMETANIDE 4 MG: 0.25 INJECTION INTRAMUSCULAR; INTRAVENOUS at 08:30

## 2019-11-24 RX ADMIN — INSULIN ASPART 8 UNITS: 100 INJECTION, SOLUTION INTRAVENOUS; SUBCUTANEOUS at 08:29

## 2019-11-24 RX ADMIN — BUDESONIDE AND FORMOTEROL FUMARATE DIHYDRATE 2 PUFF: 160; 4.5 AEROSOL RESPIRATORY (INHALATION) at 08:29

## 2019-11-24 RX ADMIN — INSULIN ASPART 8 UNITS: 100 INJECTION, SOLUTION INTRAVENOUS; SUBCUTANEOUS at 12:11

## 2019-11-24 RX ADMIN — LEVOTHYROXINE SODIUM 125 MCG: 125 TABLET ORAL at 06:09

## 2019-11-25 LAB
ANION GAP SERPL CALCULATED.3IONS-SCNC: 16.7 MMOL/L (ref 5–15)
BUN BLD-MCNC: 64 MG/DL (ref 8–23)
BUN/CREAT SERPL: 23.3 (ref 7–25)
CALCIUM SPEC-SCNC: 8.9 MG/DL (ref 8.6–10.5)
CHLORIDE SERPL-SCNC: 96 MMOL/L (ref 98–107)
CO2 SERPL-SCNC: 25.3 MMOL/L (ref 22–29)
CREAT BLD-MCNC: 2.75 MG/DL (ref 0.76–1.27)
GFR SERPL CREATININE-BSD FRML MDRD: 22 ML/MIN/1.73
GLUCOSE BLD-MCNC: 239 MG/DL (ref 65–99)
GLUCOSE BLDC GLUCOMTR-MCNC: 205 MG/DL (ref 70–130)
GLUCOSE BLDC GLUCOMTR-MCNC: 220 MG/DL (ref 70–130)
GLUCOSE BLDC GLUCOMTR-MCNC: 247 MG/DL (ref 70–130)
GLUCOSE BLDC GLUCOMTR-MCNC: 269 MG/DL (ref 70–130)
POTASSIUM BLD-SCNC: 4.3 MMOL/L (ref 3.5–5.2)
S PYO AG THROAT QL: NEGATIVE
SODIUM BLD-SCNC: 138 MMOL/L (ref 136–145)

## 2019-11-25 PROCEDURE — 99308 SBSQ NF CARE LOW MDM 20: CPT | Performed by: HOSPITALIST

## 2019-11-25 PROCEDURE — 80048 BASIC METABOLIC PNL TOTAL CA: CPT | Performed by: NURSE PRACTITIONER

## 2019-11-25 PROCEDURE — 87081 CULTURE SCREEN ONLY: CPT | Performed by: HOSPITALIST

## 2019-11-25 PROCEDURE — 97110 THERAPEUTIC EXERCISES: CPT

## 2019-11-25 PROCEDURE — 99232 SBSQ HOSP IP/OBS MODERATE 35: CPT | Performed by: INTERNAL MEDICINE

## 2019-11-25 PROCEDURE — 97535 SELF CARE MNGMENT TRAINING: CPT

## 2019-11-25 PROCEDURE — 63710000001 INSULIN DETEMIR PER 5 UNITS: Performed by: NURSE PRACTITIONER

## 2019-11-25 PROCEDURE — 0100U HC BIOFIRE FILMARRAY RESP PANEL 2: CPT | Performed by: HOSPITALIST

## 2019-11-25 PROCEDURE — 87880 STREP A ASSAY W/OPTIC: CPT | Performed by: HOSPITALIST

## 2019-11-25 PROCEDURE — 63710000001 INSULIN ASPART PER 5 UNITS: Performed by: NURSE PRACTITIONER

## 2019-11-25 PROCEDURE — 82962 GLUCOSE BLOOD TEST: CPT

## 2019-11-25 RX ORDER — BUMETANIDE 0.25 MG/ML
4 INJECTION INTRAMUSCULAR; INTRAVENOUS EVERY 6 HOURS
Status: COMPLETED | OUTPATIENT
Start: 2019-11-25 | End: 2019-11-26

## 2019-11-25 RX ORDER — ECHINACEA PURPUREA EXTRACT 125 MG
2 TABLET ORAL AS NEEDED
Status: DISCONTINUED | OUTPATIENT
Start: 2019-11-25 | End: 2019-11-27 | Stop reason: HOSPADM

## 2019-11-25 RX ORDER — GUAIFENESIN 600 MG/1
600 TABLET, EXTENDED RELEASE ORAL EVERY 12 HOURS SCHEDULED
Status: DISCONTINUED | OUTPATIENT
Start: 2019-11-25 | End: 2019-11-27 | Stop reason: HOSPADM

## 2019-11-25 RX ADMIN — INSULIN ASPART 12 UNITS: 100 INJECTION, SOLUTION INTRAVENOUS; SUBCUTANEOUS at 21:21

## 2019-11-25 RX ADMIN — ACETAMINOPHEN 650 MG: 325 TABLET, FILM COATED ORAL at 21:26

## 2019-11-25 RX ADMIN — BUDESONIDE AND FORMOTEROL FUMARATE DIHYDRATE 2 PUFF: 160; 4.5 AEROSOL RESPIRATORY (INHALATION) at 21:26

## 2019-11-25 RX ADMIN — APIXABAN 2.5 MG: 2.5 TABLET, FILM COATED ORAL at 21:26

## 2019-11-25 RX ADMIN — NYSTATIN: 100000 POWDER TOPICAL at 09:46

## 2019-11-25 RX ADMIN — CARVEDILOL 6.25 MG: 6.25 TABLET, FILM COATED ORAL at 18:37

## 2019-11-25 RX ADMIN — CARVEDILOL 6.25 MG: 6.25 TABLET, FILM COATED ORAL at 09:46

## 2019-11-25 RX ADMIN — TAMSULOSIN HYDROCHLORIDE 0.4 MG: 0.4 CAPSULE ORAL at 09:46

## 2019-11-25 RX ADMIN — NYSTATIN: 100000 POWDER TOPICAL at 21:27

## 2019-11-25 RX ADMIN — INSULIN ASPART 8 UNITS: 100 INJECTION, SOLUTION INTRAVENOUS; SUBCUTANEOUS at 09:37

## 2019-11-25 RX ADMIN — CETIRIZINE HYDROCHLORIDE 5 MG: 10 TABLET, FILM COATED ORAL at 09:46

## 2019-11-25 RX ADMIN — PREGABALIN 150 MG: 75 CAPSULE ORAL at 21:26

## 2019-11-25 RX ADMIN — PREGABALIN 150 MG: 75 CAPSULE ORAL at 09:56

## 2019-11-25 RX ADMIN — MELATONIN TAB 5 MG 5 MG: 5 TAB at 21:26

## 2019-11-25 RX ADMIN — ATORVASTATIN CALCIUM 10 MG: 10 TABLET, FILM COATED ORAL at 09:46

## 2019-11-25 RX ADMIN — INSULIN DETEMIR 35 UNITS: 100 INJECTION, SOLUTION SUBCUTANEOUS at 09:39

## 2019-11-25 RX ADMIN — GUAIFENESIN 600 MG: 600 TABLET, EXTENDED RELEASE ORAL at 21:26

## 2019-11-25 RX ADMIN — BUMETANIDE 4 MG: 0.25 INJECTION INTRAMUSCULAR; INTRAVENOUS at 23:02

## 2019-11-25 RX ADMIN — INSULIN ASPART 8 UNITS: 100 INJECTION, SOLUTION INTRAVENOUS; SUBCUTANEOUS at 18:36

## 2019-11-25 RX ADMIN — CYANOCOBALAMIN TAB 1000 MCG 1000 MCG: 1000 TAB at 09:46

## 2019-11-25 RX ADMIN — INSULIN DETEMIR 35 UNITS: 100 INJECTION, SOLUTION SUBCUTANEOUS at 21:18

## 2019-11-25 RX ADMIN — LEVOTHYROXINE SODIUM 125 MCG: 125 TABLET ORAL at 06:04

## 2019-11-25 RX ADMIN — FLUTICASONE PROPIONATE 2 SPRAY: 50 SPRAY, METERED NASAL at 09:46

## 2019-11-25 RX ADMIN — NYSTATIN 500000 UNITS: 500000 SUSPENSION ORAL at 21:26

## 2019-11-25 RX ADMIN — BUDESONIDE AND FORMOTEROL FUMARATE DIHYDRATE 2 PUFF: 160; 4.5 AEROSOL RESPIRATORY (INHALATION) at 09:46

## 2019-11-25 RX ADMIN — INSULIN ASPART 8 UNITS: 100 INJECTION, SOLUTION INTRAVENOUS; SUBCUTANEOUS at 13:25

## 2019-11-25 RX ADMIN — PETROLATUM: 420 OINTMENT TOPICAL at 09:46

## 2019-11-25 RX ADMIN — BUMETANIDE 4 MG: 0.25 INJECTION INTRAMUSCULAR; INTRAVENOUS at 18:37

## 2019-11-25 RX ADMIN — APIXABAN 2.5 MG: 2.5 TABLET, FILM COATED ORAL at 09:46

## 2019-11-26 LAB
ALBUMIN SERPL-MCNC: 3.4 G/DL (ref 3.5–5.2)
ANION GAP SERPL CALCULATED.3IONS-SCNC: 13 MMOL/L (ref 5–15)
B PARAPERT DNA SPEC QL NAA+PROBE: NOT DETECTED
B PERT DNA SPEC QL NAA+PROBE: NOT DETECTED
BUN BLD-MCNC: 67 MG/DL (ref 8–23)
BUN/CREAT SERPL: 26.1 (ref 7–25)
C PNEUM DNA NPH QL NAA+NON-PROBE: NOT DETECTED
CALCIUM SPEC-SCNC: 9 MG/DL (ref 8.6–10.5)
CHLORIDE SERPL-SCNC: 95 MMOL/L (ref 98–107)
CO2 SERPL-SCNC: 29 MMOL/L (ref 22–29)
CREAT BLD-MCNC: 2.57 MG/DL (ref 0.76–1.27)
FLUAV H1 2009 PAND RNA NPH QL NAA+PROBE: NOT DETECTED
FLUAV H1 HA GENE NPH QL NAA+PROBE: NOT DETECTED
FLUAV H3 RNA NPH QL NAA+PROBE: NOT DETECTED
FLUAV SUBTYP SPEC NAA+PROBE: NOT DETECTED
FLUBV RNA ISLT QL NAA+PROBE: NOT DETECTED
GFR SERPL CREATININE-BSD FRML MDRD: 24 ML/MIN/1.73
GLUCOSE BLD-MCNC: 191 MG/DL (ref 65–99)
GLUCOSE BLDC GLUCOMTR-MCNC: 192 MG/DL (ref 70–130)
GLUCOSE BLDC GLUCOMTR-MCNC: 213 MG/DL (ref 70–130)
GLUCOSE BLDC GLUCOMTR-MCNC: 222 MG/DL (ref 70–130)
GLUCOSE BLDC GLUCOMTR-MCNC: 255 MG/DL (ref 70–130)
HADV DNA SPEC NAA+PROBE: NOT DETECTED
HCOV 229E RNA SPEC QL NAA+PROBE: NOT DETECTED
HCOV HKU1 RNA SPEC QL NAA+PROBE: NOT DETECTED
HCOV NL63 RNA SPEC QL NAA+PROBE: NOT DETECTED
HCOV OC43 RNA SPEC QL NAA+PROBE: NOT DETECTED
HMPV RNA NPH QL NAA+NON-PROBE: NOT DETECTED
HPIV1 RNA SPEC QL NAA+PROBE: NOT DETECTED
HPIV2 RNA SPEC QL NAA+PROBE: NOT DETECTED
HPIV3 RNA NPH QL NAA+PROBE: NOT DETECTED
HPIV4 P GENE NPH QL NAA+PROBE: NOT DETECTED
M PNEUMO IGG SER IA-ACNC: NOT DETECTED
MAGNESIUM SERPL-MCNC: 1.8 MG/DL (ref 1.6–2.4)
PHOSPHATE SERPL-MCNC: 4.4 MG/DL (ref 2.5–4.5)
POTASSIUM BLD-SCNC: 4.4 MMOL/L (ref 3.5–5.2)
RHINOVIRUS RNA SPEC NAA+PROBE: NOT DETECTED
RSV RNA NPH QL NAA+NON-PROBE: NOT DETECTED
SODIUM BLD-SCNC: 137 MMOL/L (ref 136–145)

## 2019-11-26 PROCEDURE — 63710000001 INSULIN ASPART PER 5 UNITS: Performed by: NURSE PRACTITIONER

## 2019-11-26 PROCEDURE — 80069 RENAL FUNCTION PANEL: CPT | Performed by: INTERNAL MEDICINE

## 2019-11-26 PROCEDURE — 82962 GLUCOSE BLOOD TEST: CPT

## 2019-11-26 PROCEDURE — 97530 THERAPEUTIC ACTIVITIES: CPT

## 2019-11-26 PROCEDURE — 83735 ASSAY OF MAGNESIUM: CPT | Performed by: INTERNAL MEDICINE

## 2019-11-26 PROCEDURE — 63710000001 INSULIN DETEMIR PER 5 UNITS: Performed by: NURSE PRACTITIONER

## 2019-11-26 PROCEDURE — 94799 UNLISTED PULMONARY SVC/PX: CPT

## 2019-11-26 RX ORDER — BUMETANIDE 1 MG/1
4 TABLET ORAL DAILY
Status: DISCONTINUED | OUTPATIENT
Start: 2019-11-27 | End: 2019-11-27

## 2019-11-26 RX ORDER — BUMETANIDE 0.25 MG/ML
4 INJECTION INTRAMUSCULAR; INTRAVENOUS EVERY 6 HOURS
Status: COMPLETED | OUTPATIENT
Start: 2019-11-26 | End: 2019-11-27

## 2019-11-26 RX ADMIN — ATORVASTATIN CALCIUM 10 MG: 10 TABLET, FILM COATED ORAL at 09:13

## 2019-11-26 RX ADMIN — BUMETANIDE 4 MG: 0.25 INJECTION INTRAMUSCULAR; INTRAVENOUS at 05:24

## 2019-11-26 RX ADMIN — CETIRIZINE HYDROCHLORIDE 5 MG: 10 TABLET, FILM COATED ORAL at 09:13

## 2019-11-26 RX ADMIN — LEVOTHYROXINE SODIUM 125 MCG: 125 TABLET ORAL at 05:24

## 2019-11-26 RX ADMIN — ACETAMINOPHEN 650 MG: 325 TABLET, FILM COATED ORAL at 21:39

## 2019-11-26 RX ADMIN — BUMETANIDE 4 MG: 0.25 INJECTION INTRAMUSCULAR; INTRAVENOUS at 12:36

## 2019-11-26 RX ADMIN — NYSTATIN: 100000 POWDER TOPICAL at 21:46

## 2019-11-26 RX ADMIN — NYSTATIN: 100000 POWDER TOPICAL at 09:21

## 2019-11-26 RX ADMIN — GUAIFENESIN 600 MG: 600 TABLET, EXTENDED RELEASE ORAL at 09:13

## 2019-11-26 RX ADMIN — FLUTICASONE PROPIONATE 2 SPRAY: 50 SPRAY, METERED NASAL at 09:12

## 2019-11-26 RX ADMIN — NYSTATIN 500000 UNITS: 500000 SUSPENSION ORAL at 21:48

## 2019-11-26 RX ADMIN — BUMETANIDE 4 MG: 0.25 INJECTION INTRAMUSCULAR; INTRAVENOUS at 18:51

## 2019-11-26 RX ADMIN — GUAIFENESIN 600 MG: 600 TABLET, EXTENDED RELEASE ORAL at 21:51

## 2019-11-26 RX ADMIN — CARVEDILOL 6.25 MG: 6.25 TABLET, FILM COATED ORAL at 18:50

## 2019-11-26 RX ADMIN — APIXABAN 2.5 MG: 2.5 TABLET, FILM COATED ORAL at 21:51

## 2019-11-26 RX ADMIN — PETROLATUM 454 APPLICATION: 420 OINTMENT TOPICAL at 09:22

## 2019-11-26 RX ADMIN — INSULIN ASPART 4 UNITS: 100 INJECTION, SOLUTION INTRAVENOUS; SUBCUTANEOUS at 12:35

## 2019-11-26 RX ADMIN — CYANOCOBALAMIN TAB 1000 MCG 1000 MCG: 1000 TAB at 09:13

## 2019-11-26 RX ADMIN — INSULIN DETEMIR 35 UNITS: 100 INJECTION, SOLUTION SUBCUTANEOUS at 21:42

## 2019-11-26 RX ADMIN — INSULIN DETEMIR 35 UNITS: 100 INJECTION, SOLUTION SUBCUTANEOUS at 09:01

## 2019-11-26 RX ADMIN — INSULIN ASPART 8 UNITS: 100 INJECTION, SOLUTION INTRAVENOUS; SUBCUTANEOUS at 17:11

## 2019-11-26 RX ADMIN — CARVEDILOL 6.25 MG: 6.25 TABLET, FILM COATED ORAL at 09:16

## 2019-11-26 RX ADMIN — INSULIN ASPART 8 UNITS: 100 INJECTION, SOLUTION INTRAVENOUS; SUBCUTANEOUS at 09:15

## 2019-11-26 RX ADMIN — APIXABAN 2.5 MG: 2.5 TABLET, FILM COATED ORAL at 09:12

## 2019-11-26 RX ADMIN — PREGABALIN 150 MG: 75 CAPSULE ORAL at 21:39

## 2019-11-26 RX ADMIN — NYSTATIN 500000 UNITS: 500000 SUSPENSION ORAL at 09:19

## 2019-11-26 RX ADMIN — NYSTATIN 500000 UNITS: 500000 SUSPENSION ORAL at 18:50

## 2019-11-26 RX ADMIN — BUDESONIDE AND FORMOTEROL FUMARATE DIHYDRATE 2 PUFF: 160; 4.5 AEROSOL RESPIRATORY (INHALATION) at 09:12

## 2019-11-26 RX ADMIN — PREGABALIN 150 MG: 75 CAPSULE ORAL at 09:13

## 2019-11-26 RX ADMIN — TAMSULOSIN HYDROCHLORIDE 0.4 MG: 0.4 CAPSULE ORAL at 09:14

## 2019-11-26 RX ADMIN — INSULIN ASPART 12 UNITS: 100 INJECTION, SOLUTION INTRAVENOUS; SUBCUTANEOUS at 21:40

## 2019-11-26 RX ADMIN — BUDESONIDE AND FORMOTEROL FUMARATE DIHYDRATE 2 PUFF: 160; 4.5 AEROSOL RESPIRATORY (INHALATION) at 21:47

## 2019-11-27 VITALS
RESPIRATION RATE: 18 BRPM | TEMPERATURE: 97.5 F | BODY MASS INDEX: 38.45 KG/M2 | HEART RATE: 65 BPM | HEIGHT: 73 IN | WEIGHT: 290.13 LBS | DIASTOLIC BLOOD PRESSURE: 55 MMHG | OXYGEN SATURATION: 92 % | SYSTOLIC BLOOD PRESSURE: 116 MMHG

## 2019-11-27 LAB
ALBUMIN SERPL-MCNC: 3.7 G/DL (ref 3.5–5.2)
ANION GAP SERPL CALCULATED.3IONS-SCNC: 11.9 MMOL/L (ref 5–15)
ANION GAP SERPL CALCULATED.3IONS-SCNC: 12.4 MMOL/L (ref 5–15)
BACTERIA SPEC AEROBE CULT: NORMAL
BASOPHILS # BLD AUTO: 0.03 10*3/MM3 (ref 0–0.2)
BASOPHILS NFR BLD AUTO: 0.3 % (ref 0–1.5)
BUN BLD-MCNC: 72 MG/DL (ref 8–23)
BUN BLD-MCNC: 75 MG/DL (ref 8–23)
BUN/CREAT SERPL: 27.2 (ref 7–25)
BUN/CREAT SERPL: 28.4 (ref 7–25)
CALCIUM SPEC-SCNC: 9 MG/DL (ref 8.6–10.5)
CALCIUM SPEC-SCNC: 9.2 MG/DL (ref 8.6–10.5)
CHLORIDE SERPL-SCNC: 90 MMOL/L (ref 98–107)
CHLORIDE SERPL-SCNC: 92 MMOL/L (ref 98–107)
CO2 SERPL-SCNC: 30.6 MMOL/L (ref 22–29)
CO2 SERPL-SCNC: 31.1 MMOL/L (ref 22–29)
CREAT BLD-MCNC: 2.64 MG/DL (ref 0.76–1.27)
CREAT BLD-MCNC: 2.65 MG/DL (ref 0.76–1.27)
DEPRECATED RDW RBC AUTO: 48.2 FL (ref 37–54)
EOSINOPHIL # BLD AUTO: 0.31 10*3/MM3 (ref 0–0.4)
EOSINOPHIL NFR BLD AUTO: 3.5 % (ref 0.3–6.2)
ERYTHROCYTE [DISTWIDTH] IN BLOOD BY AUTOMATED COUNT: 16.5 % (ref 12.3–15.4)
GFR SERPL CREATININE-BSD FRML MDRD: 23 ML/MIN/1.73
GFR SERPL CREATININE-BSD FRML MDRD: 24 ML/MIN/1.73
GLUCOSE BLD-MCNC: 173 MG/DL (ref 65–99)
GLUCOSE BLD-MCNC: 187 MG/DL (ref 65–99)
GLUCOSE BLDC GLUCOMTR-MCNC: 167 MG/DL (ref 70–130)
GLUCOSE BLDC GLUCOMTR-MCNC: 193 MG/DL (ref 70–130)
GLUCOSE BLDC GLUCOMTR-MCNC: 250 MG/DL (ref 70–130)
HCT VFR BLD AUTO: 34 % (ref 37.5–51)
HGB BLD-MCNC: 9.7 G/DL (ref 13–17.7)
IMM GRANULOCYTES # BLD AUTO: 0.02 10*3/MM3 (ref 0–0.05)
IMM GRANULOCYTES NFR BLD AUTO: 0.2 % (ref 0–0.5)
LYMPHOCYTES # BLD AUTO: 1.3 10*3/MM3 (ref 0.7–3.1)
LYMPHOCYTES NFR BLD AUTO: 14.7 % (ref 19.6–45.3)
MAGNESIUM SERPL-MCNC: 1.8 MG/DL (ref 1.6–2.4)
MCH RBC QN AUTO: 22.6 PG (ref 26.6–33)
MCHC RBC AUTO-ENTMCNC: 28.5 G/DL (ref 31.5–35.7)
MCV RBC AUTO: 79.1 FL (ref 79–97)
MONOCYTES # BLD AUTO: 1.12 10*3/MM3 (ref 0.1–0.9)
MONOCYTES NFR BLD AUTO: 12.7 % (ref 5–12)
NEUTROPHILS # BLD AUTO: 6.04 10*3/MM3 (ref 1.7–7)
NEUTROPHILS NFR BLD AUTO: 68.6 % (ref 42.7–76)
PHOSPHATE SERPL-MCNC: 4.2 MG/DL (ref 2.5–4.5)
PLATELET # BLD AUTO: 189 10*3/MM3 (ref 140–450)
PMV BLD AUTO: 11.3 FL (ref 6–12)
POTASSIUM BLD-SCNC: 4 MMOL/L (ref 3.5–5.2)
POTASSIUM BLD-SCNC: 4.4 MMOL/L (ref 3.5–5.2)
RBC # BLD AUTO: 4.3 10*6/MM3 (ref 4.14–5.8)
SODIUM BLD-SCNC: 133 MMOL/L (ref 136–145)
SODIUM BLD-SCNC: 135 MMOL/L (ref 136–145)
WBC NRBC COR # BLD: 8.82 10*3/MM3 (ref 3.4–10.8)

## 2019-11-27 PROCEDURE — 85130 CHROMOGENIC SUBSTRATE ASSAY: CPT | Performed by: NURSE PRACTITIONER

## 2019-11-27 PROCEDURE — 85025 COMPLETE CBC W/AUTO DIFF WBC: CPT | Performed by: INTERNAL MEDICINE

## 2019-11-27 PROCEDURE — 63710000001 INSULIN ASPART PER 5 UNITS: Performed by: NURSE PRACTITIONER

## 2019-11-27 PROCEDURE — 63710000001 INSULIN DETEMIR PER 5 UNITS: Performed by: NURSE PRACTITIONER

## 2019-11-27 PROCEDURE — 99316 NF DSCHRG MGMT 30 MIN+: CPT | Performed by: HOSPITALIST

## 2019-11-27 PROCEDURE — 83735 ASSAY OF MAGNESIUM: CPT | Performed by: INTERNAL MEDICINE

## 2019-11-27 PROCEDURE — 82962 GLUCOSE BLOOD TEST: CPT

## 2019-11-27 PROCEDURE — 80048 BASIC METABOLIC PNL TOTAL CA: CPT | Performed by: INTERNAL MEDICINE

## 2019-11-27 PROCEDURE — 80069 RENAL FUNCTION PANEL: CPT | Performed by: INTERNAL MEDICINE

## 2019-11-27 RX ORDER — BUMETANIDE 2 MG/1
4 TABLET ORAL 2 TIMES DAILY
Qty: 120 TABLET | Refills: 0 | Status: ON HOLD | OUTPATIENT
Start: 2019-11-27 | End: 2020-07-02 | Stop reason: SDUPTHER

## 2019-11-27 RX ORDER — GUAIFENESIN 600 MG/1
600 TABLET, EXTENDED RELEASE ORAL EVERY 12 HOURS SCHEDULED
Start: 2019-11-27 | End: 2020-07-02 | Stop reason: HOSPADM

## 2019-11-27 RX ORDER — PETROLATUM 420 MG/G
OINTMENT TOPICAL
Start: 2019-11-28 | End: 2022-01-01 | Stop reason: HOSPADM

## 2019-11-27 RX ORDER — BUMETANIDE 1 MG/1
4 TABLET ORAL 2 TIMES DAILY
Status: DISCONTINUED | OUTPATIENT
Start: 2019-11-27 | End: 2019-11-27 | Stop reason: HOSPADM

## 2019-11-27 RX ORDER — ECHINACEA PURPUREA EXTRACT 125 MG
2 TABLET ORAL AS NEEDED
Refills: 12
Start: 2019-11-27 | End: 2022-01-01 | Stop reason: HOSPADM

## 2019-11-27 RX ORDER — HYDROCODONE BITARTRATE AND ACETAMINOPHEN 5; 325 MG/1; MG/1
1 TABLET ORAL EVERY 6 HOURS PRN
Qty: 12 TABLET | Refills: 0 | Status: SHIPPED | OUTPATIENT
Start: 2019-11-27 | End: 2019-11-30

## 2019-11-27 RX ORDER — CHOLECALCIFEROL (VITAMIN D3) 125 MCG
5 CAPSULE ORAL NIGHTLY PRN
Start: 2019-11-27 | End: 2022-01-01 | Stop reason: HOSPADM

## 2019-11-27 RX ADMIN — FLUTICASONE PROPIONATE 2 SPRAY: 50 SPRAY, METERED NASAL at 08:50

## 2019-11-27 RX ADMIN — PETROLATUM 454 APPLICATION: 420 OINTMENT TOPICAL at 08:46

## 2019-11-27 RX ADMIN — NYSTATIN: 100000 POWDER TOPICAL at 08:46

## 2019-11-27 RX ADMIN — INSULIN ASPART 4 UNITS: 100 INJECTION, SOLUTION INTRAVENOUS; SUBCUTANEOUS at 18:13

## 2019-11-27 RX ADMIN — BUDESONIDE AND FORMOTEROL FUMARATE DIHYDRATE 2 PUFF: 160; 4.5 AEROSOL RESPIRATORY (INHALATION) at 08:41

## 2019-11-27 RX ADMIN — BUMETANIDE 4 MG: 0.25 INJECTION INTRAMUSCULAR; INTRAVENOUS at 01:10

## 2019-11-27 RX ADMIN — INSULIN ASPART 12 UNITS: 100 INJECTION, SOLUTION INTRAVENOUS; SUBCUTANEOUS at 07:48

## 2019-11-27 RX ADMIN — BUMETANIDE 4 MG: 1 TABLET ORAL at 08:40

## 2019-11-27 RX ADMIN — APIXABAN 2.5 MG: 2.5 TABLET, FILM COATED ORAL at 10:07

## 2019-11-27 RX ADMIN — ATORVASTATIN CALCIUM 10 MG: 10 TABLET, FILM COATED ORAL at 08:40

## 2019-11-27 RX ADMIN — CARVEDILOL 6.25 MG: 6.25 TABLET, FILM COATED ORAL at 08:40

## 2019-11-27 RX ADMIN — CYANOCOBALAMIN TAB 1000 MCG 1000 MCG: 1000 TAB at 08:40

## 2019-11-27 RX ADMIN — TAMSULOSIN HYDROCHLORIDE 0.4 MG: 0.4 CAPSULE ORAL at 08:40

## 2019-11-27 RX ADMIN — NYSTATIN 500000 UNITS: 500000 SUSPENSION ORAL at 18:13

## 2019-11-27 RX ADMIN — INSULIN ASPART 4 UNITS: 100 INJECTION, SOLUTION INTRAVENOUS; SUBCUTANEOUS at 12:04

## 2019-11-27 RX ADMIN — INSULIN DETEMIR 35 UNITS: 100 INJECTION, SOLUTION SUBCUTANEOUS at 08:43

## 2019-11-27 RX ADMIN — NYSTATIN 500000 UNITS: 500000 SUSPENSION ORAL at 12:06

## 2019-11-27 RX ADMIN — LEVOTHYROXINE SODIUM 125 MCG: 125 TABLET ORAL at 07:45

## 2019-11-27 RX ADMIN — NYSTATIN 500000 UNITS: 500000 SUSPENSION ORAL at 08:41

## 2019-11-27 RX ADMIN — GUAIFENESIN 600 MG: 600 TABLET, EXTENDED RELEASE ORAL at 08:40

## 2019-11-27 RX ADMIN — CETIRIZINE HYDROCHLORIDE 5 MG: 10 TABLET, FILM COATED ORAL at 08:41

## 2019-11-27 RX ADMIN — PREGABALIN 150 MG: 75 CAPSULE ORAL at 08:49

## 2019-12-03 LAB
APIXABAN PPP CHRO-MCNC: 129 NG/ML
APIXABAN PPP CHRO-MCNC: 162 NG/ML

## 2019-12-09 ENCOUNTER — APPOINTMENT (OUTPATIENT)
Dept: GENERAL RADIOLOGY | Facility: HOSPITAL | Age: 79
End: 2019-12-09

## 2019-12-09 ENCOUNTER — HOSPITAL ENCOUNTER (EMERGENCY)
Facility: HOSPITAL | Age: 79
Discharge: HOME | End: 2019-12-09
Attending: EMERGENCY MEDICINE | Admitting: EMERGENCY MEDICINE

## 2019-12-09 VITALS
DIASTOLIC BLOOD PRESSURE: 91 MMHG | HEART RATE: 69 BPM | RESPIRATION RATE: 18 BRPM | OXYGEN SATURATION: 97 % | WEIGHT: 307 LBS | HEIGHT: 73 IN | TEMPERATURE: 98 F | BODY MASS INDEX: 40.69 KG/M2 | SYSTOLIC BLOOD PRESSURE: 152 MMHG

## 2019-12-09 DIAGNOSIS — N39.0 URINARY TRACT INFECTION IN MALE: ICD-10-CM

## 2019-12-09 DIAGNOSIS — R55 NEAR SYNCOPE: Primary | ICD-10-CM
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
## 2019-12-09 LAB
ALBUMIN SERPL-MCNC: 3.9 G/DL (ref 3.5–5.2)
ALBUMIN/GLOB SERPL: 1 G/DL
ALP SERPL-CCNC: 89 U/L (ref 39–117)
ALT SERPL W P-5'-P-CCNC: 14 U/L (ref 1–41)
ANION GAP SERPL CALCULATED.3IONS-SCNC: 13.2 MMOL/L (ref 5–15)
AST SERPL-CCNC: 19 U/L (ref 1–40)
BACTERIA UR QL AUTO: ABNORMAL /HPF
BASOPHILS # BLD AUTO: 0.03 10*3/MM3 (ref 0–0.2)
BASOPHILS NFR BLD AUTO: 0.4 % (ref 0–1.5)
BILIRUB SERPL-MCNC: 0.4 MG/DL (ref 0.2–1.2)
BILIRUB UR QL STRIP: NEGATIVE
BUN BLD-MCNC: 44 MG/DL (ref 8–23)
BUN/CREAT SERPL: 17.3 (ref 7–25)
CALCIUM SPEC-SCNC: 9.3 MG/DL (ref 8.6–10.5)
CHLORIDE SERPL-SCNC: 101 MMOL/L (ref 98–107)
CLARITY UR: ABNORMAL
CO2 SERPL-SCNC: 26.8 MMOL/L (ref 22–29)
COLOR UR: YELLOW
CREAT BLD-MCNC: 2.55 MG/DL (ref 0.76–1.27)
DEPRECATED RDW RBC AUTO: 50.6 FL (ref 37–54)
EOSINOPHIL # BLD AUTO: 0.43 10*3/MM3 (ref 0–0.4)
EOSINOPHIL NFR BLD AUTO: 5.8 % (ref 0.3–6.2)
ERYTHROCYTE [DISTWIDTH] IN BLOOD BY AUTOMATED COUNT: 17.8 % (ref 12.3–15.4)
GFR SERPL CREATININE-BSD FRML MDRD: 25 ML/MIN/1.73
GLOBULIN UR ELPH-MCNC: 3.8 GM/DL
GLUCOSE BLD-MCNC: 141 MG/DL (ref 65–99)
GLUCOSE UR STRIP-MCNC: NEGATIVE MG/DL
HCT VFR BLD AUTO: 34.1 % (ref 37.5–51)
HGB BLD-MCNC: 9.9 G/DL (ref 13–17.7)
HGB UR QL STRIP.AUTO: ABNORMAL
HYALINE CASTS UR QL AUTO: ABNORMAL /LPF
IMM GRANULOCYTES # BLD AUTO: 0.03 10*3/MM3 (ref 0–0.05)
IMM GRANULOCYTES NFR BLD AUTO: 0.4 % (ref 0–0.5)
KETONES UR QL STRIP: NEGATIVE
LEUKOCYTE ESTERASE UR QL STRIP.AUTO: ABNORMAL
LYMPHOCYTES # BLD AUTO: 1.17 10*3/MM3 (ref 0.7–3.1)
LYMPHOCYTES NFR BLD AUTO: 15.7 % (ref 19.6–45.3)
MCH RBC QN AUTO: 23 PG (ref 26.6–33)
MCHC RBC AUTO-ENTMCNC: 29 G/DL (ref 31.5–35.7)
MCV RBC AUTO: 79.1 FL (ref 79–97)
MONOCYTES # BLD AUTO: 0.91 10*3/MM3 (ref 0.1–0.9)
MONOCYTES NFR BLD AUTO: 12.2 % (ref 5–12)
NEUTROPHILS # BLD AUTO: 4.88 10*3/MM3 (ref 1.7–7)
NEUTROPHILS NFR BLD AUTO: 65.5 % (ref 42.7–76)
NITRITE UR QL STRIP: NEGATIVE
NRBC BLD AUTO-RTO: 0 /100 WBC (ref 0–0.2)
NT-PROBNP SERPL-MCNC: 5528 PG/ML (ref 5–1800)
PH UR STRIP.AUTO: 5.5 [PH] (ref 4.5–8)
PLATELET # BLD AUTO: 202 10*3/MM3 (ref 140–450)
PMV BLD AUTO: 11.4 FL (ref 6–12)
POTASSIUM BLD-SCNC: 4.6 MMOL/L (ref 3.5–5.2)
PROT SERPL-MCNC: 7.7 G/DL (ref 6–8.5)
PROT UR QL STRIP: ABNORMAL
RBC # BLD AUTO: 4.31 10*6/MM3 (ref 4.14–5.8)
RBC # UR: ABNORMAL /HPF
REF LAB TEST METHOD: ABNORMAL
SODIUM BLD-SCNC: 141 MMOL/L (ref 136–145)
SP GR UR STRIP: 1.02 (ref 1–1.03)
SQUAMOUS #/AREA URNS HPF: ABNORMAL /HPF
TROPONIN T SERPL-MCNC: 0.04 NG/ML (ref 0–0.03)
UROBILINOGEN UR QL STRIP: ABNORMAL
WBC NRBC COR # BLD: 7.45 10*3/MM3 (ref 3.4–10.8)
WBC UR QL AUTO: ABNORMAL /HPF
YEAST URNS QL MICRO: ABNORMAL /HPF

## 2019-12-09 PROCEDURE — 85025 COMPLETE CBC W/AUTO DIFF WBC: CPT | Performed by: EMERGENCY MEDICINE

## 2019-12-09 PROCEDURE — 73560 X-RAY EXAM OF KNEE 1 OR 2: CPT

## 2019-12-09 PROCEDURE — 84484 ASSAY OF TROPONIN QUANT: CPT | Performed by: EMERGENCY MEDICINE

## 2019-12-09 PROCEDURE — 73610 X-RAY EXAM OF ANKLE: CPT

## 2019-12-09 PROCEDURE — 93005 ELECTROCARDIOGRAM TRACING: CPT | Performed by: EMERGENCY MEDICINE

## 2019-12-09 PROCEDURE — 71046 X-RAY EXAM CHEST 2 VIEWS: CPT

## 2019-12-09 PROCEDURE — 81001 URINALYSIS AUTO W/SCOPE: CPT | Performed by: EMERGENCY MEDICINE

## 2019-12-09 PROCEDURE — 80053 COMPREHEN METABOLIC PANEL: CPT | Performed by: EMERGENCY MEDICINE

## 2019-12-09 PROCEDURE — 99284 EMERGENCY DEPT VISIT MOD MDM: CPT

## 2019-12-09 PROCEDURE — 94640 AIRWAY INHALATION TREATMENT: CPT

## 2019-12-09 PROCEDURE — 83880 ASSAY OF NATRIURETIC PEPTIDE: CPT | Performed by: EMERGENCY MEDICINE

## 2019-12-09 PROCEDURE — 93010 ELECTROCARDIOGRAM REPORT: CPT | Performed by: INTERNAL MEDICINE

## 2019-12-09 PROCEDURE — 99284 EMERGENCY DEPT VISIT MOD MDM: CPT | Performed by: EMERGENCY MEDICINE

## 2019-12-09 RX ORDER — IPRATROPIUM BROMIDE AND ALBUTEROL SULFATE 2.5; .5 MG/3ML; MG/3ML
3 SOLUTION RESPIRATORY (INHALATION) ONCE
Status: COMPLETED | OUTPATIENT
Start: 2019-12-09 | End: 2019-12-09

## 2019-12-09 RX ORDER — CEFUROXIME AXETIL 500 MG/1
500 TABLET ORAL 2 TIMES DAILY
Qty: 14 TABLET | Refills: 0 | Status: SHIPPED | OUTPATIENT
Start: 2019-12-09 | End: 2020-07-02 | Stop reason: HOSPADM

## 2019-12-09 RX ORDER — CEFUROXIME AXETIL 250 MG/1
500 TABLET ORAL ONCE
Status: COMPLETED | OUTPATIENT
Start: 2019-12-09 | End: 2019-12-09

## 2019-12-09 RX ORDER — SODIUM CHLORIDE 0.9 % (FLUSH) 0.9 %
10 SYRINGE (ML) INJECTION AS NEEDED
Status: DISCONTINUED | OUTPATIENT
Start: 2019-12-09 | End: 2019-12-09 | Stop reason: HOSPADM

## 2019-12-09 RX ADMIN — IPRATROPIUM BROMIDE AND ALBUTEROL SULFATE 3 ML: .5; 3 SOLUTION RESPIRATORY (INHALATION) at 04:06

## 2019-12-09 RX ADMIN — CEFUROXIME AXETIL 500 MG: 250 TABLET, FILM COATED ORAL at 04:23

## 2019-12-11 ENCOUNTER — APPOINTMENT (OUTPATIENT)
Dept: CT IMAGING | Facility: HOSPITAL | Age: 79
End: 2019-12-11

## 2019-12-11 ENCOUNTER — HOSPITAL ENCOUNTER (EMERGENCY)
Facility: HOSPITAL | Age: 79
Discharge: HOME | End: 2019-12-11
Attending: EMERGENCY MEDICINE | Admitting: EMERGENCY MEDICINE

## 2019-12-11 ENCOUNTER — APPOINTMENT (OUTPATIENT)
Dept: GENERAL RADIOLOGY | Facility: HOSPITAL | Age: 79
End: 2019-12-11

## 2019-12-11 VITALS
DIASTOLIC BLOOD PRESSURE: 69 MMHG | SYSTOLIC BLOOD PRESSURE: 136 MMHG | HEIGHT: 73 IN | OXYGEN SATURATION: 94 % | TEMPERATURE: 97.6 F | HEART RATE: 60 BPM | RESPIRATION RATE: 16 BRPM | BODY MASS INDEX: 40.61 KG/M2 | WEIGHT: 306.44 LBS

## 2019-12-11 DIAGNOSIS — M25.512 ACUTE PAIN OF LEFT SHOULDER: Primary | ICD-10-CM

## 2019-12-11 DIAGNOSIS — S51.011A SKIN TEAR OF RIGHT ELBOW WITHOUT COMPLICATION, INITIAL ENCOUNTER: ICD-10-CM

## 2019-12-11 PROCEDURE — 99283 EMERGENCY DEPT VISIT LOW MDM: CPT

## 2019-12-11 PROCEDURE — 73030 X-RAY EXAM OF SHOULDER: CPT

## 2019-12-11 PROCEDURE — 99284 EMERGENCY DEPT VISIT MOD MDM: CPT

## 2019-12-11 PROCEDURE — 99282 EMERGENCY DEPT VISIT SF MDM: CPT | Performed by: EMERGENCY MEDICINE

## 2019-12-11 PROCEDURE — 70450 CT HEAD/BRAIN W/O DYE: CPT

## 2019-12-11 NOTE — DISCHARGE INSTRUCTIONS
Continue current medications.  Leave dressing in place for 48 hours.  Recommend you soak the dressings prior to removal.  Gently remove then wash the wound twice daily with antibacterial soap, pat dry and apply thin layer of bacitracin or Neosporin and a nonstick dressing.  Follow-up with your PCP as above.  Return to ED for medical emergencies.   Please abstract the following data from this visit with this patient into the appropriate field in Epic:Tests that can be patient reported without a hard copy:Pap smear done on this date: 10/10/2019 (approximately), by this group: [facility], results were pap and HPV both negative. Other Tests found in the patient's chart through Chart Review/Care Everywhere:{Abstract Quality List (Optional):314514}Note to Abstraction: If this section is blank, no results were found via Chart Review/Care Everywhere.

## 2019-12-11 NOTE — ED PROVIDER NOTES
Subjective   Froilan Helton is a 79-year-old white male who presents secondary to injury sustained in fall.  Patient was walking to the bathroom when he fell striking his head on the carpeted floor as well as his left shoulder.  No loss of consciousness.  No headache.  No nausea or vomiting.  Patient complains of pain in his left shoulder.  Patient has chronic shoulder pain secondary to arthritis.  It is been suggested that he undergo arthroplasty which he has declined at this point.  Patient also has a skin tear right elbow.  He presents for evaluation.  Transported by EMS.      History provided by:  Patient  Fall   Mechanism of injury: fall    Injury location:  Head/neck and shoulder/arm  Head/neck injury location:  Head  Shoulder/arm injury location:  R shoulder  Incident location:  Home  Arrived directly from scene: yes    Fall:     Fall occurred:  Walking    Impact surface:  Carpet    Point of impact:  Head (Left shoulder)  Suspicion of alcohol use: no    Suspicion of drug use: no    Tetanus status:  Up to date  Prior to arrival data:     Patient ambulatory at scene: no      Blood loss:  None    Loss of consciousness: no      Amnesic to event: no      Airway interventions:  None    Breathing interventions:  None    IV access status:  None  Associated symptoms: no abdominal pain, no back pain, no blindness, no chest pain, no difficulty breathing, no headaches, no hearing loss, no loss of consciousness, no nausea, no neck pain, no seizures and no vomiting    Risk factors: CAD, CHF, COPD, diabetes and past MI        Review of Systems   Constitutional: Negative for fever.   HENT: Negative for hearing loss.    Eyes: Negative for blindness and visual disturbance.   Cardiovascular: Negative for chest pain.   Gastrointestinal: Negative for abdominal pain, nausea and vomiting.   Genitourinary: Negative for dysuria.   Musculoskeletal: Positive for gait problem (Chronic knee pain). Negative for back pain and neck pain.    Neurological: Negative for seizures, loss of consciousness and headaches.   All other systems reviewed and are negative.      Past Medical History:   Diagnosis Date   • Arthritis    • Asthma    • Cancer (CMS/HCC)     skin   • Cataract    • CHF (congestive heart failure) (CMS/HCC)    • COPD (chronic obstructive pulmonary disease) (CMS/HCC)    • Diabetes mellitus (CMS/HCC)    • Disease of thyroid gland    • Hyperlipidemia    • Hypertension    • Kidney stone    • Myocardial infarct, old    • Renal disorder        Allergies   Allergen Reactions   • Atorvastatin Unknown - Low Severity   • Oxycontin [Oxycodone Hcl] Confusion     'Makes me crazy and stand on my head'       Past Surgical History:   Procedure Laterality Date   • COLONOSCOPY     • EYE SURGERY     • JOINT REPLACEMENT      right   • KIDNEY STONE SURGERY     • REPLACEMENT TOTAL KNEE     • TOE SURGERY         Family History   Problem Relation Age of Onset   • COPD Mother    • Diabetes Father        Social History     Socioeconomic History   • Marital status: Unknown     Spouse name: Not on file   • Number of children: Not on file   • Years of education: Not on file   • Highest education level: Not on file   Tobacco Use   • Smoking status: Former Smoker   • Smokeless tobacco: Never Used   • Tobacco comment: quit 1993   Substance and Sexual Activity   • Alcohol use: No   • Drug use: No   • Sexual activity: Defer           Objective   Physical Exam   Constitutional: He is oriented to person, place, and time. He appears well-developed and well-nourished. No distress.   79-year-old white male laying in bed.  Patient is obese.  He appears in fair health for age.  Vital signs unremarkable.  Patient is friendly and cooperative.   HENT:   Head: Normocephalic. Head is without raccoon's eyes, without Nieves's sign, without abrasion, without contusion and without laceration. Hair is normal.       Right Ear: External ear normal.   Left Ear: External ear normal.   Nose: Nose  normal.   Mouth/Throat: Oropharynx is clear and moist.   Eyes: Pupils are equal, round, and reactive to light. Conjunctivae and EOM are normal.   Neck: Normal range of motion. Neck supple.   Cardiovascular: Normal rate, regular rhythm, normal heart sounds and intact distal pulses. Exam reveals no gallop and no friction rub.   No murmur heard.  Pulmonary/Chest: Effort normal and breath sounds normal. No stridor. No respiratory distress. He has no wheezes. He has no rales.   Abdominal: Soft. He exhibits no distension. There is no tenderness.   Musculoskeletal: He exhibits no edema.        Left shoulder: He exhibits pain. He exhibits no tenderness, no bony tenderness, no swelling, no effusion, no crepitus and no deformity.        Right elbow: He exhibits normal range of motion, no swelling, no effusion, no deformity and no laceration.        Arms:  Patient has Ace wrap is applied to bilateral lower legs.   Neurological: He is alert and oriented to person, place, and time. He has normal reflexes. No cranial nerve deficit.   Skin: Skin is warm and dry. No rash noted. He is not diaphoretic. No erythema.   Psychiatric: He has a normal mood and affect. His behavior is normal.   Nursing note and vitals reviewed.      Procedures           ED Course  ED Course as of Dec 11 0212   Wed Dec 11, 2019   0138 Obtaining CT of head and x-rays of left shoulder.  Will clean and bandage skin tears    [SS]   0202 CT head is unremarkable.  X-ray of shoulders are unremarkable.  Patient's wounds are being cleaned and dressed.  Will DC home.    [SS]      ED Course User Index  [SS] Anurag Stover MD      Xr Chest 2 View    Result Date: 12/9/2019  Narrative: CR Chest 2 Vws INDICATION:  Near syncope today COMPARISON:  11/20/2019 FINDINGS: PA and lateral views of the chest.  Heart and mediastinal contours are normal. The lungs are clear. No pneumothorax or pleural effusion.      Impression: No acute cardiopulmonary findings. Signer Name:  Fei Henderson MD  Signed: 12/9/2019 4:01 AM  Workstation Name: [facility]  [facility]    Xr Shoulder 2+ View Left    Result Date: 12/11/2019  Narrative: CR Shoulder Comp Min 2 Vws LT INDICATION: Following left shoulder multiple times in last couple weeks with pain COMPARISON: None available. FINDINGS: 2 views of the left shoulder.  There is no fracture or dislocation  The acromioclavicular and glenohumeral articulations are within normal limits.  No foreign body.     Impression: Negative left shoulder. Signer Name: Fei Henderson MD  Signed: 12/11/2019 1:47 AM  Workstation Name: [facility]  [facility]    Xr Knee 1 Or 2 View Left    Result Date: 12/9/2019  Narrative: CR Knee 1 or 2 Vws LT INDICATION: Fall. This morning with left knee pain COMPARISON: None available. FINDINGS: 3 view(s) of the left knee.  No fracture, dislocation, or effusion. No bone erosion or destruction.  No foreign body.     Impression: Negative left knee. Signer Name: Fei Henderson MD  Signed: 12/9/2019 4:01 AM  Workstation Name: [facility]  [facility]    Xr Knee 1 Or 2 View Right    Result Date: 12/9/2019  Narrative: CR Knee 1 or 2 Vws RT INDICATION: Fall this morning with right knee pain COMPARISON: None available. FINDINGS: 2 view(s) of the right knee.  No fracture, dislocation, or effusion. No bone erosion or destruction.  There is a total knee prosthesis present.     Impression: Previous knee replacement otherwise negative. Signer Name: Fei Henderson MD  Signed: 12/9/2019 4:01 AM  Workstation Name: [facility]  [facility]    Xr Knee 3 View Left    Result Date: 11/19/2019  Narrative: LEFT KNEE 3 VIEWS 11/19/2019  HISTORY: Left knee pain status post fall today.  FINDINGS: 3 views of the left knee demonstrate no fracture. There is degenerative change with moderate narrowing of the medial compartment of the knee and there is narrowing of the patellofemoral  joint. Small osteophytes border the medial compartment and are seen along the posterior aspect of the patella. The bones are somewhat osteopenic. There is no joint effusion.      Impression: Degenerative changes left knee. No acute abnormality.  This report was finalized on 11/19/2019 11:24 AM by Dr. Enrike Roche MD.      Xr Ankle 3+ View Left    Result Date: 12/9/2019  Narrative: CR Ankle Min 3 Vws LT INDICATION: Fall this morning with left ankle pain. COMPARISON: None. FINDINGS: 3 view(s) of the left ankle.  No fracture or dislocation. No bone erosion or destruction. No foreign body.     Impression: Negative left ankle. Signer Name: Fei Henderson MD  Signed: 12/9/2019 4:00 AM  [facility]  [facility]    Ct Head Without Contrast    Result Date: 12/11/2019  Narrative: CT Head WO HISTORY: Head trauma and headache. Multiple falls last few weeks TECHNIQUE: Axial unenhanced head CT. Radiation dose reduction techniques included automated exposure control or exposure modulation based on body size. Count of known CT and cardiac nuc med studies performed in previous 12 months: 3. Time of scan: 1:42 AM COMPARISON: 11/20/2019 FINDINGS: No intracranial hemorrhage, mass, or infarct. No hydrocephalus or extra-axial fluid collection. Brain parenchymal density is normal. The skull base, calvarium, and extracranial soft tissues are normal.     Impression: Normal, negative unenhanced head CT. Signer Name: Fei Henderson MD  Signed: 12/11/2019 1:49 AM  Workstation [facility]: [facility]  [facility]    Ct Head Without Contrast    Result Date: 11/20/2019  Narrative: CT HEAD, NONCONTRAST, 11/20/2019  HISTORY: 79-year-old male with multiple recent falls. Two falls today. Struck forehead on ground. CT here 2 days ago for similar.  TECHNIQUE:  CT imaging of the head without IV contrast. Radiation dose reduction techniques included automated exposure control or exposure modulation  based on body size. Radiation audit for CT and nuclear cardiology exams in the last 12 months: 4.  COMPARISON: *  CT head, 11/18/2019.  FINDINGS:  No acute intracranial abnormality is demonstrated. No visible skull fracture.  Mild generalized age-appropriate cerebral volume loss. Mild to moderate diffuse low-attenuation white matter changes, nonspecific but most typically seen in the setting of chronic small vessel disease. These findings are stable.  No evidence of intracranial hemorrhage, mass, mass effect, cerebral edema, extra-axial fluid collection or progressive ventricular enlargement. Visualized upper paranasal sinuses and mastoid air spaces are grossly clear.       Impression: 1. No acute intracranial abnormality. 2. Stable diffuse chronic changes as noted above. 3. No change since 11/18/2019.  This report was finalized on 11/20/2019 1:31 PM by Dr. Edgardo Adams MD.      Ct Head Without Contrast    Result Date: 11/18/2019  Narrative: HEAD CT WITHOUT CONTRAST 11/18/2019  HISTORY: Lower extremity weakness and buckling resulting in multiple falls over the past 3 days including 2 falls today. Diabetes and hypertension.  TECHNIQUE: Multiple axial images were obtained from the skull base to vertex without intravenous contrast administration. Radiation dose reduction techniques included automated exposure control or exposure modulation based on body size. Radiation audit for CT and nuclear cardiology exams in the last 12 months: 3.  FINDINGS: There is generalized enlargement of the ventricles and sulci characteristic of atrophy. Is no midline shift. There is no mass or mass effect, hemorrhage or acute infarct. The visualized paranasal sinuses are clear.      Impression: Atrophy. No acute intracranial abnormality.  This report was finalized on 11/18/2019 3:29 PM by Dr. Enrike Roche MD.      Mri Knee Left Without Contrast    Result Date: 11/21/2019  Narrative: MRI Knee LT WO INDICATION:  Patient fell with knee  giving out causing him to fall. Multiple falls over the last 6 weeks. TECHNIQUE: MRI of the  left knee without contrast. COMPARISON: None. FINDINGS: Extensive full-thickness cartilage loss medial femoral condyle and medial tibial plateau with a small amount of edema medial tibial plateau and to a lesser extent medial femoral condyle. Moderate knee effusion. Moderate amount of circumferential soft tissue swelling and edema which is nonspecific. Moderate grade chondral malacia medial patellar facet with subchondral edema. Edema also noted along the tibial eminence near the ACL tibial insertion most likely representing enthesopathic marrow change. Tear mid body segment medial meniscus suggesting a sizable radial tear. Degenerative fraying and volume loss posterior horn medial meniscus. Lateral meniscus appears intact. Mucinous degeneration ACL. The posterior cruciate ligament appears intact. The medial collateral ligament and lateral collateral ligament complex appears normal. Extensor mechanism unremarkable.     Impression: Advanced arthrosis medial compartment with extensive grade IV chondromalacia and a small amount of associated subchondral edema. Abnormal mid body segment medial meniscus with truncation and volume loss may represent a diffuse degenerative tear or sizable radial tear. No displaced meniscal fragment. Joint effusion with a moderate amount of nonspecific soft tissue swelling and edema about the knee. Signer Name: MICHELLE Masters MD  Signed: 11/21/2019 3:59 PM  Workstation [facility]: [facility]  [facility] Renal Bilateral    Result Date: 11/21/2019  Narrative: US Renal Comp INDICATION: Abnormal BUN/creatinine. Renal failure, diabetes hypertension and morbid obesity. COMPARISON: Renal ultrasound 8/18/2019. FINDINGS: KIDNEYS:  The right kidney measures 10.2 x 5.3 x 5.6 cm. There is mild diffuse renal parenchymal thinning. No hydronephrosis or focal mass is suspected. The  left kidney measures 10.1 x 5.8 x 5.4 cm. There is left renal parenchymal thinning with cortex about a centimeter in width. No hydronephrosis or focal mass is suspected. There is a tiny echogenic focus in the mid pole left kidney which could be a tiny nonobstructing calculus. Renal echotexture appears normal bilaterally. URINARY BLADDER:  The bladder is unremarkable.     Impression: No evidence for hydronephrosis. There is mild bilateral renal parenchymal thinning. This is also present previously. Suspected tiny shadowing nonobstructing calculus left kidney. Signer Name: Libertad Kam MD  Signed: 11/21/2019 5:29 PM  Workstation Name: [facility]  [facility]    Us Venous Doppler Lower Extremity Bilateral (duplex)    Result Date: 11/21/2019  Narrative: US Veins LE Duplex BILAT HISTORY: Recent fall left knee pain 2 weeks. Edema. Lower extremities. Redness in calves TECHNIQUE: Real-time ultrasound was performed of both lower extremities utilizing spectral and color Doppler with compression and augmentation techniques. COMPARISON: Ultrasound venous Doppler May 8 2019. FINDINGS: Right Lower Extremity: There is no deep venous thrombus seen in the right lower extremity, including the right common femoral veins, right femoral veins and right popliteal veins.  Normal compressibility and respiratory phasicity was visualized.  No calf vein thrombus. Left Lower Extremity: There is no deep venous thrombus seen in the left lower extremity, including the left common femoral veins, left femoral veins and left popliteal veins.   Normal compressibility and respiratory phasicity was visualized.  No calf vein thrombus. There is a small baker's cyst in the left popliteal fossa. It measures about 1.2 x 1.6 x 3.2 cm. It was not seen on prior study.     Impression: Small baker's cyst left popliteal fossa. No evidence for acute appearing deep venous thrombosis either lower extremity deep venous system. Signer Name: Libertad  Negin SALAZAR  Signed: 11/21/2019 5:25 PM  Workstation Name: [facility]  [facility]    Xr Chest Pa & Lateral    Result Date: 11/20/2019  Narrative: CR Chest 2 Vws INDICATION:  Pulmonary edema. New admission. Dry cough swollen legs. COMPARISON:  Chest x-ray 10/15/2019 FINDINGS: PA and lateral views of the chest.  There is no pleural effusion. Mild elevation left hemidiaphragm. There is mild to moderate cardiac silhouette enlargement, similar to prior. There is mild prominence of central vascular markings and interstitial markings. Please correlate for clinical evidence of mild volume overload. Fairly linear airspace disease at the left base is probably atelectasis. Follow-up to clearing is recommended. There is no pneumothorax. There are mild thoracic degenerative changes.      Impression: 1. Likely mild central vascular congestion and interstitial edema. Please correlate for clinical evidence of mild volume overload. 2. Linear airspace disease at the left base is probably atelectasis. Follow-up to clearing is recommended Signer Name: Libertad Kam MD  Signed: 11/20/2019 6:32 PM  Workstation Name: [facility]  [facility]    Xr Hip With Or Without Pelvis 2 - 3 View Left    Result Date: 11/19/2019  Narrative: PELVIS AND LEFT HIP 3 VIEWS 11/19/2019  HISTORY: Pelvic pain and left hip pain status post fall this morning.  FINDINGS: 3 views of the pelvis and left hip demonstrate no fracture. The hip joints are normally maintained. There is some osteophytic spurring about each acetabulum. There is no soft tissue abnormality.      Impression: Mild degenerative change involving the hips bilaterally. No acute abnormality.  This report was finalized on 11/19/2019 11:25 AM by Dr. Enrike Roche MD.                      No data recorded                        MDM    Final diagnoses:   Acute pain of left shoulder   Skin tear of right elbow without complication, initial encounter               Anurag Stover MD  12/11/19 [id number]

## 2019-12-11 NOTE — ED NOTES
"Called pt's wife to let her know he is being discharged.  She replied \"okay, thank you.\"     Jeanne Duarte RN  12/11/19 0211    "

## 2020-02-28 NOTE — ED PROVIDER NOTES
" EMERGENCY DEPARTMENT ENCOUNTER      Room Number: 4/04      HPI:    Chief complaint: Multiple falls, generalized weakness and near syncope    Location: Not applicable    Quality/Severity: Moderate    Timing/Duration: Patient has recently fallen twice.    Modifying Factors: None    Associated Symptoms: Increased lower extremity edema    Narrative: Pt is a 79 y.o. male who presents complaining of multiple falls, generalized weakness and near syncope as noted above.  It should be noted that the patient was seen in our department on November 20 with similar symptoms and was admitted throughNovember 27.  Patient relates that after being discharged from our facility he did follow-up with his PMD and the patient was subsequently admitted to [facility] for an additional 5 days.  Patient relates a generalized weakness and feeling \"swimmy headed\" before falling twice this evening.  Patient does express some bilateral knee pain as well as left ankle pain.  Denies loss of consciousness.      PMD: Abdullahi Clarke MD    REVIEW OF SYSTEMS  Review of Systems   Constitutional: Negative for activity change, appetite change, fatigue and fever.   HENT: Negative for congestion.    Respiratory: Positive for shortness of breath. Negative for cough and wheezing.    Cardiovascular: Positive for leg swelling. Negative for chest pain and palpitations.   Gastrointestinal: Negative for abdominal pain, diarrhea, nausea and vomiting.   Genitourinary: Negative for dysuria, flank pain, hematuria and urgency.   Musculoskeletal: Positive for gait problem (Patient uses walker for ambulation). Negative for back pain.   Skin: Positive for wound (Healing wounds on lower extremities). Negative for rash.   Neurological: Positive for weakness (Generalized) and light-headedness. Negative for dizziness, syncope and headaches.   Psychiatric/Behavioral: Negative for confusion.   All other systems reviewed and are negative.      PAST MEDICAL " HISTORY  Active Ambulatory Problems     Diagnosis Date Noted   • COPD (chronic obstructive pulmonary disease) (CMS/HCC)    • Diabetes mellitus (CMS/HCC)    • Hypertension    • Primary osteoarthritis of left knee 08/27/2018   • CKD (chronic kidney disease) 05/07/2019   • Chronic diastolic (congestive) heart failure (CMS/HCC) 05/07/2019   • Morbidly obese (CMS/HCC) 09/10/2019   • Physical debility 11/20/2019     Resolved Ambulatory Problems     Diagnosis Date Noted   • Sepsis (CMS/HCC) 11/21/2017   • Acute renal failure (CMS/HCC)    • Elevated procalcitonin 11/22/2017   • Lactic acid acidosis 11/22/2017   • Leukocytosis 11/22/2017   • NSTEMI (non-ST elevated myocardial infarction) (CMS/HCC) 01/10/2018   • Injury of right ankle 08/27/2018   • Pneumonia of both lower lobes due to infectious organism (CMS/HCC) 02/17/2019   • Elevated troponin 05/07/2019   • Cardiorenal syndrome with renal failure 05/07/2019   • [place] coma scale total score 9-12 08/15/2019   • Acute respiratory failure with hypoxia (CMS/HCC) 08/15/2019   • Sepsis, Gram positive (CMS/HCC) 08/16/2019   • Streptococcal bacteremia 08/16/2019   • Atrial fibrillation with RVR (CMS/HCC) 08/17/2019   • Cellulitis of right lower extremity 10/15/2019     Past Medical History:   Diagnosis Date   • Arthritis    • Asthma    • Cancer (CMS/HCC)    • Cataract    • CHF (congestive heart failure) (CMS/HCC)    • Disease of thyroid gland    • Hyperlipidemia    • Kidney stone    • Myocardial infarct, old    • Renal disorder        PAST SURGICAL HISTORY  Past Surgical History:   Procedure Laterality Date   • COLONOSCOPY     • EYE SURGERY     • JOINT REPLACEMENT      right   • KIDNEY STONE SURGERY     • REPLACEMENT TOTAL KNEE     • TOE SURGERY         FAMILY HISTORY  Family History   Problem Relation Age of Onset   • COPD Mother    • Diabetes Father        SOCIAL HISTORY  Social History     Socioeconomic History   • Marital status: Unknown     Spouse name: Not on file   •  Number of children: Not on file   • Years of education: Not on file   • Highest education level: Not on file   Tobacco Use   • Smoking status: Former Smoker   • Smokeless tobacco: Never Used   • Tobacco comment: quit 1993   Substance and Sexual Activity   • Alcohol use: No   • Drug use: No   • Sexual activity: Defer       ALLERGIES  Atorvastatin and Oxycontin [oxycodone hcl]    PHYSICAL EXAM  ED Triage Vitals [12/09/19 0245]   Temp Heart Rate Resp BP SpO2   98 °F (36.7 °C) 76 18 152/91 92 %      Temp src Heart Rate Source Patient Position BP Location FiO2 (%)   Oral Monitor Lying Right arm --       Physical Exam   Constitutional: He is oriented to person, place, and time.   The patient is an obese, elderly, white male in no acute distress.   HENT:   Head: Normocephalic and atraumatic.   Eyes: Pupils are equal, round, and reactive to light. Conjunctivae and EOM are normal.   Neck: Normal range of motion. Neck supple.   Cardiovascular: Normal rate, regular rhythm and normal heart sounds.   No murmur heard.  Pulmonary/Chest: Effort normal. No respiratory distress.   Mild, bibasilar wheezes present.   Abdominal: Soft. Bowel sounds are normal. There is no tenderness.   Musculoskeletal: Normal range of motion. He exhibits edema (3+ pitting edema to the distal thighs bilaterally.).   Ace wrap bandages on the bilateral lower extremities.   Neurological: He is alert and oriented to person, place, and time.   Skin: Skin is warm and dry.   Psychiatric: Affect and judgment normal.   Nursing note and vitals reviewed.      LAB RESULTS  Results for orders placed or performed during the hospital encounter of 12/09/19   Comprehensive Metabolic Panel   Result Value Ref Range    Glucose 141 (H) 65 - 99 mg/dL    BUN 44 (H) 8 - 23 mg/dL    Creatinine 2.55 (H) 0.76 - 1.27 mg/dL    Sodium 141 136 - 145 mmol/L    Potassium 4.6 3.5 - 5.2 mmol/L    Chloride 101 98 - 107 mmol/L    CO2 26.8 22.0 - 29.0 mmol/L    Calcium 9.3 8.6 - 10.5 mg/dL     Total Protein 7.7 6.0 - 8.5 g/dL    Albumin 3.90 3.50 - 5.20 g/dL    ALT (SGPT) 14 1 - 41 U/L    AST (SGOT) 19 1 - 40 U/L    Alkaline Phosphatase 89 39 - 117 U/L    Total Bilirubin 0.4 0.2 - 1.2 mg/dL    eGFR Non African Amer 25 (L) >60 mL/min/1.73    Globulin 3.8 gm/dL    A/G Ratio 1.0 g/dL    BUN/Creatinine Ratio 17.3 7.0 - 25.0    Anion Gap 13.2 5.0 - 15.0 mmol/L   Urinalysis With Microscopic If Indicated (No Culture) - Urine, Clean Catch   Result Value Ref Range    Color, UA Yellow Yellow, Straw    Appearance, UA Turbid (A) Clear    pH, UA 5.5 4.5 - 8.0    Specific Gravity, UA 1.020 1.003 - 1.030    Glucose, UA Negative Negative    Ketones, UA Negative Negative    Bilirubin, UA Negative Negative    Blood, UA Moderate (2+) (A) Negative    Protein, UA >=300 mg/dL (3+) (A) Negative    Leuk Esterase, UA Moderate (2+) (A) Negative    Nitrite, UA Negative Negative    Urobilinogen, UA 0.2 E.U./dL 0.2 - 1.0 E.U./dL   BNP   Result Value Ref Range    proBNP 5,528.0 (H) 5.0-1,800.0 pg/mL   Troponin   Result Value Ref Range    Troponin T 0.035 (C) 0.000-<0.030 ng/mL   CBC Auto Differential   Result Value Ref Range    WBC 7.45 3.40 - 10.80 10*3/mm3    RBC 4.31 4.14 - 5.80 10*6/mm3    Hemoglobin 9.9 (L) 13.0 - 17.7 g/dL    Hematocrit 34.1 (L) 37.5 - 51.0 %    MCV 79.1 79.0 - 97.0 fL    MCH 23.0 (L) 26.6 - 33.0 pg    MCHC 29.0 (L) 31.5 - 35.7 g/dL    RDW 17.8 (H) 12.3 - 15.4 %    RDW-SD 50.6 37.0 - 54.0 fl    MPV 11.4 6.0 - 12.0 fL    Platelets 202 140 - 450 10*3/mm3    Neutrophil % 65.5 42.7 - 76.0 %    Lymphocyte % 15.7 (L) 19.6 - 45.3 %    Monocyte % 12.2 (H) 5.0 - 12.0 %    Eosinophil % 5.8 0.3 - 6.2 %    Basophil % 0.4 0.0 - 1.5 %    Immature Grans % 0.4 0.0 - 0.5 %    Neutrophils, Absolute 4.88 1.70 - 7.00 10*3/mm3    Lymphocytes, Absolute 1.17 0.70 - 3.10 10*3/mm3    Monocytes, Absolute 0.91 (H) 0.10 - 0.90 10*3/mm3    Eosinophils, Absolute 0.43 (H) 0.00 - 0.40 10*3/mm3    Basophils, Absolute 0.03 0.00 - 0.20 10*3/mm3     Immature Grans, Absolute 0.03 0.00 - 0.05 10*3/mm3    nRBC 0.0 0.0 - 0.2 /100 WBC   Urinalysis, Microscopic Only - Urine, Clean Catch   Result Value Ref Range    RBC, UA 3-5 (A) None Seen /HPF    WBC, UA Too Numerous to Count (A) None Seen /HPF    Bacteria, UA Trace (A) None Seen /HPF    Squamous Epithelial Cells, UA 0-2 None Seen, 0-2 /HPF    Yeast, UA Small/1+ Budding Yeast None Seen /HPF    Hyaline Casts, UA None Seen None Seen /LPF    Methodology Manual Light Microscopy          I ordered the above labs and reviewed the results    RADIOLOGY  Xr Chest 2 View    Result Date: 12/9/2019  Narrative: CR Chest 2 Vws INDICATION:  Near syncope today COMPARISON:  11/20/2019 FINDINGS: PA and lateral views of the chest.  Heart and mediastinal contours are normal. The lungs are clear. No pneumothorax or pleural effusion.      Impression: No acute cardiopulmonary findings. Signer Name: Fei Henderson MD  Signed: 12/9/2019 4:01 AM  Workstation Name: [facility]  [facility]    Xr Knee 1 Or 2 View Left    Result Date: 12/9/2019  Narrative: CR Knee 1 or 2 Vws LT INDICATION: Fall. This morning with left knee pain COMPARISON: None available. FINDINGS: 3 view(s) of the left knee.  No fracture, dislocation, or effusion. No bone erosion or destruction.  No foreign body.     Impression: Negative left knee. Signer Name: Fei Henderson MD  Signed: 12/9/2019 4:01 AM  Workstation Name: [facility]  [facility]    Xr Knee 1 Or 2 View Right    Result Date: 12/9/2019  Narrative: CR Knee 1 or 2 Vws RT INDICATION: Fall this morning with right knee pain COMPARISON: None available. FINDINGS: 2 view(s) of the right knee.  No fracture, dislocation, or effusion. No bone erosion or destruction.  There is a total knee prosthesis present.     Impression: Previous knee replacement otherwise negative. Signer Name: Fei Henderson MD  Signed: 12/9/2019 4:01 AM  Workstation Name: [facility]  [facility]   [place]    Xr Knee 3 View Left    Result Date: 11/19/2019  Narrative: LEFT KNEE 3 VIEWS 11/19/2019  HISTORY: Left knee pain status post fall today.  FINDINGS: 3 views of the left knee demonstrate no fracture. There is degenerative change with moderate narrowing of the medial compartment of the knee and there is narrowing of the patellofemoral joint. Small osteophytes border the medial compartment and are seen along the posterior aspect of the patella. The bones are somewhat osteopenic. There is no joint effusion.      Impression: Degenerative changes left knee. No acute abnormality.  This report was finalized on 11/19/2019 11:24 AM by Dr. Enrike Roche MD.      Xr Ankle 3+ View Left    Result Date: 12/9/2019  Narrative: CR Ankle Min 3 Vws LT INDICATION: Fall this morning with left ankle pain. COMPARISON: None. FINDINGS: 3 view(s) of the left ankle.  No fracture or dislocation. No bone erosion or destruction. No foreign body.     Impression: Negative left ankle. Signer Name: Fei Henderson MD  Signed: 12/9/2019 4:00 AM  Workstation Name: [email or web address]  [facility] [place]    Ct Head Without Contrast    Result Date: 11/20/2019  Narrative: CT HEAD, NONCONTRAST, 11/20/2019  HISTORY: 79-year-old male with multiple recent falls. Two falls today. Struck forehead on ground. CT here 2 days ago for similar.  TECHNIQUE:  CT imaging of the head without IV contrast. Radiation dose reduction techniques included automated exposure control or exposure modulation based on body size. Radiation audit for CT and nuclear cardiology exams in the last 12 months: 4.  COMPARISON: *  CT head, 11/18/2019.  FINDINGS:  No acute intracranial abnormality is demonstrated. No visible skull fracture.  Mild generalized age-appropriate cerebral volume loss. Mild to moderate diffuse low-attenuation white matter changes, nonspecific but most typically seen in the setting of chronic small vessel disease. These findings are stable.  No  evidence of intracranial hemorrhage, mass, mass effect, cerebral edema, extra-axial fluid collection or progressive ventricular enlargement. Visualized upper paranasal sinuses and mastoid air spaces are grossly clear.       Impression: 1. No acute intracranial abnormality. 2. Stable diffuse chronic changes as noted above. 3. No change since 11/18/2019.  This report was finalized on 11/20/2019 1:31 PM by Dr. Edgardo Adams MD.      Ct Head Without Contrast    Result Date: 11/18/2019  Narrative: HEAD CT WITHOUT CONTRAST 11/18/2019  HISTORY: Lower extremity weakness and buckling resulting in multiple falls over the past 3 days including 2 falls today. Diabetes and hypertension.  TECHNIQUE: Multiple axial images were obtained from the skull base to vertex without intravenous contrast administration. Radiation dose reduction techniques included automated exposure control or exposure modulation based on body size. Radiation audit for CT and nuclear cardiology exams in the last 12 months: 3.  FINDINGS: There is generalized enlargement of the ventricles and sulci characteristic of atrophy. Is no midline shift. There is no mass or mass effect, hemorrhage or acute infarct. The visualized paranasal sinuses are clear.      Impression: Atrophy. No acute intracranial abnormality.  This report was finalized on 11/18/2019 3:29 PM by Dr. Enrike Roche MD.      Mri Knee Left Without Contrast    Result Date: 11/21/2019  Narrative: MRI Knee LT WO INDICATION:  Patient fell with knee giving out causing him to fall. Multiple falls over the last 6 weeks. TECHNIQUE: MRI of the  left knee without contrast. COMPARISON: None. FINDINGS: Extensive full-thickness cartilage loss medial femoral condyle and medial tibial plateau with a small amount of edema medial tibial plateau and to a lesser extent medial femoral condyle. Moderate knee effusion. Moderate amount of circumferential soft tissue swelling and edema which is nonspecific. Moderate  grade chondral malacia medial patellar facet with subchondral edema. Edema also noted along the tibial eminence near the ACL tibial insertion most likely representing enthesopathic marrow change. Tear mid body segment medial meniscus suggesting a sizable radial tear. Degenerative fraying and volume loss posterior horn medial meniscus. Lateral meniscus appears intact. Mucinous degeneration ACL. The posterior cruciate ligament appears intact. The medial collateral ligament and lateral collateral ligament complex appears normal. Extensor mechanism unremarkable.     Impression: Advanced arthrosis medial compartment with extensive grade IV chondromalacia and a small amount of associated subchondral edema. Abnormal mid body segment medial meniscus with truncation and volume loss may represent a diffuse degenerative tear or sizable radial tear. No displaced meniscal fragment. Joint effusion with a moderate amount of nonspecific soft tissue swelling and edema about the knee. Signer Name: MICHELLE Masters MD  Signed: 11/21/2019 3:59 PM  Workstation Name: [email or web address]  [facility] Renal Bilateral    Result Date: 11/21/2019  Narrative: US Renal Comp INDICATION: Abnormal BUN/creatinine. Renal failure, diabetes hypertension and morbid obesity. COMPARISON: Renal ultrasound 8/18/2019. FINDINGS: KIDNEYS:  The right kidney measures 10.2 x 5.3 x 5.6 cm. There is mild diffuse renal parenchymal thinning. No hydronephrosis or focal mass is suspected. The left kidney measures 10.1 x 5.8 x 5.4 cm. There is left renal parenchymal thinning with cortex about a centimeter in width. No hydronephrosis or focal mass is suspected. There is a tiny echogenic focus in the mid pole left kidney which could be a tiny nonobstructing calculus. Renal echotexture appears normal bilaterally. URINARY BLADDER:  The bladder is unremarkable.     Impression: No evidence for hydronephrosis. There is mild bilateral renal parenchymal  thinning. This is also present previously. Suspected tiny shadowing nonobstructing calculus left kidney. Signer Name: Libertad Kam MD  Signed: 11/21/2019 5:29 PM  Workstation Name: [facility]  [facility]    Us Venous Doppler Lower Extremity Bilateral (duplex)    Result Date: 11/21/2019  Narrative: US Veins LE Duplex BILAT HISTORY: Recent fall left knee pain 2 weeks. Edema. Lower extremities. Redness in calves TECHNIQUE: Real-time ultrasound was performed of both lower extremities utilizing spectral and color Doppler with compression and augmentation techniques. COMPARISON: Ultrasound venous Doppler May 8 2019. FINDINGS: Right Lower Extremity: There is no deep venous thrombus seen in the right lower extremity, including the right common femoral veins, right femoral veins and right popliteal veins.  Normal compressibility and respiratory phasicity was visualized.  No calf vein thrombus. Left Lower Extremity: There is no deep venous thrombus seen in the left lower extremity, including the left common femoral veins, left femoral veins and left popliteal veins.   Normal compressibility and respiratory phasicity was visualized.  No calf vein thrombus. There is a small baker's cyst in the left popliteal fossa. It measures about 1.2 x 1.6 x 3.2 cm. It was not seen on prior study.     Impression: Small baker's cyst left popliteal fossa. No evidence for acute appearing deep venous thrombosis either lower extremity deep venous system. Signer Name: Libertad Kam MD  Signed: 11/21/2019 5:25 PM  Workstation Name: [facility]  [facility]    Xr Chest Pa & Lateral    Result Date: 11/20/2019  Narrative: CR Chest 2 Vws INDICATION:  Pulmonary edema. New admission. Dry cough swollen legs. COMPARISON:  Chest x-ray 10/15/2019 FINDINGS: PA and lateral views of the chest.  There is no pleural effusion. Mild elevation left hemidiaphragm. There is mild to moderate cardiac silhouette enlargement,  similar to prior. There is mild prominence of central vascular markings and interstitial markings. Please correlate for clinical evidence of mild volume overload. Fairly linear airspace disease at the left base is probably atelectasis. Follow-up to clearing is recommended. There is no pneumothorax. There are mild thoracic degenerative changes.      Impression: 1. Likely mild central vascular congestion and interstitial edema. Please correlate for clinical evidence of mild volume overload. 2. Linear airspace disease at the left base is probably atelectasis. Follow-up to clearing is recommended Signer Name: Libertad Kam MD  Signed: 11/20/2019 6:32 PM  Workstation Name: [email or web address]  [facility] [place]    Xr Hip With Or Without Pelvis 2 - 3 View Left    Result Date: 11/19/2019  Narrative: PELVIS AND LEFT HIP 3 VIEWS 11/19/2019  HISTORY: Pelvic pain and left hip pain status post fall this morning.  FINDINGS: 3 views of the pelvis and left hip demonstrate no fracture. The hip joints are normally maintained. There is some osteophytic spurring about each acetabulum. There is no soft tissue abnormality.      Impression: Mild degenerative change involving the hips bilaterally. No acute abnormality.  This report was finalized on 11/19/2019 11:25 AM by Dr. Enrike Roche MD.        I ordered the above radiologic testing and reviewed the results    PROCEDURES  Procedures      PROGRESS AND CONSULTS  ED Course as of Dec 09 0426   Mon Dec 09, 2019   0315 EKG was reviewed at 0315 hrs. and showed an atrial fibrillation with a ventricular rate of 78 bpm.  ST segments and T waves unremarkable.    [ML]   0425 Hemoglobin and renal function at baseline when compared to prior values.  Patient informed of urinary tract infection and antibiotics will be started in the department.  Patient advised to follow-up with his primary care provider should he continue to have lightheadedness.  I did express concerns to the patient about  his well-being at home and to consider the possibility of assisted living.    [ML]      ED Course User Index  [ML] Akin Rosa MD           MEDICAL DECISION MAKING  Results were reviewed/discussed with the patient and they were also made aware of online access. Pt also made aware that some labs, such as cultures, will not be resulted during ER visit and follow up with PMD is necessary.     MDM       DIAGNOSIS  Final diagnoses:   Near syncope   Fall at home, initial encounter   Urinary tract infection in male       Latest Documented Vital Signs:  As of 4:26 AM  BP- 152/91 HR- 69 Temp- 98 °F (36.7 °C) (Oral) O2 sat- 97%    DISPOSITION  Discharged in stable condition       Medication List      New Prescriptions    cefuroxime 500 MG tablet  Commonly known as:  CEFTIN  Take 1 tablet by mouth 2 (Two) Times a Day.        Changed    O2  Commonly known as:  OXYGEN  Inhale 1 L/min every night at bedtime.  What changed:  how much to take          Follow-up Information     Abdullahi Clarke MD.    Specialty:  Internal Medicine  Why:  As needed  Contact information:  [facility] [address] [place] [address] [phone]                      Akin Rosa MD  12/09/19 9350     Walking - Outdoors allowed/Showering allowed/Walking - Indoors allowed/No heavy lifting/straining

## 2020-06-29 ENCOUNTER — HOSPITAL ENCOUNTER (INPATIENT)
Facility: HOSPITAL | Age: 80
LOS: 1 days | Discharge: HOME HEALTH | End: 2020-07-02
Attending: EMERGENCY MEDICINE | Admitting: INTERNAL MEDICINE

## 2020-06-29 ENCOUNTER — APPOINTMENT (OUTPATIENT)
Dept: GENERAL RADIOLOGY | Facility: HOSPITAL | Age: 80
End: 2020-06-29

## 2020-06-29 ENCOUNTER — APPOINTMENT (OUTPATIENT)
Dept: CARDIOLOGY | Facility: HOSPITAL | Age: 80
End: 2020-06-29

## 2020-06-29 DIAGNOSIS — R53.1 GENERALIZED WEAKNESS: Primary | ICD-10-CM

## 2020-06-29 DIAGNOSIS — N39.0 URINARY TRACT INFECTION IN ELDERLY PATIENT: ICD-10-CM

## 2020-06-29 LAB
ALBUMIN SERPL-MCNC: 3.6 G/DL (ref 3.5–5.2)
ALBUMIN SERPL-MCNC: 3.9 G/DL (ref 3.5–5.2)
ALBUMIN/GLOB SERPL: 1.1 G/DL
ALBUMIN/GLOB SERPL: 1.1 G/DL
ALP SERPL-CCNC: 91 U/L (ref 39–117)
ALP SERPL-CCNC: 96 U/L (ref 39–117)
ALT SERPL W P-5'-P-CCNC: 11 U/L (ref 1–41)
ALT SERPL W P-5'-P-CCNC: 12 U/L (ref 1–41)
ANION GAP SERPL CALCULATED.3IONS-SCNC: 12.1 MMOL/L (ref 5–15)
ANION GAP SERPL CALCULATED.3IONS-SCNC: 14.1 MMOL/L (ref 5–15)
AORTIC DIMENSIONLESS INDEX: 0.6 (DI)
AST SERPL-CCNC: 14 U/L (ref 1–40)
AST SERPL-CCNC: 16 U/L (ref 1–40)
BACTERIA UR QL AUTO: ABNORMAL /HPF
BASOPHILS # BLD AUTO: 0.04 10*3/MM3 (ref 0–0.2)
BASOPHILS # BLD AUTO: 0.05 10*3/MM3 (ref 0–0.2)
BASOPHILS NFR BLD AUTO: 0.4 % (ref 0–1.5)
BASOPHILS NFR BLD AUTO: 0.5 % (ref 0–1.5)
BH CV ECHO MEAS - AO MAX PG: 6 MMHG
BH CV ECHO MEAS - AO MEAN PG (FULL): 3 MMHG
BH CV ECHO MEAS - AO MEAN PG: 4 MMHG
BH CV ECHO MEAS - AO ROOT AREA (BSA CORRECTED): 1.4
BH CV ECHO MEAS - AO ROOT AREA: 9.6 CM^2
BH CV ECHO MEAS - AO ROOT DIAM: 3.5 CM
BH CV ECHO MEAS - AO V2 MAX: 120 CM/SEC
BH CV ECHO MEAS - AO V2 MEAN: 89.3 CM/SEC
BH CV ECHO MEAS - AO V2 VTI: 26.1 CM
BH CV ECHO MEAS - AVA(I,A): 2.6 CM^2
BH CV ECHO MEAS - AVA(I,D): 2.6 CM^2
BH CV ECHO MEAS - BSA(HAYCOCK): 2.6 M^2
BH CV ECHO MEAS - BSA: 2.5 M^2
BH CV ECHO MEAS - BZI_BMI: 36 KILOGRAMS/M^2
BH CV ECHO MEAS - BZI_METRIC_HEIGHT: 185.4 CM
BH CV ECHO MEAS - BZI_METRIC_WEIGHT: 123.8 KG
BH CV ECHO MEAS - EDV(CUBED): 116.2 ML
BH CV ECHO MEAS - EDV(MOD-SP2): 167 ML
BH CV ECHO MEAS - EDV(MOD-SP4): 203 ML
BH CV ECHO MEAS - EDV(TEICH): 111.7 ML
BH CV ECHO MEAS - EF(CUBED): 53.2 %
BH CV ECHO MEAS - EF(MOD-BP): 36 %
BH CV ECHO MEAS - EF(MOD-SP2): 33.5 %
BH CV ECHO MEAS - EF(MOD-SP4): 32.5 %
BH CV ECHO MEAS - EF(TEICH): 44.9 %
BH CV ECHO MEAS - ESV(CUBED): 54.4 ML
BH CV ECHO MEAS - ESV(MOD-SP2): 111 ML
BH CV ECHO MEAS - ESV(MOD-SP4): 137 ML
BH CV ECHO MEAS - ESV(TEICH): 61.6 ML
BH CV ECHO MEAS - FS: 22.3 %
BH CV ECHO MEAS - IVS/LVPW: 0.96
BH CV ECHO MEAS - IVSD: 0.95 CM
BH CV ECHO MEAS - LAT PEAK E' VEL: 7.1 CM/SEC
BH CV ECHO MEAS - LV DIASTOLIC VOL/BSA (35-75): 82.7 ML/M^2
BH CV ECHO MEAS - LV MASS(C)D: 168.2 GRAMS
BH CV ECHO MEAS - LV MASS(C)DI: 68.5 GRAMS/M^2
BH CV ECHO MEAS - LV MAX PG: 2 MMHG
BH CV ECHO MEAS - LV MEAN PG: 1 MMHG
BH CV ECHO MEAS - LV SYSTOLIC VOL/BSA (12-30): 55.8 ML/M^2
BH CV ECHO MEAS - LV V1 MAX: 69 CM/SEC
BH CV ECHO MEAS - LV V1 MEAN: 50.4 CM/SEC
BH CV ECHO MEAS - LV V1 VTI: 14.9 CM
BH CV ECHO MEAS - LVIDD: 4.9 CM
BH CV ECHO MEAS - LVIDS: 3.8 CM
BH CV ECHO MEAS - LVLD AP2: 8.6 CM
BH CV ECHO MEAS - LVLD AP4: 9.2 CM
BH CV ECHO MEAS - LVLS AP2: 8.4 CM
BH CV ECHO MEAS - LVLS AP4: 8.4 CM
BH CV ECHO MEAS - LVOT AREA (M): 4.5 CM^2
BH CV ECHO MEAS - LVOT AREA: 4.5 CM^2
BH CV ECHO MEAS - LVOT DIAM: 2.4 CM
BH CV ECHO MEAS - LVPWD: 0.99 CM
BH CV ECHO MEAS - MED PEAK E' VEL: 4.8 CM/SEC
BH CV ECHO MEAS - MV DEC SLOPE: 446 CM/SEC^2
BH CV ECHO MEAS - MV DEC TIME: 158 SEC
BH CV ECHO MEAS - MV E MAX VEL: 98.5 CM/SEC
BH CV ECHO MEAS - MV MEAN PG: 2 MMHG
BH CV ECHO MEAS - MV P1/2T MAX VEL: 108 CM/SEC
BH CV ECHO MEAS - MV P1/2T: 70.9 MSEC
BH CV ECHO MEAS - MV V2 MEAN: 60.6 CM/SEC
BH CV ECHO MEAS - MV V2 VTI: 26.1 CM
BH CV ECHO MEAS - MVA P1/2T LCG: 2 CM^2
BH CV ECHO MEAS - MVA(P1/2T): 3.1 CM^2
BH CV ECHO MEAS - MVA(VTI): 2.6 CM^2
BH CV ECHO MEAS - RAP SYSTOLE: 3 MMHG
BH CV ECHO MEAS - RV BASE (<4.1) - OBSOLETE: 3.9 CM
BH CV ECHO MEAS - SI(AO): 102.2 ML/M^2
BH CV ECHO MEAS - SI(CUBED): 25.2 ML/M^2
BH CV ECHO MEAS - SI(LVOT): 27.4 ML/M^2
BH CV ECHO MEAS - SI(MOD-SP2): 22.8 ML/M^2
BH CV ECHO MEAS - SI(MOD-SP4): 26.9 ML/M^2
BH CV ECHO MEAS - SI(TEICH): 20.4 ML/M^2
BH CV ECHO MEAS - SV(AO): 251.1 ML
BH CV ECHO MEAS - SV(CUBED): 61.8 ML
BH CV ECHO MEAS - SV(LVOT): 67.4 ML
BH CV ECHO MEAS - SV(MOD-SP2): 56 ML
BH CV ECHO MEAS - SV(MOD-SP4): 66 ML
BH CV ECHO MEAS - SV(TEICH): 50.2 ML
BH CV ECHO MEAS - TAPSE (>1.6): 1.6 CM
BH CV ECHO MEASUREMENTS AVERAGE E/E' RATIO: 16.55
BH CV XLRA - RV BASE: 3.9 CM
BH CV XLRA - TDI S': 8.1 CM/SEC
BILIRUB SERPL-MCNC: 0.4 MG/DL (ref 0.2–1.2)
BILIRUB SERPL-MCNC: 0.4 MG/DL (ref 0.2–1.2)
BILIRUB UR QL STRIP: NEGATIVE
BUN BLD-MCNC: 43 MG/DL (ref 8–23)
BUN BLD-MCNC: 43 MG/DL (ref 8–23)
BUN/CREAT SERPL: 20.3 (ref 7–25)
BUN/CREAT SERPL: 21.3 (ref 7–25)
CALCIUM SPEC-SCNC: 8.9 MG/DL (ref 8.6–10.5)
CALCIUM SPEC-SCNC: 9.2 MG/DL (ref 8.6–10.5)
CHLORIDE SERPL-SCNC: 100 MMOL/L (ref 98–107)
CHLORIDE SERPL-SCNC: 101 MMOL/L (ref 98–107)
CLARITY UR: ABNORMAL
CO2 SERPL-SCNC: 23.9 MMOL/L (ref 22–29)
CO2 SERPL-SCNC: 24.9 MMOL/L (ref 22–29)
COLOR UR: YELLOW
CREAT BLD-MCNC: 2.02 MG/DL (ref 0.76–1.27)
CREAT BLD-MCNC: 2.12 MG/DL (ref 0.76–1.27)
DEPRECATED RDW RBC AUTO: 49.6 FL (ref 37–54)
DEPRECATED RDW RBC AUTO: 50.6 FL (ref 37–54)
EOSINOPHIL # BLD AUTO: 0.49 10*3/MM3 (ref 0–0.4)
EOSINOPHIL # BLD AUTO: 0.56 10*3/MM3 (ref 0–0.4)
EOSINOPHIL NFR BLD AUTO: 5.1 % (ref 0.3–6.2)
EOSINOPHIL NFR BLD AUTO: 5.2 % (ref 0.3–6.2)
ERYTHROCYTE [DISTWIDTH] IN BLOOD BY AUTOMATED COUNT: 17.2 % (ref 12.3–15.4)
ERYTHROCYTE [DISTWIDTH] IN BLOOD BY AUTOMATED COUNT: 17.2 % (ref 12.3–15.4)
GFR SERPL CREATININE-BSD FRML MDRD: 30 ML/MIN/1.73
GFR SERPL CREATININE-BSD FRML MDRD: 32 ML/MIN/1.73
GLOBULIN UR ELPH-MCNC: 3.3 GM/DL
GLOBULIN UR ELPH-MCNC: 3.5 GM/DL
GLUCOSE BLD-MCNC: 224 MG/DL (ref 65–99)
GLUCOSE BLD-MCNC: 234 MG/DL (ref 65–99)
GLUCOSE BLDC GLUCOMTR-MCNC: 182 MG/DL (ref 70–130)
GLUCOSE BLDC GLUCOMTR-MCNC: 230 MG/DL (ref 70–130)
GLUCOSE BLDC GLUCOMTR-MCNC: 245 MG/DL (ref 70–130)
GLUCOSE BLDC GLUCOMTR-MCNC: 293 MG/DL (ref 70–130)
GLUCOSE UR STRIP-MCNC: ABNORMAL MG/DL
HBA1C MFR BLD: 10.3 % (ref 4.8–5.6)
HCT VFR BLD AUTO: 34.1 % (ref 37.5–51)
HCT VFR BLD AUTO: 34.8 % (ref 37.5–51)
HGB BLD-MCNC: 10.2 G/DL (ref 13–17.7)
HGB BLD-MCNC: 10.7 G/DL (ref 13–17.7)
HGB UR QL STRIP.AUTO: ABNORMAL
HYALINE CASTS UR QL AUTO: ABNORMAL /LPF
IMM GRANULOCYTES # BLD AUTO: 0.05 10*3/MM3 (ref 0–0.05)
IMM GRANULOCYTES # BLD AUTO: 0.05 10*3/MM3 (ref 0–0.05)
IMM GRANULOCYTES NFR BLD AUTO: 0.5 % (ref 0–0.5)
IMM GRANULOCYTES NFR BLD AUTO: 0.5 % (ref 0–0.5)
KETONES UR QL STRIP: NEGATIVE
LEFT ATRIUM VOLUME INDEX: 23 ML/M2
LEUKOCYTE ESTERASE UR QL STRIP.AUTO: ABNORMAL
LV EF 2D ECHO EST: 36 %
LYMPHOCYTES # BLD AUTO: 1.07 10*3/MM3 (ref 0.7–3.1)
LYMPHOCYTES # BLD AUTO: 1.08 10*3/MM3 (ref 0.7–3.1)
LYMPHOCYTES NFR BLD AUTO: 10 % (ref 19.6–45.3)
LYMPHOCYTES NFR BLD AUTO: 11.1 % (ref 19.6–45.3)
MAGNESIUM SERPL-MCNC: 2 MG/DL (ref 1.6–2.4)
MCH RBC QN AUTO: 24.5 PG (ref 26.6–33)
MCH RBC QN AUTO: 24.7 PG (ref 26.6–33)
MCHC RBC AUTO-ENTMCNC: 29.9 G/DL (ref 31.5–35.7)
MCHC RBC AUTO-ENTMCNC: 30.7 G/DL (ref 31.5–35.7)
MCV RBC AUTO: 80.4 FL (ref 79–97)
MCV RBC AUTO: 82 FL (ref 79–97)
MONOCYTES # BLD AUTO: 1.04 10*3/MM3 (ref 0.1–0.9)
MONOCYTES # BLD AUTO: 1.12 10*3/MM3 (ref 0.1–0.9)
MONOCYTES NFR BLD AUTO: 10.4 % (ref 5–12)
MONOCYTES NFR BLD AUTO: 10.8 % (ref 5–12)
NEUTROPHILS # BLD AUTO: 6.92 10*3/MM3 (ref 1.7–7)
NEUTROPHILS # BLD AUTO: 7.95 10*3/MM3 (ref 1.7–7)
NEUTROPHILS NFR BLD AUTO: 72.1 % (ref 42.7–76)
NEUTROPHILS NFR BLD AUTO: 73.4 % (ref 42.7–76)
NITRITE UR QL STRIP: NEGATIVE
NRBC BLD AUTO-RTO: 0 /100 WBC (ref 0–0.2)
NRBC BLD AUTO-RTO: 0 /100 WBC (ref 0–0.2)
NT-PROBNP SERPL-MCNC: 5909 PG/ML (ref 5–1800)
PH UR STRIP.AUTO: 6 [PH] (ref 4.5–8)
PLATELET # BLD AUTO: 177 10*3/MM3 (ref 140–450)
PLATELET # BLD AUTO: 178 10*3/MM3 (ref 140–450)
PMV BLD AUTO: 11 FL (ref 6–12)
PMV BLD AUTO: 11 FL (ref 6–12)
POTASSIUM BLD-SCNC: 4.6 MMOL/L (ref 3.5–5.2)
POTASSIUM BLD-SCNC: 4.9 MMOL/L (ref 3.5–5.2)
PROT SERPL-MCNC: 6.9 G/DL (ref 6–8.5)
PROT SERPL-MCNC: 7.4 G/DL (ref 6–8.5)
PROT UR QL STRIP: ABNORMAL
RBC # BLD AUTO: 4.16 10*6/MM3 (ref 4.14–5.8)
RBC # BLD AUTO: 4.33 10*6/MM3 (ref 4.14–5.8)
RBC # UR: ABNORMAL /HPF
REF LAB TEST METHOD: ABNORMAL
SODIUM BLD-SCNC: 138 MMOL/L (ref 136–145)
SODIUM BLD-SCNC: 138 MMOL/L (ref 136–145)
SP GR UR STRIP: 1.02 (ref 1–1.03)
SQUAMOUS #/AREA URNS HPF: ABNORMAL /HPF
T4 FREE SERPL-MCNC: 0.32 NG/DL (ref 0.93–1.7)
T4 FREE SERPL-MCNC: 0.38 NG/DL (ref 0.93–1.7)
TROPONIN T SERPL-MCNC: 0.03 NG/ML (ref 0–0.03)
TROPONIN T SERPL-MCNC: 0.04 NG/ML (ref 0–0.03)
TROPONIN T SERPL-MCNC: 0.04 NG/ML (ref 0–0.03)
TSH SERPL DL<=0.05 MIU/L-ACNC: 80.59 UIU/ML (ref 0.27–4.2)
UROBILINOGEN UR QL STRIP: ABNORMAL
WBC NRBC COR # BLD: 10.81 10*3/MM3 (ref 3.4–10.8)
WBC NRBC COR # BLD: 9.61 10*3/MM3 (ref 3.4–10.8)
WBC UR QL AUTO: ABNORMAL /HPF

## 2020-06-29 PROCEDURE — 93005 ELECTROCARDIOGRAM TRACING: CPT | Performed by: INTERNAL MEDICINE

## 2020-06-29 PROCEDURE — 83735 ASSAY OF MAGNESIUM: CPT | Performed by: INTERNAL MEDICINE

## 2020-06-29 PROCEDURE — 25010000002 CEFTRIAXONE SODIUM-DEXTROSE 1-3.74 GM-%(50ML) RECONSTITUTED SOLUTION: Performed by: EMERGENCY MEDICINE

## 2020-06-29 PROCEDURE — 85025 COMPLETE CBC W/AUTO DIFF WBC: CPT | Performed by: EMERGENCY MEDICINE

## 2020-06-29 PROCEDURE — 80053 COMPREHEN METABOLIC PANEL: CPT | Performed by: EMERGENCY MEDICINE

## 2020-06-29 PROCEDURE — 99284 EMERGENCY DEPT VISIT MOD MDM: CPT | Performed by: EMERGENCY MEDICINE

## 2020-06-29 PROCEDURE — 99284 EMERGENCY DEPT VISIT MOD MDM: CPT

## 2020-06-29 PROCEDURE — 97161 PT EVAL LOW COMPLEX 20 MIN: CPT

## 2020-06-29 PROCEDURE — 84484 ASSAY OF TROPONIN QUANT: CPT | Performed by: INTERNAL MEDICINE

## 2020-06-29 PROCEDURE — 83880 ASSAY OF NATRIURETIC PEPTIDE: CPT | Performed by: INTERNAL MEDICINE

## 2020-06-29 PROCEDURE — 63710000001 INSULIN ASPART PER 5 UNITS: Performed by: INTERNAL MEDICINE

## 2020-06-29 PROCEDURE — 82962 GLUCOSE BLOOD TEST: CPT

## 2020-06-29 PROCEDURE — G0378 HOSPITAL OBSERVATION PER HR: HCPCS

## 2020-06-29 PROCEDURE — 81001 URINALYSIS AUTO W/SCOPE: CPT | Performed by: EMERGENCY MEDICINE

## 2020-06-29 PROCEDURE — 80053 COMPREHEN METABOLIC PANEL: CPT | Performed by: INTERNAL MEDICINE

## 2020-06-29 PROCEDURE — 85025 COMPLETE CBC W/AUTO DIFF WBC: CPT | Performed by: INTERNAL MEDICINE

## 2020-06-29 PROCEDURE — 93306 TTE W/DOPPLER COMPLETE: CPT | Performed by: INTERNAL MEDICINE

## 2020-06-29 PROCEDURE — 93010 ELECTROCARDIOGRAM REPORT: CPT | Performed by: INTERNAL MEDICINE

## 2020-06-29 PROCEDURE — 84439 ASSAY OF FREE THYROXINE: CPT | Performed by: INTERNAL MEDICINE

## 2020-06-29 PROCEDURE — 84443 ASSAY THYROID STIM HORMONE: CPT | Performed by: INTERNAL MEDICINE

## 2020-06-29 PROCEDURE — 99222 1ST HOSP IP/OBS MODERATE 55: CPT | Performed by: INTERNAL MEDICINE

## 2020-06-29 PROCEDURE — 83036 HEMOGLOBIN GLYCOSYLATED A1C: CPT | Performed by: INTERNAL MEDICINE

## 2020-06-29 PROCEDURE — 94799 UNLISTED PULMONARY SVC/PX: CPT

## 2020-06-29 PROCEDURE — 71046 X-RAY EXAM CHEST 2 VIEWS: CPT

## 2020-06-29 PROCEDURE — 63710000001 INSULIN DETEMIR PER 5 UNITS: Performed by: INTERNAL MEDICINE

## 2020-06-29 PROCEDURE — 93306 TTE W/DOPPLER COMPLETE: CPT

## 2020-06-29 RX ORDER — AMOXICILLIN 250 MG
1 CAPSULE ORAL 2 TIMES DAILY
Status: DISCONTINUED | OUTPATIENT
Start: 2020-06-29 | End: 2020-07-02 | Stop reason: HOSPADM

## 2020-06-29 RX ORDER — SODIUM CHLORIDE 9 MG/ML
40 INJECTION, SOLUTION INTRAVENOUS AS NEEDED
Status: DISCONTINUED | OUTPATIENT
Start: 2020-06-29 | End: 2020-07-02 | Stop reason: HOSPADM

## 2020-06-29 RX ORDER — CEFTRIAXONE 1 G/50ML
1 INJECTION, SOLUTION INTRAVENOUS ONCE
Status: COMPLETED | OUTPATIENT
Start: 2020-06-29 | End: 2020-06-29

## 2020-06-29 RX ORDER — TAMSULOSIN HYDROCHLORIDE 0.4 MG/1
0.4 CAPSULE ORAL DAILY
Status: DISCONTINUED | OUTPATIENT
Start: 2020-06-29 | End: 2020-07-02 | Stop reason: HOSPADM

## 2020-06-29 RX ORDER — SODIUM CHLORIDE 0.9 % (FLUSH) 0.9 %
10 SYRINGE (ML) INJECTION EVERY 12 HOURS SCHEDULED
Status: DISCONTINUED | OUTPATIENT
Start: 2020-06-29 | End: 2020-07-02 | Stop reason: HOSPADM

## 2020-06-29 RX ORDER — AMIODARONE HYDROCHLORIDE 200 MG/1
200 TABLET ORAL EVERY 12 HOURS SCHEDULED
Status: DISCONTINUED | OUTPATIENT
Start: 2020-06-29 | End: 2020-06-30

## 2020-06-29 RX ORDER — IPRATROPIUM BROMIDE AND ALBUTEROL SULFATE 2.5; .5 MG/3ML; MG/3ML
3 SOLUTION RESPIRATORY (INHALATION) EVERY 4 HOURS PRN
Status: DISCONTINUED | OUTPATIENT
Start: 2020-06-29 | End: 2020-07-02 | Stop reason: HOSPADM

## 2020-06-29 RX ORDER — PHENAZOPYRIDINE HYDROCHLORIDE 100 MG/1
100 TABLET, FILM COATED ORAL 3 TIMES DAILY PRN
Status: DISCONTINUED | OUTPATIENT
Start: 2020-06-29 | End: 2020-07-02 | Stop reason: HOSPADM

## 2020-06-29 RX ORDER — ALPRAZOLAM 0.25 MG/1
0.25 TABLET ORAL NIGHTLY PRN
Status: DISCONTINUED | OUTPATIENT
Start: 2020-06-29 | End: 2020-07-02 | Stop reason: HOSPADM

## 2020-06-29 RX ORDER — BUMETANIDE 1 MG/1
4 TABLET ORAL 2 TIMES DAILY
Status: DISCONTINUED | OUTPATIENT
Start: 2020-06-29 | End: 2020-07-01

## 2020-06-29 RX ORDER — PETROLATUM 42 G/100G
OINTMENT TOPICAL AS NEEDED
Status: DISCONTINUED | OUTPATIENT
Start: 2020-06-29 | End: 2020-07-02 | Stop reason: HOSPADM

## 2020-06-29 RX ORDER — MELATONIN
2000 DAILY
Status: DISCONTINUED | OUTPATIENT
Start: 2020-06-29 | End: 2020-06-30

## 2020-06-29 RX ORDER — SODIUM CHLORIDE 0.9 % (FLUSH) 0.9 %
10 SYRINGE (ML) INJECTION AS NEEDED
Status: DISCONTINUED | OUTPATIENT
Start: 2020-06-29 | End: 2020-07-02 | Stop reason: HOSPADM

## 2020-06-29 RX ORDER — NITROGLYCERIN 0.4 MG/1
0.4 TABLET SUBLINGUAL
Status: DISCONTINUED | OUTPATIENT
Start: 2020-06-29 | End: 2020-07-02 | Stop reason: HOSPADM

## 2020-06-29 RX ORDER — NICOTINE POLACRILEX 4 MG
15 LOZENGE BUCCAL
Status: DISCONTINUED | OUTPATIENT
Start: 2020-06-29 | End: 2020-07-02 | Stop reason: HOSPADM

## 2020-06-29 RX ORDER — CARVEDILOL 6.25 MG/1
6.25 TABLET ORAL 2 TIMES DAILY WITH MEALS
Status: DISCONTINUED | OUTPATIENT
Start: 2020-06-29 | End: 2020-06-30

## 2020-06-29 RX ORDER — CETIRIZINE HYDROCHLORIDE 10 MG/1
10 TABLET ORAL DAILY
Status: DISCONTINUED | OUTPATIENT
Start: 2020-06-29 | End: 2020-06-30

## 2020-06-29 RX ORDER — BUDESONIDE AND FORMOTEROL FUMARATE DIHYDRATE 160; 4.5 UG/1; UG/1
2 AEROSOL RESPIRATORY (INHALATION)
Status: DISCONTINUED | OUTPATIENT
Start: 2020-06-29 | End: 2020-07-02 | Stop reason: HOSPADM

## 2020-06-29 RX ORDER — LEVOTHYROXINE SODIUM 0.12 MG/1
125 TABLET ORAL
Status: DISCONTINUED | OUTPATIENT
Start: 2020-06-29 | End: 2020-06-30

## 2020-06-29 RX ORDER — FLUTICASONE PROPIONATE 50 MCG
2 SPRAY, SUSPENSION (ML) NASAL DAILY
Status: DISCONTINUED | OUTPATIENT
Start: 2020-06-29 | End: 2020-07-02 | Stop reason: HOSPADM

## 2020-06-29 RX ORDER — CEFTRIAXONE 1 G/50ML
1 INJECTION, SOLUTION INTRAVENOUS EVERY 24 HOURS
Status: DISCONTINUED | OUTPATIENT
Start: 2020-06-30 | End: 2020-07-02 | Stop reason: HOSPADM

## 2020-06-29 RX ORDER — CHOLECALCIFEROL (VITAMIN D3) 125 MCG
5 CAPSULE ORAL NIGHTLY PRN
Status: DISCONTINUED | OUTPATIENT
Start: 2020-06-29 | End: 2020-07-02 | Stop reason: HOSPADM

## 2020-06-29 RX ORDER — PANTOPRAZOLE SODIUM 40 MG/1
40 TABLET, DELAYED RELEASE ORAL EVERY EVENING
Status: DISCONTINUED | OUTPATIENT
Start: 2020-06-29 | End: 2020-07-02 | Stop reason: HOSPADM

## 2020-06-29 RX ORDER — PREGABALIN 75 MG/1
150 CAPSULE ORAL EVERY 12 HOURS SCHEDULED
Status: DISCONTINUED | OUTPATIENT
Start: 2020-06-29 | End: 2020-07-02 | Stop reason: HOSPADM

## 2020-06-29 RX ORDER — LANOLIN ALCOHOL/MO/W.PET/CERES
1000 CREAM (GRAM) TOPICAL DAILY
Status: DISCONTINUED | OUTPATIENT
Start: 2020-06-29 | End: 2020-07-02 | Stop reason: HOSPADM

## 2020-06-29 RX ORDER — PANTOPRAZOLE SODIUM 40 MG/1
40 TABLET, DELAYED RELEASE ORAL EVERY MORNING
Status: DISCONTINUED | OUTPATIENT
Start: 2020-06-29 | End: 2020-06-29

## 2020-06-29 RX ORDER — DEXTROSE MONOHYDRATE 25 G/50ML
25 INJECTION, SOLUTION INTRAVENOUS
Status: DISCONTINUED | OUTPATIENT
Start: 2020-06-29 | End: 2020-07-02 | Stop reason: HOSPADM

## 2020-06-29 RX ORDER — ACETAMINOPHEN 325 MG/1
650 TABLET ORAL EVERY 6 HOURS PRN
Status: DISCONTINUED | OUTPATIENT
Start: 2020-06-29 | End: 2020-07-02 | Stop reason: HOSPADM

## 2020-06-29 RX ADMIN — FLUTICASONE PROPIONATE 2 SPRAY: 50 SPRAY, METERED NASAL at 09:52

## 2020-06-29 RX ADMIN — DOCUSATE SODIUM 50MG AND SENNOSIDES 8.6MG 1 TABLET: 8.6; 5 TABLET, FILM COATED ORAL at 21:40

## 2020-06-29 RX ADMIN — SODIUM CHLORIDE, PRESERVATIVE FREE 10 ML: 5 INJECTION INTRAVENOUS at 21:39

## 2020-06-29 RX ADMIN — INSULIN ASPART 4 UNITS: 100 INJECTION, SOLUTION INTRAVENOUS; SUBCUTANEOUS at 12:42

## 2020-06-29 RX ADMIN — LEVOTHYROXINE SODIUM 125 MCG: 125 TABLET ORAL at 06:42

## 2020-06-29 RX ADMIN — PREGABALIN 150 MG: 75 CAPSULE ORAL at 09:50

## 2020-06-29 RX ADMIN — AMIODARONE HYDROCHLORIDE 200 MG: 200 TABLET ORAL at 09:48

## 2020-06-29 RX ADMIN — CARVEDILOL 6.25 MG: 6.25 TABLET, FILM COATED ORAL at 09:50

## 2020-06-29 RX ADMIN — APIXABAN 2.5 MG: 2.5 TABLET, FILM COATED ORAL at 09:49

## 2020-06-29 RX ADMIN — PANTOPRAZOLE SODIUM 40 MG: 40 TABLET, DELAYED RELEASE ORAL at 06:42

## 2020-06-29 RX ADMIN — MELATONIN 2000 UNITS: at 09:50

## 2020-06-29 RX ADMIN — APIXABAN 2.5 MG: 2.5 TABLET, FILM COATED ORAL at 21:40

## 2020-06-29 RX ADMIN — AMIODARONE HYDROCHLORIDE 200 MG: 200 TABLET ORAL at 21:40

## 2020-06-29 RX ADMIN — DOCUSATE SODIUM 50MG AND SENNOSIDES 8.6MG 1 TABLET: 8.6; 5 TABLET, FILM COATED ORAL at 09:49

## 2020-06-29 RX ADMIN — SODIUM CHLORIDE, PRESERVATIVE FREE 10 ML: 5 INJECTION INTRAVENOUS at 09:51

## 2020-06-29 RX ADMIN — CETIRIZINE HYDROCHLORIDE 10 MG: 10 TABLET, FILM COATED ORAL at 09:50

## 2020-06-29 RX ADMIN — INSULIN DETEMIR 35 UNITS: 100 INJECTION, SOLUTION SUBCUTANEOUS at 21:40

## 2020-06-29 RX ADMIN — CARVEDILOL 6.25 MG: 6.25 TABLET, FILM COATED ORAL at 17:42

## 2020-06-29 RX ADMIN — BUDESONIDE AND FORMOTEROL FUMARATE DIHYDRATE 2 PUFF: 160; 4.5 AEROSOL RESPIRATORY (INHALATION) at 20:07

## 2020-06-29 RX ADMIN — PANTOPRAZOLE SODIUM 40 MG: 40 TABLET, DELAYED RELEASE ORAL at 17:42

## 2020-06-29 RX ADMIN — CYANOCOBALAMIN TAB 1000 MCG 1000 MCG: 1000 TAB at 09:50

## 2020-06-29 RX ADMIN — INSULIN ASPART 4 UNITS: 100 INJECTION, SOLUTION INTRAVENOUS; SUBCUTANEOUS at 17:42

## 2020-06-29 RX ADMIN — BUMETANIDE 4 MG: 1 TABLET ORAL at 09:49

## 2020-06-29 RX ADMIN — INSULIN DETEMIR 35 UNITS: 100 INJECTION, SOLUTION SUBCUTANEOUS at 09:45

## 2020-06-29 RX ADMIN — TAMSULOSIN HYDROCHLORIDE 0.4 MG: 0.4 CAPSULE ORAL at 09:50

## 2020-06-29 RX ADMIN — CEFTRIAXONE 1 G: 1 INJECTION, SOLUTION INTRAVENOUS at 02:07

## 2020-06-29 RX ADMIN — BUMETANIDE 4 MG: 1 TABLET ORAL at 21:39

## 2020-06-29 RX ADMIN — INSULIN ASPART 3 UNITS: 100 INJECTION, SOLUTION INTRAVENOUS; SUBCUTANEOUS at 09:47

## 2020-06-29 RX ADMIN — PHENAZOPYRIDINE 100 MG: 100 TABLET ORAL at 17:42

## 2020-06-29 RX ADMIN — LINAGLIPTIN 5 MG: 5 TABLET, FILM COATED ORAL at 09:50

## 2020-06-29 RX ADMIN — ACETAMINOPHEN 650 MG: 325 TABLET, FILM COATED ORAL at 21:58

## 2020-06-29 RX ADMIN — PREGABALIN 150 MG: 75 CAPSULE ORAL at 21:39

## 2020-06-30 PROBLEM — I48.0 PAROXYSMAL ATRIAL FIBRILLATION (HCC): Chronic | Status: ACTIVE | Noted: 2020-06-30

## 2020-06-30 PROBLEM — E66.01 CLASS 2 SEVERE OBESITY DUE TO EXCESS CALORIES WITH SERIOUS COMORBIDITY IN ADULT (HCC): Chronic | Status: ACTIVE | Noted: 2019-09-10

## 2020-06-30 PROBLEM — Z91.199 H/O NONCOMPLIANCE WITH MEDICAL TREATMENT, PRESENTING HAZARDS TO HEALTH: Chronic | Status: ACTIVE | Noted: 2020-06-30

## 2020-06-30 PROBLEM — E03.9 MYXEDEMA: Chronic | Status: ACTIVE | Noted: 2020-06-30

## 2020-06-30 PROBLEM — N18.4 CHRONIC RENAL INSUFFICIENCY, STAGE 4 (SEVERE) (HCC): Status: ACTIVE | Noted: 2019-05-07

## 2020-06-30 PROBLEM — I50.43 ACUTE ON CHRONIC COMBINED SYSTOLIC AND DIASTOLIC CHF (CONGESTIVE HEART FAILURE) (HCC): Chronic | Status: ACTIVE | Noted: 2020-06-30

## 2020-06-30 LAB
ANION GAP SERPL CALCULATED.3IONS-SCNC: 11.8 MMOL/L (ref 5–15)
BASOPHILS # BLD AUTO: 0.03 10*3/MM3 (ref 0–0.2)
BASOPHILS NFR BLD AUTO: 0.4 % (ref 0–1.5)
BUN SERPL-MCNC: 47 MG/DL (ref 8–23)
BUN/CREAT SERPL: 19.8 (ref 7–25)
CALCIUM SPEC-SCNC: 8.9 MG/DL (ref 8.6–10.5)
CHLORIDE SERPL-SCNC: 95 MMOL/L (ref 98–107)
CO2 SERPL-SCNC: 27.2 MMOL/L (ref 22–29)
CREAT SERPL-MCNC: 2.37 MG/DL (ref 0.76–1.27)
DEPRECATED RDW RBC AUTO: 50.7 FL (ref 37–54)
EOSINOPHIL # BLD AUTO: 0.52 10*3/MM3 (ref 0–0.4)
EOSINOPHIL NFR BLD AUTO: 6.5 % (ref 0.3–6.2)
ERYTHROCYTE [DISTWIDTH] IN BLOOD BY AUTOMATED COUNT: 17.3 % (ref 12.3–15.4)
GFR SERPL CREATININE-BSD FRML MDRD: 27 ML/MIN/1.73
GLUCOSE BLDC GLUCOMTR-MCNC: 175 MG/DL (ref 70–130)
GLUCOSE BLDC GLUCOMTR-MCNC: 191 MG/DL (ref 70–130)
GLUCOSE BLDC GLUCOMTR-MCNC: 191 MG/DL (ref 70–130)
GLUCOSE BLDC GLUCOMTR-MCNC: 233 MG/DL (ref 70–130)
GLUCOSE SERPL-MCNC: 200 MG/DL (ref 65–99)
HCT VFR BLD AUTO: 35 % (ref 37.5–51)
HGB BLD-MCNC: 10.4 G/DL (ref 13–17.7)
IMM GRANULOCYTES # BLD AUTO: 0.05 10*3/MM3 (ref 0–0.05)
IMM GRANULOCYTES NFR BLD AUTO: 0.6 % (ref 0–0.5)
LYMPHOCYTES # BLD AUTO: 1.12 10*3/MM3 (ref 0.7–3.1)
LYMPHOCYTES NFR BLD AUTO: 14 % (ref 19.6–45.3)
MCH RBC QN AUTO: 24.3 PG (ref 26.6–33)
MCHC RBC AUTO-ENTMCNC: 29.7 G/DL (ref 31.5–35.7)
MCV RBC AUTO: 81.8 FL (ref 79–97)
MONOCYTES # BLD AUTO: 0.88 10*3/MM3 (ref 0.1–0.9)
MONOCYTES NFR BLD AUTO: 11 % (ref 5–12)
NEUTROPHILS NFR BLD AUTO: 5.4 10*3/MM3 (ref 1.7–7)
NEUTROPHILS NFR BLD AUTO: 67.5 % (ref 42.7–76)
NRBC BLD AUTO-RTO: 0 /100 WBC (ref 0–0.2)
PLATELET # BLD AUTO: 183 10*3/MM3 (ref 140–450)
PMV BLD AUTO: 11.2 FL (ref 6–12)
POTASSIUM SERPL-SCNC: 4.8 MMOL/L (ref 3.5–5.2)
RBC # BLD AUTO: 4.28 10*6/MM3 (ref 4.14–5.8)
SODIUM SERPL-SCNC: 134 MMOL/L (ref 136–145)
T3FREE SERPL-MCNC: 1.26 PG/ML (ref 2–4.4)
TROPONIN T SERPL-MCNC: 0.04 NG/ML (ref 0–0.03)
WBC # BLD AUTO: 8 10*3/MM3 (ref 3.4–10.8)

## 2020-06-30 PROCEDURE — 85025 COMPLETE CBC W/AUTO DIFF WBC: CPT | Performed by: INTERNAL MEDICINE

## 2020-06-30 PROCEDURE — 25010000002 CEFTRIAXONE SODIUM-DEXTROSE 1-3.74 GM-%(50ML) RECONSTITUTED SOLUTION: Performed by: INTERNAL MEDICINE

## 2020-06-30 PROCEDURE — 63710000001 INSULIN DETEMIR PER 5 UNITS: Performed by: INTERNAL MEDICINE

## 2020-06-30 PROCEDURE — 84484 ASSAY OF TROPONIN QUANT: CPT | Performed by: INTERNAL MEDICINE

## 2020-06-30 PROCEDURE — 80048 BASIC METABOLIC PNL TOTAL CA: CPT | Performed by: INTERNAL MEDICINE

## 2020-06-30 PROCEDURE — 97165 OT EVAL LOW COMPLEX 30 MIN: CPT

## 2020-06-30 PROCEDURE — 99232 SBSQ HOSP IP/OBS MODERATE 35: CPT | Performed by: NURSE PRACTITIONER

## 2020-06-30 PROCEDURE — 93005 ELECTROCARDIOGRAM TRACING: CPT | Performed by: INTERNAL MEDICINE

## 2020-06-30 PROCEDURE — 94799 UNLISTED PULMONARY SVC/PX: CPT

## 2020-06-30 PROCEDURE — 63710000001 INSULIN ASPART PER 5 UNITS: Performed by: INTERNAL MEDICINE

## 2020-06-30 PROCEDURE — 84481 FREE ASSAY (FT-3): CPT | Performed by: INTERNAL MEDICINE

## 2020-06-30 PROCEDURE — 82962 GLUCOSE BLOOD TEST: CPT

## 2020-06-30 PROCEDURE — G0378 HOSPITAL OBSERVATION PER HR: HCPCS

## 2020-06-30 PROCEDURE — 93010 ELECTROCARDIOGRAM REPORT: CPT | Performed by: INTERNAL MEDICINE

## 2020-06-30 PROCEDURE — 99233 SBSQ HOSP IP/OBS HIGH 50: CPT | Performed by: INTERNAL MEDICINE

## 2020-06-30 RX ORDER — MELATONIN
1000 DAILY
Status: DISCONTINUED | OUTPATIENT
Start: 2020-07-01 | End: 2020-07-02 | Stop reason: HOSPADM

## 2020-06-30 RX ORDER — LEVOTHYROXINE SODIUM 0.15 MG/1
150 TABLET ORAL
Status: DISCONTINUED | OUTPATIENT
Start: 2020-07-01 | End: 2020-06-30

## 2020-06-30 RX ADMIN — CARVEDILOL 6.25 MG: 6.25 TABLET, FILM COATED ORAL at 08:45

## 2020-06-30 RX ADMIN — INSULIN DETEMIR 10 UNITS: 100 INJECTION, SOLUTION SUBCUTANEOUS at 10:00

## 2020-06-30 RX ADMIN — INSULIN ASPART 4 UNITS: 100 INJECTION, SOLUTION INTRAVENOUS; SUBCUTANEOUS at 12:24

## 2020-06-30 RX ADMIN — INSULIN ASPART 8 UNITS: 100 INJECTION, SOLUTION INTRAVENOUS; SUBCUTANEOUS at 17:40

## 2020-06-30 RX ADMIN — FLUTICASONE PROPIONATE 2 SPRAY: 50 SPRAY, METERED NASAL at 08:45

## 2020-06-30 RX ADMIN — APIXABAN 2.5 MG: 2.5 TABLET, FILM COATED ORAL at 08:43

## 2020-06-30 RX ADMIN — DOCUSATE SODIUM 50MG AND SENNOSIDES 8.6MG 1 TABLET: 8.6; 5 TABLET, FILM COATED ORAL at 08:44

## 2020-06-30 RX ADMIN — INSULIN ASPART 4 UNITS: 100 INJECTION, SOLUTION INTRAVENOUS; SUBCUTANEOUS at 08:45

## 2020-06-30 RX ADMIN — AMIODARONE HYDROCHLORIDE 200 MG: 200 TABLET ORAL at 08:44

## 2020-06-30 RX ADMIN — BUMETANIDE 4 MG: 1 TABLET ORAL at 11:16

## 2020-06-30 RX ADMIN — LEVOTHYROXINE SODIUM 125 MCG: 125 TABLET ORAL at 08:44

## 2020-06-30 RX ADMIN — LINAGLIPTIN 5 MG: 5 TABLET, FILM COATED ORAL at 08:45

## 2020-06-30 RX ADMIN — BUDESONIDE AND FORMOTEROL FUMARATE DIHYDRATE 2 PUFF: 160; 4.5 AEROSOL RESPIRATORY (INHALATION) at 09:43

## 2020-06-30 RX ADMIN — INSULIN DETEMIR 45 UNITS: 100 INJECTION, SOLUTION SUBCUTANEOUS at 20:00

## 2020-06-30 RX ADMIN — PHENAZOPYRIDINE 100 MG: 100 TABLET ORAL at 02:33

## 2020-06-30 RX ADMIN — BUDESONIDE AND FORMOTEROL FUMARATE DIHYDRATE 2 PUFF: 160; 4.5 AEROSOL RESPIRATORY (INHALATION) at 21:30

## 2020-06-30 RX ADMIN — PREGABALIN 150 MG: 75 CAPSULE ORAL at 08:44

## 2020-06-30 RX ADMIN — BUMETANIDE 4 MG: 1 TABLET ORAL at 20:32

## 2020-06-30 RX ADMIN — CETIRIZINE HYDROCHLORIDE 10 MG: 10 TABLET, FILM COATED ORAL at 08:43

## 2020-06-30 RX ADMIN — INSULIN DETEMIR 35 UNITS: 100 INJECTION, SOLUTION SUBCUTANEOUS at 08:45

## 2020-06-30 RX ADMIN — TAMSULOSIN HYDROCHLORIDE 0.4 MG: 0.4 CAPSULE ORAL at 08:43

## 2020-06-30 RX ADMIN — PREGABALIN 150 MG: 75 CAPSULE ORAL at 20:32

## 2020-06-30 RX ADMIN — SODIUM CHLORIDE, PRESERVATIVE FREE 10 ML: 5 INJECTION INTRAVENOUS at 20:33

## 2020-06-30 RX ADMIN — CEFTRIAXONE 1 G: 1 INJECTION, SOLUTION INTRAVENOUS at 06:32

## 2020-06-30 RX ADMIN — PANTOPRAZOLE SODIUM 40 MG: 40 TABLET, DELAYED RELEASE ORAL at 16:59

## 2020-06-30 RX ADMIN — CARVEDILOL 9.38 MG: 6.25 TABLET, FILM COATED ORAL at 17:03

## 2020-06-30 RX ADMIN — APIXABAN 2.5 MG: 2.5 TABLET, FILM COATED ORAL at 20:32

## 2020-06-30 RX ADMIN — MELATONIN 2000 UNITS: at 08:44

## 2020-06-30 RX ADMIN — DOCUSATE SODIUM 50MG AND SENNOSIDES 8.6MG 1 TABLET: 8.6; 5 TABLET, FILM COATED ORAL at 20:32

## 2020-06-30 RX ADMIN — SODIUM CHLORIDE, PRESERVATIVE FREE 10 ML: 5 INJECTION INTRAVENOUS at 10:00

## 2020-06-30 RX ADMIN — CYANOCOBALAMIN TAB 1000 MCG 1000 MCG: 1000 TAB at 08:44

## 2020-07-01 PROBLEM — R53.1 GENERALIZED WEAKNESS: Status: ACTIVE | Noted: 2020-07-01

## 2020-07-01 LAB
ANION GAP SERPL CALCULATED.3IONS-SCNC: 15.5 MMOL/L (ref 5–15)
BASOPHILS # BLD AUTO: 0.03 10*3/MM3 (ref 0–0.2)
BASOPHILS NFR BLD AUTO: 0.3 % (ref 0–1.5)
BUN SERPL-MCNC: 57 MG/DL (ref 8–23)
BUN/CREAT SERPL: 20.2 (ref 7–25)
CALCIUM SPEC-SCNC: 9 MG/DL (ref 8.6–10.5)
CHLORIDE SERPL-SCNC: 96 MMOL/L (ref 98–107)
CO2 SERPL-SCNC: 26.5 MMOL/L (ref 22–29)
CREAT SERPL-MCNC: 2.82 MG/DL (ref 0.76–1.27)
DEPRECATED RDW RBC AUTO: 50.7 FL (ref 37–54)
EOSINOPHIL # BLD AUTO: 0.5 10*3/MM3 (ref 0–0.4)
EOSINOPHIL NFR BLD AUTO: 5.3 % (ref 0.3–6.2)
ERYTHROCYTE [DISTWIDTH] IN BLOOD BY AUTOMATED COUNT: 17.4 % (ref 12.3–15.4)
GFR SERPL CREATININE-BSD FRML MDRD: 22 ML/MIN/1.73
GLUCOSE BLDC GLUCOMTR-MCNC: 172 MG/DL (ref 70–130)
GLUCOSE BLDC GLUCOMTR-MCNC: 189 MG/DL (ref 70–130)
GLUCOSE BLDC GLUCOMTR-MCNC: 233 MG/DL (ref 70–130)
GLUCOSE BLDC GLUCOMTR-MCNC: 245 MG/DL (ref 70–130)
GLUCOSE SERPL-MCNC: 169 MG/DL (ref 65–99)
HCT VFR BLD AUTO: 36.8 % (ref 37.5–51)
HGB BLD-MCNC: 10.8 G/DL (ref 13–17.7)
IMM GRANULOCYTES # BLD AUTO: 0.06 10*3/MM3 (ref 0–0.05)
IMM GRANULOCYTES NFR BLD AUTO: 0.6 % (ref 0–0.5)
LYMPHOCYTES # BLD AUTO: 1.17 10*3/MM3 (ref 0.7–3.1)
LYMPHOCYTES NFR BLD AUTO: 12.5 % (ref 19.6–45.3)
MCH RBC QN AUTO: 23.9 PG (ref 26.6–33)
MCHC RBC AUTO-ENTMCNC: 29.3 G/DL (ref 31.5–35.7)
MCV RBC AUTO: 81.6 FL (ref 79–97)
MONOCYTES # BLD AUTO: 0.96 10*3/MM3 (ref 0.1–0.9)
MONOCYTES NFR BLD AUTO: 10.3 % (ref 5–12)
NEUTROPHILS NFR BLD AUTO: 6.63 10*3/MM3 (ref 1.7–7)
NEUTROPHILS NFR BLD AUTO: 71 % (ref 42.7–76)
NRBC BLD AUTO-RTO: 0 /100 WBC (ref 0–0.2)
PLATELET # BLD AUTO: 218 10*3/MM3 (ref 140–450)
PMV BLD AUTO: 10.9 FL (ref 6–12)
POTASSIUM SERPL-SCNC: 4.5 MMOL/L (ref 3.5–5.2)
RBC # BLD AUTO: 4.51 10*6/MM3 (ref 4.14–5.8)
SODIUM SERPL-SCNC: 138 MMOL/L (ref 136–145)
WBC # BLD AUTO: 9.35 10*3/MM3 (ref 3.4–10.8)

## 2020-07-01 PROCEDURE — 63710000001 INSULIN DETEMIR PER 5 UNITS: Performed by: INTERNAL MEDICINE

## 2020-07-01 PROCEDURE — 63710000001 INSULIN ASPART PER 5 UNITS: Performed by: INTERNAL MEDICINE

## 2020-07-01 PROCEDURE — 94799 UNLISTED PULMONARY SVC/PX: CPT

## 2020-07-01 PROCEDURE — 80048 BASIC METABOLIC PNL TOTAL CA: CPT | Performed by: INTERNAL MEDICINE

## 2020-07-01 PROCEDURE — 25010000002 CEFTRIAXONE SODIUM-DEXTROSE 1-3.74 GM-%(50ML) RECONSTITUTED SOLUTION: Performed by: INTERNAL MEDICINE

## 2020-07-01 PROCEDURE — 97530 THERAPEUTIC ACTIVITIES: CPT

## 2020-07-01 PROCEDURE — 94760 N-INVAS EAR/PLS OXIMETRY 1: CPT

## 2020-07-01 PROCEDURE — 85025 COMPLETE CBC W/AUTO DIFF WBC: CPT | Performed by: INTERNAL MEDICINE

## 2020-07-01 PROCEDURE — 82962 GLUCOSE BLOOD TEST: CPT

## 2020-07-01 PROCEDURE — 99233 SBSQ HOSP IP/OBS HIGH 50: CPT | Performed by: INTERNAL MEDICINE

## 2020-07-01 RX ORDER — BUMETANIDE 1 MG/1
2 TABLET ORAL 2 TIMES DAILY
Status: DISCONTINUED | OUTPATIENT
Start: 2020-07-01 | End: 2020-07-02 | Stop reason: HOSPADM

## 2020-07-01 RX ADMIN — PANTOPRAZOLE SODIUM 40 MG: 40 TABLET, DELAYED RELEASE ORAL at 18:01

## 2020-07-01 RX ADMIN — LINAGLIPTIN 5 MG: 5 TABLET, FILM COATED ORAL at 08:35

## 2020-07-01 RX ADMIN — CARVEDILOL 9.38 MG: 6.25 TABLET, FILM COATED ORAL at 08:35

## 2020-07-01 RX ADMIN — MELATONIN 1000 UNITS: at 08:34

## 2020-07-01 RX ADMIN — PREGABALIN 150 MG: 75 CAPSULE ORAL at 08:34

## 2020-07-01 RX ADMIN — INSULIN ASPART 8 UNITS: 100 INJECTION, SOLUTION INTRAVENOUS; SUBCUTANEOUS at 18:00

## 2020-07-01 RX ADMIN — TAMSULOSIN HYDROCHLORIDE 0.4 MG: 0.4 CAPSULE ORAL at 08:35

## 2020-07-01 RX ADMIN — APIXABAN 2.5 MG: 2.5 TABLET, FILM COATED ORAL at 08:35

## 2020-07-01 RX ADMIN — INSULIN ASPART 2 UNITS: 100 INJECTION, SOLUTION INTRAVENOUS; SUBCUTANEOUS at 08:35

## 2020-07-01 RX ADMIN — PREGABALIN 150 MG: 75 CAPSULE ORAL at 20:51

## 2020-07-01 RX ADMIN — BUDESONIDE AND FORMOTEROL FUMARATE DIHYDRATE 2 PUFF: 160; 4.5 AEROSOL RESPIRATORY (INHALATION) at 09:11

## 2020-07-01 RX ADMIN — CEFTRIAXONE 1 G: 1 INJECTION, SOLUTION INTRAVENOUS at 05:13

## 2020-07-01 RX ADMIN — BUMETANIDE 4 MG: 1 TABLET ORAL at 08:34

## 2020-07-01 RX ADMIN — SODIUM CHLORIDE, PRESERVATIVE FREE 10 ML: 5 INJECTION INTRAVENOUS at 20:52

## 2020-07-01 RX ADMIN — SODIUM CHLORIDE, PRESERVATIVE FREE 10 ML: 5 INJECTION INTRAVENOUS at 08:38

## 2020-07-01 RX ADMIN — APIXABAN 2.5 MG: 2.5 TABLET, FILM COATED ORAL at 20:51

## 2020-07-01 RX ADMIN — DOCUSATE SODIUM 50MG AND SENNOSIDES 8.6MG 1 TABLET: 8.6; 5 TABLET, FILM COATED ORAL at 08:34

## 2020-07-01 RX ADMIN — INSULIN DETEMIR 45 UNITS: 100 INJECTION, SOLUTION SUBCUTANEOUS at 20:51

## 2020-07-01 RX ADMIN — INSULIN ASPART 2 UNITS: 100 INJECTION, SOLUTION INTRAVENOUS; SUBCUTANEOUS at 12:40

## 2020-07-01 RX ADMIN — INSULIN DETEMIR 45 UNITS: 100 INJECTION, SOLUTION SUBCUTANEOUS at 08:37

## 2020-07-01 RX ADMIN — BUMETANIDE 2 MG: 1 TABLET ORAL at 20:51

## 2020-07-01 RX ADMIN — CYANOCOBALAMIN TAB 1000 MCG 1000 MCG: 1000 TAB at 08:41

## 2020-07-01 RX ADMIN — FLUTICASONE PROPIONATE 2 SPRAY: 50 SPRAY, METERED NASAL at 08:38

## 2020-07-01 RX ADMIN — LEVOTHYROXINE SODIUM 137 MCG: 25 TABLET ORAL at 05:13

## 2020-07-01 RX ADMIN — DOCUSATE SODIUM 50MG AND SENNOSIDES 8.6MG 1 TABLET: 8.6; 5 TABLET, FILM COATED ORAL at 20:51

## 2020-07-01 RX ADMIN — CARVEDILOL 9.38 MG: 6.25 TABLET, FILM COATED ORAL at 18:00

## 2020-07-01 NOTE — PLAN OF CARE
"  Problem: Patient Care Overview  Goal: Plan of Care Review  Flowsheets (Taken 7/1/2020 6469)  Outcome Summary: OT: Education with BUE HEP. Pt performed arom and gentle a/arom (for right shoulder). Pt states he is familiar with the BUE exercises from HH services. Pt indicated that his right shoulder has been bothering him for months (during the eval it seemed as though it was acute injury). Reinforced gentle UE rom program. Education also provided regarding home safety to address his recent falls. Pt stated that his rollator \"gets away from him\" because it rolls easy and the brakes don't work well. Rec use of his rolling walker instead of the rollator. Pt also stated that his bathroom door is too narrow to get his rollator through the door frame. Thus, he has been attempting to ambulate in the bathroom without an assistive device. Pt has had falls in his bathroom at home. Discussed using the FWW and side stepping into the bathroom, so he can still use the FWW for support while in the bathroom. Pt did not appear receptive to use of the FWW instead of the rollator or using the FWW in the bathroom.  Rec HH safety evaluation upon discharge.      "

## 2020-07-01 NOTE — PLAN OF CARE
Problem: Patient Care Overview  Goal: Plan of Care Review  Flowsheets (Taken 7/1/2020 3129)  Progress: improving  Plan of Care Reviewed With: patient  Outcome Summary: Patient ambulated frequently to bathroom.  Patient slept in chair most of shift.  No complaints

## 2020-07-01 NOTE — PROGRESS NOTES
"   LOS: 0 days    Patient Care Team:  Abdullahi Clarke MD as PCP - General (Internal Medicine)      Subjective     Patient sitting comfortably.  Patient has chronic orthopnea and exertional dyspnea.    denies any chest pain, nausea or vomiting    Objective     Vital Sign Min/Max for last 24 hours  Temp:  [96.5 °F (35.8 °C)-97.7 °F (36.5 °C)] 96.5 °F (35.8 °C)  Heart Rate:  [64-79] 75  Resp:  [18-20] 18  BP: (105-139)/(60-79) 105/60                       Flowsheet Rows      First Filed Value   Admission Height  185.4 cm (73\") Documented at 06/29/2020 0053   Admission Weight  134 kg (295 lb) Documented at 06/29/2020 0053          I/O this shift:  In: 360 [P.O.:360]  Out: 900 [Urine:900]  I/O last 3 completed shifts:  In: 1010 [P.O.:960; IV Piggyback:50]  Out: 3750 [Urine:3750]    Physical Exam:  Physical Exam    General.  Awake, alert, NAD  Head.  Atraumatic, normocephalic  Neck.  Supple  Respiratory.  Clear to auscultation bilaterally  Cardiovascular.  Irregular heart rhythm  ENT.  Oral mucosa moist  Abdomen.  Obese, soft, nontender  Extremities. 1-2+ bilateral lower extremity edema         LABS:  Lab Results   Component Value Date    CALCIUM 9.0 07/01/2020    PHOS 4.2 11/27/2019     Results from last 7 days   Lab Units 07/01/20  0423 06/30/20  0439 06/29/20  0507 06/29/20  0136   MAGNESIUM mg/dL  --   --  2.0  --    SODIUM mmol/L 138 134* 138 138   POTASSIUM mmol/L 4.5 4.8 4.6 4.9   CHLORIDE mmol/L 96* 95* 101 100   CO2 mmol/L 26.5 27.2 24.9 23.9   BUN mg/dL 57* 47* 43* 43*   CREATININE mg/dL 2.82* 2.37* 2.02* 2.12*   GLUCOSE mg/dL 169* 200* 234* 224*   CALCIUM mg/dL 9.0 8.9 8.9 9.2   WBC 10*3/mm3 9.35 8.00 9.61 10.81*   HEMOGLOBIN g/dL 10.8* 10.4* 10.2* 10.7*   PLATELETS 10*3/mm3 218 183 177 178   ALT (SGPT) U/L  --   --  11 12   AST (SGOT) U/L  --   --  14 16     Lab Results   Component Value Date    TROPONINT 0.039 (C) 06/30/2020     Estimated Creatinine Clearance: 30.3 mL/min (A) (by C-G formula based " on SCr of 2.82 mg/dL (H)).      Brief Urine Lab Results  (Last result in the past 365 days)      Color   Clarity   Blood   Leuk Est   Nitrite   Protein   CREAT   Urine HCG        06/29/20 0138 Yellow Cloudy Large (3+) Moderate (2+) Negative >=300 mg/dL (3+)             WEIGHTS:     Wt Readings from Last 1 Encounters:   07/01/20 1025 128 kg (282 lb 3.2 oz)   06/29/20 0305 124 kg (273 lb 9.6 oz)   06/29/20 0053 134 kg (295 lb)         apixaban 2.5 mg Oral Q12H   budesonide-formoterol 2 puff Inhalation BID - RT   bumetanide 4 mg Oral BID   carvedilol 9.375 mg Oral BID With Meals   cefTRIAXone 1 g Intravenous Q24H   cholecalciferol 1,000 Units Oral Daily   fluticasone 2 spray Each Nare Daily   insulin aspart 0-24 Units Subcutaneous TID AC   insulin detemir 45 Units Subcutaneous BID   levothyroxine 137 mcg Oral Q AM   linagliptin 5 mg Oral Daily   pantoprazole 40 mg Oral Q PM   pregabalin 150 mg Oral Q12H   senna-docusate sodium 1 tablet Oral BID   sodium chloride 10 mL Intravenous Q12H   tamsulosin 0.4 mg Oral Daily   cyanocobalamin 1,000 mcg Oral Daily          Assessment/Plan       1.  Chronic kidney disease stage IV  Secondary to diabetes and hypertension.  Creatinine slowly increasing.  I will decrease the dose of Bumex  Electrolytes are acceptable    2.  Congestive heart failure  Systolic.  Chronic. LV EF around 45% per records  Seems to be stable hemodynamically.  Lower extremity edema is chronic  Decreasing Bumex dose to 2 mg twice daily for now due to worsening creatinine    3.  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  UTI?  On IV Rocephin  Repeat urinalysis and culture      Mario Barclay MD  07/01/20  11:56

## 2020-07-01 NOTE — PROGRESS NOTES
[facility] Medicine Services  INPATIENT PROGRESS NOTE  1421/1   Hospitalist Team  LOS 0 days      Patient Care Team:  Abdullahi Clarke MD as PCP - General (Internal Medicine)      Patient was examined with relevant and adequate PPE keeping in mind the current coronavirus pandemic.    Chief Complaint / Subjective  Chief Complaint   Patient presents with   • Fall       HPI  Mr Froilan Helton is a 79 year old  male with hx of HTN, HLD, Diastolic CHF, CAD, COPD, Atrial Fib, chronic anticoagulation, CKD Stage 3, Hypothyroidism, DJD, Vitamin D deficiency and bilateral sensorineural hearing loss who was brought to [facility] with hx of generalized weakness and frequent falls. He injured his left elbow with a small skin tear. The patient states that he is too weak to get back up once he falls. He is fallen twice today and 3 times this week. The patient lives with his elderly wife.  He has a history of diastolic congestive heart failure and chronic lower extremity edema for which he takes Bumex 2 mg twice a day.  He states he has to get up frequently to urinate, but has fallen twice in the bathroom today and once earlier this week.  Once he falls he is too weak to get back up and requires EMS for a lift assist.  After the second episode today, he was brought to the emergency department. Workup in ED revealed evidence of UTI and patient has been started on Inj Ceftriaxone. Patient admits to having dysuria for a couple of weeks but he did not seek medical care for that. Dr Monaco is his cardiologist     Patient denies any sx of fever, headache, chest pain, shortness of breath, abdominal pain, nausea/ vomiting or any recent hx of blood in stool. No other medical history available.       Fall           Interval History and ROS:   (4 hpi elements or status of 3 chronic)  Patient unable to give any history of compliance with Synthroid or lack thereof  States longstanding renal  dysfunction.  Active with cardiology and per nursing baseline creatinine has been around 2  Verified with the outpatient  pharmacy.  Patient had not filled his Synthroid from August 2019 to Khadijah 15 2020.    No new c o     History taken from: patient    Review of Systems   Constitution: Positive for malaise/fatigue.   HENT: Negative.    Eyes: Negative.    Cardiovascular: Positive for leg swelling.   Respiratory: Negative.    Endocrine: Negative.    Hematologic/Lymphatic: Negative.    Skin: Negative.    Musculoskeletal: Negative.    Gastrointestinal: Negative.    Genitourinary: Negative.    Neurological: Negative.    Psychiatric/Behavioral: Negative.    Allergic/Immunologic: Negative.    All other systems reviewed and are negative.  noted          Family History   Problem Relation Age of Onset   • COPD Mother    • Diabetes Father        Past Medical History:   Diagnosis Date   • Arthritis    • Asthma    • Cancer (CMS/[facility])     skin   • Cataract    • CHF (congestive heart failure) (CMS/[facility])    • COPD (chronic obstructive pulmonary disease) (CMS/[facility])    • Diabetes mellitus (CMS/[facility])    • Disease of thyroid gland    • Hyperlipidemia    • Hypertension    • Kidney stone    • Myocardial infarct, old    • Neuropathy    • Renal disorder        Social History     Socioeconomic History   • Marital status: Unknown     Spouse name: Not on file   • Number of children: Not on file   • Years of education: Not on file   • Highest education level: Not on file   Tobacco Use   • Smoking status: Former Smoker   • Smokeless tobacco: Never Used   • Tobacco comment: quit 1993   Substance and Sexual Activity   • Alcohol use: No   • Drug use: No   • Sexual activity: Defer       Prior to Admission medications    Medication Sig Start Date End Date Taking? Authorizing Provider   ALPRAZolam (XANAX) 0.25 MG tablet Take 0.25 mg by mouth At Night As Needed for Anxiety.   Yes Provider, MD Carmina   amiodarone (PACERONE) 200 MG tablet Take 1 tablet  by mouth Every 12 (Twelve) Hours. 8/29/19  Yes Terri Chaudhry MD   apixaban (ELIQUIS) 2.5 MG tablet tablet Take 1 tablet by mouth Every 12 (Twelve) Hours. 11/27/19  Yes Terri Chaudhry MD   budesonide-formoterol (SYMBICORT) 160-4.5 MCG/ACT inhaler Inhale 2 puffs 2 (Two) Times a Day. 2/19/19  Yes Santiago Powers,    bumetanide (BUMEX) 2 MG tablet Take 2 tablets by mouth 2 (Two) Times a Day. 11/27/19  Yes Terri Chaudhry MD   carvedilol (COREG) 6.25 MG tablet Take 1 tablet by mouth 2 (Two) Times a Day With Meals. 8/29/19  Yes Terri Chaudhry MD   cholecalciferol 2000 units tablet Take 2,000 Units by mouth Daily. 8/30/19  Yes Terri Chaudhry MD   fluticasone (FLONASE) 50 MCG/ACT nasal spray 2 sprays by Each Nare route Daily. 6/29/19  Yes Terri Chaudhry MD   insulin aspart (novoLOG) 100 UNIT/ML injection Inject 1-14 Units under the skin into the appropriate area as directed 3 (Three) Times a Day Before Meals. As directed per sliding scale   Yes ProviderCarmina MD   insulin detemir (LEVEMIR) 100 UNIT/ML injection Inject 35 Units under the skin into the appropriate area as directed 2 (Two) Times a Day. 11/27/19  Yes Terri Chaudhry MD   levothyroxine (SYNTHROID, LEVOTHROID) 125 MCG tablet Take 1 tablet by mouth Daily. 8/29/19  Yes Terri Chaudhry MD   nystatin (MYCOSTATIN) 116235 UNIT/GM powder Apply  topically to the appropriate area as directed 2 (Two) Times a Day.   Yes ProviderCarmina MD   Petrolatum 42 % ointment Apply  topically to the appropriate area as directed Daily. Send with patient 11/28/19  Yes Terri Chaudhry MD   silver sulfadiazine (SILVADENE, SSD) 1 % cream Apply to affected area daily 9/16/19 9/15/20 Yes Melchor Cotton MD   tamsulosin (FLOMAX) 0.4 MG capsule 24 hr capsule Take 1 capsule by mouth Daily. 8/30/19  Yes Terri Chaudhry MD   vitamin B-12  "(VITAMIN B-12) 1000 MCG tablet Take 1 tablet by mouth Daily. 8/30/19  Yes Terri Chaudhry MD   acetaminophen (TYLENOL) 325 MG tablet Take 2 tablets by mouth Every 4 (Four) Hours As Needed for Mild Pain . 10/21/19   Sonya Daniel DO   atorvastatin (LIPITOR) 10 MG tablet Take 1 tablet by mouth Daily. 8/29/19   Terri Chaudhry MD   cefuroxime (CEFTIN) 500 MG tablet Take 1 tablet by mouth 2 (Two) Times a Day. 12/9/19   Akin Rosa MD   guaiFENesin (MUCINEX) 600 MG 12 hr tablet Take 1 tablet by mouth Every 12 (Twelve) Hours. Over the counter 11/27/19   Terri Chaudhry MD   hydrophor (AQUAPHOR) ointment ointment Apply  topically to the appropriate area as directed As Needed (Ulcerations lower extremities and edema). Lower extremities. 10/21/19   Sonya Daniel DO   Insulin Syringe 30G X 5/16\" 1 ML misc U UTD WITH INSULIN UP TO 6 TIMES A DAY 9/8/19   Carmina Jain MD   ipratropium-albuterol (DUO-NEB) 0.5-2.5 mg/3 ml nebulizer Take 3 mL by nebulization Every 4 (Four) Hours As Needed for Wheezing.    ProviderCarmina MD   loratadine (CLARITIN) 10 MG tablet TK 1 T PO D PRN 8/7/19   Carmina Jain MD   LYRICA 150 MG capsule TK 1 C PO TID 6/14/19   Carmina Jain MD   melatonin 5 MG tablet tablet Take 1 tablet by mouth At Night As Needed (sleep). Over the counter 11/27/19   Terri Chaudhry MD   O2 (OXYGEN) Inhale 1 L/min every night at bedtime.  Patient taking differently: Inhale 2 L/min every night at bedtime. 8/29/19   Terri Chaudhry MD   SITagliptin (JANUVIA) 50 MG tablet Take 1 tablet by mouth Daily. 11/27/19   Terri Chaudhry MD   sodium chloride 0.65 % nasal spray 2 sprays into the nostril(s) as directed by provider As Needed for Congestion. Send with patient 11/27/19   Terri Chaudhry MD        Objective    Physical Exam     Vital Signs  Temp:  [96.5 °F (35.8 °C)-97.7 °F (36.5 " "°C)] 96.5 °F (35.8 °C)  Heart Rate:  [64-79] 75  Resp:  [18-20] 18  BP: (105-139)/(60-79) 105/60    Oxygen Therapy  SpO2: 98 %  Pulse Oximetry Type: Continuous  Device (Oxygen Therapy): nasal cannula  Flow (L/min): 2  Flowsheet Rows      First Filed Value   Admission Height  185.4 cm (73\") Documented at 06/29/2020 0053   Admission Weight  134 kg (295 lb) Documented at 06/29/2020 0053        Weight change:    Intake & Output (last 3 days)       06/28 0701 - 06/29 0700 06/29 0701 - 06/30 0700 06/30 0701 - 07/01 0700 07/01 0701 - 07/02 0700    P.O.  1080 960 360    IV Piggyback  50      Total Intake(mL/kg)  1130 (9.1) 960 (7.7) 360 (2.8)    Urine (mL/kg/hr) 100 2850 (1) 2200 (0.7) 900 (1.4)    Total Output 100 2850 2200 900    Net -100 -1720 -1240 -540            Urine Unmeasured Occurrence  5 x 2 x         Lines, Drains & Airways    Active LDAs     Name:   Placement date:   Placement time:   Site:   Days:    Peripheral IV 06/29/20 0054 Right Hand   06/29/20 0054    Hand   1                Physical Exam:    Physical Exam   Constitutional: He is oriented to person, place, and time. He appears well-developed. No distress.   Obese   HENT:   Head: Normocephalic and atraumatic.   Right Ear: External ear normal.   Left Ear: External ear normal.   Nose: Nose normal.   Mouth/Throat: Oropharynx is clear and moist. No oropharyngeal exudate.   Eyes: Pupils are equal, round, and reactive to light. Conjunctivae and EOM are normal. Right eye exhibits no discharge. Left eye exhibits no discharge. No scleral icterus.   Neck: Normal range of motion. No JVD present. No tracheal deviation present. No thyromegaly present.   Cardiovascular: Normal rate, regular rhythm and normal heart sounds. Exam reveals no gallop and no friction rub.   No murmur heard.  Nonpitting edema   Pulmonary/Chest: Effort normal and breath sounds normal. No stridor. No respiratory distress. He has no wheezes. He has no rales. He exhibits no tenderness. "   Abdominal: Soft. Bowel sounds are normal. He exhibits no distension and no mass. There is no tenderness. There is no rebound and no guarding. No hernia.   Musculoskeletal: Normal range of motion. He exhibits edema. He exhibits no tenderness or deformity.   Lymphadenopathy:     He has no cervical adenopathy.   Neurological: He is alert and oriented to person, place, and time. No cranial nerve deficit or sensory deficit. He exhibits normal muscle tone. Coordination normal.   Skin: Skin is warm and dry. No rash noted. He is not diaphoretic. No erythema.   Psychiatric: He has a normal mood and affect. His behavior is normal.   Nursing note and vitals reviewed.  Reviewed, no change in above data from the prior day.           Wounds (last 24 hours)      LDA Wound     Row Name 07/01/20 0842 07/01/20 0400 07/01/20 0000       Wound 06/29/20 0216 Left upper calf Pressure Injury    Wound - Properties Group Date first assessed: 06/29/20  -BM Time first assessed: 0216  -BM Present on Hospital Admission: Y  -BM Side: Left  -BM Orientation: upper  -BM Location: calf  -BM Primary Wound Type: Pressure inj  -BM Stage, Pressure Injury: deep tissue injury  -BM    Dressing Appearance  open to air  -SM  open to air  -JM  open to air  -JM    Periwound  --  redness;dry  -JM  redness;dry  -JM    Drainage Amount  --  none  -JM  none  -JM       Wound 06/29/20 0217 Left upper other (see notes) Skin Tear    Wound - Properties Group Date first assessed: 06/29/20  -BM Time first assessed: 0217  -BM Side: Left  -BM Orientation: upper  -BM Location: other (see notes)  -BM, forearm  Primary Wound Type: Skin tear  -BM Additional Comments: 1.5 cm skintear  -BM    Dressing Appearance  open to air;other (see comments)  -SM  open to air;other (see comments) scab  -JM  open to air;other (see comments) scab  -JM    Periwound  --  dry;intact  -JM  dry;intact  -JM    Periwound Temperature  --  warm  -JM  warm  -JM    Periwound Skin Turgor  --  firm  -JM   firm  -JM    Drainage Amount  --  none  -JM  none  -JM    Row Name 06/30/20 2000             Wound 06/29/20 0216 Left upper calf Pressure Injury    Wound - Properties Group Date first assessed: 06/29/20  -BM Time first assessed: 0216  -BM Present on Hospital Admission: Y  -BM Side: Left  -BM Orientation: upper  -BM Location: calf  -BM Primary Wound Type: Pressure inj  -BM Stage, Pressure Injury: deep tissue injury  -BM    Dressing Appearance  open to air  -JM      Periwound  redness;dry  -JM      Drainage Amount  none  -JM         Wound 06/29/20 0217 Left upper other (see notes) Skin Tear    Wound - Properties Group Date first assessed: 06/29/20  -BM Time first assessed: 0217  -BM Side: Left  -BM Orientation: upper  -BM Location: other (see notes)  -BM, forearm  Primary Wound Type: Skin tear  -BM Additional Comments: 1.5 cm skintear  -BM    Dressing Appearance  open to air;other (see comments) scab  -JM      Periwound  dry;intact  -JM      Periwound Temperature  warm  -JM      Periwound Skin Turgor  firm  -JM      Drainage Amount  none  -JM        User Key  (r) = Recorded By, (t) = Taken By, (c) = Cosigned By    Initials Name Provider Type    BM Donna Torres, RN Registered Nurse    Marquita Villalta, RN Registered Nurse    Skip Maier, RN Registered Nurse          Procedures:        Assessment/Plan with Problem wise       Active Hospital Problems    Diagnosis  POA   • **Acute on chronic combined systolic and diastolic CHF (congestive heart failure) (CMS/[facility]) [I50.43]  Yes     Priority: High   • Paroxysmal atrial fibrillation (CMS/[facility]) [I48.0]  Yes     Priority: Medium   • Myxedema [E03.9]  Yes     Priority: Medium   • Generalized weakness [R53.1]  Yes   • H/O noncompliance with medical treatment, presenting hazards to health [Z91.19]  Not Applicable   • Acute UTI (urinary tract infection) [N39.0]  Yes   • Class 2 severe obesity due to excess calories with serious comorbidity in adult (CMS/[facility]) [E66.01]  Yes    • Chronic renal insufficiency, stage 4 (severe) (CMS/[facility]) [N18.4]  Yes   • Essential hypertension [I10]  Yes   • Type 2 diabetes mellitus with stage 4 chronic kidney disease, with long-term current use of insulin (CMS/[facility]) [E11.22, N18.4, Z79.4]  Not Applicable      Resolved Hospital Problems   No resolved problems to display.        Estimated Creatinine Clearance: 30.3 mL/min (A) (by C-G formula based on SCr of 2.82 mg/dL (H)).    Code Status and Medical Interventions:   Ordered at: 06/29/20 0354     Level Of Support Discussed With:    Patient     Code Status:    CPR     Medical Interventions (Level of Support Prior to Arrest):    Full       MEDICAL DECISION MAKING COMPLEXITY BY PROBLEM:     CHF:  Diuresis-loop diuretics  Spironolactone if tolerated  Thiazides if tolerated  ACE inhibitor if tolerated  Beta-blocker  Check echocardiogram      Diabetes mellitus:  Monitor blood sugars  Basal bolus insulin  Sliding scale as needed  Diabetic education as needed  Supportive treatment  Nursing do not hold long-acting insulin without orders   Increase Levemir    Renal failure/insufficiency/injury:  Hydration  Supportive treatment  Cut down or hold ACE inhibitors  Avoid nephrotoxic medications   Address diuretics  Consult nephrology    UTI without sepsis  -On admit T 98.4, HR 93, RR 20, /81, SPO2 90%, WBC 10.8  - UA on admit: Mod LE, pyuria too numerous to count, 1+ bacteria  -Has history of nephrolithiasis, no CVA tenderness on exam, and no ROLA  -Start Azo judiciously, and PRN for dysuria  -On ceftriaxone, cultures pending      New systolic dysfunction, on chronic diastolic CHF  -Chronic lymphedema complicates physical exam  -proBNP 5.9K, previously has been 3.7-5.5K  -Cannot appreciate JVD, seen by cards, who agrees, grossly not fluid overloaded  -Continue home diuretic therapy, 4 mg Bumex twice daily  -Echo on 8/15/19 EF of 50%, without wall segment abnormalities, today has global hypokinesis and EF of 36%       Hypothyroidism  -TSH 80, free T4 low   - restarted on his levothyroxine 125 mcg, but no patient is also on pantoprazole, will attempt to reschedule pantoprazole to evening  Patient noncompliant with Synthroid  Increased dose  Needs outpatient follow-up with endocrinology  myxedema present      COPD  -On home Symbicort, and PRN duo nebs       Atrial fibrillation:   Rate control  Beta-blockers  Calcium channel blockers  Digoxin  +/-Amiodarone  Anticoagulation  Close monitoring    Care coordination with nursing regarding patient's insulin and diet and with patient's outpatient pharmacy verify Synthroid dosing.  Extensive chart review.  Patient new to me 06/30/20 8:57 AM.    Care coordination with lab.  No culture plated due to no orders.  Placed order 07/01/20, 12:04 PM        Plan for disposition:  Need placement          Destination      Coordination has not been started for this encounter.      Durable Medical Equipment      Coordination has not been started for this encounter.      Dialysis/Infusion      Coordination has not been started for this encounter.      Home Medical Care      Coordination has not been started for this encounter.      Therapy      Coordination has not been started for this encounter.      Community Resources      Coordination has not been started for this encounter.         Historical & Objective Data     Results Review:    I reviewed the patient's new lab and radiology results. 07/01/20     Results from last 7 days   Lab Units 07/01/20  0423 06/30/20  0439 06/29/20  0507   WBC 10*3/mm3 9.35 8.00 9.61   HEMOGLOBIN g/dL 10.8* 10.4* 10.2*   HEMATOCRIT % 36.8* 35.0* 34.1*   MCV fL 81.6 81.8 82.0   MCH pg 23.9* 24.3* 24.5*   MPV fL 10.9 11.2 11.0   RDW % 17.4* 17.3* 17.2*   PLATELETS 10*3/mm3 218 183 177     Results from last 7 days   Lab Units 07/01/20  0423 06/30/20  0439 06/29/20  0507 06/29/20  0136   SODIUM mmol/L 138 134* 138 138   POTASSIUM mmol/L 4.5 4.8 4.6 4.9   CHLORIDE mmol/L 96*  95* 101 100   CO2 mmol/L 26.5 27.2 24.9 23.9   BUN mg/dL 57* 47* 43* 43*   CREATININE mg/dL 2.82* 2.37* 2.02* 2.12*   CALCIUM mg/dL 9.0 8.9 8.9 9.2   MAGNESIUM mg/dL  --   --  2.0  --    BILIRUBIN mg/dL  --   --  0.4 0.4   ALK PHOS U/L  --   --  91 96   ALT (SGPT) U/L  --   --  11 12   AST (SGOT) U/L  --   --  14 16   GLUCOSE mg/dL 169* 200* 234* 224*     Inflammatory Biomarkers        Invalid input(s): ESR, D-DIMER QUANTITATIVE,  PROCALCITONIN,    Lab Results   Component Value Date    PHOS 4.2 11/27/2019     Hemoglobin A1C   Date Value Ref Range Status   06/29/2020 10.30 (H) 4.80 - 5.60 % Final     Lab Results   Component Value Date    CHOL 205 (H) 05/06/2019    TRIG 193 (H) 05/06/2019    HDL 36 (L) 05/06/2019     (H) 05/06/2019     Lab Results   Component Value Date    LIPASE 58 09/22/2018               No results found for: INTRAOP, PREDX, FINALDX, COMDX  No results found for: COVID19     Microbiology Results (last 10 days)     ** No results found for the last 240 hours. **          ECG/EMG Results (most recent)     Procedure Component Value Units Date/Time    Transthoracic Echo Complete With Contrast if Necessary Per Protocol [[id number]] Collected:  06/29/20 0741     Updated:  06/29/20 0838     BSA 2.5 m^2      IVSd 0.95 cm      LVIDd 4.9 cm      LVIDs 3.8 cm      LVPWd 0.99 cm      IVS/LVPW 0.96     FS 22.3 %      EDV(Teich) 111.7 ml      ESV(Teich) 61.6 ml      EF(Teich) 44.9 %      EDV(cubed) 116.2 ml      ESV(cubed) 54.4 ml      EF(cubed) 53.2 %      LV mass(C)d 168.2 grams      LV mass(C)dI 68.5 grams/m^2      SV(Teich) 50.2 ml      SI(Teich) 20.4 ml/m^2      SV(cubed) 61.8 ml      SI(cubed) 25.2 ml/m^2      Ao root diam 3.5 cm      Ao root area 9.6 cm^2      LVOT diam 2.4 cm      LVOT area 4.5 cm^2      LVOT area(traced) 4.5 cm^2      LVLd ap4 9.2 cm      EDV(MOD-sp4) 203.0 ml      LVLs ap4 8.4 cm      ESV(MOD-sp4) 137.0 ml      EF(MOD-sp4) 32.5 %      LVLd ap2 8.6 cm      EDV(MOD-sp2) 167.0 ml       LVLs ap2 8.4 cm      ESV(MOD-sp2) 111.0 ml      EF(MOD-sp2) 33.5 %      SV(MOD-sp4) 66.0 ml      SI(MOD-sp4) 26.9 ml/m^2      SV(MOD-sp2) 56.0 ml      SI(MOD-sp2) 22.8 ml/m^2      Ao root area (BSA corrected) 1.4     LV Fernandez Vol (BSA corrected) 82.7 ml/m^2      LV Sys Vol (BSA corrected) 55.8 ml/m^2      TAPSE (>1.6) 1.6 cm      EF(MOD-bp) 36.0 %      MV E max joao 98.5 cm/sec      MV V2 mean 60.6 cm/sec      MV mean PG 2.0 mmHg      MV V2 VTI 26.1 cm      MVA(VTI) 2.6 cm^2      MV P1/2t max joao 108.0 cm/sec      MV P1/2t 70.9 msec      MVA(P1/2t) 3.1 cm^2      MV dec slope 446.0 cm/sec^2      MV dec time 158 sec      Ao V2 mean 89.3 cm/sec      Ao mean PG 4.0 mmHg      Ao mean PG (full) 3.0 mmHg      Ao V2 VTI 26.1 cm      MAXX(I,A) 2.6 cm^2      MAXX(I,D) 2.6 cm^2      LV V1 mean PG 1.0 mmHg      LV V1 mean 50.4 cm/sec      LV V1 VTI 14.9 cm      SV(Ao) 251.1 ml      SI(Ao) 102.2 ml/m^2      SV(LVOT) 67.4 ml      SI(LVOT) 27.4 ml/m^2      MVA P1/2T LCG 2.0 cm^2      RV BASE (<4.1) 3.9 cm      Lat Peak E' Joao 7.1 cm/sec      Med Peak E' Joao 4.8 cm/sec       CV ECHO BISHNU - BZI_BMI 36.0 kilograms/m^2       CV ECHO BISHNU - BSA(HAYCOCK) 2.6 m^2       CV ECHO BISHNU - BZI_METRIC_WEIGHT 123.8 kg       CV ECHO BISHNU - BZI_METRIC_HEIGHT 185.4 cm      Avg E/e' ratio 16.55     RV S' 8.10 cm/sec      RV Base 3.90 cm      LA Volume Index 23.0 mL/m2      Dimensionless Index 0.6 (DI)      Ao pk joao 120.0 cm/sec      LV V1 max PG 2.0 mmHg      LV V1 max 69.0 cm/sec      RAP systole 3 mmHg      Ao max PG 6.0 mmHg      Echo EF Estimated 36 %     Narrative:       · Estimated EF = 36%.  · Left ventricular systolic function is moderately decreased.  · There is severe calcification of the aortic valve mainly affecting the   left and right coronary cusp(s).  · Trace-to-mild aortic valve regurgitation is present.  · The following left ventricular wall segments are hypokinetic: mid   anterior, apical anterior, basal anterolateral, mid  anterolateral, apical   lateral, basal inferolateral, mid inferolateral, apical inferior, mid   inferior, apical septal, basal inferoseptal, mid inferoseptal, apex   hypokinetic, mid anteroseptal, basal anterior, basal inferior and basal   inferoseptal.       ECG 12 Lead [[id number]] Collected:  06/29/20 0405     Updated:  06/29/20 1005    Narrative:       HEART RATE= 80  bpm  RR Interval= 748  ms  WA Interval=   ms  P Horizontal Axis=   deg  P Front Axis=   deg  QRSD Interval= 112  ms  QT Interval= 417  ms  QRS Axis= -3  deg  T Wave Axis= 18  deg  - ABNORMAL ECG -  Atrial fibrillation  Borderline prolonged QT interval  NO SIGNIFICANT CHANGE FROM PREVIOUS ECG  Electronically Signed By: Darwin Monaco ([facility]) 29-Jun-2020 10:04:39  Date and Time of Study: 2020-06-29 04:05:42    ECG 12 Lead [[id number]] Collected:  06/30/20 0645     Updated:  06/30/20 1733    Narrative:       HEART RATE= 63  bpm  RR Interval= 949  ms  WA Interval=   ms  P Horizontal Axis=   deg  P Front Axis=   deg  QRSD Interval= 113  ms  QT Interval= 462  ms  QRS Axis= -18  deg  T Wave Axis= 135  deg  - ABNORMAL ECG -  Atrial fibrillation  Inferior infarct, old  Nonspecific T abnormalities, lateral leads  When compared with ECG of 29-Jun-2020 4:05:42,  No significant change  Electronically Signed By: Reggie Lilly ([facility]) 30-Jun-2020 17:22:48  Date and Time of Study: 2020-06-30 06:45:37               Results for orders placed during the hospital encounter of 06/29/20   Transthoracic Echo Complete With Contrast if Necessary Per Protocol    Narrative · Estimated EF = 36%.  · Left ventricular systolic function is moderately decreased.  · There is severe calcification of the aortic valve mainly affecting the   left and right coronary cusp(s).  · Trace-to-mild aortic valve regurgitation is present.  · The following left ventricular wall segments are hypokinetic: mid   anterior, apical anterior, basal anterolateral, mid anterolateral, apical   lateral, basal  inferolateral, mid inferolateral, apical inferior, mid   inferior, apical septal, basal inferoseptal, mid inferoseptal, apex   hypokinetic, mid anteroseptal, basal anterior, basal inferior and basal   inferoseptal.          Xr Chest Pa & Lateral    Result Date: 6/29/2020  Negative chest.  This report was finalized on 6/29/2020 8:19 AM by Dr. Fei Henderson MD.        I personally reviewed patient's x-ray films and my findings are: 0    I personally reviewed patient's EKG strip and my findings are: 0    Medication Review:   I have reviewed the patient's current medication list 07/01/20     Scheduled Meds    apixaban 2.5 mg Oral Q12H   budesonide-formoterol 2 puff Inhalation BID - RT   bumetanide 4 mg Oral BID   carvedilol 9.375 mg Oral BID With Meals   cefTRIAXone 1 g Intravenous Q24H   cholecalciferol 1,000 Units Oral Daily   fluticasone 2 spray Each Nare Daily   insulin aspart 0-24 Units Subcutaneous TID AC   insulin detemir 45 Units Subcutaneous BID   levothyroxine 137 mcg Oral Q AM   linagliptin 5 mg Oral Daily   pantoprazole 40 mg Oral Q PM   pregabalin 150 mg Oral Q12H   senna-docusate sodium 1 tablet Oral BID   sodium chloride 10 mL Intravenous Q12H   tamsulosin 0.4 mg Oral Daily   cyanocobalamin 1,000 mcg Oral Daily       Meds Infusions       DVT prophylaxis  Mechanical Order History:      Ordered        06/29/20 0411  Place Sequential Compression Device  Once         06/29/20 0411  Maintain Sequential Compression Device  Continuous                 Pharmalogical Order History:     Ordered     Dose Route Frequency Stop    06/29/20 0343  apixaban (ELIQUIS) tablet 2.5 mg      2.5 mg PO Every 12 Hours Scheduled --          Meds PRN  •  acetaminophen  •  ALPRAZolam  •  dextrose  •  dextrose  •  glucagon (human recombinant)  •  hydrophor  •  ipratropium-albuterol  •  melatonin  •  nitroglycerin  •  phenazopyridine  •  sodium chloride  •  sodium chloride      Levi Rojas MD  07/01/20  11:59      MDM

## 2020-07-01 NOTE — PLAN OF CARE
Problem: Patient Care Overview  Goal: Plan of Care Review  7/1/2020 1727 by Marquita Wiley, RN  Flowsheets (Taken 7/1/2020 1725)  Outcome Summary: pt up to recliner today, ble elevated and optifoam dressing in place, on telemetry a-fib 60's noted, continous oxygen at 2l via nasal canula, post void bladder scan with 137ml residual present. PT voices no concerns at this time, will continue to monitor.  7/1/2020 1723 by Marquita Wiley, RN  Outcome: Ongoing (interventions implemented as appropriate)  Flowsheets (Taken 7/1/2020 1704)  Plan of Care Reviewed With: patient  Outcome Summary: --

## 2020-07-01 NOTE — CONSULTS
"Diabetes Education  Assessment/Teaching    Patient Name:  Froilan Helton  YOB: 1940  MRN: [id number]  Admit Date:  6/29/2020      Assessment Date:  7/1/2020    Most Recent Value   General Information    Height  185.4 cm (72.99\")   Height Method  Stated   Weight  128 kg (282 lb 3.2 oz)   Weight Method  Standing scale   Pregnancy Assessment   Diabetes History   What type of diabetes do you have?  Type 2   Length of Diabetes Diagnosis  10 + years   Current DM knowledge  good   Have you had diabetes education/teaching in the past?  no   Do you test your blood sugar at home?  yes   Frequency of checks  2 times a day   Who performs the test?  self   Have you had low blood sugar? (<70mg/dl)  yes   How often do you have low blood sugar?  rare   Have you had high blood sugar? (>140mg/dl)  yes   How often do you have high blood sugar?  occasionally   Do you have any diabetes complications?  heart disease   Education Preferences   Nutrition Information   Assessment Topics   Healthy Eating - Assessment  Needs education   Being Active - Assessment  Needs education   Taking Medication - Assessment  Competent   Problem Solving - Assessment  Competent   Reducing Risk - Assessment  Needs education   Healthy Coping - Assessment  Competent   Monitoring - Assessment  Competent   DM Goals            Most Recent Value   DM Education Needs   Meter  Has own   Frequency of Testing  2 times a day   Medication  Insulin   Problem Solving  Hypoglycemia, Hyperglycemia, Sick days, Signs, Symptoms   Reducing Risks  A1C testing   Healthy Eating  RD consult   Physical Activity  None   Physical Activity Frequency  Discussed exercise importance   Healthy Coping  Appropriate   Discharge Plan  Home   Motivation  Moderate   Teaching Method  Explanation, Discussion, Handouts   Patient Response  Verbalized understanding            Other Comments:          Electronically signed by:  Lizet Vivar RN  07/01/20 14:09  "

## 2020-07-01 NOTE — THERAPY TREATMENT NOTE
Acute Care - Occupational Therapy Treatment Note  [facility]HAEL K[facility]     Patient Name: Frolian Helton  : 1940  MRN: [id number]  Today's Date: 2020  Onset of Illness/Injury or Date of Surgery: 20  Date of Referral to OT: 20  Referring Physician: Terell    Admit Date: 2020       ICD-10-CM ICD-9-CM   1. Generalized weakness R53.1 780.79   2. Urinary tract infection in elderly patient N39.0 599.0     Patient Active Problem List   Diagnosis   • COPD (chronic obstructive pulmonary disease) (CMS/[facility])   • Type 2 diabetes mellitus with stage 4 chronic kidney disease, with long-term current use of insulin (CMS/[facility])   • Essential hypertension   • Primary osteoarthritis of left knee   • Chronic renal insufficiency, stage 4 (severe) (CMS/[facility])   • Chronic diastolic (congestive) heart failure (CMS/[facility])   • Class 2 severe obesity due to excess calories with serious comorbidity in adult (CMS/[facility])   • Physical debility   • Acute UTI (urinary tract infection)   • Paroxysmal atrial fibrillation (CMS/[facility])   • H/O noncompliance with medical treatment, presenting hazards to health   • Myxedema   • Acute on chronic combined systolic and diastolic CHF (congestive heart failure) (CMS/[facility])   • Generalized weakness     Past Medical History:   Diagnosis Date   • Arthritis    • Asthma    • Cancer (CMS/[facility])     skin   • Cataract    • CHF (congestive heart failure) (CMS/[facility])    • COPD (chronic obstructive pulmonary disease) (CMS/[facility])    • Diabetes mellitus (CMS/[facility])    • Disease of thyroid gland    • Hyperlipidemia    • Hypertension    • Kidney stone    • Myocardial infarct, old    • Neuropathy    • Renal disorder      Past Surgical History:   Procedure Laterality Date   • COLONOSCOPY     • EYE SURGERY     • JOINT REPLACEMENT      right   • KIDNEY STONE SURGERY     • REPLACEMENT TOTAL KNEE     • TOE SURGERY         Therapy Treatment    Rehabilitation Treatment Summary     Row Name 20 0945             Treatment  Time/Intention    Discipline  occupational therapist  -SD      Document Type  therapy note (daily note)  -SD      Subjective Information  complains of;pain in left shoulder  -SD      Mode of Treatment  occupational therapy  -SD      Patient/Family Observations  pt reclined in chair. pt agreeable to therapy,.  -SD      Patient Effort  fair  -SD      Existing Precautions/Restrictions  fall;oxygen therapy device and L/min  -SD             Recorded by [SD] Sebastian Wright, OTR 07/01/20 1207      Row Name 07/01/20 0945             BADL Safety/Performance    Impairments, BADL Safety/Performance  other (see comments) poor safety awareness, history of falls  -SD      Skilled BADL Treatment/Intervention  other (see comments) education with BUE HEP and home safety strategies  -SD      Progress in BADL Status  other (see comments) education provided for BUE HEP and home safety  -SD      Recorded by [SD] Sebastian Wright, OTR 07/01/20 1207      Row Name 07/01/20 0945             Therapeutic Exercise    Upper Extremity Range of Motion (Therapeutic Exercise)  shoulder flexion/extension, bilateral;shoulder abduction/adduction, bilateral;elbow flexion/extension, bilateral  -SD      Exercise Type (Therapeutic Exercise)  AROM (active range of motion);AAROM (active assistive range of motion) a/arom for right shoulder  -SD      Position (Therapeutic Exercise)  seated  -SD      Sets/Reps (Therapeutic Exercise)  1/10  -SD      Expected Outcome (Therapeutic Exercise)  improve functional tolerance, self-care activity;increase active range of motion of right shoulder  -SD      Comment (Therapeutic Exercise)  Pt stated that his right shoulder has hurt for month. During the evaluation, it appeared as though the injury was acute from his recent falls. Pt states he has been performing BUE rom exercises with HH therapy.   -SD      Recorded by [SD] Sebastian Wright, OTR 07/01/20 1207      Row Name 07/01/20 5245             Positioning and  "Restraints    Pre-Treatment Position  sitting in chair/recliner  -SD      Post Treatment Position  chair  -SD      In Chair  reclined;call light within reach;encouraged to call for assist  -SD      Recorded by [SD] Sebastian Wright, OTR 07/01/20 1207      Row Name 07/01/20 0945             Pain Scale: Numbers Pre/Post-Treatment    Pain Scale: Numbers, Pretreatment  5/10  -SD      Pain Scale: Numbers, Post-Treatment  5/10  -SD      Pain Location - Side  Bilateral shoulders  -SD      Pre/Post Treatment Pain Comment  pt report more soreness in his right than his right  -SD      Recorded by [SD] Sebastian Wright, OTR 07/01/20 1207      Row Name                Wound 06/29/20 0216 Left upper calf Pressure Injury    Wound - Properties Group Date first assessed: 06/29/20 [BM] Time first assessed: 0216 [BM] Present on Hospital Admission: Y [BM] Side: Left [BM] Orientation: upper [BM] Location: calf [BM] Primary Wound Type: Pressure inj [BM] Stage, Pressure Injury: deep tissue injury [BM] Recorded by:  [BM] Donna Torres RN 06/29/20 0217    Row Name                Wound 06/29/20 0217 Left upper other (see notes) Skin Tear    Wound - Properties Group Date first assessed: 06/29/20 [BM] Time first assessed: 0217 [BM] Side: Left [BM] Orientation: upper [BM] Location: other (see notes) [BM], forearm  Primary Wound Type: Skin tear [BM] Additional Comments: 1.5 cm skintear [BM] Recorded by:  [BM] Donna Torres RN 06/29/20 0220    Row Name 07/01/20 0945             Plan of Care Review    Outcome Summary  OT: Education with BUE HEP. Pt performed arom and gentle a/arom (for right shoulder). Pt states he is familiar with the BUE exercises from  services. Pt indicated that his right shoulder has been bothering him for months (during the eval it seemed as though it was acute injury). Reinforced gentle UE rom program. Education also provided regarding home safety to address his recent falls. Pt stated that his rollator \"gets away " "from him\" because it rolls easy and the brakes don't work well. Rec use of his rolling walker instead of the rollator. Pt also stated that his bathroom door is too narrow to use his rollator. Thus, he has been attempting to ambulate in the bathroom without an assistie device. Pt has had falls in his bathroom at home. Discussed using the FWW and side stepping into the bathroom, so he can still use the FWW for support while in the bathroom. Pt did not appear receptive to use of the FWW instead of the rollator or using the FWW in the bathroom.  Rec HH safety evaluation upon discharge.   -SD      Recorded by [SD] Sebastian Wright, OTR 07/01/20 1207      Row Name 07/01/20 0945             Outcome Summary/Treatment Plan (OT)    Daily Summary of Progress (OT)  progress toward functional goals as expected;prepare for discharge  -SD      Plan for Continued Treatment (OT)  plan to follow for one more visit to continue with home safety education   -SD      Anticipated Discharge Disposition (OT)  home with assist;home with home health  -SD      Recorded by [SD] Sebastian Wright OTR 07/01/20 1207        User Key  (r) = Recorded By, (t) = Taken By, (c) = Cosigned By    Initials Name Effective Dates Discipline    SD Sebastian Wright OTR 06/22/16 -  OT    Donna Preciado RN 06/16/16 -  Nurse        Wound 06/29/20 0216 Left upper calf Pressure Injury (Active)   Dressing Appearance open to air 7/1/2020  8:42 AM   Periwound redness;dry 7/1/2020  4:00 AM   Drainage Amount none 7/1/2020  4:00 AM       Wound 06/29/20 0217 Left upper other (see notes) Skin Tear (Active)   Dressing Appearance open to air;other (see comments) 7/1/2020  8:42 AM   Periwound dry;intact 7/1/2020  4:00 AM   Periwound Temperature warm 7/1/2020  4:00 AM   Periwound Skin Turgor firm 7/1/2020  4:00 AM   Drainage Amount none 7/1/2020  4:00 AM       Occupational Therapy Education                 Title: PT OT SLP Therapies (Not Started)     Topic: Occupational " "Therapy (In Progress)     Point: ADL training (In Progress)     Description:   Instruct learner(s) on proper safety adaptation and remediation techniques during self care or transfers.   Instruct in proper use of assistive devices.              Learning Progress Summary           Patient Nonacceptance, E, NR by SD at 7/1/2020 1140    Comment:  Pt states his bathroom door is too narrow to use his rollator or FWW, so he has not been using an assistive device while in the bathroom (falls have occured in bathroom). Discussed options to increase safety (use FWW and side step). Pt not interested.    Nonacceptance, E, NR by SD at 6/30/2020 1247    Comment:  Education regarding OT services, benefits of activity and safety with mobility. Pt uses a rollator, but he states he has to use the brakes constantly because \"it gets away from me.\" Pt has a FWW but does not appear receptive to using it vs. the rollator                   Point: Home exercise program (Done)     Description:   Instruct learner(s) on appropriate technique for monitoring, assisting and/or progressing therapeutic exercises/activities.              Learning Progress Summary           Patient Acceptance, E,TB,D, VU,DU by SD at 7/1/2020 1147    Comment:  Education with BUE HEP. Pt performed arom and gentle a/arom (for right shoulder). Pt states he is familiar with the BUE exercises. Pt indicated that his right shoulder has been bothering him for months (during the eval it seemed as though it was acute injury)                               User Key     Initials Effective Dates Name Provider Type Discipline    SD 06/22/16 -  Sebastian Wright, OTR Occupational Therapist OT                OT Recommendation and Plan  Outcome Summary/Treatment Plan (OT)  Daily Summary of Progress (OT): progress toward functional goals as expected, prepare for discharge  Plan for Continued Treatment (OT): plan to follow for one more visit to continue with home safety education " "  Anticipated Discharge Disposition (OT): home with assist, home with home health  Therapy Frequency (OT Eval): other (see comments)(1-2 visits)  Daily Summary of Progress (OT): progress toward functional goals as expected, prepare for discharge  Outcome Summary: OT: Education with BUE HEP. Pt performed arom and gentle a/arom (for right shoulder). Pt states he is familiar with the BUE exercises from  services. Pt indicated that his right shoulder has been bothering him for months (during the eval it seemed as though it was acute injury). Reinforced gentle UE rom program. Education also provided regarding home safety to address his recent falls. Pt stated that his rollator \"gets away from him\" because it rolls easy and the brakes don't work well. Rec use of his rolling walker instead of the rollator. Pt also stated that his bathroom door is too narrow to use his rollator. Thus, he has been attempting to ambulate in the bathroom without an assistie device. Pt has had falls in his bathroom at home. Discussed using the FWW and side stepping into the bathroom, so he can still use the FWW for support while in the bathroom. Pt did not appear receptive to use of the FWW instead of the rollator or using the FWW in the bathroom.  Rec HH safety evaluation upon discharge.   Outcome Measures     Row Name 06/30/20 0830             How much help from another is currently needed...    Putting on and taking off regular lower body clothing?  3  -SD      Bathing (including washing, rinsing, and drying)  3  -SD      Toileting (which includes using toilet bed pan or urinal)  4  -SD      Putting on and taking off regular upper body clothing  4  -SD      Taking care of personal grooming (such as brushing teeth)  4  -SD      Eating meals  4  -SD      AM-PAC 6 Clicks Score (OT)  22  -SD         Functional Assessment    Outcome Measure Options  AM-PAC 6 Clicks Daily Activity (OT)  -SD        User Key  (r) = Recorded By, (t) = Taken By, (c) " = Cosigned By    Initials Name Provider Type    Sebastian Greene OTR Occupational Therapist           Time Calculation:   Time Calculation- OT     Row Name 07/01/20 1207             Time Calculation- OT    OT Start Time  0942  -SD      OT Stop Time  0957  -SD      OT Time Calculation (min)  15 min  -SD         Timed Charges    30109 - OT Therapeutic Activity Minutes  15  -SD        User Key  (r) = Recorded By, (t) = Taken By, (c) = Cosigned By    Initials Name Provider Type    Sebastian Greene OTDELTA Occupational Therapist        Therapy Charges for Today     Code Description Service Date Service Provider Modifiers Qty    [id number]  OT THERAPEUTIC ACT EA 15 MIN 7/1/2020 Sebastian Wright OTR GO 1               BETH Melgar  7/1/2020

## 2020-07-01 NOTE — NURSING NOTE
Continued Stay Note  MICHAEL Kincaid     Patient Name: Froilan Helton  MRN: [id number]  Today's Date: 7/1/2020    Admit Date: 6/29/2020    Discharge Plan     Row Name 07/01/20 1431       Plan    Plan  Home with [facility]    Plan Comments  Updated IMM with patient at bedside today. Patient states his plan remains the same to return home with his wife to help as needed and [facility]. Patient had no other questions or concerns regarding discharge plans at this time. CM will continue to follow for needs.        Discharge Codes    No documentation.             Elizabeth Arguello RN

## 2020-07-01 NOTE — PROGRESS NOTES
"Adult Nutrition  Assessment/PES    Patient Name:  Froilan Helton  YOB: 1940  MRN: [id number]  Admit Date:  6/29/2020    Assessment Date:  7/1/2020    Comments:  Diet education provided (see below):    Reason for Assessment     Row Name 07/01/20 1148          Reason for Assessment    Reason For Assessment  other (see comments)     Diagnosis  -- chronic CHF, diabetes, CKD, obesity, UTI, hypothyroidism, history of non compliance with medical treatment     Identified At Risk by Screening Criteria  need for education CHF         Nutrition/Diet History     Row Name 07/01/20 1149          Nutrition/Diet History    Typical Food/Fluid Intake  eats sharma, does not plan to stop eating this, doesn't really like many vegetables         Anthropometrics     Row Name 07/01/20 1152 07/01/20 1025       Anthropometrics    Height  185.4 cm (72.99\")  --    Weight  --  128 kg (282 lb 3.2 oz)       Admit Weight    Admit Weight  -- 273.6# (standing)  --       Ideal Body Weight (IBW)    Ideal Body Weight (IBW) (kg)  84.84  --       Usual Body Weight (UBW)    Usual Body Weight  -- 12.11.19 307# bedscale wt down compared to 12 '19 but here with CHF/treatment with diuretics  --       Body Mass Index (BMI)    BMI Assessment  BMI 35-39.9: obesity grade II 37.2  --        Labs/Tests/Procedures/Meds     Row Name 07/01/20 1153          Labs/Procedures/Meds    Lab Results Reviewed  reviewed     Lab Results Comments  BUN 57, Cr 2.82        Medications    Pertinent Medications Reviewed  reviewed     Pertinent Medications Comments  bumex, B12           Estimated/Assessed Needs     Row Name 07/01/20 1154 07/01/20 1152       Calculation Measurements    Height  --  185.4 cm (72.99\")       Estimated/Assessed Needs    Additional Documentation  Hilario-StEdis Ribera Equation (Group);Calorie Requirements (Group);Fluid Requirements (Group);Protein Requirements (Group)  --       Calorie Requirements    Estimated Calorie Need Method  [place] no " activity factor  --    Estimated Calorie Requirement Comment  2,018 estimated carbohydrate needs=227 based upon 45% of estimated energy needs  --       Protein Requirements    Est Protein Requirement Amount (gms/kg)  0.8 gm protein 99 grams  --       Fluid Requirements    Estimated Fluid Requirement Method  other (see comments) 1,000-2,000 ml/day  --        Nutrition Prescription Ordered     Row Name 07/01/20 1201          Nutrition Prescription PO    Common Modifiers  Cardiac;Consistent Carbohydrate         Evaluation of Received Nutrient/Fluid Intake     Row Name 07/01/20 1152          Fluid Intake Evaluation    Oral Fluid (mL)  -- insufficient data        PO Evaluation    Number of Meals  3     % PO Intake  100%               Problem/Interventions:  Problem 1     Row Name 07/01/20 1157          Nutrition Diagnoses Problem 1    Problem 1  Limited Adherence to Diet Modifications     Etiology (related to)  Factors Affecting Nutrition     Signs/Symptoms (evidenced by)  Report/Observation               Intervention Goal     Row Name 07/01/20 1157          Intervention Goal    General  Provide information regarding MNT for treatment/condition     PO  PO intake (%)     PO Intake %  75 % or greater of meals         Nutrition Intervention     Row Name 07/01/20 1202          Nutrition Intervention    RD/Tech Action  Follow Tx progress           Education/Evaluation     Row Name 07/01/20 1203          Education    Education  Provided education regarding     Provided education regarding  -- Low Sodium, alternative seasonings, weighing self daily/reporting to primary care MD, plate method for diabete management        Monitor/Evaluation    Monitor  I&O;PO intake;Pertinent labs;Weight;Skin status pt. demonstrated good understanding by ability to state 3 examples of high sodium foods to limit, the recommendation to weigh self daily, expected compliance is poor based upon response to education     Education Follow-up  --  provided with handouts:  Quick Starts to Reducing Sodium, Seasoning Your Food, Simple Rules for Diabetes and RD contact information           Electronically signed by:  Tara Young RD  07/01/20 12:06

## 2020-07-02 VITALS
OXYGEN SATURATION: 95 % | HEIGHT: 73 IN | BODY MASS INDEX: 37.11 KG/M2 | RESPIRATION RATE: 20 BRPM | DIASTOLIC BLOOD PRESSURE: 52 MMHG | HEART RATE: 61 BPM | SYSTOLIC BLOOD PRESSURE: 106 MMHG | WEIGHT: 280 LBS | TEMPERATURE: 97.3 F

## 2020-07-02 LAB
ALBUMIN SERPL-MCNC: 3.4 G/DL (ref 3.5–5.2)
ANION GAP SERPL CALCULATED.3IONS-SCNC: 12.2 MMOL/L (ref 5–15)
BACTERIA UR QL AUTO: ABNORMAL /HPF
BASOPHILS # BLD AUTO: 0.04 10*3/MM3 (ref 0–0.2)
BASOPHILS NFR BLD AUTO: 0.5 % (ref 0–1.5)
BILIRUB UR QL STRIP: NEGATIVE
BUN SERPL-MCNC: 62 MG/DL (ref 8–23)
BUN/CREAT SERPL: 22.9 (ref 7–25)
CALCIUM SPEC-SCNC: 9 MG/DL (ref 8.6–10.5)
CHLORIDE SERPL-SCNC: 96 MMOL/L (ref 98–107)
CLARITY UR: ABNORMAL
CO2 SERPL-SCNC: 27.8 MMOL/L (ref 22–29)
COLOR UR: ABNORMAL
CREAT SERPL-MCNC: 2.71 MG/DL (ref 0.76–1.27)
DEPRECATED RDW RBC AUTO: 51.4 FL (ref 37–54)
EOSINOPHIL # BLD AUTO: 0.51 10*3/MM3 (ref 0–0.4)
EOSINOPHIL NFR BLD AUTO: 5.8 % (ref 0.3–6.2)
ERYTHROCYTE [DISTWIDTH] IN BLOOD BY AUTOMATED COUNT: 17.5 % (ref 12.3–15.4)
GFR SERPL CREATININE-BSD FRML MDRD: 23 ML/MIN/1.73
GLUCOSE BLDC GLUCOMTR-MCNC: 145 MG/DL (ref 70–130)
GLUCOSE BLDC GLUCOMTR-MCNC: 172 MG/DL (ref 70–130)
GLUCOSE SERPL-MCNC: 143 MG/DL (ref 65–99)
GLUCOSE UR STRIP-MCNC: NEGATIVE MG/DL
HCT VFR BLD AUTO: 34.3 % (ref 37.5–51)
HGB BLD-MCNC: 10.3 G/DL (ref 13–17.7)
HGB UR QL STRIP.AUTO: ABNORMAL
HYALINE CASTS UR QL AUTO: ABNORMAL /LPF
IMM GRANULOCYTES # BLD AUTO: 0.04 10*3/MM3 (ref 0–0.05)
IMM GRANULOCYTES NFR BLD AUTO: 0.5 % (ref 0–0.5)
KETONES UR QL STRIP: NEGATIVE
LEUKOCYTE ESTERASE UR QL STRIP.AUTO: ABNORMAL
LYMPHOCYTES # BLD AUTO: 1.48 10*3/MM3 (ref 0.7–3.1)
LYMPHOCYTES NFR BLD AUTO: 16.8 % (ref 19.6–45.3)
MCH RBC QN AUTO: 24.7 PG (ref 26.6–33)
MCHC RBC AUTO-ENTMCNC: 30 G/DL (ref 31.5–35.7)
MCV RBC AUTO: 82.3 FL (ref 79–97)
MONOCYTES # BLD AUTO: 0.95 10*3/MM3 (ref 0.1–0.9)
MONOCYTES NFR BLD AUTO: 10.8 % (ref 5–12)
NEUTROPHILS NFR BLD AUTO: 5.78 10*3/MM3 (ref 1.7–7)
NEUTROPHILS NFR BLD AUTO: 65.6 % (ref 42.7–76)
NITRITE UR QL STRIP: NEGATIVE
NRBC BLD AUTO-RTO: 0 /100 WBC (ref 0–0.2)
PH UR STRIP.AUTO: <=5 [PH] (ref 4.5–8)
PHOSPHATE SERPL-MCNC: 5.3 MG/DL (ref 2.5–4.5)
PLATELET # BLD AUTO: 211 10*3/MM3 (ref 140–450)
PMV BLD AUTO: 11.1 FL (ref 6–12)
POTASSIUM SERPL-SCNC: 4.4 MMOL/L (ref 3.5–5.2)
PROT UR QL STRIP: ABNORMAL
RBC # BLD AUTO: 4.17 10*6/MM3 (ref 4.14–5.8)
RBC # UR: ABNORMAL /HPF
REF LAB TEST METHOD: ABNORMAL
SODIUM SERPL-SCNC: 136 MMOL/L (ref 136–145)
SP GR UR STRIP: 1.01 (ref 1–1.03)
SQUAMOUS #/AREA URNS HPF: ABNORMAL /HPF
UROBILINOGEN UR QL STRIP: ABNORMAL
WBC # BLD AUTO: 8.8 10*3/MM3 (ref 3.4–10.8)
WBC UR QL AUTO: ABNORMAL /HPF

## 2020-07-02 PROCEDURE — 82962 GLUCOSE BLOOD TEST: CPT

## 2020-07-02 PROCEDURE — 94799 UNLISTED PULMONARY SVC/PX: CPT

## 2020-07-02 PROCEDURE — 99238 HOSP IP/OBS DSCHRG MGMT 30/<: CPT | Performed by: INTERNAL MEDICINE

## 2020-07-02 PROCEDURE — 80069 RENAL FUNCTION PANEL: CPT | Performed by: INTERNAL MEDICINE

## 2020-07-02 PROCEDURE — 63710000001 INSULIN DETEMIR PER 5 UNITS: Performed by: INTERNAL MEDICINE

## 2020-07-02 PROCEDURE — 85025 COMPLETE CBC W/AUTO DIFF WBC: CPT | Performed by: INTERNAL MEDICINE

## 2020-07-02 PROCEDURE — 25010000002 CEFTRIAXONE SODIUM-DEXTROSE 1-3.74 GM-%(50ML) RECONSTITUTED SOLUTION: Performed by: INTERNAL MEDICINE

## 2020-07-02 PROCEDURE — 81001 URINALYSIS AUTO W/SCOPE: CPT | Performed by: INTERNAL MEDICINE

## 2020-07-02 PROCEDURE — 97530 THERAPEUTIC ACTIVITIES: CPT

## 2020-07-02 PROCEDURE — 94760 N-INVAS EAR/PLS OXIMETRY 1: CPT

## 2020-07-02 RX ORDER — BUMETANIDE 2 MG/1
2 TABLET ORAL 2 TIMES DAILY
Qty: 120 TABLET | Refills: 0
Start: 2020-07-02 | End: 2022-01-01 | Stop reason: HOSPADM

## 2020-07-02 RX ORDER — CEFDINIR 300 MG/1
300 CAPSULE ORAL 2 TIMES DAILY
Qty: 10 CAPSULE | Refills: 0 | Status: SHIPPED | OUTPATIENT
Start: 2020-07-02 | End: 2020-07-07

## 2020-07-02 RX ORDER — CARVEDILOL 6.25 MG/1
6.25 TABLET ORAL ONCE
Status: COMPLETED | OUTPATIENT
Start: 2020-07-02 | End: 2020-07-02

## 2020-07-02 RX ORDER — CARVEDILOL 6.25 MG/1
6.25 TABLET ORAL 2 TIMES DAILY WITH MEALS
Status: DISCONTINUED | OUTPATIENT
Start: 2020-07-02 | End: 2020-07-02 | Stop reason: HOSPADM

## 2020-07-02 RX ADMIN — MELATONIN 1000 UNITS: at 09:12

## 2020-07-02 RX ADMIN — FLUTICASONE PROPIONATE 2 SPRAY: 50 SPRAY, METERED NASAL at 09:12

## 2020-07-02 RX ADMIN — BUMETANIDE 2 MG: 1 TABLET ORAL at 10:17

## 2020-07-02 RX ADMIN — DOCUSATE SODIUM 50MG AND SENNOSIDES 8.6MG 1 TABLET: 8.6; 5 TABLET, FILM COATED ORAL at 09:12

## 2020-07-02 RX ADMIN — BUDESONIDE AND FORMOTEROL FUMARATE DIHYDRATE 2 PUFF: 160; 4.5 AEROSOL RESPIRATORY (INHALATION) at 08:12

## 2020-07-02 RX ADMIN — LEVOTHYROXINE SODIUM 137 MCG: 25 TABLET ORAL at 05:37

## 2020-07-02 RX ADMIN — CEFTRIAXONE 1 G: 1 INJECTION, SOLUTION INTRAVENOUS at 05:37

## 2020-07-02 RX ADMIN — LINAGLIPTIN 5 MG: 5 TABLET, FILM COATED ORAL at 09:12

## 2020-07-02 RX ADMIN — CYANOCOBALAMIN TAB 1000 MCG 1000 MCG: 1000 TAB at 09:12

## 2020-07-02 RX ADMIN — APIXABAN 2.5 MG: 2.5 TABLET, FILM COATED ORAL at 09:09

## 2020-07-02 RX ADMIN — SODIUM CHLORIDE, PRESERVATIVE FREE 10 ML: 5 INJECTION INTRAVENOUS at 09:12

## 2020-07-02 RX ADMIN — PREGABALIN 150 MG: 75 CAPSULE ORAL at 09:12

## 2020-07-02 RX ADMIN — TAMSULOSIN HYDROCHLORIDE 0.4 MG: 0.4 CAPSULE ORAL at 09:12

## 2020-07-02 RX ADMIN — INSULIN DETEMIR 45 UNITS: 100 INJECTION, SOLUTION SUBCUTANEOUS at 10:16

## 2020-07-02 RX ADMIN — CARVEDILOL 6.25 MG: 6.25 TABLET, FILM COATED ORAL at 10:18

## 2020-07-02 NOTE — PLAN OF CARE
Problem: Patient Care Overview  Goal: Plan of Care Review  Outcome: Ongoing (interventions implemented as appropriate)  Flowsheets  Taken 7/1/2020 2051  Plan of Care Reviewed With: patient  Taken 7/2/2020 0613  Outcome Summary: Patient sleeps in recliner, bumex decreased, BLE edema baseline, afib in 60's, continuous pulse ox, 2LNC baseline.

## 2020-07-02 NOTE — PROGRESS NOTES
"   LOS: 1 day    Patient Care Team:  Abdullahi Clarke MD as PCP - General (Internal Medicine)      Subjective     Patient sitting comfortably.  Patient has chronic orthopnea and exertional dyspnea.    No new complaint    Objective     Vital Sign Min/Max for last 24 hours  Temp:  [96.8 °F (36 °C)-97.3 °F (36.3 °C)] 97.3 °F (36.3 °C)  Heart Rate:  [56-86] 61  Resp:  [16-20] 20  BP: (106-126)/(52-73) 106/52                       Flowsheet Rows      First Filed Value   Admission Height  185.4 cm (73\") Documented at 06/29/2020 0053   Admission Weight  134 kg (295 lb) Documented at 06/29/2020 0053          I/O this shift:  In: 240 [P.O.:240]  Out: 600 [Urine:600]  I/O last 3 completed shifts:  In: 600 [P.O.:600]  Out: 4500 [Urine:4500]    Physical Exam:  Physical Exam    General.  Awake, alert, NAD  Head.  Atraumatic, normocephalic  Neck.  Supple  Respiratory.  Clear to auscultation bilaterally  Cardiovascular.  Irregular heart rhythm  ENT.  Oral mucosa moist  Abdomen.  Obese, soft, nontender  Extremities. 1-2+ bilateral lower extremity edema         LABS:  Lab Results   Component Value Date    CALCIUM 9.0 07/02/2020    PHOS 5.3 (H) 07/02/2020     Results from last 7 days   Lab Units 07/02/20  0422 07/01/20  0423 06/30/20  0439 06/29/20  0507 06/29/20  0136   MAGNESIUM mg/dL  --   --   --  2.0  --    SODIUM mmol/L 136 138 134* 138 138   POTASSIUM mmol/L 4.4 4.5 4.8 4.6 4.9   CHLORIDE mmol/L 96* 96* 95* 101 100   CO2 mmol/L 27.8 26.5 27.2 24.9 23.9   BUN mg/dL 62* 57* 47* 43* 43*   CREATININE mg/dL 2.71* 2.82* 2.37* 2.02* 2.12*   GLUCOSE mg/dL 143* 169* 200* 234* 224*   CALCIUM mg/dL 9.0 9.0 8.9 8.9 9.2   WBC 10*3/mm3 8.80 9.35 8.00 9.61 10.81*   HEMOGLOBIN g/dL 10.3* 10.8* 10.4* 10.2* 10.7*   PLATELETS 10*3/mm3 211 218 183 177 178   ALT (SGPT) U/L  --   --   --  11 12   AST (SGOT) U/L  --   --   --  14 16     Lab Results   Component Value Date    TROPONINT 0.039 (C) 06/30/2020     Estimated Creatinine Clearance: " 31.4 mL/min (A) (by C-G formula based on SCr of 2.71 mg/dL (H)).      Brief Urine Lab Results  (Last result in the past 365 days)      Color   Clarity   Blood   Leuk Est   Nitrite   Protein   CREAT   Urine HCG        06/29/20 0138 Yellow Cloudy Large (3+) Moderate (2+) Negative >=300 mg/dL (3+)             WEIGHTS:     Wt Readings from Last 1 Encounters:   07/02/20 0946 127 kg (280 lb)   07/01/20 1025 128 kg (282 lb 3.2 oz)   06/29/20 0305 124 kg (273 lb 9.6 oz)   06/29/20 0053 134 kg (295 lb)         apixaban 2.5 mg Oral Q12H   budesonide-formoterol 2 puff Inhalation BID - RT   bumetanide 2 mg Oral BID   carvedilol 6.25 mg Oral BID With Meals   cefTRIAXone 1 g Intravenous Q24H   cholecalciferol 1,000 Units Oral Daily   fluticasone 2 spray Each Nare Daily   insulin aspart 0-24 Units Subcutaneous TID AC   insulin detemir 45 Units Subcutaneous BID   levothyroxine 137 mcg Oral Q AM   linagliptin 5 mg Oral Daily   pantoprazole 40 mg Oral Q PM   pregabalin 150 mg Oral Q12H   senna-docusate sodium 1 tablet Oral BID   sodium chloride 10 mL Intravenous Q12H   tamsulosin 0.4 mg Oral Daily   cyanocobalamin 1,000 mcg Oral Daily          Assessment/Plan       1.  Chronic kidney disease stage IV  Secondary to diabetes and hypertension.  Creatinine little better.  Electrolytes, volume status okay    2.  Congestive heart failure  Systolic.  Chronic. LV EF around 45% per records  Seems to be stable hemodynamically.  Lower extremity edema is chronic  Continue Bumex at current dose    3.  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  UTI  On IV Rocephin  Repeat urinalysis and culture pending    Follow-up in our office in 2 to 4 weeks if discharged home today      Mario Barclay MD  07/02/20  10:55

## 2020-07-02 NOTE — DISCHARGE SUMMARY
"  Froilan Helton  1940  [id number]      Hospitalists Discharge Summary    Date of Admission: 6/29/2020  Date of Discharge:  7/2/2020    Primary Discharge Diagnoses: Acute UTI    Secondary Discharge Diagnoses:   New systolic on chronic diastolic heart failure, without acute exacerbation  BPH  Hypothyroidism  Type 2 diabetes on chronic home insulin CKD stage IV due to DM and HTN  COPD without acute exacerbation  Permanent A. Fib  Essential hypertension  On chronic AC  Chronic physical debility with frequent falls  PETER on home trilogy  Anemia of chronic disease  Frail elderly    PCP  Patient Care Team:  Abdullahi Clarke MD as PCP - General (Internal Medicine)    Consults:   Consults     Date and Time Order Name Status Description    6/30/2020 0905 Inpatient Nephrology Consult      6/29/2020 0354 Inpatient Cardiology Consult            CC:  Generalized weakness with hx of multiple falls    History of Present Illness:  As per H&P: \"79 year old  male with hx of HTN, HLD, Diastolic CHF, CAD, COPD, Atrial Fib, chronic anticoagulation, CKD Stage 3, Hypothyroidism, DJD, Vitamin D deficiency and bilateral sensorineural hearing loss who was brought to [facility] with hx of generalized weakness and frequent falls. He injured his left elbow with a small skin tear. The patient states that he is too weak to get back up once he falls. He is fallen twice today and 3 times this week. The patient lives with his elderly wife.  He has a history of diastolic congestive heart failure and chronic lower extremity edema for which he takes Bumex 2 mg twice a day.  He states he has to get up frequently to urinate, but has fallen twice in the bathroom today and once earlier this week.  Once he falls he is too weak to get back up and requires EMS for a lift assist.  After the second episode today, he was brought to the emergency department. Workup in ED revealed evidence of UTI and patient has been started on " "Inj Ceftriaxone. Patient admits to having dysuria for a couple of weeks but he did not seek medical care for that. \"    Hospital Course  UTI - after investigation, his other medical co-morbidities all appeared to be with stable, and given his symptoms of dsyuria, and being more lethargic than usual on presentation, with improvement after being put on ABX, felt that his condition was driven by a clinically significant uti. Due to lab policies, urine was not sent for culture. Given he responded to rocephin, pt  DC'd on Cefdinir.    Afib - Pt doesn't want to take elliquis, feel pt w/ his history of falls is better being non-complaint/not taking with Elliquis than attempting to get him to be non-complaint with inr checks on warfarin.      Issac felt his two most recent echos had the same LVEF of 45%, and also agreed with the assessment that he was not acutely fluid overloaded.     Physical Exam at Discharge  Vital Signs  Temp:  [96.8 °F (36 °C)-97.3 °F (36.3 °C)] 97.3 °F (36.3 °C)  Heart Rate:  [56-86] 58  Resp:  [16-20] 20  BP: (109-126)/(60-73) 109/60    Physical Exam:  Physical Exam   Constitutional: No distress.   Eyes: Pupils are equal, round, and reactive to light.   Cardiovascular: Normal rate. An irregularly irregular rhythm present.   Murmur heard.  Pulmonary/Chest: Effort normal and breath sounds normal. No stridor. No respiratory distress. He has no wheezes. He has no rales.   Abdominal: Soft. Bowel sounds are normal. He exhibits no distension and no mass. There is no tenderness. There is no guarding.   Musculoskeletal:   Chronic Lymphedema, w/o pitting edema   Neurological: He is alert. No cranial nerve deficit.   Skin: Skin is warm and dry. He is not diaphoretic. No erythema.   Nursing note and vitals reviewed.      Operations and Procedures Performed:        Allergies:  is allergic to atorvastatin and oxycontin [oxycodone hcl].    Joseph  reviewed    Discharge Medications:     Discharge Medications      New " "Medications      Instructions Start Date   cefdinir 300 MG capsule  Commonly known as:  OMNICEF   300 mg, Oral, 2 Times Daily         Changes to Medications      Instructions Start Date   bumetanide 2 MG tablet  Commonly known as:  BUMEX  What changed:  how much to take   2 mg, Oral, 2 Times Daily      insulin detemir 100 UNIT/ML injection  Commonly known as:  LEVEMIR  What changed:  how much to take   45 Units, Subcutaneous, 2 Times Daily      O2  Commonly known as:  OXYGEN  What changed:  how much to take   1 L/min (1 L/min), Inhalation, Every Night at Bedtime         Continue These Medications      Instructions Start Date   acetaminophen 325 MG tablet  Commonly known as:  TYLENOL   650 mg, Oral, Every 4 Hours PRN      ALPRAZolam 0.25 MG tablet  Commonly known as:  XANAX   0.25 mg, Oral, Nightly PRN      apixaban 2.5 MG tablet tablet  Commonly known as:  ELIQUIS   2.5 mg, Oral, Every 12 Hours Scheduled      budesonide-formoterol 160-4.5 MCG/ACT inhaler  Commonly known as:  Symbicort   2 puffs, Inhalation, 2 Times Daily - RT      carvedilol 6.25 MG tablet  Commonly known as:  COREG   6.25 mg, Oral, 2 Times Daily With Meals      Cholecalciferol 50 MCG (2000 UT) tablet   2,000 Units, Oral, Daily      cyanocobalamin 1000 MCG tablet  Commonly known as:  VITAMIN B-12   1,000 mcg, Oral, Daily      fluticasone 50 MCG/ACT nasal spray  Commonly known as:  FLONASE   2 sprays, Each Nare, Daily      hydrophor ointment ointment   Topical, As Needed, Lower extremities.      insulin aspart 100 UNIT/ML injection  Commonly known as:  novoLOG   1-14 Units, Subcutaneous, 3 Times Daily Before Meals, As directed per sliding scale       Insulin Syringe 30G X 5/16\" 1 ML misc   U UTD WITH INSULIN UP TO 6 TIMES A DAY      ipratropium-albuterol 0.5-2.5 mg/3 ml nebulizer  Commonly known as:  DUO-NEB   3 mL, Nebulization, Every 4 Hours PRN      levothyroxine 125 MCG tablet  Commonly known as:  SYNTHROID, LEVOTHROID   125 mcg, Oral, Daily    "   Lyrica 150 MG capsule  Generic drug:  pregabalin   TK 1 C PO TID      melatonin 5 MG tablet tablet   5 mg, Oral, Nightly PRN, Over the counter      Petrolatum 42 % ointment   Topical, Every 24 Hours Scheduled, Send with patient      silver sulfadiazine 1 % cream  Commonly known as:  SILVADENE, SSD   Apply to affected area daily      SITagliptin 50 MG tablet  Commonly known as:  JANUVIA   50 mg, Oral, Daily      sodium chloride 0.65 % nasal spray   2 sprays, Nasal, As Needed, Send with patient      tamsulosin 0.4 MG capsule 24 hr capsule  Commonly known as:  FLOMAX   0.4 mg, Oral, Daily         Stop These Medications    amiodarone 200 MG tablet  Commonly known as:  PACERONE     atorvastatin 10 MG tablet  Commonly known as:  LIPITOR     cefuroxime 500 MG tablet  Commonly known as:  CEFTIN     guaiFENesin 600 MG 12 hr tablet  Commonly known as:  MUCINEX     loratadine 10 MG tablet  Commonly known as:  CLARITIN     nystatin 480865 UNIT/GM powder  Commonly known as:  MYCOSTATIN            Last Lab Results:   Lab Results (last 72 hours)     Procedure Component Value Units Date/Time    POC Glucose Once [[id number]]  (Abnormal) Collected:  07/02/20 0720    Specimen:  Blood Updated:  07/02/20 0729     Glucose 145 mg/dL     Renal Function Panel [[id number]]  (Abnormal) Collected:  07/02/20 0422    Specimen:  Blood Updated:  07/02/20 0528     Glucose 143 mg/dL      BUN 62 mg/dL      Creatinine 2.71 mg/dL      Sodium 136 mmol/L      Potassium 4.4 mmol/L      Chloride 96 mmol/L      CO2 27.8 mmol/L      Calcium 9.0 mg/dL      Albumin 3.40 g/dL      Phosphorus 5.3 mg/dL      Anion Gap 12.2 mmol/L      BUN/Creatinine Ratio 22.9     eGFR Non African Amer 23 mL/min/1.73     Narrative:       GFR Normal >60  Chronic Kidney Disease <60  Kidney Failure <15      CBC & Differential [[id number]] Collected:  07/02/20 0422    Specimen:  Blood Updated:  07/02/20 1288    Narrative:       The following orders were created for panel order CBC &  Differential.  Procedure                               Abnormality         Status                     ---------                               -----------         ------                     CBC Auto Differential[[id number]]        Abnormal            Final result                 Please view results for these tests on the individual orders.    CBC Auto Differential [[id number]]  (Abnormal) Collected:  07/02/20 0422    Specimen:  Blood Updated:  07/02/20 0505     WBC 8.80 10*3/mm3      RBC 4.17 10*6/mm3      Hemoglobin 10.3 g/dL      Hematocrit 34.3 %      MCV 82.3 fL      MCH 24.7 pg      MCHC 30.0 g/dL      RDW 17.5 %      RDW-SD 51.4 fl      MPV 11.1 fL      Platelets 211 10*3/mm3      Neutrophil % 65.6 %      Lymphocyte % 16.8 %      Monocyte % 10.8 %      Eosinophil % 5.8 %      Basophil % 0.5 %      Immature Grans % 0.5 %      Neutrophils, Absolute 5.78 10*3/mm3      Lymphocytes, Absolute 1.48 10*3/mm3      Monocytes, Absolute 0.95 10*3/mm3      Eosinophils, Absolute 0.51 10*3/mm3      Basophils, Absolute 0.04 10*3/mm3      Immature Grans, Absolute 0.04 10*3/mm3      nRBC 0.0 /100 WBC     POC Glucose Once [[id number]]  (Abnormal) Collected:  07/01/20 2026    Specimen:  Blood Updated:  07/01/20 2033     Glucose 233 mg/dL     POC Glucose Once [[id number]]  (Abnormal) Collected:  07/01/20 1738    Specimen:  Blood Updated:  07/01/20 1744     Glucose 245 mg/dL     POC Glucose Once [[id number]]  (Abnormal) Collected:  07/01/20 1142    Specimen:  Blood Updated:  07/01/20 1204     Glucose 189 mg/dL     POC Glucose Once [[id number]]  (Abnormal) Collected:  07/01/20 0718    Specimen:  Blood Updated:  07/01/20 0733     Glucose 172 mg/dL     Basic Metabolic Panel [[id number]]  (Abnormal) Collected:  07/01/20 0423    Specimen:  Blood Updated:  07/01/20 0504     Glucose 169 mg/dL      BUN 57 mg/dL      Creatinine 2.82 mg/dL      Sodium 138 mmol/L      Potassium 4.5 mmol/L      Chloride 96 mmol/L      CO2 26.5 mmol/L      Calcium 9.0  mg/dL      eGFR Non African Amer 22 mL/min/1.73      BUN/Creatinine Ratio 20.2     Anion Gap 15.5 mmol/L     Narrative:       GFR Normal >60  Chronic Kidney Disease <60  Kidney Failure <15      CBC & Differential [[id number]] Collected:  07/01/20 0423    Specimen:  Blood Updated:  07/01/20 0445    Narrative:       The following orders were created for panel order CBC & Differential.  Procedure                               Abnormality         Status                     ---------                               -----------         ------                     CBC Auto Differential[[id number]]        Abnormal            Final result                 Please view results for these tests on the individual orders.    CBC Auto Differential [[id number]]  (Abnormal) Collected:  07/01/20 0423    Specimen:  Blood Updated:  07/01/20 0445     WBC 9.35 10*3/mm3      RBC 4.51 10*6/mm3      Hemoglobin 10.8 g/dL      Hematocrit 36.8 %      MCV 81.6 fL      MCH 23.9 pg      MCHC 29.3 g/dL      RDW 17.4 %      RDW-SD 50.7 fl      MPV 10.9 fL      Platelets 218 10*3/mm3      Neutrophil % 71.0 %      Lymphocyte % 12.5 %      Monocyte % 10.3 %      Eosinophil % 5.3 %      Basophil % 0.3 %      Immature Grans % 0.6 %      Neutrophils, Absolute 6.63 10*3/mm3      Lymphocytes, Absolute 1.17 10*3/mm3      Monocytes, Absolute 0.96 10*3/mm3      Eosinophils, Absolute 0.50 10*3/mm3      Basophils, Absolute 0.03 10*3/mm3      Immature Grans, Absolute 0.06 10*3/mm3      nRBC 0.0 /100 WBC     POC Glucose Once [[id number]]  (Abnormal) Collected:  06/30/20 2049    Specimen:  Blood Updated:  06/30/20 2106     Glucose 191 mg/dL     POC Glucose Once [[id number]]  (Abnormal) Collected:  06/30/20 1644    Specimen:  Blood Updated:  06/30/20 1654     Glucose 233 mg/dL     POC Glucose Once [[id number]]  (Abnormal) Collected:  06/30/20 1128    Specimen:  Blood Updated:  06/30/20 1140     Glucose 191 mg/dL     T3, Free [[id number]]  (Abnormal) Collected:  06/30/20 3934     Specimen:  Blood Updated:  06/30/20 1101     T3, Free 1.26 pg/mL     Narrative:       Results may be falsely increased if patient taking Biotin.      POC Glucose Once [[id number]]  (Abnormal) Collected:  06/30/20 0731    Specimen:  Blood Updated:  06/30/20 0737     Glucose 175 mg/dL     Troponin [[id number]]  (Abnormal) Collected:  06/30/20 0439    Specimen:  Blood Updated:  06/30/20 0528     Troponin T 0.039 ng/mL     Narrative:       Troponin T Reference Range:  <= 0.03 ng/mL-   Negative for AMI  >0.03 ng/mL-     Abnormal for myocardial necrosis.  Clinicians would have to utilize clinical acumen, EKG, Troponin and serial changes to determine if it is an Acute Myocardial Infarction or myocardial injury due to an underlying chronic condition.       Results may be falsely decreased if patient taking Biotin.      Basic Metabolic Panel [[id number]]  (Abnormal) Collected:  06/30/20 0439    Specimen:  Blood Updated:  06/30/20 0519     Glucose 200 mg/dL      BUN 47 mg/dL      Creatinine 2.37 mg/dL      Sodium 134 mmol/L      Potassium 4.8 mmol/L      Chloride 95 mmol/L      CO2 27.2 mmol/L      Calcium 8.9 mg/dL      eGFR Non African Amer 27 mL/min/1.73      BUN/Creatinine Ratio 19.8     Anion Gap 11.8 mmol/L     Narrative:       GFR Normal >60  Chronic Kidney Disease <60  Kidney Failure <15      CBC & Differential [[id number]] Collected:  06/30/20 0439    Specimen:  Blood Updated:  06/30/20 0502    Narrative:       The following orders were created for panel order CBC & Differential.  Procedure                               Abnormality         Status                     ---------                               -----------         ------                     CBC Auto Differential[[id number]]        Abnormal            Final result                 Please view results for these tests on the individual orders.    CBC Auto Differential [[id number]]  (Abnormal) Collected:  06/30/20 0439    Specimen:  Blood Updated:  06/30/20 0502      WBC 8.00 10*3/mm3      RBC 4.28 10*6/mm3      Hemoglobin 10.4 g/dL      Hematocrit 35.0 %      MCV 81.8 fL      MCH 24.3 pg      MCHC 29.7 g/dL      RDW 17.3 %      RDW-SD 50.7 fl      MPV 11.2 fL      Platelets 183 10*3/mm3      Neutrophil % 67.5 %      Lymphocyte % 14.0 %      Monocyte % 11.0 %      Eosinophil % 6.5 %      Basophil % 0.4 %      Immature Grans % 0.6 %      Neutrophils, Absolute 5.40 10*3/mm3      Lymphocytes, Absolute 1.12 10*3/mm3      Monocytes, Absolute 0.88 10*3/mm3      Eosinophils, Absolute 0.52 10*3/mm3      Basophils, Absolute 0.03 10*3/mm3      Immature Grans, Absolute 0.05 10*3/mm3      nRBC 0.0 /100 WBC     POC Glucose Once [[id number]]  (Abnormal) Collected:  06/29/20 2051    Specimen:  Blood Updated:  06/29/20 2058     Glucose 245 mg/dL     Troponin [[id number]]  (Abnormal) Collected:  06/29/20 1559    Specimen:  Blood Updated:  06/29/20 1710     Troponin T 0.038 ng/mL     Narrative:       Troponin T Reference Range:  <= 0.03 ng/mL-   Negative for AMI  >0.03 ng/mL-     Abnormal for myocardial necrosis.  Clinicians would have to utilize clinical acumen, EKG, Troponin and serial changes to determine if it is an Acute Myocardial Infarction or myocardial injury due to an underlying chronic condition.       Results may be falsely decreased if patient taking Biotin.      POC Glucose Once [[id number]]  (Abnormal) Collected:  06/29/20 1655    Specimen:  Blood Updated:  06/29/20 1701     Glucose 182 mg/dL     T4, Free [[id number]]  (Abnormal) Collected:  06/29/20 1559    Specimen:  Blood Updated:  06/29/20 1632     Free T4 0.32 ng/dL     Narrative:       Results may be falsely increased if patient taking Biotin.      POC Glucose Once [[id number]]  (Abnormal) Collected:  06/29/20 1117    Specimen:  Blood Updated:  06/29/20 1128     Glucose 293 mg/dL     T4, Free [[id number]]  (Abnormal) Collected:  06/29/20 0948    Specimen:  Blood Updated:  06/29/20 1056     Free T4 0.38 ng/dL     Narrative:        Results may be falsely increased if patient taking Biotin.      Troponin [[id number]]  (Abnormal) Collected:  06/29/20 0948    Specimen:  Blood Updated:  06/29/20 1012     Troponin T 0.035 ng/mL     Narrative:       Troponin T Reference Range:  <= 0.03 ng/mL-   Negative for AMI  >0.03 ng/mL-     Abnormal for myocardial necrosis.  Clinicians would have to utilize clinical acumen, EKG, Troponin and serial changes to determine if it is an Acute Myocardial Infarction or myocardial injury due to an underlying chronic condition.       Results may be falsely decreased if patient taking Biotin.          Xr Chest Pa & Lateral    Result Date: 6/29/2020  Narrative: PA AND LATERAL CHEST, 6/29/2020 8:09 AM  HISTORY: diastolic CHF and COPD; R53.1-Weakness; N39.0-Urinary tract infection, site not specified   symptoms for 3 days  COMPARISON: 12/09/2019  TECHNIQUE: PA and lateral upright chest series.  FINDINGS: Heart size and pulmonary vascularity are normal. The lungs are expanded and clear. No visible pulmonary infiltrate or pleural effusion.       Impression: Negative chest.  This report was finalized on 6/29/2020 8:19 AM by Dr. Fei Henderson MD.        Condition on Discharge: Patient's baseline    Discharge Disposition  To home    Visiting Nurse:    Yes     Home PT/OT:  Yes     Home Safety Evaluation:  Yes     DME  None    Discharge Diet:      Dietary Orders (From admission, onward)     Start     Ordered    06/30/20 0911  Diet Regular; Cardiac, Consistent Carbohydrate, Low Sodium; 2,000 mg Na  Diet Effective Now     Question Answer Comment   Diet Texture / Consistency Regular    Common Modifiers Cardiac    Common Modifiers Consistent Carbohydrate    Common Modifiers Low Sodium    Low Sodium Restriction: 2,000 mg Na        06/30/20 0910                Activity at Discharge:  activity as tolerated      Pre-discharge education      Follow-up Appointments  No future appointments.      Test Results Pending at Discharge        Pia Figueredo MD  07/02/20  09:59    Time: Discharge < 30 min (if over 30 minutes give explanation as to why it took greater than 30 minutes)

## 2020-07-02 NOTE — NURSING NOTE
Case Management Discharge Note      Final Note: Home with [facility].    Provided Post Acute Provider List?: Refused  Refused Provider List Comment: offered community resources but pt and wife decline the need for them at this time    Destination      No service has been selected for the patient.      Durable Medical Equipment      No service has been selected for the patient.      Dialysis/Infusion      No service has been selected for the patient.      Home Medical Care - Selection Complete      Service Provider Request Status Selected Services Address Phone Number Fax Number    [facility] Selected Home Health Services [address] [place] [id number] [phone]      Therapy      No service has been selected for the patient.      Community Resources      No service has been selected for the patient.             Final Discharge Disposition Code: 06 - home with home health care

## 2020-07-02 NOTE — THERAPY DISCHARGE NOTE
Acute Care - Occupational Therapy Treatment Note/Discharge  MICHAEL Kincaid     Patient Name: Froilan Helton  : 1940  MRN: [id number]  Today's Date: 2020  Onset of Illness/Injury or Date of Surgery: 20  Date of Referral to OT: 20  Referring Physician: Terell      Admit Date: 2020    Visit Dx:     ICD-10-CM ICD-9-CM   1. Generalized weakness R53.1 780.79   2. Urinary tract infection in elderly patient N39.0 599.0     Patient Active Problem List   Diagnosis   • COPD (chronic obstructive pulmonary disease) (CMS/[facility])   • Type 2 diabetes mellitus with stage 4 chronic kidney disease, with long-term current use of insulin (CMS/[facility])   • Essential hypertension   • Primary osteoarthritis of left knee   • Chronic renal insufficiency, stage 4 (severe) (CMS/[facility])   • Chronic diastolic (congestive) heart failure (CMS/[facility])   • Class 2 severe obesity due to excess calories with serious comorbidity in adult (CMS/[facility])   • Physical debility   • Acute UTI (urinary tract infection)   • Paroxysmal atrial fibrillation (CMS/[facility])   • H/O noncompliance with medical treatment, presenting hazards to health   • Myxedema   • Acute on chronic combined systolic and diastolic CHF (congestive heart failure) (CMS/[facility])   • Generalized weakness       Therapy Treatment    Rehabilitation Treatment Summary     Row Name 20 0909             Treatment Time/Intention    Discipline  occupational therapist  -SD      Document Type  discharge treatment  -SD      Subjective Information  no complaints;pain chronic joint pain  -SD      Mode of Treatment  occupational therapy  -SD      Patient/Family Observations  pt reclined in chair.  -SD      Care Plan Review  evaluation/treatment results reviewed;care plan/treatment goals reviewed;risks/benefits reviewed;current/potential barriers reviewed;patient/other agree to care plan pt to be discharged home today.   -SD      Patient Effort  fair  -SD      Treatment Considerations/Comments   "Education with BUE HEP to address joint stiffness/pain to allow for increased function for daily tasks. Education with home safety strategies to decrease his falls risk. Pt not receptive to adapting his daily routine for safety. Pt continues to be a falls risk. Rec  services.   -SD      Recorded by [SD] Sebastian Wright, OTR 07/02/20 1252      Row Name 07/02/20 0909             Cognitive Assessment/Intervention- PT/OT    Safety Deficit (Cognitive)  awareness of need for assistance;insight into deficits/self awareness  -SD      Cognitive Assessment/Intervention Comment  pt not  receptive to modifiying his daily routine or using assistive devices that may increase his safety.   -SD      Recorded by [SD] Sebastian Wright, OTR 07/02/20 1252      Row Name 07/02/20 0909             Therapeutic Exercise    Upper Extremity Range of Motion (Therapeutic Exercise)  shoulder flexion/extension, right;shoulder abduction/adduction, right;elbow flexion/extension, right  -SD      Position (Therapeutic Exercise)  prone  -SD      Sets/Reps (Therapeutic Exercise)  1/10  -SD      Comment (Therapeutic Exercise)  Pt stated his left shoulder was injured about a year ago from a fall and it healed well. \"I didn't have to do any exercises for it and it turned out just fine.\" Pt does no appear receptive to continued participation in UE HEP.   -SD      Recorded by [SD] Sebastian Wright, OTR 07/02/20 1252      Row Name 07/02/20 0909             Positioning and Restraints    Pre-Treatment Position  sitting in chair/recliner  -SD      Post Treatment Position  chair  -SD      In Chair  reclined;call light within reach;encouraged to call for assist  -SD      Recorded by [SD] Sebastian Wright, OTR 07/02/20 1252      Row Name 07/02/20 0909             Pain Scale: Numbers Pre/Post-Treatment    Pre/Post Treatment Pain Comment  pt c/o chronic joint pain (shoulders, knees and back) but did not rate.  -SD      Pain Intervention(s)  Repositioned RUE " rom program  -SD      Recorded by [SD] Sebastian Wright OTR 07/02/20 1252      Row Name                Wound 06/29/20 0216 Left upper calf Pressure Injury    Wound - Properties Group Date first assessed: 06/29/20 [BM] Time first assessed: 0216 [BM] Present on Hospital Admission: Y [BM] Side: Left [BM] Orientation: upper [BM] Location: calf [BM] Primary Wound Type: Pressure inj [BM] Stage, Pressure Injury: deep tissue injury [BM] Recorded by:  [BM] Donna Torres RN 06/29/20 0217    Row Name                Wound 06/29/20 0217 Left upper other (see notes) Skin Tear    Wound - Properties Group Date first assessed: 06/29/20 [BM] Time first assessed: 0217 [BM] Side: Left [BM] Orientation: upper [BM] Location: other (see notes) [BM], forearm  Primary Wound Type: Skin tear [BM] Additional Comments: 1.5 cm skintear [BM] Recorded by:  [BM] Donna Torres RN 06/29/20 0220    Row Name 07/02/20 0909             Outcome Summary/Treatment Plan (OT)    Daily Summary of Progress (OT)  progress toward functional goals is gradual;prepare for discharge  -SD      Plan for Continued Treatment (OT)  Pt to be discharged home. He reports no concerns. Pt continues to be a falls risk. He is not receptive to any modifications/adaptations of his daily routine that may address his falls risk  -SD      Anticipated Discharge Disposition (OT)  home with assist  -SD      Recorded by [SD] Sebastian Wright OTR 07/02/20 1252        User Key  (r) = Recorded By, (t) = Taken By, (c) = Cosigned By    Initials Name Effective Dates Discipline    SD Sebastian Wright OTR 06/22/16 -  OT    BM Donna Torres RN 06/16/16 -  Nurse        Wound 06/29/20 0216 Left upper calf Pressure Injury (Active)   Wound Image   7/1/2020  4:07 PM   Dressing Appearance dry;intact 7/2/2020  9:04 AM   Drainage Amount none 7/2/2020  9:04 AM       Wound 06/29/20 0217 Left upper other (see notes) Skin Tear (Active)   Dressing Appearance open to air;other (see comments)  7/2/2020  9:04 AM   Periwound dry;intact 7/2/2020  9:04 AM   Periwound Temperature warm 7/2/2020  9:04 AM   Periwound Skin Turgor firm 7/2/2020  9:04 AM   Drainage Amount none 7/2/2020  9:04 AM       Rehab Goal Summary     Row Name 07/02/20 0910             ROM Goal 1 (OT)    ROM Goal 1 (OT)  Pt to perform BUE rom program to be able to manage adl routine.  -SD      Time Frame (ROM Goal 1, OT)  by discharge  -SD      Progress/Outcome (ROM Goal 1, OT)  discharged from facility;other (see comments) pt performed UE rom with encouragement therapist  -SD         Patient Education Goal (OT)    Activity (Patient Education Goal, OT)  Pt to verbalize home safety strategies to decrease risk of falls during adl's/transfers/mobility  -SD      [place]/Cues/Accuracy (Memory Goal 2, OT)  verbalizes understanding  -SD      Time Frame (Patient Education Goal, OT)  by discharge  -SD      Progress/Outcome (Patient Education Goal, OT)  discharged from facility;progress slower than expected;other (see comments) pt not receptive to safety strategies  -SD        User Key  (r) = Recorded By, (t) = Taken By, (c) = Cosigned By    Initials Name Provider Type Discipline    Sebastian Greene, OTR Occupational Therapist OT          Occupational Therapy Education                 Title: PT OT SLP Therapies (Not Started)     Topic: Occupational Therapy (In Progress)     Point: ADL training (In Progress)     Description:   Instruct learner(s) on proper safety adaptation and remediation techniques during self care or transfers.   Instruct in proper use of assistive devices.              Learning Progress Summary           Patient Nonacceptance, E, RT by SD at 7/2/2020 1219    Comment:  Education regarding home safety and strategies to minimize his falls risk. Pt not interested in modifying his current routine (even though he has a history of falls). Rec HH safety evaluation.    Nonacceptance, E, NR by SD at 7/1/2020 1149    Comment:  Pt states  "his bathroom door is too narrow to use his rollator or FWW, so he has not been using an assistive device while in the bathroom (falls have occured in bathroom). Discussed options to increase safety (use FWW and side step). Pt not interested.    Nonacceptance, E, NR by SD at 6/30/2020 1247    Comment:  Education regarding OT services, benefits of activity and safety with mobility. Pt uses a rollator, but he states he has to use the brakes constantly because \"it gets away from me.\" Pt has a FWW but does not appear receptive to using it vs. the rollator                   Point: Home exercise program (Done)     Description:   Instruct learner(s) on appropriate technique for monitoring, assisting and/or progressing therapeutic exercises/activities.              Learning Progress Summary           Patient Acceptance, E,TB,D, VU by SD at 7/2/2020 1218    Comment:  Education with benefits of BUE HEP to address his joint stiffness, limited rom and pain. Pt states he if familiar with BUE exerices from HH services. Rec pt continue with HH services upon discharge.    Acceptance, E,TB,D, VU,DU by SD at 7/1/2020 1144    Comment:  Education with BUE HEP. Pt performed arom and gentle a/arom (for left shoulder). Pt states he is familiar with the BUE exercises. Pt indicated that his left shoulder has been bothering him for months (during the eval it seemed as though it was acute inj)                   Point: Precautions (Resolved)     Description:   Instruct learner(s) on prescribed precautions during self-care and functional transfers.              Learner Progress:   Not documented in this visit.                      User Key     Initials Effective Dates Name Provider Type Discipline    SD 06/22/16 -  Sebastian Wright, OTR Occupational Therapist OT                OT Recommendation and Plan  Outcome Summary/Treatment Plan (OT)  Daily Summary of Progress (OT): progress toward functional goals is gradual, prepare for discharge  Plan " "for Continued Treatment (OT): Pt to be discharged home. He reports no concerns. Pt continues to be a falls risk. He is not receptive to any modifications/adaptations of his daily routine that may address his falls risk  Anticipated Discharge Disposition (OT): home with home health, home with assist  Therapy Frequency (OT Eval): other (see comments)(1-2 visits)  Daily Summary of Progress (OT): progress toward functional goals is gradual, prepare for discharge  Outcome Summary: OT: Education with BUE HEP. Pt performed arom and gentle a/arom (for right shoulder). Pt states he is familiar with the BUE exercises from  services. Pt indicated that his right shoulder has been bothering him for months (during the eval it seemed as though it was acute injury). Reinforced gentle UE rom program. Education also provided regarding home safety to address his recent falls. Pt stated that his rollator \"gets away from him\" because it rolls easy and the brakes don't work well. Rec use of his rolling walker instead of the rollator. Pt also stated that his bathroom door is too narrow to use his rollator. Thus, he has been attempting to ambulate in the bathroom without an assistie device. Pt has had falls in his bathroom at home. Discussed using the FWW and side stepping into the bathroom, so he can still use the FWW for support while in the bathroom. Pt did not appear receptive to use of the FWW instead of the rollator or using the FWW in the bathroom.  Rec  safety evaluation upon discharge.     Outcome Measures     Row Name 06/30/20 3830             How much help from another is currently needed...    Putting on and taking off regular lower body clothing?  3  -SD      Bathing (including washing, rinsing, and drying)  3  -SD      Toileting (which includes using toilet bed pan or urinal)  4  -SD      Putting on and taking off regular upper body clothing  4  -SD      Taking care of personal grooming (such as brushing teeth)  4  -SD   "    Eating meals  4  -SD      AM-PAC 6 Clicks Score (OT)  22  -SD         Functional Assessment    Outcome Measure Options  AM-PAC 6 Clicks Daily Activity (OT)  -SD        User Key  (r) = Recorded By, (t) = Taken By, (c) = Cosigned By    Initials Name Provider Type    Sebastian Greene OTDELTA Occupational Therapist           Time Calculation:    Time Calculation- OT     Row Name 07/02/20 1218             Time Calculation- OT    OT Start Time  0909  -SD        User Key  (r) = Recorded By, (t) = Taken By, (c) = Cosigned By    Initials Name Provider Type    Sebastian Greene OTDELTA Occupational Therapist        Therapy Suggested Charges     Code   Minutes Charges    73947 (CPT®) Hc Ot Neuromusc Re Education Ea 15 Min      76154 (CPT®) Hc Ot Ther Proc Ea 15 Min      68749 (CPT®) Hc Ot Therapeutic Act Ea 15 Min 15 1    07979 (CPT®) Hc Ot Manual Therapy Ea 15 Min      10695 (CPT®) Hc Ot Iontophoresis Ea 15 Min      15079 (CPT®) Hc Ot Elec Stim Ea-Per 15 Min      15235 (CPT®) Hc Ot Ultrasound Ea 15 Min      85682 (CPT®) Hc Ot Self Care/Mgmt/Train Ea 15 Min      Total  15 1        Therapy Charges for Today     Code Description Service Date Service Provider Modifiers Qty    33258122425 HC OT THERAPEUTIC ACT EA 15 MIN 7/2/2020 Sebastian Wright OTR GO 1               OT Discharge Summary  Anticipated Discharge Disposition (OT): home with home health, home with assist  Reason for Discharge: Discharge from facility  Outcomes Achieved: Patient able to partially acheive established goals  Discharge Destination: Home with home health, Home with assist    BETH Melgar  7/2/2020

## 2020-07-03 ENCOUNTER — READMISSION MANAGEMENT (OUTPATIENT)
Dept: CALL CENTER | Facility: HOSPITAL | Age: 80
End: 2020-07-03

## 2020-07-03 NOTE — OUTREACH NOTE
Prep Survey      Responses   [facility] patient discharged from?  [place]   Is LACE score < 7 ?  No   Eligibility  Readm Mgmt   Discharge diagnosis  UTI    Does the patient have one of the following disease processes/diagnoses(primary or secondary)?  Other   Does the patient have Home health ordered?  Yes   What is the Home health agency?   [facility]    Is there a DME ordered?  No   General alerts for this patient  Hs: CHF and COPD    Prep survey completed?  Yes          Renu Malik RN

## 2020-07-07 ENCOUNTER — READMISSION MANAGEMENT (OUTPATIENT)
Dept: CALL CENTER | Facility: HOSPITAL | Age: 80
End: 2020-07-07

## 2020-07-07 NOTE — OUTREACH NOTE
Medical Week 1 Survey      Responses   [facility] patient discharged from?  [place]   COVID-19 Test Status  Not tested [MD stopped test and pt had UTI per EPIC note]   Does the patient have one of the following disease processes/diagnoses(primary or secondary)?  Other   Is there a successful TCM telephone encounter documented?  No   Week 1 attempt successful?  Yes   Call start time  0932   Call end time  0940   General alerts for this patient  Hs: CHF and COPD    Discharge diagnosis  UTI    Meds reviewed with patient/caregiver?  Yes   Is the patient having any side effects they believe may be caused by any medication additions or changes?  No   Does the patient have all medications ordered at discharge?  Yes   Is the patient taking all medications as directed (includes completed medication regime)?  Yes   Medication comments  Pt states he takes 35-40 units of Levemir, advised order is 45 units. BS is 150's. Pt dosen't understand why he needs to take Bumex BID and we discussed this. Also advised him to weigh daily.    Does the patient have a primary care provider?   Yes   Does the patient have an appointment with their PCP within 7 days of discharge?  Yes   Has the patient kept scheduled appointments due by today?  N/A   Comments  Sees PCP on Friday   Psychosocial issues?  No   Did the patient receive a copy of their discharge instructions?  Yes   Nursing interventions  Reviewed instructions with patient   What is the patient's perception of their health status since discharge?  Improving   Is the patient/caregiver able to teach back signs and symptoms related to disease process for when to call PCP?  Yes   Is the patient/caregiver able to teach back signs and symptoms related to disease process for when to call 911?  Yes   Is the patient/caregiver able to teach back the hierarchy of who to call/visit for symptoms/problems? PCP, Specialist, Home health nurse, Urgent Care, ED, 911  Yes   Week 1 call completed?   Yes          Beata Anne RN

## 2020-07-16 ENCOUNTER — READMISSION MANAGEMENT (OUTPATIENT)
Dept: CALL CENTER | Facility: HOSPITAL | Age: 80
End: 2020-07-16

## 2020-07-16 NOTE — OUTREACH NOTE
Medical Week 2 Survey      Responses   [facility] patient discharged from?  [place]   COVID-19 Test Status  Not tested   Does the patient have one of the following disease processes/diagnoses(primary or secondary)?  Other   Week 2 attempt successful?  No   Unsuccessful attempts  Attempt 1          Yaima Mcneill RN

## 2020-07-20 ENCOUNTER — READMISSION MANAGEMENT (OUTPATIENT)
Dept: CALL CENTER | Facility: HOSPITAL | Age: 80
End: 2020-07-20

## 2020-07-20 NOTE — OUTREACH NOTE
Medical Week 2 Survey      Responses   [facility] patient discharged from?  [place]   COVID-19 Test Status  Not tested   Does the patient have one of the following disease processes/diagnoses(primary or secondary)?  Other   Week 2 attempt successful?  No   Unsuccessful attempts  Attempt 2          Priya Santizo RN

## 2020-07-28 ENCOUNTER — READMISSION MANAGEMENT (OUTPATIENT)
Dept: CALL CENTER | Facility: HOSPITAL | Age: 80
End: 2020-07-28

## 2020-07-28 NOTE — OUTREACH NOTE
Medical Week 3 Survey      Responses   [facility] patient discharged from?  [place]   COVID-19 Test Status  Not tested   Does the patient have one of the following disease processes/diagnoses(primary or secondary)?  Other   Week 3 attempt successful?  No   Unsuccessful attempts  Attempt 1          Leia Cárdenas RN

## 2020-10-14 ENCOUNTER — HOSPITAL ENCOUNTER (EMERGENCY)
Facility: HOSPITAL | Age: 80
Discharge: HOME | End: 2020-10-14
Attending: EMERGENCY MEDICINE | Admitting: EMERGENCY MEDICINE

## 2020-10-14 VITALS
HEIGHT: 73 IN | BODY MASS INDEX: 40.99 KG/M2 | TEMPERATURE: 98 F | OXYGEN SATURATION: 93 % | HEART RATE: 77 BPM | SYSTOLIC BLOOD PRESSURE: 138 MMHG | RESPIRATION RATE: 20 BRPM | DIASTOLIC BLOOD PRESSURE: 71 MMHG | WEIGHT: 309.3 LBS

## 2020-10-14 DIAGNOSIS — L03.116 CELLULITIS OF LEFT LOWER EXTREMITY: Primary | ICD-10-CM

## 2020-10-14 LAB
ALBUMIN SERPL-MCNC: 4.2 G/DL (ref 3.5–5.2)
ALBUMIN/GLOB SERPL: 1.4 G/DL
ALP SERPL-CCNC: 94 U/L (ref 39–117)
ALT SERPL W P-5'-P-CCNC: 13 U/L (ref 1–41)
ANION GAP SERPL CALCULATED.3IONS-SCNC: 7.4 MMOL/L (ref 5–15)
AST SERPL-CCNC: 18 U/L (ref 1–40)
BASOPHILS # BLD AUTO: 0.06 10*3/MM3 (ref 0–0.2)
BASOPHILS NFR BLD AUTO: 0.9 % (ref 0–1.5)
BILIRUB SERPL-MCNC: 0.3 MG/DL (ref 0–1.2)
BUN SERPL-MCNC: 42 MG/DL (ref 8–23)
BUN/CREAT SERPL: 20.2 (ref 7–25)
CALCIUM SPEC-SCNC: 9.6 MG/DL (ref 8.6–10.5)
CHLORIDE SERPL-SCNC: 101 MMOL/L (ref 98–107)
CO2 SERPL-SCNC: 27.6 MMOL/L (ref 22–29)
CREAT SERPL-MCNC: 2.08 MG/DL (ref 0.76–1.27)
D-LACTATE SERPL-SCNC: 0.5 MMOL/L (ref 0.5–2)
DEPRECATED RDW RBC AUTO: 48.5 FL (ref 37–54)
EOSINOPHIL # BLD AUTO: 0.51 10*3/MM3 (ref 0–0.4)
EOSINOPHIL NFR BLD AUTO: 7.9 % (ref 0.3–6.2)
ERYTHROCYTE [DISTWIDTH] IN BLOOD BY AUTOMATED COUNT: 15.6 % (ref 12.3–15.4)
GFR SERPL CREATININE-BSD FRML MDRD: 31 ML/MIN/1.73
GLOBULIN UR ELPH-MCNC: 3.1 GM/DL
GLUCOSE SERPL-MCNC: 158 MG/DL (ref 65–99)
HCT VFR BLD AUTO: 37.1 % (ref 37.5–51)
HGB BLD-MCNC: 10.8 G/DL (ref 13–17.7)
IMM GRANULOCYTES # BLD AUTO: 0.01 10*3/MM3 (ref 0–0.05)
IMM GRANULOCYTES NFR BLD AUTO: 0.2 % (ref 0–0.5)
LYMPHOCYTES # BLD AUTO: 1.08 10*3/MM3 (ref 0.7–3.1)
LYMPHOCYTES NFR BLD AUTO: 16.6 % (ref 19.6–45.3)
MCH RBC QN AUTO: 24.8 PG (ref 26.6–33)
MCHC RBC AUTO-ENTMCNC: 29.1 G/DL (ref 31.5–35.7)
MCV RBC AUTO: 85.1 FL (ref 79–97)
MONOCYTES # BLD AUTO: 0.69 10*3/MM3 (ref 0.1–0.9)
MONOCYTES NFR BLD AUTO: 10.6 % (ref 5–12)
NEUTROPHILS NFR BLD AUTO: 4.14 10*3/MM3 (ref 1.7–7)
NEUTROPHILS NFR BLD AUTO: 63.8 % (ref 42.7–76)
PLATELET # BLD AUTO: 174 10*3/MM3 (ref 140–450)
PMV BLD AUTO: 11.4 FL (ref 6–12)
POTASSIUM SERPL-SCNC: 4.7 MMOL/L (ref 3.5–5.2)
PROT SERPL-MCNC: 7.3 G/DL (ref 6–8.5)
RBC # BLD AUTO: 4.36 10*6/MM3 (ref 4.14–5.8)
SODIUM SERPL-SCNC: 136 MMOL/L (ref 136–145)
WBC # BLD AUTO: 6.49 10*3/MM3 (ref 3.4–10.8)

## 2020-10-14 PROCEDURE — 85025 COMPLETE CBC W/AUTO DIFF WBC: CPT | Performed by: EMERGENCY MEDICINE

## 2020-10-14 PROCEDURE — 87186 SC STD MICRODIL/AGAR DIL: CPT | Performed by: EMERGENCY MEDICINE

## 2020-10-14 PROCEDURE — 87147 CULTURE TYPE IMMUNOLOGIC: CPT | Performed by: EMERGENCY MEDICINE

## 2020-10-14 PROCEDURE — 96365 THER/PROPH/DIAG IV INF INIT: CPT

## 2020-10-14 PROCEDURE — 25010000003 CEFAZOLIN SODIUM-DEXTROSE 1-4 GM-%(50ML) RECONSTITUTED SOLUTION: Performed by: EMERGENCY MEDICINE

## 2020-10-14 PROCEDURE — 80053 COMPREHEN METABOLIC PANEL: CPT | Performed by: EMERGENCY MEDICINE

## 2020-10-14 PROCEDURE — 83605 ASSAY OF LACTIC ACID: CPT | Performed by: EMERGENCY MEDICINE

## 2020-10-14 PROCEDURE — 99283 EMERGENCY DEPT VISIT LOW MDM: CPT | Performed by: EMERGENCY MEDICINE

## 2020-10-14 PROCEDURE — 87205 SMEAR GRAM STAIN: CPT | Performed by: EMERGENCY MEDICINE

## 2020-10-14 PROCEDURE — 87070 CULTURE OTHR SPECIMN AEROBIC: CPT | Performed by: EMERGENCY MEDICINE

## 2020-10-14 PROCEDURE — 99284 EMERGENCY DEPT VISIT MOD MDM: CPT

## 2020-10-14 RX ORDER — SODIUM CHLORIDE 0.9 % (FLUSH) 0.9 %
10 SYRINGE (ML) INJECTION AS NEEDED
Status: DISCONTINUED | OUTPATIENT
Start: 2020-10-14 | End: 2020-10-14 | Stop reason: HOSPADM

## 2020-10-14 RX ORDER — ATORVASTATIN CALCIUM 10 MG/1
10 TABLET, FILM COATED ORAL DAILY
COMMUNITY
End: 2021-01-01

## 2020-10-14 RX ORDER — DOXYCYCLINE 100 MG/1
100 CAPSULE ORAL 2 TIMES DAILY
Qty: 20 CAPSULE | Refills: 0 | Status: SHIPPED | OUTPATIENT
Start: 2020-10-14 | End: 2020-10-24

## 2020-10-14 RX ORDER — CEFAZOLIN SODIUM 1 G/50ML
1 SOLUTION INTRAVENOUS ONCE
Status: COMPLETED | OUTPATIENT
Start: 2020-10-14 | End: 2020-10-14

## 2020-10-14 RX ADMIN — CEFAZOLIN SODIUM 1 G: 1 SOLUTION INTRAVENOUS at 14:39

## 2020-10-14 NOTE — DISCHARGE INSTRUCTIONS
Return to the emergency room if you develop vomiting cannot take your medications, if you develop fevers, worsening symptoms or for any other concerns.

## 2020-10-16 LAB
BACTERIA SPEC AEROBE CULT: ABNORMAL
BACTERIA SPEC AEROBE CULT: ABNORMAL
GRAM STN SPEC: ABNORMAL
GRAM STN SPEC: ABNORMAL
STREP GROUPING: ABNORMAL

## 2021-01-01 ENCOUNTER — ANESTHESIA (OUTPATIENT)
Dept: PERIOP | Facility: HOSPITAL | Age: 81
End: 2021-01-01

## 2021-01-01 ENCOUNTER — APPOINTMENT (OUTPATIENT)
Dept: CT IMAGING | Facility: HOSPITAL | Age: 81
End: 2021-01-01

## 2021-01-01 ENCOUNTER — TELEPHONE (OUTPATIENT)
Dept: CARDIOLOGY | Facility: CLINIC | Age: 81
End: 2021-01-01

## 2021-01-01 ENCOUNTER — APPOINTMENT (OUTPATIENT)
Dept: GENERAL RADIOLOGY | Facility: HOSPITAL | Age: 81
End: 2021-01-01

## 2021-01-01 ENCOUNTER — HOSPITAL ENCOUNTER (EMERGENCY)
Facility: HOSPITAL | Age: 81
Discharge: HOME | End: 2021-11-09
Attending: EMERGENCY MEDICINE | Admitting: EMERGENCY MEDICINE

## 2021-01-01 ENCOUNTER — APPOINTMENT (OUTPATIENT)
Dept: ULTRASOUND IMAGING | Facility: HOSPITAL | Age: 81
End: 2021-01-01

## 2021-01-01 ENCOUNTER — HOSPITAL ENCOUNTER (INPATIENT)
Facility: HOSPITAL | Age: 81
LOS: 10 days | Discharge: HOME HEALTH | End: 2021-12-20
Attending: EMERGENCY MEDICINE | Admitting: INTERNAL MEDICINE

## 2021-01-01 ENCOUNTER — HOSPITAL ENCOUNTER (EMERGENCY)
Facility: HOSPITAL | Age: 81
Discharge: HOME | End: 2021-10-08
Attending: EMERGENCY MEDICINE | Admitting: EMERGENCY MEDICINE

## 2021-01-01 ENCOUNTER — LAB REQUISITION (OUTPATIENT)
Dept: LAB | Facility: HOSPITAL | Age: 81
End: 2021-01-01

## 2021-01-01 ENCOUNTER — OFFICE VISIT (OUTPATIENT)
Dept: CARDIOLOGY | Facility: CLINIC | Age: 81
End: 2021-01-01

## 2021-01-01 ENCOUNTER — HOSPITAL ENCOUNTER (OUTPATIENT)
Facility: HOSPITAL | Age: 81
Setting detail: OBSERVATION
Discharge: NURSING, REHAB OR LONG-TERM CARE | End: 2021-07-21
Attending: EMERGENCY MEDICINE | Admitting: HOSPITALIST

## 2021-01-01 ENCOUNTER — HOSPITAL ENCOUNTER (INPATIENT)
Facility: HOSPITAL | Age: 81
LOS: 14 days | Discharge: LEFT BEFORE DISCHARGE | End: 2022-01-11
Attending: EMERGENCY MEDICINE | Admitting: SURGERY

## 2021-01-01 ENCOUNTER — READMISSION MANAGEMENT (OUTPATIENT)
Dept: CALL CENTER | Facility: HOSPITAL | Age: 81
End: 2021-01-01

## 2021-01-01 ENCOUNTER — HOSPITAL ENCOUNTER (OUTPATIENT)
Facility: HOSPITAL | Age: 81
Setting detail: OBSERVATION
Discharge: HOME | End: 2021-07-08
Attending: EMERGENCY MEDICINE | Admitting: HOSPITALIST

## 2021-01-01 ENCOUNTER — HOSPITAL ENCOUNTER (INPATIENT)
Facility: HOSPITAL | Age: 81
LOS: 6 days | Discharge: NURSING, REHAB OR LONG-TERM CARE | End: 2021-08-03
Attending: EMERGENCY MEDICINE | Admitting: INTERNAL MEDICINE

## 2021-01-01 ENCOUNTER — HOSPITAL ENCOUNTER (EMERGENCY)
Facility: HOSPITAL | Age: 81
Discharge: HOME | End: 2021-10-06
Attending: EMERGENCY MEDICINE | Admitting: EMERGENCY MEDICINE

## 2021-01-01 ENCOUNTER — HOSPITAL ENCOUNTER (EMERGENCY)
Facility: HOSPITAL | Age: 81
Discharge: HOME | End: 2021-12-06
Attending: EMERGENCY MEDICINE | Admitting: EMERGENCY MEDICINE

## 2021-01-01 ENCOUNTER — HOSPITAL ENCOUNTER (EMERGENCY)
Facility: HOSPITAL | Age: 81
Discharge: HOME | End: 2021-07-27
Attending: EMERGENCY MEDICINE | Admitting: EMERGENCY MEDICINE

## 2021-01-01 ENCOUNTER — ANESTHESIA EVENT (OUTPATIENT)
Dept: PERIOP | Facility: HOSPITAL | Age: 81
End: 2021-01-01

## 2021-01-01 ENCOUNTER — APPOINTMENT (OUTPATIENT)
Dept: CARDIOLOGY | Facility: HOSPITAL | Age: 81
End: 2021-01-01

## 2021-01-01 VITALS
HEIGHT: 73 IN | HEART RATE: 67 BPM | OXYGEN SATURATION: 97 % | DIASTOLIC BLOOD PRESSURE: 53 MMHG | RESPIRATION RATE: 18 BRPM | SYSTOLIC BLOOD PRESSURE: 108 MMHG | WEIGHT: 245.3 LBS | TEMPERATURE: 98.2 F | BODY MASS INDEX: 32.51 KG/M2

## 2021-01-01 VITALS
RESPIRATION RATE: 16 BRPM | DIASTOLIC BLOOD PRESSURE: 64 MMHG | SYSTOLIC BLOOD PRESSURE: 124 MMHG | HEIGHT: 73 IN | HEART RATE: 59 BPM | TEMPERATURE: 98.5 F | WEIGHT: 234.79 LBS | BODY MASS INDEX: 31.12 KG/M2 | OXYGEN SATURATION: 96 %

## 2021-01-01 VITALS
BODY MASS INDEX: 31.19 KG/M2 | OXYGEN SATURATION: 95 % | RESPIRATION RATE: 16 BRPM | DIASTOLIC BLOOD PRESSURE: 66 MMHG | WEIGHT: 230 LBS | TEMPERATURE: 98.2 F | SYSTOLIC BLOOD PRESSURE: 150 MMHG | HEART RATE: 78 BPM

## 2021-01-01 VITALS
BODY MASS INDEX: 31.87 KG/M2 | SYSTOLIC BLOOD PRESSURE: 111 MMHG | WEIGHT: 240.5 LBS | DIASTOLIC BLOOD PRESSURE: 60 MMHG | HEIGHT: 73 IN | HEART RATE: 58 BPM | RESPIRATION RATE: 16 BRPM | TEMPERATURE: 97.7 F | OXYGEN SATURATION: 96 %

## 2021-01-01 VITALS
SYSTOLIC BLOOD PRESSURE: 104 MMHG | BODY MASS INDEX: 31.84 KG/M2 | DIASTOLIC BLOOD PRESSURE: 50 MMHG | HEIGHT: 72 IN | RESPIRATION RATE: 16 BRPM | HEART RATE: 73 BPM

## 2021-01-01 VITALS
DIASTOLIC BLOOD PRESSURE: 78 MMHG | TEMPERATURE: 97.6 F | HEART RATE: 93 BPM | OXYGEN SATURATION: 92 % | SYSTOLIC BLOOD PRESSURE: 176 MMHG | WEIGHT: 245.8 LBS | RESPIRATION RATE: 20 BRPM | HEIGHT: 73 IN | BODY MASS INDEX: 32.58 KG/M2

## 2021-01-01 VITALS
HEART RATE: 69 BPM | RESPIRATION RATE: 20 BRPM | OXYGEN SATURATION: 98 % | HEIGHT: 71 IN | WEIGHT: 230 LBS | BODY MASS INDEX: 32.2 KG/M2 | SYSTOLIC BLOOD PRESSURE: 159 MMHG | TEMPERATURE: 97.7 F | DIASTOLIC BLOOD PRESSURE: 84 MMHG

## 2021-01-01 VITALS
WEIGHT: 244.71 LBS | HEART RATE: 80 BPM | TEMPERATURE: 97.3 F | BODY MASS INDEX: 30.43 KG/M2 | RESPIRATION RATE: 18 BRPM | OXYGEN SATURATION: 92 % | HEIGHT: 75 IN | SYSTOLIC BLOOD PRESSURE: 170 MMHG | DIASTOLIC BLOOD PRESSURE: 88 MMHG

## 2021-01-01 VITALS
SYSTOLIC BLOOD PRESSURE: 161 MMHG | BODY MASS INDEX: 30.62 KG/M2 | WEIGHT: 231 LBS | HEART RATE: 66 BPM | OXYGEN SATURATION: 91 % | RESPIRATION RATE: 16 BRPM | HEIGHT: 73 IN | DIASTOLIC BLOOD PRESSURE: 86 MMHG | TEMPERATURE: 98.9 F

## 2021-01-01 VITALS
TEMPERATURE: 97.8 F | OXYGEN SATURATION: 90 % | RESPIRATION RATE: 18 BRPM | HEART RATE: 70 BPM | DIASTOLIC BLOOD PRESSURE: 96 MMHG | HEIGHT: 73 IN | BODY MASS INDEX: 30.31 KG/M2 | SYSTOLIC BLOOD PRESSURE: 174 MMHG | WEIGHT: 228.7 LBS

## 2021-01-01 DIAGNOSIS — I10 ESSENTIAL HYPERTENSION: Chronic | ICD-10-CM

## 2021-01-01 DIAGNOSIS — N18.4 CHRONIC RENAL INSUFFICIENCY, STAGE 4 (SEVERE) (HCC): ICD-10-CM
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
## 2021-01-01 DIAGNOSIS — R53.1 GENERALIZED WEAKNESS: Primary | ICD-10-CM

## 2021-01-01 DIAGNOSIS — S00.83XA HEMATOMA OF OCCIPITAL SURFACE OF HEAD, INITIAL ENCOUNTER: ICD-10-CM

## 2021-01-01 DIAGNOSIS — I50.33 ACUTE ON CHRONIC DIASTOLIC CONGESTIVE HEART FAILURE (HCC): Primary | ICD-10-CM

## 2021-01-01 DIAGNOSIS — S91.302A OPEN WOUND OF LEFT FOOT, INITIAL ENCOUNTER: ICD-10-CM

## 2021-01-01 DIAGNOSIS — N39.0 ACUTE UTI: Primary | ICD-10-CM

## 2021-01-01 DIAGNOSIS — N18.9 ACUTE RENAL FAILURE SUPERIMPOSED ON CHRONIC KIDNEY DISEASE, UNSPECIFIED CKD STAGE, UNSPECIFIED ACUTE RENAL FAILURE TYPE: Primary | ICD-10-CM

## 2021-01-01 DIAGNOSIS — G93.41 ACUTE METABOLIC ENCEPHALOPATHY: Primary | ICD-10-CM

## 2021-01-01 DIAGNOSIS — S40.012A CONTUSION OF LEFT SHOULDER, INITIAL ENCOUNTER: ICD-10-CM

## 2021-01-01 DIAGNOSIS — I25.10 CORONARY ARTERY DISEASE INVOLVING NATIVE CORONARY ARTERY OF NATIVE HEART WITHOUT ANGINA PECTORIS: ICD-10-CM

## 2021-01-01 DIAGNOSIS — L89.223 PRESSURE INJURY OF LEFT THIGH, STAGE 3 (HCC): ICD-10-CM

## 2021-01-01 DIAGNOSIS — R31.9 URINARY TRACT INFECTION WITH HEMATURIA, SITE UNSPECIFIED: ICD-10-CM

## 2021-01-01 DIAGNOSIS — E11.40 TYPE 2 DIABETES MELLITUS WITH DIABETIC NEUROPATHY, UNSPECIFIED WHETHER LONG TERM INSULIN USE (HCC): ICD-10-CM

## 2021-01-01 DIAGNOSIS — S70.02XA CONTUSION OF LEFT HIP, INITIAL ENCOUNTER: ICD-10-CM

## 2021-01-01 DIAGNOSIS — R41.0 EPISODIC CONFUSION: ICD-10-CM

## 2021-01-01 DIAGNOSIS — N18.9 CHRONIC KIDNEY DISEASE, UNSPECIFIED CKD STAGE: ICD-10-CM

## 2021-01-01 DIAGNOSIS — E78.5 HYPERLIPIDEMIA, UNSPECIFIED HYPERLIPIDEMIA TYPE: ICD-10-CM

## 2021-01-01 DIAGNOSIS — M25.559 HIP PAIN: ICD-10-CM

## 2021-01-01 DIAGNOSIS — R77.8 ELEVATED TROPONIN: ICD-10-CM

## 2021-01-01 DIAGNOSIS — I50.9 ACUTE ON CHRONIC CONGESTIVE HEART FAILURE, UNSPECIFIED HEART FAILURE TYPE (HCC): ICD-10-CM

## 2021-01-01 DIAGNOSIS — T79.6XXA TRAUMATIC RHABDOMYOLYSIS, INITIAL ENCOUNTER (HCC): ICD-10-CM

## 2021-01-01 DIAGNOSIS — N39.0 URINARY TRACT INFECTION WITH HEMATURIA, SITE UNSPECIFIED: ICD-10-CM

## 2021-01-01 DIAGNOSIS — N17.9 ACUTE RENAL FAILURE SUPERIMPOSED ON CHRONIC KIDNEY DISEASE, UNSPECIFIED CKD STAGE, UNSPECIFIED ACUTE RENAL FAILURE TYPE: Primary | ICD-10-CM

## 2021-01-01 DIAGNOSIS — S80.01XA CONTUSION OF RIGHT KNEE, INITIAL ENCOUNTER: ICD-10-CM

## 2021-01-01 DIAGNOSIS — I48.21 PERMANENT ATRIAL FIBRILLATION (HCC): Chronic | ICD-10-CM

## 2021-01-01 DIAGNOSIS — E86.0 DEHYDRATION: ICD-10-CM

## 2021-01-01 DIAGNOSIS — R53.1 GENERALIZED WEAKNESS: ICD-10-CM

## 2021-01-01 DIAGNOSIS — R31.0 GROSS HEMATURIA: Primary | ICD-10-CM

## 2021-01-01 DIAGNOSIS — R53.1 WEAKNESS: ICD-10-CM

## 2021-01-01 DIAGNOSIS — I50.43 ACUTE ON CHRONIC COMBINED SYSTOLIC AND DIASTOLIC CHF (CONGESTIVE HEART FAILURE) (HCC): Primary | Chronic | ICD-10-CM

## 2021-01-01 DIAGNOSIS — J90 RECURRENT LEFT PLEURAL EFFUSION: Primary | ICD-10-CM

## 2021-01-01 DIAGNOSIS — N39.0 URINARY TRACT INFECTION IN MALE: ICD-10-CM

## 2021-01-01 DIAGNOSIS — L89.229 DECUBITUS ULCER OF TROCHANTERIC REGION OF LEFT HIP, UNSPECIFIED ULCER STAGE: ICD-10-CM

## 2021-01-01 LAB
25(OH)D3 SERPL-MCNC: 15.8 NG/ML (ref 30–100)
ALBUMIN SERPL-MCNC: 2.5 G/DL (ref 3.5–5.2)
ALBUMIN SERPL-MCNC: 2.6 G/DL (ref 3.5–5.2)
ALBUMIN SERPL-MCNC: 3 G/DL (ref 3.5–5.2)
ALBUMIN SERPL-MCNC: 3 G/DL (ref 3.5–5.2)
ALBUMIN SERPL-MCNC: 3.1 G/DL (ref 3.5–5.2)
ALBUMIN SERPL-MCNC: 3.1 G/DL (ref 3.5–5.2)
ALBUMIN SERPL-MCNC: 3.3 G/DL (ref 3.5–5.2)
ALBUMIN SERPL-MCNC: 3.4 G/DL (ref 3.5–5.2)
ALBUMIN SERPL-MCNC: 3.5 G/DL (ref 3.5–5.2)
ALBUMIN SERPL-MCNC: 3.5 G/DL (ref 3.5–5.2)
ALBUMIN SERPL-MCNC: 3.8 G/DL (ref 3.5–5.2)
ALBUMIN SERPL-MCNC: 4 G/DL (ref 3.5–5.2)
ALBUMIN/GLOB SERPL: 0.8 G/DL
ALBUMIN/GLOB SERPL: 0.8 G/DL
ALBUMIN/GLOB SERPL: 0.9 G/DL
ALBUMIN/GLOB SERPL: 1 G/DL
ALBUMIN/GLOB SERPL: 1.1 G/DL
ALBUMIN/GLOB SERPL: 1.2 G/DL
ALBUMIN/GLOB SERPL: 1.2 G/DL
ALBUMIN/GLOB SERPL: 1.3 G/DL
ALBUMIN/GLOB SERPL: 1.5 G/DL
ALP SERPL-CCNC: 103 U/L (ref 39–117)
ALP SERPL-CCNC: 59 U/L (ref 39–117)
ALP SERPL-CCNC: 64 U/L (ref 39–117)
ALP SERPL-CCNC: 74 U/L (ref 39–117)
ALP SERPL-CCNC: 75 U/L (ref 39–117)
ALP SERPL-CCNC: 78 U/L (ref 39–117)
ALP SERPL-CCNC: 81 U/L (ref 39–117)
ALP SERPL-CCNC: 84 U/L (ref 39–117)
ALP SERPL-CCNC: 85 U/L (ref 39–117)
ALP SERPL-CCNC: 86 U/L (ref 39–117)
ALP SERPL-CCNC: 86 U/L (ref 39–117)
ALP SERPL-CCNC: 90 U/L (ref 39–117)
ALT SERPL W P-5'-P-CCNC: 10 U/L (ref 1–41)
ALT SERPL W P-5'-P-CCNC: 12 U/L (ref 1–41)
ALT SERPL W P-5'-P-CCNC: 12 U/L (ref 1–41)
ALT SERPL W P-5'-P-CCNC: 13 U/L (ref 1–41)
ALT SERPL W P-5'-P-CCNC: 17 U/L (ref 1–41)
ALT SERPL W P-5'-P-CCNC: 18 U/L (ref 1–41)
ALT SERPL W P-5'-P-CCNC: 25 U/L (ref 1–41)
ALT SERPL W P-5'-P-CCNC: 32 U/L (ref 1–41)
ALT SERPL W P-5'-P-CCNC: 6 U/L (ref 1–41)
ALT SERPL W P-5'-P-CCNC: 6 U/L (ref 1–41)
ALT SERPL W P-5'-P-CCNC: 9 U/L (ref 1–41)
ALT SERPL W P-5'-P-CCNC: 9 U/L (ref 1–41)
AMMONIA BLD-SCNC: 18 UMOL/L (ref 16–60)
AMPHET+METHAMPHET UR QL: NEGATIVE
AMPHET+METHAMPHET UR QL: NEGATIVE
AMPHETAMINES UR QL: NEGATIVE
AMPHETAMINES UR QL: NEGATIVE
ANION GAP SERPL CALCULATED.3IONS-SCNC: 10 MMOL/L (ref 5–15)
ANION GAP SERPL CALCULATED.3IONS-SCNC: 10.6 MMOL/L (ref 5–15)
ANION GAP SERPL CALCULATED.3IONS-SCNC: 11 MMOL/L (ref 5–15)
ANION GAP SERPL CALCULATED.3IONS-SCNC: 11.3 MMOL/L (ref 5–15)
ANION GAP SERPL CALCULATED.3IONS-SCNC: 11.4 MMOL/L (ref 5–15)
ANION GAP SERPL CALCULATED.3IONS-SCNC: 11.4 MMOL/L (ref 5–15)
ANION GAP SERPL CALCULATED.3IONS-SCNC: 11.5 MMOL/L (ref 5–15)
ANION GAP SERPL CALCULATED.3IONS-SCNC: 11.6 MMOL/L (ref 5–15)
ANION GAP SERPL CALCULATED.3IONS-SCNC: 11.9 MMOL/L (ref 5–15)
ANION GAP SERPL CALCULATED.3IONS-SCNC: 12.3 MMOL/L (ref 5–15)
ANION GAP SERPL CALCULATED.3IONS-SCNC: 12.5 MMOL/L (ref 5–15)
ANION GAP SERPL CALCULATED.3IONS-SCNC: 12.6 MMOL/L (ref 5–15)
ANION GAP SERPL CALCULATED.3IONS-SCNC: 12.9 MMOL/L (ref 5–15)
ANION GAP SERPL CALCULATED.3IONS-SCNC: 13 MMOL/L (ref 5–15)
ANION GAP SERPL CALCULATED.3IONS-SCNC: 13.6 MMOL/L (ref 5–15)
ANION GAP SERPL CALCULATED.3IONS-SCNC: 14.2 MMOL/L (ref 5–15)
ANION GAP SERPL CALCULATED.3IONS-SCNC: 6.5 MMOL/L (ref 5–15)
ANION GAP SERPL CALCULATED.3IONS-SCNC: 6.8 MMOL/L (ref 5–15)
ANION GAP SERPL CALCULATED.3IONS-SCNC: 7.3 MMOL/L (ref 5–15)
ANION GAP SERPL CALCULATED.3IONS-SCNC: 7.8 MMOL/L (ref 5–15)
ANION GAP SERPL CALCULATED.3IONS-SCNC: 7.9 MMOL/L (ref 5–15)
ANION GAP SERPL CALCULATED.3IONS-SCNC: 7.9 MMOL/L (ref 5–15)
ANION GAP SERPL CALCULATED.3IONS-SCNC: 8 MMOL/L (ref 5–15)
ANION GAP SERPL CALCULATED.3IONS-SCNC: 8.1 MMOL/L (ref 5–15)
ANION GAP SERPL CALCULATED.3IONS-SCNC: 8.7 MMOL/L (ref 5–15)
ANION GAP SERPL CALCULATED.3IONS-SCNC: 8.8 MMOL/L (ref 5–15)
ANION GAP SERPL CALCULATED.3IONS-SCNC: 8.9 MMOL/L (ref 5–15)
ANION GAP SERPL CALCULATED.3IONS-SCNC: 9.1 MMOL/L (ref 5–15)
ANION GAP SERPL CALCULATED.3IONS-SCNC: 9.4 MMOL/L (ref 5–15)
ANION GAP SERPL CALCULATED.3IONS-SCNC: 9.6 MMOL/L (ref 5–15)
AORTIC DIMENSIONLESS INDEX: 0.2 (DI)
ASCENDING AORTA: 3.4 CM
AST SERPL-CCNC: 12 U/L (ref 1–40)
AST SERPL-CCNC: 12 U/L (ref 1–40)
AST SERPL-CCNC: 13 U/L (ref 1–40)
AST SERPL-CCNC: 14 U/L (ref 1–40)
AST SERPL-CCNC: 14 U/L (ref 1–40)
AST SERPL-CCNC: 16 U/L (ref 1–40)
AST SERPL-CCNC: 22 U/L (ref 1–40)
AST SERPL-CCNC: 30 U/L (ref 1–40)
AST SERPL-CCNC: 30 U/L (ref 1–40)
AST SERPL-CCNC: 31 U/L (ref 1–40)
AST SERPL-CCNC: 38 U/L (ref 1–40)
AST SERPL-CCNC: 59 U/L (ref 1–40)
BACTERIA BLD CULT: ABNORMAL
BACTERIA SPEC AEROBE CULT: ABNORMAL
BACTERIA SPEC AEROBE CULT: NO GROWTH
BACTERIA SPEC AEROBE CULT: NORMAL
BACTERIA UR QL AUTO: ABNORMAL /HPF
BARBITURATES UR QL SCN: NEGATIVE
BARBITURATES UR QL SCN: NEGATIVE
BASOPHILS # BLD AUTO: 0.02 10*3/MM3 (ref 0–0.2)
BASOPHILS # BLD AUTO: 0.02 10*3/MM3 (ref 0–0.2)
BASOPHILS # BLD AUTO: 0.03 10*3/MM3 (ref 0–0.2)
BASOPHILS # BLD AUTO: 0.04 10*3/MM3 (ref 0–0.2)
BASOPHILS # BLD AUTO: 0.05 10*3/MM3 (ref 0–0.2)
BASOPHILS # BLD AUTO: 0.05 10*3/MM3 (ref 0–0.2)
BASOPHILS # BLD AUTO: 0.06 10*3/MM3 (ref 0–0.2)
BASOPHILS # BLD AUTO: 0.06 10*3/MM3 (ref 0–0.2)
BASOPHILS # BLD AUTO: 0.07 10*3/MM3 (ref 0–0.2)
BASOPHILS NFR BLD AUTO: 0.3 % (ref 0–1.5)
BASOPHILS NFR BLD AUTO: 0.4 % (ref 0–1.5)
BASOPHILS NFR BLD AUTO: 0.5 % (ref 0–1.5)
BASOPHILS NFR BLD AUTO: 0.6 % (ref 0–1.5)
BENZODIAZ UR QL SCN: NEGATIVE
BENZODIAZ UR QL SCN: POSITIVE
BH CV ECHO MEAS - ACS: 1.3 CM
BH CV ECHO MEAS - AO ACC TIME: 0.17 SEC
BH CV ECHO MEAS - AO MAX PG: 19 MMHG
BH CV ECHO MEAS - AO MEAN PG (FULL): 8.5 MMHG
BH CV ECHO MEAS - AO MEAN PG: 9.5 MMHG
BH CV ECHO MEAS - AO ROOT AREA (BSA CORRECTED): 1.8
BH CV ECHO MEAS - AO ROOT AREA: 13.2 CM^2
BH CV ECHO MEAS - AO ROOT DIAM: 4.1 CM
BH CV ECHO MEAS - AO V2 MAX: 219 CM/SEC
BH CV ECHO MEAS - AO V2 MEAN: 141.5 CM/SEC
BH CV ECHO MEAS - AO V2 VTI: 48.2 CM
BH CV ECHO MEAS - ASC AORTA: 3.4 CM
BH CV ECHO MEAS - AVA(I,A): 1.1 CM^2
BH CV ECHO MEAS - AVA(I,D): 1.1 CM^2
BH CV ECHO MEAS - BSA(HAYCOCK): 2.3 M^2
BH CV ECHO MEAS - BSA: 2.3 M^2
BH CV ECHO MEAS - BZI_BMI: 29.8 KILOGRAMS/M^2
BH CV ECHO MEAS - BZI_METRIC_HEIGHT: 185 CM
BH CV ECHO MEAS - BZI_METRIC_WEIGHT: 102 KG
BH CV ECHO MEAS - CONTRAST EF (2CH): 55.9 CM2
BH CV ECHO MEAS - CONTRAST EF 4CH: 56.6 CM2
BH CV ECHO MEAS - EDV(CUBED): 146.4 ML
BH CV ECHO MEAS - EDV(MOD-SP2): 194 ML
BH CV ECHO MEAS - EDV(MOD-SP4): 229 ML
BH CV ECHO MEAS - EDV(TEICH): 133.6 ML
BH CV ECHO MEAS - EF(CUBED): 53.9 %
BH CV ECHO MEAS - EF(MOD-BP): 55.8 %
BH CV ECHO MEAS - EF(TEICH): 45.4 %
BH CV ECHO MEAS - ESV(CUBED): 67.4 ML
BH CV ECHO MEAS - ESV(MOD-SP2): 85.5 ML
BH CV ECHO MEAS - ESV(MOD-SP4): 99.4 ML
BH CV ECHO MEAS - ESV(TEICH): 72.9 ML
BH CV ECHO MEAS - FS: 22.8 %
BH CV ECHO MEAS - IVS/LVPW: 0.89
BH CV ECHO MEAS - IVSD: 1.1 CM
BH CV ECHO MEAS - LAT PEAK E' VEL: 12.9 CM/SEC
BH CV ECHO MEAS - LV DIASTOLIC VOL/BSA (35-75): 101.4 ML/M^2
BH CV ECHO MEAS - LV MASS(C)D: 257.2 GRAMS
BH CV ECHO MEAS - LV MASS(C)DI: 113.9 GRAMS/M^2
BH CV ECHO MEAS - LV MAX PG: 1.2 MMHG
BH CV ECHO MEAS - LV MEAN PG: 1 MMHG
BH CV ECHO MEAS - LV SYSTOLIC VOL/BSA (12-30): 44 ML/M^2
BH CV ECHO MEAS - LV V1 MAX: 55 CM/SEC
BH CV ECHO MEAS - LV V1 MEAN: 42 CM/SEC
BH CV ECHO MEAS - LV V1 VTI: 13.5 CM
BH CV ECHO MEAS - LVIDD: 5.3 CM
BH CV ECHO MEAS - LVIDS: 4.1 CM
BH CV ECHO MEAS - LVLD AP2: 9.4 CM
BH CV ECHO MEAS - LVLD AP4: 10.1 CM
BH CV ECHO MEAS - LVLS AP2: 7.7 CM
BH CV ECHO MEAS - LVLS AP4: 8.4 CM
BH CV ECHO MEAS - LVOT AREA (M): 3.8 CM^2
BH CV ECHO MEAS - LVOT AREA: 3.8 CM^2
BH CV ECHO MEAS - LVOT DIAM: 2.2 CM
BH CV ECHO MEAS - LVPWD: 1.3 CM
BH CV ECHO MEAS - MED PEAK E' VEL: 7 CM/SEC
BH CV ECHO MEAS - MV DEC SLOPE: 474 CM/SEC^2
BH CV ECHO MEAS - MV DEC TIME: 120 SEC
BH CV ECHO MEAS - MV E MAX VEL: 119.3 CM/SEC
BH CV ECHO MEAS - MV MEAN PG: 2 MMHG
BH CV ECHO MEAS - MV P1/2T MAX VEL: 127 CM/SEC
BH CV ECHO MEAS - MV P1/2T: 78.5 MSEC
BH CV ECHO MEAS - MV V2 MEAN: 61.9 CM/SEC
BH CV ECHO MEAS - MV V2 VTI: 32 CM
BH CV ECHO MEAS - MVA P1/2T LCG: 1.7 CM^2
BH CV ECHO MEAS - MVA(P1/2T): 2.8 CM^2
BH CV ECHO MEAS - MVA(VTI): 1.6 CM^2
BH CV ECHO MEAS - PA ACC TIME: 0.11 SEC
BH CV ECHO MEAS - PA MAX PG: 1.9 MMHG
BH CV ECHO MEAS - PA PR(ACCEL): 31.3 MMHG
BH CV ECHO MEAS - PA V2 MAX: 69.3 CM/SEC
BH CV ECHO MEAS - PULM SYS VEL: 56.8 CM/SEC
BH CV ECHO MEAS - QP/QS: 0.95
BH CV ECHO MEAS - RAP SYSTOLE: 8 MMHG
BH CV ECHO MEAS - RV MEAN PG: 0 MMHG
BH CV ECHO MEAS - RV V1 MEAN: 20.4 CM/SEC
BH CV ECHO MEAS - RV V1 VTI: 6.9 CM
BH CV ECHO MEAS - RVOT AREA: 7.1 CM^2
BH CV ECHO MEAS - RVOT DIAM: 3 CM
BH CV ECHO MEAS - RVSP: 44 MMHG
BH CV ECHO MEAS - SI(AO): 281.7 ML/M^2
BH CV ECHO MEAS - SI(CUBED): 35 ML/M^2
BH CV ECHO MEAS - SI(LVOT): 22.7 ML/M^2
BH CV ECHO MEAS - SI(MOD-SP2): 48.1 ML/M^2
BH CV ECHO MEAS - SI(MOD-SP4): 57.4 ML/M^2
BH CV ECHO MEAS - SI(TEICH): 26.9 ML/M^2
BH CV ECHO MEAS - SV(AO): 636 ML
BH CV ECHO MEAS - SV(CUBED): 78.9 ML
BH CV ECHO MEAS - SV(LVOT): 51.3 ML
BH CV ECHO MEAS - SV(MOD-SP2): 108.5 ML
BH CV ECHO MEAS - SV(MOD-SP4): 129.6 ML
BH CV ECHO MEAS - SV(RVOT): 48.6 ML
BH CV ECHO MEAS - SV(TEICH): 60.6 ML
BH CV ECHO MEAS - TAPSE (>1.6): 1.8 CM
BH CV ECHO MEAS - TR MAX VEL: 297.5 CM/SEC
BH CV ECHO MEASUREMENTS AVERAGE E/E' RATIO: 11.99
BH CV VAS BP LEFT ARM: NORMAL MMHG
BH CV XLRA - RV BASE: 5.1 CM
BH CV XLRA - RV LENGTH: 9.6 CM
BH CV XLRA - RV MID: 4 CM
BH CV XLRA - TDI S': 10.3 CM/SEC
BILIRUB SERPL-MCNC: 0.2 MG/DL (ref 0–1.2)
BILIRUB SERPL-MCNC: 0.2 MG/DL (ref 0–1.2)
BILIRUB SERPL-MCNC: 0.3 MG/DL (ref 0–1.2)
BILIRUB SERPL-MCNC: 0.3 MG/DL (ref 0–1.2)
BILIRUB SERPL-MCNC: 0.4 MG/DL (ref 0–1.2)
BILIRUB SERPL-MCNC: 0.4 MG/DL (ref 0–1.2)
BILIRUB SERPL-MCNC: 0.5 MG/DL (ref 0–1.2)
BILIRUB SERPL-MCNC: 0.7 MG/DL (ref 0–1.2)
BILIRUB SERPL-MCNC: 0.8 MG/DL (ref 0–1.2)
BILIRUB SERPL-MCNC: 0.9 MG/DL (ref 0–1.2)
BILIRUB SERPL-MCNC: 1 MG/DL (ref 0–1.2)
BILIRUB SERPL-MCNC: 1 MG/DL (ref 0–1.2)
BILIRUB UR QL STRIP: NEGATIVE
BOTTLE TYPE: ABNORMAL
BUN SERPL-MCNC: 14 MG/DL (ref 8–23)
BUN SERPL-MCNC: 14 MG/DL (ref 8–23)
BUN SERPL-MCNC: 16 MG/DL (ref 8–23)
BUN SERPL-MCNC: 24 MG/DL (ref 8–23)
BUN SERPL-MCNC: 24 MG/DL (ref 8–23)
BUN SERPL-MCNC: 25 MG/DL (ref 8–23)
BUN SERPL-MCNC: 28 MG/DL (ref 8–23)
BUN SERPL-MCNC: 28 MG/DL (ref 8–23)
BUN SERPL-MCNC: 29 MG/DL (ref 8–23)
BUN SERPL-MCNC: 30 MG/DL (ref 8–23)
BUN SERPL-MCNC: 33 MG/DL (ref 8–23)
BUN SERPL-MCNC: 35 MG/DL (ref 8–23)
BUN SERPL-MCNC: 35 MG/DL (ref 8–23)
BUN SERPL-MCNC: 37 MG/DL (ref 8–23)
BUN SERPL-MCNC: 38 MG/DL (ref 8–23)
BUN SERPL-MCNC: 40 MG/DL (ref 8–23)
BUN SERPL-MCNC: 41 MG/DL (ref 8–23)
BUN SERPL-MCNC: 42 MG/DL (ref 8–23)
BUN SERPL-MCNC: 42 MG/DL (ref 8–23)
BUN SERPL-MCNC: 43 MG/DL (ref 8–23)
BUN SERPL-MCNC: 45 MG/DL (ref 8–23)
BUN SERPL-MCNC: 48 MG/DL (ref 8–23)
BUN SERPL-MCNC: 51 MG/DL (ref 8–23)
BUN SERPL-MCNC: 53 MG/DL (ref 8–23)
BUN/CREAT SERPL: 11 (ref 7–25)
BUN/CREAT SERPL: 12.7 (ref 7–25)
BUN/CREAT SERPL: 14.8 (ref 7–25)
BUN/CREAT SERPL: 14.9 (ref 7–25)
BUN/CREAT SERPL: 15 (ref 7–25)
BUN/CREAT SERPL: 15 (ref 7–25)
BUN/CREAT SERPL: 15.1 (ref 7–25)
BUN/CREAT SERPL: 15.2 (ref 7–25)
BUN/CREAT SERPL: 15.2 (ref 7–25)
BUN/CREAT SERPL: 15.8 (ref 7–25)
BUN/CREAT SERPL: 16.2 (ref 7–25)
BUN/CREAT SERPL: 16.8 (ref 7–25)
BUN/CREAT SERPL: 17.6 (ref 7–25)
BUN/CREAT SERPL: 17.7 (ref 7–25)
BUN/CREAT SERPL: 17.8 (ref 7–25)
BUN/CREAT SERPL: 17.9 (ref 7–25)
BUN/CREAT SERPL: 18.6 (ref 7–25)
BUN/CREAT SERPL: 19.2 (ref 7–25)
BUN/CREAT SERPL: 20 (ref 7–25)
BUN/CREAT SERPL: 21 (ref 7–25)
BUN/CREAT SERPL: 21.9 (ref 7–25)
BUN/CREAT SERPL: 22.1 (ref 7–25)
BUN/CREAT SERPL: 22.2 (ref 7–25)
BUN/CREAT SERPL: 22.8 (ref 7–25)
BUN/CREAT SERPL: 22.9 (ref 7–25)
BUN/CREAT SERPL: 23.4 (ref 7–25)
BUN/CREAT SERPL: 24 (ref 7–25)
BUN/CREAT SERPL: 24.3 (ref 7–25)
BUN/CREAT SERPL: 33.3 (ref 7–25)
BUN/CREAT SERPL: 9.5 (ref 7–25)
BUPRENORPHINE SERPL-MCNC: NEGATIVE NG/ML
BUPRENORPHINE SERPL-MCNC: NEGATIVE NG/ML
CALCIUM SPEC-SCNC: 10 MG/DL (ref 8.6–10.5)
CALCIUM SPEC-SCNC: 8.1 MG/DL (ref 8.6–10.5)
CALCIUM SPEC-SCNC: 8.2 MG/DL (ref 8.6–10.5)
CALCIUM SPEC-SCNC: 8.3 MG/DL (ref 8.6–10.5)
CALCIUM SPEC-SCNC: 8.4 MG/DL (ref 8.6–10.5)
CALCIUM SPEC-SCNC: 8.4 MG/DL (ref 8.6–10.5)
CALCIUM SPEC-SCNC: 8.5 MG/DL (ref 8.6–10.5)
CALCIUM SPEC-SCNC: 8.6 MG/DL (ref 8.6–10.5)
CALCIUM SPEC-SCNC: 8.7 MG/DL (ref 8.6–10.5)
CALCIUM SPEC-SCNC: 8.8 MG/DL (ref 8.6–10.5)
CALCIUM SPEC-SCNC: 8.9 MG/DL (ref 8.6–10.5)
CALCIUM SPEC-SCNC: 8.9 MG/DL (ref 8.6–10.5)
CALCIUM SPEC-SCNC: 9 MG/DL (ref 8.6–10.5)
CALCIUM SPEC-SCNC: 9 MG/DL (ref 8.6–10.5)
CALCIUM SPEC-SCNC: 9.1 MG/DL (ref 8.6–10.5)
CALCIUM SPEC-SCNC: 9.2 MG/DL (ref 8.6–10.5)
CALCIUM SPEC-SCNC: 9.3 MG/DL (ref 8.6–10.5)
CALCIUM SPEC-SCNC: 9.4 MG/DL (ref 8.6–10.5)
CALCIUM SPEC-SCNC: 9.4 MG/DL (ref 8.6–10.5)
CALCIUM SPEC-SCNC: 9.5 MG/DL (ref 8.6–10.5)
CALCIUM SPEC-SCNC: 9.5 MG/DL (ref 8.6–10.5)
CALCIUM SPEC-SCNC: 9.6 MG/DL (ref 8.6–10.5)
CALCIUM SPEC-SCNC: 9.6 MG/DL (ref 8.6–10.5)
CALCIUM SPEC-SCNC: 9.8 MG/DL (ref 8.6–10.5)
CANNABINOIDS SERPL QL: NEGATIVE
CANNABINOIDS SERPL QL: NEGATIVE
CHLORIDE SERPL-SCNC: 100 MMOL/L (ref 98–107)
CHLORIDE SERPL-SCNC: 101 MMOL/L (ref 98–107)
CHLORIDE SERPL-SCNC: 102 MMOL/L (ref 98–107)
CHLORIDE SERPL-SCNC: 103 MMOL/L (ref 98–107)
CHLORIDE SERPL-SCNC: 104 MMOL/L (ref 98–107)
CHLORIDE SERPL-SCNC: 104 MMOL/L (ref 98–107)
CHLORIDE SERPL-SCNC: 105 MMOL/L (ref 98–107)
CHLORIDE SERPL-SCNC: 106 MMOL/L (ref 98–107)
CHLORIDE SERPL-SCNC: 107 MMOL/L (ref 98–107)
CHLORIDE SERPL-SCNC: 107 MMOL/L (ref 98–107)
CHLORIDE SERPL-SCNC: 109 MMOL/L (ref 98–107)
CHLORIDE SERPL-SCNC: 109 MMOL/L (ref 98–107)
CHLORIDE SERPL-SCNC: 93 MMOL/L (ref 98–107)
CHLORIDE SERPL-SCNC: 94 MMOL/L (ref 98–107)
CHLORIDE SERPL-SCNC: 95 MMOL/L (ref 98–107)
CHLORIDE SERPL-SCNC: 96 MMOL/L (ref 98–107)
CHLORIDE SERPL-SCNC: 97 MMOL/L (ref 98–107)
CHLORIDE SERPL-SCNC: 98 MMOL/L (ref 98–107)
CHLORIDE UR-SCNC: 22 MMOL/L
CK SERPL-CCNC: 219 U/L (ref 20–200)
CK SERPL-CCNC: 39 U/L (ref 20–200)
CK SERPL-CCNC: 484 U/L (ref 20–200)
CK SERPL-CCNC: 917 U/L (ref 20–200)
CLARITY UR: ABNORMAL
CLARITY UR: CLEAR
CLARITY UR: CLEAR
CO2 SERPL-SCNC: 19.4 MMOL/L (ref 22–29)
CO2 SERPL-SCNC: 19.4 MMOL/L (ref 22–29)
CO2 SERPL-SCNC: 19.5 MMOL/L (ref 22–29)
CO2 SERPL-SCNC: 20.1 MMOL/L (ref 22–29)
CO2 SERPL-SCNC: 20.1 MMOL/L (ref 22–29)
CO2 SERPL-SCNC: 20.3 MMOL/L (ref 22–29)
CO2 SERPL-SCNC: 20.7 MMOL/L (ref 22–29)
CO2 SERPL-SCNC: 20.9 MMOL/L (ref 22–29)
CO2 SERPL-SCNC: 21 MMOL/L (ref 22–29)
CO2 SERPL-SCNC: 21.4 MMOL/L (ref 22–29)
CO2 SERPL-SCNC: 21.6 MMOL/L (ref 22–29)
CO2 SERPL-SCNC: 22.4 MMOL/L (ref 22–29)
CO2 SERPL-SCNC: 24 MMOL/L (ref 22–29)
CO2 SERPL-SCNC: 24.7 MMOL/L (ref 22–29)
CO2 SERPL-SCNC: 25 MMOL/L (ref 22–29)
CO2 SERPL-SCNC: 25.2 MMOL/L (ref 22–29)
CO2 SERPL-SCNC: 25.9 MMOL/L (ref 22–29)
CO2 SERPL-SCNC: 26 MMOL/L (ref 22–29)
CO2 SERPL-SCNC: 26.1 MMOL/L (ref 22–29)
CO2 SERPL-SCNC: 27.1 MMOL/L (ref 22–29)
CO2 SERPL-SCNC: 27.2 MMOL/L (ref 22–29)
CO2 SERPL-SCNC: 27.8 MMOL/L (ref 22–29)
CO2 SERPL-SCNC: 28.6 MMOL/L (ref 22–29)
CO2 SERPL-SCNC: 29.1 MMOL/L (ref 22–29)
CO2 SERPL-SCNC: 29.7 MMOL/L (ref 22–29)
CO2 SERPL-SCNC: 30.4 MMOL/L (ref 22–29)
CO2 SERPL-SCNC: 30.5 MMOL/L (ref 22–29)
CO2 SERPL-SCNC: 30.6 MMOL/L (ref 22–29)
CO2 SERPL-SCNC: 31.2 MMOL/L (ref 22–29)
CO2 SERPL-SCNC: 31.5 MMOL/L (ref 22–29)
COCAINE UR QL: NEGATIVE
COCAINE UR QL: NEGATIVE
COLOR UR: ABNORMAL
COLOR UR: ABNORMAL
COLOR UR: YELLOW
CREAT SERPL-MCNC: 1.26 MG/DL (ref 0.76–1.27)
CREAT SERPL-MCNC: 1.27 MG/DL (ref 0.76–1.27)
CREAT SERPL-MCNC: 1.27 MG/DL (ref 0.76–1.27)
CREAT SERPL-MCNC: 1.34 MG/DL (ref 0.76–1.27)
CREAT SERPL-MCNC: 1.35 MG/DL (ref 0.76–1.27)
CREAT SERPL-MCNC: 1.36 MG/DL (ref 0.76–1.27)
CREAT SERPL-MCNC: 1.41 MG/DL (ref 0.76–1.27)
CREAT SERPL-MCNC: 1.44 MG/DL (ref 0.76–1.27)
CREAT SERPL-MCNC: 1.45 MG/DL (ref 0.76–1.27)
CREAT SERPL-MCNC: 1.47 MG/DL (ref 0.76–1.27)
CREAT SERPL-MCNC: 1.51 MG/DL (ref 0.76–1.27)
CREAT SERPL-MCNC: 1.58 MG/DL (ref 0.76–1.27)
CREAT SERPL-MCNC: 1.69 MG/DL (ref 0.76–1.27)
CREAT SERPL-MCNC: 1.84 MG/DL (ref 0.76–1.27)
CREAT SERPL-MCNC: 1.86 MG/DL (ref 0.76–1.27)
CREAT SERPL-MCNC: 1.89 MG/DL (ref 0.76–1.27)
CREAT SERPL-MCNC: 1.95 MG/DL (ref 0.76–1.27)
CREAT SERPL-MCNC: 1.95 MG/DL (ref 0.76–1.27)
CREAT SERPL-MCNC: 1.98 MG/DL (ref 0.76–1.27)
CREAT SERPL-MCNC: 2.04 MG/DL (ref 0.76–1.27)
CREAT SERPL-MCNC: 2.1 MG/DL (ref 0.76–1.27)
CREAT SERPL-MCNC: 2.14 MG/DL (ref 0.76–1.27)
CREAT SERPL-MCNC: 2.16 MG/DL (ref 0.76–1.27)
CREAT SERPL-MCNC: 2.2 MG/DL (ref 0.76–1.27)
CREAT SERPL-MCNC: 2.21 MG/DL (ref 0.76–1.27)
CREAT SERPL-MCNC: 2.33 MG/DL (ref 0.76–1.27)
CREAT SERPL-MCNC: 2.54 MG/DL (ref 0.76–1.27)
CREAT SERPL-MCNC: 2.66 MG/DL (ref 0.76–1.27)
CREAT SERPL-MCNC: 2.68 MG/DL (ref 0.76–1.27)
CREAT SERPL-MCNC: 3.24 MG/DL (ref 0.76–1.27)
D DIMER PPP FEU-MCNC: 0.96 MCGFEU/ML (ref 0–0.46)
D-LACTATE SERPL-SCNC: 0.9 MMOL/L (ref 0.5–2)
D-LACTATE SERPL-SCNC: 1.2 MMOL/L (ref 0.5–2)
D-LACTATE SERPL-SCNC: 1.3 MMOL/L (ref 0.5–2)
D-LACTATE SERPL-SCNC: 1.5 MMOL/L (ref 0.5–2)
DEPRECATED RDW RBC AUTO: 41.3 FL (ref 37–54)
DEPRECATED RDW RBC AUTO: 42.5 FL (ref 37–54)
DEPRECATED RDW RBC AUTO: 43 FL (ref 37–54)
DEPRECATED RDW RBC AUTO: 43.5 FL (ref 37–54)
DEPRECATED RDW RBC AUTO: 43.8 FL (ref 37–54)
DEPRECATED RDW RBC AUTO: 43.9 FL (ref 37–54)
DEPRECATED RDW RBC AUTO: 44.2 FL (ref 37–54)
DEPRECATED RDW RBC AUTO: 44.2 FL (ref 37–54)
DEPRECATED RDW RBC AUTO: 44.4 FL (ref 37–54)
DEPRECATED RDW RBC AUTO: 45 FL (ref 37–54)
DEPRECATED RDW RBC AUTO: 45.2 FL (ref 37–54)
DEPRECATED RDW RBC AUTO: 45.3 FL (ref 37–54)
DEPRECATED RDW RBC AUTO: 45.6 FL (ref 37–54)
DEPRECATED RDW RBC AUTO: 46.6 FL (ref 37–54)
DEPRECATED RDW RBC AUTO: 46.9 FL (ref 37–54)
DEPRECATED RDW RBC AUTO: 47.3 FL (ref 37–54)
DEPRECATED RDW RBC AUTO: 47.4 FL (ref 37–54)
DEPRECATED RDW RBC AUTO: 47.5 FL (ref 37–54)
DEPRECATED RDW RBC AUTO: 48 FL (ref 37–54)
DEPRECATED RDW RBC AUTO: 48 FL (ref 37–54)
DEPRECATED RDW RBC AUTO: 48.3 FL (ref 37–54)
DEPRECATED RDW RBC AUTO: 48.8 FL (ref 37–54)
DEPRECATED RDW RBC AUTO: 48.9 FL (ref 37–54)
DEPRECATED RDW RBC AUTO: 49.4 FL (ref 37–54)
DEPRECATED RDW RBC AUTO: 50 FL (ref 37–54)
EOSINOPHIL # BLD AUTO: 0.09 10*3/MM3 (ref 0–0.4)
EOSINOPHIL # BLD AUTO: 0.1 10*3/MM3 (ref 0–0.4)
EOSINOPHIL # BLD AUTO: 0.29 10*3/MM3 (ref 0–0.4)
EOSINOPHIL # BLD AUTO: 0.34 10*3/MM3 (ref 0–0.4)
EOSINOPHIL # BLD AUTO: 0.36 10*3/MM3 (ref 0–0.4)
EOSINOPHIL # BLD AUTO: 0.39 10*3/MM3 (ref 0–0.4)
EOSINOPHIL # BLD AUTO: 0.43 10*3/MM3 (ref 0–0.4)
EOSINOPHIL # BLD AUTO: 0.44 10*3/MM3 (ref 0–0.4)
EOSINOPHIL # BLD AUTO: 0.45 10*3/MM3 (ref 0–0.4)
EOSINOPHIL # BLD AUTO: 0.46 10*3/MM3 (ref 0–0.4)
EOSINOPHIL # BLD AUTO: 0.47 10*3/MM3 (ref 0–0.4)
EOSINOPHIL # BLD AUTO: 0.47 10*3/MM3 (ref 0–0.4)
EOSINOPHIL # BLD AUTO: 0.53 10*3/MM3 (ref 0–0.4)
EOSINOPHIL # BLD AUTO: 0.55 10*3/MM3 (ref 0–0.4)
EOSINOPHIL # BLD AUTO: 0.56 10*3/MM3 (ref 0–0.4)
EOSINOPHIL # BLD AUTO: 0.57 10*3/MM3 (ref 0–0.4)
EOSINOPHIL # BLD AUTO: 0.57 10*3/MM3 (ref 0–0.4)
EOSINOPHIL # BLD AUTO: 0.86 10*3/MM3 (ref 0–0.4)
EOSINOPHIL NFR BLD AUTO: 0.9 % (ref 0.3–6.2)
EOSINOPHIL NFR BLD AUTO: 1 % (ref 0.3–6.2)
EOSINOPHIL NFR BLD AUTO: 2.6 % (ref 0.3–6.2)
EOSINOPHIL NFR BLD AUTO: 4.1 % (ref 0.3–6.2)
EOSINOPHIL NFR BLD AUTO: 4.2 % (ref 0.3–6.2)
EOSINOPHIL NFR BLD AUTO: 4.2 % (ref 0.3–6.2)
EOSINOPHIL NFR BLD AUTO: 4.4 % (ref 0.3–6.2)
EOSINOPHIL NFR BLD AUTO: 4.4 % (ref 0.3–6.2)
EOSINOPHIL NFR BLD AUTO: 4.7 % (ref 0.3–6.2)
EOSINOPHIL NFR BLD AUTO: 5.3 % (ref 0.3–6.2)
EOSINOPHIL NFR BLD AUTO: 5.4 % (ref 0.3–6.2)
EOSINOPHIL NFR BLD AUTO: 5.9 % (ref 0.3–6.2)
EOSINOPHIL NFR BLD AUTO: 6.2 % (ref 0.3–6.2)
EOSINOPHIL NFR BLD AUTO: 6.6 % (ref 0.3–6.2)
EOSINOPHIL NFR BLD AUTO: 7.3 % (ref 0.3–6.2)
EOSINOPHIL NFR BLD AUTO: 7.6 % (ref 0.3–6.2)
EOSINOPHIL NFR BLD AUTO: 8.6 % (ref 0.3–6.2)
EOSINOPHIL NFR BLD AUTO: 9.2 % (ref 0.3–6.2)
ERYTHROCYTE [DISTWIDTH] IN BLOOD BY AUTOMATED COUNT: 13.5 % (ref 12.3–15.4)
ERYTHROCYTE [DISTWIDTH] IN BLOOD BY AUTOMATED COUNT: 13.6 % (ref 12.3–15.4)
ERYTHROCYTE [DISTWIDTH] IN BLOOD BY AUTOMATED COUNT: 13.7 % (ref 12.3–15.4)
ERYTHROCYTE [DISTWIDTH] IN BLOOD BY AUTOMATED COUNT: 13.7 % (ref 12.3–15.4)
ERYTHROCYTE [DISTWIDTH] IN BLOOD BY AUTOMATED COUNT: 13.8 % (ref 12.3–15.4)
ERYTHROCYTE [DISTWIDTH] IN BLOOD BY AUTOMATED COUNT: 13.9 % (ref 12.3–15.4)
ERYTHROCYTE [DISTWIDTH] IN BLOOD BY AUTOMATED COUNT: 13.9 % (ref 12.3–15.4)
ERYTHROCYTE [DISTWIDTH] IN BLOOD BY AUTOMATED COUNT: 14 % (ref 12.3–15.4)
ERYTHROCYTE [DISTWIDTH] IN BLOOD BY AUTOMATED COUNT: 14 % (ref 12.3–15.4)
ERYTHROCYTE [DISTWIDTH] IN BLOOD BY AUTOMATED COUNT: 14.1 % (ref 12.3–15.4)
ERYTHROCYTE [DISTWIDTH] IN BLOOD BY AUTOMATED COUNT: 14.2 % (ref 12.3–15.4)
ERYTHROCYTE [DISTWIDTH] IN BLOOD BY AUTOMATED COUNT: 14.2 % (ref 12.3–15.4)
ERYTHROCYTE [DISTWIDTH] IN BLOOD BY AUTOMATED COUNT: 14.3 % (ref 12.3–15.4)
ERYTHROCYTE [DISTWIDTH] IN BLOOD BY AUTOMATED COUNT: 14.4 % (ref 12.3–15.4)
ERYTHROCYTE [DISTWIDTH] IN BLOOD BY AUTOMATED COUNT: 14.5 % (ref 12.3–15.4)
ERYTHROCYTE [DISTWIDTH] IN BLOOD BY AUTOMATED COUNT: 14.6 % (ref 12.3–15.4)
ERYTHROCYTE [DISTWIDTH] IN BLOOD BY AUTOMATED COUNT: 14.7 % (ref 12.3–15.4)
ERYTHROCYTE [DISTWIDTH] IN BLOOD BY AUTOMATED COUNT: 15.1 % (ref 12.3–15.4)
ERYTHROCYTE [DISTWIDTH] IN BLOOD BY AUTOMATED COUNT: 15.3 % (ref 12.3–15.4)
ERYTHROCYTE [DISTWIDTH] IN BLOOD BY AUTOMATED COUNT: 15.3 % (ref 12.3–15.4)
ERYTHROCYTE [DISTWIDTH] IN BLOOD BY AUTOMATED COUNT: 15.4 % (ref 12.3–15.4)
FERRITIN SERPL-MCNC: 130 NG/ML (ref 30–400)
FERRITIN SERPL-MCNC: 164 NG/ML (ref 30–400)
FLUAV RNA RESP QL NAA+PROBE: NOT DETECTED
FLUBV RNA RESP QL NAA+PROBE: NOT DETECTED
FOLATE SERPL-MCNC: 7.49 NG/ML (ref 4.78–24.2)
FOLATE SERPL-MCNC: 7.72 NG/ML (ref 4.78–24.2)
FOLATE SERPL-MCNC: 9.83 NG/ML (ref 4.78–24.2)
GFR SERPL CREATININE-BSD FRML MDRD: 19 ML/MIN/1.73
GFR SERPL CREATININE-BSD FRML MDRD: 23 ML/MIN/1.73
GFR SERPL CREATININE-BSD FRML MDRD: 23 ML/MIN/1.73
GFR SERPL CREATININE-BSD FRML MDRD: 25 ML/MIN/1.73
GFR SERPL CREATININE-BSD FRML MDRD: 27 ML/MIN/1.73
GFR SERPL CREATININE-BSD FRML MDRD: 29 ML/MIN/1.73
GFR SERPL CREATININE-BSD FRML MDRD: 29 ML/MIN/1.73
GFR SERPL CREATININE-BSD FRML MDRD: 30 ML/MIN/1.73
GFR SERPL CREATININE-BSD FRML MDRD: 30 ML/MIN/1.73
GFR SERPL CREATININE-BSD FRML MDRD: 31 ML/MIN/1.73
GFR SERPL CREATININE-BSD FRML MDRD: 32 ML/MIN/1.73
GFR SERPL CREATININE-BSD FRML MDRD: 33 ML/MIN/1.73
GFR SERPL CREATININE-BSD FRML MDRD: 34 ML/MIN/1.73
GFR SERPL CREATININE-BSD FRML MDRD: 35 ML/MIN/1.73
GFR SERPL CREATININE-BSD FRML MDRD: 36 ML/MIN/1.73
GFR SERPL CREATININE-BSD FRML MDRD: 39 ML/MIN/1.73
GFR SERPL CREATININE-BSD FRML MDRD: 42 ML/MIN/1.73
GFR SERPL CREATININE-BSD FRML MDRD: 45 ML/MIN/1.73
GFR SERPL CREATININE-BSD FRML MDRD: 46 ML/MIN/1.73
GFR SERPL CREATININE-BSD FRML MDRD: 47 ML/MIN/1.73
GFR SERPL CREATININE-BSD FRML MDRD: 47 ML/MIN/1.73
GFR SERPL CREATININE-BSD FRML MDRD: 48 ML/MIN/1.73
GFR SERPL CREATININE-BSD FRML MDRD: 51 ML/MIN/1.73
GFR SERPL CREATININE-BSD FRML MDRD: 55 ML/MIN/1.73
GLOBULIN UR ELPH-MCNC: 2.6 GM/DL
GLOBULIN UR ELPH-MCNC: 2.6 GM/DL
GLOBULIN UR ELPH-MCNC: 2.7 GM/DL
GLOBULIN UR ELPH-MCNC: 2.8 GM/DL
GLOBULIN UR ELPH-MCNC: 2.8 GM/DL
GLOBULIN UR ELPH-MCNC: 3 GM/DL
GLOBULIN UR ELPH-MCNC: 3.1 GM/DL
GLOBULIN UR ELPH-MCNC: 3.1 GM/DL
GLOBULIN UR ELPH-MCNC: 3.2 GM/DL
GLOBULIN UR ELPH-MCNC: 3.2 GM/DL
GLOBULIN UR ELPH-MCNC: 3.3 GM/DL
GLOBULIN UR ELPH-MCNC: 3.8 GM/DL
GLUCOSE BLDC GLUCOMTR-MCNC: 103 MG/DL (ref 70–130)
GLUCOSE BLDC GLUCOMTR-MCNC: 103 MG/DL (ref 70–130)
GLUCOSE BLDC GLUCOMTR-MCNC: 104 MG/DL (ref 70–130)
GLUCOSE BLDC GLUCOMTR-MCNC: 105 MG/DL (ref 70–130)
GLUCOSE BLDC GLUCOMTR-MCNC: 107 MG/DL (ref 70–130)
GLUCOSE BLDC GLUCOMTR-MCNC: 107 MG/DL (ref 70–130)
GLUCOSE BLDC GLUCOMTR-MCNC: 108 MG/DL (ref 70–130)
GLUCOSE BLDC GLUCOMTR-MCNC: 108 MG/DL (ref 70–130)
GLUCOSE BLDC GLUCOMTR-MCNC: 109 MG/DL (ref 70–130)
GLUCOSE BLDC GLUCOMTR-MCNC: 109 MG/DL (ref 70–130)
GLUCOSE BLDC GLUCOMTR-MCNC: 110 MG/DL (ref 70–130)
GLUCOSE BLDC GLUCOMTR-MCNC: 111 MG/DL (ref 70–130)
GLUCOSE BLDC GLUCOMTR-MCNC: 111 MG/DL (ref 70–130)
GLUCOSE BLDC GLUCOMTR-MCNC: 112 MG/DL (ref 70–130)
GLUCOSE BLDC GLUCOMTR-MCNC: 113 MG/DL (ref 70–130)
GLUCOSE BLDC GLUCOMTR-MCNC: 115 MG/DL (ref 70–130)
GLUCOSE BLDC GLUCOMTR-MCNC: 116 MG/DL (ref 70–130)
GLUCOSE BLDC GLUCOMTR-MCNC: 118 MG/DL (ref 70–130)
GLUCOSE BLDC GLUCOMTR-MCNC: 119 MG/DL (ref 70–130)
GLUCOSE BLDC GLUCOMTR-MCNC: 121 MG/DL (ref 70–130)
GLUCOSE BLDC GLUCOMTR-MCNC: 121 MG/DL (ref 70–130)
GLUCOSE BLDC GLUCOMTR-MCNC: 123 MG/DL (ref 70–130)
GLUCOSE BLDC GLUCOMTR-MCNC: 124 MG/DL (ref 70–130)
GLUCOSE BLDC GLUCOMTR-MCNC: 128 MG/DL (ref 70–130)
GLUCOSE BLDC GLUCOMTR-MCNC: 128 MG/DL (ref 70–130)
GLUCOSE BLDC GLUCOMTR-MCNC: 130 MG/DL (ref 70–130)
GLUCOSE BLDC GLUCOMTR-MCNC: 131 MG/DL (ref 70–130)
GLUCOSE BLDC GLUCOMTR-MCNC: 132 MG/DL (ref 70–130)
GLUCOSE BLDC GLUCOMTR-MCNC: 133 MG/DL (ref 70–130)
GLUCOSE BLDC GLUCOMTR-MCNC: 133 MG/DL (ref 70–130)
GLUCOSE BLDC GLUCOMTR-MCNC: 136 MG/DL (ref 70–130)
GLUCOSE BLDC GLUCOMTR-MCNC: 137 MG/DL (ref 70–130)
GLUCOSE BLDC GLUCOMTR-MCNC: 139 MG/DL (ref 70–130)
GLUCOSE BLDC GLUCOMTR-MCNC: 140 MG/DL (ref 70–130)
GLUCOSE BLDC GLUCOMTR-MCNC: 141 MG/DL (ref 70–130)
GLUCOSE BLDC GLUCOMTR-MCNC: 142 MG/DL (ref 70–130)
GLUCOSE BLDC GLUCOMTR-MCNC: 143 MG/DL (ref 70–130)
GLUCOSE BLDC GLUCOMTR-MCNC: 147 MG/DL (ref 70–130)
GLUCOSE BLDC GLUCOMTR-MCNC: 149 MG/DL (ref 70–130)
GLUCOSE BLDC GLUCOMTR-MCNC: 149 MG/DL (ref 70–130)
GLUCOSE BLDC GLUCOMTR-MCNC: 150 MG/DL (ref 70–130)
GLUCOSE BLDC GLUCOMTR-MCNC: 150 MG/DL (ref 70–130)
GLUCOSE BLDC GLUCOMTR-MCNC: 151 MG/DL (ref 70–130)
GLUCOSE BLDC GLUCOMTR-MCNC: 151 MG/DL (ref 70–130)
GLUCOSE BLDC GLUCOMTR-MCNC: 153 MG/DL (ref 70–130)
GLUCOSE BLDC GLUCOMTR-MCNC: 154 MG/DL (ref 70–130)
GLUCOSE BLDC GLUCOMTR-MCNC: 154 MG/DL (ref 70–130)
GLUCOSE BLDC GLUCOMTR-MCNC: 156 MG/DL (ref 70–130)
GLUCOSE BLDC GLUCOMTR-MCNC: 156 MG/DL (ref 70–130)
GLUCOSE BLDC GLUCOMTR-MCNC: 157 MG/DL (ref 70–130)
GLUCOSE BLDC GLUCOMTR-MCNC: 158 MG/DL (ref 70–130)
GLUCOSE BLDC GLUCOMTR-MCNC: 159 MG/DL (ref 70–130)
GLUCOSE BLDC GLUCOMTR-MCNC: 161 MG/DL (ref 70–130)
GLUCOSE BLDC GLUCOMTR-MCNC: 162 MG/DL (ref 70–130)
GLUCOSE BLDC GLUCOMTR-MCNC: 163 MG/DL (ref 70–130)
GLUCOSE BLDC GLUCOMTR-MCNC: 164 MG/DL (ref 70–130)
GLUCOSE BLDC GLUCOMTR-MCNC: 167 MG/DL (ref 70–130)
GLUCOSE BLDC GLUCOMTR-MCNC: 170 MG/DL (ref 70–130)
GLUCOSE BLDC GLUCOMTR-MCNC: 177 MG/DL (ref 70–130)
GLUCOSE BLDC GLUCOMTR-MCNC: 177 MG/DL (ref 70–130)
GLUCOSE BLDC GLUCOMTR-MCNC: 195 MG/DL (ref 70–130)
GLUCOSE BLDC GLUCOMTR-MCNC: 198 MG/DL (ref 70–130)
GLUCOSE BLDC GLUCOMTR-MCNC: 199 MG/DL (ref 70–130)
GLUCOSE BLDC GLUCOMTR-MCNC: 200 MG/DL (ref 70–130)
GLUCOSE BLDC GLUCOMTR-MCNC: 200 MG/DL (ref 70–130)
GLUCOSE BLDC GLUCOMTR-MCNC: 252 MG/DL (ref 70–130)
GLUCOSE BLDC GLUCOMTR-MCNC: 257 MG/DL (ref 70–130)
GLUCOSE BLDC GLUCOMTR-MCNC: 283 MG/DL (ref 70–130)
GLUCOSE BLDC GLUCOMTR-MCNC: 291 MG/DL (ref 70–130)
GLUCOSE BLDC GLUCOMTR-MCNC: 93 MG/DL (ref 70–130)
GLUCOSE BLDC GLUCOMTR-MCNC: 93 MG/DL (ref 70–130)
GLUCOSE BLDC GLUCOMTR-MCNC: 94 MG/DL (ref 70–130)
GLUCOSE BLDC GLUCOMTR-MCNC: 95 MG/DL (ref 70–130)
GLUCOSE BLDC GLUCOMTR-MCNC: 98 MG/DL (ref 70–130)
GLUCOSE SERPL-MCNC: 102 MG/DL (ref 65–99)
GLUCOSE SERPL-MCNC: 105 MG/DL (ref 65–99)
GLUCOSE SERPL-MCNC: 105 MG/DL (ref 65–99)
GLUCOSE SERPL-MCNC: 107 MG/DL (ref 65–99)
GLUCOSE SERPL-MCNC: 107 MG/DL (ref 65–99)
GLUCOSE SERPL-MCNC: 108 MG/DL (ref 65–99)
GLUCOSE SERPL-MCNC: 109 MG/DL (ref 65–99)
GLUCOSE SERPL-MCNC: 109 MG/DL (ref 65–99)
GLUCOSE SERPL-MCNC: 111 MG/DL (ref 65–99)
GLUCOSE SERPL-MCNC: 112 MG/DL (ref 65–99)
GLUCOSE SERPL-MCNC: 112 MG/DL (ref 65–99)
GLUCOSE SERPL-MCNC: 115 MG/DL (ref 65–99)
GLUCOSE SERPL-MCNC: 116 MG/DL (ref 65–99)
GLUCOSE SERPL-MCNC: 118 MG/DL (ref 65–99)
GLUCOSE SERPL-MCNC: 127 MG/DL (ref 65–99)
GLUCOSE SERPL-MCNC: 130 MG/DL (ref 65–99)
GLUCOSE SERPL-MCNC: 135 MG/DL (ref 65–99)
GLUCOSE SERPL-MCNC: 140 MG/DL (ref 65–99)
GLUCOSE SERPL-MCNC: 141 MG/DL (ref 65–99)
GLUCOSE SERPL-MCNC: 142 MG/DL (ref 65–99)
GLUCOSE SERPL-MCNC: 144 MG/DL (ref 65–99)
GLUCOSE SERPL-MCNC: 154 MG/DL (ref 65–99)
GLUCOSE SERPL-MCNC: 159 MG/DL (ref 65–99)
GLUCOSE SERPL-MCNC: 166 MG/DL (ref 65–99)
GLUCOSE SERPL-MCNC: 168 MG/DL (ref 65–99)
GLUCOSE SERPL-MCNC: 248 MG/DL (ref 65–99)
GLUCOSE SERPL-MCNC: 85 MG/DL (ref 65–99)
GLUCOSE SERPL-MCNC: 89 MG/DL (ref 65–99)
GLUCOSE SERPL-MCNC: 92 MG/DL (ref 65–99)
GLUCOSE SERPL-MCNC: 99 MG/DL (ref 65–99)
GLUCOSE UR STRIP-MCNC: ABNORMAL MG/DL
GLUCOSE UR STRIP-MCNC: NEGATIVE MG/DL
GRAM STN SPEC: ABNORMAL
HBA1C MFR BLD: 5.3 % (ref 4.8–5.6)
HBA1C MFR BLD: 5.44 % (ref 4.8–5.6)
HBA1C MFR BLD: 6.6 % (ref 4.8–5.6)
HCT VFR BLD AUTO: 27.6 % (ref 37.5–51)
HCT VFR BLD AUTO: 27.6 % (ref 37.5–51)
HCT VFR BLD AUTO: 28 % (ref 37.5–51)
HCT VFR BLD AUTO: 28.1 % (ref 37.5–51)
HCT VFR BLD AUTO: 28.3 % (ref 37.5–51)
HCT VFR BLD AUTO: 28.3 % (ref 37.5–51)
HCT VFR BLD AUTO: 28.5 % (ref 37.5–51)
HCT VFR BLD AUTO: 28.6 % (ref 37.5–51)
HCT VFR BLD AUTO: 28.8 % (ref 37.5–51)
HCT VFR BLD AUTO: 28.9 % (ref 37.5–51)
HCT VFR BLD AUTO: 29.4 % (ref 37.5–51)
HCT VFR BLD AUTO: 29.4 % (ref 37.5–51)
HCT VFR BLD AUTO: 29.5 % (ref 37.5–51)
HCT VFR BLD AUTO: 29.7 % (ref 37.5–51)
HCT VFR BLD AUTO: 29.7 % (ref 37.5–51)
HCT VFR BLD AUTO: 30 % (ref 37.5–51)
HCT VFR BLD AUTO: 30.2 % (ref 37.5–51)
HCT VFR BLD AUTO: 30.4 % (ref 37.5–51)
HCT VFR BLD AUTO: 30.5 % (ref 37.5–51)
HCT VFR BLD AUTO: 31 % (ref 37.5–51)
HCT VFR BLD AUTO: 31.6 % (ref 37.5–51)
HCT VFR BLD AUTO: 31.8 % (ref 37.5–51)
HCT VFR BLD AUTO: 31.8 % (ref 37.5–51)
HCT VFR BLD AUTO: 32.1 % (ref 37.5–51)
HCT VFR BLD AUTO: 32.5 % (ref 37.5–51)
HCT VFR BLD AUTO: 32.7 % (ref 37.5–51)
HCT VFR BLD AUTO: 33.1 % (ref 37.5–51)
HCT VFR BLD AUTO: 33.5 % (ref 37.5–51)
HCT VFR BLD AUTO: 33.7 % (ref 37.5–51)
HCT VFR BLD AUTO: 33.8 % (ref 37.5–51)
HCT VFR BLD AUTO: 33.8 % (ref 37.5–51)
HCT VFR BLD AUTO: 34.1 % (ref 37.5–51)
HCT VFR BLD AUTO: 34.2 % (ref 37.5–51)
HCT VFR BLD AUTO: 34.3 % (ref 37.5–51)
HCT VFR BLD AUTO: 35 % (ref 37.5–51)
HCT VFR BLD AUTO: 35 % (ref 37.5–51)
HCT VFR BLD AUTO: 35.6 % (ref 37.5–51)
HCT VFR BLD AUTO: 36.8 % (ref 37.5–51)
HCT VFR BLD AUTO: 41.7 % (ref 37.5–51)
HGB BLD-MCNC: 10 G/DL (ref 13–17.7)
HGB BLD-MCNC: 10.1 G/DL (ref 13–17.7)
HGB BLD-MCNC: 10.2 G/DL (ref 13–17.7)
HGB BLD-MCNC: 10.2 G/DL (ref 13–17.7)
HGB BLD-MCNC: 10.3 G/DL (ref 13–17.7)
HGB BLD-MCNC: 10.4 G/DL (ref 13–17.7)
HGB BLD-MCNC: 10.4 G/DL (ref 13–17.7)
HGB BLD-MCNC: 10.5 G/DL (ref 13–17.7)
HGB BLD-MCNC: 10.6 G/DL (ref 13–17.7)
HGB BLD-MCNC: 10.8 G/DL (ref 13–17.7)
HGB BLD-MCNC: 11 G/DL (ref 13–17.7)
HGB BLD-MCNC: 11.1 G/DL (ref 13–17.7)
HGB BLD-MCNC: 11.2 G/DL (ref 13–17.7)
HGB BLD-MCNC: 11.3 G/DL (ref 13–17.7)
HGB BLD-MCNC: 11.4 G/DL (ref 13–17.7)
HGB BLD-MCNC: 11.6 G/DL (ref 13–17.7)
HGB BLD-MCNC: 13 G/DL (ref 13–17.7)
HGB BLD-MCNC: 8.8 G/DL (ref 13–17.7)
HGB BLD-MCNC: 8.8 G/DL (ref 13–17.7)
HGB BLD-MCNC: 9 G/DL (ref 13–17.7)
HGB BLD-MCNC: 9.1 G/DL (ref 13–17.7)
HGB BLD-MCNC: 9.3 G/DL (ref 13–17.7)
HGB BLD-MCNC: 9.3 G/DL (ref 13–17.7)
HGB BLD-MCNC: 9.4 G/DL (ref 13–17.7)
HGB BLD-MCNC: 9.5 G/DL (ref 13–17.7)
HGB BLD-MCNC: 9.6 G/DL (ref 13–17.7)
HGB BLD-MCNC: 9.7 G/DL (ref 13–17.7)
HGB BLD-MCNC: 9.7 G/DL (ref 13–17.7)
HGB BLD-MCNC: 9.9 G/DL (ref 13–17.7)
HGB UR QL STRIP.AUTO: ABNORMAL
HYALINE CASTS UR QL AUTO: ABNORMAL /LPF
IMM GRANULOCYTES # BLD AUTO: 0.02 10*3/MM3 (ref 0–0.05)
IMM GRANULOCYTES # BLD AUTO: 0.03 10*3/MM3 (ref 0–0.05)
IMM GRANULOCYTES # BLD AUTO: 0.03 10*3/MM3 (ref 0–0.05)
IMM GRANULOCYTES # BLD AUTO: 0.04 10*3/MM3 (ref 0–0.05)
IMM GRANULOCYTES # BLD AUTO: 0.05 10*3/MM3 (ref 0–0.05)
IMM GRANULOCYTES # BLD AUTO: 0.06 10*3/MM3 (ref 0–0.05)
IMM GRANULOCYTES # BLD AUTO: 0.06 10*3/MM3 (ref 0–0.05)
IMM GRANULOCYTES # BLD AUTO: 0.07 10*3/MM3 (ref 0–0.05)
IMM GRANULOCYTES # BLD AUTO: 0.08 10*3/MM3 (ref 0–0.05)
IMM GRANULOCYTES # BLD AUTO: 0.09 10*3/MM3 (ref 0–0.05)
IMM GRANULOCYTES NFR BLD AUTO: 0.2 % (ref 0–0.5)
IMM GRANULOCYTES NFR BLD AUTO: 0.3 % (ref 0–0.5)
IMM GRANULOCYTES NFR BLD AUTO: 0.3 % (ref 0–0.5)
IMM GRANULOCYTES NFR BLD AUTO: 0.4 % (ref 0–0.5)
IMM GRANULOCYTES NFR BLD AUTO: 0.4 % (ref 0–0.5)
IMM GRANULOCYTES NFR BLD AUTO: 0.5 % (ref 0–0.5)
IMM GRANULOCYTES NFR BLD AUTO: 0.6 % (ref 0–0.5)
IMM GRANULOCYTES NFR BLD AUTO: 0.7 % (ref 0–0.5)
IMM GRANULOCYTES NFR BLD AUTO: 0.7 % (ref 0–0.5)
IMM GRANULOCYTES NFR BLD AUTO: 0.8 % (ref 0–0.5)
IMM GRANULOCYTES NFR BLD AUTO: 0.8 % (ref 0–0.5)
IRON 24H UR-MRATE: 17 MCG/DL (ref 59–158)
IRON 24H UR-MRATE: 17 MCG/DL (ref 59–158)
IRON 24H UR-MRATE: 64 MCG/DL (ref 59–158)
IRON SATN MFR SERPL: 14 % (ref 20–50)
IRON SATN MFR SERPL: 28 % (ref 20–50)
IRON SATN MFR SERPL: 8 % (ref 20–50)
ISOLATED FROM: ABNORMAL
KETONES UR QL STRIP: ABNORMAL
KETONES UR QL STRIP: NEGATIVE
LEFT ATRIUM VOLUME INDEX: 28.9 ML/M2
LEUKOCYTE ESTERASE UR QL STRIP.AUTO: ABNORMAL
LEUKOCYTE ESTERASE UR QL STRIP.AUTO: NEGATIVE
LEUKOCYTE ESTERASE UR QL STRIP.AUTO: NEGATIVE
LV EF 2D ECHO EST: 55 %
LYMPHOCYTES # BLD AUTO: 0.53 10*3/MM3 (ref 0.7–3.1)
LYMPHOCYTES # BLD AUTO: 0.6 10*3/MM3 (ref 0.7–3.1)
LYMPHOCYTES # BLD AUTO: 0.74 10*3/MM3 (ref 0.7–3.1)
LYMPHOCYTES # BLD AUTO: 0.75 10*3/MM3 (ref 0.7–3.1)
LYMPHOCYTES # BLD AUTO: 0.77 10*3/MM3 (ref 0.7–3.1)
LYMPHOCYTES # BLD AUTO: 0.78 10*3/MM3 (ref 0.7–3.1)
LYMPHOCYTES # BLD AUTO: 0.83 10*3/MM3 (ref 0.7–3.1)
LYMPHOCYTES # BLD AUTO: 0.86 10*3/MM3 (ref 0.7–3.1)
LYMPHOCYTES # BLD AUTO: 0.88 10*3/MM3 (ref 0.7–3.1)
LYMPHOCYTES # BLD AUTO: 0.89 10*3/MM3 (ref 0.7–3.1)
LYMPHOCYTES # BLD AUTO: 0.97 10*3/MM3 (ref 0.7–3.1)
LYMPHOCYTES # BLD AUTO: 1.03 10*3/MM3 (ref 0.7–3.1)
LYMPHOCYTES # BLD AUTO: 1.07 10*3/MM3 (ref 0.7–3.1)
LYMPHOCYTES # BLD AUTO: 1.1 10*3/MM3 (ref 0.7–3.1)
LYMPHOCYTES # BLD AUTO: 1.11 10*3/MM3 (ref 0.7–3.1)
LYMPHOCYTES # BLD AUTO: 1.14 10*3/MM3 (ref 0.7–3.1)
LYMPHOCYTES # BLD AUTO: 1.3 10*3/MM3 (ref 0.7–3.1)
LYMPHOCYTES # BLD AUTO: 1.58 10*3/MM3 (ref 0.7–3.1)
LYMPHOCYTES NFR BLD AUTO: 11.2 % (ref 19.6–45.3)
LYMPHOCYTES NFR BLD AUTO: 11.4 % (ref 19.6–45.3)
LYMPHOCYTES NFR BLD AUTO: 11.4 % (ref 19.6–45.3)
LYMPHOCYTES NFR BLD AUTO: 11.5 % (ref 19.6–45.3)
LYMPHOCYTES NFR BLD AUTO: 12 % (ref 19.6–45.3)
LYMPHOCYTES NFR BLD AUTO: 12 % (ref 19.6–45.3)
LYMPHOCYTES NFR BLD AUTO: 12.3 % (ref 19.6–45.3)
LYMPHOCYTES NFR BLD AUTO: 13.9 % (ref 19.6–45.3)
LYMPHOCYTES NFR BLD AUTO: 14 % (ref 19.6–45.3)
LYMPHOCYTES NFR BLD AUTO: 15.6 % (ref 19.6–45.3)
LYMPHOCYTES NFR BLD AUTO: 17.7 % (ref 19.6–45.3)
LYMPHOCYTES NFR BLD AUTO: 4.7 % (ref 19.6–45.3)
LYMPHOCYTES NFR BLD AUTO: 5.6 % (ref 19.6–45.3)
LYMPHOCYTES NFR BLD AUTO: 6.8 % (ref 19.6–45.3)
LYMPHOCYTES NFR BLD AUTO: 7.4 % (ref 19.6–45.3)
LYMPHOCYTES NFR BLD AUTO: 8.8 % (ref 19.6–45.3)
LYMPHOCYTES NFR BLD AUTO: 9.6 % (ref 19.6–45.3)
LYMPHOCYTES NFR BLD AUTO: 9.8 % (ref 19.6–45.3)
MAGNESIUM SERPL-MCNC: 1.6 MG/DL (ref 1.6–2.4)
MAGNESIUM SERPL-MCNC: 1.7 MG/DL (ref 1.6–2.4)
MAGNESIUM SERPL-MCNC: 1.9 MG/DL (ref 1.6–2.4)
MCH RBC QN AUTO: 27.2 PG (ref 26.6–33)
MCH RBC QN AUTO: 27.3 PG (ref 26.6–33)
MCH RBC QN AUTO: 27.3 PG (ref 26.6–33)
MCH RBC QN AUTO: 27.6 PG (ref 26.6–33)
MCH RBC QN AUTO: 27.7 PG (ref 26.6–33)
MCH RBC QN AUTO: 27.7 PG (ref 26.6–33)
MCH RBC QN AUTO: 27.8 PG (ref 26.6–33)
MCH RBC QN AUTO: 27.8 PG (ref 26.6–33)
MCH RBC QN AUTO: 27.9 PG (ref 26.6–33)
MCH RBC QN AUTO: 28 PG (ref 26.6–33)
MCH RBC QN AUTO: 28.1 PG (ref 26.6–33)
MCH RBC QN AUTO: 28.1 PG (ref 26.6–33)
MCH RBC QN AUTO: 28.3 PG (ref 26.6–33)
MCH RBC QN AUTO: 28.3 PG (ref 26.6–33)
MCH RBC QN AUTO: 28.4 PG (ref 26.6–33)
MCH RBC QN AUTO: 28.5 PG (ref 26.6–33)
MCH RBC QN AUTO: 28.7 PG (ref 26.6–33)
MCH RBC QN AUTO: 28.8 PG (ref 26.6–33)
MCH RBC QN AUTO: 28.8 PG (ref 26.6–33)
MCH RBC QN AUTO: 28.9 PG (ref 26.6–33)
MCHC RBC AUTO-ENTMCNC: 30.6 G/DL (ref 31.5–35.7)
MCHC RBC AUTO-ENTMCNC: 30.6 G/DL (ref 31.5–35.7)
MCHC RBC AUTO-ENTMCNC: 31.1 G/DL (ref 31.5–35.7)
MCHC RBC AUTO-ENTMCNC: 31.2 G/DL (ref 31.5–35.7)
MCHC RBC AUTO-ENTMCNC: 31.3 G/DL (ref 31.5–35.7)
MCHC RBC AUTO-ENTMCNC: 31.4 G/DL (ref 31.5–35.7)
MCHC RBC AUTO-ENTMCNC: 31.5 G/DL (ref 31.5–35.7)
MCHC RBC AUTO-ENTMCNC: 31.7 G/DL (ref 31.5–35.7)
MCHC RBC AUTO-ENTMCNC: 31.8 G/DL (ref 31.5–35.7)
MCHC RBC AUTO-ENTMCNC: 32 G/DL (ref 31.5–35.7)
MCHC RBC AUTO-ENTMCNC: 32 G/DL (ref 31.5–35.7)
MCHC RBC AUTO-ENTMCNC: 32.1 G/DL (ref 31.5–35.7)
MCHC RBC AUTO-ENTMCNC: 32.2 G/DL (ref 31.5–35.7)
MCHC RBC AUTO-ENTMCNC: 32.3 G/DL (ref 31.5–35.7)
MCHC RBC AUTO-ENTMCNC: 32.3 G/DL (ref 31.5–35.7)
MCHC RBC AUTO-ENTMCNC: 32.5 G/DL (ref 31.5–35.7)
MCHC RBC AUTO-ENTMCNC: 32.5 G/DL (ref 31.5–35.7)
MCHC RBC AUTO-ENTMCNC: 32.6 G/DL (ref 31.5–35.7)
MCHC RBC AUTO-ENTMCNC: 32.7 G/DL (ref 31.5–35.7)
MCHC RBC AUTO-ENTMCNC: 33.1 G/DL (ref 31.5–35.7)
MCHC RBC AUTO-ENTMCNC: 33.2 G/DL (ref 31.5–35.7)
MCV RBC AUTO: 84.6 FL (ref 79–97)
MCV RBC AUTO: 84.9 FL (ref 79–97)
MCV RBC AUTO: 85.7 FL (ref 79–97)
MCV RBC AUTO: 87.1 FL (ref 79–97)
MCV RBC AUTO: 87.2 FL (ref 79–97)
MCV RBC AUTO: 87.3 FL (ref 79–97)
MCV RBC AUTO: 87.3 FL (ref 79–97)
MCV RBC AUTO: 87.5 FL (ref 79–97)
MCV RBC AUTO: 87.8 FL (ref 79–97)
MCV RBC AUTO: 87.9 FL (ref 79–97)
MCV RBC AUTO: 88.3 FL (ref 79–97)
MCV RBC AUTO: 88.3 FL (ref 79–97)
MCV RBC AUTO: 88.4 FL (ref 79–97)
MCV RBC AUTO: 88.5 FL (ref 79–97)
MCV RBC AUTO: 88.6 FL (ref 79–97)
MCV RBC AUTO: 88.8 FL (ref 79–97)
MCV RBC AUTO: 88.9 FL (ref 79–97)
MCV RBC AUTO: 89.2 FL (ref 79–97)
MCV RBC AUTO: 89.2 FL (ref 79–97)
MCV RBC AUTO: 89.4 FL (ref 79–97)
MCV RBC AUTO: 89.8 FL (ref 79–97)
MCV RBC AUTO: 90.6 FL (ref 79–97)
MCV RBC AUTO: 90.7 FL (ref 79–97)
MCV RBC AUTO: 91 FL (ref 79–97)
MCV RBC AUTO: 91 FL (ref 79–97)
METHADONE UR QL SCN: NEGATIVE
METHADONE UR QL SCN: NEGATIVE
MONOCYTES # BLD AUTO: 0.74 10*3/MM3 (ref 0.1–0.9)
MONOCYTES # BLD AUTO: 0.78 10*3/MM3 (ref 0.1–0.9)
MONOCYTES # BLD AUTO: 0.9 10*3/MM3 (ref 0.1–0.9)
MONOCYTES # BLD AUTO: 0.95 10*3/MM3 (ref 0.1–0.9)
MONOCYTES # BLD AUTO: 0.96 10*3/MM3 (ref 0.1–0.9)
MONOCYTES # BLD AUTO: 0.97 10*3/MM3 (ref 0.1–0.9)
MONOCYTES # BLD AUTO: 0.98 10*3/MM3 (ref 0.1–0.9)
MONOCYTES # BLD AUTO: 1.03 10*3/MM3 (ref 0.1–0.9)
MONOCYTES # BLD AUTO: 1.07 10*3/MM3 (ref 0.1–0.9)
MONOCYTES # BLD AUTO: 1.2 10*3/MM3 (ref 0.1–0.9)
MONOCYTES # BLD AUTO: 1.2 10*3/MM3 (ref 0.1–0.9)
MONOCYTES # BLD AUTO: 1.21 10*3/MM3 (ref 0.1–0.9)
MONOCYTES # BLD AUTO: 1.21 10*3/MM3 (ref 0.1–0.9)
MONOCYTES # BLD AUTO: 1.23 10*3/MM3 (ref 0.1–0.9)
MONOCYTES # BLD AUTO: 1.28 10*3/MM3 (ref 0.1–0.9)
MONOCYTES # BLD AUTO: 1.32 10*3/MM3 (ref 0.1–0.9)
MONOCYTES # BLD AUTO: 1.33 10*3/MM3 (ref 0.1–0.9)
MONOCYTES # BLD AUTO: 1.47 10*3/MM3 (ref 0.1–0.9)
MONOCYTES NFR BLD AUTO: 10.6 % (ref 5–12)
MONOCYTES NFR BLD AUTO: 10.8 % (ref 5–12)
MONOCYTES NFR BLD AUTO: 10.8 % (ref 5–12)
MONOCYTES NFR BLD AUTO: 11.1 % (ref 5–12)
MONOCYTES NFR BLD AUTO: 11.1 % (ref 5–12)
MONOCYTES NFR BLD AUTO: 11.4 % (ref 5–12)
MONOCYTES NFR BLD AUTO: 12 % (ref 5–12)
MONOCYTES NFR BLD AUTO: 12.1 % (ref 5–12)
MONOCYTES NFR BLD AUTO: 12.5 % (ref 5–12)
MONOCYTES NFR BLD AUTO: 12.9 % (ref 5–12)
MONOCYTES NFR BLD AUTO: 13.1 % (ref 5–12)
MONOCYTES NFR BLD AUTO: 13.2 % (ref 5–12)
MONOCYTES NFR BLD AUTO: 13.4 % (ref 5–12)
MONOCYTES NFR BLD AUTO: 14.1 % (ref 5–12)
MONOCYTES NFR BLD AUTO: 16.5 % (ref 5–12)
MONOCYTES NFR BLD AUTO: 21.5 % (ref 5–12)
MONOCYTES NFR BLD AUTO: 9 % (ref 5–12)
MONOCYTES NFR BLD AUTO: 9.7 % (ref 5–12)
NEUTROPHILS NFR BLD AUTO: 3.36 10*3/MM3 (ref 1.7–7)
NEUTROPHILS NFR BLD AUTO: 4.49 10*3/MM3 (ref 1.7–7)
NEUTROPHILS NFR BLD AUTO: 4.49 10*3/MM3 (ref 1.7–7)
NEUTROPHILS NFR BLD AUTO: 4.59 10*3/MM3 (ref 1.7–7)
NEUTROPHILS NFR BLD AUTO: 4.85 10*3/MM3 (ref 1.7–7)
NEUTROPHILS NFR BLD AUTO: 5.21 10*3/MM3 (ref 1.7–7)
NEUTROPHILS NFR BLD AUTO: 5.8 10*3/MM3 (ref 1.7–7)
NEUTROPHILS NFR BLD AUTO: 54.3 % (ref 42.7–76)
NEUTROPHILS NFR BLD AUTO: 6.43 10*3/MM3 (ref 1.7–7)
NEUTROPHILS NFR BLD AUTO: 6.58 10*3/MM3 (ref 1.7–7)
NEUTROPHILS NFR BLD AUTO: 6.88 10*3/MM3 (ref 1.7–7)
NEUTROPHILS NFR BLD AUTO: 6.96 10*3/MM3 (ref 1.7–7)
NEUTROPHILS NFR BLD AUTO: 61.2 % (ref 42.7–76)
NEUTROPHILS NFR BLD AUTO: 63.2 % (ref 42.7–76)
NEUTROPHILS NFR BLD AUTO: 66.6 % (ref 42.7–76)
NEUTROPHILS NFR BLD AUTO: 67.8 % (ref 42.7–76)
NEUTROPHILS NFR BLD AUTO: 69.2 % (ref 42.7–76)
NEUTROPHILS NFR BLD AUTO: 69.5 % (ref 42.7–76)
NEUTROPHILS NFR BLD AUTO: 69.9 % (ref 42.7–76)
NEUTROPHILS NFR BLD AUTO: 70 % (ref 42.7–76)
NEUTROPHILS NFR BLD AUTO: 70.5 % (ref 42.7–76)
NEUTROPHILS NFR BLD AUTO: 71.6 % (ref 42.7–76)
NEUTROPHILS NFR BLD AUTO: 71.9 % (ref 42.7–76)
NEUTROPHILS NFR BLD AUTO: 73.9 % (ref 42.7–76)
NEUTROPHILS NFR BLD AUTO: 74.2 % (ref 42.7–76)
NEUTROPHILS NFR BLD AUTO: 75.3 % (ref 42.7–76)
NEUTROPHILS NFR BLD AUTO: 77.9 % (ref 42.7–76)
NEUTROPHILS NFR BLD AUTO: 8.25 10*3/MM3 (ref 1.7–7)
NEUTROPHILS NFR BLD AUTO: 8.31 10*3/MM3 (ref 1.7–7)
NEUTROPHILS NFR BLD AUTO: 8.6 10*3/MM3 (ref 1.7–7)
NEUTROPHILS NFR BLD AUTO: 8.6 10*3/MM3 (ref 1.7–7)
NEUTROPHILS NFR BLD AUTO: 80.6 % (ref 42.7–76)
NEUTROPHILS NFR BLD AUTO: 82.1 % (ref 42.7–76)
NEUTROPHILS NFR BLD AUTO: 9.24 10*3/MM3 (ref 1.7–7)
NITRITE UR QL STRIP: NEGATIVE
NRBC BLD AUTO-RTO: 0 /100 WBC (ref 0–0.2)
NRBC BLD AUTO-RTO: 0.1 /100 WBC (ref 0–0.2)
NRBC BLD AUTO-RTO: 0.1 /100 WBC (ref 0–0.2)
NRBC BLD AUTO-RTO: 0.2 /100 WBC (ref 0–0.2)
NT-PROBNP SERPL-MCNC: 6059 PG/ML (ref 0–1800)
NT-PROBNP SERPL-MCNC: 6777 PG/ML (ref 0–1800)
NT-PROBNP SERPL-MCNC: 8140 PG/ML (ref 0–1800)
NT-PROBNP SERPL-MCNC: ABNORMAL PG/ML (ref 0–1800)
NT-PROBNP SERPL-MCNC: ABNORMAL PG/ML (ref 0–1800)
OPIATES UR QL: NEGATIVE
OPIATES UR QL: NEGATIVE
OXYCODONE UR QL SCN: NEGATIVE
OXYCODONE UR QL SCN: NEGATIVE
PCP UR QL SCN: NEGATIVE
PCP UR QL SCN: NEGATIVE
PH UR STRIP.AUTO: 5.5 [PH] (ref 4.5–8)
PH UR STRIP.AUTO: 5.5 [PH] (ref 5–8)
PH UR STRIP.AUTO: 6 [PH] (ref 5–8)
PH UR STRIP.AUTO: 6.5 [PH] (ref 4.5–8)
PH UR STRIP.AUTO: 7 [PH] (ref 4.5–8)
PH UR STRIP.AUTO: 7 [PH] (ref 4.5–8)
PH UR STRIP.AUTO: 7 [PH] (ref 5–8)
PH UR STRIP.AUTO: <=5 [PH] (ref 4.5–8)
PHOSPHATE SERPL-MCNC: 3.3 MG/DL (ref 2.5–4.5)
PHOSPHATE SERPL-MCNC: 4.3 MG/DL (ref 2.5–4.5)
PHOSPHATE SERPL-MCNC: 4.7 MG/DL (ref 2.5–4.5)
PLATELET # BLD AUTO: 159 10*3/MM3 (ref 140–450)
PLATELET # BLD AUTO: 161 10*3/MM3 (ref 140–450)
PLATELET # BLD AUTO: 163 10*3/MM3 (ref 140–450)
PLATELET # BLD AUTO: 167 10*3/MM3 (ref 140–450)
PLATELET # BLD AUTO: 173 10*3/MM3 (ref 140–450)
PLATELET # BLD AUTO: 179 10*3/MM3 (ref 140–450)
PLATELET # BLD AUTO: 182 10*3/MM3 (ref 140–450)
PLATELET # BLD AUTO: 184 10*3/MM3 (ref 140–450)
PLATELET # BLD AUTO: 187 10*3/MM3 (ref 140–450)
PLATELET # BLD AUTO: 190 10*3/MM3 (ref 140–450)
PLATELET # BLD AUTO: 191 10*3/MM3 (ref 140–450)
PLATELET # BLD AUTO: 193 10*3/MM3 (ref 140–450)
PLATELET # BLD AUTO: 201 10*3/MM3 (ref 140–450)
PLATELET # BLD AUTO: 205 10*3/MM3 (ref 140–450)
PLATELET # BLD AUTO: 205 10*3/MM3 (ref 140–450)
PLATELET # BLD AUTO: 208 10*3/MM3 (ref 140–450)
PLATELET # BLD AUTO: 221 10*3/MM3 (ref 140–450)
PLATELET # BLD AUTO: 228 10*3/MM3 (ref 140–450)
PLATELET # BLD AUTO: 229 10*3/MM3 (ref 140–450)
PLATELET # BLD AUTO: 251 10*3/MM3 (ref 140–450)
PLATELET # BLD AUTO: 253 10*3/MM3 (ref 140–450)
PLATELET # BLD AUTO: 255 10*3/MM3 (ref 140–450)
PLATELET # BLD AUTO: 269 10*3/MM3 (ref 140–450)
PLATELET # BLD AUTO: 278 10*3/MM3 (ref 140–450)
PLATELET # BLD AUTO: 279 10*3/MM3 (ref 140–450)
PMV BLD AUTO: 10 FL (ref 6–12)
PMV BLD AUTO: 10.1 FL (ref 6–12)
PMV BLD AUTO: 10.2 FL (ref 6–12)
PMV BLD AUTO: 10.2 FL (ref 6–12)
PMV BLD AUTO: 10.3 FL (ref 6–12)
PMV BLD AUTO: 10.3 FL (ref 6–12)
PMV BLD AUTO: 10.4 FL (ref 6–12)
PMV BLD AUTO: 10.5 FL (ref 6–12)
PMV BLD AUTO: 10.6 FL (ref 6–12)
PMV BLD AUTO: 10.7 FL (ref 6–12)
PMV BLD AUTO: 10.9 FL (ref 6–12)
PMV BLD AUTO: 11.1 FL (ref 6–12)
PMV BLD AUTO: 11.1 FL (ref 6–12)
PMV BLD AUTO: 11.2 FL (ref 6–12)
PMV BLD AUTO: 11.3 FL (ref 6–12)
PMV BLD AUTO: 11.3 FL (ref 6–12)
PMV BLD AUTO: 11.5 FL (ref 6–12)
PMV BLD AUTO: 11.5 FL (ref 6–12)
PMV BLD AUTO: 11.8 FL (ref 6–12)
PMV BLD AUTO: 11.8 FL (ref 6–12)
PMV BLD AUTO: 11.9 FL (ref 6–12)
POTASSIUM SERPL-SCNC: 3.8 MMOL/L (ref 3.5–5.2)
POTASSIUM SERPL-SCNC: 3.9 MMOL/L (ref 3.5–5.2)
POTASSIUM SERPL-SCNC: 4 MMOL/L (ref 3.5–5.2)
POTASSIUM SERPL-SCNC: 4.1 MMOL/L (ref 3.5–5.2)
POTASSIUM SERPL-SCNC: 4.2 MMOL/L (ref 3.5–5.2)
POTASSIUM SERPL-SCNC: 4.3 MMOL/L (ref 3.5–5.2)
POTASSIUM SERPL-SCNC: 4.3 MMOL/L (ref 3.5–5.2)
POTASSIUM SERPL-SCNC: 4.4 MMOL/L (ref 3.5–5.2)
POTASSIUM SERPL-SCNC: 4.5 MMOL/L (ref 3.5–5.2)
POTASSIUM SERPL-SCNC: 4.6 MMOL/L (ref 3.5–5.2)
POTASSIUM SERPL-SCNC: 4.7 MMOL/L (ref 3.5–5.2)
POTASSIUM SERPL-SCNC: 4.9 MMOL/L (ref 3.5–5.2)
POTASSIUM SERPL-SCNC: 4.9 MMOL/L (ref 3.5–5.2)
POTASSIUM SERPL-SCNC: 5 MMOL/L (ref 3.5–5.2)
POTASSIUM SERPL-SCNC: 5 MMOL/L (ref 3.5–5.2)
POTASSIUM UR-SCNC: 16.4 MMOL/L
PROCALCITONIN SERPL-MCNC: 0.09 NG/ML (ref 0–0.25)
PROCALCITONIN SERPL-MCNC: 0.12 NG/ML (ref 0–0.25)
PROPOXYPH UR QL: NEGATIVE
PROPOXYPH UR QL: NEGATIVE
PROT SERPL-MCNC: 5.6 G/DL (ref 6–8.5)
PROT SERPL-MCNC: 5.9 G/DL (ref 6–8.5)
PROT SERPL-MCNC: 6 G/DL (ref 6–8.5)
PROT SERPL-MCNC: 6.1 G/DL (ref 6–8.5)
PROT SERPL-MCNC: 6.3 G/DL (ref 6–8.5)
PROT SERPL-MCNC: 6.5 G/DL (ref 6–8.5)
PROT SERPL-MCNC: 6.6 G/DL (ref 6–8.5)
PROT SERPL-MCNC: 6.7 G/DL (ref 6–8.5)
PROT SERPL-MCNC: 6.9 G/DL (ref 6–8.5)
PROT SERPL-MCNC: 7.2 G/DL (ref 6–8.5)
PROT UR QL STRIP: ABNORMAL
QT INTERVAL: 411 MS
QT INTERVAL: 416 MS
QT INTERVAL: 434 MS
QT INTERVAL: 457 MS
QT INTERVAL: 459 MS
QT INTERVAL: 464 MS
QT INTERVAL: 475 MS
QT INTERVAL: 581 MS
RBC # BLD AUTO: 3.09 10*6/MM3 (ref 4.14–5.8)
RBC # BLD AUTO: 3.16 10*6/MM3 (ref 4.14–5.8)
RBC # BLD AUTO: 3.22 10*6/MM3 (ref 4.14–5.8)
RBC # BLD AUTO: 3.23 10*6/MM3 (ref 4.14–5.8)
RBC # BLD AUTO: 3.28 10*6/MM3 (ref 4.14–5.8)
RBC # BLD AUTO: 3.32 10*6/MM3 (ref 4.14–5.8)
RBC # BLD AUTO: 3.33 10*6/MM3 (ref 4.14–5.8)
RBC # BLD AUTO: 3.42 10*6/MM3 (ref 4.14–5.8)
RBC # BLD AUTO: 3.51 10*6/MM3 (ref 4.14–5.8)
RBC # BLD AUTO: 3.54 10*6/MM3 (ref 4.14–5.8)
RBC # BLD AUTO: 3.54 10*6/MM3 (ref 4.14–5.8)
RBC # BLD AUTO: 3.55 10*6/MM3 (ref 4.14–5.8)
RBC # BLD AUTO: 3.7 10*6/MM3 (ref 4.14–5.8)
RBC # BLD AUTO: 3.77 10*6/MM3 (ref 4.14–5.8)
RBC # BLD AUTO: 3.79 10*6/MM3 (ref 4.14–5.8)
RBC # BLD AUTO: 3.82 10*6/MM3 (ref 4.14–5.8)
RBC # BLD AUTO: 3.83 10*6/MM3 (ref 4.14–5.8)
RBC # BLD AUTO: 3.91 10*6/MM3 (ref 4.14–5.8)
RBC # BLD AUTO: 3.91 10*6/MM3 (ref 4.14–5.8)
RBC # BLD AUTO: 3.94 10*6/MM3 (ref 4.14–5.8)
RBC # BLD AUTO: 3.94 10*6/MM3 (ref 4.14–5.8)
RBC # BLD AUTO: 3.95 10*6/MM3 (ref 4.14–5.8)
RBC # BLD AUTO: 4.03 10*6/MM3 (ref 4.14–5.8)
RBC # BLD AUTO: 4.19 10*6/MM3 (ref 4.14–5.8)
RBC # BLD AUTO: 4.58 10*6/MM3 (ref 4.14–5.8)
RBC # UR STRIP: ABNORMAL /HPF
RBC # UR: ABNORMAL /HPF
RBC MORPH BLD: NORMAL
REF LAB TEST METHOD: ABNORMAL
SARS-COV-2 RNA PNL SPEC NAA+PROBE: NOT DETECTED
SARS-COV-2 RNA RESP QL NAA+PROBE: NOT DETECTED
SARS-COV-2 RNA RESP QL NAA+PROBE: NOT DETECTED
SINUS: 4.4 CM
SMALL PLATELETS BLD QL SMEAR: ADEQUATE
SODIUM SERPL-SCNC: 130 MMOL/L (ref 136–145)
SODIUM SERPL-SCNC: 132 MMOL/L (ref 136–145)
SODIUM SERPL-SCNC: 132 MMOL/L (ref 136–145)
SODIUM SERPL-SCNC: 133 MMOL/L (ref 136–145)
SODIUM SERPL-SCNC: 134 MMOL/L (ref 136–145)
SODIUM SERPL-SCNC: 135 MMOL/L (ref 136–145)
SODIUM SERPL-SCNC: 136 MMOL/L (ref 136–145)
SODIUM SERPL-SCNC: 136 MMOL/L (ref 136–145)
SODIUM SERPL-SCNC: 137 MMOL/L (ref 136–145)
SODIUM SERPL-SCNC: 138 MMOL/L (ref 136–145)
SODIUM SERPL-SCNC: 138 MMOL/L (ref 136–145)
SODIUM SERPL-SCNC: 139 MMOL/L (ref 136–145)
SODIUM SERPL-SCNC: 140 MMOL/L (ref 136–145)
SODIUM SERPL-SCNC: 140 MMOL/L (ref 136–145)
SODIUM SERPL-SCNC: 141 MMOL/L (ref 136–145)
SODIUM SERPL-SCNC: 142 MMOL/L (ref 136–145)
SODIUM SERPL-SCNC: 142 MMOL/L (ref 136–145)
SODIUM UR-SCNC: 22 MMOL/L
SP GR UR STRIP: 1.01 (ref 1–1.03)
SP GR UR STRIP: 1.02 (ref 1–1.03)
SQUAMOUS #/AREA URNS HPF: ABNORMAL /HPF
STJ: 3.3 CM
T4 FREE SERPL-MCNC: 1.05 NG/DL (ref 0.93–1.7)
TIBC SERPL-MCNC: 122 MCG/DL (ref 298–536)
TIBC SERPL-MCNC: 225 MCG/DL (ref 298–536)
TIBC SERPL-MCNC: 229 MCG/DL (ref 298–536)
TRANSFERRIN SERPL-MCNC: 151 MG/DL (ref 200–360)
TRICYCLICS UR QL SCN: NEGATIVE
TRICYCLICS UR QL SCN: NEGATIVE
TROPONIN T SERPL-MCNC: 0.06 NG/ML (ref 0–0.03)
TROPONIN T SERPL-MCNC: 0.07 NG/ML (ref 0–0.03)
TROPONIN T SERPL-MCNC: 0.07 NG/ML (ref 0–0.03)
TROPONIN T SERPL-MCNC: 0.09 NG/ML (ref 0–0.03)
TSH SERPL DL<=0.05 MIU/L-ACNC: 0.59 UIU/ML (ref 0.27–4.2)
TSH SERPL DL<=0.05 MIU/L-ACNC: 11.45 UIU/ML (ref 0.27–4.2)
TSH SERPL DL<=0.05 MIU/L-ACNC: 14.29 UIU/ML (ref 0.27–4.2)
TSH SERPL DL<=0.05 MIU/L-ACNC: 38.83 UIU/ML (ref 0.27–4.2)
UIBC SERPL-MCNC: 105 MCG/DL (ref 112–346)
UIBC SERPL-MCNC: 165 MCG/DL (ref 112–346)
URATE SERPL-MCNC: 5.8 MG/DL (ref 3.4–7)
URATE SERPL-MCNC: 6.5 MG/DL (ref 3.4–7)
URATE SERPL-MCNC: 9.3 MG/DL (ref 3.4–7)
UROBILINOGEN UR QL STRIP: ABNORMAL
VIT B12 BLD-MCNC: 419 PG/ML (ref 211–946)
VIT B12 BLD-MCNC: 623 PG/ML (ref 211–946)
VIT B12 BLD-MCNC: 945 PG/ML (ref 211–946)
WBC # BLD AUTO: 10.04 10*3/MM3 (ref 3.4–10.8)
WBC # BLD AUTO: 10.14 10*3/MM3 (ref 3.4–10.8)
WBC # BLD AUTO: 10.67 10*3/MM3 (ref 3.4–10.8)
WBC # BLD AUTO: 11.18 10*3/MM3 (ref 3.4–10.8)
WBC # BLD AUTO: 6.19 10*3/MM3 (ref 3.4–10.8)
WBC # BLD AUTO: 6.25 10*3/MM3 (ref 3.4–10.8)
WBC # BLD AUTO: 6.42 10*3/MM3 (ref 3.4–10.8)
WBC # BLD AUTO: 7.34 10*3/MM3 (ref 3.4–10.8)
WBC # BLD AUTO: 7.36 10*3/MM3 (ref 3.4–10.8)
WBC # BLD AUTO: 9.19 10*3/MM3 (ref 3.4–10.8)
WBC # BLD AUTO: 9.69 10*3/MM3 (ref 3.4–10.8)
WBC # UR STRIP: ABNORMAL /HPF
WBC CLUMPS # UR AUTO: ABNORMAL /HPF
WBC MORPH BLD: NORMAL
WBC NRBC COR # BLD: 11.03 10*3/MM3 (ref 3.4–10.8)
WBC NRBC COR # BLD: 11.2 10*3/MM3 (ref 3.4–10.8)
WBC NRBC COR # BLD: 11.26 10*3/MM3 (ref 3.4–10.8)
WBC NRBC COR # BLD: 5.06 10*3/MM3 (ref 3.4–10.8)
WBC NRBC COR # BLD: 6.87 10*3/MM3 (ref 3.4–10.8)
WBC NRBC COR # BLD: 7.24 10*3/MM3 (ref 3.4–10.8)
WBC NRBC COR # BLD: 7.26 10*3/MM3 (ref 3.4–10.8)
WBC NRBC COR # BLD: 7.83 10*3/MM3 (ref 3.4–10.8)
WBC NRBC COR # BLD: 8.1 10*3/MM3 (ref 3.4–10.8)
WBC NRBC COR # BLD: 8.74 10*3/MM3 (ref 3.4–10.8)
WBC NRBC COR # BLD: 8.95 10*3/MM3 (ref 3.4–10.8)
WBC NRBC COR # BLD: 9.25 10*3/MM3 (ref 3.4–10.8)
WBC NRBC COR # BLD: 9.26 10*3/MM3 (ref 3.4–10.8)
WBC NRBC COR # BLD: 9.81 10*3/MM3 (ref 3.4–10.8)
WBC UR QL AUTO: ABNORMAL /HPF
YEAST URNS QL MICRO: ABNORMAL
YEAST URNS QL MICRO: ABNORMAL /HPF

## 2021-01-01 PROCEDURE — G0378 HOSPITAL OBSERVATION PER HR: HCPCS

## 2021-01-01 PROCEDURE — 94760 N-INVAS EAR/PLS OXIMETRY 1: CPT

## 2021-01-01 PROCEDURE — 82962 GLUCOSE BLOOD TEST: CPT

## 2021-01-01 PROCEDURE — 94799 UNLISTED PULMONARY SVC/PX: CPT

## 2021-01-01 PROCEDURE — 80053 COMPREHEN METABOLIC PANEL: CPT | Performed by: EMERGENCY MEDICINE

## 2021-01-01 PROCEDURE — 87636 SARSCOV2 & INF A&B AMP PRB: CPT | Performed by: EMERGENCY MEDICINE

## 2021-01-01 PROCEDURE — 85018 HEMOGLOBIN: CPT | Performed by: NURSE PRACTITIONER

## 2021-01-01 PROCEDURE — 83550 IRON BINDING TEST: CPT | Performed by: FAMILY MEDICINE

## 2021-01-01 PROCEDURE — 87077 CULTURE AEROBIC IDENTIFY: CPT | Performed by: SURGERY

## 2021-01-01 PROCEDURE — 87040 BLOOD CULTURE FOR BACTERIA: CPT | Performed by: EMERGENCY MEDICINE

## 2021-01-01 PROCEDURE — 93306 TTE W/DOPPLER COMPLETE: CPT | Performed by: INTERNAL MEDICINE

## 2021-01-01 PROCEDURE — 85014 HEMATOCRIT: CPT | Performed by: STUDENT IN AN ORGANIZED HEALTH CARE EDUCATION/TRAINING PROGRAM

## 2021-01-01 PROCEDURE — 99285 EMERGENCY DEPT VISIT HI MDM: CPT | Performed by: PHYSICIAN ASSISTANT

## 2021-01-01 PROCEDURE — 81001 URINALYSIS AUTO W/SCOPE: CPT | Performed by: INTERNAL MEDICINE

## 2021-01-01 PROCEDURE — 97161 PT EVAL LOW COMPLEX 20 MIN: CPT

## 2021-01-01 PROCEDURE — 94761 N-INVAS EAR/PLS OXIMETRY MLT: CPT

## 2021-01-01 PROCEDURE — 85025 COMPLETE CBC W/AUTO DIFF WBC: CPT | Performed by: PHYSICIAN ASSISTANT

## 2021-01-01 PROCEDURE — 80048 BASIC METABOLIC PNL TOTAL CA: CPT | Performed by: INTERNAL MEDICINE

## 2021-01-01 PROCEDURE — 99285 EMERGENCY DEPT VISIT HI MDM: CPT

## 2021-01-01 PROCEDURE — 73502 X-RAY EXAM HIP UNI 2-3 VIEWS: CPT

## 2021-01-01 PROCEDURE — 99282 EMERGENCY DEPT VISIT SF MDM: CPT | Performed by: EMERGENCY MEDICINE

## 2021-01-01 PROCEDURE — 63710000001 INSULIN DETEMIR PER 5 UNITS: Performed by: FAMILY MEDICINE

## 2021-01-01 PROCEDURE — 82306 VITAMIN D 25 HYDROXY: CPT | Performed by: NURSE PRACTITIONER

## 2021-01-01 PROCEDURE — 87077 CULTURE AEROBIC IDENTIFY: CPT | Performed by: EMERGENCY MEDICINE

## 2021-01-01 PROCEDURE — 71275 CT ANGIOGRAPHY CHEST: CPT

## 2021-01-01 PROCEDURE — 81001 URINALYSIS AUTO W/SCOPE: CPT | Performed by: PHYSICIAN ASSISTANT

## 2021-01-01 PROCEDURE — 25010000002 FLUCONAZOLE PER 200 MG: Performed by: STUDENT IN AN ORGANIZED HEALTH CARE EDUCATION/TRAINING PROGRAM

## 2021-01-01 PROCEDURE — 87086 URINE CULTURE/COLONY COUNT: CPT | Performed by: EMERGENCY MEDICINE

## 2021-01-01 PROCEDURE — 87147 CULTURE TYPE IMMUNOLOGIC: CPT | Performed by: SURGERY

## 2021-01-01 PROCEDURE — 25010000002 CEFTRIAXONE SODIUM-DEXTROSE 1-3.74 GM-%(50ML) RECONSTITUTED SOLUTION: Performed by: EMERGENCY MEDICINE

## 2021-01-01 PROCEDURE — 87086 URINE CULTURE/COLONY COUNT: CPT | Performed by: PHYSICIAN ASSISTANT

## 2021-01-01 PROCEDURE — 94640 AIRWAY INHALATION TREATMENT: CPT

## 2021-01-01 PROCEDURE — 93010 ELECTROCARDIOGRAM REPORT: CPT | Performed by: INTERNAL MEDICINE

## 2021-01-01 PROCEDURE — 99283 EMERGENCY DEPT VISIT LOW MDM: CPT

## 2021-01-01 PROCEDURE — 82550 ASSAY OF CK (CPK): CPT | Performed by: NURSE PRACTITIONER

## 2021-01-01 PROCEDURE — 25010000002 CEFTRIAXONE PER 250 MG: Performed by: INTERNAL MEDICINE

## 2021-01-01 PROCEDURE — 81001 URINALYSIS AUTO W/SCOPE: CPT | Performed by: EMERGENCY MEDICINE

## 2021-01-01 PROCEDURE — 96360 HYDRATION IV INFUSION INIT: CPT

## 2021-01-01 PROCEDURE — 99284 EMERGENCY DEPT VISIT MOD MDM: CPT

## 2021-01-01 PROCEDURE — 84484 ASSAY OF TROPONIN QUANT: CPT | Performed by: STUDENT IN AN ORGANIZED HEALTH CARE EDUCATION/TRAINING PROGRAM

## 2021-01-01 PROCEDURE — 70450 CT HEAD/BRAIN W/O DYE: CPT

## 2021-01-01 PROCEDURE — 83550 IRON BINDING TEST: CPT | Performed by: NURSE PRACTITIONER

## 2021-01-01 PROCEDURE — 25010000002 CEFTRIAXONE SODIUM-DEXTROSE 2-2.22 GM-%(50ML) RECONSTITUTED SOLUTION: Performed by: EMERGENCY MEDICINE

## 2021-01-01 PROCEDURE — 25010000002 NA FERRIC GLUC CPLX PER 12.5 MG: Performed by: HOSPITALIST

## 2021-01-01 PROCEDURE — 85025 COMPLETE CBC W/AUTO DIFF WBC: CPT | Performed by: EMERGENCY MEDICINE

## 2021-01-01 PROCEDURE — 85007 BL SMEAR W/DIFF WBC COUNT: CPT | Performed by: EMERGENCY MEDICINE

## 2021-01-01 PROCEDURE — 85379 FIBRIN DEGRADATION QUANT: CPT | Performed by: EMERGENCY MEDICINE

## 2021-01-01 PROCEDURE — 99285 EMERGENCY DEPT VISIT HI MDM: CPT | Performed by: EMERGENCY MEDICINE

## 2021-01-01 PROCEDURE — 73030 X-RAY EXAM OF SHOULDER: CPT

## 2021-01-01 PROCEDURE — 11045 DBRDMT SUBQ TISS EACH ADDL: CPT | Performed by: SURGERY

## 2021-01-01 PROCEDURE — 63710000001 INSULIN ASPART PER 5 UNITS: Performed by: SURGERY

## 2021-01-01 PROCEDURE — 80048 BASIC METABOLIC PNL TOTAL CA: CPT | Performed by: STUDENT IN AN ORGANIZED HEALTH CARE EDUCATION/TRAINING PROGRAM

## 2021-01-01 PROCEDURE — 85014 HEMATOCRIT: CPT | Performed by: NURSE PRACTITIONER

## 2021-01-01 PROCEDURE — 63710000001 INSULIN LISPRO (HUMAN) PER 5 UNITS: Performed by: INTERNAL MEDICINE

## 2021-01-01 PROCEDURE — 82436 ASSAY OF URINE CHLORIDE: CPT | Performed by: SURGERY

## 2021-01-01 PROCEDURE — 93000 ELECTROCARDIOGRAM COMPLETE: CPT | Performed by: NURSE PRACTITIONER

## 2021-01-01 PROCEDURE — 93005 ELECTROCARDIOGRAM TRACING: CPT | Performed by: EMERGENCY MEDICINE

## 2021-01-01 PROCEDURE — 85018 HEMOGLOBIN: CPT | Performed by: STUDENT IN AN ORGANIZED HEALTH CARE EDUCATION/TRAINING PROGRAM

## 2021-01-01 PROCEDURE — 36415 COLL VENOUS BLD VENIPUNCTURE: CPT | Performed by: NURSE PRACTITIONER

## 2021-01-01 PROCEDURE — 80053 COMPREHEN METABOLIC PANEL: CPT | Performed by: SURGERY

## 2021-01-01 PROCEDURE — 83735 ASSAY OF MAGNESIUM: CPT | Performed by: INTERNAL MEDICINE

## 2021-01-01 PROCEDURE — 85025 COMPLETE CBC W/AUTO DIFF WBC: CPT | Performed by: HOSPITALIST

## 2021-01-01 PROCEDURE — 83735 ASSAY OF MAGNESIUM: CPT | Performed by: STUDENT IN AN ORGANIZED HEALTH CARE EDUCATION/TRAINING PROGRAM

## 2021-01-01 PROCEDURE — 84484 ASSAY OF TROPONIN QUANT: CPT | Performed by: EMERGENCY MEDICINE

## 2021-01-01 PROCEDURE — 96361 HYDRATE IV INFUSION ADD-ON: CPT

## 2021-01-01 PROCEDURE — 83880 ASSAY OF NATRIURETIC PEPTIDE: CPT | Performed by: FAMILY MEDICINE

## 2021-01-01 PROCEDURE — 87015 SPECIMEN INFECT AGNT CONCNTJ: CPT | Performed by: SURGERY

## 2021-01-01 PROCEDURE — 99232 SBSQ HOSP IP/OBS MODERATE 35: CPT | Performed by: NURSE PRACTITIONER

## 2021-01-01 PROCEDURE — 83880 ASSAY OF NATRIURETIC PEPTIDE: CPT | Performed by: PHYSICIAN ASSISTANT

## 2021-01-01 PROCEDURE — 82607 VITAMIN B-12: CPT | Performed by: NURSE PRACTITIONER

## 2021-01-01 PROCEDURE — 0HBJXZZ EXCISION OF LEFT UPPER LEG SKIN, EXTERNAL APPROACH: ICD-10-PCS | Performed by: SURGERY

## 2021-01-01 PROCEDURE — 93005 ELECTROCARDIOGRAM TRACING: CPT | Performed by: PHYSICIAN ASSISTANT

## 2021-01-01 PROCEDURE — 85025 COMPLETE CBC W/AUTO DIFF WBC: CPT | Performed by: INTERNAL MEDICINE

## 2021-01-01 PROCEDURE — 84145 PROCALCITONIN (PCT): CPT | Performed by: EMERGENCY MEDICINE

## 2021-01-01 PROCEDURE — 80048 BASIC METABOLIC PNL TOTAL CA: CPT | Performed by: HOSPITALIST

## 2021-01-01 PROCEDURE — 82746 ASSAY OF FOLIC ACID SERUM: CPT | Performed by: FAMILY MEDICINE

## 2021-01-01 PROCEDURE — 85027 COMPLETE CBC AUTOMATED: CPT | Performed by: STUDENT IN AN ORGANIZED HEALTH CARE EDUCATION/TRAINING PROGRAM

## 2021-01-01 PROCEDURE — 80048 BASIC METABOLIC PNL TOTAL CA: CPT | Performed by: FAMILY MEDICINE

## 2021-01-01 PROCEDURE — 94664 DEMO&/EVAL PT USE INHALER: CPT

## 2021-01-01 PROCEDURE — 82550 ASSAY OF CK (CPK): CPT | Performed by: PHYSICIAN ASSISTANT

## 2021-01-01 PROCEDURE — 81001 URINALYSIS AUTO W/SCOPE: CPT | Performed by: SURGERY

## 2021-01-01 PROCEDURE — 36415 COLL VENOUS BLD VENIPUNCTURE: CPT

## 2021-01-01 PROCEDURE — 83036 HEMOGLOBIN GLYCOSYLATED A1C: CPT | Performed by: INTERNAL MEDICINE

## 2021-01-01 PROCEDURE — 25010000002 SUCCINYLCHOLINE PER 20 MG: Performed by: NURSE ANESTHETIST, CERTIFIED REGISTERED

## 2021-01-01 PROCEDURE — 97165 OT EVAL LOW COMPLEX 30 MIN: CPT

## 2021-01-01 PROCEDURE — 87205 SMEAR GRAM STAIN: CPT | Performed by: EMERGENCY MEDICINE

## 2021-01-01 PROCEDURE — 85027 COMPLETE CBC AUTOMATED: CPT | Performed by: INTERNAL MEDICINE

## 2021-01-01 PROCEDURE — 80306 DRUG TEST PRSMV INSTRMNT: CPT | Performed by: EMERGENCY MEDICINE

## 2021-01-01 PROCEDURE — 97165 OT EVAL LOW COMPLEX 30 MIN: CPT | Performed by: OCCUPATIONAL THERAPIST

## 2021-01-01 PROCEDURE — 84466 ASSAY OF TRANSFERRIN: CPT | Performed by: HOSPITALIST

## 2021-01-01 PROCEDURE — 99283 EMERGENCY DEPT VISIT LOW MDM: CPT | Performed by: NURSE PRACTITIONER

## 2021-01-01 PROCEDURE — 82607 VITAMIN B-12: CPT | Performed by: FAMILY MEDICINE

## 2021-01-01 PROCEDURE — 63710000001 INSULIN ASPART PER 5 UNITS: Performed by: STUDENT IN AN ORGANIZED HEALTH CARE EDUCATION/TRAINING PROGRAM

## 2021-01-01 PROCEDURE — 25010000002 ONDANSETRON PER 1 MG: Performed by: INTERNAL MEDICINE

## 2021-01-01 PROCEDURE — 51701 INSERT BLADDER CATHETER: CPT

## 2021-01-01 PROCEDURE — 96376 TX/PRO/DX INJ SAME DRUG ADON: CPT

## 2021-01-01 PROCEDURE — 84550 ASSAY OF BLOOD/URIC ACID: CPT | Performed by: FAMILY MEDICINE

## 2021-01-01 PROCEDURE — 80053 COMPREHEN METABOLIC PANEL: CPT | Performed by: INTERNAL MEDICINE

## 2021-01-01 PROCEDURE — 82140 ASSAY OF AMMONIA: CPT | Performed by: PHYSICIAN ASSISTANT

## 2021-01-01 PROCEDURE — 83036 HEMOGLOBIN GLYCOSYLATED A1C: CPT | Performed by: NURSE PRACTITIONER

## 2021-01-01 PROCEDURE — 87635 SARS-COV-2 COVID-19 AMP PRB: CPT | Performed by: PHYSICIAN ASSISTANT

## 2021-01-01 PROCEDURE — 63710000001 INSULIN ASPART PER 5 UNITS: Performed by: NURSE PRACTITIONER

## 2021-01-01 PROCEDURE — 99222 1ST HOSP IP/OBS MODERATE 55: CPT | Performed by: NURSE PRACTITIONER

## 2021-01-01 PROCEDURE — 0 CEFAZOLIN SODIUM-DEXTROSE 2-3 GM-%(50ML) RECONSTITUTED SOLUTION: Performed by: SURGERY

## 2021-01-01 PROCEDURE — 97140 MANUAL THERAPY 1/> REGIONS: CPT

## 2021-01-01 PROCEDURE — 87086 URINE CULTURE/COLONY COUNT: CPT | Performed by: INTERNAL MEDICINE

## 2021-01-01 PROCEDURE — 99232 SBSQ HOSP IP/OBS MODERATE 35: CPT | Performed by: INTERNAL MEDICINE

## 2021-01-01 PROCEDURE — 85027 COMPLETE CBC AUTOMATED: CPT | Performed by: SURGERY

## 2021-01-01 PROCEDURE — 71045 X-RAY EXAM CHEST 1 VIEW: CPT

## 2021-01-01 PROCEDURE — P9612 CATHETERIZE FOR URINE SPEC: HCPCS

## 2021-01-01 PROCEDURE — 93005 ELECTROCARDIOGRAM TRACING: CPT | Performed by: HOSPITALIST

## 2021-01-01 PROCEDURE — 63710000001 INSULIN LISPRO (HUMAN) PER 5 UNITS: Performed by: NURSE PRACTITIONER

## 2021-01-01 PROCEDURE — 83605 ASSAY OF LACTIC ACID: CPT | Performed by: EMERGENCY MEDICINE

## 2021-01-01 PROCEDURE — 87040 BLOOD CULTURE FOR BACTERIA: CPT | Performed by: INTERNAL MEDICINE

## 2021-01-01 PROCEDURE — 87635 SARS-COV-2 COVID-19 AMP PRB: CPT | Performed by: STUDENT IN AN ORGANIZED HEALTH CARE EDUCATION/TRAINING PROGRAM

## 2021-01-01 PROCEDURE — 93306 TTE W/DOPPLER COMPLETE: CPT

## 2021-01-01 PROCEDURE — C9803 HOPD COVID-19 SPEC COLLECT: HCPCS

## 2021-01-01 PROCEDURE — 80306 DRUG TEST PRSMV INSTRMNT: CPT | Performed by: NURSE PRACTITIONER

## 2021-01-01 PROCEDURE — 93005 ELECTROCARDIOGRAM TRACING: CPT | Performed by: INTERNAL MEDICINE

## 2021-01-01 PROCEDURE — 25010000002 ONDANSETRON PER 1 MG: Performed by: NURSE ANESTHETIST, CERTIFIED REGISTERED

## 2021-01-01 PROCEDURE — 11042 DBRDMT SUBQ TIS 1ST 20SQCM/<: CPT | Performed by: SURGERY

## 2021-01-01 PROCEDURE — U0003 INFECTIOUS AGENT DETECTION BY NUCLEIC ACID (DNA OR RNA); SEVERE ACUTE RESPIRATORY SYNDROME CORONAVIRUS 2 (SARS-COV-2) (CORONAVIRUS DISEASE [COVID-19]), AMPLIFIED PROBE TECHNIQUE, MAKING USE OF HIGH THROUGHPUT TECHNOLOGIES AS DESCRIBED BY CMS-2020-01-R: HCPCS | Performed by: EMERGENCY MEDICINE

## 2021-01-01 PROCEDURE — 97162 PT EVAL MOD COMPLEX 30 MIN: CPT

## 2021-01-01 PROCEDURE — 83540 ASSAY OF IRON: CPT | Performed by: NURSE PRACTITIONER

## 2021-01-01 PROCEDURE — 87070 CULTURE OTHR SPECIMN AEROBIC: CPT | Performed by: SURGERY

## 2021-01-01 PROCEDURE — 84443 ASSAY THYROID STIM HORMONE: CPT | Performed by: PHYSICIAN ASSISTANT

## 2021-01-01 PROCEDURE — 83880 ASSAY OF NATRIURETIC PEPTIDE: CPT | Performed by: EMERGENCY MEDICINE

## 2021-01-01 PROCEDURE — 84550 ASSAY OF BLOOD/URIC ACID: CPT | Performed by: INTERNAL MEDICINE

## 2021-01-01 PROCEDURE — 84443 ASSAY THYROID STIM HORMONE: CPT | Performed by: STUDENT IN AN ORGANIZED HEALTH CARE EDUCATION/TRAINING PROGRAM

## 2021-01-01 PROCEDURE — 80053 COMPREHEN METABOLIC PANEL: CPT | Performed by: PHYSICIAN ASSISTANT

## 2021-01-01 PROCEDURE — 90791 PSYCH DIAGNOSTIC EVALUATION: CPT

## 2021-01-01 PROCEDURE — 80069 RENAL FUNCTION PANEL: CPT | Performed by: HOSPITALIST

## 2021-01-01 PROCEDURE — 99220 PR INITIAL OBSERVATION CARE/DAY 70 MINUTES: CPT | Performed by: NURSE PRACTITIONER

## 2021-01-01 PROCEDURE — 25010000002 CEFTRIAXONE PER 250 MG: Performed by: NURSE PRACTITIONER

## 2021-01-01 PROCEDURE — 0 IOPAMIDOL PER 1 ML: Performed by: EMERGENCY MEDICINE

## 2021-01-01 PROCEDURE — 25010000002 DEXAMETHASONE PER 1 MG: Performed by: NURSE ANESTHETIST, CERTIFIED REGISTERED

## 2021-01-01 PROCEDURE — 83540 ASSAY OF IRON: CPT | Performed by: HOSPITALIST

## 2021-01-01 PROCEDURE — 93005 ELECTROCARDIOGRAM TRACING: CPT | Performed by: NURSE PRACTITIONER

## 2021-01-01 PROCEDURE — 84300 ASSAY OF URINE SODIUM: CPT | Performed by: SURGERY

## 2021-01-01 PROCEDURE — 99220 PR INITIAL OBSERVATION CARE/DAY 70 MINUTES: CPT | Performed by: STUDENT IN AN ORGANIZED HEALTH CARE EDUCATION/TRAINING PROGRAM

## 2021-01-01 PROCEDURE — 96375 TX/PRO/DX INJ NEW DRUG ADDON: CPT

## 2021-01-01 PROCEDURE — 82607 VITAMIN B-12: CPT | Performed by: HOSPITALIST

## 2021-01-01 PROCEDURE — 99282 EMERGENCY DEPT VISIT SF MDM: CPT | Performed by: PHYSICIAN ASSISTANT

## 2021-01-01 PROCEDURE — 84484 ASSAY OF TROPONIN QUANT: CPT | Performed by: PHYSICIAN ASSISTANT

## 2021-01-01 PROCEDURE — 84443 ASSAY THYROID STIM HORMONE: CPT | Performed by: NURSE PRACTITIONER

## 2021-01-01 PROCEDURE — 85027 COMPLETE CBC AUTOMATED: CPT | Performed by: FAMILY MEDICINE

## 2021-01-01 PROCEDURE — 82728 ASSAY OF FERRITIN: CPT | Performed by: NURSE PRACTITIONER

## 2021-01-01 PROCEDURE — 25010000002 CEFTRIAXONE PER 250 MG: Performed by: EMERGENCY MEDICINE

## 2021-01-01 PROCEDURE — 87205 SMEAR GRAM STAIN: CPT | Performed by: SURGERY

## 2021-01-01 PROCEDURE — 87186 SC STD MICRODIL/AGAR DIL: CPT | Performed by: SURGERY

## 2021-01-01 PROCEDURE — 97530 THERAPEUTIC ACTIVITIES: CPT

## 2021-01-01 PROCEDURE — 99221 1ST HOSP IP/OBS SF/LOW 40: CPT | Performed by: INTERNAL MEDICINE

## 2021-01-01 PROCEDURE — 85025 COMPLETE CBC W/AUTO DIFF WBC: CPT | Performed by: FAMILY MEDICINE

## 2021-01-01 PROCEDURE — 82550 ASSAY OF CK (CPK): CPT | Performed by: EMERGENCY MEDICINE

## 2021-01-01 PROCEDURE — 73562 X-RAY EXAM OF KNEE 3: CPT

## 2021-01-01 PROCEDURE — 63710000001 INSULIN DETEMIR PER 5 UNITS: Performed by: SURGERY

## 2021-01-01 PROCEDURE — 96365 THER/PROPH/DIAG IV INF INIT: CPT

## 2021-01-01 PROCEDURE — 99217 PR OBSERVATION CARE DISCHARGE MANAGEMENT: CPT | Performed by: HOSPITALIST

## 2021-01-01 PROCEDURE — 80048 BASIC METABOLIC PNL TOTAL CA: CPT | Performed by: NURSE PRACTITIONER

## 2021-01-01 PROCEDURE — 83036 HEMOGLOBIN GLYCOSYLATED A1C: CPT | Performed by: FAMILY MEDICINE

## 2021-01-01 PROCEDURE — 0JBM0ZZ EXCISION OF LEFT UPPER LEG SUBCUTANEOUS TISSUE AND FASCIA, OPEN APPROACH: ICD-10-PCS | Performed by: SURGERY

## 2021-01-01 PROCEDURE — 99223 1ST HOSP IP/OBS HIGH 75: CPT | Performed by: INTERNAL MEDICINE

## 2021-01-01 PROCEDURE — 51798 US URINE CAPACITY MEASURE: CPT

## 2021-01-01 PROCEDURE — 87635 SARS-COV-2 COVID-19 AMP PRB: CPT | Performed by: EMERGENCY MEDICINE

## 2021-01-01 PROCEDURE — 82728 ASSAY OF FERRITIN: CPT | Performed by: HOSPITALIST

## 2021-01-01 PROCEDURE — 97166 OT EVAL MOD COMPLEX 45 MIN: CPT

## 2021-01-01 PROCEDURE — 84439 ASSAY OF FREE THYROXINE: CPT | Performed by: NURSE PRACTITIONER

## 2021-01-01 PROCEDURE — 76775 US EXAM ABDO BACK WALL LIM: CPT

## 2021-01-01 PROCEDURE — 96367 TX/PROPH/DG ADDL SEQ IV INF: CPT

## 2021-01-01 PROCEDURE — 25010000002 CEFTRIAXONE PER 250 MG: Performed by: PHYSICIAN ASSISTANT

## 2021-01-01 PROCEDURE — 99224 PR SBSQ OBSERVATION CARE/DAY 15 MINUTES: CPT | Performed by: HOSPITALIST

## 2021-01-01 PROCEDURE — 83605 ASSAY OF LACTIC ACID: CPT | Performed by: INTERNAL MEDICINE

## 2021-01-01 PROCEDURE — 25010000002 AZITHROMYCIN PER 500 MG: Performed by: EMERGENCY MEDICINE

## 2021-01-01 PROCEDURE — 81001 URINALYSIS AUTO W/SCOPE: CPT | Performed by: NURSE PRACTITIONER

## 2021-01-01 PROCEDURE — 84443 ASSAY THYROID STIM HORMONE: CPT | Performed by: FAMILY MEDICINE

## 2021-01-01 PROCEDURE — 97140 MANUAL THERAPY 1/> REGIONS: CPT | Performed by: OCCUPATIONAL THERAPIST

## 2021-01-01 PROCEDURE — 93970 EXTREMITY STUDY: CPT

## 2021-01-01 PROCEDURE — 83605 ASSAY OF LACTIC ACID: CPT | Performed by: PHYSICIAN ASSISTANT

## 2021-01-01 PROCEDURE — 72192 CT PELVIS W/O DYE: CPT

## 2021-01-01 PROCEDURE — 82746 ASSAY OF FOLIC ACID SERUM: CPT | Performed by: HOSPITALIST

## 2021-01-01 PROCEDURE — 87186 SC STD MICRODIL/AGAR DIL: CPT | Performed by: EMERGENCY MEDICINE

## 2021-01-01 PROCEDURE — 84133 ASSAY OF URINE POTASSIUM: CPT | Performed by: SURGERY

## 2021-01-01 PROCEDURE — 87086 URINE CULTURE/COLONY COUNT: CPT | Performed by: SURGERY

## 2021-01-01 PROCEDURE — 99232 SBSQ HOSP IP/OBS MODERATE 35: CPT | Performed by: SURGERY

## 2021-01-01 PROCEDURE — 72040 X-RAY EXAM NECK SPINE 2-3 VW: CPT

## 2021-01-01 PROCEDURE — 71046 X-RAY EXAM CHEST 2 VIEWS: CPT

## 2021-01-01 PROCEDURE — 99214 OFFICE O/P EST MOD 30 MIN: CPT | Performed by: INTERNAL MEDICINE

## 2021-01-01 PROCEDURE — 25010000002 PERFLUTREN (DEFINITY) 8.476 MG IN SODIUM CHLORIDE (PF) 0.9 % 10 ML INJECTION: Performed by: FAMILY MEDICINE

## 2021-01-01 PROCEDURE — 87086 URINE CULTURE/COLONY COUNT: CPT | Performed by: NURSE PRACTITIONER

## 2021-01-01 PROCEDURE — 97110 THERAPEUTIC EXERCISES: CPT

## 2021-01-01 PROCEDURE — 82746 ASSAY OF FOLIC ACID SERUM: CPT | Performed by: NURSE PRACTITIONER

## 2021-01-01 PROCEDURE — 99231 SBSQ HOSP IP/OBS SF/LOW 25: CPT | Performed by: SURGERY

## 2021-01-01 PROCEDURE — 84484 ASSAY OF TROPONIN QUANT: CPT | Performed by: HOSPITALIST

## 2021-01-01 PROCEDURE — U0005 INFEC AGEN DETEC AMPLI PROBE: HCPCS | Performed by: EMERGENCY MEDICINE

## 2021-01-01 PROCEDURE — 87076 CULTURE ANAEROBE IDENT EACH: CPT | Performed by: EMERGENCY MEDICINE

## 2021-01-01 PROCEDURE — 87070 CULTURE OTHR SPECIMN AEROBIC: CPT | Performed by: EMERGENCY MEDICINE

## 2021-01-01 PROCEDURE — 83540 ASSAY OF IRON: CPT | Performed by: FAMILY MEDICINE

## 2021-01-01 PROCEDURE — 84100 ASSAY OF PHOSPHORUS: CPT | Performed by: STUDENT IN AN ORGANIZED HEALTH CARE EDUCATION/TRAINING PROGRAM

## 2021-01-01 PROCEDURE — 87150 DNA/RNA AMPLIFIED PROBE: CPT | Performed by: EMERGENCY MEDICINE

## 2021-01-01 PROCEDURE — 87075 CULTR BACTERIA EXCEPT BLOOD: CPT | Performed by: SURGERY

## 2021-01-01 PROCEDURE — 87040 BLOOD CULTURE FOR BACTERIA: CPT | Performed by: PHYSICIAN ASSISTANT

## 2021-01-01 PROCEDURE — 99214 OFFICE O/P EST MOD 30 MIN: CPT | Performed by: NURSE PRACTITIONER

## 2021-01-01 RX ORDER — ACETAMINOPHEN 160 MG/5ML
650 SOLUTION ORAL EVERY 4 HOURS PRN
Status: DISCONTINUED | OUTPATIENT
Start: 2021-01-01 | End: 2022-01-01 | Stop reason: HOSPADM

## 2021-01-01 RX ORDER — ACETAMINOPHEN 325 MG/1
650 TABLET ORAL EVERY 4 HOURS PRN
Status: DISCONTINUED | OUTPATIENT
Start: 2021-01-01 | End: 2021-01-01 | Stop reason: HOSPADM

## 2021-01-01 RX ORDER — MELATONIN
2000 DAILY
Refills: 0 | Status: DISCONTINUED | OUTPATIENT
Start: 2021-01-01 | End: 2021-01-01 | Stop reason: HOSPADM

## 2021-01-01 RX ORDER — NITROGLYCERIN 0.4 MG/1
0.4 TABLET SUBLINGUAL
Status: DISCONTINUED | OUTPATIENT
Start: 2021-01-01 | End: 2021-01-01 | Stop reason: HOSPADM

## 2021-01-01 RX ORDER — PREGABALIN 75 MG/1
75 CAPSULE ORAL 2 TIMES DAILY
Qty: 6 CAPSULE | Refills: 0 | Status: SHIPPED | OUTPATIENT
Start: 2021-01-01 | End: 2022-01-01 | Stop reason: HOSPADM

## 2021-01-01 RX ORDER — IPRATROPIUM BROMIDE AND ALBUTEROL SULFATE 2.5; .5 MG/3ML; MG/3ML
3 SOLUTION RESPIRATORY (INHALATION) EVERY 4 HOURS PRN
Status: DISCONTINUED | OUTPATIENT
Start: 2021-01-01 | End: 2021-01-01 | Stop reason: HOSPADM

## 2021-01-01 RX ORDER — CEFTRIAXONE SODIUM 1 G/50ML
1 INJECTION, SOLUTION INTRAVENOUS ONCE
Status: COMPLETED | OUTPATIENT
Start: 2021-01-01 | End: 2021-01-01

## 2021-01-01 RX ORDER — SODIUM CHLORIDE 9 MG/ML
40 INJECTION, SOLUTION INTRAVENOUS AS NEEDED
Status: DISCONTINUED | OUTPATIENT
Start: 2021-01-01 | End: 2021-01-01 | Stop reason: HOSPADM

## 2021-01-01 RX ORDER — DOCUSATE SODIUM 100 MG/1
100 CAPSULE, LIQUID FILLED ORAL 2 TIMES DAILY
Status: DISCONTINUED | OUTPATIENT
Start: 2021-01-01 | End: 2021-01-01 | Stop reason: HOSPADM

## 2021-01-01 RX ORDER — ONDANSETRON 4 MG/1
4 TABLET, FILM COATED ORAL EVERY 6 HOURS PRN
Status: DISCONTINUED | OUTPATIENT
Start: 2021-01-01 | End: 2021-01-01 | Stop reason: HOSPADM

## 2021-01-01 RX ORDER — ETOMIDATE 2 MG/ML
INJECTION INTRAVENOUS AS NEEDED
Status: DISCONTINUED | OUTPATIENT
Start: 2021-01-01 | End: 2021-01-01 | Stop reason: SURG

## 2021-01-01 RX ORDER — CITALOPRAM 20 MG/1
20 TABLET ORAL NIGHTLY
Status: DISCONTINUED | OUTPATIENT
Start: 2021-01-01 | End: 2022-01-01 | Stop reason: HOSPADM

## 2021-01-01 RX ORDER — BUMETANIDE 0.25 MG/ML
2 INJECTION INTRAMUSCULAR; INTRAVENOUS EVERY 12 HOURS
Status: DISCONTINUED | OUTPATIENT
Start: 2021-01-01 | End: 2021-01-01

## 2021-01-01 RX ORDER — ATORVASTATIN CALCIUM 20 MG/1
10 TABLET, FILM COATED ORAL NIGHTLY
Status: DISCONTINUED | OUTPATIENT
Start: 2021-01-01 | End: 2021-01-01 | Stop reason: HOSPADM

## 2021-01-01 RX ORDER — BUDESONIDE AND FORMOTEROL FUMARATE DIHYDRATE 160; 4.5 UG/1; UG/1
2 AEROSOL RESPIRATORY (INHALATION)
Status: DISCONTINUED | OUTPATIENT
Start: 2021-01-01 | End: 2021-01-01 | Stop reason: HOSPADM

## 2021-01-01 RX ORDER — DEXAMETHASONE SODIUM PHOSPHATE 4 MG/ML
8 INJECTION, SOLUTION INTRA-ARTICULAR; INTRALESIONAL; INTRAMUSCULAR; INTRAVENOUS; SOFT TISSUE ONCE AS NEEDED
Status: COMPLETED | OUTPATIENT
Start: 2021-01-01 | End: 2021-01-01

## 2021-01-01 RX ORDER — CHOLECALCIFEROL (VITAMIN D3) 125 MCG
5 CAPSULE ORAL NIGHTLY PRN
Status: DISCONTINUED | OUTPATIENT
Start: 2021-01-01 | End: 2022-01-01 | Stop reason: HOSPADM

## 2021-01-01 RX ORDER — CEFUROXIME AXETIL 500 MG/1
500 TABLET ORAL 2 TIMES DAILY
Qty: 14 TABLET | Refills: 0 | Status: SHIPPED | OUTPATIENT
Start: 2021-01-01 | End: 2021-01-01

## 2021-01-01 RX ORDER — ALBUTEROL SULFATE 90 UG/1
2 AEROSOL, METERED RESPIRATORY (INHALATION) EVERY 4 HOURS PRN
Status: DISCONTINUED | OUTPATIENT
Start: 2021-01-01 | End: 2022-01-01 | Stop reason: HOSPADM

## 2021-01-01 RX ORDER — QUETIAPINE FUMARATE 25 MG/1
25 TABLET, FILM COATED ORAL NIGHTLY
COMMUNITY
End: 2021-01-01 | Stop reason: HOSPADM

## 2021-01-01 RX ORDER — PREGABALIN 75 MG/1
75 CAPSULE ORAL 2 TIMES DAILY
Status: DISCONTINUED | OUTPATIENT
Start: 2021-01-01 | End: 2022-01-01 | Stop reason: HOSPADM

## 2021-01-01 RX ORDER — FLUCONAZOLE 200 MG/1
400 TABLET ORAL EVERY 24 HOURS
Qty: 20 TABLET | Refills: 0
Start: 2021-01-01 | End: 2021-01-01

## 2021-01-01 RX ORDER — NICOTINE POLACRILEX 4 MG
15 LOZENGE BUCCAL
Status: DISCONTINUED | OUTPATIENT
Start: 2021-01-01 | End: 2021-01-01 | Stop reason: HOSPADM

## 2021-01-01 RX ORDER — AMIODARONE HYDROCHLORIDE 200 MG/1
200 TABLET ORAL 2 TIMES DAILY
COMMUNITY
End: 2021-01-01 | Stop reason: HOSPADM

## 2021-01-01 RX ORDER — CEFTRIAXONE 1 G/50ML
1 INJECTION, SOLUTION INTRAVENOUS ONCE
Status: COMPLETED | OUTPATIENT
Start: 2021-01-01 | End: 2021-01-01

## 2021-01-01 RX ORDER — ATORVASTATIN CALCIUM 10 MG/1
10 TABLET, FILM COATED ORAL NIGHTLY
COMMUNITY
End: 2022-01-01 | Stop reason: HOSPADM

## 2021-01-01 RX ORDER — SODIUM CHLORIDE 9 MG/ML
250 INJECTION, SOLUTION INTRAVENOUS CONTINUOUS
Status: DISCONTINUED | OUTPATIENT
Start: 2021-01-01 | End: 2021-01-01

## 2021-01-01 RX ORDER — AMIODARONE HYDROCHLORIDE 200 MG/1
200 TABLET ORAL EVERY 12 HOURS SCHEDULED
Status: DISCONTINUED | OUTPATIENT
Start: 2021-01-01 | End: 2021-01-01 | Stop reason: HOSPADM

## 2021-01-01 RX ORDER — BUMETANIDE 0.25 MG/ML
2 INJECTION INTRAMUSCULAR; INTRAVENOUS ONCE
Status: COMPLETED | OUTPATIENT
Start: 2021-01-01 | End: 2021-01-01

## 2021-01-01 RX ORDER — CARVEDILOL 3.12 MG/1
3.12 TABLET ORAL EVERY 12 HOURS SCHEDULED
Status: DISCONTINUED | OUTPATIENT
Start: 2021-01-01 | End: 2021-01-01

## 2021-01-01 RX ORDER — CEFUROXIME AXETIL 250 MG/1
500 TABLET ORAL ONCE
Status: COMPLETED | OUTPATIENT
Start: 2021-01-01 | End: 2021-01-01

## 2021-01-01 RX ORDER — ACETAMINOPHEN 160 MG/5ML
650 SOLUTION ORAL EVERY 4 HOURS PRN
Status: DISCONTINUED | OUTPATIENT
Start: 2021-01-01 | End: 2021-01-01 | Stop reason: HOSPADM

## 2021-01-01 RX ORDER — AMIODARONE HYDROCHLORIDE 200 MG/1
TABLET ORAL
Status: DISPENSED
Start: 2021-01-01 | End: 2021-01-01

## 2021-01-01 RX ORDER — FLUTICASONE PROPIONATE 50 MCG
2 SPRAY, SUSPENSION (ML) NASAL DAILY
Status: DISCONTINUED | OUTPATIENT
Start: 2021-01-01 | End: 2021-01-01 | Stop reason: HOSPADM

## 2021-01-01 RX ORDER — SAL ACID/UREA/PETROLATUM,WHITE 5 %-10 %
OINTMENT (GRAM) TOPICAL 2 TIMES DAILY
Status: DISCONTINUED | OUTPATIENT
Start: 2021-01-01 | End: 2022-01-01

## 2021-01-01 RX ORDER — QUETIAPINE FUMARATE 25 MG/1
0.5 TABLET, FILM COATED ORAL EVERY EVENING
COMMUNITY
End: 2022-01-01 | Stop reason: HOSPADM

## 2021-01-01 RX ORDER — CARVEDILOL 6.25 MG/1
TABLET ORAL EVERY 12 HOURS SCHEDULED
COMMUNITY
End: 2022-01-01 | Stop reason: HOSPADM

## 2021-01-01 RX ORDER — PREGABALIN 75 MG/1
75 CAPSULE ORAL 2 TIMES DAILY
Status: DISCONTINUED | OUTPATIENT
Start: 2021-01-01 | End: 2021-01-01 | Stop reason: HOSPADM

## 2021-01-01 RX ORDER — FAMOTIDINE 10 MG/ML
20 INJECTION, SOLUTION INTRAVENOUS
Status: COMPLETED | OUTPATIENT
Start: 2021-01-01 | End: 2021-01-01

## 2021-01-01 RX ORDER — AMIODARONE HYDROCHLORIDE 200 MG/1
TABLET ORAL EVERY 12 HOURS SCHEDULED
COMMUNITY
End: 2022-01-01 | Stop reason: HOSPADM

## 2021-01-01 RX ORDER — NICOTINE POLACRILEX 4 MG
15 LOZENGE BUCCAL
Status: DISCONTINUED | OUTPATIENT
Start: 2021-01-01 | End: 2022-01-01 | Stop reason: HOSPADM

## 2021-01-01 RX ORDER — ACETAMINOPHEN 650 MG/1
650 SUPPOSITORY RECTAL EVERY 4 HOURS PRN
Status: DISCONTINUED | OUTPATIENT
Start: 2021-01-01 | End: 2022-01-01 | Stop reason: HOSPADM

## 2021-01-01 RX ORDER — CARVEDILOL 6.25 MG/1
6.25 TABLET ORAL 2 TIMES DAILY WITH MEALS
Status: DISCONTINUED | OUTPATIENT
Start: 2021-01-01 | End: 2021-01-01 | Stop reason: HOSPADM

## 2021-01-01 RX ORDER — ACETAMINOPHEN 325 MG/1
650 TABLET ORAL EVERY 4 HOURS PRN
Status: DISCONTINUED | OUTPATIENT
Start: 2021-01-01 | End: 2022-01-01 | Stop reason: HOSPADM

## 2021-01-01 RX ORDER — POLYETHYLENE GLYCOL 3350 17 G/17G
17 POWDER, FOR SOLUTION ORAL DAILY
Status: DISCONTINUED | OUTPATIENT
Start: 2021-01-01 | End: 2021-01-01 | Stop reason: HOSPADM

## 2021-01-01 RX ORDER — CITALOPRAM 20 MG/1
20 TABLET ORAL DAILY
Status: DISCONTINUED | OUTPATIENT
Start: 2021-01-01 | End: 2021-01-01 | Stop reason: HOSPADM

## 2021-01-01 RX ORDER — ONDANSETRON 2 MG/ML
4 INJECTION INTRAMUSCULAR; INTRAVENOUS EVERY 6 HOURS PRN
Status: DISCONTINUED | OUTPATIENT
Start: 2021-01-01 | End: 2021-01-01 | Stop reason: HOSPADM

## 2021-01-01 RX ORDER — LACTULOSE 10 G/15ML
30 SOLUTION ORAL DAILY PRN
COMMUNITY
End: 2022-01-01 | Stop reason: HOSPADM

## 2021-01-01 RX ORDER — PETROLATUM 42 G/100G
OINTMENT TOPICAL AS NEEDED
Status: DISCONTINUED | OUTPATIENT
Start: 2021-01-01 | End: 2021-01-01 | Stop reason: HOSPADM

## 2021-01-01 RX ORDER — SODIUM CHLORIDE 0.9 % (FLUSH) 0.9 %
10 SYRINGE (ML) INJECTION AS NEEDED
Status: DISCONTINUED | OUTPATIENT
Start: 2021-01-01 | End: 2021-01-01 | Stop reason: HOSPADM

## 2021-01-01 RX ORDER — ALBUTEROL SULFATE 2.5 MG/3ML
2.5 SOLUTION RESPIRATORY (INHALATION) EVERY 6 HOURS PRN
Status: DISCONTINUED | OUTPATIENT
Start: 2021-01-01 | End: 2021-01-01 | Stop reason: HOSPADM

## 2021-01-01 RX ORDER — QUETIAPINE FUMARATE 25 MG/1
12.5 TABLET, FILM COATED ORAL NIGHTLY
Status: DISCONTINUED | OUTPATIENT
Start: 2021-01-01 | End: 2021-01-01 | Stop reason: HOSPADM

## 2021-01-01 RX ORDER — LIDOCAINE HYDROCHLORIDE 10 MG/ML
0.5 INJECTION, SOLUTION EPIDURAL; INFILTRATION; INTRACAUDAL; PERINEURAL ONCE AS NEEDED
Status: DISCONTINUED | OUTPATIENT
Start: 2021-01-01 | End: 2021-01-01 | Stop reason: HOSPADM

## 2021-01-01 RX ORDER — CEFTRIAXONE SODIUM 1 G/50ML
1 INJECTION, SOLUTION INTRAVENOUS EVERY 24 HOURS
Status: DISCONTINUED | OUTPATIENT
Start: 2021-01-01 | End: 2021-01-01

## 2021-01-01 RX ORDER — CETIRIZINE HYDROCHLORIDE 10 MG/1
10 TABLET ORAL DAILY
Status: DISCONTINUED | OUTPATIENT
Start: 2021-01-01 | End: 2021-01-01 | Stop reason: HOSPADM

## 2021-01-01 RX ORDER — DIAPER,BRIEF,INFANT-TODD,DISP
EACH MISCELLANEOUS EVERY 12 HOURS SCHEDULED
Status: DISCONTINUED | OUTPATIENT
Start: 2021-01-01 | End: 2021-01-01 | Stop reason: HOSPADM

## 2021-01-01 RX ORDER — QUETIAPINE FUMARATE 25 MG/1
12.5 TABLET, FILM COATED ORAL EVERY EVENING
Status: DISCONTINUED | OUTPATIENT
Start: 2021-01-01 | End: 2022-01-01 | Stop reason: HOSPADM

## 2021-01-01 RX ORDER — BISACODYL 10 MG
10 SUPPOSITORY, RECTAL RECTAL DAILY PRN
Status: DISCONTINUED | OUTPATIENT
Start: 2021-01-01 | End: 2021-01-01 | Stop reason: HOSPADM

## 2021-01-01 RX ORDER — DOCUSATE SODIUM 100 MG/1
100 CAPSULE, LIQUID FILLED ORAL 2 TIMES DAILY
COMMUNITY
End: 2022-01-01 | Stop reason: HOSPADM

## 2021-01-01 RX ORDER — CHOLECALCIFEROL (VITAMIN D3) 125 MCG
5 CAPSULE ORAL NIGHTLY PRN
Status: DISCONTINUED | OUTPATIENT
Start: 2021-01-01 | End: 2021-01-01 | Stop reason: HOSPADM

## 2021-01-01 RX ORDER — SODIUM CHLORIDE 0.9 % (FLUSH) 0.9 %
10 SYRINGE (ML) INJECTION EVERY 12 HOURS SCHEDULED
Status: DISCONTINUED | OUTPATIENT
Start: 2021-01-01 | End: 2021-01-01 | Stop reason: HOSPADM

## 2021-01-01 RX ORDER — DIAPER,BRIEF,INFANT-TODD,DISP
EACH MISCELLANEOUS 2 TIMES DAILY
COMMUNITY
End: 2022-01-01 | Stop reason: HOSPADM

## 2021-01-01 RX ORDER — CEFUROXIME AXETIL 500 MG/1
500 TABLET ORAL 2 TIMES DAILY
Qty: 14 TABLET | Refills: 0 | Status: SHIPPED | OUTPATIENT
Start: 2021-01-01 | End: 2021-01-01 | Stop reason: SDUPTHER

## 2021-01-01 RX ORDER — QUETIAPINE FUMARATE 25 MG/1
25 TABLET, FILM COATED ORAL NIGHTLY
Status: DISCONTINUED | OUTPATIENT
Start: 2021-01-01 | End: 2021-01-01 | Stop reason: HOSPADM

## 2021-01-01 RX ORDER — BUMETANIDE 2 MG/1
TABLET ORAL
COMMUNITY
End: 2021-01-01

## 2021-01-01 RX ORDER — AMIODARONE HYDROCHLORIDE 200 MG/1
200 TABLET ORAL 2 TIMES DAILY
Status: DISCONTINUED | OUTPATIENT
Start: 2021-01-01 | End: 2021-01-01

## 2021-01-01 RX ORDER — LANOLIN ALCOHOL/MO/W.PET/CERES
1000 CREAM (GRAM) TOPICAL DAILY
Status: DISCONTINUED | OUTPATIENT
Start: 2021-01-01 | End: 2021-01-01 | Stop reason: HOSPADM

## 2021-01-01 RX ORDER — BUMETANIDE 2 MG/1
2 TABLET ORAL DAILY
Status: DISCONTINUED | OUTPATIENT
Start: 2021-01-01 | End: 2021-01-01 | Stop reason: HOSPADM

## 2021-01-01 RX ORDER — LIDOCAINE HYDROCHLORIDE 20 MG/ML
INJECTION, SOLUTION INFILTRATION; PERINEURAL AS NEEDED
Status: DISCONTINUED | OUTPATIENT
Start: 2021-01-01 | End: 2021-01-01 | Stop reason: SURG

## 2021-01-01 RX ORDER — PREGABALIN 50 MG/1
CAPSULE ORAL EVERY 12 HOURS SCHEDULED
COMMUNITY
End: 2021-01-01

## 2021-01-01 RX ORDER — ECHINACEA PURPUREA EXTRACT 125 MG
2 TABLET ORAL AS NEEDED
Status: DISCONTINUED | OUTPATIENT
Start: 2021-01-01 | End: 2021-01-01 | Stop reason: HOSPADM

## 2021-01-01 RX ORDER — AMOXICILLIN 250 MG/1
500 CAPSULE ORAL EVERY 8 HOURS SCHEDULED
Status: DISCONTINUED | OUTPATIENT
Start: 2021-01-01 | End: 2021-01-01 | Stop reason: HOSPADM

## 2021-01-01 RX ORDER — ALBUTEROL SULFATE 90 UG/1
2 AEROSOL, METERED RESPIRATORY (INHALATION) EVERY 4 HOURS PRN
COMMUNITY
End: 2022-01-01 | Stop reason: HOSPADM

## 2021-01-01 RX ORDER — ATORVASTATIN CALCIUM 10 MG/1
10 TABLET, FILM COATED ORAL DAILY
Status: DISCONTINUED | OUTPATIENT
Start: 2021-01-01 | End: 2021-01-01 | Stop reason: HOSPADM

## 2021-01-01 RX ORDER — PREGABALIN 75 MG/1
75 CAPSULE ORAL 2 TIMES DAILY
Status: ON HOLD | COMMUNITY
End: 2021-01-01 | Stop reason: SDUPTHER

## 2021-01-01 RX ORDER — POLYETHYLENE GLYCOL 3350 17 G/17G
17 POWDER, FOR SOLUTION ORAL DAILY
Status: DISCONTINUED | OUTPATIENT
Start: 2021-01-01 | End: 2022-01-01 | Stop reason: HOSPADM

## 2021-01-01 RX ORDER — CITALOPRAM 10 MG/1
10 TABLET ORAL DAILY
Status: DISCONTINUED | OUTPATIENT
Start: 2021-01-01 | End: 2021-01-01 | Stop reason: HOSPADM

## 2021-01-01 RX ORDER — LEVOTHYROXINE SODIUM 0.05 MG/1
50 TABLET ORAL
Status: DISCONTINUED | OUTPATIENT
Start: 2021-01-01 | End: 2021-01-01

## 2021-01-01 RX ORDER — INSULIN LISPRO 100 [IU]/ML
0-9 INJECTION, SOLUTION INTRAVENOUS; SUBCUTANEOUS
Status: DISCONTINUED | OUTPATIENT
Start: 2021-01-01 | End: 2021-01-01 | Stop reason: HOSPADM

## 2021-01-01 RX ORDER — CHOLECALCIFEROL (VITAMIN D3) 125 MCG
1000 CAPSULE ORAL DAILY
Status: DISCONTINUED | OUTPATIENT
Start: 2021-01-01 | End: 2021-01-01 | Stop reason: HOSPADM

## 2021-01-01 RX ORDER — CEFTRIAXONE 2 G/50ML
2 INJECTION, SOLUTION INTRAVENOUS ONCE
Status: COMPLETED | OUTPATIENT
Start: 2021-01-01 | End: 2021-01-01

## 2021-01-01 RX ORDER — MIRTAZAPINE 15 MG/1
7.5 TABLET, FILM COATED ORAL NIGHTLY
Status: DISCONTINUED | OUTPATIENT
Start: 2021-01-01 | End: 2021-01-01 | Stop reason: HOSPADM

## 2021-01-01 RX ORDER — LEVOTHYROXINE SODIUM 112 UG/1
224 TABLET ORAL DAILY
Status: DISCONTINUED | OUTPATIENT
Start: 2021-01-01 | End: 2021-01-01 | Stop reason: HOSPADM

## 2021-01-01 RX ORDER — ONDANSETRON 2 MG/ML
4 INJECTION INTRAMUSCULAR; INTRAVENOUS ONCE AS NEEDED
Status: DISCONTINUED | OUTPATIENT
Start: 2021-01-01 | End: 2021-01-01 | Stop reason: HOSPADM

## 2021-01-01 RX ORDER — DOCUSATE SODIUM 100 MG/1
100 CAPSULE, LIQUID FILLED ORAL 2 TIMES DAILY
Status: DISCONTINUED | OUTPATIENT
Start: 2021-01-01 | End: 2022-01-01 | Stop reason: HOSPADM

## 2021-01-01 RX ORDER — QUETIAPINE FUMARATE 25 MG/1
12.5 TABLET, FILM COATED ORAL NIGHTLY
COMMUNITY
End: 2021-01-01

## 2021-01-01 RX ORDER — LORATADINE 10 MG/1
CAPSULE, LIQUID FILLED ORAL EVERY 24 HOURS
COMMUNITY
End: 2021-01-01

## 2021-01-01 RX ORDER — SODIUM CHLORIDE 9 MG/ML
75 INJECTION, SOLUTION INTRAVENOUS CONTINUOUS
Status: DISCONTINUED | OUTPATIENT
Start: 2021-01-01 | End: 2021-01-01

## 2021-01-01 RX ORDER — CHOLECALCIFEROL (VITAMIN D3) 125 MCG
5 CAPSULE ORAL NIGHTLY PRN
Status: DISCONTINUED | OUTPATIENT
Start: 2021-01-01 | End: 2021-01-01 | Stop reason: SDUPTHER

## 2021-01-01 RX ORDER — CEFAZOLIN SODIUM 2 G/50ML
2 SOLUTION INTRAVENOUS ONCE
Status: COMPLETED | OUTPATIENT
Start: 2021-01-01 | End: 2021-01-01

## 2021-01-01 RX ORDER — TAMSULOSIN HYDROCHLORIDE 0.4 MG/1
0.4 CAPSULE ORAL DAILY
Status: DISCONTINUED | OUTPATIENT
Start: 2021-01-01 | End: 2021-01-01 | Stop reason: HOSPADM

## 2021-01-01 RX ORDER — POLYETHYLENE GLYCOL 3350 17 G/17G
17 POWDER, FOR SOLUTION ORAL DAILY
COMMUNITY
End: 2022-01-01 | Stop reason: HOSPADM

## 2021-01-01 RX ORDER — FLUTICASONE PROPIONATE 50 MCG
2 SPRAY, SUSPENSION (ML) NASAL DAILY
Status: DISCONTINUED | OUTPATIENT
Start: 2021-01-01 | End: 2022-01-01 | Stop reason: HOSPADM

## 2021-01-01 RX ORDER — ATORVASTATIN CALCIUM 10 MG/1
10 TABLET, FILM COATED ORAL NIGHTLY
Status: DISCONTINUED | OUTPATIENT
Start: 2021-01-01 | End: 2022-01-01 | Stop reason: HOSPADM

## 2021-01-01 RX ORDER — SODIUM CHLORIDE, SODIUM LACTATE, POTASSIUM CHLORIDE, CALCIUM CHLORIDE 600; 310; 30; 20 MG/100ML; MG/100ML; MG/100ML; MG/100ML
100 INJECTION, SOLUTION INTRAVENOUS CONTINUOUS
Status: DISCONTINUED | OUTPATIENT
Start: 2021-01-01 | End: 2021-01-01

## 2021-01-01 RX ORDER — ONDANSETRON 2 MG/ML
4 INJECTION INTRAMUSCULAR; INTRAVENOUS ONCE AS NEEDED
Status: COMPLETED | OUTPATIENT
Start: 2021-01-01 | End: 2021-01-01

## 2021-01-01 RX ORDER — LEVOTHYROXINE SODIUM 112 UG/1
224 TABLET ORAL
Status: DISCONTINUED | OUTPATIENT
Start: 2021-01-01 | End: 2021-01-01 | Stop reason: HOSPADM

## 2021-01-01 RX ORDER — DOXYCYCLINE HYCLATE 100 MG/1
100 CAPSULE ORAL 2 TIMES DAILY
COMMUNITY
End: 2021-01-01 | Stop reason: HOSPADM

## 2021-01-01 RX ORDER — TAMSULOSIN HYDROCHLORIDE 0.4 MG/1
CAPSULE ORAL EVERY 24 HOURS
COMMUNITY
End: 2021-01-01

## 2021-01-01 RX ORDER — CALCIUM CARBONATE 200(500)MG
2 TABLET,CHEWABLE ORAL 2 TIMES DAILY PRN
Status: DISCONTINUED | OUTPATIENT
Start: 2021-01-01 | End: 2021-01-01 | Stop reason: HOSPADM

## 2021-01-01 RX ORDER — CITALOPRAM 10 MG/1
20 TABLET ORAL NIGHTLY
COMMUNITY
End: 2022-01-01 | Stop reason: HOSPADM

## 2021-01-01 RX ORDER — LEVOTHYROXINE SODIUM 112 UG/1
224 TABLET ORAL DAILY
COMMUNITY
End: 2022-01-01 | Stop reason: HOSPADM

## 2021-01-01 RX ORDER — ERGOCALCIFEROL 1.25 MG/1
50000 CAPSULE ORAL DAILY
Status: DISCONTINUED | OUTPATIENT
Start: 2021-01-01 | End: 2021-01-01 | Stop reason: HOSPADM

## 2021-01-01 RX ORDER — MAGNESIUM HYDROXIDE 1200 MG/15ML
LIQUID ORAL AS NEEDED
Status: DISCONTINUED | OUTPATIENT
Start: 2021-01-01 | End: 2021-01-01 | Stop reason: HOSPADM

## 2021-01-01 RX ORDER — ACETAMINOPHEN 650 MG/1
650 SUPPOSITORY RECTAL EVERY 4 HOURS PRN
Status: DISCONTINUED | OUTPATIENT
Start: 2021-01-01 | End: 2021-01-01 | Stop reason: HOSPADM

## 2021-01-01 RX ORDER — ERGOCALCIFEROL 1.25 MG/1
1250 CAPSULE ORAL DAILY
COMMUNITY
End: 2022-01-01 | Stop reason: HOSPADM

## 2021-01-01 RX ORDER — CEFUROXIME AXETIL 500 MG/1
500 TABLET ORAL 2 TIMES DAILY
Qty: 14 TABLET | Refills: 0 | Status: SHIPPED | OUTPATIENT
Start: 2021-01-01 | End: 2021-01-01 | Stop reason: HOSPADM

## 2021-01-01 RX ORDER — DEXTROSE MONOHYDRATE 25 G/50ML
25 INJECTION, SOLUTION INTRAVENOUS
Status: DISCONTINUED | OUTPATIENT
Start: 2021-01-01 | End: 2021-01-01 | Stop reason: HOSPADM

## 2021-01-01 RX ORDER — ALBUTEROL SULFATE 90 UG/1
2 AEROSOL, METERED RESPIRATORY (INHALATION) EVERY 6 HOURS PRN
Status: DISCONTINUED | OUTPATIENT
Start: 2021-01-01 | End: 2021-01-01 | Stop reason: HOSPADM

## 2021-01-01 RX ORDER — MELATONIN
2000 DAILY
Refills: 0 | Status: DISCONTINUED | OUTPATIENT
Start: 2021-01-01 | End: 2022-01-01 | Stop reason: HOSPADM

## 2021-01-01 RX ORDER — TAMSULOSIN HYDROCHLORIDE 0.4 MG/1
0.4 CAPSULE ORAL DAILY
Status: DISCONTINUED | OUTPATIENT
Start: 2021-01-01 | End: 2022-01-01 | Stop reason: HOSPADM

## 2021-01-01 RX ORDER — LEVOTHYROXINE SODIUM 112 UG/1
224 TABLET ORAL
Status: DISCONTINUED | OUTPATIENT
Start: 2021-01-01 | End: 2022-01-01 | Stop reason: HOSPADM

## 2021-01-01 RX ORDER — DEXTROSE MONOHYDRATE 25 G/50ML
25 INJECTION, SOLUTION INTRAVENOUS
Status: DISCONTINUED | OUTPATIENT
Start: 2021-01-01 | End: 2022-01-01 | Stop reason: HOSPADM

## 2021-01-01 RX ORDER — BUDESONIDE AND FORMOTEROL FUMARATE DIHYDRATE 160; 4.5 UG/1; UG/1
2 AEROSOL RESPIRATORY (INHALATION)
Status: DISCONTINUED | OUTPATIENT
Start: 2021-01-01 | End: 2022-01-01 | Stop reason: HOSPADM

## 2021-01-01 RX ORDER — INSULIN DETEMIR 100 [IU]/ML
INJECTION, SOLUTION SUBCUTANEOUS
Status: ON HOLD | COMMUNITY
End: 2022-01-01 | Stop reason: SDUPTHER

## 2021-01-01 RX ORDER — PREGABALIN 75 MG/1
CAPSULE ORAL EVERY 12 HOURS SCHEDULED
COMMUNITY
Start: 2021-01-01 | End: 2021-01-01

## 2021-01-01 RX ORDER — FLUCONAZOLE 200 MG/1
400 TABLET ORAL EVERY 24 HOURS
Status: DISCONTINUED | OUTPATIENT
Start: 2021-01-01 | End: 2021-01-01 | Stop reason: HOSPADM

## 2021-01-01 RX ORDER — AMOXICILLIN 500 MG/1
500 CAPSULE ORAL EVERY 8 HOURS SCHEDULED
Qty: 12 CAPSULE | Refills: 0
Start: 2021-01-01 | End: 2021-01-01

## 2021-01-01 RX ORDER — PETROLATUM 420 MG/G
OINTMENT TOPICAL
Status: DISCONTINUED | OUTPATIENT
Start: 2021-01-01 | End: 2021-01-01 | Stop reason: HOSPADM

## 2021-01-01 RX ORDER — OLANZAPINE 10 MG/1
5 INJECTION, POWDER, LYOPHILIZED, FOR SOLUTION INTRAMUSCULAR ONCE
Status: DISCONTINUED | OUTPATIENT
Start: 2021-01-01 | End: 2021-01-01

## 2021-01-01 RX ORDER — CARVEDILOL 6.25 MG/1
6.25 TABLET ORAL EVERY 12 HOURS SCHEDULED
Status: DISCONTINUED | OUTPATIENT
Start: 2021-01-01 | End: 2021-01-01

## 2021-01-01 RX ORDER — FENTANYL CITRATE 50 UG/ML
25 INJECTION, SOLUTION INTRAMUSCULAR; INTRAVENOUS
Status: DISCONTINUED | OUTPATIENT
Start: 2021-01-01 | End: 2021-01-01 | Stop reason: HOSPADM

## 2021-01-01 RX ORDER — EPHEDRINE SULFATE 50 MG/ML
INJECTION, SOLUTION INTRAVENOUS AS NEEDED
Status: DISCONTINUED | OUTPATIENT
Start: 2021-01-01 | End: 2021-01-01 | Stop reason: SURG

## 2021-01-01 RX ORDER — SODIUM CHLORIDE 9 MG/ML
50 INJECTION, SOLUTION INTRAVENOUS CONTINUOUS
Status: DISCONTINUED | OUTPATIENT
Start: 2021-01-01 | End: 2021-01-01

## 2021-01-01 RX ORDER — SUCCINYLCHOLINE CHLORIDE 20 MG/ML
INJECTION INTRAMUSCULAR; INTRAVENOUS AS NEEDED
Status: DISCONTINUED | OUTPATIENT
Start: 2021-01-01 | End: 2021-01-01 | Stop reason: SURG

## 2021-01-01 RX ORDER — SODIUM CHLORIDE 9 MG/ML
125 INJECTION, SOLUTION INTRAVENOUS CONTINUOUS
Status: DISCONTINUED | OUTPATIENT
Start: 2021-01-01 | End: 2021-01-01

## 2021-01-01 RX ORDER — CITALOPRAM 20 MG/1
20 TABLET ORAL DAILY
COMMUNITY
End: 2021-01-01 | Stop reason: HOSPADM

## 2021-01-01 RX ORDER — BISACODYL 10 MG
10 SUPPOSITORY, RECTAL RECTAL DAILY PRN
COMMUNITY
End: 2022-01-01 | Stop reason: HOSPADM

## 2021-01-01 RX ORDER — OLANZAPINE 10 MG/1
5 INJECTION, POWDER, LYOPHILIZED, FOR SOLUTION INTRAMUSCULAR EVERY 8 HOURS PRN
Status: DISCONTINUED | OUTPATIENT
Start: 2021-01-01 | End: 2021-01-01 | Stop reason: HOSPADM

## 2021-01-01 RX ORDER — AMIODARONE HYDROCHLORIDE 200 MG/1
200 TABLET ORAL EVERY 12 HOURS SCHEDULED
Status: DISCONTINUED | OUTPATIENT
Start: 2021-01-01 | End: 2022-01-01

## 2021-01-01 RX ORDER — SODIUM CHLORIDE, SODIUM LACTATE, POTASSIUM CHLORIDE, CALCIUM CHLORIDE 600; 310; 30; 20 MG/100ML; MG/100ML; MG/100ML; MG/100ML
9 INJECTION, SOLUTION INTRAVENOUS CONTINUOUS
Status: DISCONTINUED | OUTPATIENT
Start: 2021-01-01 | End: 2021-01-01

## 2021-01-01 RX ORDER — LANOLIN ALCOHOL/MO/W.PET/CERES
1000 CREAM (GRAM) TOPICAL DAILY
Status: DISCONTINUED | OUTPATIENT
Start: 2021-01-01 | End: 2022-01-01 | Stop reason: HOSPADM

## 2021-01-01 RX ORDER — SODIUM CHLORIDE 9 MG/ML
250 INJECTION, SOLUTION INTRAVENOUS CONTINUOUS
Status: DISCONTINUED | OUTPATIENT
Start: 2021-01-01 | End: 2021-01-01 | Stop reason: HOSPADM

## 2021-01-01 RX ORDER — FLUCONAZOLE 2 MG/ML
400 INJECTION, SOLUTION INTRAVENOUS DAILY
Status: DISCONTINUED | OUTPATIENT
Start: 2021-01-01 | End: 2021-01-01

## 2021-01-01 RX ADMIN — FLUCONAZOLE 400 MG: 200 TABLET ORAL at 20:34

## 2021-01-01 RX ADMIN — LEVOTHYROXINE SODIUM 224 MCG: 0.11 TABLET ORAL at 09:31

## 2021-01-01 RX ADMIN — PREGABALIN 75 MG: 75 CAPSULE ORAL at 21:03

## 2021-01-01 RX ADMIN — CYANOCOBALAMIN TAB 1000 MCG 1000 MCG: 1000 TAB at 12:57

## 2021-01-01 RX ADMIN — ATORVASTATIN CALCIUM 10 MG: 20 TABLET, FILM COATED ORAL at 20:47

## 2021-01-01 RX ADMIN — SODIUM CHLORIDE 500 MG: 900 INJECTION, SOLUTION INTRAVENOUS at 23:56

## 2021-01-01 RX ADMIN — CYANOCOBALAMIN TAB 1000 MCG 1000 MCG: 1000 TAB at 08:44

## 2021-01-01 RX ADMIN — TAMSULOSIN HYDROCHLORIDE 0.4 MG: 0.4 CAPSULE ORAL at 08:05

## 2021-01-01 RX ADMIN — AMIODARONE HYDROCHLORIDE 200 MG: 200 TABLET ORAL at 08:44

## 2021-01-01 RX ADMIN — IOPAMIDOL 100 ML: 755 INJECTION, SOLUTION INTRAVENOUS at 00:25

## 2021-01-01 RX ADMIN — INSULIN LISPRO 2 UNITS: 100 INJECTION, SOLUTION INTRAVENOUS; SUBCUTANEOUS at 17:39

## 2021-01-01 RX ADMIN — SODIUM CHLORIDE 75 ML/HR: 9 INJECTION, SOLUTION INTRAVENOUS at 17:20

## 2021-01-01 RX ADMIN — SODIUM CHLORIDE, PRESERVATIVE FREE 10 ML: 5 INJECTION INTRAVENOUS at 08:30

## 2021-01-01 RX ADMIN — QUETIAPINE FUMARATE 12.5 MG: 25 TABLET ORAL at 22:00

## 2021-01-01 RX ADMIN — INSULIN LISPRO 2 UNITS: 100 INJECTION, SOLUTION INTRAVENOUS; SUBCUTANEOUS at 17:44

## 2021-01-01 RX ADMIN — CARVEDILOL 3.12 MG: 3.12 TABLET, FILM COATED ORAL at 10:08

## 2021-01-01 RX ADMIN — DOCUSATE SODIUM 100 MG: 100 CAPSULE, LIQUID FILLED ORAL at 22:00

## 2021-01-01 RX ADMIN — CEFTRIAXONE 1 G: 1 INJECTION, POWDER, FOR SOLUTION INTRAMUSCULAR; INTRAVENOUS at 17:39

## 2021-01-01 RX ADMIN — APIXABAN 5 MG: 2.5 TABLET, FILM COATED ORAL at 08:43

## 2021-01-01 RX ADMIN — Medication 1000 MCG: at 10:06

## 2021-01-01 RX ADMIN — LEVOTHYROXINE SODIUM 224 MCG: 0.11 TABLET ORAL at 08:17

## 2021-01-01 RX ADMIN — AMIODARONE HYDROCHLORIDE 200 MG: 200 TABLET ORAL at 08:50

## 2021-01-01 RX ADMIN — TAMSULOSIN HYDROCHLORIDE 0.4 MG: 0.4 CAPSULE ORAL at 09:31

## 2021-01-01 RX ADMIN — CARVEDILOL 6.25 MG: 6.25 TABLET, FILM COATED ORAL at 18:10

## 2021-01-01 RX ADMIN — INSULIN ASPART 3 UNITS: 100 INJECTION, SOLUTION INTRAVENOUS; SUBCUTANEOUS at 12:46

## 2021-01-01 RX ADMIN — DOCUSATE SODIUM 100 MG: 100 CAPSULE, LIQUID FILLED ORAL at 12:57

## 2021-01-01 RX ADMIN — APIXABAN 2.5 MG: 2.5 TABLET, FILM COATED ORAL at 08:17

## 2021-01-01 RX ADMIN — CEFTRIAXONE 1 G: 1 INJECTION, POWDER, FOR SOLUTION INTRAMUSCULAR; INTRAVENOUS at 17:52

## 2021-01-01 RX ADMIN — ETOMIDATE 15 MG: 2 INJECTION, SOLUTION INTRAVENOUS at 10:19

## 2021-01-01 RX ADMIN — Medication 1000 MCG: at 08:26

## 2021-01-01 RX ADMIN — Medication 1000 MCG: at 08:06

## 2021-01-01 RX ADMIN — SODIUM CHLORIDE, PRESERVATIVE FREE 10 ML: 5 INJECTION INTRAVENOUS at 20:48

## 2021-01-01 RX ADMIN — DOCUSATE SODIUM 100 MG: 100 CAPSULE, LIQUID FILLED ORAL at 09:47

## 2021-01-01 RX ADMIN — ERGOCALCIFEROL 50000 UNITS: 1.25 CAPSULE ORAL at 08:52

## 2021-01-01 RX ADMIN — BUDESONIDE AND FORMOTEROL FUMARATE DIHYDRATE 2 PUFF: 160; 4.5 AEROSOL RESPIRATORY (INHALATION) at 19:20

## 2021-01-01 RX ADMIN — INSULIN ASPART 2 UNITS: 100 INJECTION, SOLUTION INTRAVENOUS; SUBCUTANEOUS at 17:41

## 2021-01-01 RX ADMIN — INSULIN ASPART 8 UNITS: 100 INJECTION, SOLUTION INTRAVENOUS; SUBCUTANEOUS at 12:38

## 2021-01-01 RX ADMIN — APIXABAN 5 MG: 2.5 TABLET, FILM COATED ORAL at 09:41

## 2021-01-01 RX ADMIN — CYANOCOBALAMIN TAB 1000 MCG 1000 MCG: 1000 TAB at 09:52

## 2021-01-01 RX ADMIN — SODIUM CHLORIDE 75 ML/HR: 9 INJECTION, SOLUTION INTRAVENOUS at 19:06

## 2021-01-01 RX ADMIN — PREGABALIN 75 MG: 75 CAPSULE ORAL at 09:52

## 2021-01-01 RX ADMIN — AMIODARONE HYDROCHLORIDE 200 MG: 200 TABLET ORAL at 10:02

## 2021-01-01 RX ADMIN — SODIUM CHLORIDE, PRESERVATIVE FREE 10 ML: 5 INJECTION INTRAVENOUS at 08:45

## 2021-01-01 RX ADMIN — DOCUSATE SODIUM 100 MG: 100 CAPSULE ORAL at 20:50

## 2021-01-01 RX ADMIN — BUMETANIDE 2 MG: 0.25 INJECTION, SOLUTION INTRAMUSCULAR; INTRAVENOUS at 01:07

## 2021-01-01 RX ADMIN — SODIUM CHLORIDE, PRESERVATIVE FREE 10 ML: 5 INJECTION INTRAVENOUS at 00:00

## 2021-01-01 RX ADMIN — CEFTRIAXONE 1 G: 1 INJECTION, POWDER, FOR SOLUTION INTRAMUSCULAR; INTRAVENOUS at 14:21

## 2021-01-01 RX ADMIN — QUETIAPINE FUMARATE 12.5 MG: 25 TABLET ORAL at 20:34

## 2021-01-01 RX ADMIN — QUETIAPINE FUMARATE 12.5 MG: 25 TABLET ORAL at 20:50

## 2021-01-01 RX ADMIN — INSULIN DETEMIR 25 UNITS: 100 INJECTION, SOLUTION SUBCUTANEOUS at 22:41

## 2021-01-01 RX ADMIN — APIXABAN 2.5 MG: 2.5 TABLET, FILM COATED ORAL at 08:51

## 2021-01-01 RX ADMIN — INSULIN LISPRO 2 UNITS: 100 INJECTION, SOLUTION INTRAVENOUS; SUBCUTANEOUS at 17:10

## 2021-01-01 RX ADMIN — QUETIAPINE FUMARATE 12.5 MG: 25 TABLET ORAL at 18:07

## 2021-01-01 RX ADMIN — CARVEDILOL 6.25 MG: 6.25 TABLET, FILM COATED ORAL at 17:41

## 2021-01-01 RX ADMIN — CYANOCOBALAMIN TAB 1000 MCG 1000 MCG: 1000 TAB at 09:41

## 2021-01-01 RX ADMIN — ATORVASTATIN CALCIUM 10 MG: 20 TABLET, FILM COATED ORAL at 20:34

## 2021-01-01 RX ADMIN — CITALOPRAM HYDROBROMIDE 20 MG: 20 TABLET ORAL at 22:46

## 2021-01-01 RX ADMIN — LINAGLIPTIN 5 MG: 5 TABLET, FILM COATED ORAL at 12:58

## 2021-01-01 RX ADMIN — DOCUSATE SODIUM 100 MG: 100 CAPSULE, LIQUID FILLED ORAL at 21:22

## 2021-01-01 RX ADMIN — PREGABALIN 75 MG: 75 CAPSULE ORAL at 08:30

## 2021-01-01 RX ADMIN — ATORVASTATIN CALCIUM 10 MG: 20 TABLET, FILM COATED ORAL at 20:36

## 2021-01-01 RX ADMIN — SODIUM CHLORIDE, PRESERVATIVE FREE 10 ML: 5 INJECTION INTRAVENOUS at 22:34

## 2021-01-01 RX ADMIN — APIXABAN 5 MG: 2.5 TABLET, FILM COATED ORAL at 22:00

## 2021-01-01 RX ADMIN — MIRTAZAPINE 7.5 MG: 15 TABLET, FILM COATED ORAL at 21:23

## 2021-01-01 RX ADMIN — SODIUM CHLORIDE, PRESERVATIVE FREE 10 ML: 5 INJECTION INTRAVENOUS at 08:26

## 2021-01-01 RX ADMIN — IOPAMIDOL 24 ML: 755 INJECTION, SOLUTION INTRAVENOUS at 00:34

## 2021-01-01 RX ADMIN — SODIUM CHLORIDE, PRESERVATIVE FREE 10 ML: 5 INJECTION INTRAVENOUS at 20:14

## 2021-01-01 RX ADMIN — HYDROCORTISONE: 1 CREAM TOPICAL at 08:44

## 2021-01-01 RX ADMIN — BUDESONIDE AND FORMOTEROL FUMARATE DIHYDRATE 2 PUFF: 160; 4.5 AEROSOL RESPIRATORY (INHALATION) at 11:15

## 2021-01-01 RX ADMIN — AMIODARONE HYDROCHLORIDE 200 MG: 200 TABLET ORAL at 20:28

## 2021-01-01 RX ADMIN — ONDANSETRON 4 MG: 2 INJECTION INTRAMUSCULAR; INTRAVENOUS at 13:04

## 2021-01-01 RX ADMIN — CARVEDILOL 3.12 MG: 3.12 TABLET, FILM COATED ORAL at 09:59

## 2021-01-01 RX ADMIN — LEVOTHYROXINE SODIUM 224 MCG: 0.11 TABLET ORAL at 08:30

## 2021-01-01 RX ADMIN — AMOXICILLIN 500 MG: 250 CAPSULE ORAL at 21:03

## 2021-01-01 RX ADMIN — SODIUM CHLORIDE 50 ML/HR: 9 INJECTION, SOLUTION INTRAVENOUS at 20:50

## 2021-01-01 RX ADMIN — BUMETANIDE 2 MG: 0.25 INJECTION INTRAMUSCULAR; INTRAVENOUS at 09:40

## 2021-01-01 RX ADMIN — PREGABALIN 75 MG: 75 CAPSULE ORAL at 22:00

## 2021-01-01 RX ADMIN — FLUCONAZOLE 400 MG: 200 TABLET ORAL at 21:04

## 2021-01-01 RX ADMIN — ERGOCALCIFEROL 50000 UNITS: 1.25 CAPSULE ORAL at 08:19

## 2021-01-01 RX ADMIN — SODIUM CHLORIDE, POTASSIUM CHLORIDE, SODIUM LACTATE AND CALCIUM CHLORIDE: 600; 310; 30; 20 INJECTION, SOLUTION INTRAVENOUS at 10:10

## 2021-01-01 RX ADMIN — BUDESONIDE AND FORMOTEROL FUMARATE DIHYDRATE 2 PUFF: 160; 4.5 AEROSOL RESPIRATORY (INHALATION) at 19:24

## 2021-01-01 RX ADMIN — SODIUM CHLORIDE, PRESERVATIVE FREE 10 ML: 5 INJECTION INTRAVENOUS at 10:13

## 2021-01-01 RX ADMIN — SODIUM CHLORIDE, PRESERVATIVE FREE 10 ML: 5 INJECTION INTRAVENOUS at 08:53

## 2021-01-01 RX ADMIN — DOCUSATE SODIUM 100 MG: 100 CAPSULE, LIQUID FILLED ORAL at 09:41

## 2021-01-01 RX ADMIN — SODIUM CHLORIDE 75 ML/HR: 9 INJECTION, SOLUTION INTRAVENOUS at 10:10

## 2021-01-01 RX ADMIN — FLUTICASONE PROPIONATE 2 SPRAY: 50 SPRAY, METERED NASAL at 08:45

## 2021-01-01 RX ADMIN — INSULIN ASPART 12 UNITS: 100 INJECTION, SOLUTION INTRAVENOUS; SUBCUTANEOUS at 09:50

## 2021-01-01 RX ADMIN — CARVEDILOL 6.25 MG: 6.25 TABLET, FILM COATED ORAL at 18:44

## 2021-01-01 RX ADMIN — POLYETHYLENE GLYCOL 3350 17 G: 17 POWDER, FOR SOLUTION ORAL at 09:52

## 2021-01-01 RX ADMIN — Medication 1000 MCG: at 08:19

## 2021-01-01 RX ADMIN — CITALOPRAM 10 MG: 10 TABLET, FILM COATED ORAL at 08:44

## 2021-01-01 RX ADMIN — SODIUM CHLORIDE 125 ML/HR: 9 INJECTION, SOLUTION INTRAVENOUS at 22:35

## 2021-01-01 RX ADMIN — DOCUSATE SODIUM 100 MG: 100 CAPSULE ORAL at 08:36

## 2021-01-01 RX ADMIN — BUDESONIDE AND FORMOTEROL FUMARATE DIHYDRATE 2 PUFF: 160; 4.5 AEROSOL RESPIRATORY (INHALATION) at 21:38

## 2021-01-01 RX ADMIN — FLUTICASONE PROPIONATE 2 SPRAY: 50 SPRAY, METERED NASAL at 12:58

## 2021-01-01 RX ADMIN — APIXABAN 2.5 MG: 2.5 TABLET, FILM COATED ORAL at 08:30

## 2021-01-01 RX ADMIN — CEFTRIAXONE 1 G: 1 INJECTION, POWDER, FOR SOLUTION INTRAMUSCULAR; INTRAVENOUS at 17:44

## 2021-01-01 RX ADMIN — QUETIAPINE FUMARATE 25 MG: 25 TABLET ORAL at 20:37

## 2021-01-01 RX ADMIN — ACETAMINOPHEN 650 MG: 325 TABLET, FILM COATED ORAL at 01:24

## 2021-01-01 RX ADMIN — APIXABAN 2.5 MG: 2.5 TABLET, FILM COATED ORAL at 09:44

## 2021-01-01 RX ADMIN — CETIRIZINE HYDROCHLORIDE 10 MG: 10 TABLET ORAL at 09:28

## 2021-01-01 RX ADMIN — SODIUM CHLORIDE 75 ML/HR: 9 INJECTION, SOLUTION INTRAVENOUS at 14:01

## 2021-01-01 RX ADMIN — FLUCONAZOLE 400 MG: 200 TABLET ORAL at 20:36

## 2021-01-01 RX ADMIN — SODIUM CHLORIDE, PRESERVATIVE FREE 10 ML: 5 INJECTION INTRAVENOUS at 08:20

## 2021-01-01 RX ADMIN — ACETAMINOPHEN 650 MG: 325 TABLET, FILM COATED ORAL at 18:16

## 2021-01-01 RX ADMIN — Medication 1 PACKET: at 22:39

## 2021-01-01 RX ADMIN — TAMSULOSIN HYDROCHLORIDE 0.4 MG: 0.4 CAPSULE ORAL at 21:23

## 2021-01-01 RX ADMIN — DOCUSATE SODIUM 100 MG: 100 CAPSULE ORAL at 08:30

## 2021-01-01 RX ADMIN — FLUTICASONE PROPIONATE 2 SPRAY: 50 SPRAY, METERED NASAL at 08:31

## 2021-01-01 RX ADMIN — DOCUSATE SODIUM 100 MG: 100 CAPSULE, LIQUID FILLED ORAL at 08:19

## 2021-01-01 RX ADMIN — AMIODARONE HYDROCHLORIDE 200 MG: 200 TABLET ORAL at 21:03

## 2021-01-01 RX ADMIN — PETROLATUM 100 APPLICATION: 420 OINTMENT TOPICAL at 08:48

## 2021-01-01 RX ADMIN — Medication 2000 UNITS: at 09:41

## 2021-01-01 RX ADMIN — AMIODARONE HYDROCHLORIDE 200 MG: 200 TABLET ORAL at 08:06

## 2021-01-01 RX ADMIN — CEFTRIAXONE 1 G: 1 INJECTION, POWDER, FOR SOLUTION INTRAMUSCULAR; INTRAVENOUS at 18:39

## 2021-01-01 RX ADMIN — CEFTRIAXONE 1 G: 1 INJECTION, SOLUTION INTRAVENOUS at 15:18

## 2021-01-01 RX ADMIN — INSULIN ASPART 2 UNITS: 100 INJECTION, SOLUTION INTRAVENOUS; SUBCUTANEOUS at 08:53

## 2021-01-01 RX ADMIN — AMOXICILLIN 500 MG: 250 CAPSULE ORAL at 14:43

## 2021-01-01 RX ADMIN — ATORVASTATIN CALCIUM 10 MG: 20 TABLET, FILM COATED ORAL at 21:25

## 2021-01-01 RX ADMIN — FLUTICASONE PROPIONATE 2 SPRAY: 50 SPRAY, METERED NASAL at 08:32

## 2021-01-01 RX ADMIN — BUDESONIDE AND FORMOTEROL FUMARATE DIHYDRATE 2 PUFF: 160; 4.5 AEROSOL RESPIRATORY (INHALATION) at 20:28

## 2021-01-01 RX ADMIN — CETIRIZINE HYDROCHLORIDE 10 MG: 10 TABLET ORAL at 08:30

## 2021-01-01 RX ADMIN — APIXABAN 2.5 MG: 2.5 TABLET, FILM COATED ORAL at 21:03

## 2021-01-01 RX ADMIN — LEVOTHYROXINE SODIUM 224 MCG: 0.11 TABLET ORAL at 08:50

## 2021-01-01 RX ADMIN — PREGABALIN 75 MG: 75 CAPSULE ORAL at 08:44

## 2021-01-01 RX ADMIN — ONDANSETRON 4 MG: 2 INJECTION INTRAMUSCULAR; INTRAVENOUS at 10:06

## 2021-01-01 RX ADMIN — AMIODARONE HYDROCHLORIDE 200 MG: 200 TABLET ORAL at 21:23

## 2021-01-01 RX ADMIN — CITALOPRAM HYDROBROMIDE 20 MG: 20 TABLET ORAL at 21:16

## 2021-01-01 RX ADMIN — BUDESONIDE AND FORMOTEROL FUMARATE DIHYDRATE 2 PUFF: 160; 4.5 AEROSOL RESPIRATORY (INHALATION) at 10:29

## 2021-01-01 RX ADMIN — CETIRIZINE HYDROCHLORIDE 10 MG: 10 TABLET, FILM COATED ORAL at 09:41

## 2021-01-01 RX ADMIN — DOCUSATE SODIUM 100 MG: 100 CAPSULE, LIQUID FILLED ORAL at 09:51

## 2021-01-01 RX ADMIN — ATORVASTATIN CALCIUM 10 MG: 20 TABLET, FILM COATED ORAL at 20:02

## 2021-01-01 RX ADMIN — HYDROCORTISONE: 1 CREAM TOPICAL at 09:52

## 2021-01-01 RX ADMIN — FLUTICASONE PROPIONATE 2 SPRAY: 50 SPRAY, METERED NASAL at 09:28

## 2021-01-01 RX ADMIN — PREGABALIN 75 MG: 75 CAPSULE ORAL at 12:58

## 2021-01-01 RX ADMIN — DOCUSATE SODIUM 100 MG: 100 CAPSULE, LIQUID FILLED ORAL at 20:36

## 2021-01-01 RX ADMIN — AMOXICILLIN 500 MG: 250 CAPSULE ORAL at 06:40

## 2021-01-01 RX ADMIN — BUDESONIDE AND FORMOTEROL FUMARATE DIHYDRATE 2 PUFF: 160; 4.5 AEROSOL RESPIRATORY (INHALATION) at 20:35

## 2021-01-01 RX ADMIN — BUMETANIDE 2 MG: 2 TABLET ORAL at 14:20

## 2021-01-01 RX ADMIN — DOCUSATE SODIUM 100 MG: 100 CAPSULE, LIQUID FILLED ORAL at 21:16

## 2021-01-01 RX ADMIN — FLUTICASONE PROPIONATE 2 SPRAY: 50 SPRAY, METERED NASAL at 11:45

## 2021-01-01 RX ADMIN — SODIUM CHLORIDE, PRESERVATIVE FREE 10 ML: 5 INJECTION INTRAVENOUS at 09:32

## 2021-01-01 RX ADMIN — FLUTICASONE PROPIONATE 2 SPRAY: 50 SPRAY, METERED NASAL at 09:53

## 2021-01-01 RX ADMIN — CARVEDILOL 6.25 MG: 6.25 TABLET, FILM COATED ORAL at 08:47

## 2021-01-01 RX ADMIN — LINAGLIPTIN 5 MG: 5 TABLET, FILM COATED ORAL at 08:53

## 2021-01-01 RX ADMIN — AMIODARONE HYDROCHLORIDE 200 MG: 200 TABLET ORAL at 20:42

## 2021-01-01 RX ADMIN — AMOXICILLIN 500 MG: 250 CAPSULE ORAL at 06:31

## 2021-01-01 RX ADMIN — ZINC OXIDE 1 APPLICATION: 200 OINTMENT TOPICAL at 20:45

## 2021-01-01 RX ADMIN — ATORVASTATIN CALCIUM 10 MG: 20 TABLET, FILM COATED ORAL at 20:50

## 2021-01-01 RX ADMIN — Medication 1 PACKET: at 11:45

## 2021-01-01 RX ADMIN — CHOLECALCIFEROL (VITAMIN D3) 25 MCG (1,000 UNIT) TABLET 2000 UNITS: TABLET at 09:41

## 2021-01-01 RX ADMIN — TAMSULOSIN HYDROCHLORIDE 0.4 MG: 0.4 CAPSULE ORAL at 09:48

## 2021-01-01 RX ADMIN — FLUTICASONE PROPIONATE 2 SPRAY: 50 SPRAY, METERED NASAL at 09:32

## 2021-01-01 RX ADMIN — AMOXICILLIN 500 MG: 250 CAPSULE ORAL at 23:16

## 2021-01-01 RX ADMIN — DOCUSATE SODIUM 100 MG: 100 CAPSULE, LIQUID FILLED ORAL at 08:52

## 2021-01-01 RX ADMIN — BUDESONIDE AND FORMOTEROL FUMARATE DIHYDRATE 2 PUFF: 160; 4.5 AEROSOL RESPIRATORY (INHALATION) at 22:14

## 2021-01-01 RX ADMIN — BUDESONIDE AND FORMOTEROL FUMARATE DIHYDRATE 2 PUFF: 160; 4.5 AEROSOL RESPIRATORY (INHALATION) at 20:19

## 2021-01-01 RX ADMIN — AMIODARONE HYDROCHLORIDE 200 MG: 200 TABLET ORAL at 22:48

## 2021-01-01 RX ADMIN — SODIUM CHLORIDE 75 ML/HR: 9 INJECTION, SOLUTION INTRAVENOUS at 12:50

## 2021-01-01 RX ADMIN — CITALOPRAM HYDROBROMIDE 20 MG: 20 TABLET ORAL at 09:41

## 2021-01-01 RX ADMIN — ATORVASTATIN CALCIUM 10 MG: 10 TABLET, FILM COATED ORAL at 22:38

## 2021-01-01 RX ADMIN — BUDESONIDE AND FORMOTEROL FUMARATE DIHYDRATE 2 PUFF: 160; 4.5 AEROSOL RESPIRATORY (INHALATION) at 20:10

## 2021-01-01 RX ADMIN — TAMSULOSIN HYDROCHLORIDE 0.4 MG: 0.4 CAPSULE ORAL at 08:30

## 2021-01-01 RX ADMIN — CEFUROXIME AXETIL 500 MG: 250 TABLET, FILM COATED ORAL at 08:35

## 2021-01-01 RX ADMIN — PREGABALIN 75 MG: 75 CAPSULE ORAL at 08:54

## 2021-01-01 RX ADMIN — CARVEDILOL 6.25 MG: 6.25 TABLET, FILM COATED ORAL at 17:15

## 2021-01-01 RX ADMIN — APIXABAN 2.5 MG: 2.5 TABLET, FILM COATED ORAL at 10:05

## 2021-01-01 RX ADMIN — PETROLATUM 100 APPLICATION: 420 OINTMENT TOPICAL at 08:31

## 2021-01-01 RX ADMIN — PETROLATUM 100 APPLICATION: 420 OINTMENT TOPICAL at 09:28

## 2021-01-01 RX ADMIN — LIDOCAINE HYDROCHLORIDE 100 MG: 20 INJECTION, SOLUTION INFILTRATION; PERINEURAL at 10:13

## 2021-01-01 RX ADMIN — CARVEDILOL 6.25 MG: 6.25 TABLET, FILM COATED ORAL at 09:31

## 2021-01-01 RX ADMIN — CITALOPRAM 20 MG: 20 TABLET, FILM COATED ORAL at 08:36

## 2021-01-01 RX ADMIN — HYDROCORTISONE: 1 CREAM TOPICAL at 09:45

## 2021-01-01 RX ADMIN — ERGOCALCIFEROL 50000 UNITS: 1.25 CAPSULE ORAL at 08:05

## 2021-01-01 RX ADMIN — HYDROCORTISONE: 1 CREAM TOPICAL at 20:36

## 2021-01-01 RX ADMIN — SODIUM CHLORIDE 75 ML/HR: 9 INJECTION, SOLUTION INTRAVENOUS at 09:39

## 2021-01-01 RX ADMIN — LEVOTHYROXINE SODIUM 224 MCG: 0.11 TABLET ORAL at 21:24

## 2021-01-01 RX ADMIN — AMIODARONE HYDROCHLORIDE 200 MG: 200 TABLET ORAL at 20:13

## 2021-01-01 RX ADMIN — TAMSULOSIN HYDROCHLORIDE 0.4 MG: 0.4 CAPSULE ORAL at 08:19

## 2021-01-01 RX ADMIN — LEVOTHYROXINE SODIUM 224 MCG: 0.11 TABLET ORAL at 09:48

## 2021-01-01 RX ADMIN — AMIODARONE HYDROCHLORIDE 200 MG: 200 TABLET ORAL at 08:30

## 2021-01-01 RX ADMIN — TAMSULOSIN HYDROCHLORIDE 0.4 MG: 0.4 CAPSULE ORAL at 09:44

## 2021-01-01 RX ADMIN — BUDESONIDE AND FORMOTEROL FUMARATE DIHYDRATE 2 PUFF: 160; 4.5 AEROSOL RESPIRATORY (INHALATION) at 07:01

## 2021-01-01 RX ADMIN — CARVEDILOL 6.25 MG: 6.25 TABLET, FILM COATED ORAL at 09:49

## 2021-01-01 RX ADMIN — LEVOTHYROXINE SODIUM 224 MCG: 0.11 TABLET ORAL at 08:52

## 2021-01-01 RX ADMIN — APIXABAN 2.5 MG: 2.5 TABLET, FILM COATED ORAL at 10:10

## 2021-01-01 RX ADMIN — HYDROCORTISONE: 1 CREAM TOPICAL at 09:32

## 2021-01-01 RX ADMIN — SODIUM CHLORIDE 50 ML/HR: 9 INJECTION, SOLUTION INTRAVENOUS at 05:39

## 2021-01-01 RX ADMIN — ATORVASTATIN CALCIUM 10 MG: 10 TABLET, FILM COATED ORAL at 22:48

## 2021-01-01 RX ADMIN — ZINC OXIDE 1 APPLICATION: 200 OINTMENT TOPICAL at 09:45

## 2021-01-01 RX ADMIN — APIXABAN 2.5 MG: 2.5 TABLET, FILM COATED ORAL at 20:13

## 2021-01-01 RX ADMIN — SODIUM CHLORIDE, PRESERVATIVE FREE 10 ML: 5 INJECTION INTRAVENOUS at 20:41

## 2021-01-01 RX ADMIN — ERGOCALCIFEROL 50000 UNITS: 1.25 CAPSULE ORAL at 08:44

## 2021-01-01 RX ADMIN — QUETIAPINE FUMARATE 12.5 MG: 25 TABLET ORAL at 20:36

## 2021-01-01 RX ADMIN — SODIUM CHLORIDE, PRESERVATIVE FREE 10 ML: 5 INJECTION INTRAVENOUS at 20:45

## 2021-01-01 RX ADMIN — APIXABAN 2.5 MG: 2.5 TABLET, FILM COATED ORAL at 22:38

## 2021-01-01 RX ADMIN — SODIUM CHLORIDE, PRESERVATIVE FREE 10 ML: 5 INJECTION INTRAVENOUS at 21:25

## 2021-01-01 RX ADMIN — HYDROCORTISONE: 1 CREAM TOPICAL at 21:45

## 2021-01-01 RX ADMIN — SODIUM CHLORIDE 50 ML/HR: 9 INJECTION, SOLUTION INTRAVENOUS at 00:19

## 2021-01-01 RX ADMIN — ERGOCALCIFEROL 50000 UNITS: 1.25 CAPSULE ORAL at 21:25

## 2021-01-01 RX ADMIN — DOCUSATE SODIUM 100 MG: 100 CAPSULE, LIQUID FILLED ORAL at 22:51

## 2021-01-01 RX ADMIN — DOCUSATE SODIUM 100 MG: 100 CAPSULE, LIQUID FILLED ORAL at 20:45

## 2021-01-01 RX ADMIN — BUDESONIDE AND FORMOTEROL FUMARATE DIHYDRATE 2 PUFF: 160; 4.5 AEROSOL RESPIRATORY (INHALATION) at 22:13

## 2021-01-01 RX ADMIN — DOCUSATE SODIUM 100 MG: 100 CAPSULE, LIQUID FILLED ORAL at 08:26

## 2021-01-01 RX ADMIN — DOCUSATE SODIUM 100 MG: 100 CAPSULE, LIQUID FILLED ORAL at 08:44

## 2021-01-01 RX ADMIN — MIRTAZAPINE 7.5 MG: 15 TABLET, FILM COATED ORAL at 20:36

## 2021-01-01 RX ADMIN — LEVOTHYROXINE SODIUM 224 MCG: 0.11 TABLET ORAL at 06:09

## 2021-01-01 RX ADMIN — ACETAMINOPHEN 650 MG: 325 TABLET, FILM COATED ORAL at 20:23

## 2021-01-01 RX ADMIN — ATORVASTATIN CALCIUM 10 MG: 10 TABLET, FILM COATED ORAL at 21:16

## 2021-01-01 RX ADMIN — CETIRIZINE HYDROCHLORIDE 10 MG: 10 TABLET, FILM COATED ORAL at 08:43

## 2021-01-01 RX ADMIN — LEVOTHYROXINE SODIUM 224 MCG: 0.11 TABLET ORAL at 05:45

## 2021-01-01 RX ADMIN — AMIODARONE HYDROCHLORIDE 200 MG: 200 TABLET ORAL at 20:37

## 2021-01-01 RX ADMIN — COLLAGENASE SANTYL 1 APPLICATION: 250 OINTMENT TOPICAL at 08:21

## 2021-01-01 RX ADMIN — SODIUM CHLORIDE 500 ML: 9 INJECTION, SOLUTION INTRAVENOUS at 13:40

## 2021-01-01 RX ADMIN — AMIODARONE HYDROCHLORIDE 200 MG: 200 TABLET ORAL at 09:46

## 2021-01-01 RX ADMIN — ZINC OXIDE 1 APPLICATION: 200 OINTMENT TOPICAL at 22:16

## 2021-01-01 RX ADMIN — CARVEDILOL 6.25 MG: 6.25 TABLET, FILM COATED ORAL at 08:52

## 2021-01-01 RX ADMIN — ATORVASTATIN CALCIUM 10 MG: 10 TABLET, FILM COATED ORAL at 20:45

## 2021-01-01 RX ADMIN — BUDESONIDE AND FORMOTEROL FUMARATE DIHYDRATE 2 PUFF: 160; 4.5 AEROSOL RESPIRATORY (INHALATION) at 11:16

## 2021-01-01 RX ADMIN — BUDESONIDE AND FORMOTEROL FUMARATE DIHYDRATE 2 PUFF: 160; 4.5 AEROSOL RESPIRATORY (INHALATION) at 10:28

## 2021-01-01 RX ADMIN — POLYETHYLENE GLYCOL 3350 17 G: 17 POWDER, FOR SOLUTION ORAL at 08:44

## 2021-01-01 RX ADMIN — TAMSULOSIN HYDROCHLORIDE 0.4 MG: 0.4 CAPSULE ORAL at 08:26

## 2021-01-01 RX ADMIN — CARVEDILOL 6.25 MG: 6.25 TABLET, FILM COATED ORAL at 17:52

## 2021-01-01 RX ADMIN — AMIODARONE HYDROCHLORIDE 200 MG: 200 TABLET ORAL at 21:45

## 2021-01-01 RX ADMIN — BUDESONIDE AND FORMOTEROL FUMARATE DIHYDRATE 2 PUFF: 160; 4.5 AEROSOL RESPIRATORY (INHALATION) at 19:10

## 2021-01-01 RX ADMIN — BUDESONIDE AND FORMOTEROL FUMARATE DIHYDRATE 2 PUFF: 160; 4.5 AEROSOL RESPIRATORY (INHALATION) at 09:16

## 2021-01-01 RX ADMIN — DOCUSATE SODIUM 100 MG: 100 CAPSULE, LIQUID FILLED ORAL at 20:39

## 2021-01-01 RX ADMIN — APIXABAN 2.5 MG: 2.5 TABLET, FILM COATED ORAL at 20:42

## 2021-01-01 RX ADMIN — QUETIAPINE FUMARATE 25 MG: 25 TABLET ORAL at 20:47

## 2021-01-01 RX ADMIN — LINAGLIPTIN 5 MG: 5 TABLET, FILM COATED ORAL at 09:52

## 2021-01-01 RX ADMIN — LEVOTHYROXINE SODIUM 224 MCG: 0.11 TABLET ORAL at 08:26

## 2021-01-01 RX ADMIN — ERGOCALCIFEROL 50000 UNITS: 1.25 CAPSULE ORAL at 10:05

## 2021-01-01 RX ADMIN — PREGABALIN 75 MG: 75 CAPSULE ORAL at 21:26

## 2021-01-01 RX ADMIN — ERGOCALCIFEROL 50000 UNITS: 1.25 CAPSULE ORAL at 10:02

## 2021-01-01 RX ADMIN — APIXABAN 2.5 MG: 2.5 TABLET, FILM COATED ORAL at 20:37

## 2021-01-01 RX ADMIN — Medication 1000 MCG: at 08:30

## 2021-01-01 RX ADMIN — LEVOTHYROXINE SODIUM 224 MCG: 0.11 TABLET ORAL at 08:55

## 2021-01-01 RX ADMIN — CITALOPRAM 20 MG: 20 TABLET, FILM COATED ORAL at 09:28

## 2021-01-01 RX ADMIN — ACETAMINOPHEN 650 MG: 325 TABLET, FILM COATED ORAL at 08:41

## 2021-01-01 RX ADMIN — CITALOPRAM 20 MG: 20 TABLET, FILM COATED ORAL at 08:54

## 2021-01-01 RX ADMIN — ZINC OXIDE 1 APPLICATION: 200 OINTMENT TOPICAL at 18:08

## 2021-01-01 RX ADMIN — SODIUM CHLORIDE 50 ML/HR: 9 INJECTION, SOLUTION INTRAVENOUS at 03:00

## 2021-01-01 RX ADMIN — Medication 1 PACKET: at 09:51

## 2021-01-01 RX ADMIN — LEVOTHYROXINE SODIUM 224 MCG: 0.11 TABLET ORAL at 09:41

## 2021-01-01 RX ADMIN — Medication 1000 MCG: at 08:44

## 2021-01-01 RX ADMIN — ZINC OXIDE 1 APPLICATION: 200 OINTMENT TOPICAL at 22:39

## 2021-01-01 RX ADMIN — COLLAGENASE SANTYL 1 APPLICATION: 250 OINTMENT TOPICAL at 12:24

## 2021-01-01 RX ADMIN — PREGABALIN 75 MG: 75 CAPSULE ORAL at 22:38

## 2021-01-01 RX ADMIN — SODIUM CHLORIDE, PRESERVATIVE FREE 10 ML: 5 INJECTION INTRAVENOUS at 20:50

## 2021-01-01 RX ADMIN — ERGOCALCIFEROL 50000 UNITS: 1.25 CAPSULE ORAL at 08:26

## 2021-01-01 RX ADMIN — FLUTICASONE PROPIONATE 2 SPRAY: 50 SPRAY, METERED NASAL at 08:54

## 2021-01-01 RX ADMIN — SODIUM CHLORIDE 75 ML/HR: 9 INJECTION, SOLUTION INTRAVENOUS at 01:43

## 2021-01-01 RX ADMIN — PREGABALIN 75 MG: 75 CAPSULE ORAL at 09:28

## 2021-01-01 RX ADMIN — ATORVASTATIN CALCIUM 10 MG: 20 TABLET, FILM COATED ORAL at 20:42

## 2021-01-01 RX ADMIN — LEVOTHYROXINE SODIUM 224 MCG: 0.11 TABLET ORAL at 10:10

## 2021-01-01 RX ADMIN — SODIUM CHLORIDE, PRESERVATIVE FREE 10 ML: 5 INJECTION INTRAVENOUS at 21:27

## 2021-01-01 RX ADMIN — LEVOTHYROXINE SODIUM 224 MCG: 0.11 TABLET ORAL at 08:47

## 2021-01-01 RX ADMIN — TAMSULOSIN HYDROCHLORIDE 0.4 MG: 0.4 CAPSULE ORAL at 08:51

## 2021-01-01 RX ADMIN — ZINC OXIDE 1 APPLICATION: 200 OINTMENT TOPICAL at 21:18

## 2021-01-01 RX ADMIN — CETIRIZINE HYDROCHLORIDE 10 MG: 10 TABLET ORAL at 08:55

## 2021-01-01 RX ADMIN — SODIUM CHLORIDE 75 ML/HR: 9 INJECTION, SOLUTION INTRAVENOUS at 04:11

## 2021-01-01 RX ADMIN — HYDROCORTISONE: 1 CREAM TOPICAL at 08:48

## 2021-01-01 RX ADMIN — INSULIN ASPART 3 UNITS: 100 INJECTION, SOLUTION INTRAVENOUS; SUBCUTANEOUS at 12:47

## 2021-01-01 RX ADMIN — BUDESONIDE AND FORMOTEROL FUMARATE DIHYDRATE 2 PUFF: 160; 4.5 AEROSOL RESPIRATORY (INHALATION) at 07:46

## 2021-01-01 RX ADMIN — HYDROCORTISONE: 1 CREAM TOPICAL at 08:31

## 2021-01-01 RX ADMIN — POLYETHYLENE GLYCOL 3350 17 G: 17 POWDER, FOR SOLUTION ORAL at 09:31

## 2021-01-01 RX ADMIN — ATORVASTATIN CALCIUM 10 MG: 10 TABLET, FILM COATED ORAL at 09:48

## 2021-01-01 RX ADMIN — SODIUM CHLORIDE, PRESERVATIVE FREE 10 ML: 5 INJECTION INTRAVENOUS at 21:04

## 2021-01-01 RX ADMIN — BUDESONIDE AND FORMOTEROL FUMARATE DIHYDRATE 2 PUFF: 160; 4.5 AEROSOL RESPIRATORY (INHALATION) at 19:15

## 2021-01-01 RX ADMIN — ERGOCALCIFEROL 50000 UNITS: 1.25 CAPSULE ORAL at 10:12

## 2021-01-01 RX ADMIN — CITALOPRAM 10 MG: 10 TABLET, FILM COATED ORAL at 18:16

## 2021-01-01 RX ADMIN — APIXABAN 5 MG: 2.5 TABLET, FILM COATED ORAL at 20:35

## 2021-01-01 RX ADMIN — LINAGLIPTIN 5 MG: 5 TABLET, FILM COATED ORAL at 09:41

## 2021-01-01 RX ADMIN — SODIUM CHLORIDE 75 ML/HR: 9 INJECTION, SOLUTION INTRAVENOUS at 23:08

## 2021-01-01 RX ADMIN — SODIUM CHLORIDE, PRESERVATIVE FREE 10 ML: 5 INJECTION INTRAVENOUS at 10:18

## 2021-01-01 RX ADMIN — AMIODARONE HYDROCHLORIDE 200 MG: 200 TABLET ORAL at 20:36

## 2021-01-01 RX ADMIN — EPHEDRINE SULFATE 10 MG: 50 INJECTION, SOLUTION INTRAVENOUS at 11:06

## 2021-01-01 RX ADMIN — BUDESONIDE AND FORMOTEROL FUMARATE DIHYDRATE 2 PUFF: 160; 4.5 AEROSOL RESPIRATORY (INHALATION) at 19:26

## 2021-01-01 RX ADMIN — SODIUM CHLORIDE, PRESERVATIVE FREE 10 ML: 5 INJECTION INTRAVENOUS at 08:31

## 2021-01-01 RX ADMIN — SODIUM CHLORIDE 250 ML/HR: 9 INJECTION, SOLUTION INTRAVENOUS at 17:04

## 2021-01-01 RX ADMIN — SODIUM CHLORIDE 250 ML/HR: 9 INJECTION, SOLUTION INTRAVENOUS at 19:38

## 2021-01-01 RX ADMIN — MIRTAZAPINE 7.5 MG: 15 TABLET, FILM COATED ORAL at 20:47

## 2021-01-01 RX ADMIN — COLLAGENASE SANTYL 1 APPLICATION: 250 OINTMENT TOPICAL at 10:14

## 2021-01-01 RX ADMIN — AMIODARONE HYDROCHLORIDE 200 MG: 200 TABLET ORAL at 20:02

## 2021-01-01 RX ADMIN — INSULIN ASPART 4 UNITS: 100 INJECTION, SOLUTION INTRAVENOUS; SUBCUTANEOUS at 18:34

## 2021-01-01 RX ADMIN — APIXABAN 5 MG: 2.5 TABLET, FILM COATED ORAL at 09:48

## 2021-01-01 RX ADMIN — CARVEDILOL 6.25 MG: 6.25 TABLET, FILM COATED ORAL at 09:28

## 2021-01-01 RX ADMIN — CHOLECALCIFEROL (VITAMIN D3) 25 MCG (1,000 UNIT) TABLET 2000 UNITS: TABLET at 08:52

## 2021-01-01 RX ADMIN — CEFAZOLIN SODIUM 2 G: 2 SOLUTION INTRAVENOUS at 10:26

## 2021-01-01 RX ADMIN — FLUTICASONE PROPIONATE 2 SPRAY: 50 SPRAY, METERED NASAL at 08:51

## 2021-01-01 RX ADMIN — CARVEDILOL 6.25 MG: 6.25 TABLET, FILM COATED ORAL at 09:42

## 2021-01-01 RX ADMIN — PREGABALIN 75 MG: 75 CAPSULE ORAL at 21:16

## 2021-01-01 RX ADMIN — HYDROCORTISONE: 1 CREAM TOPICAL at 08:54

## 2021-01-01 RX ADMIN — INSULIN ASPART 12 UNITS: 100 INJECTION, SOLUTION INTRAVENOUS; SUBCUTANEOUS at 12:49

## 2021-01-01 RX ADMIN — LEVOTHYROXINE SODIUM 224 MCG: 0.11 TABLET ORAL at 08:06

## 2021-01-01 RX ADMIN — HYDROCORTISONE: 1 CREAM TOPICAL at 20:35

## 2021-01-01 RX ADMIN — SODIUM CHLORIDE 500 ML: 9 INJECTION, SOLUTION INTRAVENOUS at 16:32

## 2021-01-01 RX ADMIN — CHOLECALCIFEROL (VITAMIN D3) 25 MCG (1,000 UNIT) TABLET 2000 UNITS: TABLET at 09:48

## 2021-01-01 RX ADMIN — INSULIN LISPRO 2 UNITS: 100 INJECTION, SOLUTION INTRAVENOUS; SUBCUTANEOUS at 17:20

## 2021-01-01 RX ADMIN — SODIUM CHLORIDE 500 ML: 9 INJECTION, SOLUTION INTRAVENOUS at 23:58

## 2021-01-01 RX ADMIN — AMOXICILLIN 500 MG: 250 CAPSULE ORAL at 15:08

## 2021-01-01 RX ADMIN — SODIUM CHLORIDE 250 MG: 9 INJECTION, SOLUTION INTRAVENOUS at 16:20

## 2021-01-01 RX ADMIN — PETROLATUM 100 APPLICATION: 420 OINTMENT TOPICAL at 09:32

## 2021-01-01 RX ADMIN — POLYETHYLENE GLYCOL 3350 17 G: 17 POWDER, FOR SOLUTION ORAL at 09:51

## 2021-01-01 RX ADMIN — Medication 2000 UNITS: at 09:51

## 2021-01-01 RX ADMIN — APIXABAN 2.5 MG: 2.5 TABLET, FILM COATED ORAL at 08:26

## 2021-01-01 RX ADMIN — TAMSULOSIN HYDROCHLORIDE 0.4 MG: 0.4 CAPSULE ORAL at 08:47

## 2021-01-01 RX ADMIN — BUDESONIDE AND FORMOTEROL FUMARATE DIHYDRATE 2 PUFF: 160; 4.5 AEROSOL RESPIRATORY (INHALATION) at 09:52

## 2021-01-01 RX ADMIN — SUCCINYLCHOLINE CHLORIDE 200 MG: 20 INJECTION, SOLUTION INTRAMUSCULAR; INTRAVENOUS at 10:19

## 2021-01-01 RX ADMIN — POLYETHYLENE GLYCOL 3350 17 G: 17 POWDER, FOR SOLUTION ORAL at 08:54

## 2021-01-01 RX ADMIN — DOCUSATE SODIUM 100 MG: 100 CAPSULE, LIQUID FILLED ORAL at 10:06

## 2021-01-01 RX ADMIN — APIXABAN 2.5 MG: 2.5 TABLET, FILM COATED ORAL at 08:06

## 2021-01-01 RX ADMIN — FLUTICASONE PROPIONATE 2 SPRAY: 50 SPRAY, METERED NASAL at 09:45

## 2021-01-01 RX ADMIN — BUDESONIDE AND FORMOTEROL FUMARATE DIHYDRATE 2 PUFF: 160; 4.5 AEROSOL RESPIRATORY (INHALATION) at 07:23

## 2021-01-01 RX ADMIN — SODIUM CHLORIDE, PRESERVATIVE FREE 10 ML: 5 INJECTION INTRAVENOUS at 09:51

## 2021-01-01 RX ADMIN — CETIRIZINE HYDROCHLORIDE 10 MG: 10 TABLET ORAL at 09:31

## 2021-01-01 RX ADMIN — PREGABALIN 75 MG: 75 CAPSULE ORAL at 20:34

## 2021-01-01 RX ADMIN — CARVEDILOL 6.25 MG: 6.25 TABLET, FILM COATED ORAL at 08:54

## 2021-01-01 RX ADMIN — SODIUM CHLORIDE, PRESERVATIVE FREE 10 ML: 5 INJECTION INTRAVENOUS at 10:03

## 2021-01-01 RX ADMIN — DOCUSATE SODIUM 100 MG: 100 CAPSULE ORAL at 09:27

## 2021-01-01 RX ADMIN — INSULIN LISPRO 2 UNITS: 100 INJECTION, SOLUTION INTRAVENOUS; SUBCUTANEOUS at 11:22

## 2021-01-01 RX ADMIN — INSULIN DETEMIR 15 UNITS: 100 INJECTION, SOLUTION SUBCUTANEOUS at 21:18

## 2021-01-01 RX ADMIN — COLLAGENASE SANTYL 1 APPLICATION: 250 OINTMENT TOPICAL at 08:06

## 2021-01-01 RX ADMIN — ATORVASTATIN CALCIUM 10 MG: 20 TABLET, FILM COATED ORAL at 20:13

## 2021-01-01 RX ADMIN — CITALOPRAM 20 MG: 20 TABLET, FILM COATED ORAL at 08:47

## 2021-01-01 RX ADMIN — TAMSULOSIN HYDROCHLORIDE 0.4 MG: 0.4 CAPSULE ORAL at 08:36

## 2021-01-01 RX ADMIN — SODIUM CHLORIDE 250 MG: 9 INJECTION, SOLUTION INTRAVENOUS at 15:23

## 2021-01-01 RX ADMIN — TAMSULOSIN HYDROCHLORIDE 0.4 MG: 0.4 CAPSULE ORAL at 10:01

## 2021-01-01 RX ADMIN — BUMETANIDE 2 MG: 0.25 INJECTION INTRAMUSCULAR; INTRAVENOUS at 21:45

## 2021-01-01 RX ADMIN — DOCUSATE SODIUM 100 MG: 100 CAPSULE, LIQUID FILLED ORAL at 21:25

## 2021-01-01 RX ADMIN — COLLAGENASE SANTYL 1 APPLICATION: 250 OINTMENT TOPICAL at 18:39

## 2021-01-01 RX ADMIN — Medication 1000 MCG: at 08:50

## 2021-01-01 RX ADMIN — PREGABALIN 75 MG: 75 CAPSULE ORAL at 08:47

## 2021-01-01 RX ADMIN — CEFTRIAXONE SODIUM 1 G: 1 INJECTION, SOLUTION INTRAVENOUS at 01:29

## 2021-01-01 RX ADMIN — LEVOTHYROXINE SODIUM 224 MCG: 0.11 TABLET ORAL at 09:28

## 2021-01-01 RX ADMIN — TAMSULOSIN HYDROCHLORIDE 0.4 MG: 0.4 CAPSULE ORAL at 08:44

## 2021-01-01 RX ADMIN — SODIUM CHLORIDE, PRESERVATIVE FREE 10 ML: 5 INJECTION INTRAVENOUS at 08:06

## 2021-01-01 RX ADMIN — INSULIN ASPART 3 UNITS: 100 INJECTION, SOLUTION INTRAVENOUS; SUBCUTANEOUS at 12:41

## 2021-01-01 RX ADMIN — ATORVASTATIN CALCIUM 10 MG: 10 TABLET, FILM COATED ORAL at 08:43

## 2021-01-01 RX ADMIN — TAMSULOSIN HYDROCHLORIDE 0.4 MG: 0.4 CAPSULE ORAL at 09:52

## 2021-01-01 RX ADMIN — APIXABAN 2.5 MG: 2.5 TABLET, FILM COATED ORAL at 09:52

## 2021-01-01 RX ADMIN — DOCUSATE SODIUM 100 MG: 100 CAPSULE, LIQUID FILLED ORAL at 20:42

## 2021-01-01 RX ADMIN — CETIRIZINE HYDROCHLORIDE 10 MG: 10 TABLET, FILM COATED ORAL at 09:48

## 2021-01-01 RX ADMIN — SODIUM CHLORIDE, PRESERVATIVE FREE 10 ML: 5 INJECTION INTRAVENOUS at 20:35

## 2021-01-01 RX ADMIN — ATORVASTATIN CALCIUM 10 MG: 20 TABLET, FILM COATED ORAL at 21:03

## 2021-01-01 RX ADMIN — AMOXICILLIN 500 MG: 250 CAPSULE ORAL at 05:39

## 2021-01-01 RX ADMIN — BUMETANIDE 2 MG: 2 TABLET ORAL at 08:44

## 2021-01-01 RX ADMIN — PREGABALIN 75 MG: 75 CAPSULE ORAL at 20:50

## 2021-01-01 RX ADMIN — DOCUSATE SODIUM 100 MG: 100 CAPSULE, LIQUID FILLED ORAL at 20:34

## 2021-01-01 RX ADMIN — SODIUM CHLORIDE 75 ML/HR: 9 INJECTION, SOLUTION INTRAVENOUS at 03:42

## 2021-01-01 RX ADMIN — APIXABAN 2.5 MG: 2.5 TABLET, FILM COATED ORAL at 20:02

## 2021-01-01 RX ADMIN — FAMOTIDINE 20 MG: 10 INJECTION, SOLUTION INTRAVENOUS at 10:05

## 2021-01-01 RX ADMIN — APIXABAN 2.5 MG: 2.5 TABLET, FILM COATED ORAL at 20:45

## 2021-01-01 RX ADMIN — SODIUM CHLORIDE 75 ML/HR: 9 INJECTION, SOLUTION INTRAVENOUS at 21:21

## 2021-01-01 RX ADMIN — FLUTICASONE PROPIONATE 2 SPRAY: 50 SPRAY, METERED NASAL at 10:03

## 2021-01-01 RX ADMIN — CEFTRIAXONE SODIUM 1 G: 1 INJECTION, SOLUTION INTRAVENOUS at 01:18

## 2021-01-01 RX ADMIN — DOCUSATE SODIUM 100 MG: 100 CAPSULE, LIQUID FILLED ORAL at 20:28

## 2021-01-01 RX ADMIN — FLUTICASONE PROPIONATE 2 SPRAY: 50 SPRAY, METERED NASAL at 08:48

## 2021-01-01 RX ADMIN — FLUTICASONE PROPIONATE 2 SPRAY: 50 SPRAY, METERED NASAL at 09:52

## 2021-01-01 RX ADMIN — DOCUSATE SODIUM 100 MG: 100 CAPSULE, LIQUID FILLED ORAL at 22:38

## 2021-01-01 RX ADMIN — CARVEDILOL 6.25 MG: 6.25 TABLET, FILM COATED ORAL at 08:43

## 2021-01-01 RX ADMIN — CITALOPRAM 20 MG: 20 TABLET, FILM COATED ORAL at 09:31

## 2021-01-01 RX ADMIN — BUDESONIDE AND FORMOTEROL FUMARATE DIHYDRATE 2 PUFF: 160; 4.5 AEROSOL RESPIRATORY (INHALATION) at 09:42

## 2021-01-01 RX ADMIN — DOCUSATE SODIUM 100 MG: 100 CAPSULE, LIQUID FILLED ORAL at 08:50

## 2021-01-01 RX ADMIN — INSULIN LISPRO 2 UNITS: 100 INJECTION, SOLUTION INTRAVENOUS; SUBCUTANEOUS at 08:51

## 2021-01-01 RX ADMIN — LEVOTHYROXINE SODIUM 224 MCG: 0.11 TABLET ORAL at 06:29

## 2021-01-01 RX ADMIN — SODIUM CHLORIDE, PRESERVATIVE FREE 10 ML: 5 INJECTION INTRAVENOUS at 09:28

## 2021-01-01 RX ADMIN — BUDESONIDE AND FORMOTEROL FUMARATE DIHYDRATE 2 PUFF: 160; 4.5 AEROSOL RESPIRATORY (INHALATION) at 19:40

## 2021-01-01 RX ADMIN — SODIUM CHLORIDE 2 ML: 9 INJECTION INTRAMUSCULAR; INTRAVENOUS; SUBCUTANEOUS at 19:50

## 2021-01-01 RX ADMIN — Medication 1000 MCG: at 21:25

## 2021-01-01 RX ADMIN — CARVEDILOL 6.25 MG: 6.25 TABLET, FILM COATED ORAL at 08:36

## 2021-01-01 RX ADMIN — SODIUM CHLORIDE, PRESERVATIVE FREE 10 ML: 5 INJECTION INTRAVENOUS at 08:39

## 2021-01-01 RX ADMIN — Medication 2000 UNITS: at 12:57

## 2021-01-01 RX ADMIN — LEVOTHYROXINE SODIUM 224 MCG: 0.11 TABLET ORAL at 08:36

## 2021-01-01 RX ADMIN — CITALOPRAM 10 MG: 10 TABLET, FILM COATED ORAL at 08:18

## 2021-01-01 RX ADMIN — ATORVASTATIN CALCIUM 10 MG: 10 TABLET, FILM COATED ORAL at 09:41

## 2021-01-01 RX ADMIN — ZINC OXIDE 1 APPLICATION: 200 OINTMENT TOPICAL at 09:53

## 2021-01-01 RX ADMIN — POLYETHYLENE GLYCOL 3350 17 G: 17 POWDER, FOR SOLUTION ORAL at 09:40

## 2021-01-01 RX ADMIN — ATORVASTATIN CALCIUM 10 MG: 10 TABLET, FILM COATED ORAL at 08:52

## 2021-01-01 RX ADMIN — SODIUM CHLORIDE, PRESERVATIVE FREE 10 ML: 5 INJECTION INTRAVENOUS at 09:42

## 2021-01-01 RX ADMIN — AMIODARONE HYDROCHLORIDE 200 MG: 200 TABLET ORAL at 08:17

## 2021-01-01 RX ADMIN — LEVOTHYROXINE SODIUM 224 MCG: 0.11 TABLET ORAL at 10:02

## 2021-01-01 RX ADMIN — INSULIN ASPART 3 UNITS: 100 INJECTION, SOLUTION INTRAVENOUS; SUBCUTANEOUS at 17:52

## 2021-01-01 RX ADMIN — SODIUM CHLORIDE 75 ML/HR: 9 INJECTION, SOLUTION INTRAVENOUS at 04:59

## 2021-01-01 RX ADMIN — ATORVASTATIN CALCIUM 10 MG: 20 TABLET, FILM COATED ORAL at 20:28

## 2021-01-01 RX ADMIN — SODIUM CHLORIDE 500 ML: 9 INJECTION, SOLUTION INTRAVENOUS at 14:23

## 2021-01-01 RX ADMIN — APIXABAN 5 MG: 2.5 TABLET, FILM COATED ORAL at 08:52

## 2021-01-01 RX ADMIN — BUDESONIDE AND FORMOTEROL FUMARATE DIHYDRATE 2 PUFF: 160; 4.5 AEROSOL RESPIRATORY (INHALATION) at 19:56

## 2021-01-01 RX ADMIN — EPHEDRINE SULFATE 10 MG: 50 INJECTION, SOLUTION INTRAVENOUS at 10:17

## 2021-01-01 RX ADMIN — SODIUM CHLORIDE, POTASSIUM CHLORIDE, SODIUM LACTATE AND CALCIUM CHLORIDE 100 ML/HR: 600; 310; 30; 20 INJECTION, SOLUTION INTRAVENOUS at 18:42

## 2021-01-01 RX ADMIN — SODIUM CHLORIDE, PRESERVATIVE FREE 10 ML: 5 INJECTION INTRAVENOUS at 00:12

## 2021-01-01 RX ADMIN — INSULIN LISPRO 2 UNITS: 100 INJECTION, SOLUTION INTRAVENOUS; SUBCUTANEOUS at 22:02

## 2021-01-01 RX ADMIN — CEFTRIAXONE 1 G: 1 INJECTION, POWDER, FOR SOLUTION INTRAMUSCULAR; INTRAVENOUS at 17:55

## 2021-01-01 RX ADMIN — QUETIAPINE FUMARATE 12.5 MG: 25 TABLET ORAL at 21:26

## 2021-01-01 RX ADMIN — AMOXICILLIN 500 MG: 250 CAPSULE ORAL at 14:17

## 2021-01-01 RX ADMIN — LEVOTHYROXINE SODIUM 224 MCG: 0.11 TABLET ORAL at 10:05

## 2021-01-01 RX ADMIN — CEFTRIAXONE 1 G: 1 INJECTION, POWDER, FOR SOLUTION INTRAMUSCULAR; INTRAVENOUS at 21:22

## 2021-01-01 RX ADMIN — Medication 2 PACKET: at 12:58

## 2021-01-01 RX ADMIN — SODIUM CHLORIDE, PRESERVATIVE FREE 10 ML: 5 INJECTION INTRAVENOUS at 20:05

## 2021-01-01 RX ADMIN — PETROLATUM 100 APPLICATION: 420 OINTMENT TOPICAL at 08:54

## 2021-01-01 RX ADMIN — FLUTICASONE PROPIONATE 2 SPRAY: 50 SPRAY, METERED NASAL at 08:06

## 2021-01-01 RX ADMIN — CITALOPRAM HYDROBROMIDE 20 MG: 20 TABLET ORAL at 20:45

## 2021-01-01 RX ADMIN — PREGABALIN 75 MG: 75 CAPSULE ORAL at 09:41

## 2021-01-01 RX ADMIN — PREGABALIN 75 MG: 75 CAPSULE ORAL at 20:36

## 2021-01-01 RX ADMIN — HYDROCORTISONE: 1 CREAM TOPICAL at 21:27

## 2021-01-01 RX ADMIN — SODIUM CHLORIDE 75 ML/HR: 9 INJECTION, SOLUTION INTRAVENOUS at 08:06

## 2021-01-01 RX ADMIN — CITALOPRAM HYDROBROMIDE 20 MG: 20 TABLET ORAL at 22:38

## 2021-01-01 RX ADMIN — SODIUM CHLORIDE, PRESERVATIVE FREE 10 ML: 5 INJECTION INTRAVENOUS at 10:05

## 2021-01-01 RX ADMIN — INSULIN LISPRO 2 UNITS: 100 INJECTION, SOLUTION INTRAVENOUS; SUBCUTANEOUS at 21:04

## 2021-01-01 RX ADMIN — PREGABALIN 75 MG: 75 CAPSULE ORAL at 20:45

## 2021-01-01 RX ADMIN — ACETAMINOPHEN 650 MG: 325 TABLET, FILM COATED ORAL at 06:08

## 2021-01-01 RX ADMIN — ATORVASTATIN CALCIUM 10 MG: 20 TABLET, FILM COATED ORAL at 21:26

## 2021-01-01 RX ADMIN — CITALOPRAM 20 MG: 20 TABLET, FILM COATED ORAL at 08:30

## 2021-01-01 RX ADMIN — POLYETHYLENE GLYCOL 3350 17 G: 17 POWDER, FOR SOLUTION ORAL at 08:47

## 2021-01-01 RX ADMIN — Medication 1000 MCG: at 10:01

## 2021-01-01 RX ADMIN — BUDESONIDE AND FORMOTEROL FUMARATE DIHYDRATE 2 PUFF: 160; 4.5 AEROSOL RESPIRATORY (INHALATION) at 19:41

## 2021-01-01 RX ADMIN — CYANOCOBALAMIN TAB 1000 MCG 1000 MCG: 1000 TAB at 09:48

## 2021-01-01 RX ADMIN — SODIUM CHLORIDE 75 ML/HR: 9 INJECTION, SOLUTION INTRAVENOUS at 11:25

## 2021-01-01 RX ADMIN — ZINC OXIDE 1 APPLICATION: 200 OINTMENT TOPICAL at 09:09

## 2021-01-01 RX ADMIN — CARVEDILOL 3.12 MG: 3.12 TABLET, FILM COATED ORAL at 08:51

## 2021-01-01 RX ADMIN — POLYETHYLENE GLYCOL 3350 17 G: 17 POWDER, FOR SOLUTION ORAL at 12:58

## 2021-01-01 RX ADMIN — ACETAMINOPHEN 650 MG: 325 TABLET, FILM COATED ORAL at 08:32

## 2021-01-01 RX ADMIN — PREGABALIN 75 MG: 75 CAPSULE ORAL at 09:44

## 2021-01-01 RX ADMIN — SODIUM CHLORIDE, PRESERVATIVE FREE 10 ML: 5 INJECTION INTRAVENOUS at 08:52

## 2021-01-01 RX ADMIN — HYDROCORTISONE: 1 CREAM TOPICAL at 20:50

## 2021-01-01 RX ADMIN — DOCUSATE SODIUM 100 MG: 100 CAPSULE ORAL at 20:36

## 2021-01-01 RX ADMIN — AMIODARONE HYDROCHLORIDE 200 MG: 200 TABLET ORAL at 10:11

## 2021-01-01 RX ADMIN — CARVEDILOL 6.25 MG: 6.25 TABLET, FILM COATED ORAL at 08:30

## 2021-01-01 RX ADMIN — INSULIN LISPRO 2 UNITS: 100 INJECTION, SOLUTION INTRAVENOUS; SUBCUTANEOUS at 11:52

## 2021-01-01 RX ADMIN — DOCUSATE SODIUM 100 MG: 100 CAPSULE ORAL at 08:47

## 2021-01-01 RX ADMIN — CARVEDILOL 6.25 MG: 6.25 TABLET, FILM COATED ORAL at 18:27

## 2021-01-01 RX ADMIN — INSULIN LISPRO 2 UNITS: 100 INJECTION, SOLUTION INTRAVENOUS; SUBCUTANEOUS at 09:27

## 2021-01-01 RX ADMIN — QUETIAPINE FUMARATE 12.5 MG: 25 TABLET ORAL at 18:35

## 2021-01-01 RX ADMIN — FLUCONAZOLE IN SODIUM CHLORIDE 800 MG: 2 INJECTION, SOLUTION INTRAVENOUS at 21:27

## 2021-01-01 RX ADMIN — HYDROCORTISONE: 1 CREAM TOPICAL at 09:28

## 2021-01-01 RX ADMIN — BUDESONIDE AND FORMOTEROL FUMARATE DIHYDRATE 2 PUFF: 160; 4.5 AEROSOL RESPIRATORY (INHALATION) at 19:21

## 2021-01-01 RX ADMIN — CHOLECALCIFEROL (VITAMIN D3) 25 MCG (1,000 UNIT) TABLET 2000 UNITS: TABLET at 08:43

## 2021-01-01 RX ADMIN — APIXABAN 2.5 MG: 2.5 TABLET, FILM COATED ORAL at 21:22

## 2021-01-01 RX ADMIN — DOCUSATE SODIUM 100 MG: 100 CAPSULE ORAL at 21:26

## 2021-01-01 RX ADMIN — SODIUM CHLORIDE, PRESERVATIVE FREE 10 ML: 5 INJECTION INTRAVENOUS at 21:26

## 2021-01-01 RX ADMIN — TAMSULOSIN HYDROCHLORIDE 0.4 MG: 0.4 CAPSULE ORAL at 09:42

## 2021-01-01 RX ADMIN — ERGOCALCIFEROL 50000 UNITS: 1.25 CAPSULE ORAL at 08:31

## 2021-01-01 RX ADMIN — TAMSULOSIN HYDROCHLORIDE 0.4 MG: 0.4 CAPSULE ORAL at 12:57

## 2021-01-01 RX ADMIN — DOCUSATE SODIUM 100 MG: 100 CAPSULE ORAL at 09:31

## 2021-01-01 RX ADMIN — AMIODARONE HYDROCHLORIDE 200 MG: 200 TABLET ORAL at 10:07

## 2021-01-01 RX ADMIN — AMOXICILLIN 500 MG: 250 CAPSULE ORAL at 17:15

## 2021-01-01 RX ADMIN — AMIODARONE HYDROCHLORIDE 200 MG: 200 TABLET ORAL at 08:42

## 2021-01-01 RX ADMIN — SODIUM CHLORIDE 75 ML/HR: 9 INJECTION, SOLUTION INTRAVENOUS at 00:23

## 2021-01-01 RX ADMIN — CEFTRIAXONE 1 G: 1 INJECTION, POWDER, FOR SOLUTION INTRAMUSCULAR; INTRAVENOUS at 20:02

## 2021-01-01 RX ADMIN — TAMSULOSIN HYDROCHLORIDE 0.4 MG: 0.4 CAPSULE ORAL at 08:55

## 2021-01-01 RX ADMIN — DOCUSATE SODIUM 100 MG: 100 CAPSULE, LIQUID FILLED ORAL at 20:02

## 2021-01-01 RX ADMIN — BUDESONIDE AND FORMOTEROL FUMARATE DIHYDRATE 2 PUFF: 160; 4.5 AEROSOL RESPIRATORY (INHALATION) at 07:33

## 2021-01-01 RX ADMIN — ZINC OXIDE 1 APPLICATION: 200 OINTMENT TOPICAL at 15:35

## 2021-01-01 RX ADMIN — FLUTICASONE PROPIONATE 2 SPRAY: 50 SPRAY, METERED NASAL at 10:05

## 2021-01-01 RX ADMIN — INSULIN LISPRO 2 UNITS: 100 INJECTION, SOLUTION INTRAVENOUS; SUBCUTANEOUS at 12:25

## 2021-01-01 RX ADMIN — DOCUSATE SODIUM 100 MG: 100 CAPSULE, LIQUID FILLED ORAL at 08:31

## 2021-01-01 RX ADMIN — CITALOPRAM HYDROBROMIDE 20 MG: 20 TABLET ORAL at 09:48

## 2021-01-01 RX ADMIN — INSULIN DETEMIR 15 UNITS: 100 INJECTION, SOLUTION SUBCUTANEOUS at 20:45

## 2021-01-01 RX ADMIN — BUDESONIDE AND FORMOTEROL FUMARATE DIHYDRATE 2 PUFF: 160; 4.5 AEROSOL RESPIRATORY (INHALATION) at 12:04

## 2021-01-01 RX ADMIN — APIXABAN 2.5 MG: 2.5 TABLET, FILM COATED ORAL at 20:28

## 2021-01-01 RX ADMIN — DEXAMETHASONE SODIUM PHOSPHATE 8 MG: 4 INJECTION, SOLUTION INTRAMUSCULAR; INTRAVENOUS at 10:07

## 2021-01-01 RX ADMIN — PREGABALIN 75 MG: 75 CAPSULE ORAL at 08:36

## 2021-01-01 RX ADMIN — FLUTICASONE PROPIONATE 2 SPRAY: 50 SPRAY, METERED NASAL at 08:27

## 2021-01-01 RX ADMIN — QUETIAPINE FUMARATE 25 MG: 25 TABLET ORAL at 21:22

## 2021-01-01 RX ADMIN — POLYETHYLENE GLYCOL 3350 17 G: 17 POWDER, FOR SOLUTION ORAL at 08:53

## 2021-01-01 RX ADMIN — PREGABALIN 75 MG: 75 CAPSULE ORAL at 22:48

## 2021-01-01 RX ADMIN — CARVEDILOL 3.12 MG: 3.12 TABLET, FILM COATED ORAL at 21:23

## 2021-01-01 RX ADMIN — TAMSULOSIN HYDROCHLORIDE 0.4 MG: 0.4 CAPSULE ORAL at 10:10

## 2021-01-01 RX ADMIN — CARVEDILOL 6.25 MG: 6.25 TABLET, FILM COATED ORAL at 17:11

## 2021-01-01 RX ADMIN — SODIUM CHLORIDE 75 ML/HR: 9 INJECTION, SOLUTION INTRAVENOUS at 18:16

## 2021-01-01 RX ADMIN — AMOXICILLIN 500 MG: 250 CAPSULE ORAL at 20:36

## 2021-01-01 RX ADMIN — BUMETANIDE 2 MG: 0.25 INJECTION INTRAMUSCULAR; INTRAVENOUS at 08:43

## 2021-01-01 RX ADMIN — AMIODARONE HYDROCHLORIDE 200 MG: 200 TABLET ORAL at 02:13

## 2021-01-01 RX ADMIN — FLUTICASONE PROPIONATE 2 SPRAY: 50 SPRAY, METERED NASAL at 08:44

## 2021-01-01 RX ADMIN — SODIUM CHLORIDE 250 MG: 9 INJECTION, SOLUTION INTRAVENOUS at 14:19

## 2021-01-01 RX ADMIN — CETIRIZINE HYDROCHLORIDE 10 MG: 10 TABLET ORAL at 08:36

## 2021-01-01 RX ADMIN — APIXABAN 2.5 MG: 2.5 TABLET, FILM COATED ORAL at 20:47

## 2021-01-01 RX ADMIN — APIXABAN 2.5 MG: 2.5 TABLET, FILM COATED ORAL at 10:01

## 2021-01-01 RX ADMIN — TAMSULOSIN HYDROCHLORIDE 0.4 MG: 0.4 CAPSULE ORAL at 21:24

## 2021-01-01 RX ADMIN — TAMSULOSIN HYDROCHLORIDE 0.4 MG: 0.4 CAPSULE ORAL at 08:52

## 2021-01-01 RX ADMIN — TAMSULOSIN HYDROCHLORIDE 0.4 MG: 0.4 CAPSULE ORAL at 10:06

## 2021-01-01 RX ADMIN — SODIUM CHLORIDE, PRESERVATIVE FREE 10 ML: 5 INJECTION INTRAVENOUS at 21:05

## 2021-01-01 RX ADMIN — APIXABAN 2.5 MG: 2.5 TABLET, FILM COATED ORAL at 21:23

## 2021-01-01 RX ADMIN — HYDROCORTISONE: 1 CREAM TOPICAL at 21:04

## 2021-01-01 RX ADMIN — AMIODARONE HYDROCHLORIDE 200 MG: 200 TABLET ORAL at 20:47

## 2021-01-01 RX ADMIN — SODIUM CHLORIDE 500 ML: 9 INJECTION, SOLUTION INTRAVENOUS at 15:01

## 2021-01-01 RX ADMIN — CEFTRIAXONE 2 G: 2 INJECTION, SOLUTION INTRAVENOUS at 22:52

## 2021-01-01 RX ADMIN — QUETIAPINE FUMARATE 12.5 MG: 25 TABLET ORAL at 22:46

## 2021-01-01 RX ADMIN — CITALOPRAM HYDROBROMIDE 20 MG: 20 TABLET ORAL at 08:44

## 2021-01-01 RX ADMIN — BUDESONIDE AND FORMOTEROL FUMARATE DIHYDRATE 2 PUFF: 160; 4.5 AEROSOL RESPIRATORY (INHALATION) at 10:03

## 2021-01-01 RX ADMIN — AMIODARONE HYDROCHLORIDE 200 MG: 200 TABLET ORAL at 08:26

## 2021-01-01 RX ADMIN — APIXABAN 2.5 MG: 2.5 TABLET, FILM COATED ORAL at 08:44

## 2021-01-01 RX ADMIN — COLLAGENASE SANTYL 1 APPLICATION: 250 OINTMENT TOPICAL at 15:37

## 2021-01-01 RX ADMIN — PREGABALIN 75 MG: 75 CAPSULE ORAL at 09:31

## 2021-01-01 RX ADMIN — COLLAGENASE SANTYL 1 APPLICATION: 250 OINTMENT TOPICAL at 08:45

## 2021-01-01 RX ADMIN — APIXABAN 2.5 MG: 2.5 TABLET, FILM COATED ORAL at 20:40

## 2021-01-01 RX ADMIN — BUDESONIDE AND FORMOTEROL FUMARATE DIHYDRATE 2 PUFF: 160; 4.5 AEROSOL RESPIRATORY (INHALATION) at 08:27

## 2021-01-01 RX ADMIN — Medication 2 PACKET: at 20:45

## 2021-01-01 RX ADMIN — CEFTRIAXONE SODIUM 1 G: 1 INJECTION, SOLUTION INTRAVENOUS at 23:59

## 2021-01-01 RX ADMIN — DOCUSATE SODIUM 100 MG: 100 CAPSULE, LIQUID FILLED ORAL at 20:13

## 2021-01-01 RX ADMIN — LEVOTHYROXINE SODIUM 224 MCG: 0.11 TABLET ORAL at 08:44

## 2021-01-01 RX ADMIN — TAMSULOSIN HYDROCHLORIDE 0.4 MG: 0.4 CAPSULE ORAL at 09:28

## 2021-01-01 RX ADMIN — BUDESONIDE AND FORMOTEROL FUMARATE DIHYDRATE 2 PUFF: 160; 4.5 AEROSOL RESPIRATORY (INHALATION) at 07:06

## 2021-01-01 RX ADMIN — Medication 1000 MCG: at 10:12

## 2021-01-01 RX ADMIN — QUETIAPINE FUMARATE 12.5 MG: 25 TABLET ORAL at 21:03

## 2021-01-01 RX ADMIN — DOCUSATE SODIUM 100 MG: 100 CAPSULE, LIQUID FILLED ORAL at 09:42

## 2021-01-01 RX ADMIN — SODIUM CHLORIDE, POTASSIUM CHLORIDE, SODIUM LACTATE AND CALCIUM CHLORIDE 100 ML/HR: 600; 310; 30; 20 INJECTION, SOLUTION INTRAVENOUS at 12:58

## 2021-01-01 RX ADMIN — CARVEDILOL 3.12 MG: 3.12 TABLET, FILM COATED ORAL at 20:38

## 2021-01-01 RX ADMIN — ATORVASTATIN CALCIUM 10 MG: 20 TABLET, FILM COATED ORAL at 21:22

## 2021-01-01 RX ADMIN — DOCUSATE SODIUM 100 MG: 100 CAPSULE ORAL at 08:55

## 2021-01-01 RX ADMIN — FLUTICASONE PROPIONATE 2 SPRAY: 50 SPRAY, METERED NASAL at 08:42

## 2021-01-01 RX ADMIN — INSULIN LISPRO 2 UNITS: 100 INJECTION, SOLUTION INTRAVENOUS; SUBCUTANEOUS at 11:26

## 2021-04-06 ENCOUNTER — HOSPITAL ENCOUNTER (EMERGENCY)
Facility: HOSPITAL | Age: 81
Discharge: HOME | End: 2021-04-06
Attending: EMERGENCY MEDICINE | Admitting: EMERGENCY MEDICINE

## 2021-04-06 ENCOUNTER — APPOINTMENT (OUTPATIENT)
Dept: CT IMAGING | Facility: HOSPITAL | Age: 81
End: 2021-04-06

## 2021-04-06 ENCOUNTER — APPOINTMENT (OUTPATIENT)
Dept: GENERAL RADIOLOGY | Facility: HOSPITAL | Age: 81
End: 2021-04-06

## 2021-04-06 VITALS
RESPIRATION RATE: 18 BRPM | TEMPERATURE: 99.5 F | BODY MASS INDEX: 31.52 KG/M2 | SYSTOLIC BLOOD PRESSURE: 146 MMHG | DIASTOLIC BLOOD PRESSURE: 68 MMHG | HEART RATE: 85 BPM | HEIGHT: 74 IN | WEIGHT: 245.6 LBS | OXYGEN SATURATION: 93 %

## 2021-04-06 DIAGNOSIS — S61.217A LACERATION OF LEFT LITTLE FINGER WITHOUT FOREIGN BODY WITHOUT DAMAGE TO NAIL, INITIAL ENCOUNTER: ICD-10-CM
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
## 2021-04-06 DIAGNOSIS — S50.12XA CONTUSION OF LEFT FOREARM, INITIAL ENCOUNTER: ICD-10-CM

## 2021-04-06 PROCEDURE — 99283 EMERGENCY DEPT VISIT LOW MDM: CPT | Performed by: PHYSICIAN ASSISTANT

## 2021-04-06 PROCEDURE — 70450 CT HEAD/BRAIN W/O DYE: CPT

## 2021-04-06 PROCEDURE — 73090 X-RAY EXAM OF FOREARM: CPT

## 2021-04-06 PROCEDURE — 72220 X-RAY EXAM SACRUM TAILBONE: CPT

## 2021-04-06 PROCEDURE — 73110 X-RAY EXAM OF WRIST: CPT

## 2021-04-06 PROCEDURE — 99283 EMERGENCY DEPT VISIT LOW MDM: CPT

## 2021-04-06 PROCEDURE — 73080 X-RAY EXAM OF ELBOW: CPT

## 2021-04-06 NOTE — DISCHARGE INSTRUCTIONS
Return to the emergency department with worsening symptoms, or as needed with emergent concerns. Cleanse wound on pinky daily with soap and water, apply bacitracin ointment and a bandage until wound is healed. Follow-up with PCP if any signs of infection develop.

## 2021-04-06 NOTE — ED NOTES
Went into pts room to check vitals, he was talking on phone with mask off, after 3 attempts to ask pt to put mask back on he yelled at this nurse and refused to put mask on     Valeria Fallon, RN  04/06/21 5248

## 2021-04-06 NOTE — ED NOTES
Left fifth finger cleaned and bacitracin and bandaid applied     Valeria Fallon RN  04/06/21 9921

## 2021-04-06 NOTE — ED PROVIDER NOTES
EMERGENCY DEPARTMENT ENCOUNTER      Room Number: 07/07    History is provided by the patient, no translation services needed    HPI:    Chief complaint: Fall, left arm injury, finger laceration    Location: Left elbow, forearm, wrist, and left pinky finger.  Also complaining of tailbone injury.    Quality/Severity: 8/10.    Timing/Duration: Patient states he fell this morning, also had a fall on Friday and has had some tailbone pain since.    Modifying Factors: Pain increases with movement.    Associated Symptoms: Positive for swelling and pain in left forearm, pain in left elbow, left pinky, and laceration in left pinky as well.  Patient also complains of tailbone pain but denies any back pain or neck pain.  Denies any loss of consciousness, dizziness, chest pain, shortness of breath.    Narrative: Pt is a 80 y.o. male who presents complaining of fall at home while trying to transfer from his wheeled walker to the toilet.  Patient states he fell quickly and is not sure what he hit his left arm on, he states he may have hit his head but everything happened so fast it is hard for him to remember.  Med rec shows that he is on Eliquis however he states he does not take it because it causes him to bruise. He denies any dizziness or other symptoms that precipitated the fall.  He states he had another mechanical fall on Friday, 5 days ago, where he landed straight on his tailbone.  He states he has been having pain in his tailbone since.  He states his tetanus is up-to-date.       PMD: Abdullahi Clarke MD    REVIEW OF SYSTEMS  Review of Systems   Constitutional: Negative for chills and fever.   Respiratory: Negative for cough and shortness of breath.    Cardiovascular: Negative for chest pain and palpitations.   Gastrointestinal: Negative for abdominal pain, nausea and vomiting.   Genitourinary: Negative for difficulty urinating and dysuria.   Musculoskeletal: Positive for arthralgias and gait problem (Uses  walker at home.). Negative for myalgias.   Skin: Positive for wound (Left pinky.). Negative for pallor and rash.   Neurological: Negative for dizziness and syncope.   Psychiatric/Behavioral: Negative for confusion. The patient is not nervous/anxious.          PAST MEDICAL HISTORY  Active Ambulatory Problems     Diagnosis Date Noted   • COPD (chronic obstructive pulmonary disease) (CMS/[facility])    • Type 2 diabetes mellitus with stage 4 chronic kidney disease, with long-term current use of insulin (CMS/[facility])    • Essential hypertension    • Primary osteoarthritis of left knee 08/27/2018   • Chronic renal insufficiency, stage 4 (severe) (CMS/HCC) 05/07/2019   • Chronic diastolic (congestive) heart failure (CMS/HCC) 05/07/2019   • Class 2 severe obesity due to excess calories with serious comorbidity in adult (CMS/HCC) 09/10/2019   • Physical debility 11/20/2019   • Acute UTI (urinary tract infection) 06/29/2020   • Paroxysmal atrial fibrillation (CMS/HCC) 06/30/2020   • H/O noncompliance with medical treatment, presenting hazards to health 06/30/2020   • Myxedema 06/30/2020   • Acute on chronic combined systolic and diastolic CHF (congestive heart failure) (CMS/[facility]) 06/30/2020   • Generalized weakness 07/01/2020     Resolved Ambulatory Problems     Diagnosis Date Noted   • Sepsis (CMS/[facility]) 11/21/2017   • Acute renal failure (CMS/HCC)    • Elevated procalcitonin 11/22/2017   • Lactic acid acidosis 11/22/2017   • Leukocytosis 11/22/2017   • NSTEMI (non-ST elevated myocardial infarction) (CMS/[facility]) 01/10/2018   • Injury of right ankle 08/27/2018   • Pneumonia of both lower lobes due to infectious organism 02/17/2019   • Elevated troponin 05/07/2019   • Cardiorenal syndrome with renal failure 05/07/2019   • Linn coma scale total score 9-12 08/15/2019   • Acute respiratory failure with hypoxia (CMS/HCC) 08/15/2019   • Sepsis, Gram positive (CMS/[facility]) 08/16/2019   • Streptococcal bacteremia 08/16/2019   • Atrial fibrillation  with RVR (CMS/[facility]) 08/17/2019   • Cellulitis of right lower extremity 10/15/2019     Past Medical History:   Diagnosis Date   • Arthritis    • Asthma    • Cancer (CMS/[facility])    • Cataract    • CHF (congestive heart failure) (CMS/[facility])    • Diabetes mellitus (CMS/[facility])    • Disease of thyroid gland    • Hyperlipidemia    • Hypertension    • Kidney stone    • Myocardial infarct, old    • Neuropathy    • Renal disorder        PAST SURGICAL HISTORY  Past Surgical History:   Procedure Laterality Date   • COLONOSCOPY     • EYE SURGERY     • JOINT REPLACEMENT      right   • KIDNEY STONE SURGERY     • REPLACEMENT TOTAL KNEE     • TOE SURGERY         FAMILY HISTORY  Family History   Problem Relation Age of Onset   • COPD Mother    • Diabetes Father        SOCIAL HISTORY  Social History     Socioeconomic History   • Marital status: Unknown     Spouse name: Not on file   • Number of children: Not on file   • Years of education: Not on file   • Highest education level: Not on file   Tobacco Use   • Smoking status: Former Smoker   • Smokeless tobacco: Never Used   • Tobacco comment: quit 1993   Substance and Sexual Activity   • Alcohol use: No   • Drug use: No   • Sexual activity: Defer       ALLERGIES  Atorvastatin and Oxycontin [oxycodone hcl]    No current facility-administered medications for this encounter.    Current Outpatient Medications:   •  acetaminophen (TYLENOL) 325 MG tablet, Take 2 tablets by mouth Every 4 (Four) Hours As Needed for Mild Pain ., Disp: , Rfl:   •  ALPRAZolam (XANAX) 0.25 MG tablet, Take 0.25 mg by mouth At Night As Needed for Anxiety., Disp: , Rfl:   •  apixaban (ELIQUIS) 2.5 MG tablet tablet, Take 1 tablet by mouth Every 12 (Twelve) Hours. (Patient taking differently: Take 5 mg by mouth Every 12 (Twelve) Hours.), Disp: 60 tablet, Rfl: 0  •  atorvastatin (LIPITOR) 10 MG tablet, Take 10 mg by mouth Daily., Disp: , Rfl:   •  budesonide-formoterol (SYMBICORT) 160-4.5 MCG/ACT inhaler, Inhale 2 puffs 2  "(Two) Times a Day., Disp: 1 inhaler, Rfl: 0  •  bumetanide (BUMEX) 2 MG tablet, Take 1 tablet by mouth 2 (Two) Times a Day., Disp: 120 tablet, Rfl: 0  •  carvedilol (COREG) 6.25 MG tablet, Take 1 tablet by mouth 2 (Two) Times a Day With Meals., Disp: 60 tablet, Rfl: 0  •  cholecalciferol 2000 units tablet, Take 2,000 Units by mouth Daily., Disp: 30 each, Rfl: 0  •  fluticasone (FLONASE) 50 MCG/ACT nasal spray, 2 sprays by Each Nare route Daily., Disp: 1 bottle, Rfl: 0  •  hydrophor (AQUAPHOR) ointment ointment, Apply  topically to the appropriate area as directed As Needed (Ulcerations lower extremities and edema). Lower extremities., Disp: , Rfl:   •  insulin aspart (novoLOG) 100 UNIT/ML injection, Inject 1-14 Units under the skin into the appropriate area as directed 3 (Three) Times a Day Before Meals. As directed per sliding scale, Disp: , Rfl:   •  insulin detemir (LEVEMIR) 100 UNIT/ML injection, Inject 45 Units under the skin into the appropriate area as directed 2 (Two) Times a Day., Disp: 10 mL, Rfl: 0  •  Insulin Syringe 30G X 5/16\" 1 ML misc, U UTD WITH INSULIN UP TO 6 TIMES A DAY, Disp: , Rfl: 3  •  ipratropium-albuterol (DUO-NEB) 0.5-2.5 mg/3 ml nebulizer, Take 3 mL by nebulization Every 4 (Four) Hours As Needed for Wheezing., Disp: , Rfl:   •  levothyroxine (SYNTHROID, LEVOTHROID) 125 MCG tablet, Take 1 tablet by mouth Daily., Disp: 30 tablet, Rfl: 0  •  loratadine (CLARITIN) 5 MG chewable tablet, Chew 10 mg Daily., Disp: , Rfl:   •  LYRICA 150 MG capsule, 100 mg., Disp: , Rfl: 3  •  melatonin 5 MG tablet tablet, Take 1 tablet by mouth At Night As Needed (sleep). Over the counter, Disp: , Rfl:   •  O2 (OXYGEN), Inhale 1 L/min every night at bedtime. (Patient taking differently: Inhale 2 L/min every night at bedtime.), Disp: 1 L, Rfl: 0  •  Petrolatum 42 % ointment, Apply  topically to the appropriate area as directed Daily. Send with patient, Disp: , Rfl:   •  SITagliptin (JANUVIA) 50 MG tablet, Take 1 " tablet by mouth Daily., Disp: 30 tablet, Rfl: 0  •  sodium chloride 0.65 % nasal spray, 2 sprays into the nostril(s) as directed by provider As Needed for Congestion. Send with patient, Disp: , Rfl: 12  •  tamsulosin (FLOMAX) 0.4 MG capsule 24 hr capsule, Take 1 capsule by mouth Daily., Disp: 30 capsule, Rfl: 0  •  vitamin B-12 (VITAMIN B-12) 1000 MCG tablet, Take 1 tablet by mouth Daily., Disp: 60 tablet, Rfl: 0    PHYSICAL EXAM  ED Triage Vitals [04/06/21 1256]   Temp Heart Rate Resp BP SpO2   99.5 °F (37.5 °C) 98 18 172/69 92 %      Temp src Heart Rate Source Patient Position BP Location FiO2 (%)   Oral Apical Lying Right arm --       Physical Exam  Vitals and nursing note reviewed.   Constitutional:       Appearance: He is obese.      Comments: Patient is an chronically ill appearing 80-year-old male.  He is following commands and answering questions appropriately.   HENT:      Head: Normocephalic and atraumatic.   Eyes:      Extraocular Movements: Extraocular movements intact.      Conjunctiva/sclera: Conjunctivae normal.      Pupils: Pupils are equal, round, and reactive to light.   Cardiovascular:      Rate and Rhythm: Normal rate and regular rhythm.   Pulmonary:      Effort: Pulmonary effort is normal.      Breath sounds: Normal breath sounds.   Abdominal:      General: Bowel sounds are normal.      Palpations: Abdomen is soft.      Tenderness: There is no abdominal tenderness. There is no guarding.   Musculoskeletal:         General: Normal range of motion.      Left elbow: No swelling or deformity. Tenderness present.      Left forearm: Swelling (Contusion to dorsal aspect) and bony tenderness present.      Left wrist: Bony tenderness present. No snuff box tenderness. Normal pulse.      Left hand: Laceration (Small laceration to fifth finger) present. Normal range of motion. Normal sensation. Normal capillary refill. Normal pulse.      Cervical back: Normal range of motion and neck supple.   Skin:      General: Skin is warm and dry.      Capillary Refill: Capillary refill takes less than 2 seconds.   Neurological:      General: No focal deficit present.      Mental Status: He is alert and oriented to person, place, and time.      Sensory: No sensory deficit.   Psychiatric:         Mood and Affect: Mood and affect normal.         Behavior: Behavior normal.         Cognition and Memory: Memory normal.         Judgment: Judgment normal.           LAB RESULTS  Lab Results (last 24 hours)     ** No results found for the last 24 hours. **            I ordered the above labs and reviewed the results    RADIOLOGY  XR Sacrum & Coccyx    Result Date: 4/6/2021  SACRUM AND COCCYX, 04/06/2021     HISTORY: 80-year-old male in the ED after a fall. Tailbone pain.  TECHNIQUE: AP and cross table lateral radiograph side of the sacrum and coccyx.  FINDINGS: There is somewhat poor radiographic exposure of the lower sacrococcygeal segments due to the limitations of cross table lateral technique. No displaced sacrococcygeal fracture is demonstrated. There is no diastasis of the sacroiliac joints or symphysis pubis.      Negative sacrococcygeal spine series with limitations as noted above.  This report was finalized on 4/6/2021 2:51 PM by Dr. Edgardo Adams MD.      XR Elbow 3+ View Left    Result Date: 4/6/2021  LEFT ELBOW, 04/06/2021     HISTORY: 80-year-old male in the ED with left upper extremity pain after a fall this morning. Wrist tenderness. Elbow swelling.  TECHNIQUE: Three-view left elbow series.  FINDINGS: No fracture, dislocation or other acute osseous abnormality is demonstrated. Soft tissue swelling is noted posteriorly over the olecranon process. No visible radiopaque foreign body. No evidence of joint effusion.      No acute osseous abnormality. Posterior soft tissue swelling.  This report was finalized on 4/6/2021 2:49 PM by Dr. Edgardo Adams MD.      XR Forearm 2 View Left    Result Date: 4/6/2021  LEFT FOREARM,  04/06/2021     HISTORY: 80-year-old male in the ED with left upper extremity pain after a fall this morning. Wrist tenderness. Elbow swelling.  TECHNIQUE: Two view left forearm series.  FINDINGS: No fracture, dislocation or other acute osseous abnormality is demonstrated. Soft tissue swelling is visible over the posterior mid forearm and posterior elbow. No visible radiopaque foreign body.      Soft tissue swelling. No acute osseous abnormality.  This report was finalized on 4/6/2021 2:48 PM by Dr. Edgardo Adams MD.      XR Wrist 3+ View Left    Result Date: 4/6/2021  LEFT WRIST, 04/06/2021     HISTORY: 80-year-old male in the ED with left upper extremity pain after a fall this morning. Wrist tenderness. Elbow swelling.  TECHNIQUE: Three-view left wrist series.  FINDINGS: No fracture, dislocation or other acute osseous abnormality is demonstrated. Mild to moderate degenerative arthropathy, greatest at the 1st CMC joint.      No acute osseous abnormality.  This report was finalized on 4/6/2021 2:47 PM by Dr. Edgardo Adams MD.      CT Head Without Contrast    Result Date: 4/6/2021  CT HEAD, NONCONTRAST, 04/06/2021  HISTORY: 80-year-old male with fall are clear today and another fall 4 days ago. He notes striking his head today but denies loss of consciousness.  TECHNIQUE:  CT imaging of the head without IV contrast. Radiation dose reduction techniques included automated exposure control or exposure modulation based on body size. Radiation audit for CT and nuclear cardiology exams in the last 12 months: 0.  COMPARISON: *  CT head, 12/11/2019.  FINDINGS:  No acute intracranial abnormality is demonstrated. No visible skull fracture.  Mild generalized age-appropriate cerebral volume loss. Mild diffuse chronic small vessel type white matter changes, nonspecific. These findings are stable.  No evidence of acute intracranial hemorrhage. No visible mass, mass effect, cerebral edema, extra-axial fluid collection or  hydrocephalus. Minimal scattered mucosal thickening within maxillary and ethmoid sinuses.      1. No acute intracranial abnormality. 2. Stable diffuse chronic changes as noted above. 3. No change since 12/11/2019.  This report was finalized on 4/6/2021 3:17 PM by Dr. Edgardo Adams MD.        I ordered the above radiologic testing and reviewed the results    PROCEDURES  Procedures      PROGRESS AND CONSULTS  ED Course as of Apr 06 1553   Tue Apr 06, 2021   1521 Discussed imaging findings with patient.  No acute abnormalities and imaging today.  Also discussed wound care and need for follow-up with PCP for any further issues. I recommended patient apply ice to left forearm, as well as bruises to help decrease inflammation and swelling. Discussed return to ER warnings.  Patient verbalizes understanding and is agreeable with plan for discharge at this time.    [KS]      ED Course User Index  [KS] Ade Beard, FEI           MEDICAL DECISION MAKING    MDM        My differential diagnosis of the upper extremity includes but is not limited to contusions of the shoulder, forearm, arm, wrist, elbow or hand, dislocations of shoulder, elbow, wrist, digits, shoulder sprain, elbow sprain, wrist sprain, digit sprain, shoulder strain, arm strain, forearm strain, elbow strain, wrist strain, hand sprain, digit strain, lacerations of the upper extremity, fractures both closed and open of radius, ulna and humerus, cellulitis or abscess, cervical radiculopathy, radial nerve palsy, neurogenic upper extremity pain.      DIAGNOSIS  Final diagnoses:   Fall from ground level   Contusion of left forearm, initial encounter   Laceration of left little finger without foreign body without damage to nail, initial encounter       Latest Documented Vital Signs:  As of 15:53 EDT  BP- 172/69 HR- 98 Temp- 99.5 °F (37.5 °C) (Oral) O2 sat- 92%    DISPOSITION  Patient discharged home in care of family members.    Discussed pertinent findings  with the patient/family.  Patient/Family voiced understanding of need to follow-up for recheck and further testing as needed.  Return to the Emergency Department warnings were given.         Medication List      Changed    apixaban 2.5 MG tablet tablet  Commonly known as: ELIQUIS  Take 1 tablet by mouth Every 12 (Twelve) Hours.  What changed: how much to take     O2  Commonly known as: OXYGEN  Inhale 1 L/min every night at bedtime.  What changed: how much to take                Follow-up Information     Abdullahi Clarke MD. Call in 1 day.    Specialty: Internal Medicine  Why: To schedule follow-up appointment  Contact information:  [address]  [place] [id number] [phone]                     Dictated utilizing Dragon dictation     Ade Beard PA-C  04/06/21 1554

## 2021-04-16 ENCOUNTER — LAB REQUISITION (OUTPATIENT)
Dept: LAB | Facility: HOSPITAL | Age: 81
End: 2021-04-16

## 2021-04-16 DIAGNOSIS — N18.4 CHRONIC KIDNEY DISEASE, STAGE 4 (SEVERE) (HCC): ICD-10-CM

## 2021-04-16 DIAGNOSIS — E55.9 VITAMIN D DEFICIENCY, UNSPECIFIED: ICD-10-CM

## 2021-04-16 DIAGNOSIS — E03.9 HYPOTHYROIDISM, UNSPECIFIED: ICD-10-CM

## 2021-04-16 DIAGNOSIS — D63.1 ANEMIA IN CHRONIC KIDNEY DISEASE (CODE): ICD-10-CM

## 2021-04-16 DIAGNOSIS — I13.0 HYPERTENSIVE HEART AND CHRONIC KIDNEY DISEASE WITH HEART FAILURE AND STAGE 1 THROUGH STAGE 4 CHRONIC KIDNEY DISEASE, OR UNSPECIFIED CHRONIC KIDNEY DISEASE (HCC): ICD-10-CM

## 2021-04-16 DIAGNOSIS — I50.32 CHRONIC DIASTOLIC (CONGESTIVE) HEART FAILURE (HCC): ICD-10-CM

## 2021-04-16 DIAGNOSIS — E11.22 TYPE 2 DIABETES MELLITUS WITH DIABETIC CHRONIC KIDNEY DISEASE (HCC): ICD-10-CM

## 2021-04-16 LAB
ALBUMIN SERPL-MCNC: 3.5 G/DL (ref 3.5–5.2)
ALBUMIN/GLOB SERPL: 1.3 G/DL
ALP SERPL-CCNC: 60 U/L (ref 39–117)
ALT SERPL W P-5'-P-CCNC: 11 U/L (ref 1–41)
ANION GAP SERPL CALCULATED.3IONS-SCNC: 10 MMOL/L (ref 5–15)
AST SERPL-CCNC: 14 U/L (ref 1–40)
BASOPHILS # BLD AUTO: 0.03 10*3/MM3 (ref 0–0.2)
BASOPHILS NFR BLD AUTO: 0.5 % (ref 0–1.5)
BILIRUB SERPL-MCNC: 0.3 MG/DL (ref 0–1.2)
BUN SERPL-MCNC: 18 MG/DL (ref 8–23)
BUN/CREAT SERPL: 11.5 (ref 7–25)
CALCIUM SPEC-SCNC: 8.9 MG/DL (ref 8.6–10.5)
CHLORIDE SERPL-SCNC: 101 MMOL/L (ref 98–107)
CHOLEST SERPL-MCNC: 176 MG/DL (ref 0–200)
CO2 SERPL-SCNC: 29 MMOL/L (ref 22–29)
CREAT SERPL-MCNC: 1.56 MG/DL (ref 0.76–1.27)
DEPRECATED RDW RBC AUTO: 47.2 FL (ref 37–54)
EOSINOPHIL # BLD AUTO: 0.4 10*3/MM3 (ref 0–0.4)
EOSINOPHIL NFR BLD AUTO: 6.5 % (ref 0.3–6.2)
ERYTHROCYTE [DISTWIDTH] IN BLOOD BY AUTOMATED COUNT: 14.8 % (ref 12.3–15.4)
GFR SERPL CREATININE-BSD FRML MDRD: 43 ML/MIN/1.73
GLOBULIN UR ELPH-MCNC: 2.6 GM/DL
GLUCOSE SERPL-MCNC: 146 MG/DL (ref 65–99)
HBA1C MFR BLD: 6.9 % (ref 4.8–5.6)
HCT VFR BLD AUTO: 35.1 % (ref 37.5–51)
HDLC SERPL-MCNC: 27 MG/DL (ref 40–60)
HGB BLD-MCNC: 11.3 G/DL (ref 13–17.7)
IMM GRANULOCYTES # BLD AUTO: 0.01 10*3/MM3 (ref 0–0.05)
IMM GRANULOCYTES NFR BLD AUTO: 0.2 % (ref 0–0.5)
LDLC SERPL CALC-MCNC: 125 MG/DL (ref 0–100)
LDLC/HDLC SERPL: 4.53 {RATIO}
LYMPHOCYTES # BLD AUTO: 1.22 10*3/MM3 (ref 0.7–3.1)
LYMPHOCYTES NFR BLD AUTO: 19.8 % (ref 19.6–45.3)
MAGNESIUM SERPL-MCNC: 1.6 MG/DL (ref 1.6–2.4)
MCH RBC QN AUTO: 28 PG (ref 26.6–33)
MCHC RBC AUTO-ENTMCNC: 32.2 G/DL (ref 31.5–35.7)
MCV RBC AUTO: 86.9 FL (ref 79–97)
MONOCYTES # BLD AUTO: 0.66 10*3/MM3 (ref 0.1–0.9)
MONOCYTES NFR BLD AUTO: 10.7 % (ref 5–12)
NEUTROPHILS NFR BLD AUTO: 3.85 10*3/MM3 (ref 1.7–7)
NEUTROPHILS NFR BLD AUTO: 62.3 % (ref 42.7–76)
NRBC BLD AUTO-RTO: 0 /100 WBC (ref 0–0.2)
PLATELET # BLD AUTO: 204 10*3/MM3 (ref 140–450)
PMV BLD AUTO: 11.6 FL (ref 6–12)
POTASSIUM SERPL-SCNC: 3.6 MMOL/L (ref 3.5–5.2)
PROT SERPL-MCNC: 6.1 G/DL (ref 6–8.5)
RBC # BLD AUTO: 4.04 10*6/MM3 (ref 4.14–5.8)
SODIUM SERPL-SCNC: 140 MMOL/L (ref 136–145)
TRIGL SERPL-MCNC: 134 MG/DL (ref 0–150)
TSH SERPL DL<=0.05 MIU/L-ACNC: >100 UIU/ML (ref 0.27–4.2)
VLDLC SERPL-MCNC: 24 MG/DL (ref 5–40)
WBC # BLD AUTO: 6.17 10*3/MM3 (ref 3.4–10.8)

## 2021-04-16 PROCEDURE — 85025 COMPLETE CBC W/AUTO DIFF WBC: CPT | Performed by: FAMILY MEDICINE

## 2021-04-16 PROCEDURE — 83036 HEMOGLOBIN GLYCOSYLATED A1C: CPT | Performed by: FAMILY MEDICINE

## 2021-04-16 PROCEDURE — 80053 COMPREHEN METABOLIC PANEL: CPT | Performed by: FAMILY MEDICINE

## 2021-04-16 PROCEDURE — 84443 ASSAY THYROID STIM HORMONE: CPT | Performed by: FAMILY MEDICINE

## 2021-04-16 PROCEDURE — 83735 ASSAY OF MAGNESIUM: CPT | Performed by: FAMILY MEDICINE

## 2021-04-16 PROCEDURE — 80061 LIPID PANEL: CPT | Performed by: FAMILY MEDICINE

## 2021-04-16 PROCEDURE — 82306 VITAMIN D 25 HYDROXY: CPT | Performed by: FAMILY MEDICINE

## 2021-04-16 PROCEDURE — 82607 VITAMIN B-12: CPT | Performed by: FAMILY MEDICINE

## 2021-04-17 LAB
25(OH)D3 SERPL-MCNC: 13.7 NG/ML (ref 30–100)
VIT B12 BLD-MCNC: 284 PG/ML (ref 211–946)

## 2021-07-07 PROBLEM — I50.33 ACUTE ON CHRONIC DIASTOLIC CONGESTIVE HEART FAILURE (HCC): Status: ACTIVE | Noted: 2021-01-01

## 2021-07-09 NOTE — OUTREACH NOTE
Prep Survey      Responses   [facility] facility patient discharged from?  [facility]   Is LACE score < 7 ?  No   Emergency Room discharge w/ pulse ox?  No   Eligibility  Readm Mgmt   Discharge diagnosis  Acute on chronic diastolic congestive heart failure (CMS/HCC)   Does the patient have one of the following disease processes/diagnoses(primary or secondary)?  CHF   Does the patient have Home health ordered?  Yes   What is the Home health agency?   refused   Is there a DME ordered?  No   Prep survey completed?  Yes          Earlene Vale RN

## 2021-07-13 NOTE — OUTREACH NOTE
CHF Week 1 Survey      Responses   [facility] [place] patient discharged from?  [facility]   Does the patient have one of the following disease processes/diagnoses(primary or secondary)?  CHF   CHF Week 1 attempt successful?  No   Unsuccessful attempts  Attempt 1          Bhavna Barraza RN

## 2021-07-15 NOTE — OUTREACH NOTE
CHF Week 1 Survey      Responses   [facility] patient discharged from?  [facility]   Does the patient have one of the following disease processes/diagnoses(primary or secondary)?  CHF   CHF Week 1 attempt successful?  No   Unsuccessful attempts  Attempt 2          Beata Anne RN
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
## 2021-07-19 PROBLEM — I48.21 PERMANENT ATRIAL FIBRILLATION (HCC): Chronic | Status: ACTIVE | Noted: 2020-06-30

## 2021-07-19 PROBLEM — J90 RECURRENT LEFT PLEURAL EFFUSION: Status: ACTIVE | Noted: 2021-01-01

## 2021-07-19 NOTE — NURSING NOTE
Educated patient on need for covid testing at this time.  Patient continues to refuse, stating that he has been watching TV, and knows too much about the swab.

## 2021-07-19 NOTE — ED PROVIDER NOTES
Subjective     History provided by:  Patient and relative (Niece)    History of Present Illness    · Chief complaint: Fall    · Location: Injuries to the right knee, left hip and left shoulder.    · Quality/Severity: Moderate pain in the left hip.  Mild pain in the right knee and left shoulder.    · Timing/Onset: Fell last night.    · Modifying Factors: Movement of the left hip and right knee and left shoulder exacerbate pain.    · Associated symptoms: Patient did not hit his head denies head or neck pain.  He does report shortness of breath that started today but denies any coughing.    · Narrative: The patient is a 80-year-old white male who presents with his niece after falling last night.  He states he injured his right knee, left hip and left shoulder.  The patient was hospitalized here 7/7-8/21 for weakness.  His daughter states he was hospitalized for 8 days at the end of June early July [facility] for pneumonia.  The patient has a history of COPD and congestive heart failure and is on home O2 2 L.  He has a history of A. fib and is anticoagulated with Eliquis.  He has a history of chronic kidney disease stage III.  He status post right knee replacement.  The patient fell last night transferring from his scooter to his bed injuring his right knee, left hip and left shoulder.  He states today has been a little short of breath but has not had a cough.  He denies any fever.    Review of Systems   Constitutional: Negative for activity change, appetite change, chills, diaphoresis, fatigue and fever.   HENT: Negative for congestion, ear pain, rhinorrhea, sinus pain and voice change.    Eyes: Negative for pain and visual disturbance.   Respiratory: Positive for shortness of breath. Negative for cough.    Cardiovascular: Negative for chest pain.   Gastrointestinal: Negative for abdominal pain, diarrhea, nausea and vomiting.   Genitourinary: Negative for difficulty urinating, dysuria, flank pain and hematuria.  "  Musculoskeletal: Positive for gait problem ( Chronic due to weakness). Negative for back pain, neck pain and neck stiffness.   Skin: Negative for rash.   Neurological: Positive for weakness ( Generalized). Negative for dizziness, numbness and headaches.   Psychiatric/Behavioral: Negative for confusion.     Past Medical History:   Diagnosis Date   • A-fib (CMS/[facility])    • Arthritis    • Asthma    • CAD (coronary artery disease)    • Cardiomyopathy (CMS/[facility])    • Cataract    • CHF (congestive heart failure) (CMS/[facility])    • CKD (chronic kidney disease), stage III (CMS/[facility])    • COPD (chronic obstructive pulmonary disease) (CMS/[facility])    • Diabetes mellitus (CMS/[facility])    • Disease of thyroid gland    • Emphysema, unspecified (CMS/[facility])    • Glaucoma    • Hyperlipidemia    • Hypertension    • Kidney stone    • Myocardial infarct, old    • Neuropathy    • Osteoarthritis    • Osteoporosis    • PVD (peripheral vascular disease) (CMS/[facility])    • Renal disorder    • Shingles      /84   Pulse 92   Temp 98.3 °F (36.8 °C) (Oral)   Resp 18   Ht 185.4 cm (73\")   Wt 111 kg (244 lb)   SpO2 95%   BMI 32.19 kg/m²     Past Medical History:   Diagnosis Date   • A-fib (CMS/[facility])    • Arthritis    • Asthma    • CAD (coronary artery disease)    • Cardiomyopathy (CMS/[facility])    • Cataract    • CHF (congestive heart failure) (CMS/[facility])    • CKD (chronic kidney disease), stage III (CMS/[facility])    • COPD (chronic obstructive pulmonary disease) (CMS/[facility])    • Diabetes mellitus (CMS/[facility])    • Disease of thyroid gland    • Emphysema, unspecified (CMS/[facility])    • Glaucoma    • Hyperlipidemia    • Hypertension    • Kidney stone    • Myocardial infarct, old    • Neuropathy    • Osteoarthritis    • Osteoporosis    • PVD (peripheral vascular disease) (CMS/[facility])    • Renal disorder    • Shingles        Allergies   Allergen Reactions   • Morphine Unknown - High Severity   • Atorvastatin Unknown - Low Severity   • Oxycontin [Oxycodone Hcl] Confusion     " 'Makes me crazy and stand on my head'       Past Surgical History:   Procedure Laterality Date   • COLONOSCOPY     • EYE SURGERY     • JOINT REPLACEMENT      right   • KIDNEY STONE SURGERY     • REPLACEMENT TOTAL KNEE     • TOE SURGERY         Family History   Problem Relation Age of Onset   • COPD Mother    • Diabetes Father        Social History     Socioeconomic History   • Marital status: Unknown     Spouse name: Not on file   • Number of children: Not on file   • Years of education: Not on file   • Highest education level: Not on file   Tobacco Use   • Smoking status: Former Smoker   • Smokeless tobacco: Never Used   • Tobacco comment: quit 1993   Vaping Use   • Vaping Use: Never used   Substance and Sexual Activity   • Alcohol use: No   • Drug use: No   • Sexual activity: Defer           Objective   Physical Exam  Vitals and nursing note reviewed.   Constitutional:       General: He is not in acute distress.     Appearance: He is not ill-appearing, toxic-appearing or diaphoretic.      Comments: The patient is elderly and appears chronically ill, he appears in no acute distress and does not appear toxic.   HENT:      Head: Normocephalic and atraumatic.      Nose: Nose normal. No congestion or rhinorrhea.      Mouth/Throat:      Mouth: Mucous membranes are moist.      Pharynx: Oropharynx is clear.   Eyes:      General: No scleral icterus.        Right eye: No discharge.         Left eye: No discharge.      Conjunctiva/sclera: Conjunctivae normal.      Pupils: Pupils are equal, round, and reactive to light.   Cardiovascular:      Rate and Rhythm: Normal rate and regular rhythm.      Heart sounds: No murmur heard.     Pulmonary:      Breath sounds: No wheezing.      Comments: The patient has good air movement.  He has crackles in his left base.  Abdominal:      General: Bowel sounds are normal.      Palpations: Abdomen is soft.      Tenderness: There is no abdominal tenderness. There is no right CVA tenderness or  left CVA tenderness.   Musculoskeletal:      Cervical back: Normal range of motion and neck supple. No tenderness.      Comments: The patient's right knee has a scar from his arthropathy.  There is no bony deformity and only minimal patellar tenderness.  No ligamental laxity.  Good range of motion of the right knee.  The left hip is tender over the greater trochanter.  He has mild pain with range of motion of the left hip.  His left shoulder has no obvious deformity.  He has soft tissue tenderness on the anterior aspect of it.  There is no ecchymosis.  He has some discomfort with range of motion.   Lymphadenopathy:      Cervical: No cervical adenopathy.   Skin:     General: Skin is warm and dry.      Capillary Refill: Capillary refill takes less than 2 seconds.      Findings: No bruising.   Neurological:      General: No focal deficit present.      Mental Status: He is alert and oriented to person, place, and time.      Cranial Nerves: No cranial nerve deficit.      Sensory: No sensory deficit.      Motor: No weakness.   Psychiatric:         Mood and Affect: Mood normal.         Behavior: Behavior normal.         Thought Content: Thought content normal.         Judgment: Judgment normal.         Procedures           ED Course  ED Course as of Jul 19 0103   Sun Jul 18, 2021   2246 My interpretation of the patient's EKG tracing performed 22: 33 is atrial fibrillation with a ventricular response of 95, 90 degree axis, significant baseline artifact, wide QRS complex due to left bundle branch block, isolated PVC, no acute ST segment elevation or depression consistent with ischemia, no change in comparison to tracing 7/7/2021.    [TP]   2324 The patient's chest x-ray shows a new left lower lobe infiltrate with a pleural effusion in comparison to chest x-ray performed 7/7/2021.  His CBC has a upper limits normal white count of 10.67 with a left shift.  He is mildly anemic with a hemoglobin 11.1 and hematocrit 35.  CMP has  an elevated BUN of 25 with an elevated creatinine of 1.41 with a low GFR of 48 consistent with known chronic kidney disease stage III.  Liver function test within normal limits.  His cardiac troponin was slightly elevated in the indeterminate range at 0.06H which is actually baseline in comparison to previous cardiac troponins in the chart.  His D-dimer was mildly elevated at 0.96 which is slightly increased from 2 years ago when it was also slightly elevated at 0.69.  His proBNP was elevated at 20,761 which is slightly elevated in comparison to 11 days ago.  Lactic acid and procalcitonin were within normal limits.  2 sets of blood cultures are pending.  The patient refused his Covid nasal swab, but the patient has been vaccinated against Covid.    [TP]   2327 Antibiotic therapy for the patient's left lower lobe pneumonia was initiated with Rocephin 2 g and Zithromax 500 mg IV.  Request for records from [facility] in [place] concerning his recent admission for pneumonia was made.    [TP]   Mon Jul 19, 2021   0054 The patient CT angiogram of the chest was negative for PE.  Bilateral pleural effusions and hazy groundglass opacities suggest congestive heart failure and pulmonary edema.    [TP]   0100 Bumex 2 mg IV.    [TP]   0100 01:00 patient discussed with Dr Katz, hospitalist, who agreed to admit the patient.    [TP]      ED Course User Index  [TP] Melchor Elliott MD                                           MDM  Number of Diagnoses or Management Options  Acute on chronic congestive heart failure, unspecified heart failure type (CMS/HCC): new and requires workup  Contusion of left hip, initial encounter: new and requires workup  Contusion of left shoulder, initial encounter: new and requires workup  Contusion of right knee, initial encounter: new and requires workup  Recurrent left pleural effusion: new and requires workup     Amount and/or Complexity of Data Reviewed  Clinical lab tests: ordered  and reviewed  Tests in the radiology section of CPT®: ordered and reviewed  Tests in the medicine section of CPT®: ordered and reviewed  Discuss the patient with other providers: yes    Risk of Complications, Morbidity, and/or Mortality  Presenting problems: high  Diagnostic procedures: high  Management options: high  General comments: My differential diagnosis for dyspnea includes but is not limited to:  Asthma, COPD, pneumonia, pulmonary embolus, acute respiratory distress syndrome, pneumothorax, pleural effusion, pulmonary fibrosis, congestive heart failure, myocardial infarction, DKA, uremia, acidosis, sepsis, anemia, drug related, hyperventilation, CNS disease    Patient Progress  Patient progress: stable      Final diagnoses:   Recurrent left pleural effusion   Acute on chronic congestive heart failure, unspecified heart failure type (CMS/HCC)   Contusion of left hip, initial encounter   Contusion of left shoulder, initial encounter   Contusion of right knee, initial encounter       ED Disposition  ED Disposition     ED Disposition Condition Comment    Decision to Admit  Level of Care: Telemetry [5]   Diagnosis: Recurrent left pleural effusion [[id number]]   Admitting Physician: DOMENICA ARMENTA [[id number]]   Bed Request Comments: Acute on chronic congestive heart failure and left pleural effusion            No follow-up provider specified.       Medication List      No changes were made to your prescriptions during this visit.         Labs Reviewed   COMPREHENSIVE METABOLIC PANEL - Abnormal; Notable for the following components:       Result Value    Glucose 154 (*)     BUN 25 (*)     Creatinine 1.41 (*)     Sodium 135 (*)     Chloride 97 (*)     eGFR Non  Amer 48 (*)     All other components within normal limits    Narrative:     GFR Normal >60  Chronic Kidney Disease <60  Kidney Failure <15     URINALYSIS W/ MICROSCOPIC IF INDICATED (NO CULTURE) - Abnormal; Notable for the following components:     Appearance, UA Slightly Cloudy (*)     Glucose,  mg/dL (2+) (*)     Blood, UA Small (1+) (*)     Protein, UA >=300 mg/dL (3+) (*)     All other components within normal limits   TROPONIN (IN-HOUSE) - Abnormal; Notable for the following components:    Troponin T 0.068 (*)     All other components within normal limits    Narrative:     Troponin T Reference Range:  <= 0.03 ng/mL-   Negative for AMI  >0.03 ng/mL-     Abnormal for myocardial necrosis.  Clinicians would have to utilize clinical acumen, EKG, Troponin and serial changes to determine if it is an Acute Myocardial Infarction or myocardial injury due to an underlying chronic condition.       Results may be falsely decreased if patient taking Biotin.     D-DIMER, QUANTITATIVE - Abnormal; Notable for the following components:    D-Dimer, Quantitative 0.96 (*)     All other components within normal limits    Narrative:     Can be elevated in, but is not diagnostic for deep vein thrombosis (DVT) or pulmonary embolis (PE).  It is also elevated in other medical conditions.  Clinical correlation is required.  The negative cut-off value for the D-Dimer is 0.50 mcg FEU/mL for DVT and PE.     BNP (IN-HOUSE) - Abnormal; Notable for the following components:    proBNP 20,761.0 (*)     All other components within normal limits    Narrative:     Among patients with dyspnea, NT-proBNP is highly sensitive for the detection of acute congestive heart failure. In addition NT-proBNP of <300 pg/ml effectively rules out acute congestive heart failure with 99% negative predictive value.    Results may be falsely decreased if patient taking Biotin.     CBC WITH AUTO DIFFERENTIAL - Abnormal; Notable for the following components:    RBC 3.95 (*)     Hemoglobin 11.1 (*)     Hematocrit 35.0 (*)     Neutrophil % 80.6 (*)     Lymphocyte % 5.6 (*)     Neutrophils, Absolute 8.60 (*)     Lymphocytes, Absolute 0.60 (*)     Monocytes, Absolute 0.96 (*)     Eosinophils, Absolute 0.44 (*)      All other components within normal limits   URINALYSIS, MICROSCOPIC ONLY - Abnormal; Notable for the following components:    RBC, UA 3-5 (*)     WBC, UA 3-5 (*)     Bacteria, UA Trace (*)     All other components within normal limits   LACTIC ACID, PLASMA - Normal   PROCALCITONIN - Normal    Narrative:     Results may be falsely decreased if patient taking Biotin.    BLOOD CULTURE   BLOOD CULTURE   COVID PRE-OP / PRE-PROCEDURE SCREENING ORDER (NO ISOLATION)    Narrative:     The following orders were created for panel order COVID PRE-OP / PRE-PROCEDURE SCREENING ORDER (NO ISOLATION) - Swab, Nasopharynx.  Procedure                               Abnormality         Status                     ---------                               -----------         ------                     COVID-19 and FLU A/B PCR...[[id number]]                                                   Please view results for these tests on the individual orders.   COVID-19 AND FLU A/B, NP SWAB IN TRANSPORT MEDIA 8-12 HR TAT   CBC AND DIFFERENTIAL    Narrative:     The following orders were created for panel order CBC & Differential.  Procedure                               Abnormality         Status                     ---------                               -----------         ------                     CBC Auto Differential[[id number]]        Abnormal            Final result                 Please view results for these tests on the individual orders.     CT Angiogram Chest   Final Result   1. Negative for pulmonary embolus.   2. Negative for thoracic aortic aneurysm/dissection.   3. Cardiomegaly with bilateral pleural effusions, compressive bibasilar atelectasis, and hazy groundglass opacities throughout both lungs. There is interlobular septal thickening. Findings appear most suggestive of congestive failure and pulmonary edema.   4. Cholelithiasis.      Signer Name: Gato Mcgovern MD    Signed: 7/19/2021 12:42 AM    Workstation Name: [id number]      [facility]      XR Chest 2 View   Final Result   Interval worsening in the appearance the chest with development of a moderate-sized left pleural effusion. This also left lower lobe airspace disease and probably patchy airspace disease at the periphery left upper lung. There is again mild cardiac   silhouette enlargement and central vascular congestion which is similar to the prior study. Given the asymmetric involvement of the left hemithorax, please correlate for clinical evidence for aspiration or pneumonia, possibly with a parapneumonic   effusion. Please correlate for any clinical concern for pulmonary embolus. Correlation with a CT chest PE protocol may be helpful.      Signer Name: Libertad Kam MD    Signed: 7/18/2021 9:29 PM    Workstation Name: [facility]     [facility]      XR Hip With or Without Pelvis 2 - 3 View Left   Final Result   No acute fracture or dislocation suspected left hip.      Signer Name: Libertad Kam MD    Signed: 7/18/2021 9:25 PM    Workstation Name: [facility]     [facility]      XR Shoulder 2+ View Left   Final Result   Plain film findings are most suggestive of chronic rotator cuff disease left shoulder without acute fracture or dislocation. There is also degenerative change at the acromioclavicular joint.      Signer Name: Libertad Kam MD    Signed: 7/18/2021 9:22 PM    Workstation Name: [facility]     Radiology [facility]      XR Knee 3 View Right   Final Result      Postoperative findings consistent with right total knee arthroplasty without evidence for acute fracture or loosening or dislocation. There may be a small amount of suprapatellar joint fluid. This is difficult to determine in the postoperative knee.      Signer Name: Libertad Kam MD    Signed: 7/18/2021 9:23 PM    Workstation Name: [facility]     [facility]iology [facility]             Medication List      No changes were  made to your prescriptions during this visit.              Melchor Elliott MD  07/19/21 0103

## 2021-07-19 NOTE — PLAN OF CARE
"Goal Outcome Evaluation:              Outcome Summary: OT evaluation completed. Pt with a history of frequent falls at home. Pt lives alone, and he reports increasing difficulty with management of basic daily tasks. Pt states his grandaughter has been assisting with IADL's, and he has home health services. Pt required mod assist for bed mobility. Pt managed transfer from bed to chair with CGA using a rolling walker for support (the bed surface was elevated prior to standing since he uses a lift chair at home). Pt stated he typically uses a rollator or scooter at home for mobility. Pt needs assistance for basic adl's adequately manage his hygiene. Pt stated his family wants him to go to a SNF, and he is receptive to \"keep the peace with the family.\" Rec OT services during acute care stay to address safety/independence with basic adl tasks/transfers.   "

## 2021-07-19 NOTE — OUTREACH NOTE
CHF Week 2 Survey      Responses   [facility] [place] patient discharged from?  [place]   Does the patient have one of the following disease processes/diagnoses(primary or secondary)?  CHF   Week 2 attempt successful?  No   Unsuccessful attempts  Attempt 1   Revoke  Readmitted          Beata Barth RN

## 2021-07-19 NOTE — NURSING NOTE
I&O not able to be captured this shift due to patient incontinence. Male external catheter was applied at start of shift to attempt accurate output, yet the catheter leaked onto bed.  Patient has urinated large amounts throughout shift

## 2021-07-19 NOTE — CASE MANAGEMENT/SOCIAL WORK
Continued Stay Note   Temitope Castillo     Patient Name: Froilan Helton  MRN: [id number]  Today's Date: 7/19/2021    Admit Date: 7/18/2021    Discharge Plan     Row Name 07/19/21 1457       Plan    Plan  plan home with HH vs STR    Plan Comments  Noted [facility] has declined patient. [facility] request return call for update on ability to accept patient. CM will continue to follow.    Row Name 07/19/21 1327       Plan    Plan  plan to be determined, from home, lives alone    Plan Comments  Spoke with patient from door way, he is sitting up in recliner. Permission given to contact family related to dc planning. Per ER notes, patients granddaughter, Jaz, is POA. Only patients son, Agustin Helton is listed in chart. Spoke with Agustin via phone, he was unaware patient is in hospital. He provides phone number for Jaz's mother, Geovanna. Spoke with Geovanna Helton (patients daughter-in-law) who provides contact number for Jaz. Spoke with Jaz Grigsby [phone]. She is patients granddaughter and LITO. Face sheet verified. Patient lives alone and is able to transfer from his chair to his scooter to the bathroom. Jaz calls him multiple times a day and provides dinner to him each evening.She states he has a rolling walker, rollator, glucometer and 02 provided by [facility]. Patient is current with [facility] and has been to [facility] previously. He uses [facility] and there are no issues obtaining medications. Jaz states patient's PCP feel patient would benefit from STR and she has called several facilities with no return call. [facility] choice for [facility] is [facility], second choice is [facility]. Referrals sent to each facility via in basket and voice message left for [facility] and [facility]. Spoke with [facility] to inform of patient admission. CM will continue to follow, face sheet updated.        Discharge Codes    No documentation.             Titi Ramirez RN

## 2021-07-19 NOTE — CASE MANAGEMENT/SOCIAL WORK
Continued Stay Note  MICHAEL Kincaid     Patient Name: Froilan Helton  MRN: [id number]  Today's Date: 7/19/2021    Admit Date: 7/18/2021    Discharge Plan     Row Name 07/19/21 1327       Plan    Plan  plan to be determined, from home, lives alone    Plan Comments  Spoke with patient from door way, he is sitting up in recliner. Permission given to contact family related to dc planning. Per ER notes, patients granddaughter, Jaz, is POA. Only patients son, Agustin Helton, is listed in chart. Spoke with Agustin via phone, he was unaware patient is in hospital. He provides phone number for Jaz's mother, Geovanna Helton. Spoke with Geovanna Helton (patients daughter-in-law) who provides contact number for Jaz. Spoke with Jaz Grigsby [phone]. She is patients granddaughter and POA. Face sheet verified. Patient lives alone and is able to transfer from his chair to his scooter to the bathroom. Jaz calls him multiple times a day and provides dinner to him each evening. Family provides transportation as needed.She states he has a rolling walker, rollator, glucometer, nebulizer and 02 provided by [facility]. Patient is current with [facility]  and has been to [facility] previously. He uses [facility] and there are no issues obtaining medications. Jaz states patient's PCP feels patient would benefit from STR and she has called several facilities with no return call. [facility] choice for [facility] is [facility], second choice is [facility]. Referrals sent to each facility via in basket and voice message left for [facility] and [facility]. Spoke with [facility] to inform of patient admission. CM will continue to follow, face sheet updated.        Discharge Codes    No documentation.             Titi Ramirez RN

## 2021-07-19 NOTE — PLAN OF CARE
Goal Outcome Evaluation:  Plan of Care Reviewed With: patient        Progress: no change  Outcome Summary: Pt admitted from ER, in bed, resting. Pt continues covid isolation as per hospital requirements r/t  pt admantly refuses covid screening, educated to covid and mask requirements by hospial, needs encouragement to wear mask.

## 2021-07-19 NOTE — ED NOTES
"Pt arrives via [facility] c/o a fall around 1730 last night, he states he has been on the floor since the fall. He says he fell on his left side and is having left shoulder and right knee pain. He arrives saturated in urine and has multiple abrasions and skin tears. He says home health is \"working on\" getting him placement at an assisted living.      Tomeka Esqueda RN  07/18/21 2011    "

## 2021-07-19 NOTE — DISCHARGE PLACEMENT REQUEST
"Froilan Ferguson P (80 y.o. Male)     Date of Birth Social Security Number Address Home Phone MRN    1940  [address] [address] [phone] [id number]    Yazdanism Marital Status          None Unknown       Admission Date Admission Type Admitting Provider Attending Provider Department, Room/Bed    7/18/21 Emergency Alaina Javier MD Salmon Echanique, Cristina D, MD [facility] SURG, 1418/1    Discharge Date Discharge Disposition Discharge Destination                       Attending Provider: Alaina Javier MD    Allergies: Morphine, Atorvastatin, Oxycontin [Oxycodone Hcl]    Isolation: Enh Drop/Con   Infection: None   Code Status: No CPR    Ht: 185.4 cm (73\")   Wt: 109 kg (240 lb 8 oz)    Admission Cmt: None   Principal Problem: Fall on same level as cause of accidental injury [W18.30XA]                 Active Insurance as of 7/18/2021     Primary Coverage     Payor Plan Insurance Group Employer/Plan Group    MEDICARE MEDICARE A & B      Payor Plan Address Payor Plan Phone Number Payor Plan Fax Number Effective Dates    [address] [phone]  11/1/2006 - None Entered    [facility] [id number]       Subscriber Name Subscriber Birth Date Member ID       FROILAN FERGUSON P 1940 [id number]           Secondary Coverage     Payor Plan Insurance Group Employer/Plan Group    HUMANA HUMANA  SUP              [id number]     Payor Plan Address Payor Plan Phone Number Payor Plan Fax Number Effective Dates    [address] [address]   1/1/2013 - None Entered    [facility] [id number]       Subscriber Name Subscriber Birth Date Member ID       FROILAN FERGUSON P 1940 [id number]                 Emergency Contacts      (Rel.) Home Phone Work Phone Mobile Phone    ISAIAS FERGUSON (Son) [phone] -- --              "

## 2021-07-19 NOTE — NURSING NOTE
"CWON consult received. Patient with hx of reoccurring falls at home. He has a large abrasion to left hip that is stable. Base is moist and red. CWON recommends protecting with a large Optifoam dressing, changing every 3 days. He also has a healing right elbow injury with a dry thick scab noted. Will also recommend protecting with a foam dressing to allow scab to soften and wound to heal.    Skin to coccyx and bilateral glutes assessed. There is no current evidence of pressure injury noted. Skin is intact with evidence of prior injuries, chronic texture changes and hyperpigmentation which is blanchable. CWON will recommend Z guard for barrier protection and a waffle cushion to chair.    BLE are currently wrapped with unna boots and ace wrap compression. He states HH comes twice weekly and changes them.    CWON removed wraps and legs were washed with cleansing foam and water. Chronic changes noted to both legs, consistent with chronic venous insufficiency. Patient also with a hx of CHF.   There is skin wrinkling noted and mild edema. LLE with resolving/healing blisters noted to top of left foot and anterior lower leg. Also, a large bruise is present to lower anterior/lateral left leg. There are no open draining wounds noted today. CWON recommends compression be continued while patient is here and he is agreeable. Also, discussed with Dr. Briggs to ensure ok to proceed with changing wraps. He is also agreeable.    Vaseline gauze was applied over LLE resolving blisters followed by unna boots, Kerlix and 4\" ace wraps x 2 to BLE, toes to knees. Patient tolerated well.    Will plan to change wraps twice weekly as per his home schedule.    Update given to RAVI Olmedo.    Please don't hesitate to call with any questions.  "

## 2021-07-19 NOTE — SIGNIFICANT NOTE
07/19/21 0544   Provider Notification   Reason for Communication Critical lab value   Provider Name Dr. Katz   Notification Route Phone call   Response No new orders

## 2021-07-19 NOTE — DISCHARGE PLACEMENT REQUEST
"Froilan Ferguson P (80 y.o. Male)     Date of Birth Social Security Number Address Home Phone MRN    1940  [address] [facility] [address] [address] [phone] [id number]    Mosque Marital Status          None Unknown       Admission Date Admission Type Admitting Provider Attending Provider Department, Room/Bed    7/18/21 Emergency Alaina Javier MD Salmon Echanique, Cristina D, MD [facility] SURG, 1418/1    Discharge Date Discharge Disposition Discharge Destination                       Attending Provider: Alaina Javier MD    Allergies: Morphine, Atorvastatin, Oxycontin [Oxycodone Hcl]    Isolation: Enh Drop/Con   Infection: None   Code Status: No CPR    Ht: 185.4 cm (73\")   Wt: 109 kg (240 lb 8 oz)    Admission Cmt: None   Principal Problem: Fall on same level as cause of accidental injury [W18.30XA]                 Active Insurance as of 7/18/2021     Primary Coverage     Payor Plan Insurance Group Employer/Plan Group    MEDICARE MEDICARE A & B      Payor Plan Address Payor Plan Phone Number Payor Plan Fax Number Effective Dates    [address] [phone]  11/1/2006 - None Entered    [facility] [id number]       Subscriber Name Subscriber Birth Date Member ID       FROILAN FERGUSON P 1940 [id number]           Secondary Coverage     Payor Plan Insurance Group Employer/Plan Group    HUMANA HUMANA  SUP              [id number]     Payor Plan Address Payor Plan Phone Number Payor Plan Fax Number Effective Dates    [address] [address]   1/1/2013 - None Entered    [facility] [id number]       Subscriber Name Subscriber Birth Date Member ID       FROILAN FERGUSON P 1940 [id number]                 Emergency Contacts      (Rel.) Home Phone Work Phone Mobile Phone    ISAIAS FERGUSON (Son) [phone] -- --              "

## 2021-07-19 NOTE — PROGRESS NOTES
Adult Nutrition  Assessment/PES    Patient Name:  Froilan Helton  YOB: 1940  MRN: [id number]  Admit Date:  7/18/2021    Assessment Date:  7/19/2021    Recommend: consider adding Consistent CHO to diet order hx of DM noted    Encourage po and voice food prefs. Will cont to follow and monitor.     Reason for Assessment     Row Name 07/19/21 1458          Reason for Assessment    Reason For Assessment  identified at risk by screening criteria     Diagnosis  trauma;cardiac disease s/p fall, CHF, plueral effusion hx CKD DM CHF     Identified At Risk by Screening Criteria  MST SCORE 2+         Nutrition/Diet History     Row Name 07/19/21 1451          Nutrition/Diet History    Typical Food/Fluid Intake  Spoke w Rn on floor, pt refused covid test so had to be in enhanced isolation. Noted plan for possible rehab. per RN note disoriented x 2         Anthropometrics     Row Name 07/19/21 9300          Anthropometrics    Weight  -- 240.5#        Usual Body Weight (UBW)    Weight Loss Time Frame  per EMR data pt down from 280# last July 2020 which is 40# loss hx of CHF noted, bumex noted as home med        Body Mass Index (BMI)    BMI Assessment  BMI 30-34.9: obesity grade I         Labs/Tests/Procedures/Meds     Row Name 07/19/21 1453          Labs/Procedures/Meds    Lab Results Reviewed  reviewed     Lab Results Comments  BUN 24/Creat 1.3        Diagnostic Tests/Procedures    Diagnostic Test/Procedure Reviewed  reviewed        Medications    Pertinent Medications Reviewed  reviewed     Pertinent Medications Comments  BUMEx, vitamin D, novolog, miralax, B12         Physical Findings     Row Name 07/19/21 5544          Physical Findings    Skin  other (see comments) see WOCN note: no PI , L hip abrasion, elbow scab, leg blisters/wrapped         Estimated/Assessed Needs     Row Name 07/19/21 2532          Estimated/Assessed Needs    Additional Documentation  Calorie Requirements (Group);Fluid Requirements  (Group);Protein Requirements (Group)        Calorie Requirements    Estimated Calorie Need Method  [place]     Estimated Calorie Requirement Comment  0458-5192 kcal ( mifflin 0-1.2) 209-251 gm CHO, 45% kcal        Protein Requirements    Est Protein Requirement Amount (gms/kg)  0.8 gm protein 87 gm pro        Fluid Requirements    Estimated Fluid Requirement Method  RDA Method 3941-2033 ml CHF guidelines         Nutrition Prescription Ordered     Row Name 07/19/21 1455          Nutrition Prescription PO    Common Modifiers  Cardiac         Evaluation of Received Nutrient/Fluid Intake     Row Name 07/19/21 1456          Fluid Intake Evaluation    IV Fluid (mL)  735 insufficient data        PO Evaluation    Number of Days PO Intake Evaluated  Insufficient Data     Number of Meals  1     % PO Intake  100               Problem/Interventions:  Problem 1     Row Name 07/19/21 1457          Nutrition Diagnoses Problem 1    Problem 1  Limited Adherence to Diet Modifications     Etiology (related to)  Medical Diagnosis;Factors Affecting Nutrition     Signs/Symptoms (evidenced by)  Report/Observation               Intervention Goal     Row Name 07/19/21 1457          Intervention Goal    General  Meet nutritional needs for age/condition     PO  PO intake (%)     PO Intake %  75 % or greater         Nutrition Intervention     Row Name 07/19/21 1458          Nutrition Intervention    RD/Tech Action  Follow Tx progress           Education/Evaluation     Row Name 07/19/21 1458          Education    Education  Education not appropriate at this time        Monitor/Evaluation    Monitor  Per protocol;PO intake;I&O;Pertinent labs;Weight;Skin status           Electronically signed by:  Laura Varma RD  07/19/21 14:58 EDT

## 2021-07-19 NOTE — ED NOTES
"Pt refused covid test, informed MD. Pt states he did not receive the second shot, when asked why the pt stated, \"Because I didn't want it.\"      Tomeka Eqsueda RN  07/18/21 7002    "

## 2021-07-19 NOTE — CONSULTS
Patient Name: Froilan Helton  :1940  80 y.o.    Date of Admission: 2021  Date of Consultation:  21  Encounter Provider: Darwin Monaco III, MD  Place of Service: [facility] CARDIOLOGY  Referring Provider: No ref. provider found  Patient Care Team:  Abdullahi Clarke MD as PCP - General (Internal Medicine)      Chief complaint: fall    History of Present Illness: Froilan Helton is a 80 year old pt with a history of COPD on home oxygen 2 liters NC, Afib on Eliquis, CKD III, HTN, HLD and CHF.      Pt was last seen in hospital consultation on 20 . Pt was admitted with progressive worsening of generalized body weakness and frequent falls. . Pt reported ambulating with a walker but had been having balance issues. Pt denied any significant change in his breathing, palpitations, presyncope, syncope, orthopnea or PND. Pt felt like his bilateral lower extremity edema was worse. Pt was to continue his home oral diuretic and Coreg and Eliquis .     Patient presented the emergency room with complaints of a fall that injured his right knee, left hip and left shoulder.  Patient was hospitalized from  to  for weakness.  In the emergency room he had an EKG that showed atrial fibrillation with a heart rate of 95.  Chest x-ray showed a new left lower lobe infiltrate and pleural effusion.  NT proBNP is elevated 20,761.  Chest CT showed cardiomegaly with bilateral pleural effusions, compressive bibasilar atelectasis groundglass opacities throughout both lungs.    Patient is still in Covid isolation.  He tested negative on the seventh but refused testing overnight, plan is to do his Covid test this morning.    ECHO 20      · Estimated EF = 36%.  · Left ventricular systolic function is moderately decreased.  · There is severe calcification of the aortic valve mainly affecting the left and right coronary cusp(s).  · Trace-to-mild aortic valve regurgitation is  present.  · The following left ventricular wall segments are hypokinetic: mid anterior, apical anterior, basal anterolateral, mid anterolateral, apical lateral, basal inferolateral, mid inferolateral, apical inferior, mid inferior, apical septal, basal inferoseptal, mid inferoseptal, apex hypokinetic, mid anteroseptal, basal anterior, basal inferior and basal inferoseptal.    Stress Test 9/17/18      · Myocardial perfusion imaging indicates a normal myocardial perfusion study with no evidence of ischemia.  · Impressions are consistent with a low risk study.  · Left ventricular ejection fraction is mildly reduced (Calculated EF = 42%).      Past Medical History:   Diagnosis Date   • A-fib (CMS/[facility])    • Arthritis    • Asthma    • CAD (coronary artery disease)    • Cardiomyopathy (CMS/[facility])    • Cataract    • CHF (congestive heart failure) (CMS/HCC)    • CKD (chronic kidney disease), stage III (CMS/HCC)    • COPD (chronic obstructive pulmonary disease) (CMS/HCC)    • Diabetes mellitus (CMS/HCC)    • Disease of thyroid gland    • Emphysema, unspecified (CMS/[facility])    • Glaucoma    • Hyperlipidemia    • Hypertension    • Kidney stone    • Myocardial infarct, old    • Neuropathy    • Osteoarthritis    • Osteoporosis    • Permanent atrial fibrillation (CMS/[facility]) 6/30/2020   • PVD (peripheral vascular disease) (CMS/[facility])    • Renal disorder    • Shingles        Past Surgical History:   Procedure Laterality Date   • COLONOSCOPY     • EYE SURGERY     • JOINT REPLACEMENT      right   • KIDNEY STONE SURGERY     • REPLACEMENT TOTAL KNEE     • TOE SURGERY           Prior to Admission medications    Medication Sig Start Date End Date Taking? Authorizing Provider   acetaminophen (TYLENOL) 325 MG tablet Take 2 tablets by mouth Every 4 (Four) Hours As Needed for Mild Pain . 10/21/19  Yes Sonya Daniel DO   amiodarone (PACERONE) 200 MG tablet Take 200 mg by mouth 2 (Two) Times a Day.   Yes Provider, MD Carmina   apixaban  (ELIQUIS) 2.5 MG tablet tablet Take 1 tablet by mouth Every 12 (Twelve) Hours.  Patient taking differently: Take 5 mg by mouth Every 12 (Twelve) Hours. 11/27/19  Yes Terri Chaudhry MD   atorvastatin (LIPITOR) 10 MG tablet Take 10 mg by mouth Daily.   Yes Carmina Jain MD   bumetanide (BUMEX) 2 MG tablet Take 1 tablet by mouth 2 (Two) Times a Day. 7/2/20  Yes Pia Figueredo MD   carvedilol (COREG) 6.25 MG tablet Take 1 tablet by mouth 2 (Two) Times a Day With Meals. 8/29/19  Yes Terri Chaudhry MD   citalopram (CeleXA) 10 MG tablet Take 20 mg by mouth Daily.   Yes Carmina Jain MD   docusate sodium (COLACE) 100 MG capsule Take 100 mg by mouth 2 (Two) Times a Day.   Yes Carmina Jain MD   hydrophor (AQUAPHOR) ointment ointment Apply  topically to the appropriate area as directed As Needed (Ulcerations lower extremities and edema). Lower extremities. 10/21/19  Yes Sonya Daniel,    levothyroxine (SYNTHROID, LEVOTHROID) 112 MCG tablet Take 224 mcg by mouth Daily.   Yes ProviderCarmina MD   O2 (OXYGEN) Inhale 1 L/min every night at bedtime.  Patient taking differently: Inhale 2 L/min every night at bedtime. 8/29/19  Yes Terri Chaudhry MD   pregabalin (LYRICA) 75 MG capsule Take 75 mg by mouth 2 (Two) Times a Day.   Yes ProviderCarmina MD   QUEtiapine (SEROquel) 25 MG tablet Take 12.5 mg by mouth Every Night.   Yes Carmina Jain MD   tamsulosin (FLOMAX) 0.4 MG capsule 24 hr capsule Take 1 capsule by mouth Daily. 8/30/19  Yes Terri Chaudhry MD   albuterol sulfate  (90 Base) MCG/ACT inhaler Inhale 2 puffs Every 4 (Four) Hours As Needed for Wheezing.    Carmina Jain MD   budesonide-formoterol (SYMBICORT) 160-4.5 MCG/ACT inhaler Inhale 2 puffs 2 (Two) Times a Day. 2/19/19   Santiago Powers DO   cholecalciferol 2000 units tablet Take 2,000 Units by mouth Daily. 8/30/19   Terri Chaudhry  "MD Nayana   doxycycline (VIBRAMYCIN) 100 MG capsule Take 100 mg by mouth 2 (Two) Times a Day.    Carmina Jain MD   fluticasone (FLONASE) 50 MCG/ACT nasal spray 2 sprays by Each Nare route Daily. 6/29/19   Terri Chaudhry MD   hydrocortisone 1 % cream Apply  topically to the appropriate area as directed 2 (Two) Times a Day.    Carmina Jain MD   insulin aspart (novoLOG) 100 UNIT/ML injection Inject 1-14 Units under the skin into the appropriate area as directed 3 (Three) Times a Day Before Meals. As directed per sliding scale    Carmina Jain MD   Insulin Syringe 30G X 5/16\" 1 ML misc U UTD WITH INSULIN UP TO 6 TIMES A DAY 9/8/19   Carmina Jain MD   ipratropium-albuterol (DUO-NEB) 0.5-2.5 mg/3 ml nebulizer Take 3 mL by nebulization Every 4 (Four) Hours As Needed for Wheezing.    Carmina Jain MD   loratadine (CLARITIN) 5 MG chewable tablet Chew 10 mg Daily.    Carmina Jain MD   melatonin 5 MG tablet tablet Take 1 tablet by mouth At Night As Needed (sleep). Over the counter 11/27/19   Terri Chaudhry MD   Petrolatum 42 % ointment Apply  topically to the appropriate area as directed Daily. Send with patient 11/28/19   Terri Chaudhry MD   polyethylene glycol (MIRALAX) 17 g packet Take 17 g by mouth Daily.    ProviderCarmina MD   SITagliptin (JANUVIA) 100 MG tablet Take 100 mg by mouth Daily.    Carmina Jain MD   sodium chloride 0.65 % nasal spray 2 sprays into the nostril(s) as directed by provider As Needed for Congestion. Send with patient 11/27/19   Terri Chaudhry MD   vitamin B-12 (VITAMIN B-12) 1000 MCG tablet Take 1 tablet by mouth Daily. 8/30/19   Terri Chaudhry MD   vitamin D (ERGOCALCIFEROL) 1.25 MG (07493 UT) capsule capsule Take 1,250 Units by mouth Daily.    Carmina Jain MD   levothyroxine (SYNTHROID, LEVOTHROID) 125 MCG tablet Take 1 tablet by mouth Daily. " "8/29/19 7/19/21  Terri Chaudhry MD       Allergies   Allergen Reactions   • Morphine Unknown - High Severity   • Atorvastatin Unknown - Low Severity   • Oxycontin [Oxycodone Hcl] Confusion     'Makes me crazy and stand on my head'       Social History     Socioeconomic History   • Marital status: Unknown     Spouse name: Not on file   • Number of children: Not on file   • Years of education: Not on file   • Highest education level: Not on file   Tobacco Use   • Smoking status: Former Smoker   • Smokeless tobacco: Never Used   • Tobacco comment: quit 1993   Vaping Use   • Vaping Use: Never used   Substance and Sexual Activity   • Alcohol use: No   • Drug use: No   • Sexual activity: Defer       Family History   Problem Relation Age of Onset   • COPD Mother    • Diabetes Father        REVIEW OF SYSTEMS:   All systems reviewed.  Pertinent positives identified in HPI.  All other systems are negative.      Objective:     Vitals:    07/18/21 2300 07/18/21 2315 07/19/21 0130 07/19/21 0235   BP: 110/79 144/84 151/82 133/76   BP Location:       Patient Position:       Pulse:    72   Resp:    18   Temp:    98.2 °F (36.8 °C)   TempSrc:    Oral   SpO2: 95% 95% 95% 96%   Weight:    109 kg (240 lb 8 oz)   Height:    185.4 cm (73\")     Body mass index is 31.73 kg/m².    Physical Exam:  No exam due to Covid isolation    Lab Review:     Results from last 7 days   Lab Units 07/19/21  0442 07/18/21 2229   SODIUM mmol/L 138 135*   POTASSIUM mmol/L 4.1 4.2   CHLORIDE mmol/L 102 97*   CO2 mmol/L 27.2 26.1   BUN mg/dL 24* 25*   CREATININE mg/dL 1.36* 1.41*   CALCIUM mg/dL 9.4 9.6   BILIRUBIN mg/dL  --  0.9   ALK PHOS U/L  --  78   ALT (SGPT) U/L  --  9   AST (SGOT) U/L  --  14   GLUCOSE mg/dL 127* 154*     Results from last 7 days   Lab Units 07/19/21  0442 07/18/21 2229   TROPONIN T ng/mL 0.061* 0.068*     Results from last 7 days   Lab Units 07/19/21 0442   WBC 10*3/mm3 9.69   HEMOGLOBIN g/dL 10.8*   HEMATOCRIT % 33.8* "   PLATELETS 10*3/mm3 182         Results from last 7 days   Lab Units 07/18/21  2229   MAGNESIUM mg/dL 1.9                                         I personally viewed and interpreted the patient's EKG/Telemetry data.      Current Facility-Administered Medications:   •  albuterol (PROVENTIL) nebulizer solution 0.083% 2.5 mg/3mL, 2.5 mg, Nebulization, Q6H PRN, Alaina Javier MD  •  amiodarone (PACERONE) tablet 200 mg, 200 mg, Oral, BID, Alaina Javier MD  •  apixaban (ELIQUIS) tablet 5 mg, 5 mg, Oral, Q12H, Alaina Javier MD  •  atorvastatin (LIPITOR) tablet 10 mg, 10 mg, Oral, Daily, Alaina Javier MD  •  budesonide-formoterol (SYMBICORT) 160-4.5 MCG/ACT inhaler 2 puff, 2 puff, Inhalation, BID - RT, Alaina Javier MD  •  bumetanide (BUMEX) injection 2 mg, 2 mg, Intravenous, Q12H, Alaina Javier MD  •  carvedilol (COREG) tablet 6.25 mg, 6.25 mg, Oral, BID With Meals, Alaina Javier MD  •  cetirizine (zyrTEC) tablet 10 mg, 10 mg, Oral, Daily, Alaina Javier MD  •  cholecalciferol (VITAMIN D3) tablet 2,000 Units, 2,000 Units, Oral, Daily, Alaina Javier MD  •  citalopram (CeleXA) tablet 20 mg, 20 mg, Oral, Daily, Alaina Javier MD  •  docusate sodium (COLACE) capsule 100 mg, 100 mg, Oral, BID, Alaina Javier MD  •  fluticasone (FLONASE) 50 MCG/ACT nasal spray 2 spray, 2 spray, Each Nare, Daily, Alaina Javier MD  •  hydrocortisone 1 % cream, , Topical, Q12H, Alaina Javier MD  •  insulin aspart (novoLOG) injection 0-14 Units, 0-14 Units, Subcutaneous, TID AC, Alaina Javier MD  •  levothyroxine (SYNTHROID, LEVOTHROID) tablet 224 mcg, 224 mcg, Oral, Daily, Alaina Javier MD  •  melatonin tablet 5 mg, 5 mg, Oral, Nightly PRN, Alaina Javier MD  •  polyethylene glycol (MIRALAX) packet 17  g, 17 g, Oral, Daily, Alaina Javier MD  •  pregabalin (LYRICA) capsule 75 mg, 75 mg, Oral, BID, Alaina Javier MD  •  QUEtiapine (SEROquel) tablet 12.5 mg, 12.5 mg, Oral, Nightly, Alaina Javier MD  •  [COMPLETED] Insert peripheral IV, , , Once **AND** sodium chloride 0.9 % flush 10 mL, 10 mL, Intravenous, PRN, Alaina Javier MD  •  sodium chloride nasal spray 2 spray, 2 spray, Nasal, PRN, Alaina Javier MD  •  tamsulosin (FLOMAX) 24 hr capsule 0.4 mg, 0.4 mg, Oral, Daily, Alaina Javier MD  •  vitamin B-12 (CYANOCOBALAMIN) tablet 1,000 mcg, 1,000 mcg, Oral, Daily, Alaina Javier MD    Assessment and Plan:       Active Hospital Problems    Diagnosis  POA   • **Fall on same level as cause of accidental injury [W18.30XA]  Yes   • Recurrent left pleural effusion [J90]  Yes   • Acute on chronic combined systolic and diastolic CHF (congestive heart failure) (CMS/[facility]) [I50.43]  Yes   • Permanent atrial fibrillation (CMS/[facility]) [I48.21]  Yes   • Class 2 severe obesity due to excess calories with serious comorbidity in adult (CMS/[facility]) [E66.01]  Yes   • Chronic renal insufficiency, stage 4 (severe) (CMS/[facility]) [N18.4]  Yes   • Essential hypertension [I10]  Yes   • COPD (chronic obstructive pulmonary disease) (CMS/[facility]) [J44.9]  Yes   • Type 2 diabetes mellitus with stage 4 chronic kidney disease, with long-term current use of insulin (CMS/[facility]) [E11.22, N18.4, Z79.4]  Not Applicable      Resolved Hospital Problems   No resolved problems to display.     1.  Weakness fall-she has a history of weakness and falls  2.  Respiratory failure-chest CT shows groundglass infiltrates throughout both lung fields, had recent pneumonia, has underlying COPD, as well as a history of heart failure.  He refused Covid testing overnight and currently is in Covid isolation  3.  Heart failure-acute on chronic systolic and diastolic congestive heart  failure.  We have been consulted following for heart failure.  Weight is less then when he was here 11 days ago.  NT proBNP is elevated at 20,761.  On IV Bumex, will follow, anticipate repeat an echocardiogram once he is out of Covid isolation  4.  History of COPD as well obstructive sleep apnea on trilogy at home  5.  Morbid obesity  6.  Permanent atrial fibrillation-patient is amiodarone discontinued June 2020 which was the last time that we have seen him.  We saw him in the hospital at that time.  He is now back on amiodarone but remains in atrial fibrillation so the amiodarone is unnecessary.  Continue carvedilol, we will follow his heart rate, we will adjust the dose of carvedilol if necessary  7.  Hypothyroidism on replacement therapy  8.  Chronic anticoagulation    Darwin Monaco III, MD  07/19/21  09:19 EDT

## 2021-07-19 NOTE — ED NOTES
Pt's POA and granddaughter, Jaz, just arrived and states that she left pt's house around 2100 last night and he was in his chair and when she talked to him this morning he did not mention that he was on the floor. Jaz states she is expecting a call from the rehab facility tomorrow in regards to getting pt a bed.      Tomeka Esqueda RN  07/18/21 2018

## 2021-07-19 NOTE — THERAPY EVALUATION
"Acute Care - Physical Therapy Initial Evaluation   Temitope Castillo     Patient Name: Froilan Helton  : 1940  MRN: [id number]  Today's Date: 2021   Onset of Illness/Injury or Date of Surgery: 21       PT Assessment (last 12 hours)      PT Evaluation and Treatment     Row Name 21 0915          Physical Therapy Time and Intention    Subjective Information  complains of;pain  -     Document Type  evaluation  -     Mode of Treatment  physical therapy;co-treatment  -     Patient Effort  adequate  -     Comment  nurse present during evaluation  -     Row Name 2115          General Information    Patient Profile Reviewed  yes  -     Onset of Illness/Injury or Date of Surgery  21  -     Referring Physician  Dr. Katz  -     Patient Observations  alert;cooperative;agree to therapy  -     General Observations of Patient  Patient reclined in bed, on 2L O2.  Bilateral lower legs wrapped with kerlix and ace wrap.  IV in place.  Nurse present during evaluation.  -     Equipment Currently Used at Home  rollator;walker, rolling;ramp;cane, straight lift chair, motorized scooter  -     Pertinent History of Current Functional Problem  Patient brought to ER after fall at home when transferring from motorized scooter to bed.  Patient with c/o left hip, right knee, left shoulder pain.  Xrays negative for fracture but positive for left pleural effusion - patient did report some increased SOA.  Patient reports he is here \"just to keep the peace\" with his family who wanted him to come to the hospital.  -     Existing Precautions/Restrictions  fall  -     Risks Reviewed  patient:;LOB  -     Benefits Reviewed  patient:;improve function;increase independence;increase strength  -     Barriers to Rehab  medically complex;previous functional deficit  -     Row Name 2115          Living Environment    Current Living Arrangements  home/apartment/condo  -     Home " Accessibility  -- ramp to enter home  -     Lives With  alone  -Critical access hospital Name 07/19/21 0915          Home Use of Assistive/Adaptive Equipment    Equipment Currently Used at Home  rollator sleeps in lift chair, keeps rolling walker in car  -Critical access hospital Name 07/19/21 0915          Sensory Assessment (Somatosensory)    Sensory Subjective Reports  numbness;tingling bilateral feet  -Critical access hospital Name 07/19/21 0915          Cognition    Orientation Status (Cognition)  oriented x 3  -     Personal Safety Interventions  gait belt;nonskid shoes/slippers when out of bed;supervised activity  -Critical access hospital Name 07/19/21 0915          Pain Scale: Word Pre/Post-Treatment    Pain: Word Scale, Pretreatment  8 - severe pain  -     Posttreatment Pain Rating  8 - severe pain  -     Pre/Posttreatment Pain Comment  left hip (primary source of pain), right knee, left shoulder  -     Pain Intervention(s)  Repositioned  -Critical access hospital Name 07/19/21 0915          Range of Motion Comprehensive    Comment, General Range of Motion  B LE AROM within functional limits - ankle motion somewhat limited by bulky LE dressings  -Critical access hospital Name 07/19/21 0915          Strength Comprehensive (MMT)    Comment, General Manual Muscle Testing (MMT) Assessment  3-/5 B LE  -Critical access hospital Name 07/19/21 0915          Bed Mobility    Bed Mobility  supine-sit  -     Supine-Sit [place] (Bed Mobility)  moderate assist (50% patient effort);verbal cues  -     Assistive Device (Bed Mobility)  bed rails;draw sheet;head of bed elevated  -Critical access hospital Name 07/19/21 0915          Transfers    Transfers  sit-stand transfer;stand-sit transfer  -     Comment (Transfers)  Height of bed signicantly elevated for sit to stand - patient uses lift chair at home (sleeps in chair) and reports he elevates chair to nearly maximal height each time he stands up.  -     Sit-Stand [place] (Transfers)  contact guard  -     Stand-Sit [place] (Transfers)  contact  "guard  -     Row Name 07/19/21 0915          Sit-Stand Transfer    Assistive Device (Sit-Stand Transfers)  walker, front-wheeled  -     Row Name 07/19/21 0915          Stand-Sit Transfer    Assistive Device (Stand-Sit Transfers)  walker, front-wheeled  -     Row Name 07/19/21 0915          Gait/Stairs (Locomotion)    [place] Level (Gait)  contact guard;verbal cues  -     Assistive Device (Gait)  walker, front-wheeled  -     Distance in Feet (Gait)  3 feet bed to chair  -     Deviations/Abnormal Patterns (Gait)  base of support, wide;gait speed decreased;weight shifting decreased;stride length decreased  -     Bilateral Gait Deviations  forward flexed posture  -     Comment (Gait/Stairs)  Patient reports falls frequently occur at home due to rollator \"getting away\" from him.  Advised patient to discontinue use of rollator, which has 4 wheels, and utilize rolling walker instead, which only has 2 wheels for improved stability/support.  Patient ambulates with slow gait speed, wide base due to impaired balance and bilateral LE sensation impairments.    -     Row Name 07/19/21 0915          Safety Issues, Functional Mobility    Impairments Affecting Function (Mobility)  balance;sensation/sensory awareness;strength  -     Row Name             Wound 07/07/21 2108 Right  Pressure Injury    Wound - Properties Group Placement Date: 07/07/21  -HC Placement Time: 2108  -HC Present on Hospital Admission: Y  -HC Side: Right  -HC Location: --  -HC, Buttocks   Primary Wound Type: Pressure inj  -HC Stage, Pressure Injury : Stage 2  -HC    Retired Wound - Properties Group Date first assessed: 07/07/21  -HC Time first assessed: 2108  -HC Present on Hospital Admission: Y  -HC Side: Right  -HC Location: --  -HC, Buttocks   Primary Wound Type: Pressure inj  -HC    Row Name             Wound 07/19/21 0221 Left anterior greater trochanter    Wound - Properties Group Placement Date: 07/19/21  - Placement Time: 0221 " " -KH Present on Hospital Admission: Y  -KH Side: Left  -KH Orientation: anterior  -KH Location: greater trochanter  -KH    Retired Wound - Properties Group Date first assessed: 07/19/21 -KH Time first assessed: 0221  -KH Present on Hospital Admission: Y  -KH Side: Left  -KH Location: greater trochanter  -KH    Row Name             Wound 07/19/21 0224 Right anterior greater trochanter    Wound - Properties Group Placement Date: 07/19/21 -KH Placement Time: 0224  -KH Side: Right  -KH Orientation: anterior  -KH Location: greater trochanter  -KH    Retired Wound - Properties Group Date first assessed: 07/19/21 -KH Time first assessed: 0224  -KH Side: Right  -KH Location: greater trochanter  -KH    Row Name             Wound 07/19/21 0227 perineum    Wound - Properties Group Placement Date: 07/19/21 -KH Placement Time: 0227 -KH Location: perineum  -KH    Retired Wound - Properties Group Date first assessed: 07/19/21 -KH Time first assessed: 0227 -KH Location: perineum  -KH    Row Name 07/19/21 0915          Plan of Care Review    Plan of Care Reviewed With  patient  -LH     Outcome Summary  PT evaluation completed.  Patient required modA for bed mobility, CGA for sit to stand from elevated surface with rolling walker and CGA to ambulate 3 feet in room with rolling walker.  Patient demonstrates bilateral LE weakness, reports chronic numbness/tingling in bilateral feet and has history of frequent falls with use of rollator and motorized scooter at home.  Patient lives alone and is a high falls risk.  He would benefit from short term rehab with option of transitioning to long term care if needed, however patient has been resistant to such recommendations in the past.  At this point, patient reports he is open to STR, although he does state he came to the hospital \"just to keep the peace\" with his family.  PT to see daily to progress strength and safety with functional mobility.  Recommend use of rolling walker, not " rollator, due to high falls risk and history of frequent falls with use of rollator.  -     Row Name 07/19/21 0915          Physical Therapy Goals    Transfer Goal Selection (PT)  transfer, PT goal 1  -     Gait Training Goal Selection (PT)  gait training, PT goal 1  -[facility] Name 07/19/21 0915          Transfer Goal 1 (PT)    Activity/Assistive Device (Transfer Goal 1, PT)  sit-to-stand/stand-to-sit;walker, rolling  -     [place] Level/Cues Needed (Transfer Goal 1, PT)  standby assist from elevated surface  -     Time Frame (Transfer Goal 1, PT)  3 days  -     Progress/Outcome (Transfer Goal 1, PT)  goal ongoing  -[facility] Name 07/19/21 0915          Gait Training Goal 1 (PT)    Activity/Assistive Device (Gait Training Goal 1, PT)  gait (walking locomotion);assistive device use;walker, rolling  -     [place] Level (Gait Training Goal 1, PT)  standby assist  -     Distance (Gait Training Goal 1, PT)  25  -     Time Frame (Gait Training Goal 1, PT)  3 days  -     Progress/Outcome (Gait Training Goal 1, PT)  goal ongoing  -[facility] Name 07/19/21 0915          Positioning and Restraints    Pre-Treatment Position  in bed  -     Post Treatment Position  chair  -     In Chair  sitting;call light within reach;encouraged to call for assist;exit alarm on;with nsg  -[facility] Name 07/19/21 0915          Therapy Assessment/Plan (PT)    Patient/Family Therapy Goals Statement (PT)  none stated  -     PT Diagnosis (PT)  impaired functional mobility, risk of falls  -     Rehab Potential (PT)  fair, will monitor progress closely  -     Criteria for Skilled Interventions Met (PT)  yes;meets criteria;skilled treatment is necessary  -     Predicted Duration of Therapy Intervention (PT)  3-5 days  -     Problem List (PT)  problems related to;balance;mobility;sensation;strength;pain  -     Activity Limitations Related to Problem List (PT)  unable to ambulate safely;unable to transfer  safely  -     Row Name 07/19/21 0915          PT Evaluation Complexity    History, PT Evaluation Complexity  1-2 personal factors and/or comorbidities  -     Examination of Body Systems (PT Eval Complexity)  1-2 elements  -     Clinical Presentation (PT Evaluation Complexity)  stable  -     Clinical Decision Making (PT Evaluation Complexity)  low complexity  -     Overall Complexity (PT Evaluation Complexity)  low complexity  -     Row Name 07/19/21 0915          Therapy Plan Review/Discharge Plan (PT)    Therapy Plan Review (PT)  evaluation/treatment results reviewed;care plan/treatment goals reviewed;risks/benefits reviewed;current/potential barriers reviewed;participants voiced agreement with care plan;participants included;patient  -       User Key  (r) = Recorded By, (t) = Taken By, (c) = Cosigned By    Initials Name Provider Type     Ana Waite, PT Physical Therapist    Tomeka Parikh, RN Registered Nurse    Gutierrez Ambriz RN Registered Nurse        Physical Therapy Education                 Title: PT OT SLP Therapies (Done)     Topic: Physical Therapy (Done)     Point: Mobility training (Done)     Learning Progress Summary           Patient Acceptance, E,TB, VU by  at 7/19/2021 1043                               User Key     Initials Effective Dates Name Provider Type Discipline     06/16/21 -  Ana Waite, PT Physical Therapist PT              PT Recommendation and Plan  Anticipated Discharge Disposition (PT): skilled nursing facility, extended care facility  Planned Therapy Interventions (PT): balance training, bed mobility training, gait training, home exercise program, patient/family education, strengthening, transfer training  Therapy Frequency (PT): daily (6 days/week)  Plan of Care Reviewed With: patient  Outcome Summary: PT evaluation completed.  Patient required modA for bed mobility, CGA for sit to stand from elevated surface with rolling walker and CGA  "to ambulate 3 feet in room with rolling walker.  Patient demonstrates bilateral LE weakness, reports chronic numbness/tingling in bilateral feet and has history of frequent falls with use of rollator and motorized scooter at home.  Patient lives alone and is a high falls risk.  He would benefit from short term rehab with option of transitioning to long term care if needed, however patient has been resistant to such recommendations in the past.  At this point, patient reports he is open to STR, although he does state he came to the hospital \"just to keep the peace\" with his family.  PT to see daily to progress strength and safety with functional mobility.  Recommend use of rolling walker, not rollator, due to high falls risk and history of frequent falls with use of rollator.  Outcome Measures     Row Name 07/19/21 1000             How much help from another person do you currently need...    Turning from your back to your side while in flat bed without using bedrails?  3  -LH      Moving from lying on back to sitting on the side of a flat bed without bedrails?  2  -LH      Moving to and from a bed to a chair (including a wheelchair)?  3  -LH      Standing up from a chair using your arms (e.g., wheelchair, bedside chair)?  2  -LH      Climbing 3-5 steps with a railing?  1  -LH      To walk in hospital room?  3  -LH      AM-PAC 6 Clicks Score (PT)  14  -         Functional Assessment    Outcome Measure Options  AM-PAC 6 Clicks Basic Mobility (PT)  -        User Key  (r) = Recorded By, (t) = Taken By, (c) = Cosigned By    Initials Name Provider Type     Ana Waite, PT Physical Therapist           Time Calculation:   PT Charges     Row Name 07/19/21 1045             Time Calculation    Start Time  0915  -      Stop Time  0947  -      Time Calculation (min)  32 min  -      PT Received On  07/19/21  -      PT - Next Appointment  07/20/21  -        User Key  (r) = Recorded By, (t) = Taken By, (c) = " Cosigned By    Initials Name Provider Type     Ana Waite, PT Physical Therapist        Therapy Charges for Today     Code Description Service Date Service Provider Modifiers Qty    [id number] HC PT EVAL LOW COMPLEXITY 2 7/19/2021 Ana Waite, PT GP 1          PT G-Codes  Outcome Measure Options: AM-PAC 6 Clicks Basic Mobility (PT)  AM-PAC 6 Clicks Score (PT): 14    Ana Waite, PT  7/19/2021

## 2021-07-19 NOTE — PROGRESS NOTES
Mr. Helton was seen during rounds.  He reports good compliance w/ his medical regimen, but I doubt this is the case given the rise in his TSH.  Appreciate Cardiology eval.

## 2021-07-19 NOTE — THERAPY EVALUATION
Acute Care - Occupational Therapy Initial Evaluation  MICHAEL Kincaid     Patient Name: Froilan Helton  : 1940  MRN: [id number]  Today's Date: 2021  Onset of Illness/Injury or Date of Surgery: 21  Date of Referral to OT: 21  Referring Physician: Sterling    Admit Date: 2021       ICD-10-CM ICD-9-CM   1. Recurrent left pleural effusion  J90 511.9   2. Acute on chronic congestive heart failure, unspecified heart failure type (CMS/[facility])  I50.9 428.0   3. Contusion of left hip, initial encounter  S70.02XA 924.01   4. Contusion of left shoulder, initial encounter  S40.012A 923.00   5. Contusion of right knee, initial encounter  S80.01XA 924.11     Patient Active Problem List   Diagnosis   • COPD (chronic obstructive pulmonary disease) (CMS/[facility])   • Type 2 diabetes mellitus with stage 4 chronic kidney disease, with long-term current use of insulin (CMS/[facility])   • Essential hypertension   • Primary osteoarthritis of left knee   • Chronic renal insufficiency, stage 4 (severe) (CMS/[facility])   • Chronic diastolic (congestive) heart failure (CMS/[facility])   • Class 2 severe obesity due to excess calories with serious comorbidity in adult (CMS/[facility])   • Physical debility   • Acute UTI (urinary tract infection)   • Permanent atrial fibrillation (CMS/[facility])   • H/O noncompliance with medical treatment, presenting hazards to health   • Myxedema   • Acute on chronic combined systolic and diastolic CHF (congestive heart failure) (CMS/[facility])   • Generalized weakness   • Acute on chronic diastolic congestive heart failure (CMS/HCC)   • Recurrent left pleural effusion   • Fall on same level as cause of accidental injury     Past Medical History:   Diagnosis Date   • A-fib (CMS/[facility])    • Arthritis    • Asthma    • CAD (coronary artery disease)    • Cardiomyopathy (CMS/[facility])    • Cataract    • CHF (congestive heart failure) (CMS/[facility])    • CKD (chronic kidney disease), stage III (CMS/HCC)    • COPD (chronic obstructive pulmonary  "disease) (CMS/[facility])    • Diabetes mellitus (CMS/[facility])    • Disease of thyroid gland    • Emphysema, unspecified (CMS/[facility])    • Glaucoma    • Hyperlipidemia    • Hypertension    • Kidney stone    • Myocardial infarct, old    • Neuropathy    • Osteoarthritis    • Osteoporosis    • Permanent atrial fibrillation (CMS/[facility]) 6/30/2020   • PVD (peripheral vascular disease) (CMS/[facility])    • Renal disorder    • Shingles      Past Surgical History:   Procedure Laterality Date   • COLONOSCOPY     • EYE SURGERY     • JOINT REPLACEMENT      right   • KIDNEY STONE SURGERY     • REPLACEMENT TOTAL KNEE     • TOE SURGERY              OT ASSESSMENT FLOWSHEET (last 12 hours)      OT Evaluation and Treatment     Row Name 07/19/21 0948                   OT Time and Intention    Subjective Information  complains of;pain  -SD        Document Type  evaluation  -SD        Mode of Treatment  occupational therapy  -SD        Patient Effort  adequate  -SD           General Information    Patient Profile Reviewed  yes  -SD        Onset of Illness/Injury or Date of Surgery  07/18/21  -SD        Referring Physician  Sterling  -SD        General Observations of Patient  Pt in bed on 2L 02 and IV in RUE. Pt agreeable to therapy.   -SD        Prior Level of Function  independent:;ADL's;mod assist:;home management pt states [facility] assists with IADL's  -SD        Equipment Currently Used at Home  rollator;walker, rolling;ramp;cane, straight lift chair, motorized scooter  -SD        Pertinent History of Current Functional Problem  Per medical record and pt interview, patient brought to ER after fall at home when transferring from motorized scooter to bed.  Patient with c/o left hip, right knee, left shoulder pain.  Xrays negative for fracture but positive for left pleural effusion - patient did report some increased SOA.  Patient reports he is here \"just to keep the peace\" with his family who wanted him to come to the hospital and want him to go to " rehab upon discharge. Pt states he manages basic adl's himself, but his grandaujulian assists with meals. Pt stated he has  nursing to wrap his BLE's 2-3x's/wk.   -SD        Existing Precautions/Restrictions  fall  -SD        Risks Reviewed  patient:;LOB  -SD        Benefits Reviewed  patient:;increase independence  -SD        Barriers to Rehab  previous functional deficit;medically complex  -SD           Occupational Profile    Reason for Services/Referral (Occupational Profile)  decreased adl function  -SD        Successful Occupations (Occupational Profile)  pt states he manages basic adl's himself. He has assistance for IADL tasks  -SD        Environmental Supports and Barriers (Occupational Profile)  dania assists with meal prep (supper time)  -SD           Previous Level of Function/Home Environm    BADLs, Premorbid Functional Level  needs assistance for safe performance  -SD        IADLs, Premorbid Functional Level  needs assistance for safe performance  -SD        Household Ambulation, Premorbid Functional Level  needs assistive device for safe performance;needs adaptive equipment for safe performance;other (see comments) pt with history of frequent falls  -SD           Living Environment    Current Living Arrangements  home/apartment/condo  -SD        Home Accessibility  -- ramp to enter  -SD        Lives With  alone  -SD           Cognition    Orientation Status (Cognition)  oriented x 3  -SD        Personal Safety Interventions  gait belt;fall prevention program maintained;nonskid shoes/slippers when out of bed;supervised activity  -SD           Pain Scale: Numbers Pre/Post-Treatment    Pretreatment Pain Rating  8/10  -SD        Posttreatment Pain Rating  8/10  -SD        Pain Location - Side  Left;Right  -SD        Pain Location  hip;shoulder;knee left hip and shoulder, right knee  -SD        Pre/Posttreatment Pain Comment  pt stated he is sore from his fall at home.   -SD        Pain  Intervention(s)  Repositioned;Ambulation/increased activity  -SD           Range of Motion Comprehensive    Comment, General Range of Motion  pt with a history of limited left shoulder rom due to rotator cuff injury. LUE wfl distal to shoulder. RUE rom functional for bed mobility and transfer  -SD           Strength Comprehensive (MMT)    Comment, General Manual Muscle Testing (MMT) Assessment  BUE rom not formally assessed  -SD           Bed Mobility    Supine-Sit [place] (Bed Mobility)  moderate assist (50% patient effort);verbal cues  -SD        Assistive Device (Bed Mobility)  bed rails;draw sheet;head of bed elevated  -SD           Transfer Assessment/Treatment    Comment (Transfers)  pt states he normally uses a lift chair at home. He sleeps in his lift chair , and he uses the lift mechanism whenever he gets into/out of the chair. Bed surface was elevated prior to standing from EOB.  -SD           Transfers    Sit-Stand [place] (Transfers)  contact guard;verbal cues  -SD        Stand-Sit [place] (Transfers)  contact guard;verbal cues  -SD           Sit-Stand Transfer    Assistive Device (Sit-Stand Transfers)  walker, front-wheeled  -SD           Stand-Sit Transfer    Assistive Device (Stand-Sit Transfers)  walker, front-wheeled  -SD           Safety Issues, Functional Mobility    Impairments Affecting Function (Mobility)  balance  -SD           Activities of Daily Living    BADL Assessment/Intervention  bathing;lower body dressing  -SD           Bathing Assessment/Intervention    [place] Level (Bathing)  bathing skills;lower body;moderate assist (50% patient effort)  -SD           Lower Body Dressing Assessment/Training    [place] Level (Lower Body Dressing)  lower body dressing skills;moderate assist (50% patient effort)  -SD           BADL Safety/Performance    Impairments, BADL Safety/Performance  balance;pain;strength  -SD           [REMOVED] Wound 07/07/21 2108 Right  Pressure  Injury    Wound - Properties Group Placement Date: 07/07/21  -HC Placement Time: 2108 -HC Present on Hospital Admission: Y  -HC Side: Right  -HC Location: --  -HC, Buttocks   Primary Wound Type: Pressure inj  -HC Stage, Pressure Injury : Stage 2  -HC Removal Date: 07/19/21  -LC Removal Time: 1229 -LC Wound Outcome: Healed  -LC    Retired Wound - Properties Group Date first assessed: 07/07/21  -HC Time first assessed: 2108 -HC Present on Hospital Admission: Y  -HC Side: Right  -HC Location: --  -HC, Buttocks   Primary Wound Type: Pressure inj  -HC Resolution Date: 07/19/21  -LC Resolution Time: 1229 -LC Wound Outcome: Healed  -LC       Wound 07/19/21 0221 Left anterior greater trochanter Abrasion    Wound - Properties Group Placement Date: 07/19/21  -KH Placement Time: 0221 -KH Present on Hospital Admission: Y  -KH Side: Left  -KH Orientation: anterior  -KH Location: greater trochanter  -KH Primary Wound Type: Abrasion  -LC    Retired Wound - Properties Group Date first assessed: 07/19/21  -KH Time first assessed: 0221 -KH Present on Hospital Admission: Y  -KH Side: Left  -KH Location: greater trochanter  -KH Primary Wound Type: Abrasion  -LC       Wound 07/19/21 0224 Right anterior greater trochanter    Wound - Properties Group Placement Date: 07/19/21  -KH Placement Time: 0224 -KH Side: Right  -KH Orientation: anterior  -KH Location: greater trochanter  -KH    Retired Wound - Properties Group Date first assessed: 07/19/21 -KH Time first assessed: 0224 -KH Side: Right  -KH Location: greater trochanter  -KH       Wound 07/19/21 0227 perineum    Wound - Properties Group Placement Date: 07/19/21  -KH Placement Time: 0227 -KH Location: perineum  -KH    Retired Wound - Properties Group Date first assessed: 07/19/21  -KH Time first assessed: 0227 -KH Location: perineum  -KH       Plan of Care Review    Outcome Summary  OT evaluation completed. Pt with a history of frequent falls at home. Pt lives alone, and he  "reports increasing difficulty with management of basic daily tasks. Pt states his grandrobert has been assisting with IADL's, and he has home health services. Pt required mod assist for bed mobility. Pt managed transfer from bed to chair with CGA using a rolling walker for support (the bed surface was elevated prior to standing since he uses a lift chair at home). Pt stated he typically uses a rollator or scooter at home for mobility. Pt needs assistance for basic adl's adequately manage his hygiene. Pt stated his family wants him to go to a SNF, and he is receptive to \"keep the peace with the family.\" Rec OT services during acute care stay to address safety/independence with basic adl tasks/transfers.   -SD           OT Goals    Transfer Goal Selection (OT)  transfer, OT goal 1  -SD           Transfer Goal 1 (OT)    Activity/Assistive Device (Transfer Goal 1, OT)  commode, 3-in-1  -SD        [place] Level/Cues Needed (Transfer Goal 1, OT)  supervision required  -SD        Time Frame (Transfer Goal 1, OT)  by discharge  -SD        Progress/Outcome (Transfer Goal 1, OT)  other (see comments) new goal  -SD           Patient Education Goal (OT)    Activity (Patient Education Goal, OT)  Pt to perform BUE HEP to improve functional strength and activity tolerance for basic daily tasks.   -SD        [place]/Cues/Accuracy (Memory Goal 2, OT)  verbalizes understanding  -SD        Time Frame (Patient Education Goal, OT)  by discharge  -SD        Progress/Outcome (Patient Education Goal, OT)  other (see comments) new goal  -SD           Positioning and Restraints    Pre-Treatment Position  in bed  -SD        Post Treatment Position  chair  -SD        In Chair  with nsg;call light within reach;encouraged to call for assist;sitting  -SD           Therapy Assessment/Plan (OT)    Date of Referral to OT  07/18/21  -SD        Functional Level at Time of Evaluation (OT)  Pt with a history of frequent falls at home. Pt " "lives alone, and he reports increasing difficulty with management of basic daily tasks. Pt states his grandaughter has been assisting with IADL's, and he has home health services. Pt required mod assist for bed mobility. Pt managed transfer from bed to chair with CGA using a rolling walker for support (the bed surface was elevated prior to standing since he uses a lift chair at home). Pt stated he typically uses a rollator or scooter at home for mobility. Pt needs assistance for basic adl's adequately manage his hygiene. Pt stated his family wants him to go to a SNF, and he is receptive to \"keep the peace with the family.\" Rec OT services during acute care stay to address safety/independence with basic adl tasks/transfers.   -SD        OT Diagnosis  decreased adl function  -SD        Rehab Potential (OT)  good, to achieve stated therapy goals  -SD        Criteria for Skilled Therapeutic Interventions Met (OT)  yes;skilled treatment is necessary  -SD        Therapy Frequency (OT)  5 times/wk  -SD        Predicted Duration of Therapy Intervention (OT)  1 week  -SD        Problem List (OT)  balance;pain  -SD        Activity Limitations Related to Problem List (OT)  unable to ambulate safely;unable to transfer safely;BADLs not performed adequately or safely;IADLs not performed adequately or safely  -SD        Planned Therapy Interventions (OT)  BADL retraining;transfer/mobility retraining;patient/caregiver education/training  -SD           Therapy Plan Review/Discharge Plan (OT)    Therapy Plan Review (OT)  evaluation/treatment results reviewed;care plan/treatment goals reviewed;risks/benefits reviewed;current/potential barriers reviewed;participants voiced agreement with care plan;patient  -SD        Anticipated Discharge Disposition (OT)  skilled nursing facility;extended care facility  -SD          User Key  (r) = Recorded By, (t) = Taken By, (c) = Cosigned By    Initials Name Effective Dates    Carole Taylor " "P, RN 06/16/21 -     Sebastian Greene, OTR 06/16/21 -     HC Tomeka Esqueda RN 02/22/21 -     Gutierrez Ambriz RN 06/16/21 -          Occupational Therapy Education                 Title: PT OT SLP Therapies (Done)     Topic: Occupational Therapy (Done)     Point: ADL training (Done)     Description:   Instruct learner(s) on proper safety adaptation and remediation techniques during self care or transfers.   Instruct in proper use of assistive devices.              Learning Progress Summary           Patient Acceptance, E, VU by SD at 7/19/2021 1252    Comment: Education regarding OT services, benefits of activity and safety with transfers/mobility.                               User Key     Initials Effective Dates Name Provider Type Discipline    SD 06/16/21 -  Sebastian Wright OTR Occupational Therapist OT                  OT Recommendation and Plan  Planned Therapy Interventions (OT): BADL retraining, transfer/mobility retraining, patient/caregiver education/training  Therapy Frequency (OT): 5 times/wk  Plan of Care Review  Outcome Summary: OT evaluation completed. Pt with a history of frequent falls at home. Pt lives alone, and he reports increasing difficulty with management of basic daily tasks. Pt states his grandaughter has been assisting with IADL's, and he has home health services. Pt required mod assist for bed mobility. Pt managed transfer from bed to chair with CGA using a rolling walker for support (the bed surface was elevated prior to standing since he uses a lift chair at home). Pt stated he typically uses a rollator or scooter at home for mobility. Pt needs assistance for basic adl's adequately manage his hygiene. Pt stated his family wants him to go to a SNF, and he is receptive to \"keep the peace with the family.\" Rec OT services during acute care stay to address safety/independence with basic adl tasks/transfers.       Outcome Measures     Row Name 07/19/21 1044 07/19/21 1000    "       How much help from another person do you currently need...    Turning from your back to your side while in flat bed without using bedrails?  --  3  -LH     Moving from lying on back to sitting on the side of a flat bed without bedrails?  --  2  -LH     Moving to and from a bed to a chair (including a wheelchair)?  --  3  -LH     Standing up from a chair using your arms (e.g., wheelchair, bedside chair)?  --  2  -LH     Climbing 3-5 steps with a railing?  --  1  -LH     To walk in hospital room?  --  3  -LH     AM-PAC 6 Clicks Score (PT)  --  14  -LH        How much help from another is currently needed...    Putting on and taking off regular lower body clothing?  2  -SD  --     Bathing (including washing, rinsing, and drying)  2  -SD  --     Toileting (which includes using toilet bed pan or urinal)  2  -SD  --     Putting on and taking off regular upper body clothing  3  -SD  --     Taking care of personal grooming (such as brushing teeth)  4  -SD  --     Eating meals  4  -SD  --     AM-PAC 6 Clicks Score (OT)  17  -SD  --        Functional Assessment    Outcome Measure Options  AM-PAC 6 Clicks Daily Activity (OT)  -SD  AM-PAC 6 Clicks Basic Mobility (PT)  -       User Key  (r) = Recorded By, (t) = Taken By, (c) = Cosigned By    Initials Name Provider Type     Ana Waite, PT Physical Therapist    Sebastian Greene, OTR Occupational Therapist          Time Calculation:   Time Calculation- OT     Row Name 07/19/21 1254             Time Calculation- OT    OT Start Time  0915  -SD      OT Stop Time  0947  -SD      OT Time Calculation (min)  32 min  -SD        User Key  (r) = Recorded By, (t) = Taken By, (c) = Cosigned By    Initials Name Provider Type    Sebastian Greene, OTDELTA Occupational Therapist        Therapy Charges for Today     Code Description Service Date Service Provider Modifiers Qty    [id number]  OT EVAL LOW COMPLEXITY 2 7/19/2021 Sebastian Wright OTR GO 1               Sebastian  ISACC Wright, OTR  7/19/2021

## 2021-07-19 NOTE — CASE MANAGEMENT/SOCIAL WORK
Discharge Planning Assessment   Temitope Castillo     Patient Name: Froilan Helton  MRN: [id number]  Today's Date: 7/19/2021    Admit Date: 7/18/2021    Discharge Needs Assessment     Row Name 07/19/21 1351       Living Environment    Lives With  alone    Current Living Arrangements  home/apartment/condo    Primary Care Provided by  self    Provides Primary Care For  no one, unable/limited ability to care for self    Family Caregiver if Needed  grandchild(nba), adult;child(nba), adult    Family Caregiver Names  Jaz Grigsby -michell POA    Quality of Family Relationships  involved;helpful;supportive    Able to Return to Prior Arrangements  other (see comments)       Resource/Environmental Concerns    Transportation Concerns  car, none       Transition Planning    Patient/Family Anticipates Transition to  home;inpatient rehabilitation facility    Patient/Family Anticipated Services at Transition  home health care    Transportation Anticipated  family or friend will provide       Discharge Needs Assessment    Equipment Currently Used at Home  rollator;walker, rolling;nebulizer;glucometer;other (see comments)    Equipment Needed After Discharge  none    Provided Post Acute Provider List?  N/A    Provided Post Acute Provider Quality & Resource List?  N/A        Discharge Plan     Row Name 07/19/21 1327       Plan    Plan  plan to be determined, from home, lives alone    Plan Comments  Spoke with patient from door way, he is sitting up in recliner. Permission given to contact family related to dc planning. Per ER notes, patients granddaughter, Jaz, is POA. Only patients son, Agustin Helton is listed in chart. Spoke with Agustin via phone, he was unaware patient is in hospital. He provides phone number for Jaz's mother, Geovanna. Spoke with Geovanna Henryross (patients daughter-in-law) who provides contact number for Jaz. Spoke with Jaz Grigsby [phone]. She is patients granddaughter and LTIO. Face sheet verified. Patient  Found [employer] coupon for patient for the suppository for pick n save. Awaiting confirmation of pharmacy from patient before sending     lives alone and is able to transfer from his chair to his scooter to the bathroom. Jaz calls him multiple times a day and provides dinner to him each evening.She states he has a rolling walker, rollator, glucometer and 02 provided by [facility]. Patient is current with [facility] and has been to [facility] previously. He uses [facility] and there are no issues obtaining medications. Jaz states patient's PCP feel patient would benefit from STR and she has called several facilities with no return call. First choice for STR is [facility], second choice is [facility]. Referrals sent to each facility via in basket and voice message left for Nat[facility] and [facility]. Spoke with Margarita/[facility] to inform of patient admission. CM will continue to follow, face sheet updated.        Continued Care and Services - Admitted Since 7/18/2021     Destination     Service Provider Request Status Selected Services Address Phone Fax Patient Preferred    [facility]  Pending - Request Sent N/A [address] [facility] [address] [phone] --    [facility]  Pending - Request Sent N/A [address] [place] [address] [phone] --                Demographic Summary     Row Name 07/19/21 1351       General Information    Admission Type  observation    Arrived From  home    Referral Source  admission list    Reason for Consult  discharge planning    Preferred Language  English     Used During This Interaction  no       Contact Information    Permission Granted to Share Info With          Functional Status    No documentation.       Psychosocial    No documentation.       Abuse/Neglect    No documentation.       Legal    No documentation.       Substance Abuse    No documentation.       Patient Forms    No documentation.           Titi Ramirez RN

## 2021-07-19 NOTE — H&P
[facility]   HISTORY AND PHYSICAL    Patient Name: Froilan Helton  : 1940  MRN: [id number]  Primary Care Physician:  Abdullahi Clarke MD  Date of admission: 2021    Subjective   Subjective     Chief Complaint: s/p mechanical fall    79 y/o M with hx of CHF, Afib on AC, Asthma/COPD, CKD, DM, HTN, HLD presented to ED last night after sustaining mechanical fall while trying to get into bed from his scooter and flipped forward. Patient also endorsed SOB/BEAN that has gotten worse recently when compared to prior; he is aware of his CHF diagnosis and claims compliance to home meds, but now unable to lay flat as well and noted chronic LE edema.        Review of Systems   Constitutional: Negative for activity change, appetite change, fatigue, fever and unexpected weight change.   HENT: Negative.    Eyes: Negative.    Respiratory: Positive for apnea and shortness of breath. Negative for cough, choking, chest tightness, wheezing and stridor.    Cardiovascular: Positive for leg swelling. Negative for chest pain and palpitations.   Gastrointestinal: Negative.    Endocrine: Negative.    Genitourinary: Negative.    Musculoskeletal: Positive for joint swelling.   Neurological: Positive for weakness.   Hematological: Negative.    Psychiatric/Behavioral: Negative.    All other systems reviewed and are negative.       Personal History     Past Medical History:   Diagnosis Date   • A-fib (CMS/[facility]ly [facility])    • Arthritis    • Asthma    • CAD (coronary artery disease)    • Cardiomyopathy (CMS/HCC)    • Cataract    • CHF (congestive heart failure) (CMS/[facility])    • CKD (chronic kidney disease), stage III (CMS/HCC)    • COPD (chronic obstructive pulmonary disease) (CMS/HCC)    • Diabetes mellitus (CMS/HCC)    • Disease of thyroid gland    • Emphysema, unspecified (CMS/HCC)    • Glaucoma    • Hyperlipidemia    • Hypertension    • Kidney stone    • Myocardial infarct, old    • Neuropathy    • Osteoarthritis    • Osteoporosis     • PVD (peripheral vascular disease) (CMS/HCC)    • Renal disorder    • Shingles        Past Surgical History:   Procedure Laterality Date   • COLONOSCOPY     • EYE SURGERY     • JOINT REPLACEMENT      right   • KIDNEY STONE SURGERY     • REPLACEMENT TOTAL KNEE     • TOE SURGERY         Family History: family history includes COPD in his mother; Diabetes in his father. Otherwise pertinent FHx was reviewed and not pertinent to current issue.    Social History:  reports that he has quit smoking. He has never used smokeless tobacco. He reports that he does not drink alcohol and does not use drugs.    Home Medications:  Cholecalciferol, Insulin Syringe, O2, Petrolatum, QUEtiapine, SITagliptin, acetaminophen, albuterol sulfate HFA, amiodarone, apixaban, atorvastatin, budesonide-formoterol, bumetanide, carvedilol, citalopram, cyanocobalamin, docusate sodium, doxycycline, fluticasone, hydrocortisone, hydrophor, insulin aspart, ipratropium-albuterol, levothyroxine, loratadine, melatonin, polyethylene glycol, pregabalin, sodium chloride, tamsulosin, and vitamin D    Allergies:  Allergies   Allergen Reactions   • Morphine Unknown - High Severity   • Atorvastatin Unknown - Low Severity   • Oxycontin [Oxycodone Hcl] Confusion     'Makes me crazy and stand on my head'       Objective    Objective     Vitals:   Temp:  [98.2 °F (36.8 °C)-98.3 °F (36.8 °C)] 98.2 °F (36.8 °C)  Heart Rate:  [72-92] 72  Resp:  [18] 18  BP: (110-152)/(75-91) 133/76  Flow (L/min):  [2] 2    Physical Exam  Vitals and nursing note reviewed.   Constitutional:       General: He is not in acute distress.     Appearance: Normal appearance. He is not ill-appearing or toxic-appearing.   HENT:      Head: Normocephalic and atraumatic.      Right Ear: External ear normal.      Left Ear: External ear normal.      Nose: Nose normal. No congestion or rhinorrhea.      Mouth/Throat:      Mouth: Mucous membranes are moist.      Pharynx: No oropharyngeal exudate or  posterior oropharyngeal erythema.   Eyes:      Extraocular Movements: Extraocular movements intact.      Conjunctiva/sclera: Conjunctivae normal.      Pupils: Pupils are equal, round, and reactive to light.   Cardiovascular:      Rate and Rhythm: Normal rate. Rhythm irregular.      Pulses: Normal pulses.      Heart sounds: Normal heart sounds. No murmur heard.   No friction rub. No gallop.    Pulmonary:      Effort: Pulmonary effort is normal. No respiratory distress.      Breath sounds: Rales present. No wheezing.   Abdominal:      General: Bowel sounds are normal. There is no distension.      Palpations: Abdomen is soft.      Tenderness: There is no abdominal tenderness.   Musculoskeletal:         General: Swelling present. Normal range of motion.      Cervical back: Normal range of motion and neck supple. No rigidity or tenderness.      Right lower leg: Edema present.      Left lower leg: Edema present.   Skin:     General: Skin is warm.   Neurological:      General: No focal deficit present.      Mental Status: He is alert and oriented to person, place, and time. Mental status is at baseline.   Psychiatric:         Mood and Affect: Mood normal.         Behavior: Behavior normal.         Result Review    Result Review:  I have personally reviewed the results from the time of this admission to 7/19/2021 03:16 EDT and agree with these findings:  [x]  Laboratory  [x]  Microbiology  [x]  Radiology  []  EKG/Telemetry   [x]  Cardiology/Vascular   []  Pathology  [x]  Old records  []  Other:  Most notable findings include: SOB, elevated BNP, elevated trop, imaging consistent with bilateral pleural effusion    Assessment/Plan   Assessment / Plan     Brief Patient Summary:  Froilan Helton is a 80 y.o. male who presented to ED following mechanical fall, found to have bilateral pleural effusion, admitted for CHF exacerbation.    Active Hospital Problems:  Active Hospital Problems    Diagnosis    • Recurrent left pleural  effusion    Acute on chronic systolic heart failure; latest echo 6/2020 showing EF 36% with global hypokinesis, trop mildly elevated likely from demand ischemia, no ST changes  Afib, not in RVR  Hx of HTN, DM, CKD      Plan:   -Admit to observation with telemetry  -trend troponin and EKG q6h  -IV diuretics, monitor SrCr and K levels  -strict I/Os  -monitor daily weight  -consult cardiology, recs appreciated  -repeat Echocardiogram ordered for AM  -maintain sats >88%, O2 supplementation as needed, currently on room air  -consult PT/OT  -resume apixaban for afib, not in RVR      DVT prophylaxis:  Medical DVT prophylaxis orders are present.    CODE STATUS:    Level Of Support Discussed With: Patient  Code Status: No CPR  Medical Interventions (Level of Support Prior to Arrest): Full    Admission Status:  I believe this patient meets observation status.    Electronically signed by Alaina Garduno MD, 07/19/21, 1:09 AM EDT.

## 2021-07-19 NOTE — PLAN OF CARE
"Goal Outcome Evaluation:  Plan of Care Reviewed With: patient           Outcome Summary: PT evaluation completed.  Patient required modA for bed mobility, CGA for sit to stand from elevated surface with rolling walker and CGA to ambulate 3 feet in room with rolling walker.  Patient demonstrates bilateral LE weakness, reports chronic numbness/tingling in bilateral feet and has history of frequent falls with use of rollator and motorized scooter at home.  Patient lives alone and is a high falls risk.  He would benefit from short term rehab with option of transitioning to long term care if needed, however patient has been resistant to such recommendations in the past.  At this point, patient reports he is open to STR, although he does state he came to the hospital \"just to keep the peace\" with his family.  PT to see daily to progress strength and safety with functional mobility.  Recommend use of rolling walker, not rollator, due to high falls risk and history of frequent falls with use of rollator.  "

## 2021-07-19 NOTE — PLAN OF CARE
Goal Outcome Evaluation:           Progress: improving  Outcome Summary: patient vss. wound nurse rounded this shift for skin issues.  patient able to be up in chair this shift.  bumex given IV. afib on tele. eliquis given.

## 2021-07-19 NOTE — ED NOTES
Called [facility] re faxing medical records.  Spoke to  Cole.     Elen Liao  07/18/21 3796

## 2021-07-20 NOTE — PLAN OF CARE
"Goal Outcome Evaluation:              Outcome Summary: PT: Patient requires encouragement to participate with therapy.  Patient performs sit to stand with max assist x2 from recliner surface.  Patient with bilateral knee flexion during standing, reports he can not obtain full knee extension.  Patient declines gait activities stating \"I just don't think I can do it today.\"  Patient does not appear safe to return home alone at this time, recommend SNF with potential to transition to long term care at discharge.  "

## 2021-07-20 NOTE — CASE MANAGEMENT/SOCIAL WORK
Continued Stay Note  MICHAEL Kincaid     Patient Name: Froilan Helton  MRN: [id number]  Today's Date: 7/20/2021    Admit Date: 7/18/2021    Discharge Plan     Row Name 07/20/21 0850       Plan    Plan  plan home w HH vs STR    Plan Comments  Spoke with Scout, she's waiting determination from DON and will return call with decision regarding ability to accept. CM will continue to follow.        Discharge Codes    No documentation.             Titi Ramirez RN

## 2021-07-20 NOTE — PROGRESS NOTES
"Hospitalist Team      Patient Care Team:  Fortunato De Leon MD as PCP - General (Family Medicine)        Chief Complaint: Follow-up CHF Exacerbation    Subjective    No complaints.  Specifically, Mr. Helton denies chest pain and dyspnea.  He is tolerating PO.  He hasn't been up much today.    Objective    Vital Signs  Temp:  [96.7 °F (35.9 °C)-97.6 °F (36.4 °C)] 97.6 °F (36.4 °C)  Heart Rate:  [59-72] 68  Resp:  [16-18] 16  BP: ()/(54-78) 107/58  Oxygen Therapy  SpO2: 100 %  Pulse Oximetry Type: Intermittent  Device (Oxygen Therapy): nasal cannula  Flow (L/min): 1  Oxygen Concentration (%): 28}    Flowsheet Rows      First Filed Value   Admission Height  185.4 cm (73\") Documented at 07/18/2021 1928   Admission Weight  111 kg (244 lb) Documented at 07/18/2021 1928        Physical Exam:    General: Appears stated age in NAD.  Lungs: Breath sounds are diminished.  No wheeze or rales appreciated.  CV: Irregular rate and rhythm.  Radial pulses are 2+ and symmetric.  Abdomen: Soft and non-tender w/ active bowel sounds  Skin: BLE dressed  Neuro: CN II-XII grossly intact.  Psych: Normal affect.    Results Review:     I reviewed the patient's new clinical results.    Lab Results (last 24 hours)     Procedure Component Value Units Date/Time    POC Glucose Once [[id number]]  (Abnormal) Collected: 07/20/21 1640    Specimen: Blood Updated: 07/20/21 1647     Glucose 153 mg/dL      Comment: Meter: [id number] : [id number] Madalyn Barnhart CNA       POC Glucose Once [[id number]]  (Abnormal) Collected: 07/20/21 1208    Specimen: Blood Updated: 07/20/21 1215     Glucose 257 mg/dL      Comment: Meter: [id number] : [id number] Madalyn BUCKNERA       COVID PRE-OP / PRE-PROCEDURE SCREENING ORDER (NO ISOLATION) - Swab, Nasal Cavity [[id number]]  (Normal) Collected: 07/20/21 1023    Specimen: Swab from Nasal Cavity Updated: 07/20/21 1123    Narrative:      The following orders were created for panel order COVID PRE-OP / " PRE-PROCEDURE SCREENING ORDER (NO ISOLATION) - Swab, Nasal Cavity.  Procedure                               Abnormality         Status                     ---------                               -----------         ------                     COVID-19,[facility]...[[id number]]  Normal              Final result                 Please view results for these tests on the individual orders.    COVID-19,[facility] IN-HOUSE,Nasal Swab No Transport Media 3-4 HR TAT - Swab, Nasal Cavity [[id number]]  (Normal) Collected: 07/20/21 1023    Specimen: Swab from Nasal Cavity Updated: 07/20/21 1123     COVID19 Not Detected    Narrative:      Fact sheet for providers: https://www.fda.gov/media/178070/download     Fact sheet for patients: https://www.fda.gov/media/453513/download    Test performed by PCR.    Consider negative results in combination with clinical observations, patient history, and epidemiological information.    Renal Function Panel [[id number]]  (Abnormal) Collected: 07/20/21 1005    Specimen: Blood Updated: 07/20/21 1045     Glucose 166 mg/dL      BUN 28 mg/dL      Creatinine 1.84 mg/dL      Sodium 137 mmol/L      Potassium 4.1 mmol/L      Chloride 100 mmol/L      CO2 29.1 mmol/L      Calcium 8.8 mg/dL      Albumin 3.00 g/dL      Phosphorus 4.3 mg/dL      Anion Gap 7.9 mmol/L      BUN/Creatinine Ratio 15.2     eGFR Non African Amer 36 mL/min/1.73     Narrative:      GFR Normal >60  Chronic Kidney Disease <60  Kidney Failure <15      POC Glucose Once [[id number]]  (Abnormal) Collected: 07/20/21 0756    Specimen: Blood Updated: 07/20/21 0802     Glucose 136 mg/dL      Comment: Meter: [id number] : [id number] Madalyn Barnhart CNA       Blood Culture - Blood, Arm, Left [[id number]] Collected: 07/18/21 2229    Specimen: Blood from Arm, Left Updated: 07/19/21 2245     Blood Culture No growth at 24 hours    Blood Culture - Blood, Arm, Right [[id number]] Collected: 07/18/21 2229    Specimen: Blood from Arm, Right Updated:  07/19/21 2245     Blood Culture No growth at 24 hours          Imaging Results (Last 24 Hours)     ** No results found for the last 24 hours. **          Medication Review:   I have reviewed the patient's current medication list    Current Facility-Administered Medications:   •  albuterol (PROVENTIL) nebulizer solution 0.083% 2.5 mg/3mL, 2.5 mg, Nebulization, Q6H PRN, Alaina Javier MD  •  apixaban (ELIQUIS) tablet 5 mg, 5 mg, Oral, Q12H, Alaina Javier MD, 5 mg at 07/20/21 0843  •  atorvastatin (LIPITOR) tablet 10 mg, 10 mg, Oral, Daily, Alaina Javier MD, 10 mg at 07/20/21 0843  •  budesonide-formoterol (SYMBICORT) 160-4.5 MCG/ACT inhaler 2 puff, 2 puff, Inhalation, BID - RT, Alaina Javier MD, 2 puff at 07/20/21 0942  •  carvedilol (COREG) tablet 6.25 mg, 6.25 mg, Oral, BID With Meals, Alaina Javier MD, 6.25 mg at 07/20/21 0843  •  cetirizine (zyrTEC) tablet 10 mg, 10 mg, Oral, Daily, Alaina Javier MD, 10 mg at 07/20/21 0843  •  cholecalciferol (VITAMIN D3) tablet 2,000 Units, 2,000 Units, Oral, Daily, Alaina Javier MD, 2,000 Units at 07/20/21 0843  •  citalopram (CeleXA) tablet 20 mg, 20 mg, Oral, Daily, Alaina Javier MD, 20 mg at 07/20/21 0844  •  docusate sodium (COLACE) capsule 100 mg, 100 mg, Oral, BID, Alaina Javier MD, 100 mg at 07/20/21 0844  •  fluticasone (FLONASE) 50 MCG/ACT nasal spray 2 spray, 2 spray, Each Nare, Daily, Alaina Javier MD, 2 spray at 07/20/21 0844  •  hydrocortisone 1 % cream, , Topical, Q12H, Alaina Javier MD, Given at 07/20/21 0844  •  insulin aspart (novoLOG) injection 0-14 Units, 0-14 Units, Subcutaneous, TID AC, Alaina Javier MD, 8 Units at 07/20/21 1238  •  levothyroxine (SYNTHROID, LEVOTHROID) tablet 224 mcg, 224 mcg, Oral, Daily, Alaina Javier MD, 224 mcg at 07/20/21 0844  •   melatonin tablet 5 mg, 5 mg, Oral, Nightly PRN, Alaina Javier MD  •  nitroglycerin (NITROSTAT) SL tablet 0.4 mg, 0.4 mg, Sublingual, Q5 Min PRN, Ozzie Briggs MD  •  polyethylene glycol (MIRALAX) packet 17 g, 17 g, Oral, Daily, Alaina Javier MD, 17 g at 07/20/21 0844  •  pregabalin (LYRICA) capsule 75 mg, 75 mg, Oral, BID, Alaina Javier MD, 75 mg at 07/20/21 0844  •  QUEtiapine (SEROquel) tablet 12.5 mg, 12.5 mg, Oral, Nightly, Alaina Javier MD, 12.5 mg at 07/19/21 2200  •  [COMPLETED] Insert peripheral IV, , , Once **AND** sodium chloride 0.9 % flush 10 mL, 10 mL, Intravenous, PRN, Alaina Javier MD, 10 mL at 07/19/21 0942  •  sodium chloride nasal spray 2 spray, 2 spray, Nasal, PRN, Alaina Javier MD  •  tamsulosin (FLOMAX) 24 hr capsule 0.4 mg, 0.4 mg, Oral, Daily, Alaina Javier MD, 0.4 mg at 07/20/21 0844  •  vitamin B-12 (CYANOCOBALAMIN) tablet 1,000 mcg, 1,000 mcg, Oral, Daily, Alaina Javier MD, 1,000 mcg at 07/20/21 0844      Assessment/Plan     1.  Acute-on-Chronic sCHF Exacerbation: I've stopped his IV diuresis due to bump in bicarb and creatinine.  Will repeat in AM and likely resume oral therapy.  Discussed echo w/ Dr. Davenport.    2.  Hypothyroidism: Continue replacement dose.  Needs to be compliant.    3.  Diabetes Mellitus, Type 2: AM and bedsides at goal.  No change to regimen.    Plan for disposition: AD in AM    Ozzie Briggs MD  07/20/21  17:45 EDT

## 2021-07-20 NOTE — PROGRESS NOTES
Discussed with Dr Briggs.    Mr Helton continues to refuse COVID test.  However, past history and appearance of imaging is most c/w acute systolic CHF. He has not been compliant with medications or sodium restriction as an outpatient.    I agree that a repeat echo will not change our management.     Continue IV bumetanide. His I/Os cannot be obtained due to incontinence. His O2 sat is 91% on RA (but he uses oxygen at home). Switch to PO bumetanide when nearing baseline.    Stop amiodarone permanently.     Will see PRN.

## 2021-07-20 NOTE — THERAPY TREATMENT NOTE
Acute Care - Occupational Therapy Treatment Note  MICHAEL Kincaid     Patient Name: Froilan Helton  : 1940  MRN: [id number]  Today's Date: 2021  Onset of Illness/Injury or Date of Surgery: 21  Date of Referral to OT: 21  Referring Physician: Sterling    Admit Date: 2021       ICD-10-CM ICD-9-CM   1. Recurrent left pleural effusion  J90 511.9   2. Acute on chronic congestive heart failure, unspecified heart failure type (CMS/[facility])  I50.9 428.0   3. Contusion of left hip, initial encounter  S70.02XA 924.01   4. Contusion of left shoulder, initial encounter  S40.012A 923.00   5. Contusion of right knee, initial encounter  S80.01XA 924.11     Patient Active Problem List   Diagnosis   • COPD (chronic obstructive pulmonary disease) (CMS/[facility])   • Type 2 diabetes mellitus with stage 4 chronic kidney disease, with long-term current use of insulin (CMS/[facility])   • Essential hypertension   • Primary osteoarthritis of left knee   • Chronic renal insufficiency, stage 4 (severe) (CMS/[facility])   • Chronic diastolic (congestive) heart failure (CMS/[facility])   • Class 2 severe obesity due to excess calories with serious comorbidity in adult (CMS/[facility])   • Physical debility   • Acute UTI (urinary tract infection)   • Permanent atrial fibrillation (CMS/[facility])   • H/O noncompliance with medical treatment, presenting hazards to health   • Myxedema   • Acute on chronic combined systolic and diastolic CHF (congestive heart failure) (CMS/[facility])   • Generalized weakness   • Acute on chronic diastolic congestive heart failure (CMS/[facility])   • Recurrent left pleural effusion   • Fall on same level as cause of accidental injury     Past Medical History:   Diagnosis Date   • A-fib (CMS/[facility])    • Arthritis    • Asthma    • CAD (coronary artery disease)    • Cardiomyopathy (CMS/[facility])    • Cataract    • CHF (congestive heart failure) (CMS/[facility])    • CKD (chronic kidney disease), stage III (CMS/[facility])    • COPD (chronic obstructive pulmonary disease)  "(CMS/[facility])    • Diabetes mellitus (CMS/[facility])    • Disease of thyroid gland    • Emphysema, unspecified (CMS/[facility])    • Glaucoma    • Hyperlipidemia    • Hypertension    • Kidney stone    • Myocardial infarct, old    • Neuropathy    • Osteoarthritis    • Osteoporosis    • Permanent atrial fibrillation (CMS/[facility]) 6/30/2020   • PVD (peripheral vascular disease) (CMS/[facility])    • Renal disorder    • Shingles      Past Surgical History:   Procedure Laterality Date   • COLONOSCOPY     • EYE SURGERY     • JOINT REPLACEMENT      right   • KIDNEY STONE SURGERY     • REPLACEMENT TOTAL KNEE     • TOE SURGERY              OT ASSESSMENT FLOWSHEET (last 12 hours)      OT Evaluation and Treatment     Row Name 07/20/21 1045                   OT Time and Intention    Subjective Information  complains of;fatigue;pain  -SD        Document Type  therapy note (daily note)  -SD        Mode of Treatment  occupational therapy  -SD        Patient Effort  fair  -SD        Comment  Pt stated \"I am just tired. I can't do much.\"  -SD           General Information    Patient/Family/Caregiver Comments/Observations  Pt reclined in chair. Pt agreeable to therapy  -SD        Existing Precautions/Restrictions  fall  -SD        Barriers to Rehab  previous functional deficit  -SD           Cognition    Personal Safety Interventions  gait belt;fall prevention program maintained;nonskid shoes/slippers when out of bed;supervised activity  -SD           Pain Scale: Numbers Pre/Post-Treatment    Pre/Posttreatment Pain Comment  pt c/o chronic back pain and left hip pain, but he did not rate pain.  -SD        Pain Intervention(s)  Repositioned  -SD           Functional Mobility    Functional Mobility- Comment  pt stated he was too fatigued to attempt mobility.   -SD           Transfer Assessment/Treatment    Comment (Transfers)  Pt requires significant assistance to stand from a standard recliner. Pt uses a lift chair at home.  -SD           Transfers    Sit-Stand " [place] (Transfers)  maximum assist (25% patient effort);2 person assist;verbal cues  -SD        Stand-Sit [place] (Transfers)  contact guard;verbal cues  -SD           Sit-Stand Transfer    Assistive Device (Sit-Stand Transfers)  walker, front-wheeled  -SD           Stand-Sit Transfer    Assistive Device (Stand-Sit Transfers)  walker, front-wheeled  -SD           Balance    Comment, Balance  Pt stood in front of recliner for between 1-2 minutes with CGA using a walker for support.   -SD           Bathing Assessment/Intervention    Comment (Bathing)  pt declined adl activity  -SD           BADL Safety/Performance    Impairments, BADL Safety/Performance  balance;pain;endurance/activity tolerance  -SD           Wound 07/19/21 0221 Left anterior greater trochanter Abrasion    Wound - Properties Group Placement Date: 07/19/21 -KH Placement Time: 0221 -KH Present on Hospital Admission: Y  -KH Side: Left  -KH Orientation: anterior  -KH Location: greater trochanter  -KH Primary Wound Type: Abrasion  -LC    Retired Wound - Properties Group Date first assessed: 07/19/21 -KH Time first assessed: 0221 -KH Present on Hospital Admission: Y  -KH Side: Left  -KH Location: greater trochanter  -KH Primary Wound Type: Abrasion  -LC       Wound 07/19/21 0224 Right anterior greater trochanter    Wound - Properties Group Placement Date: 07/19/21 -KH Placement Time: 0224 -KH Side: Right  -KH Orientation: anterior  -KH Location: greater trochanter  -KH    Retired Wound - Properties Group Date first assessed: 07/19/21 -KH Time first assessed: 0224 -KH Side: Right  -KH Location: greater trochanter  -KH       Wound 07/19/21 0227 perineum    Wound - Properties Group Placement Date: 07/19/21 -KH Placement Time: 0227 -KH Location: perineum  -KH    Retired Wound - Properties Group Date first assessed: 07/19/21 -KH Time first assessed: 0227 -KH Location: perineum  -KH       Plan of Care Review    Plan of Care Reviewed With   patient  -SD        Outcome Summary  OT: Pt requires significant assisistance to stand from a standard recliner. Pt states he uses a lift chair all the time at home. Pt c/o fatigue with min activity today. Pt would not be safe to return home alone. Rec SNF upon discharge.   -SD           Transfer Goal 1 (OT)    Activity/Assistive Device (Transfer Goal 1, OT)  commode, 3-in-1  -SD        [place] Level/Cues Needed (Transfer Goal 1, OT)  supervision required  -SD        Time Frame (Transfer Goal 1, OT)  by discharge  -SD        Progress/Outcome (Transfer Goal 1, OT)  goal ongoing max A x 2 today for sit to stand tranfer from recliner  -SD           Patient Education Goal (OT)    Activity (Patient Education Goal, OT)  Pt to perform BUE HEP to improve functional strength and activity tolerance for basic daily tasks.   -SD        [place]/Cues/Accuracy (Memory Goal 2, OT)  verbalizes understanding  -SD        Time Frame (Patient Education Goal, OT)  by discharge  -SD        Progress/Outcome (Patient Education Goal, OT)  goal ongoing verbal instruction. pt declined activity  -SD           Positioning and Restraints    Pre-Treatment Position  sitting in chair/recliner  -SD        Post Treatment Position  chair  -SD        In Chair  reclined;call light within reach;encouraged to call for assist;exit alarm on  -SD           Progress Summary (OT)    Progress Toward Functional Goals (OT)  progress toward functional goals is gradual  -SD        Daily Progress Summary (OT)  continue with plan  -SD           Therapy Plan Review/Discharge Plan (OT)    Therapy Plan Review (OT)  evaluation/treatment results reviewed;care plan/treatment goals reviewed;risks/benefits reviewed;current/potential barriers reviewed;participants voiced agreement with care plan;patient  -SD        Anticipated Discharge Disposition (OT)  skilled nursing facility;extended care facility  -SD          User Key  (r) = Recorded By, (t) = Taken By, (c) =  Cosigned By    Initials Name Effective Dates    Carole Taylor RN 06/16/21 -     Sebastian Greene OTDELTA 06/16/21 -     Gutierrez Ambriz RN 06/16/21 -            Occupational Therapy Education                 Title: PT OT SLP Therapies (Done)     Topic: Occupational Therapy (Done)     Point: ADL training (Done)     Description:   Instruct learner(s) on proper safety adaptation and remediation techniques during self care or transfers.   Instruct in proper use of assistive devices.              Learning Progress Summary           Patient Acceptance, E, VU by SD at 7/20/2021 1243    Comment: Education regarding OT services, benefits of activity and safety with transfers.    Acceptance, E, VU by SD at 7/19/2021 1252    Comment: Education regarding OT services, benefits of activity and safety with transfers/mobility.                               User Key     Initials Effective Dates Name Provider Type Discipline    SD 06/16/21 -  Sebastian Wright, OTR Occupational Therapist OT                  OT Recommendation and Plan  Planned Therapy Interventions (OT): BADL retraining, transfer/mobility retraining, patient/caregiver education/training  Therapy Frequency (OT): 5 times/wk  Progress Toward Functional Goals (OT): progress toward functional goals is gradual  Plan of Care Review  Plan of Care Reviewed With: patient  Outcome Summary: OT: Pt requires significant assisistance to stand from a standard recliner. Pt states he uses a lift chair all the time at home. Pt c/o fatigue with min activity today. Pt would not be safe to return home alone. Rec SNF upon discharge.       Outcome Measures     Row Name 07/20/21 1025 07/20/21 1045          Turning from your back to your side while in flat bed without using bedrails?  3  -JW  --    Moving from lying on back to sitting on the side of a flat bed without bedrails?  2  -JW  --    Moving to and from a bed to a chair (including a wheelchair)?  2  -JW  --    Standing  up from a chair using your arms (e.g., wheelchair, bedside chair)?  1  -JW  --    Climbing 3-5 steps with a railing?  1  -JW  --    To walk in hospital room?  1  -JW  --    AM-PAC 6 Clicks Score (PT)  10  -JW  --          Putting on and taking off regular lower body clothing?  --  2  -SD    Bathing (including washing, rinsing, and drying)  --  2  -SD    Toileting (which includes using toilet bed pan or urinal)  --  2  -SD    Putting on and taking off regular upper body clothing  --  3  -SD    Taking care of personal grooming (such as brushing teeth)  --  4  -SD    Eating meals  --  4  -SD    AM-PAC 6 Clicks Score (OT)  --  17  -SD          Outcome Measure Options  AM-[facility] 6 Clicks Basic Mobility (PT)  -JW  AM-PAC 6 Clicks Daily Activity (OT)  -SD      User Key  (r) = Recorded By, (t) = Taken By, (c) = Cosigned By    Initials Name Provider Type     Ana Waite, PT Physical Therapist    Sebastian Greene OTR Occupational Therapist    Zoila Cazares, PT Physical Therapist          Time Calculation:   Time Calculation- OT     Row Name 07/20/21 1242             Time Calculation- OT    OT Start Time  1025  -SD      OT Stop Time  1045  -SD      OT Time Calculation (min)  20 min  -SD         Timed Charges    92252 - OT Therapeutic Activity Minutes  20  -SD         Total Minutes    Timed Charges Total Minutes  20  -SD       Total Minutes  20  -SD        User Key  (r) = Recorded By, (t) = Taken By, (c) = Cosigned By    Initials Name Provider Type    Sebastian Greene OTR Occupational Therapist        Therapy Charges for Today     Code Description Service Date Service Provider Modifiers Qty    [id number]  OT THERAPEUTIC ACT EA 15 MIN 7/20/2021 Sbeastian Wright OTR GO 1               BETH Melgar  7/20/2021

## 2021-07-20 NOTE — PLAN OF CARE
"Goal Outcome Evaluation:  Plan of Care Reviewed With: patient        Progress: improving  Outcome Summary: vss. Pt continues bed/chair alarm. Pt accepting to Covid testing- results negative/not detected.  Pt continues tele, afib, HR 58-70, continues 02, RT therapy, see emar, flowsheets. Pt continues incontinence, \"strict I&O\" difficult. Pt resting at this time. No other complaints at this time.   "

## 2021-07-20 NOTE — THERAPY TREATMENT NOTE
Acute Care - Physical Therapy Treatment Note  MICHAEL Kincaid     Patient Name: Froilan Helton  : 1940  MRN: [id number]  Today's Date: 2021   Onset of Illness/Injury or Date of Surgery: 21  Visit Dx:     ICD-10-CM ICD-9-CM   1. Recurrent left pleural effusion  J90 511.9   2. Acute on chronic congestive heart failure, unspecified heart failure type (CMS/[facility])  I50.9 428.0   3. Contusion of left hip, initial encounter  S70.02XA 924.01   4. Contusion of left shoulder, initial encounter  S40.012A 923.00   5. Contusion of right knee, initial encounter  S80.01XA 924.11     Patient Active Problem List   Diagnosis   • COPD (chronic obstructive pulmonary disease) (CMS/[facility])   • Type 2 diabetes mellitus with stage 4 chronic kidney disease, with long-term current use of insulin (CMS/[facility])   • Essential hypertension   • Primary osteoarthritis of left knee   • Chronic renal insufficiency, stage 4 (severe) (CMS/[facility])   • Chronic diastolic (congestive) heart failure (CMS/[facility])   • Class 2 severe obesity due to excess calories with serious comorbidity in adult (CMS/[facility])   • Physical debility   • Acute UTI (urinary tract infection)   • Permanent atrial fibrillation (CMS/[facility])   • H/O noncompliance with medical treatment, presenting hazards to health   • Myxedema   • Acute on chronic combined systolic and diastolic CHF (congestive heart failure) (CMS/[facility])   • Generalized weakness   • Acute on chronic diastolic congestive heart failure (CMS/[facility])   • Recurrent left pleural effusion   • Fall on same level as cause of accidental injury     Past Medical History:   Diagnosis Date   • A-fib (CMS/[facility])    • Arthritis    • Asthma    • CAD (coronary artery disease)    • Cardiomyopathy (CMS/[facility])    • Cataract    • CHF (congestive heart failure) (CMS/[facility])    • CKD (chronic kidney disease), stage III (CMS/[facility])    • COPD (chronic obstructive pulmonary disease) (CMS/[facility])    • Diabetes mellitus (CMS/[facility])    • Disease of thyroid gland    •  Emphysema, unspecified (CMS/[facility])    • Glaucoma    • Hyperlipidemia    • Hypertension    • Kidney stone    • Myocardial infarct, old    • Neuropathy    • Osteoarthritis    • Osteoporosis    • Permanent atrial fibrillation (CMS/[facility]) 6/30/2020   • PVD (peripheral vascular disease) (CMS/[facility])    • Renal disorder    • Shingles      Past Surgical History:   Procedure Laterality Date   • COLONOSCOPY     • EYE SURGERY     • JOINT REPLACEMENT      right   • KIDNEY STONE SURGERY     • REPLACEMENT TOTAL KNEE     • TOE SURGERY          PT Assessment (last 12 hours)      PT Evaluation and Treatment     Row Name 07/20/21 1025          Physical Therapy Time and Intention    Subjective Information  complains of;fatigue;pain  -     Document Type  therapy note (daily note)  -     Mode of Treatment  physical therapy  -     Patient Effort  adequate  -     Comment  requires encouragement for maximal effort  -     Row Name 07/20/21 1025          General Information    Patient Observations  alert;agree to therapy  -     Patient/Family/Caregiver Comments/Observations  pt in recliner, agrees to therapy  -     Existing Precautions/Restrictions  fall  -     Barriers to Rehab  previous functional deficit  -[facility] Name 07/20/21 1025          Cognition    Personal Safety Interventions  gait belt;nonskid shoes/slippers when out of bed  -     Row Name 07/20/21 1025          Pain Scale: Numbers Pre/Post-Treatment    Pre/Posttreatment Pain Comment  pt reports back and left hip pain, does not rate  -[facility] Name 07/20/21 1025          Bed Mobility    Comment (Bed Mobility)  deferred-up in chair  -[facility] Name 07/20/21 1025          Transfers    Transfers  sit-stand transfer;stand-sit transfer  -     Comment (Transfers)  pt requires significant assistance to stand from recliner surface.  pt reports he uses lift chair at all times at home  -     Sit-Stand [place] (Transfers)  maximum assist (25% patient effort);2  "person assist;verbal cues  -     Stand-Sit [place] (Transfers)  contact guard;verbal cues  -     Row Name 07/20/21 1025          Sit-Stand Transfer    Assistive Device (Sit-Stand Transfers)  walker, front-wheeled  -BA     Row Name 07/20/21 1025          Stand-Sit Transfer    Assistive Device (Stand-Sit Transfers)  walker, front-wheeled  -BA     Row Name 07/20/21 1025          Gait/Stairs (Locomotion)    Comment (Gait/Stairs)  pt declines to attempt gait activities.  pt states \"I just don't think I can\"  -     Row Name             Wound 07/19/21 0221 Left anterior greater trochanter Abrasion    Wound - Properties Group Placement Date: 07/19/21  -KH Placement Time: 0221 -KH Present on Hospital Admission: Y  -KH Side: Left  -KH Orientation: anterior  -KH Location: greater trochanter  -KH Primary Wound Type: Abrasion  -LC    Retired Wound - Properties Group Date first assessed: 07/19/21  -KH Time first assessed: 0221 -KH Present on Hospital Admission: Y  -KH Side: Left  -KH Location: greater trochanter  -KH Primary Wound Type: Abrasion  -LC    Row Name             Wound 07/19/21 0224 Right anterior greater trochanter    Wound - Properties Group Placement Date: 07/19/21  -KH Placement Time: 0224 -KH Side: Right  -KH Orientation: anterior  -KH Location: greater trochanter  -KH    Retired Wound - Properties Group Date first assessed: 07/19/21  -KH Time first assessed: 0224 -KH Side: Right  -KH Location: greater trochanter  -KH    Row Name             Wound 07/19/21 0227 perineum    Wound - Properties Group Placement Date: 07/19/21  -KH Placement Time: 0227 -KH Location: perineum  -KH    Retired Wound - Properties Group Date first assessed: 07/19/21  -KH Time first assessed: 0227 -KH Location: perineum  -KH    Row Name 07/20/21 1025          Plan of Care Review    Outcome Summary  PT: Patient requires encouragement to participate with therapy.  Patient performs sit to stand with max assist x2 from recliner " "surface.  Patient with bilateral knee flexion during standing, reports he can not obtain full knee extension.  Patient declines gait activities stating \"I just don't think I can do it today.\"  Patient does not appear safe to return home alone at this time, recommend SNF with potential to transition to long term care at discharge.  -     Row Name 07/20/21 1025          Positioning and Restraints    Pre-Treatment Position  sitting in chair/recliner  -     Post Treatment Position  chair  -JW     In Chair  reclined;call light within reach;encouraged to call for assist;exit alarm on  -     Row Name 07/20/21 1025          Progress Summary (PT)    Progress Toward Functional Goals (PT)  progress toward functional goals is gradual  -     Barriers to Overall Progress (PT)  prior level of function  -       User Key  (r) = Recorded By, (t) = Taken By, (c) = Cosigned By    Initials Name Provider Type    Carole Taylor, RN Registered Nurse    Zoila Cazares, PT Physical Therapist    Gutierrez Ambriz RN Registered Nurse        Physical Therapy Education                 Title: PT OT SLP Therapies (Done)     Topic: Physical Therapy (Done)     Point: Mobility training (Done)     Learning Progress Summary           Patient Acceptance, E,TB, VU by  at 7/20/2021 1214    Acceptance, E,TB, VU by  at 7/19/2021 1043                               User Key     Initials Effective Dates Name Provider Type Discipline     06/16/21 -  Ana Waite, PT Physical Therapist PT     06/16/21 -  Zoila Estrella, LORENZO Physical Therapist PT              PT Recommendation and Plan  Anticipated Discharge Disposition (PT): skilled nursing facility, extended care facility  Progress Summary (PT)  Progress Toward Functional Goals (PT): progress toward functional goals is gradual  Barriers to Overall Progress (PT): prior level of function  Outcome Summary: PT: Patient requires encouragement to participate with therapy.  " "Patient performs sit to stand with max assist x2 from recliner surface.  Patient with bilateral knee flexion during standing, reports he can not obtain full knee extension.  Patient declines gait activities stating \"I just don't think I can do it today.\"  Patient does not appear safe to return home alone at this time, recommend SNF with potential to transition to long term care at discharge.  Outcome Measures     Row Name 07/20/21 1025 07/19/21 1044 07/19/21 1000       How much help from another person do you currently need...    Turning from your back to your side while in flat bed without using bedrails?  3  -JW  --  3  -LH    Moving from lying on back to sitting on the side of a flat bed without bedrails?  2  -JW  --  2  -LH    Moving to and from a bed to a chair (including a wheelchair)?  2  -JW  --  3  -LH    Standing up from a chair using your arms (e.g., wheelchair, bedside chair)?  1  -JW  --  2  -LH    Climbing 3-5 steps with a railing?  1  -JW  --  1  -LH    To walk in hospital room?  1  -JW  --  3  -LH    AM-PAC 6 Clicks Score (PT)  10  -JW  --  14  -       How much help from another is currently needed...    Putting on and taking off regular lower body clothing?  --  2  -SD  --    Bathing (including washing, rinsing, and drying)  --  2  -SD  --    Toileting (which includes using toilet bed pan or urinal)  --  2  -SD  --    Putting on and taking off regular upper body clothing  --  3  -SD  --    Taking care of personal grooming (such as brushing teeth)  --  4  -SD  --    Eating meals  --  4  -SD  --    AM-PAC 6 Clicks Score (OT)  --  17  -SD  --       Functional Assessment    Outcome Measure Options  AM-PAC 6 Clicks Basic Mobility (PT)  -  AM-PAC 6 Clicks Daily Activity (OT)  -[facility]-PAC 6 Clicks Basic Mobility (PT)  -      User Key  (r) = Recorded By, (t) = Taken By, (c) = Cosigned By    Initials Name Provider Type     Ana Waite, PT Physical Therapist    Sebastian Greene, OTR " Occupational Therapist    Zoila Cazares, PT Physical Therapist           Time Calculation:   PT Charges     Row Name 07/20/21 1214             Time Calculation    Start Time  1025  -JW      Stop Time  1045  -JW      Time Calculation (min)  20 min  -JW         Timed Charges    73396 - PT Therapeutic Exercise Minutes  20  -JW         Total Minutes    Timed Charges Total Minutes  20  -JW       Total Minutes  20  -JW        User Key  (r) = Recorded By, (t) = Taken By, (c) = Cosigned By    Initials Name Provider Type    Zoila Cazares, LORENZO Physical Therapist        Therapy Charges for Today     Code Description Service Date Service Provider Modifiers Qty    [id number] HC PT THER PROC EA 15 MIN 7/20/2021 Zoila Estrella, LORENZO GP 1          PT G-Codes  Outcome Measure Options: AM-PAC 6 Clicks Basic Mobility (PT)  AM-PAC 6 Clicks Score (PT): 10  AM-PAC 6 Clicks Score (OT): 17    Zoila Estrella PT  7/20/2021

## 2021-07-20 NOTE — PLAN OF CARE
Goal Outcome Evaluation:  Plan of Care Reviewed With: patient           Outcome Summary: OT: Pt requires significant assisistance to stand from a standard recliner. Pt states he uses a lift chair all the time at home. Pt c/o fatigue with min activity today. Pt would not be safe to return home alone. Rec SNF upon discharge.

## 2021-07-20 NOTE — PLAN OF CARE
"Goal Outcome Evaluation:  Plan of Care Reviewed With: patient        Progress: improving  Outcome Summary: Pt VS, am labs pending. Pt continues bed alarm, continues isolation r/t refuses covid testing. Pt continues tele, afib, HR 60-80's, continues 02, RT therapy, see emar, flowsheets. Pt continues incontinence, \"strict I&O\" difficult. Pt resting at this time.  "

## 2021-07-20 NOTE — CASE MANAGEMENT/SOCIAL WORK
Continued Stay Note  MICHAEL Kincaid     Patient Name: Froilan Helton  MRN: [id number]  Today's Date: 7/20/2021    Admit Date: 7/18/2021    Discharge Plan     Row Name 07/20/21 1550       Plan    Plan  plan STR [facility]    Plan Comments  Spoke with Travis who states ablity to accept patient tomorrow for STR, need covid screening completed. Spoke with patient at bedside, he has been screened for Covid and results negative. He is sitting up in recliner with lunch tray arriving. He is agreeable to STR at [place] and understands ability to accept tomorrow. Spoke with patients granddaughter/POODELL Sebastian, she is also in agreement with STR at [facility]. Dr Briggs updated of ability to accept tomorrow. Patient was current with [facility], notified Margarita/Gamal of plan STR, she will follow patient at [facility]. CM will continue to follow.        Discharge Codes    No documentation.             Titi Ramirez RN

## 2021-07-21 NOTE — PROGRESS NOTES
Contacted by staff at 2:20 AM regarding patient complaining of neck pain.  Epic downtime prevented computer .   Staff also noted urinary retention, requiring in and out cath, 800 mL out, check UA C&S  Add bladder scans, urinary retention protocol

## 2021-07-21 NOTE — CASE MANAGEMENT/SOCIAL WORK
Continued Stay Note  MICHAEL Kincaid     Patient Name: Froilan Helton  MRN: [id number]  Today's Date: 7/21/2021    Admit Date: 7/18/2021    Discharge Plan     Row Name 07/21/21 1419       Plan    Plan  Discharge to [facility]    Plan Comments  I spoke with Flores from [facility] and requested transport for the patient. She advised that they can have someone pick the patient up about 3:30 today.  I advised that we would assist with getting the patient into the van.  CM will continue to follow.        Discharge Codes    No documentation.       Expected Discharge Date and Time     Expected Discharge Date Expected Discharge Time    Jul 21, 2021             Terri Martinez RN

## 2021-07-21 NOTE — PLAN OF CARE
Goal Outcome Evaluation:           Progress: improving  Outcome Summary: VSS.  Pt stait catherd with 800cc out after bladder scan greater than 500.  Fore skin narrowed and therefore difficult to cath.

## 2021-07-21 NOTE — DISCHARGE SUMMARY
Froilan Helton  1940  [id number]    Hospitalists Discharge Summary    Date of Admission: 7/18/2021  Date of Discharge:  7/21/2021    Primary Discharge Diagnoses:  1.  Acute-on-Chronic sCHF exacerbation  2.  Hypothyroidism  3.  Medical non-compliance    Secondary Discharge Diagnoses:  1.  Diabetes Mellitus, Type 2 last A1c was  2.  CKD  3.  Essential Hypertension  4.  Chronic Afib  5.  Nocturnal hypoxia  6.  COPD  7.  Urinary retention on Flomax    History of Present Illness (taken from H&P):  79 y/o M with hx of CHF, Afib on AC, Asthma/COPD, CKD, DM, HTN, HLD presented to ED last night after sustaining mechanical fall while trying to get into bed from his scooter and flipped forward. Patient also endorsed SOB/BEAN that has gotten worse recently when compared to prior; he is aware of his CHF diagnosis and claims compliance to home meds, but now unable to lay flat as well and noted chronic LE edema.      Hospital Course:  Mr. Helton was admitted to the Med/Surg unit in enhanced isolation initially because he refused a COVID swab.  He was resumed on diuretic therapy as well as his home medications.  Although he endorses good compliance, it is clear from his labwork he is not taking his medications as prescribed.  He was seen in consultation by Cardiology, and his Amiodarone was stopped.  His creatinine and bicarb bumped so I held his diuretic therapy and he should resume oral regimen tomorrow.    PCP  Patient Care Team:  Fortunato De Leon MD as PCP - General (Family Medicine)    Consults:   Consults     Date and Time Order Name Status Description    7/19/2021  3:12 AM Inpatient Cardiology Consult Completed           Operations and Procedures Performed:       XR Chest 2 View    Result Date: 7/18/2021  Narrative: CR Chest 2 Vws INDICATION:  Short of air and crackles in the left lower lobe. History of atrial fibrillation and congestive heart failure. Complaining of left shoulder pain. COMPARISON:  Chest x-ray 7/7/2021.  FINDINGS: PA and lateral views of the chest.  3 films submitted. There is interval worsening in the appearance the chest with development of moderate left pleural effusion. There is mild cardiac silhouette enlargement not changed. There is new airspace disease in the left lower lung laterally and patchier peripheral airspace disease in left upper lung laterally. There is mild central vascular congestion but is otherwise not significantly changed from 7/7/2021. Please correlate for clinical concern for aspiration or pneumonia. While congestive heart failure could result in this appearance, the asymmetry is atypical. Please correlate for any concern for pulmonary embolus. There is no pneumothorax. No displaced rib fracture is suspected.     Impression: Interval worsening in the appearance the chest with development of a moderate-sized left pleural effusion. This also left lower lobe airspace disease and probably patchy airspace disease at the periphery left upper lung. There is again mild cardiac silhouette enlargement and central vascular congestion which is similar to the prior study. Given the asymmetric involvement of the left hemithorax, please correlate for clinical evidence for aspiration or pneumonia, possibly with a parapneumonic effusion. Please correlate for any clinical concern for pulmonary embolus. Correlation with a CT chest PE protocol may be helpful. Signer Name: Libertad Kam MD  Signed: 7/18/2021 9:29 PM  Workstation Name: [facility]  [facility]    XR Chest 2 View    Result Date: 7/7/2021  Narrative: PROCEDURE: CR Chest 2 Vws INDICATIONS: generalized weakness  Additional Relevant clinical info: No known recent trauma. General weakness. Recent discharge from [facility] per family member for abnormal thyroid and other bloodwork. COMPARISON: 6/29/2020 TECHNIQUE: 2 view chest. FINDINGS:     Cardiac silhouette is stable with mild left ventricular prominence and tortuosity of  the aorta. Minimal elevation left hemidiaphragm stable from recent exam. No definite infiltrate consolidation or pleural effusion. No acute osseous abnormality.     Impression: No acute disease     Signer Name: Maryanne Eubanks MD  Signed: 7/7/2021 9:22 PM  Workstation Name: [email or web address]  [facility]    XR Spine Cervical 2 or 3 View    Result Date: 7/21/2021  Narrative: CERVICAL SPINE, 07/21/2021     HISTORY: 80-year-old male complaining of neck pain since a fall 3 days ago.  TECHNIQUE: Three-view cervical spine series with additional swimmer's lateral images.  FINDINGS: No acute or chronic fracture deformity or additional traumatic osseous abnormality is identified.  Bilateral degenerative facet arthropathy throughout the cervical spine with grade 1 anterolisthesis at C3-4 and C4-5. Mild to moderate degenerative disc space narrowing at C6-7. Cervicothoracic spinal curvature.      Impression: 1. No acute osseous abnormality is demonstrated. 2. Extensive bilateral degenerative facet arthropathy with grade 1 anterolisthesis at C3-4 and C4-5. Degenerative disc disease at C6-7.  This report was finalized on 7/21/2021 8:58 AM by Dr. Edgardo Adams MD.      XR Shoulder 2+ View Left    Result Date: 7/18/2021  Narrative: CR Shoulder Comp Min 2 Vws LT INDICATION: Fell around 1700 yesterday complains of left hip pain shoulder pain and stiffness in the right knee. Previous right knee surgery. COMPARISON: None available. FINDINGS: 2 views of the left shoulder.  There is degenerative change at the acromioclavicular joint. The humeral head is located. There is suspicion for chronic rotator cuff disease with narrowing of the superior joint space. No acute fracture is suspected. No dislocation. No radiopaque foreign body. See accompanying chest x-ray report.    Impression: Plain film findings are most suggestive of chronic rotator cuff disease left shoulder without acute fracture or dislocation. There is  also degenerative change at the acromioclavicular joint. Signer Name: Libertad Kam MD  Signed: 7/18/2021 9:22 PM  Workstation Name: [facility]  [facility]    XR Knee 3 View Right    Result Date: 7/18/2021  Narrative: CR Knee 3 Vws RT INDICATION: Right knee pain since a fall yesterday. Previous right knee arthroplasty. COMPARISON: Plain films from December 2019. FINDINGS: 3 view(s) of the right knee. Postoperative changes right total knee arthroplasty seen with no evidence for acute fracture dislocation or loosening. There may be a small amount of suprapatellar joint fluid. There are mild vascular calcifications.     Impression: Postoperative findings consistent with right total knee arthroplasty without evidence for acute fracture or loosening or dislocation. There may be a small amount of suprapatellar joint fluid. This is difficult to determine in the postoperative knee. Signer Name: Libertad Kam MD  Signed: 7/18/2021 9:23 PM  Workstation Name: [facility]    CT Head Without Contrast    Result Date: 7/7/2021  Narrative: PROCEDURE: CT Head WO COMPARISON: 4/6/2021 INDICATIONS: Generalized weakness, confusion Additional Relevant clinical info: No known recent trauma. General weakness. Recent discharge from [facility] per family member for abnormal thyroid and other bloodwork.  TECHNIQUE:  CT examination of the brain is performed without IV contrast. Radiation dose reduction techniques included automated exposure control or exposure modulation based on body size.  Count of known CT and cardiac nuc med studies performed in previous 12 months: 1.  FINDINGS:   There is no evidence of acute intracranial hemorrhage, mass effect or midline shift. No intra-axial or extra-axial fluid collections. There is mild cortical atrophy. Periventricular low attenuation is likely secondary to small vessel ischemic disease. The ventricular system is not dilated.  The  calvarium is intact.     Impression: No acute intracranial process. Probable small vessel ischemic disease.  Signer Name: Maryanne Eubanks MD  Signed: 7/7/2021 9:24 PM  Workstation Name: [facility]  [facility]    US Renal Bilateral    Result Date: 7/8/2021  Narrative: BILATERAL RENAL ULTRASOUND, 07/08/2021  HISTORY: Hematuria  COMPARISON: 11/21/2019  TECHNIQUE: Grayscale ultrasound imaging of both kidneys and the urinary bladder was performed.  FINDINGS: Technologist noted that the study was limited because the patient's immobility.  The bladder appears normal. Right kidney does not show any hydronephrosis. It is approximately 10.3 cm in length. Cortex is normal. Left kidney is 11.10 m in length. There is no hydronephrosis. Cortex is normal.      Impression: Negative bilateral renal  ultarsound. Study is somewhat limited by patient's immobility  This report was finalized on 7/8/2021 11:37 AM by Dr. Fei Henderson MD.      US Venous Doppler Lower Extremity Bilateral (duplex)    Result Date: 7/8/2021  Narrative: VENOUS DOPPLER ULTRASOUND, BILATERAL LOWER EXTREMITIES, 07/20/2021  HISTORY: Bilateral leg edema for 3 months with wounds at ankles  COMPARISON: 11/21/2019  TECHNIQUE: Venous Doppler ultrasound examination of both legs was performed using grey-scale, spectral Doppler, and color flow Doppler ultrasound imaging.  FINDINGS: Soft tissue edema is present in the ankles bilaterally There is no evidence of deep venous thrombosis from the groin to the lower calf bilaterally. The greater saphenous veins are also patent.      Impression: Ankle edema is noted. No evidence of lower extremity DVT on the right or left.  This report was finalized on 7/8/2021 11:35 AM by Dr. Fei Henderson MD.      CT Angiogram Chest    Result Date: 7/19/2021  Narrative: CTA Chest INDICATION: Shortness of air today. TECHNIQUE: CT angiogram of the chest with IV contrast. 3-D reconstructions were obtained and reviewed.    Radiation dose reduction techniques included automated exposure control or exposure modulation based on body size. Count of known CT and cardiac nuc med studies performed in previous 12 months: 2. COMPARISON: CT chest 11/21/2017 FINDINGS: Adequate opacification of the pulmonary arteries with no filling defects. Thoracic aorta normal in course and caliber without dissection. Marked cardiomegaly. No pericardial effusion. Small right pleural effusion and moderate left pleural effusion. Compressive bibasilar atelectasis. Hazy groundglass opacities throughout both lungs with interlobular septal thickening, most suggestive of pulmonary edema. No pneumothorax. Visualized upper abdomen is unremarkable. Cholelithiasis. No acute osseous abnormality.     Impression: 1. Negative for pulmonary embolus. 2. Negative for thoracic aortic aneurysm/dissection. 3. Cardiomegaly with bilateral pleural effusions, compressive bibasilar atelectasis, and hazy groundglass opacities throughout both lungs. There is interlobular septal thickening. Findings appear most suggestive of congestive failure and pulmonary edema. 4. Cholelithiasis. Signer Name: Gato Mcgovern MD  Signed: 7/19/2021 12:42 AM  Workstation Name: [id number]  [facility]    XR Hip With or Without Pelvis 2 - 3 View Left    Result Date: 7/18/2021  Narrative: CR Hip Uni Comp Min 2 Vws LT INDICATION: Fell around 1700 yesterday with complaint of left hip pain. COMPARISON: None available. FINDINGS: AP pelvis and AP and frog-leg views of the left hip..  3 films obtained. There is arthritis of the left hip with some osteophyte formation and joint space narrowing but no acute fracture dislocation or radiopaque foreign body is suspected. No osseous destructive lesion. Lower sacrum is obscured by overlying bowel gas and stool. Degenerative changes noted lower lumbar spine.      Impression: No acute fracture or dislocation suspected left hip. Signer Name: Libertad  Negin SALAZAR  Signed: 7/18/2021 9:25 PM  [facility]  [facility]      Allergies:  is allergic to morphine, atorvastatin, and oxycontin [oxycodone hcl].    Joseph  reviewed    Discharge Medications:     Discharge Medications      Changes to Medications      Instructions Start Date   apixaban 5 MG tablet tablet  Commonly known as: ELIQUIS  What changed:   · medication strength  · how much to take   5 mg, Oral, Every 12 Hours Scheduled      O2  Commonly known as: OXYGEN  What changed: how much to take   1 L/min (1 L/min), Inhalation, Every Night at Bedtime         Continue These Medications      Instructions Start Date   acetaminophen 325 MG tablet  Commonly known as: TYLENOL   650 mg, Oral, Every 4 Hours PRN      albuterol sulfate  (90 Base) MCG/ACT inhaler  Commonly known as: PROVENTIL HFA;VENTOLIN HFA;PROAIR HFA   2 puffs, Inhalation, Every 4 Hours PRN      atorvastatin 10 MG tablet  Commonly known as: LIPITOR   10 mg, Oral, Daily      budesonide-formoterol 160-4.5 MCG/ACT inhaler  Commonly known as: Symbicort   2 puffs, Inhalation, 2 Times Daily - RT      bumetanide 2 MG tablet  Commonly known as: BUMEX   2 mg, Oral, 2 Times Daily      carvedilol 6.25 MG tablet  Commonly known as: COREG   6.25 mg, Oral, 2 Times Daily With Meals      Cholecalciferol 50 MCG (2000 UT) tablet   2,000 Units, Oral, Daily      citalopram 10 MG tablet  Commonly known as: CeleXA   20 mg, Oral, Daily      cyanocobalamin 1000 MCG tablet  Commonly known as: VITAMIN B-12   1,000 mcg, Oral, Daily      docusate sodium 100 MG capsule  Commonly known as: COLACE   100 mg, Oral, 2 Times Daily      fluticasone 50 MCG/ACT nasal spray  Commonly known as: FLONASE   2 sprays, Each Nare, Daily      hydrocortisone 1 % cream   Topical, 2 Times Daily      hydrophor ointment ointment   Topical, As Needed, Lower extremities.      insulin aspart 100 UNIT/ML injection  Commonly known as: novoLOG   1-14 Units,  "Subcutaneous, 3 Times Daily Before Meals, As directed per sliding scale       Insulin Syringe 30G X 5/16\" 1 ML misc   U UTD WITH INSULIN UP TO 6 TIMES A DAY      ipratropium-albuterol 0.5-2.5 mg/3 ml nebulizer  Commonly known as: DUO-NEB   3 mL, Nebulization, Every 4 Hours PRN      levothyroxine 112 MCG tablet  Commonly known as: SYNTHROID, LEVOTHROID   224 mcg, Oral, Daily      loratadine 5 MG chewable tablet  Commonly known as: CLARITIN   10 mg, Oral, Daily      melatonin 5 MG tablet tablet   5 mg, Oral, Nightly PRN, Over the counter      Petrolatum 42 % ointment   Topical, Every 24 Hours Scheduled, Send with patient      polyethylene glycol 17 g packet  Commonly known as: MIRALAX   17 g, Oral, Daily      pregabalin 75 MG capsule  Commonly known as: LYRICA   75 mg, Oral, 2 Times Daily      QUEtiapine 25 MG tablet  Commonly known as: SEROquel   12.5 mg, Oral, Nightly      SITagliptin 100 MG tablet  Commonly known as: JANUVIA   100 mg, Oral, Daily      sodium chloride 0.65 % nasal spray   2 sprays, Nasal, As Needed, Send with patient      tamsulosin 0.4 MG capsule 24 hr capsule  Commonly known as: FLOMAX   0.4 mg, Oral, Daily      vitamin D 1.25 MG (79711 UT) capsule capsule  Commonly known as: ERGOCALCIFEROL   1,250 Units, Oral, Daily         Stop These Medications    amiodarone 200 MG tablet  Commonly known as: PACERONE     doxycycline 100 MG capsule  Commonly known as: VIBRAMYCIN            Last Lab Results:   Lab Results (most recent)     Procedure Component Value Units Date/Time    POC Glucose Once [[id number]]  (Abnormal) Collected: 07/21/21 1148    Specimen: Blood Updated: 07/21/21 1155     Glucose 158 mg/dL      Comment: Meter: [id number] : [id number] Liliana HARKINS       POC Glucose Once [[id number]]  (Normal) Collected: 07/21/21 0717    Specimen: Blood Updated: 07/21/21 0725     Glucose 112 mg/dL      Comment: Meter: [id number] : [id number] Liliana HARKINS       Renal Function " Panel [[id number]]  (Abnormal) Collected: 07/21/21 0353    Specimen: Blood Updated: 07/21/21 0506     Glucose 112 mg/dL      BUN 30 mg/dL      Creatinine 1.98 mg/dL      Sodium 134 mmol/L      Potassium 4.5 mmol/L      Chloride 97 mmol/L      CO2 30.5 mmol/L      Calcium 8.7 mg/dL      Albumin 3.00 g/dL      Phosphorus 4.7 mg/dL      Anion Gap 6.5 mmol/L      BUN/Creatinine Ratio 15.2     eGFR Non African Amer 33 mL/min/1.73     Narrative:      GFR Normal >60  Chronic Kidney Disease <60  Kidney Failure <15      Urinalysis, Microscopic Only - Urine, Catheter [[id number]]  (Abnormal) Collected: 07/21/21 0429    Specimen: Urine, Catheter Updated: 07/21/21 0459     RBC, UA 3-5 /HPF      WBC, UA 6-12 /HPF      Bacteria, UA Trace /HPF      Squamous Epithelial Cells, UA 0-2 /HPF      Hyaline Casts, UA None Seen /LPF      Methodology Manual Light Microscopy    Urine Culture - Urine, Urine, Catheter [[id number]] Collected: 07/21/21 0429    Specimen: Urine, Catheter Updated: 07/21/21 0459    Urinalysis With Culture If Indicated - Urine, Catheter [[id number]]  (Abnormal) Collected: 07/21/21 0429    Specimen: Urine, Catheter Updated: 07/21/21 0442     Color, UA Yellow     Appearance, UA Clear     pH, UA <=5.0     Specific Gravity, UA 1.010     Glucose,  mg/dL (Trace)     Ketones, UA Negative     Bilirubin, UA Negative     Blood, UA Trace     Protein,  mg/dL (2+)     Leuk Esterase, UA Moderate (2+)     Nitrite, UA Negative     Urobilinogen, UA 0.2 E.U./dL    Blood Culture - Blood, Arm, Left [[id number]] Collected: 07/18/21 2229    Specimen: Blood from Arm, Left Updated: 07/20/21 2245     Blood Culture No growth at 2 days    Blood Culture - Blood, Arm, Right [[id number]] Collected: 07/18/21 2229    Specimen: Blood from Arm, Right Updated: 07/20/21 2245     Blood Culture No growth at 2 days    COVID PRE-OP / PRE-PROCEDURE SCREENING ORDER (NO ISOLATION) - Swab, Nasal Cavity [[id number]]  (Normal) Collected: 07/20/21 1023     Specimen: Swab from Nasal Cavity Updated: 07/20/21 1123    Narrative:      The following orders were created for panel order COVID PRE-OP / PRE-PROCEDURE SCREENING ORDER (NO ISOLATION) - Swab, Nasal Cavity.  Procedure                               Abnormality         Status                     ---------                               -----------         ------                     COVID-19,[facility] IN-[facility]RE...[[id number]]  Normal              Final result                 Please view results for these tests on the individual orders.    COVID-19,[facility] IN-HOUSE,Nasal Swab No Transport Media 3-4 HR TAT - Swab, Nasal Cavity [[id number]]  (Normal) Collected: 07/20/21 1023    Specimen: Swab from Nasal Cavity Updated: 07/20/21 1123     COVID19 Not Detected    Narrative:      Fact sheet for providers: https://www.fda.gov/media/266401/download     Fact sheet for patients: https://www.fda.gov/media/741277/download    Test performed by PCR.    Consider negative results in combination with clinical observations, patient history, and epidemiological information.    Renal Function Panel [[id number]]  (Abnormal) Collected: 07/20/21 1005    Specimen: Blood Updated: 07/20/21 1045     Glucose 166 mg/dL      BUN 28 mg/dL      Creatinine 1.84 mg/dL      Sodium 137 mmol/L      Potassium 4.1 mmol/L      Chloride 100 mmol/L      CO2 29.1 mmol/L      Calcium 8.8 mg/dL      Albumin 3.00 g/dL      Phosphorus 4.3 mg/dL      Anion Gap 7.9 mmol/L      BUN/Creatinine Ratio 15.2     eGFR Non African Amer 36 mL/min/1.73     Narrative:      GFR Normal >60  Chronic Kidney Disease <60  Kidney Failure <15      Troponin [[id number]]  (Abnormal) Collected: 07/19/21 0442    Specimen: Blood Updated: 07/19/21 0545     Troponin T 0.061 ng/mL     Narrative:      Troponin T Reference Range:  <= 0.03 ng/mL-   Negative for AMI  >0.03 ng/mL-     Abnormal for myocardial necrosis.  Clinicians would have to utilize clinical acumen, EKG, Troponin and serial changes to  determine if it is an Acute Myocardial Infarction or myocardial injury due to an underlying chronic condition.       Results may be falsely decreased if patient taking Biotin.      Basic Metabolic Panel [[id number]]  (Abnormal) Collected: 07/19/21 0442    Specimen: Blood Updated: 07/19/21 0530     Glucose 127 mg/dL      BUN 24 mg/dL      Creatinine 1.36 mg/dL      Sodium 138 mmol/L      Potassium 4.1 mmol/L      Chloride 102 mmol/L      CO2 27.2 mmol/L      Calcium 9.4 mg/dL      eGFR Non African Amer 51 mL/min/1.73      BUN/Creatinine Ratio 17.6     Anion Gap 8.8 mmol/L     Narrative:      GFR Normal >60  Chronic Kidney Disease <60  Kidney Failure <15      CBC (No Diff) [[id number]]  (Abnormal) Collected: 07/19/21 0442    Specimen: Blood Updated: 07/19/21 0510     WBC 9.69 10*3/mm3      RBC 3.82 10*6/mm3      Hemoglobin 10.8 g/dL      Hematocrit 33.8 %      MCV 88.5 fL      MCH 28.3 pg      MCHC 32.0 g/dL      RDW 15.4 %      RDW-SD 50.0 fl      MPV 11.5 fL      Platelets 182 10*3/mm3     TSH [[id number]]  (Abnormal) Collected: 07/18/21 2229    Specimen: Blood Updated: 07/19/21 0350     TSH 38.830 uIU/mL     Phosphorus [[id number]]  (Normal) Collected: 07/18/21 2229    Specimen: Blood Updated: 07/19/21 0336     Phosphorus 3.3 mg/dL     Magnesium [[id number]]  (Normal) Collected: 07/18/21 2229    Specimen: Blood Updated: 07/19/21 0336     Magnesium 1.9 mg/dL     Procalcitonin [[id number]]  (Normal) Collected: 07/18/21 2229    Specimen: Blood Updated: 07/18/21 2314     Procalcitonin 0.09 ng/mL     Narrative:      Results may be falsely decreased if patient taking Biotin.     BNP [[id number]]  (Abnormal) Collected: 07/18/21 2229    Specimen: Blood Updated: 07/18/21 2311     proBNP 20,761.0 pg/mL     Narrative:      Among patients with dyspnea, NT-proBNP is highly sensitive for the detection of acute congestive heart failure. In addition NT-proBNP of <300 pg/ml effectively rules out acute congestive heart failure with 99%  negative predictive value.    Results may be falsely decreased if patient taking Biotin.      Troponin [[id number]]  (Abnormal) Collected: 07/18/21 2229    Specimen: Blood Updated: 07/18/21 2311     Troponin T 0.068 ng/mL     Narrative:      Troponin T Reference Range:  <= 0.03 ng/mL-   Negative for AMI  >0.03 ng/mL-     Abnormal for myocardial necrosis.  Clinicians would have to utilize clinical acumen, EKG, Troponin and serial changes to determine if it is an Acute Myocardial Infarction or myocardial injury due to an underlying chronic condition.       Results may be falsely decreased if patient taking Biotin.      Comprehensive Metabolic Panel [[id number]]  (Abnormal) Collected: 07/18/21 2229    Specimen: Blood Updated: 07/18/21 2310     Glucose 154 mg/dL      BUN 25 mg/dL      Creatinine 1.41 mg/dL      Sodium 135 mmol/L      Potassium 4.2 mmol/L      Chloride 97 mmol/L      CO2 26.1 mmol/L      Calcium 9.6 mg/dL      Total Protein 6.9 g/dL      Albumin 3.80 g/dL      ALT (SGPT) 9 U/L      AST (SGOT) 14 U/L      Alkaline Phosphatase 78 U/L      Total Bilirubin 0.9 mg/dL      eGFR Non African Amer 48 mL/min/1.73      Globulin 3.1 gm/dL      A/G Ratio 1.2 g/dL      BUN/Creatinine Ratio 17.7     Anion Gap 11.9 mmol/L     Narrative:      GFR Normal >60  Chronic Kidney Disease <60  Kidney Failure <15      D-dimer, Quantitative [[id number]]  (Abnormal) Collected: 07/18/21 2229    Specimen: Blood Updated: 07/18/21 2305     D-Dimer, Quantitative 0.96 MCGFEU/mL     Narrative:      Can be elevated in, but is not diagnostic for deep vein thrombosis (DVT) or pulmonary embolis (PE).  It is also elevated in other medical conditions.  Clinical correlation is required.  The negative cut-off value for the D-Dimer is 0.50 mcg FEU/mL for DVT and PE.      Lactic Acid, Plasma [[id number]]  (Normal) Collected: 07/18/21 2229    Specimen: Blood Updated: 07/18/21 2259     Lactate 1.2 mmol/L     Urinalysis, Microscopic Only - Urine, Clean  Catch [[id number]]  (Abnormal) Collected: 07/18/21 2228    Specimen: Urine, Clean Catch Updated: 07/18/21 2255     RBC, UA 3-5 /HPF      WBC, UA 3-5 /HPF      Bacteria, UA Trace /HPF      Squamous Epithelial Cells, UA 0-2 /HPF      Yeast, UA --     Comment: Trace        Hyaline Casts, UA None Seen /LPF      Methodology Manual Light Microscopy    Urinalysis With Microscopic If Indicated (No Culture) - Urine, Clean Catch [[id number]]  (Abnormal) Collected: 07/18/21 2228    Specimen: Urine, Clean Catch Updated: 07/18/21 2242     Color, UA Yellow     Appearance, UA Slightly Cloudy     pH, UA 7.0     Specific Gravity, UA 1.025     Glucose,  mg/dL (2+)     Ketones, UA Negative     Bilirubin, UA Negative     Blood, UA Small (1+)     Protein, UA >=300 mg/dL (3+)     Leuk Esterase, UA Negative     Nitrite, UA Negative     Urobilinogen, UA 1.0 E.U./dL    CBC & Differential [[id number]]  (Abnormal) Collected: 07/18/21 2229    Specimen: Blood Updated: 07/18/21 2239    Narrative:      The following orders were created for panel order CBC & Differential.  Procedure                               Abnormality         Status                     ---------                               -----------         ------                     CBC Auto Differential[[id number]]        Abnormal            Final result                 Please view results for these tests on the individual orders.    CBC Auto Differential [[id number]]  (Abnormal) Collected: 07/18/21 2229    Specimen: Blood Updated: 07/18/21 2239     WBC 10.67 10*3/mm3      RBC 3.95 10*6/mm3      Hemoglobin 11.1 g/dL      Hematocrit 35.0 %      MCV 88.6 fL      MCH 28.1 pg      MCHC 31.7 g/dL      RDW 15.3 %      RDW-SD 49.4 fl      MPV 11.2 fL      Platelets 191 10*3/mm3      Neutrophil % 80.6 %      Lymphocyte % 5.6 %      Monocyte % 9.0 %      Eosinophil % 4.1 %      Basophil % 0.3 %      Immature Grans % 0.4 %      Neutrophils, Absolute 8.60 10*3/mm3      Lymphocytes, Absolute 0.60  10*3/mm3      Monocytes, Absolute 0.96 10*3/mm3      Eosinophils, Absolute 0.44 10*3/mm3      Basophils, Absolute 0.03 10*3/mm3      Immature Grans, Absolute 0.04 10*3/mm3      nRBC 0.0 /100 WBC         Imaging Results (Most Recent)     Procedure Component Value Units Date/Time    XR Spine Cervical 2 or 3 View [[id number]] Collected: 07/21/21 0856     Updated: 07/21/21 0900    Narrative:      CERVICAL SPINE, 07/21/2021         HISTORY:  80-year-old male complaining of neck pain since a fall 3 days ago.     TECHNIQUE:  Three-view cervical spine series with additional swimmer's lateral  images.     FINDINGS:  No acute or chronic fracture deformity or additional traumatic osseous  abnormality is identified.     Bilateral degenerative facet arthropathy throughout the cervical spine  with grade 1 anterolisthesis at C3-4 and C4-5. Mild to moderate  degenerative disc space narrowing at C6-7. Cervicothoracic spinal  curvature.       Impression:      1. No acute osseous abnormality is demonstrated.  2. Extensive bilateral degenerative facet arthropathy with grade 1  anterolisthesis at C3-4 and C4-5. Degenerative disc disease at C6-7.     This report was finalized on 7/21/2021 8:58 AM by Dr. Edgardo Adams MD.       CT Angiogram Chest [[id number]] Collected: 07/19/21 0042     Updated: 07/19/21 0044    Narrative:      CTA Chest    INDICATION:   Shortness of air today.    TECHNIQUE:   CT angiogram of the chest with IV contrast. 3-D reconstructions were obtained and reviewed.   Radiation dose reduction techniques included automated exposure control or exposure modulation based on body size. Count of known CT and cardiac nuc med studies  performed in previous 12 months: 2.     COMPARISON:   CT chest 11/21/2017    FINDINGS:   Adequate opacification of the pulmonary arteries with no filling defects. Thoracic aorta normal in course and caliber without dissection. Marked cardiomegaly. No pericardial effusion.    Small right pleural  effusion and moderate left pleural effusion. Compressive bibasilar atelectasis. Hazy groundglass opacities throughout both lungs with interlobular septal thickening, most suggestive of pulmonary edema. No pneumothorax.     Visualized upper abdomen is unremarkable. Cholelithiasis.    No acute osseous abnormality.      Impression:      1. Negative for pulmonary embolus.  2. Negative for thoracic aortic aneurysm/dissection.  3. Cardiomegaly with bilateral pleural effusions, compressive bibasilar atelectasis, and hazy groundglass opacities throughout both lungs. There is interlobular septal thickening. Findings appear most suggestive of congestive failure and pulmonary edema.  4. Cholelithiasis.    Signer Name: Gato Mcgovern MD   Signed: 7/19/2021 12:42 AM   Workstation Name: [id number]    [facility]    XR Chest 2 View [[id number]] Collected: 07/18/21 2129     Updated: 07/18/21 2131    Narrative:      CR Chest 2 Vws    INDICATION:    Short of air and crackles in the left lower lobe. History of atrial fibrillation and congestive heart failure. Complaining of left shoulder pain.    COMPARISON:    Chest x-ray 7/7/2021.    FINDINGS:   PA and lateral views of the chest.  3 films submitted. There is interval worsening in the appearance the chest with development of moderate left pleural effusion. There is mild cardiac silhouette enlargement not changed. There is new airspace disease in  the left lower lung laterally and patchier peripheral airspace disease in left upper lung laterally. There is mild central vascular congestion but is otherwise not significantly changed from 7/7/2021. Please correlate for clinical concern for aspiration  or pneumonia. While congestive heart failure could result in this appearance, the asymmetry is atypical. Please correlate for any concern for pulmonary embolus. There is no pneumothorax. No displaced rib fracture is suspected.      Impression:      Interval worsening  in the appearance the chest with development of a moderate-sized left pleural effusion. This also left lower lobe airspace disease and probably patchy airspace disease at the periphery left upper lung. There is again mild cardiac  silhouette enlargement and central vascular congestion which is similar to the prior study. Given the asymmetric involvement of the left hemithorax, please correlate for clinical evidence for aspiration or pneumonia, possibly with a parapneumonic  effusion. Please correlate for any clinical concern for pulmonary embolus. Correlation with a CT chest PE protocol may be helpful.    Signer Name: Libertad Kam MD   Signed: 7/18/2021 9:29 PM   Workstation Name: [facility]    [facility]    XR Hip With or Without Pelvis 2 - 3 View Left [[id number]] Collected: 07/18/21 2125     Updated: 07/18/21 2127    Narrative:      CR Hip Uni Comp Min 2 Vws LT    INDICATION:   Fell around 1700 yesterday with complaint of left hip pain.    COMPARISON:   None available.    FINDINGS:  AP pelvis and AP and frog-leg views of the left hip..  3 films obtained. There is arthritis of the left hip with some osteophyte formation and joint space narrowing but no acute fracture dislocation or radiopaque foreign body is suspected. No osseous  destructive lesion. Lower sacrum is obscured by overlying bowel gas and stool. Degenerative changes noted lower lumbar spine.        Impression:      No acute fracture or dislocation suspected left hip.    Signer Name: Libertad Kam MD   Signed: 7/18/2021 9:25 PM   Workstation Name: [facility]    [facility]    XR Knee 3 View Right [[id number]] Collected: 07/18/21 2123     Updated: 07/18/21 2125    Narrative:      CR Knee 3 Vws RT    INDICATION:   Right knee pain since a fall yesterday. Previous right knee arthroplasty.    COMPARISON:   Plain films from December 2019.    FINDINGS:  3 view(s) of the right knee. Postoperative changes right total  knee arthroplasty seen with no evidence for acute fracture dislocation or loosening. There may be a small amount of suprapatellar joint fluid.  There are mild vascular calcifications.      Impression:        Postoperative findings consistent with right total knee arthroplasty without evidence for acute fracture or loosening or dislocation. There may be a small amount of suprapatellar joint fluid. This is difficult to determine in the postoperative knee.    Signer Name: Libertad Kam MD   Signed: 7/18/2021 9:23 PM   Workstation Name: [facility]    [facility]    XR Shoulder 2+ View Left [[id number]] Collected: 07/18/21 2122     Updated: 07/18/21 2124    Narrative:      CR Shoulder Comp Min 2 Vws LT    INDICATION:   Fell around 1700 yesterday complains of left hip pain shoulder pain and stiffness in the right knee. Previous right knee surgery.    COMPARISON:   None available.    FINDINGS:   2 views of the left shoulder.  There is degenerative change at the acromioclavicular joint. The humeral head is located. There is suspicion for chronic rotator cuff disease with narrowing of the superior joint space. No acute fracture is suspected. No  dislocation. No radiopaque foreign body. See accompanying chest x-ray report.    Impression:      Plain film findings are most suggestive of chronic rotator cuff disease left shoulder without acute fracture or dislocation. There is also degenerative change at the acromioclavicular joint.    Signer Name: Libertad Kam MD   Signed: 7/18/2021 9:22 PM   Workstation Name: [facility]          PROCEDURES      Condition on Discharge:  Stable    Physical Exam at Discharge  Vital Signs  Temp:  [96.7 °F (35.9 °C)-97.9 °F (36.6 °C)] 97.7 °F (36.5 °C)  Heart Rate:  [56-70] 58  Resp:  [16] 16  BP: ()/(55-69) 111/60    Physical Exam:  Physical Exam   Constitutional: Patient appears well-developed and well-nourished and in no acute  distress   Cardiovascular: Irregularly, irregular rate and rhythm, S1 normal and S2 normal.  No murmur heard.  Pulmonary/Chest: Lungs are diminished to auscultation bilaterally. No respiratory distress. No wheezes. No rhonchi. No rales.   Abdominal: Soft. Bowel sounds are normal. No distension and no mass. There is no tenderness.   Musculoskeletal: Normal Muscle tone  Neurological: Cranial nerves II-XII are grossly intact with no focal deficits.    Discharge Disposition  To [place]    Visiting Nurse:    No     Home PT/OT:  No     Home Safety Evaluation:  No     DME  None    Discharge Diet:      Dietary Orders (From admission, onward)     Start     Ordered    07/19/21 0310  Diet Regular; Cardiac  Diet Effective Now     Question Answer Comment   Diet Texture / Consistency Regular    Common Modifiers Cardiac        07/19/21 0312              Activity at Discharge:  As tolerated    Follow-up Appointments  No future appointments.  Additional Instructions for the Follow-ups that You Need to Schedule     Discharge Follow-up with PCP   As directed       Currently Documented PCP:    Fortunato De Leon MD    PCP Phone Number:    [phone]     Follow Up Details: 1-2 weeks         Discharge Follow-up with Specialty: [facility]; 1 Month   As directed      Specialty: [facility]    Follow Up: 1 Month               Test Results Pending at Discharge  Pending Labs     Order Current Status    Urine Culture - Urine, Urine, Catheter In process    Blood Culture - Blood, Arm, Left Preliminary result    Blood Culture - Blood, Arm, Right Preliminary result           Ozzie Briggs MD  07/21/21  13:16 EDT

## 2021-07-22 NOTE — CASE MANAGEMENT/SOCIAL WORK
Case Management Discharge Note      Final Note: Discharged to [facility], skilled    Provided Post Acute Provider List?: N/A  Provided Post Acute Provider Quality & Resource List?: N/A    Selected Continued Care - Discharged on 7/21/2021 Admission date: 7/18/2021 - Discharge disposition: Long Term Care (DC - External)    Destination Coordination complete.    Service Provider Selected Services Address Phone Fax Patient Preferred    [facility] [address] [place] [address] [phone] --          Durable Medical Equipment    No services have been selected for the patient.              Dialysis/Infusion    No services have been selected for the patient.              Home Medical Care    No services have been selected for the patient.              Therapy    No services have been selected for the patient.              Community Resources    No services have been selected for the patient.              Community & DME    No services have been selected for the patient.                       Final Discharge Disposition Code: 03 - skilled nursing facility (SNF)

## 2021-07-28 PROBLEM — R31.0 GROSS HEMATURIA: Status: ACTIVE | Noted: 2021-01-01

## 2021-07-29 PROBLEM — I25.10 CAD (CORONARY ARTERY DISEASE): Status: ACTIVE | Noted: 2021-01-01

## 2021-07-29 PROBLEM — E03.9 HYPOTHYROIDISM: Status: ACTIVE | Noted: 2021-01-01

## 2021-07-29 PROBLEM — N18.30 CKD (CHRONIC KIDNEY DISEASE) STAGE 3, GFR 30-59 ML/MIN (HCC): Status: ACTIVE | Noted: 2021-01-01

## 2021-07-29 PROBLEM — E78.5 HLD (HYPERLIPIDEMIA): Status: ACTIVE | Noted: 2021-01-01

## 2021-08-01 NOTE — PLAN OF CARE
Goal Outcome Evaluation:  Plan of Care Reviewed With: patient           Outcome Summary: Urine remains clear to light yellow this morning.  Orders to stop cbi and removed cath at 5 am tomorrow.  Pt had some pain in the neck this am, tylenol given.  HR in the 50's while sleeping and he was desating, will continue to monitor.

## 2021-08-01 NOTE — PROGRESS NOTES
Name: Froilan Helton ADMIT: 2021   : 1940  PCP: Fortunato De Leon MD    MRN: [id number] LOS: 4 days   AGE/SEX: 80 y.o. male  ROOM: [facility]     Subjective   Subjective     No complaints today. Urine appear mildly pink     Objective   Objective   Vital Signs  Temp:  [97.4 °F (36.3 °C)-98.4 °F (36.9 °C)] 97.4 °F (36.3 °C)  Heart Rate:  [51-67] 52  Resp:  [16-18] 18  BP: (110-149)/(65-88) 130/65  SpO2:  [90 %-99 %] 91 %  on  Flow (L/min):  [2] 2;   Device (Oxygen Therapy): nasal cannula  Body mass index is 32 kg/m².  Physical Exam  Constitutional:       General: He is not in acute distress.  Cardiovascular:      Rate and Rhythm: Normal rate and regular rhythm.      Heart sounds: Normal heart sounds.   Pulmonary:      Effort: Pulmonary effort is normal.      Breath sounds: Normal breath sounds.   Abdominal:      General: Bowel sounds are normal.      Palpations: Abdomen is soft.   Genitourinary:     Comments: Cavanaugh in place  Musculoskeletal:         General: No tenderness.      Right lower leg: No edema.      Left lower leg: No edema.      Comments: Erythema on BLE which patient says is chronic and improving   Neurological:      Mental Status: He is alert.   Psychiatric:         Mood and Affect: Mood normal.         Behavior: Behavior normal.         Results Review     I reviewed the patient's new clinical results.  Results from last 7 days   Lab Units 21  1603 21  0422 21  0004 21  1615 21  1604 21  0815 21  0123 21  1938 21  1329 21  1329   WBC 10*3/mm3  --  6.42  --   --   --  6.25  --  11.18*  --  6.19   HEMOGLOBIN g/dL 9.5* 9.5* 9.0* 9.4*   < > 9.7*   < > 10.4*   < > 10.3*   PLATELETS 10*3/mm3  --  229  --   --   --  201  --  278  --  255    < > = values in this interval not displayed.     Results from last 7 days   Lab Units 21  0422 21  0435 21  0815 21  0755   SODIUM mmol/L 133* 139 139 139   POTASSIUM mmol/L 5.0 4.5  4.5 4.2   CHLORIDE mmol/L 98 101 101 97*   CO2 mmol/L 25.9 28.6 31.2* 27.8   BUN mg/dL 29* 33* 37* 37*   CREATININE mg/dL 1.45* 1.44* 1.69* 2.20*   GLUCOSE mg/dL 102* 109* 85 108*   Estimated Creatinine Clearance: 53.7 mL/min (A) (by C-G formula based on SCr of 1.45 mg/dL (H)).  Results from last 7 days   Lab Units 08/01/21 0422 07/28/21 1938 07/27/21  1329   ALBUMIN g/dL 3.10* 3.30* 3.10*   BILIRUBIN mg/dL 0.2 0.3 0.4   ALK PHOS U/L 84 85 86   AST (SGOT) U/L 30 12 13   ALT (SGPT) U/L 12 10 9     Results from last 7 days   Lab Units 08/01/21 0422 07/31/21  0435 07/30/21  0815 07/29/21  0755 07/28/21 1938 07/28/21  1938 07/27/21  1329 07/27/21  1329   CALCIUM mg/dL 8.7 8.6 8.9 8.6   < > 9.0   < > 9.2   ALBUMIN g/dL 3.10*  --   --   --   --  3.30*  --  3.10*    < > = values in this interval not displayed.     Results from last 7 days   Lab Units 07/28/21 1938   PROCALCITONIN ng/mL 0.12   LACTATE mmol/L 0.9     COVID19   Date Value Ref Range Status   07/28/2021 Not Detected Not Detected - Ref. Range Final   07/20/2021 Not Detected Not Detected - Ref. Range Final   07/07/2021 Not Detected Not Detected - Ref. Range Final     Glucose   Date/Time Value Ref Range Status   08/01/2021 1539 177 (H) 70 - 130 mg/dL Final     Comment:     Meter: [id number] : [id number] Cecil HARKINS   08/01/2021 1113 123 70 - 130 mg/dL Final     Comment:     Meter: [id number] : [id number] Cecil HARKINS   08/01/2021 0645 132 (H) 70 - 130 mg/dL Final     Comment:     Meter: [id number] : [id number] Oliva Pedro    07/31/2021 2112 130 70 - 130 mg/dL Final     Comment:     Meter: [id number] : [id number] Oliva Pedro    07/31/2021 1615 123 70 - 130 mg/dL Final     Comment:     Meter: [id number] : [id number] [facility]   07/31/2021 1055 142 (H) 70 - 130 mg/dL Final     Comment:     Meter: [id number] : [id number] [facility]   07/31/2021 0649 128 70 - 130 mg/dL Final     Comment:     Meter: [id number] : [id number]  Oliva HARKINS       XR Chest 1 View  PORTABLE CHEST 07/20/2021 AT 7:46 PM     CLINICAL HISTORY: Fever. Confusion.     The lungs are somewhat poorly inflated but appear free of focal  infiltrates. There are no pleural effusions. The heart is mildly  enlarged.     IMPRESSIONS: No evidence of acute disease within the chest.     This report was finalized on 7/28/2021 8:05 PM by Dr. Demario Davidson M.D.       Scheduled Medications  amoxicillin, 500 mg, Oral, Q8H  atorvastatin, 10 mg, Oral, Nightly  budesonide-formoterol, 2 puff, Inhalation, BID - RT  carvedilol, 6.25 mg, Oral, BID With Meals  cetirizine, 10 mg, Oral, Daily  citalopram, 20 mg, Oral, Daily  docusate sodium, 100 mg, Oral, BID  fluconazole, 400 mg, Oral, Q24H  fluticasone, 2 spray, Each Nare, Daily  hydrocortisone, , Topical, Q12H  insulin lispro, 0-9 Units, Subcutaneous, 4x Daily With Meals & Nightly  levothyroxine, 224 mcg, Oral, Daily  Petrolatum, , Topical, Q24H  polyethylene glycol, 17 g, Oral, Daily  pregabalin, 75 mg, Oral, BID  QUEtiapine, 12.5 mg, Oral, Nightly  sodium chloride, 10 mL, Intravenous, Q12H  tamsulosin, 0.4 mg, Oral, Daily    Infusions  sodium chloride, 50 mL/hr, Last Rate: 50 mL/hr (07/30/21 2050)    Diet  Diet Regular; Consistent Carbohydrate       Assessment/Plan     Active Hospital Problems    Diagnosis  POA   • **Gross hematuria [R31.0]  Yes   • CAD (coronary artery disease) [I25.10]  Unknown   • HLD (hyperlipidemia) [E78.5]  Unknown   • Hypothyroidism [E03.9]  Unknown   • CKD (chronic kidney disease) stage 3, GFR 30-59 ml/min (CMS/HCC) [N18.30]  Unknown   • Permanent atrial fibrillation (CMS/HCC) [I48.21]  Yes   • Acute UTI (urinary tract infection) [N39.0]  Yes   • Chronic diastolic (congestive) heart failure (CMS/HCC) [I50.32]  Yes   • Type 2 diabetes mellitus with stage 4 chronic kidney disease, with long-term current use of insulin (CMS/[facility]) [E11.22, N18.4, Z79.4]  Not Applicable   • Essential hypertension [I10]  Yes   • COPD  (chronic obstructive pulmonary disease) (CMS/[facility]) [J44.9]  Yes      Resolved Hospital Problems   No resolved problems to display.       80 y.o. male admitted with Gross hematuria.    Enterococcus faecalis and fungal acute urinary tract infection with gross hematuria related to indwelling barajas catheter-Eliquis is held.  On amoxicillin 500 mg po q8h for 7 days in addition to fluconazole 400 mg po daily for 14 days.     Candida albicans fungemia from urinary source-fluconazole as above. Blood cultures repeated this morning.    Essential Hypertension-well controlled  Permanent afib-restart eliquis when okay with urology  COPD-continue inhalers  Chronic diastolic heart failure-euvolemic  Coronary artery disease  Hypothyroidism   CKD3  Dm2    · SCDs for DVT prophylaxis.  · Full code.  · Discussed with patient and consulting provider.  · Anticipate discharge TBD      Augustus Granda MD  [facility]  08/01/21  17:32 EDT    I wore protective equipment throughout this patient encounter including a face mask, gloves and protective eyewear.  Hand hygiene was performed before donning protective equipment and after removal when leaving the room.

## 2021-08-01 NOTE — PROGRESS NOTES
LOS: 4 days     Chief Complaint:  Follow-up Candidemia    Interval History:  No fever. Tolerating fluconazole. Large BM this AM. Cavanaugh in place.     ROS: no f/c/s    Vital Signs  Temp:  [98 °F (36.7 °C)-98.4 °F (36.9 °C)] 98.4 °F (36.9 °C)  Heart Rate:  [51-67] 51  Resp:  [16-18] 18  BP: (110-149)/(68-88) 130/75    Physical Exam:  General: awake, alert  Cardiovascular: NR  Respiratory:  no wheezing  GI: Abdomen is soft, nontender, nondistended  :  + Cavanaugh catheter   Musculoskeletal: legs wrapped  Skin: No rashes  Psychiatric: Normal mood and affect     Antibiotics:  •  amoxicillin (AMOXIL) capsule 500 mg, 500 mg, Oral, Q8H  •  fluconazole (DIFLUCAN) tablet 400 mg, 400 mg, Oral, Q24H    LABS:  CBC, CMP, micro reviewed today  Lab Results   Component Value Date    WBC 6.42 08/01/2021    HGB 9.5 (L) 08/01/2021    HCT 30.2 (L) 08/01/2021    MCV 88.3 08/01/2021     08/01/2021     Lab Results   Component Value Date    GLUCOSE 102 (H) 08/01/2021    BUN 29 (H) 08/01/2021    CREATININE 1.45 (H) 08/01/2021    EGFRIFNONA 47 (L) 08/01/2021    BCR 20.0 08/01/2021    K 5.0 08/01/2021    CO2 25.9 08/01/2021    CALCIUM 8.7 08/01/2021    ALBUMIN 3.10 (L) 08/01/2021    AST 30 08/01/2021    ALT 12 08/01/2021     UA TNTC WBCs, TNTC RBCs, mod LE, neg nitrite  Procal 0.12  LA 0.9  UDS negative     Microbiology:  7/18 BCx: negative  7/20 COVID: negative  7/21 UCx: >100k Corynebacterium and <25k GPC  7/27 UCx: >100k mixed shira  7/28 BCx: Candida albicans in 1/2 sets  7/28 UCx: Enterococcus faecalis and yeast  7/28 COVID: negative  8/1 BCx: NGTD    Assessment/Plan   1. Candidemia  2. Hematuria  3. Chronic Cavanaugh   4. Urine culture positive for Enterococcus  5. Acute kidney injury  6. Obesity BMI 31  7. Lower extremity wounds     Continue fluconazole 400 mg PO daily through 8/12/21 which is a 14 day course. II repeated blood cultures this AM to document sterility. I agree with amoxicillin 500 mg PO q8h x 7 days for the Enterococcus  in his urine. Continue local wound care to the legs. Follow-up urology recommendations as well.      ID will follow. D/W Dr Granda.

## 2021-08-01 NOTE — PROGRESS NOTES
"   LOS: 4 days   Patient Care Team:  Fortunato De Leon MD as PCP - General (Family Medicine)        Subjective     CC hematuria     81 yo pleasant male. Resting in bed. He continues to have CBI. However urine is clear today.   Nurse came in with c/o bladder scan of 800.  Dr. German came in and hand irrigated cath. No clots. Cath irrigiated with ease. No blood.   Able to demonstrate patency of cath.            Subjective:  Symptoms:  Stable.    Activity level: Impaired due to weakness.    Pain:  He reports no pain.          Objective     Vital Signs  Temp:  [98 °F (36.7 °C)-98.4 °F (36.9 °C)] 98.4 °F (36.9 °C)  Heart Rate:  [51-67] 51  Resp:  [16-18] 18  BP: (110-149)/(68-88) 130/75    Objective:  General Appearance:  Comfortable, well-appearing and in no acute distress.    Vital signs: (most recent): Blood pressure 130/75, pulse 51, temperature 98.4 °F (36.9 °C), temperature source Oral, resp. rate 18, height 185.4 cm (72.99\"), weight 110 kg (242 lb 8.1 oz), SpO2 90 %.  Vital signs are normal.    Output: Urine output assessment: clear yello urine in barajas.     Lungs:  Normal effort.    Abdomen: Abdomen is soft.    Neurological: Patient is alert.    Skin:  Warm and dry.              Results Review:  New clinical results reviewed.        Assessment/Plan       Gross hematuria    COPD (chronic obstructive pulmonary disease) (CMS/[facility])    Type 2 diabetes mellitus with stage 4 chronic kidney disease, with long-term current use of insulin (CMS/[facility])    Essential hypertension    Chronic diastolic (congestive) heart failure (CMS/[facility])    Acute UTI (urinary tract infection)    Permanent atrial fibrillation (CMS/[facility])    CAD (coronary artery disease)    HLD (hyperlipidemia)    Hypothyroidism    CKD (chronic kidney disease) stage 3, GFR 30-59 ml/min (CMS/[facility])      Assessment:  (Hematuria - keep CBI clamped  Will plan VT 5am tomorrow morning. ).       Anayeli Meier, LEATHA  08/01/21  11:09 EDT          "

## 2021-08-02 NOTE — PROGRESS NOTES
Name: Froilan Helton ADMIT: 2021   : 1940  PCP: Fortunato De Leon MD    MRN: [id number] LOS: 5 days   AGE/SEX: 80 y.o. male  ROOM: [facility]   Subjective   Chief Complaint   Patient presents with   • Altered Mental Status   • Fever      Not having any chest pain shortness of breath nausea vomiting or diarrhea.  He does have a bit of pain with urinating after the Cavanaugh was taken out but overall feels comfortable.    Objective   Vital Signs  Temp:  [97.6 °F (36.4 °C)-97.9 °F (36.6 °C)] 97.9 °F (36.6 °C)  Heart Rate:  [60-68] 60  Resp:  [16-18] 16  BP: (123-151)/(63-95) 126/81  SpO2:  [97 %-100 %] 100 %  on  Flow (L/min):  [2] 2;   Device (Oxygen Therapy): nasal cannula  Body mass index is 31.22 kg/m².    Physical Exam  Vitals and nursing note reviewed.   Constitutional:       General: He is not in acute distress.     Appearance: He is not ill-appearing or diaphoretic.   HENT:      Head: Normocephalic and atraumatic.   Eyes:      General:         Right eye: No discharge.         Left eye: No discharge.      Conjunctiva/sclera: Conjunctivae normal.   Cardiovascular:      Rate and Rhythm: Normal rate. Rhythm irregular.      Pulses: Normal pulses.   Pulmonary:      Effort: Pulmonary effort is normal.      Breath sounds: No wheezing.   Abdominal:      General: There is no distension.      Palpations: Abdomen is soft.      Tenderness: There is no abdominal tenderness. There is no guarding or rebound.   Musculoskeletal:         General: No tenderness.      Cervical back: Neck supple. No tenderness.      Comments: BLE wrapped   Skin:     General: Skin is warm and dry.      Findings: Erythema present.   Neurological:      Mental Status: He is alert. Mental status is at baseline.   Psychiatric:         Mood and Affect: Mood normal.         Behavior: Behavior normal.         Results Review:       I reviewed the patient's new clinical results.     I reviewed imaging, agree with interpretation.     I reviewed  telemetry/EKG results, A. fib, rate okay      Results from last 7 days   Lab Units 08/02/21  0812 08/01/21  1958 08/01/21  1603 08/01/21 0422 07/30/21  1604 07/30/21  0815 07/29/21  0123 07/28/21 1938 07/27/21  1329 07/27/21  1329   WBC 10*3/mm3  --   --   --  6.42  --  6.25  --  11.18*  --  6.19   HEMOGLOBIN g/dL 9.0* 9.1* 9.5* 9.5*   < > 9.7*   < > 10.4*   < > 10.3*   PLATELETS 10*3/mm3  --   --   --  229  --  201  --  278  --  255    < > = values in this interval not displayed.     Results from last 7 days   Lab Units 08/02/21 0812 08/01/21 0422 07/31/21  0435 07/30/21  0815   SODIUM mmol/L 134* 133* 139 139   POTASSIUM mmol/L 5.0 5.0 4.5 4.5   CHLORIDE mmol/L 101 98 101 101   CO2 mmol/L 20.7* 25.9 28.6 31.2*   BUN mg/dL 29* 29* 33* 37*   CREATININE mg/dL 1.51* 1.45* 1.44* 1.69*   GLUCOSE mg/dL 130* 102* 109* 85   Estimated Creatinine Clearance: 50.9 mL/min (A) (by C-G formula based on SCr of 1.51 mg/dL (H)).  Results from last 7 days   Lab Units 08/02/21  0812 08/01/21 0422 07/31/21  0435 07/30/21  0815 07/29/21  0755 07/28/21 1938 07/27/21  1329 07/27/21  1329   CALCIUM mg/dL 8.6 8.7 8.6 8.9   < > 9.0   < > 9.2   ALBUMIN g/dL  --  3.10*  --   --   --  3.30*  --  3.10*    < > = values in this interval not displayed.          amoxicillin, 500 mg, Oral, Q8H  atorvastatin, 10 mg, Oral, Nightly  budesonide-formoterol, 2 puff, Inhalation, BID - RT  carvedilol, 6.25 mg, Oral, BID With Meals  cetirizine, 10 mg, Oral, Daily  citalopram, 20 mg, Oral, Daily  docusate sodium, 100 mg, Oral, BID  fluconazole, 400 mg, Oral, Q24H  fluticasone, 2 spray, Each Nare, Daily  hydrocortisone, , Topical, Q12H  insulin lispro, 0-9 Units, Subcutaneous, 4x Daily With Meals & Nightly  levothyroxine, 224 mcg, Oral, Daily  Petrolatum, , Topical, Q24H  polyethylene glycol, 17 g, Oral, Daily  pregabalin, 75 mg, Oral, BID  QUEtiapine, 12.5 mg, Oral, Nightly  sodium chloride, 10 mL, Intravenous, Q12H  tamsulosin, 0.4 mg, Oral,  Daily      sodium chloride, 50 mL/hr, Last Rate: 50 mL/hr (08/02/21 0539)    Diet Regular; Consistent Carbohydrate    Assessment/Plan      Active Hospital Problems    Diagnosis  POA   • **Gross hematuria [R31.0]  Yes   • CAD (coronary artery disease) [I25.10]  Unknown   • HLD (hyperlipidemia) [E78.5]  Unknown   • Hypothyroidism [E03.9]  Unknown   • CKD (chronic kidney disease) stage 3, GFR 30-59 ml/min (CMS/[facility]) [N18.30]  Unknown   • Permanent atrial fibrillation (CMS/[facility]) [I48.21]  Yes   • Acute UTI (urinary tract infection) [N39.0]  Yes   • Chronic diastolic (congestive) heart failure (CMS/[facility]) [I50.32]  Yes   • Type 2 diabetes mellitus with stage 4 chronic kidney disease, with long-term current use of insulin (CMS/[facility]) [E11.22, N18.4, Z79.4]  Not Applicable   • Essential hypertension [I10]  Yes   • COPD (chronic obstructive pulmonary disease) (CMS/[facility]) [J44.9]  Yes      Resolved Hospital Problems   No resolved problems to display.       · UTI: Enterococcus/Candida.  On amoxicillin and fluconazole.  Infectious disease following.  · Candida fungemia  · Hematuria/urinary retention: Cavanaugh out and undergoing voiding trial today.  Urology following.  · CKD 3: Creatinine about the same as yesterday.  Will monitor.  · Chronic atrial fibrillation: On Coreg.  Eliquis held and planned resumption once okay with urology.  · Chronic diastolic heart failure: Monitor and will stop the continuous fluids.  Possibly resume Bumex tomorrow.  · Hypothyroidism: On Synthroid  · Hypertension: Monitor  · Diabetes: Continue insulin  · Disposition: SNF/few days    Ellis Lombardo MD  [facility]  08/02/21  13:45 EDT    Dictated portions using Dragon dictation software.    During the entire encounter, I was wearing recommended PPE including face mask and eye protection. Hand sanitization was performed prior to entering room and upon exit.

## 2021-08-02 NOTE — THERAPY TREATMENT NOTE
Acute Care - Occupational Therapy Treatment Note  [facility]     Patient Name: Froilan Helton  : 1940  MRN: [id number]  Today's Date: 2021  Onset of Illness/Injury or Date of Surgery: 21          Admit Date: 2021       ICD-10-CM ICD-9-CM   1. Gross hematuria  R31.0 599.71   2. Chronic renal insufficiency, stage 4 (severe) (CMS/[facility])  N18.4 585.4     Patient Active Problem List   Diagnosis   • COPD (chronic obstructive pulmonary disease) (CMS/[facility])   • Type 2 diabetes mellitus with stage 4 chronic kidney disease, with long-term current use of insulin (CMS/[facility])   • Essential hypertension   • Primary osteoarthritis of left knee   • Chronic renal insufficiency, stage 4 (severe) (CMS/[facility])   • Chronic diastolic (congestive) heart failure (CMS/[facility])   • Class 2 severe obesity due to excess calories with serious comorbidity in adult (CMS/[facility])   • Physical debility   • Acute UTI (urinary tract infection)   • Permanent atrial fibrillation (CMS/[facility])   • H/O noncompliance with medical treatment, presenting hazards to health   • Myxedema   • Acute on chronic combined systolic and diastolic CHF (congestive heart failure) (CMS/[facility])   • Generalized weakness   • Acute on chronic diastolic congestive heart failure (CMS/[facility])   • Recurrent left pleural effusion   • Fall on same level as cause of accidental injury   • Gross hematuria   • CAD (coronary artery disease)   • HLD (hyperlipidemia)   • Hypothyroidism   • CKD (chronic kidney disease) stage 3, GFR 30-59 ml/min (CMS/[facility])     Past Medical History:   Diagnosis Date   • A-fib (CMS/[facility])    • Arthritis    • Asthma    • CAD (coronary artery disease)    • Cardiomyopathy (CMS/[facility])    • Cataract    • CHF (congestive heart failure) (CMS/[facility])    • CKD (chronic kidney disease), stage III (CMS/[facility])    • COPD (chronic obstructive pulmonary disease) (CMS/[facility])    • Diabetes mellitus (CMS/[facility])    • Disease of thyroid gland    • Emphysema, unspecified (CMS/[facility])    • Glaucoma     • Hyperlipidemia    • Hypertension    • Kidney stone    • Myocardial infarct, old    • Neuropathy    • Osteoarthritis    • Osteoporosis    • Permanent atrial fibrillation (CMS/[facility]) 6/30/2020   • PVD (peripheral vascular disease) (CMS/[facility])    • Renal disorder    • Shingles      Past Surgical History:   Procedure Laterality Date   • COLONOSCOPY     • EYE SURGERY     • JOINT REPLACEMENT      right   • KIDNEY STONE SURGERY     • REPLACEMENT TOTAL KNEE     • TOE SURGERY         Lymphedema     Row Name 08/02/21 1417             Subjective Pain    Able to rate subjective pain?  yes  -VS      Pre-Treatment Pain Level  5  -VS      Post-Treatment Pain Level  5  -VS      Subjective Pain Comment  Therapist (A) the pt. is asking for pain medication  -VS      Recorded by [VS] Migdalia Limon OTR              Subjective Comments    Subjective Comments  PT. tolerated the LEs wraps, no change noted with edema today   -VS      Recorded by [VS] Migdalia Limon, ADOLPHR              Lymphedema Assessment    Lymphedema Classification  RLE:;LLE:  -VS      Recorded by [VS] Migdalia Limon, ADOLPHR              Lymphedema Edema Assessment    Ptting Edema Category  By severity  -VS      Pitting Edema  Mild  -VS      Edema Assessment Comment  (R)LE greater than (L)LE  -VS      Recorded by [VS] Migdalia Limon, ADOLPHR              Skin Changes/Observations    Location/Assessment  Lower Extremity  -VS      Lower Extremity Conditions  bilateral:;intact;clean;shiny;hairless  -VS      Lower Extremity Color/Pigment  bilateral: (L)LE has red areas noted but not open at this time   -VS      Recorded by [VS] Migdalia Limon, ADOLPHR              Compression/Skin Care    Compression/Skin Care  skin care;wrapping location;bandaging;remove bandages  -VS      Skin Care  washed/dried;lotion applied  -VS      Wrapping Location  lower extremity  -VS      Wrapping Location LE  bilateral:;foot to knee  -VS      Bandage Layers  cotton liner;padding/fluff  layer;short-stretch bandages (comment size/quantity)  -VS      Bandaging Comments  ! x #8 & #10 base of toes to knee (B)ly   -VS      Bandaging Technique  light compression  -VS      Recorded by [VS] Migdalia Limon OTR        User Key  (r) = Recorded By, (t) = Taken By, (c) = Cosigned By    Initials Name Effective Dates    VS Migdalia Limon OTR 06/16/21 -            OT ASSESSMENT FLOWSHEET (last 12 hours)      OT Evaluation and Treatment     Row Name 08/02/21 1417                   OT Time and Intention    Subjective Information  complains of;pain  -VS        Document Type  therapy note (daily note)  -VS        Mode of Treatment  occupational therapy  -VS        Patient Effort  poor  -VS        Symptoms Noted During/After Treatment  increased pain  -VS        Comment  Pt. stated his pain level was 5/10, increased from last week.  OT assisted the pt. is asking for pain medication   -VS           General Information    Existing Precautions/Restrictions  fall  -VS        Barriers to Rehab  previous functional deficit  -VS           Cognition    Orientation Status (Cognition)  oriented to;person;place  -VS           Pain Assessment    Additional Documentation  Pain Scale: Numbers Pre/Post-Treatment (Group)  -VS           Pain Scale: Numbers Pre/Post-Treatment    Pretreatment Pain Rating  5/10  -VS        Posttreatment Pain Rating  5/10  -VS        Pain Location - Side  Left  -VS        Pain Location - Orientation  lower  -VS        Pain Location  hip  -VS        Pain Intervention(s)  Repositioned assisted the pt. is asking for pain medication   -VS           Bed Mobility    Comment (Bed Mobility)  Pt. was not able to roll or (A) with holding LEs up while therapist wrapped   -VS           Transfer Assessment/Treatment    Comment (Transfers)  NA  -VS           Activities of Daily Living    BADL Assessment/Intervention  bathing  -VS           Bathing Assessment/Intervention    Comment (Bathing)  Therapist bathed,  "dried and applied lotion to (B)LE toes to knees.  -VS           Lower Body Dressing Assessment/Training    Comment (Lower Body Dressing)  No non slip socks needed at this time  -VS           Edema Assessment & Management    Location (Edema)  lower extremity, left;lower extremity, right  -VS        Additional Documentation  Edema Symptoms/Interventions (Group);Location (Edema) (Row)  -VS           Lower Extremity Edema Measures, Left    Lower Extremity, Left (Edema)  knee;mid-calf  -VS        Mid-Calf Circumference, Left (Edema)  Lymph wrapping details documented in Lymph section of the chart   -VS           Lower Extremity Edema Measures, Right    Lower Extremity, Right (Edema)  knee;mid-calf  -VS           Edema Symptoms/Interventions    Description (Edema)  localized;pitting  -VS        Pitting Scale (Edema)  2-->mild  -VS        Associated Symptoms (Edema)  hair growth changes;skin creases diminished  -VS        Treatment Interventions (Edema)  compression bandaging, short stretch  -VS           Wound 07/19/21 0227 perineum    Wound - Properties Group Placement Date: 07/19/21  -KH Placement Time: 0227 -KH Location: perineum  -KH    Retired Wound - Properties Group Date first assessed: 07/19/21  -KH Time first assessed: 0227 -KH Location: perineum  -KH       Wound 07/29/21 0505 Left anterior thigh    Wound - Properties Group Placement Date: 07/29/21  -IT Placement Time: 0505  -IT Present on Hospital Admission: Y  -IT Side: Left  -IT Orientation: anterior  -IT Location: thigh  -IT Stage, Pressure Injury : other (see comments)  -IT Additional Comments: \"Rug burn\"  -IT    Retired Wound - Properties Group Date first assessed: 07/29/21  -IT Time first assessed: 0505  -IT Present on Hospital Admission: Y  -IT Side: Left  -IT Location: thigh  -IT Additional Comments: \"Rug burn\"  -IT       Wound 07/08/21 0216 Right distal leg Diabetic Ulcer    Wound - Properties Group Placement Date: 07/08/21  -AB Placement Time: 0216  " -AB Present on Hospital Admission: Y  -AB Side: Right  -AB Orientation: distal  -AB Location: leg  -AB Primary Wound Type: Diabetic ulc  -AB Stage, Pressure Injury : Stage 1  -AB    Retired Wound - Properties Group Date first assessed: 07/08/21  -AB Time first assessed: 0216  -AB Present on Hospital Admission: Y  -AB Side: Right  -AB Location: leg  -AB Primary Wound Type: Diabetic ulc  -AB       Wound 07/08/21 0213 Left lower leg Blisters    Wound - Properties Group Placement Date: 07/08/21  -AB Placement Time: 0213  -AB Present on Hospital Admission: Y  -AB Side: Left  -AB Orientation: lower  -AB Location: leg  -AB Primary Wound Type: Blisters  -AB    Retired Wound - Properties Group Date first assessed: 07/08/21  -AB Time first assessed: 0213  -AB Present on Hospital Admission: Y  -AB Side: Left  -AB Location: leg  -AB Primary Wound Type: Blisters  -AB       Plan of Care Review    Plan of Care Reviewed With  patient  -VS        Progress  no change  -VS        Outcome Summary  OT Lymph:  Pt. was O x 2.  Pt. complained on increased (L) hip pain when LE wraps were removed.  No skin issues noted on either LE.  Therapsit (A) the pt. is asking for pain medication from RN.  OT washed, dried and applied bandages from base of toes to knee (B)ly.  Pt. contiunes to benfit from OT for LE edema control.  Therapist wore a mask. eye protections and washed her hands before and after the treatment.  Pt. could not tolerate wearing a mask at this time./   -VS           Positioning and Restraints    Pre-Treatment Position  in bed  -VS        Post Treatment Position  bed  -VS        In Bed  supine;call light within reach;encouraged to call for assist;exit alarm on;side rails up x3;SCD pump applied  -VS          User Key  (r) = Recorded By, (t) = Taken By, (c) = Cosigned By    Initials Name Effective Dates    VS Migdalia Limon OTR 06/16/21 -     Betty Muñoz RN 06/16/21 -     Gutierrez Ambriz RN 06/16/21 -     TIM Roche  RAVI Collins 07/06/20 -            Occupational Therapy Education                 Title: PT OT SLP Therapies (Done)     Topic: Occupational Therapy (Done)     Point: ADL training (Done)     Description:   Instruct learner(s) on proper safety adaptation and remediation techniques during self care or transfers.   Instruct in proper use of assistive devices.              Learning Progress Summary           Patient Acceptance, E,TB, VU by  at 7/29/2021 1525    Comment: ed pt on role of OT. ed on lymphedema wraps and benefits                   Point: Precautions (Done)     Description:   Instruct learner(s) on prescribed precautions during self-care and functional transfers.              Learning Progress Summary           Patient Acceptance, E,TB, VU by  at 7/29/2021 1525    Comment: ed pt on role of OT. ed on lymphedema wraps and benefits                   Point: Body mechanics (Done)     Description:   Instruct learner(s) on proper positioning and spine alignment during self-care, functional mobility activities and/or exercises.              Learning Progress Summary           Patient Acceptance, E,TB, VU by  at 7/29/2021 1525    Comment: ed pt on role of OT. ed on lymphedema wraps and benefits                               User Key     Initials Effective Dates Name Provider Type Discipline     06/16/21 -  Jacki Disla, OTR Occupational Therapist OT                  OT Recommendation and Plan     Plan of Care Review  Plan of Care Reviewed With: patient  Progress: no change  Outcome Summary: OT Lymph:  Pt. was O x 2.  Pt. complained on increased (L) hip pain when LE wraps were removed.  No skin issues noted on either LE.  Therapsit (A) the pt. is asking for pain medication from RN.  OT washed, dried and applied bandages from base of toes to knee (B)ly.  Pt. contiunes to benfit from OT for LE edema control.  Therapist wore a mask. eye protections and washed her hands before and after the treatment.   Pt. could not tolerate wearing a mask at this time./   Plan of Care Reviewed With: patient  Outcome Summary: OT Lymph:  Pt. was O x 2.  Pt. complained on increased (L) hip pain when LE wraps were removed.  No skin issues noted on either LE.  Therapsit (A) the pt. is asking for pain medication from RN.  OT washed, dried and applied bandages from base of toes to knee (B)ly.  Pt. contiunes to benfit from OT for LE edema control.  Therapist wore a mask. eye protections and washed her hands before and after the treatment.  Pt. could not tolerate wearing a mask at this time./     Outcome Measures     Row Name 08/02/21 1400             How much help from another is currently needed...    Putting on and taking off regular lower body clothing?  1  -VS      Bathing (including washing, rinsing, and drying)  1  -VS      Toileting (which includes using toilet bed pan or urinal)  1  -VS      Putting on and taking off regular upper body clothing  2  -VS      Taking care of personal grooming (such as brushing teeth)  2  -VS      Eating meals  3  -VS      AM-PAC 6 Clicks Score (OT)  10  -VS         Functional Assessment    Outcome Measure Options  AM-PAC 6 Clicks Daily Activity (OT)  -VS        User Key  (r) = Recorded By, (t) = Taken By, (c) = Cosigned By    Initials Name Provider Type    VS Migdalia Limon OTR Occupational Therapist          Time Calculation:   Time Calculation- OT     Row Name 08/02/21 1433             Time Calculation- OT    OT Start Time  1306  -VS      OT Stop Time  1355  -VS      OT Time Calculation (min)  49 min  -VS      Total Timed Code Minutes- OT  49 minute(s)  -VS      OT Received On  08/02/21  -VS      OT - Next Appointment  08/03/21  -VS         Timed Charges    14369 - OT Manual Therapy Minutes  49  -VS         Total Minutes    Timed Charges Total Minutes  49  -VS       Total Minutes  49  -VS        User Key  (r) = Recorded By, (t) = Taken By, (c) = Cosigned By    Initials Name Provider Type    VS  Migdalia Limon OTR Occupational Therapist        Therapy Charges for Today     Code Description Service Date Service Provider Modifiers Qty    [id number] HC OT MANUAL THERAPY EA 15 MIN 8/2/2021 Migdalia Limon OTR GO 3               BETH Pineda  8/2/2021

## 2021-08-02 NOTE — PLAN OF CARE
Problem: Fall Injury Risk  Goal: Absence of Fall and Fall-Related Injury  Outcome: Ongoing, Progressing  Intervention: Identify and Manage Contributors to Fall Injury Risk  Recent Flowsheet Documentation  Taken 8/2/2021 0434 by Beatris Humphresy RN  Medication Review/Management: medications reviewed  Taken 8/2/2021 0206 by Beatris Humphreys RN  Medication Review/Management: medications reviewed  Taken 8/2/2021 0045 by Beatris Humphreys RN  Medication Review/Management: medications reviewed  Taken 8/1/2021 2212 by Beatris Humphreys RN  Medication Review/Management: medications reviewed  Taken 8/1/2021 2100 by Beatris Humphreys RN  Medication Review/Management: medications reviewed  Intervention: Promote Injury-Free Environment  Recent Flowsheet Documentation  Taken 8/2/2021 0434 by Beatris Humphreys RN  Safety Promotion/Fall Prevention:   activity supervised   clutter free environment maintained   fall prevention program maintained   nonskid shoes/slippers when out of bed   room organization consistent   safety round/check completed  Taken 8/2/2021 0206 by Beatris Humphreys RN  Safety Promotion/Fall Prevention:   activity supervised   clutter free environment maintained   fall prevention program maintained   nonskid shoes/slippers when out of bed   room organization consistent   safety round/check completed  Taken 8/2/2021 0045 by Beatris Humphreys RN  Safety Promotion/Fall Prevention:   activity supervised   clutter free environment maintained   fall prevention program maintained   nonskid shoes/slippers when out of bed   room organization consistent   safety round/check completed  Taken 8/1/2021 2212 by Beatris Humphreys RN  Safety Promotion/Fall Prevention:   activity supervised   clutter free environment maintained   fall prevention program maintained   nonskid shoes/slippers when out of bed   room organization consistent   safety round/check completed  Taken 8/1/2021 2100 by Beatris Humphreys RN  Safety Promotion/Fall Prevention:   activity  supervised   clutter free environment maintained   fall prevention program maintained   nonskid shoes/slippers when out of bed   safety round/check completed   room organization consistent     Problem: Adult Inpatient Plan of Care  Goal: Plan of Care Review  Outcome: Ongoing, Progressing  Flowsheets (Taken 8/2/2021 0600)  Progress: improving  Plan of Care Reviewed With: patient  Outcome Summary: observed clear, yellow urine over night. CBI clamped all night. no complaints of pain. patient refused turns. barajas cath taken out around 0520, educated patient to call out when he needs to urinate so we can assist with urinal.  Goal: Patient-Specific Goal (Individualized)  Outcome: Ongoing, Progressing  Goal: Absence of Hospital-Acquired Illness or Injury  Outcome: Ongoing, Progressing  Intervention: Identify and Manage Fall Risk  Recent Flowsheet Documentation  Taken 8/2/2021 0434 by Beatris Humphreys, RN  Safety Promotion/Fall Prevention:   activity supervised   clutter free environment maintained   fall prevention program maintained   nonskid shoes/slippers when out of bed   room organization consistent   safety round/check completed  Taken 8/2/2021 0206 by Beatris Humphreys, RN  Safety Promotion/Fall Prevention:   activity supervised   clutter free environment maintained   fall prevention program maintained   nonskid shoes/slippers when out of bed   room organization consistent   safety round/check completed  Taken 8/2/2021 0045 by Beatris Humphreys, RN  Safety Promotion/Fall Prevention:   activity supervised   clutter free environment maintained   fall prevention program maintained   nonskid shoes/slippers when out of bed   room organization consistent   safety round/check completed  Taken 8/1/2021 2212 by Beatris Humphreys, RN  Safety Promotion/Fall Prevention:   activity supervised   clutter free environment maintained   fall prevention program maintained   nonskid shoes/slippers when out of bed   room organization consistent   safety  round/check completed  Taken 8/1/2021 2100 by Beatris Humphreys RN  Safety Promotion/Fall Prevention:   activity supervised   clutter free environment maintained   fall prevention program maintained   nonskid shoes/slippers when out of bed   safety round/check completed   room organization consistent  Intervention: Prevent Skin Injury  Recent Flowsheet Documentation  Taken 8/2/2021 0434 by Beatris Humphreys RN  Body Position:   patient/family refused   supine  Taken 8/2/2021 0206 by Beatris Humphreys RN  Body Position: patient/family refused  Taken 8/2/2021 0045 by Beatris Humphreys RN  Body Position:   patient/family refused   supine  Taken 8/1/2021 2212 by Beatris Humphreys RN  Body Position:   patient/family refused   supine  Taken 8/1/2021 2100 by Beatris Humphreys RN  Body Position:   patient/family refused   supine  Skin Protection:   adhesive use limited   incontinence pads utilized   tubing/devices free from skin contact  Intervention: Prevent and Manage VTE (venous thromboembolism) Risk  Recent Flowsheet Documentation  Taken 8/2/2021 0045 by Beatris Humphreys RN  VTE Prevention/Management:   bilateral   dorsiflexion/plantar flexion performed   bleeding risk factor(s) identified  Taken 8/1/2021 2100 by Beatris Humphreys RN  VTE Prevention/Management:   bilateral   dorsiflexion/plantar flexion performed   bleeding risk factor(s) identified  Intervention: Prevent Infection  Recent Flowsheet Documentation  Taken 8/2/2021 0434 by Beatris Humphreys RN  Infection Prevention:   personal protective equipment utilized   hand hygiene promoted   rest/sleep promoted   single patient room provided   visitors restricted/screened  Taken 8/2/2021 0206 by Beatris Humphreys RN  Infection Prevention:   hand hygiene promoted   personal protective equipment utilized   rest/sleep promoted   single patient room provided   visitors restricted/screened  Taken 8/2/2021 0045 by Beatris Humphreys RN  Infection Prevention:   hand hygiene promoted   personal protective equipment  utilized   rest/sleep promoted   single patient room provided   visitors restricted/screened  Taken 8/1/2021 2212 by Beatris Humphreys RN  Infection Prevention:   personal protective equipment utilized   hand hygiene promoted   rest/sleep promoted   single patient room provided   visitors restricted/screened  Taken 8/1/2021 2100 by Beatris Humphreys RN  Infection Prevention:   hand hygiene promoted   personal protective equipment utilized   rest/sleep promoted   single patient room provided   visitors restricted/screened  Goal: Optimal Comfort and Wellbeing  Outcome: Ongoing, Progressing  Intervention: Provide Person-Centered Care  Recent Flowsheet Documentation  Taken 8/2/2021 0045 by Beatris Humphreys RN  Trust Relationship/Rapport:   care explained   choices provided  Taken 8/1/2021 2100 by Beatris Humphreys RN  Trust Relationship/Rapport:   care explained   choices provided  Goal: Readiness for Transition of Care  Outcome: Ongoing, Progressing     Problem: Skin Injury Risk Increased  Goal: Skin Health and Integrity  Outcome: Ongoing, Progressing  Intervention: Optimize Skin Protection  Recent Flowsheet Documentation  Taken 8/2/2021 0434 by Beatris Humphreys RN  Head of Bed (HOB): HOB at 20-30 degrees  Taken 8/2/2021 0206 by Beatris Humphreys RN  Head of Bed (HOB): HOB at 20-30 degrees  Taken 8/2/2021 0045 by Beatris Humphreys RN  Head of Bed (HOB): HOB at 20-30 degrees  Taken 8/1/2021 2212 by Beatris Humphreys RN  Head of Bed (HOB): HOB at 20-30 degrees  Taken 8/1/2021 2100 by Beatris Humphreys RN  Pressure Reduction Techniques:   frequent weight shift encouraged   sit time limited to 2 hours   weight shift assistance provided  Head of Bed (HOB): HOB at 20-30 degrees  Pressure Reduction Devices: positioning supports utilized  Skin Protection:   adhesive use limited   incontinence pads utilized   tubing/devices free from skin contact     Problem: UTI (Urinary Tract Infection)  Goal: Improved Infection Symptoms  Outcome: Ongoing, Progressing   Goal  Outcome Evaluation:  Plan of Care Reviewed With: patient        Progress: improving  Outcome Summary: observed clear, yellow urine over night. CBI clamped all night. no complaints of pain. patient refused turns. barajas cath taken out around 0520, educated patient to call out when he needs to urinate so we can assist with urinal.

## 2021-08-02 NOTE — PLAN OF CARE
Goal Outcome Evaluation:  Plan of Care Reviewed With: patient        Progress: no change  Outcome Summary: OT Lymph:  Pt. was O x 2.  Pt. complained on increased (L) hip pain when LE wraps were removed.  No skin issues noted on either LE.  Therapsit (A) the pt. is asking for pain medication from RN.  OT washed, dried and applied bandages from base of toes to knee (B)ly.  Pt. contiunes to benfit from OT for LE edema control.  Therapist wore a mask. eye protections and washed her hands before and after the treatment.  Pt. could not tolerate wearing a mask at this time./

## 2021-08-02 NOTE — PROGRESS NOTES
LOS: 5 days     Chief Complaint:  Follow-up Candidemia    Interval History:  No fever. Cavanaugh removed this AM. Awaiting void per my d/w RN Taz.     ROS: no f/c/s    Vital Signs  Temp:  [97.4 °F (36.3 °C)-97.8 °F (36.6 °C)] 97.6 °F (36.4 °C)  Heart Rate:  [52-68] 60  Resp:  [16-18] 16  BP: (110-151)/(63-95) 123/95    Physical Exam:  General: NAD  Cardiovascular: bradycardic  Respiratory:  no wheezing; normal work of breathing  GI: Abdomen is nondistended  :   Cavanaugh catheter has been removed  Musculoskeletal: legs wrapped  Skin: No rashes    Antibiotics:  •  amoxicillin (AMOXIL) capsule 500 mg, 500 mg, Oral, Q8H  •  fluconazole (DIFLUCAN) tablet 400 mg, 400 mg, Oral, Q24H    LABS:  Hct, micro reviewed today  Lab Results   Component Value Date    WBC 6.42 08/01/2021    HGB 9.0 (L) 08/02/2021    HCT 28.1 (L) 08/02/2021    MCV 88.3 08/01/2021     08/01/2021     Lab Results   Component Value Date    GLUCOSE 102 (H) 08/01/2021    BUN 29 (H) 08/01/2021    CREATININE 1.45 (H) 08/01/2021    EGFRIFNONA 47 (L) 08/01/2021    BCR 20.0 08/01/2021    K 5.0 08/01/2021    CO2 25.9 08/01/2021    CALCIUM 8.7 08/01/2021    ALBUMIN 3.10 (L) 08/01/2021    AST 30 08/01/2021    ALT 12 08/01/2021     UA TNTC WBCs, TNTC RBCs, mod LE, neg nitrite  Procal 0.12  LA 0.9  UDS negative     Microbiology:  7/18 BCx: negative  7/20 COVID: negative  7/21 UCx: >100k Corynebacterium and <25k GPC  7/27 UCx: >100k mixed shira  7/28 BCx: Candida albicans in 1/2 sets  7/28 UCx: Enterococcus faecalis and yeast  7/28 COVID: negative  8/1 BCx: NGTD    Assessment/Plan   1. Candidemia  2. Hematuria  3. Chronic Cavanaugh - now removed for voiding trial  4. Urine culture positive for Enterococcus  5. Acute kidney injury  6. Obesity BMI 31  7. Lower extremity wounds     Continue fluconazole 400 mg PO daily through 8/12/21 which is a 14 day course. His repeat blood cultures from 8/1 are negative to date. Continue amoxicillin 500 mg PO q8h x 7 day course for  the Enterococcus in his urine. Noted voiding trial and will follow up urology evaluation.      ID will follow. D/W RN.

## 2021-08-03 NOTE — PLAN OF CARE
Goal Outcome Evaluation:  Plan of Care Reviewed With: patient        Progress: improving  Outcome Summary: pt B LEs washed, dried, lotion applied, and re wrapped w. TG7, artiflex 10cm, and 1-8 cm rosidal and 1-10cm rosidal foot to knee. Pt report decr in redness sabrina on R LE, state L LE has always been more red. Decr in edema as well. cont dailly wrapping, pt do dc to SNU at dc and cont wraps.  OT wore all PPE, washed hands before/after

## 2021-08-03 NOTE — NURSING NOTE
Attempt to call report to [facility] multiple times.  No answer on A unit or B unit.  Message left on B unit voicemail requesting a call back for report.  Patient is scheduled to be picked up at 1630 today and returned to [facility]

## 2021-08-03 NOTE — THERAPY TREATMENT NOTE
Acute Care - Occupational Therapy Lymphedema Treatment Note  [facility]     Patient Name: Froilan Helton  : 1940  MRN: [id number]  Today's Date: 8/3/2021  Onset of Illness/Injury or Date of Surgery: 21          Admit Date: 2021       ICD-10-CM ICD-9-CM   1. Gross hematuria  R31.0 599.71   2. Chronic renal insufficiency, stage 4 (severe) (CMS/[facility])  N18.4 585.4   3. Type 2 diabetes mellitus with diabetic neuropathy, unspecified whether long term insulin use (CMS/[facility])  E11.40 250.60     357.2     Patient Active Problem List   Diagnosis   • COPD (chronic obstructive pulmonary disease) (CMS/[facility])   • Type 2 diabetes mellitus with stage 4 chronic kidney disease, with long-term current use of insulin (CMS/[facility])   • Essential hypertension   • Primary osteoarthritis of left knee   • Chronic renal insufficiency, stage 4 (severe) (CMS/[facility])   • Chronic diastolic (congestive) heart failure (CMS/[facility])   • Class 2 severe obesity due to excess calories with serious comorbidity in adult (CMS/[facility])   • Physical debility   • Acute UTI (urinary tract infection)   • Permanent atrial fibrillation (CMS/[facility])   • H/O noncompliance with medical treatment, presenting hazards to health   • Myxedema   • Acute on chronic combined systolic and diastolic CHF (congestive heart failure) (CMS/[facility])   • Generalized weakness   • Acute on chronic diastolic congestive heart failure (CMS/[facility])   • Recurrent left pleural effusion   • Fall on same level as cause of accidental injury   • Gross hematuria   • CAD (coronary artery disease)   • HLD (hyperlipidemia)   • Hypothyroidism   • CKD (chronic kidney disease) stage 3, GFR 30-59 ml/min (CMS/[facility])     Past Medical History:   Diagnosis Date   • A-fib (CMS/[facility])    • Arthritis    • Asthma    • CAD (coronary artery disease)    • Cardiomyopathy (CMS/[facility])    • Cataract    • CHF (congestive heart failure) (CMS/[facility])    • CKD (chronic kidney disease), stage III (CMS/[facility])    • COPD (chronic obstructive  pulmonary disease) (CMS/[facility])    • Diabetes mellitus (CMS/[facility])    • Disease of thyroid gland    • Emphysema, unspecified (CMS/[facility])    • Glaucoma    • Hyperlipidemia    • Hypertension    • Kidney stone    • Myocardial infarct, old    • Neuropathy    • Osteoarthritis    • Osteoporosis    • Permanent atrial fibrillation (CMS/[facility]) 6/30/2020   • PVD (peripheral vascular disease) (CMS/[facility])    • Renal disorder    • Shingles      Past Surgical History:   Procedure Laterality Date   • COLONOSCOPY     • EYE SURGERY     • JOINT REPLACEMENT      right   • KIDNEY STONE SURGERY     • REPLACEMENT TOTAL KNEE     • TOE SURGERY         Lymphedema     Row Name 08/03/21 1300 08/02/21 1417          Subjective Pain    Able to rate subjective pain?  --  yes  -VS     Pre-Treatment Pain Level  --  5  -VS     Post-Treatment Pain Level  --  5  -VS     Subjective Pain Comment  --  Therapist (A) the pt. is asking for pain medication  -VS     Recorded by  [VS] Migdalia Limon OTR            Subjective Comments    Subjective Comments  --  PT. tolerated the LEs wraps, no change noted with edema today   -VS     Recorded by  [VS] Migdalia Limon OTR            Lymphedema Assessment    Lymphedema Classification  --  RLE:;LLE:  -VS     Recorded by  [VS] Migdalia Limon OTR            Lymphedema Edema Assessment    Ptting Edema Category  By severity  -KP  By severity  -VS     Pitting Edema  Mild  -KP  Mild  -VS     Edema Assessment Comment  --  (R)LE greater than (L)LE  -VS     Recorded by [KP] Jacki Disla OTR [VS] Migdalia Limon OTR            Skin Changes/Observations    Location/Assessment  Lower Extremity  -KP  Lower Extremity  -VS     Lower Extremity Conditions  bilateral:;clean;dry;shiny;hairless;intact  -KP  bilateral:;intact;clean;shiny;hairless  -VS     Lower Extremity Color/Pigment  bilateral:;red red areas not open  -KP  bilateral: (L)LE has red areas noted but not open at this time   -VS     Skin Observations Comment  B  LE more redness on L LE than R LE  -  --     Recorded by [KP] Jacki Disla, ADOLPHR [VS] Migdalia Limon OTR            Compression/Skin Care    Compression/Skin Care  skin care;wrapping location;bandaging;remove bandages  -  skin care;wrapping location;bandaging;remove bandages  -VS     Skin Care  washed/dried;lotion applied  -  washed/dried;lotion applied  -VS     Wrapping Location  lower extremity  -KP  lower extremity  -VS     Wrapping Location LE  --  bilateral:;foot to knee  -VS     Wrapping Comments  TG stockinette, 10cm artiflex  -  --     Bandage Layers  cotton liner;padding/fluff layer;short-stretch bandages (comment size/quantity)  -  cotton liner;padding/fluff layer;short-stretch bandages (comment size/quantity)  -VS     Bandaging Comments  8cm rosidal, 10 cm rosidal  -  ! x #8 & #10 base of toes to knee (B)ly   -VS     Bandaging Technique  moderate compression;circumferential/spiral  -  light compression  -VS     Recorded by [KP] Jacki Disla, OTR [VS] Migdalia Limon, ADOLPHR       User Key  (r) = Recorded By, (t) = Taken By, (c) = Cosigned By    Initials Name Effective Dates     Jacki Disla, OTR 06/16/21 -     VS Migdalia Limon OTR 06/16/21 -              OT ASSESSMENT FLOWSHEET (last 12 hours)      OT Evaluation and Treatment     Row Name 08/03/21 1303                   OT Time and Intention    Subjective Information  no complaints  -        Document Type  therapy note (daily note)  -        Mode of Treatment  individual therapy;occupational therapy  -KP        Patient Effort  good  -        Symptoms Noted During/After Treatment  none  -KP        Comment  pt states his legs are looking better  -           General Information    Patient/Family/Caregiver Comments/Observations  pt supine in bed upon OT arrival  -        Existing Precautions/Restrictions  fall  -KP           Cognition    Affect/Mental Status (Cognitive)  WFL  -        Orientation  "Status (Cognition)  oriented to;person;place  -           Pain Scale: Numbers Pre/Post-Treatment    Pretreatment Pain Rating  0/10 - no pain  -KP        Posttreatment Pain Rating  0/10 - no pain  -           Bed Mobility    Comment (Bed Mobility)  pt able to hold LE up some off the bed, more so the R than L  -KP           Bathing Assessment/Intervention    Comment (Bathing)  OT washed pt LEs and dried them prior to re wrapping LEs  -           Lower Extremity Edema Measures, Left    Lower Extremity, Left (Edema)  knee;mid-calf  -KP        Mid-Calf Circumference, Left (Edema)  lymph details in lymph section in chart  -           Lower Extremity Edema Measures, Right    Lower Extremity, Right (Edema)  knee;mid-calf  -KP           Edema Symptoms/Interventions    Description (Edema)  localized;pitting  -        Pitting Scale (Edema)  2-->mild  -        Associated Symptoms (Edema)  skin color changes;hair growth changes  -        Treatment Interventions (Edema)  compression bandaging, short stretch  -KP           Wound 07/19/21 0227 perineum    Wound - Properties Group Placement Date: 07/19/21  -KH Placement Time: 0227 -KH Location: perineum  -KH    Retired Wound - Properties Group Date first assessed: 07/19/21  -KH Time first assessed: 0227 -KH Location: perineum  -KH       Wound 07/29/21 0505 Left anterior thigh    Wound - Properties Group Placement Date: 07/29/21  -IT Placement Time: 0505  -IT Present on Hospital Admission: Y  -IT Side: Left  -IT Orientation: anterior  -IT Location: thigh  -IT Stage, Pressure Injury : other (see comments)  -IT Additional Comments: \"Rug burn\"  -IT    Retired Wound - Properties Group Date first assessed: 07/29/21  -IT Time first assessed: 0505  -IT Present on Hospital Admission: Y  -IT Side: Left  -IT Location: thigh  -IT Additional Comments: \"Rug burn\"  -IT       Wound 07/08/21 0216 Right distal leg Diabetic Ulcer    Wound - Properties Group Placement Date: 07/08/21  " -AB Placement Time: 0216 -AB Present on Hospital Admission: Y  -AB Side: Right  -AB Orientation: distal  -AB Location: leg  -AB Primary Wound Type: Diabetic ulc  -AB Stage, Pressure Injury : Stage 1  -AB    Retired Wound - Properties Group Date first assessed: 07/08/21  -AB Time first assessed: 0216 -AB Present on Hospital Admission: Y  -AB Side: Right  -AB Location: leg  -AB Primary Wound Type: Diabetic ulc  -AB       Wound 07/08/21 0213 Left lower leg Blisters    Wound - Properties Group Placement Date: 07/08/21  -AB Placement Time: 0213 -AB Present on Hospital Admission: Y  -AB Side: Left  -AB Orientation: lower  -AB Location: leg  -AB Primary Wound Type: Blisters  -AB    Retired Wound - Properties Group Date first assessed: 07/08/21  -AB Time first assessed: 0213 -AB Present on Hospital Admission: Y  -AB Side: Left  -AB Location: leg  -AB Primary Wound Type: Blisters  -AB       Plan of Care Review    Plan of Care Reviewed With  patient  -KP        Progress  improving  -KP        Outcome Summary  pt B LEs washed, dried, lotion applied, and re wrapped w. TG7, artiflex 10cm, and 1-8 cm rosidal and 1-10cm rosidal foot to knee. Pt report decr in redness sabrina on R LE, state L LE has always been more red. Decr in edema as well. cont dailly wrapping, pt do dc to SNU at dc and cont wraps.  -KP           Positioning and Restraints    Pre-Treatment Position  in bed  -KP        Post Treatment Position  bed  -KP        In Bed  supine;sitting EOB;encouraged to call for assist;notified nsg;SCD pump applied alarm off upon entry, pt understands to stay in bed  -KP          User Key  (r) = Recorded By, (t) = Taken By, (c) = Cosigned By    Initials Name Effective Dates    Jacki Schmidt, OTR 06/16/21 -     AB Betty Hung RN 06/16/21 -     Gutierrez Ambriz RN 06/16/21 -     IT Karina Roche RN 07/06/20 -            Occupational Therapy Education                 Title: PT OT SLP Therapies (Done)     Topic:  Occupational Therapy (Done)     Point: ADL training (Done)     Description:   Instruct learner(s) on proper safety adaptation and remediation techniques during self care or transfers.   Instruct in proper use of assistive devices.              Learning Progress Summary           Patient Acceptance, E,TB, VU by  at 7/29/2021 1525    Comment: ed pt on role of OT. ed on lymphedema wraps and benefits                   Point: Precautions (Done)     Description:   Instruct learner(s) on prescribed precautions during self-care and functional transfers.              Learning Progress Summary           Patient Acceptance, E,TB, VU by  at 7/29/2021 1525    Comment: ed pt on role of OT. ed on lymphedema wraps and benefits                   Point: Body mechanics (Done)     Description:   Instruct learner(s) on proper positioning and spine alignment during self-care, functional mobility activities and/or exercises.              Learning Progress Summary           Patient Acceptance, E,TB, VU by  at 7/29/2021 1525    Comment: ed pt on role of OT. ed on lymphedema wraps and benefits                               User Key     Initials Effective Dates Name Provider Type Discipline     06/16/21 -  Jacki Disla, OTR Occupational Therapist OT                  OT Recommendation and Plan  Therapy Frequency (OT): 5 times/wk  Plan of Care Review  Plan of Care Reviewed With: patient  Progress: improving  Outcome Summary: pt B LEs washed, dried, lotion applied, and re wrapped w. TG7, artiflex 10cm, and 1-8 cm rosidal and 1-10cm rosidal foot to knee. Pt report decr in redness sabrina on R LE, state L LE has always been more red. Decr in edema as well. cont dailly wrapping, pt do dc to SNU at dc and cont wraps.  Plan of Care Reviewed With: patient  Outcome Summary: pt B LEs washed, dried, lotion applied, and re wrapped w. TG7, artiflex 10cm, and 1-8 cm rosidal and 1-10cm rosidal foot to knee. Pt report decr in redness sabrina on R  LE, state L LE has always been more red. Decr in edema as well. cont dailly wrapping, pt do dc to SNU at dc and cont wraps.      Outcome Measures     Row Name 08/02/21 1400             How much help from another is currently needed...    Putting on and taking off regular lower body clothing?  1  -VS      Bathing (including washing, rinsing, and drying)  1  -VS      Toileting (which includes using toilet bed pan or urinal)  1  -VS      Putting on and taking off regular upper body clothing  2  -VS      Taking care of personal grooming (such as brushing teeth)  2  -VS      Eating meals  3  -VS      AM-PAC 6 Clicks Score (OT)  10  -VS         Functional Assessment    Outcome Measure Options  AM-PAC 6 Clicks Daily Activity (OT)  -VS        User Key  (r) = Recorded By, (t) = Taken By, (c) = Cosigned By    Initials Name Provider Type    VS Migdalia Limon OTR Occupational Therapist            Time Calculation:    Time Calculation- OT     Row Name 08/03/21 1310             Time Calculation- OT    OT Start Time  0937  -      OT Stop Time  1004  -      OT Time Calculation (min)  27 min  -      Total Timed Code Minutes- OT  27 minute(s)  -KP      OT Received On  08/03/21  -      OT - Next Appointment  08/04/21  -         Timed Charges    78471 - OT Manual Therapy Minutes  27  -KP         Total Minutes    Timed Charges Total Minutes  27  -KP       Total Minutes  27  -KP        User Key  (r) = Recorded By, (t) = Taken By, (c) = Cosigned By    Initials Name Provider Type    Jacki Schmidt OTR Occupational Therapist          Therapy Charges for Today     Code Description Service Date Service Provider Modifiers Qty    [id number]  OT MANUAL THERAPY EA 15 MIN 8/3/2021 Jacki Disla OTR GO 2                      BETH Zelaya  8/3/2021

## 2021-08-03 NOTE — PLAN OF CARE
Goal Outcome Evaluation:  Plan of Care Reviewed With: patient           Outcome Summary: No acute changes overnight. Incontienent of urine. Still urinating spontaneously and clear yellow. AOx4. Refusing some turns, education provided on skin care.

## 2021-08-03 NOTE — PROGRESS NOTES
LOS: 6 days     Chief Complaint:  Follow-up Candidemia    Interval History:  He says he is passing his urine pretty well with Cavanaugh removed. No fevers. Tolerating fluconazole and amoxicillin.     ROS: no f/c/s    Vital Signs  Temp:  [97.7 °F (36.5 °C)-98.1 °F (36.7 °C)] 98.1 °F (36.7 °C)  Heart Rate:  [56-63] 56  Resp:  [16-18] 18  BP: (118-136)/(66-81) 118/66    Physical Exam:  General: NAD, very nice  Cardiovascular: bradycardic  Respiratory:  no wheezing; normal work of breathing on 1L NC  GI: Abdomen is nondistended  :   Cavanaugh catheter has been removed  Skin: No rashes    Antibiotics:  •  amoxicillin (AMOXIL) capsule 500 mg, 500 mg, Oral, Q8H  •  fluconazole (DIFLUCAN) tablet 400 mg, 400 mg, Oral, Q24H    LABS:  BMP, micro reviewed today  Lab Results   Component Value Date    WBC 6.42 08/01/2021    HGB 9.1 (L) 08/02/2021    HCT 28.8 (L) 08/02/2021    MCV 88.3 08/01/2021     08/01/2021     Lab Results   Component Value Date    GLUCOSE 130 (H) 08/02/2021    BUN 29 (H) 08/02/2021    CREATININE 1.51 (H) 08/02/2021    EGFRIFNONA 45 (L) 08/02/2021    BCR 19.2 08/02/2021    K 5.0 08/02/2021    CO2 20.7 (L) 08/02/2021    CALCIUM 8.6 08/02/2021    ALBUMIN 3.10 (L) 08/01/2021    AST 30 08/01/2021    ALT 12 08/01/2021     UA TNTC WBCs, TNTC RBCs, mod LE, neg nitrite  Procal 0.12  LA 0.9  UDS negative     Microbiology:  7/18 BCx: negative  7/20 COVID: negative  7/21 UCx: >100k Corynebacterium and <25k GPC  7/27 UCx: >100k mixed shira  7/28 BCx: Candida albicans in 1/2 sets  7/28 UCx: Enterococcus faecalis and yeast  7/28 COVID: negative  8/1 BCx: NGTD    Assessment/Plan   1. Candidemia  2. Hematuria  3. Chronic Cavanaugh - now removed  4. Urine culture positive for Enterococcus faecalis  5. Acute kidney injury  6. Obesity BMI 30  7. Lower extremity wounds     Continue fluconazole 400 mg PO daily through 8/12/21 which is a 14 day course. His repeat blood cultures from 8/1 are negative to date. Continue amoxicillin 500  mg PO q8h x 7 day course with stop date 8/6/21 for the Enterococcus in his urine. He appears to have passed his voiding trial.     Thank you for allowing me to be involved in the care of this patient. Infectious diseases will sign off at this time with antibiotics plan in place, but please call me at [phone] if any further ID questions or new ID concerns.

## 2021-08-03 NOTE — DISCHARGE SUMMARY
Date of Admission: 7/28/2021  Date of Discharge:  8/3/2021  Primary Care Physician: Fortunato De Leon MD     Discharge Diagnosis:  Active Hospital Problems    Diagnosis  POA   • **Gross hematuria [R31.0]  Yes   • CAD (coronary artery disease) [I25.10]  Yes   • HLD (hyperlipidemia) [E78.5]  Yes   • Hypothyroidism [E03.9]  Yes   • CKD (chronic kidney disease) stage 3, GFR 30-59 ml/min (CMS/[facility]) [N18.30]  Yes   • Permanent atrial fibrillation (CMS/[facility]) [I48.21]  Yes   • Acute UTI (urinary tract infection) [N39.0]  Yes   • Chronic diastolic (congestive) heart failure (CMS/[facility]) [I50.32]  Yes   • Type 2 diabetes mellitus with stage 4 chronic kidney disease, with long-term current use of insulin (CMS/[facility]) [E11.22, N18.4, Z79.4]  Not Applicable   • Essential hypertension [I10]  Yes   • COPD (chronic obstructive pulmonary disease) (CMS/[facility]) [J44.9]  Yes      Resolved Hospital Problems   No resolved problems to display.       Presenting Problem/History of Present Illness:  Gross hematuria [R31.0]  Chronic renal insufficiency, stage 4 (severe) (CMS/[facility]) [N18.4]     Mr. Helton is a 80 y.o. former smoker with a history of hypertension, permanent atrial fibrillation, COPD, chronic diastolic congestive heart failure, coronary artery disease, hypothyroidism, CKD stage 3, and type 2 diabetes mellitus that presents to Psychiatric due to altered mental status.  Per ED notes, he was sent from his nursing home for confusion.  The patient is alert and oriented to himself and time, but is a poor historian.  He reports suprapubic tenderness, but denies any other complaints at this time.  He was seen in the ED at [facility] yesterday and was diagnosed with an acute UTI and discharged with PO Cefuroxime.  He has a history of chronic barajas catheter.  He was found to have significant hematuria in the ED tonight and a 3-way barajas catheter was placed with continuous irrigation.  He is currently on Eliquis for a  history of atrial fibrillation.    Hospital Course:  The patient is a 80 y.o. male who presented with hematuria and urinary retention.  He was admitted and placed on CBI.  He had chronic indwelling Cavanaugh.  Urine was infectious and culture grew Enterococcus and Candida.  He subsequently was found to have Candida fungemia.  Urology and infectious disease were consulted.  Hematuria resolved and the Cavanaugh was actually able to be removed and he has passed a voiding trial.  He is going to continue treatment for Enterococcus UTI with amoxicillin and Candida UTI/fungemia with fluconazole per infectious disease recommendations.  He needs to follow-up with urology in clinic and also with his primary care provider.    He has chronic atrial fibrillation and his anticoagulant was held while he was bleeding.    Exam Today:  Constitutional:       General: He is not in acute distress.     Appearance: He is not ill-appearing or diaphoretic.   HENT:      Head: Normocephalic and atraumatic.   Eyes:      General:         Right eye: No discharge.         Left eye: No discharge.      Conjunctiva/sclera: Conjunctivae normal.   Cardiovascular:      Rate and Rhythm: Normal rate. Rhythm irregular.      Pulses: Normal pulses.   Pulmonary:      Effort: Pulmonary effort is normal.      Breath sounds: No wheezing.   Abdominal:      General: There is no distension.      Palpations: Abdomen is soft.      Tenderness: There is no abdominal tenderness. There is no guarding or rebound.   Musculoskeletal:         General: No tenderness.      Cervical back: Neck supple. No tenderness.      Comments: BLE wrapped   Skin:     General: Skin is warm and dry.   Neurological:      Mental Status: He is alert. Mental status is at baseline.   Psychiatric:         Mood and Affect: Mood normal.         Behavior: Behavior normal.     Results:  CXR  No evidence of acute disease within the chest.    Procedures Performed:         Consults:   Consults     Date and Time  Order Name Status Description    7/30/2021  8:28 PM Inpatient Infectious Diseases Consult Completed     7/28/2021 11:53 PM Inpatient Urology Consult Completed     7/28/2021 10:46 PM [facility] (on-call MD unless specified) Details Completed     7/19/2021  3:12 AM Inpatient Cardiology Consult Completed            Discharge Disposition:  Skilled Nursing Facility (DC - External)    Discharge Medications:     Discharge Medications      New Medications      Instructions Start Date   amoxicillin 500 MG capsule  Commonly known as: AMOXIL   500 mg, Oral, Every 8 Hours Scheduled      fluconazole 200 MG tablet  Commonly known as: DIFLUCAN   400 mg, Oral, Every 24 Hours         Changes to Medications      Instructions Start Date   O2  Commonly known as: OXYGEN  What changed: how much to take   1 L/min (1 L/min), Inhalation, Every Night at Bedtime         Continue These Medications      Instructions Start Date   acetaminophen 325 MG tablet  Commonly known as: TYLENOL   650 mg, Oral, Every 4 Hours PRN      albuterol sulfate  (90 Base) MCG/ACT inhaler  Commonly known as: PROVENTIL HFA;VENTOLIN HFA;PROAIR HFA   2 puffs, Inhalation, Every 4 Hours PRN      apixaban 5 MG tablet tablet  Commonly known as: ELIQUIS   5 mg, Oral, Every 12 Hours Scheduled      atorvastatin 10 MG tablet  Commonly known as: LIPITOR   10 mg, Oral, Daily      bisacodyl 10 MG suppository  Commonly known as: DULCOLAX   10 mg, Rectal, Daily PRN      budesonide-formoterol 160-4.5 MCG/ACT inhaler  Commonly known as: Symbicort   2 puffs, Inhalation, 2 Times Daily - RT      bumetanide 2 MG tablet  Commonly known as: BUMEX   2 mg, Oral, 2 Times Daily      carvedilol 6.25 MG tablet  Commonly known as: COREG   6.25 mg, Oral, 2 Times Daily With Meals      Cholecalciferol 50 MCG (2000 UT) tablet   2,000 Units, Oral, Daily      citalopram 10 MG tablet  Commonly known as: CeleXA   20 mg, Oral, Daily      cyanocobalamin 1000 MCG tablet  Commonly known as: VITAMIN B-12    "1,000 mcg, Oral, Daily      docusate sodium 100 MG capsule  Commonly known as: COLACE   100 mg, Oral, 2 Times Daily      fluticasone 50 MCG/ACT nasal spray  Commonly known as: FLONASE   2 sprays, Each Nare, Daily      hydrocortisone 1 % cream   Topical, 2 Times Daily      hydrophor ointment ointment   Topical, As Needed, Lower extremities.      insulin aspart 100 UNIT/ML injection  Commonly known as: novoLOG   1-14 Units, Subcutaneous, 3 Times Daily Before Meals, As directed per sliding scale       Insulin Syringe 30G X 5/16\" 1 ML misc   U UTD WITH INSULIN UP TO 6 TIMES A DAY      ipratropium-albuterol 0.5-2.5 mg/3 ml nebulizer  Commonly known as: DUO-NEB   3 mL, Nebulization, Every 4 Hours PRN      lactulose 10 GM/15ML solution  Commonly known as: CHRONULAC   30 g, Oral, Daily PRN      levothyroxine 112 MCG tablet  Commonly known as: SYNTHROID, LEVOTHROID   224 mcg, Oral, Daily      loratadine 5 MG chewable tablet  Commonly known as: CLARITIN   10 mg, Oral, Daily      melatonin 5 MG tablet tablet   5 mg, Oral, Nightly PRN, Over the counter      Petrolatum 42 % ointment   Topical, Every 24 Hours Scheduled, Send with patient      polyethylene glycol 17 g packet  Commonly known as: MIRALAX   17 g, Oral, Daily      pregabalin 75 MG capsule  Commonly known as: LYRICA   75 mg, Oral, 2 Times Daily      QUEtiapine 25 MG tablet  Commonly known as: SEROquel   12.5 mg, Oral, Nightly      REMEDY ANTIMICROBIAL CLEANSER EX   Apply externally, Every 1 Hour PRN      SITagliptin 100 MG tablet  Commonly known as: JANUVIA   100 mg, Oral, Daily      sodium chloride 0.65 % nasal spray   2 sprays, Nasal, As Needed, Send with patient      tamsulosin 0.4 MG capsule 24 hr capsule  Commonly known as: FLOMAX   0.4 mg, Oral, Daily      Tubersol 5 UNIT/0.1ML injection  Generic drug: tuberculin   5 Units, Intradermal, Once, Inject 0.1 mL intradermally every evening shift every 11 months starting on the 22nd for 1 day(s) Give annual PPD     "   vitamin D 1.25 MG (40857 UT) capsule capsule  Commonly known as: ERGOCALCIFEROL   1,250 Units, Oral, Daily         Stop These Medications    cefuroxime 500 MG tablet  Commonly known as: CEFTIN            Discharge Diet:   Diet Instructions     Diet: Consistent Carbohydrate      Discharge Diet: Consistent Carbohydrate          Activity at Discharge:   Activity Instructions     Activity as Tolerated            Follow-up Appointments:   Contact information for follow-up providers     Fortunato De Leon MD Follow up.    Specialty: Family Medicine  Contact information:  [address]  [facility] [address] [phone]             Lavon Masters MD Follow up.    Specialty: Urology  Contact information:  [address] [facility] [address] [phone]                   Contact information for after-discharge care     Destination     [facility] .    Service: Skilled Nursing  Contact information:  [address] [facility] [address] [phone]                             Test Results Pending at Discharge:  Pending Labs     Order Current Status    Blood Culture - Blood, Arm, Right Preliminary result    Blood Culture - Blood, Hand, Right Preliminary result           Ellis Lombardo MD  08/03/21  11:34 EDT    Time Spent on Discharge Activities: >30 minutes    Dictated portions using Dragon dictation software.  During the entire encounter, I was wearing recommended PPE including face mask and eye protection. Hand sanitization was performed prior to entering room and upon exit.

## 2021-08-03 NOTE — PLAN OF CARE
Goal Outcome Evaluation:  Plan of Care Reviewed With: patient        Progress: improving  Outcome Summary: plan to discharge on oral antibiotics to facility this afternoon

## 2021-08-03 NOTE — CASE MANAGEMENT/SOCIAL WORK
Discharge Planning Assessment  [facility]     Patient Name: Froilan Helton  MRN: [id number]  Today's Date: 8/3/2021    Admit Date: 7/28/2021    Discharge Needs Assessment    No documentation.       Discharge Plan     Row Name 08/03/21 1320       Plan    Plan  Return to [facility]    Patient/Family in Agreement with Plan  yes    Plan Comments  DC order in EPIC.  Spoke with granddaughter Jaz by telephone and she is in agreement with return to [facility].  Spoke with Flores/[facility] -skilled bed available.  Flores states [facility] van will transport patient and will be here around 4:30 p.m. Packet to RN.  Spoke with patient at bedside and he is aware/agreeable.  BHumeniuk RN        Continued Care and Services - Admitted Since 7/28/2021     Destination Coordination complete.    Service Provider Request Status Selected Services Address Phone Fax Patient Preferred    [place] [facility]   [facility] [address] [place] [address] [phone] --            Selected Continued Care - Prior Encounters Includes selections from prior encounters from 4/29/2021 to 8/3/2021    Discharged on 7/21/2021 Admission date: 7/18/2021 - Discharge disposition: Long Term Care (DC - External)    Destination     Service Provider Selected Services Address Phone Fax Patient Preferred    [facility]  Skilled Nursing [address] [place] [address] [phone] --                    Expected Discharge Date and Time     Expected Discharge Date Expected Discharge Time    Aug 3, 2021                 Becky S. Humeniuk, RN

## 2021-08-24 PROBLEM — I50.33 ACUTE ON CHRONIC DIASTOLIC CONGESTIVE HEART FAILURE (HCC): Status: RESOLVED | Noted: 2021-01-01 | Resolved: 2021-01-01

## 2021-08-24 PROBLEM — I50.32 CHRONIC DIASTOLIC (CONGESTIVE) HEART FAILURE (HCC): Status: RESOLVED | Noted: 2019-05-07 | Resolved: 2021-01-01

## 2021-08-24 NOTE — PROGRESS NOTES
Date of Office Visit: 2021  Encounter Provider: LEATHA Dow  Place of Service: [facility] CARDIOLOGY  Patient Name: Froilan Helton  :1940  Primary Cardiologist: Dr. Monaco    CC:  hypotension    Dear Dr. De Leon    HPI: Froilan Helton is a pleasant 80 y.o. male who presents 2021 for cardiac follow up.  I have reviewed his past medical records including notes, labs and testing in preparation for today's visit.     78-year-old male admitted to the ICU in [facility] on 8/15/19,  after presenting to the emergency room with respiratory arrest.  Wife and reportedly became confused and short of breath at home.      He was seen when he was hospitalized in 2019.  He was seen by Dr. Lopez in the hospital.  He had an echocardiogram performed 2019 showed ejection fraction 50% with sigmoid septum, normal diastolic function, bicuspid aortic valve with mild aortic valvular stenosis and mild aortic insufficiency.  Lower extremity venous duplex done 2019 showed no DVT.      Has a history of chronic renal failure as well as chronically elevated troponin.  Prior values have been 0.035, 0.037, 0.039 back in .     Because of his respiratory failure he was intubated and he was transferred to the ICU.  Laboratory data demonstrated a lactic acidosis with a lactate of 2.8.  Procalcitonin was elevated 0.83.  White count was elevated 24,640 initial troponin was 0.03.  Blood sugar was elevated at 303.  Creatinine was higher than usual at 1.85.     Because of confusion and decreased responsivity on initial arrival he had a head CT performed in the ER.  images  Reviewed and no acute abnormality was seen.  He also had a chest x-ray obtained in the ER which revealed Bilateral interstitial infiltrates that could either indicate pneumonia or heart failure.  Airspace disease was greater on the right side     Echocardiogram revealed  Normal function, normal valvular structure  and function, no pulmonary hypertension.  No evidence of a cardiac contribution to the current situation.  Normal ejection fraction, consistent with physical exam.      He had a complicated stay.  He was treated for severe sepsis secondary to strep dysgalactiae bacteremia from recurrent right lower extremity cellulitis.  He was seen by ID.  He had a PICC line for antibiotic therapy.  He was discharged home with Lotrimin cream twice a day for 10 days with home health to follow for wound care.     He also had acute kidney injury on chronic kidney disease stage III/IV suspected secondary to sepsis, volume overload, with possible urinary retention.  Renal ultrasound was negative for acute findings.  His lisinopril was discontinued.  He was diuresed with IV Bumex and switch to Bumex 1 mg p.o. twice daily for home.  Flomax was added for post void residuals which were borderline at 200-300s.  Renal function returned to his baseline.     He went into atrial fibrillation with RVR and STEMI type II secondary to sepsis.  Echo as above.  He was rate controlled on oral amiodarone.  His metoprolol was changed to Coreg 6.25 mg twice daily and his amlodipine was discontinued secondary to possible relation to his lower extremity edema.  He was started on Coumadin, he is not a candidate for no wax secondary to obesity and renal dysfunction.  He will be followed in our medication management clinic.     Acute hypoxic respiratory failure, COPD-asthma without exacerbation.  Nocturnal hypoxemia with suspected PETER.  Pulmonary signed off on patient's respiratory status that was stable.  Initially he required mechanical ventilation.  He was discharged home on Symbicort.  Overnight oximetry completed and the patient required 1 L per nasal cannula, walking oximetry showed no O2 requirement with walking and patient on room air at rest.     He has acute on chronic anemia with iron profile consistent with anemia of chronic disease, low iron and  percent saturation but also low TIBC and normal ferritin.  His iron supplementation was discontinued.  To be monitored as outpatient.     Uncontrolled insulin-dependent diabetes type 2 with hyperglycemia with an A1c on admission of 9.4%.  Patient was discharged home with his insulin and Januvia.     He is also treated for hypothyroid and his dose was adjusted.     Metabolic encephalopathy secondary to sepsis and infection resolved.  Patient was back to his baseline.     Interpretation Summary 9/18     · Myocardial perfusion imaging indicates a normal myocardial perfusion study with no evidence of ischemia.  · Impressions are consistent with a low risk study.  · Left ventricular ejection fraction is mildly reduced (Calculated EF = 42%).            He presents today, 9/10/2019, in follow-up to his recent hospitalization.  He did not bring a list of his medications nor did he bring his medications.  Upon going over everything it appears that he is taking both his Coreg and the metoprolol, or so he feels.  He also thinks he is taking his Lasix and Bumex.  He denies any palpitations.  He does have chronic shortness of air, chronic lower extremity edema, chronic fatigue.  His bilateral lower extremities are wrapped.  Home health is seeing him for nursing and wound care.  He also has physical therapy coming to his house.  He still complains of some dizziness and lightheadedness that is not new.  He states he has some chest discomfort at times.  It does not appear that he got enrolled in our medical management clinic but had a therapeutic PT/INR on 9/2/2019 of 2.15.  I will enroll him in our medical management clinic I will have home health check PT/INR and a BMP this week.  He is also requested a new endocrinologist.  I have put in a referral through the [facility] for this.  He states he has not been taking the baby aspirin for quite some time because it makes him ill.     7/19/2021Pt was last seen in hospital  consultation on 6/29/20 . Pt was admitted with progressive worsening of generalized body weakness and frequent falls. . Pt reported ambulating with a walker but had been having balance issues. Pt denied any significant change in his breathing, palpitations, presyncope, syncope, orthopnea or PND. Pt felt like his bilateral lower extremity edema was worse. Pt was to continue his home oral diuretic and Coreg and Eliquis .      Patient presented the emergency room with complaints of a fall that injured his right knee, left hip and left shoulder.  Patient was hospitalized from July 7 to July 8 for weakness.  In the emergency room he had an EKG that showed atrial fibrillation with a heart rate of 95.  Chest x-ray showed a new left lower lobe infiltrate and pleural effusion.  NT proBNP is elevated 20,761.  Chest CT showed cardiomegaly with bilateral pleural effusions, compressive bibasilar atelectasis groundglass opacities throughout both lungs.     He was hospitalized overnight from 7/7-7/8 for confusion and family had requested placement.  When assessed, he was awake alert and oriented x3 with no signs and symptoms of encephalopathy.  He refused placement therefore was discharged home.    He returned to the emergency room on 7/18 and was hospitalized until the 21st after sustaining a mechanical fall while trying to get from his bed to his scooter.  He flipped forward.  He also complained of shortness of breath and dyspnea on exertion.  He was admitted to [facility] isolation initially because he refused a Covid swab.  His diuretics were restarted on his home dosages.  His amiodarone was stopped.  He was reeducated about his diagnosis and need for compliance and was discharged home on 21 July.    He again presented to the emergency department on July 28 at [facility] with altered mental status.  He was sent from the nursing facility.  He also reported suprapubic pubic tenderness but denied other  complaints.  He had just been seen in the ED at [facility] the day before and was diagnosed with a UTI and discharged home on p.o. cefuroxime.  He was found to have significant hematuria and a three-way Cavanaugh was placed with continuous irrigation.  His Eliquis was held.  His urine was infectious and cultured for enteric coccus and Candida.  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  Urology and ID were consulted.  His hematuria resolved and the Cavanaugh was removed and he was able to void on his own.  He was discharged  on August 3, 2021 back to the [facility].  His anticoagulation was restarted at discharge.    He presents today for hypotension especially during physical therapy.  He is awake alert and oriented x3.  His chronic lower extremity edema is better than I have ever seen it.  He does have his legs wrapped and he states that that helps quite a bit.  Right is greater than left.  He states he has been having back and neck pain since his fall a few weeks ago.  He states physical therapy is working with him but due to his orthostasis he is having trouble completing it.  He brings a list of blood pressures that I have scanned into epic.  It does show that he has orthostatic changes.  The most extreme was on July 27 lying 122/74 sitting 88/64.  His average blood pressures 1 teens/60s.  He has dropped as low as 102/52 at other times when sitting.  This does make him dizzy.  He states it makes him feel very him unwell.  He also brings labs dated 8/23/2021 from the facility that showed a sodium of 138, potassium 4.3, creatinine 1.9.  He has normal liver function.  A proBNP was 360.3.  He denies any palpitations, he cannot feel his heart racing or skipping.  He does have shortness of breath with exertion.  He does get dizzy with standing.  He denies any chest pain or chest pressure.  He is weak and fatigued.  He is in a wheelchair.  He could not stand to get weighed.  He denies seeing any further  hematuria.  He is back on the Eliquis.    Past Medical History:   Diagnosis Date   • A-fib (CMS/[facility])    • Arthritis    • Asthma    • CAD (coronary artery disease)    • Cardiomyopathy (CMS/[facility])    • Cataract    • CHF (congestive heart failure) (CMS/[facility])    • CKD (chronic kidney disease), stage III (CMS/[facility])    • COPD (chronic obstructive pulmonary disease) (CMS/[facility])    • Diabetes mellitus (CMS/[facility])    • Disease of thyroid gland    • Emphysema, unspecified (CMS/[facility])    • Glaucoma    • Hyperlipidemia    • Hypertension    • Kidney stone    • Myocardial infarct, old    • Neuropathy    • Osteoarthritis    • Osteoporosis    • Permanent atrial fibrillation (CMS/[facility]) 6/30/2020   • PVD (peripheral vascular disease) (CMS/[facility])    • Renal disorder    • Shingles        Past Surgical History:   Procedure Laterality Date   • COLONOSCOPY     • EYE SURGERY     • JOINT REPLACEMENT      right   • KIDNEY STONE SURGERY     • REPLACEMENT TOTAL KNEE     • TOE SURGERY         Social History     Socioeconomic History   • Marital status:      Spouse name: Not on file   • Number of children: Not on file   • Years of education: Not on file   • Highest education level: Not on file   Tobacco Use   • Smoking status: Former Smoker   • Smokeless tobacco: Never Used   • Tobacco comment: quit 1993   Vaping Use   • Vaping Use: Never used   Substance and Sexual Activity   • Alcohol use: No   • Drug use: No   • Sexual activity: Defer       Family History   Problem Relation Age of Onset   • COPD Mother    • Diabetes Father        The following portion of the patient's history were reviewed and updated as appropriate: past medical history, past surgical history, past social history, past family history, allergies, current medications, and problem list.    Review of Systems   Constitutional: Positive for malaise/fatigue. Negative for diaphoresis and fever.   HENT: Negative for congestion, hearing loss, hoarse voice, nosebleeds and sore throat.     Eyes: Negative for photophobia, vision loss in left eye, vision loss in right eye and visual disturbance.   Cardiovascular: Positive for leg swelling (much improvement). Negative for chest pain, dyspnea on exertion, irregular heartbeat, near-syncope, orthopnea, palpitations, paroxysmal nocturnal dyspnea and syncope.   Respiratory: Positive for shortness of breath (with exertion). Negative for cough, hemoptysis, sleep disturbances due to breathing, snoring, sputum production and wheezing.    Endocrine: Negative for cold intolerance, heat intolerance, polydipsia, polyphagia and polyuria.   Hematologic/Lymphatic: Negative for bleeding problem. Does not bruise/bleed easily.   Skin: Negative for color change, dry skin, poor wound healing, rash and suspicious lesions.   Musculoskeletal: Positive for back pain (from previous fall), falls and neck pain (from previous fall). Negative for arthritis, gout, joint pain, joint swelling, muscle cramps, muscle weakness and myalgias.   Gastrointestinal: Negative for bloating, abdominal pain, constipation, diarrhea, dysphagia, melena, nausea and vomiting.   Neurological: Positive for dizziness (when changing positions), loss of balance and weakness. Negative for excessive daytime sleepiness, headaches, light-headedness, numbness, paresthesias, seizures and vertigo.   Psychiatric/Behavioral: Negative for depression, memory loss and substance abuse. The patient is not nervous/anxious.        Allergies   Allergen Reactions   • Morphine Unknown - High Severity   • Atorvastatin Unknown - Low Severity   • Oxycontin [Oxycodone Hcl] Confusion     'Makes me crazy and stand on my head'         Current Outpatient Medications:   •  acetaminophen (TYLENOL) 325 MG tablet, Take 2 tablets by mouth Every 4 (Four) Hours As Needed for Mild Pain ., Disp: , Rfl:   •  albuterol sulfate  (90 Base) MCG/ACT inhaler, Inhale 2 puffs Every 4 (Four) Hours As Needed for Wheezing., Disp: , Rfl:   •   "apixaban (ELIQUIS) 5 MG tablet tablet, Take 1 tablet by mouth Every 12 (Twelve) Hours. Indications: Atrial Fibrillation, Disp: 60 tablet, Rfl:   •  Benzalkonium Chloride (REMEDY ANTIMICROBIAL CLEANSER EX), Apply  topically Every 1 (One) Hour As Needed., Disp: , Rfl:   •  bisacodyl (DULCOLAX) 10 MG suppository, Insert 10 mg into the rectum Daily As Needed for Constipation., Disp: , Rfl:   •  budesonide-formoterol (SYMBICORT) 160-4.5 MCG/ACT inhaler, Inhale 2 puffs 2 (Two) Times a Day., Disp: 1 inhaler, Rfl: 0  •  bumetanide (BUMEX) 2 MG tablet, Take 1 tablet by mouth 2 (Two) Times a Day. (Patient taking differently: Take 1 mg by mouth 2 (Two) Times a Day.), Disp: 120 tablet, Rfl: 0  •  carvedilol (COREG) 6.25 MG tablet, Take 1 tablet by mouth 2 (Two) Times a Day With Meals. (Patient taking differently: Take 3.125 mg by mouth 2 (Two) Times a Day With Meals.), Disp: 60 tablet, Rfl: 0  •  cholecalciferol 2000 units tablet, Take 2,000 Units by mouth Daily., Disp: 30 each, Rfl: 0  •  citalopram (CeleXA) 10 MG tablet, Take 20 mg by mouth Daily., Disp: , Rfl:   •  docusate sodium (COLACE) 100 MG capsule, Take 100 mg by mouth 2 (Two) Times a Day., Disp: , Rfl:   •  fluticasone (FLONASE) 50 MCG/ACT nasal spray, 2 sprays by Each Nare route Daily., Disp: 1 bottle, Rfl: 0  •  hydrocortisone 1 % cream, Apply  topically to the appropriate area as directed 2 (Two) Times a Day., Disp: , Rfl:   •  hydrophor (AQUAPHOR) ointment ointment, Apply  topically to the appropriate area as directed As Needed (Ulcerations lower extremities and edema). Lower extremities., Disp: , Rfl:   •  insulin aspart (novoLOG) 100 UNIT/ML injection, Inject 1-14 Units under the skin into the appropriate area as directed 3 (Three) Times a Day Before Meals. As directed per sliding scale, Disp: , Rfl:   •  Insulin Syringe 30G X 5/16\" 1 ML misc, U UTD WITH INSULIN UP TO 6 TIMES A DAY, Disp: , Rfl: 3  •  ipratropium-albuterol (DUO-NEB) 0.5-2.5 mg/3 ml nebulizer, " "Take 3 mL by nebulization Every 4 (Four) Hours As Needed for Wheezing., Disp: , Rfl:   •  lactulose (CHRONULAC) 10 GM/15ML solution, Take 30 g by mouth Daily As Needed., Disp: , Rfl:   •  levothyroxine (SYNTHROID, LEVOTHROID) 112 MCG tablet, Take 224 mcg by mouth Daily., Disp: , Rfl:   •  loratadine (CLARITIN) 5 MG chewable tablet, Chew 10 mg Daily., Disp: , Rfl:   •  melatonin 5 MG tablet tablet, Take 1 tablet by mouth At Night As Needed (sleep). Over the counter, Disp: , Rfl:   •  O2 (OXYGEN), Inhale 1 L/min every night at bedtime. (Patient taking differently: Inhale 2 L/min every night at bedtime.), Disp: 1 L, Rfl: 0  •  Petrolatum 42 % ointment, Apply  topically to the appropriate area as directed Daily. Send with patient, Disp: , Rfl:   •  polyethylene glycol (MIRALAX) 17 g packet, Take 17 g by mouth Daily., Disp: , Rfl:   •  pregabalin (LYRICA) 75 MG capsule, Take 1 capsule by mouth 2 (Two) Times a Day., Disp: 6 capsule, Rfl: 0  •  sodium chloride 0.65 % nasal spray, 2 sprays into the nostril(s) as directed by provider As Needed for Congestion. Send with patient, Disp: , Rfl: 12  •  tamsulosin (FLOMAX) 0.4 MG capsule 24 hr capsule, Take 1 capsule by mouth Daily., Disp: 30 capsule, Rfl: 0  •  tuberculin (Tubersol) 5 UNIT/0.1ML injection, Inject 5 Units into the appropriate area of the skin as directed by provider 1 (One) Time. Inject 0.1 mL intradermally every evening shift every 11 months starting on the 22nd for 1 day(s) Give annual PPD, Disp: , Rfl:   •  vitamin B-12 (VITAMIN B-12) 1000 MCG tablet, Take 1 tablet by mouth Daily., Disp: 60 tablet, Rfl: 0  •  vitamin D (ERGOCALCIFEROL) 1.25 MG (45444 UT) capsule capsule, Take 1,250 Units by mouth Daily., Disp: , Rfl:         Objective:     Vitals:    08/24/21 1425   BP: 104/50   Pulse: 73   Resp: 16   Height: 182.9 cm (72\")     Body mass index is 31.84 kg/m².      Vitals reviewed.   Constitutional:       General: Not in acute distress.     Appearance: Frail. " Chronically ill-appearing.   Eyes:      General:         Right eye: No discharge.         Left eye: No discharge.      Conjunctiva/sclera: Conjunctivae normal.   HENT:      Head: Normocephalic and atraumatic.      Right Ear: External ear normal.      Left Ear: External ear normal.      Nose: Nose normal.   Neck:      Thyroid: No thyromegaly.      Vascular: No JVD.      Trachea: No tracheal deviation.      Lymphadenopathy: No cervical adenopathy.   Pulmonary:      Effort: Pulmonary effort is normal. No respiratory distress.      Breath sounds: Normal breath sounds. No wheezing. No rales.   Chest:      Chest wall: Not tender to palpatation.   Cardiovascular:      Normal rate. Regularly irregular rhythm.      No gallop.   Pulses:     Intact distal pulses.   Edema:     Peripheral edema present.     Pretibial: trace edema of the left pretibial area and 1+ edema of the right pretibial area.     Ankle: trace edema of the left ankle and 1+ edema of the right ankle.     Feet: trace edema of the left foot and 1+ edema of the right foot.  Abdominal:      General: There is no distension.      Palpations: Abdomen is soft.      Tenderness: There is no abdominal tenderness.   Musculoskeletal: Normal range of motion.         General: No tenderness or deformity.      Cervical back: Normal range of motion and neck supple. Skin:     General: Skin is warm and dry.      Findings: No erythema or rash.   Neurological:      Mental Status: Alert and oriented to person, place, and time.      Coordination: Coordination normal.   Psychiatric:         Attention and Perception: Attention normal.         Mood and Affect: Mood normal.         Speech: Speech normal.         Behavior: Behavior normal. Behavior is cooperative.         Thought Content: Thought content normal.         Cognition and Memory: Cognition normal.         Judgment: Judgment normal.               ECG 12 Lead    Date/Time: 8/24/2021 4:02 PM  Performed by: Shilpi Benedict  LEATHA  Authorized by: Shilpi Benedict APRN   Comparison: compared with previous ECG from 7/31/2021  Similar to previous ECG  Rhythm: atrial flutter and atrial fibrillation  Rate: normal  Conduction: conduction normal  Q waves: III and aVF    ST Depression: I and aVL  T inversion: I and aVL  QRS axis: left    Clinical impression: abnormal EKG              Assessment:       Diagnosis Plan   1. Acute on chronic combined systolic and diastolic CHF (congestive heart failure) (CMS/[facility])     2. Coronary artery disease involving native coronary artery of native heart without angina pectoris     3. Essential hypertension     4. Permanent atrial fibrillation (CMS/[facility])     5. Hyperlipidemia, unspecified hyperlipidemia type            Plan:        1.  Weakness/ fall-He has a history of weakness and falls.  Currently living at [place].  He would like to go back home I do not know if this is possible.  He states physical therapy is working with him because he has had back and neck pain since his late this fall.  2.  Heart failure-acute on chronic systolic and diastolic congestive heart failure -he diuresed well.  He is now back on his p.o. regimen of Bumex 1 mg twice a day.  His lower extremity edema is better than I have ever seen it.  His legs are wrapped.  Echo was going to be performed during his last hospitalization but he refused Covid testing and he was in isolation so this was not able to be done.  4.  History of COPD as well obstructive sleep apnea on trilogy at home  5.  Morbid obesity-over the last year he has lost greater than 60 pounds.  6.  Permanent atrial fibrillation-his amiodarone was stopped during his recent hospitalization.  He remains in fib flutter.  He is not tolerating the carvedilol.  I am going to stop that due to hypotension.  I have asked his facility to monitor his heart rate and blood pressures and call with any concerns.  7.  Hypothyroidism on replacement therapy  8.  Chronic anticoagulation-he is  on Eliquis  9.  Orthostatic hypotension-he does have some mild to moderate drops in his blood pressure when he goes from lying to sitting.  This makes him very dizzy.  He has also had some recent falls.  We will stop the carvedilol and monitor blood pressure and heart rate.  May have to accept higher supine blood pressure to account for orthostatic changes.  Start carvedilol due to hypotension.  I have asked the facility to monitor his heart rate and blood pressures.  We may have to try a different rate control if his heart rate increases.    As always, it has been a pleasure to participate in your patient's care. Thank you.       Sincerely,       LEATHA Dow      Current Outpatient Medications:   •  acetaminophen (TYLENOL) 325 MG tablet, Take 2 tablets by mouth Every 4 (Four) Hours As Needed for Mild Pain ., Disp: , Rfl:   •  albuterol sulfate  (90 Base) MCG/ACT inhaler, Inhale 2 puffs Every 4 (Four) Hours As Needed for Wheezing., Disp: , Rfl:   •  apixaban (ELIQUIS) 5 MG tablet tablet, Take 1 tablet by mouth Every 12 (Twelve) Hours. Indications: Atrial Fibrillation, Disp: 60 tablet, Rfl:   •  Benzalkonium Chloride (REMEDY ANTIMICROBIAL CLEANSER EX), Apply  topically Every 1 (One) Hour As Needed., Disp: , Rfl:   •  bisacodyl (DULCOLAX) 10 MG suppository, Insert 10 mg into the rectum Daily As Needed for Constipation., Disp: , Rfl:   •  budesonide-formoterol (SYMBICORT) 160-4.5 MCG/ACT inhaler, Inhale 2 puffs 2 (Two) Times a Day., Disp: 1 inhaler, Rfl: 0  •  bumetanide (BUMEX) 2 MG tablet, Take 1 tablet by mouth 2 (Two) Times a Day. (Patient taking differently: Take 1 mg by mouth 2 (Two) Times a Day.), Disp: 120 tablet, Rfl: 0  •  cholecalciferol 2000 units tablet, Take 2,000 Units by mouth Daily., Disp: 30 each, Rfl: 0  •  citalopram (CeleXA) 10 MG tablet, Take 20 mg by mouth Daily., Disp: , Rfl:   •  docusate sodium (COLACE) 100 MG capsule, Take 100 mg by mouth 2 (Two) Times a Day., Disp: , Rfl:   •   "fluticasone (FLONASE) 50 MCG/ACT nasal spray, 2 sprays by Each Nare route Daily., Disp: 1 bottle, Rfl: 0  •  hydrocortisone 1 % cream, Apply  topically to the appropriate area as directed 2 (Two) Times a Day., Disp: , Rfl:   •  hydrophor (AQUAPHOR) ointment ointment, Apply  topically to the appropriate area as directed As Needed (Ulcerations lower extremities and edema). Lower extremities., Disp: , Rfl:   •  insulin aspart (novoLOG) 100 UNIT/ML injection, Inject 1-14 Units under the skin into the appropriate area as directed 3 (Three) Times a Day Before Meals. As directed per sliding scale, Disp: , Rfl:   •  Insulin Syringe 30G X 5/16\" 1 ML misc, U UTD WITH INSULIN UP TO 6 TIMES A DAY, Disp: , Rfl: 3  •  ipratropium-albuterol (DUO-NEB) 0.5-2.5 mg/3 ml nebulizer, Take 3 mL by nebulization Every 4 (Four) Hours As Needed for Wheezing., Disp: , Rfl:   •  lactulose (CHRONULAC) 10 GM/15ML solution, Take 30 g by mouth Daily As Needed., Disp: , Rfl:   •  levothyroxine (SYNTHROID, LEVOTHROID) 112 MCG tablet, Take 224 mcg by mouth Daily., Disp: , Rfl:   •  loratadine (CLARITIN) 5 MG chewable tablet, Chew 10 mg Daily., Disp: , Rfl:   •  melatonin 5 MG tablet tablet, Take 1 tablet by mouth At Night As Needed (sleep). Over the counter, Disp: , Rfl:   •  O2 (OXYGEN), Inhale 1 L/min every night at bedtime. (Patient taking differently: Inhale 2 L/min every night at bedtime.), Disp: 1 L, Rfl: 0  •  Petrolatum 42 % ointment, Apply  topically to the appropriate area as directed Daily. Send with patient, Disp: , Rfl:   •  polyethylene glycol (MIRALAX) 17 g packet, Take 17 g by mouth Daily., Disp: , Rfl:   •  pregabalin (LYRICA) 75 MG capsule, Take 1 capsule by mouth 2 (Two) Times a Day., Disp: 6 capsule, Rfl: 0  •  sodium chloride 0.65 % nasal spray, 2 sprays into the nostril(s) as directed by provider As Needed for Congestion. Send with patient, Disp: , Rfl: 12  •  tamsulosin (FLOMAX) 0.4 MG capsule 24 hr capsule, Take 1 capsule by " mouth Daily., Disp: 30 capsule, Rfl: 0  •  tuberculin (Tubersol) 5 UNIT/0.1ML injection, Inject 5 Units into the appropriate area of the skin as directed by provider 1 (One) Time. Inject 0.1 mL intradermally every evening shift every 11 months starting on the 22nd for 1 day(s) Give annual PPD, Disp: , Rfl:   •  vitamin B-12 (VITAMIN B-12) 1000 MCG tablet, Take 1 tablet by mouth Daily., Disp: 60 tablet, Rfl: 0  •  vitamin D (ERGOCALCIFEROL) 1.25 MG (06888 UT) capsule capsule, Take 1,250 Units by mouth Daily., Disp: , Rfl:     Dictated utilizing Dragon dictation

## 2021-08-25 NOTE — TELEPHONE ENCOUNTER
"Marly calls with [facility]   [phone] call back    Pt was seen yesterday due to hypotension, instructed to stop carvedilol. This has been done per the nursing staff however the Pt had an episode of significant hypotension with fatigue and  Near syncope when he was stood up today to attempt physical therapy. The APRN at the facility would like to start midodrine 5 mg qd as long as cardiology is ok.    BP today    AM at rest 103/63, 89 before any morning meds and getting up.    100 pm, when attempting to stand Pt, weakness and near syncope Pt was immediately sat back down, BP 74/48, 79    \"A few minutes later\" 112/64, heart rate was not recorded.  "

## 2021-10-06 NOTE — ED PROVIDER NOTES
"Subjective   Froilan Helton is a 70-year-old white male who present secondary to fall.  Patient states he fell while attempting to stand from his recliner.  Patient fell into the floor with impact on his left hip.  Patient struck his head on the door frame.  No loss of consciousness.  Patient was in the floor approximately 4 hours before he was able to reach his phone and call EMS for help.  He was stable during transport.  Patient presents for evaluation.    Patient also reports he fell yesterday.  He landed on his left hip during that fall.  EMS was called to the scene.  A bandage was placed on patient's left hip.  He did not come to the hospital at that time.  Mr. Helton is had multiple falls over the past week.  He reports EMS to has been to his house 5 times in the past week including this morning.    Patient is also experiencing dizziness on standing.  He said this has been occurring since his wife  in November.  Patient states that she simply \"fell to the floor dead\"      History provided by:  Patient      Review of Systems   Constitutional: Negative for fever.   HENT: Negative for rhinorrhea.    Eyes: Negative for redness.   Respiratory: Negative for cough.    Cardiovascular: Positive for leg swelling. Negative for chest pain.   Gastrointestinal: Negative for abdominal pain.   Genitourinary: Negative for dysuria.   Musculoskeletal: Negative for back pain.   Skin: Negative for rash.   Neurological: Positive for dizziness and weakness. Negative for syncope.   All other systems reviewed and are negative.      Past Medical History:   Diagnosis Date   • A-fib ([facility])    • Arthritis    • Asthma    • CAD (coronary artery disease)    • Cardiomyopathy ([facility])    • Cataract    • CHF (congestive heart failure) ([facility])    • CKD (chronic kidney disease), stage III ([facility])    • COPD (chronic obstructive pulmonary disease) ([facility])    • Diabetes mellitus ([facility])    • Disease of thyroid gland    • Emphysema, unspecified ([facility])    • Glaucoma "    • Hyperlipidemia    • Hypertension    • Kidney stone    • Myocardial infarct, old    • Neuropathy    • Osteoarthritis    • Osteoporosis    • Permanent atrial fibrillation ([facility]) 6/30/2020   • PVD (peripheral vascular disease) ([facility])    • Renal disorder    • Shingles        Allergies   Allergen Reactions   • Morphine Unknown - High Severity   • Atorvastatin Unknown - Low Severity   • Oxycontin [Oxycodone Hcl] Confusion     'Makes me crazy and stand on my head'       Past Surgical History:   Procedure Laterality Date   • COLONOSCOPY     • EYE SURGERY     • JOINT REPLACEMENT      right   • KIDNEY STONE SURGERY     • REPLACEMENT TOTAL KNEE     • TOE SURGERY         Family History   Problem Relation Age of Onset   • COPD Mother    • Diabetes Father        Social History     Socioeconomic History   • Marital status:      Spouse name: Not on file   • Number of children: Not on file   • Years of education: Not on file   • Highest education level: Not on file   Tobacco Use   • Smoking status: Former Smoker   • Smokeless tobacco: Never Used   • Tobacco comment: quit 1993   Vaping Use   • Vaping Use: Never used   Substance and Sexual Activity   • Alcohol use: No   • Drug use: No   • Sexual activity: Defer           Objective   Physical Exam  Vitals and nursing note reviewed.   Constitutional:       General: He is not in acute distress.     Appearance: Normal appearance. He is well-developed. He is obese. He is not ill-appearing, toxic-appearing or diaphoretic.      Comments: 80-year-old white male laying in bed.  Patient is overweight.  He appears in fair overall health.  Vital signs notable for BP of 140/65.  Patient is a bit of a rambling historian.  He is friendly and cooperative.   HENT:      Head: Normocephalic and atraumatic.      Right Ear: Tympanic membrane, ear canal and external ear normal.      Left Ear: Tympanic membrane, ear canal and external ear normal.      Nose: Nose normal.      Mouth/Throat:       Mouth: Mucous membranes are moist.      Pharynx: Oropharynx is clear.   Eyes:      Extraocular Movements: Extraocular movements intact.      Conjunctiva/sclera: Conjunctivae normal.      Pupils: Pupils are equal, round, and reactive to light.   Cardiovascular:      Rate and Rhythm: Normal rate and regular rhythm.      Heart sounds: Normal heart sounds. No murmur heard.   No friction rub. No gallop.    Pulmonary:      Effort: Pulmonary effort is normal. No respiratory distress.      Breath sounds: Normal breath sounds. No stridor. No wheezing or rales.   Abdominal:      General: There is no distension.      Palpations: Abdomen is soft. There is no mass.      Tenderness: There is no abdominal tenderness. There is no guarding or rebound.      Hernia: No hernia is present.   Musculoskeletal:         General: Normal range of motion.      Cervical back: Normal range of motion and neck supple.      Right lower le+ Pitting Edema (2+ pitting edema) present.      Left lower le+ Pitting Edema (2+ pitting edema) present.        Legs:    Skin:     General: Skin is warm and dry.      Findings: No erythema or rash.   Neurological:      General: No focal deficit present.      Mental Status: He is alert and oriented to person, place, and time.      Cranial Nerves: No cranial nerve deficit.      Deep Tendon Reflexes: Reflexes are normal and symmetric.   Psychiatric:         Mood and Affect: Mood normal.         Behavior: Behavior normal.         Procedures       EKG 12-lead  Date 10/6/2021  Time 05: 59  Junctional rhythm with a rate of 72 beats a minute  Leftward axis  Interventricular conduction delay present  Prolonged QT interval  Normal R wave progression  Q wave in lead III  Slight ST depression V4 through V6  Nonspecific T wave abnormalities  Abnormal EKG    Essentially unchanged from EKG dated 2021    ED Course  ED Course as of Oct 08 1610   Wed Oct 06, 2021   0552 No obtaining full set of labs, EKG, chest x-ray,  CK, CT head and x-ray left hip.  Patient reported to his nurse Juliette Ugaret that he wants to return to the [facility].  He reportedly signed himself out AMA and now would like to be placed there.  He did not mention this to me.    [SS]   0637 Hemoglobin(!): 10.4 [SS]   0637 Hematocrit(!): 32.7 [SS]   0637 BUN(!): 35 [SS]   0637 Creatinine(!): 2.16 [SS]   0637 Troponin T(!!): 0.086 [SS]   0637 Creatine Kinase(!): 219 [SS]   0637 CBC shows anemia with hemoglobin 10.4 and hematocrit 32.7.  This is at baseline.  CMP notable for renal dysfunction with BUN 35 and creatinine 2.16.  This is also at baseline.  Troponin chronically elevated.  proBNP chronically elevated.  Creatinine kinase mildly elevated at 219.  With patient's chronic CHF I do not feel fluids are indicated at this time.    [SS]   0722 CT head shows atrophy.  Otherwise unremarkable.  Chest x-ray consistent with chronic CHF.  Nothing acute.  X-ray of hip and pelvis unremarkable.  Awaiting UA.    [SS]   0730 Discussed at length with patient results thus far.  I do not find any indication for hospitalization.   Patient is able to support his own weight.  He stood without difficulty for orthostatics.  Mr. Helton has not yet provided a urine sample.  Awaiting urinalysis prior to final disposition.Discussed with and transfer care to Dr. Mo Diaz.        [SS]      ED Course User Index  [SS] Anurag Stover MD      Labs Reviewed   COMPREHENSIVE METABOLIC PANEL - Abnormal; Notable for the following components:       Result Value    Glucose 105 (*)     BUN 35 (*)     Creatinine 2.16 (*)     eGFR Non  Amer 30 (*)     All other components within normal limits    Narrative:     GFR Normal >60  Chronic Kidney Disease <60  Kidney Failure <15     URINALYSIS W/ MICROSCOPIC IF INDICATED (NO CULTURE) - Abnormal; Notable for the following components:    Appearance, UA Cloudy (*)     Blood, UA Moderate (2+) (*)     Protein,  mg/dL (2+) (*)     Leuk Esterase, UA  Large (3+) (*)     All other components within normal limits   CK - Abnormal; Notable for the following components:    Creatine Kinase 219 (*)     All other components within normal limits   BNP (IN-HOUSE) - Abnormal; Notable for the following components:    proBNP 6,777.0 (*)     All other components within normal limits    Narrative:     Among patients with dyspnea, NT-proBNP is highly sensitive for the detection of acute congestive heart failure. In addition NT-proBNP of <300 pg/ml effectively rules out acute congestive heart failure with 99% negative predictive value.    Results may be falsely decreased if patient taking Biotin.     TROPONIN (IN-HOUSE) - Abnormal; Notable for the following components:    Troponin T 0.086 (*)     All other components within normal limits    Narrative:     Troponin T Reference Range:  <= 0.03 ng/mL-   Negative for AMI  >0.03 ng/mL-     Abnormal for myocardial necrosis.  Clinicians would have to utilize clinical acumen, EKG, Troponin and serial changes to determine if it is an Acute Myocardial Infarction or myocardial injury due to an underlying chronic condition.       Results may be falsely decreased if patient taking Biotin.     CBC WITH AUTO DIFFERENTIAL - Abnormal; Notable for the following components:    RBC 3.70 (*)     Hemoglobin 10.4 (*)     Hematocrit 32.7 (*)     Lymphocyte % 15.6 (*)     Monocytes, Absolute 1.07 (*)     Eosinophils, Absolute 0.55 (*)     All other components within normal limits   URINALYSIS, MICROSCOPIC ONLY - Abnormal; Notable for the following components:    RBC, UA 31-50 (*)     WBC, UA Too Numerous to Count (*)     Bacteria, UA Trace (*)     All other components within normal limits   CBC AND DIFFERENTIAL    Narrative:     The following orders were created for panel order CBC & Differential.  Procedure                               Abnormality         Status                     ---------                               -----------         ------                      CBC Auto Differential[[id number]]        Abnormal            Final result                 Please view results for these tests on the individual orders.     XR Chest 2 View    Result Date: 10/6/2021  Narrative: CR Chest 2 Vws INDICATION:  Generalized weakness with frequent falls. Fell last night and hit the left side of his head complaining of headache. Also dizziness generalized weakness and frequent falls for 10 months. COMPARISON:  Chest x-ray 7/27/2021 FINDINGS: PA and lateral views of the chest.  No pleural effusion. Cardiac silhouette is mildly enlarged. There is evidence for old granulomatous disease. There is mild chronic prominence of the central vascular and interstitial markings. There is no acute infiltrate. There is no pneumothorax.      Impression: Mild cardiac silhouette enlargement and chronic prominence of central vascular and interstitial markings. Please correlate for clinical evidence of chronic congestive heart failure Signer Name: Libertad Kam MD  Signed: 10/6/2021 6:58 AM  [facility]  [facility]    CT Head Without Contrast    Result Date: 10/6/2021  Narrative: HISTORY: Patient fell last night with confusion. Patient hit left side of the head and complains of headache at the top of head with dizziness. Generalized weakness and frequent falls for 10 months TECHNIQUE: CT head without contrast. Radiation dose reduction techniques included automated exposure control or exposure modulation based on body size. Radiation audit for number of CT and nuclear cardiology exams performed in the last year: 5.  COMPARISON: Head CT from 7/27/2021. FINDINGS: There is no displaced calvarial fracture. The visualized paranasal sinuses are clear. The mastoid air cells are clear. There is no evidence for acute intracranial hemorrhage or extra-axial fluid collection. There is mild generalized atrophy and mild white matter low-attenuation that is nonspecific but  likely due to small vessel disease. No intracranial mass effect or acute cortical infarct is suspected. Likely small age-indeterminate lacunar type insult at the right thalamus. There are prominent vascular calcifications at the base of the brain.     Impression: 1. No acute intracranial abnormality. Mild generalized atrophy and probable sequelae of small vessel disease. Signer Name: Libertad Kam MD  Signed: 10/6/2021 7:03 AM  Workstation Name: [facility]  [facility]    XR Hip With or Without Pelvis 2 - 3 View Left    Result Date: 10/6/2021  Narrative: CR Hip Uni Comp Min 2 Vws LT INDICATION: Patient fell to the floor and now complains of left hip pain. Frequent falls for the last 10 months. COMPARISON: Left hip and pelvis study 7/18/2021 FINDINGS: AP pelvis and AP and frog-leg lateral view(s) of the left hip.  No acute fracture or dislocation. No radiopaque foreign body. There is joint space narrowing bilateral hips consistent with arthritis. There are degenerative changes in the lower lumbar spine. Lower sacrum is partly obscured by overlying bowel gas.     Impression: No acute fracture or dislocation left hip or pelvis Signer Name: Libertad Kam MD  Signed: 10/6/2021 6:56 AM  Workstation Name: [facility]  [facility]    My differential diagnosis includes but is not limited to cerebral contusion, cervical strain, concussion with LOC, concussion without LOC, contusion, fracture of the skull, orbits or mandible, hematoma, intracranial hemorrhage including subdural, epidural, subarachnoid and intracerebral, laceration and postconcussion syndrome                                       MDM    Final diagnoses:   Generalized weakness   Urinary tract infection with hematuria, site unspecified       ED Disposition  ED Disposition     ED Disposition Condition Comment    Discharge Stable           Fortunato De Leon MD  [address] [place] [id number] [phone]    Call    Today to discuss return to nursing home.  Arrange follow-up for any further issues as needed.         Medication List      New Prescriptions    cefuroxime 500 MG tablet  Commonly known as: CEFTIN  Take 1 tablet by mouth 2 (Two) Times a Day for 7 days.        Changed    bumetanide 2 MG tablet  Commonly known as: BUMEX  Take 1 tablet by mouth 2 (Two) Times a Day.  What changed: how much to take     O2  Commonly known as: OXYGEN  Inhale 1 L/min every night at bedtime.  What changed: how much to take           Where to Get Your Medications      These medications were sent to [id number] - [place], [place] - [address] AT [facility] & [address] - [phone] PH - [phone] FX  [address] [place] [address]    Phone: [phone]   · cefuroxime 500 MG tablet          Anurag Stover MD  10/08/21 5532

## 2021-10-06 NOTE — ED PROVIDER NOTES
Patient turned over to me from Dr. Stover for follow-up of urinalysis.  It appears that he has some evidence of urinary tract infection so I have given him a dose of Ceftin here and sent home with prescription of Ceftin.     Jaylen Diaz MD  10/06/21 0815

## 2021-10-06 NOTE — ED NOTES
"[facility]  EMS states that this is the 5th run on the patient for falls in 4 days.  Patient states that he \"signed himself out of the [facility] a few days ago\" and that they \"just kept me laying in bed or in a wheelchair so that I didn't fall\".  He states that \"I just want to go back to the [facility]\".  Explained to him that it was not as easy as him saying he wanted to go back for him to qualify for readmission.     Juliette Ugarte, RN  10/06/21 0607    " Received refill request for solifenacin 10 mg  Patient's LOV greater than 12 months   No recent PSA/BMP pertaining labs  Medication does not meet refill criteria protocol  Medication denied.  Patient needs to schedule an appointment

## 2021-10-06 NOTE — DISCHARGE INSTRUCTIONS
Continue current medications.  Fall precautions.  Recommend contacting your PCP today if you desire to return to a care facility.  Return to the ED for medical emergencies

## 2021-10-08 NOTE — ED NOTES
"PA requested RN to ambulate patient. Rn to bedside asking patient to stand and go for a walk. Pt yelling and states he will not walk. Attempted to encourage patient to try to sit on the side of the bed and stand. Pt put his hand in RN's face while yelling refusing to cooperate with any simple commands and yells, \"you shut up\". PA notified patient refusing to ambulate or follow any commands.      Isi Munguia, RN  10/08/21 1929    "

## 2021-10-08 NOTE — ED PROVIDER NOTES
EMERGENCY DEPARTMENT ENCOUNTER      Room Number: 05/05    History is provided by the patient, no translation services needed    HPI:    Chief complaint: Generalized weakness    Location: Generalized    Quality/Severity:      Timing/Duration: Week    Modifying Factors:     Associated Symptoms: Joint pain    Narrative: Pt is a 80 y.o. male who presents complaining of malaise weakness.  Patient states that he was sent by his doctor who was concerned that he was dehydrated.  Patient states that he is just feeling weak.  Patient is that he has general joint pain that has had for a while.  Patient is any type episodes of falling but no loss of consciousness or hitting his head.  Patient states that he is not taking the antibiotics that he was prescribed previously for a urinary tract infection.      PMD: Fortunato De Leon MD    REVIEW OF SYSTEMS  Review of Systems   Constitutional: Negative for chills and fever.   Eyes: Negative for pain and visual disturbance.   Respiratory: Negative for cough and shortness of breath.    Cardiovascular: Negative for chest pain and palpitations.   Gastrointestinal: Negative for abdominal pain, nausea and vomiting.   Genitourinary: Negative for difficulty urinating, dysuria and flank pain.   Musculoskeletal: Positive for arthralgias. Negative for gait problem and myalgias.   Skin: Negative for rash and wound.   Neurological: Positive for weakness. Negative for dizziness, syncope, numbness and headaches.   Psychiatric/Behavioral: Negative for confusion and suicidal ideas. The patient is not nervous/anxious.          PAST MEDICAL HISTORY  Active Ambulatory Problems     Diagnosis Date Noted   • COPD (chronic obstructive pulmonary disease) ([facility])    • Type 2 diabetes mellitus with stage 4 chronic kidney disease, with long-term current use of insulin ([facility])    • Essential hypertension    • Primary osteoarthritis of left knee 08/27/2018   • Chronic renal insufficiency, stage 4 (severe) ([facility])  05/07/2019   • Class 2 severe obesity due to excess calories with serious comorbidity in adult ([facility]) 09/10/2019   • Physical debility 11/20/2019   • Acute UTI (urinary tract infection) 06/29/2020   • Permanent atrial fibrillation ([facility]) 06/30/2020   • H/O noncompliance with medical treatment, presenting hazards to health 06/30/2020   • Myxedema 06/30/2020   • Acute on chronic combined systolic and diastolic CHF (congestive heart failure) ([facility]) 06/30/2020   • Generalized weakness 07/01/2020   • Recurrent left pleural effusion 07/19/2021   • Fall on same level as cause of accidental injury 07/19/2021   • Gross hematuria 07/28/2021   • CAD (coronary artery disease) 07/29/2021   • HLD (hyperlipidemia) 07/29/2021   • Hypothyroidism 07/29/2021   • CKD (chronic kidney disease) stage 3, GFR 30-59 ml/min ([facility]) 07/29/2021     Resolved Ambulatory Problems     Diagnosis Date Noted   • Sepsis ([facility]) 11/21/2017   • Acute renal failure ([facility])    • Elevated procalcitonin 11/22/2017   • Lactic acid acidosis 11/22/2017   • Leukocytosis 11/22/2017   • NSTEMI (non-ST elevated myocardial infarction) ([facility]) 01/10/2018   • Injury of right ankle 08/27/2018   • Pneumonia of both lower lobes due to infectious organism 02/17/2019   • Chronic diastolic (congestive) heart failure ([facility]) 05/07/2019   • Elevated troponin 05/07/2019   • Cardiorenal syndrome with renal failure 05/07/2019   • Lidia coma scale total score 9-12 08/15/2019   • Acute respiratory failure with hypoxia ([facility]) 08/15/2019   • Sepsis, Gram positive ([facility]) 08/16/2019   • Streptococcal bacteremia 08/16/2019   • Atrial fibrillation with RVR ([facility]) 08/17/2019   • Cellulitis of right lower extremity 10/15/2019   • Acute on chronic diastolic congestive heart failure ([facility]) 07/07/2021     Past Medical History:   Diagnosis Date   • A-fib ([facility])    • Arthritis    • Asthma    • Cardiomyopathy ([facility])    • Cataract    • CHF (congestive heart failure) ([facility])    • CKD (chronic kidney disease), stage  III ([facility])    • Diabetes mellitus ([facility])    • Disease of thyroid gland    • Emphysema, unspecified ([facility])    • Glaucoma    • Hyperlipidemia    • Hypertension    • Kidney stone    • Myocardial infarct, old    • Neuropathy    • Osteoarthritis    • Osteoporosis    • PVD (peripheral vascular disease) ([facility])    • Renal disorder    • Shingles        PAST SURGICAL HISTORY  Past Surgical History:   Procedure Laterality Date   • COLONOSCOPY     • EYE SURGERY     • JOINT REPLACEMENT      right   • KIDNEY STONE SURGERY     • REPLACEMENT TOTAL KNEE     • TOE SURGERY         FAMILY HISTORY  Family History   Problem Relation Age of Onset   • COPD Mother    • Diabetes Father        SOCIAL HISTORY  Social History     Socioeconomic History   • Marital status:      Spouse name: Not on file   • Number of children: Not on file   • Years of education: Not on file   • Highest education level: Not on file   Tobacco Use   • Smoking status: Former Smoker   • Smokeless tobacco: Never Used   • Tobacco comment: quit 1993   Vaping Use   • Vaping Use: Never used   Substance and Sexual Activity   • Alcohol use: No   • Drug use: No   • Sexual activity: Defer       ALLERGIES  Morphine, Atorvastatin, and Oxycontin [oxycodone hcl]      Current Facility-Administered Medications:   •  [COMPLETED] Insert peripheral IV, , , Once **AND** sodium chloride 0.9 % flush 10 mL, 10 mL, Intravenous, PRN, Karmen Martin PA-C  •  sodium chloride 0.9 % infusion, 250 mL/hr, Intravenous, Continuous, Karmen Martin PA-C, Last Rate: 250 mL/hr at 10/08/21 1938, 250 mL/hr at 10/08/21 1938    Current Outpatient Medications:   •  acetaminophen (TYLENOL) 325 MG tablet, Take 2 tablets by mouth Every 4 (Four) Hours As Needed for Mild Pain ., Disp: , Rfl:   •  albuterol sulfate  (90 Base) MCG/ACT inhaler, Inhale 2 puffs Every 4 (Four) Hours As Needed for Wheezing., Disp: , Rfl:   •  apixaban (ELIQUIS) 5 MG tablet tablet, Take 1 tablet by mouth Every 12  "(Twelve) Hours. Indications: Atrial Fibrillation, Disp: 60 tablet, Rfl:   •  Benzalkonium Chloride (REMEDY ANTIMICROBIAL CLEANSER EX), Apply  topically Every 1 (One) Hour As Needed., Disp: , Rfl:   •  bisacodyl (DULCOLAX) 10 MG suppository, Insert 10 mg into the rectum Daily As Needed for Constipation., Disp: , Rfl:   •  budesonide-formoterol (SYMBICORT) 160-4.5 MCG/ACT inhaler, Inhale 2 puffs 2 (Two) Times a Day., Disp: 1 inhaler, Rfl: 0  •  bumetanide (BUMEX) 2 MG tablet, Take 1 tablet by mouth 2 (Two) Times a Day. (Patient taking differently: Take 1 mg by mouth 2 (Two) Times a Day.), Disp: 120 tablet, Rfl: 0  •  cefuroxime (CEFTIN) 500 MG tablet, Take 1 tablet by mouth 2 (Two) Times a Day for 7 days., Disp: 14 tablet, Rfl: 0  •  cholecalciferol 2000 units tablet, Take 2,000 Units by mouth Daily., Disp: 30 each, Rfl: 0  •  citalopram (CeleXA) 10 MG tablet, Take 20 mg by mouth Daily., Disp: , Rfl:   •  docusate sodium (COLACE) 100 MG capsule, Take 100 mg by mouth 2 (Two) Times a Day., Disp: , Rfl:   •  fluticasone (FLONASE) 50 MCG/ACT nasal spray, 2 sprays by Each Nare route Daily., Disp: 1 bottle, Rfl: 0  •  hydrocortisone 1 % cream, Apply  topically to the appropriate area as directed 2 (Two) Times a Day., Disp: , Rfl:   •  hydrophor (AQUAPHOR) ointment ointment, Apply  topically to the appropriate area as directed As Needed (Ulcerations lower extremities and edema). Lower extremities., Disp: , Rfl:   •  insulin aspart (novoLOG) 100 UNIT/ML injection, Inject 1-14 Units under the skin into the appropriate area as directed 3 (Three) Times a Day Before Meals. As directed per sliding scale, Disp: , Rfl:   •  Insulin Syringe 30G X 5/16\" 1 ML misc, U UTD WITH INSULIN UP TO 6 TIMES A DAY, Disp: , Rfl: 3  •  ipratropium-albuterol (DUO-NEB) 0.5-2.5 mg/3 ml nebulizer, Take 3 mL by nebulization Every 4 (Four) Hours As Needed for Wheezing., Disp: , Rfl:   •  lactulose (CHRONULAC) 10 GM/15ML solution, Take 30 g by mouth Daily " As Needed., Disp: , Rfl:   •  levothyroxine (SYNTHROID, LEVOTHROID) 112 MCG tablet, Take 224 mcg by mouth Daily., Disp: , Rfl:   •  loratadine (CLARITIN) 5 MG chewable tablet, Chew 10 mg Daily., Disp: , Rfl:   •  melatonin 5 MG tablet tablet, Take 1 tablet by mouth At Night As Needed (sleep). Over the counter, Disp: , Rfl:   •  O2 (OXYGEN), Inhale 1 L/min every night at bedtime. (Patient taking differently: Inhale 2 L/min every night at bedtime.), Disp: 1 L, Rfl: 0  •  Petrolatum 42 % ointment, Apply  topically to the appropriate area as directed Daily. Send with patient, Disp: , Rfl:   •  polyethylene glycol (MIRALAX) 17 g packet, Take 17 g by mouth Daily., Disp: , Rfl:   •  pregabalin (LYRICA) 75 MG capsule, Take 1 capsule by mouth 2 (Two) Times a Day., Disp: 6 capsule, Rfl: 0  •  sodium chloride 0.65 % nasal spray, 2 sprays into the nostril(s) as directed by provider As Needed for Congestion. Send with patient, Disp: , Rfl: 12  •  tamsulosin (FLOMAX) 0.4 MG capsule 24 hr capsule, Take 1 capsule by mouth Daily., Disp: 30 capsule, Rfl: 0  •  tuberculin (Tubersol) 5 UNIT/0.1ML injection, Inject 5 Units into the appropriate area of the skin as directed by provider 1 (One) Time. Inject 0.1 mL intradermally every evening shift every 11 months starting on the 22nd for 1 day(s) Give annual PPD, Disp: , Rfl:   •  vitamin B-12 (VITAMIN B-12) 1000 MCG tablet, Take 1 tablet by mouth Daily., Disp: 60 tablet, Rfl: 0  •  vitamin D (ERGOCALCIFEROL) 1.25 MG (91498 UT) capsule capsule, Take 1,250 Units by mouth Daily., Disp: , Rfl:     PHYSICAL EXAM  ED Triage Vitals [10/08/21 1802]   Temp Heart Rate Resp BP SpO2   97.7 °F (36.5 °C) 69 20 112/48 98 %      Temp src Heart Rate Source Patient Position BP Location FiO2 (%)   Oral Monitor Lying Right arm --       Physical Exam  Vitals and nursing note reviewed.   HENT:      Head: Normocephalic and atraumatic.   Eyes:      Conjunctiva/sclera: Conjunctivae normal.   Cardiovascular:       Rate and Rhythm: Normal rate and regular rhythm.      Heart sounds: Normal heart sounds.   Pulmonary:      Effort: Pulmonary effort is normal. No respiratory distress.      Breath sounds: Normal breath sounds.   Abdominal:      General: Bowel sounds are normal. There is no distension.      Palpations: Abdomen is soft.      Tenderness: There is no abdominal tenderness.   Musculoskeletal:         General: Normal range of motion.      Cervical back: Normal range of motion and neck supple.   Skin:     General: Skin is warm and dry.          Neurological:      Mental Status: He is alert and oriented to person, place, and time.   Psychiatric:         Mood and Affect: Mood and affect normal.           LAB RESULTS  Lab Results (last 24 hours)     Procedure Component Value Units Date/Time    CBC & Differential [[id number]]  (Abnormal) Collected: 10/08/21 1811    Specimen: Blood Updated: 10/08/21 1843    Narrative:      The following orders were created for panel order CBC & Differential.  Procedure                               Abnormality         Status                     ---------                               -----------         ------                     CBC Auto Differential[[id number]]        Abnormal            Final result                 Please view results for these tests on the individual orders.    Comprehensive Metabolic Panel [[id number]]  (Abnormal) Collected: 10/08/21 1811    Specimen: Blood Updated: 10/08/21 1854     Glucose 116 mg/dL      BUN 35 mg/dL      Creatinine 2.33 mg/dL      Sodium 130 mmol/L      Potassium 4.0 mmol/L      Chloride 98 mmol/L      CO2 24.7 mmol/L      Calcium 8.7 mg/dL      Total Protein 6.0 g/dL      Albumin 3.30 g/dL      ALT (SGPT) 6 U/L      AST (SGOT) 12 U/L      Alkaline Phosphatase 86 U/L      Total Bilirubin 0.3 mg/dL      eGFR Non African Amer 27 mL/min/1.73      Globulin 2.7 gm/dL      A/G Ratio 1.2 g/dL      BUN/Creatinine Ratio 15.0     Anion Gap 7.3 mmol/L     Narrative:       GFR Normal >60  Chronic Kidney Disease <60  Kidney Failure <15      BNP [[id number]]  (Abnormal) Collected: 10/08/21 1811    Specimen: Blood Updated: 10/08/21 1903     proBNP 6,059.0 pg/mL     Narrative:      Among patients with dyspnea, NT-proBNP is highly sensitive for the detection of acute congestive heart failure. In addition NT-proBNP of <300 pg/ml effectively rules out acute congestive heart failure with 99% negative predictive value.    Results may be falsely decreased if patient taking Biotin.      CBC Auto Differential [[id number]]  (Abnormal) Collected: 10/08/21 1811    Specimen: Blood Updated: 10/08/21 1843     WBC 7.34 10*3/mm3      RBC 3.54 10*6/mm3      Hemoglobin 10.2 g/dL      Hematocrit 31.8 %      MCV 89.8 fL      MCH 28.8 pg      MCHC 32.1 g/dL      RDW 14.4 %      RDW-SD 46.9 fl      MPV 10.4 fL      Platelets 184 10*3/mm3      Neutrophil % 61.2 %      Lymphocyte % 17.7 %      Monocyte % 12.9 %      Eosinophil % 7.6 %      Basophil % 0.3 %      Immature Grans % 0.3 %      Neutrophils, Absolute 4.49 10*3/mm3      Lymphocytes, Absolute 1.30 10*3/mm3      Monocytes, Absolute 0.95 10*3/mm3      Eosinophils, Absolute 0.56 10*3/mm3      Basophils, Absolute 0.02 10*3/mm3      Immature Grans, Absolute 0.02 10*3/mm3      nRBC 0.0 /100 WBC     Urinalysis With Microscopic If Indicated (No Culture) - Urine, Catheter [[id number]]  (Abnormal) Collected: 10/08/21 1829    Specimen: Urine, Catheter Updated: 10/08/21 1853     Color, UA Yellow     Appearance, UA Cloudy     pH, UA 5.5     Specific Gravity, UA 1.015     Glucose, UA Negative     Ketones, UA Negative     Bilirubin, UA Negative     Blood, UA Small (1+)     Protein,  mg/dL (2+)     Leuk Esterase, UA Large (3+)     Nitrite, UA Negative     Urobilinogen, UA 0.2 E.U./dL    Urinalysis, Microscopic Only - Urine, Catheter [[id number]]  (Abnormal) Collected: 10/08/21 1829    Specimen: Urine, Catheter Updated: 10/08/21 1854     RBC, UA 0-2 /HPF      WBC,  UA Too Numerous to Count /HPF      Bacteria, UA Trace /HPF      Squamous Epithelial Cells, UA None Seen /HPF      Yeast, UA Small/1+ Yeast /HPF      Hyaline Casts, UA 0-2 /LPF      Methodology Manual Light Microscopy            I ordered the above labs and reviewed the results    RADIOLOGY  XR Chest 1 View    Result Date: 10/8/2021  CR Chest 1 Vw INDICATION: Generalized weakness COMPARISON:  Chest x-ray from 10/6/2021 FINDINGS: Portable AP view(s) of the chest.  The heart is enlarged and the pulmonary vasculature appears congested with bilateral perihilar streakiness. No pneumothorax or pleural effusion.     Findings again are nonspecific but may represent potential CHF. Signer Name: Tara Hairston MD  Signed: 10/8/2021 7:28 PM  Workstation Name: [id number]  [facility]      I ordered the above radiologic testing and reviewed the results    PROCEDURES  Procedures      PROGRESS AND CONSULTS  ED Course as of Oct 08 2056   Fri Oct 08, 2021   1904 proBNP(!): 6,059.0 [GT]   1904 BNP(!) [GT]   1904 proBNP(!): 6,059.0 [GT]   1934 80-year-old male brought in by EMS with complaints of generalized weakness.  Patient was seen by primary care provider today and sent to the ER for concern for dehydration.  Patient states that he has not been eating much due to the fact that he is having difficulty getting around the house.  Patient states that he has had episodes of falling this week which he has since been seen for.  Patient also states that he has not started the antibiotics for his UTI that he was diagnosed with 2 days ago.  Patient denies any nausea or vomiting.  Patient denies any new onset of pain.  Patient denies any dizziness.  After history and physical exam patient was noted to be alert and oriented x3.  Patient's laboratory studies showed a BUN of 35 and a creatinine of 2.33 which was not much more elevated than previously 2 days ago.  Patient's BMP was elevated at 6059.  Patient's urinalysis  showed trace bacteria with too numerous to count WBCs.  Patient's urine will be sent for culture.  Patient will be sent home with a prescription for Ceftin.  While in the ED patient was infused with 500 mL of normal saline.  Patient sodium was noted to be slightly low at 130.  Patient's hemoglobin and hematocrit were 10.2 and 31.8 respectively.  Patient stated that he would not ambulate in the ER and that he gets around at home being a electric scooter.  Patient will be discharged into his daughter's care.  Instructed patient that if his symptoms worsen that he needs to return to the ED.    [GT]      ED Course User Index  [GT] Karmen Martin PA-C           MEDICAL DECISION MAKING    MDM       DIAGNOSIS  Final diagnoses:   Generalized weakness   Dehydration   Urinary tract infection in male       Latest Documented Vital Signs:  As of 20:56 EDT  BP- 159/84 HR- 69 Temp- 97.7 °F (36.5 °C) (Oral) O2 sat- 98%    DISPOSITION  Discharged home        Discussed pertinent findings with the patient/family.  Patient/Family voiced understanding of need to follow-up for recheck and further testing as needed.  Return to the Emergency Department warnings were given.         Medication List      Changed    bumetanide 2 MG tablet  Commonly known as: BUMEX  Take 1 tablet by mouth 2 (Two) Times a Day.  What changed: how much to take     O2  Commonly known as: OXYGEN  Inhale 1 L/min every night at bedtime.  What changed: how much to take                Follow-up Information     Fortunato De Leon MD. Call in 1 day.    Specialty: Family Medicine  Why: To schedule a follow up appointment  Contact information:  [address] [facility] [address] [phone]                     Dictated utilizing Dragon dictation     Karmen Martin PA-C  10/08/21 [id number]

## 2021-11-09 NOTE — ED NOTES
"Pt presents to the ED with complaints of a fall that happened around midnight this morning. PT reports he fell after he used the restroom. PT denies any LOC but reports he did hit his head on the washer. A small hematoma was noted to the right occipital lobe. Pt reports he does take eliquis daily. When asked about his pain, pt reported bilateral knee pain and back pain and when asked if that was from this fall pt reported \"no that was from 4 falls ago\". EMS reports they have been to the house 8 times since November 1st for lift assist. Pt denies having a life alert. Pt reported he cannot get up on his own when he falls and did not have a phone on him so he scooted to the living room where he thought he left his phone only to find that it was not there. Pt reports his granddaughter came in to check in him this morning and found him sitting in the living room. Pt reports he gets around the home in which he lives him by himself with a Rollator.      Isi Mathews, RN  11/09/21 1222    "

## 2021-11-09 NOTE — DISCHARGE INSTRUCTIONS
Thank you for allowing us to care for you today.   Should you experience chest pain or shortness of breath, seek emergency care. Otherwise, please follow up with your primary care provider or specialist as noted on your instructions.

## 2021-11-09 NOTE — ED PROVIDER NOTES
EMERGENCY DEPARTMENT ENCOUNTER      Room Number: 10/10    History is provided by the patient, no translation services needed    HPI:    Chief complaint: Fall with head injury    Location: Right occipital    Quality/Severity: Mild pain on palpation    Timing/Duration: Sudden onset after patient fell last night at midnight.  Pain is under with palpation    Modifying Factors:     Associated Symptoms: No associated symptoms.    Narrative: Pt is a 80 y.o. male who presents complaining of using the restroom last night and falling hitting the right side of his head on the washing machine.  Patient reports he did not have any loss of consciousness, he had no bleeding from the site, no headache, no vision changes, no nausea, no vomiting. Patient reports he falls frequently.  Patient states after he fell he was unable to brace himself back to a standing position due to chronic knee pain.  He reports he then scooted on the floor to the living room where he rested on the carpet until his granddaughter checked on him this morning and called 911.  Patient reports he was able to move around on the floor he was not in any 1 position for prolonged period of time.  Patient reports his last fall was 3 days prior and resulted in a rug burn on his left hip.  Patient reports he is currently in the process of deciding if he is going to have in-home care or be placed in assisted living facility.  He is currently with [facility] per his report.      PMD: Fortunato De Leon MD    REVIEW OF SYSTEMS  Review of Systems   Constitutional: Negative for activity change, appetite change, chills, diaphoresis, fatigue, fever and unexpected weight change.   HENT: Negative for congestion, facial swelling, postnasal drip, rhinorrhea, sinus pressure, sinus pain, sneezing and sore throat.    Eyes: Negative for itching and visual disturbance.   Respiratory: Negative for cough, chest tightness and shortness of breath.    Cardiovascular: Negative  for chest pain, palpitations and leg swelling.   Gastrointestinal: Negative for diarrhea, nausea and vomiting.   Genitourinary: Negative.    Musculoskeletal: Negative for arthralgias, myalgias, neck pain and neck stiffness.        Chronic bilateral knee pain, chronic left shoulder pain. Chronic back pain.    Skin: Positive for wound (left hip from previous fall and contusion right occipital region. ). Negative for pallor and rash.   Allergic/Immunologic: Negative.    Neurological: Negative for dizziness, weakness, light-headedness and headaches.   Hematological: Negative.    Psychiatric/Behavioral: Negative.          PAST MEDICAL HISTORY  Active Ambulatory Problems     Diagnosis Date Noted   • COPD (chronic obstructive pulmonary disease) ([facility])    • Type 2 diabetes mellitus with stage 4 chronic kidney disease, with long-term current use of insulin ([facility])    • Essential hypertension    • Primary osteoarthritis of left knee 08/27/2018   • Chronic renal insufficiency, stage 4 (severe) ([facility]) 05/07/2019   • Class 2 severe obesity due to excess calories with serious comorbidity in adult ([facility]) 09/10/2019   • Physical debility 11/20/2019   • Acute UTI (urinary tract infection) 06/29/2020   • Permanent atrial fibrillation ([facility]) 06/30/2020   • H/O noncompliance with medical treatment, presenting hazards to health 06/30/2020   • Myxedema 06/30/2020   • Acute on chronic combined systolic and diastolic CHF (congestive heart failure) ([facility]) 06/30/2020   • Generalized weakness 07/01/2020   • Recurrent left pleural effusion 07/19/2021   • Fall on same level as cause of accidental injury 07/19/2021   • Gross hematuria 07/28/2021   • CAD (coronary artery disease) 07/29/2021   • HLD (hyperlipidemia) 07/29/2021   • Hypothyroidism 07/29/2021   • CKD (chronic kidney disease) stage 3, GFR 30-59 ml/min ([facility]) 07/29/2021     Resolved Ambulatory Problems     Diagnosis Date Noted   • Sepsis ([facility]) 11/21/2017   • Acute renal failure ([facility])    •  Elevated procalcitonin 11/22/2017   • Lactic acid acidosis 11/22/2017   • Leukocytosis 11/22/2017   • NSTEMI (non-ST elevated myocardial infarction) ([facility]) 01/10/2018   • Injury of right ankle 08/27/2018   • Pneumonia of both lower lobes due to infectious organism 02/17/2019   • Chronic diastolic (congestive) heart failure ([facility]) 05/07/2019   • Elevated troponin 05/07/2019   • Cardiorenal syndrome with renal failure 05/07/2019   • Lidia coma scale total score 9-12 08/15/2019   • Acute respiratory failure with hypoxia ([facility]) 08/15/2019   • Sepsis, Gram positive ([facility]) 08/16/2019   • Streptococcal bacteremia 08/16/2019   • Atrial fibrillation with RVR ([facility]) 08/17/2019   • Cellulitis of right lower extremity 10/15/2019   • Acute on chronic diastolic congestive heart failure ([facility]) 07/07/2021     Past Medical History:   Diagnosis Date   • A-fib ([facility])    • Arthritis    • Asthma    • Cardiomyopathy ([facility])    • Cataract    • CHF (congestive heart failure) ([facility])    • CKD (chronic kidney disease), stage III ([facility])    • Diabetes mellitus ([facility])    • Disease of thyroid gland    • Emphysema, unspecified ([facility])    • Glaucoma    • Hyperlipidemia    • Hypertension    • Kidney stone    • Myocardial infarct, old    • Neuropathy    • Osteoarthritis    • Osteoporosis    • PVD (peripheral vascular disease) ([facility])    • Renal disorder    • Shingles        PAST SURGICAL HISTORY  Past Surgical History:   Procedure Laterality Date   • COLONOSCOPY     • EYE SURGERY     • JOINT REPLACEMENT      right   • KIDNEY STONE SURGERY     • REPLACEMENT TOTAL KNEE     • TOE SURGERY         FAMILY HISTORY  Family History   Problem Relation Age of Onset   • COPD Mother    • Diabetes Father        SOCIAL HISTORY  Social History     Socioeconomic History   • Marital status:    Tobacco Use   • Smoking status: Former Smoker   • Smokeless tobacco: Never Used   • Tobacco comment: quit 1993   Vaping Use   • Vaping Use: Never used   Substance and Sexual Activity    • Alcohol use: No   • Drug use: No   • Sexual activity: Defer       ALLERGIES  Morphine, Atorvastatin, and Oxycontin [oxycodone hcl]    No current facility-administered medications for this encounter.    Current Outpatient Medications:   •  acetaminophen (TYLENOL) 325 MG tablet, Take 2 tablets by mouth Every 4 (Four) Hours As Needed for Mild Pain ., Disp: , Rfl:   •  albuterol sulfate  (90 Base) MCG/ACT inhaler, Inhale 2 puffs Every 4 (Four) Hours As Needed for Wheezing., Disp: , Rfl:   •  amiodarone (PACERONE) 200 MG tablet, Every 12 (Twelve) Hours., Disp: , Rfl:   •  apixaban (ELIQUIS) 5 MG tablet tablet, Take 1 tablet by mouth Every 12 (Twelve) Hours. Indications: Atrial Fibrillation, Disp: 60 tablet, Rfl:   •  atorvastatin (LIPITOR) 10 MG tablet, Daily., Disp: , Rfl:   •  Benzalkonium Chloride (REMEDY ANTIMICROBIAL CLEANSER EX), Apply  topically Every 1 (One) Hour As Needed., Disp: , Rfl:   •  bisacodyl (DULCOLAX) 10 MG suppository, Insert 10 mg into the rectum Daily As Needed for Constipation., Disp: , Rfl:   •  budesonide-formoterol (SYMBICORT) 160-4.5 MCG/ACT inhaler, Inhale 2 puffs 2 (Two) Times a Day., Disp: 1 inhaler, Rfl: 0  •  bumetanide (BUMEX) 2 MG tablet, Take 1 tablet by mouth 2 (Two) Times a Day. (Patient taking differently: Take 1 mg by mouth 2 (Two) Times a Day.), Disp: 120 tablet, Rfl: 0  •  carvedilol (COREG) 6.25 MG tablet, Every 12 (Twelve) Hours., Disp: , Rfl:   •  cholecalciferol 2000 units tablet, Take 2,000 Units by mouth Daily., Disp: 30 each, Rfl: 0  •  citalopram (CeleXA) 10 MG tablet, Take 20 mg by mouth Daily., Disp: , Rfl:   •  docusate sodium (COLACE) 100 MG capsule, Take 100 mg by mouth 2 (Two) Times a Day., Disp: , Rfl:   •  fluticasone (FLONASE) 50 MCG/ACT nasal spray, 2 sprays by Each Nare route Daily., Disp: 1 bottle, Rfl: 0  •  hydrocortisone 1 % cream, Apply  topically to the appropriate area as directed 2 (Two) Times a Day., Disp: , Rfl:   •  hydrophor (AQUAPHOR)  "ointment ointment, Apply  topically to the appropriate area as directed As Needed (Ulcerations lower extremities and edema). Lower extremities., Disp: , Rfl:   •  insulin aspart (novoLOG) 100 UNIT/ML injection, Inject 1-14 Units under the skin into the appropriate area as directed 3 (Three) Times a Day Before Meals. As directed per sliding scale, Disp: , Rfl:   •  insulin detemir (Levemir FlexTouch) 100 UNIT/ML injection, 20 units, Disp: , Rfl:   •  Insulin Syringe 30G X 5/16\" 1 ML misc, U UTD WITH INSULIN UP TO 6 TIMES A DAY, Disp: , Rfl: 3  •  ipratropium-albuterol (DUO-NEB) 0.5-2.5 mg/3 ml nebulizer, Take 3 mL by nebulization Every 4 (Four) Hours As Needed for Wheezing., Disp: , Rfl:   •  lactulose (CHRONULAC) 10 GM/15ML solution, Take 30 g by mouth Daily As Needed., Disp: , Rfl:   •  levothyroxine (SYNTHROID, LEVOTHROID) 112 MCG tablet, Take 224 mcg by mouth Daily., Disp: , Rfl:   •  Levothyroxine Sodium 112 MCG/ML solution, TAKE TWO TABLETS BY MOUTH EVERY MORNING At 6:00 AM, Disp: , Rfl:   •  loratadine (CLARITIN) 5 MG chewable tablet, Chew 10 mg Daily., Disp: , Rfl:   •  melatonin 5 MG tablet tablet, Take 1 tablet by mouth At Night As Needed (sleep). Over the counter, Disp: , Rfl:   •  O2 (OXYGEN), Inhale 1 L/min every night at bedtime. (Patient taking differently: Inhale 2 L/min every night at bedtime.), Disp: 1 L, Rfl: 0  •  Petrolatum 42 % ointment, Apply  topically to the appropriate area as directed Daily. Send with patient, Disp: , Rfl:   •  polyethylene glycol (MIRALAX) 17 g packet, Take 17 g by mouth Daily., Disp: , Rfl:   •  pregabalin (LYRICA) 75 MG capsule, Take 1 capsule by mouth 2 (Two) Times a Day., Disp: 6 capsule, Rfl: 0  •  QUEtiapine (SEROquel) 25 MG tablet, Take 0.5 tablets by mouth Every Evening., Disp: , Rfl:   •  SITagliptin (Januvia) 100 MG tablet, Daily., Disp: , Rfl:   •  sodium chloride 0.65 % nasal spray, 2 sprays into the nostril(s) as directed by provider As Needed for Congestion. " Send with patient, Disp: , Rfl: 12  •  tamsulosin (FLOMAX) 0.4 MG capsule 24 hr capsule, Take 1 capsule by mouth Daily., Disp: 30 capsule, Rfl: 0  •  tuberculin (Tubersol) 5 UNIT/0.1ML injection, Inject 5 Units into the appropriate area of the skin as directed by provider 1 (One) Time. Inject 0.1 mL intradermally every evening shift every 11 months starting on the 22nd for 1 day(s) Give annual PPD, Disp: , Rfl:   •  vitamin B-12 (VITAMIN B-12) 1000 MCG tablet, Take 1 tablet by mouth Daily., Disp: 60 tablet, Rfl: 0  •  vitamin D (ERGOCALCIFEROL) 1.25 MG (32825 UT) capsule capsule, Take 1,250 Units by mouth Daily., Disp: , Rfl:     PHYSICAL EXAM  ED Triage Vitals   Temp Heart Rate Resp BP SpO2   11/09/21 1205 11/09/21 1205 11/09/21 1203 11/09/21 1203 11/09/21 1205   97.6 °F (36.4 °C) 93 20 176/78 92 %      Temp src Heart Rate Source Patient Position BP Location FiO2 (%)   11/09/21 1205 11/09/21 1203 11/09/21 1203 11/09/21 1203 --   Oral Monitor Sitting Left arm        Physical Exam  Vitals and nursing note reviewed.   Constitutional:       Appearance: Normal appearance. He is obese.   HENT:      Head: Normocephalic.      Right Ear: Tympanic membrane, ear canal and external ear normal.      Left Ear: Tympanic membrane, ear canal and external ear normal.      Nose: Nose normal.      Mouth/Throat:      Mouth: Mucous membranes are moist.      Pharynx: Oropharynx is clear. No posterior oropharyngeal erythema.   Eyes:      Extraocular Movements: Extraocular movements intact.      Conjunctiva/sclera: Conjunctivae normal.   Cardiovascular:      Rate and Rhythm: Normal rate.      Pulses: Normal pulses.   Pulmonary:      Effort: Pulmonary effort is normal.      Breath sounds: Normal breath sounds.   Abdominal:      General: Bowel sounds are normal. There is no distension.      Tenderness: There is no abdominal tenderness. There is no guarding.   Musculoskeletal:      Cervical back: Normal range of motion and neck supple. No  rigidity or tenderness.   Lymphadenopathy:      Cervical: No cervical adenopathy.   Skin:     General: Skin is warm and dry.      Capillary Refill: Capillary refill takes less than 2 seconds.          Neurological:      General: No focal deficit present.      Mental Status: He is alert and oriented to person, place, and time.      GCS: GCS eye subscore is 4. GCS verbal subscore is 5. GCS motor subscore is 6.      Cranial Nerves: No cranial nerve deficit.      Sensory: Sensation is intact. No sensory deficit.      Motor: No tremor, abnormal muscle tone, seizure activity or pronator drift.   Psychiatric:         Mood and Affect: Mood normal.         Behavior: Behavior normal.           LAB RESULTS  Lab Results (last 24 hours)     ** No results found for the last 24 hours. **            I ordered the above labs and reviewed the results    RADIOLOGY  CT Head Without Contrast    Result Date: 11/9/2021  CT Head WO HISTORY: Fall last evening with trauma to right side of head. Patient on blood thinner. TECHNIQUE: Axial unenhanced head CT. Radiation dose reduction techniques included automated exposure control or exposure modulation based on body size. Count of known CT and cardiac nuc med studies performed in previous 12 months: 5. Time of scan: 12:45 PM COMPARISON: 10/6/2021 FINDINGS: The ventricles are generous in size. Cortical sulci are correspondingly prominent. No extraaxial fluid collections are seen. No parenchymal or subarachnoid hemorrhage is present. Periventricular hypodensities are demonstrated which are nonspecific but likely represent white matter microvascular change in a patient of this age. No CT evidence of mass or acute infarct. The mastoid air cells are clear. The visualized paranasal sinuses are clear. Orbital structures demonstrate no acute findings.     Impression: 1. No clearly acute intracranial pathology demonstrated. 2. Generalized cerebral atrophy and nonspecific periventricular hypodensities  which are thought to represent white matter microvascular change. 3. No significant interval change from prior study of 10/6/2021. Signer Name: Skip Mcgovern MD  Signed: 11/9/2021 12:57 PM  Workstation Name: [id number]  [facility]      I ordered the above radiologic testing and reviewed the results    PROCEDURES  Procedures      PROGRESS AND CONSULTS  ED Course as of 11/09/21 1429   Tue Nov 09, 2021   1305 IMPRESSION:  Impression:  1. No clearly acute intracranial pathology demonstrated.  2. Generalized cerebral atrophy and nonspecific periventricular hypodensities which are thought to represent white matter microvascular change.  3. No significant interval change from prior study of 10/6/2021.     Signer Name: Skip Mcgovern MD [VT]      ED Course User Index  [VT] Alba Chance APRN           MEDICAL DECISION MAKING    MDM     My differential diagnosis includes but is not limited to cerebral contusion, cervical strain, concussion with LOC, concussion without LOC, contusion, fracture of the skull, orbits or mandible, hematoma, intracranial hemorrhage including subdural, epidural, subarachnoid and intracerebral, laceration and postconcussion syndrome    DIAGNOSIS  Final diagnoses:   Fall, initial encounter   Hematoma of occipital surface of head, initial encounter       Latest Documented Vital Signs:  As of 14:29 EST  BP- 176/78 HR- 93 Temp- 97.6 °F (36.4 °C) (Oral) O2 sat- 92%    DISPOSITION    Discussed pertinent findings with the patient/family.  Patient/Family voiced understanding of need to follow-up for recheck and further testing as needed.  Return to the Emergency Department warnings were given.         Medication List      Changed    bumetanide 2 MG tablet  Commonly known as: BUMEX  Take 1 tablet by mouth 2 (Two) Times a Day.  What changed: how much to take     O2  Commonly known as: OXYGEN  Inhale 1 L/min every night at bedtime.  What changed: how much to take                  Follow-up Information     Fortunato eD Leon MD. Call in 2 days.    Specialty: Family Medicine  Why: As needed, If symptoms worsen  Contact information:  [address]  [facility] [address] [phone]                           Dictated utilizing Dragon dictation     Alba Chance, APRN  11/09/21 1420

## 2021-12-06 NOTE — ED PROVIDER NOTES
EMERGENCY DEPARTMENT ENCOUNTER      Room Number: 07/07    History is provided by the patient, no translation services needed    HPI:    Chief complaint: fall    Narrative: Pt is a 81 y.o. male who presents complaining of a fall.  Patient has difficult time giving appropriate history however he does report having a fall and he reports waking up on the ground.  He is unsure how long he laid there he was unable to reach a phone to call for help.  Reports he has someone who brings meals in for him and they found him on the ground.  He does report hitting his head and states he has a headache at this time.  He also is reporting left-sided hip pain.  Reports he does have frequent falls.  Denies any shortness of breath or chest pain.  No other complaints.      PMD: Fortunato De Leon MD    REVIEW OF SYSTEMS  Review of Systems  Complete review of systems performed otherwise negative except pertinent positives per HPI.    PAST MEDICAL HISTORY  Active Ambulatory Problems     Diagnosis Date Noted   • COPD (chronic obstructive pulmonary disease) ([facility])    • Type 2 diabetes mellitus with stage 4 chronic kidney disease, with long-term current use of insulin ([facility])    • Essential hypertension    • Primary osteoarthritis of left knee 08/27/2018   • Chronic renal insufficiency, stage 4 (severe) ([facility]) 05/07/2019   • Class 2 severe obesity due to excess calories with serious comorbidity in adult ([facility]) 09/10/2019   • Physical debility 11/20/2019   • Acute UTI (urinary tract infection) 06/29/2020   • Permanent atrial fibrillation ([facility]) 06/30/2020   • H/O noncompliance with medical treatment, presenting hazards to health 06/30/2020   • Myxedema 06/30/2020   • Acute on chronic combined systolic and diastolic CHF (congestive heart failure) ([facility]) 06/30/2020   • Generalized weakness 07/01/2020   • Recurrent left pleural effusion 07/19/2021   • Fall on same level as cause of accidental injury 07/19/2021   • Gross hematuria 07/28/2021   • CAD  (coronary artery disease) 07/29/2021   • HLD (hyperlipidemia) 07/29/2021   • Hypothyroidism 07/29/2021   • CKD (chronic kidney disease) stage 3, GFR 30-59 ml/min ([facility]) 07/29/2021     Resolved Ambulatory Problems     Diagnosis Date Noted   • Sepsis ([facility]) 11/21/2017   • Acute renal failure ([facility])    • Elevated procalcitonin 11/22/2017   • Lactic acid acidosis 11/22/2017   • Leukocytosis 11/22/2017   • NSTEMI (non-ST elevated myocardial infarction) ([facility]) 01/10/2018   • Injury of right ankle 08/27/2018   • Pneumonia of both lower lobes due to infectious organism 02/17/2019   • Chronic diastolic (congestive) heart failure ([facility]) 05/07/2019   • Elevated troponin 05/07/2019   • Cardiorenal syndrome with renal failure 05/07/2019   • Lidia coma scale total score 9-12 08/15/2019   • Acute respiratory failure with hypoxia ([facility]) 08/15/2019   • Sepsis, Gram positive ([facility]) 08/16/2019   • Streptococcal bacteremia 08/16/2019   • Atrial fibrillation with RVR ([facility]) 08/17/2019   • Cellulitis of right lower extremity 10/15/2019   • Acute on chronic diastolic congestive heart failure ([facility]) 07/07/2021     Past Medical History:   Diagnosis Date   • A-fib ([facility])    • Arthritis    • Asthma    • Cardiomyopathy ([facility])    • Cataract    • CHF (congestive heart failure) ([facility])    • CKD (chronic kidney disease), stage III ([facility])    • Diabetes mellitus ([facility])    • Disease of thyroid gland    • Emphysema, unspecified ([facility])    • Glaucoma    • Hyperlipidemia    • Hypertension    • Kidney stone    • Myocardial infarct, old    • Neuropathy    • Osteoarthritis    • Osteoporosis    • PVD (peripheral vascular disease) ([facility])    • Renal disorder    • Shingles        PAST SURGICAL HISTORY  Past Surgical History:   Procedure Laterality Date   • COLONOSCOPY     • EYE SURGERY     • JOINT REPLACEMENT      right   • KIDNEY STONE SURGERY     • REPLACEMENT TOTAL KNEE     • TOE SURGERY         FAMILY HISTORY  Family History   Problem Relation Age of Onset   • COPD Mother     • Diabetes Father        SOCIAL HISTORY  Social History     Socioeconomic History   • Marital status:    Tobacco Use   • Smoking status: Former Smoker   • Smokeless tobacco: Never Used   • Tobacco comment: quit 1993   Vaping Use   • Vaping Use: Never used   Substance and Sexual Activity   • Alcohol use: No   • Drug use: No   • Sexual activity: Defer       ALLERGIES  Morphine, Atorvastatin, and Oxycontin [oxycodone hcl]      Current Facility-Administered Medications:   •  [COMPLETED] Insert peripheral IV, , , Once **AND** sodium chloride 0.9 % flush 10 mL, 10 mL, Intravenous, PRN, Elen Parker PA-C    Current Outpatient Medications:   •  acetaminophen (TYLENOL) 325 MG tablet, Take 2 tablets by mouth Every 4 (Four) Hours As Needed for Mild Pain ., Disp: , Rfl:   •  albuterol sulfate  (90 Base) MCG/ACT inhaler, Inhale 2 puffs Every 4 (Four) Hours As Needed for Wheezing., Disp: , Rfl:   •  amiodarone (PACERONE) 200 MG tablet, Every 12 (Twelve) Hours., Disp: , Rfl:   •  apixaban (ELIQUIS) 5 MG tablet tablet, Take 1 tablet by mouth Every 12 (Twelve) Hours. Indications: Atrial Fibrillation, Disp: 60 tablet, Rfl:   •  atorvastatin (LIPITOR) 10 MG tablet, Daily., Disp: , Rfl:   •  Benzalkonium Chloride (REMEDY ANTIMICROBIAL CLEANSER EX), Apply  topically Every 1 (One) Hour As Needed., Disp: , Rfl:   •  bisacodyl (DULCOLAX) 10 MG suppository, Insert 10 mg into the rectum Daily As Needed for Constipation., Disp: , Rfl:   •  budesonide-formoterol (SYMBICORT) 160-4.5 MCG/ACT inhaler, Inhale 2 puffs 2 (Two) Times a Day., Disp: 1 inhaler, Rfl: 0  •  bumetanide (BUMEX) 2 MG tablet, Take 1 tablet by mouth 2 (Two) Times a Day. (Patient taking differently: Take 1 mg by mouth 2 (Two) Times a Day.), Disp: 120 tablet, Rfl: 0  •  carvedilol (COREG) 6.25 MG tablet, Every 12 (Twelve) Hours., Disp: , Rfl:   •  cholecalciferol 2000 units tablet, Take 2,000 Units by mouth Daily., Disp: 30 each, Rfl: 0  •  citalopram  "(CeleXA) 10 MG tablet, Take 20 mg by mouth Daily., Disp: , Rfl:   •  docusate sodium (COLACE) 100 MG capsule, Take 100 mg by mouth 2 (Two) Times a Day., Disp: , Rfl:   •  fluticasone (FLONASE) 50 MCG/ACT nasal spray, 2 sprays by Each Nare route Daily., Disp: 1 bottle, Rfl: 0  •  hydrocortisone 1 % cream, Apply  topically to the appropriate area as directed 2 (Two) Times a Day., Disp: , Rfl:   •  hydrophor (AQUAPHOR) ointment ointment, Apply  topically to the appropriate area as directed As Needed (Ulcerations lower extremities and edema). Lower extremities., Disp: , Rfl:   •  insulin aspart (novoLOG) 100 UNIT/ML injection, Inject 1-14 Units under the skin into the appropriate area as directed 3 (Three) Times a Day Before Meals. As directed per sliding scale, Disp: , Rfl:   •  insulin detemir (Levemir FlexTouch) 100 UNIT/ML injection, 20 units, Disp: , Rfl:   •  Insulin Syringe 30G X 5/16\" 1 ML misc, U UTD WITH INSULIN UP TO 6 TIMES A DAY, Disp: , Rfl: 3  •  ipratropium-albuterol (DUO-NEB) 0.5-2.5 mg/3 ml nebulizer, Take 3 mL by nebulization Every 4 (Four) Hours As Needed for Wheezing., Disp: , Rfl:   •  lactulose (CHRONULAC) 10 GM/15ML solution, Take 30 g by mouth Daily As Needed., Disp: , Rfl:   •  levothyroxine (SYNTHROID, LEVOTHROID) 112 MCG tablet, Take 224 mcg by mouth Daily., Disp: , Rfl:   •  Levothyroxine Sodium 112 MCG/ML solution, TAKE TWO TABLETS BY MOUTH EVERY MORNING At 6:00 AM, Disp: , Rfl:   •  loratadine (CLARITIN) 5 MG chewable tablet, Chew 10 mg Daily., Disp: , Rfl:   •  melatonin 5 MG tablet tablet, Take 1 tablet by mouth At Night As Needed (sleep). Over the counter, Disp: , Rfl:   •  O2 (OXYGEN), Inhale 1 L/min every night at bedtime. (Patient taking differently: Inhale 2 L/min every night at bedtime.), Disp: 1 L, Rfl: 0  •  Petrolatum 42 % ointment, Apply  topically to the appropriate area as directed Daily. Send with patient, Disp: , Rfl:   •  polyethylene glycol (MIRALAX) 17 g packet, Take 17 " g by mouth Daily., Disp: , Rfl:   •  pregabalin (LYRICA) 75 MG capsule, Take 1 capsule by mouth 2 (Two) Times a Day., Disp: 6 capsule, Rfl: 0  •  QUEtiapine (SEROquel) 25 MG tablet, Take 0.5 tablets by mouth Every Evening., Disp: , Rfl:   •  SITagliptin (Januvia) 100 MG tablet, Daily., Disp: , Rfl:   •  sodium chloride 0.65 % nasal spray, 2 sprays into the nostril(s) as directed by provider As Needed for Congestion. Send with patient, Disp: , Rfl: 12  •  tamsulosin (FLOMAX) 0.4 MG capsule 24 hr capsule, Take 1 capsule by mouth Daily., Disp: 30 capsule, Rfl: 0  •  tuberculin (Tubersol) 5 UNIT/0.1ML injection, Inject 5 Units into the appropriate area of the skin as directed by provider 1 (One) Time. Inject 0.1 mL intradermally every evening shift every 11 months starting on the 22nd for 1 day(s) Give annual PPD, Disp: , Rfl:   •  vitamin B-12 (VITAMIN B-12) 1000 MCG tablet, Take 1 tablet by mouth Daily., Disp: 60 tablet, Rfl: 0  •  vitamin D (ERGOCALCIFEROL) 1.25 MG (61095 UT) capsule capsule, Take 1,250 Units by mouth Daily., Disp: , Rfl:     PHYSICAL EXAM  ED Triage Vitals   Temp Heart Rate Resp BP SpO2   12/06/21 1405 12/06/21 1403 12/06/21 1403 12/06/21 1403 12/06/21 1403   97.3 °F (36.3 °C) 63 16 149/71 97 %      Temp src Heart Rate Source Patient Position BP Location FiO2 (%)   12/06/21 1405 12/06/21 1403 -- -- --   Temporal Monitor          Physical Exam  Vitals and nursing note reviewed.   Constitutional:       Appearance: Normal appearance. He is normal weight. He is not ill-appearing.   HENT:      Head: Normocephalic and atraumatic.      Nose: Nose normal.      Mouth/Throat:      Mouth: Mucous membranes are dry.   Eyes:      Extraocular Movements: Extraocular movements intact.      Conjunctiva/sclera: Conjunctivae normal.      Pupils: Pupils are equal, round, and reactive to light.   Cardiovascular:      Rate and Rhythm: Normal rate.      Pulses: Normal pulses.      Heart sounds: Murmur heard.        Pulmonary:      Effort: Pulmonary effort is normal.      Breath sounds: No wheezing.   Chest:      Chest wall: No tenderness.   Abdominal:      General: Abdomen is flat.      Palpations: Abdomen is soft.      Tenderness: There is no abdominal tenderness.   Musculoskeletal:         General: Normal range of motion.      Cervical back: Normal range of motion and neck supple. No tenderness.   Skin:     General: Skin is warm.      Capillary Refill: Capillary refill takes less than 2 seconds.      Coloration: Skin is not pale.      Findings: Bruising (left arm ) and rash (left lower extremity, large area of redness, warm to palpation, tenderness) present.   Neurological:      General: No focal deficit present.      Mental Status: He is alert and oriented to person, place, and time.   Psychiatric:         Mood and Affect: Mood normal.           LAB RESULTS  Lab Results (last 24 hours)     Procedure Component Value Units Date/Time    CBC & Differential [[id number]]  (Abnormal) Collected: 12/06/21 1449    Specimen: Blood Updated: 12/06/21 1503    Narrative:      The following orders were created for panel order CBC & Differential.  Procedure                               Abnormality         Status                     ---------                               -----------         ------                     CBC Auto Differential[[id number]]        Abnormal            Final result                 Please view results for these tests on the individual orders.    Comprehensive Metabolic Panel [[id number]]  (Abnormal) Collected: 12/06/21 1449    Specimen: Blood Updated: 12/06/21 1527     Glucose 115 mg/dL      BUN 43 mg/dL      Creatinine 1.95 mg/dL      Sodium 137 mmol/L      Potassium 4.6 mmol/L      Chloride 103 mmol/L      CO2 22.4 mmol/L      Calcium 10.0 mg/dL      Total Protein 6.7 g/dL      Albumin 3.50 g/dL      ALT (SGPT) 17 U/L      AST (SGOT) 31 U/L      Alkaline Phosphatase 103 U/L      Total Bilirubin 1.0 mg/dL       eGFR Non African Amer 33 mL/min/1.73      Globulin 3.2 gm/dL      A/G Ratio 1.1 g/dL      BUN/Creatinine Ratio 22.1     Anion Gap 11.6 mmol/L     Narrative:      GFR Normal >60  Chronic Kidney Disease <60  Kidney Failure <15      CK [[id number]]  (Abnormal) Collected: 12/06/21 1449    Specimen: Blood Updated: 12/06/21 1527     Creatine Kinase 484 U/L     CBC Auto Differential [[id number]]  (Abnormal) Collected: 12/06/21 1449    Specimen: Blood Updated: 12/06/21 1503     WBC 11.03 10*3/mm3      RBC 3.94 10*6/mm3      Hemoglobin 11.3 g/dL      Hematocrit 35.0 %      MCV 88.8 fL      MCH 28.7 pg      MCHC 32.3 g/dL      RDW 14.4 %      RDW-SD 46.6 fl      MPV 11.8 fL      Platelets 161 10*3/mm3      Neutrophil % 77.9 %      Lymphocyte % 6.8 %      Monocyte % 12.0 %      Eosinophil % 2.6 %      Basophil % 0.5 %      Immature Grans % 0.2 %      Neutrophils, Absolute 8.60 10*3/mm3      Lymphocytes, Absolute 0.75 10*3/mm3      Monocytes, Absolute 1.32 10*3/mm3      Eosinophils, Absolute 0.29 10*3/mm3      Basophils, Absolute 0.05 10*3/mm3      Immature Grans, Absolute 0.02 10*3/mm3      nRBC 0.0 /100 WBC     TSH [[id number]]  (Normal) Collected: 12/06/21 1449    Specimen: Blood Updated: 12/06/21 1750     TSH 0.592 uIU/mL     Urinalysis With Microscopic If Indicated (No Culture) - Urine, Clean Catch [[id number]]  (Abnormal) Collected: 12/06/21 1501    Specimen: Urine, Clean Catch Updated: 12/06/21 1513     Color, UA Yellow     Appearance, UA Slightly Cloudy     pH, UA 6.5     Specific Gravity, UA 1.025     Glucose, UA Negative     Ketones, UA 15 mg/dL (1+)     Bilirubin, UA Negative     Blood, UA Moderate (2+)     Protein, UA >=300 mg/dL (3+)     Leuk Esterase, UA Moderate (2+)     Nitrite, UA Negative     Urobilinogen, UA 0.2 E.U./dL    Urinalysis, Microscopic Only - Urine, Clean Catch [[id number]]  (Abnormal) Collected: 12/06/21 1501    Specimen: Urine, Clean Catch Updated: 12/06/21 1515     RBC, UA 31-50 /HPF      WBC, UA Too  Numerous to Count /HPF      Bacteria, UA 1+ /HPF      Squamous Epithelial Cells, UA None Seen /HPF      Hyaline Casts, UA None Seen /LPF      Methodology Manual Light Microscopy    Urine Culture - Urine, Urine, Clean Catch [[id number]] Collected: 12/06/21 1501    Specimen: Urine, Clean Catch Updated: 12/06/21 1826    COVID PRE-OP / PRE-PROCEDURE SCREENING ORDER (NO ISOLATION) - Swab, Nasopharynx [[id number]]  (Normal) Collected: 12/06/21 1649    Specimen: Swab from Nasopharynx Updated: 12/06/21 1718    Narrative:      The following orders were created for panel order COVID PRE-OP / PRE-PROCEDURE SCREENING ORDER (NO ISOLATION) - Swab, Nasopharynx.  Procedure                               Abnormality         Status                     ---------                               -----------         ------                     COVID-19 and FLU A/B PCR...[[id number]]  Normal              Final result                 Please view results for these tests on the individual orders.    COVID-19 and FLU A/B PCR - Swab, Nasopharynx [[id number]]  (Normal) Collected: 12/06/21 1649    Specimen: Swab from Nasopharynx Updated: 12/06/21 1718     COVID19 Not Detected     Influenza A PCR Not Detected     Influenza B PCR Not Detected    Narrative:      Fact sheet for providers: https://www.fda.gov/media/424605/download    Fact sheet for patients: https://www.fda.gov/media/246075/download    Test performed by PCR.            I ordered the above labs and reviewed the results    RADIOLOGY  CT Head Without Contrast    Result Date: 12/6/2021  CT Head WO: 12/6/2021 4:56 PM HISTORY: Multiple falls, headache and dizziness TECHNIQUE: Axial unenhanced head CT. Radiation dose reduction techniques included automated exposure control or exposure modulation based on body size. Radiation audit for number of CT and nuclear cardiology exams performed in the last year: 0.  COMPARISON: 11/9/2021 FINDINGS: No intracranial hemorrhage, mass, or infarct. There is  chronic small vessel ischemic disease and parenchymal volume loss that appear similar to prior. No hydrocephalus or extra-axial fluid collection. The skull base, calvarium, and extracranial soft tissues are normal.     No acute intracranial abnormality. Signer Name: Tara Hairston MD  Signed: 12/6/2021 4:01 PM  Workstation Name: [id number]  [facility]    CT Pelvis Without Contrast    Result Date: 12/6/2021  CT Pelvis WO INDICATION:  Left hip pain status post fall yesterday. TECHNIQUE: CT of the pelvis without IV contrast. Coronal and sagittal reconstructions were obtained.  Radiation dose reduction techniques included automated exposure control or exposure modulation based on body size. Count of known CT and cardiac nuc med studies performed in previous 12 months: 7. COMPARISON:  Left hip x-rays 12/6/2021 and 10/6/2021 FINDINGS: No acute fracture or dislocation. Bony pelvis and sacrum appear intact. No hip effusions. Visualized soft tissues and musculature are unremarkable. Included visceral pelvis is unremarkable. Degenerative changes in the visualized lumbar spine. Mild degenerative changes of both hips.     1. Mild bilateral hip arthrosis. No acute fracture or dislocation. 2. Bony pelvis and sacrum appear intact. 3. Degenerative changes in the visualized lumbar spine. Signer Name: Gato Mcgovern MD  Signed: 12/6/2021 7:46 PM  Workstation Name: [id number]  [facility]iology [facility]    XR Hip With or Without Pelvis 2 - 3 View Left    Result Date: 12/6/2021  CR Hip Uni Comp Min 2 Vws LT INDICATION: Fall, left hip pain COMPARISON: None available. FINDINGS: Two AP and one frog-leg lateral view(s) of the left hip.  No fracture or dislocation. There is bilateral femoro-acetabular joint space narrowing. No bone erosion or destruction.      No acute fracture or subluxation. Signer Name: Tara Hairston MD  Signed: 12/6/2021 4:10 PM  Workstation Name: [id number]  [facility]   "[place]      I ordered the above radiologic testing and reviewed the results    PROCEDURES  Procedures      PROGRESS AND CONSULTS  ED Course as of 12/06/21 2001   Mon Dec 06, 2021   1616 XR hip L: IMPRESSION:  No acute fracture or subluxation. [KM]   1617 CT head: IMPRESSION:  No acute intracranial abnormality. [KM]   1947 1. Mild bilateral hip arthrosis. No acute fracture or dislocation.  2. Bony pelvis and sacrum appear intact.  3. Degenerative changes in the visualized lumbar spine. [KM]      ED Course User Index  [KM] Elen Parker, FEI           MEDICAL DECISION MAKING    MDM     81-year-old male seen and evaluated after a fall and who is experiencing hip pain.  Patient coming from home where he lives independently  On arrival vitals are stable.  On examination patient does appear quite weak however he answers questions appropriately.  He does have pain on the left side of his hip.    CBC: Mild leukocytosis of 11, hemoglobin is 11.3  CMP: Patient's labs are primarily at his baseline  CK is mildly elevated 484  Urinalysis is positive for leukocytes, protein and blood this appears to be the state of patient's urine chronically and he has been diagnosed with urinary retention.  He does not report any urinary symptoms.  Covid and influenza are both negative  TSH is within normal limits    Patient is on apixaban, CT head obtained and is negative  X-ray hip with or without pelvis shows no acute fracture dislocation    Patient was attempted to ambulate with the help of staff and he had a really hard time with this.  Therefore I did consult with medicine for hospital admission.  Dr. Briggs came to personally evaluate the patient.  He has taken care of him in the past and when asking how to benefit the patient whether that be placement or rehab patient states \"he is not going anywhere\".  They had a discussion about returning home versus hospitalization and as patient states he does not want to go into a nursing " home facility Dr. Briggs decided it would be appropriate to discharge patient home as patient does get around his home by pivoting and using a scooter.  My concern is that patient lives independently and when he does fall he will lay on the ground for many hours of time which I did discuss with Dr. Briggs however ultimately the plan was to discharge patient home.  Additional imaging of CT pelvis was obtained to ensure there is no acute fracture of the hips and that 2 was negative.  Therefore patient was discharged home his vitals were stable at time of discharge and he was willing to return home.    DIAGNOSIS  Final diagnoses:   Fall, initial encounter   Hip pain       Latest Documented Vital Signs:  As of 20:01 EST  BP- 162/72 HR- 85 Temp- 97.3 °F (36.3 °C) (Temporal) O2 sat- 91%    DISPOSITION    Discussed pertinent findings with the patient/family.  Patient/Family voiced understanding of need to follow-up for recheck and further testing as needed.  Return to the Emergency Department warnings were given.         Medication List      Changed    bumetanide 2 MG tablet  Commonly known as: BUMEX  Take 1 tablet by mouth 2 (Two) Times a Day.  What changed: how much to take     O2  Commonly known as: OXYGEN  Inhale 1 L/min every night at bedtime.  What changed: how much to take                 Follow-up Information     Fortunato De Leon MD. Call in 1 day.    Specialty: Family Medicine  Why: To schedule follow up appointment  Contact information:  [address]  [facility] [address]  [phone]                           Dictated utilizing Dragon dictation     Elen Parker PA-C  12/06/21 2001

## 2021-12-06 NOTE — ED NOTES
Unable to cath pt, given pt a urinal, meatus cleaned with iodine, pt is uncircumcised and foreskin has grown over meatus     Valeria Fallon, RAVI  12/06/21 1456       Valeria Fallon, RN  12/06/21 7037

## 2021-12-10 PROBLEM — G93.41 ACUTE METABOLIC ENCEPHALOPATHY: Status: ACTIVE | Noted: 2021-01-01

## 2021-12-10 NOTE — H&P
Internal medicine history and physical  INTERNAL MEDICINE   [facility]       Patient Identification:  Name: Froilan Helton  Age: 81 y.o.  Sex: male  :  1940  MRN: [id number]                   Primary Care Physician: Fortunato De Leon MD                               Date of admission:12/10/2021    Chief Complaint: Brought to the emergency room by EMS as per request for home health for weakness and altered mental status.    History of Present Illness:   Source of information review of records and discussion with ER physician.  Patient himself is unable to engage in significant information exchange.    Patient is a 81-year-old male with complicated past medical history and for some reason has been living independently alone at home has been declining in the last week or so with repeated falls requiring EMS to come to his home and help him.  He was apparently sent to the emergency room at [facility] on 2021 for evaluation of fall and left hip pain.  He was apparently walking around his house and fell and was found by somebody who came to drop meals for him.  Again history is sketchy.  Evaluation in the emergency room on 2021 revealed bruise left arm and rash on his lower extremities due to attempted scooting.  Patient was noted to have abnormal urinalysis.  His abnormal urinalysis was interpreted as result of chronic urinary retention as it has not changed in the past.  Patient was attempted to be hospitalized but patient refused.-Patient has been followed multiple times and eventually was noted to be confused and was brought to the emergency room for further evaluation.  Patient has not been started on any antibiotics.  Patient did have CT scan of the abdomen pelvis which did not show any fracture of his hip or pelvis.  Work-up in the emergency room also noticed worsening creatinine elevated CPK mild leukocytosis and elevated troponin.  Patient is being admitted for further  care.    Past Medical History:  Past Medical History:   Diagnosis Date   • A-fib ([facility])    • Arthritis    • Asthma    • CAD (coronary artery disease)    • Cardiomyopathy ([facility])    • Cataract    • CHF (congestive heart failure) ([facility])    • CKD (chronic kidney disease), stage III ([facility])    • COPD (chronic obstructive pulmonary disease) ([facility])    • Diabetes mellitus ([facility])    • Disease of thyroid gland    • Emphysema, unspecified ([facility])    • Glaucoma    • Hyperlipidemia    • Hypertension    • Kidney stone    • Myocardial infarct, old    • Neuropathy    • Osteoarthritis    • Osteoporosis    • Permanent atrial fibrillation ([facility]) 6/30/2020   • PVD (peripheral vascular disease) ([facility])    • Renal disorder    • Shingles      Past Surgical History:  Past Surgical History:   Procedure Laterality Date   • COLONOSCOPY     • EYE SURGERY     • JOINT REPLACEMENT      right   • KIDNEY STONE SURGERY     • REPLACEMENT TOTAL KNEE     • TOE SURGERY        Home Meds:  Medications Prior to Admission   Medication Sig Dispense Refill Last Dose   • acetaminophen (TYLENOL) 325 MG tablet Take 2 tablets by mouth Every 4 (Four) Hours As Needed for Mild Pain .      • albuterol sulfate  (90 Base) MCG/ACT inhaler Inhale 2 puffs Every 4 (Four) Hours As Needed for Wheezing.      • amiodarone (PACERONE) 200 MG tablet Every 12 (Twelve) Hours.      • apixaban (ELIQUIS) 5 MG tablet tablet Take 1 tablet by mouth Every 12 (Twelve) Hours. Indications: Atrial Fibrillation 60 tablet     • atorvastatin (LIPITOR) 10 MG tablet Every Night.      • Benzalkonium Chloride (REMEDY ANTIMICROBIAL CLEANSER EX) Apply  topically Every 1 (One) Hour As Needed.      • bisacodyl (DULCOLAX) 10 MG suppository Insert 10 mg into the rectum Daily As Needed for Constipation.      • budesonide-formoterol (SYMBICORT) 160-4.5 MCG/ACT inhaler Inhale 2 puffs 2 (Two) Times a Day. 1 inhaler 0    • bumetanide (BUMEX) 2 MG tablet Take 1 tablet by mouth 2 (Two) Times a Day. (Patient taking  "differently: Take 1 mg by mouth 2 (Two) Times a Day.) 120 tablet 0    • carvedilol (COREG) 6.25 MG tablet Every 12 (Twelve) Hours.      • cholecalciferol 2000 units tablet Take 2,000 Units by mouth Daily. 30 each 0    • citalopram (CeleXA) 10 MG tablet Take 20 mg by mouth Daily.      • docusate sodium (COLACE) 100 MG capsule Take 100 mg by mouth 2 (Two) Times a Day.      • fluticasone (FLONASE) 50 MCG/ACT nasal spray 2 sprays by Each Nare route Daily. 1 bottle 0    • hydrocortisone 1 % cream Apply  topically to the appropriate area as directed 2 (Two) Times a Day.      • hydrophor (AQUAPHOR) ointment ointment Apply  topically to the appropriate area as directed As Needed (Ulcerations lower extremities and edema). Lower extremities.      • insulin aspart (novoLOG) 100 UNIT/ML injection Inject 1-14 Units under the skin into the appropriate area as directed 3 (Three) Times a Day Before Meals. As directed per sliding scale      • insulin detemir (Levemir FlexTouch) 100 UNIT/ML injection 20 units      • Insulin Syringe 30G X 5/16\" 1 ML misc U UTD WITH INSULIN UP TO 6 TIMES A DAY  3    • ipratropium-albuterol (DUO-NEB) 0.5-2.5 mg/3 ml nebulizer Take 3 mL by nebulization Every 4 (Four) Hours As Needed for Wheezing.      • lactulose (CHRONULAC) 10 GM/15ML solution Take 30 g by mouth Daily As Needed.      • levothyroxine (SYNTHROID, LEVOTHROID) 112 MCG tablet Take 224 mcg by mouth Daily.      • Levothyroxine Sodium 112 MCG/ML solution TAKE TWO TABLETS BY MOUTH EVERY MORNING At 6:00 AM      • loratadine (CLARITIN) 5 MG chewable tablet Chew 10 mg Daily.      • melatonin 5 MG tablet tablet Take 1 tablet by mouth At Night As Needed (sleep). Over the counter      • O2 (OXYGEN) Inhale 1 L/min every night at bedtime. (Patient taking differently: Inhale 2 L/min every night at bedtime.) 1 L 0    • Petrolatum 42 % ointment Apply  topically to the appropriate area as directed Daily. Send with patient      • polyethylene glycol " "(MIRALAX) 17 g packet Take 17 g by mouth Daily.      • pregabalin (LYRICA) 75 MG capsule Take 1 capsule by mouth 2 (Two) Times a Day. 6 capsule 0    • QUEtiapine (SEROquel) 25 MG tablet Take 0.5 tablets by mouth Every Evening.      • SITagliptin (Januvia) 100 MG tablet Daily.      • sodium chloride 0.65 % nasal spray 2 sprays into the nostril(s) as directed by provider As Needed for Congestion. Send with patient  12    • tamsulosin (FLOMAX) 0.4 MG capsule 24 hr capsule Take 1 capsule by mouth Daily. 30 capsule 0    • tuberculin (Tubersol) 5 UNIT/0.1ML injection Inject 5 Units into the appropriate area of the skin as directed by provider 1 (One) Time. Inject 0.1 mL intradermally every evening shift every 11 months starting on the 22nd for 1 day(s) Give annual PPD      • vitamin B-12 (VITAMIN B-12) 1000 MCG tablet Take 1 tablet by mouth Daily. 60 tablet 0    • vitamin D (ERGOCALCIFEROL) 1.25 MG (51505 UT) capsule capsule Take 1,250 Units by mouth Daily.        Current Meds:   No current facility-administered medications for this encounter.  Allergies:  Allergies   Allergen Reactions   • Morphine Unknown - High Severity   • Atorvastatin Unknown - Low Severity   • Oxycontin [Oxycodone Hcl] Confusion     'Makes me crazy and stand on my head'     Social History:   Social History     Tobacco Use   • Smoking status: Former Smoker   • Smokeless tobacco: Never Used   • Tobacco comment: quit 1993   Substance Use Topics   • Alcohol use: No      Family History:  Family History   Problem Relation Age of Onset   • COPD Mother    • Diabetes Father           Review of Systems  See history of present illness and past medical history.     Limited because of dementia and altered mental status.  Remainder of ROS is negative.      Vitals:   /83 (BP Location: Left arm, Patient Position: Lying)   Pulse 84   Temp 100.4 °F (38 °C) (Oral) Comment: nurse wendy notified  Resp 16   Ht 185.4 cm (72.99\")   Wt 104 kg (228 lb 11.2 oz)   " SpO2 95%   BMI 30.18 kg/m²   I/O:     Intake/Output Summary (Last 24 hours) at 12/10/2021 1712  Last data filed at 12/10/2021 1534  Gross per 24 hour   Intake 500 ml   Output --   Net 500 ml     Exam:  Patient is examined using the personal protective equipment as per guidelines from infection control for this particular patient as enacted.  Hand washing was performed before and after patient interaction.  General Appearance:   Chronically ill older than his stated age elderly male does not appear to be in any acute distress.   Head:    Normocephalic, without obvious abnormality, atraumatic   Eyes:    PERRL, conjunctiva/corneas clear, EOM's intact, both eyes   Ears:    Normal external ear canals, both ears   Nose:   Nares normal, septum midline, mucosa normal, no drainage    or sinus tenderness   Throat:   Lips, tongue, gums normal; oral mucosa pink and dry   Neck:   Supple, symmetrical, trachea midline, no adenopathy;     thyroid:  no enlargement/tenderness/nodules; no carotid    bruit or JVD   Back:     Symmetric, no curvature, ROM normal, no CVA tenderness   Lungs:    Decreased breath sounds at the bases   Chest Wall:    No tenderness or deformity    Heart:   S1-S2 irregularly irregular   Abdomen:    Soft nontender   Extremities:   Extremities normal, atraumatic, no cyanosis or edema   Pulses:   Pulses palpable in all extremities; symmetric all extremities   Skin:  Ecchymotic changes and skin abrasions noted but no obvious cellulitis noted   Neurologic:  Oriented to his name but does not know where he is at grossly nonfocal examination otherwise.       Data Review:      I reviewed the patient's new clinical results.  Results from last 7 days   Lab Units 12/10/21  1243 12/06/21  1449   WBC 10*3/mm3 11.26* 11.03*   HEMOGLOBIN g/dL 13.0 11.3*   PLATELETS 10*3/mm3 208 161     Results from last 7 days   Lab Units 12/10/21  1243 12/06/21  1449   SODIUM mmol/L 140 137   POTASSIUM mmol/L 4.4 4.6   CHLORIDE mmol/L 104  103   CO2 mmol/L 25.0 22.4   BUN mg/dL 38* 43*   CREATININE mg/dL 2.04* 1.95*   CALCIUM mg/dL 9.5 10.0   GLUCOSE mg/dL 140* 115*     Microbiology Results (last 10 days)     Procedure Component Value - Date/Time    COVID PRE-OP / PRE-PROCEDURE SCREENING ORDER (NO ISOLATION) - Swab, Nasopharynx [[id number]]  (Normal) Collected: 12/10/21 1244    Lab Status: Final result Specimen: Swab from Nasopharynx Updated: 12/10/21 1343    Narrative:      The following orders were created for panel order COVID PRE-OP / PRE-PROCEDURE SCREENING ORDER (NO ISOLATION) - Swab, Nasopharynx.  Procedure                               Abnormality         Status                     ---------                               -----------         ------                     COVID-19,[facility]...[[id number]]  Normal              Final result                 Please view results for these tests on the individual orders.    COVID-19,[facility] CEPHEID/SINDY NP SWAB IN TRANSPORT MEDIA 8-12 HR TAT - Swab, Nasopharynx [[id number]]  (Normal) Collected: 12/10/21 1244    Lab Status: Final result Specimen: Swab from Nasopharynx Updated: 12/10/21 1343     COVID19 Not Detected    Narrative:      Fact sheet for providers: https://www.fda.gov/media/060254/download    Fact sheet for patients: https://www.fda.gov/media/553972/download    Test performed by PCR.    COVID PRE-OP / PRE-PROCEDURE SCREENING ORDER (NO ISOLATION) - Swab, Nasopharynx [[id number]]  (Normal) Collected: 12/06/21 1649    Lab Status: Final result Specimen: Swab from Nasopharynx Updated: 12/06/21 1718    Narrative:      The following orders were created for panel order COVID PRE-OP / PRE-PROCEDURE SCREENING ORDER (NO ISOLATION) - Swab, Nasopharynx.  Procedure                               Abnormality         Status                     ---------                               -----------         ------                     COVID-19 and FLU A/B PCR...[[id number]]  Normal              Final result                  Please view results for these tests on the individual orders.    COVID-19 and FLU A/B PCR - Swab, Nasopharynx [[id number]]  (Normal) Collected: 12/06/21 1649    Lab Status: Final result Specimen: Swab from Nasopharynx Updated: 12/06/21 1718     COVID19 Not Detected     Influenza A PCR Not Detected     Influenza B PCR Not Detected    Narrative:      Fact sheet for providers: https://www.fda.gov/media/012835/download    Fact sheet for patients: https://www.fda.gov/media/581517/download    Test performed by PCR.    Urine Culture - Urine, Urine, Clean Catch [[id number]]  (Abnormal) Collected: 12/06/21 1501    Lab Status: Final result Specimen: Urine, Clean Catch Updated: 12/07/21 2035     Urine Culture <25,000 CFU/mL Gram Negative Bacilli    Narrative:      Call if further workup needed.            Assessment:  Active Hospital Problems    Diagnosis  POA   • **Acute metabolic encephalopathy [G93.41]  Yes   • Cardiomyopathy ([facility]) [I42.9]  Unknown   • Recurrent falls [R29.6]  Not Applicable   • Hypothyroidism [E03.9]  Yes   • CAD (coronary artery disease) [I25.10]  Yes   • Recurrent left pleural effusion [J90]  Yes   • Permanent atrial fibrillation ([facility]) [I48.21]  Yes   • Acute UTI (urinary tract infection) [N39.0]  Yes   • Chronic renal insufficiency, stage 4 (severe) ([facility]) [N18.4]  Yes   • Primary osteoarthritis of left knee [M17.12]  Yes   • Essential hypertension [I10]  Yes   • Type 2 diabetes mellitus with stage 4 chronic kidney disease, with long-term current use of insulin ([facility]) [E11.22, N18.4, Z79.4]  Not Applicable   • COPD (chronic obstructive pulmonary disease) ([facility]) [J44.9]  Yes       Medical decision making:  Metabolic encephalopathy-with multiple falls and inability to take care of himself-multifactorial including urinary tract infection dehydration and increasing decline due to underlying dementia and chronic illness-plan is to admit the patient provide him with IV hydration while monitoring him  for volume overload, IV antibiotics follow-up on urine culture results.  Consult OT PT and case management as he needs placement since his current living condition is not safe for him.  History of coronary artery disease with cardiomyopathy and congestive heart failure-patient appears to be dry at this time plan is to provide him with cautious hydration hold his diuretics while monitoring his renal function.  Acute on chronic renal insufficiency exacerbated by decreased intake and ongoing use of diuretics consistent with volume depletion-hold diuretics and provide him with cautious hydration and monitor.  Type 2 diabetes-continue with Accu-Cheks and sliding scale coverage watch for hypoglycemia and avoid oral hypoglycemic agents because of the renal insufficiency and risk for prolonged hypoglycemia.  COPD-monitor his oxygen saturation provide him with nebulizer treatment and oxygen supplementation on as-needed basis  Hypothyroidism-continue thyroid replacement therapy.    Adrián Sparks MD   12/10/2021  17:12 EST  Much of this encounter note is an electronic transcription/translation of spoken language to printed text. The electronic translation of spoken language may permit erroneous, or at times, nonsensical words or phrases to be inadvertently transcribed; Although I have reviewed the note for such errors, some may still exist

## 2021-12-10 NOTE — ED NOTES
Per EMS, facility called for AMS and weakness by home health. Pt opens his eyes when called, but does not verbally respond.  Pt dx with UTI on Monday and hasn't taken abx. Multiple falls this week and unable to care for himself at home.       Patient was placed in face mask during triage process. Patient was wearing facemask when I entered the room and throughout our encounter. I wore full protective equipment throughout this patient encounter including a face mask, eye protection, and gloves. Hand hygiene was performed before donning protective equipment and again following doffing of PPE after leaving the room.       Lindsey Hanks, RN  12/10/21 1218       Lindsey Hanks RN  12/10/21 1213

## 2021-12-10 NOTE — ED PROVIDER NOTES
EMERGENCY DEPARTMENT ENCOUNTER    Room Number:  08/08  Date seen:  12/10/2021  Time seen: 12:30 EST  PCP: Fortunato De Leon MD  Historian: EMS      HPI:  Chief Complaint: AMS    A complete HPI/ROS/PMH/PSH/SH/FH are unobtainable due to: AMS    Context: Froilan Helton is a 81 y.o. male who presents to the ED for evaluation of altered mental status.  Patient was at home independently, EMS was called by home health today when he was found on the floor with altered mental status.    EMS crew is very familiar with the patient.  They have been to his home every day for the last 5 days for falls.  Apparently the patient was evaluated on Monday at [facility] and discharged, possibly had a UTI.  Patient cannot provide any history.  He can tell me his name.        PAST MEDICAL HISTORY  Active Ambulatory Problems     Diagnosis Date Noted   • COPD (chronic obstructive pulmonary disease) ([facility])    • Type 2 diabetes mellitus with stage 4 chronic kidney disease, with long-term current use of insulin ([facility])    • Essential hypertension    • Primary osteoarthritis of left knee 08/27/2018   • Chronic renal insufficiency, stage 4 (severe) ([facility]) 05/07/2019   • Class 2 severe obesity due to excess calories with serious comorbidity in adult ([facility]) 09/10/2019   • Physical debility 11/20/2019   • Acute UTI (urinary tract infection) 06/29/2020   • Permanent atrial fibrillation ([facility]) 06/30/2020   • H/O noncompliance with medical treatment, presenting hazards to health 06/30/2020   • Myxedema 06/30/2020   • Acute on chronic combined systolic and diastolic CHF (congestive heart failure) ([facility]) 06/30/2020   • Generalized weakness 07/01/2020   • Recurrent left pleural effusion 07/19/2021   • Fall on same level as cause of accidental injury 07/19/2021   • Gross hematuria 07/28/2021   • CAD (coronary artery disease) 07/29/2021   • HLD (hyperlipidemia) 07/29/2021   • Hypothyroidism 07/29/2021   • CKD (chronic kidney disease) stage 3, GFR 30-59 ml/min  ([facility]) 07/29/2021     Resolved Ambulatory Problems     Diagnosis Date Noted   • Sepsis ([facility]) 11/21/2017   • Acute renal failure ([facility])    • Elevated procalcitonin 11/22/2017   • Lactic acid acidosis 11/22/2017   • Leukocytosis 11/22/2017   • NSTEMI (non-ST elevated myocardial infarction) ([facility]) 01/10/2018   • Injury of right ankle 08/27/2018   • Pneumonia of both lower lobes due to infectious organism 02/17/2019   • Chronic diastolic (congestive) heart failure ([facility]) 05/07/2019   • Elevated troponin 05/07/2019   • Cardiorenal syndrome with renal failure 05/07/2019   • Lidia coma scale total score 9-12 08/15/2019   • Acute respiratory failure with hypoxia ([facility]) 08/15/2019   • Sepsis, Gram positive ([facility]) 08/16/2019   • Streptococcal bacteremia 08/16/2019   • Atrial fibrillation with RVR ([facility]) 08/17/2019   • Cellulitis of right lower extremity 10/15/2019   • Acute on chronic diastolic congestive heart failure ([facility]) 07/07/2021     Past Medical History:   Diagnosis Date   • A-fib ([facility])    • Arthritis    • Asthma    • Cardiomyopathy ([facility])    • Cataract    • CHF (congestive heart failure) ([facility])    • CKD (chronic kidney disease), stage III ([facility])    • Diabetes mellitus ([facility])    • Disease of thyroid gland    • Emphysema, unspecified ([facility])    • Glaucoma    • Hyperlipidemia    • Hypertension    • Kidney stone    • Myocardial infarct, old    • Neuropathy    • Osteoarthritis    • Osteoporosis    • PVD (peripheral vascular disease) ([facility])    • Renal disorder    • Shingles          PAST SURGICAL HISTORY  Past Surgical History:   Procedure Laterality Date   • COLONOSCOPY     • EYE SURGERY     • JOINT REPLACEMENT      right   • KIDNEY STONE SURGERY     • REPLACEMENT TOTAL KNEE     • TOE SURGERY           FAMILY HISTORY  Family History   Problem Relation Age of Onset   • COPD Mother    • Diabetes Father          SOCIAL HISTORY  Social History     Socioeconomic History   • Marital status:    Tobacco Use   • Smoking status: Former  Smoker   • Smokeless tobacco: Never Used   • Tobacco comment: quit 1993   Vaping Use   • Vaping Use: Never used   Substance and Sexual Activity   • Alcohol use: No   • Drug use: No   • Sexual activity: Defer         ALLERGIES  Morphine, Atorvastatin, and Oxycontin [oxycodone hcl]        REVIEW OF SYSTEMS  Review of Systems   Unable to perform ROS: Mental status change          PHYSICAL EXAM  ED Triage Vitals [12/10/21 1216]   Temp Heart Rate Resp BP SpO2   99.5 °F (37.5 °C) 78 18 158/88 98 %      Temp src Heart Rate Source Patient Position BP Location FiO2 (%)   -- -- -- -- --         GENERAL: Ill-appearing, smells strongly of urine  HENT: atraumatic  EYES: no scleral icterus  CV: regular rhythm, regular rate  RESPIRATORY: normal effort CTA B  ABDOMEN: soft, nontender  MUSCULOSKELETAL: no deformity.  Weakly moves all 4 extremities.  NEURO: Drowsy, responds to verbal stimulation, answers a couple simple questions appropriately but otherwise is disoriented to date place and situation, moves all extremities, follows some simple commands  SKIN: warm, dry.  Stage III large decubitus ulcer to the lateral left hip and thigh  Vital signs and nursing notes reviewed.          LAB RESULTS  Recent Results (from the past 24 hour(s))   Comprehensive Metabolic Panel    Collection Time: 12/10/21 12:43 PM    Specimen: Blood   Result Value Ref Range    Glucose 140 (H) 65 - 99 mg/dL    BUN 38 (H) 8 - 23 mg/dL    Creatinine 2.04 (H) 0.76 - 1.27 mg/dL    Sodium 140 136 - 145 mmol/L    Potassium 4.4 3.5 - 5.2 mmol/L    Chloride 104 98 - 107 mmol/L    CO2 25.0 22.0 - 29.0 mmol/L    Calcium 9.5 8.6 - 10.5 mg/dL    Total Protein 7.2 6.0 - 8.5 g/dL    Albumin 3.40 (L) 3.50 - 5.20 g/dL    ALT (SGPT) 32 1 - 41 U/L    AST (SGOT) 59 (H) 1 - 40 U/L    Alkaline Phosphatase 75 39 - 117 U/L    Total Bilirubin 0.7 0.0 - 1.2 mg/dL    eGFR Non African Amer 32 (L) >60 mL/min/1.73    Globulin 3.8 gm/dL    A/G Ratio 0.9 g/dL    BUN/Creatinine Ratio 18.6  7.0 - 25.0    Anion Gap 11.0 5.0 - 15.0 mmol/L   Troponin    Collection Time: 12/10/21 12:43 PM    Specimen: Blood   Result Value Ref Range    Troponin T 0.088 (C) 0.000 - 0.030 ng/mL   Lactic Acid, Plasma    Collection Time: 12/10/21 12:43 PM    Specimen: Blood   Result Value Ref Range    Lactate 1.5 0.5 - 2.0 mmol/L   CBC Auto Differential    Collection Time: 12/10/21 12:43 PM    Specimen: Blood   Result Value Ref Range    WBC 11.26 (H) 3.40 - 10.80 10*3/mm3    RBC 4.58 4.14 - 5.80 10*6/mm3    Hemoglobin 13.0 13.0 - 17.7 g/dL    Hematocrit 41.7 37.5 - 51.0 %    MCV 91.0 79.0 - 97.0 fL    MCH 28.4 26.6 - 33.0 pg    MCHC 31.2 (L) 31.5 - 35.7 g/dL    RDW 13.5 12.3 - 15.4 %    RDW-SD 45.2 37.0 - 54.0 fl    MPV 11.5 6.0 - 12.0 fL    Platelets 208 140 - 450 10*3/mm3    Neutrophil % 82.1 (H) 42.7 - 76.0 %    Lymphocyte % 4.7 (L) 19.6 - 45.3 %    Monocyte % 11.4 5.0 - 12.0 %    Eosinophil % 0.9 0.3 - 6.2 %    Basophil % 0.4 0.0 - 1.5 %    Immature Grans % 0.5 0.0 - 0.5 %    Neutrophils, Absolute 9.24 (H) 1.70 - 7.00 10*3/mm3    Lymphocytes, Absolute 0.53 (L) 0.70 - 3.10 10*3/mm3    Monocytes, Absolute 1.28 (H) 0.10 - 0.90 10*3/mm3    Eosinophils, Absolute 0.10 0.00 - 0.40 10*3/mm3    Basophils, Absolute 0.05 0.00 - 0.20 10*3/mm3    Immature Grans, Absolute 0.06 (H) 0.00 - 0.05 10*3/mm3    nRBC 0.1 0.0 - 0.2 /100 WBC   CK    Collection Time: 12/10/21 12:43 PM    Specimen: Blood   Result Value Ref Range    Creatine Kinase 917 (H) 20 - 200 U/L   COVID-19,[facility] IN-HOUSE CEPHEID/SINDY NP SWAB IN TRANSPORT MEDIA 8-12 HR TAT - Swab, Nasopharynx    Collection Time: 12/10/21 12:44 PM    Specimen: Nasopharynx; Swab   Result Value Ref Range    COVID19 Not Detected Not Detected - Ref. Range   ECG 12 Lead    Collection Time: 12/10/21 12:44 PM   Result Value Ref Range    QT Interval 411 ms   Urinalysis With Culture If Indicated - Urine, Catheter    Collection Time: 12/10/21  1:03 PM    Specimen: Urine, Catheter   Result Value Ref Range     Color, UA Yellow Yellow, Straw    Appearance, UA Cloudy (A) Clear    pH, UA 6.0 5.0 - 8.0    Specific Gravity, UA 1.016 1.005 - 1.030    Glucose,  mg/dL (Trace) (A) Negative    Ketones, UA Trace (A) Negative    Bilirubin, UA Negative Negative    Blood, UA Large (3+) (A) Negative    Protein, UA >=300 mg/dL (3+) (A) Negative    Leuk Esterase, UA Moderate (2+) (A) Negative    Nitrite, UA Negative Negative    Urobilinogen, UA 0.2 E.U./dL 0.2 - 1.0 E.U./dL   Urinalysis, Microscopic Only - Urine, Catheter    Collection Time: 12/10/21  1:03 PM    Specimen: Urine, Catheter   Result Value Ref Range    RBC, UA 31-50 (A) None Seen, 0-2 /HPF    WBC, UA Too Numerous to Count (A) None Seen, 0-2 /HPF    Bacteria, UA None Seen None Seen /HPF    Squamous Epithelial Cells, UA 0-2 None Seen, 0-2 /HPF    Hyaline Casts, UA 0-2 None Seen /LPF    Methodology Automated Microscopy    Ammonia    Collection Time: 12/10/21  1:14 PM    Specimen: Blood   Result Value Ref Range    Ammonia 18 16 - 60 umol/L       Ordered the above labs and independently reviewed the results.        RADIOLOGY  CT Head Without Contrast   Final Result   Unremarkable CT scan of the head without contrast.       This report was finalized on 12/10/2021 2:03 PM by Dr. Demario Davidson M.D.          XR Chest 1 View   Final Result          I ordered the above noted radiological studies. Reviewed by me and discussed with radiologist.  See dictation for official radiology interpretation.    PROCEDURES  Procedures        MEDICATIONS GIVEN IN ER  Medications   cefTRIAXone (ROCEPHIN) 1 g in sodium chloride 0.9 % 100 mL IVPB-VTB (0 g Intravenous Stopped 12/10/21 1515)   sodium chloride 0.9 % bolus 500 mL (0 mL Intravenous Stopped 12/10/21 1534)             PROGRESS AND CONSULTS    Differential diagnosis for altered mental status includes but is not limited to:  - vital sign abnormalities such as HTN encephalopathy, hypotension, hypoxemia, hypercarbia, heat stroke  -  toxic/metabolic pathology such as hypoglycemia, DKA, hypo/hyper-natremia, thyroid storm, myxedema coma, medication side effect (either intentional or accidental)  - infectious etiology  - intracranial pathology such as stroke, seizure, intracranial mass, intracranial hemorrhage  - psychiatric pathology      ED Course as of 12/10/21 1556   Fri Dec 10, 2021   1344 WBC, UA(!): Too Numerous to Count [KA]   1344 Bacteria, UA: None Seen [KA]   1344 RBC, UA(!): 31-50 [KA]   1344 Troponin T(!!): 0.088  Stable from 2 months ago when it was 0.086 [KA]   1344 WBC(!): 11.26 [KA]   1344 Lactate: 1.5  Patient altered, temp 99.5, mild elevation in the white blood cell count with 10 emergency count white blood cells in the urine.  Will cover with Rocephin for suspected urinary tract infection. [KA]   1348 Ammonia: 18 [KA]   1348 COVID19: Not Detected [KA]   1348 Creatine Kinase(!): 917 [KA]   1348 Creatinine(!): 2.04  Chronic, stable [KA]   1352 Medical chart reviewed.  Echo performed June 2020 shows an EF of 86%.    ER visit to [facility] on 12/6/2021 for a fall, found by [employer].  Creatinine was 1.93, , white blood cell count 11, urine showed 1+ bacteria too numerous to count white blood cells and 31-50 red blood cells, CT head and pelvis showed no acute findings.  Covid and influenza test were negative.  Patient was then recommended for admission due to difficulty ambulating even with assistance.  Patient refused to be admitted, refused rehab [KA]   1402 I discussed the patient with Dr. Davidson, radiologist. CT head shows no acute intracranial findings. [KA]   1431 My interpretation of the EKG performed at 12:44 PM is sinus rhythm rate 88 normal axis, IVCD, multiple PVCs, no acute ST elevation or acute ST changes.  No significant change from prior on 10/6/2021 however does appear the QTC that was previously prolonged is no longer elongated. [KA]      ED Course User Index  [KA] Elen Parnell PA              Patient was placed in face mask in first look. Patient was wearing facemask each time I entered the room and throughout our encounter. I wore protective equipment throughout this patient encounter including a face mask, eye shield and gloves. Hand hygiene was performed before donning protective equipment and after removal when leaving the room.        DIAGNOSIS  Final diagnoses:   Acute metabolic encephalopathy   Traumatic rhabdomyolysis, initial encounter (HCC)   Chronic kidney disease, unspecified CKD stage   Pressure injury of left thigh, stage 3 ([facility])     Latest Documented Vital Signs:  As of 15:56 EST  BP- 169/87 HR- 91 Temp- 99.5 °F (37.5 °C) O2 sat- 94%       Elen Parnell PA  12/10/21 9047

## 2021-12-10 NOTE — ED NOTES
Pt wearing mask. Myself had mask, and eye wear/PPE when present with pt. Hand hygiene performed before and after pt care.     Brandi Joel, PCT  12/10/21 0018

## 2021-12-10 NOTE — ED PROVIDER NOTES
MD ATTESTATION NOTE    The PAULA and I have discussed this patient's history, physical exam, and treatment plan.  I have reviewed the documentation and personally had a face to face interaction with the patient. I affirm the documentation and agree with the treatment and plan.  The attached note describes my personal findings.      Froilan Helton is a 81 y.o. male who presents to the ED c/o having altered mental status.  Apparently has had multiple recent falls requiring EMS calls for the last 5 days.  Today he was found on the home by home health.  He had altered mental status.  He is not able to provide any history with his altered mental status.  He was evaluated on Monday in the ER but refused admission.  At that time they thought he may have had a UTI.  It is reported that he has been refusing antibiotics.      On exam:  GENERAL: Awake, alert, no acute distress.  He responds to questions with one-word and then stops talking.  He is chronically ill-appearing.  SKIN: Warm, dry  HENT: Normocephalic, atraumatic  EYES: no scleral icterus  CV: regular rhythm, regular rate  RESPIRATORY: normal effort, lungs clear  ABDOMEN: soft, nontender, nondistended  MUSCULOSKELETAL: no deformity  NEURO: alert, moves all extremities    Labs  Recent Results (from the past 24 hour(s))   Comprehensive Metabolic Panel    Collection Time: 12/10/21 12:43 PM    Specimen: Blood   Result Value Ref Range    Glucose 140 (H) 65 - 99 mg/dL    BUN 38 (H) 8 - 23 mg/dL    Creatinine 2.04 (H) 0.76 - 1.27 mg/dL    Sodium 140 136 - 145 mmol/L    Potassium 4.4 3.5 - 5.2 mmol/L    Chloride 104 98 - 107 mmol/L    CO2 25.0 22.0 - 29.0 mmol/L    Calcium 9.5 8.6 - 10.5 mg/dL    Total Protein 7.2 6.0 - 8.5 g/dL    Albumin 3.40 (L) 3.50 - 5.20 g/dL    ALT (SGPT) 32 1 - 41 U/L    AST (SGOT) 59 (H) 1 - 40 U/L    Alkaline Phosphatase 75 39 - 117 U/L    Total Bilirubin 0.7 0.0 - 1.2 mg/dL    eGFR Non African Amer 32 (L) >60 mL/min/1.73    Globulin 3.8 gm/dL    A/G  Ratio 0.9 g/dL    BUN/Creatinine Ratio 18.6 7.0 - 25.0    Anion Gap 11.0 5.0 - 15.0 mmol/L   Troponin    Collection Time: 12/10/21 12:43 PM    Specimen: Blood   Result Value Ref Range    Troponin T 0.088 (C) 0.000 - 0.030 ng/mL   Lactic Acid, Plasma    Collection Time: 12/10/21 12:43 PM    Specimen: Blood   Result Value Ref Range    Lactate 1.5 0.5 - 2.0 mmol/L   CBC Auto Differential    Collection Time: 12/10/21 12:43 PM    Specimen: Blood   Result Value Ref Range    WBC 11.26 (H) 3.40 - 10.80 10*3/mm3    RBC 4.58 4.14 - 5.80 10*6/mm3    Hemoglobin 13.0 13.0 - 17.7 g/dL    Hematocrit 41.7 37.5 - 51.0 %    MCV 91.0 79.0 - 97.0 fL    MCH 28.4 26.6 - 33.0 pg    MCHC 31.2 (L) 31.5 - 35.7 g/dL    RDW 13.5 12.3 - 15.4 %    RDW-SD 45.2 37.0 - 54.0 fl    MPV 11.5 6.0 - 12.0 fL    Platelets 208 140 - 450 10*3/mm3    Neutrophil % 82.1 (H) 42.7 - 76.0 %    Lymphocyte % 4.7 (L) 19.6 - 45.3 %    Monocyte % 11.4 5.0 - 12.0 %    Eosinophil % 0.9 0.3 - 6.2 %    Basophil % 0.4 0.0 - 1.5 %    Immature Grans % 0.5 0.0 - 0.5 %    Neutrophils, Absolute 9.24 (H) 1.70 - 7.00 10*3/mm3    Lymphocytes, Absolute 0.53 (L) 0.70 - 3.10 10*3/mm3    Monocytes, Absolute 1.28 (H) 0.10 - 0.90 10*3/mm3    Eosinophils, Absolute 0.10 0.00 - 0.40 10*3/mm3    Basophils, Absolute 0.05 0.00 - 0.20 10*3/mm3    Immature Grans, Absolute 0.06 (H) 0.00 - 0.05 10*3/mm3    nRBC 0.1 0.0 - 0.2 /100 WBC   CK    Collection Time: 12/10/21 12:43 PM    Specimen: Blood   Result Value Ref Range    Creatine Kinase 917 (H) 20 - 200 U/L   COVID-19,[facility]SE CEPHEID/SINDY NP SWAB IN TRANSPORT MEDIA 8-12 HR TAT - Swab, Nasopharynx    Collection Time: 12/10/21 12:44 PM    Specimen: Nasopharynx; Swab   Result Value Ref Range    COVID19 Not Detected Not Detected - Ref. Range   ECG 12 Lead    Collection Time: 12/10/21 12:44 PM   Result Value Ref Range    QT Interval 411 ms   Urinalysis With Culture If Indicated - Urine, Catheter    Collection Time: 12/10/21  1:03 PM     Specimen: Urine, Catheter   Result Value Ref Range    Color, UA Yellow Yellow, Straw    Appearance, UA Cloudy (A) Clear    pH, UA 6.0 5.0 - 8.0    Specific Gravity, UA 1.016 1.005 - 1.030    Glucose,  mg/dL (Trace) (A) Negative    Ketones, UA Trace (A) Negative    Bilirubin, UA Negative Negative    Blood, UA Large (3+) (A) Negative    Protein, UA >=300 mg/dL (3+) (A) Negative    Leuk Esterase, UA Moderate (2+) (A) Negative    Nitrite, UA Negative Negative    Urobilinogen, UA 0.2 E.U./dL 0.2 - 1.0 E.U./dL   Urinalysis, Microscopic Only - Urine, Catheter    Collection Time: 12/10/21  1:03 PM    Specimen: Urine, Catheter   Result Value Ref Range    RBC, UA 31-50 (A) None Seen, 0-2 /HPF    WBC, UA Too Numerous to Count (A) None Seen, 0-2 /HPF    Bacteria, UA None Seen None Seen /HPF    Squamous Epithelial Cells, UA 0-2 None Seen, 0-2 /HPF    Hyaline Casts, UA 0-2 None Seen /LPF    Methodology Automated Microscopy    Ammonia    Collection Time: 12/10/21  1:14 PM    Specimen: Blood   Result Value Ref Range    Ammonia 18 16 - 60 umol/L       Radiology  CT Head Without Contrast    Result Date: 12/10/2021  CT HEAD WO CONTRAST-  CLINICAL HISTORY: Confusion  TECHNIQUE: Transverse 3 mm thick images were acquired from the base of the skull to the vertex without IV contrast.  Radiation dose reduction techniques were utilized, including automated exposure control and exposure modulation based on body size.  COMPARISON: None  FINDINGS: The brain and ventricular system appear structurally within normal limits for a patient of this age. There is no evidence of recent or old infarct or hemorrhage. There are no masses. The paranasal sinuses and mastoid air cells and middle ear cavities are well aerated.      Unremarkable CT scan of the head without contrast.  This report was finalized on 12/10/2021 2:03 PM by Dr. Demario Davidson M.D.      XR Chest 1 View    Result Date: 12/10/2021  CHEST  CLINICAL: 81-year-old male with altered  mental status.  FINDINGS: There is pulmonary vascular congestion and CHF is suspected. At the lateral aspect of the left mid and lower lung field, there is increased density and markings which may represent atelectasis, but pneumonia cannot be excluded. A broad thoracic dextroscoliosis is noted. The linear lucency at the right mid thorax is suspected to represent a skinfold. Fully upright PA and lateral views of the chest is recommended when patient is able.        Medical Decision Making:  ED Course as of 12/10/21 1407   Fri Dec 10, 2021   1344 WBC, UA(!): Too Numerous to Count [KA]   1344 Bacteria, UA: None Seen [KA]   1344 RBC, UA(!): 31-50 [KA]   1344 Troponin T(!!): 0.088  Stable from 2 months ago when it was 0.086 [KA]   1344 WBC(!): 11.26 [KA]   1344 Lactate: 1.5  Patient altered, temp 99.5, mild elevation in the white blood cell count with 10 emergency count white blood cells in the urine.  Will cover with Rocephin for suspected urinary tract infection. [KA]   1348 Ammonia: 18 [KA]   1348 COVID19: Not Detected [KA]   1348 Creatine Kinase(!): 917 [KA]   1348 Creatinine(!): 2.04  Chronic, stable [KA]   1352 Medical chart reviewed.  Echo performed June 2020 shows an EF of 86%.    ER visit to [facility] on 12/6/2021 for a fall, found by [employer].  Creatinine was 1.93, , white blood cell count 11, urine showed 1+ bacteria too numerous to count white blood cells and 31-50 red blood cells, CT head and pelvis showed no acute findings.  Covid and influenza test were negative.  Patient was then recommended for admission due to difficulty ambulating even with assistance.  Patient refused to be admitted, refused rehab [KA]   1402 I discussed the patient with Dr. Davidson, radiologist. CT head shows no acute intracranial findings. [KA]      ED Course User Index  [KA] Elen Parnell PA       Plan laboratory evaluation including infectious work-up, urinalysis, lactic acid, blood cultures.  Plan CT of  his head given his multiple falls and altered mental status.  He will require admission to the hospital for treatment.    PPE: The patient wore a mask and I wore an N95 mask throughout the entire patient encounter.      The patient qualifies to receive the vaccine, but they have not yet received it.    Diagnosis  Final diagnoses:   Acute metabolic encephalopathy   Traumatic rhabdomyolysis, initial encounter (HCC)   Chronic kidney disease, unspecified CKD stage   Pressure injury of left thigh, stage 3 (HCC)        Dexter Nielsen MD  12/10/21 9139

## 2021-12-11 NOTE — PLAN OF CARE
"Goal Outcome Evaluation:  Plan of Care Reviewed With: patient        Progress: no change  Outcome Summary: Patient s an 80 yo male who presented with AMS and multiple falls. He was admitted with traumatic rhabdomyolysis and acute metabolic encephalopahty. Hip x-ray (-) for acute fx and Unremarkable CT scan of the head. Pt states he lives alone and uses rollator within home but when he \"feels weak\" uses electric wc. He has ramp to enter home. Pt reports multiple falls and recently fell out of bed. He presents today with decreased activity tolerance, strength, ROM, balance, and increased pain. He completed bed mobility with mod-maxAx2 and STSx2 to rwx requiring mod-maxAx2 with cues for UE placement. Pt will likely continue to benefit from skilled PT to address functional deficits. PT rec SNF at discharge.  "

## 2021-12-11 NOTE — PROGRESS NOTES
"DAILY PROGRESS NOTE  [facility]    Patient Identification:  Name: Froilan Helton  Age: 81 y.o.  Sex: male  :  1940  MRN: [id number]         Primary Care Physician: Fortunato De Leon MD    Subjective:  Interval History:He complains of pain. He is weak.    Objective:    Scheduled Meds:amiodarone, 200 mg, Oral, Q12H  apixaban, 2.5 mg, Oral, Q12H  atorvastatin, 10 mg, Oral, Nightly  budesonide-formoterol, 2 puff, Inhalation, BID - RT  carvedilol, 3.125 mg, Oral, Q12H  cefTRIAXone, 1 g, Intravenous, Q24H  docusate sodium, 100 mg, Oral, BID  fluticasone, 2 spray, Each Nare, Daily  insulin lispro, 0-9 Units, Subcutaneous, TID AC  levothyroxine, 224 mcg, Oral, Daily  sodium chloride, 10 mL, Intravenous, Q12H  tamsulosin, 0.4 mg, Oral, Daily  cyanocobalamin, 1,000 mcg, Oral, Daily  vitamin D, 50,000 Units, Oral, Daily      Continuous Infusions:O2, 2 L/min, Last Rate: 2 L/min (12/10/21 2127)  sodium chloride, 75 mL/hr, Last Rate: 75 mL/hr (21 112)        Vital signs in last 24 hours:  Temp:  [97.7 °F (36.5 °C)-100.4 °F (38 °C)] 97.7 °F (36.5 °C)  Heart Rate:  [50-91] 50  Resp:  [16-20] 18  BP: (101-169)/(47-87) 101/47    Intake/Output:    Intake/Output Summary (Last 24 hours) at 2021 1357  Last data filed at 12/10/2021 2115  Gross per 24 hour   Intake 560 ml   Output --   Net 560 ml       Exam:  /47 (BP Location: Left arm, Patient Position: Lying)   Pulse 50   Temp 97.7 °F (36.5 °C) (Oral)   Resp 18   Ht 185.4 cm (72.99\")   Wt 104 kg (228 lb 11.2 oz)   SpO2 99%   BMI 30.18 kg/m²     General Appearance:    Alert, cooperative, no distress   Head:    Normocephalic, without obvious abnormality, atraumatic   Eyes:       Throat:   Lips, tongue, gums normal   Neck:   Supple, symmetrical, trachea midline, no JVD   Lungs:     Clear to auscultation bilaterally, respirations unlabored   Chest Wall:    No tenderness or deformity    Heart:    Regular rate and rhythm, S1 and S2 normal, no " murmur,no  Rub or gallop   Abdomen:     Soft, nontender, bowel sounds active, no masses, no organomegaly    Extremities:   Extremities normal, atraumatic, no cyanosis or edema   Pulses:      Skin:   Skin is warm and dry,  no rashes or palpable lesions   Neurologic:   no focal deficits noted      Lab Results (last 72 hours)     Procedure Component Value Units Date/Time    Blood Culture - Blood, Arm, Right [[id number]]  (Normal) Collected: 12/10/21 1243    Specimen: Blood from Arm, Right Updated: 12/11/21 1315     Blood Culture No growth at 24 hours    Blood Culture - Blood, Arm, Right [[id number]]  (Normal) Collected: 12/10/21 1243    Specimen: Blood from Arm, Right Updated: 12/11/21 1315     Blood Culture No growth at 24 hours    POC Glucose Once [[id number]]  (Abnormal) Collected: 12/11/21 1101    Specimen: Blood Updated: 12/11/21 1106     Glucose 199 mg/dL      Comment: RN Notified R and V Meter: [id number] : [id number] Cecil HARKINS       POC Glucose Once [[id number]]  (Abnormal) Collected: 12/11/21 0634    Specimen: Blood Updated: 12/11/21 0636     Glucose 150 mg/dL      Comment: Meter: [id number] : [id number] Ankush HARKINS       Lactic Acid, Plasma [[id number]]  (Normal) Collected: 12/11/21 0329    Specimen: Blood Updated: 12/11/21 0522     Lactate 1.3 mmol/L     Comprehensive Metabolic Panel [[id number]]  (Abnormal) Collected: 12/11/21 0329    Specimen: Blood Updated: 12/11/21 0513     Glucose 141 mg/dL      BUN 41 mg/dL      Creatinine 1.95 mg/dL      Sodium 142 mmol/L      Potassium 3.8 mmol/L      Chloride 109 mmol/L      CO2 21.6 mmol/L      Calcium 8.8 mg/dL      Total Protein 5.9 g/dL      Albumin 2.60 g/dL      ALT (SGPT) 25 U/L      AST (SGOT) 38 U/L      Alkaline Phosphatase 59 U/L      Total Bilirubin 0.5 mg/dL      eGFR Non African Amer 33 mL/min/1.73      Globulin 3.3 gm/dL      A/G Ratio 0.8 g/dL      BUN/Creatinine Ratio 21.0     Anion Gap 11.4 mmol/L     Narrative:      GFR Normal  >60  Chronic Kidney Disease <60  Kidney Failure <15      CBC Auto Differential [[id number]]  (Abnormal) Collected: 12/11/21 0329    Specimen: Blood Updated: 12/11/21 0458     WBC 8.74 10*3/mm3      RBC 3.94 10*6/mm3      Hemoglobin 11.4 g/dL      Hematocrit 34.3 %      MCV 87.1 fL      MCH 28.9 pg      MCHC 33.2 g/dL      RDW 13.7 %      RDW-SD 43.8 fl      MPV 11.3 fL      Platelets 179 10*3/mm3      Neutrophil % 75.3 %      Lymphocyte % 8.8 %      Monocyte % 14.1 %      Eosinophil % 1.0 %      Basophil % 0.3 %      Immature Grans % 0.5 %      Neutrophils, Absolute 6.58 10*3/mm3      Lymphocytes, Absolute 0.77 10*3/mm3      Monocytes, Absolute 1.23 10*3/mm3      Eosinophils, Absolute 0.09 10*3/mm3      Basophils, Absolute 0.03 10*3/mm3      Immature Grans, Absolute 0.04 10*3/mm3      nRBC 0.0 /100 WBC     Hemoglobin A1c [[id number]]  (Normal) Collected: 12/11/21 0329    Specimen: Blood Updated: 12/11/21 0458     Hemoglobin A1C 5.44 %     Narrative:      Hemoglobin A1C Ranges:    Increased Risk for Diabetes  5.7% to 6.4%  Diabetes                     >= 6.5%  Diabetic Goal                < 7.0%    POC Glucose Once [[id number]]  (Abnormal) Collected: 12/10/21 2155    Specimen: Blood Updated: 12/10/21 2156     Glucose 167 mg/dL      Comment: Meter: [id number] : [id number] Ankush HARKINS       Ammonia [[id number]]  (Normal) Collected: 12/10/21 1314    Specimen: Blood Updated: 12/10/21 1347     Ammonia 18 umol/L     Comprehensive Metabolic Panel [[id number]]  (Abnormal) Collected: 12/10/21 1243    Specimen: Blood Updated: 12/10/21 1345     Glucose 140 mg/dL      BUN 38 mg/dL      Creatinine 2.04 mg/dL      Sodium 140 mmol/L      Potassium 4.4 mmol/L      Comment: Slight hemolysis detected by analyzer. Results may be affected.        Chloride 104 mmol/L      CO2 25.0 mmol/L      Calcium 9.5 mg/dL      Total Protein 7.2 g/dL      Albumin 3.40 g/dL      ALT (SGPT) 32 U/L      AST (SGOT) 59 U/L      Alkaline Phosphatase  75 U/L      Total Bilirubin 0.7 mg/dL      eGFR Non African Amer 32 mL/min/1.73      Globulin 3.8 gm/dL      A/G Ratio 0.9 g/dL      BUN/Creatinine Ratio 18.6     Anion Gap 11.0 mmol/L     Narrative:      GFR Normal >60  Chronic Kidney Disease <60  Kidney Failure <15      CK [[id number]]  (Abnormal) Collected: 12/10/21 1243    Specimen: Blood Updated: 12/10/21 1345     Creatine Kinase 917 U/L     COVID PRE-OP / PRE-PROCEDURE SCREENING ORDER (NO ISOLATION) - Swab, Nasopharynx [[id number]]  (Normal) Collected: 12/10/21 1244    Specimen: Swab from Nasopharynx Updated: 12/10/21 1343    Narrative:      The following orders were created for panel order COVID PRE-OP / PRE-PROCEDURE SCREENING ORDER (NO ISOLATION) - Swab, Nasopharynx.  Procedure                               Abnormality         Status                     ---------                               -----------         ------                     COVID-19,[facility]...[[id number]]  Normal              Final result                 Please view results for these tests on the individual orders.    COVID-19,[facility] CEPHEID/SINDY NP SWAB IN TRANSPORT MEDIA 8-12 HR TAT - Swab, Nasopharynx [[id number]]  (Normal) Collected: 12/10/21 1244    Specimen: Swab from Nasopharynx Updated: 12/10/21 1343     COVID19 Not Detected    Narrative:      Fact sheet for providers: https://www.fda.gov/media/384190/download    Fact sheet for patients: https://www.fda.gov/media/253778/download    Test performed by PCR.    Troponin [[id number]]  (Abnormal) Collected: 12/10/21 1243    Specimen: Blood Updated: 12/10/21 1343     Troponin T 0.088 ng/mL     Narrative:      Troponin T Reference Range:  <= 0.03 ng/mL-   Negative for AMI  >0.03 ng/mL-     Abnormal for myocardial necrosis.  Clinicians would have to utilize clinical acumen, EKG, Troponin and serial changes to determine if it is an Acute Myocardial Infarction or myocardial injury due to an underlying chronic condition.       Results  may be falsely decreased if patient taking Biotin.      Lactic Acid, Plasma [[id number]]  (Normal) Collected: 12/10/21 1243    Specimen: Blood Updated: 12/10/21 1342     Lactate 1.5 mmol/L     Urinalysis, Microscopic Only - Urine, Catheter [[id number]]  (Abnormal) Collected: 12/10/21 1303    Specimen: Urine, Catheter Updated: 12/10/21 1320     RBC, UA 31-50 /HPF      WBC, UA Too Numerous to Count /HPF      Bacteria, UA None Seen /HPF      Squamous Epithelial Cells, UA 0-2 /HPF      Hyaline Casts, UA 0-2 /LPF      Methodology Automated Microscopy    Urinalysis With Culture If Indicated - Urine, Catheter [[id number]]  (Abnormal) Collected: 12/10/21 1303    Specimen: Urine, Catheter Updated: 12/10/21 1320     Color, UA Yellow     Appearance, UA Cloudy     pH, UA 6.0     Specific Gravity, UA 1.016     Glucose,  mg/dL (Trace)     Ketones, UA Trace     Bilirubin, UA Negative     Blood, UA Large (3+)     Protein, UA >=300 mg/dL (3+)     Leuk Esterase, UA Moderate (2+)     Nitrite, UA Negative     Urobilinogen, UA 0.2 E.U./dL    Urine Culture - Urine, Urine, Catheter [[id number]] Collected: 12/10/21 1303    Specimen: Urine, Catheter Updated: 12/10/21 1320    CBC & Differential [[id number]]  (Abnormal) Collected: 12/10/21 1243    Specimen: Blood Updated: 12/10/21 1320    Narrative:      The following orders were created for panel order CBC & Differential.  Procedure                               Abnormality         Status                     ---------                               -----------         ------                     CBC Auto Differential[[id number]]        Abnormal            Final result                 Please view results for these tests on the individual orders.    CBC Auto Differential [[id number]]  (Abnormal) Collected: 12/10/21 1243    Specimen: Blood Updated: 12/10/21 1320     WBC 11.26 10*3/mm3      RBC 4.58 10*6/mm3      Hemoglobin 13.0 g/dL      Hematocrit 41.7 %      MCV 91.0 fL      MCH 28.4 pg       MCHC 31.2 g/dL      RDW 13.5 %      RDW-SD 45.2 fl      MPV 11.5 fL      Platelets 208 10*3/mm3      Neutrophil % 82.1 %      Lymphocyte % 4.7 %      Monocyte % 11.4 %      Eosinophil % 0.9 %      Basophil % 0.4 %      Immature Grans % 0.5 %      Neutrophils, Absolute 9.24 10*3/mm3      Lymphocytes, Absolute 0.53 10*3/mm3      Monocytes, Absolute 1.28 10*3/mm3      Eosinophils, Absolute 0.10 10*3/mm3      Basophils, Absolute 0.05 10*3/mm3      Immature Grans, Absolute 0.06 10*3/mm3      nRBC 0.1 /100 WBC         Data Review:  Results from last 7 days   Lab Units 12/11/21  0329 12/10/21  1243 12/10/21  1243 12/06/21  1449 12/06/21  1449   SODIUM mmol/L 142  --  140  --  137   POTASSIUM mmol/L 3.8  --  4.4  --  4.6   CHLORIDE mmol/L 109*  --  104  --  103   CO2 mmol/L 21.6*  --  25.0  --  22.4   BUN mg/dL 41*  --  38*  --  43*   CREATININE mg/dL 1.95*  --  2.04*  --  1.95*   GLUCOSE mg/dL 141*   < > 140*   < > 115*   CALCIUM mg/dL 8.8  --  9.5  --  10.0    < > = values in this interval not displayed.     Results from last 7 days   Lab Units 12/11/21  0329 12/10/21  1243 12/06/21  1449   WBC 10*3/mm3 8.74 11.26* 11.03*   HEMOGLOBIN g/dL 11.4* 13.0 11.3*   HEMATOCRIT % 34.3* 41.7 35.0*   PLATELETS 10*3/mm3 179 208 161     Results from last 7 days   Lab Units 12/06/21  1449   TSH uIU/mL 0.592     Results from last 7 days   Lab Units 12/11/21  0329   HEMOGLOBIN A1C % 5.44     Lab Results   Lab Value Date/Time    TROPONINT 0.088 (C) 12/10/2021 1243    TROPONINT 0.086 (C) 10/06/2021 0600    TROPONINT 0.061 (C) 07/19/2021 0442    TROPONINT 0.068 (C) 07/18/2021 2229    TROPONINT 0.065 (C) 07/07/2021 2025    TROPONINT 0.039 (C) 06/30/2020 0439    TROPONINT 0.038 (C) 06/29/2020 1559    TROPONINT 0.035 (C) 06/29/2020 0948    TROPONINT 0.030 06/29/2020 0507    TROPONINT 0.035 (C) 12/09/2019 0312    TROPONINT 0.035 (C) 10/15/2019 1542    TROPONINT 0.037 (C) 10/03/2019 1959    TROPONINT 1.010 (C) 08/17/2019 0540    TROPONINT 1.100  (C) 08/15/2019 2059    TROPONINT 0.811 (C) 08/15/2019 1516    TROPONINT 0.396 (C) 08/15/2019 1000    TROPONINT 0.183 (C) 08/15/2019 0420    TROPONINT 0.033 (C) 08/14/2019 2212    TROPONINT 0.031 (C) 06/28/2019 1324    TROPONINT 0.035 (C) 06/28/2019 0719    TROPONINT 0.037 (C) 06/28/2019 0349    TROPONINT 0.039 (C) 06/27/2019 1842    TROPONINT 0.024 05/06/2019 1013    TROPONINT 0.031 (C) 05/06/2019 0331    TROPONINT 0.033 (C) 05/05/2019 2131    TROPONINT 0.034 (H) 02/16/2019 1958    TROPONINT 0.037 (H) 02/16/2019 1726    TROPONINT 0.015 09/22/2018 2107    TROPONINT 0.013 08/03/2018 1719    TROPONINT 0.027 03/08/2018 2307    TROPONINT 0.068 (H) 11/23/2017 1327    TROPONINT 0.099 (H) 11/22/2017 2355    TROPONINT 0.115 (C) 11/22/2017 1805    TROPONINT 0.141 (C) 11/22/2017 1256    TROPONINT 0.014 11/21/2017 1557         Results from last 7 days   Lab Units 12/11/21  0329 12/10/21  1243 12/06/21  1449   ALK PHOS U/L 59 75 103   BILIRUBIN mg/dL 0.5 0.7 1.0   ALT (SGPT) U/L 25 32 17   AST (SGOT) U/L 38 59* 31     Results from last 7 days   Lab Units 12/06/21  1449   TSH uIU/mL 0.592     Results from last 7 days   Lab Units 12/11/21  0329   HEMOGLOBIN A1C % 5.44     Glucose   Date/Time Value Ref Range Status   12/11/2021 1101 199 (H) 70 - 130 mg/dL Final     Comment:     RN Notified R and V Meter: [id number] : [id number] Workman Ramesh HARKINS   12/11/2021 0634 150 (H) 70 - 130 mg/dL Final     Comment:     Meter: [id number] : [id number] Ankush HARKINS   12/10/2021 2155 167 (H) 70 - 130 mg/dL Final     Comment:     Meter: [id number] : [id number] Ankush HARKINS           Past Medical History:   Diagnosis Date   • A-fib (HCC)    • Arthritis    • Asthma    • CAD (coronary artery disease)    • Cardiomyopathy (HCC)    • Cataract    • CHF (congestive heart failure) (HCC)    • CKD (chronic kidney disease), stage III (HCC)    • COPD (chronic obstructive pulmonary disease) (HCC)    • Diabetes mellitus (HCC)    • Disease of  thyroid gland    • Emphysema, unspecified ([facility])    • Glaucoma    • Hyperlipidemia    • Hypertension    • Kidney stone    • Myocardial infarct, old    • Neuropathy    • Osteoarthritis    • Osteoporosis    • Permanent atrial fibrillation ([facility]) 6/30/2020   • PVD (peripheral vascular disease) ([facility])    • Renal disorder    • Shingles        Assessment:  Active Hospital Problems    Diagnosis  POA   • **Acute metabolic encephalopathy [G93.41]  Yes   • Cardiomyopathy ([facility]) [I42.9]  Unknown   • Recurrent falls [R29.6]  Not Applicable   • Hypothyroidism [E03.9]  Yes   • CAD (coronary artery disease) [I25.10]  Yes   • Recurrent left pleural effusion [J90]  Yes   • Permanent atrial fibrillation ([facility]) [I48.21]  Yes   • Acute UTI (urinary tract infection) [N39.0]  Yes   • Chronic renal insufficiency, stage 4 (severe) ([facility]) [N18.4]  Yes   • Primary osteoarthritis of left knee [M17.12]  Yes   • Essential hypertension [I10]  Yes   • Type 2 diabetes mellitus with stage 4 chronic kidney disease, with long-term current use of insulin ([facility]) [E11.22, N18.4, Z79.4]  Not Applicable   • COPD (chronic obstructive pulmonary disease) ([facility]) [J44.9]  Yes      Resolved Hospital Problems   No resolved problems to display.       Plan:  Continue with antibiotics for UTI and await cultures. Follow lab. Ask cardiology to see.    Bautista Gonzalez MD  12/11/2021  13:57 EST

## 2021-12-11 NOTE — NURSING NOTE
Low air loss mattress ordered from [employer] s/w José Miguel, bed frame requested s/w Otis Suarez, RN notified

## 2021-12-11 NOTE — THERAPY EVALUATION
Patient Name: Froilan Helton  : 1940    MRN: [id number]                              Today's Date: 2021       Admit Date: 12/10/2021    Visit Dx:     ICD-10-CM ICD-9-CM   1. Acute metabolic encephalopathy  G93.41 348.31   2. Traumatic rhabdomyolysis, initial encounter ([facility])  T79.6XXA 958.6   3. Chronic kidney disease, unspecified CKD stage  N18.9 585.9   4. Pressure injury of left thigh, stage 3 ([facility])  L89.223 707.09     707.23     Patient Active Problem List   Diagnosis   • COPD (chronic obstructive pulmonary disease) ([facility])   • Type 2 diabetes mellitus with stage 4 chronic kidney disease, with long-term current use of insulin ([facility])   • Essential hypertension   • Primary osteoarthritis of left knee   • Chronic renal insufficiency, stage 4 (severe) ([facility])   • Class 2 severe obesity due to excess calories with serious comorbidity in adult ([facility])   • Physical debility   • Acute UTI (urinary tract infection)   • Permanent atrial fibrillation ([facility])   • H/O noncompliance with medical treatment, presenting hazards to health   • Myxedema   • Acute on chronic combined systolic and diastolic CHF (congestive heart failure) ([facility])   • Generalized weakness   • Recurrent left pleural effusion   • Fall on same level as cause of accidental injury   • Gross hematuria   • CAD (coronary artery disease)   • HLD (hyperlipidemia)   • Hypothyroidism   • CKD (chronic kidney disease) stage 3, GFR 30-59 ml/min ([facility])   • Acute metabolic encephalopathy   • Cardiomyopathy ([facility])   • Recurrent falls     Past Medical History:   Diagnosis Date   • A-fib ([facility])    • Arthritis    • Asthma    • CAD (coronary artery disease)    • Cardiomyopathy ([facility])    • Cataract    • CHF (congestive heart failure) ([facility])    • CKD (chronic kidney disease), stage III ([facility])    • COPD (chronic obstructive pulmonary disease) ([facility])    • Diabetes mellitus ([facility])    • Disease of thyroid gland    • Emphysema, unspecified ([facility])    • Glaucoma    • Hyperlipidemia    •  Hypertension    • Kidney stone    • Myocardial infarct, old    • Neuropathy    • Osteoarthritis    • Osteoporosis    • Permanent atrial fibrillation ([facility]) 6/30/2020   • PVD (peripheral vascular disease) ([facility])    • Renal disorder    • Shingles      Past Surgical History:   Procedure Laterality Date   • COLONOSCOPY     • EYE SURGERY     • JOINT REPLACEMENT      right   • KIDNEY STONE SURGERY     • REPLACEMENT TOTAL KNEE     • TOE SURGERY        General Information     Row Name 12/11/21 1297          Physical Therapy Time and Intention    Document Type evaluation  -CB     Mode of Treatment individual therapy; physical therapy  -CB     Row Name 12/11/21 5164          General Information    Patient Profile Reviewed yes  -CB     Prior Level of Function independent:; gait; transfer; bed mobility  rollator and wc  -CB     Existing Precautions/Restrictions fall  -CB     Barriers to Rehab none identified  -CB     Row Name 12/11/21 1339          Living Environment    Lives With alone  -CB     Row Name 12/11/21 1339          Stairs Within Home, Primary    Number of Stairs, Within Home, Primary --  ramp  -CB     Row Name 12/11/21 1336          Cognition    Orientation Status (Cognition) oriented x 4  -CB     Row Name 12/11/21 8651          Safety Issues, Functional Mobility    Safety Issues Affecting Function (Mobility) insight into deficits/self-awareness; judgment; awareness of need for assistance; positioning of assistive device; safety precaution awareness; safety precautions follow-through/compliance  -CB     Impairments Affecting Function (Mobility) balance; endurance/activity tolerance; strength; pain; range of motion (ROM); postural/trunk control  -CB     Comment, Safety Issues/Impairments (Mobility) gait belt and non skid socks  -CB           User Key  (r) = Recorded By, (t) = Taken By, (c) = Cosigned By    Initials Name Provider Type    Jamia Palma PT Physical Therapist               Mobility     Row Name  12/11/21 1340          Bed Mobility    Bed Mobility supine-sit; sit-supine  -CB     Supine-Sit [place] (Bed Mobility) moderate assist (50% patient effort); 2 person assist; verbal cues  -CB     Sit-Supine [place] (Bed Mobility) maximum assist (25% patient effort); 2 person assist; verbal cues  -CB     Row Name 12/11/21 1340          Transfers    Comment (Transfers) sit<>Stand x2; cues for upright posture  -CB     Row Name 12/11/21 1340          Sit-Stand Transfer    Sit-Stand [place] (Transfers) moderate assist (50% patient effort); maximum assist (25% patient effort); 2 person assist; verbal cues  -CB     Assistive Device (Sit-Stand Transfers) walker, front-wheeled  -CB     Row Name 12/11/21 1340          Gait/Stairs (Locomotion)    [place] Level (Gait) unable to assess  -CB           User Key  (r) = Recorded By, (t) = Taken By, (c) = Cosigned By    Initials Name Provider Type    CB Jamia Hernandez, PT Physical Therapist               Obj/Interventions     Row Name 12/11/21 1343          Range of Motion Comprehensive    Comment, General Range of Motion limted L hip ROM due to pain  -CB     Row Name 12/11/21 1343          Strength Comprehensive (MMT)    Comment, General Manual Muscle Testing (MMT) Assessment generalized weakness  -CB     Row Name 12/11/21 1343          Balance    Balance Assessment sitting static balance; sitting dynamic balance; standing static balance; standing dynamic balance  -CB     Static Sitting Balance mild impairment; unsupported; sitting, edge of bed  -CB     Dynamic Sitting Balance mild impairment; unsupported; sitting, edge of bed  -CB     Static Standing Balance moderate impairment; standing  -CB     Row Name 12/11/21 1343          Sensory Assessment (Somatosensory)    Sensory Assessment (Somatosensory) LE sensation intact  -CB           User Key  (r) = Recorded By, (t) = Taken By, (c) = Cosigned By    Initials Name Provider Type    Jamia Palma, LORENZO Physical  Therapist               Goals/Plan     Row Name 12/11/21 1342          Bed Mobility Goal 1 (PT)    Activity/Assistive Device (Bed Mobility Goal 1, PT) bed mobility activities, all  -CB     Door Level/Cues Needed (Bed Mobility Goal 1, PT) contact guard assist  -CB     Time Frame (Bed Mobility Goal 1, PT) long term goal (LTG); 1 week  -CB     Row Name 12/11/21 1341          Transfer Goal 1 (PT)    Activity/Assistive Device (Transfer Goal 1, PT) sit-to-stand/stand-to-sit; bed-to-chair/chair-to-bed  -CB     Door Level/Cues Needed (Transfer Goal 1, PT) minimum assist (75% or more patient effort)  -CB     Time Frame (Transfer Goal 1, PT) long term goal (LTG); 1 week  -CB     Row Name 12/11/21 134          Gait Training Goal 1 (PT)    Activity/Assistive Device (Gait Training Goal 1, PT) gait (walking locomotion); walker, rolling  -CB     Door Level (Gait Training Goal 1, PT) minimum assist (75% or more patient effort)  -CB     Distance (Gait Training Goal 1, PT) 10ft  -CB     Time Frame (Gait Training Goal 1, PT) long term goal (LTG); 1 week  -CB           User Key  (r) = Recorded By, (t) = Taken By, (c) = Cosigned By    Initials Name Provider Type    CB Jamia Hernandez, PT Physical Therapist               Clinical Impression     Row Name 12/11/21 7063          Pain    Additional Documentation Pain Scale: Numbers Pre/Post-Treatment (Group)  -CB     Row Name 12/11/21 8382          Pain Scale: Numbers Pre/Post-Treatment    Pretreatment Pain Rating 7/10  -CB     Posttreatment Pain Rating 9/10  -CB     Pain Location - Side Left  -CB     Pain Location hip; shoulder; head  -CB     Pain Intervention(s) Repositioned; Rest; Ambulation/increased activity  -CB     Row Name 12/11/21 5825          Plan of Care Review    Plan of Care Reviewed With patient  -CB     Progress no change  -CB     Outcome Summary Patient s an 80 yo male who presented with AMS and multiple falls. He was admitted with traumatic  "rhabdomyolysis and acute metabolic encephalopahty. Hip x-ray (-) for acute fx and Unremarkable CT scan of the head. Pt states he lives alone and uses rollator within home but when he \"feels weak\" uses electric wc. He has ramp to enter home. Pt reports multiple falls and recently fell out of bed. He presents today with decreased activity tolerance, strength, ROM, balance, and increased pain. He completed bed mobility with mod-maxAx2 and STSx2 to rwx requiring mod-maxAx2 with cues for UE placement. Pt will likely continue to benefit from skilled PT to address functional deficits. PT rec SNF at discharge.  -CB     Row Name 12/11/21 3758          Therapy Assessment/Plan (PT)    Rehab Potential (PT) good, to achieve stated therapy goals  -CB     Criteria for Skilled Interventions Met (PT) yes  -CB     Row Name 12/11/21 9930          Positioning and Restraints    Pre-Treatment Position in bed  -CB     Post Treatment Position bed  -CB     In Bed notified nsg; fowlers; call light within reach; encouraged to call for assist; exit alarm on; side rails up x3  -CB           User Key  (r) = Recorded By, (t) = Taken By, (c) = Cosigned By    Initials Name Provider Type    Jamia Palma, PT Physical Therapist               Outcome Measures     Row Name 12/11/21 1861          How much help from another person do you currently need...    Turning from your back to your side while in flat bed without using bedrails? 2  -CB     Moving from lying on back to sitting on the side of a flat bed without bedrails? 1  -CB     Moving to and from a bed to a chair (including a wheelchair)? 1  -CB     Standing up from a chair using your arms (e.g., wheelchair, bedside chair)? 1  -CB     Climbing 3-5 steps with a railing? 1  -CB     To walk in hospital room? 1  -CB     AM-PAC 6 Clicks Score (PT) 7  -CB     Row Name 12/11/21 9582          Functional Assessment    Outcome Measure Options AM-PAC 6 Clicks Basic Mobility (PT)  -CB           User Key  " "(r) = Recorded By, (t) = Taken By, (c) = Cosigned By    Initials Name Provider Type    CB Jamia Hernandez PT Physical Therapist                             Physical Therapy Education                 Title: PT OT SLP Therapies (In Progress)     Topic: Physical Therapy (In Progress)     Point: Mobility training (Done)     Learning Progress Summary           Patient Acceptance, E,TB,D, VU,NR by  at 12/11/2021 1348                   Point: Home exercise program (Not Started)     Learner Progress:  Not documented in this visit.          Point: Body mechanics (Done)     Learning Progress Summary           Patient Acceptance, E,TB,D, VU,NR by CB at 12/11/2021 1348                   Point: Precautions (Done)     Learning Progress Summary           Patient Acceptance, E,TB,D, VU,NR by  at 12/11/2021 1348                               User Key     Initials Effective Dates Name Provider Type Discipline     10/22/21 -  Jamia Hernandez PT Physical Therapist PT              PT Recommendation and Plan  Planned Therapy Interventions (PT): balance training, bed mobility training, gait training, home exercise program, patient/family education, strengthening, transfer training, postural re-education, ROM (range of motion)  Plan of Care Reviewed With: patient  Progress: no change  Outcome Summary: Patient s an 82 yo male who presented with AMS and multiple falls. He was admitted with traumatic rhabdomyolysis and acute metabolic encephalopahty. Hip x-ray (-) for acute fx and Unremarkable CT scan of the head. Pt states he lives alone and uses rollator within home but when he \"feels weak\" uses electric wc. He has ramp to enter home. Pt reports multiple falls and recently fell out of bed. He presents today with decreased activity tolerance, strength, ROM, balance, and increased pain. He completed bed mobility with mod-maxAx2 and STSx2 to rwx requiring mod-maxAx2 with cues for UE placement. Pt will likely continue to benefit from " skilled PT to address functional deficits. PT rec SNF at discharge.     Time Calculation:    PT Charges     Row Name 12/11/21 1350             Time Calculation    Start Time 1332  -CB      Stop Time 1350  -CB      Time Calculation (min) 18 min  -CB      PT Received On 12/11/21  -CB      PT - Next Appointment 12/12/21  -CB      PT Goal Re-Cert Due Date 12/18/21  -CB              Time Calculation- PT    Total Timed Code Minutes- PT 10 minute(s)  -CB              Timed Charges    24464 - PT Therapeutic Activity Minutes 10  -CB              Total Minutes    Timed Charges Total Minutes 10  -CB       Total Minutes 10  -CB            User Key  (r) = Recorded By, (t) = Taken By, (c) = Cosigned By    Initials Name Provider Type    CB Jamia Hernandez, PT Physical Therapist              Therapy Charges for Today     Code Description Service Date Service Provider Modifiers Qty    72517516355 HC PT THERAPEUTIC ACT EA 15 MIN 12/11/2021 Jamia Hernandez, PT GP 1    61245689435 HC PT EVAL MOD COMPLEXITY 2 12/11/2021 Jamia Hernandez, PT GP 1    64435412683 HC PT THER SUPP EA 15 MIN 12/11/2021 Jamia Hernandez, PT GP 1          PT G-Codes  Outcome Measure Options: AM-PAC 6 Clicks Basic Mobility (PT)  AM-PAC 6 Clicks Score (PT): 7    Jamia Hernandez PT  12/11/2021

## 2021-12-11 NOTE — PLAN OF CARE
Goal Outcome Evaluation:  Plan of Care Reviewed With: patient        Progress: no change  Outcome Summary: A/o to self only, unable to communicate needs. Nonverbal indicators of pain when being turned and moved. Wound care provided to L lateral pressure injury, cleaned w/ NS and meplilex put over. WOCN consulted. Iv abx and NS @75. Put on 2L NC to help maintain sats >90%. Q2 turns, accumax pump in place. Incontinence care prn. Scrotal redness and discharge from urethra. Will continue to monitor closely.

## 2021-12-11 NOTE — PLAN OF CARE
Goal Outcome Evaluation:  Plan of Care Reviewed With: patient        Progress: no change  Outcome Summary: Patient admmitted today with Dx acute metabolic encephalopathy confused fall live alone POA granddaughter, able to respond to his name but confused en time place and situation, according to granddaughter patient refused to eat for a week , note a large wound unstageable on left lateral thigh consult wound care,patient received bolus in ER and Rocephin IV once notify MD for order

## 2021-12-12 NOTE — DISCHARGE PLACEMENT REQUEST
"Joshua Ferguson P (81 y.o. Male)             Date of Birth Social Security Number Address Home Phone MRN    1940  [id number] [facility] [address] [facility] [address] [phone] [id number]    L[employer] Marital Status             None        Admission Date Admission Type Admitting Provider Attending Provider Department, Room/Bed    12/10/21 Emergency Adrián Sparks MD Beard, Lyle E, MD [address], [address]    Discharge Date Discharge Disposition Discharge Destination                         Attending Provider: Bautista Gonzalez MD    Allergies: Morphine, Atorvastatin, Oxycontin [Oxycodone Hcl]    Isolation: None   Infection: None   Code Status: CPR   Advance Care Planning Activity    Ht: 185.4 cm (72.99\")   Wt: 104 kg (228 lb 11.2 oz)    Admission Cmt: None   Principal Problem: Acute metabolic encephalopathy [G93.41]                 Active Insurance as of 12/10/2021     Primary Coverage     Payor Plan Insurance Group Employer/Plan Group    MEDICARE MEDICARE A & B      Payor Plan Address Payor Plan Phone Number Payor Plan Fax Number Effective Dates    [address] [phone]  11/1/2006 - None Entered    [facility] [id number]       Subscriber Name Subscriber Birth Date Member ID       JOSHUA FERGUSON P 1940 [id number]           Secondary Coverage     Payor Plan Insurance Group Employer/Plan Group    HUMANA HUMANA [id number]     Payor Plan Address Payor Plan Phone Number Payor Plan Fax Number Effective Dates    [address] [address] [phone]  1/1/2013 - None Entered    [facility] [id number]       Subscriber Name Subscriber Birth Date Member ID       JOSHUA FERGUSON P 1940 [id number]                 Emergency Contacts      (Rel.) Home Phone Work Phone Mobile Phone    Jaz Grigsby (Power of ) [phone] -- [phone]    ISAIAS FERGUSON (Son) [phone] -- --              "

## 2021-12-12 NOTE — CASE MANAGEMENT/SOCIAL WORK
Discharge Planning Assessment  [facility]     Patient Name: Froilan Helton  MRN: [id number]  Today's Date: 12/12/2021    Admit Date: 12/10/2021     Discharge Needs Assessment     Row Name 12/12/21 9358       Living Environment    Lives With alone    Current Living Arrangements home/apartment/condo    Primary Care Provided by self    Provides Primary Care For no one; no one, unable/limited ability to care for self    Family Caregiver if Needed grandchild(nba), adult       Transition Planning    Patient/Family Anticipates Transition to inpatient rehabilitation facility    Patient/Family Anticipated Services at Transition        Discharge Needs Assessment    Equipment Currently Used at Home walker, rolling; wheelchair, motorized; power chair,(recliner lift); ramp    Equipment Needed After Discharge none    Discharge Facility/Level of Care Needs nursing facility, skilled; rehabilitation facility               Discharge Plan     Row Name 12/12/21 8686       Plan    Plan Rehab    Patient/Family in Agreement with Plan yes    Plan Comments Met with pt at bedside. Introduced self, explained CCP role, facesheet verified. Pt states he lives alone and is independent with ADLs but has been having falls recently.  Has walker, electric scooter, lift recliner, and ramp at home.  Pt is current with [facility].  Would like [facility] for rehab and pt agreeable.  Also spoke with pt's POA/Jaz who agrees with that plan.  Referral placed in [facility].  ILEANA Fuentes RN              Continued Care and Services - Admitted Since 12/10/2021     Destination     Service Provider Request Status Selected Services Address Phone Fax Patient Preferred    [facility]  Pending - Request Sent N/A [address] [address] [phone] --                 Demographic Summary     Row Name 12/12/21 1641       General Information    Admission Type inpatient    Arrived From home    Referral Source admission list     Reason for Consult discharge planning    Preferred Language English       Contact Information    Permission Granted to Share Info With family/designee               Functional Status    No documentation.                Psychosocial    No documentation.                Abuse/Neglect    No documentation.                Legal    No documentation.                Substance Abuse    No documentation.                Patient Forms    No documentation.                   Basilia Fuentes RN

## 2021-12-12 NOTE — CONSULTS
Cardiology History & Physical / Consultation      Patient Name: Froilan Helton  Age/Sex: 81 y.o. male  : 1940  MRN: [id number]    Date of Admission: 12/10/2021  Date of Encounter Visit: 21  Encounter Provider: Ancelmo Goff MD  Referring Provider: Adrián Sparks MD  Place of Service: [facility] CARDIOLOGY  Patient Care Team:  Fortunato De Leon MD as PCP - General (Family Medicine)          Subjective:     Chief Complaint: fall, altered mental status    Reason for consultation: elevated troponin    History of Present Illness:  Froilan Helton is a 81 y.o. male with a history of COPD, DMII, hypertension, CKD stage 4, obesity, permanent atrial fibrillation, hyperlipidemia, CAD, and combined systolic and diastolic CHF. His most recent ECHO was on 20 and showed an EF of 36%, severe aortic valve calcification, and multiple hypokinetic LV wall segments. He has been seen previously by LEATHA Dow in the office and Dr. Lopez while hospitalized in 2019.      He presented to the ED on 12/10 for evaluation of altered mental status. Patient was at home independently. EMS was called when home health found patient on the floor altered. Per EMS, they are very familiar with this patient, and have been to his house for the past five days prior to presentation to the hospital for falls. He was also seen Monday at [facility] secondary to a fall and was discharged with a diagnosis of UTI after refusing admission.   Workup in the ER found patient to have a troponin of 0.088, CK of 917, and WBC of 11.26. UA and culture were completed, finding gram negative bacilli. CXR chest was performed showing pulmonary vascular congestion and likely CHF. Per EKG, patient was in SR with multiple PVCs.   Patient was admitted to the hospitalist group with a diagnosis of acute metabolic encephalopathy. We were asked to consult on patient for his elevated troponin.    Previous cardiac  testing:    ECHO 6/29/20  · Estimated EF = 36%.  · Left ventricular systolic function is moderately decreased.  · There is severe calcification of the aortic valve mainly affecting the left and right coronary cusp(s).  · Trace-to-mild aortic valve regurgitation is present.  · The following left ventricular wall segments are hypokinetic: mid anterior, apical anterior, basal anterolateral, mid anterolateral, apical lateral, basal inferolateral, mid inferolateral, apical inferior, mid inferior, apical septal, basal inferoseptal, mid inferoseptal, apex hypokinetic, mid anteroseptal, basal anterior, basal inferior and basal inferoseptal.    Stress Test 9/17/18  · Myocardial perfusion imaging indicates a normal myocardial perfusion study with no evidence of ischemia.  · Impressions are consistent with a low risk study.  · Left ventricular ejection fraction is mildly reduced (Calculated EF = 42%).    Past Medical History:  Past Medical History:   Diagnosis Date   • A-fib ([facility])    • Arthritis    • Asthma    • CAD (coronary artery disease)    • Cardiomyopathy ([facility])    • Cataract    • CHF (congestive heart failure) ([facility])    • CKD (chronic kidney disease), stage III ([facility])    • COPD (chronic obstructive pulmonary disease) ([facility])    • Diabetes mellitus ([facility])    • Disease of thyroid gland    • Emphysema, unspecified ([facility])    • Glaucoma    • Hyperlipidemia    • Hypertension    • Kidney stone    • Myocardial infarct, old    • Neuropathy    • Osteoarthritis    • Osteoporosis    • Permanent atrial fibrillation ([facility]) 6/30/2020   • PVD (peripheral vascular disease) ([facility])    • Renal disorder    • Shingles        Past Surgical History:   Procedure Laterality Date   • COLONOSCOPY     • EYE SURGERY     • JOINT REPLACEMENT      right   • KIDNEY STONE SURGERY     • REPLACEMENT TOTAL KNEE     • TOE SURGERY         Home Medications:   Medications Prior to Admission   Medication Sig Dispense Refill Last Dose   • acetaminophen (TYLENOL) 325 MG  "tablet Take 2 tablets by mouth Every 4 (Four) Hours As Needed for Mild Pain .      • albuterol sulfate  (90 Base) MCG/ACT inhaler Inhale 2 puffs Every 4 (Four) Hours As Needed for Wheezing.      • amiodarone (PACERONE) 200 MG tablet Every 12 (Twelve) Hours.      • apixaban (ELIQUIS) 5 MG tablet tablet Take 1 tablet by mouth Every 12 (Twelve) Hours. Indications: Atrial Fibrillation 60 tablet     • atorvastatin (LIPITOR) 10 MG tablet Every Night.      • Benzalkonium Chloride (REMEDY ANTIMICROBIAL CLEANSER EX) Apply  topically Every 1 (One) Hour As Needed.      • bisacodyl (DULCOLAX) 10 MG suppository Insert 10 mg into the rectum Daily As Needed for Constipation.      • budesonide-formoterol (SYMBICORT) 160-4.5 MCG/ACT inhaler Inhale 2 puffs 2 (Two) Times a Day. 1 inhaler 0    • bumetanide (BUMEX) 2 MG tablet Take 1 tablet by mouth 2 (Two) Times a Day. (Patient taking differently: Take 1 mg by mouth 2 (Two) Times a Day.) 120 tablet 0    • carvedilol (COREG) 6.25 MG tablet Every 12 (Twelve) Hours.      • cholecalciferol 2000 units tablet Take 2,000 Units by mouth Daily. 30 each 0    • citalopram (CeleXA) 10 MG tablet Take 20 mg by mouth Every Night.      • docusate sodium (COLACE) 100 MG capsule Take 100 mg by mouth 2 (Two) Times a Day.      • fluticasone (FLONASE) 50 MCG/ACT nasal spray 2 sprays by Each Nare route Daily. 1 bottle 0    • hydrocortisone 1 % cream Apply  topically to the appropriate area as directed 2 (Two) Times a Day.      • hydrophor (AQUAPHOR) ointment ointment Apply  topically to the appropriate area as directed As Needed (Ulcerations lower extremities and edema). Lower extremities.      • insulin aspart (novoLOG) 100 UNIT/ML injection Inject 1-14 Units under the skin into the appropriate area as directed 3 (Three) Times a Day Before Meals. As directed per sliding scale      • insulin detemir (Levemir FlexTouch) 100 UNIT/ML injection 20 units      • Insulin Syringe 30G X 5/16\" 1 ML misc U UTD " WITH INSULIN UP TO 6 TIMES A DAY  3    • ipratropium-albuterol (DUO-NEB) 0.5-2.5 mg/3 ml nebulizer Take 3 mL by nebulization Every 4 (Four) Hours As Needed for Wheezing.   Unknown at Unknown time   • lactulose (CHRONULAC) 10 GM/15ML solution Take 30 g by mouth Daily As Needed.   Unknown at Unknown time   • levothyroxine (SYNTHROID, LEVOTHROID) 112 MCG tablet Take 224 mcg by mouth Daily.      • Levothyroxine Sodium 112 MCG/ML solution TAKE TWO TABLETS BY MOUTH EVERY MORNING At 6:00 AM      • loratadine (CLARITIN) 5 MG chewable tablet Chew 10 mg Daily.      • melatonin 5 MG tablet tablet Take 1 tablet by mouth At Night As Needed (sleep). Over the counter      • O2 (OXYGEN) Inhale 1 L/min every night at bedtime. (Patient taking differently: Inhale 2 L/min every night at bedtime.) 1 L 0    • Petrolatum 42 % ointment Apply  topically to the appropriate area as directed Daily. Send with patient      • polyethylene glycol (MIRALAX) 17 g packet Take 17 g by mouth Daily.      • pregabalin (LYRICA) 75 MG capsule Take 1 capsule by mouth 2 (Two) Times a Day. 6 capsule 0    • QUEtiapine (SEROquel) 25 MG tablet Take 0.5 tablets by mouth Every Evening.      • SITagliptin (Januvia) 100 MG tablet Daily.      • sodium chloride 0.65 % nasal spray 2 sprays into the nostril(s) as directed by provider As Needed for Congestion. Send with patient  12    • tamsulosin (FLOMAX) 0.4 MG capsule 24 hr capsule Take 1 capsule by mouth Daily. 30 capsule 0    • tuberculin (Tubersol) 5 UNIT/0.1ML injection Inject 5 Units into the appropriate area of the skin as directed by provider 1 (One) Time. Inject 0.1 mL intradermally every evening shift every 11 months starting on the 22nd for 1 day(s) Give annual PPD      • vitamin B-12 (VITAMIN B-12) 1000 MCG tablet Take 1 tablet by mouth Daily. 60 tablet 0    • vitamin D (ERGOCALCIFEROL) 1.25 MG (14976 UT) capsule capsule Take 1,250 Units by mouth Daily.          Allergies:  Allergies   Allergen Reactions    • Morphine Unknown - High Severity   • Atorvastatin Unknown - Low Severity   • Oxycontin [Oxycodone Hcl] Confusion     'Makes me crazy and stand on my head'       Past Social History:  Social History     Socioeconomic History   • Marital status:    Tobacco Use   • Smoking status: Former Smoker   • Smokeless tobacco: Never Used   • Tobacco comment: quit 1993   Vaping Use   • Vaping Use: Never used   Substance and Sexual Activity   • Alcohol use: No   • Drug use: No   • Sexual activity: Defer       Past Family History: History reviewed. No pertinent family history.   Family History   Problem Relation Age of Onset   • COPD Mother    • Diabetes Father        Review of Systems   All other systems reviewed and are negative.          Objective:     Objective:  Temp:  [97.4 °F (36.3 °C)-97.8 °F (36.6 °C)] 97.8 °F (36.6 °C)  Heart Rate:  [49-60] 60  Resp:  [16-20] 16  BP: (101-126)/(47-63) 126/50    Intake/Output Summary (Last 24 hours) at 12/12/2021 1238  Last data filed at 12/12/2021 0342  Gross per 24 hour   Intake 950 ml   Output --   Net 950 ml     Body mass index is 30.18 kg/m².      12/10/21  1337 12/10/21  1623   Weight: 109 kg (240 lb) 104 kg (228 lb 11.2 oz)           Physical Exam:   Vitals reviewed.   Constitutional:       Appearance: Well-developed.   Eyes:      Conjunctiva/sclera: Conjunctivae normal.   HENT:      Head: Normocephalic.   Pulmonary:      Breath sounds: Normal breath sounds.   Cardiovascular:      Normal rate. Regular rhythm.   Abdominal:      General: Bowel sounds are normal.      Palpations: Abdomen is soft.   Musculoskeletal: Normal range of motion.      Cervical back: Normal range of motion. Skin:     General: Skin is warm and dry.   Neurological:      Mental Status: Alert and oriented to person, place, and time.   Psychiatric:         Behavior: Behavior normal.          Labs:   Lab Review:     Results from last 7 days   Lab Units 12/12/21  0336 12/11/21  0329 12/10/21  1243  12/06/21  1449   SODIUM mmol/L 137 142 140 137   POTASSIUM mmol/L 3.8 3.8 4.4 4.6   CHLORIDE mmol/L 104 109* 104 103   CO2 mmol/L 20.1* 21.6* 25.0 22.4   BUN mg/dL 51* 41* 38* 43*   CREATININE mg/dL 2.10* 1.95* 2.04* 1.95*   GLUCOSE mg/dL 135* 141* 140* 115*   CALCIUM mg/dL 8.1* 8.8 9.5 10.0   AST (SGOT) U/L  --  38 59* 31   ALT (SGPT) U/L  --  25 32 17     Results from last 7 days   Lab Units 12/12/21  0336 12/10/21  1243 12/06/21  1449   CK TOTAL U/L  --  917* 484*   TROPONIN T ng/mL 0.086* 0.088*  --      Results from last 7 days   Lab Units 12/12/21  0336   WBC 10*3/mm3 7.26   HEMOGLOBIN g/dL 9.0*   HEMATOCRIT % 27.6*   PLATELETS 10*3/mm3 159                             Results from last 7 days   Lab Units 12/06/21  1449   TSH uIU/mL 0.592         PREVIOUS EKG:          EKG:             Assessment:       Acute metabolic encephalopathy    COPD (chronic obstructive pulmonary disease) ([facility])    Type 2 diabetes mellitus with stage 4 chronic kidney disease, with long-term current use of insulin ([facility])    Essential hypertension    Primary osteoarthritis of left knee    Chronic renal insufficiency, stage 4 (severe) ([facility])    Acute UTI (urinary tract infection)    Permanent atrial fibrillation ([facility])    Recurrent left pleural effusion    CAD (coronary artery disease)    Hypothyroidism    Cardiomyopathy ([facility])    Recurrent falls        Plan:      1.  Elevated troponin.  Looking back through patient's laboratory values he had an elevated troponin dating back 4 months ago.  On 7/19/2021 his troponin was 0.061 on 10/6/2021 it was 0.086 and 2 days ago it was 0.088.  Patient has chronic elevated troponin no further work-up necessary at this time.  Patient also has chronic renal insufficiency which is probably contributing factor.  2.  Chronic atrial fibrillation heart rate stable.  3.  Acute urinary tract infection.  Treat that see how patient does he does improve contact us.  We will see as needed.    Thank you for allowing me to  participate in the care of Froilan Helton. Feel free to contact me directly with any further questions or concerns.    Ancelmo Goff MD  [facility]  12/12/21  12:38 EST

## 2021-12-12 NOTE — SIGNIFICANT NOTE
12/12/21 0854   OTHER   Discipline physical therapy assistant   Rehab Time/Intention   Session Not Performed patient/family declined treatment  (Pt stated he is not doing therapy today)   Recommendation   PT - Next Appointment 12/13/21

## 2021-12-12 NOTE — PROGRESS NOTES
"DAILY PROGRESS NOTE  [facility]    Patient Identification:  Name: Froilan Helton  Age: 81 y.o.  Sex: male  :  1940  MRN: [id number]         Primary Care Physician: Fortunato De Leon MD    Subjective:  Interval History:He complains of pain. He is weak.    Objective:    Scheduled Meds:amiodarone, 200 mg, Oral, Q12H  apixaban, 2.5 mg, Oral, Q12H  atorvastatin, 10 mg, Oral, Nightly  budesonide-formoterol, 2 puff, Inhalation, BID - RT  carvedilol, 3.125 mg, Oral, Q12H  cefTRIAXone, 1 g, Intravenous, Q24H  docusate sodium, 100 mg, Oral, BID  fluticasone, 2 spray, Each Nare, Daily  insulin lispro, 0-9 Units, Subcutaneous, TID AC  levothyroxine, 224 mcg, Oral, Daily  sodium chloride, 10 mL, Intravenous, Q12H  tamsulosin, 0.4 mg, Oral, Daily  cyanocobalamin, 1,000 mcg, Oral, Daily  vitamin D, 50,000 Units, Oral, Daily      Continuous Infusions:O2, 2 L/min, Last Rate: 2 L/min (12/10/21 2127)  sodium chloride, 75 mL/hr, Last Rate: 75 mL/hr (21)        Vital signs in last 24 hours:  Temp:  [97.4 °F (36.3 °C)-97.9 °F (36.6 °C)] 97.9 °F (36.6 °C)  Heart Rate:  [49-60] 60  Resp:  [16-20] 16  BP: (109-126)/(50-63) 126/50    Intake/Output:    Intake/Output Summary (Last 24 hours) at 2021 1439  Last data filed at 2021 0342  Gross per 24 hour   Intake 950 ml   Output --   Net 950 ml       Exam:  /50 (BP Location: Left arm, Patient Position: Lying)   Pulse 60   Temp 97.9 °F (36.6 °C) (Oral)   Resp 16   Ht 185.4 cm (72.99\")   Wt 104 kg (228 lb 11.2 oz)   SpO2 96%   BMI 30.18 kg/m²     General Appearance:    Alert, cooperative, no distress   Head:    Normocephalic, without obvious abnormality, atraumatic   Eyes:       Throat:   Lips, tongue, gums normal   Neck:   Supple, symmetrical, trachea midline, no JVD   Lungs:     Clear to auscultation bilaterally, respirations unlabored   Chest Wall:    No tenderness or deformity    Heart:    Regular rate and rhythm, S1 and S2 normal, no " murmur,no  Rub or gallop   Abdomen:     Soft, nontender, bowel sounds active, no masses, no organomegaly    Extremities:   Extremities normal, atraumatic, no cyanosis or edema   Pulses:      Skin:   Skin is warm and dry,  no rashes or palpable lesions   Neurologic:   no focal deficits noted      Lab Results (last 72 hours)     Procedure Component Value Units Date/Time    Blood Culture - Blood, Arm, Right [[id number]]  (Normal) Collected: 12/10/21 1243    Specimen: Blood from Arm, Right Updated: 12/11/21 1315     Blood Culture No growth at 24 hours    Blood Culture - Blood, Arm, Right [[id number]]  (Normal) Collected: 12/10/21 1243    Specimen: Blood from Arm, Right Updated: 12/11/21 1315     Blood Culture No growth at 24 hours    POC Glucose Once [[id number]]  (Abnormal) Collected: 12/11/21 1101    Specimen: Blood Updated: 12/11/21 1106     Glucose 199 mg/dL      Comment: RN Notified R and V Meter: [id number] : [id number] Cecil HARKINS       POC Glucose Once [[id number]]  (Abnormal) Collected: 12/11/21 0634    Specimen: Blood Updated: 12/11/21 0636     Glucose 150 mg/dL      Comment: Meter: [id number] : [id number] Ankush HARKINS       Lactic Acid, Plasma [[id number]]  (Normal) Collected: 12/11/21 0329    Specimen: Blood Updated: 12/11/21 0522     Lactate 1.3 mmol/L     Comprehensive Metabolic Panel [[id number]]  (Abnormal) Collected: 12/11/21 0329    Specimen: Blood Updated: 12/11/21 0513     Glucose 141 mg/dL      BUN 41 mg/dL      Creatinine 1.95 mg/dL      Sodium 142 mmol/L      Potassium 3.8 mmol/L      Chloride 109 mmol/L      CO2 21.6 mmol/L      Calcium 8.8 mg/dL      Total Protein 5.9 g/dL      Albumin 2.60 g/dL      ALT (SGPT) 25 U/L      AST (SGOT) 38 U/L      Alkaline Phosphatase 59 U/L      Total Bilirubin 0.5 mg/dL      eGFR Non African Amer 33 mL/min/1.73      Globulin 3.3 gm/dL      A/G Ratio 0.8 g/dL      BUN/Creatinine Ratio 21.0     Anion Gap 11.4 mmol/L     Narrative:      GFR Normal  >60  Chronic Kidney Disease <60  Kidney Failure <15      CBC Auto Differential [[id number]]  (Abnormal) Collected: 12/11/21 0329    Specimen: Blood Updated: 12/11/21 0458     WBC 8.74 10*3/mm3      RBC 3.94 10*6/mm3      Hemoglobin 11.4 g/dL      Hematocrit 34.3 %      MCV 87.1 fL      MCH 28.9 pg      MCHC 33.2 g/dL      RDW 13.7 %      RDW-SD 43.8 fl      MPV 11.3 fL      Platelets 179 10*3/mm3      Neutrophil % 75.3 %      Lymphocyte % 8.8 %      Monocyte % 14.1 %      Eosinophil % 1.0 %      Basophil % 0.3 %      Immature Grans % 0.5 %      Neutrophils, Absolute 6.58 10*3/mm3      Lymphocytes, Absolute 0.77 10*3/mm3      Monocytes, Absolute 1.23 10*3/mm3      Eosinophils, Absolute 0.09 10*3/mm3      Basophils, Absolute 0.03 10*3/mm3      Immature Grans, Absolute 0.04 10*3/mm3      nRBC 0.0 /100 WBC     Hemoglobin A1c [[id number]]  (Normal) Collected: 12/11/21 0329    Specimen: Blood Updated: 12/11/21 0458     Hemoglobin A1C 5.44 %     Narrative:      Hemoglobin A1C Ranges:    Increased Risk for Diabetes  5.7% to 6.4%  Diabetes                     >= 6.5%  Diabetic Goal                < 7.0%    POC Glucose Once [[id number]]  (Abnormal) Collected: 12/10/21 2155    Specimen: Blood Updated: 12/10/21 2156     Glucose 167 mg/dL      Comment: Meter: [id number] : [id number] Ankush HARKINS       Ammonia [[id number]]  (Normal) Collected: 12/10/21 1314    Specimen: Blood Updated: 12/10/21 1347     Ammonia 18 umol/L     Comprehensive Metabolic Panel [[id number]]  (Abnormal) Collected: 12/10/21 1243    Specimen: Blood Updated: 12/10/21 1345     Glucose 140 mg/dL      BUN 38 mg/dL      Creatinine 2.04 mg/dL      Sodium 140 mmol/L      Potassium 4.4 mmol/L      Comment: Slight hemolysis detected by analyzer. Results may be affected.        Chloride 104 mmol/L      CO2 25.0 mmol/L      Calcium 9.5 mg/dL      Total Protein 7.2 g/dL      Albumin 3.40 g/dL      ALT (SGPT) 32 U/L      AST (SGOT) 59 U/L      Alkaline Phosphatase  75 U/L      Total Bilirubin 0.7 mg/dL      eGFR Non African Amer 32 mL/min/1.73      Globulin 3.8 gm/dL      A/G Ratio 0.9 g/dL      BUN/Creatinine Ratio 18.6     Anion Gap 11.0 mmol/L     Narrative:      GFR Normal >60  Chronic Kidney Disease <60  Kidney Failure <15      CK [[id number]]  (Abnormal) Collected: 12/10/21 1243    Specimen: Blood Updated: 12/10/21 1345     Creatine Kinase 917 U/L     COVID PRE-OP / PRE-PROCEDURE SCREENING ORDER (NO ISOLATION) - Swab, Nasopharynx [[id number]]  (Normal) Collected: 12/10/21 1244    Specimen: Swab from Nasopharynx Updated: 12/10/21 1343    Narrative:      The following orders were created for panel order COVID PRE-OP / PRE-PROCEDURE SCREENING ORDER (NO ISOLATION) - Swab, Nasopharynx.  Procedure                               Abnormality         Status                     ---------                               -----------         ------                     COVID-19,[facility]...[[id number]]  Normal              Final result                 Please view results for these tests on the individual orders.    COVID-19,[facility] CEPHEID/SINDY NP SWAB IN TRANSPORT MEDIA 8-12 HR TAT - Swab, Nasopharynx [[id number]]  (Normal) Collected: 12/10/21 1244    Specimen: Swab from Nasopharynx Updated: 12/10/21 1343     COVID19 Not Detected    Narrative:      Fact sheet for providers: https://www.fda.gov/media/142717/download    Fact sheet for patients: https://www.fda.gov/media/928251/download    Test performed by PCR.    Troponin [[id number]]  (Abnormal) Collected: 12/10/21 1243    Specimen: Blood Updated: 12/10/21 1343     Troponin T 0.088 ng/mL     Narrative:      Troponin T Reference Range:  <= 0.03 ng/mL-   Negative for AMI  >0.03 ng/mL-     Abnormal for myocardial necrosis.  Clinicians would have to utilize clinical acumen, EKG, Troponin and serial changes to determine if it is an Acute Myocardial Infarction or myocardial injury due to an underlying chronic condition.       Results  may be falsely decreased if patient taking Biotin.      Lactic Acid, Plasma [[id number]]  (Normal) Collected: 12/10/21 1243    Specimen: Blood Updated: 12/10/21 1342     Lactate 1.5 mmol/L     Urinalysis, Microscopic Only - Urine, Catheter [[id number]]  (Abnormal) Collected: 12/10/21 1303    Specimen: Urine, Catheter Updated: 12/10/21 1320     RBC, UA 31-50 /HPF      WBC, UA Too Numerous to Count /HPF      Bacteria, UA None Seen /HPF      Squamous Epithelial Cells, UA 0-2 /HPF      Hyaline Casts, UA 0-2 /LPF      Methodology Automated Microscopy    Urinalysis With Culture If Indicated - Urine, Catheter [[id number]]  (Abnormal) Collected: 12/10/21 1303    Specimen: Urine, Catheter Updated: 12/10/21 1320     Color, UA Yellow     Appearance, UA Cloudy     pH, UA 6.0     Specific Gravity, UA 1.016     Glucose,  mg/dL (Trace)     Ketones, UA Trace     Bilirubin, UA Negative     Blood, UA Large (3+)     Protein, UA >=300 mg/dL (3+)     Leuk Esterase, UA Moderate (2+)     Nitrite, UA Negative     Urobilinogen, UA 0.2 E.U./dL    Urine Culture - Urine, Urine, Catheter [[id number]] Collected: 12/10/21 1303    Specimen: Urine, Catheter Updated: 12/10/21 1320    CBC & Differential [[id number]]  (Abnormal) Collected: 12/10/21 1243    Specimen: Blood Updated: 12/10/21 1320    Narrative:      The following orders were created for panel order CBC & Differential.  Procedure                               Abnormality         Status                     ---------                               -----------         ------                     CBC Auto Differential[[id number]]        Abnormal            Final result                 Please view results for these tests on the individual orders.    CBC Auto Differential [[id number]]  (Abnormal) Collected: 12/10/21 1243    Specimen: Blood Updated: 12/10/21 1320     WBC 11.26 10*3/mm3      RBC 4.58 10*6/mm3      Hemoglobin 13.0 g/dL      Hematocrit 41.7 %      MCV 91.0 fL      MCH 28.4 pg       MCHC 31.2 g/dL      RDW 13.5 %      RDW-SD 45.2 fl      MPV 11.5 fL      Platelets 208 10*3/mm3      Neutrophil % 82.1 %      Lymphocyte % 4.7 %      Monocyte % 11.4 %      Eosinophil % 0.9 %      Basophil % 0.4 %      Immature Grans % 0.5 %      Neutrophils, Absolute 9.24 10*3/mm3      Lymphocytes, Absolute 0.53 10*3/mm3      Monocytes, Absolute 1.28 10*3/mm3      Eosinophils, Absolute 0.10 10*3/mm3      Basophils, Absolute 0.05 10*3/mm3      Immature Grans, Absolute 0.06 10*3/mm3      nRBC 0.1 /100 WBC         Data Review:  Results from last 7 days   Lab Units 12/12/21  0336 12/11/21  0329 12/11/21  0329 12/10/21  1243 12/10/21  1243   SODIUM mmol/L 137  --  142  --  140   POTASSIUM mmol/L 3.8  --  3.8  --  4.4   CHLORIDE mmol/L 104  --  109*  --  104   CO2 mmol/L 20.1*  --  21.6*  --  25.0   BUN mg/dL 51*  --  41*  --  38*   CREATININE mg/dL 2.10*  --  1.95*  --  2.04*   GLUCOSE mg/dL 135*   < > 141*   < > 140*   CALCIUM mg/dL 8.1*  --  8.8  --  9.5    < > = values in this interval not displayed.     Results from last 7 days   Lab Units 12/12/21  0336 12/11/21  0329 12/10/21  1243   WBC 10*3/mm3 7.26 8.74 11.26*   HEMOGLOBIN g/dL 9.0* 11.4* 13.0   HEMATOCRIT % 27.6* 34.3* 41.7   PLATELETS 10*3/mm3 159 179 208     Results from last 7 days   Lab Units 12/06/21  1449   TSH uIU/mL 0.592     Results from last 7 days   Lab Units 12/11/21 0329   HEMOGLOBIN A1C % 5.44     Lab Results   Lab Value Date/Time    TROPONINT 0.086 (C) 12/12/2021 0336    TROPONINT 0.088 (C) 12/10/2021 1243    TROPONINT 0.086 (C) 10/06/2021 0600    TROPONINT 0.061 (C) 07/19/2021 0442    TROPONINT 0.068 (C) 07/18/2021 2229    TROPONINT 0.065 (C) 07/07/2021 2025    TROPONINT 0.039 (C) 06/30/2020 0439    TROPONINT 0.038 (C) 06/29/2020 1559    TROPONINT 0.035 (C) 06/29/2020 0948    TROPONINT 0.030 06/29/2020 0507    TROPONINT 0.035 (C) 12/09/2019 0312    TROPONINT 0.035 (C) 10/15/2019 1542    TROPONINT 0.037 (C) 10/03/2019 1959    TROPONINT 1.010  (C) 08/17/2019 0540    TROPONINT 1.100 (C) 08/15/2019 2059    TROPONINT 0.811 (C) 08/15/2019 1516    TROPONINT 0.396 (C) 08/15/2019 1000    TROPONINT 0.183 (C) 08/15/2019 0420    TROPONINT 0.033 (C) 08/14/2019 2212    TROPONINT 0.031 (C) 06/28/2019 1324    TROPONINT 0.035 (C) 06/28/2019 0719    TROPONINT 0.037 (C) 06/28/2019 0349    TROPONINT 0.039 (C) 06/27/2019 1842    TROPONINT 0.024 05/06/2019 1013    TROPONINT 0.031 (C) 05/06/2019 0331    TROPONINT 0.033 (C) 05/05/2019 2131    TROPONINT 0.034 (H) 02/16/2019 1958    TROPONINT 0.037 (H) 02/16/2019 1726    TROPONINT 0.015 09/22/2018 2107    TROPONINT 0.013 08/03/2018 1719    TROPONINT 0.027 03/08/2018 2307    TROPONINT 0.068 (H) 11/23/2017 1327    TROPONINT 0.099 (H) 11/22/2017 2355    TROPONINT 0.115 (C) 11/22/2017 1805    TROPONINT 0.141 (C) 11/22/2017 1256    TROPONINT 0.014 11/21/2017 1557         Results from last 7 days   Lab Units 12/11/21  0329 12/10/21  1243 12/06/21  1449   ALK PHOS U/L 59 75 103   BILIRUBIN mg/dL 0.5 0.7 1.0   ALT (SGPT) U/L 25 32 17   AST (SGOT) U/L 38 59* 31     Results from last 7 days   Lab Units 12/06/21  1449   TSH uIU/mL 0.592     Results from last 7 days   Lab Units 12/11/21  0329   HEMOGLOBIN A1C % 5.44     Glucose   Date/Time Value Ref Range Status   12/12/2021 1141 158 (H) 70 - 130 mg/dL Final     Comment:     Meter: [id number] : [id number] Guerrero Felix CNA   12/12/2021 0635 116 70 - 130 mg/dL Final     Comment:     Meter: [id number] : [id number] Ankush Chawla    12/11/2021 2135 198 (H) 70 - 130 mg/dL Final     Comment:     Meter: [id number] : [id number] Ankush Chawla    12/11/2021 1612 164 (H) 70 - 130 mg/dL Final     Comment:     Meter: [id number] : [id number] Cecil Chandler    12/11/2021 1101 199 (H) 70 - 130 mg/dL Final     Comment:     RN Notified R and V Meter: [id number] : [id number] Cecil Chandler    12/11/2021 0634 150 (H) 70 - 130 mg/dL Final     Comment:     Meter: [id number] :  [id number] Ankush Chawla SHAHANA   12/10/2021 2155 167 (H) 70 - 130 mg/dL Final     Comment:     Meter: [id number] : [id number] Ankush HARKINS           Past Medical History:   Diagnosis Date   • A-fib ([facility])    • Arthritis    • Asthma    • CAD (coronary artery disease)    • Cardiomyopathy ([facility])    • Cataract    • CHF (congestive heart failure) ([facility])    • CKD (chronic kidney disease), stage III ([facility])    • COPD (chronic obstructive pulmonary disease) ([facility])    • Diabetes mellitus ([facility])    • Disease of thyroid gland    • Emphysema, unspecified ([facility])    • Glaucoma    • Hyperlipidemia    • Hypertension    • Kidney stone    • Myocardial infarct, old    • Neuropathy    • Osteoarthritis    • Osteoporosis    • Permanent atrial fibrillation ([facility]) 6/30/2020   • PVD (peripheral vascular disease) ([facility])    • Renal disorder    • Shingles        Assessment:  Active Hospital Problems    Diagnosis  POA   • **Acute metabolic encephalopathy [G93.41]  Yes   • Cardiomyopathy ([facility]) [I42.9]  Unknown   • Recurrent falls [R29.6]  Not Applicable   • Hypothyroidism [E03.9]  Yes   • CAD (coronary artery disease) [I25.10]  Yes   • Recurrent left pleural effusion [J90]  Yes   • Permanent atrial fibrillation ([facility]) [I48.21]  Yes   • Acute UTI (urinary tract infection) [N39.0]  Yes   • Chronic renal insufficiency, stage 4 (severe) ([facility]) [N18.4]  Yes   • Primary osteoarthritis of left knee [M17.12]  Yes   • Essential hypertension [I10]  Yes   • Type 2 diabetes mellitus with stage 4 chronic kidney disease, with long-term current use of insulin ([facility]) [E11.22, N18.4, Z79.4]  Not Applicable   • COPD (chronic obstructive pulmonary disease) ([facility]) [J44.9]  Yes      Resolved Hospital Problems   No resolved problems to display.       Plan:  Continue with antibiotics for UTI and await cultures. Follow lab.  cardiology consult noted.  Will need SNU for rehab.    Bautista Gonzalez MD  12/12/2021  14:39 EST

## 2021-12-12 NOTE — PLAN OF CARE
Goal Outcome Evaluation:              Outcome Summary: Patient is alert and oriented X4, tylenol given for left thigh pain. Patient on IVF & ABX. Patient's troponin 0.086, patient denies chest pain. On call notified. VSS no s/sx of distress. Patient on Mariya air. Fall precautions maintained. Will continue to monitor.

## 2021-12-12 NOTE — NURSING NOTE
Patient's has a critical troponin 0.086, Patient denies chest pain or SOA. Spoke with Zhang Luke over the phone. No new ordersgiven at this time.

## 2021-12-13 NOTE — CONSULTS
"Purpose of the visit was to evaluate for: goals of care/advanced care planning, support for patient/family and hospice referral/discussion. Spoke with MD and RN as well as patient and HCS and discussed goals of care, care options, resuscitation status and [facility].      Assessment:  Patient is palliative care appropriate for inpatient care given (list diagnosis/symptoms):  AFib, CKD, CHF, COPD, DM2. AMS, frequent falls.  Upon assessment, patient is resting with eyes open. He declines to engage in conversation, states that he \"wants to be left alone\".  PPS: 60%  After attempt to discuss GOC I asked about resuscitation status and he states that he does not want to be resuscitated or intubated. With his permission I called dania/CYNTHIA Sebastian who states that he does in fact wish to be DNR/DNI, but she would like to further discuss ultimate GOC with him. Also requested to have MOST form completed.     Recommendations/Plan: Change CODE status to DNR/DNI. Complete MOST form. Continue to follow to provide support and discuss GOC as appropriate,.    Shannon Prajapati RN  "

## 2021-12-13 NOTE — PROGRESS NOTES
Continued Stay Note  [facility]     Patient Name: Froilan Helton  MRN: [id number]  Today's Date: 12/13/2021    Admit Date: 12/10/2021     Discharge Plan     Row Name 12/13/21 1336       Plan    Plan referral pending w/ [facility]    Plan Comments Referral pending w/ [facility], spoke w/ [facility] [phone] (covering for [facility]). [facility] does not have Epic access, referral faxed to  [phone]. [facility] to eval and follow up. Pt not participating w/ therapy, [facility] will need therapy notes prior to dc. CCP will follow up w/ pt/family.               Discharge Codes    No documentation.                     Jenn Murguia RN

## 2021-12-13 NOTE — PROGRESS NOTES
"Adult Nutrition  Assessment/PES    Patient Name:  Froilan Helton  YOB: 1940  MRN: [id number]  Admit Date:  12/10/2021    Assessment Date:  12/13/2021    Comments:  Assessed due to skin status - multiple tears and wound on L thigh. Pt from home with multiple falls, TME and UTI noted. Hx DM2, CAD, CKD, Afib, hypothyroid. On Soft/ground foods, thin liquids. Limited intake data. Will add boost glucose control TID for added cals, protein. RD to monitor intake.     Reason for Assessment     Row Name 12/13/21 1101          Reason for Assessment    Reason For Assessment identified at risk by screening criteria     Diagnosis neurologic conditions; diabetes diagnosis/complications; renal disease; cardiac disease     Identified At Risk by Screening Criteria MST SCORE 2+; large or nonhealing wound, burn or pressure injury                Nutrition/Diet History     Row Name 12/13/21 1104          Nutrition/Diet History    Typical Food/Fluid Intake Limited data charted. From home, has [employer] who found him down. He has had multiple falls per chart notes; TME.     Factors Affecting Nutritional Intake impaired cognitive status/motor control                Anthropometrics     Row Name 12/13/21 1105          Anthropometrics    Height 185.4 cm (73\")            Admit Weight    Admit Weight 103 kg (228 lb)            Ideal Body Weight (IBW)    Ideal Body Weight (IBW) (kg) 84.86            Usual Body Weight (UBW)    Weight Loss Time Frame UBW varies 230-245 lb range over past 5 months            Body Mass Index (BMI)    BMI Assessment BMI 30-34.9: obesity grade I                Labs/Tests/Procedures/Meds     Row Name 12/13/21 1106          Labs/Procedures/Meds    Lab Results Reviewed reviewed, pertinent     Lab Results Comments BUN, Cr, Alb, Hgb            Diagnostic Tests/Procedures    Diagnostic Test/Procedure Reviewed reviewed, pertinent            Medications    Pertinent Medications Reviewed reviewed, pertinent  " "   Pertinent Medications Comments amiodarone, eliquis, abx, insulin, synthroid, vit D, B12, IVF 75 cc/h                Physical Findings     Row Name 12/13/21 1110          Physical Findings    Skin poor skin integrity/turgor; other (see comments); pressure injury; edema  multiple, various skin tears; L thigh with large wound (? pressure)                Estimated/Assessed Needs     Row Name 12/13/21 1120 12/13/21 1105       Calculation Measurements    Weight Used For Calculations 103 kg (228 lb) --    Height -- 185.4 cm (73\")       Estimated/Assessed Needs    Additional Documentation Protein Requirements (Group); Fluid Requirements (Group); KCAL/KG (Group) --       KCAL/KG    KCAL/KG 25 Kcal/Kg (kcal); 30 Kcal/Kg (kcal) --    25 Kcal/Kg (kcal) 2585.5 --    30 Kcal/Kg (kcal) 3102.6 --       Protein Requirements    Weight Used For Protein Calculations 103 kg (228 lb) --    Est Protein Requirement Amount (gms/kg) 1.2 gm protein  103-124 (1-1.2 g/kg)  - CKD --    Estimated Protein Requirements (gms/day) 124.1 --       Fluid Requirements    Fluid Requirements (mL/day) 2585 --    Estimated Fluid Requirement Method RDA Method --    RDA Method (mL) 2585 --               Nutrition Prescription Ordered     Row Name 12/13/21 1123          Nutrition Prescription PO    Current PO Diet Soft Texture     Texture Ground     Fluid Consistency Thin                Evaluation of Received Nutrient/Fluid Intake     Row Name 12/13/21 1123          PO Evaluation    % PO Intake 25%                     Problem/Interventions:   Problem 1     Row Name 12/13/21 1125          Nutrition Diagnoses Problem 1    Problem 1 Increased Nutrient Needs     Macronutrient Kcal; Protein     Micronutrient Vitamin; Mineral     Etiology (related to) Medical Diagnosis     Skin Skin breakdown; Pressure injury                      Intervention Goal     Row Name 12/13/21 1125          Intervention Goal    General Disease management/therapy; Reduce/improve symptoms; " Meet nutritional needs for age/condition     PO Increase intake; PO intake (%); Meet estimated needs     PO Intake % 75 %     Weight Appropriate weight loss                Nutrition Intervention     Row Name 12/13/21 1125          Nutrition Intervention    RD/Tech Action Follow Tx progress; Care plan reviewd; Encourage intake; Interview for preference; Recommend/ordered     Recommended/Ordered Supplement                Nutrition Prescription     Row Name 12/13/21 1129          Nutrition Prescription PO    PO Prescription Begin/change supplement     Supplement Boost Glucose Control     Supplement Frequency 3 times a day     New PO Prescription Ordered? Yes                Education/Evaluation     Row Name 12/13/21 1132          Education    Education Will Instruct as appropriate            Monitor/Evaluation    Monitor Per protocol                 Electronically signed by:  Iris Rodriguez RD  12/13/21 11:38 EST

## 2021-12-13 NOTE — PLAN OF CARE
VSS, patient denies pain. Patient alert and oriented x4.  Patient alone at the bedside all day.  Patient to continue with IV abx for UTI. Blood cultures still pending. Patient is resting comfortably at this time.    Problem: Adult Inpatient Plan of Care  Goal: Plan of Care Review  Outcome: Ongoing, Progressing  Goal: Patient-Specific Goal (Individualized)  Outcome: Ongoing, Progressing  Goal: Absence of Hospital-Acquired Illness or Injury  Outcome: Ongoing, Progressing  Intervention: Identify and Manage Fall Risk  Recent Flowsheet Documentation  Taken 12/12/2021 1800 by Valeria Boss RN  Safety Promotion/Fall Prevention: safety round/check completed  Taken 12/12/2021 1600 by Valeria Boss RN  Safety Promotion/Fall Prevention:   activity supervised   safety round/check completed   fall prevention program maintained  Taken 12/12/2021 1400 by Valeria Boss RN  Safety Promotion/Fall Prevention:   activity supervised   safety round/check completed  Taken 12/12/2021 1200 by Valeria Boss RN  Safety Promotion/Fall Prevention:   safety round/check completed   fall prevention program maintained  Taken 12/12/2021 1000 by Valeria Boss RN  Safety Promotion/Fall Prevention:   activity supervised   safety round/check completed  Taken 12/12/2021 0800 by Valeria Boss RN  Safety Promotion/Fall Prevention: safety round/check completed  Goal: Optimal Comfort and Wellbeing  Outcome: Ongoing, Progressing  Goal: Readiness for Transition of Care  Outcome: Ongoing, Progressing     Problem: Fall Injury Risk  Goal: Absence of Fall and Fall-Related Injury  Outcome: Ongoing, Progressing  Intervention: Promote Injury-Free Environment  Recent Flowsheet Documentation  Taken 12/12/2021 1800 by Valeria Boss RN  Safety Promotion/Fall Prevention: safety round/check completed  Taken 12/12/2021 1600 by Valeria Boss RN  Safety Promotion/Fall Prevention:   activity supervised   safety round/check completed   fall prevention program maintained  Taken  12/12/2021 1400 by Valeria Boss RN  Safety Promotion/Fall Prevention:   activity supervised   safety round/check completed  Taken 12/12/2021 1200 by Valeria Boss RN  Safety Promotion/Fall Prevention:   safety round/check completed   fall prevention program maintained  Taken 12/12/2021 1000 by Valeria Boss RN  Safety Promotion/Fall Prevention:   activity supervised   safety round/check completed  Taken 12/12/2021 0800 by Valeria Boss RN  Safety Promotion/Fall Prevention: safety round/check completed     Problem: Skin Injury Risk Increased  Goal: Skin Health and Integrity  Outcome: Ongoing, Progressing  Intervention: Optimize Skin Protection  Recent Flowsheet Documentation  Taken 12/12/2021 1000 by Valeria Boss RN  Pressure Reduction Techniques:   frequent weight shift encouraged   heels elevated off bed   positioned off wounds   weight shift assistance provided  Pressure Reduction Devices:   pressure-redistributing mattress utilized   specialty bed utilized     Problem: Asthma Comorbidity  Goal: Maintenance of Asthma Control  Outcome: Ongoing, Progressing     Problem: COPD Comorbidity  Goal: Maintenance of COPD Symptom Control  Outcome: Ongoing, Progressing     Problem: Hypertension Comorbidity  Goal: Blood Pressure in Desired Range  Outcome: Ongoing, Progressing   Goal Outcome Evaluation:

## 2021-12-13 NOTE — PROGRESS NOTES
"DAILY PROGRESS NOTE  [facility]    Patient Identification:  Name: Froilan Helton  Age: 81 y.o.  Sex: male  :  1940  MRN: [id number]         Primary Care Physician: Fortunato De Leon MD    Subjective:  Interval History:He complains of pain. He is weak.    Objective:    Scheduled Meds:amiodarone, 200 mg, Oral, Q12H  apixaban, 2.5 mg, Oral, Q12H  atorvastatin, 10 mg, Oral, Nightly  budesonide-formoterol, 2 puff, Inhalation, BID - RT  carvedilol, 3.125 mg, Oral, Q12H  cefTRIAXone, 1 g, Intravenous, Q24H  docusate sodium, 100 mg, Oral, BID  fluticasone, 2 spray, Each Nare, Daily  insulin lispro, 0-9 Units, Subcutaneous, TID AC  levothyroxine, 224 mcg, Oral, Daily  sodium chloride, 10 mL, Intravenous, Q12H  tamsulosin, 0.4 mg, Oral, Daily  cyanocobalamin, 1,000 mcg, Oral, Daily  vitamin D, 50,000 Units, Oral, Daily      Continuous Infusions:O2, 2 L/min, Last Rate: 2 L/min (12/10/21 2127)  sodium chloride, 75 mL/hr, Last Rate: 75 mL/hr (21)        Vital signs in last 24 hours:  Temp:  [97.8 °F (36.6 °C)-98.1 °F (36.7 °C)] 97.9 °F (36.6 °C)  Heart Rate:  [54-60] 58  Resp:  [16-18] 18  BP: (126-157)/(50-74) 157/74    Intake/Output:    Intake/Output Summary (Last 24 hours) at 2021 1124  Last data filed at 2021 0459  Gross per 24 hour   Intake 1950 ml   Output --   Net 1950 ml       Exam:  /74 (BP Location: Left arm, Patient Position: Lying)   Pulse 58   Temp 97.9 °F (36.6 °C) (Oral)   Resp 18   Ht 185.4 cm (73\")   Wt 104 kg (228 lb 11.2 oz)   SpO2 95%   BMI 30.17 kg/m²     General Appearance:    Alert, cooperative, no distress   Head:    Normocephalic, without obvious abnormality, atraumatic   Eyes:       Throat:   Lips, tongue, gums normal   Neck:   Supple, symmetrical, trachea midline, no JVD   Lungs:     Clear to auscultation bilaterally, respirations unlabored   Chest Wall:    No tenderness or deformity    Heart:    Regular rate and rhythm, S1 and S2 normal, no " murmur,no  Rub or gallop   Abdomen:     Soft, nontender, bowel sounds active, no masses, no organomegaly    Extremities:   Extremities normal, atraumatic, no cyanosis or edema   Pulses:      Skin:   Skin is warm and dry,  Skin wounds and abrasions   Neurologic:   no focal deficits noted      Lab Results (last 72 hours)     Procedure Component Value Units Date/Time    Blood Culture - Blood, Arm, Right [[id number]]  (Normal) Collected: 12/10/21 1243    Specimen: Blood from Arm, Right Updated: 12/11/21 1315     Blood Culture No growth at 24 hours    Blood Culture - Blood, Arm, Right [[id number]]  (Normal) Collected: 12/10/21 1243    Specimen: Blood from Arm, Right Updated: 12/11/21 1315     Blood Culture No growth at 24 hours    POC Glucose Once [[id number]]  (Abnormal) Collected: 12/11/21 1101    Specimen: Blood Updated: 12/11/21 1106     Glucose 199 mg/dL      Comment: RN Notified R and V Meter: [id number] : [id number] Cecil HARKINS       POC Glucose Once [[id number]]  (Abnormal) Collected: 12/11/21 0634    Specimen: Blood Updated: 12/11/21 0636     Glucose 150 mg/dL      Comment: Meter: [id number] : [id number] Ankush HARKINS       Lactic Acid, Plasma [[id number]]  (Normal) Collected: 12/11/21 0329    Specimen: Blood Updated: 12/11/21 0522     Lactate 1.3 mmol/L     Comprehensive Metabolic Panel [[id number]]  (Abnormal) Collected: 12/11/21 0329    Specimen: Blood Updated: 12/11/21 0513     Glucose 141 mg/dL      BUN 41 mg/dL      Creatinine 1.95 mg/dL      Sodium 142 mmol/L      Potassium 3.8 mmol/L      Chloride 109 mmol/L      CO2 21.6 mmol/L      Calcium 8.8 mg/dL      Total Protein 5.9 g/dL      Albumin 2.60 g/dL      ALT (SGPT) 25 U/L      AST (SGOT) 38 U/L      Alkaline Phosphatase 59 U/L      Total Bilirubin 0.5 mg/dL      eGFR Non African Amer 33 mL/min/1.73      Globulin 3.3 gm/dL      A/G Ratio 0.8 g/dL      BUN/Creatinine Ratio 21.0     Anion Gap 11.4 mmol/L     Narrative:      GFR Normal  >60  Chronic Kidney Disease <60  Kidney Failure <15      CBC Auto Differential [[id number]]  (Abnormal) Collected: 12/11/21 0329    Specimen: Blood Updated: 12/11/21 0458     WBC 8.74 10*3/mm3      RBC 3.94 10*6/mm3      Hemoglobin 11.4 g/dL      Hematocrit 34.3 %      MCV 87.1 fL      MCH 28.9 pg      MCHC 33.2 g/dL      RDW 13.7 %      RDW-SD 43.8 fl      MPV 11.3 fL      Platelets 179 10*3/mm3      Neutrophil % 75.3 %      Lymphocyte % 8.8 %      Monocyte % 14.1 %      Eosinophil % 1.0 %      Basophil % 0.3 %      Immature Grans % 0.5 %      Neutrophils, Absolute 6.58 10*3/mm3      Lymphocytes, Absolute 0.77 10*3/mm3      Monocytes, Absolute 1.23 10*3/mm3      Eosinophils, Absolute 0.09 10*3/mm3      Basophils, Absolute 0.03 10*3/mm3      Immature Grans, Absolute 0.04 10*3/mm3      nRBC 0.0 /100 WBC     Hemoglobin A1c [[id number]]  (Normal) Collected: 12/11/21 0329    Specimen: Blood Updated: 12/11/21 0458     Hemoglobin A1C 5.44 %     Narrative:      Hemoglobin A1C Ranges:    Increased Risk for Diabetes  5.7% to 6.4%  Diabetes                     >= 6.5%  Diabetic Goal                < 7.0%    POC Glucose Once [[id number]]  (Abnormal) Collected: 12/10/21 2155    Specimen: Blood Updated: 12/10/21 2156     Glucose 167 mg/dL      Comment: Meter: [id number] : [id number] Ankush HARKINS       Ammonia [[id number]]  (Normal) Collected: 12/10/21 1314    Specimen: Blood Updated: 12/10/21 1347     Ammonia 18 umol/L     Comprehensive Metabolic Panel [[id number]]  (Abnormal) Collected: 12/10/21 1243    Specimen: Blood Updated: 12/10/21 1345     Glucose 140 mg/dL      BUN 38 mg/dL      Creatinine 2.04 mg/dL      Sodium 140 mmol/L      Potassium 4.4 mmol/L      Comment: Slight hemolysis detected by analyzer. Results may be affected.        Chloride 104 mmol/L      CO2 25.0 mmol/L      Calcium 9.5 mg/dL      Total Protein 7.2 g/dL      Albumin 3.40 g/dL      ALT (SGPT) 32 U/L      AST (SGOT) 59 U/L      Alkaline Phosphatase  75 U/L      Total Bilirubin 0.7 mg/dL      eGFR Non African Amer 32 mL/min/1.73      Globulin 3.8 gm/dL      A/G Ratio 0.9 g/dL      BUN/Creatinine Ratio 18.6     Anion Gap 11.0 mmol/L     Narrative:      GFR Normal >60  Chronic Kidney Disease <60  Kidney Failure <15      CK [[id number]]  (Abnormal) Collected: 12/10/21 1243    Specimen: Blood Updated: 12/10/21 1345     Creatine Kinase 917 U/L     COVID PRE-OP / PRE-PROCEDURE SCREENING ORDER (NO ISOLATION) - Swab, Nasopharynx [[id number]]  (Normal) Collected: 12/10/21 1244    Specimen: Swab from Nasopharynx Updated: 12/10/21 1343    Narrative:      The following orders were created for panel order COVID PRE-OP / PRE-PROCEDURE SCREENING ORDER (NO ISOLATION) - Swab, Nasopharynx.  Procedure                               Abnormality         Status                     ---------                               -----------         ------                     COVID-19,[facility]...[[id number]]  Normal              Final result                 Please view results for these tests on the individual orders.    COVID-19,[facility] CEPHEID/SINDY NP SWAB IN TRANSPORT MEDIA 8-12 HR TAT - Swab, Nasopharynx [[id number]]  (Normal) Collected: 12/10/21 1244    Specimen: Swab from Nasopharynx Updated: 12/10/21 1343     COVID19 Not Detected    Narrative:      Fact sheet for providers: https://www.fda.gov/media/971505/download    Fact sheet for patients: https://www.fda.gov/media/403602/download    Test performed by PCR.    Troponin [[id number]]  (Abnormal) Collected: 12/10/21 1243    Specimen: Blood Updated: 12/10/21 1343     Troponin T 0.088 ng/mL     Narrative:      Troponin T Reference Range:  <= 0.03 ng/mL-   Negative for AMI  >0.03 ng/mL-     Abnormal for myocardial necrosis.  Clinicians would have to utilize clinical acumen, EKG, Troponin and serial changes to determine if it is an Acute Myocardial Infarction or myocardial injury due to an underlying chronic condition.       Results  may be falsely decreased if patient taking Biotin.      Lactic Acid, Plasma [[id number]]  (Normal) Collected: 12/10/21 1243    Specimen: Blood Updated: 12/10/21 1342     Lactate 1.5 mmol/L     Urinalysis, Microscopic Only - Urine, Catheter [[id number]]  (Abnormal) Collected: 12/10/21 1303    Specimen: Urine, Catheter Updated: 12/10/21 1320     RBC, UA 31-50 /HPF      WBC, UA Too Numerous to Count /HPF      Bacteria, UA None Seen /HPF      Squamous Epithelial Cells, UA 0-2 /HPF      Hyaline Casts, UA 0-2 /LPF      Methodology Automated Microscopy    Urinalysis With Culture If Indicated - Urine, Catheter [[id number]]  (Abnormal) Collected: 12/10/21 1303    Specimen: Urine, Catheter Updated: 12/10/21 1320     Color, UA Yellow     Appearance, UA Cloudy     pH, UA 6.0     Specific Gravity, UA 1.016     Glucose,  mg/dL (Trace)     Ketones, UA Trace     Bilirubin, UA Negative     Blood, UA Large (3+)     Protein, UA >=300 mg/dL (3+)     Leuk Esterase, UA Moderate (2+)     Nitrite, UA Negative     Urobilinogen, UA 0.2 E.U./dL    Urine Culture - Urine, Urine, Catheter [[id number]] Collected: 12/10/21 1303    Specimen: Urine, Catheter Updated: 12/10/21 1320    CBC & Differential [[id number]]  (Abnormal) Collected: 12/10/21 1243    Specimen: Blood Updated: 12/10/21 1320    Narrative:      The following orders were created for panel order CBC & Differential.  Procedure                               Abnormality         Status                     ---------                               -----------         ------                     CBC Auto Differential[[id number]]        Abnormal            Final result                 Please view results for these tests on the individual orders.    CBC Auto Differential [[id number]]  (Abnormal) Collected: 12/10/21 1243    Specimen: Blood Updated: 12/10/21 1320     WBC 11.26 10*3/mm3      RBC 4.58 10*6/mm3      Hemoglobin 13.0 g/dL      Hematocrit 41.7 %      MCV 91.0 fL      MCH 28.4 pg       MCHC 31.2 g/dL      RDW 13.5 %      RDW-SD 45.2 fl      MPV 11.5 fL      Platelets 208 10*3/mm3      Neutrophil % 82.1 %      Lymphocyte % 4.7 %      Monocyte % 11.4 %      Eosinophil % 0.9 %      Basophil % 0.4 %      Immature Grans % 0.5 %      Neutrophils, Absolute 9.24 10*3/mm3      Lymphocytes, Absolute 0.53 10*3/mm3      Monocytes, Absolute 1.28 10*3/mm3      Eosinophils, Absolute 0.10 10*3/mm3      Basophils, Absolute 0.05 10*3/mm3      Immature Grans, Absolute 0.06 10*3/mm3      nRBC 0.1 /100 WBC         Data Review:  Results from last 7 days   Lab Units 12/12/21  0336 12/11/21  0329 12/11/21  0329 12/10/21  1243 12/10/21  1243   SODIUM mmol/L 137  --  142  --  140   POTASSIUM mmol/L 3.8  --  3.8  --  4.4   CHLORIDE mmol/L 104  --  109*  --  104   CO2 mmol/L 20.1*  --  21.6*  --  25.0   BUN mg/dL 51*  --  41*  --  38*   CREATININE mg/dL 2.10*  --  1.95*  --  2.04*   GLUCOSE mg/dL 135*   < > 141*   < > 140*   CALCIUM mg/dL 8.1*  --  8.8  --  9.5    < > = values in this interval not displayed.     Results from last 7 days   Lab Units 12/12/21  0336 12/11/21  0329 12/10/21  1243   WBC 10*3/mm3 7.26 8.74 11.26*   HEMOGLOBIN g/dL 9.0* 11.4* 13.0   HEMATOCRIT % 27.6* 34.3* 41.7   PLATELETS 10*3/mm3 159 179 208     Results from last 7 days   Lab Units 12/06/21  1449   TSH uIU/mL 0.592     Results from last 7 days   Lab Units 12/11/21 0329   HEMOGLOBIN A1C % 5.44     Lab Results   Lab Value Date/Time    TROPONINT 0.086 (C) 12/12/2021 0336    TROPONINT 0.088 (C) 12/10/2021 1243    TROPONINT 0.086 (C) 10/06/2021 0600    TROPONINT 0.061 (C) 07/19/2021 0442    TROPONINT 0.068 (C) 07/18/2021 2229    TROPONINT 0.065 (C) 07/07/2021 2025    TROPONINT 0.039 (C) 06/30/2020 0439    TROPONINT 0.038 (C) 06/29/2020 1559    TROPONINT 0.035 (C) 06/29/2020 0948    TROPONINT 0.030 06/29/2020 0507    TROPONINT 0.035 (C) 12/09/2019 0312    TROPONINT 0.035 (C) 10/15/2019 1542    TROPONINT 0.037 (C) 10/03/2019 1959    TROPONINT 1.010  (C) 08/17/2019 0540    TROPONINT 1.100 (C) 08/15/2019 2059    TROPONINT 0.811 (C) 08/15/2019 1516    TROPONINT 0.396 (C) 08/15/2019 1000    TROPONINT 0.183 (C) 08/15/2019 0420    TROPONINT 0.033 (C) 08/14/2019 2212    TROPONINT 0.031 (C) 06/28/2019 1324    TROPONINT 0.035 (C) 06/28/2019 0719    TROPONINT 0.037 (C) 06/28/2019 0349    TROPONINT 0.039 (C) 06/27/2019 1842    TROPONINT 0.024 05/06/2019 1013    TROPONINT 0.031 (C) 05/06/2019 0331    TROPONINT 0.033 (C) 05/05/2019 2131    TROPONINT 0.034 (H) 02/16/2019 1958    TROPONINT 0.037 (H) 02/16/2019 1726    TROPONINT 0.015 09/22/2018 2107    TROPONINT 0.013 08/03/2018 1719    TROPONINT 0.027 03/08/2018 2307    TROPONINT 0.068 (H) 11/23/2017 1327    TROPONINT 0.099 (H) 11/22/2017 2355    TROPONINT 0.115 (C) 11/22/2017 1805    TROPONINT 0.141 (C) 11/22/2017 1256    TROPONINT 0.014 11/21/2017 1557         Results from last 7 days   Lab Units 12/11/21  0329 12/10/21  1243 12/06/21  1449   ALK PHOS U/L 59 75 103   BILIRUBIN mg/dL 0.5 0.7 1.0   ALT (SGPT) U/L 25 32 17   AST (SGOT) U/L 38 59* 31     Results from last 7 days   Lab Units 12/06/21  1449   TSH uIU/mL 0.592     Results from last 7 days   Lab Units 12/11/21  0329   HEMOGLOBIN A1C % 5.44     Glucose   Date/Time Value Ref Range Status   12/13/2021 1059 156 (H) 70 - 130 mg/dL Final     Comment:     Meter: [id number] : [id number] Workman Ramesh HARKINS   12/13/2021 0658 123 70 - 130 mg/dL Final     Comment:     Meter: [id number] : [id number] Frederick Panda RAVI   12/12/2021 2227 140 (H) 70 - 130 mg/dL Final     Comment:     Meter: [id number] : [id number] Ankush HARKINS   12/12/2021 1609 133 (H) 70 - 130 mg/dL Final     Comment:     Meter: [id number] : [id number] Guerrero Felix CNA   12/12/2021 1141 158 (H) 70 - 130 mg/dL Final     Comment:     Meter: [id number] : [id number] Guerrero Felix CNA   12/12/2021 0635 116 70 - 130 mg/dL Final     Comment:     Meter: [id number] : [id number] Ankush HARKINS    12/11/2021 2135 198 (H) 70 - 130 mg/dL Final     Comment:     Meter: [id number] : [id number] Ankush HARKINS   12/11/2021 1612 164 (H) 70 - 130 mg/dL Final     Comment:     Meter: [id number] : [id number] Cecil HARKINS           Past Medical History:   Diagnosis Date   • A-fib ([facility])    • Arthritis    • Asthma    • CAD (coronary artery disease)    • Cardiomyopathy ([facility])    • Cataract    • CHF (congestive heart failure) ([facility])    • CKD (chronic kidney disease), stage III ([facility])    • COPD (chronic obstructive pulmonary disease) ([facility])    • Diabetes mellitus ([facility])    • Disease of thyroid gland    • Emphysema, unspecified ([facility])    • Glaucoma    • Hyperlipidemia    • Hypertension    • Kidney stone    • Myocardial infarct, old    • Neuropathy    • Osteoarthritis    • Osteoporosis    • Permanent atrial fibrillation ([facility]) 6/30/2020   • PVD (peripheral vascular disease) ([facility])    • Renal disorder    • Shingles        Assessment:  Active Hospital Problems    Diagnosis  POA   • **Acute metabolic encephalopathy [G93.41]  Yes   • Cardiomyopathy ([facility]) [I42.9]  Unknown   • Recurrent falls [R29.6]  Not Applicable   • Hypothyroidism [E03.9]  Yes   • CAD (coronary artery disease) [I25.10]  Yes   • Recurrent left pleural effusion [J90]  Yes   • Permanent atrial fibrillation ([facility]) [I48.21]  Yes   • Acute UTI (urinary tract infection) [N39.0]  Yes   • Chronic renal insufficiency, stage 4 (severe) ([facility]) [N18.4]  Yes   • Primary osteoarthritis of left knee [M17.12]  Yes   • Essential hypertension [I10]  Yes   • Type 2 diabetes mellitus with stage 4 chronic kidney disease, with long-term current use of insulin ([facility]) [E11.22, N18.4, Z79.4]  Not Applicable   • COPD (chronic obstructive pulmonary disease) ([facility]) [J44.9]  Yes      Resolved Hospital Problems   No resolved problems to display.       Plan:  Continue with antibiotics for UTI and await cultures. Follow lab.  cardiology consult noted.  Will need SNU for rehab.  Wound  nurse.    Bautista Gonzalez MD  12/13/2021  11:24 EST

## 2021-12-13 NOTE — SIGNIFICANT NOTE
"   12/13/21 1036   OTHER   Discipline occupational therapist   Rehab Time/Intention   Session Not Performed other (see comments)  (Pt requested therapy to s/o - \"I don't want your services, go on and take a hike\". OT to f/u tomorrow one more time as pt is from a rehab facility.)   Recommendation   OT - Next Appointment 12/14/21     "

## 2021-12-13 NOTE — DISCHARGE PLACEMENT REQUEST
"Joshua Ferguson P (81 y.o. Male)             Date of Birth Social Security Number Address Home Phone MRN    1940  [address] [phone] [id number]    [employer] Marital Status             None        Admission Date Admission Type Admitting Provider Attending Provider Department, Room/Bed    12/10/21 Emergency Adrián Sparks MD Beard, Lyle E, MD [address], [address]    Discharge Date Discharge Disposition Discharge Destination                         Attending Provider: Bautista Gonzalez MD    Allergies: Morphine, Atorvastatin, Oxycontin [Oxycodone Hcl]    Isolation: None   Infection: None   Code Status: CPR   Advance Care Planning Activity    Ht: 185.4 cm (72.99\")   Wt: 104 kg (228 lb 11.2 oz)    Admission Cmt: None   Principal Problem: Acute metabolic encephalopathy [G93.41]                 Active Insurance as of 12/10/2021     Primary Coverage     Payor Plan Insurance Group Employer/Plan Group    MEDICARE MEDICARE A & B      Payor Plan Address Payor Plan Phone Number Payor Plan Fax Number Effective Dates    [address] [phone]  11/1/2006 - None Entered    [facility] [id number]       Subscriber Name Subscriber Birth Date Member ID       JOSHUA FERGUSON P 1940 [id number]           Secondary Coverage     Payor Plan Insurance Group Employer/Plan Group    HUMANA HUMANA [id number]     Payor Plan Address Payor Plan Phone Number Payor Plan Fax Number Effective Dates    [address] [address] [phone]  1/1/2013 - None Entered    [facility] [address]       Subscriber Name Subscriber Birth Date Member ID       JOSHUA FERGUSON P 1940 [id number]                 Emergency Contacts      (Rel.) Home Phone Work Phone Mobile Phone    Jaz Grigsby (Power of ) [phone] -- [phone]    ISAIAS FERGUSON (Son) [phone] -- --              "

## 2021-12-13 NOTE — NURSING NOTE
" notified this RN pt refused labs. This RN went into pts room, A&Ox1. Heart monitor was removed, educated pt why monitor must be on. Pt began to become aggressive stating \"I will choke you if you put that on me.\" Will continue to monitor and try to replace monitor.   "

## 2021-12-14 NOTE — PLAN OF CARE
Problem: Adult Inpatient Plan of Care  Goal: Plan of Care Review  Outcome: Ongoing, Progressing  Flowsheets  Taken 12/14/2021 1608 by Brigid Reyes, RN  Progress: no change  Outcome Summary: pt wanting to rest most of day, encouraged more activity, minimally participated with PT/OT. Turns Q2, can be irritable when doesn't want to be bothered. IVF, IV iron today. Not eating much. Wound care as ordered to left hip. Will closely monitor.  Taken 12/14/2021 1248 by Aislinn Parsons OT  Plan of Care Reviewed With: patient   Goal Outcome Evaluation:           Progress: no change  Outcome Summary: pt wanting to rest most of day, encouraged more activity, minimally participated with PT/OT. Turns Q2, can be irritable when doesn't want to be bothered. IVF, IV iron today. Not eating much. Wound care as ordered to left hip. Will closely monitor.

## 2021-12-14 NOTE — PLAN OF CARE
Goal Outcome Evaluation:  Plan of Care Reviewed With: patient           Outcome Summary: Pt is a 80 y/o M admit s/p fall. W/u for UTI, acute metabolic enceph/traumatic rhabdomyolitis and AMS. HIP XRAY (-) for fracture. PMH includes DM2, COPD, CAD, HTN and OA. Pt resides alone and reports indep with ADLs and mobility at baseline. Pt requires max A x 2 for bed mobility to perform jolene care and linen changes 2/2 incontinence episode. Pt requires max-dep A for jolene care and mod A for UB dressing. Pt appears significantly below ADL baseline and will benefit from skilled IP OT services at 2x/week. OT recommends d/c to SNF. LTC may be required post therapy.    Patient was not wearing a face mask during this therapy encounter. Therapist used appropriate personal protective equipment including mask, gloves, and eye protection.  Mask used was standard procedure mask. Appropriate PPE was worn during the entire therapy session. Hand hygiene was completed before and after therapy session. Patient is not in enhanced droplet precautions.

## 2021-12-14 NOTE — NURSING NOTE
Monitor tech notified this RN that pt was off the heart monitor. Went into room and educated why pt needed monitor to stay on. Pt became aggressive, pointing fingers in staffs face and proceeded to grab staff.  Monitor placed on pts back. Chata.

## 2021-12-14 NOTE — PLAN OF CARE
"Goal Outcome Evaluation:  Plan of Care Reviewed With: patient        Progress: improving  Outcome Summary: Pt continues to be confused during the night. A&Ox2. At times becomes easily angry with staff while trying to turn or change, with threatening and inappropriate comments. Bit through IV tubing during the night when asked why pt stated \"I have nothing better to do.\" On IV abx and fluids. Room air. Q2 turns and incontience care as needed. ABD pad changed on Lt hip wound. VSS. Awaiting am labs. Will closely monitor.  "

## 2021-12-14 NOTE — PROGRESS NOTES
"DAILY PROGRESS NOTE  [facility]    Patient Identification:  Name: Froilan Helton  Age: 81 y.o.  Sex: male  :  1940  MRN: [id number]         Primary Care Physician: Fortunato De Leon MD    Subjective:  Interval History:He complains of pain. He is weak.    Objective:    Scheduled Meds:amiodarone, 200 mg, Oral, Q12H  apixaban, 2.5 mg, Oral, Q12H  atorvastatin, 10 mg, Oral, Nightly  budesonide-formoterol, 2 puff, Inhalation, BID - RT  carvedilol, 3.125 mg, Oral, Q12H  cefTRIAXone, 1 g, Intravenous, Q24H  collagenase, 1 application, Topical, Q24H  docusate sodium, 100 mg, Oral, BID  fluticasone, 2 spray, Each Nare, Daily  insulin lispro, 0-9 Units, Subcutaneous, TID AC  levothyroxine, 224 mcg, Oral, Daily  sodium chloride, 10 mL, Intravenous, Q12H  tamsulosin, 0.4 mg, Oral, Daily  cyanocobalamin, 1,000 mcg, Oral, Daily  vitamin D, 50,000 Units, Oral, Daily      Continuous Infusions:O2, 2 L/min, Last Rate: 2 L/min (12/10/21 2127)  sodium chloride, 75 mL/hr, Last Rate: 75 mL/hr (21)        Vital signs in last 24 hours:  Temp:  [97.5 °F (36.4 °C)-97.7 °F (36.5 °C)] 97.5 °F (36.4 °C)  Heart Rate:  [52-59] 59  Resp:  [18] 18  BP: (132-166)/(62-76) 166/76    Intake/Output:    Intake/Output Summary (Last 24 hours) at 2021 1252  Last data filed at 2021 0725  Gross per 24 hour   Intake 460 ml   Output --   Net 460 ml       Exam:  /76 (BP Location: Left arm, Patient Position: Lying)   Pulse 59   Temp 97.5 °F (36.4 °C) (Oral)   Resp 18   Ht 185.4 cm (73\")   Wt 104 kg (228 lb 11.2 oz)   SpO2 96%   BMI 30.17 kg/m²     General Appearance:    Alert, cooperative, no distress   Head:    Normocephalic, without obvious abnormality, atraumatic   Eyes:       Throat:   Lips, tongue, gums normal   Neck:   Supple, symmetrical, trachea midline, no JVD   Lungs:     Clear to auscultation bilaterally, respirations unlabored   Chest Wall:    No tenderness or deformity    Heart:    Regular " rate and rhythm, S1 and S2 normal, no murmur,no  Rub or gallop   Abdomen:     Soft, nontender, bowel sounds active, no masses, no organomegaly    Extremities:   Extremities normal, atraumatic, no cyanosis or edema   Pulses:      Skin:   Skin is warm and dry,  Skin wounds and abrasions   Neurologic:   no focal deficits noted      Lab Results (last 72 hours)     Procedure Component Value Units Date/Time    Blood Culture - Blood, Arm, Right [[id number]]  (Normal) Collected: 12/10/21 1243    Specimen: Blood from Arm, Right Updated: 12/11/21 1315     Blood Culture No growth at 24 hours    Blood Culture - Blood, Arm, Right [[id number]]  (Normal) Collected: 12/10/21 1243    Specimen: Blood from Arm, Right Updated: 12/11/21 1315     Blood Culture No growth at 24 hours    POC Glucose Once [[id number]]  (Abnormal) Collected: 12/11/21 1101    Specimen: Blood Updated: 12/11/21 1106     Glucose 199 mg/dL      Comment: RN Notified R and V Meter: [id number] : [id number] Cecil HARKINS       POC Glucose Once [[id number]]  (Abnormal) Collected: 12/11/21 0634    Specimen: Blood Updated: 12/11/21 0636     Glucose 150 mg/dL      Comment: Meter: [id number] : [id number] Ankush HARKINS       Lactic Acid, Plasma [[id number]]  (Normal) Collected: 12/11/21 0329    Specimen: Blood Updated: 12/11/21 0522     Lactate 1.3 mmol/L     Comprehensive Metabolic Panel [[id number]]  (Abnormal) Collected: 12/11/21 0329    Specimen: Blood Updated: 12/11/21 0513     Glucose 141 mg/dL      BUN 41 mg/dL      Creatinine 1.95 mg/dL      Sodium 142 mmol/L      Potassium 3.8 mmol/L      Chloride 109 mmol/L      CO2 21.6 mmol/L      Calcium 8.8 mg/dL      Total Protein 5.9 g/dL      Albumin 2.60 g/dL      ALT (SGPT) 25 U/L      AST (SGOT) 38 U/L      Alkaline Phosphatase 59 U/L      Total Bilirubin 0.5 mg/dL      eGFR Non African Amer 33 mL/min/1.73      Globulin 3.3 gm/dL      A/G Ratio 0.8 g/dL      BUN/Creatinine Ratio 21.0     Anion Gap 11.4 mmol/L      Narrative:      GFR Normal >60  Chronic Kidney Disease <60  Kidney Failure <15      CBC Auto Differential [[id number]]  (Abnormal) Collected: 12/11/21 0329    Specimen: Blood Updated: 12/11/21 0458     WBC 8.74 10*3/mm3      RBC 3.94 10*6/mm3      Hemoglobin 11.4 g/dL      Hematocrit 34.3 %      MCV 87.1 fL      MCH 28.9 pg      MCHC 33.2 g/dL      RDW 13.7 %      RDW-SD 43.8 fl      MPV 11.3 fL      Platelets 179 10*3/mm3      Neutrophil % 75.3 %      Lymphocyte % 8.8 %      Monocyte % 14.1 %      Eosinophil % 1.0 %      Basophil % 0.3 %      Immature Grans % 0.5 %      Neutrophils, Absolute 6.58 10*3/mm3      Lymphocytes, Absolute 0.77 10*3/mm3      Monocytes, Absolute 1.23 10*3/mm3      Eosinophils, Absolute 0.09 10*3/mm3      Basophils, Absolute 0.03 10*3/mm3      Immature Grans, Absolute 0.04 10*3/mm3      nRBC 0.0 /100 WBC     Hemoglobin A1c [[id number]]  (Normal) Collected: 12/11/21 0329    Specimen: Blood Updated: 12/11/21 0458     Hemoglobin A1C 5.44 %     Narrative:      Hemoglobin A1C Ranges:    Increased Risk for Diabetes  5.7% to 6.4%  Diabetes                     >= 6.5%  Diabetic Goal                < 7.0%    POC Glucose Once [[id number]]  (Abnormal) Collected: 12/10/21 2155    Specimen: Blood Updated: 12/10/21 2156     Glucose 167 mg/dL      Comment: Meter: [id number] : [id number] Ankush HARKINS       Ammonia [[id number]]  (Normal) Collected: 12/10/21 1314    Specimen: Blood Updated: 12/10/21 1347     Ammonia 18 umol/L     Comprehensive Metabolic Panel [[id number]]  (Abnormal) Collected: 12/10/21 1243    Specimen: Blood Updated: 12/10/21 1345     Glucose 140 mg/dL      BUN 38 mg/dL      Creatinine 2.04 mg/dL      Sodium 140 mmol/L      Potassium 4.4 mmol/L      Comment: Slight hemolysis detected by analyzer. Results may be affected.        Chloride 104 mmol/L      CO2 25.0 mmol/L      Calcium 9.5 mg/dL      Total Protein 7.2 g/dL      Albumin 3.40 g/dL      ALT (SGPT) 32 U/L      AST (SGOT) 59 U/L       Alkaline Phosphatase 75 U/L      Total Bilirubin 0.7 mg/dL      eGFR Non African Amer 32 mL/min/1.73      Globulin 3.8 gm/dL      A/G Ratio 0.9 g/dL      BUN/Creatinine Ratio 18.6     Anion Gap 11.0 mmol/L     Narrative:      GFR Normal >60  Chronic Kidney Disease <60  Kidney Failure <15      CK [[id number]]  (Abnormal) Collected: 12/10/21 1243    Specimen: Blood Updated: 12/10/21 1345     Creatine Kinase 917 U/L     COVID PRE-OP / PRE-PROCEDURE SCREENING ORDER (NO ISOLATION) - Swab, Nasopharynx [[id number]]  (Normal) Collected: 12/10/21 1244    Specimen: Swab from Nasopharynx Updated: 12/10/21 1343    Narrative:      The following orders were created for panel order COVID PRE-OP / PRE-PROCEDURE SCREENING ORDER (NO ISOLATION) - Swab, Nasopharynx.  Procedure                               Abnormality         Status                     ---------                               -----------         ------                     COVID-19,[facility]...[[id number]]  Normal              Final result                 Please view results for these tests on the individual orders.    COVID-19,[facility] CEPHEID/SINDY NP SWAB IN TRANSPORT MEDIA 8-12 HR TAT - Swab, Nasopharynx [[id number]]  (Normal) Collected: 12/10/21 1244    Specimen: Swab from Nasopharynx Updated: 12/10/21 1343     COVID19 Not Detected    Narrative:      Fact sheet for providers: https://www.fda.gov/media/559928/download    Fact sheet for patients: https://www.fda.gov/media/194923/download    Test performed by PCR.    Troponin [[id number]]  (Abnormal) Collected: 12/10/21 1243    Specimen: Blood Updated: 12/10/21 1343     Troponin T 0.088 ng/mL     Narrative:      Troponin T Reference Range:  <= 0.03 ng/mL-   Negative for AMI  >0.03 ng/mL-     Abnormal for myocardial necrosis.  Clinicians would have to utilize clinical acumen, EKG, Troponin and serial changes to determine if it is an Acute Myocardial Infarction or myocardial injury due to an underlying  chronic condition.       Results may be falsely decreased if patient taking Biotin.      Lactic Acid, Plasma [[id number]]  (Normal) Collected: 12/10/21 1243    Specimen: Blood Updated: 12/10/21 1342     Lactate 1.5 mmol/L     Urinalysis, Microscopic Only - Urine, Catheter [[id number]]  (Abnormal) Collected: 12/10/21 1303    Specimen: Urine, Catheter Updated: 12/10/21 1320     RBC, UA 31-50 /HPF      WBC, UA Too Numerous to Count /HPF      Bacteria, UA None Seen /HPF      Squamous Epithelial Cells, UA 0-2 /HPF      Hyaline Casts, UA 0-2 /LPF      Methodology Automated Microscopy    Urinalysis With Culture If Indicated - Urine, Catheter [[id number]]  (Abnormal) Collected: 12/10/21 1303    Specimen: Urine, Catheter Updated: 12/10/21 1320     Color, UA Yellow     Appearance, UA Cloudy     pH, UA 6.0     Specific Gravity, UA 1.016     Glucose,  mg/dL (Trace)     Ketones, UA Trace     Bilirubin, UA Negative     Blood, UA Large (3+)     Protein, UA >=300 mg/dL (3+)     Leuk Esterase, UA Moderate (2+)     Nitrite, UA Negative     Urobilinogen, UA 0.2 E.U./dL    Urine Culture - Urine, Urine, Catheter [[id number]] Collected: 12/10/21 1303    Specimen: Urine, Catheter Updated: 12/10/21 1320    CBC & Differential [[id number]]  (Abnormal) Collected: 12/10/21 1243    Specimen: Blood Updated: 12/10/21 1320    Narrative:      The following orders were created for panel order CBC & Differential.  Procedure                               Abnormality         Status                     ---------                               -----------         ------                     CBC Auto Differential[[id number]]        Abnormal            Final result                 Please view results for these tests on the individual orders.    CBC Auto Differential [[id number]]  (Abnormal) Collected: 12/10/21 1243    Specimen: Blood Updated: 12/10/21 1320     WBC 11.26 10*3/mm3      RBC 4.58 10*6/mm3      Hemoglobin 13.0 g/dL      Hematocrit 41.7 %      MCV  91.0 fL      MCH 28.4 pg      MCHC 31.2 g/dL      RDW 13.5 %      RDW-SD 45.2 fl      MPV 11.5 fL      Platelets 208 10*3/mm3      Neutrophil % 82.1 %      Lymphocyte % 4.7 %      Monocyte % 11.4 %      Eosinophil % 0.9 %      Basophil % 0.4 %      Immature Grans % 0.5 %      Neutrophils, Absolute 9.24 10*3/mm3      Lymphocytes, Absolute 0.53 10*3/mm3      Monocytes, Absolute 1.28 10*3/mm3      Eosinophils, Absolute 0.10 10*3/mm3      Basophils, Absolute 0.05 10*3/mm3      Immature Grans, Absolute 0.06 10*3/mm3      nRBC 0.1 /100 WBC         Data Review:  Results from last 7 days   Lab Units 12/14/21 0409 12/13/21 1246 12/13/21  1246 12/12/21  0336 12/12/21  0336   SODIUM mmol/L 136  --  134*  --  137   POTASSIUM mmol/L 3.9  --  3.9  --  3.8   CHLORIDE mmol/L 106  --  105  --  104   CO2 mmol/L 19.4*  --  20.9*  --  20.1*   BUN mg/dL 37*  --  42*  --  51*   CREATININE mg/dL 1.58*  --  1.89*  --  2.10*   GLUCOSE mg/dL 109*   < > 168*   < > 135*   CALCIUM mg/dL 8.4*  --  8.3*  --  8.1*    < > = values in this interval not displayed.     Results from last 7 days   Lab Units 12/14/21 0409 12/13/21  1246 12/12/21  0336   WBC 10*3/mm3 7.83 6.87 7.26   HEMOGLOBIN g/dL 9.7* 9.6* 9.0*   HEMATOCRIT % 29.7* 29.7* 27.6*   PLATELETS 10*3/mm3 190 173 159         Results from last 7 days   Lab Units 12/11/21  0329   HEMOGLOBIN A1C % 5.44     Lab Results   Lab Value Date/Time    TROPONINT 0.086 (C) 12/12/2021 0336    TROPONINT 0.088 (C) 12/10/2021 1243    TROPONINT 0.086 (C) 10/06/2021 0600    TROPONINT 0.061 (C) 07/19/2021 0442    TROPONINT 0.068 (C) 07/18/2021 2229    TROPONINT 0.065 (C) 07/07/2021 2025    TROPONINT 0.039 (C) 06/30/2020 0439    TROPONINT 0.038 (C) 06/29/2020 1559    TROPONINT 0.035 (C) 06/29/2020 0948    TROPONINT 0.030 06/29/2020 0507    TROPONINT 0.035 (C) 12/09/2019 0312    TROPONINT 0.035 (C) 10/15/2019 1542    TROPONINT 0.037 (C) 10/03/2019 1959    TROPONINT 1.010 (C) 08/17/2019 0540    TROPONINT 1.100 (C)  08/15/2019 2059    TROPONINT 0.811 (C) 08/15/2019 1516    TROPONINT 0.396 (C) 08/15/2019 1000    TROPONINT 0.183 (C) 08/15/2019 0420    TROPONINT 0.033 (C) 08/14/2019 2212    TROPONINT 0.031 (C) 06/28/2019 1324    TROPONINT 0.035 (C) 06/28/2019 0719    TROPONINT 0.037 (C) 06/28/2019 0349    TROPONINT 0.039 (C) 06/27/2019 1842    TROPONINT 0.024 05/06/2019 1013    TROPONINT 0.031 (C) 05/06/2019 0331    TROPONINT 0.033 (C) 05/05/2019 2131    TROPONINT 0.034 (H) 02/16/2019 1958    TROPONINT 0.037 (H) 02/16/2019 1726    TROPONINT 0.015 09/22/2018 2107    TROPONINT 0.013 08/03/2018 1719    TROPONINT 0.027 03/08/2018 2307    TROPONINT 0.068 (H) 11/23/2017 1327    TROPONINT 0.099 (H) 11/22/2017 2355    TROPONINT 0.115 (C) 11/22/2017 1805    TROPONINT 0.141 (C) 11/22/2017 1256    TROPONINT 0.014 11/21/2017 1557         Results from last 7 days   Lab Units 12/11/21  0329 12/10/21  1243   ALK PHOS U/L 59 75   BILIRUBIN mg/dL 0.5 0.7   ALT (SGPT) U/L 25 32   AST (SGOT) U/L 38 59*         Results from last 7 days   Lab Units 12/11/21  0329   HEMOGLOBIN A1C % 5.44     Glucose   Date/Time Value Ref Range Status   12/14/2021 1121 133 (H) 70 - 130 mg/dL Final     Comment:     Meter: [id number] : [id number] Catrachita Vinson NA   12/13/2021 2026 149 (H) 70 - 130 mg/dL Final     Comment:     Meter: [id number] : [id number] Maximo Shara [facility]   12/13/2021 1607 149 (H) 70 - 130 mg/dL Final     Comment:     Meter: [id number] : [id number] Cecil Chandler SHAHANA   12/13/2021 1059 156 (H) 70 - 130 mg/dL Final     Comment:     Meter: [id number] : [id number] Cecil Chandler SHAHANA   12/13/2021 0658 123 70 - 130 mg/dL Final     Comment:     Meter: [id number] : [id number] Frederick Amarilys RODRIGUES   12/12/2021 2227 140 (H) 70 - 130 mg/dL Final     Comment:     Meter: [id number] : [id number] Ankush Chawla    12/12/2021 1609 133 (H) 70 - 130 mg/dL Final     Comment:     Meter: [id number] : [id number] Guerrero Felix CNA   12/12/2021 1141 158 (H) 70 -  130 mg/dL Final     Comment:     Meter: [id number] : [id number] Guerrero Felix EFREM           Past Medical History:   Diagnosis Date   • A-fib ([facility])    • Arthritis    • Asthma    • CAD (coronary artery disease)    • Cardiomyopathy ([facility])    • Cataract    • CHF (congestive heart failure) ([facility])    • CKD (chronic kidney disease), stage III ([facility])    • COPD (chronic obstructive pulmonary disease) ([facility])    • Diabetes mellitus ([facility])    • Disease of thyroid gland    • Emphysema, unspecified ([facility])    • Glaucoma    • Hyperlipidemia    • Hypertension    • Kidney stone    • Myocardial infarct, old    • Neuropathy    • Osteoarthritis    • Osteoporosis    • Permanent atrial fibrillation ([facility]) 6/30/2020   • PVD (peripheral vascular disease) ([facility])    • Renal disorder    • Shingles        Assessment:  Active Hospital Problems    Diagnosis  POA   • **Acute metabolic encephalopathy [G93.41]  Yes   • Cardiomyopathy ([facility]) [I42.9]  Unknown   • Recurrent falls [R29.6]  Not Applicable   • Hypothyroidism [E03.9]  Yes   • CAD (coronary artery disease) [I25.10]  Yes   • Recurrent left pleural effusion [J90]  Yes   • Permanent atrial fibrillation ([facility]) [I48.21]  Yes   • Acute UTI (urinary tract infection) [N39.0]  Yes   • Chronic renal insufficiency, stage 4 (severe) ([facility]) [N18.4]  Yes   • Primary osteoarthritis of left knee [M17.12]  Yes   • Essential hypertension [I10]  Yes   • Type 2 diabetes mellitus with stage 4 chronic kidney disease, with long-term current use of insulin ([facility]) [E11.22, N18.4, Z79.4]  Not Applicable   • COPD (chronic obstructive pulmonary disease) ([facility]) [J44.9]  Yes      Resolved Hospital Problems   No resolved problems to display.       Plan:  Continue with antibiotics for UTI and await cultures. Follow lab.  cardiology consult noted.  Will need SNU for rehab.  Wound nurse.    Bautista Gonzalez MD  12/14/2021  12:52 EST

## 2021-12-14 NOTE — CASE MANAGEMENT/SOCIAL WORK
Continued Stay Note  [facility]     Patient Name: Froilan Helton  MRN: [id number]  Today's Date: 12/14/2021    Admit Date: 12/10/2021     Discharge Plan     Row Name 12/14/21 1507       Plan    Plan [facility] - pending acceptance    Plan Comments Spoke with Travis, she is reviewing and plans to speak with granddaughter Jaz prior to accepting.  CCP will continue to follow.  BHumeniuk RN.                          Becky S. Humeniuk, RN

## 2021-12-14 NOTE — THERAPY EVALUATION
Patient Name: Froilan Helton  : 1940    MRN: [id number]                              Today's Date: 2021       Admit Date: 12/10/2021    Visit Dx:     ICD-10-CM ICD-9-CM   1. Acute metabolic encephalopathy  G93.41 348.31   2. Traumatic rhabdomyolysis, initial encounter ([facility])  T79.6XXA 958.6   3. Chronic kidney disease, unspecified CKD stage  N18.9 585.9   4. Pressure injury of left thigh, stage 3 ([facility])  L89.223 707.09     707.23     Patient Active Problem List   Diagnosis   • COPD (chronic obstructive pulmonary disease) ([facility])   • Type 2 diabetes mellitus with stage 4 chronic kidney disease, with long-term current use of insulin ([facility])   • Essential hypertension   • Primary osteoarthritis of left knee   • Chronic renal insufficiency, stage 4 (severe) ([facility])   • Class 2 severe obesity due to excess calories with serious comorbidity in adult ([facility])   • Physical debility   • Acute UTI (urinary tract infection)   • Permanent atrial fibrillation ([facility])   • H/O noncompliance with medical treatment, presenting hazards to health   • Myxedema   • Acute on chronic combined systolic and diastolic CHF (congestive heart failure) ([facility])   • Generalized weakness   • Recurrent left pleural effusion   • Fall on same level as cause of accidental injury   • Gross hematuria   • CAD (coronary artery disease)   • HLD (hyperlipidemia)   • Hypothyroidism   • CKD (chronic kidney disease) stage 3, GFR 30-59 ml/min ([facility])   • Acute metabolic encephalopathy   • Cardiomyopathy ([facility])   • Recurrent falls     Past Medical History:   Diagnosis Date   • A-fib ([facility])    • Arthritis    • Asthma    • CAD (coronary artery disease)    • Cardiomyopathy ([facility])    • Cataract    • CHF (congestive heart failure) ([facility])    • CKD (chronic kidney disease), stage III ([facility])    • COPD (chronic obstructive pulmonary disease) ([facility])    • Diabetes mellitus ([facility])    • Disease of thyroid gland    • Emphysema, unspecified ([facility])    • Glaucoma    • Hyperlipidemia    •  Hypertension    • Kidney stone    • Myocardial infarct, old    • Neuropathy    • Osteoarthritis    • Osteoporosis    • Permanent atrial fibrillation ([facility]) 6/30/2020   • PVD (peripheral vascular disease) ([facility])    • Renal disorder    • Shingles      Past Surgical History:   Procedure Laterality Date   • COLONOSCOPY     • EYE SURGERY     • JOINT REPLACEMENT      right   • KIDNEY STONE SURGERY     • REPLACEMENT TOTAL KNEE     • TOE SURGERY        General Information     Row Name 12/14/21 1243          OT Time and Intention    Document Type evaluation  -     Mode of Treatment occupational therapy; co-treatment; physical therapy  -     Row Name 12/14/21 1243          General Information    Patient Profile Reviewed yes  -ONEAL     Prior Level of Function independent:; ADL's; all household mobility  -     Existing Precautions/Restrictions fall  -     Barriers to Rehab uncooperative  -     Row Name 12/14/21 1243          Occupational Profile    Reason for Services/Referral (Occupational Profile) Pt is a 82 y/o M admit s/p fall. W/u for UTI, acute metabolic enceph/traumatic rhabdomyolitis and AMS. HIP XRAY (-) for fracture. PMH includes DM2, COPD, CAD, HTN and OA. Pt resides alone and reports indep with ADLs and mobility at baseline.  -     Row Name 12/14/21 1243          Living Environment    Lives With alone  -     Row Name 12/14/21 1243          Home Main Entrance    Number of Stairs, Main Entrance none  -     Row Name 12/14/21 1243          Cognition    Orientation Status (Cognition) oriented to; person  -     Row Name 12/14/21 1243          Safety Issues, Functional Mobility    Safety Issues Affecting Function (Mobility) ability to follow commands; awareness of need for assistance; insight into deficits/self-awareness; judgment; problem-solving; safety precaution awareness; safety precautions follow-through/compliance  -     Impairments Affecting Function (Mobility) balance; endurance/activity tolerance;  strength; pain; range of motion (ROM); postural/trunk control  -           User Key  (r) = Recorded By, (t) = Taken By, (c) = Cosigned By    Initials Name Provider Type    Aislinn Pritchard OT Occupational Therapist                 Mobility/ADL's     Row Name 12/14/21 [facility]          Bed Mobility    Bed Mobility rolling left; rolling right  -ONEAL     Rolling Left [place] (Bed Mobility) maximum assist (25% patient effort); 2 person assist; verbal cues; nonverbal cues (demo/gesture)  -ONEAL     Rolling Right [place] (Bed Mobility) maximum assist (25% patient effort); 2 person assist; verbal cues; nonverbal cues (demo/gesture)  -ONEAL     Comment (Bed Mobility) Only able to complete rolling side to side for jolene care and linen mgmt due to incontinence episode. Pt refused attempt to EOB and was growing increasingly agitated.  -[facility] Name 12/14/21 [facility]          Transfers    Sit-Stand [place] (Transfers) not tested  -[facility] Name 12/14/21 [facility]          Functional Mobility    Functional Mobility- Ind. Level not tested  -[facility] Name 12/14/21 [facility]          Activities of Daily Living    BADL Assessment/Intervention toileting; upper body dressing  -[facility] Name 12/14/21 [facility]          Toileting Assessment/Training    [place] Level (Toileting) adjust/manage clothing; change pad/brief; perform perineal hygiene; dependent (less than 25% patient effort); set up; verbal cues; nonverbal cues (demo/gesture)  -ONEAL     Position (Toileting) supine  -[facility] Name 12/14/21 [facility]          Upper Body Dressing Assessment/Training    [place] Level (Upper Body Dressing) don; doff; front opening garment; moderate assist (50% patient effort)  -ONEAL     Position (Upper Body Dressing) long sitting  -           User Key  (r) = Recorded By, (t) = Taken By, (c) = Cosigned By    Initials Name Provider Type    Aislinn Pritchard OT Occupational Therapist               Obj/Interventions     Row Name 12/14/21  1248          Sensory Assessment (Somatosensory)    Sensory Assessment (Somatosensory) sensation intact  -ONEAL     Row Name 12/14/21 1248          Vision Assessment/Intervention    Visual Impairment/Limitations WFL  -ONEAL     Row Name 12/14/21 1248          Range of Motion Comprehensive    General Range of Motion no range of motion deficits identified  -[facility] Name 12/14/21 1248          Strength Comprehensive (MMT)    General Manual Muscle Testing (MMT) Assessment no strength deficits identified  -     Comment, General Manual Muscle Testing (MMT) Assessment generalized weakness but WFL  -[facility] Name 12/14/21 1248          Balance    Balance Assessment sitting static balance; sitting dynamic balance  -ONEAL     Static Sitting Balance mild impairment; unsupported; long sitting  -           User Key  (r) = Recorded By, (t) = Taken By, (c) = Cosigned By    Initials Name Provider Type    Aislinn Pritchard, OT Occupational Therapist               Goals/Plan     [facility] Name 12/14/21 1251          Bed Mobility Goal 1 (OT)    Activity/Assistive Device (Bed Mobility Goal 1, OT) bed mobility activities, all  -ONEAL     [place] Level/Cues Needed (Bed Mobility Goal 1, OT) minimum assist (75% or more patient effort)  -ONEAL     Time Frame (Bed Mobility Goal 1, OT) short term goal (STG); 2 weeks  -[facility] Name 12/14/21 1251          Transfer Goal 1 (OT)    Activity/Assistive Device (Transfer Goal 1, OT) transfers, all  -ONEAL     [place] Level/Cues Needed (Transfer Goal 1, OT) minimum assist (75% or more patient effort)  -ONEAL     Time Frame (Transfer Goal 1, OT) short term goal (STG); 2 weeks  -[facility] Name 12/14/21 1251          Bathing Goal 1 (OT)    Activity/Device (Bathing Goal 1, OT) bathing skills, all  -ONEAL     [place] Level/Cues Needed (Bathing Goal 1, OT) minimum assist (75% or more patient effort)  -ONEAL     Time Frame (Bathing Goal 1, OT) short term goal (STG); 2 weeks  -[facility] Name 12/14/21 1251           Dressing Goal 1 (OT)    Activity/Device (Dressing Goal 1, OT) dressing skills, all  -ONEAL     [place]/Cues Needed (Dressing Goal 1, OT) minimum assist (75% or more patient effort)  -ONEAL     Time Frame (Dressing Goal 1, OT) short term goal (STG); 2 weeks  -     Row Name 12/14/21 1251          Therapy Assessment/Plan (OT)    Planned Therapy Interventions (OT) activity tolerance training; functional balance retraining; occupation/activity based interventions; ROM/therapeutic exercise; strengthening exercise; transfer/mobility retraining; patient/caregiver education/training; neuromuscular control/coordination retraining; cognitive/visual perception retraining; edema control/reduction; BADL retraining; adaptive equipment training  -ONEAL           User Key  (r) = Recorded By, (t) = Taken By, (c) = Cosigned By    Initials Name Provider Type    Aislinn Pritchard, OT Occupational Therapist               Clinical Impression     Row Name 12/14/21 7737          Pain Scale: Numbers Pre/Post-Treatment    Pretreatment Pain Rating 7/10  -ONEAL     Posttreatment Pain Rating 9/10  -ONEAL     Pain Location - Side Left  -ONEAL     Pain Location hip  -ONEAL     Pain Intervention(s) Repositioned; Ambulation/increased activity  -     Row Name 12/14/21 9599          Plan of Care Review    Plan of Care Reviewed With patient  -ONEAL     Outcome Summary Pt is a 82 y/o M admit s/p fall. W/u for UTI, acute metabolic enceph/traumatic rhabdomyolitis and AMS. HIP XRAY (-) for fracture. PMH includes DM2, COPD, CAD, HTN and OA. Pt resides alone and reports indep with ADLs and mobility at baseline. Pt requires max A x 2 for bed mobility to perform jolene care and linen changes 2/2 incontinence episode. Pt requires max-dep A for jolene care and mod A for UB dressing. Pt appears significantly below ADL baseline and will benefit from skilled IP OT services at 2x/week. OT recommends d/c to SNF. LTC may be required post therapy.  -     Row Name 12/14/21  1248          Therapy Assessment/Plan (OT)    Rehab Potential (OT) fair, will monitor progress closely  -ONEAL     Criteria for Skilled Therapeutic Interventions Met (OT) yes; skilled treatment is necessary  -ONEAL     Therapy Frequency (OT) 2 times/wk  -ONEAL     Row Name 12/14/21 1248          Therapy Plan Review/Discharge Plan (OT)    Anticipated Discharge Disposition (OT) skilled nursing facility  -ONEAL     Row Name 12/14/21 1248          Vital Signs    Recovery Time VSS  -ONEAL     Row Name 12/14/21 1248          Positioning and Restraints    Pre-Treatment Position in bed  -ONEAL     Post Treatment Position bed  -ONEAL     In Bed fowlers; call light within reach; encouraged to call for assist; exit alarm on; notified ns  -ONEAL           User Key  (r) = Recorded By, (t) = Taken By, (c) = Cosigned By    Initials Name Provider Type    Aislinn Pritchard, OT Occupational Therapist               Outcome Measures     Row Name 12/14/21 1251          How much help from another is currently needed...    Putting on and taking off regular lower body clothing? 1  -ONEAL     Bathing (including washing, rinsing, and drying) 1  -ONEAL     Toileting (which includes using toilet bed pan or urinal) 1  -ONEAL     Putting on and taking off regular upper body clothing 2  -ONEAL     Taking care of personal grooming (such as brushing teeth) 2  -ONEAL     Eating meals 2  -ONEAL     AM-PAC 6 Clicks Score (OT) 9  -ONEAL     Row Name 12/14/21 1143 12/14/21 0152       How much help from another person do you currently need...    Turning from your back to your side while in flat bed without using bedrails? 2  -SR 2  -BA    Moving from lying on back to sitting on the side of a flat bed without bedrails? 1  -SR 1  -BA    Moving to and from a bed to a chair (including a wheelchair)? 1  -SR 1  -BA    Standing up from a chair using your arms (e.g., wheelchair, bedside chair)? 1  -SR 1  -BA    Climbing 3-5 steps with a railing? 1  -SR 1  -BA    To walk in hospital room? 1  -SR 1   -    AM-PAC 6 Clicks Score (PT) 7  -SR 7  -BA    Row Name 12/14/21 1251          Modified Teressa Scale    Modified Teressa Scale 5 - Severe disability.  Bedridden, incontinent, and requiring constant nursing care and attention.  -ONEAL     Row Name 12/14/21 1251 12/14/21 1143       Functional Assessment    Outcome Measure Options AM-PAC 6 Clicks Daily Activity (OT); Modified Teressa  -ONEAL AM-PAC 6 Clicks Basic Mobility (PT)  -SR          User Key  (r) = Recorded By, (t) = Taken By, (c) = Cosigned By    Initials Name Provider Type    BA Merced Dean, RN Registered Nurse    Aislinn Pritchard OT Occupational Therapist    Brandi Toussaint Physical Therapist                Occupational Therapy Education                 Title: PT OT SLP Therapies (In Progress)     Topic: Occupational Therapy (Done)     Point: ADL training (Done)     Description:   Instruct learner(s) on proper safety adaptation and remediation techniques during self care or transfers.   Instruct in proper use of assistive devices.              Learning Progress Summary           Patient Acceptance, E,TB, VU by ONEAL at 12/14/2021 1251    Acceptance, E, VU by CL at 12/11/2021 1821                   Point: Precautions (Done)     Description:   Instruct learner(s) on prescribed precautions during self-care and functional transfers.              Learning Progress Summary           Patient Acceptance, E,TB, VU by ONEAL at 12/14/2021 1251    Acceptance, E, VU by CL at 12/11/2021 1821                   Point: Body mechanics (Done)     Description:   Instruct learner(s) on proper positioning and spine alignment during self-care, functional mobility activities and/or exercises.              Learning Progress Summary           Patient Acceptance, E,TB, VU by ONEAL at 12/14/2021 1251    Acceptance, E, VU by CL at 12/11/2021 1821                               User Key     Initials Effective Dates Name Provider Type [facility] 06/16/21 -  Otis Fleming, RAVI Registered  Nurse Nurse    ONEAL 06/16/21 -  Aislinn Parsons OT Occupational Therapist OT              OT Recommendation and Plan  Planned Therapy Interventions (OT): activity tolerance training, functional balance retraining, occupation/activity based interventions, ROM/therapeutic exercise, strengthening exercise, transfer/mobility retraining, patient/caregiver education/training, neuromuscular control/coordination retraining, cognitive/visual perception retraining, edema control/reduction, BADL retraining, adaptive equipment training  Therapy Frequency (OT): 2 times/wk  Plan of Care Review  Plan of Care Reviewed With: patient  Outcome Summary: Pt is a 82 y/o M admit s/p fall. W/u for UTI, acute metabolic enceph/traumatic rhabdomyolitis and AMS. HIP XRAY (-) for fracture. PMH includes DM2, COPD, CAD, HTN and OA. Pt resides alone and reports indep with ADLs and mobility at baseline. Pt requires max A x 2 for bed mobility to perform jolene care and linen changes 2/2 incontinence episode. Pt requires max-dep A for jolene care and mod A for UB dressing. Pt appears significantly below ADL baseline and will benefit from skilled IP OT services at 2x/week. OT recommends d/c to SNF. LTC may be required post therapy.     Time Calculation:    Time Calculation- OT     Row Name 12/14/21 1252             Time Calculation- OT    OT Start Time 1011  -ONEAL      OT Stop Time 1027  -ONEAL      OT Time Calculation (min) 16 min  -ONEAL      Total Timed Code Minutes- OT 16 minute(s)  -ONEAL      OT Received On 12/14/21  -ONEAL      OT - Next Appointment 12/16/21  -ONEAL      OT Goal Re-Cert Due Date 12/28/21  -ONEAL              Untimed Charges    OT Eval/Re-eval Minutes 16  -ONEAL              Total Minutes    Untimed Charges Total Minutes 16  -ONEAL       Total Minutes 16  -ONEAL            User Key  (r) = Recorded By, (t) = Taken By, (c) = Cosigned By    Initials Name Provider Type    Aislinn Pritchard OT Occupational Therapist              Therapy Charges for Today      Code Description Service Date Service Provider Modifiers Qty    [id number] HC OT EVAL MOD COMPLEXITY 2 12/14/2021 Aislinn Parsons OT GO 1               Aislinn Parsons OT  12/14/2021

## 2021-12-14 NOTE — THERAPY TREATMENT NOTE
Patient Name: Froilan Helton  : 1940    MRN: [id number]                              Today's Date: 2021       Admit Date: 12/10/2021    Visit Dx:     ICD-10-CM ICD-9-CM   1. Acute metabolic encephalopathy  G93.41 348.31   2. Traumatic rhabdomyolysis, initial encounter ([facility])  T79.6XXA 958.6   3. Chronic kidney disease, unspecified CKD stage  N18.9 585.9   4. Pressure injury of left thigh, stage 3 ([facility])  L89.223 707.09     707.23     Patient Active Problem List   Diagnosis   • COPD (chronic obstructive pulmonary disease) ([facility])   • Type 2 diabetes mellitus with stage 4 chronic kidney disease, with long-term current use of insulin ([facility])   • Essential hypertension   • Primary osteoarthritis of left knee   • Chronic renal insufficiency, stage 4 (severe) ([facility])   • Class 2 severe obesity due to excess calories with serious comorbidity in adult ([facility])   • Physical debility   • Acute UTI (urinary tract infection)   • Permanent atrial fibrillation ([facility])   • H/O noncompliance with medical treatment, presenting hazards to health   • Myxedema   • Acute on chronic combined systolic and diastolic CHF (congestive heart failure) ([facility])   • Generalized weakness   • Recurrent left pleural effusion   • Fall on same level as cause of accidental injury   • Gross hematuria   • CAD (coronary artery disease)   • HLD (hyperlipidemia)   • Hypothyroidism   • CKD (chronic kidney disease) stage 3, GFR 30-59 ml/min ([facility])   • Acute metabolic encephalopathy   • Cardiomyopathy ([facility])   • Recurrent falls     Past Medical History:   Diagnosis Date   • A-fib ([facility])    • Arthritis    • Asthma    • CAD (coronary artery disease)    • Cardiomyopathy ([facility])    • Cataract    • CHF (congestive heart failure) ([facility])    • CKD (chronic kidney disease), stage III ([facility])    • COPD (chronic obstructive pulmonary disease) ([facility])    • Diabetes mellitus ([facility])    • Disease of thyroid gland    • Emphysema, unspecified ([facility])    • Glaucoma    • Hyperlipidemia    •  Hypertension    • Kidney stone    • Myocardial infarct, old    • Neuropathy    • Osteoarthritis    • Osteoporosis    • Permanent atrial fibrillation ([facility]) 6/30/2020   • PVD (peripheral vascular disease) ([facility])    • Renal disorder    • Shingles      Past Surgical History:   Procedure Laterality Date   • COLONOSCOPY     • EYE SURGERY     • JOINT REPLACEMENT      right   • KIDNEY STONE SURGERY     • REPLACEMENT TOTAL KNEE     • TOE SURGERY        General Information     Row Name 12/14/21 1136          Physical Therapy Time and Intention    Document Type therapy note (daily note)  -SR     Mode of Treatment physical therapy; co-treatment; occupational therapy  -SR     Row Name 12/14/21 1136          General Information    Patient Profile Reviewed yes  -SR     Existing Precautions/Restrictions fall  -SR     Row Name 12/14/21 1136          Cognition    Orientation Status (Cognition) person  -SR     Row Name 12/14/21 1136          Safety Issues, Functional Mobility    Safety Issues Affecting Function (Mobility) ability to follow commands; awareness of need for assistance; insight into deficits/self-awareness; judgment; problem-solving  -SR     Impairments Affecting Function (Mobility) balance; endurance/activity tolerance; strength; pain; range of motion (ROM); postural/trunk control  -SR           User Key  (r) = Recorded By, (t) = Taken By, (c) = Cosigned By    Initials Name Provider Type    SR Brandi Scott Physical Therapist               Mobility     Row Name 12/14/21 1137          Bed Mobility    Bed Mobility rolling left; rolling right  -SR     Rolling Left [place] (Bed Mobility) maximum assist (25% patient effort); 2 person assist; verbal cues; nonverbal cues (demo/gesture)  -SR     Rolling Right [place] (Bed Mobility) maximum assist (25% patient effort); 2 person assist; verbal cues; nonverbal cues (demo/gesture)  -SR     Assistive Device (Bed Mobility) draw sheet; bed rails  -SR     Comment (Bed Mobility)  maxAx2 to roll L and R for pericare and linen management; pt encouraged to attempt additional mobility, however pt refusing requesting to be left alone  -SR           User Key  (r) = Recorded By, (t) = Taken By, (c) = Cosigned By    Initials Name Provider Type    SR Brandi Scott Physical Therapist               Obj/Interventions     Row Name 12/14/21 1140          Balance    Comment, Balance refused to sit EOB this session  -SR           User Key  (r) = Recorded By, (t) = Taken By, (c) = Cosigned By    Initials Name Provider Type    SR Brandi Scott Physical Therapist               Goals/Plan    No documentation.                Clinical Impression     Row Name 12/14/21 1141          Pain    Additional Documentation Pain Scale: FACES Pre/Post-Treatment (Group)  -SR     Row Name 12/14/21 1141          Pain Scale: FACES Pre/Post-Treatment    Pain: FACES Scale, Pretreatment 8-->hurts whole lot  -SR     Posttreatment Pain Rating 8-->hurts whole lot  -SR     Pain Location - Side Left  -SR     Pain Location - Orientation lower  -SR     Pain Location extremity  -SR     Pre/Posttreatment Pain Comment RN in room at end of session  -SR     Row Name 12/14/21 1141          Plan of Care Review    Plan of Care Reviewed With patient  -SR     Progress no change  -SR     Outcome Summary PT/OT co-tx this session 2/2 pt mobility deficits requring 2 skilled therapists to ensure optimal therapeutic benefit and safety. Pt self limiting this date only agreeable for bed mobility. Pt requires maxAx2 to roll R/L and refused to attempt to sit EOB. Pt could continue to benefit from skilled PT to address mobility defiicts  -SR     Row Name 12/14/21 1141          Therapy Assessment/Plan (PT)    Rehab Potential (PT) fair, will monitor progress closely  -SR     Criteria for Skilled Interventions Met (PT) yes  -SR     Row Name 12/14/21 1141          Positioning and Restraints    Pre-Treatment Position in bed  -SR     Post Treatment Position bed   -SR     In Bed fowlers; call light within reach; encouraged to call for assist; exit alarm on; notified nsg  -SR           User Key  (r) = Recorded By, (t) = Taken By, (c) = Cosigned By    Initials Name Provider Type    Brandi Toussaint Physical Therapist               Outcome Measures     Row Name 12/14/21 1143 12/14/21 0152       How much help from another person do you currently need...    Turning from your back to your side while in flat bed without using bedrails? 2  -SR 2  -BA    Moving from lying on back to sitting on the side of a flat bed without bedrails? 1  -SR 1  -BA    Moving to and from a bed to a chair (including a wheelchair)? 1  -SR 1  -BA    Standing up from a chair using your arms (e.g., wheelchair, bedside chair)? 1  -SR 1  -BA    Climbing 3-5 steps with a railing? 1  -SR 1  -BA    To walk in hospital room? 1  -SR 1  -BA    AM-PAC 6 Clicks Score (PT) 7  -SR 7  -BA    Row Name 12/14/21 1143          Functional Assessment    Outcome Measure Options AM-PAC 6 Clicks Basic Mobility (PT)  -SR           User Key  (r) = Recorded By, (t) = Taken By, (c) = Cosigned By    Initials Name Provider Type    Merced Jones, RN Registered Nurse    Brandi Toussaint Physical Therapist                             Physical Therapy Education                 Title: PT OT SLP Therapies (In Progress)     Topic: Physical Therapy (In Progress)     Point: Mobility training (In Progress)     Learning Progress Summary           Patient Acceptance, E, NR by SR at 12/14/2021 1144    Acceptance, E, VU by CL at 12/11/2021 1821    Acceptance, E,TB,D, VU,NR by CB at 12/11/2021 1348                   Point: Home exercise program (In Progress)     Learning Progress Summary           Patient Acceptance, E, NR by SR at 12/14/2021 1144    Acceptance, E, VU by CL at 12/11/2021 1821                   Point: Body mechanics (In Progress)     Learning Progress Summary           Patient Acceptance, E, NR by SR at 12/14/2021 1144     Acceptance, E, VU by CL at 12/11/2021 1821    Acceptance, E,TB,D, VU,NR by CB at 12/11/2021 1348                   Point: Precautions (In Progress)     Learning Progress Summary           Patient Acceptance, E, NR by SR at 12/14/2021 1144    Acceptance, E, VU by CL at 12/11/2021 1821    Acceptance, E,TB,D, VU,NR by CB at 12/11/2021 1348                               User Key     Initials Effective Dates Name Provider Type Discipline    CL 06/16/21 -  Otis Fleming, RN Registered Nurse Nurse    CB 10/22/21 -  Jamia Hernandez, PT Physical Therapist PT    SR 02/08/21 -  Brandi Scott Physical Therapist PT              PT Recommendation and Plan     Plan of Care Reviewed With: patient  Progress: no change  Outcome Summary: PT/OT co-tx this session 2/2 pt mobility deficits requring 2 skilled therapists to ensure optimal therapeutic benefit and safety. Pt self limiting this date only agreeable for bed mobility. Pt requires maxAx2 to roll R/L and refused to attempt to sit EOB. Pt could continue to benefit from skilled PT to address mobility defiicts     Time Calculation:    PT Charges     Row Name 12/14/21 1144             Time Calculation    Start Time 1011  -SR      Stop Time 1025  -SR      Time Calculation (min) 14 min  -SR      PT Received On 12/14/21  -SR      PT - Next Appointment 12/15/21  -      PT Goal Re-Cert Due Date 12/18/21  -              Time Calculation- PT    Total Timed Code Minutes- PT 14 minute(s)  -SR            User Key  (r) = Recorded By, (t) = Taken By, (c) = Cosigned By    Initials Name Provider Type    SR Brandi Scott Physical Therapist              Therapy Charges for Today     Code Description Service Date Service Provider Modifiers Qty    [id number] HC PT THERAPEUTIC ACT EA 15 MIN 12/14/2021 Brandi Scott GP 1          PT G-Codes  Outcome Measure Options: AM-PAC 6 Clicks Basic Mobility (PT)  AM-PAC 6 Clicks Score (PT): 7    Brandi Scott  12/14/2021

## 2021-12-14 NOTE — CASE MANAGEMENT/SOCIAL WORK
Continued Stay Note  [facility]     Patient Name: Froilan Helton  MRN: [id number]  Today's Date: 12/14/2021    Admit Date: 12/10/2021     Discharge Plan     Row Name 12/14/21 1517       Plan    Plan SNF -pending referrals    Plan Comments Spoke with [facility] is unable to accept.  Spoke with granddaughter, Jaz (POA) by telephone [phone].  She requests referrals to [place] - spoke with Jennifer and there are not any beds available at present but she will follow, and to [facility] message for Aaliyah.  CCP will continue to follow.  BHumeniuk RN    Row Name 12/14/21 1507       Plan    Plan [facility] - pending acceptance    Plan Comments Spoke with Travis, she is reviewing and plans to speak with granddaughter Jaz prior to accepting.  CCP will continue to follow.  BHumeniuk RN.                Expected Discharge Date and Time     Expected Discharge Date Expected Discharge Time    Dec 16, 2021             Becky S. Humeniuk, RAVI

## 2021-12-14 NOTE — PLAN OF CARE
Goal Outcome Evaluation:  Plan of Care Reviewed With: patient        Progress: no change  Outcome Summary: PT/OT co-tx this session 2/2 pt mobility deficits requring 2 skilled therapists to ensure optimal therapeutic benefit and safety. Pt self limiting this date only agreeable for bed mobility. Pt requires maxAx2 to roll R/L and refused to attempt to sit EOB. Pt could continue to benefit from skilled PT to address mobility defiicts    Patient was not wearing a face mask during this therapy encounter. Therapist used appropriate personal protective equipment including eye protection, mask, and gloves.  Mask used was standard procedure mask. Appropriate PPE was worn during the entire therapy session. Hand hygiene was completed before and after therapy session. Patient is not in enhanced droplet precautions.
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
## 2021-12-15 NOTE — PLAN OF CARE
Goal Outcome Evaluation:  Plan of Care Reviewed With: patient        Progress: no change  Outcome Summary: Pt slept most of the night. Q2 turns. Incontience care as needed. Lt hip dressing, dry, clean and intact. Remains on 2L duriing night. VSS. Will closely monitor.

## 2021-12-15 NOTE — PROGRESS NOTES
Attempted to educate and consent patient for COVID vaccination and he adamantly declines COVID Vaccine.     Verónica Carey, PharmD, BCPS  12/15/2021 12:30 EST

## 2021-12-15 NOTE — PLAN OF CARE
Problem: Adult Inpatient Plan of Care  Goal: Plan of Care Review  12/15/2021 1738 by Marly Mistry RN  Outcome: Ongoing, Progressing  Flowsheets (Taken 12/15/2021 1738)  Progress: improving  Outcome Summary: VSS. A/o x4. IVABX and IV iron. Wound care as ordered. Acess consulted. Q2 turns. Incontience care as needed. Will continue to monitor.   Goal Outcome Evaluation:           Progress: improving  Outcome Summary: VSS. A/o x4. IVABX and IV iron. Wound care as ordered. Acess consulted. Q2 turns. Incontience care as needed. Will continue to monitor.

## 2021-12-15 NOTE — ACP (ADVANCE CARE PLANNING)
Palliative Care completed MOST form with patient's POA, Jaz Grigsby. MOST form scanned into [facility] and placed in patient's chart. Will sign off for now. Please re-consult if further GOC discussions are necessary.

## 2021-12-15 NOTE — PROGRESS NOTES
Continued Stay Note  [facility]     Patient Name: Froilan Helton  MRN: [id number]  Today's Date: 12/15/2021    Admit Date: 12/10/2021     Discharge Plan     Row Name 12/15/21 1500       Plan    Plan SNF    Plan Comments Access consult noted. Follow up w/ pt's POA, granddaughter, Jaz [phone], she is agreeable with referrals to multiple facilities in and around [place] and [place] to determine availability and eligibility. [place] referral placed, CCP will f/u w/ Jaz to discuss accepting facilities.               Discharge Codes    No documentation.               Expected Discharge Date and Time     Expected Discharge Date Expected Discharge Time    Dec 16, 2021             Jenn Murguia RN

## 2021-12-15 NOTE — PROGRESS NOTES
"DAILY PROGRESS NOTE  [facility]    Patient Identification:  Name: Froilan Helton  Age: 81 y.o.  Sex: male  :  1940  MRN: [id number]         Primary Care Physician: Fortunato De Leon MD    Subjective:  Interval History:He complains of pain. He is weak.    Objective:    Scheduled Meds:amiodarone, 200 mg, Oral, Q12H  apixaban, 2.5 mg, Oral, Q12H  atorvastatin, 10 mg, Oral, Nightly  budesonide-formoterol, 2 puff, Inhalation, BID - RT  cefTRIAXone, 1 g, Intravenous, Q24H  collagenase, 1 application, Topical, Q24H  docusate sodium, 100 mg, Oral, BID  sodium ferric gluconate complex IVPB in 100 mL NS, 250 mg, Intravenous, Q24H  fluticasone, 2 spray, Each Nare, Daily  insulin lispro, 0-9 Units, Subcutaneous, TID AC  levothyroxine, 224 mcg, Oral, Daily  sodium chloride, 10 mL, Intravenous, Q12H  tamsulosin, 0.4 mg, Oral, Daily  cyanocobalamin, 1,000 mcg, Oral, Daily  vitamin D, 50,000 Units, Oral, Daily      Continuous Infusions:O2, 2 L/min, Last Rate: 2 L/min (12/10/21 2127)  sodium chloride, 75 mL/hr, Last Rate: 75 mL/hr (12/15/21 0411)        Vital signs in last 24 hours:  Temp:  [97.7 °F (36.5 °C)-98.1 °F (36.7 °C)] 98.1 °F (36.7 °C)  Heart Rate:  [48-55] 55  Resp:  [18] 18  BP: (142-154)/(62-72) 152/69    Intake/Output:  No intake or output data in the 24 hours ending 12/15/21 1633    Exam:  /69 (BP Location: Left arm, Patient Position: Lying)   Pulse 55   Temp 98.1 °F (36.7 °C) (Oral)   Resp 18   Ht 185.4 cm (73\")   Wt 104 kg (228 lb 11.2 oz)   SpO2 99%   BMI 30.17 kg/m²     General Appearance:    Alert, cooperative, no distress   Head:    Normocephalic, without obvious abnormality, atraumatic   Eyes:       Throat:   Lips, tongue, gums normal   Neck:   Supple, symmetrical, trachea midline, no JVD   Lungs:     Clear to auscultation bilaterally, respirations unlabored   Chest Wall:    No tenderness or deformity    Heart:    Regular rate and rhythm, S1 and S2 normal, no murmur,no  Rub or " gallop   Abdomen:     Soft, nontender, bowel sounds active, no masses, no organomegaly    Extremities:   Extremities normal, atraumatic, no cyanosis or edema   Pulses:      Skin:   Skin is warm and dry,  Skin wounds and abrasions   Neurologic:   no focal deficits noted      Lab Results (last 72 hours)     Procedure Component Value Units Date/Time    Blood Culture - Blood, Arm, Right [[id number]]  (Normal) Collected: 12/10/21 1243    Specimen: Blood from Arm, Right Updated: 12/11/21 1315     Blood Culture No growth at 24 hours    Blood Culture - Blood, Arm, Right [[id number]]  (Normal) Collected: 12/10/21 1243    Specimen: Blood from Arm, Right Updated: 12/11/21 1315     Blood Culture No growth at 24 hours    POC Glucose Once [[id number]]  (Abnormal) Collected: 12/11/21 1101    Specimen: Blood Updated: 12/11/21 1106     Glucose 199 mg/dL      Comment: RN Notified R and V Meter: [id number] : [id number] Cecil HARKINS       POC Glucose Once [[id number]]  (Abnormal) Collected: 12/11/21 0634    Specimen: Blood Updated: 12/11/21 0636     Glucose 150 mg/dL      Comment: Meter: [id number] : [id number] Ankush HARKINS       Lactic Acid, Plasma [[id number]]  (Normal) Collected: 12/11/21 0329    Specimen: Blood Updated: 12/11/21 0522     Lactate 1.3 mmol/L     Comprehensive Metabolic Panel [[id number]]  (Abnormal) Collected: 12/11/21 0329    Specimen: Blood Updated: 12/11/21 0513     Glucose 141 mg/dL      BUN 41 mg/dL      Creatinine 1.95 mg/dL      Sodium 142 mmol/L      Potassium 3.8 mmol/L      Chloride 109 mmol/L      CO2 21.6 mmol/L      Calcium 8.8 mg/dL      Total Protein 5.9 g/dL      Albumin 2.60 g/dL      ALT (SGPT) 25 U/L      AST (SGOT) 38 U/L      Alkaline Phosphatase 59 U/L      Total Bilirubin 0.5 mg/dL      eGFR Non African Amer 33 mL/min/1.73      Globulin 3.3 gm/dL      A/G Ratio 0.8 g/dL      BUN/Creatinine Ratio 21.0     Anion Gap 11.4 mmol/L     Narrative:      GFR Normal >60  Chronic Kidney Disease  <60  Kidney Failure <15      CBC Auto Differential [[id number]]  (Abnormal) Collected: 12/11/21 0329    Specimen: Blood Updated: 12/11/21 0458     WBC 8.74 10*3/mm3      RBC 3.94 10*6/mm3      Hemoglobin 11.4 g/dL      Hematocrit 34.3 %      MCV 87.1 fL      MCH 28.9 pg      MCHC 33.2 g/dL      RDW 13.7 %      RDW-SD 43.8 fl      MPV 11.3 fL      Platelets 179 10*3/mm3      Neutrophil % 75.3 %      Lymphocyte % 8.8 %      Monocyte % 14.1 %      Eosinophil % 1.0 %      Basophil % 0.3 %      Immature Grans % 0.5 %      Neutrophils, Absolute 6.58 10*3/mm3      Lymphocytes, Absolute 0.77 10*3/mm3      Monocytes, Absolute 1.23 10*3/mm3      Eosinophils, Absolute 0.09 10*3/mm3      Basophils, Absolute 0.03 10*3/mm3      Immature Grans, Absolute 0.04 10*3/mm3      nRBC 0.0 /100 WBC     Hemoglobin A1c [[id number]]  (Normal) Collected: 12/11/21 0329    Specimen: Blood Updated: 12/11/21 0458     Hemoglobin A1C 5.44 %     Narrative:      Hemoglobin A1C Ranges:    Increased Risk for Diabetes  5.7% to 6.4%  Diabetes                     >= 6.5%  Diabetic Goal                < 7.0%    POC Glucose Once [[id number]]  (Abnormal) Collected: 12/10/21 2155    Specimen: Blood Updated: 12/10/21 2156     Glucose 167 mg/dL      Comment: Meter: [id number] : [id number] Ankush HARKINS       Ammonia [[id number]]  (Normal) Collected: 12/10/21 1314    Specimen: Blood Updated: 12/10/21 1347     Ammonia 18 umol/L     Comprehensive Metabolic Panel [[id number]]  (Abnormal) Collected: 12/10/21 1243    Specimen: Blood Updated: 12/10/21 1345     Glucose 140 mg/dL      BUN 38 mg/dL      Creatinine 2.04 mg/dL      Sodium 140 mmol/L      Potassium 4.4 mmol/L      Comment: Slight hemolysis detected by analyzer. Results may be affected.        Chloride 104 mmol/L      CO2 25.0 mmol/L      Calcium 9.5 mg/dL      Total Protein 7.2 g/dL      Albumin 3.40 g/dL      ALT (SGPT) 32 U/L      AST (SGOT) 59 U/L      Alkaline Phosphatase 75 U/L      Total Bilirubin  0.7 mg/dL      eGFR Non African Amer 32 mL/min/1.73      Globulin 3.8 gm/dL      A/G Ratio 0.9 g/dL      BUN/Creatinine Ratio 18.6     Anion Gap 11.0 mmol/L     Narrative:      GFR Normal >60  Chronic Kidney Disease <60  Kidney Failure <15      CK [[id number]]  (Abnormal) Collected: 12/10/21 1243    Specimen: Blood Updated: 12/10/21 1345     Creatine Kinase 917 U/L     COVID PRE-OP / PRE-PROCEDURE SCREENING ORDER (NO ISOLATION) - Swab, Nasopharynx [[id number]]  (Normal) Collected: 12/10/21 1244    Specimen: Swab from Nasopharynx Updated: 12/10/21 1343    Narrative:      The following orders were created for panel order COVID PRE-OP / PRE-PROCEDURE SCREENING ORDER (NO ISOLATION) - Swab, Nasopharynx.  Procedure                               Abnormality         Status                     ---------                               -----------         ------                     COVID-19,[facility]...[[id number]]  Normal              Final result                 Please view results for these tests on the individual orders.    COVID-19,[facility] CEPHEID/SINDY NP SWAB IN TRANSPORT MEDIA 8-12 HR TAT - Swab, Nasopharynx [[id number]]  (Normal) Collected: 12/10/21 1244    Specimen: Swab from Nasopharynx Updated: 12/10/21 1343     COVID19 Not Detected    Narrative:      Fact sheet for providers: https://www.fda.gov/media/635598/download    Fact sheet for patients: https://www.fda.gov/media/751377/download    Test performed by PCR.    Troponin [[id number]]  (Abnormal) Collected: 12/10/21 1243    Specimen: Blood Updated: 12/10/21 1343     Troponin T 0.088 ng/mL     Narrative:      Troponin T Reference Range:  <= 0.03 ng/mL-   Negative for AMI  >0.03 ng/mL-     Abnormal for myocardial necrosis.  Clinicians would have to utilize clinical acumen, EKG, Troponin and serial changes to determine if it is an Acute Myocardial Infarction or myocardial injury due to an underlying chronic condition.       Results may be falsely decreased if  patient taking Biotin.      Lactic Acid, Plasma [[id number]]  (Normal) Collected: 12/10/21 1243    Specimen: Blood Updated: 12/10/21 1342     Lactate 1.5 mmol/L     Urinalysis, Microscopic Only - Urine, Catheter [[id number]]  (Abnormal) Collected: 12/10/21 1303    Specimen: Urine, Catheter Updated: 12/10/21 1320     RBC, UA 31-50 /HPF      WBC, UA Too Numerous to Count /HPF      Bacteria, UA None Seen /HPF      Squamous Epithelial Cells, UA 0-2 /HPF      Hyaline Casts, UA 0-2 /LPF      Methodology Automated Microscopy    Urinalysis With Culture If Indicated - Urine, Catheter [[id number]]  (Abnormal) Collected: 12/10/21 1303    Specimen: Urine, Catheter Updated: 12/10/21 1320     Color, UA Yellow     Appearance, UA Cloudy     pH, UA 6.0     Specific Gravity, UA 1.016     Glucose,  mg/dL (Trace)     Ketones, UA Trace     Bilirubin, UA Negative     Blood, UA Large (3+)     Protein, UA >=300 mg/dL (3+)     Leuk Esterase, UA Moderate (2+)     Nitrite, UA Negative     Urobilinogen, UA 0.2 E.U./dL    Urine Culture - Urine, Urine, Catheter [[id number]] Collected: 12/10/21 1303    Specimen: Urine, Catheter Updated: 12/10/21 1320    CBC & Differential [[id number]]  (Abnormal) Collected: 12/10/21 1243    Specimen: Blood Updated: 12/10/21 1320    Narrative:      The following orders were created for panel order CBC & Differential.  Procedure                               Abnormality         Status                     ---------                               -----------         ------                     CBC Auto Differential[[id number]]        Abnormal            Final result                 Please view results for these tests on the individual orders.    CBC Auto Differential [[id number]]  (Abnormal) Collected: 12/10/21 1243    Specimen: Blood Updated: 12/10/21 1320     WBC 11.26 10*3/mm3      RBC 4.58 10*6/mm3      Hemoglobin 13.0 g/dL      Hematocrit 41.7 %      MCV 91.0 fL      MCH 28.4 pg      MCHC 31.2 g/dL      RDW 13.5 %       RDW-SD 45.2 fl      MPV 11.5 fL      Platelets 208 10*3/mm3      Neutrophil % 82.1 %      Lymphocyte % 4.7 %      Monocyte % 11.4 %      Eosinophil % 0.9 %      Basophil % 0.4 %      Immature Grans % 0.5 %      Neutrophils, Absolute 9.24 10*3/mm3      Lymphocytes, Absolute 0.53 10*3/mm3      Monocytes, Absolute 1.28 10*3/mm3      Eosinophils, Absolute 0.10 10*3/mm3      Basophils, Absolute 0.05 10*3/mm3      Immature Grans, Absolute 0.06 10*3/mm3      nRBC 0.1 /100 WBC         Data Review:  Results from last 7 days   Lab Units 12/14/21  0409 12/13/21  1246 12/13/21  1246 12/12/21  0336 12/12/21  0336   SODIUM mmol/L 136  --  134*  --  137   POTASSIUM mmol/L 3.9  --  3.9  --  3.8   CHLORIDE mmol/L 106  --  105  --  104   CO2 mmol/L 19.4*  --  20.9*  --  20.1*   BUN mg/dL 37*  --  42*  --  51*   CREATININE mg/dL 1.58*  --  1.89*  --  2.10*   GLUCOSE mg/dL 109*   < > 168*   < > 135*   CALCIUM mg/dL 8.4*  --  8.3*  --  8.1*    < > = values in this interval not displayed.     Results from last 7 days   Lab Units 12/14/21  0409 12/13/21  1246 12/12/21  0336   WBC 10*3/mm3 7.83 6.87 7.26   HEMOGLOBIN g/dL 9.7* 9.6* 9.0*   HEMATOCRIT % 29.7* 29.7* 27.6*   PLATELETS 10*3/mm3 190 173 159         Results from last 7 days   Lab Units 12/11/21  0329   HEMOGLOBIN A1C % 5.44     Lab Results   Lab Value Date/Time    TROPONINT 0.086 (C) 12/12/2021 0336    TROPONINT 0.088 (C) 12/10/2021 1243    TROPONINT 0.086 (C) 10/06/2021 0600    TROPONINT 0.061 (C) 07/19/2021 0442    TROPONINT 0.068 (C) 07/18/2021 2229    TROPONINT 0.065 (C) 07/07/2021 2025    TROPONINT 0.039 (C) 06/30/2020 0439    TROPONINT 0.038 (C) 06/29/2020 1559    TROPONINT 0.035 (C) 06/29/2020 0948    TROPONINT 0.030 06/29/2020 0507    TROPONINT 0.035 (C) 12/09/2019 0312    TROPONINT 0.035 (C) 10/15/2019 1542    TROPONINT 0.037 (C) 10/03/2019 1959    TROPONINT 1.010 (C) 08/17/2019 0540    TROPONINT 1.100 (C) 08/15/2019 2059    TROPONINT 0.811 (C) 08/15/2019 1516     TROPONINT 0.396 (C) 08/15/2019 1000    TROPONINT 0.183 (C) 08/15/2019 0420    TROPONINT 0.033 (C) 08/14/2019 2212    TROPONINT 0.031 (C) 06/28/2019 1324    TROPONINT 0.035 (C) 06/28/2019 0719    TROPONINT 0.037 (C) 06/28/2019 0349    TROPONINT 0.039 (C) 06/27/2019 1842    TROPONINT 0.024 05/06/2019 1013    TROPONINT 0.031 (C) 05/06/2019 0331    TROPONINT 0.033 (C) 05/05/2019 2131    TROPONINT 0.034 (H) 02/16/2019 1958    TROPONINT 0.037 (H) 02/16/2019 1726    TROPONINT 0.015 09/22/2018 2107    TROPONINT 0.013 08/03/2018 1719    TROPONINT 0.027 03/08/2018 2307    TROPONINT 0.068 (H) 11/23/2017 1327    TROPONINT 0.099 (H) 11/22/2017 2355    TROPONINT 0.115 (C) 11/22/2017 1805    TROPONINT 0.141 (C) 11/22/2017 1256    TROPONINT 0.014 11/21/2017 1557         Results from last 7 days   Lab Units 12/11/21  0329 12/10/21  1243   ALK PHOS U/L 59 75   BILIRUBIN mg/dL 0.5 0.7   ALT (SGPT) U/L 25 32   AST (SGOT) U/L 38 59*         Results from last 7 days   Lab Units 12/11/21  0329   HEMOGLOBIN A1C % 5.44     Glucose   Date/Time Value Ref Range Status   12/15/2021 1047 119 70 - 130 mg/dL Final     Comment:     Meter: [id number] : [id number] Zhang Statonluca HARKINS   12/15/2021 0634 107 70 - 130 mg/dL Final     Comment:     Meter: [id number] : [id number] Kris Wareluca BUCKNERA   12/14/2021 2039 161 (H) 70 - 130 mg/dL Final     Comment:     Meter: [id number] : [id number] Kris Mckeon CNA   12/14/2021 1707 151 (H) 70 - 130 mg/dL Final     Comment:     Meter: [id number] : [id number] Avalos Jo-Ann NA   12/14/2021 1121 133 (H) 70 - 130 mg/dL Final     Comment:     Meter: [id number] : [id number] Catrachita Vinson NA   12/13/2021 2026 149 (H) 70 - 130 mg/dL Final     Comment:     Meter: [id number] : [id number] Maximo Olmedo [facility]   12/13/2021 1607 149 (H) 70 - 130 mg/dL Final     Comment:     Meter: [id number] : [id number] Cecil Chandler NA   12/13/2021 1059 156 (H) 70 - 130 mg/dL Final     Comment:     Meter: [id number] :  [id number] Workman Ramesh HARKINS           Past Medical History:   Diagnosis Date   • A-fib ([facility])    • Arthritis    • Asthma    • CAD (coronary artery disease)    • Cardiomyopathy (HCC)    • Cataract    • CHF (congestive heart failure) ([facility])    • CKD (chronic kidney disease), stage III ([facility])    • COPD (chronic obstructive pulmonary disease) ([facility])    • Diabetes mellitus ([facility])    • Disease of thyroid gland    • Emphysema, unspecified ([facility])    • Glaucoma    • Hyperlipidemia    • Hypertension    • Kidney stone    • Myocardial infarct, old    • Neuropathy    • Osteoarthritis    • Osteoporosis    • Permanent atrial fibrillation ([facility]) 6/30/2020   • PVD (peripheral vascular disease) ([facility])    • Renal disorder    • Shingles        Assessment:  Active Hospital Problems    Diagnosis  POA   • **Acute metabolic encephalopathy [G93.41]  Yes   • Cardiomyopathy ([facility]) [I42.9]  Unknown   • Recurrent falls [R29.6]  Not Applicable   • Hypothyroidism [E03.9]  Yes   • CAD (coronary artery disease) [I25.10]  Yes   • Recurrent left pleural effusion [J90]  Yes   • Permanent atrial fibrillation ([facility]) [I48.21]  Yes   • Acute UTI (urinary tract infection) [N39.0]  Yes   • Chronic renal insufficiency, stage 4 (severe) ([facility]) [N18.4]  Yes   • Primary osteoarthritis of left knee [M17.12]  Yes   • Essential hypertension [I10]  Yes   • Type 2 diabetes mellitus with stage 4 chronic kidney disease, with long-term current use of insulin ([facility]) [E11.22, N18.4, Z79.4]  Not Applicable   • COPD (chronic obstructive pulmonary disease) ([facility]) [J44.9]  Yes      Resolved Hospital Problems   No resolved problems to display.       Plan:  Continue with antibiotics for UTI and await cultures. Follow lab.  cardiology consult noted.  Will need SNU for rehab.  Wound nurse.  DC planning. Refuses COVID-19 vaccine    Bautista Gonzalez MD  12/15/2021  16:33 EST

## 2021-12-15 NOTE — DISCHARGE PLACEMENT REQUEST
"Joshua Ferguson P (81 y.o. Male)             Date of Birth Social Security Number Address Home Phone MRN    1940  [address] [address] [phone] [id number]    Oriental orthodox Marital Status             None        Admission Date Admission Type Admitting Provider Attending Provider Department, Room/Bed    12/10/21 Emergency Adrián Sparks MD Beard, Lyle E, MD [address], [address]    Discharge Date Discharge Disposition Discharge Destination                         Attending Provider: Bautista Gonzalez MD    Allergies: Morphine, Atorvastatin, Oxycontin [Oxycodone Hcl]    Isolation: None   Infection: None   Code Status: No CPR   Advance Care Planning Activity    Ht: 185.4 cm (73\")   Wt: 104 kg (228 lb 11.2 oz)    Admission Cmt: None   Principal Problem: Acute metabolic encephalopathy [G93.41]                 Active Insurance as of 12/10/2021     Primary Coverage     Payor Plan Insurance Group Employer/Plan Group    MEDICARE MEDICARE A & B      Payor Plan Address Payor Plan Phone Number Payor Plan Fax Number Effective Dates    [address] [phone]  11/1/2006 - None Entered    [facility] [id number]       Subscriber Name Subscriber Birth Date Member ID       JOSHUA FERGUSON P 1940 [id number]           Secondary Coverage     Payor Plan Insurance Group Employer/Plan Group    HUMANA HUMANA [id number]     Payor Plan Address Payor Plan Phone Number Payor Plan Fax Number Effective Dates    [address] [address] [phone]  1/1/2013 - None Entered    [facility] [id number]       Subscriber Name Subscriber Birth Date Member ID       JOSHUA FERGUSON P 1940 [id number]                 Emergency Contacts      (Rel.) Home Phone Work Phone Mobile Phone    Jaz Grigsby (Power of ) [phone] -- [phone]    ISAIAS FERGUSON (Son) [phone] -- --              "

## 2021-12-16 NOTE — SIGNIFICANT NOTE
12/16/21 1040   OTHER   Discipline physical therapist   Rehab Time/Intention   Session Not Performed   (Pt refused all PT, including gentle stretches or exercises in bed.  Attempted max encouragement/education.  Pt pointing to door telling PT to get out.  Will decrease frequency of attempting PT due to frequent refusals.)   Recommendation   PT - Next Appointment 12/17/21

## 2021-12-16 NOTE — PLAN OF CARE
Goal Outcome Evaluation:  Plan of Care Reviewed With: patient        Progress: no change  Outcome Summary: pt refusing some turns. NS at 75. Awaiting placedment for d/c. Received Tylenol for c/o pain-see MAR. ERIKA

## 2021-12-16 NOTE — PROGRESS NOTES
Continued Stay Note  [facility]     Patient Name: Froilan Helton  MRN: [id number]  Today's Date: 12/16/2021    Admit Date: 12/10/2021     Discharge Plan     Row Name 12/16/21 1130       Plan    Plan Skilled rehab bed, accepted at [facility]    Plan Comments Incoming call from Simeon with [facility] and she accepts the patient for rehab.  Will notifiy Jaz and the patient.....................Mer Duran RN               Discharge Codes    No documentation.               Expected Discharge Date and Time     Expected Discharge Date Expected Discharge Time    Dec 16, 2021             Mer Duran RN

## 2021-12-16 NOTE — PROGRESS NOTES
"DAILY PROGRESS NOTE  [facility]    Patient Identification:  Name: Froilan Helton  Age: 81 y.o.  Sex: male  :  1940  MRN: [id number]         Primary Care Physician: Fortunato De Leon MD    Subjective:  Interval History:He complains of pain. He is weak.  Refusing PT.    Objective:    Scheduled Meds:amiodarone, 200 mg, Oral, Q12H  apixaban, 2.5 mg, Oral, Q12H  atorvastatin, 10 mg, Oral, Nightly  budesonide-formoterol, 2 puff, Inhalation, BID - RT  cefTRIAXone, 1 g, Intravenous, Q24H  collagenase, 1 application, Topical, Q24H  docusate sodium, 100 mg, Oral, BID  sodium ferric gluconate complex IVPB in 100 mL NS, 250 mg, Intravenous, Q24H  fluticasone, 2 spray, Each Nare, Daily  insulin lispro, 0-9 Units, Subcutaneous, TID AC  levothyroxine, 224 mcg, Oral, Daily  sodium chloride, 10 mL, Intravenous, Q12H  tamsulosin, 0.4 mg, Oral, Daily  cyanocobalamin, 1,000 mcg, Oral, Daily  vitamin D, 50,000 Units, Oral, Daily      Continuous Infusions:O2, 2 L/min, Last Rate: 2 L/min (12/10/21 2127)  sodium chloride, 75 mL/hr, Last Rate: 75 mL/hr (21)        Vital signs in last 24 hours:  Temp:  [97.5 °F (36.4 °C)-98.1 °F (36.7 °C)] 97.9 °F (36.6 °C)  Heart Rate:  [52-59] 59  Resp:  [16-18] 16  BP: (142-152)/(47-69) 150/59    Intake/Output:    Intake/Output Summary (Last 24 hours) at 2021 1118  Last data filed at 2021 0806  Gross per 24 hour   Intake 240 ml   Output --   Net 240 ml       Exam:  /59 (BP Location: Left arm, Patient Position: Lying)   Pulse 59   Temp 97.9 °F (36.6 °C) (Oral)   Resp 16   Ht 185.4 cm (73\")   Wt 104 kg (228 lb 11.2 oz)   SpO2 95%   BMI 30.17 kg/m²     General Appearance:    Alert, cooperative, no distress   Head:    Normocephalic, without obvious abnormality, atraumatic   Eyes:       Throat:   Lips, tongue, gums normal   Neck:   Supple, symmetrical, trachea midline, no JVD   Lungs:     Clear to auscultation bilaterally, respirations unlabored   Chest " Wall:    No tenderness or deformity    Heart:    Regular rate and rhythm, S1 and S2 normal, no murmur,no  Rub or gallop   Abdomen:     Soft, nontender, bowel sounds active, no masses, no organomegaly    Extremities:   Extremities normal, atraumatic, no cyanosis or edema   Pulses:      Skin:   Skin is warm and dry,  Skin wounds and abrasions   Neurologic:   no focal deficits noted      Lab Results (last 72 hours)     Procedure Component Value Units Date/Time    Blood Culture - Blood, Arm, Right [[id number]]  (Normal) Collected: 12/10/21 1243    Specimen: Blood from Arm, Right Updated: 12/11/21 1315     Blood Culture No growth at 24 hours    Blood Culture - Blood, Arm, Right [[id number]]  (Normal) Collected: 12/10/21 1243    Specimen: Blood from Arm, Right Updated: 12/11/21 1315     Blood Culture No growth at 24 hours    POC Glucose Once [[id number]]  (Abnormal) Collected: 12/11/21 1101    Specimen: Blood Updated: 12/11/21 1106     Glucose 199 mg/dL      Comment: RN Notified R and V Meter: [id number] : [id number] Cecil HARKINS       POC Glucose Once [[id number]]  (Abnormal) Collected: 12/11/21 0634    Specimen: Blood Updated: 12/11/21 0636     Glucose 150 mg/dL      Comment: Meter: [id number] : [id number] Ankush HARKINS       Lactic Acid, Plasma [[id number]]  (Normal) Collected: 12/11/21 0329    Specimen: Blood Updated: 12/11/21 0522     Lactate 1.3 mmol/L     Comprehensive Metabolic Panel [[id number]]  (Abnormal) Collected: 12/11/21 0329    Specimen: Blood Updated: 12/11/21 0513     Glucose 141 mg/dL      BUN 41 mg/dL      Creatinine 1.95 mg/dL      Sodium 142 mmol/L      Potassium 3.8 mmol/L      Chloride 109 mmol/L      CO2 21.6 mmol/L      Calcium 8.8 mg/dL      Total Protein 5.9 g/dL      Albumin 2.60 g/dL      ALT (SGPT) 25 U/L      AST (SGOT) 38 U/L      Alkaline Phosphatase 59 U/L      Total Bilirubin 0.5 mg/dL      eGFR Non African Amer 33 mL/min/1.73      Globulin 3.3 gm/dL      A/G Ratio 0.8 g/dL       BUN/Creatinine Ratio 21.0     Anion Gap 11.4 mmol/L     Narrative:      GFR Normal >60  Chronic Kidney Disease <60  Kidney Failure <15      CBC Auto Differential [[id number]]  (Abnormal) Collected: 12/11/21 0329    Specimen: Blood Updated: 12/11/21 0458     WBC 8.74 10*3/mm3      RBC 3.94 10*6/mm3      Hemoglobin 11.4 g/dL      Hematocrit 34.3 %      MCV 87.1 fL      MCH 28.9 pg      MCHC 33.2 g/dL      RDW 13.7 %      RDW-SD 43.8 fl      MPV 11.3 fL      Platelets 179 10*3/mm3      Neutrophil % 75.3 %      Lymphocyte % 8.8 %      Monocyte % 14.1 %      Eosinophil % 1.0 %      Basophil % 0.3 %      Immature Grans % 0.5 %      Neutrophils, Absolute 6.58 10*3/mm3      Lymphocytes, Absolute 0.77 10*3/mm3      Monocytes, Absolute 1.23 10*3/mm3      Eosinophils, Absolute 0.09 10*3/mm3      Basophils, Absolute 0.03 10*3/mm3      Immature Grans, Absolute 0.04 10*3/mm3      nRBC 0.0 /100 WBC     Hemoglobin A1c [[id number]]  (Normal) Collected: 12/11/21 0329    Specimen: Blood Updated: 12/11/21 0458     Hemoglobin A1C 5.44 %     Narrative:      Hemoglobin A1C Ranges:    Increased Risk for Diabetes  5.7% to 6.4%  Diabetes                     >= 6.5%  Diabetic Goal                < 7.0%    POC Glucose Once [[id number]]  (Abnormal) Collected: 12/10/21 2155    Specimen: Blood Updated: 12/10/21 2156     Glucose 167 mg/dL      Comment: Meter: [id number] : [id number] Ankush HARKINS       Ammonia [[id number]]  (Normal) Collected: 12/10/21 1314    Specimen: Blood Updated: 12/10/21 1347     Ammonia 18 umol/L     Comprehensive Metabolic Panel [[id number]]  (Abnormal) Collected: 12/10/21 1243    Specimen: Blood Updated: 12/10/21 1345     Glucose 140 mg/dL      BUN 38 mg/dL      Creatinine 2.04 mg/dL      Sodium 140 mmol/L      Potassium 4.4 mmol/L      Comment: Slight hemolysis detected by analyzer. Results may be affected.        Chloride 104 mmol/L      CO2 25.0 mmol/L      Calcium 9.5 mg/dL      Total Protein 7.2 g/dL       Albumin 3.40 g/dL      ALT (SGPT) 32 U/L      AST (SGOT) 59 U/L      Alkaline Phosphatase 75 U/L      Total Bilirubin 0.7 mg/dL      eGFR Non African Amer 32 mL/min/1.73      Globulin 3.8 gm/dL      A/G Ratio 0.9 g/dL      BUN/Creatinine Ratio 18.6     Anion Gap 11.0 mmol/L     Narrative:      GFR Normal >60  Chronic Kidney Disease <60  Kidney Failure <15      CK [[id number]]  (Abnormal) Collected: 12/10/21 1243    Specimen: Blood Updated: 12/10/21 1345     Creatine Kinase 917 U/L     COVID PRE-OP / PRE-PROCEDURE SCREENING ORDER (NO ISOLATION) - Swab, Nasopharynx [[id number]]  (Normal) Collected: 12/10/21 1244    Specimen: Swab from Nasopharynx Updated: 12/10/21 1343    Narrative:      The following orders were created for panel order COVID PRE-OP / PRE-PROCEDURE SCREENING ORDER (NO ISOLATION) - Swab, Nasopharynx.  Procedure                               Abnormality         Status                     ---------                               -----------         ------                     COVID-19,[facility]...[[id number]]  Normal              Final result                 Please view results for these tests on the individual orders.    COVID-19,[facility] CEPHEID/SINDY NP SWAB IN TRANSPORT MEDIA 8-12 HR TAT - Swab, Nasopharynx [[id number]]  (Normal) Collected: 12/10/21 1244    Specimen: Swab from Nasopharynx Updated: 12/10/21 1343     COVID19 Not Detected    Narrative:      Fact sheet for providers: https://www.fda.gov/media/505415/download    Fact sheet for patients: https://www.fda.gov/media/359609/download    Test performed by PCR.    Troponin [[id number]]  (Abnormal) Collected: 12/10/21 1243    Specimen: Blood Updated: 12/10/21 1343     Troponin T 0.088 ng/mL     Narrative:      Troponin T Reference Range:  <= 0.03 ng/mL-   Negative for AMI  >0.03 ng/mL-     Abnormal for myocardial necrosis.  Clinicians would have to utilize clinical acumen, EKG, Troponin and serial changes to determine if it is an Acute  Myocardial Infarction or myocardial injury due to an underlying chronic condition.       Results may be falsely decreased if patient taking Biotin.      Lactic Acid, Plasma [[id number]]  (Normal) Collected: 12/10/21 1243    Specimen: Blood Updated: 12/10/21 1342     Lactate 1.5 mmol/L     Urinalysis, Microscopic Only - Urine, Catheter [[id number]]  (Abnormal) Collected: 12/10/21 1303    Specimen: Urine, Catheter Updated: 12/10/21 1320     RBC, UA 31-50 /HPF      WBC, UA Too Numerous to Count /HPF      Bacteria, UA None Seen /HPF      Squamous Epithelial Cells, UA 0-2 /HPF      Hyaline Casts, UA 0-2 /LPF      Methodology Automated Microscopy    Urinalysis With Culture If Indicated - Urine, Catheter [[id number]]  (Abnormal) Collected: 12/10/21 1303    Specimen: Urine, Catheter Updated: 12/10/21 1320     Color, UA Yellow     Appearance, UA Cloudy     pH, UA 6.0     Specific Gravity, UA 1.016     Glucose,  mg/dL (Trace)     Ketones, UA Trace     Bilirubin, UA Negative     Blood, UA Large (3+)     Protein, UA >=300 mg/dL (3+)     Leuk Esterase, UA Moderate (2+)     Nitrite, UA Negative     Urobilinogen, UA 0.2 E.U./dL    Urine Culture - Urine, Urine, Catheter [[id number]] Collected: 12/10/21 1303    Specimen: Urine, Catheter Updated: 12/10/21 1320    CBC & Differential [[id number]]  (Abnormal) Collected: 12/10/21 1243    Specimen: Blood Updated: 12/10/21 1320    Narrative:      The following orders were created for panel order CBC & Differential.  Procedure                               Abnormality         Status                     ---------                               -----------         ------                     CBC Auto Differential[[id number]]        Abnormal            Final result                 Please view results for these tests on the individual orders.    CBC Auto Differential [[id number]]  (Abnormal) Collected: 12/10/21 1243    Specimen: Blood Updated: 12/10/21 1320     WBC 11.26 10*3/mm3      RBC 4.58  10*6/mm3      Hemoglobin 13.0 g/dL      Hematocrit 41.7 %      MCV 91.0 fL      MCH 28.4 pg      MCHC 31.2 g/dL      RDW 13.5 %      RDW-SD 45.2 fl      MPV 11.5 fL      Platelets 208 10*3/mm3      Neutrophil % 82.1 %      Lymphocyte % 4.7 %      Monocyte % 11.4 %      Eosinophil % 0.9 %      Basophil % 0.4 %      Immature Grans % 0.5 %      Neutrophils, Absolute 9.24 10*3/mm3      Lymphocytes, Absolute 0.53 10*3/mm3      Monocytes, Absolute 1.28 10*3/mm3      Eosinophils, Absolute 0.10 10*3/mm3      Basophils, Absolute 0.05 10*3/mm3      Immature Grans, Absolute 0.06 10*3/mm3      nRBC 0.1 /100 WBC         Data Review:  Results from last 7 days   Lab Units 12/16/21 0419 12/14/21  0409 12/14/21  0409 12/13/21  1246 12/13/21  1246   SODIUM mmol/L 138  --  136  --  134*   POTASSIUM mmol/L 4.1  --  3.9  --  3.9   CHLORIDE mmol/L 109*  --  106  --  105   CO2 mmol/L 20.3*  --  19.4*  --  20.9*   BUN mg/dL 24*  --  37*  --  42*   CREATININE mg/dL 1.34*  --  1.58*  --  1.89*   GLUCOSE mg/dL 107*   < > 109*   < > 168*   CALCIUM mg/dL 8.4*  --  8.4*  --  8.3*    < > = values in this interval not displayed.     Results from last 7 days   Lab Units 12/16/21 0419 12/14/21  0409 12/13/21  1246   WBC 10*3/mm3 8.10 7.83 6.87   HEMOGLOBIN g/dL 9.3* 9.7* 9.6*   HEMATOCRIT % 28.6* 29.7* 29.7*   PLATELETS 10*3/mm3 187 190 173         Results from last 7 days   Lab Units 12/11/21  0329   HEMOGLOBIN A1C % 5.44     Lab Results   Lab Value Date/Time    TROPONINT 0.086 (C) 12/12/2021 0336    TROPONINT 0.088 (C) 12/10/2021 1243    TROPONINT 0.086 (C) 10/06/2021 0600    TROPONINT 0.061 (C) 07/19/2021 0442    TROPONINT 0.068 (C) 07/18/2021 2229    TROPONINT 0.065 (C) 07/07/2021 2025    TROPONINT 0.039 (C) 06/30/2020 0439    TROPONINT 0.038 (C) 06/29/2020 1559    TROPONINT 0.035 (C) 06/29/2020 0948    TROPONINT 0.030 06/29/2020 0507    TROPONINT 0.035 (C) 12/09/2019 0312    TROPONINT 0.035 (C) 10/15/2019 1542    TROPONINT 0.037 (C)  10/03/2019 1959    TROPONINT 1.010 (C) 08/17/2019 0540    TROPONINT 1.100 (C) 08/15/2019 2059    TROPONINT 0.811 (C) 08/15/2019 1516    TROPONINT 0.396 (C) 08/15/2019 1000    TROPONINT 0.183 (C) 08/15/2019 0420    TROPONINT 0.033 (C) 08/14/2019 2212    TROPONINT 0.031 (C) 06/28/2019 1324    TROPONINT 0.035 (C) 06/28/2019 0719    TROPONINT 0.037 (C) 06/28/2019 0349    TROPONINT 0.039 (C) 06/27/2019 1842    TROPONINT 0.024 05/06/2019 1013    TROPONINT 0.031 (C) 05/06/2019 0331    TROPONINT 0.033 (C) 05/05/2019 2131    TROPONINT 0.034 (H) 02/16/2019 1958    TROPONINT 0.037 (H) 02/16/2019 1726    TROPONINT 0.015 09/22/2018 2107    TROPONINT 0.013 08/03/2018 1719    TROPONINT 0.027 03/08/2018 2307    TROPONINT 0.068 (H) 11/23/2017 1327    TROPONINT 0.099 (H) 11/22/2017 2355    TROPONINT 0.115 (C) 11/22/2017 1805    TROPONINT 0.141 (C) 11/22/2017 1256    TROPONINT 0.014 11/21/2017 1557         Results from last 7 days   Lab Units 12/11/21  0329 12/10/21  1243   ALK PHOS U/L 59 75   BILIRUBIN mg/dL 0.5 0.7   ALT (SGPT) U/L 25 32   AST (SGOT) U/L 38 59*         Results from last 7 days   Lab Units 12/11/21  0329   HEMOGLOBIN A1C % 5.44     Glucose   Date/Time Value Ref Range Status   12/16/2021 1109 131 (H) 70 - 130 mg/dL Final     Comment:     Meter: [id number] : [id number] Humberto Lemos NA   12/16/2021 0703 111 70 - 130 mg/dL Final     Comment:     Meter: [id number] : [id number] Ankushlon Chawla NA   12/15/2021 2132 118 70 - 130 mg/dL Final     Comment:     Meter: [id number] : [id number] Ankush Chawla NA   12/15/2021 1705 159 (H) 70 - 130 mg/dL Final     Comment:     Meter: [id number] : [id number] Fidelia Luke RN   12/15/2021 1047 119 70 - 130 mg/dL Final     Comment:     Meter: [id number] : [id number] Zhang Vargas NA   12/15/2021 0634 107 70 - 130 mg/dL Final     Comment:     Meter: [id number] : [id number] Kris Mckeon CNA   12/14/2021 2039 161 (H) 70 - 130 mg/dL Final     Comment:      Meter: [id number] : [id number] Kris Mckeon CNA   12/14/2021 1707 151 (H) 70 - 130 mg/dL Final     Comment:     Meter: [id number] : [id number] Goodman Jo-Ann HARKINS           Past Medical History:   Diagnosis Date   • A-fib ([facility])    • Arthritis    • Asthma    • CAD (coronary artery disease)    • Cardiomyopathy ([facility])    • Cataract    • CHF (congestive heart failure) ([facility])    • CKD (chronic kidney disease), stage III ([facility])    • COPD (chronic obstructive pulmonary disease) ([facility])    • Diabetes mellitus ([facility])    • Disease of thyroid gland    • Emphysema, unspecified ([facility])    • Glaucoma    • Hyperlipidemia    • Hypertension    • Kidney stone    • Myocardial infarct, old    • Neuropathy    • Osteoarthritis    • Osteoporosis    • Permanent atrial fibrillation ([facility]) 6/30/2020   • PVD (peripheral vascular disease) ([facility])    • Renal disorder    • Shingles        Assessment:  Active Hospital Problems    Diagnosis  POA   • **Acute metabolic encephalopathy [G93.41]  Yes   • Cardiomyopathy ([facility]) [I42.9]  Unknown   • Recurrent falls [R29.6]  Not Applicable   • Hypothyroidism [E03.9]  Yes   • CAD (coronary artery disease) [I25.10]  Yes   • Recurrent left pleural effusion [J90]  Yes   • Permanent atrial fibrillation ([facility]) [I48.21]  Yes   • Acute UTI (urinary tract infection) [N39.0]  Yes   • Chronic renal insufficiency, stage 4 (severe) ([facility]) [N18.4]  Yes   • Primary osteoarthritis of left knee [M17.12]  Yes   • Essential hypertension [I10]  Yes   • Type 2 diabetes mellitus with stage 4 chronic kidney disease, with long-term current use of insulin ([facility]) [E11.22, N18.4, Z79.4]  Not Applicable   • COPD (chronic obstructive pulmonary disease) ([facility]) [J44.9]  Yes      Resolved Hospital Problems   No resolved problems to display.       Plan:  Continue with antibiotics for UTI and await cultures. Follow lab.  cardiology consult noted.  Will need SNU for rehab.  Wound nurse.  DC planning. Refuses COVID-19 vaccine. Refuses PT.  Ask for  psych consult.    Bautista Gonzalez MD  12/16/2021  11:18 EST

## 2021-12-16 NOTE — CASE MANAGEMENT/SOCIAL WORK
Continued Stay Note  [facility]     Patient Name: Froilan Helton  MRN: [id number]  Today's Date: 12/16/2021    Admit Date: 12/10/2021     Discharge Plan     Row Name 12/16/21 1248       Plan    Plan Comments Spoke to Wendy with the Abuse and Neglect Hotline [phone] who stated that report number [id number] was not accepted for investigation due to an active case already being open on the patient.  Wendy was informed to provide the active worker or supervisor with the contact information for CCP as she states that she cannot provide the worker or supervisors contact information to [facility].  PAULO Perkins    Row Name 12/16/21 1159       Plan    Plan Plans to be determined, has accepting facility for skilled rehab at [facility]    Plan Comments INcoming call from ESTRADA Galvan  #[phone].  He states that the patient has an open case and he is working on the case.  Discussed that patient states he does not want to go to rehab and prefers home at [place] and his safety.  Cole will speak with his Jaz najera.....................Mer Duran RN    Row Name 12/16/21 1142       Plan    Plan Plan to be determined; has accepting facility skilled bed at [facility]    Plan Comments Patient states he will not go to rehab and his plan is home.  Spoke with tu Sebastian and POA.  She states she prefers that the patient go to skilled rehab at [place].  Notified the patient is refusing at this time and she states she will call and speak with him.  She is also requesting that someone assist with her and his decision making    Row Name 12/16/21 1130       Plan    Plan Skilled rehab bed, accepted at [facility]    Plan Comments Incoming call from Simeon with [facility] and she accepts the patient for rehab.  Will notifiy Jaz and the patient.....................Mer Duran RN               Discharge Codes    No documentation.               Expected Discharge Date and Time     Expected Discharge Date Expected Discharge Time     Dec 16, 2021             PAULO Lester

## 2021-12-16 NOTE — PROGRESS NOTES
Continued Stay Note  [facility]     Patient Name: Froilan Helton  MRN: [id number]  Today's Date: 12/16/2021    Admit Date: 12/10/2021     Discharge Plan     Row Name 12/16/21 1142       Plan    Plan Plan to be determined; has accepting facility skilled bed at [facility]    Plan Comments Patient states he will not go to rehab and his plan is home.  Spoke with tu Sebastian and POA.  She states she prefers that the patient go to skilled rehab at [place].  Notified the patient is refusing at this time and she states she will call and speak with him.  She is also requesting that someone assist with her and his decision making    Row Name 12/16/21 1130       Plan    Plan Skilled rehab bed, accepted at [facility]    Plan Comments Incoming call from Simeon with [facility] and she accepts the patient for rehab.  Will notifiy Jaz and the patient.....................Mer Duran RN               Discharge Codes    No documentation.               Expected Discharge Date and Time     Expected Discharge Date Expected Discharge Time    Dec 16, 2021             Mer Duran RN

## 2021-12-16 NOTE — PROGRESS NOTES
Adult Nutrition  Assessment/PES    Patient Name:  Froilan Helton  YOB: 1940  MRN: [id number]  Admit Date:  12/10/2021    Assessment Date:  12/16/2021    Comments:  Recommend adding daily multivitamin/mineral to support wound healing. Intake 75%; supplementing with Boost glucose control TID. Will cont to monitor.      Reason for Assessment     Row Name 12/16/21 1244          Reason for Assessment    Reason For Assessment follow-up protocol                Nutrition/Diet History     Row Name 12/16/21 1244          Nutrition/Diet History    Typical Food/Fluid Intake Eating ok.     Factors Affecting Nutritional Intake impaired cognitive status/motor control                  Labs/Tests/Procedures/Meds     Row Name 12/16/21 1245          Labs/Procedures/Meds    Lab Results Reviewed reviewed, pertinent     Lab Results Comments Glu, BUN, Cl, Cr, Alb            Diagnostic Tests/Procedures    Diagnostic Test/Procedure Reviewed reviewed, pertinent            Medications    Pertinent Medications Reviewed reviewed, pertinent                Physical Findings     Row Name 12/16/21 1248          Physical Findings    Skin poor skin integrity/turgor; other (see comments); pressure injury; edema  multiple, various skin tears; L thigh with large wound (? pressure)                  Nutrition Prescription Ordered     Row Name 12/16/21 1244          Nutrition Prescription PO    Current PO Diet Soft Texture     Texture Ground     Fluid Consistency Thin     Supplement Boost Glucose Control (Glucerna Shake)     Supplement Frequency 3 times a day                       Problem/Interventions:           Intervention Goal     Row Name 12/16/21 1241          Intervention Goal    General Maintain nutrition; Disease management/therapy     PO Maintain intake; PO intake (%)     PO Intake % 75 %     Weight No significant weight loss                Nutrition Intervention     Row Name 12/16/21 1244          Nutrition Intervention    RD/Tech  Action Supplement provided; Encourage intake; Follow Tx progress; Care plan reviewd; Interview for preference                Nutrition Prescription     Row Name 12/16/21 1250          Other Orders    Supplement Vitamin mineral supplement     Supplement Ordered? No, recommended                Education/Evaluation     Row Name 12/16/21 1251 12/16/21 1249       Education    Education Will Instruct as appropriate Will Instruct as appropriate       Monitor/Evaluation    Monitor Per protocol Per protocol                Electronically signed by:  Iris Rodriguez RD  12/16/21 12:52 EST

## 2021-12-16 NOTE — PLAN OF CARE
Goal Outcome Evaluation:  Plan of Care Reviewed With: patient        Progress: no change  Outcome Summary: VSS, on RA. Pt alert and oriented throughout most of shift, but becomes more irritable and confused around 1800. Psych consulted. IV abx and iron given. Wound care as ordered. remains q2hr turn. will continue to montior

## 2021-12-16 NOTE — PROGRESS NOTES
Continued Stay Note  [facility]     Patient Name: Froilan Helton  MRN: [id number]  Today's Date: 12/16/2021    Admit Date: 12/10/2021     Discharge Plan     Row Name 12/16/21 1159       Plan    Plan Plans to be determined, has accepting facility for skilled rehab at [facility]    Plan Comments INcoming call from ESTRADA Galvan  #[phone].  He states that the patient has an open case and he is working on the case.  Discussed that patient states he does not want to go to rehab and prefers home at [place] and his safety.  Cole will speak with his Jaz najera.....................Mer Duran RN    Row Name 12/16/21 1143       Plan    Plan Plan to be determined; has accepting facility skilled bed at [facility]    Plan Comments Patient states he will not go to rehab and his plan is home.  Spoke with tu Sebastian and POA.  She states she prefers that the patient go to skilled rehab at [place].  Notified the patient is refusing at this time and she states she will call and speak with him.  She is also requesting that someone assist with her and his decision making    Row Name 12/16/21 1130       Plan    Plan Skilled rehab bed, accepted at [facility]    Plan Comments Incoming call from Simeon with [facility] and she accepts the patient for rehab.  Will notifiy Jaz and the patient.....................Mer Duran RN               Discharge Codes    No documentation.               Expected Discharge Date and Time     Expected Discharge Date Expected Discharge Time    Dec 16, 2021             Mer Duran RN

## 2021-12-16 NOTE — NURSING NOTE
Access Center note.  Spoke with RN who reports patient has been pleasant this morning.  RN feels patient is experiencing sundowning as he has noticed patient's behaviors to change late in the day.  Discussed consult for psychiatrist, RN will notify attending.    AC following.

## 2021-12-17 NOTE — PLAN OF CARE
Goal Outcome Evaluation:  Plan of Care Reviewed With: patient           Outcome Summary: Pt. requires Max. assist x 2 for bed mobility this date.  Once sitting EOB, pt. requires CGA x 1 for static sitting balance and Min. assist x 1 for dynamic sitting balance. BLE ther. ex. program x 5 reps completed for general strengthening.  Verbal/tactile cues given for posture correction throughout.  Pt. adamantly refusing to attempt sit <-> stand transfers this date despite verbal encouragement and education on importance of progressing mobility.    Patient was not wearing a face mask during this therapy encounter. Therapist used appropriate personal protective equipment including eye protection, mask, and gloves.  Mask used was standard procedure mask. Appropriate PPE was worn during the entire therapy session. Hand hygiene was completed before and after therapy session. Patient is not in enhanced droplet precautions.

## 2021-12-17 NOTE — PROGRESS NOTES
Continued Stay Note  [facility]     Patient Name: Froilan Helton  MRN: [id number]  Today's Date: 12/17/2021    Admit Date: 12/10/2021     Discharge Plan     Row Name 12/17/21 1327       Plan    Plan Patient prefers home at [place]    Plan Comments VM for [facility] worker [phone], to follow up.  Patient prefers home and declines rehab.  Per psychiatric note patient is alert and oriented.  Will proceed with discharge plans for home.  Following PT.  Patient sat at side of bed with assist.  Will set up HH if patient allows and will follow up on Monday.........................Mer Duran RN               Discharge Codes    No documentation.               Expected Discharge Date and Time     Expected Discharge Date Expected Discharge Time    Dec 20, 2021             Mer Duran RN

## 2021-12-17 NOTE — NURSING NOTE
Pt continues to refuse monitors, threatening staff, and grabbing staffs arm. Security called to the room and pt immediately starts to cooperate and lets staff place heart monitor on. IM zyprexa was ordered but not needed while security was at bedside. Pt now being cooperative and leaving monitors alone but still being rude with staff.

## 2021-12-17 NOTE — CONSULTS
IDENTIFYING INFORMATION: The patient is a 81-year-old white male admitted with weakness and mental status changes.    CHIEF COMPLAINT: None given    INFORMANT: Patient and chart    RELIABILITY: Fair    HISTORY OF PRESENT ILLNESS: The patient is an 81-year-old white male admitted on 12/10/2021 with altered mental status and frequent falls.  The patient has been living independently but frequently has to call EMS to assist him after his falls.  The patient is currently being treated with IV antibiotics for urinary tract infection.  He has been less than optimally cooperative with staff and on occasion has needed as needed Zyprexa for agitation.  When seen by this physician today, the patient is fully oriented and is initially a bit sarcastic but warms up as the interview progresses.  He continues to state wish to go home following discharge and spite of recommendation that he go to rehab.  Patient reports that he had been prescribed Celexa and Seroquel in the past but has been noncompliant with these medications for some time.  He denies prior psychiatric hospitalization.  He is currently denying any suicidal or homicidal thinking and denies any psychotic symptoms.  Past    PAST PSYCHIATRIC HISTORY: As noted previously, the patient had been prescribed Celexa and Seroquel in the past but has been noncompliant with his medications for some time per his report.  He reports that his depression dates to the death of his wife approximately 1 year ago.    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, neuropathy, osteoarthritis, osteoporosis, atrial fibrillation, peripheral vascular disease    MEDICATIONS:   Current  Facility-Administered Medications   Medication Dose Route Frequency Provider Last Rate Last Admin   • acetaminophen (TYLENOL) tablet 650 mg  650 mg Oral Q4H PRN Adrián Sparks MD   650 mg at 12/17/21 0832   • albuterol sulfate HFA (PROVENTIL HFA;VENTOLIN HFA;PROAIR HFA) inhaler 2 puff  2 puff Inhalation Q6H PRN Adrián Sparks MD       • amiodarone (PACERONE) tablet 200 mg  200 mg Oral Q12H Adrián Sparks MD   200 mg at 12/17/21 0826   • apixaban (ELIQUIS) tablet 2.5 mg  2.5 mg Oral Q12H Adrián Sparks MD   2.5 mg at 12/17/21 0826   • atorvastatin (LIPITOR) tablet 10 mg  10 mg Oral Nightly Adrián Sparks MD   10 mg at 12/16/21 2103   • budesonide-formoterol (SYMBICORT) 160-4.5 MCG/ACT inhaler 2 puff  2 puff Inhalation BID - RT Adrián Sparks MD   2 puff at 12/17/21 0733   • collagenase ointment 1 application  1 application Topical Q24H Bautista Gonzalez MD   1 application at 12/16/21 0806   • dextrose (D50W) (25 g/50 mL) IV injection 25 g  25 g Intravenous Q15 Min PRN Adrián Sparks MD       • dextrose (GLUTOSE) oral gel 15 g  15 g Oral Q15 Min PRN Adrián Sparks MD       • docusate sodium (COLACE) capsule 100 mg  100 mg Oral BID Adrián Sparks MD   100 mg at 12/17/21 0826   • fluticasone (FLONASE) 50 MCG/ACT nasal spray 2 spray  2 spray Each Nare Daily Adrián Sparks MD   2 spray at 12/17/21 0827   • glucagon (human recombinant) (GLUCAGEN DIAGNOSTIC) injection 1 mg  1 mg Subcutaneous Q15 Min PRN Adrián Sparks MD       • insulin lispro (ADMELOG) injection 0-9 Units  0-9 Units Subcutaneous TID AC Adrián Sparks MD   2 Units at 12/15/21 1720   • ipratropium-albuterol (DUO-NEB) nebulizer solution 3 mL  3 mL Nebulization Q4H PRN Adrián Sparks MD       • levothyroxine (SYNTHROID, LEVOTHROID) tablet 224 mcg  224 mcg Oral Daily Adrián Sparks MD   224 mcg at 12/17/21 0826   • mirtazapine (REMERON) tablet 7.5 mg  7.5 mg Oral Nightly Prosper New III, MD       • nitroglycerin (NITROSTAT) SL tablet 0.4 mg  0.4 mg Sublingual Q5  Min PRN Adrián Sparks MD       • O2 (OXYGEN)  2 L/min Inhalation Continuous Adrián Sparks ,000 mL/hr at 12/10/21 2127 2 L/min at 12/10/21 2127   • OLANZapine (zyPREXA) injection 5 mg  5 mg Intramuscular Once Marly Holcomb APRN       • ondansetron (ZOFRAN) injection 4 mg  4 mg Intravenous Q6H PRN Adrián Sparks MD   4 mg at 12/14/21 1304   • sodium chloride 0.9 % flush 10 mL  10 mL Intravenous Q12H Adrián Sparks MD   10 mL at 12/17/21 0826   • sodium chloride 0.9 % flush 10 mL  10 mL Intravenous PRN Adrián Sparks MD       • sodium chloride 0.9 % infusion  75 mL/hr Intravenous Continuous Adrián Sparks MD 75 mL/hr at 12/16/21 2308 75 mL/hr at 12/16/21 2308   • sodium chloride nasal spray 2 spray  2 spray Nasal PRN Adrián Sparks MD       • tamsulosin (FLOMAX) 24 hr capsule 0.4 mg  0.4 mg Oral Daily Adrián Sparks MD   0.4 mg at 12/17/21 0826   • vitamin B-12 (CYANOCOBALAMIN) tablet 1,000 mcg  1,000 mcg Oral Daily Adrián Sparks MD   1,000 mcg at 12/17/21 0826   • vitamin D (ERGOCALCIFEROL) capsule 50,000 Units  50,000 Units Oral Daily Adrián Sparks MD   50,000 Units at 12/17/21 0826         ALLERGIES: Morphine, atorvastatin, OxyContin    FAMILY HISTORY: Noncontributory    SOCIAL HISTORY: The patient is a retired farmer.  He is  and lives alone.  There is no reported use of alcohol tobacco street drugs.    MENTAL STATUS EXAM: The patient is an elderly white male appearing his stated age.  He is in no apparent physical distress at the time examination.  He is awake alert and oriented x4.  His mood he is able to identify the [place] president.  His mood is a bit irritable his affect congruent.  Speech is generally relevant and coherent.  There are no deficits memory or cognition noted.  Intelligence is judged to be in the average range based on fund of knowledge, the patient is cooperative.  He is currently reporting no suicidal or homicidal ideation or psychotic symptoms.  His judgment and insight  appear to be reasonably intact.    ASSETS/LIABILITIES: To be assessed    DIAGNOSTIC IMPRESSION: Major depressive disorder recurrent mild, multiple medical problems described previously    PLAN: I will go ahead and restart the patient's previously prescribed Celexa and Seroquel and will leave orders for as needed medication.  I suspect that the patient's problematic behaviors are related to a difficult pre-existing personality style rather than any encephalopathic changes at this point.  Access center and I will continue to follow with you.

## 2021-12-17 NOTE — PLAN OF CARE
Goal Outcome Evaluation:  Plan of Care Reviewed With: patient        Progress: no change  Outcome Summary: VSS, RA. Pt became more and more irritable and belligerent throughout the night. Pt refused heart monitor, pulse ox, and gown until finally agreed to wear the heart monitor only. Wound care completed prior to refusal. Unable to complete q2h turns d/t refusal by pt. Will continue to monitor.

## 2021-12-17 NOTE — NURSING NOTE
Informed MD during walking rounds of patients overnight behaviors and removal of IV. Psychiatry to see.

## 2021-12-17 NOTE — THERAPY TREATMENT NOTE
Patient Name: Froilan Helton  : 1940    MRN: [id number]                              Today's Date: 2021       Admit Date: 12/10/2021    Visit Dx:     ICD-10-CM ICD-9-CM   1. Acute metabolic encephalopathy  G93.41 348.31   2. Traumatic rhabdomyolysis, initial encounter ([facility])  T79.6XXA 958.6   3. Chronic kidney disease, unspecified CKD stage  N18.9 585.9   4. Pressure injury of left thigh, stage 3 ([facility])  L89.223 707.09     707.23     Patient Active Problem List   Diagnosis   • COPD (chronic obstructive pulmonary disease) ([facility])   • Type 2 diabetes mellitus with stage 4 chronic kidney disease, with long-term current use of insulin ([facility])   • Essential hypertension   • Primary osteoarthritis of left knee   • Chronic renal insufficiency, stage 4 (severe) ([facility])   • Class 2 severe obesity due to excess calories with serious comorbidity in adult ([facility])   • Physical debility   • Acute UTI (urinary tract infection)   • Permanent atrial fibrillation ([facility])   • H/O noncompliance with medical treatment, presenting hazards to health   • Myxedema   • Acute on chronic combined systolic and diastolic CHF (congestive heart failure) ([facility])   • Generalized weakness   • Recurrent left pleural effusion   • Fall on same level as cause of accidental injury   • Gross hematuria   • CAD (coronary artery disease)   • HLD (hyperlipidemia)   • Hypothyroidism   • CKD (chronic kidney disease) stage 3, GFR 30-59 ml/min ([facility])   • Acute metabolic encephalopathy   • Cardiomyopathy ([facility])   • Recurrent falls     Past Medical History:   Diagnosis Date   • A-fib ([facility])    • Arthritis    • Asthma    • CAD (coronary artery disease)    • Cardiomyopathy ([facility])    • Cataract    • CHF (congestive heart failure) ([facility])    • CKD (chronic kidney disease), stage III ([facility])    • COPD (chronic obstructive pulmonary disease) ([facility])    • Diabetes mellitus ([facility])    • Disease of thyroid gland    • Emphysema, unspecified ([facility])    • Glaucoma    • Hyperlipidemia    •  Hypertension    • Kidney stone    • Myocardial infarct, old    • Neuropathy    • Osteoarthritis    • Osteoporosis    • Permanent atrial fibrillation ([facility]) 6/30/2020   • PVD (peripheral vascular disease) ([facility])    • Renal disorder    • Shingles      Past Surgical History:   Procedure Laterality Date   • COLONOSCOPY     • EYE SURGERY     • JOINT REPLACEMENT      right   • KIDNEY STONE SURGERY     • REPLACEMENT TOTAL KNEE     • TOE SURGERY        General Information     Row Name 12/17/21 1123          Physical Therapy Time and Intention    Document Type therapy note (daily note)  -MS     Mode of Treatment physical therapy; individual therapy  -MS     Row Name 12/17/21 1123          General Information    Patient Profile Reviewed yes  -MS     Existing Precautions/Restrictions fall   Exit alarm  -MS     Row Name 12/17/21 1123          Cognition    Orientation Status (Cognition) oriented to; person  -MS           User Key  (r) = Recorded By, (t) = Taken By, (c) = Cosigned By    Initials Name Provider Type    Sree Abdul, PT Physical Therapist               Mobility     Row Name 12/17/21 1124          Bed Mobility    Supine-Sit [place] (Bed Mobility) maximum assist (25% patient effort); 2 person assist  -MS     Sit-Supine [place] (Bed Mobility) maximum assist (25% patient effort); 2 person assist  -MS     Assistive Device (Bed Mobility) draw sheet  -MS     Comment (Bed Mobility) Once sitting EOB, pt requires CGA x 1 for static sitting balance and Min. assist x 1 for dynamic sitting balance.  -MS     Row Name 12/17/21 1124          Transfers    Comment (Transfers) Pt. adamantly refuses to even attempt sit <-> stand transfers despite verbal encouragement and education.  -MS           User Key  (r) = Recorded By, (t) = Taken By, (c) = Cosigned By    Initials Name Provider Type    Sree Abdul, PT Physical Therapist               Obj/Interventions     Row Name 12/17/21 1125          Motor Skills     Therapeutic Exercise --  BLE ther. ex. program x 5 reps completed (Ankle pumps, Hip Flexion, LAQ's)  -MS           User Key  (r) = Recorded By, (t) = Taken By, (c) = Cosigned By    Initials Name Provider Type    Sree Abdul PT Physical Therapist               Goals/Plan    No documentation.                Clinical Impression     Row Name 12/17/21 1125          Pain Scale: Numbers Pre/Post-Treatment    Pretreatment Pain Rating 0/10 - no pain  -MS     Posttreatment Pain Rating 0/10 - no pain  -MS     Row Name 12/17/21 1125          Positioning and Restraints    Pre-Treatment Position in bed  -MS     Post Treatment Position bed  -MS     In Bed notified nsg; supine; call light within reach; encouraged to call for assist; exit alarm on  All lines intact.  -MS           User Key  (r) = Recorded By, (t) = Taken By, (c) = Cosigned By    Initials Name Provider Type    Sree Abdul, PT Physical Therapist               Outcome Measures     Row Name 12/17/21 1125          How much help from another person do you currently need...    Turning from your back to your side while in flat bed without using bedrails? 2  -MS     Moving from lying on back to sitting on the side of a flat bed without bedrails? 2  -MS     Moving to and from a bed to a chair (including a wheelchair)? 2  -MS     Standing up from a chair using your arms (e.g., wheelchair, bedside chair)? 2  -MS     Climbing 3-5 steps with a railing? 1  -MS     To walk in hospital room? 2  -MS     AM-PAC 6 Clicks Score (PT) 11  -MS     Row Name 12/17/21 1125          Functional Assessment    Outcome Measure Options AM-PAC 6 Clicks Basic Mobility (PT)  -MS           User Key  (r) = Recorded By, (t) = Taken By, (c) = Cosigned By    Initials Name Provider Type    Sree Abdul PT Physical Therapist                             Physical Therapy Education                 Title: PT OT SLP Therapies (In Progress)     Topic: Physical Therapy (In Progress)      Point: Mobility training (In Progress)     Learning Progress Summary           Patient Acceptance, E,D, NR by MS at 12/17/2021 1126    Acceptance, E, NR by SR at 12/14/2021 1144    Acceptance, E, VU by CL at 12/11/2021 1821    Acceptance, E,TB,D, VU,NR by CB at 12/11/2021 1348                   Point: Home exercise program (In Progress)     Learning Progress Summary           Patient Acceptance, E,D, NR by MS at 12/17/2021 1126    Acceptance, E, NR by SR at 12/14/2021 1144    Acceptance, E, VU by CL at 12/11/2021 1821                   Point: Body mechanics (In Progress)     Learning Progress Summary           Patient Acceptance, E,D, NR by MS at 12/17/2021 1126    Acceptance, E, NR by SR at 12/14/2021 1144    Acceptance, E, VU by CL at 12/11/2021 1821    Acceptance, E,TB,D, VU,NR by CB at 12/11/2021 1348                   Point: Precautions (In Progress)     Learning Progress Summary           Patient Acceptance, E,D, NR by MS at 12/17/2021 1126    Acceptance, E, NR by SR at 12/14/2021 1144    Acceptance, E, VU by CL at 12/11/2021 1821    Acceptance, E,TB,D, VU,NR by CB at 12/11/2021 1348                               User Key     Initials Effective Dates Name Provider Type Discipline    MS 06/16/21 -  Sree Granda PT Physical Therapist PT    CL 06/16/21 -  Otis Fleming, RN Registered Nurse Nurse    CB 10/22/21 -  Jamia Hernandez PT Physical Therapist PT    SR 02/08/21 -  Brandi Scott Physical Therapist PT              PT Recommendation and Plan     Plan of Care Reviewed With: patient  Outcome Summary: Pt. requires Max. assist x 2 for bed mobility this date.  Once sitting EOB, pt. requires CGA x 1 for static sitting balance and Min. assist x 1 for dynamic sitting balance. BLE ther. ex. program x 5 reps completed for general strengthening.  Verbal/tactile cues given for posture correction throughout.  Pt. adamantly refusing to attempt sit <-> stand transfers this date despite verbal encouragement and education on  importance of progressing mobility.     Time Calculation:    PT Charges     Row Name 12/17/21 1128             Time Calculation    Start Time 0845  -MS      Stop Time 0900  -MS      Time Calculation (min) 15 min  -MS      PT Received On 12/17/21  -MS      PT - Next Appointment 12/20/21  -MS              Time Calculation- PT    Total Timed Code Minutes- PT 14 minute(s)  -MS            User Key  (r) = Recorded By, (t) = Taken By, (c) = Cosigned By    Initials Name Provider Type    Sree Abdul, PT Physical Therapist              Therapy Charges for Today     Code Description Service Date Service Provider Modifiers Qty    75587936823 HC PT THER PROC EA 15 MIN 12/17/2021 Sree Granda, PT GP 1    47611232093 HC PT THER SUPP EA 15 MIN 12/17/2021 Sree Granda PT GP 1          PT G-Codes  Outcome Measure Options: AM-PAC 6 Clicks Basic Mobility (PT)  AM-PAC 6 Clicks Score (PT): 11  AM-PAC 6 Clicks Score (OT): 9  Modified Teressa Scale: 5 - Severe disability.  Bedridden, incontinent, and requiring constant nursing care and attention.    Sree Granda, LORENZO  12/17/2021

## 2021-12-17 NOTE — NURSING NOTE
CWOCN- follow up on left lateral thigh. Wounds are showing some improvement with the current treatment of santyl + opticel ag. The wound is 70% pink, moist and 30% necrotic. The wound bed appears to be partial thickness but until the black and yellow necrotic areas heal this cannot be confirmed. There is yellow slough at the proximal wound bed, black in the center and a small yellow area distal. The drainage and the color of the drainage have improved. There is no odor. Recommend to continue the current plan, daily. Patient is tolerating turns.   Wound care was completed per the order.

## 2021-12-17 NOTE — PROGRESS NOTES
"DAILY PROGRESS NOTE  [facility]    Patient Identification:  Name: Froilan Helton  Age: 81 y.o.  Sex: male  :  1940  MRN: [id number]         Primary Care Physician: Fortunato De Leon MD    Subjective:  Interval History:He complains of pain. He is weak.  Refusing PT. also refusing to go to rehab facility.    Objective:    Scheduled Meds:amiodarone, 200 mg, Oral, Q12H  apixaban, 2.5 mg, Oral, Q12H  atorvastatin, 10 mg, Oral, Nightly  budesonide-formoterol, 2 puff, Inhalation, BID - RT  collagenase, 1 application, Topical, Q24H  docusate sodium, 100 mg, Oral, BID  fluticasone, 2 spray, Each Nare, Daily  insulin lispro, 0-9 Units, Subcutaneous, TID AC  levothyroxine, 224 mcg, Oral, Daily  mirtazapine, 7.5 mg, Oral, Nightly  OLANZapine, 5 mg, Intramuscular, Once  sodium chloride, 10 mL, Intravenous, Q12H  tamsulosin, 0.4 mg, Oral, Daily  cyanocobalamin, 1,000 mcg, Oral, Daily  vitamin D, 50,000 Units, Oral, Daily      Continuous Infusions:O2, 2 L/min, Last Rate: 2 L/min (12/10/21 2127)  sodium chloride, 75 mL/hr, Last Rate: 75 mL/hr (21)        Vital signs in last 24 hours:  Temp:  [97.6 °F (36.4 °C)-97.9 °F (36.6 °C)] 97.9 °F (36.6 °C)  Heart Rate:  [56-78] 62  Resp:  [16-18] 16  BP: (139-164)/(63-87) 164/79    Intake/Output:    Intake/Output Summary (Last 24 hours) at 2021 1220  Last data filed at 2021 1706  Gross per 24 hour   Intake 270 ml   Output --   Net 270 ml       Exam:  /79 (BP Location: Left arm, Patient Position: Lying)   Pulse 62   Temp 97.9 °F (36.6 °C) (Oral)   Resp 16   Ht 185.4 cm (73\")   Wt 104 kg (228 lb 11.2 oz)   SpO2 92%   BMI 30.17 kg/m²     General Appearance:    Alert, cooperative, no distress   Head:    Normocephalic, without obvious abnormality, atraumatic   Eyes:       Throat:   Lips, tongue, gums normal   Neck:   Supple, symmetrical, trachea midline, no JVD   Lungs:     Clear to auscultation bilaterally, respirations unlabored   Chest " Wall:    No tenderness or deformity    Heart:    Regular rate and rhythm, S1 and S2 normal, no murmur,no  Rub or gallop   Abdomen:     Soft, nontender, bowel sounds active, no masses, no organomegaly    Extremities:   Extremities normal, atraumatic, no cyanosis or edema   Pulses:      Skin:   Skin is warm and dry,  Skin wounds and abrasions   Neurologic:   no focal deficits noted      Lab Results (last 72 hours)       Procedure Component Value Units Date/Time    Blood Culture - Blood, Arm, Right [[id number]]  (Normal) Collected: 12/10/21 1243    Specimen: Blood from Arm, Right Updated: 12/11/21 1315     Blood Culture No growth at 24 hours    Blood Culture - Blood, Arm, Right [[id number]]  (Normal) Collected: 12/10/21 1243    Specimen: Blood from Arm, Right Updated: 12/11/21 1315     Blood Culture No growth at 24 hours    POC Glucose Once [[id number]]  (Abnormal) Collected: 12/11/21 1101    Specimen: Blood Updated: 12/11/21 1106     Glucose 199 mg/dL      Comment: RN Notified R and V Meter: [id number] : [id number] Cecil HARKINS       POC Glucose Once [[id number]]  (Abnormal) Collected: 12/11/21 0634    Specimen: Blood Updated: 12/11/21 0636     Glucose 150 mg/dL      Comment: Meter: [id number] : [id number] Ankush HARKINS       Lactic Acid, Plasma [[id number]]  (Normal) Collected: 12/11/21 0329    Specimen: Blood Updated: 12/11/21 0522     Lactate 1.3 mmol/L     Comprehensive Metabolic Panel [[id number]]  (Abnormal) Collected: 12/11/21 0329    Specimen: Blood Updated: 12/11/21 0513     Glucose 141 mg/dL      BUN 41 mg/dL      Creatinine 1.95 mg/dL      Sodium 142 mmol/L      Potassium 3.8 mmol/L      Chloride 109 mmol/L      CO2 21.6 mmol/L      Calcium 8.8 mg/dL      Total Protein 5.9 g/dL      Albumin 2.60 g/dL      ALT (SGPT) 25 U/L      AST (SGOT) 38 U/L      Alkaline Phosphatase 59 U/L      Total Bilirubin 0.5 mg/dL      eGFR Non African Amer 33 mL/min/1.73      Globulin 3.3 gm/dL      A/G Ratio 0.8 g/dL       BUN/Creatinine Ratio 21.0     Anion Gap 11.4 mmol/L     Narrative:      GFR Normal >60  Chronic Kidney Disease <60  Kidney Failure <15      CBC Auto Differential [[id number]]  (Abnormal) Collected: 12/11/21 0329    Specimen: Blood Updated: 12/11/21 0458     WBC 8.74 10*3/mm3      RBC 3.94 10*6/mm3      Hemoglobin 11.4 g/dL      Hematocrit 34.3 %      MCV 87.1 fL      MCH 28.9 pg      MCHC 33.2 g/dL      RDW 13.7 %      RDW-SD 43.8 fl      MPV 11.3 fL      Platelets 179 10*3/mm3      Neutrophil % 75.3 %      Lymphocyte % 8.8 %      Monocyte % 14.1 %      Eosinophil % 1.0 %      Basophil % 0.3 %      Immature Grans % 0.5 %      Neutrophils, Absolute 6.58 10*3/mm3      Lymphocytes, Absolute 0.77 10*3/mm3      Monocytes, Absolute 1.23 10*3/mm3      Eosinophils, Absolute 0.09 10*3/mm3      Basophils, Absolute 0.03 10*3/mm3      Immature Grans, Absolute 0.04 10*3/mm3      nRBC 0.0 /100 WBC     Hemoglobin A1c [[id number]]  (Normal) Collected: 12/11/21 0329    Specimen: Blood Updated: 12/11/21 0458     Hemoglobin A1C 5.44 %     Narrative:      Hemoglobin A1C Ranges:    Increased Risk for Diabetes  5.7% to 6.4%  Diabetes                     >= 6.5%  Diabetic Goal                < 7.0%    POC Glucose Once [[id number]]  (Abnormal) Collected: 12/10/21 2155    Specimen: Blood Updated: 12/10/21 2156     Glucose 167 mg/dL      Comment: Meter: [id number] : [id number] Ankush HARKINS       Ammonia [[id number]]  (Normal) Collected: 12/10/21 1314    Specimen: Blood Updated: 12/10/21 1347     Ammonia 18 umol/L     Comprehensive Metabolic Panel [[id number]]  (Abnormal) Collected: 12/10/21 1243    Specimen: Blood Updated: 12/10/21 1345     Glucose 140 mg/dL      BUN 38 mg/dL      Creatinine 2.04 mg/dL      Sodium 140 mmol/L      Potassium 4.4 mmol/L      Comment: Slight hemolysis detected by analyzer. Results may be affected.        Chloride 104 mmol/L      CO2 25.0 mmol/L      Calcium 9.5 mg/dL      Total Protein 7.2 g/dL       Albumin 3.40 g/dL      ALT (SGPT) 32 U/L      AST (SGOT) 59 U/L      Alkaline Phosphatase 75 U/L      Total Bilirubin 0.7 mg/dL      eGFR Non African Amer 32 mL/min/1.73      Globulin 3.8 gm/dL      A/G Ratio 0.9 g/dL      BUN/Creatinine Ratio 18.6     Anion Gap 11.0 mmol/L     Narrative:      GFR Normal >60  Chronic Kidney Disease <60  Kidney Failure <15      CK [[id number]]  (Abnormal) Collected: 12/10/21 1243    Specimen: Blood Updated: 12/10/21 1345     Creatine Kinase 917 U/L     COVID PRE-OP / PRE-PROCEDURE SCREENING ORDER (NO ISOLATION) - Swab, Nasopharynx [[id number]]  (Normal) Collected: 12/10/21 1244    Specimen: Swab from Nasopharynx Updated: 12/10/21 1343    Narrative:      The following orders were created for panel order COVID PRE-OP / PRE-PROCEDURE SCREENING ORDER (NO ISOLATION) - Swab, Nasopharynx.  Procedure                               Abnormality         Status                     ---------                               -----------         ------                     COVID-19,[facility]...[[id number]]  Normal              Final result                 Please view results for these tests on the individual orders.    COVID-19,[facility] CEPHEID/SINDY NP SWAB IN TRANSPORT MEDIA 8-12 HR TAT - Swab, Nasopharynx [[id number]]  (Normal) Collected: 12/10/21 1244    Specimen: Swab from Nasopharynx Updated: 12/10/21 1343     COVID19 Not Detected    Narrative:      Fact sheet for providers: https://www.fda.gov/media/447156/download    Fact sheet for patients: https://www.fda.gov/media/114056/download    Test performed by PCR.    Troponin [[id number]]  (Abnormal) Collected: 12/10/21 1243    Specimen: Blood Updated: 12/10/21 1343     Troponin T 0.088 ng/mL     Narrative:      Troponin T Reference Range:  <= 0.03 ng/mL-   Negative for AMI  >0.03 ng/mL-     Abnormal for myocardial necrosis.  Clinicians would have to utilize clinical acumen, EKG, Troponin and serial changes to determine if it is an Acute  Myocardial Infarction or myocardial injury due to an underlying chronic condition.       Results may be falsely decreased if patient taking Biotin.      Lactic Acid, Plasma [[id number]]  (Normal) Collected: 12/10/21 1243    Specimen: Blood Updated: 12/10/21 1342     Lactate 1.5 mmol/L     Urinalysis, Microscopic Only - Urine, Catheter [[id number]]  (Abnormal) Collected: 12/10/21 1303    Specimen: Urine, Catheter Updated: 12/10/21 1320     RBC, UA 31-50 /HPF      WBC, UA Too Numerous to Count /HPF      Bacteria, UA None Seen /HPF      Squamous Epithelial Cells, UA 0-2 /HPF      Hyaline Casts, UA 0-2 /LPF      Methodology Automated Microscopy    Urinalysis With Culture If Indicated - Urine, Catheter [[id number]]  (Abnormal) Collected: 12/10/21 1303    Specimen: Urine, Catheter Updated: 12/10/21 1320     Color, UA Yellow     Appearance, UA Cloudy     pH, UA 6.0     Specific Gravity, UA 1.016     Glucose,  mg/dL (Trace)     Ketones, UA Trace     Bilirubin, UA Negative     Blood, UA Large (3+)     Protein, UA >=300 mg/dL (3+)     Leuk Esterase, UA Moderate (2+)     Nitrite, UA Negative     Urobilinogen, UA 0.2 E.U./dL    Urine Culture - Urine, Urine, Catheter [[id number]] Collected: 12/10/21 1303    Specimen: Urine, Catheter Updated: 12/10/21 1320    CBC & Differential [[id number]]  (Abnormal) Collected: 12/10/21 1243    Specimen: Blood Updated: 12/10/21 1320    Narrative:      The following orders were created for panel order CBC & Differential.  Procedure                               Abnormality         Status                     ---------                               -----------         ------                     CBC Auto Differential[[id number]]        Abnormal            Final result                 Please view results for these tests on the individual orders.    CBC Auto Differential [[id number]]  (Abnormal) Collected: 12/10/21 1243    Specimen: Blood Updated: 12/10/21 1320     WBC 11.26 10*3/mm3      RBC 4.58  10*6/mm3      Hemoglobin 13.0 g/dL      Hematocrit 41.7 %      MCV 91.0 fL      MCH 28.4 pg      MCHC 31.2 g/dL      RDW 13.5 %      RDW-SD 45.2 fl      MPV 11.5 fL      Platelets 208 10*3/mm3      Neutrophil % 82.1 %      Lymphocyte % 4.7 %      Monocyte % 11.4 %      Eosinophil % 0.9 %      Basophil % 0.4 %      Immature Grans % 0.5 %      Neutrophils, Absolute 9.24 10*3/mm3      Lymphocytes, Absolute 0.53 10*3/mm3      Monocytes, Absolute 1.28 10*3/mm3      Eosinophils, Absolute 0.10 10*3/mm3      Basophils, Absolute 0.05 10*3/mm3      Immature Grans, Absolute 0.06 10*3/mm3      nRBC 0.1 /100 WBC           Data Review:  Results from last 7 days   Lab Units 12/16/21 0419 12/14/21  0409 12/14/21  0409 12/13/21  1246 12/13/21  1246   SODIUM mmol/L 138  --  136  --  134*   POTASSIUM mmol/L 4.1  --  3.9  --  3.9   CHLORIDE mmol/L 109*  --  106  --  105   CO2 mmol/L 20.3*  --  19.4*  --  20.9*   BUN mg/dL 24*  --  37*  --  42*   CREATININE mg/dL 1.34*  --  1.58*  --  1.89*   GLUCOSE mg/dL 107*   < > 109*   < > 168*   CALCIUM mg/dL 8.4*  --  8.4*  --  8.3*    < > = values in this interval not displayed.     Results from last 7 days   Lab Units 12/16/21 0419 12/14/21  0409 12/13/21  1246   WBC 10*3/mm3 8.10 7.83 6.87   HEMOGLOBIN g/dL 9.3* 9.7* 9.6*   HEMATOCRIT % 28.6* 29.7* 29.7*   PLATELETS 10*3/mm3 187 190 173         Results from last 7 days   Lab Units 12/11/21  0329   HEMOGLOBIN A1C % 5.44     Lab Results   Lab Value Date/Time    TROPONINT 0.086 (C) 12/12/2021 0336    TROPONINT 0.088 (C) 12/10/2021 1243    TROPONINT 0.086 (C) 10/06/2021 0600    TROPONINT 0.061 (C) 07/19/2021 0442    TROPONINT 0.068 (C) 07/18/2021 2229    TROPONINT 0.065 (C) 07/07/2021 2025    TROPONINT 0.039 (C) 06/30/2020 0439    TROPONINT 0.038 (C) 06/29/2020 1559    TROPONINT 0.035 (C) 06/29/2020 0948    TROPONINT 0.030 06/29/2020 0507    TROPONINT 0.035 (C) 12/09/2019 0312    TROPONINT 0.035 (C) 10/15/2019 1542    TROPONINT 0.037 (C)  10/03/2019 1959    TROPONINT 1.010 (C) 08/17/2019 0540    TROPONINT 1.100 (C) 08/15/2019 2059    TROPONINT 0.811 (C) 08/15/2019 1516    TROPONINT 0.396 (C) 08/15/2019 1000    TROPONINT 0.183 (C) 08/15/2019 0420    TROPONINT 0.033 (C) 08/14/2019 2212    TROPONINT 0.031 (C) 06/28/2019 1324    TROPONINT 0.035 (C) 06/28/2019 0719    TROPONINT 0.037 (C) 06/28/2019 0349    TROPONINT 0.039 (C) 06/27/2019 1842    TROPONINT 0.024 05/06/2019 1013    TROPONINT 0.031 (C) 05/06/2019 0331    TROPONINT 0.033 (C) 05/05/2019 2131    TROPONINT 0.034 (H) 02/16/2019 1958    TROPONINT 0.037 (H) 02/16/2019 1726    TROPONINT 0.015 09/22/2018 2107    TROPONINT 0.013 08/03/2018 1719    TROPONINT 0.027 03/08/2018 2307    TROPONINT 0.068 (H) 11/23/2017 1327    TROPONINT 0.099 (H) 11/22/2017 2355    TROPONINT 0.115 (C) 11/22/2017 1805    TROPONINT 0.141 (C) 11/22/2017 1256    TROPONINT 0.014 11/21/2017 1557         Results from last 7 days   Lab Units 12/11/21  0329 12/10/21  1243   ALK PHOS U/L 59 75   BILIRUBIN mg/dL 0.5 0.7   ALT (SGPT) U/L 25 32   AST (SGOT) U/L 38 59*         Results from last 7 days   Lab Units 12/11/21  0329   HEMOGLOBIN A1C % 5.44     Glucose   Date/Time Value Ref Range Status   12/17/2021 1130 110 70 - 130 mg/dL Final     Comment:     Meter: [id number] : [id number] Castillo Jennifer NA   12/17/2021 0655 103 70 - 130 mg/dL Final     Comment:     Meter: [id number] : [id number] Ankush Chawla NA   12/16/2021 2126 118 70 - 130 mg/dL Final     Comment:     Meter: [id number] : [id number] Ankush Cammy NA   12/16/2021 1640 143 (H) 70 - 130 mg/dL Final     Comment:     Meter: [id number] : [id number] Humberto Aminta NA   12/16/2021 1109 131 (H) 70 - 130 mg/dL Final     Comment:     Meter: [id number] : [id number] Humberto Aminta NA   12/16/2021 0703 111 70 - 130 mg/dL Final     Comment:     Meter: [id number] : [id number] Ankush Cammy NA   12/15/2021 2132 118 70 - 130 mg/dL Final     Comment:      Meter: [id number] : [id number] Ankush HARKINS   12/15/2021 1705 159 (H) 70 - 130 mg/dL Final     Comment:     Meter: [id number] : [id number] Crowley Marly RODRIGUES           Past Medical History:   Diagnosis Date    A-fib (HCC)     Arthritis     Asthma     CAD (coronary artery disease)     Cardiomyopathy (HCC)     Cataract     CHF (congestive heart failure) (HCC)     CKD (chronic kidney disease), stage III (HCC)     COPD (chronic obstructive pulmonary disease) (HCC)     Diabetes mellitus (HCC)     Disease of thyroid gland     Emphysema, unspecified (HCC)     Glaucoma     Hyperlipidemia     Hypertension     Kidney stone     Myocardial infarct, old     Neuropathy     Osteoarthritis     Osteoporosis     Permanent atrial fibrillation ([facility]) 6/30/2020    PVD (peripheral vascular disease) ([facility])     Renal disorder     Shingles        Assessment:  Active Hospital Problems    Diagnosis  POA    **Acute metabolic encephalopathy [G93.41]  Yes    Cardiomyopathy (HCC) [I42.9]  Unknown    Recurrent falls [R29.6]  Not Applicable    Hypothyroidism [E03.9]  Yes    CAD (coronary artery disease) [I25.10]  Yes    Recurrent left pleural effusion [J90]  Yes    Permanent atrial fibrillation (HCC) [I48.21]  Yes    Acute UTI (urinary tract infection) [N39.0]  Yes    Chronic renal insufficiency, stage 4 (severe) ([facility]) [N18.4]  Yes    Primary osteoarthritis of left knee [M17.12]  Yes    Essential hypertension [I10]  Yes    Type 2 diabetes mellitus with stage 4 chronic kidney disease, with long-term current use of insulin ([facility]) [E11.22, N18.4, Z79.4]  Not Applicable    COPD (chronic obstructive pulmonary disease) ([facility]) [J44.9]  Yes      Resolved Hospital Problems   No resolved problems to display.   Acute kidney injury on stage 4 ckd    Plan:  Continue with antibiotics for UTI and await cultures. Follow lab.  cardiology consult noted.  Will need SNU for rehab.  Wound nurse.  DC planning. Refuses COVID-19 vaccine. Refuses PT.  Ask for  psych consult.  Hopefully we can get him to participate in physical therapy and convince him to go to rehab facility or get family to take guardianship and make him go.    Bautista Gonzalez MD  12/17/2021  12:20 EST

## 2021-12-17 NOTE — NURSING NOTE
Patient refusing to wear heart monitor at this time. This RN educated the pt on why his provider needs for him to wear the heart monitor but pt continues to refuse. Pt is A/Ox4 at this time. Will continue to monitor and observe heart rate via SpO2 probe.    0200- Pt refusing SpO2 probe at this time. Pt remains A/Ox4 but is irritable. This RN educated pt on why the probe is necessary but pt continues to refuse.    0325- Pt refusing to wear gown at this time and continues to refuse to wear his heart monitor and spo2 probe. Pt remains A/Ox4 but is increasingly irritable with staff in the room. Will continue to monitor.     0348- Pt refusing to answer orientation questions at this time and continues to refuse heart monitor. Pt threatening staff. Will page [facility].    0400- Spoke with LEATHA Luke over the phone- orders received- call back if unable to administer meds/restraints are necessary

## 2021-12-17 NOTE — NURSING NOTE
"Entered room to administer medications at 0825.  Observed previous kerlix dressing to right forearm had been removed. Blood on gown. IV tubing torn and leaking. Asked pt where his IV was. Patient stated \"I took it out and threw it in the garbage\". Appears IV tubing was chewed. Unable to find PIV in trash. Asked patient current place. Patient stated \"I thought I was in the hospital, come to find out, I'm in the junkyard\". Asked patient why he came to hospital. Patient stated \"to keep peace in they family\".   "

## 2021-12-18 NOTE — PLAN OF CARE
Problem: Adult Inpatient Plan of Care  Goal: Plan of Care Review  Outcome: Ongoing, Progressing  Flowsheets (Taken 12/18/2021 0548)  Plan of Care Reviewed With: patient  Outcome Summary: Pt has attempted to get out of bed by himself several times even after being corrected by several staff members.  Goal: Patient-Specific Goal (Individualized)  Outcome: Ongoing, Progressing  Goal: Absence of Hospital-Acquired Illness or Injury  Outcome: Ongoing, Progressing  Intervention: Identify and Manage Fall Risk  Recent Flowsheet Documentation  Taken 12/18/2021 0411 by Skip Marie RN  Safety Promotion/Fall Prevention: safety round/check completed  Taken 12/18/2021 0205 by Skip Marie RN  Safety Promotion/Fall Prevention: safety round/check completed  Taken 12/18/2021 0027 by Skip Marie RN  Safety Promotion/Fall Prevention: safety round/check completed  Taken 12/17/2021 2037 by Skip Marie RN  Safety Promotion/Fall Prevention:   clutter free environment maintained   fall prevention program maintained   nonskid shoes/slippers when out of bed   room organization consistent   safety round/check completed  Intervention: Prevent Skin Injury  Recent Flowsheet Documentation  Taken 12/18/2021 0411 by Skip Marie RN  Body Position: position changed independently  Taken 12/18/2021 0205 by Skip Marie RN  Body Position: position changed independently  Taken 12/18/2021 0027 by Skip Marie RN  Body Position: position changed independently  Taken 12/17/2021 2202 by Skip Marie RN  Body Position: position changed independently  Taken 12/17/2021 2037 by Skip Marie RN  Body Position: position changed independently  Intervention: Prevent and Manage VTE (venous thromboembolism) Risk  Recent Flowsheet Documentation  Taken 12/18/2021 0205 by Skip Marie RN  VTE Prevention/Management: (eliquis)   bilateral   dorsiflexion/plantar flexion performed   bleeding risk factor(s) identified  Taken 12/17/2021 2037  by Skip Marie RN  VTE Prevention/Management: (eliquis)   bilateral   dorsiflexion/plantar flexion performed   bleeding risk factor(s) identified  Goal: Optimal Comfort and Wellbeing  Outcome: Ongoing, Progressing  Intervention: Provide Person-Centered Care  Recent Flowsheet Documentation  Taken 12/17/2021 2037 by Skip Marie RN  Trust Relationship/Rapport:   care explained   choices provided   empathic listening provided   questions answered   thoughts/feelings acknowledged  Goal: Readiness for Transition of Care  Outcome: Ongoing, Progressing     Problem: Fall Injury Risk  Goal: Absence of Fall and Fall-Related Injury  Outcome: Ongoing, Progressing  Intervention: Identify and Manage Contributors to Fall Injury Risk  Recent Flowsheet Documentation  Taken 12/17/2021 2037 by Skip Marie RN  Medication Review/Management: medications reviewed  Intervention: Promote Injury-Free Environment  Recent Flowsheet Documentation  Taken 12/18/2021 0411 by Skip Marie RN  Safety Promotion/Fall Prevention: safety round/check completed  Taken 12/18/2021 0205 by Skip Marie RN  Safety Promotion/Fall Prevention: safety round/check completed  Taken 12/18/2021 0027 by Skip Marie RN  Safety Promotion/Fall Prevention: safety round/check completed  Taken 12/17/2021 2037 by Skip Marie RN  Safety Promotion/Fall Prevention:   clutter free environment maintained   fall prevention program maintained   nonskid shoes/slippers when out of bed   room organization consistent   safety round/check completed     Problem: Skin Injury Risk Increased  Goal: Skin Health and Integrity  Outcome: Ongoing, Progressing  Intervention: Optimize Skin Protection  Recent Flowsheet Documentation  Taken 12/18/2021 0411 by Skip Marie RN  Head of Bed (HOB): HOB elevated  Taken 12/18/2021 0205 by Skip Marie RN  Head of Bed (HOB): HOB elevated  Taken 12/18/2021 0027 by Skip Marie RN  Head of Bed (HOB): HOB elevated  Taken  12/17/2021 2202 by Skip Marie RN  Head of Bed ([facility]): HOB elevated  Taken 12/17/2021 2037 by Skip Marie RN  Head of Bed ([facility]): HOB elevated     Problem: Asthma Comorbidity  Goal: Maintenance of Asthma Control  Outcome: Ongoing, Progressing  Intervention: Maintain Asthma Symptom Control  Recent Flowsheet Documentation  Taken 12/17/2021 2037 by Skip Marie RN  Medication Review/Management: medications reviewed     Problem: COPD Comorbidity  Goal: Maintenance of COPD Symptom Control  Outcome: Ongoing, Progressing  Intervention: Maintain COPD-Symptom Control  Recent Flowsheet Documentation  Taken 12/17/2021 2037 by Skip Marie RN  Medication Review/Management: medications reviewed     Problem: Hypertension Comorbidity  Goal: Blood Pressure in Desired Range  Outcome: Ongoing, Progressing  Intervention: Maintain Hypertension-Management Strategies  Recent Flowsheet Documentation  Taken 12/17/2021 2037 by Skip Marie RN  Medication Review/Management: medications reviewed     Problem: UTI (Urinary Tract Infection)  Goal: Improved Infection Symptoms  Outcome: Ongoing, Progressing     Problem: Wound  Goal: Optimal Wound Healing  Outcome: Ongoing, Progressing  Intervention: Promote Effective Wound Healing  Recent Flowsheet Documentation  Taken 12/17/2021 2037 by Skip Marie RN  Pain Management Interventions: position adjusted   Goal Outcome Evaluation:  Plan of Care Reviewed With: patient           Outcome Summary: Pt has attempted to get out of bed by himself several times even after being corrected by several staff members.

## 2021-12-18 NOTE — NURSING NOTE
Seen by Dr. New yesterday. Asleep on approach, appropriate to hour. RN Mane reports that pt attempted to get out of bed, but was easily redirectable. RN reports that at another instance, pt threatened to punch RN in the face, redirected quickly in response to calm limit setting. RN reports that pt oriented on day shift, appeared more confused at instances during the night shift. Will continue to follow.

## 2021-12-18 NOTE — NURSING NOTE
"Attempted to do morning assessment and give morning medications, pt repeatedly answered every question with \"go away and leave me alone\" or \"shut the hell up and leave me alone\" or \"what the hell don't you understand about I don't want to be bothered\". He raised his fist at this RN, was unable to be verbally redirected. Attempted to turn, and check brief but patient was getting very agitated and trying to grab my arm. Will re-attempt later.  "

## 2021-12-18 NOTE — PROGRESS NOTES
Name: Froilan Helton ADMIT: 12/10/2021   : 1940  PCP: Fortunato De Leon MD    MRN: [id number] LOS: 8 days   AGE/SEX: 81 y.o. male  ROOM: [facility]   Subjective   Chief Complaint   Patient presents with   • Altered Mental Status      Patient's drowsy and selectively answering some questions.  He did wake up for me and answered some review of systems questions before he told me he was tired of answering questions and wanted me to stop asking.  He denied chest pain shortness of breath.  He did report some nausea but no vomiting.  No abdominal pain.  No diarrhea reported.  He did report dysuria but denied flank pain.    Objective   Vital Signs  Temp:  [97.6 °F (36.4 °C)-98.8 °F (37.1 °C)] 98.8 °F (37.1 °C)  Heart Rate:  [62-85] 73  Resp:  [16-18] 18  BP: (157-184)/(79-96) 157/79     on   ;   Device (Oxygen Therapy): room air  Body mass index is 30.17 kg/m².    Physical Exam  Vitals and nursing note reviewed.   Constitutional:       General: He is not in acute distress.     Appearance: He is ill-appearing. He is not diaphoretic.   HENT:      Head: Normocephalic and atraumatic.   Eyes:      General:         Right eye: No discharge.         Left eye: No discharge.      Conjunctiva/sclera: Conjunctivae normal.   Cardiovascular:      Rate and Rhythm: Normal rate and regular rhythm.      Pulses: Normal pulses.   Pulmonary:      Effort: Pulmonary effort is normal.      Breath sounds: Decreased breath sounds present. No wheezing.   Abdominal:      General: There is no distension.      Palpations: Abdomen is soft.      Tenderness: There is abdominal tenderness (lower abdomen). There is no guarding or rebound.   Musculoskeletal:         General: No tenderness.      Cervical back: Neck supple. No tenderness.      Right lower leg: Edema present.      Left lower leg: Edema present.      Comments: 2+   Skin:     General: Skin is warm and dry.   Neurological:      Cranial Nerves: No cranial nerve deficit.   Psychiatric:          Behavior: Behavior is withdrawn.         Cognition and Memory: Cognition is impaired.         Results Review:       I reviewed the patient's new clinical results.     I reviewed imaging, agree with interpretation.     I reviewed telemetry/EKG results, sinus     I reviewed other test results, agree with interpretation.    Results from last 7 days   Lab Units 12/18/21 0329 12/16/21 0419 12/14/21 0409 12/13/21  1246   WBC 10*3/mm3 11.20* 8.10 7.83 6.87   HEMOGLOBIN g/dL 11.1* 9.3* 9.7* 9.6*   PLATELETS 10*3/mm3 269 187 190 173     Results from last 7 days   Lab Units 12/18/21 0329 12/16/21 0419 12/14/21 0409 12/13/21  1246   SODIUM mmol/L 140 138 136 134*   POTASSIUM mmol/L 4.0 4.1 3.9 3.9   CHLORIDE mmol/L 106 109* 106 105   CO2 mmol/L 21.4* 20.3* 19.4* 20.9*   BUN mg/dL 16 24* 37* 42*   CREATININE mg/dL 1.26 1.34* 1.58* 1.89*   GLUCOSE mg/dL 99 107* 109* 168*   Estimated Creatinine Clearance: 59.2 mL/min (by C-G formula based on SCr of 1.26 mg/dL).  Results from last 7 days   Lab Units 12/18/21 0329 12/16/21 0419 12/14/21 0409 12/13/21  1246   CALCIUM mg/dL 9.4 8.4* 8.4* 8.3*          amiodarone, 200 mg, Oral, Q12H  apixaban, 2.5 mg, Oral, Q12H  atorvastatin, 10 mg, Oral, Nightly  budesonide-formoterol, 2 puff, Inhalation, BID - RT  citalopram, 10 mg, Oral, Daily  collagenase, 1 application, Topical, Q24H  docusate sodium, 100 mg, Oral, BID  fluticasone, 2 spray, Each Nare, Daily  insulin lispro, 0-9 Units, Subcutaneous, TID AC  levothyroxine, 224 mcg, Oral, Daily  mirtazapine, 7.5 mg, Oral, Nightly  OLANZapine, 5 mg, Intramuscular, Once  QUEtiapine, 25 mg, Oral, Nightly  sodium chloride, 10 mL, Intravenous, Q12H  tamsulosin, 0.4 mg, Oral, Daily  cyanocobalamin, 1,000 mcg, Oral, Daily  vitamin D, 50,000 Units, Oral, Daily      O2, 2 L/min, Last Rate: 2 L/min (12/10/21 2127)  sodium chloride, 75 mL/hr, Last Rate: 75 mL/hr (12/18/21 1010)    Diet Soft Texture; Ground    Assessment/Plan      Active Hospital  Problems    Diagnosis  POA   • **Acute metabolic encephalopathy [G93.41]  Yes   • Cardiomyopathy ([facility]) [I42.9]  Unknown   • Recurrent falls [R29.6]  Not Applicable   • Hypothyroidism [E03.9]  Yes   • CAD (coronary artery disease) [I25.10]  Yes   • Recurrent left pleural effusion [J90]  Yes   • Permanent atrial fibrillation ([facility]) [I48.21]  Yes   • Acute UTI (urinary tract infection) [N39.0]  Yes   • Chronic renal insufficiency, stage 4 (severe) ([facility]) [N18.4]  Yes   • Primary osteoarthritis of left knee [M17.12]  Yes   • Essential hypertension [I10]  Yes   • Type 2 diabetes mellitus with stage 4 chronic kidney disease, with long-term current use of insulin ([facility]) [E11.22, N18.4, Z79.4]  Not Applicable   • COPD (chronic obstructive pulmonary disease) ([facility]) [J44.9]  Yes      Resolved Hospital Problems   No resolved problems to display.       · UTI: He is reporting dysuria.  He completed a course of Rocephin during this spittle stay already.  We will repeat urinalysis with reflex culture.  He was not reporting any flank pain but depending on what the urinalysis shows I may need to proceed with CT of the abdomen pelvis  · Major depressive disorder: Psychiatry consulting and have adjusted medications.  He is a bit more drowsy today but would awaken and answer questions for me.  · Chronic systolic heart failure: EF 36%.  He has been on fluids but has evidence of peripheral volume excess.  He was not hypoxic when I saw him.  Stop fluids and will monitor.  Potentially resume his home diuretic depending on his mentation tomorrow, respiratory status, and oral intake  · Chronic atrial fibrillation: Heart rate stable.  Cardiology evaluated.  · CKD 4: Renal function is below previous baseline.  Due to the volume excess likely have to allow for some increased creatinine to control.  Will monitor as above and likely resume his diuretic tomorrow.  · Diabetes: A1c 5.4.  Previously was in diabetic range.  Continue insulin and monitor  requirements.  · Hypothyroidism: On a high dose of Synthroid.  TSH within normal limits earlier this month.  Continue current dose.  · COPD: No acute bronchospasm  · CAD: Continue cardiac regimen  · Disposition: CCP following . patient was declining rehab.  He was too drowsy to discharge today.  Potentially home health at discharge.    Ellis Lombardo MD  [facility]  12/18/21  15:05 EST    Dictated portions using Dragon dictation software.    During the entire encounter, I was wearing recommended PPE including face mask and eye protection. Hand sanitization was performed prior to entering room and upon exit.

## 2021-12-19 NOTE — PROGRESS NOTES
Access Ctr Note.    Chart reviewed. Access following from afar due to Pt's refusal. Pt seen by Psychiatrist, Dr. Alonzo, this day. See note dated the same. Primary RN's documentation indicates Pt was overall more cooperative w/staff & care this day. Hospitalist postponed discharge apparently due to increased somnolence as Pt lives alone.     Access following from afar.

## 2021-12-19 NOTE — PLAN OF CARE
Problem: Adult Inpatient Plan of Care  Goal: Plan of Care Review  Outcome: Ongoing, Progressing  Flowsheets (Taken 12/19/2021 0616)  Progress: no change  Plan of Care Reviewed With: patient  Outcome Summary: pts malinor has been pleasent this evening. pt has been calm and compliant with all requested activities. VSS, wound dressing changed. will continue to monitor  Goal: Patient-Specific Goal (Individualized)  Outcome: Ongoing, Progressing  Goal: Absence of Hospital-Acquired Illness or Injury  Outcome: Ongoing, Progressing  Intervention: Identify and Manage Fall Risk  Recent Flowsheet Documentation  Taken 12/19/2021 0604 by Skip Marie RN  Safety Promotion/Fall Prevention: safety round/check completed  Taken 12/19/2021 0402 by Skip Marie RN  Safety Promotion/Fall Prevention: safety round/check completed  Taken 12/19/2021 0207 by Skip Marie RN  Safety Promotion/Fall Prevention: safety round/check completed  Taken 12/19/2021 0017 by Skip Marie RN  Safety Promotion/Fall Prevention: safety round/check completed  Taken 12/18/2021 2212 by Skip Marie RN  Safety Promotion/Fall Prevention: safety round/check completed  Taken 12/18/2021 2123 by Skip Marie RN  Safety Promotion/Fall Prevention:   clutter free environment maintained   fall prevention program maintained   nonskid shoes/slippers when out of bed   room organization consistent   safety round/check completed  Taken 12/18/2021 2021 by Skip Marie RN  Safety Promotion/Fall Prevention: safety round/check completed  Intervention: Prevent Skin Injury  Recent Flowsheet Documentation  Taken 12/19/2021 0604 by Skip Marie RN  Body Position: position changed independently  Taken 12/19/2021 0402 by Skip Marie RN  Body Position: position changed independently  Taken 12/19/2021 0207 by Skip Marie RN  Body Position: position changed independently  Taken 12/19/2021 0017 by Skip Marie RN  Body Position: position changed  independently  Taken 12/18/2021 2212 by Skip Marie RN  Body Position: position changed independently  Taken 12/18/2021 2123 by Skip Marie RN  Body Position: position changed independently  Taken 12/18/2021 2021 by Skip Marie RN  Body Position: position changed independently  Intervention: Prevent and Manage VTE (venous thromboembolism) Risk  Recent Flowsheet Documentation  Taken 12/19/2021 0207 by Skip Marie RN  VTE Prevention/Management: (eliquis)   bilateral   dorsiflexion/plantar flexion performed   bleeding risk factor(s) identified  Taken 12/18/2021 2123 by Skip Marie RN  VTE Prevention/Management: (eliquis)   bilateral   dorsiflexion/plantar flexion performed   bleeding risk factor(s) identified  Goal: Optimal Comfort and Wellbeing  Outcome: Ongoing, Progressing  Goal: Readiness for Transition of Care  Outcome: Ongoing, Progressing     Problem: Fall Injury Risk  Goal: Absence of Fall and Fall-Related Injury  Outcome: Ongoing, Progressing  Intervention: Identify and Manage Contributors to Fall Injury Risk  Recent Flowsheet Documentation  Taken 12/18/2021 2123 by Skip Marie RN  Medication Review/Management: medications reviewed  Intervention: Promote Injury-Free Environment  Recent Flowsheet Documentation  Taken 12/19/2021 0604 by Skip Marie RN  Safety Promotion/Fall Prevention: safety round/check completed  Taken 12/19/2021 0402 by Skip Maire RN  Safety Promotion/Fall Prevention: safety round/check completed  Taken 12/19/2021 0207 by Skip Marie RN  Safety Promotion/Fall Prevention: safety round/check completed  Taken 12/19/2021 0017 by kSip Marie RN  Safety Promotion/Fall Prevention: safety round/check completed  Taken 12/18/2021 2212 by Skip Marie RN  Safety Promotion/Fall Prevention: safety round/check completed  Taken 12/18/2021 2123 by Skip Marie RN  Safety Promotion/Fall Prevention:   clutter free environment maintained   fall prevention program  maintained   nonskid shoes/slippers when out of bed   room organization consistent   safety round/check completed  Taken 12/18/2021 2021 by Skip Marie RN  Safety Promotion/Fall Prevention: safety round/check completed     Problem: Skin Injury Risk Increased  Goal: Skin Health and Integrity  Outcome: Ongoing, Progressing  Intervention: Optimize Skin Protection  Recent Flowsheet Documentation  Taken 12/19/2021 0604 by Skip Marie RN  Head of Bed (HOB): HOB elevated  Taken 12/19/2021 0402 by Skip Marie RN  Head of Bed (HOB): HOB elevated  Taken 12/19/2021 0207 by Skip Marie RN  Head of Bed (HOB): HOB elevated  Taken 12/19/2021 0017 by Skip Marie RN  Head of Bed (HOB): HOB elevated  Taken 12/18/2021 2212 by Skip Marie RN  Head of Bed (HOB): HOB elevated  Taken 12/18/2021 2123 by Skip Marie RN  Head of Bed (HOB): HOB elevated  Taken 12/18/2021 2021 by Skip Marie RN  Head of Bed (HOB): HOB elevated     Problem: Asthma Comorbidity  Goal: Maintenance of Asthma Control  Outcome: Ongoing, Progressing  Intervention: Maintain Asthma Symptom Control  Recent Flowsheet Documentation  Taken 12/18/2021 2123 by Skip Marie RN  Medication Review/Management: medications reviewed     Problem: COPD Comorbidity  Goal: Maintenance of COPD Symptom Control  Outcome: Ongoing, Progressing  Intervention: Maintain COPD-Symptom Control  Recent Flowsheet Documentation  Taken 12/18/2021 2123 by Skip Marie RN  Medication Review/Management: medications reviewed     Problem: Hypertension Comorbidity  Goal: Blood Pressure in Desired Range  Outcome: Ongoing, Progressing  Intervention: Maintain Hypertension-Management Strategies  Recent Flowsheet Documentation  Taken 12/18/2021 2123 by Skip Marie RN  Medication Review/Management: medications reviewed     Problem: UTI (Urinary Tract Infection)  Goal: Improved Infection Symptoms  Outcome: Ongoing, Progressing     Problem: Wound  Goal: Optimal Wound  Healing  Outcome: Ongoing, Progressing  Intervention: Promote Effective Wound Healing  Recent Flowsheet Documentation  Taken 12/19/2021 0402 by Skip Marie RN  Pain Management Interventions: position adjusted   Goal Outcome Evaluation:  Plan of Care Reviewed With: patient        Progress: no change  Outcome Summary: pts demeanor has been pleasent this evening. pt has been calm and compliant with all requested activities. VSS, wound dressing changed. will continue to monitor

## 2021-12-19 NOTE — PROGRESS NOTES
Name: Froilan Helton ADMIT: 12/10/2021   : 1940  PCP: Fortunato De Leon MD    MRN: [id number] LOS: 9 days   AGE/SEX: 81 y.o. male  ROOM: [facility]   Subjective   Chief Complaint   Patient presents with   • Altered Mental Status      Still drowsy and not answering all review of systems questions.  He did not have any dysuria this morning and was not having any abdominal pain.  No chest pain palpitations or shortness of breath.    Objective   Vital Signs  Temp:  [97.8 °F (36.6 °C)-98.6 °F (37 °C)] 98.6 °F (37 °C)  Heart Rate:  [67-80] 80  Resp:  [18] 18  BP: (140-157)/(70-79) 140/70  SpO2:  [91 %-94 %] 93 %  on   ;   Device (Oxygen Therapy): room air  Body mass index is 30.17 kg/m².    Physical Exam  Vitals and nursing note reviewed.   Constitutional:       General: He is not in acute distress.     Appearance: He is ill-appearing. He is not diaphoretic.   HENT:      Head: Normocephalic and atraumatic.   Eyes:      General:         Right eye: No discharge.         Left eye: No discharge.      Conjunctiva/sclera: Conjunctivae normal.   Cardiovascular:      Rate and Rhythm: Normal rate and regular rhythm.      Pulses: Normal pulses.   Pulmonary:      Effort: Pulmonary effort is normal.      Breath sounds: Decreased breath sounds present. No wheezing.   Abdominal:      General: There is no distension.      Palpations: Abdomen is soft.      Tenderness: There is no abdominal tenderness. There is no guarding or rebound.   Musculoskeletal:         General: No tenderness.      Cervical back: Neck supple. No tenderness.      Right lower leg: Edema present.      Left lower leg: Edema present.      Comments: 2+   Skin:     General: Skin is warm and dry.   Neurological:      Cranial Nerves: No cranial nerve deficit.   Psychiatric:         Behavior: Behavior is withdrawn.         Cognition and Memory: Cognition is impaired.         Results Review:       I reviewed the patient's new clinical results.      Results from last 7  days   Lab Units 12/19/21 0437 12/18/21 0329 12/16/21 0419 12/14/21  0409   WBC 10*3/mm3 9.25 11.20* 8.10 7.83   HEMOGLOBIN g/dL 11.1* 11.1* 9.3* 9.7*   PLATELETS 10*3/mm3 279 269 187 190     Results from last 7 days   Lab Units 12/19/21 0437 12/18/21 0329 12/16/21 0419 12/14/21 0409   SODIUM mmol/L 141 140 138 136   POTASSIUM mmol/L 4.0 4.0 4.1 3.9   CHLORIDE mmol/L 107 106 109* 106   CO2 mmol/L 24.0 21.4* 20.3* 19.4*   BUN mg/dL 14 16 24* 37*   CREATININE mg/dL 1.27 1.26 1.34* 1.58*   GLUCOSE mg/dL 107* 99 107* 109*   Estimated Creatinine Clearance: 58.7 mL/min (by C-G formula based on SCr of 1.27 mg/dL).  Results from last 7 days   Lab Units 12/19/21 0437 12/18/21 0329 12/16/21 0419 12/14/21  0409   CALCIUM mg/dL 9.3 9.4 8.4* 8.4*   MAGNESIUM mg/dL 1.7  --   --   --           amiodarone, 200 mg, Oral, Q12H  apixaban, 2.5 mg, Oral, Q12H  atorvastatin, 10 mg, Oral, Nightly  budesonide-formoterol, 2 puff, Inhalation, BID - RT  citalopram, 10 mg, Oral, Daily  collagenase, 1 application, Topical, Q24H  docusate sodium, 100 mg, Oral, BID  fluticasone, 2 spray, Each Nare, Daily  insulin lispro, 0-9 Units, Subcutaneous, TID AC  levothyroxine, 224 mcg, Oral, Daily  mirtazapine, 7.5 mg, Oral, Nightly  QUEtiapine, 25 mg, Oral, Nightly  sodium chloride, 10 mL, Intravenous, Q12H  tamsulosin, 0.4 mg, Oral, Daily  cyanocobalamin, 1,000 mcg, Oral, Daily  vitamin D, 50,000 Units, Oral, Daily      O2, 2 L/min, Last Rate: 2 L/min (12/10/21 2127)    Diet Soft Texture; Ground    Assessment/Plan      Active Hospital Problems    Diagnosis  POA   • **Acute metabolic encephalopathy [G93.41]  Yes   • Cardiomyopathy (HCC) [I42.9]  Unknown   • Recurrent falls [R29.6]  Not Applicable   • Hypothyroidism [E03.9]  Yes   • CAD (coronary artery disease) [I25.10]  Yes   • Recurrent left pleural effusion [J90]  Yes   • Permanent atrial fibrillation (HCC) [I48.21]  Yes   • Acute UTI (urinary tract infection) [N39.0]  Yes   • Chronic renal  insufficiency, stage 4 (severe) ([facility]) [N18.4]  Yes   • Primary osteoarthritis of left knee [M17.12]  Yes   • Essential hypertension [I10]  Yes   • Type 2 diabetes mellitus with stage 4 chronic kidney disease, with long-term current use of insulin ([facility]) [E11.22, N18.4, Z79.4]  Not Applicable   • COPD (chronic obstructive pulmonary disease) ([facility]) [J44.9]  Yes      Resolved Hospital Problems   No resolved problems to display.       · UTI: Completed a course of Rocephin here.  He still has bacteriuria but symptoms are better today off of antibiotic and leukocytosis improved.  Awaiting urine culture and will monitor for any recurrent symptoms to decide if he needs additional treatment.  · Major depressive disorder: Psychiatry consulting and have adjusted medications.  · Chronic systolic heart failure: EF 36%.  Fluids stopped renal function stable.  Resume Bumex with daily dosing for now since he is on room air  · Chronic atrial fibrillation: Heart rate stable.  Cardiology evaluated.  · CKD 4: Renal function is below previous baseline.  Due to the volume excess likely have to allow for some increased creatinine to control.  · Diabetes: A1c 5.4.  Previously was in diabetic range.  Only required 2 units.  Continue to monitor with SSI.  · Hypothyroidism: On a high dose of Synthroid.  TSH within normal limits earlier this month.  Continue current dose.  · COPD: No acute bronchospasm  · CAD: Continue cardiac regimen  · Disposition: CCP following . Patient was declining rehab.  He was too drowsy to discharge today.  Potentially home health at discharge.    Ellis Lombardo MD  [facility]  12/19/21  13:02 EST    Dictated portions using Dragon dictation software.    During the entire encounter, I was wearing recommended PPE including face mask and eye protection. Hand sanitization was performed prior to entering room and upon exit.

## 2021-12-19 NOTE — PROGRESS NOTES
"Patient is seen, evaluated, and chart reviewed. Discussed with staff.      Staff reports that patient has been cooperative and compliant with medications.  No behavioral issues.    On examination, patient is found laying in bed.  He is initially asleep but easily awakened.  He is alert and oriented x3.  Patient slept fair last night.  Mood is \"tired,\" a little irritable.  Affect congruent.  No SI/HI/AVH.  Thought processes are goal-directed.  Judgment and insight are fair.    No medication side effects.  He has tolerated the resumption of Celexa and Seroquel.  No further medication changes at this time.  "

## 2021-12-19 NOTE — PLAN OF CARE
Problem: Adult Inpatient Plan of Care  Goal: Plan of Care Review  Outcome: Ongoing, Progressing  Flowsheets  Taken 12/18/2021 1858 by Brigid Reyes, RN  Progress: no change  Outcome Summary: pt has been resting all day, any attempt at care has been met with frustration and anger and requests to leave alone. Attempted turns Q2, sometimes patient would swat and try to grab at staff. VSS, on RA. Minimal appetite. urine for U/A. would not take any meds, wouldn't allow wound care (was done per night shift early this morning). Will closely monitor.  Taken 12/18/2021 0548 by Skip Marie, RN  Plan of Care Reviewed With: patient   Goal Outcome Evaluation:           Progress: no change  Outcome Summary: pt has been resting all day, any attempt at care has been met with frustration and anger and requests to leave alone. Attempted turns Q2, sometimes patient would swat and try to grab at staff. VSS, on RA. Minimal appetite. urine for U/A. would not take any meds, wouldn't allow wound care (was done per night shift early this morning). Will closely monitor.

## 2021-12-19 NOTE — PLAN OF CARE
Problem: Adult Inpatient Plan of Care  Goal: Plan of Care Review  Outcome: Ongoing, Progressing  Flowsheets  Taken 12/19/2021 1659 by Brigid Reyes, RN  Progress: no change  Outcome Summary: Pt has been pleasant today, mostly cooperative, took medications as ordered, allowed a couple turns (educated on importance of turning Q2 hours to prevent skin breakdown), Ate a bit more of meals today. Denies c/o. purewick in place. Awaiting urine cx results. will closely monitor.  Taken 12/19/2021 0616 by Skip Marie, RN  Plan of Care Reviewed With: patient   Goal Outcome Evaluation:           Progress: no change  Outcome Summary: Pt has been pleasant today, mostly cooperative, took medications as ordered, allowed a couple turns (educated on importance of turning Q2 hours to prevent skin breakdown), Ate a bit more of meals today. Denies c/o. purewick in place. Awaiting urine cx results. will closely monitor.

## 2021-12-20 NOTE — DISCHARGE SUMMARY
Date of Admission: 12/10/2021  Date of Discharge:  12/20/2021  Primary Care Physician: Fortunato De Leon MD     Discharge Diagnosis:  Active Hospital Problems    Diagnosis  POA   • **Acute metabolic encephalopathy [G93.41]  Yes   • Cardiomyopathy ([facility]) [I42.9]  Unknown   • Recurrent falls [R29.6]  Not Applicable   • Hypothyroidism [E03.9]  Yes   • CAD (coronary artery disease) [I25.10]  Yes   • Recurrent left pleural effusion [J90]  Yes   • Permanent atrial fibrillation ([facility]) [I48.21]  Yes   • Acute UTI (urinary tract infection) [N39.0]  Yes   • Chronic renal insufficiency, stage 4 (severe) ([facility]) [N18.4]  Yes   • Primary osteoarthritis of left knee [M17.12]  Yes   • Essential hypertension [I10]  Yes   • Type 2 diabetes mellitus with stage 4 chronic kidney disease, with long-term current use of insulin ([facility]) [E11.22, N18.4, Z79.4]  Not Applicable   • COPD (chronic obstructive pulmonary disease) ([facility]) [J44.9]  Yes      Resolved Hospital Problems   No resolved problems to display.       Presenting Problem/History of Present Illness:  Traumatic rhabdomyolysis, initial encounter ([facility]) [T79.6XXA]  Acute metabolic encephalopathy [G93.41]  Chronic kidney disease, unspecified CKD stage [N18.9]  Pressure injury of left thigh, stage 3 ([facility]) [L89.223]     Hospital Course:  The patient is a 81 y.o. male who presented with urinary tract infection.  He was admitted and completed antibiotic course for that with Rocephin.  He had reported some additional dysuria however repeat urine culture was no growth.  The dysuria resolved.    He had major depressive disorder and psychiatry consulted and followed while inpatient.  They adjusted his medication regimen.    He has chronic systolic heart failure with an EF 36%.  Bumex was held and he was given fluids admission.  When he was resuscitated these were stopped and Bumex was being reinitiated.    He has diabetes with an A1c of 5.4.  It previously was in the diabetic range.  SSI was  "used while here and he did not have high insulin requirements.    Patient has hypothyroidism is on a high dose of the Synthroid.  His TSH was within normal limits earlier this month.  He was continued on his current dose and would recommend continued outpatient monitoring of that since he had very high TSH in the past.    Patient has COPD and CAD.    Plan was for placement to rehab however patient declined rehab referral.  He was then drowsy so remained inpatient for monitoring.  Plan was for potential home health at discharge if he did not want to go to rehab.  Patient was more alert this morning and then left AGAINST MEDICAL ADVICE prior to me seeing him today.  He did talk about risks and benefits with nursing prior to leaving.  Would recommend close follow-up with primary care to follow-up on his many chronic issues and monitor progression.    Exam Today:  AMA    Results:  CXR  There is pulmonary vascular congestion and CHF is suspected.  At the lateral aspect of the left mid and lower lung field, there is  increased density and markings which may represent atelectasis, but  pneumonia cannot be excluded. A broad thoracic dextroscoliosis is noted.  The linear lucency at the right mid thorax is suspected to represent a  skinfold. Fully upright PA and lateral views of the chest is recommended  when patient is able.    CT Head  Unremarkable CT scan of the head without contrast.    Procedures Performed:         Consults:   Consults     Date and Time Order Name Status Description    12/16/2021 10:37 AM Inpatient Psychiatrist Consult Completed     12/11/2021  2:03 PM Inpatient Cardiology Consult Completed            Discharge Disposition:  Left Against Medical Advice    Discharge Medications:     Discharge Medications      Continue These Medications      Instructions Start Date   Insulin Syringe 30G X 5/16\" 1 ML misc   U UTD WITH INSULIN UP TO 6 TIMES A DAY         ASK your doctor about these medications      " Instructions Start Date   acetaminophen 325 MG tablet  Commonly known as: TYLENOL   650 mg, Oral, Every 4 Hours PRN      albuterol sulfate  (90 Base) MCG/ACT inhaler  Commonly known as: PROVENTIL HFA;VENTOLIN HFA;PROAIR HFA   2 puffs, Inhalation, Every 4 Hours PRN      amiodarone 200 MG tablet  Commonly known as: PACERONE   Every 12 Hours Scheduled      apixaban 5 MG tablet tablet  Commonly known as: ELIQUIS   5 mg, Oral, Every 12 Hours Scheduled      atorvastatin 10 MG tablet  Commonly known as: LIPITOR   Nightly      bisacodyl 10 MG suppository  Commonly known as: DULCOLAX   10 mg, Rectal, Daily PRN      budesonide-formoterol 160-4.5 MCG/ACT inhaler  Commonly known as: Symbicort   2 puffs, Inhalation, 2 Times Daily - RT      bumetanide 2 MG tablet  Commonly known as: BUMEX   2 mg, Oral, 2 Times Daily      carvedilol 6.25 MG tablet  Commonly known as: COREG   Every 12 Hours Scheduled      Cholecalciferol 50 MCG (2000 UT) tablet   2,000 Units, Oral, Daily      citalopram 10 MG tablet  Commonly known as: CeleXA   20 mg, Oral, Nightly      cyanocobalamin 1000 MCG tablet  Commonly known as: VITAMIN B-12   1,000 mcg, Oral, Daily      docusate sodium 100 MG capsule  Commonly known as: COLACE   100 mg, Oral, 2 Times Daily      fluticasone 50 MCG/ACT nasal spray  Commonly known as: FLONASE   2 sprays, Each Nare, Daily      hydrocortisone 1 % cream   Topical, 2 Times Daily      hydrophor ointment ointment   Topical, As Needed, Lower extremities.      insulin aspart 100 UNIT/ML injection  Commonly known as: novoLOG   1-14 Units, Subcutaneous, 3 Times Daily Before Meals, As directed per sliding scale       ipratropium-albuterol 0.5-2.5 mg/3 ml nebulizer  Commonly known as: DUO-NEB   3 mL, Nebulization, Every 4 Hours PRN      Januvia 100 MG tablet  Generic drug: SITagliptin   Every 24 Hours      lactulose 10 GM/15ML solution  Commonly known as: CHRONULAC   30 g, Oral, Daily PRN      Levemir FlexTouch 100 UNIT/ML  injection  Generic drug: insulin detemir   20 units      levothyroxine 112 MCG tablet  Commonly known as: SYNTHROID, LEVOTHROID   224 mcg, Oral, Daily      Levothyroxine Sodium 112 MCG/ML solution   TAKE TWO TABLETS BY MOUTH EVERY MORNING At 6:00 AM      loratadine 5 MG chewable tablet  Commonly known as: CLARITIN   10 mg, Oral, Daily      melatonin 5 MG tablet tablet   5 mg, Oral, Nightly PRN, Over the counter      O2  Commonly known as: OXYGEN   1 L/min (1 L/min), Inhalation, Every Night at Bedtime      Petrolatum 42 % ointment   Topical, Every 24 Hours Scheduled, Send with patient      polyethylene glycol 17 g packet  Commonly known as: MIRALAX   17 g, Oral, Daily      pregabalin 75 MG capsule  Commonly known as: LYRICA   75 mg, Oral, 2 Times Daily      QUEtiapine 25 MG tablet  Commonly known as: SEROquel   0.5 tablets, Oral, Every Evening      REMEDY ANTIMICROBIAL CLEANSER EX   Apply externally, Every 1 Hour PRN      sodium chloride 0.65 % nasal spray   2 sprays, Nasal, As Needed, Send with patient      tamsulosin 0.4 MG capsule 24 hr capsule  Commonly known as: FLOMAX   0.4 mg, Oral, Daily      Tubersol 5 UNIT/0.1ML injection  Generic drug: tuberculin   5 Units, Intradermal, Once, Inject 0.1 mL intradermally every evening shift every 11 months starting on the 22nd for 1 day(s) Give annual PPD       vitamin D 1.25 MG (26723 UT) capsule capsule  Commonly known as: ERGOCALCIFEROL   1,250 Units, Oral, Daily             Discharge Diet: AMA    Activity at Discharge: AMA    Follow-up Appointments:   Follow-up Information     Fortunato De Leon MD .    Specialty: Family Medicine  Contact information:  [address] [facility] [address] [phone]                         Test Results Pending at Discharge:       Ellis Lombardo MD  12/20/21  15:26 EST    Time Spent on Discharge Activities: >30 minutes    Dictated portions using Dragon dictation software.  During the entire encounter, I was wearing recommended PPE  including face mask and eye protection. Hand sanitization was performed prior to entering room and upon exit.

## 2021-12-20 NOTE — CASE MANAGEMENT/SOCIAL WORK
Continued Stay Note  [facility]     Patient Name: Froilan Helton  MRN: [id number]  Today's Date: 12/20/2021    Admit Date: 12/10/2021     Discharge Plan     Row Name 12/20/21 0924       Plan    Plan Home; current with [facility]    Plan Comments CCP met with patient at bedside. Discussed discharge planning and SNF at discharge. Patient is adamantly refusing SNF at this time. CCP attempted to encourage patient to consider SNF but patient is refusing. Patient confirmed he is current with [facility]. Patient reports having a walker and scooter he uses at home. Patient states his granddaughter, Jaz assists him with his meals and house keeping. Patient reports he is going home today and his granddaughter will transport. Patient would not let me call his granddaughter. CCP left voicemail for APS worker Cole [phone]. Per Psych notes; patient is alert and oriented. CCP will follow and assist as needed. Zoila BATES               Discharge Codes    No documentation.               Expected Discharge Date and Time     Expected Discharge Date Expected Discharge Time    Dec 20, 2021             PAULO Vargas

## 2021-12-20 NOTE — DISCHARGE PLACEMENT REQUEST
"Joshua Ferguson P (81 y.o. Male)             Date of Birth Social Security Number Address Home Phone MRN    1940  [address] [facility] [address] [phone] [id number]    Confucianist Marital Status             None        Admission Date Admission Type Admitting Provider Attending Provider Department, Room/Bed    12/10/21 Emergency Adrián Sparks MD Baumann, Patrick D, MD [address], [address]    Discharge Date Discharge Disposition Discharge Destination                         Attending Provider: Ellis Lombardo MD    Allergies: Morphine, Atorvastatin, Oxycontin [Oxycodone Hcl]    Isolation: None   Infection: None   Code Status: No CPR   Advance Care Planning Activity    Ht: 185.4 cm (73\")   Wt: 104 kg (228 lb 11.2 oz)    Admission Cmt: None   Principal Problem: Acute metabolic encephalopathy [G93.41]                 Active Insurance as of 12/10/2021     Primary Coverage     Payor Plan Insurance Group Employer/Plan Group    MEDICARE MEDICARE A & B      Payor Plan Address Payor Plan Phone Number Payor Plan Fax Number Effective Dates    [address] [phone]  11/1/2006 - None Entered    [facility] [id number]       Subscriber Name Subscriber Birth Date Member ID       JOSHUA FERGUSON P 1940 [id number]           Secondary Coverage     Payor Plan Insurance Group Employer/Plan Group    HUMANA HUMANA [id number]     Payor Plan Address Payor Plan Phone Number Payor Plan Fax Number Effective Dates    [address] [address] [phone]  1/1/2013 - None Entered    [facility] [address]       Subscriber Name Subscriber Birth Date Member ID       JOSHUA FERGUSON P 1940 [id number]                 Emergency Contacts      (Rel.) Home Phone Work Phone Mobile Phone    Jaz Grigsby (Power of ) [phone] -- [phone]    ISAIAS FERGUSON (Son) [phone] -- --              "

## 2021-12-20 NOTE — PLAN OF CARE
Goal Outcome Evaluation:           Progress: no change  Outcome Summary: VSS. pt very pleasant and calm, agreeable to most interventions. pt refused turns a couple times. wound care completed. purewick changed. will ctm

## 2021-12-21 NOTE — PROGRESS NOTES
Continued Stay Note  [facility]     Patient Name: Froilan Helton  MRN: [id number]  Today's Date: 12/21/2021    Admit Date: 12/10/2021     Discharge Plan     Row Name 12/21/21 1315       Plan    Final Discharge Disposition Code 06 - home with home health care    Final Note Home with home health               Discharge Codes    No documentation.               Expected Discharge Date and Time     Expected Discharge Date Expected Discharge Time    Dec 20, 2021             Mer Duran RN

## 2021-12-28 PROBLEM — N17.9 ACUTE RENAL FAILURE SUPERIMPOSED ON CHRONIC KIDNEY DISEASE (HCC): Status: ACTIVE | Noted: 2021-01-01

## 2021-12-28 PROBLEM — N18.9 ACUTE RENAL FAILURE SUPERIMPOSED ON CHRONIC KIDNEY DISEASE (HCC): Status: ACTIVE | Noted: 2021-01-01

## 2021-12-29 PROBLEM — S91.302A OPEN WOUND OF LEFT FOOT: Status: ACTIVE | Noted: 2021-01-01

## 2021-12-30 NOTE — ANESTHESIA PREPROCEDURE EVALUATION
Anesthesia Evaluation     Patient summary reviewed and Nursing notes reviewed   NPO Solid Status: > 8 hours  NPO Liquid Status: > 8 hours           Airway   Mallampati: III  TM distance: >3 FB  Neck ROM: full  Possible difficult intubation  Dental    (+) poor dentition    Pulmonary     breath sounds clear to auscultation  (+) pleural effusion (recurrent), COPD ( last inhaler 1 week ago), asthma,home oxygen (2L at night if needed.),   Cardiovascular   Exercise tolerance: poor (<4 METS)    ECG reviewed  Rhythm: irregular  Rate: abnormal    (+) hypertension, past MI , CAD, dysrhythmias Atrial Fib, CHF Systolic <55%, PVD, hyperlipidemia,       Neuro/Psych  (+) psychiatric history Anxiety and Depression,     GI/Hepatic/Renal/Endo    (+)   renal disease CRI, diabetes mellitus type 2 well controlled, thyroid problem hypothyroidism    Musculoskeletal     (+) gait problem (multiple falls at home), neck stiffness,   Abdominal    Substance History      OB/GYN          Other   arthritis,      ROS/Med Hx Other: Last eliquis yesterday am  afib in preop, rate 46      ECG 12 Lead  Order: [id number]  Status: Final result    Visible to patient: No (not released)    Next appt: None    0 Result Notes    Component   Ref Range & Units 12/18/21 0619 12/12/21 0529 12/10/21 1244 10/6/21 0559 7/31/21 0850 7/27/21 1332 7/18/21 2233  QT Interval   ms 416  581  411  459  464  457  434           Narrative & Impression      HEART RATE= 78  bpm  RR Interval= 776  ms  SD Interval= 205  ms  P Horizontal Axis= -42  deg  P Front Axis= 0  deg  QRSD Interval= 120  ms  QT Interval= 416  ms  QRS Axis= -1  deg  T Wave Axis=   deg  - ABNORMAL ECG -  Sinus rhythm  Ventricular premature complex  Probable left atrial enlargement  Nonspecific intraventricular conduction delay  Nonspecific T abnormalities, lateral leads  SINCE PREVIOUS TRACING,  hr has increased  Electronically Signed By: Lavon Keene ([facility]) 18-Dec-2021 16:30:54  Date and Time of Study:  2021-12-18 06:19:47    Specimen Collected: 12/18/21 06:19              Phys Exam Other: Multiple missing teeth, none loose                Anesthesia Plan    ASA 4 - emergent     general     intravenous induction     Anesthetic plan, all risks, benefits, and alternatives have been provided, discussed and informed consent has been obtained with: patient.  Use of blood products discussed with patient  Consented to blood products.   Plan discussed with CRNA.

## 2021-12-30 NOTE — ANESTHESIA PROCEDURE NOTES
Airway  Urgency: elective    Date/Time: 12/30/2021 10:20 AM  End Time:12/30/2021 10:20 AM  Airway not difficult    General Information and Staff    Patient location during procedure: OR  CRNA: Aaliyah Ramos CRNA    Indications and Patient Condition  Indications for airway management: airway protection    Preoxygenated: yes  MILS maintained throughout  Mask difficulty assessment: 0 - not attempted    Final Airway Details  Final airway type: endotracheal airway      Successful airway: ETT  Cuffed: yes   Successful intubation technique: direct laryngoscopy  Facilitating devices/methods: intubating stylet  Endotracheal tube insertion site: oral  Blade: Hagen  Blade size: 2  ETT size (mm): 7.5  Cormack-Lehane Classification: grade I - full view of glottis  Placement verified by: chest auscultation and capnometry   Measured from: lips  ETT/EBT  to lips (cm): 23  Number of attempts at approach: 1  Assessment: lips, teeth, and gum same as pre-op and atraumatic intubation

## 2021-12-30 NOTE — ANESTHESIA POSTPROCEDURE EVALUATION
Patient: Froilan Helton    Procedure Summary     Date: 12/30/21 Room / Location:  [facility] OR 4 /  [facility] OR    Anesthesia Start: 1010 Anesthesia Stop: 1120    Procedure: debridement of left hip wounds and left foot wound (Left Hip) Diagnosis:       Open wound of left foot, initial encounter      (Open wound of left foot, initial encounter [S91.302A])    Surgeons: Judie Aguero DO Provider: Aaliyah Ramos CRNA    Anesthesia Type: general ASA Status: 4 - Emergent          Anesthesia Type: general    Vitals  Vitals Value Taken Time   /57 12/30/21 1135   Temp 97.8 °F (36.6 °C) 12/30/21 1115   Pulse 51 12/30/21 1140   Resp 20 12/30/21 1135   SpO2 96 % 12/30/21 1140   Vitals shown include unvalidated device data.        Post Anesthesia Care and Evaluation    Patient location during evaluation: PACU  Patient participation: complete - patient participated  Level of consciousness: awake  Pain score: 0  Pain management: adequate  Airway patency: patent  Anesthetic complications: No anesthetic complications  PONV Status: none  Cardiovascular status: acceptable  Respiratory status: acceptable  Hydration status: acceptable

## 2022-01-01 ENCOUNTER — APPOINTMENT (OUTPATIENT)
Dept: CT IMAGING | Facility: HOSPITAL | Age: 82
End: 2022-01-01

## 2022-01-01 ENCOUNTER — HOSPITAL ENCOUNTER (INPATIENT)
Facility: HOSPITAL | Age: 82
LOS: 2 days | End: 2022-05-23
Attending: EMERGENCY MEDICINE | Admitting: FAMILY MEDICINE

## 2022-01-01 ENCOUNTER — APPOINTMENT (OUTPATIENT)
Dept: GENERAL RADIOLOGY | Facility: HOSPITAL | Age: 82
End: 2022-01-01

## 2022-01-01 ENCOUNTER — HOSPITAL ENCOUNTER (INPATIENT)
Facility: HOSPITAL | Age: 82
LOS: 1 days | Discharge: NURSING, REHAB OR LONG-TERM CARE | End: 2022-04-18
Attending: EMERGENCY MEDICINE | Admitting: FAMILY MEDICINE

## 2022-01-01 ENCOUNTER — HOSPITAL ENCOUNTER (INPATIENT)
Facility: HOSPITAL | Age: 82
LOS: 1 days | End: 2022-05-24
Attending: HOSPITALIST | Admitting: HOSPITALIST

## 2022-01-01 ENCOUNTER — HOSPITAL ENCOUNTER (INPATIENT)
Facility: HOSPITAL | Age: 82
LOS: 6 days | Discharge: NURSING, REHAB OR LONG-TERM CARE | End: 2022-01-20
Attending: EMERGENCY MEDICINE | Admitting: INTERNAL MEDICINE

## 2022-01-01 ENCOUNTER — TRANSCRIBE ORDERS (OUTPATIENT)
Dept: WOUND CARE | Facility: HOSPITAL | Age: 82
End: 2022-01-01

## 2022-01-01 VITALS
OXYGEN SATURATION: 97 % | HEIGHT: 73 IN | BODY MASS INDEX: 29.83 KG/M2 | TEMPERATURE: 98.3 F | HEART RATE: 50 BPM | RESPIRATION RATE: 20 BRPM | SYSTOLIC BLOOD PRESSURE: 115 MMHG | WEIGHT: 225.1 LBS | DIASTOLIC BLOOD PRESSURE: 54 MMHG

## 2022-01-01 VITALS
TEMPERATURE: 97.5 F | DIASTOLIC BLOOD PRESSURE: 67 MMHG | HEIGHT: 73 IN | OXYGEN SATURATION: 94 % | RESPIRATION RATE: 18 BRPM | SYSTOLIC BLOOD PRESSURE: 141 MMHG | WEIGHT: 221.3 LBS | HEART RATE: 58 BPM | BODY MASS INDEX: 29.33 KG/M2

## 2022-01-01 VITALS
DIASTOLIC BLOOD PRESSURE: 58 MMHG | RESPIRATION RATE: 18 BRPM | HEIGHT: 72 IN | OXYGEN SATURATION: 95 % | TEMPERATURE: 97.5 F | SYSTOLIC BLOOD PRESSURE: 115 MMHG | BODY MASS INDEX: 30.41 KG/M2 | WEIGHT: 224.5 LBS | HEART RATE: 53 BPM

## 2022-01-01 VITALS
HEART RATE: 62 BPM | RESPIRATION RATE: 12 BRPM | OXYGEN SATURATION: 96 % | BODY MASS INDEX: 27.16 KG/M2 | HEIGHT: 74 IN | TEMPERATURE: 95.4 F | SYSTOLIC BLOOD PRESSURE: 93 MMHG | DIASTOLIC BLOOD PRESSURE: 39 MMHG | WEIGHT: 211.6 LBS

## 2022-01-01 DIAGNOSIS — L89.156 PRESSURE INJURY OF DEEP TISSUE OF SACRAL REGION: ICD-10-CM

## 2022-01-01 DIAGNOSIS — L89.90 PRESSURE INJURY OF SKIN, UNSPECIFIED INJURY STAGE, UNSPECIFIED LOCATION: ICD-10-CM

## 2022-01-01 DIAGNOSIS — M62.82 NON-TRAUMATIC RHABDOMYOLYSIS: ICD-10-CM

## 2022-01-01 DIAGNOSIS — R77.8 ELEVATED TROPONIN: ICD-10-CM

## 2022-01-01 DIAGNOSIS — R40.4 ALTERED LEVEL OF CONSCIOUSNESS: ICD-10-CM

## 2022-01-01 DIAGNOSIS — N17.9 ACUTE RENAL FAILURE, UNSPECIFIED ACUTE RENAL FAILURE TYPE: ICD-10-CM

## 2022-01-01 DIAGNOSIS — N17.9 ACUTE RENAL FAILURE SUPERIMPOSED ON CHRONIC KIDNEY DISEASE, UNSPECIFIED CKD STAGE, UNSPECIFIED ACUTE RENAL FAILURE TYPE: ICD-10-CM

## 2022-01-01 DIAGNOSIS — E87.5 ACUTE HYPERKALEMIA: ICD-10-CM

## 2022-01-01 DIAGNOSIS — R53.1 WEAKNESS: ICD-10-CM

## 2022-01-01 DIAGNOSIS — N18.4 TYPE 2 DIABETES MELLITUS WITH STAGE 4 CHRONIC KIDNEY DISEASE, WITH LONG-TERM CURRENT USE OF INSULIN: Chronic | ICD-10-CM

## 2022-01-01 DIAGNOSIS — N18.9 ACUTE RENAL FAILURE SUPERIMPOSED ON CHRONIC KIDNEY DISEASE, UNSPECIFIED CKD STAGE, UNSPECIFIED ACUTE RENAL FAILURE TYPE: ICD-10-CM

## 2022-01-01 DIAGNOSIS — M62.82 NON-TRAUMATIC RHABDOMYOLYSIS: Primary | ICD-10-CM

## 2022-01-01 DIAGNOSIS — E11.22 TYPE 2 DIABETES MELLITUS WITH STAGE 4 CHRONIC KIDNEY DISEASE, WITH LONG-TERM CURRENT USE OF INSULIN: Chronic | ICD-10-CM

## 2022-01-01 DIAGNOSIS — Z79.4 TYPE 2 DIABETES MELLITUS WITH STAGE 4 CHRONIC KIDNEY DISEASE, WITH LONG-TERM CURRENT USE OF INSULIN: Chronic | ICD-10-CM

## 2022-01-01 DIAGNOSIS — E87.20 SEVERE SEPSIS WITH LACTIC ACIDOSIS: Primary | ICD-10-CM

## 2022-01-01 DIAGNOSIS — L89.126: ICD-10-CM

## 2022-01-01 DIAGNOSIS — R00.1 SYMPTOMATIC BRADYCARDIA: Primary | ICD-10-CM

## 2022-01-01 DIAGNOSIS — A41.9 SEVERE SEPSIS WITH LACTIC ACIDOSIS: Primary | ICD-10-CM

## 2022-01-01 DIAGNOSIS — B87.9 MAGGOT INFESTATION: ICD-10-CM

## 2022-01-01 DIAGNOSIS — R65.20 SEVERE SEPSIS WITH LACTIC ACIDOSIS: Primary | ICD-10-CM

## 2022-01-01 DIAGNOSIS — I87.8 CHRONIC VENOUS STASIS: Primary | ICD-10-CM

## 2022-01-01 LAB
ALBUMIN SERPL-MCNC: 2.3 G/DL (ref 3.5–5.2)
ALBUMIN SERPL-MCNC: 2.4 G/DL (ref 3.5–5.2)
ALBUMIN SERPL-MCNC: 2.5 G/DL (ref 3.5–5.2)
ALBUMIN SERPL-MCNC: 2.5 G/DL (ref 3.5–5.2)
ALBUMIN SERPL-MCNC: 2.6 G/DL (ref 3.5–5.2)
ALBUMIN SERPL-MCNC: 2.7 G/DL (ref 3.5–5.2)
ALBUMIN SERPL-MCNC: 2.8 G/DL (ref 3.5–5.2)
ALBUMIN SERPL-MCNC: 2.9 G/DL (ref 3.5–5.2)
ALBUMIN SERPL-MCNC: 2.9 G/DL (ref 3.5–5.2)
ALBUMIN SERPL-MCNC: 3 G/DL (ref 3.5–5.2)
ALBUMIN SERPL-MCNC: 3.2 G/DL (ref 3.5–5.2)
ALBUMIN SERPL-MCNC: 3.5 G/DL (ref 3.5–5.2)
ALBUMIN/GLOB SERPL: 0.6 G/DL
ALBUMIN/GLOB SERPL: 0.6 G/DL
ALBUMIN/GLOB SERPL: 0.8 G/DL
ALBUMIN/GLOB SERPL: 0.9 G/DL
ALBUMIN/GLOB SERPL: 1 G/DL
ALP SERPL-CCNC: 72 U/L (ref 39–117)
ALP SERPL-CCNC: 78 U/L (ref 39–117)
ALP SERPL-CCNC: 79 U/L (ref 39–117)
ALP SERPL-CCNC: 81 U/L (ref 39–117)
ALP SERPL-CCNC: 88 U/L (ref 39–117)
ALT SERPL W P-5'-P-CCNC: 10 U/L (ref 1–41)
ALT SERPL W P-5'-P-CCNC: 10 U/L (ref 1–41)
ALT SERPL W P-5'-P-CCNC: 25 U/L (ref 1–41)
ALT SERPL W P-5'-P-CCNC: 7 U/L (ref 1–41)
ALT SERPL W P-5'-P-CCNC: 8 U/L (ref 1–41)
ANION GAP SERPL CALCULATED.3IONS-SCNC: 10.1 MMOL/L (ref 5–15)
ANION GAP SERPL CALCULATED.3IONS-SCNC: 10.2 MMOL/L (ref 5–15)
ANION GAP SERPL CALCULATED.3IONS-SCNC: 10.4 MMOL/L (ref 5–15)
ANION GAP SERPL CALCULATED.3IONS-SCNC: 10.4 MMOL/L (ref 5–15)
ANION GAP SERPL CALCULATED.3IONS-SCNC: 10.5 MMOL/L (ref 5–15)
ANION GAP SERPL CALCULATED.3IONS-SCNC: 12.2 MMOL/L (ref 5–15)
ANION GAP SERPL CALCULATED.3IONS-SCNC: 12.3 MMOL/L (ref 5–15)
ANION GAP SERPL CALCULATED.3IONS-SCNC: 13.6 MMOL/L (ref 5–15)
ANION GAP SERPL CALCULATED.3IONS-SCNC: 21.8 MMOL/L (ref 5–15)
ANION GAP SERPL CALCULATED.3IONS-SCNC: 29.5 MMOL/L (ref 5–15)
ANION GAP SERPL CALCULATED.3IONS-SCNC: 6.7 MMOL/L (ref 5–15)
ANION GAP SERPL CALCULATED.3IONS-SCNC: 6.9 MMOL/L (ref 5–15)
ANION GAP SERPL CALCULATED.3IONS-SCNC: 7.6 MMOL/L (ref 5–15)
ANION GAP SERPL CALCULATED.3IONS-SCNC: 8 MMOL/L (ref 5–15)
ANION GAP SERPL CALCULATED.3IONS-SCNC: 8.4 MMOL/L (ref 5–15)
ANION GAP SERPL CALCULATED.3IONS-SCNC: 8.4 MMOL/L (ref 5–15)
ANION GAP SERPL CALCULATED.3IONS-SCNC: 8.7 MMOL/L (ref 5–15)
ANION GAP SERPL CALCULATED.3IONS-SCNC: 8.8 MMOL/L (ref 5–15)
ANION GAP SERPL CALCULATED.3IONS-SCNC: 8.8 MMOL/L (ref 5–15)
ANION GAP SERPL CALCULATED.3IONS-SCNC: 8.9 MMOL/L (ref 5–15)
ANION GAP SERPL CALCULATED.3IONS-SCNC: 9.5 MMOL/L (ref 5–15)
ANION GAP SERPL CALCULATED.3IONS-SCNC: 9.5 MMOL/L (ref 5–15)
AST SERPL-CCNC: 20 U/L (ref 1–40)
AST SERPL-CCNC: 20 U/L (ref 1–40)
AST SERPL-CCNC: 24 U/L (ref 1–40)
AST SERPL-CCNC: 25 U/L (ref 1–40)
AST SERPL-CCNC: 48 U/L (ref 1–40)
BACTERIA BLD CULT: ABNORMAL
BACTERIA ID TEST ISLT QL CULT: ABNORMAL
BACTERIA ISLT: NORMAL
BACTERIA SPEC AEROBE CULT: ABNORMAL
BACTERIA SPEC AEROBE CULT: NO GROWTH
BACTERIA SPEC AEROBE CULT: NORMAL
BACTERIA SPEC AEROBE CULT: NORMAL
BACTERIA SPEC ANAEROBE CULT: ABNORMAL
BACTERIA SPEC ANAEROBE CULT: NORMAL
BACTERIA UR QL AUTO: ABNORMAL /HPF
BASOPHILS # BLD AUTO: 0.05 10*3/MM3 (ref 0–0.2)
BASOPHILS # BLD AUTO: 0.05 10*3/MM3 (ref 0–0.2)
BASOPHILS # BLD AUTO: 0.06 10*3/MM3 (ref 0–0.2)
BASOPHILS # BLD AUTO: 0.09 10*3/MM3 (ref 0–0.2)
BASOPHILS NFR BLD AUTO: 0.2 % (ref 0–1.5)
BASOPHILS NFR BLD AUTO: 0.2 % (ref 0–1.5)
BASOPHILS NFR BLD AUTO: 0.4 % (ref 0–1.5)
BASOPHILS NFR BLD AUTO: 0.6 % (ref 0–1.5)
BASOPHILS NFR BLD AUTO: 0.7 % (ref 0–1.5)
BASOPHILS NFR BLD AUTO: 0.9 % (ref 0–1.5)
BILIRUB SERPL-MCNC: 0.2 MG/DL (ref 0–1.2)
BILIRUB SERPL-MCNC: 0.3 MG/DL (ref 0–1.2)
BILIRUB SERPL-MCNC: 0.4 MG/DL (ref 0–1.2)
BILIRUB SERPL-MCNC: 0.7 MG/DL (ref 0–1.2)
BILIRUB SERPL-MCNC: 0.8 MG/DL (ref 0–1.2)
BILIRUB UR QL STRIP: NEGATIVE
BUN SERPL-MCNC: 159 MG/DL (ref 8–23)
BUN SERPL-MCNC: 163 MG/DL (ref 8–23)
BUN SERPL-MCNC: 31 MG/DL (ref 8–23)
BUN SERPL-MCNC: 32 MG/DL (ref 8–23)
BUN SERPL-MCNC: 34 MG/DL (ref 8–23)
BUN SERPL-MCNC: 35 MG/DL (ref 8–23)
BUN SERPL-MCNC: 38 MG/DL (ref 8–23)
BUN SERPL-MCNC: 39 MG/DL (ref 8–23)
BUN SERPL-MCNC: 39 MG/DL (ref 8–23)
BUN SERPL-MCNC: 41 MG/DL (ref 8–23)
BUN SERPL-MCNC: 42 MG/DL (ref 8–23)
BUN SERPL-MCNC: 46 MG/DL (ref 8–23)
BUN SERPL-MCNC: 47 MG/DL (ref 8–23)
BUN SERPL-MCNC: 52 MG/DL (ref 8–23)
BUN SERPL-MCNC: 52 MG/DL (ref 8–23)
BUN SERPL-MCNC: 54 MG/DL (ref 8–23)
BUN SERPL-MCNC: 60 MG/DL (ref 8–23)
BUN SERPL-MCNC: 67 MG/DL (ref 8–23)
BUN/CREAT SERPL: 14.8 (ref 7–25)
BUN/CREAT SERPL: 14.9 (ref 7–25)
BUN/CREAT SERPL: 15.2 (ref 7–25)
BUN/CREAT SERPL: 17.1 (ref 7–25)
BUN/CREAT SERPL: 17.4 (ref 7–25)
BUN/CREAT SERPL: 17.8 (ref 7–25)
BUN/CREAT SERPL: 17.8 (ref 7–25)
BUN/CREAT SERPL: 18.3 (ref 7–25)
BUN/CREAT SERPL: 19.1 (ref 7–25)
BUN/CREAT SERPL: 19.1 (ref 7–25)
BUN/CREAT SERPL: 19.3 (ref 7–25)
BUN/CREAT SERPL: 19.4 (ref 7–25)
BUN/CREAT SERPL: 19.5 (ref 7–25)
BUN/CREAT SERPL: 20.3 (ref 7–25)
BUN/CREAT SERPL: 22 (ref 7–25)
BUN/CREAT SERPL: 22.2 (ref 7–25)
BUN/CREAT SERPL: 23 (ref 7–25)
BUN/CREAT SERPL: 23.6 (ref 7–25)
BUN/CREAT SERPL: 24.1 (ref 7–25)
BUN/CREAT SERPL: 24.8 (ref 7–25)
BUN/CREAT SERPL: 25.9 (ref 7–25)
BUN/CREAT SERPL: 28.1 (ref 7–25)
CALCIUM SPEC-SCNC: 8.1 MG/DL (ref 8.6–10.5)
CALCIUM SPEC-SCNC: 8.3 MG/DL (ref 8.6–10.5)
CALCIUM SPEC-SCNC: 8.5 MG/DL (ref 8.6–10.5)
CALCIUM SPEC-SCNC: 8.6 MG/DL (ref 8.6–10.5)
CALCIUM SPEC-SCNC: 8.7 MG/DL (ref 8.6–10.5)
CALCIUM SPEC-SCNC: 8.8 MG/DL (ref 8.6–10.5)
CALCIUM SPEC-SCNC: 8.9 MG/DL (ref 8.6–10.5)
CALCIUM SPEC-SCNC: 8.9 MG/DL (ref 8.6–10.5)
CALCIUM SPEC-SCNC: 9 MG/DL (ref 8.6–10.5)
CALCIUM SPEC-SCNC: 9 MG/DL (ref 8.6–10.5)
CALCIUM SPEC-SCNC: 9.1 MG/DL (ref 8.6–10.5)
CALCIUM SPEC-SCNC: 9.2 MG/DL (ref 8.6–10.5)
CALCIUM SPEC-SCNC: 9.2 MG/DL (ref 8.6–10.5)
CALCIUM SPEC-SCNC: 9.6 MG/DL (ref 8.6–10.5)
CALCIUM SPEC-SCNC: 9.7 MG/DL (ref 8.6–10.5)
CHLORIDE SERPL-SCNC: 100 MMOL/L (ref 98–107)
CHLORIDE SERPL-SCNC: 101 MMOL/L (ref 98–107)
CHLORIDE SERPL-SCNC: 102 MMOL/L (ref 98–107)
CHLORIDE SERPL-SCNC: 103 MMOL/L (ref 98–107)
CHLORIDE SERPL-SCNC: 104 MMOL/L (ref 98–107)
CHLORIDE SERPL-SCNC: 104 MMOL/L (ref 98–107)
CHLORIDE SERPL-SCNC: 105 MMOL/L (ref 98–107)
CHLORIDE SERPL-SCNC: 107 MMOL/L (ref 98–107)
CHLORIDE SERPL-SCNC: 95 MMOL/L (ref 98–107)
CHLORIDE SERPL-SCNC: 95 MMOL/L (ref 98–107)
CHLORIDE SERPL-SCNC: 96 MMOL/L (ref 98–107)
CHLORIDE SERPL-SCNC: 97 MMOL/L (ref 98–107)
CHLORIDE SERPL-SCNC: 98 MMOL/L (ref 98–107)
CHLORIDE SERPL-SCNC: 99 MMOL/L (ref 98–107)
CHLORIDE SERPL-SCNC: 99 MMOL/L (ref 98–107)
CK SERPL-CCNC: 284 U/L (ref 20–200)
CK SERPL-CCNC: 381 U/L (ref 20–200)
CK SERPL-CCNC: 61 U/L (ref 20–200)
CK SERPL-CCNC: 723 U/L (ref 20–200)
CLARITY UR: ABNORMAL
CLARITY UR: ABNORMAL
CLARITY UR: CLEAR
CO2 SERPL-SCNC: 13.5 MMOL/L (ref 22–29)
CO2 SERPL-SCNC: 17.2 MMOL/L (ref 22–29)
CO2 SERPL-SCNC: 18.8 MMOL/L (ref 22–29)
CO2 SERPL-SCNC: 21.6 MMOL/L (ref 22–29)
CO2 SERPL-SCNC: 23.4 MMOL/L (ref 22–29)
CO2 SERPL-SCNC: 23.6 MMOL/L (ref 22–29)
CO2 SERPL-SCNC: 23.9 MMOL/L (ref 22–29)
CO2 SERPL-SCNC: 24 MMOL/L (ref 22–29)
CO2 SERPL-SCNC: 24.2 MMOL/L (ref 22–29)
CO2 SERPL-SCNC: 24.3 MMOL/L (ref 22–29)
CO2 SERPL-SCNC: 24.5 MMOL/L (ref 22–29)
CO2 SERPL-SCNC: 24.7 MMOL/L (ref 22–29)
CO2 SERPL-SCNC: 25.3 MMOL/L (ref 22–29)
CO2 SERPL-SCNC: 25.5 MMOL/L (ref 22–29)
CO2 SERPL-SCNC: 25.6 MMOL/L (ref 22–29)
CO2 SERPL-SCNC: 25.8 MMOL/L (ref 22–29)
CO2 SERPL-SCNC: 26.2 MMOL/L (ref 22–29)
CO2 SERPL-SCNC: 26.6 MMOL/L (ref 22–29)
CO2 SERPL-SCNC: 28.5 MMOL/L (ref 22–29)
CO2 SERPL-SCNC: 30.1 MMOL/L (ref 22–29)
CO2 SERPL-SCNC: 30.1 MMOL/L (ref 22–29)
CO2 SERPL-SCNC: 30.4 MMOL/L (ref 22–29)
COLOR UR: ABNORMAL
COLOR UR: YELLOW
COLOR UR: YELLOW
CREAT SERPL-MCNC: 1.72 MG/DL (ref 0.76–1.27)
CREAT SERPL-MCNC: 1.8 MG/DL (ref 0.76–1.27)
CREAT SERPL-MCNC: 1.85 MG/DL (ref 0.76–1.27)
CREAT SERPL-MCNC: 1.86 MG/DL (ref 0.76–1.27)
CREAT SERPL-MCNC: 1.95 MG/DL (ref 0.76–1.27)
CREAT SERPL-MCNC: 1.99 MG/DL (ref 0.76–1.27)
CREAT SERPL-MCNC: 2 MG/DL (ref 0.76–1.27)
CREAT SERPL-MCNC: 2.01 MG/DL (ref 0.76–1.27)
CREAT SERPL-MCNC: 2.07 MG/DL (ref 0.76–1.27)
CREAT SERPL-MCNC: 2.08 MG/DL (ref 0.76–1.27)
CREAT SERPL-MCNC: 2.12 MG/DL (ref 0.76–1.27)
CREAT SERPL-MCNC: 2.14 MG/DL (ref 0.76–1.27)
CREAT SERPL-MCNC: 2.15 MG/DL (ref 0.76–1.27)
CREAT SERPL-MCNC: 2.18 MG/DL (ref 0.76–1.27)
CREAT SERPL-MCNC: 2.24 MG/DL (ref 0.76–1.27)
CREAT SERPL-MCNC: 2.28 MG/DL (ref 0.76–1.27)
CREAT SERPL-MCNC: 2.3 MG/DL (ref 0.76–1.27)
CREAT SERPL-MCNC: 2.3 MG/DL (ref 0.76–1.27)
CREAT SERPL-MCNC: 2.61 MG/DL (ref 0.76–1.27)
CREAT SERPL-MCNC: 2.78 MG/DL (ref 0.76–1.27)
CREAT SERPL-MCNC: 8.18 MG/DL (ref 0.76–1.27)
CREAT SERPL-MCNC: 8.53 MG/DL (ref 0.76–1.27)
D-LACTATE SERPL-SCNC: 1.3 MMOL/L (ref 0.5–2)
D-LACTATE SERPL-SCNC: 2.2 MMOL/L (ref 0.5–2)
D-LACTATE SERPL-SCNC: 3.2 MMOL/L (ref 0.5–2)
D-LACTATE SERPL-SCNC: 4.1 MMOL/L (ref 0.5–2)
D-LACTATE SERPL-SCNC: 5.8 MMOL/L (ref 0.5–2)
DEPRECATED RDW RBC AUTO: 47.5 FL (ref 37–54)
DEPRECATED RDW RBC AUTO: 47.9 FL (ref 37–54)
DEPRECATED RDW RBC AUTO: 48.1 FL (ref 37–54)
DEPRECATED RDW RBC AUTO: 48.7 FL (ref 37–54)
DEPRECATED RDW RBC AUTO: 50.8 FL (ref 37–54)
DEPRECATED RDW RBC AUTO: 51 FL (ref 37–54)
DEPRECATED RDW RBC AUTO: 51.8 FL (ref 37–54)
DEPRECATED RDW RBC AUTO: 52.7 FL (ref 37–54)
DEPRECATED RDW RBC AUTO: 53.1 FL (ref 37–54)
DEPRECATED RDW RBC AUTO: 53.1 FL (ref 37–54)
DEPRECATED RDW RBC AUTO: 54.8 FL (ref 37–54)
EGFRCR SERPLBLD CKD-EPI 2021: 32.9 ML/MIN/1.73
EGFRCR SERPLBLD CKD-EPI 2021: 36.4 ML/MIN/1.73
EGFRCR SERPLBLD CKD-EPI 2021: 39.7 ML/MIN/1.73
EGFRCR SERPLBLD CKD-EPI 2021: 5.8 ML/MIN/1.73
EGFRCR SERPLBLD CKD-EPI 2021: 6.1 ML/MIN/1.73
EOSINOPHIL # BLD AUTO: 0.08 10*3/MM3 (ref 0–0.4)
EOSINOPHIL # BLD AUTO: 0.12 10*3/MM3 (ref 0–0.4)
EOSINOPHIL # BLD AUTO: 0.38 10*3/MM3 (ref 0–0.4)
EOSINOPHIL # BLD AUTO: 0.46 10*3/MM3 (ref 0–0.4)
EOSINOPHIL # BLD AUTO: 0.53 10*3/MM3 (ref 0–0.4)
EOSINOPHIL # BLD AUTO: 0.68 10*3/MM3 (ref 0–0.4)
EOSINOPHIL NFR BLD AUTO: 0.3 % (ref 0.3–6.2)
EOSINOPHIL NFR BLD AUTO: 0.4 % (ref 0.3–6.2)
EOSINOPHIL NFR BLD AUTO: 2.9 % (ref 0.3–6.2)
EOSINOPHIL NFR BLD AUTO: 4 % (ref 0.3–6.2)
EOSINOPHIL NFR BLD AUTO: 6 % (ref 0.3–6.2)
EOSINOPHIL NFR BLD AUTO: 9.6 % (ref 0.3–6.2)
ERYTHROCYTE [DISTWIDTH] IN BLOOD BY AUTOMATED COUNT: 14.4 % (ref 12.3–15.4)
ERYTHROCYTE [DISTWIDTH] IN BLOOD BY AUTOMATED COUNT: 14.4 % (ref 12.3–15.4)
ERYTHROCYTE [DISTWIDTH] IN BLOOD BY AUTOMATED COUNT: 14.5 % (ref 12.3–15.4)
ERYTHROCYTE [DISTWIDTH] IN BLOOD BY AUTOMATED COUNT: 14.8 % (ref 12.3–15.4)
ERYTHROCYTE [DISTWIDTH] IN BLOOD BY AUTOMATED COUNT: 15.3 % (ref 12.3–15.4)
ERYTHROCYTE [DISTWIDTH] IN BLOOD BY AUTOMATED COUNT: 15.3 % (ref 12.3–15.4)
ERYTHROCYTE [DISTWIDTH] IN BLOOD BY AUTOMATED COUNT: 15.6 % (ref 12.3–15.4)
ERYTHROCYTE [DISTWIDTH] IN BLOOD BY AUTOMATED COUNT: 15.7 % (ref 12.3–15.4)
ERYTHROCYTE [DISTWIDTH] IN BLOOD BY AUTOMATED COUNT: 16 % (ref 12.3–15.4)
ERYTHROCYTE [DISTWIDTH] IN BLOOD BY AUTOMATED COUNT: 16.1 % (ref 12.3–15.4)
ERYTHROCYTE [DISTWIDTH] IN BLOOD BY AUTOMATED COUNT: 16.1 % (ref 12.3–15.4)
ERYTHROCYTE [SEDIMENTATION RATE] IN BLOOD: 34 MM/HR (ref 0–20)
FERRITIN SERPL-MCNC: 427 NG/ML (ref 30–400)
FLUAV RNA RESP QL NAA+PROBE: NOT DETECTED
FLUBV RNA RESP QL NAA+PROBE: NOT DETECTED
FOLATE SERPL-MCNC: 9.85 NG/ML (ref 4.78–24.2)
GFR SERPL CREATININE-BSD FRML MDRD: 22 ML/MIN/1.73
GFR SERPL CREATININE-BSD FRML MDRD: 24 ML/MIN/1.73
GFR SERPL CREATININE-BSD FRML MDRD: 27 ML/MIN/1.73
GFR SERPL CREATININE-BSD FRML MDRD: 27 ML/MIN/1.73
GFR SERPL CREATININE-BSD FRML MDRD: 28 ML/MIN/1.73
GFR SERPL CREATININE-BSD FRML MDRD: 28 ML/MIN/1.73
GFR SERPL CREATININE-BSD FRML MDRD: 29 ML/MIN/1.73
GFR SERPL CREATININE-BSD FRML MDRD: 30 ML/MIN/1.73
GFR SERPL CREATININE-BSD FRML MDRD: 31 ML/MIN/1.73
GFR SERPL CREATININE-BSD FRML MDRD: 31 ML/MIN/1.73
GFR SERPL CREATININE-BSD FRML MDRD: 32 ML/MIN/1.73
GFR SERPL CREATININE-BSD FRML MDRD: 32 ML/MIN/1.73
GFR SERPL CREATININE-BSD FRML MDRD: 33 ML/MIN/1.73
GFR SERPL CREATININE-BSD FRML MDRD: 35 ML/MIN/1.73
GFR SERPL CREATININE-BSD FRML MDRD: 36 ML/MIN/1.73
GLOBULIN UR ELPH-MCNC: 3.1 GM/DL
GLOBULIN UR ELPH-MCNC: 3.5 GM/DL
GLOBULIN UR ELPH-MCNC: 3.5 GM/DL
GLOBULIN UR ELPH-MCNC: 3.7 GM/DL
GLOBULIN UR ELPH-MCNC: 4.8 GM/DL
GLUCOSE BLDC GLUCOMTR-MCNC: 100 MG/DL (ref 70–130)
GLUCOSE BLDC GLUCOMTR-MCNC: 101 MG/DL (ref 70–130)
GLUCOSE BLDC GLUCOMTR-MCNC: 103 MG/DL (ref 70–130)
GLUCOSE BLDC GLUCOMTR-MCNC: 103 MG/DL (ref 70–130)
GLUCOSE BLDC GLUCOMTR-MCNC: 106 MG/DL (ref 70–130)
GLUCOSE BLDC GLUCOMTR-MCNC: 112 MG/DL (ref 70–130)
GLUCOSE BLDC GLUCOMTR-MCNC: 113 MG/DL (ref 70–130)
GLUCOSE BLDC GLUCOMTR-MCNC: 114 MG/DL (ref 70–130)
GLUCOSE BLDC GLUCOMTR-MCNC: 114 MG/DL (ref 70–130)
GLUCOSE BLDC GLUCOMTR-MCNC: 115 MG/DL (ref 70–130)
GLUCOSE BLDC GLUCOMTR-MCNC: 115 MG/DL (ref 70–130)
GLUCOSE BLDC GLUCOMTR-MCNC: 118 MG/DL (ref 70–130)
GLUCOSE BLDC GLUCOMTR-MCNC: 119 MG/DL (ref 70–130)
GLUCOSE BLDC GLUCOMTR-MCNC: 120 MG/DL (ref 70–130)
GLUCOSE BLDC GLUCOMTR-MCNC: 120 MG/DL (ref 70–130)
GLUCOSE BLDC GLUCOMTR-MCNC: 122 MG/DL (ref 70–130)
GLUCOSE BLDC GLUCOMTR-MCNC: 125 MG/DL (ref 70–130)
GLUCOSE BLDC GLUCOMTR-MCNC: 125 MG/DL (ref 70–130)
GLUCOSE BLDC GLUCOMTR-MCNC: 127 MG/DL (ref 70–130)
GLUCOSE BLDC GLUCOMTR-MCNC: 129 MG/DL (ref 70–130)
GLUCOSE BLDC GLUCOMTR-MCNC: 130 MG/DL (ref 70–130)
GLUCOSE BLDC GLUCOMTR-MCNC: 130 MG/DL (ref 70–130)
GLUCOSE BLDC GLUCOMTR-MCNC: 132 MG/DL (ref 70–130)
GLUCOSE BLDC GLUCOMTR-MCNC: 134 MG/DL (ref 70–130)
GLUCOSE BLDC GLUCOMTR-MCNC: 134 MG/DL (ref 70–130)
GLUCOSE BLDC GLUCOMTR-MCNC: 135 MG/DL (ref 70–130)
GLUCOSE BLDC GLUCOMTR-MCNC: 136 MG/DL (ref 70–130)
GLUCOSE BLDC GLUCOMTR-MCNC: 136 MG/DL (ref 70–130)
GLUCOSE BLDC GLUCOMTR-MCNC: 139 MG/DL (ref 70–130)
GLUCOSE BLDC GLUCOMTR-MCNC: 142 MG/DL (ref 70–130)
GLUCOSE BLDC GLUCOMTR-MCNC: 143 MG/DL (ref 70–130)
GLUCOSE BLDC GLUCOMTR-MCNC: 143 MG/DL (ref 70–130)
GLUCOSE BLDC GLUCOMTR-MCNC: 145 MG/DL (ref 70–130)
GLUCOSE BLDC GLUCOMTR-MCNC: 150 MG/DL (ref 70–130)
GLUCOSE BLDC GLUCOMTR-MCNC: 150 MG/DL (ref 70–130)
GLUCOSE BLDC GLUCOMTR-MCNC: 151 MG/DL (ref 70–130)
GLUCOSE BLDC GLUCOMTR-MCNC: 152 MG/DL (ref 70–130)
GLUCOSE BLDC GLUCOMTR-MCNC: 157 MG/DL (ref 70–130)
GLUCOSE BLDC GLUCOMTR-MCNC: 160 MG/DL (ref 70–130)
GLUCOSE BLDC GLUCOMTR-MCNC: 164 MG/DL (ref 70–130)
GLUCOSE BLDC GLUCOMTR-MCNC: 165 MG/DL (ref 70–130)
GLUCOSE BLDC GLUCOMTR-MCNC: 168 MG/DL (ref 70–130)
GLUCOSE BLDC GLUCOMTR-MCNC: 174 MG/DL (ref 70–130)
GLUCOSE BLDC GLUCOMTR-MCNC: 185 MG/DL (ref 70–130)
GLUCOSE BLDC GLUCOMTR-MCNC: 187 MG/DL (ref 70–130)
GLUCOSE BLDC GLUCOMTR-MCNC: 190 MG/DL (ref 70–130)
GLUCOSE BLDC GLUCOMTR-MCNC: 192 MG/DL (ref 70–130)
GLUCOSE BLDC GLUCOMTR-MCNC: 193 MG/DL (ref 70–130)
GLUCOSE BLDC GLUCOMTR-MCNC: 203 MG/DL (ref 70–130)
GLUCOSE BLDC GLUCOMTR-MCNC: 203 MG/DL (ref 70–130)
GLUCOSE BLDC GLUCOMTR-MCNC: 238 MG/DL (ref 70–130)
GLUCOSE BLDC GLUCOMTR-MCNC: 60 MG/DL (ref 70–130)
GLUCOSE BLDC GLUCOMTR-MCNC: 69 MG/DL (ref 70–130)
GLUCOSE BLDC GLUCOMTR-MCNC: 76 MG/DL (ref 70–130)
GLUCOSE BLDC GLUCOMTR-MCNC: 79 MG/DL (ref 70–130)
GLUCOSE BLDC GLUCOMTR-MCNC: 82 MG/DL (ref 70–130)
GLUCOSE BLDC GLUCOMTR-MCNC: 84 MG/DL (ref 70–130)
GLUCOSE BLDC GLUCOMTR-MCNC: 86 MG/DL (ref 70–130)
GLUCOSE BLDC GLUCOMTR-MCNC: 93 MG/DL (ref 70–130)
GLUCOSE BLDC GLUCOMTR-MCNC: 93 MG/DL (ref 70–130)
GLUCOSE BLDC GLUCOMTR-MCNC: 95 MG/DL (ref 70–130)
GLUCOSE BLDC GLUCOMTR-MCNC: 95 MG/DL (ref 70–130)
GLUCOSE BLDC GLUCOMTR-MCNC: 96 MG/DL (ref 70–130)
GLUCOSE BLDC GLUCOMTR-MCNC: 97 MG/DL (ref 70–130)
GLUCOSE BLDC GLUCOMTR-MCNC: 97 MG/DL (ref 70–130)
GLUCOSE BLDC GLUCOMTR-MCNC: 99 MG/DL (ref 70–130)
GLUCOSE SERPL-MCNC: 104 MG/DL (ref 65–99)
GLUCOSE SERPL-MCNC: 111 MG/DL (ref 65–99)
GLUCOSE SERPL-MCNC: 120 MG/DL (ref 65–99)
GLUCOSE SERPL-MCNC: 125 MG/DL (ref 65–99)
GLUCOSE SERPL-MCNC: 137 MG/DL (ref 65–99)
GLUCOSE SERPL-MCNC: 141 MG/DL (ref 65–99)
GLUCOSE SERPL-MCNC: 144 MG/DL (ref 65–99)
GLUCOSE SERPL-MCNC: 170 MG/DL (ref 65–99)
GLUCOSE SERPL-MCNC: 226 MG/DL (ref 65–99)
GLUCOSE SERPL-MCNC: 246 MG/DL (ref 65–99)
GLUCOSE SERPL-MCNC: 62 MG/DL (ref 65–99)
GLUCOSE SERPL-MCNC: 68 MG/DL (ref 65–99)
GLUCOSE SERPL-MCNC: 75 MG/DL (ref 65–99)
GLUCOSE SERPL-MCNC: 84 MG/DL (ref 65–99)
GLUCOSE SERPL-MCNC: 85 MG/DL (ref 65–99)
GLUCOSE SERPL-MCNC: 85 MG/DL (ref 65–99)
GLUCOSE SERPL-MCNC: 88 MG/DL (ref 65–99)
GLUCOSE SERPL-MCNC: 89 MG/DL (ref 65–99)
GLUCOSE SERPL-MCNC: 92 MG/DL (ref 65–99)
GLUCOSE SERPL-MCNC: 94 MG/DL (ref 65–99)
GLUCOSE SERPL-MCNC: 94 MG/DL (ref 65–99)
GLUCOSE SERPL-MCNC: 97 MG/DL (ref 65–99)
GLUCOSE UR STRIP-MCNC: NEGATIVE MG/DL
GRAM STN SPEC: ABNORMAL
HBA1C MFR BLD: 5.3 % (ref 4.8–5.6)
HCT VFR BLD AUTO: 29.6 % (ref 37.5–51)
HCT VFR BLD AUTO: 29.7 % (ref 37.5–51)
HCT VFR BLD AUTO: 30.9 % (ref 37.5–51)
HCT VFR BLD AUTO: 31.5 % (ref 37.5–51)
HCT VFR BLD AUTO: 32.1 % (ref 37.5–51)
HCT VFR BLD AUTO: 32.6 % (ref 37.5–51)
HCT VFR BLD AUTO: 32.7 % (ref 37.5–51)
HCT VFR BLD AUTO: 33 % (ref 37.5–51)
HCT VFR BLD AUTO: 36.7 % (ref 37.5–51)
HCT VFR BLD AUTO: 37.3 % (ref 37.5–51)
HCT VFR BLD AUTO: 38.6 % (ref 37.5–51)
HGB BLD-MCNC: 10 G/DL (ref 13–17.7)
HGB BLD-MCNC: 10.3 G/DL (ref 13–17.7)
HGB BLD-MCNC: 10.5 G/DL (ref 13–17.7)
HGB BLD-MCNC: 11.5 G/DL (ref 13–17.7)
HGB BLD-MCNC: 12.1 G/DL (ref 13–17.7)
HGB BLD-MCNC: 12.6 G/DL (ref 13–17.7)
HGB BLD-MCNC: 9.1 G/DL (ref 13–17.7)
HGB BLD-MCNC: 9.3 G/DL (ref 13–17.7)
HGB BLD-MCNC: 9.4 G/DL (ref 13–17.7)
HGB BLD-MCNC: 9.9 G/DL (ref 13–17.7)
HGB BLD-MCNC: 9.9 G/DL (ref 13–17.7)
HGB UR QL STRIP.AUTO: ABNORMAL
HYALINE CASTS UR QL AUTO: ABNORMAL /LPF
IMM GRANULOCYTES # BLD AUTO: 0.02 10*3/MM3 (ref 0–0.05)
IMM GRANULOCYTES # BLD AUTO: 0.03 10*3/MM3 (ref 0–0.05)
IMM GRANULOCYTES # BLD AUTO: 0.06 10*3/MM3 (ref 0–0.05)
IMM GRANULOCYTES # BLD AUTO: 0.07 10*3/MM3 (ref 0–0.05)
IMM GRANULOCYTES # BLD AUTO: 0.2 10*3/MM3 (ref 0–0.05)
IMM GRANULOCYTES # BLD AUTO: 0.41 10*3/MM3 (ref 0–0.05)
IMM GRANULOCYTES NFR BLD AUTO: 0.3 % (ref 0–0.5)
IMM GRANULOCYTES NFR BLD AUTO: 0.4 % (ref 0–0.5)
IMM GRANULOCYTES NFR BLD AUTO: 0.5 % (ref 0–0.5)
IMM GRANULOCYTES NFR BLD AUTO: 0.5 % (ref 0–0.5)
IMM GRANULOCYTES NFR BLD AUTO: 0.6 % (ref 0–0.5)
IMM GRANULOCYTES NFR BLD AUTO: 1.5 % (ref 0–0.5)
IRON 24H UR-MRATE: 27 MCG/DL (ref 59–158)
IRON SATN MFR SERPL: 20 % (ref 20–50)
KETONES UR QL STRIP: ABNORMAL
KETONES UR QL STRIP: NEGATIVE
KETONES UR QL STRIP: NEGATIVE
LEUKOCYTE ESTERASE UR QL STRIP.AUTO: ABNORMAL
LEUKOCYTE ESTERASE UR QL STRIP.AUTO: ABNORMAL
LEUKOCYTE ESTERASE UR QL STRIP.AUTO: NEGATIVE
LYMPHOCYTES # BLD AUTO: 0.81 10*3/MM3 (ref 0.7–3.1)
LYMPHOCYTES # BLD AUTO: 0.83 10*3/MM3 (ref 0.7–3.1)
LYMPHOCYTES # BLD AUTO: 1.01 10*3/MM3 (ref 0.7–3.1)
LYMPHOCYTES # BLD AUTO: 1.04 10*3/MM3 (ref 0.7–3.1)
LYMPHOCYTES # BLD AUTO: 1.05 10*3/MM3 (ref 0.7–3.1)
LYMPHOCYTES # BLD AUTO: 1.12 10*3/MM3 (ref 0.7–3.1)
LYMPHOCYTES NFR BLD AUTO: 11.8 % (ref 19.6–45.3)
LYMPHOCYTES NFR BLD AUTO: 13.1 % (ref 19.6–45.3)
LYMPHOCYTES NFR BLD AUTO: 3 % (ref 19.6–45.3)
LYMPHOCYTES NFR BLD AUTO: 3.3 % (ref 19.6–45.3)
LYMPHOCYTES NFR BLD AUTO: 7.9 % (ref 19.6–45.3)
LYMPHOCYTES NFR BLD AUTO: 8.5 % (ref 19.6–45.3)
MAGNESIUM SERPL-MCNC: 1.5 MG/DL (ref 1.6–2.4)
MAGNESIUM SERPL-MCNC: 1.9 MG/DL (ref 1.6–2.4)
MAGNESIUM SERPL-MCNC: 2 MG/DL (ref 1.6–2.4)
MAGNESIUM SERPL-MCNC: 2.3 MG/DL (ref 1.6–2.4)
MAGNESIUM SERPL-MCNC: 2.4 MG/DL (ref 1.6–2.4)
MAGNESIUM SERPL-MCNC: 2.7 MG/DL (ref 1.6–2.4)
MCH RBC QN AUTO: 27.6 PG (ref 26.6–33)
MCH RBC QN AUTO: 27.7 PG (ref 26.6–33)
MCH RBC QN AUTO: 27.9 PG (ref 26.6–33)
MCH RBC QN AUTO: 28.1 PG (ref 26.6–33)
MCH RBC QN AUTO: 28.3 PG (ref 26.6–33)
MCH RBC QN AUTO: 28.5 PG (ref 26.6–33)
MCH RBC QN AUTO: 28.6 PG (ref 26.6–33)
MCH RBC QN AUTO: 28.6 PG (ref 26.6–33)
MCH RBC QN AUTO: 29.6 PG (ref 26.6–33)
MCH RBC QN AUTO: 29.9 PG (ref 26.6–33)
MCH RBC QN AUTO: 29.9 PG (ref 26.6–33)
MCHC RBC AUTO-ENTMCNC: 30.3 G/DL (ref 31.5–35.7)
MCHC RBC AUTO-ENTMCNC: 30.4 G/DL (ref 31.5–35.7)
MCHC RBC AUTO-ENTMCNC: 30.7 G/DL (ref 31.5–35.7)
MCHC RBC AUTO-ENTMCNC: 31.2 G/DL (ref 31.5–35.7)
MCHC RBC AUTO-ENTMCNC: 31.2 G/DL (ref 31.5–35.7)
MCHC RBC AUTO-ENTMCNC: 31.3 G/DL (ref 31.5–35.7)
MCHC RBC AUTO-ENTMCNC: 31.3 G/DL (ref 31.5–35.7)
MCHC RBC AUTO-ENTMCNC: 31.4 G/DL (ref 31.5–35.7)
MCHC RBC AUTO-ENTMCNC: 32.2 G/DL (ref 31.5–35.7)
MCHC RBC AUTO-ENTMCNC: 32.4 G/DL (ref 31.5–35.7)
MCHC RBC AUTO-ENTMCNC: 32.6 G/DL (ref 31.5–35.7)
MCV RBC AUTO: 88.7 FL (ref 79–97)
MCV RBC AUTO: 89.9 FL (ref 79–97)
MCV RBC AUTO: 90.8 FL (ref 79–97)
MCV RBC AUTO: 91.2 FL (ref 79–97)
MCV RBC AUTO: 91.3 FL (ref 79–97)
MCV RBC AUTO: 91.4 FL (ref 79–97)
MCV RBC AUTO: 92.1 FL (ref 79–97)
MCV RBC AUTO: 92.9 FL (ref 79–97)
MCV RBC AUTO: 93.4 FL (ref 79–97)
MONOCYTES # BLD AUTO: 0.77 10*3/MM3 (ref 0.1–0.9)
MONOCYTES # BLD AUTO: 0.84 10*3/MM3 (ref 0.1–0.9)
MONOCYTES # BLD AUTO: 1.06 10*3/MM3 (ref 0.1–0.9)
MONOCYTES # BLD AUTO: 1.31 10*3/MM3 (ref 0.1–0.9)
MONOCYTES # BLD AUTO: 1.72 10*3/MM3 (ref 0.1–0.9)
MONOCYTES # BLD AUTO: 2.98 10*3/MM3 (ref 0.1–0.9)
MONOCYTES NFR BLD AUTO: 10 % (ref 5–12)
MONOCYTES NFR BLD AUTO: 11.9 % (ref 5–12)
MONOCYTES NFR BLD AUTO: 6.4 % (ref 5–12)
MONOCYTES NFR BLD AUTO: 8 % (ref 5–12)
MONOCYTES NFR BLD AUTO: 9.4 % (ref 5–12)
MONOCYTES NFR BLD AUTO: 9.9 % (ref 5–12)
NEUTROPHILS NFR BLD AUTO: 10.11 10*3/MM3 (ref 1.7–7)
NEUTROPHILS NFR BLD AUTO: 10.63 10*3/MM3 (ref 1.7–7)
NEUTROPHILS NFR BLD AUTO: 23.89 10*3/MM3 (ref 1.7–7)
NEUTROPHILS NFR BLD AUTO: 27.27 10*3/MM3 (ref 1.7–7)
NEUTROPHILS NFR BLD AUTO: 4.62 10*3/MM3 (ref 1.7–7)
NEUTROPHILS NFR BLD AUTO: 5.38 10*3/MM3 (ref 1.7–7)
NEUTROPHILS NFR BLD AUTO: 65.5 % (ref 42.7–76)
NEUTROPHILS NFR BLD AUTO: 69.9 % (ref 42.7–76)
NEUTROPHILS NFR BLD AUTO: 76.7 % (ref 42.7–76)
NEUTROPHILS NFR BLD AUTO: 80 % (ref 42.7–76)
NEUTROPHILS NFR BLD AUTO: 86.2 % (ref 42.7–76)
NEUTROPHILS NFR BLD AUTO: 88.5 % (ref 42.7–76)
NITRITE UR QL STRIP: NEGATIVE
NRBC BLD AUTO-RTO: 0 /100 WBC (ref 0–0.2)
NT-PROBNP SERPL-MCNC: 7243 PG/ML (ref 0–1800)
NT-PROBNP SERPL-MCNC: ABNORMAL PG/ML (ref 0–1800)
PH UR STRIP.AUTO: 5.5 [PH] (ref 4.5–8)
PH UR STRIP.AUTO: 6 [PH] (ref 4.5–8)
PH UR STRIP.AUTO: <=5 [PH] (ref 4.5–8)
PHOSPHATE SERPL-MCNC: 3 MG/DL (ref 2.5–4.5)
PHOSPHATE SERPL-MCNC: 3.2 MG/DL (ref 2.5–4.5)
PHOSPHATE SERPL-MCNC: 3.3 MG/DL (ref 2.5–4.5)
PHOSPHATE SERPL-MCNC: 3.4 MG/DL (ref 2.5–4.5)
PHOSPHATE SERPL-MCNC: 3.5 MG/DL (ref 2.5–4.5)
PHOSPHATE SERPL-MCNC: 3.7 MG/DL (ref 2.5–4.5)
PHOSPHATE SERPL-MCNC: 3.7 MG/DL (ref 2.5–4.5)
PHOSPHATE SERPL-MCNC: 3.8 MG/DL (ref 2.5–4.5)
PHOSPHATE SERPL-MCNC: 3.9 MG/DL (ref 2.5–4.5)
PHOSPHATE SERPL-MCNC: 3.9 MG/DL (ref 2.5–4.5)
PHOSPHATE SERPL-MCNC: 4 MG/DL (ref 2.5–4.5)
PHOSPHATE SERPL-MCNC: 4.1 MG/DL (ref 2.5–4.5)
PHOSPHATE SERPL-MCNC: 4.4 MG/DL (ref 2.5–4.5)
PLAT MORPH BLD: NORMAL
PLATELET # BLD AUTO: 145 10*3/MM3 (ref 140–450)
PLATELET # BLD AUTO: 147 10*3/MM3 (ref 140–450)
PLATELET # BLD AUTO: 154 10*3/MM3 (ref 140–450)
PLATELET # BLD AUTO: 167 10*3/MM3 (ref 140–450)
PLATELET # BLD AUTO: 191 10*3/MM3 (ref 140–450)
PLATELET # BLD AUTO: 206 10*3/MM3 (ref 140–450)
PLATELET # BLD AUTO: 217 10*3/MM3 (ref 140–450)
PLATELET # BLD AUTO: 220 10*3/MM3 (ref 140–450)
PLATELET # BLD AUTO: 224 10*3/MM3 (ref 140–450)
PLATELET # BLD AUTO: 243 10*3/MM3 (ref 140–450)
PLATELET # BLD AUTO: 329 10*3/MM3 (ref 140–450)
PMV BLD AUTO: 10.1 FL (ref 6–12)
PMV BLD AUTO: 10.3 FL (ref 6–12)
PMV BLD AUTO: 10.4 FL (ref 6–12)
PMV BLD AUTO: 10.5 FL (ref 6–12)
PMV BLD AUTO: 10.6 FL (ref 6–12)
PMV BLD AUTO: 10.6 FL (ref 6–12)
PMV BLD AUTO: 10.7 FL (ref 6–12)
PMV BLD AUTO: 10.7 FL (ref 6–12)
PMV BLD AUTO: 10.8 FL (ref 6–12)
PMV BLD AUTO: 10.9 FL (ref 6–12)
PMV BLD AUTO: 9.8 FL (ref 6–12)
POTASSIUM SERPL-SCNC: 3.9 MMOL/L (ref 3.5–5.2)
POTASSIUM SERPL-SCNC: 3.9 MMOL/L (ref 3.5–5.2)
POTASSIUM SERPL-SCNC: 4.2 MMOL/L (ref 3.5–5.2)
POTASSIUM SERPL-SCNC: 4.3 MMOL/L (ref 3.5–5.2)
POTASSIUM SERPL-SCNC: 4.4 MMOL/L (ref 3.5–5.2)
POTASSIUM SERPL-SCNC: 4.4 MMOL/L (ref 3.5–5.2)
POTASSIUM SERPL-SCNC: 4.5 MMOL/L (ref 3.5–5.2)
POTASSIUM SERPL-SCNC: 4.5 MMOL/L (ref 3.5–5.2)
POTASSIUM SERPL-SCNC: 4.7 MMOL/L (ref 3.5–5.2)
POTASSIUM SERPL-SCNC: 4.8 MMOL/L (ref 3.5–5.2)
POTASSIUM SERPL-SCNC: 4.9 MMOL/L (ref 3.5–5.2)
POTASSIUM SERPL-SCNC: 5 MMOL/L (ref 3.5–5.2)
POTASSIUM SERPL-SCNC: 5 MMOL/L (ref 3.5–5.2)
POTASSIUM SERPL-SCNC: 5.2 MMOL/L (ref 3.5–5.2)
POTASSIUM SERPL-SCNC: 5.2 MMOL/L (ref 3.5–5.2)
POTASSIUM SERPL-SCNC: 5.3 MMOL/L (ref 3.5–5.2)
POTASSIUM SERPL-SCNC: 5.5 MMOL/L (ref 3.5–5.2)
POTASSIUM SERPL-SCNC: 6.4 MMOL/L (ref 3.5–5.2)
PROCALCITONIN SERPL-MCNC: 0.14 NG/ML (ref 0–0.25)
PROCALCITONIN SERPL-MCNC: 0.16 NG/ML (ref 0–0.25)
PROCALCITONIN SERPL-MCNC: 1.95 NG/ML (ref 0–0.25)
PROT SERPL-MCNC: 5.5 G/DL (ref 6–8.5)
PROT SERPL-MCNC: 6 G/DL (ref 6–8.5)
PROT SERPL-MCNC: 6.7 G/DL (ref 6–8.5)
PROT SERPL-MCNC: 7 G/DL (ref 6–8.5)
PROT SERPL-MCNC: 7.8 G/DL (ref 6–8.5)
PROT UR QL STRIP: ABNORMAL
QT INTERVAL: 334 MS
QT INTERVAL: 492 MS
QT INTERVAL: 511 MS
QT INTERVAL: 514 MS
QT INTERVAL: 527 MS
QT INTERVAL: 622 MS
QT INTERVAL: 681 MS
RBC # BLD AUTO: 3.25 10*6/MM3 (ref 4.14–5.8)
RBC # BLD AUTO: 3.26 10*6/MM3 (ref 4.14–5.8)
RBC # BLD AUTO: 3.39 10*6/MM3 (ref 4.14–5.8)
RBC # BLD AUTO: 3.47 10*6/MM3 (ref 4.14–5.8)
RBC # BLD AUTO: 3.5 10*6/MM3 (ref 4.14–5.8)
RBC # BLD AUTO: 3.51 10*6/MM3 (ref 4.14–5.8)
RBC # BLD AUTO: 3.62 10*6/MM3 (ref 4.14–5.8)
RBC # BLD AUTO: 3.67 10*6/MM3 (ref 4.14–5.8)
RBC # BLD AUTO: 4.02 10*6/MM3 (ref 4.14–5.8)
RBC # BLD AUTO: 4.05 10*6/MM3 (ref 4.14–5.8)
RBC # BLD AUTO: 4.25 10*6/MM3 (ref 4.14–5.8)
RBC # UR STRIP: ABNORMAL /HPF
RBC MORPH BLD: NORMAL
REF LAB TEST METHOD: ABNORMAL
SARS-COV-2 RNA PNL SPEC NAA+PROBE: NOT DETECTED
SARS-COV-2 RNA RESP QL NAA+PROBE: NOT DETECTED
SODIUM SERPL-SCNC: 130 MMOL/L (ref 136–145)
SODIUM SERPL-SCNC: 132 MMOL/L (ref 136–145)
SODIUM SERPL-SCNC: 133 MMOL/L (ref 136–145)
SODIUM SERPL-SCNC: 134 MMOL/L (ref 136–145)
SODIUM SERPL-SCNC: 135 MMOL/L (ref 136–145)
SODIUM SERPL-SCNC: 136 MMOL/L (ref 136–145)
SODIUM SERPL-SCNC: 136 MMOL/L (ref 136–145)
SODIUM SERPL-SCNC: 137 MMOL/L (ref 136–145)
SODIUM SERPL-SCNC: 139 MMOL/L (ref 136–145)
SODIUM SERPL-SCNC: 141 MMOL/L (ref 136–145)
SODIUM SERPL-SCNC: 142 MMOL/L (ref 136–145)
SODIUM SERPL-SCNC: 143 MMOL/L (ref 136–145)
SP GR UR STRIP: 1.01 (ref 1–1.03)
SP GR UR STRIP: 1.01 (ref 1–1.03)
SP GR UR STRIP: 1.02 (ref 1–1.03)
SQUAMOUS #/AREA URNS HPF: ABNORMAL /HPF
T3 SERPL-MCNC: 39 NG/DL (ref 71–180)
T3FREE SERPL-MCNC: 0.9 PG/ML (ref 2–4.4)
T4 FREE SERPL-MCNC: 1.23 NG/DL (ref 0.93–1.7)
TIBC SERPL-MCNC: 135 MCG/DL (ref 298–536)
TROPONIN T SERPL-MCNC: 0.12 NG/ML (ref 0–0.03)
TROPONIN T SERPL-MCNC: 0.13 NG/ML (ref 0–0.03)
TROPONIN T SERPL-MCNC: 0.25 NG/ML (ref 0–0.03)
TSH SERPL DL<=0.05 MIU/L-ACNC: 1.3 UIU/ML (ref 0.27–4.2)
TSH SERPL DL<=0.05 MIU/L-ACNC: 7.1 UIU/ML (ref 0.27–4.2)
UIBC SERPL-MCNC: 108 MCG/DL (ref 112–346)
UROBILINOGEN UR QL STRIP: ABNORMAL
VANCOMYCIN SERPL-MCNC: 13.9 MCG/ML (ref 5–40)
VANCOMYCIN SERPL-MCNC: 15.4 MCG/ML (ref 5–40)
VANCOMYCIN SERPL-MCNC: 15.9 MCG/ML (ref 5–40)
VANCOMYCIN SERPL-MCNC: 18.9 MCG/ML (ref 5–40)
VANCOMYCIN SERPL-MCNC: 19.3 MCG/ML (ref 5–40)
VANCOMYCIN SERPL-MCNC: 19.4 MCG/ML (ref 5–40)
VIT B12 BLD-MCNC: 1045 PG/ML (ref 211–946)
WBC # UR STRIP: ABNORMAL /HPF
WBC MORPH BLD: NORMAL
WBC NRBC COR # BLD: 13.18 10*3/MM3 (ref 3.4–10.8)
WBC NRBC COR # BLD: 13.28 10*3/MM3 (ref 3.4–10.8)
WBC NRBC COR # BLD: 27.01 10*3/MM3 (ref 3.4–10.8)
WBC NRBC COR # BLD: 31.63 10*3/MM3 (ref 3.4–10.8)
WBC NRBC COR # BLD: 6.36 10*3/MM3 (ref 3.4–10.8)
WBC NRBC COR # BLD: 6.69 10*3/MM3 (ref 3.4–10.8)
WBC NRBC COR # BLD: 7.05 10*3/MM3 (ref 3.4–10.8)
WBC NRBC COR # BLD: 7.07 10*3/MM3 (ref 3.4–10.8)
WBC NRBC COR # BLD: 7.41 10*3/MM3 (ref 3.4–10.8)
WBC NRBC COR # BLD: 7.7 10*3/MM3 (ref 3.4–10.8)
WBC NRBC COR # BLD: 8.53 10*3/MM3 (ref 3.4–10.8)

## 2022-01-01 PROCEDURE — 94799 UNLISTED PULMONARY SVC/PX: CPT

## 2022-01-01 PROCEDURE — 84484 ASSAY OF TROPONIN QUANT: CPT | Performed by: EMERGENCY MEDICINE

## 2022-01-01 PROCEDURE — 82962 GLUCOSE BLOOD TEST: CPT

## 2022-01-01 PROCEDURE — 93010 ELECTROCARDIOGRAM REPORT: CPT | Performed by: INTERNAL MEDICINE

## 2022-01-01 PROCEDURE — 99238 HOSP IP/OBS DSCHRG MGMT 30/<: CPT | Performed by: HOSPITALIST

## 2022-01-01 PROCEDURE — 83036 HEMOGLOBIN GLYCOSYLATED A1C: CPT | Performed by: NURSE PRACTITIONER

## 2022-01-01 PROCEDURE — 99221 1ST HOSP IP/OBS SF/LOW 40: CPT | Performed by: HOSPITALIST

## 2022-01-01 PROCEDURE — 63710000001 INSULIN ASPART PER 5 UNITS: Performed by: SURGERY

## 2022-01-01 PROCEDURE — 63710000001 INSULIN DETEMIR PER 5 UNITS: Performed by: FAMILY MEDICINE

## 2022-01-01 PROCEDURE — 25010000002 VANCOMYCIN PER 500 MG: Performed by: SURGERY

## 2022-01-01 PROCEDURE — 84145 PROCALCITONIN (PCT): CPT | Performed by: EMERGENCY MEDICINE

## 2022-01-01 PROCEDURE — 83735 ASSAY OF MAGNESIUM: CPT | Performed by: INTERNAL MEDICINE

## 2022-01-01 PROCEDURE — 85025 COMPLETE CBC W/AUTO DIFF WBC: CPT | Performed by: FAMILY MEDICINE

## 2022-01-01 PROCEDURE — 87186 SC STD MICRODIL/AGAR DIL: CPT | Performed by: EMERGENCY MEDICINE

## 2022-01-01 PROCEDURE — 80053 COMPREHEN METABOLIC PANEL: CPT | Performed by: FAMILY MEDICINE

## 2022-01-01 PROCEDURE — 85007 BL SMEAR W/DIFF WBC COUNT: CPT | Performed by: EMERGENCY MEDICINE

## 2022-01-01 PROCEDURE — 80069 RENAL FUNCTION PANEL: CPT | Performed by: NURSE PRACTITIONER

## 2022-01-01 PROCEDURE — 84443 ASSAY THYROID STIM HORMONE: CPT | Performed by: NURSE PRACTITIONER

## 2022-01-01 PROCEDURE — 99238 HOSP IP/OBS DSCHRG MGMT 30/<: CPT | Performed by: NURSE PRACTITIONER

## 2022-01-01 PROCEDURE — 80069 RENAL FUNCTION PANEL: CPT | Performed by: INTERNAL MEDICINE

## 2022-01-01 PROCEDURE — 82550 ASSAY OF CK (CPK): CPT | Performed by: EMERGENCY MEDICINE

## 2022-01-01 PROCEDURE — 87147 CULTURE TYPE IMMUNOLOGIC: CPT | Performed by: SURGERY

## 2022-01-01 PROCEDURE — 83735 ASSAY OF MAGNESIUM: CPT | Performed by: FAMILY MEDICINE

## 2022-01-01 PROCEDURE — 99223 1ST HOSP IP/OBS HIGH 75: CPT | Performed by: FAMILY MEDICINE

## 2022-01-01 PROCEDURE — 82746 ASSAY OF FOLIC ACID SERUM: CPT | Performed by: NURSE PRACTITIONER

## 2022-01-01 PROCEDURE — 83605 ASSAY OF LACTIC ACID: CPT | Performed by: EMERGENCY MEDICINE

## 2022-01-01 PROCEDURE — 71045 X-RAY EXAM CHEST 1 VIEW: CPT

## 2022-01-01 PROCEDURE — 97166 OT EVAL MOD COMPLEX 45 MIN: CPT

## 2022-01-01 PROCEDURE — 99231 SBSQ HOSP IP/OBS SF/LOW 25: CPT | Performed by: SURGERY

## 2022-01-01 PROCEDURE — 63710000001 INSULIN DETEMIR PER 5 UNITS: Performed by: NURSE PRACTITIONER

## 2022-01-01 PROCEDURE — 87040 BLOOD CULTURE FOR BACTERIA: CPT | Performed by: EMERGENCY MEDICINE

## 2022-01-01 PROCEDURE — 99284 EMERGENCY DEPT VISIT MOD MDM: CPT | Performed by: EMERGENCY MEDICINE

## 2022-01-01 PROCEDURE — 97530 THERAPEUTIC ACTIVITIES: CPT

## 2022-01-01 PROCEDURE — 25010000002 CEFEPIME-DEXTROSE 2-5 GM-%(50ML) RECONSTITUTED SOLUTION: Performed by: INTERNAL MEDICINE

## 2022-01-01 PROCEDURE — 80202 ASSAY OF VANCOMYCIN: CPT | Performed by: SURGERY

## 2022-01-01 PROCEDURE — 83880 ASSAY OF NATRIURETIC PEPTIDE: CPT | Performed by: EMERGENCY MEDICINE

## 2022-01-01 PROCEDURE — 85025 COMPLETE CBC W/AUTO DIFF WBC: CPT | Performed by: NURSE PRACTITIONER

## 2022-01-01 PROCEDURE — 97110 THERAPEUTIC EXERCISES: CPT

## 2022-01-01 PROCEDURE — 82607 VITAMIN B-12: CPT | Performed by: NURSE PRACTITIONER

## 2022-01-01 PROCEDURE — 0 MAGNESIUM SULFATE 4 GM/100ML SOLUTION: Performed by: FAMILY MEDICINE

## 2022-01-01 PROCEDURE — 87086 URINE CULTURE/COLONY COUNT: CPT | Performed by: FAMILY MEDICINE

## 2022-01-01 PROCEDURE — 99232 SBSQ HOSP IP/OBS MODERATE 35: CPT | Performed by: NURSE PRACTITIONER

## 2022-01-01 PROCEDURE — 0 ATROPINE SULFATE

## 2022-01-01 PROCEDURE — 99285 EMERGENCY DEPT VISIT HI MDM: CPT

## 2022-01-01 PROCEDURE — 84439 ASSAY OF FREE THYROXINE: CPT | Performed by: INTERNAL MEDICINE

## 2022-01-01 PROCEDURE — 93005 ELECTROCARDIOGRAM TRACING: CPT | Performed by: EMERGENCY MEDICINE

## 2022-01-01 PROCEDURE — 25010000002 IRON SUCROSE PER 1 MG: Performed by: FAMILY MEDICINE

## 2022-01-01 PROCEDURE — 93005 ELECTROCARDIOGRAM TRACING: CPT | Performed by: NURSE PRACTITIONER

## 2022-01-01 PROCEDURE — 82550 ASSAY OF CK (CPK): CPT | Performed by: FAMILY MEDICINE

## 2022-01-01 PROCEDURE — 81001 URINALYSIS AUTO W/SCOPE: CPT | Performed by: FAMILY MEDICINE

## 2022-01-01 PROCEDURE — 25010000002 HYDROMORPHONE PER 4 MG: Performed by: INTERNAL MEDICINE

## 2022-01-01 PROCEDURE — 87077 CULTURE AEROBIC IDENTIFY: CPT | Performed by: SURGERY

## 2022-01-01 PROCEDURE — 97162 PT EVAL MOD COMPLEX 30 MIN: CPT | Performed by: PHYSICAL THERAPIST

## 2022-01-01 PROCEDURE — 84484 ASSAY OF TROPONIN QUANT: CPT | Performed by: NURSE PRACTITIONER

## 2022-01-01 PROCEDURE — 85027 COMPLETE CBC AUTOMATED: CPT | Performed by: FAMILY MEDICINE

## 2022-01-01 PROCEDURE — 25010000002 VANCOMYCIN 750 MG RECONSTITUTED SOLUTION 1 EACH VIAL: Performed by: NURSE PRACTITIONER

## 2022-01-01 PROCEDURE — 83550 IRON BINDING TEST: CPT | Performed by: NURSE PRACTITIONER

## 2022-01-01 PROCEDURE — 25010000002 HYDROMORPHONE PER 4 MG: Performed by: HOSPITALIST

## 2022-01-01 PROCEDURE — 85025 COMPLETE CBC W/AUTO DIFF WBC: CPT | Performed by: EMERGENCY MEDICINE

## 2022-01-01 PROCEDURE — 99291 CRITICAL CARE FIRST HOUR: CPT | Performed by: INTERNAL MEDICINE

## 2022-01-01 PROCEDURE — 80069 RENAL FUNCTION PANEL: CPT | Performed by: FAMILY MEDICINE

## 2022-01-01 PROCEDURE — 71101 X-RAY EXAM UNILAT RIBS/CHEST: CPT

## 2022-01-01 PROCEDURE — 99222 1ST HOSP IP/OBS MODERATE 55: CPT | Performed by: FAMILY MEDICINE

## 2022-01-01 PROCEDURE — 80202 ASSAY OF VANCOMYCIN: CPT | Performed by: NURSE PRACTITIONER

## 2022-01-01 PROCEDURE — 99232 SBSQ HOSP IP/OBS MODERATE 35: CPT | Performed by: INTERNAL MEDICINE

## 2022-01-01 PROCEDURE — 97110 THERAPEUTIC EXERCISES: CPT | Performed by: PHYSICAL THERAPIST

## 2022-01-01 PROCEDURE — 80053 COMPREHEN METABOLIC PANEL: CPT | Performed by: EMERGENCY MEDICINE

## 2022-01-01 PROCEDURE — 82550 ASSAY OF CK (CPK): CPT | Performed by: NURSE PRACTITIONER

## 2022-01-01 PROCEDURE — 99283 EMERGENCY DEPT VISIT LOW MDM: CPT | Performed by: EMERGENCY MEDICINE

## 2022-01-01 PROCEDURE — 25010000002 VANCOMYCIN PER 500 MG: Performed by: FAMILY MEDICINE

## 2022-01-01 PROCEDURE — 73564 X-RAY EXAM KNEE 4 OR MORE: CPT

## 2022-01-01 PROCEDURE — 85027 COMPLETE CBC AUTOMATED: CPT | Performed by: INTERNAL MEDICINE

## 2022-01-01 PROCEDURE — 84481 FREE ASSAY (FT-3): CPT | Performed by: INTERNAL MEDICINE

## 2022-01-01 PROCEDURE — 85652 RBC SED RATE AUTOMATED: CPT | Performed by: NURSE PRACTITIONER

## 2022-01-01 PROCEDURE — 80048 BASIC METABOLIC PNL TOTAL CA: CPT | Performed by: FAMILY MEDICINE

## 2022-01-01 PROCEDURE — 99285 EMERGENCY DEPT VISIT HI MDM: CPT | Performed by: EMERGENCY MEDICINE

## 2022-01-01 PROCEDURE — 87150 DNA/RNA AMPLIFIED PROBE: CPT | Performed by: EMERGENCY MEDICINE

## 2022-01-01 PROCEDURE — 87205 SMEAR GRAM STAIN: CPT | Performed by: NURSE PRACTITIONER

## 2022-01-01 PROCEDURE — 94760 N-INVAS EAR/PLS OXIMETRY 1: CPT

## 2022-01-01 PROCEDURE — 80048 BASIC METABOLIC PNL TOTAL CA: CPT | Performed by: INTERNAL MEDICINE

## 2022-01-01 PROCEDURE — 93005 ELECTROCARDIOGRAM TRACING: CPT | Performed by: INTERNAL MEDICINE

## 2022-01-01 PROCEDURE — 83540 ASSAY OF IRON: CPT | Performed by: NURSE PRACTITIONER

## 2022-01-01 PROCEDURE — 63710000001 INSULIN REGULAR HUMAN PER 5 UNITS

## 2022-01-01 PROCEDURE — 87077 CULTURE AEROBIC IDENTIFY: CPT | Performed by: NURSE PRACTITIONER

## 2022-01-01 PROCEDURE — 70450 CT HEAD/BRAIN W/O DYE: CPT

## 2022-01-01 PROCEDURE — 25010000002 VANCOMYCIN 1 G RECONSTITUTED SOLUTION 1 EACH VIAL: Performed by: EMERGENCY MEDICINE

## 2022-01-01 PROCEDURE — 97535 SELF CARE MNGMENT TRAINING: CPT

## 2022-01-01 PROCEDURE — 25010000002 MAGNESIUM SULFATE 2 GM/50ML SOLUTION: Performed by: FAMILY MEDICINE

## 2022-01-01 PROCEDURE — 99222 1ST HOSP IP/OBS MODERATE 55: CPT | Performed by: INTERNAL MEDICINE

## 2022-01-01 PROCEDURE — 87205 SMEAR GRAM STAIN: CPT | Performed by: SURGERY

## 2022-01-01 PROCEDURE — 87635 SARS-COV-2 COVID-19 AMP PRB: CPT | Performed by: EMERGENCY MEDICINE

## 2022-01-01 PROCEDURE — 87070 CULTURE OTHR SPECIMN AEROBIC: CPT | Performed by: NURSE PRACTITIONER

## 2022-01-01 PROCEDURE — 81001 URINALYSIS AUTO W/SCOPE: CPT | Performed by: EMERGENCY MEDICINE

## 2022-01-01 PROCEDURE — 99239 HOSP IP/OBS DSCHRG MGMT >30: CPT | Performed by: NURSE PRACTITIONER

## 2022-01-01 PROCEDURE — 84443 ASSAY THYROID STIM HORMONE: CPT | Performed by: INTERNAL MEDICINE

## 2022-01-01 PROCEDURE — 87075 CULTR BACTERIA EXCEPT BLOOD: CPT | Performed by: SURGERY

## 2022-01-01 PROCEDURE — 97161 PT EVAL LOW COMPLEX 20 MIN: CPT

## 2022-01-01 PROCEDURE — 84480 ASSAY TRIIODOTHYRONINE (T3): CPT | Performed by: INTERNAL MEDICINE

## 2022-01-01 PROCEDURE — 82728 ASSAY OF FERRITIN: CPT | Performed by: NURSE PRACTITIONER

## 2022-01-01 PROCEDURE — 25010000002 PIPERACILLIN SOD-TAZOBACTAM PER 1 G: Performed by: EMERGENCY MEDICINE

## 2022-01-01 PROCEDURE — 87077 CULTURE AEROBIC IDENTIFY: CPT | Performed by: EMERGENCY MEDICINE

## 2022-01-01 PROCEDURE — 97165 OT EVAL LOW COMPLEX 30 MIN: CPT

## 2022-01-01 PROCEDURE — 87102 FUNGUS ISOLATION CULTURE: CPT | Performed by: FAMILY MEDICINE

## 2022-01-01 PROCEDURE — P9612 CATHETERIZE FOR URINE SPEC: HCPCS

## 2022-01-01 PROCEDURE — G0378 HOSPITAL OBSERVATION PER HR: HCPCS

## 2022-01-01 PROCEDURE — 87147 CULTURE TYPE IMMUNOLOGIC: CPT | Performed by: EMERGENCY MEDICINE

## 2022-01-01 PROCEDURE — 87086 URINE CULTURE/COLONY COUNT: CPT | Performed by: NURSE PRACTITIONER

## 2022-01-01 PROCEDURE — 87070 CULTURE OTHR SPECIMN AEROBIC: CPT | Performed by: SURGERY

## 2022-01-01 PROCEDURE — 99284 EMERGENCY DEPT VISIT MOD MDM: CPT

## 2022-01-01 PROCEDURE — 25010000002 VANCOMYCIN 1.5 G RECONSTITUTED SOLUTION 1 EACH VIAL: Performed by: SURGERY

## 2022-01-01 PROCEDURE — 63710000001 INSULIN ASPART PER 5 UNITS: Performed by: INTERNAL MEDICINE

## 2022-01-01 PROCEDURE — 99223 1ST HOSP IP/OBS HIGH 75: CPT | Performed by: NURSE PRACTITIONER

## 2022-01-01 PROCEDURE — 87186 SC STD MICRODIL/AGAR DIL: CPT | Performed by: NURSE PRACTITIONER

## 2022-01-01 PROCEDURE — 87186 SC STD MICRODIL/AGAR DIL: CPT | Performed by: SURGERY

## 2022-01-01 PROCEDURE — 87636 SARSCOV2 & INF A&B AMP PRB: CPT | Performed by: EMERGENCY MEDICINE

## 2022-01-01 RX ORDER — ATROPINE SULFATE 1 MG/ML
INJECTION, SOLUTION INTRAMUSCULAR; INTRAVENOUS; SUBCUTANEOUS
Status: DISPENSED
Start: 2022-01-01 | End: 2022-01-01

## 2022-01-01 RX ORDER — LORAZEPAM 2 MG/ML
0.5 INJECTION INTRAMUSCULAR
Status: DISCONTINUED | OUTPATIENT
Start: 2022-01-01 | End: 2022-01-01 | Stop reason: HOSPADM

## 2022-01-01 RX ORDER — LORAZEPAM 2 MG/ML
2 CONCENTRATE ORAL
Status: DISCONTINUED | OUTPATIENT
Start: 2022-01-01 | End: 2022-01-01 | Stop reason: HOSPADM

## 2022-01-01 RX ORDER — QUETIAPINE FUMARATE 25 MG/1
12.5 TABLET, FILM COATED ORAL EVERY EVENING
Status: DISCONTINUED | OUTPATIENT
Start: 2022-01-01 | End: 2022-01-01 | Stop reason: HOSPADM

## 2022-01-01 RX ORDER — BUMETANIDE 1 MG/1
1 TABLET ORAL DAILY
Status: DISCONTINUED | OUTPATIENT
Start: 2022-01-01 | End: 2022-01-01 | Stop reason: HOSPADM

## 2022-01-01 RX ORDER — ACETAMINOPHEN 650 MG/1
650 SUPPOSITORY RECTAL EVERY 4 HOURS PRN
Status: DISCONTINUED | OUTPATIENT
Start: 2022-01-01 | End: 2022-01-01 | Stop reason: HOSPADM

## 2022-01-01 RX ORDER — MAGNESIUM SULFATE HEPTAHYDRATE 40 MG/ML
4 INJECTION, SOLUTION INTRAVENOUS AS NEEDED
Status: DISCONTINUED | OUTPATIENT
Start: 2022-01-01 | End: 2022-01-01 | Stop reason: HOSPADM

## 2022-01-01 RX ORDER — BISACODYL 5 MG/1
5 TABLET, DELAYED RELEASE ORAL DAILY PRN
Status: DISCONTINUED | OUTPATIENT
Start: 2022-01-01 | End: 2022-01-01 | Stop reason: HOSPADM

## 2022-01-01 RX ORDER — SODIUM CHLORIDE 9 MG/ML
125 INJECTION, SOLUTION INTRAVENOUS CONTINUOUS
Status: DISCONTINUED | OUTPATIENT
Start: 2022-01-01 | End: 2022-01-01

## 2022-01-01 RX ORDER — TAMSULOSIN HYDROCHLORIDE 0.4 MG/1
0.4 CAPSULE ORAL DAILY
Status: DISCONTINUED | OUTPATIENT
Start: 2022-01-01 | End: 2022-01-01 | Stop reason: HOSPADM

## 2022-01-01 RX ORDER — ACETAMINOPHEN 325 MG/1
650 TABLET ORAL EVERY 4 HOURS PRN
Start: 2022-01-01 | End: 2022-01-01 | Stop reason: HOSPADM

## 2022-01-01 RX ORDER — BISACODYL 5 MG/1
5 TABLET, DELAYED RELEASE ORAL DAILY PRN
Start: 2022-01-01 | End: 2022-01-01 | Stop reason: HOSPADM

## 2022-01-01 RX ORDER — DIPHENOXYLATE HYDROCHLORIDE AND ATROPINE SULFATE 2.5; .025 MG/1; MG/1
1 TABLET ORAL
Status: DISCONTINUED | OUTPATIENT
Start: 2022-01-01 | End: 2022-01-01 | Stop reason: HOSPADM

## 2022-01-01 RX ORDER — SULFAMETHOXAZOLE AND TRIMETHOPRIM 800; 160 MG/1; MG/1
1 TABLET ORAL
Qty: 10 TABLET | Refills: 0 | Status: SHIPPED | OUTPATIENT
Start: 2022-01-01 | End: 2022-01-01

## 2022-01-01 RX ORDER — SODIUM CHLORIDE 0.9 % (FLUSH) 0.9 %
10 SYRINGE (ML) INJECTION EVERY 12 HOURS SCHEDULED
Status: DISCONTINUED | OUTPATIENT
Start: 2022-01-01 | End: 2022-01-01

## 2022-01-01 RX ORDER — CEFEPIME HYDROCHLORIDE 2 G/50ML
2 INJECTION, SOLUTION INTRAVENOUS ONCE
Status: COMPLETED | OUTPATIENT
Start: 2022-01-01 | End: 2022-01-01

## 2022-01-01 RX ORDER — BUDESONIDE AND FORMOTEROL FUMARATE DIHYDRATE 160; 4.5 UG/1; UG/1
2 AEROSOL RESPIRATORY (INHALATION)
Status: DISCONTINUED | OUTPATIENT
Start: 2022-01-01 | End: 2022-01-01 | Stop reason: HOSPADM

## 2022-01-01 RX ORDER — IRON POLYSACCHARIDE COMPLEX 150 MG
150 CAPSULE ORAL DAILY
Start: 2022-01-01 | End: 2022-01-01 | Stop reason: HOSPADM

## 2022-01-01 RX ORDER — BISACODYL 10 MG
10 SUPPOSITORY, RECTAL RECTAL DAILY PRN
Start: 2022-01-01 | End: 2022-01-01 | Stop reason: HOSPADM

## 2022-01-01 RX ORDER — METRONIDAZOLE 500 MG/1
500 TABLET ORAL EVERY 8 HOURS SCHEDULED
Status: DISCONTINUED | OUTPATIENT
Start: 2022-01-01 | End: 2022-01-01 | Stop reason: HOSPADM

## 2022-01-01 RX ORDER — LORAZEPAM 2 MG/ML
2 INJECTION INTRAMUSCULAR
Status: DISCONTINUED | OUTPATIENT
Start: 2022-01-01 | End: 2022-01-01 | Stop reason: HOSPADM

## 2022-01-01 RX ORDER — SODIUM CHLORIDE 9 MG/ML
75 INJECTION, SOLUTION INTRAVENOUS CONTINUOUS
Status: DISCONTINUED | OUTPATIENT
Start: 2022-01-01 | End: 2022-01-01

## 2022-01-01 RX ORDER — BUMETANIDE 0.25 MG/ML
2 INJECTION INTRAMUSCULAR; INTRAVENOUS ONCE
Status: COMPLETED | OUTPATIENT
Start: 2022-01-01 | End: 2022-01-01

## 2022-01-01 RX ORDER — CEFEPIME HYDROCHLORIDE 2 G/50ML
2 INJECTION, SOLUTION INTRAVENOUS EVERY 12 HOURS
Status: DISCONTINUED | OUTPATIENT
Start: 2022-01-01 | End: 2022-01-01

## 2022-01-01 RX ORDER — SODIUM CHLORIDE 0.9 % (FLUSH) 0.9 %
10 SYRINGE (ML) INJECTION AS NEEDED
Status: DISCONTINUED | OUTPATIENT
Start: 2022-01-01 | End: 2022-01-01 | Stop reason: HOSPADM

## 2022-01-01 RX ORDER — CITALOPRAM 20 MG/1
20 TABLET ORAL NIGHTLY
Status: DISCONTINUED | OUTPATIENT
Start: 2022-01-01 | End: 2022-01-01 | Stop reason: HOSPADM

## 2022-01-01 RX ORDER — MAGNESIUM SULFATE HEPTAHYDRATE 40 MG/ML
2 INJECTION, SOLUTION INTRAVENOUS AS NEEDED
Status: DISCONTINUED | OUTPATIENT
Start: 2022-01-01 | End: 2022-01-01 | Stop reason: HOSPADM

## 2022-01-01 RX ORDER — DEXTROSE MONOHYDRATE 25 G/50ML
25 INJECTION, SOLUTION INTRAVENOUS
Status: DISCONTINUED | OUTPATIENT
Start: 2022-01-01 | End: 2022-01-01 | Stop reason: HOSPADM

## 2022-01-01 RX ORDER — SODIUM CHLORIDE 0.9 % (FLUSH) 0.9 %
10 SYRINGE (ML) INJECTION AS NEEDED
Status: DISCONTINUED | OUTPATIENT
Start: 2022-01-01 | End: 2022-01-01

## 2022-01-01 RX ORDER — SODIUM HYPOCHLORITE 2.5 MG/ML
SOLUTION TOPICAL ONCE
Qty: 100 ML | Refills: 6 | Status: SHIPPED | OUTPATIENT
Start: 2022-01-01 | End: 2022-01-01

## 2022-01-01 RX ORDER — NICOTINE POLACRILEX 4 MG
15 LOZENGE BUCCAL
Status: DISCONTINUED | OUTPATIENT
Start: 2022-01-01 | End: 2022-01-01 | Stop reason: HOSPADM

## 2022-01-01 RX ORDER — SODIUM CHLORIDE 0.9 % (FLUSH) 0.9 %
10 SYRINGE (ML) INJECTION EVERY 12 HOURS SCHEDULED
Status: DISCONTINUED | OUTPATIENT
Start: 2022-01-01 | End: 2022-01-01 | Stop reason: HOSPADM

## 2022-01-01 RX ORDER — CEFEPIME HYDROCHLORIDE 2 G/50ML
2 INJECTION, SOLUTION INTRAVENOUS EVERY 24 HOURS
Status: DISCONTINUED | OUTPATIENT
Start: 2022-01-01 | End: 2022-01-01

## 2022-01-01 RX ORDER — LORAZEPAM 1 MG/1
1 TABLET ORAL
Status: DISCONTINUED | OUTPATIENT
Start: 2022-01-01 | End: 2022-01-01 | Stop reason: HOSPADM

## 2022-01-01 RX ORDER — VANCOMYCIN HYDROCHLORIDE 1 G/20ML
INJECTION, POWDER, LYOPHILIZED, FOR SOLUTION INTRAVENOUS
Status: DISPENSED
Start: 2022-01-01 | End: 2022-01-01

## 2022-01-01 RX ORDER — PREGABALIN 75 MG/1
75 CAPSULE ORAL 2 TIMES DAILY
Status: DISCONTINUED | OUTPATIENT
Start: 2022-01-01 | End: 2022-01-01 | Stop reason: HOSPADM

## 2022-01-01 RX ORDER — BUMETANIDE 1 MG/1
2 TABLET ORAL DAILY
Status: DISCONTINUED | OUTPATIENT
Start: 2022-01-01 | End: 2022-01-01

## 2022-01-01 RX ORDER — INSULIN DETEMIR 100 [IU]/ML
25 INJECTION, SOLUTION SUBCUTANEOUS NIGHTLY
Start: 2022-01-01 | End: 2022-01-01 | Stop reason: HOSPADM

## 2022-01-01 RX ORDER — ALBUTEROL SULFATE 2.5 MG/3ML
2.5 SOLUTION RESPIRATORY (INHALATION) EVERY 6 HOURS PRN
Status: DISCONTINUED | OUTPATIENT
Start: 2022-01-01 | End: 2022-01-01 | Stop reason: HOSPADM

## 2022-01-01 RX ORDER — HYDROMORPHONE HCL 110MG/55ML
2 PATIENT CONTROLLED ANALGESIA SYRINGE INTRAVENOUS
Status: DISCONTINUED | OUTPATIENT
Start: 2022-01-01 | End: 2022-01-01 | Stop reason: HOSPADM

## 2022-01-01 RX ORDER — LORAZEPAM 0.5 MG/1
0.5 TABLET ORAL
Status: DISCONTINUED | OUTPATIENT
Start: 2022-01-01 | End: 2022-01-01 | Stop reason: HOSPADM

## 2022-01-01 RX ORDER — SODIUM HYPOCHLORITE 1.25 MG/ML
1 SOLUTION TOPICAL DAILY
Start: 2022-01-01 | End: 2022-01-01 | Stop reason: HOSPADM

## 2022-01-01 RX ORDER — CHOLECALCIFEROL (VITAMIN D3) 125 MCG
5 CAPSULE ORAL NIGHTLY PRN
Status: DISCONTINUED | OUTPATIENT
Start: 2022-01-01 | End: 2022-01-01 | Stop reason: HOSPADM

## 2022-01-01 RX ORDER — POLYVINYL ALCOHOL 14 MG/ML
1 SOLUTION/ DROPS OPHTHALMIC
Status: DISCONTINUED | OUTPATIENT
Start: 2022-01-01 | End: 2022-01-01 | Stop reason: HOSPADM

## 2022-01-01 RX ORDER — SODIUM CHLORIDE 9 MG/ML
100 INJECTION, SOLUTION INTRAVENOUS CONTINUOUS
Status: DISCONTINUED | OUTPATIENT
Start: 2022-01-01 | End: 2022-01-01

## 2022-01-01 RX ORDER — LEVOTHYROXINE SODIUM 112 UG/1
224 TABLET ORAL DAILY
Status: DISCONTINUED | OUTPATIENT
Start: 2022-01-01 | End: 2022-01-01 | Stop reason: HOSPADM

## 2022-01-01 RX ORDER — IRON POLYSACCHARIDE COMPLEX 150 MG
150 CAPSULE ORAL DAILY
Status: DISCONTINUED | OUTPATIENT
Start: 2022-01-01 | End: 2022-01-01 | Stop reason: HOSPADM

## 2022-01-01 RX ORDER — SODIUM CHLORIDE, SODIUM LACTATE, POTASSIUM CHLORIDE, CALCIUM CHLORIDE 600; 310; 30; 20 MG/100ML; MG/100ML; MG/100ML; MG/100ML
100 INJECTION, SOLUTION INTRAVENOUS CONTINUOUS
Status: DISCONTINUED | OUTPATIENT
Start: 2022-01-01 | End: 2022-01-01

## 2022-01-01 RX ORDER — SODIUM HYPOCHLORITE 1.25 MG/ML
SOLUTION TOPICAL DAILY
Status: DISCONTINUED | OUTPATIENT
Start: 2022-01-01 | End: 2022-01-01 | Stop reason: HOSPADM

## 2022-01-01 RX ORDER — SULFAMETHOXAZOLE AND TRIMETHOPRIM 800; 160 MG/1; MG/1
1 TABLET ORAL
Status: DISCONTINUED | OUTPATIENT
Start: 2022-01-01 | End: 2022-01-01 | Stop reason: HOSPADM

## 2022-01-01 RX ORDER — GINSENG 100 MG
CAPSULE ORAL ONCE
Status: COMPLETED | OUTPATIENT
Start: 2022-01-01 | End: 2022-01-01

## 2022-01-01 RX ORDER — SODIUM CHLORIDE 9 MG/ML
250 INJECTION, SOLUTION INTRAVENOUS CONTINUOUS
Status: DISCONTINUED | OUTPATIENT
Start: 2022-01-01 | End: 2022-01-01

## 2022-01-01 RX ORDER — LEVOTHYROXINE SODIUM 112 UG/1
224 TABLET ORAL
Status: DISCONTINUED | OUTPATIENT
Start: 2022-01-01 | End: 2022-01-01 | Stop reason: HOSPADM

## 2022-01-01 RX ORDER — BUMETANIDE 1 MG/1
2 TABLET ORAL
Status: DISCONTINUED | OUTPATIENT
Start: 2022-01-01 | End: 2022-01-01

## 2022-01-01 RX ORDER — DEXTROSE MONOHYDRATE 25 G/50ML
INJECTION, SOLUTION INTRAVENOUS
Status: COMPLETED
Start: 2022-01-01 | End: 2022-01-01

## 2022-01-01 RX ORDER — LORAZEPAM 2 MG/ML
1 INJECTION INTRAMUSCULAR
Status: DISCONTINUED | OUTPATIENT
Start: 2022-01-01 | End: 2022-01-01 | Stop reason: HOSPADM

## 2022-01-01 RX ORDER — DOCUSATE SODIUM 100 MG/1
100 CAPSULE, LIQUID FILLED ORAL 2 TIMES DAILY
Start: 2022-01-01 | End: 2022-01-01 | Stop reason: HOSPADM

## 2022-01-01 RX ORDER — SODIUM CHLORIDE 0.9 % (FLUSH) 0.9 %
20 SYRINGE (ML) INJECTION AS NEEDED
Status: DISCONTINUED | OUTPATIENT
Start: 2022-01-01 | End: 2022-01-01 | Stop reason: HOSPADM

## 2022-01-01 RX ORDER — SODIUM CHLORIDE 0.9 % (FLUSH) 0.9 %
20 SYRINGE (ML) INJECTION AS NEEDED
Status: DISCONTINUED | OUTPATIENT
Start: 2022-01-01 | End: 2022-01-01

## 2022-01-01 RX ORDER — NOREPINEPHRINE BITARTRATE/D5W 8 MG/250ML
PLASTIC BAG, INJECTION (ML) INTRAVENOUS
Status: COMPLETED
Start: 2022-01-01 | End: 2022-01-01

## 2022-01-01 RX ORDER — IPRATROPIUM BROMIDE AND ALBUTEROL SULFATE 2.5; .5 MG/3ML; MG/3ML
3 SOLUTION RESPIRATORY (INHALATION) EVERY 4 HOURS PRN
Status: DISCONTINUED | OUTPATIENT
Start: 2022-01-01 | End: 2022-01-01 | Stop reason: HOSPADM

## 2022-01-01 RX ORDER — SULFAMETHOXAZOLE AND TRIMETHOPRIM 800; 160 MG/1; MG/1
1 TABLET ORAL EVERY 12 HOURS SCHEDULED
Status: DISCONTINUED | OUTPATIENT
Start: 2022-01-01 | End: 2022-01-01

## 2022-01-01 RX ORDER — LEVOFLOXACIN 500 MG/1
500 TABLET, FILM COATED ORAL EVERY 24 HOURS
Status: DISCONTINUED | OUTPATIENT
Start: 2022-01-01 | End: 2022-01-01 | Stop reason: HOSPADM

## 2022-01-01 RX ORDER — ACETAMINOPHEN 325 MG/1
650 TABLET ORAL EVERY 4 HOURS PRN
Status: DISCONTINUED | OUTPATIENT
Start: 2022-01-01 | End: 2022-01-01 | Stop reason: HOSPADM

## 2022-01-01 RX ORDER — SCOLOPAMINE TRANSDERMAL SYSTEM 1 MG/1
1 PATCH, EXTENDED RELEASE TRANSDERMAL
Status: DISCONTINUED | OUTPATIENT
Start: 2022-01-01 | End: 2022-01-01 | Stop reason: HOSPADM

## 2022-01-01 RX ORDER — MELATONIN
2000 DAILY
Status: DISCONTINUED | OUTPATIENT
Start: 2022-01-01 | End: 2022-01-01 | Stop reason: HOSPADM

## 2022-01-01 RX ORDER — FLUCONAZOLE 100 MG/1
200 TABLET ORAL EVERY 24 HOURS
Status: COMPLETED | OUTPATIENT
Start: 2022-01-01 | End: 2022-01-01

## 2022-01-01 RX ORDER — NOREPINEPHRINE BITARTRATE/D5W 8 MG/250ML
.02-.3 PLASTIC BAG, INJECTION (ML) INTRAVENOUS
Status: DISCONTINUED | OUTPATIENT
Start: 2022-01-01 | End: 2022-01-01

## 2022-01-01 RX ORDER — DOCUSATE SODIUM 100 MG/1
100 CAPSULE, LIQUID FILLED ORAL 2 TIMES DAILY
Status: DISCONTINUED | OUTPATIENT
Start: 2022-01-01 | End: 2022-01-01 | Stop reason: HOSPADM

## 2022-01-01 RX ORDER — HYDROMORPHONE HCL 110MG/55ML
0.5 PATIENT CONTROLLED ANALGESIA SYRINGE INTRAVENOUS
Status: DISCONTINUED | OUTPATIENT
Start: 2022-01-01 | End: 2022-01-01 | Stop reason: HOSPADM

## 2022-01-01 RX ORDER — ACETAMINOPHEN 650 MG/1
650 SUPPOSITORY RECTAL ONCE
Status: COMPLETED | OUTPATIENT
Start: 2022-01-01 | End: 2022-01-01

## 2022-01-01 RX ORDER — BUMETANIDE 1 MG/1
1 TABLET ORAL DAILY
Start: 2022-01-01 | End: 2022-01-01 | Stop reason: HOSPADM

## 2022-01-01 RX ORDER — BUMETANIDE 0.25 MG/ML
INJECTION INTRAMUSCULAR; INTRAVENOUS
Status: DISPENSED
Start: 2022-01-01 | End: 2022-01-01

## 2022-01-01 RX ORDER — LORAZEPAM 2 MG/ML
0.5 INJECTION INTRAMUSCULAR EVERY 4 HOURS PRN
Status: DISCONTINUED | OUTPATIENT
Start: 2022-01-01 | End: 2022-01-01 | Stop reason: HOSPADM

## 2022-01-01 RX ORDER — DEXTROSE MONOHYDRATE 25 G/50ML
25 INJECTION, SOLUTION INTRAVENOUS ONCE
Status: COMPLETED | OUTPATIENT
Start: 2022-01-01 | End: 2022-01-01

## 2022-01-01 RX ORDER — SODIUM CHLORIDE 9 MG/ML
40 INJECTION, SOLUTION INTRAVENOUS AS NEEDED
Status: DISCONTINUED | OUTPATIENT
Start: 2022-01-01 | End: 2022-01-01

## 2022-01-01 RX ORDER — HALOPERIDOL 5 MG/ML
1 INJECTION INTRAMUSCULAR EVERY 4 HOURS PRN
Status: DISCONTINUED | OUTPATIENT
Start: 2022-01-01 | End: 2022-01-01 | Stop reason: HOSPADM

## 2022-01-01 RX ORDER — CEFEPIME HYDROCHLORIDE 2 G/50ML
2 INJECTION, SOLUTION INTRAVENOUS ONCE
Status: DISCONTINUED | OUTPATIENT
Start: 2022-01-01 | End: 2022-01-01

## 2022-01-01 RX ORDER — LORAZEPAM 2 MG/ML
1 CONCENTRATE ORAL
Status: DISCONTINUED | OUTPATIENT
Start: 2022-01-01 | End: 2022-01-01 | Stop reason: HOSPADM

## 2022-01-01 RX ORDER — AMIODARONE HYDROCHLORIDE 200 MG/1
200 TABLET ORAL
Status: DISCONTINUED | OUTPATIENT
Start: 2022-01-01 | End: 2022-01-01

## 2022-01-01 RX ORDER — BISACODYL 10 MG
10 SUPPOSITORY, RECTAL RECTAL DAILY PRN
Status: DISCONTINUED | OUTPATIENT
Start: 2022-01-01 | End: 2022-01-01 | Stop reason: HOSPADM

## 2022-01-01 RX ORDER — LANOLIN ALCOHOL/MO/W.PET/CERES
1000 CREAM (GRAM) TOPICAL DAILY
Status: DISCONTINUED | OUTPATIENT
Start: 2022-01-01 | End: 2022-01-01 | Stop reason: HOSPADM

## 2022-01-01 RX ORDER — BUMETANIDE 1 MG/1
1 TABLET ORAL DAILY
Status: DISCONTINUED | OUTPATIENT
Start: 2022-01-01 | End: 2022-01-01

## 2022-01-01 RX ORDER — GLYCOPYRROLATE 0.2 MG/ML
0.4 INJECTION INTRAMUSCULAR; INTRAVENOUS EVERY 6 HOURS PRN
Status: DISCONTINUED | OUTPATIENT
Start: 2022-01-01 | End: 2022-01-01 | Stop reason: HOSPADM

## 2022-01-01 RX ORDER — BUMETANIDE 1 MG/1
2 TABLET ORAL
Status: DISCONTINUED | OUTPATIENT
Start: 2022-01-01 | End: 2022-01-01 | Stop reason: HOSPADM

## 2022-01-01 RX ORDER — ACETAMINOPHEN 160 MG/5ML
650 SOLUTION ORAL EVERY 4 HOURS PRN
Status: DISCONTINUED | OUTPATIENT
Start: 2022-01-01 | End: 2022-01-01 | Stop reason: HOSPADM

## 2022-01-01 RX ORDER — POLYETHYLENE GLYCOL 3350 17 G/17G
17 POWDER, FOR SOLUTION ORAL DAILY
Status: DISCONTINUED | OUTPATIENT
Start: 2022-01-01 | End: 2022-01-01 | Stop reason: HOSPADM

## 2022-01-01 RX ORDER — FLUTICASONE PROPIONATE 50 MCG
2 SPRAY, SUSPENSION (ML) NASAL DAILY
Status: DISCONTINUED | OUTPATIENT
Start: 2022-01-01 | End: 2022-01-01 | Stop reason: HOSPADM

## 2022-01-01 RX ORDER — LEVOFLOXACIN 500 MG/1
500 TABLET, FILM COATED ORAL EVERY 24 HOURS
Qty: 11 TABLET | Refills: 0 | Status: SHIPPED | OUTPATIENT
Start: 2022-01-01 | End: 2022-01-01

## 2022-01-01 RX ORDER — SODIUM CHLORIDE 9 MG/ML
40 INJECTION, SOLUTION INTRAVENOUS AS NEEDED
Status: DISCONTINUED | OUTPATIENT
Start: 2022-01-01 | End: 2022-01-01 | Stop reason: HOSPADM

## 2022-01-01 RX ORDER — MINOCYCLINE HYDROCHLORIDE 50 MG/1
100 CAPSULE ORAL EVERY 12 HOURS SCHEDULED
Status: DISCONTINUED | OUTPATIENT
Start: 2022-01-01 | End: 2022-01-01

## 2022-01-01 RX ORDER — BUMETANIDE 1 MG/1
1 TABLET ORAL
Status: DISCONTINUED | OUTPATIENT
Start: 2022-01-01 | End: 2022-01-01

## 2022-01-01 RX ORDER — LORAZEPAM 2 MG/ML
0.5 CONCENTRATE ORAL
Status: DISCONTINUED | OUTPATIENT
Start: 2022-01-01 | End: 2022-01-01 | Stop reason: HOSPADM

## 2022-01-01 RX ORDER — LEVOFLOXACIN 750 MG/1
750 TABLET ORAL EVERY OTHER DAY
Status: DISCONTINUED | OUTPATIENT
Start: 2022-01-01 | End: 2022-01-01

## 2022-01-01 RX ORDER — ATORVASTATIN CALCIUM 10 MG/1
10 TABLET, FILM COATED ORAL NIGHTLY
Status: DISCONTINUED | OUTPATIENT
Start: 2022-01-01 | End: 2022-01-01 | Stop reason: HOSPADM

## 2022-01-01 RX ORDER — NICOTINE POLACRILEX 4 MG
15 LOZENGE BUCCAL
Start: 2022-01-01 | End: 2022-01-01 | Stop reason: HOSPADM

## 2022-01-01 RX ORDER — SODIUM CHLORIDE 9 MG/ML
INJECTION, SOLUTION INTRAVENOUS
Status: DISPENSED
Start: 2022-01-01 | End: 2022-01-01

## 2022-01-01 RX ORDER — LORAZEPAM 1 MG/1
2 TABLET ORAL
Status: DISCONTINUED | OUTPATIENT
Start: 2022-01-01 | End: 2022-01-01 | Stop reason: HOSPADM

## 2022-01-01 RX ADMIN — FLUTICASONE PROPIONATE 2 SPRAY: 50 SPRAY, METERED NASAL at 08:46

## 2022-01-01 RX ADMIN — IRON SUCROSE 200 MG: 20 INJECTION, SOLUTION INTRAVENOUS at 15:09

## 2022-01-01 RX ADMIN — BUMETANIDE 2 MG: 1 TABLET ORAL at 17:37

## 2022-01-01 RX ADMIN — POLYETHYLENE GLYCOL 3350 17 G: 17 POWDER, FOR SOLUTION ORAL at 13:51

## 2022-01-01 RX ADMIN — TAMSULOSIN HYDROCHLORIDE 0.4 MG: 0.4 CAPSULE ORAL at 08:56

## 2022-01-01 RX ADMIN — ATORVASTATIN CALCIUM 10 MG: 10 TABLET, FILM COATED ORAL at 20:14

## 2022-01-01 RX ADMIN — POLYETHYLENE GLYCOL 3350 17 G: 17 POWDER, FOR SOLUTION ORAL at 09:09

## 2022-01-01 RX ADMIN — SODIUM CHLORIDE 100 ML/HR: 9 INJECTION, SOLUTION INTRAVENOUS at 12:09

## 2022-01-01 RX ADMIN — DOCUSATE SODIUM 100 MG: 100 CAPSULE, LIQUID FILLED ORAL at 08:45

## 2022-01-01 RX ADMIN — APIXABAN 2.5 MG: 2.5 TABLET, FILM COATED ORAL at 21:27

## 2022-01-01 RX ADMIN — MAGNESIUM SULFATE HEPTAHYDRATE 2 G: 40 INJECTION, SOLUTION INTRAVENOUS at 12:27

## 2022-01-01 RX ADMIN — CYANOCOBALAMIN TAB 1000 MCG 1000 MCG: 1000 TAB at 08:56

## 2022-01-01 RX ADMIN — Medication 2000 UNITS: at 09:09

## 2022-01-01 RX ADMIN — DEXTROSE MONOHYDRATE 25 G: 500 INJECTION PARENTERAL at 23:07

## 2022-01-01 RX ADMIN — FLUTICASONE PROPIONATE 2 SPRAY: 50 SPRAY, METERED NASAL at 08:59

## 2022-01-01 RX ADMIN — METRONIDAZOLE 500 MG: 500 TABLET ORAL at 15:08

## 2022-01-01 RX ADMIN — METRONIDAZOLE 500 MG: 500 TABLET ORAL at 21:36

## 2022-01-01 RX ADMIN — BUMETANIDE 2 MG: 1 TABLET ORAL at 08:55

## 2022-01-01 RX ADMIN — ZINC OXIDE 1 APPLICATION: 200 OINTMENT TOPICAL at 15:01

## 2022-01-01 RX ADMIN — PREGABALIN 75 MG: 75 CAPSULE ORAL at 08:58

## 2022-01-01 RX ADMIN — POLYETHYLENE GLYCOL 3350 17 G: 17 POWDER, FOR SOLUTION ORAL at 09:00

## 2022-01-01 RX ADMIN — SULFAMETHOXAZOLE AND TRIMETHOPRIM 1 TABLET: 800; 160 TABLET ORAL at 08:46

## 2022-01-01 RX ADMIN — Medication 1 APPLICATION: at 09:35

## 2022-01-01 RX ADMIN — ZINC OXIDE 1 APPLICATION: 200 OINTMENT TOPICAL at 08:20

## 2022-01-01 RX ADMIN — BUDESONIDE AND FORMOTEROL FUMARATE DIHYDRATE 2 PUFF: 160; 4.5 AEROSOL RESPIRATORY (INHALATION) at 20:06

## 2022-01-01 RX ADMIN — MAGNESIUM SULFATE HEPTAHYDRATE 2 G: 40 INJECTION, SOLUTION INTRAVENOUS at 10:02

## 2022-01-01 RX ADMIN — PREGABALIN 75 MG: 75 CAPSULE ORAL at 10:06

## 2022-01-01 RX ADMIN — BUMETANIDE 1 MG: 1 TABLET ORAL at 18:26

## 2022-01-01 RX ADMIN — POLYETHYLENE GLYCOL 3350 17 G: 17 POWDER, FOR SOLUTION ORAL at 09:35

## 2022-01-01 RX ADMIN — POLYETHYLENE GLYCOL 3350 17 G: 17 POWDER, FOR SOLUTION ORAL at 08:59

## 2022-01-01 RX ADMIN — PREGABALIN 75 MG: 75 CAPSULE ORAL at 08:20

## 2022-01-01 RX ADMIN — Medication 2 PACKET: at 19:45

## 2022-01-01 RX ADMIN — BUDESONIDE AND FORMOTEROL FUMARATE DIHYDRATE 2 PUFF: 160; 4.5 AEROSOL RESPIRATORY (INHALATION) at 09:44

## 2022-01-01 RX ADMIN — ZINC OXIDE 1 APPLICATION: 200 OINTMENT TOPICAL at 20:00

## 2022-01-01 RX ADMIN — CYANOCOBALAMIN TAB 1000 MCG 1000 MCG: 1000 TAB at 08:38

## 2022-01-01 RX ADMIN — Medication 1 PACKET: at 08:54

## 2022-01-01 RX ADMIN — SODIUM CHLORIDE 75 ML/HR: 9 INJECTION, SOLUTION INTRAVENOUS at 13:50

## 2022-01-01 RX ADMIN — INSULIN DETEMIR 10 UNITS: 100 INJECTION, SOLUTION SUBCUTANEOUS at 20:55

## 2022-01-01 RX ADMIN — QUETIAPINE FUMARATE 12.5 MG: 25 TABLET ORAL at 17:42

## 2022-01-01 RX ADMIN — Medication 1 APPLICATION: at 10:07

## 2022-01-01 RX ADMIN — BUDESONIDE AND FORMOTEROL FUMARATE DIHYDRATE 2 PUFF: 160; 4.5 AEROSOL RESPIRATORY (INHALATION) at 10:06

## 2022-01-01 RX ADMIN — LINAGLIPTIN 5 MG: 5 TABLET, FILM COATED ORAL at 08:55

## 2022-01-01 RX ADMIN — HYDROMORPHONE HYDROCHLORIDE 2 MG: 2 INJECTION, SOLUTION INTRAMUSCULAR; INTRAVENOUS; SUBCUTANEOUS at 11:56

## 2022-01-01 RX ADMIN — APIXABAN 2.5 MG: 2.5 TABLET, FILM COATED ORAL at 09:05

## 2022-01-01 RX ADMIN — CITALOPRAM HYDROBROMIDE 20 MG: 20 TABLET ORAL at 21:36

## 2022-01-01 RX ADMIN — HYDROMORPHONE HYDROCHLORIDE 0.5 MG: 2 INJECTION, SOLUTION INTRAMUSCULAR; INTRAVENOUS; SUBCUTANEOUS at 02:18

## 2022-01-01 RX ADMIN — BUDESONIDE AND FORMOTEROL FUMARATE DIHYDRATE 2 PUFF: 160; 4.5 AEROSOL RESPIRATORY (INHALATION) at 09:56

## 2022-01-01 RX ADMIN — HUMAN INSULIN 10 UNITS: 100 INJECTION, SOLUTION SUBCUTANEOUS at 23:06

## 2022-01-01 RX ADMIN — CYANOCOBALAMIN TAB 1000 MCG 1000 MCG: 1000 TAB at 09:36

## 2022-01-01 RX ADMIN — APIXABAN 2.5 MG: 2.5 TABLET, FILM COATED ORAL at 22:34

## 2022-01-01 RX ADMIN — ZINC OXIDE 1 APPLICATION: 200 OINTMENT TOPICAL at 20:44

## 2022-01-01 RX ADMIN — PREGABALIN 75 MG: 75 CAPSULE ORAL at 09:35

## 2022-01-01 RX ADMIN — CITALOPRAM HYDROBROMIDE 20 MG: 20 TABLET ORAL at 21:43

## 2022-01-01 RX ADMIN — BUDESONIDE AND FORMOTEROL FUMARATE DIHYDRATE 2 PUFF: 160; 4.5 AEROSOL RESPIRATORY (INHALATION) at 09:19

## 2022-01-01 RX ADMIN — ZINC OXIDE 1 APPLICATION: 200 OINTMENT TOPICAL at 20:58

## 2022-01-01 RX ADMIN — INSULIN DETEMIR 25 UNITS: 100 INJECTION, SOLUTION SUBCUTANEOUS at 22:14

## 2022-01-01 RX ADMIN — ZINC OXIDE 1 APPLICATION: 200 OINTMENT TOPICAL at 16:47

## 2022-01-01 RX ADMIN — APIXABAN 2.5 MG: 2.5 TABLET, FILM COATED ORAL at 08:24

## 2022-01-01 RX ADMIN — BUDESONIDE AND FORMOTEROL FUMARATE DIHYDRATE 2 PUFF: 160; 4.5 AEROSOL RESPIRATORY (INHALATION) at 10:20

## 2022-01-01 RX ADMIN — SCOPALAMINE 1 PATCH: 1 PATCH, EXTENDED RELEASE TRANSDERMAL at 01:37

## 2022-01-01 RX ADMIN — ZINC OXIDE 1 APPLICATION: 200 OINTMENT TOPICAL at 09:35

## 2022-01-01 RX ADMIN — POLYETHYLENE GLYCOL 3350 17 G: 17 POWDER, FOR SOLUTION ORAL at 08:56

## 2022-01-01 RX ADMIN — METRONIDAZOLE 500 MG: 500 TABLET ORAL at 05:57

## 2022-01-01 RX ADMIN — PREGABALIN 75 MG: 75 CAPSULE ORAL at 20:58

## 2022-01-01 RX ADMIN — APIXABAN 2.5 MG: 2.5 TABLET, FILM COATED ORAL at 08:35

## 2022-01-01 RX ADMIN — Medication 150 MG: at 09:35

## 2022-01-01 RX ADMIN — DOCUSATE SODIUM 100 MG: 100 CAPSULE, LIQUID FILLED ORAL at 22:14

## 2022-01-01 RX ADMIN — PREGABALIN 75 MG: 75 CAPSULE ORAL at 08:45

## 2022-01-01 RX ADMIN — APIXABAN 2.5 MG: 2.5 TABLET, FILM COATED ORAL at 08:55

## 2022-01-01 RX ADMIN — LINAGLIPTIN 5 MG: 5 TABLET, FILM COATED ORAL at 09:35

## 2022-01-01 RX ADMIN — ATORVASTATIN CALCIUM 10 MG: 10 TABLET, FILM COATED ORAL at 21:26

## 2022-01-01 RX ADMIN — CITALOPRAM HYDROBROMIDE 20 MG: 20 TABLET ORAL at 21:35

## 2022-01-01 RX ADMIN — ATORVASTATIN CALCIUM 10 MG: 10 TABLET, FILM COATED ORAL at 21:35

## 2022-01-01 RX ADMIN — QUETIAPINE FUMARATE 12.5 MG: 25 TABLET ORAL at 17:44

## 2022-01-01 RX ADMIN — DOCUSATE SODIUM 100 MG: 100 CAPSULE, LIQUID FILLED ORAL at 21:36

## 2022-01-01 RX ADMIN — ZINC OXIDE 1 APPLICATION: 200 OINTMENT TOPICAL at 17:04

## 2022-01-01 RX ADMIN — PREGABALIN 75 MG: 75 CAPSULE ORAL at 20:06

## 2022-01-01 RX ADMIN — VANCOMYCIN HYDROCHLORIDE 1000 MG: 1 INJECTION, POWDER, LYOPHILIZED, FOR SOLUTION INTRAVENOUS at 13:50

## 2022-01-01 RX ADMIN — POLYETHYLENE GLYCOL 3350 17 G: 17 POWDER, FOR SOLUTION ORAL at 08:58

## 2022-01-01 RX ADMIN — FLUTICASONE PROPIONATE 2 SPRAY: 50 SPRAY, METERED NASAL at 08:20

## 2022-01-01 RX ADMIN — PREGABALIN 75 MG: 75 CAPSULE ORAL at 22:14

## 2022-01-01 RX ADMIN — ZINC OXIDE 1 APPLICATION: 200 OINTMENT TOPICAL at 20:07

## 2022-01-01 RX ADMIN — Medication 2000 UNITS: at 09:03

## 2022-01-01 RX ADMIN — BUDESONIDE AND FORMOTEROL FUMARATE DIHYDRATE 2 PUFF: 160; 4.5 AEROSOL RESPIRATORY (INHALATION) at 20:15

## 2022-01-01 RX ADMIN — CYANOCOBALAMIN TAB 1000 MCG 1000 MCG: 1000 TAB at 09:09

## 2022-01-01 RX ADMIN — Medication 0.02 MCG/KG/MIN: at 05:02

## 2022-01-01 RX ADMIN — POLYETHYLENE GLYCOL 3350 17 G: 17 POWDER, FOR SOLUTION ORAL at 08:37

## 2022-01-01 RX ADMIN — Medication 150 MG: at 09:05

## 2022-01-01 RX ADMIN — CYANOCOBALAMIN TAB 1000 MCG 1000 MCG: 1000 TAB at 09:02

## 2022-01-01 RX ADMIN — LINAGLIPTIN 5 MG: 5 TABLET, FILM COATED ORAL at 08:24

## 2022-01-01 RX ADMIN — INSULIN ASPART 8 UNITS: 100 INJECTION, SOLUTION INTRAVENOUS; SUBCUTANEOUS at 12:34

## 2022-01-01 RX ADMIN — DOCUSATE SODIUM 100 MG: 100 CAPSULE, LIQUID FILLED ORAL at 08:54

## 2022-01-01 RX ADMIN — VANCOMYCIN HYDROCHLORIDE 750 MG: 750 INJECTION, POWDER, LYOPHILIZED, FOR SOLUTION INTRAVENOUS at 17:42

## 2022-01-01 RX ADMIN — QUETIAPINE FUMARATE 12.5 MG: 25 TABLET ORAL at 18:39

## 2022-01-01 RX ADMIN — INSULIN DETEMIR 25 UNITS: 100 INJECTION, SOLUTION SUBCUTANEOUS at 20:08

## 2022-01-01 RX ADMIN — Medication 2000 UNITS: at 09:35

## 2022-01-01 RX ADMIN — LEVOTHYROXINE SODIUM 224 MCG: 0.11 TABLET ORAL at 05:48

## 2022-01-01 RX ADMIN — ZINC OXIDE 1 APPLICATION: 200 OINTMENT TOPICAL at 09:09

## 2022-01-01 RX ADMIN — QUETIAPINE FUMARATE 12.5 MG: 25 TABLET ORAL at 17:07

## 2022-01-01 RX ADMIN — LEVOTHYROXINE SODIUM 224 MCG: 0.11 TABLET ORAL at 06:09

## 2022-01-01 RX ADMIN — DOCUSATE SODIUM 100 MG: 100 CAPSULE, LIQUID FILLED ORAL at 21:27

## 2022-01-01 RX ADMIN — BUDESONIDE AND FORMOTEROL FUMARATE DIHYDRATE 2 PUFF: 160; 4.5 AEROSOL RESPIRATORY (INHALATION) at 10:22

## 2022-01-01 RX ADMIN — BUDESONIDE AND FORMOTEROL FUMARATE DIHYDRATE 2 PUFF: 160; 4.5 AEROSOL RESPIRATORY (INHALATION) at 20:11

## 2022-01-01 RX ADMIN — APIXABAN 2.5 MG: 2.5 TABLET, FILM COATED ORAL at 13:51

## 2022-01-01 RX ADMIN — SULFAMETHOXAZOLE AND TRIMETHOPRIM 1 TABLET: 800; 160 TABLET ORAL at 19:45

## 2022-01-01 RX ADMIN — LINAGLIPTIN 5 MG: 5 TABLET, FILM COATED ORAL at 08:16

## 2022-01-01 RX ADMIN — BUDESONIDE AND FORMOTEROL FUMARATE DIHYDRATE 2 PUFF: 160; 4.5 AEROSOL RESPIRATORY (INHALATION) at 07:02

## 2022-01-01 RX ADMIN — LEVOTHYROXINE SODIUM 224 MCG: 0.11 TABLET ORAL at 06:08

## 2022-01-01 RX ADMIN — QUETIAPINE FUMARATE 12.5 MG: 25 TABLET ORAL at 17:11

## 2022-01-01 RX ADMIN — Medication 150 MG: at 08:55

## 2022-01-01 RX ADMIN — DOCUSATE SODIUM 100 MG: 100 CAPSULE, LIQUID FILLED ORAL at 09:35

## 2022-01-01 RX ADMIN — DOCUSATE SODIUM 100 MG: 100 CAPSULE, LIQUID FILLED ORAL at 08:58

## 2022-01-01 RX ADMIN — Medication 150 MG: at 08:46

## 2022-01-01 RX ADMIN — SODIUM CHLORIDE, PRESERVATIVE FREE 10 ML: 5 INJECTION INTRAVENOUS at 08:56

## 2022-01-01 RX ADMIN — LEVOTHYROXINE SODIUM 224 MCG: 0.11 TABLET ORAL at 05:18

## 2022-01-01 RX ADMIN — METRONIDAZOLE 500 MG: 500 TABLET ORAL at 06:21

## 2022-01-01 RX ADMIN — PREGABALIN 75 MG: 75 CAPSULE ORAL at 20:07

## 2022-01-01 RX ADMIN — LEVOTHYROXINE SODIUM 224 MCG: 0.11 TABLET ORAL at 05:50

## 2022-01-01 RX ADMIN — DOCUSATE SODIUM 100 MG: 100 CAPSULE, LIQUID FILLED ORAL at 20:08

## 2022-01-01 RX ADMIN — ZINC OXIDE 1 APPLICATION: 200 OINTMENT TOPICAL at 15:57

## 2022-01-01 RX ADMIN — BUMETANIDE 1 MG: 1 TABLET ORAL at 08:35

## 2022-01-01 RX ADMIN — LINAGLIPTIN 5 MG: 5 TABLET, FILM COATED ORAL at 08:56

## 2022-01-01 RX ADMIN — CITALOPRAM HYDROBROMIDE 20 MG: 20 TABLET ORAL at 20:57

## 2022-01-01 RX ADMIN — BUDESONIDE AND FORMOTEROL FUMARATE DIHYDRATE 2 PUFF: 160; 4.5 AEROSOL RESPIRATORY (INHALATION) at 09:27

## 2022-01-01 RX ADMIN — QUETIAPINE FUMARATE 12.5 MG: 25 TABLET ORAL at 17:10

## 2022-01-01 RX ADMIN — VANCOMYCIN HYDROCHLORIDE 2000 MG: 1 INJECTION, POWDER, LYOPHILIZED, FOR SOLUTION INTRAVENOUS at 22:48

## 2022-01-01 RX ADMIN — LEVOTHYROXINE SODIUM 224 MCG: 0.11 TABLET ORAL at 09:35

## 2022-01-01 RX ADMIN — CEFEPIME HYDROCHLORIDE 2 G: 2 INJECTION, SOLUTION INTRAVENOUS at 21:00

## 2022-01-01 RX ADMIN — APIXABAN 2.5 MG: 2.5 TABLET, FILM COATED ORAL at 20:14

## 2022-01-01 RX ADMIN — BUDESONIDE AND FORMOTEROL FUMARATE DIHYDRATE 2 PUFF: 160; 4.5 AEROSOL RESPIRATORY (INHALATION) at 09:24

## 2022-01-01 RX ADMIN — CYANOCOBALAMIN TAB 1000 MCG 1000 MCG: 1000 TAB at 08:54

## 2022-01-01 RX ADMIN — CITALOPRAM HYDROBROMIDE 20 MG: 20 TABLET ORAL at 21:27

## 2022-01-01 RX ADMIN — LEVOTHYROXINE SODIUM 224 MCG: 0.11 TABLET ORAL at 08:35

## 2022-01-01 RX ADMIN — SODIUM HYPOCHLORITE: 1.25 SOLUTION TOPICAL at 16:39

## 2022-01-01 RX ADMIN — SODIUM HYPOCHLORITE: 1.25 SOLUTION TOPICAL at 18:56

## 2022-01-01 RX ADMIN — TAMSULOSIN HYDROCHLORIDE 0.4 MG: 0.4 CAPSULE ORAL at 09:05

## 2022-01-01 RX ADMIN — SULFAMETHOXAZOLE AND TRIMETHOPRIM 1 TABLET: 800; 160 TABLET ORAL at 08:56

## 2022-01-01 RX ADMIN — INSULIN ASPART 4 UNITS: 100 INJECTION, SOLUTION INTRAVENOUS; SUBCUTANEOUS at 14:17

## 2022-01-01 RX ADMIN — SODIUM CHLORIDE 75 ML/HR: 9 INJECTION, SOLUTION INTRAVENOUS at 03:34

## 2022-01-01 RX ADMIN — ZINC OXIDE 1 APPLICATION: 200 OINTMENT TOPICAL at 16:05

## 2022-01-01 RX ADMIN — FLUCONAZOLE 200 MG: 100 TABLET ORAL at 08:16

## 2022-01-01 RX ADMIN — DOCUSATE SODIUM 100 MG: 100 CAPSULE, LIQUID FILLED ORAL at 09:02

## 2022-01-01 RX ADMIN — METRONIDAZOLE 500 MG: 500 TABLET ORAL at 19:56

## 2022-01-01 RX ADMIN — CITALOPRAM HYDROBROMIDE 20 MG: 20 TABLET ORAL at 20:08

## 2022-01-01 RX ADMIN — LEVOTHYROXINE SODIUM 224 MCG: 0.11 TABLET ORAL at 06:10

## 2022-01-01 RX ADMIN — Medication 2000 UNITS: at 09:02

## 2022-01-01 RX ADMIN — Medication 2000 UNITS: at 08:20

## 2022-01-01 RX ADMIN — INSULIN DETEMIR 10 UNITS: 100 INJECTION, SOLUTION SUBCUTANEOUS at 21:00

## 2022-01-01 RX ADMIN — POLYETHYLENE GLYCOL 3350 17 G: 17 POWDER, FOR SOLUTION ORAL at 08:55

## 2022-01-01 RX ADMIN — SODIUM HYPOCHLORITE: 1.25 SOLUTION TOPICAL at 13:45

## 2022-01-01 RX ADMIN — PREGABALIN 75 MG: 75 CAPSULE ORAL at 08:55

## 2022-01-01 RX ADMIN — BUMETANIDE 2 MG: 1 TABLET ORAL at 08:38

## 2022-01-01 RX ADMIN — APIXABAN 2.5 MG: 2.5 TABLET, FILM COATED ORAL at 08:45

## 2022-01-01 RX ADMIN — METRONIDAZOLE 500 MG: 500 TABLET ORAL at 06:28

## 2022-01-01 RX ADMIN — APIXABAN 2.5 MG: 2.5 TABLET, FILM COATED ORAL at 21:36

## 2022-01-01 RX ADMIN — APIXABAN 2.5 MG: 2.5 TABLET, FILM COATED ORAL at 22:14

## 2022-01-01 RX ADMIN — QUETIAPINE FUMARATE 12.5 MG: 25 TABLET ORAL at 17:50

## 2022-01-01 RX ADMIN — APIXABAN 2.5 MG: 2.5 TABLET, FILM COATED ORAL at 20:57

## 2022-01-01 RX ADMIN — Medication 2000 UNITS: at 08:58

## 2022-01-01 RX ADMIN — Medication 2000 UNITS: at 08:45

## 2022-01-01 RX ADMIN — ZINC OXIDE 1 APPLICATION: 200 OINTMENT TOPICAL at 08:39

## 2022-01-01 RX ADMIN — LEVOTHYROXINE SODIUM 224 MCG: 0.11 TABLET ORAL at 06:11

## 2022-01-01 RX ADMIN — Medication 50 MEQ: at 23:10

## 2022-01-01 RX ADMIN — PREGABALIN 75 MG: 75 CAPSULE ORAL at 09:09

## 2022-01-01 RX ADMIN — ATORVASTATIN CALCIUM 10 MG: 10 TABLET, FILM COATED ORAL at 20:57

## 2022-01-01 RX ADMIN — BUMETANIDE 1 MG: 1 TABLET ORAL at 09:34

## 2022-01-01 RX ADMIN — PREGABALIN 75 MG: 75 CAPSULE ORAL at 21:27

## 2022-01-01 RX ADMIN — FLUTICASONE PROPIONATE 2 SPRAY: 50 SPRAY, METERED NASAL at 08:54

## 2022-01-01 RX ADMIN — PREGABALIN 75 MG: 75 CAPSULE ORAL at 21:15

## 2022-01-01 RX ADMIN — ZINC OXIDE 1 APPLICATION: 200 OINTMENT TOPICAL at 20:14

## 2022-01-01 RX ADMIN — LEVOFLOXACIN 500 MG: 500 TABLET, FILM COATED ORAL at 16:30

## 2022-01-01 RX ADMIN — CEFEPIME HYDROCHLORIDE 2 G: 2 INJECTION, SOLUTION INTRAVENOUS at 14:01

## 2022-01-01 RX ADMIN — TAMSULOSIN HYDROCHLORIDE 0.4 MG: 0.4 CAPSULE ORAL at 08:16

## 2022-01-01 RX ADMIN — BUMETANIDE 2 MG: 0.25 INJECTION, SOLUTION INTRAMUSCULAR; INTRAVENOUS at 18:15

## 2022-01-01 RX ADMIN — METRONIDAZOLE 500 MG: 500 TABLET ORAL at 14:20

## 2022-01-01 RX ADMIN — CITALOPRAM HYDROBROMIDE 20 MG: 20 TABLET ORAL at 20:14

## 2022-01-01 RX ADMIN — MAGNESIUM SULFATE HEPTAHYDRATE 4 G: 40 INJECTION, SOLUTION INTRAVENOUS at 09:01

## 2022-01-01 RX ADMIN — BUMETANIDE 1 MG: 1 TABLET ORAL at 08:58

## 2022-01-01 RX ADMIN — MICONAZOLE NITRATE 1 APPLICATION: 20 CREAM TOPICAL at 20:14

## 2022-01-01 RX ADMIN — APIXABAN 2.5 MG: 2.5 TABLET, FILM COATED ORAL at 08:58

## 2022-01-01 RX ADMIN — INSULIN DETEMIR 10 UNITS: 100 INJECTION, SOLUTION SUBCUTANEOUS at 21:37

## 2022-01-01 RX ADMIN — VANCOMYCIN HYDROCHLORIDE 1000 MG: 1 INJECTION, POWDER, LYOPHILIZED, FOR SOLUTION INTRAVENOUS at 14:23

## 2022-01-01 RX ADMIN — SODIUM ZIRCONIUM CYCLOSILICATE 10 G: 10 POWDER, FOR SUSPENSION ORAL at 09:32

## 2022-01-01 RX ADMIN — Medication 2000 UNITS: at 08:54

## 2022-01-01 RX ADMIN — FLUTICASONE PROPIONATE 2 SPRAY: 50 SPRAY, METERED NASAL at 08:56

## 2022-01-01 RX ADMIN — LEVOTHYROXINE SODIUM 224 MCG: 0.11 TABLET ORAL at 06:28

## 2022-01-01 RX ADMIN — DOCUSATE SODIUM 100 MG: 100 CAPSULE, LIQUID FILLED ORAL at 20:14

## 2022-01-01 RX ADMIN — ZINC OXIDE 1 APPLICATION: 200 OINTMENT TOPICAL at 09:05

## 2022-01-01 RX ADMIN — ZINC OXIDE 1 APPLICATION: 200 OINTMENT TOPICAL at 15:10

## 2022-01-01 RX ADMIN — Medication 150 MG: at 08:19

## 2022-01-01 RX ADMIN — ZINC OXIDE 1 APPLICATION: 200 OINTMENT TOPICAL at 16:02

## 2022-01-01 RX ADMIN — SODIUM HYPOCHLORITE: 1.25 SOLUTION TOPICAL at 15:22

## 2022-01-01 RX ADMIN — INSULIN DETEMIR 25 UNITS: 100 INJECTION, SOLUTION SUBCUTANEOUS at 23:00

## 2022-01-01 RX ADMIN — TAMSULOSIN HYDROCHLORIDE 0.4 MG: 0.4 CAPSULE ORAL at 09:09

## 2022-01-01 RX ADMIN — Medication 1 APPLICATION: at 20:00

## 2022-01-01 RX ADMIN — QUETIAPINE FUMARATE 12.5 MG: 25 TABLET ORAL at 16:30

## 2022-01-01 RX ADMIN — ZINC OXIDE 1 APPLICATION: 200 OINTMENT TOPICAL at 18:34

## 2022-01-01 RX ADMIN — DOCUSATE SODIUM 100 MG: 100 CAPSULE, LIQUID FILLED ORAL at 08:55

## 2022-01-01 RX ADMIN — SULFAMETHOXAZOLE AND TRIMETHOPRIM 1 TABLET: 800; 160 TABLET ORAL at 09:09

## 2022-01-01 RX ADMIN — LEVOFLOXACIN 500 MG: 500 TABLET, FILM COATED ORAL at 16:00

## 2022-01-01 RX ADMIN — ZINC OXIDE 1 APPLICATION: 200 OINTMENT TOPICAL at 08:55

## 2022-01-01 RX ADMIN — TAMSULOSIN HYDROCHLORIDE 0.4 MG: 0.4 CAPSULE ORAL at 10:06

## 2022-01-01 RX ADMIN — Medication 10 ML: at 09:35

## 2022-01-01 RX ADMIN — SULFAMETHOXAZOLE AND TRIMETHOPRIM 1 TABLET: 800; 160 TABLET ORAL at 16:30

## 2022-01-01 RX ADMIN — QUETIAPINE FUMARATE 12.5 MG: 25 TABLET ORAL at 16:28

## 2022-01-01 RX ADMIN — PREGABALIN 75 MG: 75 CAPSULE ORAL at 22:33

## 2022-01-01 RX ADMIN — SODIUM CHLORIDE 1000 ML: 9 INJECTION, SOLUTION INTRAVENOUS at 21:25

## 2022-01-01 RX ADMIN — METRONIDAZOLE 500 MG: 500 TABLET ORAL at 23:00

## 2022-01-01 RX ADMIN — SODIUM CHLORIDE 125 ML/HR: 9 INJECTION, SOLUTION INTRAVENOUS at 23:47

## 2022-01-01 RX ADMIN — Medication 1 APPLICATION: at 08:55

## 2022-01-01 RX ADMIN — Medication 150 MG: at 10:06

## 2022-01-01 RX ADMIN — INSULIN ASPART 4 UNITS: 100 INJECTION, SOLUTION INTRAVENOUS; SUBCUTANEOUS at 17:43

## 2022-01-01 RX ADMIN — APIXABAN 2.5 MG: 2.5 TABLET, FILM COATED ORAL at 08:54

## 2022-01-01 RX ADMIN — DOCUSATE SODIUM 100 MG: 100 CAPSULE, LIQUID FILLED ORAL at 20:07

## 2022-01-01 RX ADMIN — INSULIN DETEMIR 25 UNITS: 100 INJECTION, SOLUTION SUBCUTANEOUS at 19:59

## 2022-01-01 RX ADMIN — Medication 1 PACKET: at 08:17

## 2022-01-01 RX ADMIN — PREGABALIN 75 MG: 75 CAPSULE ORAL at 08:38

## 2022-01-01 RX ADMIN — CITALOPRAM HYDROBROMIDE 20 MG: 20 TABLET ORAL at 20:00

## 2022-01-01 RX ADMIN — Medication 2000 UNITS: at 08:38

## 2022-01-01 RX ADMIN — FLUTICASONE PROPIONATE 2 SPRAY: 50 SPRAY, METERED NASAL at 13:51

## 2022-01-01 RX ADMIN — ATROPINE SULFATE 0.5 MG: 0.1 INJECTION, SOLUTION INTRAVENOUS at 06:44

## 2022-01-01 RX ADMIN — METRONIDAZOLE 500 MG: 500 TABLET ORAL at 13:50

## 2022-01-01 RX ADMIN — APIXABAN 2.5 MG: 2.5 TABLET, FILM COATED ORAL at 09:35

## 2022-01-01 RX ADMIN — POLYETHYLENE GLYCOL 3350 17 G: 17 POWDER, FOR SOLUTION ORAL at 09:05

## 2022-01-01 RX ADMIN — Medication 1 APPLICATION: at 20:08

## 2022-01-01 RX ADMIN — LEVOFLOXACIN 500 MG: 500 TABLET, FILM COATED ORAL at 15:47

## 2022-01-01 RX ADMIN — CITALOPRAM HYDROBROMIDE 20 MG: 20 TABLET ORAL at 19:56

## 2022-01-01 RX ADMIN — MAGNESIUM SULFATE HEPTAHYDRATE 2 G: 40 INJECTION, SOLUTION INTRAVENOUS at 08:10

## 2022-01-01 RX ADMIN — PREGABALIN 75 MG: 75 CAPSULE ORAL at 09:34

## 2022-01-01 RX ADMIN — PREGABALIN 75 MG: 75 CAPSULE ORAL at 19:45

## 2022-01-01 RX ADMIN — FLUCONAZOLE 200 MG: 100 TABLET ORAL at 17:42

## 2022-01-01 RX ADMIN — PREGABALIN 75 MG: 75 CAPSULE ORAL at 09:02

## 2022-01-01 RX ADMIN — ZINC OXIDE 1 APPLICATION: 200 OINTMENT TOPICAL at 08:46

## 2022-01-01 RX ADMIN — DOCUSATE SODIUM 100 MG: 100 CAPSULE, LIQUID FILLED ORAL at 21:26

## 2022-01-01 RX ADMIN — Medication 1 APPLICATION: at 21:36

## 2022-01-01 RX ADMIN — ZINC OXIDE 1 APPLICATION: 200 OINTMENT TOPICAL at 09:36

## 2022-01-01 RX ADMIN — TAMSULOSIN HYDROCHLORIDE 0.4 MG: 0.4 CAPSULE ORAL at 13:51

## 2022-01-01 RX ADMIN — IRON SUCROSE 200 MG: 20 INJECTION, SOLUTION INTRAVENOUS at 16:23

## 2022-01-01 RX ADMIN — BUMETANIDE 1 MG: 1 TABLET ORAL at 09:35

## 2022-01-01 RX ADMIN — DOCUSATE SODIUM 100 MG: 100 CAPSULE, LIQUID FILLED ORAL at 08:56

## 2022-01-01 RX ADMIN — FLUTICASONE PROPIONATE 2 SPRAY: 50 SPRAY, METERED NASAL at 09:06

## 2022-01-01 RX ADMIN — ZINC OXIDE 1 APPLICATION: 200 OINTMENT TOPICAL at 10:08

## 2022-01-01 RX ADMIN — LEVOTHYROXINE SODIUM 224 MCG: 0.11 TABLET ORAL at 05:14

## 2022-01-01 RX ADMIN — APIXABAN 2.5 MG: 2.5 TABLET, FILM COATED ORAL at 09:03

## 2022-01-01 RX ADMIN — INSULIN ASPART 4 UNITS: 100 INJECTION, SOLUTION INTRAVENOUS; SUBCUTANEOUS at 09:36

## 2022-01-01 RX ADMIN — APIXABAN 2.5 MG: 2.5 TABLET, FILM COATED ORAL at 08:59

## 2022-01-01 RX ADMIN — MICONAZOLE NITRATE 1 APPLICATION: 20 CREAM TOPICAL at 16:19

## 2022-01-01 RX ADMIN — FLUTICASONE PROPIONATE 2 SPRAY: 50 SPRAY, METERED NASAL at 08:23

## 2022-01-01 RX ADMIN — LEVOTHYROXINE SODIUM 224 MCG: 0.11 TABLET ORAL at 05:57

## 2022-01-01 RX ADMIN — SODIUM ZIRCONIUM CYCLOSILICATE 10 G: 10 POWDER, FOR SUSPENSION ORAL at 09:00

## 2022-01-01 RX ADMIN — APIXABAN 2.5 MG: 2.5 TABLET, FILM COATED ORAL at 20:07

## 2022-01-01 RX ADMIN — ATORVASTATIN CALCIUM 10 MG: 10 TABLET, FILM COATED ORAL at 20:32

## 2022-01-01 RX ADMIN — CITALOPRAM HYDROBROMIDE 20 MG: 20 TABLET ORAL at 21:15

## 2022-01-01 RX ADMIN — POLYETHYLENE GLYCOL 3350 17 G: 17 POWDER, FOR SOLUTION ORAL at 08:19

## 2022-01-01 RX ADMIN — ATORVASTATIN CALCIUM 10 MG: 10 TABLET, FILM COATED ORAL at 21:43

## 2022-01-01 RX ADMIN — ZINC OXIDE 1 APPLICATION: 200 OINTMENT TOPICAL at 08:58

## 2022-01-01 RX ADMIN — BUDESONIDE AND FORMOTEROL FUMARATE DIHYDRATE 2 PUFF: 160; 4.5 AEROSOL RESPIRATORY (INHALATION) at 09:23

## 2022-01-01 RX ADMIN — SODIUM CHLORIDE, PRESERVATIVE FREE 10 ML: 5 INJECTION INTRAVENOUS at 19:57

## 2022-01-01 RX ADMIN — BUDESONIDE AND FORMOTEROL FUMARATE DIHYDRATE 2 PUFF: 160; 4.5 AEROSOL RESPIRATORY (INHALATION) at 20:48

## 2022-01-01 RX ADMIN — CITALOPRAM HYDROBROMIDE 20 MG: 20 TABLET ORAL at 22:33

## 2022-01-01 RX ADMIN — BUDESONIDE AND FORMOTEROL FUMARATE DIHYDRATE 2 PUFF: 160; 4.5 AEROSOL RESPIRATORY (INHALATION) at 09:03

## 2022-01-01 RX ADMIN — ZINC OXIDE 1 APPLICATION: 200 OINTMENT TOPICAL at 08:54

## 2022-01-01 RX ADMIN — FLUTICASONE PROPIONATE 2 SPRAY: 50 SPRAY, METERED NASAL at 10:07

## 2022-01-01 RX ADMIN — APIXABAN 2.5 MG: 2.5 TABLET, FILM COATED ORAL at 21:43

## 2022-01-01 RX ADMIN — TAMSULOSIN HYDROCHLORIDE 0.4 MG: 0.4 CAPSULE ORAL at 09:03

## 2022-01-01 RX ADMIN — FLUCONAZOLE 200 MG: 100 TABLET ORAL at 17:06

## 2022-01-01 RX ADMIN — LEVOTHYROXINE SODIUM 224 MCG: 0.11 TABLET ORAL at 06:21

## 2022-01-01 RX ADMIN — PREGABALIN 75 MG: 75 CAPSULE ORAL at 21:43

## 2022-01-01 RX ADMIN — BUDESONIDE AND FORMOTEROL FUMARATE DIHYDRATE 2 PUFF: 160; 4.5 AEROSOL RESPIRATORY (INHALATION) at 19:47

## 2022-01-01 RX ADMIN — LEVOTHYROXINE SODIUM 224 MCG: 0.11 TABLET ORAL at 05:49

## 2022-01-01 RX ADMIN — INSULIN DETEMIR 25 UNITS: 100 INJECTION, SOLUTION SUBCUTANEOUS at 21:44

## 2022-01-01 RX ADMIN — DOCUSATE SODIUM 100 MG: 100 CAPSULE, LIQUID FILLED ORAL at 20:57

## 2022-01-01 RX ADMIN — INSULIN DETEMIR 25 UNITS: 100 INJECTION, SOLUTION SUBCUTANEOUS at 21:37

## 2022-01-01 RX ADMIN — NOREPINEPHRINE BITARTRATE 0.02 MCG/KG/MIN: 8 INJECTION, SOLUTION INTRAVENOUS at 05:02

## 2022-01-01 RX ADMIN — DOCUSATE SODIUM 100 MG: 100 CAPSULE, LIQUID FILLED ORAL at 08:38

## 2022-01-01 RX ADMIN — PIPERACILLIN AND TAZOBACTAM 3.38 G: 3; .375 INJECTION, POWDER, FOR SOLUTION INTRAVENOUS at 22:47

## 2022-01-01 RX ADMIN — CYANOCOBALAMIN TAB 1000 MCG 1000 MCG: 1000 TAB at 10:07

## 2022-01-01 RX ADMIN — SULFAMETHOXAZOLE AND TRIMETHOPRIM 1 TABLET: 800; 160 TABLET ORAL at 09:34

## 2022-01-01 RX ADMIN — Medication 1 APPLICATION: at 08:20

## 2022-01-01 RX ADMIN — MINOCYCLINE HYDROCHLORIDE 100 MG: 50 CAPSULE ORAL at 11:53

## 2022-01-01 RX ADMIN — BUDESONIDE AND FORMOTEROL FUMARATE DIHYDRATE 2 PUFF: 160; 4.5 AEROSOL RESPIRATORY (INHALATION) at 19:32

## 2022-01-01 RX ADMIN — APIXABAN 2.5 MG: 2.5 TABLET, FILM COATED ORAL at 09:09

## 2022-01-01 RX ADMIN — BUMETANIDE 2 MG: 1 TABLET ORAL at 09:05

## 2022-01-01 RX ADMIN — QUETIAPINE FUMARATE 12.5 MG: 25 TABLET ORAL at 17:06

## 2022-01-01 RX ADMIN — ATORVASTATIN CALCIUM 10 MG: 10 TABLET, FILM COATED ORAL at 21:15

## 2022-01-01 RX ADMIN — Medication 1 PACKET: at 09:08

## 2022-01-01 RX ADMIN — INSULIN ASPART 4 UNITS: 100 INJECTION, SOLUTION INTRAVENOUS; SUBCUTANEOUS at 13:14

## 2022-01-01 RX ADMIN — Medication 150 MG: at 08:54

## 2022-01-01 RX ADMIN — Medication 2 PACKET: at 20:14

## 2022-01-01 RX ADMIN — BUMETANIDE 2 MG: 1 TABLET ORAL at 17:06

## 2022-01-01 RX ADMIN — FLUTICASONE PROPIONATE 2 SPRAY: 50 SPRAY, METERED NASAL at 09:36

## 2022-01-01 RX ADMIN — IRON SUCROSE 200 MG: 20 INJECTION, SOLUTION INTRAVENOUS at 16:18

## 2022-01-01 RX ADMIN — BUDESONIDE AND FORMOTEROL FUMARATE DIHYDRATE 2 PUFF: 160; 4.5 AEROSOL RESPIRATORY (INHALATION) at 21:48

## 2022-01-01 RX ADMIN — LEVOTHYROXINE SODIUM 224 MCG: 0.11 TABLET ORAL at 06:32

## 2022-01-01 RX ADMIN — ATORVASTATIN CALCIUM 10 MG: 10 TABLET, FILM COATED ORAL at 21:27

## 2022-01-01 RX ADMIN — SODIUM CHLORIDE 75 ML/HR: 9 INJECTION, SOLUTION INTRAVENOUS at 16:51

## 2022-01-01 RX ADMIN — METRONIDAZOLE 500 MG: 500 TABLET ORAL at 06:08

## 2022-01-01 RX ADMIN — TAMSULOSIN HYDROCHLORIDE 0.4 MG: 0.4 CAPSULE ORAL at 08:24

## 2022-01-01 RX ADMIN — MICONAZOLE NITRATE 1 APPLICATION: 20 CREAM TOPICAL at 08:55

## 2022-01-01 RX ADMIN — ZINC OXIDE 1 APPLICATION: 200 OINTMENT TOPICAL at 08:24

## 2022-01-01 RX ADMIN — Medication 2000 UNITS: at 09:05

## 2022-01-01 RX ADMIN — QUETIAPINE FUMARATE 12.5 MG: 25 TABLET ORAL at 17:04

## 2022-01-01 RX ADMIN — TAMSULOSIN HYDROCHLORIDE 0.4 MG: 0.4 CAPSULE ORAL at 08:38

## 2022-01-01 RX ADMIN — METRONIDAZOLE 500 MG: 500 TABLET ORAL at 22:33

## 2022-01-01 RX ADMIN — SODIUM CHLORIDE 250 ML/HR: 9 INJECTION, SOLUTION INTRAVENOUS at 07:20

## 2022-01-01 RX ADMIN — LEVOFLOXACIN 500 MG: 500 TABLET, FILM COATED ORAL at 16:38

## 2022-01-01 RX ADMIN — FLUCONAZOLE 200 MG: 100 TABLET ORAL at 08:38

## 2022-01-01 RX ADMIN — FLUTICASONE PROPIONATE 2 SPRAY: 50 SPRAY, METERED NASAL at 08:55

## 2022-01-01 RX ADMIN — HYDROMORPHONE HYDROCHLORIDE 2 MG: 2 INJECTION, SOLUTION INTRAMUSCULAR; INTRAVENOUS; SUBCUTANEOUS at 23:51

## 2022-01-01 RX ADMIN — BUDESONIDE AND FORMOTEROL FUMARATE DIHYDRATE 2 PUFF: 160; 4.5 AEROSOL RESPIRATORY (INHALATION) at 20:40

## 2022-01-01 RX ADMIN — ZINC OXIDE 1 APPLICATION: 200 OINTMENT TOPICAL at 16:00

## 2022-01-01 RX ADMIN — ZINC OXIDE 1 APPLICATION: 200 OINTMENT TOPICAL at 15:22

## 2022-01-01 RX ADMIN — DOCUSATE SODIUM 100 MG: 100 CAPSULE, LIQUID FILLED ORAL at 19:45

## 2022-01-01 RX ADMIN — ZINC OXIDE 1 APPLICATION: 200 OINTMENT TOPICAL at 21:27

## 2022-01-01 RX ADMIN — METRONIDAZOLE 500 MG: 500 TABLET ORAL at 06:32

## 2022-01-01 RX ADMIN — PREGABALIN 75 MG: 75 CAPSULE ORAL at 20:14

## 2022-01-01 RX ADMIN — Medication 150 MG: at 08:58

## 2022-01-01 RX ADMIN — ZINC OXIDE 1 APPLICATION: 200 OINTMENT TOPICAL at 17:11

## 2022-01-01 RX ADMIN — ATORVASTATIN CALCIUM 10 MG: 10 TABLET, FILM COATED ORAL at 20:00

## 2022-01-01 RX ADMIN — Medication 2000 UNITS: at 08:55

## 2022-01-01 RX ADMIN — VANCOMYCIN HYDROCHLORIDE 750 MG: 750 INJECTION, POWDER, LYOPHILIZED, FOR SOLUTION INTRAVENOUS at 22:15

## 2022-01-01 RX ADMIN — METRONIDAZOLE 500 MG: 500 TABLET ORAL at 22:14

## 2022-01-01 RX ADMIN — METRONIDAZOLE 500 MG: 500 TABLET ORAL at 14:23

## 2022-01-01 RX ADMIN — QUETIAPINE FUMARATE 12.5 MG: 25 TABLET ORAL at 18:34

## 2022-01-01 RX ADMIN — PREGABALIN 75 MG: 75 CAPSULE ORAL at 20:32

## 2022-01-01 RX ADMIN — BUDESONIDE AND FORMOTEROL FUMARATE DIHYDRATE 2 PUFF: 160; 4.5 AEROSOL RESPIRATORY (INHALATION) at 20:37

## 2022-01-01 RX ADMIN — CYANOCOBALAMIN TAB 1000 MCG 1000 MCG: 1000 TAB at 09:35

## 2022-01-01 RX ADMIN — LINAGLIPTIN 5 MG: 5 TABLET, FILM COATED ORAL at 09:03

## 2022-01-01 RX ADMIN — INSULIN ASPART 4 UNITS: 100 INJECTION, SOLUTION INTRAVENOUS; SUBCUTANEOUS at 12:36

## 2022-01-01 RX ADMIN — Medication 150 MG: at 08:59

## 2022-01-01 RX ADMIN — DOCUSATE SODIUM 100 MG: 100 CAPSULE, LIQUID FILLED ORAL at 21:15

## 2022-01-01 RX ADMIN — CYANOCOBALAMIN TAB 1000 MCG 1000 MCG: 1000 TAB at 08:35

## 2022-01-01 RX ADMIN — FLUTICASONE PROPIONATE 2 SPRAY: 50 SPRAY, METERED NASAL at 08:13

## 2022-01-01 RX ADMIN — SULFAMETHOXAZOLE AND TRIMETHOPRIM 1 TABLET: 800; 160 TABLET ORAL at 08:16

## 2022-01-01 RX ADMIN — DEXTROSE MONOHYDRATE 25 G: 25 INJECTION, SOLUTION INTRAVENOUS at 23:07

## 2022-01-01 RX ADMIN — PREGABALIN 75 MG: 75 CAPSULE ORAL at 09:05

## 2022-01-01 RX ADMIN — PREGABALIN 75 MG: 75 CAPSULE ORAL at 21:26

## 2022-01-01 RX ADMIN — QUETIAPINE FUMARATE 12.5 MG: 25 TABLET ORAL at 17:41

## 2022-01-01 RX ADMIN — FLUCONAZOLE 200 MG: 100 TABLET ORAL at 09:02

## 2022-01-01 RX ADMIN — APIXABAN 2.5 MG: 2.5 TABLET, FILM COATED ORAL at 20:00

## 2022-01-01 RX ADMIN — ATORVASTATIN CALCIUM 10 MG: 10 TABLET, FILM COATED ORAL at 19:56

## 2022-01-01 RX ADMIN — APIXABAN 2.5 MG: 2.5 TABLET, FILM COATED ORAL at 21:16

## 2022-01-01 RX ADMIN — Medication: at 20:44

## 2022-01-01 RX ADMIN — ZINC OXIDE 1 APPLICATION: 200 OINTMENT TOPICAL at 16:51

## 2022-01-01 RX ADMIN — ATORVASTATIN CALCIUM 10 MG: 10 TABLET, FILM COATED ORAL at 22:14

## 2022-01-01 RX ADMIN — TAMSULOSIN HYDROCHLORIDE 0.4 MG: 0.4 CAPSULE ORAL at 08:45

## 2022-01-01 RX ADMIN — DOCUSATE SODIUM 100 MG: 100 CAPSULE, LIQUID FILLED ORAL at 20:00

## 2022-01-01 RX ADMIN — ZINC OXIDE 1 APPLICATION: 200 OINTMENT TOPICAL at 16:30

## 2022-01-01 RX ADMIN — PREGABALIN 75 MG: 75 CAPSULE ORAL at 08:56

## 2022-01-01 RX ADMIN — VANCOMYCIN HYDROCHLORIDE 1000 MG: 1 INJECTION, POWDER, LYOPHILIZED, FOR SOLUTION INTRAVENOUS at 13:38

## 2022-01-01 RX ADMIN — INSULIN ASPART 4 UNITS: 100 INJECTION, SOLUTION INTRAVENOUS; SUBCUTANEOUS at 12:19

## 2022-01-01 RX ADMIN — HYDROMORPHONE HYDROCHLORIDE 2 MG: 2 INJECTION, SOLUTION INTRAMUSCULAR; INTRAVENOUS; SUBCUTANEOUS at 11:09

## 2022-01-01 RX ADMIN — VANCOMYCIN HYDROCHLORIDE 1500 MG: 1.5 INJECTION, POWDER, LYOPHILIZED, FOR SOLUTION INTRAVENOUS at 13:03

## 2022-01-01 RX ADMIN — Medication 1 APPLICATION: at 08:46

## 2022-01-01 RX ADMIN — METRONIDAZOLE 500 MG: 500 TABLET ORAL at 16:02

## 2022-01-01 RX ADMIN — METRONIDAZOLE 500 MG: 500 TABLET ORAL at 05:49

## 2022-01-01 RX ADMIN — APIXABAN 2.5 MG: 2.5 TABLET, FILM COATED ORAL at 21:15

## 2022-01-01 RX ADMIN — ZINC OXIDE 1 APPLICATION: 200 OINTMENT TOPICAL at 09:00

## 2022-01-01 RX ADMIN — APIXABAN 2.5 MG: 2.5 TABLET, FILM COATED ORAL at 20:32

## 2022-01-01 RX ADMIN — Medication 10 ML: at 08:54

## 2022-01-01 RX ADMIN — APIXABAN 2.5 MG: 2.5 TABLET, FILM COATED ORAL at 08:16

## 2022-01-01 RX ADMIN — METRONIDAZOLE 500 MG: 500 TABLET ORAL at 21:15

## 2022-01-01 RX ADMIN — APIXABAN 2.5 MG: 2.5 TABLET, FILM COATED ORAL at 19:55

## 2022-01-01 RX ADMIN — Medication 2000 UNITS: at 08:59

## 2022-01-01 RX ADMIN — CITALOPRAM HYDROBROMIDE 20 MG: 20 TABLET ORAL at 20:07

## 2022-01-01 RX ADMIN — VANCOMYCIN HYDROCHLORIDE 750 MG: 750 INJECTION, POWDER, LYOPHILIZED, FOR SOLUTION INTRAVENOUS at 10:01

## 2022-01-01 RX ADMIN — BUMETANIDE 1 MG: 1 TABLET ORAL at 17:07

## 2022-01-01 RX ADMIN — ACETAMINOPHEN 650 MG: 325 TABLET ORAL at 09:21

## 2022-01-01 RX ADMIN — BUDESONIDE AND FORMOTEROL FUMARATE DIHYDRATE 2 PUFF: 160; 4.5 AEROSOL RESPIRATORY (INHALATION) at 09:55

## 2022-01-01 RX ADMIN — SODIUM ZIRCONIUM CYCLOSILICATE 10 G: 10 POWDER, FOR SUSPENSION ORAL at 08:17

## 2022-01-01 RX ADMIN — CYANOCOBALAMIN TAB 1000 MCG 1000 MCG: 1000 TAB at 08:59

## 2022-01-01 RX ADMIN — POLYETHYLENE GLYCOL 3350 17 G: 17 POWDER, FOR SOLUTION ORAL at 09:32

## 2022-01-01 RX ADMIN — CYANOCOBALAMIN TAB 1000 MCG 1000 MCG: 1000 TAB at 08:55

## 2022-01-01 RX ADMIN — QUETIAPINE FUMARATE 12.5 MG: 25 TABLET ORAL at 16:51

## 2022-01-01 RX ADMIN — METRONIDAZOLE 500 MG: 500 TABLET ORAL at 13:22

## 2022-01-01 RX ADMIN — BUDESONIDE AND FORMOTEROL FUMARATE DIHYDRATE 2 PUFF: 160; 4.5 AEROSOL RESPIRATORY (INHALATION) at 21:14

## 2022-01-01 RX ADMIN — ATORVASTATIN CALCIUM 10 MG: 10 TABLET, FILM COATED ORAL at 20:08

## 2022-01-01 RX ADMIN — PREGABALIN 75 MG: 75 CAPSULE ORAL at 21:36

## 2022-01-01 RX ADMIN — INSULIN DETEMIR 10 UNITS: 100 INJECTION, SOLUTION SUBCUTANEOUS at 21:26

## 2022-01-01 RX ADMIN — TAMSULOSIN HYDROCHLORIDE 0.4 MG: 0.4 CAPSULE ORAL at 09:35

## 2022-01-01 RX ADMIN — TAMSULOSIN HYDROCHLORIDE 0.4 MG: 0.4 CAPSULE ORAL at 08:35

## 2022-01-01 RX ADMIN — SODIUM HYPOCHLORITE: 1.25 SOLUTION TOPICAL at 14:01

## 2022-01-01 RX ADMIN — INSULIN ASPART 4 UNITS: 100 INJECTION, SOLUTION INTRAVENOUS; SUBCUTANEOUS at 12:27

## 2022-01-01 RX ADMIN — Medication 1 APPLICATION: at 09:00

## 2022-01-01 RX ADMIN — BUDESONIDE AND FORMOTEROL FUMARATE DIHYDRATE 2 PUFF: 160; 4.5 AEROSOL RESPIRATORY (INHALATION) at 09:36

## 2022-01-01 RX ADMIN — DOCUSATE SODIUM 100 MG: 100 CAPSULE, LIQUID FILLED ORAL at 08:24

## 2022-01-01 RX ADMIN — INSULIN ASPART 4 UNITS: 100 INJECTION, SOLUTION INTRAVENOUS; SUBCUTANEOUS at 12:39

## 2022-01-01 RX ADMIN — METRONIDAZOLE 500 MG: 500 TABLET ORAL at 21:51

## 2022-01-01 RX ADMIN — SODIUM CHLORIDE, PRESERVATIVE FREE 10 ML: 5 INJECTION INTRAVENOUS at 19:58

## 2022-01-01 RX ADMIN — INSULIN DETEMIR 25 UNITS: 100 INJECTION, SOLUTION SUBCUTANEOUS at 21:27

## 2022-01-01 RX ADMIN — ZINC OXIDE 1 APPLICATION: 200 OINTMENT TOPICAL at 18:26

## 2022-01-01 RX ADMIN — BUDESONIDE AND FORMOTEROL FUMARATE DIHYDRATE 2 PUFF: 160; 4.5 AEROSOL RESPIRATORY (INHALATION) at 09:11

## 2022-01-01 RX ADMIN — GLYCOPYRROLATE 0.4 MG: 0.2 INJECTION INTRAMUSCULAR; INTRAVENOUS at 01:36

## 2022-01-01 RX ADMIN — INSULIN DETEMIR 25 UNITS: 100 INJECTION, SOLUTION SUBCUTANEOUS at 22:35

## 2022-01-01 RX ADMIN — Medication 1 APPLICATION: at 21:15

## 2022-01-01 RX ADMIN — BUDESONIDE AND FORMOTEROL FUMARATE DIHYDRATE 2 PUFF: 160; 4.5 AEROSOL RESPIRATORY (INHALATION) at 20:54

## 2022-01-01 RX ADMIN — Medication 2000 UNITS: at 10:06

## 2022-01-01 RX ADMIN — BUDESONIDE AND FORMOTEROL FUMARATE DIHYDRATE 2 PUFF: 160; 4.5 AEROSOL RESPIRATORY (INHALATION) at 09:26

## 2022-01-01 RX ADMIN — APIXABAN 2.5 MG: 2.5 TABLET, FILM COATED ORAL at 19:45

## 2022-01-01 RX ADMIN — Medication 150 MG: at 08:35

## 2022-01-01 RX ADMIN — BUDESONIDE AND FORMOTEROL FUMARATE DIHYDRATE 2 PUFF: 160; 4.5 AEROSOL RESPIRATORY (INHALATION) at 19:22

## 2022-01-01 RX ADMIN — PREGABALIN 75 MG: 75 CAPSULE ORAL at 08:54

## 2022-01-01 RX ADMIN — BUDESONIDE AND FORMOTEROL FUMARATE DIHYDRATE 2 PUFF: 160; 4.5 AEROSOL RESPIRATORY (INHALATION) at 07:03

## 2022-01-01 RX ADMIN — ZINC OXIDE 1 APPLICATION: 200 OINTMENT TOPICAL at 18:56

## 2022-01-01 RX ADMIN — ATORVASTATIN CALCIUM 10 MG: 10 TABLET, FILM COATED ORAL at 20:07

## 2022-01-01 RX ADMIN — APIXABAN 2.5 MG: 2.5 TABLET, FILM COATED ORAL at 10:06

## 2022-01-01 RX ADMIN — APIXABAN 2.5 MG: 2.5 TABLET, FILM COATED ORAL at 09:01

## 2022-01-01 RX ADMIN — ZINC OXIDE 1 APPLICATION: 200 OINTMENT TOPICAL at 17:08

## 2022-01-01 RX ADMIN — ZINC OXIDE 1 APPLICATION: 200 OINTMENT TOPICAL at 20:08

## 2022-01-01 RX ADMIN — TAMSULOSIN HYDROCHLORIDE 0.4 MG: 0.4 CAPSULE ORAL at 08:55

## 2022-01-01 RX ADMIN — LEVOTHYROXINE SODIUM 224 MCG: 0.11 TABLET ORAL at 06:42

## 2022-01-01 RX ADMIN — QUETIAPINE FUMARATE 12.5 MG: 25 TABLET ORAL at 16:01

## 2022-01-01 RX ADMIN — PREGABALIN 75 MG: 75 CAPSULE ORAL at 08:35

## 2022-01-01 RX ADMIN — BUDESONIDE AND FORMOTEROL FUMARATE DIHYDRATE 2 PUFF: 160; 4.5 AEROSOL RESPIRATORY (INHALATION) at 10:07

## 2022-01-01 RX ADMIN — SULFAMETHOXAZOLE AND TRIMETHOPRIM 1 TABLET: 800; 160 TABLET ORAL at 08:58

## 2022-01-01 RX ADMIN — BUMETANIDE 2 MG: 1 TABLET ORAL at 09:02

## 2022-01-01 RX ADMIN — POLYETHYLENE GLYCOL 3350 17 G: 17 POWDER, FOR SOLUTION ORAL at 08:54

## 2022-01-01 RX ADMIN — SODIUM ZIRCONIUM CYCLOSILICATE 10 G: 10 POWDER, FOR SUSPENSION ORAL at 15:08

## 2022-01-01 RX ADMIN — ACETAMINOPHEN 650 MG: 650 SUPPOSITORY RECTAL at 22:25

## 2022-01-01 RX ADMIN — QUETIAPINE FUMARATE 12.5 MG: 25 TABLET ORAL at 16:37

## 2022-01-01 RX ADMIN — Medication 10 ML: at 20:08

## 2022-01-01 RX ADMIN — APIXABAN 2.5 MG: 2.5 TABLET, FILM COATED ORAL at 08:38

## 2022-01-01 RX ADMIN — Medication 150 MG: at 13:50

## 2022-01-01 RX ADMIN — LINAGLIPTIN 5 MG: 5 TABLET, FILM COATED ORAL at 09:05

## 2022-01-01 RX ADMIN — Medication 1 APPLICATION: at 08:35

## 2022-01-01 RX ADMIN — PREGABALIN 75 MG: 75 CAPSULE ORAL at 08:59

## 2022-01-01 RX ADMIN — LEVOTHYROXINE SODIUM 224 MCG: 0.11 TABLET ORAL at 08:53

## 2022-01-01 RX ADMIN — TAMSULOSIN HYDROCHLORIDE 0.4 MG: 0.4 CAPSULE ORAL at 08:19

## 2022-01-01 RX ADMIN — ZINC OXIDE 1 APPLICATION: 200 OINTMENT TOPICAL at 08:35

## 2022-01-01 RX ADMIN — BUDESONIDE AND FORMOTEROL FUMARATE DIHYDRATE 2 PUFF: 160; 4.5 AEROSOL RESPIRATORY (INHALATION) at 19:49

## 2022-01-01 RX ADMIN — DOCUSATE SODIUM 100 MG: 100 CAPSULE, LIQUID FILLED ORAL at 08:34

## 2022-01-01 RX ADMIN — CITALOPRAM HYDROBROMIDE 20 MG: 20 TABLET ORAL at 21:26

## 2022-01-01 RX ADMIN — CYANOCOBALAMIN TAB 1000 MCG 1000 MCG: 1000 TAB at 08:15

## 2022-01-01 RX ADMIN — ATORVASTATIN CALCIUM 10 MG: 10 TABLET, FILM COATED ORAL at 22:33

## 2022-01-01 RX ADMIN — ZINC OXIDE 1 APPLICATION: 200 OINTMENT TOPICAL at 21:44

## 2022-01-01 RX ADMIN — POLYETHYLENE GLYCOL 3350 17 G: 17 POWDER, FOR SOLUTION ORAL at 08:46

## 2022-01-01 RX ADMIN — Medication 1 APPLICATION: at 21:27

## 2022-01-01 RX ADMIN — PREGABALIN 75 MG: 75 CAPSULE ORAL at 20:08

## 2022-01-01 RX ADMIN — SODIUM HYPOCHLORITE: 1.25 SOLUTION TOPICAL at 14:12

## 2022-01-01 RX ADMIN — ZINC OXIDE 1 APPLICATION: 200 OINTMENT TOPICAL at 21:37

## 2022-01-01 RX ADMIN — MICONAZOLE NITRATE 1 APPLICATION: 20 CREAM TOPICAL at 19:45

## 2022-01-01 RX ADMIN — POLYETHYLENE GLYCOL 3350 17 G: 17 POWDER, FOR SOLUTION ORAL at 08:14

## 2022-01-01 RX ADMIN — BUDESONIDE AND FORMOTEROL FUMARATE DIHYDRATE 2 PUFF: 160; 4.5 AEROSOL RESPIRATORY (INHALATION) at 19:39

## 2022-01-01 RX ADMIN — Medication 1 PACKET: at 09:38

## 2022-01-01 RX ADMIN — CITALOPRAM HYDROBROMIDE 20 MG: 20 TABLET ORAL at 19:45

## 2022-01-01 RX ADMIN — PREGABALIN 75 MG: 75 CAPSULE ORAL at 08:16

## 2022-01-01 RX ADMIN — ATORVASTATIN CALCIUM 10 MG: 10 TABLET, FILM COATED ORAL at 19:45

## 2022-01-01 RX ADMIN — MICONAZOLE NITRATE 1 APPLICATION: 20 CREAM TOPICAL at 08:17

## 2022-01-01 RX ADMIN — INSULIN DETEMIR 25 UNITS: 100 INJECTION, SOLUTION SUBCUTANEOUS at 21:20

## 2022-01-01 RX ADMIN — BUDESONIDE AND FORMOTEROL FUMARATE DIHYDRATE 2 PUFF: 160; 4.5 AEROSOL RESPIRATORY (INHALATION) at 07:59

## 2022-01-01 RX ADMIN — ZINC OXIDE 1 APPLICATION: 200 OINTMENT TOPICAL at 20:32

## 2022-01-01 RX ADMIN — Medication 1 APPLICATION: at 13:51

## 2022-01-01 RX ADMIN — BUMETANIDE 2 MG: 1 TABLET ORAL at 17:41

## 2022-01-01 RX ADMIN — BUMETANIDE 1 MG: 1 TABLET ORAL at 08:54

## 2022-01-01 RX ADMIN — HYDROMORPHONE HYDROCHLORIDE 2 MG: 2 INJECTION, SOLUTION INTRAMUSCULAR; INTRAVENOUS; SUBCUTANEOUS at 04:08

## 2022-01-01 RX ADMIN — BUMETANIDE 1 MG: 1 TABLET ORAL at 08:45

## 2022-01-01 RX ADMIN — BUDESONIDE AND FORMOTEROL FUMARATE DIHYDRATE 2 PUFF: 160; 4.5 AEROSOL RESPIRATORY (INHALATION) at 19:51

## 2022-01-01 RX ADMIN — APIXABAN 2.5 MG: 2.5 TABLET, FILM COATED ORAL at 08:20

## 2022-01-01 RX ADMIN — SULFAMETHOXAZOLE AND TRIMETHOPRIM 1 TABLET: 800; 160 TABLET ORAL at 22:14

## 2022-01-01 RX ADMIN — DOCUSATE SODIUM 100 MG: 100 CAPSULE, LIQUID FILLED ORAL at 08:16

## 2022-01-01 RX ADMIN — APIXABAN 2.5 MG: 2.5 TABLET, FILM COATED ORAL at 20:08

## 2022-01-01 RX ADMIN — DOCUSATE SODIUM 100 MG: 100 CAPSULE, LIQUID FILLED ORAL at 09:05

## 2022-01-01 RX ADMIN — METRONIDAZOLE 500 MG: 500 TABLET ORAL at 06:10

## 2022-01-01 RX ADMIN — Medication 10 ML: at 01:30

## 2022-01-01 RX ADMIN — DOCUSATE SODIUM 100 MG: 100 CAPSULE, LIQUID FILLED ORAL at 10:07

## 2022-01-01 RX ADMIN — SODIUM HYPOCHLORITE: 1.25 SOLUTION TOPICAL at 08:58

## 2022-01-01 RX ADMIN — ZINC OXIDE 1 APPLICATION: 200 OINTMENT TOPICAL at 21:16

## 2022-01-01 RX ADMIN — BUDESONIDE AND FORMOTEROL FUMARATE DIHYDRATE 2 PUFF: 160; 4.5 AEROSOL RESPIRATORY (INHALATION) at 20:46

## 2022-01-01 RX ADMIN — ZINC OXIDE 1 APPLICATION: 200 OINTMENT TOPICAL at 19:45

## 2022-01-01 RX ADMIN — QUETIAPINE FUMARATE 12.5 MG: 25 TABLET ORAL at 17:52

## 2022-01-01 RX ADMIN — Medication 2000 UNITS: at 08:24

## 2022-01-01 RX ADMIN — CYANOCOBALAMIN TAB 1000 MCG 1000 MCG: 1000 TAB at 09:05

## 2022-01-01 RX ADMIN — FLUCONAZOLE 200 MG: 100 TABLET ORAL at 09:06

## 2022-01-01 RX ADMIN — METRONIDAZOLE 500 MG: 500 TABLET ORAL at 21:26

## 2022-01-01 RX ADMIN — DOCUSATE SODIUM 100 MG: 100 CAPSULE, LIQUID FILLED ORAL at 09:09

## 2022-01-01 RX ADMIN — ACETAMINOPHEN 650 MG: 325 TABLET ORAL at 10:42

## 2022-01-01 RX ADMIN — SODIUM CHLORIDE 100 ML/HR: 9 INJECTION, SOLUTION INTRAVENOUS at 00:54

## 2022-01-01 RX ADMIN — BUDESONIDE AND FORMOTEROL FUMARATE DIHYDRATE 2 PUFF: 160; 4.5 AEROSOL RESPIRATORY (INHALATION) at 20:03

## 2022-01-01 RX ADMIN — ZINC OXIDE 1 APPLICATION: 200 OINTMENT TOPICAL at 09:02

## 2022-01-01 RX ADMIN — APIXABAN 2.5 MG: 2.5 TABLET, FILM COATED ORAL at 09:02

## 2022-01-01 RX ADMIN — CYANOCOBALAMIN TAB 1000 MCG 1000 MCG: 1000 TAB at 08:20

## 2022-01-01 RX ADMIN — CYANOCOBALAMIN TAB 1000 MCG 1000 MCG: 1000 TAB at 08:45

## 2022-01-01 RX ADMIN — BUDESONIDE AND FORMOTEROL FUMARATE DIHYDRATE 2 PUFF: 160; 4.5 AEROSOL RESPIRATORY (INHALATION) at 09:25

## 2022-01-01 RX ADMIN — LEVOTHYROXINE SODIUM 224 MCG: 0.11 TABLET ORAL at 06:45

## 2022-01-01 RX ADMIN — METRONIDAZOLE 500 MG: 500 TABLET ORAL at 06:45

## 2022-01-01 RX ADMIN — BUDESONIDE AND FORMOTEROL FUMARATE DIHYDRATE 2 PUFF: 160; 4.5 AEROSOL RESPIRATORY (INHALATION) at 10:01

## 2022-01-01 RX ADMIN — IRON SUCROSE 200 MG: 20 INJECTION, SOLUTION INTRAVENOUS at 15:08

## 2022-01-01 RX ADMIN — POLYETHYLENE GLYCOL 3350 17 G: 17 POWDER, FOR SOLUTION ORAL at 10:06

## 2022-01-01 RX ADMIN — ATORVASTATIN CALCIUM 10 MG: 10 TABLET, FILM COATED ORAL at 21:36

## 2022-01-01 RX ADMIN — SULFAMETHOXAZOLE AND TRIMETHOPRIM 1 TABLET: 800; 160 TABLET ORAL at 20:14

## 2022-01-01 RX ADMIN — SODIUM CHLORIDE, POTASSIUM CHLORIDE, SODIUM LACTATE AND CALCIUM CHLORIDE 1000 ML: 600; 310; 30; 20 INJECTION, SOLUTION INTRAVENOUS at 23:05

## 2022-01-01 RX ADMIN — Medication 2000 UNITS: at 13:51

## 2022-01-01 RX ADMIN — METRONIDAZOLE 500 MG: 500 TABLET ORAL at 13:14

## 2022-01-01 RX ADMIN — METRONIDAZOLE 500 MG: 500 TABLET ORAL at 14:38

## 2022-01-01 RX ADMIN — LINAGLIPTIN 5 MG: 5 TABLET, FILM COATED ORAL at 13:50

## 2022-01-01 RX ADMIN — METRONIDAZOLE 500 MG: 500 TABLET ORAL at 22:31

## 2022-01-01 RX ADMIN — BUMETANIDE 2 MG: 1 TABLET ORAL at 17:11

## 2022-01-01 RX ADMIN — ZINC OXIDE 1 APPLICATION: 200 OINTMENT TOPICAL at 09:03

## 2022-01-01 RX ADMIN — IRON SUCROSE 200 MG: 20 INJECTION, SOLUTION INTRAVENOUS at 15:57

## 2022-01-01 RX ADMIN — CYANOCOBALAMIN TAB 1000 MCG 1000 MCG: 1000 TAB at 08:24

## 2022-01-01 RX ADMIN — VANCOMYCIN HYDROCHLORIDE 1000 MG: 1 INJECTION, POWDER, LYOPHILIZED, FOR SOLUTION INTRAVENOUS at 14:20

## 2022-01-01 RX ADMIN — INSULIN ASPART 2 UNITS: 100 INJECTION, SOLUTION INTRAVENOUS; SUBCUTANEOUS at 12:14

## 2022-01-01 RX ADMIN — FLUCONAZOLE 200 MG: 100 TABLET ORAL at 16:51

## 2022-01-01 RX ADMIN — PREGABALIN 75 MG: 75 CAPSULE ORAL at 08:24

## 2022-01-01 RX ADMIN — ZINC OXIDE 1 APPLICATION: 200 OINTMENT TOPICAL at 16:23

## 2022-01-01 RX ADMIN — CYANOCOBALAMIN TAB 1000 MCG 1000 MCG: 1000 TAB at 08:58

## 2022-01-01 RX ADMIN — Medication: at 22:54

## 2022-01-01 RX ADMIN — DOCUSATE SODIUM 100 MG: 100 CAPSULE, LIQUID FILLED ORAL at 08:20

## 2022-01-01 RX ADMIN — FLUTICASONE PROPIONATE 2 SPRAY: 50 SPRAY, METERED NASAL at 09:10

## 2022-01-01 RX ADMIN — ZINC OXIDE 1 APPLICATION: 200 OINTMENT TOPICAL at 20:06

## 2022-01-01 RX ADMIN — VANCOMYCIN HYDROCHLORIDE 1000 MG: 1 INJECTION, POWDER, LYOPHILIZED, FOR SOLUTION INTRAVENOUS at 13:14

## 2022-01-01 RX ADMIN — SODIUM BICARBONATE 50 MEQ: 84 INJECTION INTRAVENOUS at 23:10

## 2022-01-01 RX ADMIN — QUETIAPINE FUMARATE 12.5 MG: 25 TABLET ORAL at 17:48

## 2022-01-01 RX ADMIN — POLYETHYLENE GLYCOL 3350 17 G: 17 POWDER, FOR SOLUTION ORAL at 08:24

## 2022-01-01 RX ADMIN — INSULIN DETEMIR 10 UNITS: 100 INJECTION, SOLUTION SUBCUTANEOUS at 21:10

## 2022-01-01 RX ADMIN — LINAGLIPTIN 5 MG: 5 TABLET, FILM COATED ORAL at 09:02

## 2022-01-01 RX ADMIN — CITALOPRAM HYDROBROMIDE 20 MG: 20 TABLET ORAL at 22:14

## 2022-01-01 RX ADMIN — Medication 1 APPLICATION: at 08:59

## 2022-01-01 RX ADMIN — Medication 1 APPLICATION: at 20:32

## 2022-01-01 RX ADMIN — HYDROMORPHONE HYDROCHLORIDE 2 MG: 2 INJECTION, SOLUTION INTRAMUSCULAR; INTRAVENOUS; SUBCUTANEOUS at 18:51

## 2022-01-01 RX ADMIN — SODIUM CHLORIDE, POTASSIUM CHLORIDE, SODIUM LACTATE AND CALCIUM CHLORIDE 2880 ML: 600; 310; 30; 20 INJECTION, SOLUTION INTRAVENOUS at 01:24

## 2022-01-01 RX ADMIN — FLUCONAZOLE 200 MG: 100 TABLET ORAL at 08:24

## 2022-01-01 RX ADMIN — FLUCONAZOLE 200 MG: 100 TABLET ORAL at 08:56

## 2022-01-01 RX ADMIN — POLYETHYLENE GLYCOL 3350 17 G: 17 POWDER, FOR SOLUTION ORAL at 09:02

## 2022-01-01 RX ADMIN — SODIUM CHLORIDE, POTASSIUM CHLORIDE, SODIUM LACTATE AND CALCIUM CHLORIDE 100 ML/HR: 600; 310; 30; 20 INJECTION, SOLUTION INTRAVENOUS at 03:22

## 2022-01-01 RX ADMIN — ZINC OXIDE 1 APPLICATION: 200 OINTMENT TOPICAL at 08:17

## 2022-01-01 RX ADMIN — TAMSULOSIN HYDROCHLORIDE 0.4 MG: 0.4 CAPSULE ORAL at 08:58

## 2022-01-01 RX ADMIN — VANCOMYCIN HYDROCHLORIDE 1000 MG: 1 INJECTION, POWDER, LYOPHILIZED, FOR SOLUTION INTRAVENOUS at 13:22

## 2022-01-01 RX ADMIN — DOCUSATE SODIUM 100 MG: 100 CAPSULE, LIQUID FILLED ORAL at 08:59

## 2022-01-01 RX ADMIN — ZINC OXIDE 1 APPLICATION: 200 OINTMENT TOPICAL at 22:35

## 2022-01-01 RX ADMIN — TAMSULOSIN HYDROCHLORIDE 0.4 MG: 0.4 CAPSULE ORAL at 09:02

## 2022-01-01 RX ADMIN — LEVOTHYROXINE SODIUM 224 MCG: 0.11 TABLET ORAL at 06:19

## 2022-01-01 RX ADMIN — PREGABALIN 75 MG: 75 CAPSULE ORAL at 20:00

## 2022-01-01 RX ADMIN — LINAGLIPTIN 5 MG: 5 TABLET, FILM COATED ORAL at 08:38

## 2022-01-01 RX ADMIN — FLUTICASONE PROPIONATE 2 SPRAY: 50 SPRAY, METERED NASAL at 08:38

## 2022-01-01 RX ADMIN — FLUTICASONE PROPIONATE 2 SPRAY: 50 SPRAY, METERED NASAL at 09:00

## 2022-01-01 RX ADMIN — CITALOPRAM HYDROBROMIDE 20 MG: 20 TABLET ORAL at 20:32

## 2022-01-01 RX ADMIN — ZINC OXIDE 1 APPLICATION: 200 OINTMENT TOPICAL at 15:47

## 2022-01-01 RX ADMIN — LINAGLIPTIN 5 MG: 5 TABLET, FILM COATED ORAL at 09:09

## 2022-01-01 RX ADMIN — TAMSULOSIN HYDROCHLORIDE 0.4 MG: 0.4 CAPSULE ORAL at 08:54

## 2022-01-01 RX ADMIN — ZINC OXIDE 1 APPLICATION: 200 OINTMENT TOPICAL at 21:15

## 2022-01-01 RX ADMIN — ZINC OXIDE 1 APPLICATION: 200 OINTMENT TOPICAL at 21:36

## 2022-01-01 RX ADMIN — Medication 1 APPLICATION: at 20:58

## 2022-01-01 RX ADMIN — QUETIAPINE FUMARATE 12.5 MG: 25 TABLET ORAL at 17:38

## 2022-01-01 RX ADMIN — DOCUSATE SODIUM 100 MG: 100 CAPSULE, LIQUID FILLED ORAL at 20:32

## 2022-01-01 RX ADMIN — METRONIDAZOLE 500 MG: 500 TABLET ORAL at 15:00

## 2022-01-01 RX ADMIN — Medication 2000 UNITS: at 08:16

## 2022-01-01 RX ADMIN — BUMETANIDE 2 MG: 1 TABLET ORAL at 08:17

## 2022-01-01 RX ADMIN — DOCUSATE SODIUM 100 MG: 100 CAPSULE, LIQUID FILLED ORAL at 22:33

## 2022-01-01 RX ADMIN — BUMETANIDE 2 MG: 1 TABLET ORAL at 10:29

## 2022-01-01 RX ADMIN — FLUTICASONE PROPIONATE 2 SPRAY: 50 SPRAY, METERED NASAL at 09:03

## 2022-01-01 RX ADMIN — ZINC OXIDE 1 APPLICATION: 200 OINTMENT TOPICAL at 16:18

## 2022-01-01 RX ADMIN — CYANOCOBALAMIN TAB 1000 MCG 1000 MCG: 1000 TAB at 13:56

## 2022-01-01 NOTE — PLAN OF CARE
Goal Outcome Evaluation:  Plan of Care Reviewed With: patient        Progress: improving  Outcome Summary: vss. continues to be maria a. drsg changes continue to BLE,  L hip and care to coccyx. pt resting in room. incont care provided. urinal at bedside. ac/hs accucheck. pt has no complaints. resting in room.

## 2022-01-01 NOTE — CASE MANAGEMENT/SOCIAL WORK
Continued Stay Note  [facility]     Patient Name: Froilan Helton  MRN: [id number]  Today's Date: 1/1/2022    Admit Date: 12/28/2021     Discharge Plan     Row Name 01/01/22 1056       Plan    Plan Comments Patient resting, spoke with Katt RODRIGUES who states he was awake and appropriate this am, taking medications, etc. Noted R[facility] to [facility] in [place]. CM will follow for dc needs.               Discharge Codes    No documentation.                     Titi Ramirez RN

## 2022-01-01 NOTE — PLAN OF CARE
Problem: Adult Inpatient Plan of Care  Goal: Plan of Care Review  Flowsheets (Taken 1/1/2022 0312)  Plan of Care Reviewed With: patient   Goal Outcome Evaluation:  Plan of Care Reviewed With: patient

## 2022-01-01 NOTE — PLAN OF CARE
Goal Outcome Evaluation:  Plan of Care Reviewed With: patient        Progress: improving  Outcome Summary: Pt VSS, continues bradycardia, MD aware, see previous notes; am labs pending. Pt reports pain controlled with current regimen, see emar, flowsheets. Pt continues dressing changes as ordered, see orders, wound pics updated as per MD request, see flowsheets. Pt resting at this time.

## 2022-01-01 NOTE — SIGNIFICANT NOTE
"   01/01/22 0838   OTHER   Discipline physical therapist   Rehab Time/Intention   Session Not Performed patient/family declined evaluation  (Pt declined PT evaluation today stating \"he is too sore and would like to rest today and tomorrow\". Plan to check on pt tomorrow.)     "

## 2022-01-01 NOTE — PROGRESS NOTES
"Daily Progress Note:      Chief complaint: Follow-up of acute kidney injury, chronic systolic  Congestive heart failure, ischemic cardiomyopathy, atrial fibrillation, hypertension, multiple skin decubiti    Subjective: No overnight events noted.  Resting comfortably he is without complaints     LOS: 4 days     Vital Signs  Temp:  [97.5 °F (36.4 °C)-98.4 °F (36.9 °C)] 98.4 °F (36.9 °C)  Heart Rate:  [45-72] 46  Resp:  [16-20] 16  BP: (119-163)/(55-66) 138/66  Oxygen Therapy  SpO2: 95 %  Pulse Oximetry Type: Intermittent  Device (Oxygen Therapy): room air  Flow (L/min): 2  Oxygen Concentration (%): 95  ETCO2 (mmHg): 32 mmHg  Probe Placed On (Pulse Ox): Right:, finger}  Body mass index is 29.95 kg/m².  Flowsheet Rows      First Filed Value   Admission Height 185.4 cm (73\") Documented at 12/28/2021 1349   Admission Weight 104 kg (229 lb 12.8 oz) Documented at 12/28/2021 1349                   Documented weights    12/28/21 1349 12/28/21 2222 12/29/21 0643 12/29/21 1850   Weight: 104 kg (229 lb 12.8 oz) 102 kg (225 lb 4.8 oz) 102 kg (225 lb 6.4 oz) 102 kg (224 lb 13.9 oz)    12/30/21 0530 12/31/21 0539   Weight: 98.9 kg (218 lb 1.6 oz) 103 kg (226 lb)           Patient Vitals for the past 24 hrs:   BP Temp Temp src Pulse Resp SpO2   01/01/22 1049 138/66 98.4 °F (36.9 °C) Oral (!) 46 16 95 %   01/01/22 0935 150/66 -- -- 59 -- --   01/01/22 0906 -- -- -- 55 20 --   01/01/22 0903 -- -- -- (!) 47 20 91 %   01/01/22 0649 140/57 97.6 °F (36.4 °C) Oral 72 18 93 %   01/01/22 0017 -- -- -- (!) 45 18 91 %   12/31/21 2132 119/55 98.1 °F (36.7 °C) Oral (!) 47 18 95 %   12/31/21 1926 -- -- -- (!) 48 20 --   12/31/21 1921 -- -- -- (!) 45 20 93 %   12/31/21 1600 -- -- -- -- -- 94 %   12/31/21 1459 163/59 97.5 °F (36.4 °C) Oral 61 18 93 %       103 kg (226 lb)    Intake/Output                       12/30/21 0701 - 12/31/21 0700 12/31/21 0701 - 01/01/22 0700     1488-2230 1194-9159 Total 3784-9304 1040-2606 Total                 Intake "    P.O.  480  360 840  1200  120 1320    I.V.  1600  1150 2750  --  -- --    Total Intake 2080 1510 3590 8654 986 4962       Output    Urine  --  -- --  500  -- 500    Total Output -- -- -- 500 -- 500           Intake/Output Summary (Last 24 hours) at 1/1/2022 1359  Last data filed at 12/31/2021 2010  Gross per 24 hour   Intake 960 ml   Output 300 ml   Net 660 ml        Intake/Output Summary (Last 24 hours) at 1/1/2022 1359  Last data filed at 12/31/2021 2010  Gross per 24 hour   Intake 960 ml   Output 300 ml   Net 660 ml        Review of Systems   Constitutional: Positive for activity change. Negative for appetite change and fatigue.   HENT: Negative for congestion.    Respiratory: Negative for cough, chest tightness, shortness of breath and wheezing.    Cardiovascular: Positive for leg swelling. Negative for chest pain.   Gastrointestinal: Negative for abdominal distention, abdominal pain, diarrhea, nausea and vomiting.   Endocrine: Negative for polyphagia and polyuria.   Genitourinary: Negative for frequency.   Skin: Positive for color change and wound. Negative for rash.   Neurological: Negative for weakness and light-headedness.   Hematological: Does not bruise/bleed easily.   Psychiatric/Behavioral: Negative for agitation and behavioral problems.       Physical Exam    Medication Review:   I have reviewed the patient's current medication list  Scheduled Meds:aluminum sulfate-calcium acetate, , Topical, BID  [START ON 1/2/2022] amiodarone, 200 mg, Oral, Q24H  apixaban, 2.5 mg, Oral, Q12H  atorvastatin, 10 mg, Oral, Nightly  budesonide-formoterol, 2 puff, Inhalation, BID - RT  cholecalciferol, 2,000 Units, Oral, Daily  citalopram, 20 mg, Oral, Nightly  docusate sodium, 100 mg, Oral, BID  fluticasone, 2 spray, Each Nare, Daily  hydrocortisone-bacitracin-zinc oxide-nystatin, 1 application, Topical, TID  influenza vaccine, 0.5 mL, Intramuscular, Once  insulin aspart, 0-24 Units, Subcutaneous, TID AC  insulin  detemir, 25 Units, Subcutaneous, Nightly  levothyroxine, 224 mcg, Oral, Q AM  linagliptin, 5 mg, Oral, Daily  O2, 2 L/min, Inhalation, Once  polyethylene glycol, 17 g, Oral, Daily  pregabalin, 75 mg, Oral, BID  QUEtiapine, 12.5 mg, Oral, Q PM  tamsulosin, 0.4 mg, Oral, Daily  cyanocobalamin, 1,000 mcg, Oral, Daily      Continuous Infusions:   PRN Meds:.•  acetaminophen **OR** acetaminophen **OR** acetaminophen  •  albuterol sulfate HFA  •  dextrose  •  dextrose  •  glucagon (human recombinant)  •  melatonin      Labs:  Results from last 7 days   Lab Units 12/31/21 0415 12/30/21 0439 12/29/21 0435 12/28/21  1521 12/28/21  1521   WBC 10*3/mm3 5.06 7.24 9.26  --  8.95   HEMOGLOBIN g/dL 9.0* 8.8* 9.3*   < > 10.5*   HEMATOCRIT % 29.4* 28.0* 29.5*  --  33.8*   PLATELETS 10*3/mm3 163 167 228  --  251    < > = values in this interval not displayed.     Results from last 7 days   Lab Units 01/01/22  0357 12/31/21 0415 12/30/21  0439 12/29/21  0435 12/28/21  1521   SODIUM mmol/L 133* 132* 135* 135* 132*   POTASSIUM mmol/L 5.0 4.7 4.1 3.9 3.9   CHLORIDE mmol/L 104 103 106 103 98   CO2 mmol/L 18.8* 19.4* 20.1* 19.5* 21.0*   BUN mg/dL 41* 38* 42* 40* 48*   CREATININE mg/dL 2.15* 2.54* 2.66* 2.68* 3.24*   CALCIUM mg/dL 8.5* 8.5* 8.2* 8.9 9.1   BILIRUBIN mg/dL  --  0.2  --   --  0.8   ALK PHOS U/L  --  74  --   --  81   ALT (SGPT) U/L  --  12  --   --  13   AST (SGOT) U/L  --  30  --   --  16   GLUCOSE mg/dL 170* 248* 105* 112* 89           Results from last 7 days   Lab Units 12/31/21  0415 12/30/21  0439 12/29/21  0435 12/28/21  1521 12/28/21  1521   AST (SGOT) U/L 30  --   --   --  16   ALT (SGPT) U/L 12  --   --   --  13   PLATELETS 10*3/mm3 163 167 228   < > 251    < > = values in this interval not displayed.         Lab Results (last 24 hours)     Procedure Component Value Units Date/Time    Urine Culture - Urine, Urine, Random Void [[id number]]  (Normal) Collected: 12/31/21 1812    Specimen: Urine, Random Void Updated:  01/01/22 1238     Urine Culture Culture in progress    POC Glucose Once [[id number]]  (Abnormal) Collected: 01/01/22 1137    Specimen: Blood Updated: 01/01/22 1145     Glucose 134 mg/dL      Comment: Meter: [id number] : [id number] Chris Romeo CNA       POC Glucose Once [[id number]]  (Abnormal) Collected: 01/01/22 0734    Specimen: Blood Updated: 01/01/22 0740     Glucose 152 mg/dL      Comment: Meter: [id number] : [id number] Chris Romeo CNA       Triiodothyronine (T3) Total & Free [[id number]] Collected: 01/01/22 0357    Specimen: Blood Updated: 01/01/22 0540    TSH [[id number]]  (Abnormal) Collected: 01/01/22 0357    Specimen: Blood Updated: 01/01/22 0438     TSH 7.100 uIU/mL     T4, Free [[id number]]  (Normal) Collected: 01/01/22 0357    Specimen: Blood Updated: 01/01/22 0438     Free T4 1.23 ng/dL     Narrative:      Results may be falsely increased if patient taking Biotin.      Basic Metabolic Panel [[id number]]  (Abnormal) Collected: 01/01/22 0357    Specimen: Blood Updated: 01/01/22 0432     Glucose 170 mg/dL      BUN 41 mg/dL      Creatinine 2.15 mg/dL      Sodium 133 mmol/L      Potassium 5.0 mmol/L      Chloride 104 mmol/L      CO2 18.8 mmol/L      Calcium 8.5 mg/dL      eGFR Non African Amer 30 mL/min/1.73      BUN/Creatinine Ratio 19.1     Anion Gap 10.2 mmol/L     Narrative:      GFR Normal >60  Chronic Kidney Disease <60  Kidney Failure <15      POC Glucose Once [[id number]]  (Abnormal) Collected: 12/31/21 2227    Specimen: Blood Updated: 12/31/21 2234     Glucose 200 mg/dL      Comment: Meter: [id number] : [id number] Guerrero KELLY       Urine Electrolytes - Urine, Clean Catch [[id number]] Collected: 12/31/21 1839    Specimen: Urine, Clean Catch Updated: 12/31/21 1849     Potassium, Urine 16.4 mmol/L      Sodium, Urine 22 mmol/L      Chloride, Urine 22 mmol/L     Narrative:      Reference intervals for random urine have not been established.  Clinical usage is dependent upon  physician's interpretation in combination with other laboratory tests.       Urinalysis, Microscopic Only - Urine, Random Void [[id number]]  (Abnormal) Collected: 12/31/21 1812    Specimen: Urine, Random Void Updated: 12/31/21 1825     RBC, UA 6-12 /HPF      WBC, UA Too Numerous to Count /HPF      Bacteria, UA 3+ /HPF      Squamous Epithelial Cells, UA 0-2 /HPF      Hyaline Casts, UA None Seen /LPF      WBC Clumps, UA Moderate/2+ /HPF      Methodology Manual Light Microscopy    Urinalysis With Culture If Indicated - Urine, Random Void [[id number]]  (Abnormal) Collected: 12/31/21 1812    Specimen: Urine, Random Void Updated: 12/31/21 1815     Color, UA Straw     Appearance, UA Cloudy     pH, UA <=5.0     Specific Gravity, UA 1.015     Glucose, UA Negative     Ketones, UA Negative     Bilirubin, UA Negative     Blood, UA Large (3+)     Protein,  mg/dL (2+)     Leuk Esterase, UA Large (3+)     Nitrite, UA Negative     Urobilinogen, UA 0.2 E.U./dL    POC Glucose Once [[id number]]  (Normal) Collected: 12/31/21 1633    Specimen: Blood Updated: 12/31/21 1639     Glucose 118 mg/dL      Comment: Meter: [id number] : [id number] Wilian Tao CNA               Results from last 7 days   Lab Units 01/01/22  0357 12/28/21  1521   TSH uIU/mL 7.100* 14.290*                         Results from last 7 days   Lab Units 12/28/21  1521   HEMOGLOBIN A1C % 5.30     Glucose   Date/Time Value Ref Range Status   01/01/2022 1137 134 (H) 70 - 130 mg/dL Final     Comment:     Meter: [id number] : [id number] Chris Romeo CNA   01/01/2022 0734 152 (H) 70 - 130 mg/dL Final     Comment:     Meter: [id number] : [id number] Chris Romeo CNA   12/31/2021 2227 200 (H) 70 - 130 mg/dL Final     Comment:     Meter: [id number] : [id number] Guerrero Keating PCA   12/31/2021 1633 118 70 - 130 mg/dL Final     Comment:     Meter: [id number] : [id number] Wilian Tao CNA   12/31/2021 1143 291 (H) 70 - 130 mg/dL Final      Comment:     Meter: [id number] : [id number] Wilian Tao CNA   12/31/2021 0714 252 (H) 70 - 130 mg/dL Final     Comment:     Meter: [id number] : [id number] Wilian Tao CNA   12/30/2021 1939 283 (H) 70 - 130 mg/dL Final     Comment:     Meter: [id number] : [id number] Marielos Edmonds RN   12/30/2021 1638 195 (H) 70 - 130 mg/dL Final     Comment:     Meter: [id number] : [id number] Wilian Tao CNA         Results from last 7 days   Lab Units 12/31/21  1812 12/30/21  1044 12/28/21  1534   WOUNDCX   --  Heavy growth (4+) Staphylococcus aureus* Light growth (2+) Corynebacterium species*   URINECX  Culture in progress  --   --      Results from last 7 days   Lab Units 12/31/21  1812   NITRITE UA  Negative   WBC UA /HPF Too Numerous to Count*   BACTERIA UA /HPF 3+*   SQUAM EPITHEL UA /HPF 0-2   URINECX  Culture in progress             Radiology:  Imaging Results (Last 24 Hours)     ** No results found for the last 24 hours. **          Cardiology:  ECG/EMG Results (last 24 hours)     ** No results found for the last 24 hours. **                I have reviewed recent labs results and consult notes.    Please note portions of this assessment/plan may have been copied and pasted, but I have personally seen this patient and reviewed each line of this assessment and plan for accuracy and made updates to reflect my necessary changes     Assessment and Plan:  1.  Acute kidney injury chronic kidney disease stage III renal functions are improving he is off IV fluids    2.  Left hip and left leg decubitus wounds debrided on 12/30/2021 wound culture grew out MRSA sensitivities are pending    3.   Congestive heart failure with preserved ejection fraction   stable    4.    Bradycardia asymptomatic persistent improved amiodarone was stopped to be resumed at once daily versus twice daily per cardiology        5.  Adult onset diabetes mellitus Accu-Chek sliding scale appears to be hyperglycemic since  admission we will continue with home insulin Accu-Chek sliding scale insulin    6.    Proximal atrial fibrillation and sinus rhythm rate controlled better less bradycardic continue with current medications we will resume home dose of Eliquis as his renal functions have improved     7.  Ischemic cardiomyopathy echo done as admission showed improvement of the ventricular function with ejection fraction 55% with severe aortic valve calcification some right atrial dilatation..     8.  Hypothyroidism TSH is elevated likely due to patient noncompliance fall continue present dose and monitor     9.  Multiple falls at home patient is clearly unable to care for himself refuses nursing home placement will discuss with discharge planning     10.  COPD nothing acute continue home medications     11.  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    Possible urinary tract infection nitrite positive urine continues to count RBCs and IVCs cultures pending    Much of this encounter note is an electronic transcription/translation of spoken language to printed text using Dragon Software

## 2022-01-02 NOTE — SIGNIFICANT NOTE
"   01/02/22 0915   OTHER   Discipline physical therapist   Rehab Time/Intention   Session Not Performed patient/family declined evaluation  (Patient refused evaluation again today stating \"I have a bad hip and I will get up when I feel like it\". Plan to check on patient tomorrow.)     "

## 2022-01-02 NOTE — NURSING NOTE
"Spoke with Claudine with [facility] earlier this shift regarding pt's HR running sinus bradycardia last night and this morning.  AM Amiodarone was held due to the low HR.  However, I did ask the pt what his HR normally runs at home and the pt stated that his HR \"runs low.\"  I then asked the pt if he had any idea how low his HR runs normally.  The pt said, \"usually in the 40s.\"  I did pass this on to Claudine.  The latter stated she would relay all of this to Dr. Davenport and see if the MD wants to change anything.    Have not yet heard back regarding any of this.    "

## 2022-01-02 NOTE — PLAN OF CARE
Goal Outcome Evaluation:  Plan of Care Reviewed With: patient        Progress: improving  Outcome Summary: .

## 2022-01-02 NOTE — PROGRESS NOTES
"Daily Progress Note:      Chief complaint: Follow-up of acute kidney injury, chronic systolic  Congestive heart failure, ischemic cardiomyopathy, atrial fibrillation, hypertension, multiple skin decubiti    Subjective: No overnight events noted.  Resting comfortably he is without complaints     LOS: 5 days     Vital Signs  Temp:  [97.3 °F (36.3 °C)-98 °F (36.7 °C)] 98 °F (36.7 °C)  Heart Rate:  [47-53] 48  Resp:  [16-18] 16  BP: (150-176)/(67-74) 150/67  Oxygen Therapy  SpO2: 93 %  Pulse Oximetry Type: Intermittent  Device (Oxygen Therapy): room air  Flow (L/min): 2  Oxygen Concentration (%): 95  ETCO2 (mmHg): 32 mmHg  Probe Placed On (Pulse Ox): Right:, finger}  Body mass index is 30.08 kg/m².  Flowsheet Rows      First Filed Value   Admission Height 185.4 cm (73\") Documented at 12/28/2021 1349   Admission Weight 104 kg (229 lb 12.8 oz) Documented at 12/28/2021 1349                   Documented weights    12/28/21 1349 12/28/21 2222 12/29/21 0643 12/29/21 1850   Weight: 104 kg (229 lb 12.8 oz) 102 kg (225 lb 4.8 oz) 102 kg (225 lb 6.4 oz) 102 kg (224 lb 13.9 oz)    12/30/21 0530 12/31/21 0539 01/02/22 0643   Weight: 98.9 kg (218 lb 1.6 oz) 103 kg (226 lb) 103 kg (227 lb)           Patient Vitals for the past 24 hrs:   BP Temp Temp src Pulse Resp SpO2 Weight   01/02/22 0707 -- -- -- (!) 48 16 -- --   01/02/22 0700 -- -- -- (!) 49 16 93 % --   01/02/22 0643 150/67 98 °F (36.7 °C) Oral 50 18 94 % 103 kg (227 lb)   01/02/22 0341 -- -- -- 50 18 91 % --   01/02/22 0038 -- -- -- (!) 49 16 92 % --   01/01/22 2015 -- -- -- (!) 47 16 -- --   01/01/22 2011 -- -- -- (!) 48 16 92 % --   01/01/22 1959 158/71 97.3 °F (36.3 °C) Oral 51 18 96 % --   01/01/22 1502 174/73 -- -- -- -- -- --   01/01/22 1500 176/74 97.6 °F (36.4 °C) Oral 53 18 90 % --       103 kg (227 lb)    Intake/Output                       12/31/21 0701 - 01/01/22 0700 01/01/22 0701 - 01/02/22 0700     3173-0050 6147-6651 Total 7405-1475 8788-7217 Total           "       Intake    P.O.  1200  120 1320  360  -- 360    Total Intake 8843 521 7697 360 -- 360       Output    Urine  500  -- 500  --  150 150    Total Output 500 -- 500 -- 150 150           Intake/Output Summary (Last 24 hours) at 1/2/2022 1153  Last data filed at 1/2/2022 0337  Gross per 24 hour   Intake 360 ml   Output 150 ml   Net 210 ml        Intake/Output Summary (Last 24 hours) at 1/2/2022 1153  Last data filed at 1/2/2022 0337  Gross per 24 hour   Intake 360 ml   Output 150 ml   Net 210 ml        Review of Systems   Constitutional: Positive for activity change. Negative for appetite change and fatigue.   HENT: Negative for congestion.    Respiratory: Negative for cough, chest tightness, shortness of breath and wheezing.    Cardiovascular: Positive for leg swelling. Negative for chest pain.   Gastrointestinal: Negative for abdominal distention, abdominal pain, diarrhea, nausea and vomiting.   Endocrine: Negative for polyphagia and polyuria.   Genitourinary: Negative for frequency.   Skin: Positive for color change and wound. Negative for rash.   Neurological: Negative for weakness and light-headedness.   Hematological: Does not bruise/bleed easily.   Psychiatric/Behavioral: Negative for agitation and behavioral problems.       Physical Exam  Vitals and nursing note reviewed.   Constitutional:       Appearance: He is well-developed. He is obese.   HENT:      Head: Normocephalic.   Eyes:      Conjunctiva/sclera: Conjunctivae normal.   Neck:      Thyroid: No thyromegaly.      Vascular: No JVD.   Cardiovascular:      Rate and Rhythm: Regular rhythm. Bradycardia present.      Heart sounds: Normal heart sounds. No murmur heard.      Pulmonary:      Effort: Pulmonary effort is normal. No respiratory distress.      Breath sounds: Normal breath sounds. No wheezing or rales.   Abdominal:      General: Bowel sounds are normal. There is no distension.      Palpations: Abdomen is soft.      Tenderness: There is no abdominal  tenderness. There is no guarding.   Musculoskeletal:      Right lower leg: No edema.      Left lower leg: No edema.   Skin:     General: Skin is warm and dry.      Findings: No rash.      Comments: Lower extremities are in dressings   Neurological:      General: No focal deficit present.      Mental Status: He is alert and oriented to person, place, and time.         Medication Review:   I have reviewed the patient's current medication list  Scheduled Meds:aluminum sulfate-calcium acetate, , Topical, BID  amiodarone, 200 mg, Oral, Q24H  apixaban, 2.5 mg, Oral, Q12H  atorvastatin, 10 mg, Oral, Nightly  budesonide-formoterol, 2 puff, Inhalation, BID - RT  cholecalciferol, 2,000 Units, Oral, Daily  citalopram, 20 mg, Oral, Nightly  docusate sodium, 100 mg, Oral, BID  fluticasone, 2 spray, Each Nare, Daily  hydrocortisone-bacitracin-zinc oxide-nystatin, 1 application, Topical, TID  influenza vaccine, 0.5 mL, Intramuscular, Once  insulin aspart, 0-24 Units, Subcutaneous, TID AC  insulin detemir, 25 Units, Subcutaneous, Nightly  iron sucrose, 200 mg, Intravenous, Q24H  levothyroxine, 224 mcg, Oral, Q AM  linagliptin, 5 mg, Oral, Daily  miconazole, 1 application, Topical, Q12H  O2, 2 L/min, Inhalation, Once  polyethylene glycol, 17 g, Oral, Daily  pregabalin, 75 mg, Oral, BID  QUEtiapine, 12.5 mg, Oral, Q PM  sulfamethoxazole-trimethoprim, 1 tablet, Oral, Q12H  tamsulosin, 0.4 mg, Oral, Daily  cyanocobalamin, 1,000 mcg, Oral, Daily      Continuous Infusions:   PRN Meds:.•  acetaminophen **OR** acetaminophen **OR** acetaminophen  •  albuterol sulfate HFA  •  dextrose  •  dextrose  •  glucagon (human recombinant)  •  melatonin      Labs:  Results from last 7 days   Lab Units 01/02/22  0527 12/31/21  0415 12/30/21  0439 12/29/21  0435 12/29/21  0435 12/28/21  1521 12/28/21  1521   WBC 10*3/mm3 6.36 5.06 7.24  --  9.26  --  8.95   HEMOGLOBIN g/dL 9.1* 9.0* 8.8*   < > 9.3*   < > 10.5*   HEMATOCRIT % 29.6* 29.4* 28.0*  --  29.5*   --  33.8*   PLATELETS 10*3/mm3 206 163 167  --  228  --  251    < > = values in this interval not displayed.     Results from last 7 days   Lab Units 01/02/22  0527 01/01/22  0357 12/31/21 0415 12/30/21  0439 12/29/21  0435 12/28/21  1521   SODIUM mmol/L 135* 133* 132* 135* 135* 132*   POTASSIUM mmol/L 4.8 5.0 4.7 4.1 3.9 3.9   CHLORIDE mmol/L 105 104 103 106 103 98   CO2 mmol/L 21.6* 18.8* 19.4* 20.1* 19.5* 21.0*   BUN mg/dL 39* 41* 38* 42* 40* 48*   CREATININE mg/dL 2.00* 2.15* 2.54* 2.66* 2.68* 3.24*   CALCIUM mg/dL 8.6 8.5* 8.5* 8.2* 8.9 9.1   BILIRUBIN mg/dL 0.2  --  0.2  --   --  0.8   ALK PHOS U/L 81  --  74  --   --  81   ALT (SGPT) U/L 25  --  12  --   --  13   AST (SGOT) U/L 48*  --  30  --   --  16   GLUCOSE mg/dL 120* 170* 248* 105* 112* 89           Results from last 7 days   Lab Units 01/02/22  0527 12/31/21  0415 12/30/21  0439 12/29/21  0435 12/28/21  1521   AST (SGOT) U/L 48* 30  --   --  16   ALT (SGPT) U/L 25 12  --   --  13   PLATELETS 10*3/mm3 206 163 167   < > 251    < > = values in this interval not displayed.         Lab Results (last 24 hours)     Procedure Component Value Units Date/Time    POC Glucose Once [[id number]]  (Abnormal) Collected: 01/02/22 1142    Specimen: Blood Updated: 01/02/22 1148     Glucose 135 mg/dL      Comment: Meter: [id number] : [id number] Madalyn Barnhart CNA       Wound Culture - Wound, Hip, Left [[id number]]  (Abnormal)  (Susceptibility) Collected: 12/30/21 1044    Specimen: Wound from Hip, Left Updated: 01/02/22 0914     Wound Culture Heavy growth (4+) Staphylococcus aureus, MRSA     Comment: Methicillin resistant Staphylococcus aureus, Patient may be an isolation risk.         Heavy growth (4+) Enterococcus faecalis     Gram Stain Few (2+) WBCs seen      Rare (1+) Gram positive cocci in pairs, chains and clusters    Susceptibility      Staphylococcus aureus, MRSA     ALINE     Clindamycin Resistant     Erythromycin Resistant     Inducible Clindamycin Resistance  Negative     Oxacillin Resistant     Rifampin Susceptible     Tetracycline Resistant     Trimethoprim + Sulfamethoxazole Susceptible     Vancomycin Susceptible                  Linear View               Susceptibility      Enterococcus faecalis     ALINE     Ampicillin Susceptible     Vancomycin Susceptible                  Linear View                   Anaerobic Culture - Wound, Hip, Left [[id number]]  (Abnormal) Collected: 12/30/21 1044    Specimen: Wound from Hip, Left Updated: 01/02/22 0822     Anaerobic Culture Prevotella species    POC Glucose Once [[id number]]  (Normal) Collected: 01/02/22 0749    Specimen: Blood Updated: 01/02/22 0755     Glucose 100 mg/dL      Comment: Meter: [id number] : [id number] Madalyn Barnhart CNODELL       Triiodothyronine (T3) Total & Free [[id number]]  (Abnormal) Collected: 01/01/22 0357    Specimen: Blood Updated: 01/02/22 0707     T3, Total 39 ng/dL      T3, Free 0.9 pg/mL     Narrative:      Performed at:  [address] [place], [place] [id number]  : Jeremiah Green PhD, Phone:  [phone]    Comprehensive Metabolic Panel [[id number]]  (Abnormal) Collected: 01/02/22 0527    Specimen: Blood Updated: 01/02/22 0639     Glucose 120 mg/dL      BUN 39 mg/dL      Creatinine 2.00 mg/dL      Sodium 135 mmol/L      Potassium 4.8 mmol/L      Chloride 105 mmol/L      CO2 21.6 mmol/L      Calcium 8.6 mg/dL      Total Protein 5.5 g/dL      Albumin 2.40 g/dL      ALT (SGPT) 25 U/L      AST (SGOT) 48 U/L      Alkaline Phosphatase 81 U/L      Total Bilirubin 0.2 mg/dL      eGFR Non African Amer 32 mL/min/1.73      Globulin 3.1 gm/dL      A/G Ratio 0.8 g/dL      BUN/Creatinine Ratio 19.5     Anion Gap 8.4 mmol/L     Narrative:      GFR Normal >60  Chronic Kidney Disease <60  Kidney Failure <15      CBC (No Diff) [[id number]]  (Abnormal) Collected: 01/02/22 0527    Specimen: Blood Updated: 01/02/22 0603     WBC 6.36 10*3/mm3      RBC 3.26 10*6/mm3      Hemoglobin 9.1 g/dL       Hematocrit 29.6 %      MCV 90.8 fL      MCH 27.9 pg      MCHC 30.7 g/dL      RDW 14.4 %      RDW-SD 47.5 fl      MPV 10.7 fL      Platelets 206 10*3/mm3     POC Glucose Once [[id number]]  (Abnormal) Collected: 01/01/22 1959    Specimen: Blood Updated: 01/01/22 2005     Glucose 142 mg/dL      Comment: Meter: [id number] : [id number] Adam Ordaz [facility]       POC Glucose Once [[id number]]  (Normal) Collected: 01/01/22 1709    Specimen: Blood Updated: 01/01/22 1715     Glucose 130 mg/dL      Comment: Meter: [id number] : [id number] Edwin Aaliyah NA       Urine Culture - Urine, Urine, Random Void [[id number]]  (Normal) Collected: 12/31/21 1812    Specimen: Urine, Random Void Updated: 01/01/22 1238     Urine Culture Culture in progress            Results from last 7 days   Lab Units 01/01/22  0357 12/28/21  1521   TSH uIU/mL 7.100* 14.290*                         Results from last 7 days   Lab Units 12/28/21  1521   HEMOGLOBIN A1C % 5.30     Glucose   Date/Time Value Ref Range Status   01/02/2022 1142 135 (H) 70 - 130 mg/dL Final     Comment:     Meter: [id number] : [id number] Madalyn Barnhart [facility]   01/02/2022 0749 100 70 - 130 mg/dL Final     Comment:     Meter: [id number] : [id number] Madalyn Barnhart [facility]   01/01/2022 1959 142 (H) 70 - 130 mg/dL Final     Comment:     Meter: [id number] : [id number] Adam Ordaz [facility]   01/01/2022 1709 130 70 - 130 mg/dL Final     Comment:     Meter: [id number] : [id number] Edwin Aaliyah NA   01/01/2022 1137 134 (H) 70 - 130 mg/dL Final     Comment:     Meter: [id number] : [id number] Chris Romeo CN   01/01/2022 0734 152 (H) 70 - 130 mg/dL Final     Comment:     Meter: [id number] : [id number] Chris Romeo CNA   12/31/2021 2227 200 (H) 70 - 130 mg/dL Final     Comment:     Meter: [id number] : [id number] Guerrero Keating PCA   12/31/2021 1633 118 70 - 130 mg/dL Final     Comment:     Meter: [id number] : [id number] Wilian BUCKNERA          Results from last 7 days   Lab Units 12/31/21  1812 12/30/21  1044 12/28/21  1534   WOUNDCX   --  Heavy growth (4+) Staphylococcus aureus, MRSA*  Heavy growth (4+) Enterococcus faecalis* Light growth (2+) Corynebacterium species*   URINECX  Culture in progress  --   --      Results from last 7 days   Lab Units 12/31/21  1812   NITRITE UA  Negative   WBC UA /HPF Too Numerous to Count*   BACTERIA UA /HPF 3+*   SQUAM EPITHEL UA /HPF 0-2   URINECX  Culture in progress             Radiology:  Imaging Results (Last 24 Hours)     ** No results found for the last 24 hours. **          Cardiology:  ECG/EMG Results (last 24 hours)     ** No results found for the last 24 hours. **                I have reviewed recent labs results and consult notes.    Please note portions of this assessment/plan may have been copied and pasted, but I have personally seen this patient and reviewed each line of this assessment and plan for accuracy and made updates to reflect my necessary changes     Assessment and Plan:  1.  Acute kidney injury chronic kidney disease stage III renal functions are improving he is off IV fluids    2.  Left hip and left leg decubitus wounds debrided on 12/30/2021 wound culture grew out MRSA sensitive to Bactrim and Enterococcus sensitivities pending started on Bactrim yesterday    3.   Congestive heart failure with preserved ejection fraction   stable    4.    Bradycardia asymptomatic persistent remains bradycardic        5.  Adult onset diabetes mellitus Accu-Chek sliding scale appears to be hyperglycemic since admission we will continue with home insulin Accu-Chek sliding scale insulin    6.    Proximal atrial fibrillation and sinus rhythm rate controlled better less bradycardic continue with current medications we will resume home dose of Eliquis as his renal functions have improved     7.  Ischemic cardiomyopathy echo done as admission showed improvement of the ventricular function with ejection  fraction 55% with severe aortic valve calcification some right atrial dilatation..     8.  Hypothyroidism TSH is elevated likely due to patient noncompliance fall continue present dose and monitor     9.  Multiple falls at home patient is clearly unable to care for himself refuses nursing home placement will discuss with discharge planning     10.  COPD nothing acute continue home medications     11.    Iron deficiency anemia started on iron infusions    12.    Possible urinary tract infection nitrite positive urine continues pending    Much of this encounter note is an electronic transcription/translation of spoken language to printed text using Dragon Software

## 2022-01-03 NOTE — PROGRESS NOTES
LOS: 6 days   Patient Care Team:  Fortunato De Leon MD as PCP - General (Family Medicine)    Chief Complaint:     F/u chf and preop    Interval History:     His legs are wrapped this morning.  He denies chest pain or difficulty breathing.  He denies heart racing or skipping.  He has no nausea or vomiting.  His appetite's been stable.    Echo 12/29/21  Interpretation Summary    · Saline test results are negative.  · There is severe calcification of the aortic valve mainly affecting the left coronary and right coronary cusp(s).  · Calculated right ventricular systolic pressure from tricuspid regurgitation is 44 mmHg.  · Mild pulmonary hypertension is present.  · Estimated left ventricular EF = 55% Left ventricular systolic function is normal.  · The right ventricular cavity is mildly dilated.  · Mild dilation of the aortic root is present.          Objective   Vital Signs  Temp:  [97.7 °F (36.5 °C)-98.3 °F (36.8 °C)] 97.7 °F (36.5 °C)  Heart Rate:  [48-53] 48  Resp:  [16-24] 20  BP: (138-159)/(61-64) 159/64    Intake/Output Summary (Last 24 hours) at 1/3/2022 0730  Last data filed at 1/2/2022 1943  Gross per 24 hour   Intake 600 ml   Output 200 ml   Net 400 ml       Comfortable NAD  Neck supple, no JVD or thyromegaly appreciated  S1/S2 RRR, no m/r/g  Lungs CTA B, normal effort  Abdomen S/NT/ND (+) BS, no HSM appreciated  Extremities warm, no clubbing, cyanosis, or edema-legs wrapped  No visible or palpable skin lesions  A/Ox4, mood and affect appropriate    Results Review:      Results from last 7 days   Lab Units 01/03/22  0416 01/02/22  0527 01/02/22  0527 01/01/22  0357 01/01/22  0357   SODIUM mmol/L 134*  --  135*  --  133*   POTASSIUM mmol/L 5.3*  --  4.8  --  5.0   CHLORIDE mmol/L 103  --  105  --  104   CO2 mmol/L 24.3  --  21.6*  --  18.8*   BUN mg/dL 34*  --  39*  --  41*   CREATININE mg/dL 1.95*  --  2.00*  --  2.15*   GLUCOSE mg/dL 97   < > 120*   < > 170*   CALCIUM mg/dL 8.5*  --  8.6  --  8.5*    < > =  values in this interval not displayed.         Results from last 7 days   Lab Units 01/03/22  0416 01/02/22  0527 12/31/21  0415   WBC 10*3/mm3 6.69 6.36 5.06   HEMOGLOBIN g/dL 9.4* 9.1* 9.0*   HEMATOCRIT % 30.9* 29.6* 29.4*   PLATELETS 10*3/mm3 220 206 163                       I reviewed the patient's new clinical results.  I personally viewed and interpreted the patient's EKG/Telemetry data        Medication Review:   aluminum sulfate-calcium acetate, , Topical, BID  amiodarone, 200 mg, Oral, Q24H  apixaban, 2.5 mg, Oral, Q12H  atorvastatin, 10 mg, Oral, Nightly  budesonide-formoterol, 2 puff, Inhalation, BID - RT  cholecalciferol, 2,000 Units, Oral, Daily  citalopram, 20 mg, Oral, Nightly  docusate sodium, 100 mg, Oral, BID  fluticasone, 2 spray, Each Nare, Daily  hydrocortisone-bacitracin-zinc oxide-nystatin, 1 application, Topical, TID  influenza vaccine, 0.5 mL, Intramuscular, Once  insulin aspart, 0-24 Units, Subcutaneous, TID AC  insulin detemir, 25 Units, Subcutaneous, Nightly  iron sucrose, 200 mg, Intravenous, Q24H  levothyroxine, 224 mcg, Oral, Q AM  linagliptin, 5 mg, Oral, Daily  metroNIDAZOLE, 500 mg, Oral, Q8H  miconazole, 1 application, Topical, Q12H  O2, 2 L/min, Inhalation, Once  polyethylene glycol, 17 g, Oral, Daily  pregabalin, 75 mg, Oral, BID  QUEtiapine, 12.5 mg, Oral, Q PM  sulfamethoxazole-trimethoprim, 1 tablet, Oral, Q12H  tamsulosin, 0.4 mg, Oral, Daily  cyanocobalamin, 1,000 mcg, Oral, Daily             Assessment/Plan       Open wound of left foot    Acute renal failure superimposed on chronic kidney disease (HCC)       1.  Weakness/ fall-He has a history of weakness and falls.   He is living by himself at home and continues to have frequent falls.     2.  Heart failure-acute on chronic diastolic congestive heart failure, stable    3. Left hip and leg wound - s/p debridement 12/30/21, MRSA      4.  History of COPD as well obstructive sleep apnea on trilogy at home.  He is on room air  and denies any shortness of breath     5.  Morbid obesity-he has lost quite a bit of weight, greater than 60 pounds over the last year.     6.  Permanent atrial fibrillation-he remains in atrial fibrillation, he is bradycardic, amiodarone has been held following parameters given.  His Eliquis has been decreased to 2.5 mg twice daily secondary to his renal failure.      7.  Hypothyroidism on replacement therapy-poorly controlled as his TSH was greater than 14, may be worse due to amiodarone.       8.  Chronic anticoagulation-he is on Eliquis.  At one point it was recommended that he be on warfarin but he was noncompliant with checks.  He is now on renal dosing of Eliquis, this is on hold awaiting his surgery.     9.  Orthostatic hypotension and supine hypertension-monitor carefully as he falls easily as it is.    10. DM     11.  Acute on chronic renal failure     12.  chronic anemia-       Stop amiodarone d/t maria a and hypothyroidism.  Otherwise stable cardiac status, will follow.    Aline Lopez MD  01/03/22  07:30 EST

## 2022-01-03 NOTE — PROGRESS NOTES
"Daily Progress Note:      Chief complaint: Follow-up of acute kidney injury, chronic systolic  Congestive heart failure, ischemic cardiomyopathy, atrial fibrillation, hypertension, multiple skin decubiti    Subjective: No overnight events noted.  Resting comfortably he is without complaints     LOS: 6 days     Vital Signs  Temp:  [97.7 °F (36.5 °C)-98.3 °F (36.8 °C)] 97.7 °F (36.5 °C)  Heart Rate:  [48-53] 48  Resp:  [16-24] 20  BP: (138-159)/(61-64) 159/64  Oxygen Therapy  SpO2: 94 %  Pulse Oximetry Type: Intermittent  Device (Oxygen Therapy): room air  Flow (L/min): 2  Oxygen Concentration (%): 95  ETCO2 (mmHg): 32 mmHg  Probe Placed On (Pulse Ox): Right:, finger}  Body mass index is 30.16 kg/m².  Flowsheet Rows      First Filed Value   Admission Height 185.4 cm (73\") Documented at 12/28/2021 1349   Admission Weight 104 kg (229 lb 12.8 oz) Documented at 12/28/2021 1349                   Documented weights    12/28/21 1349 12/28/21 2222 12/29/21 0643 12/29/21 1850   Weight: 104 kg (229 lb 12.8 oz) 102 kg (225 lb 4.8 oz) 102 kg (225 lb 6.4 oz) 102 kg (224 lb 13.9 oz)    12/30/21 0530 12/31/21 0539 01/02/22 0643 01/03/22 0550   Weight: 98.9 kg (218 lb 1.6 oz) 103 kg (226 lb) 103 kg (227 lb) 103 kg (227 lb 9.6 oz)           Patient Vitals for the past 24 hrs:   BP Temp Temp src Pulse Resp SpO2 Weight   01/03/22 0707 -- -- -- (!) 48 20 -- --   01/03/22 0703 -- -- -- 52 24 -- --   01/03/22 0550 159/64 97.7 °F (36.5 °C) Oral (!) 49 20 94 % 103 kg (227 lb 9.6 oz)   01/02/22 2348 -- -- -- 50 18 91 % --   01/02/22 1951 -- -- -- (!) 49 20 -- --   01/02/22 1947 -- -- -- (!) 48 20 95 % --   01/02/22 1938 149/61 97.7 °F (36.5 °C) Oral 51 20 92 % --   01/02/22 1515 138/64 98.3 °F (36.8 °C) Oral 53 16 93 % --       103 kg (227 lb 9.6 oz)    Intake/Output                       01/01/22 0701 - 01/02/22 0700 01/02/22 0701 - 01/03/22 0700     [id number] 2705-8919 Total 9282-1316 5786-3544 Total                 Intake    P.O.  360  -- " 360  480  120 600    Total Intake 360 -- 360 480 120 600       Output    Urine  --  150 150  200  -- 200    Total Output -- 150 150 200 -- 200           Intake/Output Summary (Last 24 hours) at 1/3/2022 0748  Last data filed at 1/2/2022 1943  Gross per 24 hour   Intake 600 ml   Output 200 ml   Net 400 ml        Intake/Output Summary (Last 24 hours) at 1/3/2022 0748  Last data filed at 1/2/2022 1943  Gross per 24 hour   Intake 600 ml   Output 200 ml   Net 400 ml        Review of Systems   Constitutional: Positive for activity change. Negative for appetite change and fatigue.   HENT: Negative for congestion.    Respiratory: Negative for cough, chest tightness, shortness of breath and wheezing.    Cardiovascular: Positive for leg swelling. Negative for chest pain.   Gastrointestinal: Negative for abdominal distention, abdominal pain, diarrhea, nausea and vomiting.   Endocrine: Negative for polyphagia and polyuria.   Genitourinary: Negative for frequency.   Skin: Positive for color change and wound. Negative for rash.   Neurological: Negative for weakness and light-headedness.   Hematological: Does not bruise/bleed easily.   Psychiatric/Behavioral: Negative for agitation and behavioral problems.       Physical Exam  Vitals and nursing note reviewed.   Constitutional:       Appearance: He is well-developed. He is obese.   HENT:      Head: Normocephalic.   Eyes:      Conjunctiva/sclera: Conjunctivae normal.   Neck:      Thyroid: No thyromegaly.      Vascular: No JVD.   Cardiovascular:      Rate and Rhythm: Regular rhythm. Bradycardia present.      Heart sounds: Normal heart sounds. No murmur heard.      Pulmonary:      Effort: Pulmonary effort is normal. No respiratory distress.      Breath sounds: Normal breath sounds. No wheezing or rales.   Abdominal:      General: Bowel sounds are normal. There is no distension.      Palpations: Abdomen is soft.      Tenderness: There is no abdominal tenderness. There is no guarding.    Musculoskeletal:      Right lower leg: No edema.      Left lower leg: No edema.   Skin:     General: Skin is warm and dry.      Findings: No rash.      Comments: Lower extremities are in dressings   Neurological:      General: No focal deficit present.      Mental Status: He is alert and oriented to person, place, and time.         Medication Review:   I have reviewed the patient's current medication list  Scheduled Meds:aluminum sulfate-calcium acetate, , Topical, BID  apixaban, 2.5 mg, Oral, Q12H  atorvastatin, 10 mg, Oral, Nightly  budesonide-formoterol, 2 puff, Inhalation, BID - RT  cholecalciferol, 2,000 Units, Oral, Daily  citalopram, 20 mg, Oral, Nightly  docusate sodium, 100 mg, Oral, BID  fluticasone, 2 spray, Each Nare, Daily  hydrocortisone-bacitracin-zinc oxide-nystatin, 1 application, Topical, TID  influenza vaccine, 0.5 mL, Intramuscular, Once  insulin aspart, 0-24 Units, Subcutaneous, TID AC  insulin detemir, 25 Units, Subcutaneous, Nightly  iron sucrose, 200 mg, Intravenous, Q24H  levothyroxine, 224 mcg, Oral, Q AM  linagliptin, 5 mg, Oral, Daily  metroNIDAZOLE, 500 mg, Oral, Q8H  miconazole, 1 application, Topical, Q12H  O2, 2 L/min, Inhalation, Once  polyethylene glycol, 17 g, Oral, Daily  pregabalin, 75 mg, Oral, BID  QUEtiapine, 12.5 mg, Oral, Q PM  sulfamethoxazole-trimethoprim, 1 tablet, Oral, Q12H  tamsulosin, 0.4 mg, Oral, Daily  cyanocobalamin, 1,000 mcg, Oral, Daily      Continuous Infusions:   PRN Meds:.•  acetaminophen **OR** acetaminophen **OR** acetaminophen  •  albuterol sulfate HFA  •  dextrose  •  dextrose  •  glucagon (human recombinant)  •  melatonin      Labs:  Results from last 7 days   Lab Units 01/03/22  0416 01/02/22  0527 12/31/21  0415 12/30/21  0439 12/30/21  0439 12/29/21  0435 12/29/21  0435 12/28/21  1521 12/28/21  1521   WBC 10*3/mm3 6.69 6.36 5.06  --  7.24  --  9.26  --  8.95   HEMOGLOBIN g/dL 9.4* 9.1* 9.0*   < > 8.8*   < > 9.3*   < > 10.5*   HEMATOCRIT % 30.9*  29.6* 29.4*  --  28.0*  --  29.5*  --  33.8*   PLATELETS 10*3/mm3 220 206 163  --  167  --  228  --  251    < > = values in this interval not displayed.     Results from last 7 days   Lab Units 01/03/22 0416 01/02/22 0527 01/01/22  0357 12/31/21 0415 12/30/21 0439 12/29/21 0435 12/28/21  1521   SODIUM mmol/L 134* 135* 133* 132* 135* 135* 132*   POTASSIUM mmol/L 5.3* 4.8 5.0 4.7 4.1 3.9 3.9   CHLORIDE mmol/L 103 105 104 103 106 103 98   CO2 mmol/L 24.3 21.6* 18.8* 19.4* 20.1* 19.5* 21.0*   BUN mg/dL 34* 39* 41* 38* 42* 40* 48*   CREATININE mg/dL 1.95* 2.00* 2.15* 2.54* 2.66* 2.68* 3.24*   CALCIUM mg/dL 8.5* 8.6 8.5* 8.5* 8.2* 8.9 9.1   BILIRUBIN mg/dL  --  0.2  --  0.2  --   --  0.8   ALK PHOS U/L  --  81  --  74  --   --  81   ALT (SGPT) U/L  --  25  --  12  --   --  13   AST (SGOT) U/L  --  48*  --  30  --   --  16   GLUCOSE mg/dL 97 120* 170* 248* 105* 112* 89           Results from last 7 days   Lab Units 01/03/22 0416 01/02/22 0527 12/31/21 0415 12/29/21 0435 12/28/21  1521   AST (SGOT) U/L  --  48* 30  --  16   ALT (SGPT) U/L  --  25 12  --  13   PLATELETS 10*3/mm3 220 206 163   < > 251    < > = values in this interval not displayed.         Lab Results (last 24 hours)     Procedure Component Value Units Date/Time    Basic Metabolic Panel [[id number]]  (Abnormal) Collected: 01/03/22 0416    Specimen: Blood Updated: 01/03/22 0459     Glucose 97 mg/dL      BUN 34 mg/dL      Creatinine 1.95 mg/dL      Sodium 134 mmol/L      Potassium 5.3 mmol/L      Chloride 103 mmol/L      CO2 24.3 mmol/L      Calcium 8.5 mg/dL      eGFR Non African Amer 33 mL/min/1.73      BUN/Creatinine Ratio 17.4     Anion Gap 6.7 mmol/L     Narrative:      GFR Normal >60  Chronic Kidney Disease <60  Kidney Failure <15      CBC (No Diff) [[id number]]  (Abnormal) Collected: 01/03/22 0416    Specimen: Blood Updated: 01/03/22 0443     WBC 6.69 10*3/mm3      RBC 3.39 10*6/mm3      Hemoglobin 9.4 g/dL      Hematocrit 30.9 %      MCV 91.2 fL       MCH 27.7 pg      MCHC 30.4 g/dL      RDW 14.5 %      RDW-SD 47.9 fl      MPV 10.3 fL      Platelets 220 10*3/mm3     POC Glucose Once [[id number]]  (Normal) Collected: 01/02/22 2202    Specimen: Blood Updated: 01/02/22 2208     Glucose 115 mg/dL      Comment: Meter: [id number] : [id number] Marielos Edmonds RN       Urinalysis, Microscopic Only - Urine, Catheter In/Out [[id number]]  (Abnormal) Collected: 01/02/22 1856    Specimen: Urine, Catheter In/Out Updated: 01/02/22 2002     RBC, UA 13-20 /HPF      WBC, UA Too Numerous to Count /HPF      Bacteria, UA 1+ /HPF      Squamous Epithelial Cells, UA       Unable to determine due to loaded field     /HPF     Hyaline Casts, UA       Unable to determine due to loaded field     /LPF     Methodology Manual Light Microscopy    Urine Culture - Urine, Urine, Catheter In/Out [[id number]] Collected: 01/02/22 1856    Specimen: Urine, Catheter In/Out Updated: 01/02/22 2002    Urinalysis With Culture If Indicated - Urine, Catheter In/Out [[id number]]  (Abnormal) Collected: 01/02/22 1856    Specimen: Urine, Catheter In/Out Updated: 01/02/22 1957     Color, UA Yellow     Appearance, UA Slightly Cloudy     pH, UA <=5.0     Specific Gravity, UA 1.010     Glucose, UA Negative     Ketones, UA Negative     Bilirubin, UA Negative     Blood, UA Moderate (2+)     Protein, UA 30 mg/dL (1+)     Leuk Esterase, UA Large (3+)     Nitrite, UA Negative     Urobilinogen, UA 0.2 E.U./dL    POC Glucose Once [[id number]]  (Abnormal) Collected: 01/02/22 1650    Specimen: Blood Updated: 01/02/22 1656     Glucose 136 mg/dL      Comment: Meter: [id number] : [id number] Madalyn Barnhart CNA       Urine Culture - Urine, Urine, Random Void [[id number]]  (Abnormal) Collected: 12/31/21 1812    Specimen: Urine, Random Void Updated: 01/02/22 1236     Urine Culture Yeast isolated     Comment: No further workup.         POC Glucose Once [[id number]]  (Abnormal) Collected: 01/02/22 1142    Specimen: Blood  Updated: 01/02/22 1148     Glucose 135 mg/dL      Comment: Meter: [id number] : [id number] Madalyn BUCKNERA       Wound Culture - Wound, Hip, Left [[id number]]  (Abnormal)  (Susceptibility) Collected: 12/30/21 1044    Specimen: Wound from Hip, Left Updated: 01/02/22 0914     Wound Culture Heavy growth (4+) Staphylococcus aureus, MRSA     Comment: Methicillin resistant Staphylococcus aureus, Patient may be an isolation risk.         Heavy growth (4+) Enterococcus faecalis     Gram Stain Few (2+) WBCs seen      Rare (1+) Gram positive cocci in pairs, chains and clusters    Susceptibility      Staphylococcus aureus, MRSA     ALINE     Clindamycin Resistant     Erythromycin Resistant     Inducible Clindamycin Resistance Negative     Oxacillin Resistant     Rifampin Susceptible     Tetracycline Resistant     Trimethoprim + Sulfamethoxazole Susceptible     Vancomycin Susceptible                  Linear View               Susceptibility      Enterococcus faecalis     ALINE     Ampicillin Susceptible     Vancomycin Susceptible                  Linear View                   Anaerobic Culture - Wound, Hip, Left [[id number]]  (Abnormal) Collected: 12/30/21 1044    Specimen: Wound from Hip, Left Updated: 01/02/22 0822     Anaerobic Culture Prevotella species    POC Glucose Once [[id number]]  (Normal) Collected: 01/02/22 0749    Specimen: Blood Updated: 01/02/22 0755     Glucose 100 mg/dL      Comment: Meter: [id number] : [id number] Madalyn Barnhart CNA               Results from last 7 days   Lab Units 01/01/22  0357 12/28/21  1521   TSH uIU/mL 7.100* 14.290*                         Results from last 7 days   Lab Units 12/28/21  1521   HEMOGLOBIN A1C % 5.30     Glucose   Date/Time Value Ref Range Status   01/02/2022 2202 115 70 - 130 mg/dL Final     Comment:     Meter: [id number] : [id number] Marielos Edmonds RN   01/02/2022 1650 136 (H) 70 - 130 mg/dL Final     Comment:     Meter: [id number] : [id number] Bergman  Obey CNA   01/02/2022 1142 135 (H) 70 - 130 mg/dL Final     Comment:     Meter: [id number] : [id number] Madalyn Barnhart CNA   01/02/2022 0749 100 70 - 130 mg/dL Final     Comment:     Meter: [id number] : [id number] Madalyn Barnhart CNA   01/01/2022 1959 142 (H) 70 - 130 mg/dL Final     Comment:     Meter: [id number] : [id number] Adam Ordaz CNA   01/01/2022 1709 130 70 - 130 mg/dL Final     Comment:     Meter: [id number] : [id number] Edwin Winklercia NA   01/01/2022 1137 134 (H) 70 - 130 mg/dL Final     Comment:     Meter: [id number] : [id number] Chris Romeo CNA   01/01/2022 0734 152 (H) 70 - 130 mg/dL Final     Comment:     Meter: [id number] : [id number] Chris Romeo CNA         Results from last 7 days   Lab Units 12/31/21  1812 12/30/21  1044 12/28/21  1534   WOUNDCX   --  Heavy growth (4+) Staphylococcus aureus, MRSA*  Heavy growth (4+) Enterococcus faecalis* Light growth (2+) Corynebacterium species*   URINECX  Yeast isolated*  --   --      Results from last 7 days   Lab Units 01/02/22  1856 12/31/21  1812 12/31/21  1812   NITRITE UA  Negative   < > Negative   WBC UA /HPF Too Numerous to Count*   < > Too Numerous to Count*   BACTERIA UA /HPF 1+*   < > 3+*   SQUAM EPITHEL UA /HPF Unable to determine due to loaded field*   < > 0-2   URINECX   --   --  Yeast isolated*    < > = values in this interval not displayed.             Radiology:  Imaging Results (Last 24 Hours)     ** No results found for the last 24 hours. **          Cardiology:  ECG/EMG Results (last 24 hours)     ** No results found for the last 24 hours. **                I have reviewed recent labs results and consult notes.    Please note portions of this assessment/plan may have been copied and pasted, but I have personally seen this patient and reviewed each line of this assessment and plan for accuracy and made updates to reflect my necessary changes     Assessment and Plan:  1.  Acute kidney injury  chronic kidney disease stage III renal functions are improving     2.  Left hip and left leg decubitus wounds debrided on 12/30/2021 wound culture grew out MRSA sensitive to Bactrim and Enterococcus sensitivities pending started on Bactrim and Flagyl on 1/2/2021.  Watch renal functions with use of Bactrim    3.   Congestive heart failure with preserved ejection fraction   stable    4.    Bradycardia asymptomatic persistent remains bradycardic        5.  Adult onset diabetes mellitus Accu-Chek sliding scale appears to be hyperglycemic since admission we will continue with home insulin Accu-Chek sliding scale insulin    6.    Proximal atrial fibrillation and sinus rhythm rate controlled better less bradycardic continue with current medications we will resume home dose of Eliquis as his renal functions have improved     7.  Ischemic cardiomyopathy echo done as admission showed improvement of the ventricular function with ejection fraction 55% with severe aortic valve calcification some right atrial dilatation..     8.  Hypothyroidism TSH is elevated likely due to patient noncompliance fall continue present dose and monitor     9.  Multiple falls at home patient is clearly unable to care for himself refuses nursing home placement will discuss with discharge planning     10.  COPD nothing acute continue home medications     11.    Iron deficiency anemia started on iron infusions    12.    Possible urinary tract infection nitrite positive urine continues pending preliminary cultures growing out Candida start Diflucan    Much of this encounter note is an electronic transcription/translation of spoken language to printed text using Dragon Software

## 2022-01-03 NOTE — PROGRESS NOTES
[facility] Progress Note      Patient Name: Froilan Helton  : 1940  MRN: [id number]  Primary Care Physician:  Fortunato De Leon MD  Date of admission: 2021    Subjective     Interval History:   No shortness of breath on room air; dislikes the food, but no N/V  Reports leg swelling much improved from before    Review of Systems:   14 point review of systems is otherwise negative except for mentioned above on HPI    Objective     Vitals:   Temp:  [97.7 °F (36.5 °C)-98.3 °F (36.8 °C)] 97.7 °F (36.5 °C)  Heart Rate:  [48-53] 48  Resp:  [16-24] 20  BP: (138-159)/(61-64) 159/64    Intake/Output Summary (Last 24 hours) at 1/3/2022 1238  Last data filed at 1/3/2022 0900  Gross per 24 hour   Intake 600 ml   Output 400 ml   Net 200 ml       Physical Exam:    General Appearance: alert, appropriate, pale, NAD, chronically ill  Skin: warm and dry  HEENT: oral mucosa normal, nonicteric sclera  Neck: supple, no JVD  Lungs: Coarse BS but no crackles or wheezes; not labored on room air  Heart: Irregularly irregular, bradycardic, 2/6M  Abdomen: soft, nontender, nondistended, BS +  : no palpable bladder  Extremities: 2+ edema, lower legs wrapped  Neuro: normal speech and mental status     Scheduled Meds:     aluminum sulfate-calcium acetate, , Topical, BID  apixaban, 2.5 mg, Oral, Q12H  atorvastatin, 10 mg, Oral, Nightly  budesonide-formoterol, 2 puff, Inhalation, BID - RT  cholecalciferol, 2,000 Units, Oral, Daily  citalopram, 20 mg, Oral, Nightly  docusate sodium, 100 mg, Oral, BID  fluticasone, 2 spray, Each Nare, Daily  hydrocortisone-bacitracin-zinc oxide-nystatin, 1 application, Topical, TID  influenza vaccine, 0.5 mL, Intramuscular, Once  insulin aspart, 0-24 Units, Subcutaneous, TID AC  insulin detemir, 25 Units, Subcutaneous, Nightly  iron sucrose, 200 mg, Intravenous, Q24H  levothyroxine, 224 mcg, Oral, Q AM  linagliptin, 5 mg, Oral, Daily  metroNIDAZOLE, 500 mg, Oral,  Q8H  miconazole, 1 application, Topical, Q12H  O2, 2 L/min, Inhalation, Once  polyethylene glycol, 17 g, Oral, Daily  pregabalin, 75 mg, Oral, BID  QUEtiapine, 12.5 mg, Oral, Q PM  sulfamethoxazole-trimethoprim, 1 tablet, Oral, Q12H  tamsulosin, 0.4 mg, Oral, Daily  cyanocobalamin, 1,000 mcg, Oral, Daily      IV Meds:        Results Reviewed:   I have personally reviewed the results from the time of this admission to 1/3/2022 12:38 EST     Results from last 7 days   Lab Units 01/03/22  0416 01/02/22  0527 01/01/22  0357 12/31/21  0415 12/31/21 0415 12/29/21  0435 12/28/21  1521   SODIUM mmol/L 134* 135* 133*   < > 132*   < > 132*   POTASSIUM mmol/L 5.3* 4.8 5.0   < > 4.7   < > 3.9   CHLORIDE mmol/L 103 105 104   < > 103   < > 98   CO2 mmol/L 24.3 21.6* 18.8*   < > 19.4*   < > 21.0*   BUN mg/dL 34* 39* 41*   < > 38*   < > 48*   CREATININE mg/dL 1.95* 2.00* 2.15*   < > 2.54*   < > 3.24*   CALCIUM mg/dL 8.5* 8.6 8.5*   < > 8.5*   < > 9.1   BILIRUBIN mg/dL  --  0.2  --   --  0.2  --  0.8   ALK PHOS U/L  --  81  --   --  74  --  81   ALT (SGPT) U/L  --  25  --   --  12  --  13   AST (SGOT) U/L  --  48*  --   --  30  --  16   GLUCOSE mg/dL 97 120* 170*   < > 248*   < > 89    < > = values in this interval not displayed.     Estimated Creatinine Clearance: 38 mL/min (A) (by C-G formula based on SCr of 1.95 mg/dL (H)).      Results from last 7 days   Lab Units 12/29/21  0435   URIC ACID mg/dL 9.3*     Results from last 7 days   Lab Units 01/03/22  0416 01/02/22  0527 12/31/21  0415 12/30/21  0439 12/29/21  0435   WBC 10*3/mm3 6.69 6.36 5.06 7.24 9.26   HEMOGLOBIN g/dL 9.4* 9.1* 9.0* 8.8* 9.3*   PLATELETS 10*3/mm3 220 206 163 167 228           Assessment / Plan     ASSESSMENT:  1.  ROLA/CKD3, with UOP not fully quantified, improving.  Likely prerenal due to CHF.  Peripheral edema present; hypervolemic hyponatremia; mild hyperkalemia on Bactrim  2.  Chronic CHF  3.  Edema-due to venous stasis  4.  Leg wounds  5.   PAF    PLAN:  1.  Oral bumetanide 2 mg twice daily  2.  Lokelma daily  3.  Hopeful that Bactrim can be discontinued soon given hyperkalemia  4.  Add 2-gram potassium restriction to diet    Thank you for involving us in the care of Froilan Helton.  Please feel free to call with any questions.    Ellis Centeno MD  01/03/22  12:38 EST    [facility]  [phone]

## 2022-01-03 NOTE — CASE MANAGEMENT/SOCIAL WORK
Continued Stay Note  MICHAEL Kincaid     Patient Name: Froilan Helton  MRN: [id number]  Today's Date: 1/3/2022    Admit Date: 12/28/2021     Discharge Plan     Row Name 01/03/22 1437       Plan    Plan Comments I spoke with Anna from [facility] and she advised that she will not have a bed available until 1/10/22. CM will continue to follow.               Discharge Codes    No documentation.                     Terri Martinez RN

## 2022-01-03 NOTE — PROGRESS NOTES
"Adult Nutrition  Assessment/PES    Patient Name:  Froilan Helton  YOB: 1940  MRN: [id number]  Admit Date:  12/28/2021    Assessment Date:  1/3/2022    Comments:  Pt tolerating diet with good intake.  To assure adequate protein intake adding 2 high protein snacks twice daily.    Note completed with aid of nursing input and review of electronic medical record.     Reason for Assessment     Row Name 01/03/22 1801          Reason for Assessment    Reason For Assessment follow-up protocol     Diagnosis --  weakness/fall, morbid obesity, surgical debridement of left hip and foot wounds planned, history of: chronic lower extremity edema/cellulitis, CKD, acute/chronic CHF, PVD, diabetes                  Anthropometrics     Row Name 01/03/22 1804          Anthropometrics    Height --  185 cm (72.84\")     Weight --  227.6 lb 1.3.21            Admit Weight    Admit Weight --  225.3 lb                Labs/Tests/Procedures/Meds     Row Name 01/03/22 1805          Labs/Procedures/Meds    Lab Results Reviewed reviewed     Lab Results Comments potassium 5.3, BUN 34, Cr 1.95            Medications    Pertinent Medications Reviewed reviewed     Pertinent Medications Comments Bumex                  Estimated/Assessed Needs     Row Name 01/03/22 1804          Calculation Measurements    Height --  185 cm (72.84\")                Nutrition Prescription Ordered     Row Name 01/03/22 1807          Nutrition Prescription PO    Common Modifiers Low Potassium                Evaluation of Received Nutrient/Fluid Intake     Row Name 01/03/22 1807          PO Evaluation    Number of Meals 8     % PO Intake 7%                     Problem/Interventions:     Problem 2     Row Name 01/03/22 1808          Nutrition Diagnoses Problem 2    Problem 2 Malnutrition     Etiology (related to) Factors Affecting Nutrition     Signs/Symptoms (evidenced by) Report/Observation                    Intervention Goal     Row Name 01/03/22 1812          " Intervention Goal    PO PO intake (%)     PO Intake % 80 %  80% or greater of meals/any supplements ordered                Nutrition Intervention     Row Name 01/03/22 1813          Nutrition Intervention    RD/Tech Action Follow Tx progress                Nutrition Prescription     Row Name 01/03/22 1814          Nutrition Prescription PO    PO Prescription Begin/change supplement     Supplement Other (comment)  high protein snacks twice daily                Education/Evaluation     Row Name 01/03/22 1814          Monitor/Evaluation    Monitor I&O; PO intake; Pertinent labs; Weight; Skin status                 Electronically signed by:  Tara Young RD  01/03/22 18:15 EST

## 2022-01-03 NOTE — PLAN OF CARE
Goal Outcome Evaluation:  Plan of Care Reviewed With: patient        Progress: improving  Outcome Summary: Pt VSS, am labs pending. Pt reports pain controlled with current regimen, see emar, flowsheets. Pt with dressing changes as ordered, see orders, emar. Pt denies n/v/d overnight. Pt resting at this time.

## 2022-01-03 NOTE — THERAPY TREATMENT NOTE
"Acute Care - Physical Therapy Note   Temitope Castillo     Patient Name: Froilan Helton  : 1940  MRN: [id number]  Today's Date: 1/3/2022      Visit Dx:     Patient refused evaluation today, stating \"I am not doing any therapy.\"  Patient has been educated extensively on the importance of out of bed activity, pt verbalizes understanding however continues to refuse evaluation.  Patient has refused physical therapy evaluations when attempted on , , , , and 1/3.  Patient off floor for surgery on .  Patient is not appropriate candidate for physical therapy secondary to continued refusals for initial evaluation.         Zoila Estrella, PT  1/3/2022    "

## 2022-01-03 NOTE — PLAN OF CARE
Goal Outcome Evaluation:  Plan of Care Reviewed With: patient        Progress: improving  Outcome Summary: vss. HR continues to be in high 40's-50's. drsg changes continue to BLE,  L hip and care to coccyx. pt resting in room. incont care provided. urinal at bedside. ac/hs accucheck. pt has no complaints. resting in room.

## 2022-01-03 NOTE — SIGNIFICANT NOTE
01/03/22 1230   Provider Notification   Reason for Communication Critical lab value  (wound cx: enterococcus faecalis)   Provider Name perrenoud   Notification Route Phone call   Response Waiting for response

## 2022-01-04 NOTE — PROGRESS NOTES
"    Patient Name: Froilan Helton  :1940  81 y.o.      Patient Care Team:  Fortunato De Leon MD as PCP - General (Family Medicine)    Chief Complaint: f/u CHF and preop    Interval History: Sitting in bed eating breakfast.  Denies complaints of chest pain or shortness of breath.  States he feels a little stronger.       Objective   Vital Signs  Temp:  [97.7 °F (36.5 °C)-99.3 °F (37.4 °C)] 97.9 °F (36.6 °C)  Heart Rate:  [50-54] 50  Resp:  [18-20] 18  BP: (125-160)/(48-67) 160/65    Intake/Output Summary (Last 24 hours) at 2022 09  Last data filed at 1/3/2022 2014  Gross per 24 hour   Intake 680 ml   Output --   Net 680 ml     Flowsheet Rows      First Filed Value   Admission Height 185.4 cm (73\") Documented at 2021 1349   Admission Weight 104 kg (229 lb 12.8 oz) Documented at 2021 1349          Physical Exam:   General Appearance:    Alert, cooperative, in no acute distress   Lungs:     Clear to auscultation.  Normal respiratory effort and rate.      Heart:    Regular rhythm and bradycaric, normal S1 and S2, no murmurs, gallops or rubs.     Chest Wall:    No abnormalities observed   Abdomen:     Soft, nontender, positive bowel sounds.     Extremities:   no cyanosis, clubbing.  LE edema, legs wrapped.  No marked joint deformities.  Adequate musculoskeletal strength.       Results Review:    Results from last 7 days   Lab Units 22  0413   SODIUM mmol/L 136   POTASSIUM mmol/L 4.8   CHLORIDE mmol/L 102   CO2 mmol/L 25.6   BUN mg/dL 32*   CREATININE mg/dL 1.80*   GLUCOSE mg/dL 94   CALCIUM mg/dL 8.6         Results from last 7 days   Lab Units 22  0416   WBC 10*3/mm3 6.69   HEMOGLOBIN g/dL 9.4*   HEMATOCRIT % 30.9*   PLATELETS 10*3/mm3 220         Results from last 7 days   Lab Units 22  0413   MAGNESIUM mg/dL 1.5*                   Medication Review:   aluminum sulfate-calcium acetate, , Topical, BID  apixaban, 2.5 mg, Oral, Q12H  atorvastatin, 10 mg, Oral, " Nightly  budesonide-formoterol, 2 puff, Inhalation, BID - RT  bumetanide, 2 mg, Oral, BID  cholecalciferol, 2,000 Units, Oral, Daily  citalopram, 20 mg, Oral, Nightly  docusate sodium, 100 mg, Oral, BID  fluconazole, 200 mg, Oral, Q24H  fluticasone, 2 spray, Each Nare, Daily  hydrocortisone-bacitracin-zinc oxide-nystatin, 1 application, Topical, TID  influenza vaccine, 0.5 mL, Intramuscular, Once  insulin aspart, 0-24 Units, Subcutaneous, TID AC  insulin detemir, 25 Units, Subcutaneous, Nightly  iron sucrose, 200 mg, Intravenous, Q24H  levothyroxine, 224 mcg, Oral, Q AM  linagliptin, 5 mg, Oral, Daily  metroNIDAZOLE, 500 mg, Oral, Q8H  miconazole, 1 application, Topical, Q12H  O2, 2 L/min, Inhalation, Once  polyethylene glycol, 17 g, Oral, Daily  pregabalin, 75 mg, Oral, BID  QUEtiapine, 12.5 mg, Oral, Q PM  sodium zirconium cyclosilicate, 10 g, Oral, Daily  sulfamethoxazole-trimethoprim, 1 tablet, Oral, Q12H  tamsulosin, 0.4 mg, Oral, Daily  cyanocobalamin, 1,000 mcg, Oral, Daily              Assessment/Plan     1.  Weakness/ fall-He has a history of weakness and falls.   He is living by himself at home and continues to have frequent falls.      2.  Heart failure-acute on chronic diastolic congestive heart failure, Stable     3. Left hip and leg wound - s/p debridement 12/30/21, MRSA.  Bilateral LE wrapped     4.  History of COPD as well obstructive sleep apnea on trilogy at home. Remains on RA,  Denies SOA     5.  Morbid obesity-he has lost quite a bit of weight, greater than 60 pounds over the last year.     6.  Permanent atrial fibrillation-sinus bradycardia today.  Amiodarone was stopped secondary to bradycardia.  He is on Eliquis 2.5 mg twice daily secondary to renal disease.     7.  Hypothyroidism on replacement therapy-poorly controlled as his TSH was greater than 14, may be worse due to amiodarone.       8.  Chronic anticoagulation-he is on renal dosing of Eliquis.  At one point it was recommended that he  be on warfarin but he was noncompliant with checks.      9.  Orthostatic hypotension and supine hypertension-monitor carefully as he falls easily as it is.  Stable     10. DM-on insulin     11.  Acute on chronic renal failure-renal following, creatinine noted 1.80 today.     12.  chronic anemia-H&H noted 9.4/30.9.  He is on iron supplementation IV.    We will sign off    LEATHA Dow  [facility]  01/04/22  09:13 EST

## 2022-01-04 NOTE — PROGRESS NOTES
"Daily Progress Note:      Chief complaint: Follow-up of acute kidney injury, chronic systolic  Congestive heart failure, ischemic cardiomyopathy, atrial fibrillation, hypertension, multiple skin decubiti    Subjective: No overnight events noted.  Resting comfortably he is without complaints and he is not participating with therapies     LOS: 7 days     Vital Signs  Temp:  [97.7 °F (36.5 °C)-99.3 °F (37.4 °C)] 97.9 °F (36.6 °C)  Heart Rate:  [50-54] 50  Resp:  [18-20] 18  BP: (125-160)/(48-67) 160/65  Oxygen Therapy  SpO2: 96 %  Pulse Oximetry Type: Intermittent  Device (Oxygen Therapy): room air  Flow (L/min): 2  Oxygen Concentration (%): 95  ETCO2 (mmHg): 32 mmHg  Probe Placed On (Pulse Ox): Right:, finger}  Body mass index is 28.93 kg/m².  Flowsheet Rows      First Filed Value   Admission Height 185.4 cm (73\") Documented at 12/28/2021 1349   Admission Weight 104 kg (229 lb 12.8 oz) Documented at 12/28/2021 1349                   Documented weights    12/28/21 1349 12/28/21 2222 12/29/21 0643 12/29/21 1850   Weight: 104 kg (229 lb 12.8 oz) 102 kg (225 lb 4.8 oz) 102 kg (225 lb 6.4 oz) 102 kg (224 lb 13.9 oz)    12/30/21 0530 12/31/21 0539 01/02/22 0643 01/03/22 0550   Weight: 98.9 kg (218 lb 1.6 oz) 103 kg (226 lb) 103 kg (227 lb) 103 kg (227 lb 9.6 oz)    01/04/22 0534   Weight: 99 kg (218 lb 4.8 oz)           Patient Vitals for the past 24 hrs:   BP Temp Temp src Pulse Resp SpO2 Weight   01/04/22 0534 160/65 97.9 °F (36.6 °C) Oral 50 18 96 % 99 kg (218 lb 4.8 oz)   01/04/22 0000 -- -- -- 51 18 93 % --   01/03/22 2116 -- -- -- 54 20 -- --   01/03/22 2114 -- -- -- 52 20 -- --   01/03/22 2006 137/67 97.7 °F (36.5 °C) Oral 53 18 94 % --   01/03/22 1633 125/48 99.3 °F (37.4 °C) Oral 50 18 92 % --       99 kg (218 lb 4.8 oz)    Intake/Output                       01/02/22 0701 - 01/03/22 0700 01/03/22 0701 - 01/04/22 0700     [id number] 3262-7158 Total 4768-6139 3363-3462 Total                 Intake    P.O.  480  120 " 600  860  240 1100    Total Intake 480 120 600        Output    Urine  200  -- 200  200  -- 200    Total Output 200 -- 200 200 -- 200           Intake/Output Summary (Last 24 hours) at 1/4/2022 0740  Last data filed at 1/3/2022 2014  Gross per 24 hour   Intake 1100 ml   Output 200 ml   Net 900 ml        Intake/Output Summary (Last 24 hours) at 1/4/2022 0740  Last data filed at 1/3/2022 2014  Gross per 24 hour   Intake 1100 ml   Output 200 ml   Net 900 ml        Review of Systems   Constitutional: Positive for activity change. Negative for appetite change and fatigue.   HENT: Negative for congestion.    Respiratory: Negative for cough, chest tightness, shortness of breath and wheezing.    Cardiovascular: Positive for leg swelling. Negative for chest pain.   Gastrointestinal: Negative for abdominal distention, abdominal pain, diarrhea, nausea and vomiting.   Endocrine: Negative for polyphagia and polyuria.   Genitourinary: Negative for frequency.   Skin: Positive for color change and wound. Negative for rash.   Neurological: Negative for weakness and light-headedness.   Hematological: Does not bruise/bleed easily.   Psychiatric/Behavioral: Negative for agitation and behavioral problems.       Physical Exam  Vitals and nursing note reviewed.   Constitutional:       Appearance: He is well-developed. He is obese.   HENT:      Head: Normocephalic.   Eyes:      Conjunctiva/sclera: Conjunctivae normal.   Neck:      Thyroid: No thyromegaly.      Vascular: No JVD.   Cardiovascular:      Rate and Rhythm: Regular rhythm. Bradycardia present.      Heart sounds: Normal heart sounds. No murmur heard.      Pulmonary:      Effort: Pulmonary effort is normal. No respiratory distress.      Breath sounds: Normal breath sounds. No wheezing or rales.   Abdominal:      General: Bowel sounds are normal. There is no distension.      Palpations: Abdomen is soft.      Tenderness: There is no abdominal tenderness. There is no  guarding.   Musculoskeletal:      Right lower leg: No edema.      Left lower leg: No edema.   Skin:     General: Skin is warm and dry.      Findings: No rash.      Comments: Lower extremities are in dressings   Neurological:      General: No focal deficit present.      Mental Status: He is alert and oriented to person, place, and time.         Medication Review:   I have reviewed the patient's current medication list  Scheduled Meds:aluminum sulfate-calcium acetate, , Topical, BID  apixaban, 2.5 mg, Oral, Q12H  atorvastatin, 10 mg, Oral, Nightly  budesonide-formoterol, 2 puff, Inhalation, BID - RT  bumetanide, 2 mg, Oral, BID  cholecalciferol, 2,000 Units, Oral, Daily  citalopram, 20 mg, Oral, Nightly  docusate sodium, 100 mg, Oral, BID  fluticasone, 2 spray, Each Nare, Daily  hydrocortisone-bacitracin-zinc oxide-nystatin, 1 application, Topical, TID  influenza vaccine, 0.5 mL, Intramuscular, Once  insulin aspart, 0-24 Units, Subcutaneous, TID AC  insulin detemir, 25 Units, Subcutaneous, Nightly  iron sucrose, 200 mg, Intravenous, Q24H  levothyroxine, 224 mcg, Oral, Q AM  linagliptin, 5 mg, Oral, Daily  metroNIDAZOLE, 500 mg, Oral, Q8H  miconazole, 1 application, Topical, Q12H  O2, 2 L/min, Inhalation, Once  polyethylene glycol, 17 g, Oral, Daily  pregabalin, 75 mg, Oral, BID  QUEtiapine, 12.5 mg, Oral, Q PM  sodium zirconium cyclosilicate, 10 g, Oral, Daily  sulfamethoxazole-trimethoprim, 1 tablet, Oral, Q12H  tamsulosin, 0.4 mg, Oral, Daily  cyanocobalamin, 1,000 mcg, Oral, Daily      Continuous Infusions:   PRN Meds:.•  acetaminophen **OR** acetaminophen **OR** acetaminophen  •  albuterol sulfate HFA  •  dextrose  •  dextrose  •  glucagon (human recombinant)  •  magnesium sulfate **OR** magnesium sulfate **OR** magnesium sulfate  •  melatonin      Labs:  Results from last 7 days   Lab Units 01/03/22  0416 01/02/22  0527 12/31/21  0415 12/30/21  0439 12/30/21  0439 12/29/21  0435 12/29/21  0435 12/28/21  1521  12/28/21  1521   WBC 10*3/mm3 6.69 6.36 5.06  --  7.24  --  9.26  --  8.95   HEMOGLOBIN g/dL 9.4* 9.1* 9.0*   < > 8.8*   < > 9.3*   < > 10.5*   HEMATOCRIT % 30.9* 29.6* 29.4*  --  28.0*  --  29.5*  --  33.8*   PLATELETS 10*3/mm3 220 206 163  --  167  --  228  --  251    < > = values in this interval not displayed.     Results from last 7 days   Lab Units 01/04/22  0413 01/03/22  0416 01/02/22  0527 01/01/22  0357 12/31/21  0415 12/30/21  0439 12/29/21  0435 12/28/21  1521 12/28/21  1521   SODIUM mmol/L 136 134* 135* 133* 132* 135* 135*   < > 132*   POTASSIUM mmol/L 4.8 5.3* 4.8 5.0 4.7 4.1 3.9   < > 3.9   CHLORIDE mmol/L 102 103 105 104 103 106 103   < > 98   CO2 mmol/L 25.6 24.3 21.6* 18.8* 19.4* 20.1* 19.5*   < > 21.0*   BUN mg/dL 32* 34* 39* 41* 38* 42* 40*   < > 48*   CREATININE mg/dL 1.80* 1.95* 2.00* 2.15* 2.54* 2.66* 2.68*   < > 3.24*   CALCIUM mg/dL 8.6 8.5* 8.6 8.5* 8.5* 8.2* 8.9   < > 9.1   BILIRUBIN mg/dL  --   --  0.2  --  0.2  --   --   --  0.8   ALK PHOS U/L  --   --  81  --  74  --   --   --  81   ALT (SGPT) U/L  --   --  25  --  12  --   --   --  13   AST (SGOT) U/L  --   --  48*  --  30  --   --   --  16   GLUCOSE mg/dL 94 97 120* 170* 248* 105* 112*   < > 89    < > = values in this interval not displayed.     Results from last 7 days   Lab Units 01/04/22 0413   MAGNESIUM mg/dL 1.5*       Results from last 7 days   Lab Units 01/03/22 0416 01/02/22  0527 12/31/21  0415 12/29/21  0435 12/28/21  1521   AST (SGOT) U/L  --  48* 30  --  16   ALT (SGPT) U/L  --  25 12  --  13   PLATELETS 10*3/mm3 220 206 163   < > 251    < > = values in this interval not displayed.         Lab Results (last 24 hours)     Procedure Component Value Units Date/Time    Renal Function Panel [[id number]]  (Abnormal) Collected: 01/04/22 0413    Specimen: Blood Updated: 01/04/22 0456     Glucose 94 mg/dL      BUN 32 mg/dL      Creatinine 1.80 mg/dL      Sodium 136 mmol/L      Potassium 4.8 mmol/L      Chloride 102 mmol/L      CO2  25.6 mmol/L      Calcium 8.6 mg/dL      Albumin 2.50 g/dL      Phosphorus 3.0 mg/dL      Anion Gap 8.4 mmol/L      BUN/Creatinine Ratio 17.8     eGFR Non African Amer 36 mL/min/1.73     Narrative:      GFR Normal >60  Chronic Kidney Disease <60  Kidney Failure <15      Magnesium [[id number]]  (Abnormal) Collected: 01/04/22 0413    Specimen: Blood Updated: 01/04/22 0456     Magnesium 1.5 mg/dL     POC Glucose Once [[id number]]  (Normal) Collected: 01/03/22 2012    Specimen: Blood Updated: 01/03/22 2018     Glucose 127 mg/dL      Comment: Meter: [id number] : [id number] Freddy HARKINS       POC Glucose Once [[id number]]  (Normal) Collected: 01/03/22 1631    Specimen: Blood Updated: 01/03/22 1638     Glucose 103 mg/dL      Comment: Meter: [id number] : [id number] Casi Justin RN       Urine Culture - Urine, Urine, Catheter In/Out [[id number]]  (Abnormal) Collected: 01/02/22 1856    Specimen: Urine, Catheter In/Out Updated: 01/03/22 1507     Urine Culture Yeast isolated    Narrative:      No additional tests pending. Call if further workup needed.     POC Glucose Once [[id number]]  (Abnormal) Collected: 01/03/22 1141    Specimen: Blood Updated: 01/03/22 1150     Glucose 203 mg/dL      Comment: Meter: [id number] : [id number] Jacque SOUZA       POC Glucose Once [[id number]]  (Normal) Collected: 01/03/22 0752    Specimen: Blood Updated: 01/03/22 0758     Glucose 97 mg/dL      Comment: Meter: [id number] : [id number] Mario HARKINS               Results from last 7 days   Lab Units 01/01/22  0357 12/28/21  1521   TSH uIU/mL 7.100* 14.290*             Results from last 7 days   Lab Units 01/04/22  0413   MAGNESIUM mg/dL 1.5*             Results from last 7 days   Lab Units 12/28/21  1521   HEMOGLOBIN A1C % 5.30     Glucose   Date/Time Value Ref Range Status   01/03/2022 2012 127 70 - 130 mg/dL Final     Comment:     Meter: [id number] : [id number] Freddy HARKINS   01/03/2022 1631  103 70 - 130 mg/dL Final     Comment:     Meter: [id number] : [id number] Casi Justin RN   01/03/2022 1141 203 (H) 70 - 130 mg/dL Final     Comment:     Meter: [id number] : [id number] Jacque Santamaria NURSING ASSISTANT   01/03/2022 0752 97 70 - 130 mg/dL Final     Comment:     Meter: [id number] : [id number] Mario Steinberg NA   01/02/2022 2202 115 70 - 130 mg/dL Final     Comment:     Meter: [id number] : [id number] Marielos Edmonds RN   01/02/2022 1650 136 (H) 70 - 130 mg/dL Final     Comment:     Meter: [id number] : [id number] Madalyn Barnhart CNA   01/02/2022 1142 135 (H) 70 - 130 mg/dL Final     Comment:     Meter: [id number] : [id number] Madalyn Barnhart CNA   01/02/2022 0749 100 70 - 130 mg/dL Final     Comment:     Meter: [id number] : [id number] Madalyn Barnhart CNA         Results from last 7 days   Lab Units 01/02/22  1856 12/31/21  1812 12/30/21  1044 12/28/21  1534   WOUNDCX   --   --  Heavy growth (4+) Staphylococcus aureus, MRSA*  Heavy growth (4+) Enterococcus faecalis* Light growth (2+) Corynebacterium species*   URINECX  Yeast isolated* Yeast isolated*  --   --      Results from last 7 days   Lab Units 01/02/22  1856   NITRITE UA  Negative   WBC UA /HPF Too Numerous to Count*   BACTERIA UA /HPF 1+*   SQUAM EPITHEL UA /HPF Unable to determine due to loaded field*   URINECX  Yeast isolated*             Radiology:  Imaging Results (Last 24 Hours)     ** No results found for the last 24 hours. **          Cardiology:  ECG/EMG Results (last 24 hours)     ** No results found for the last 24 hours. **                I have reviewed recent labs results and consult notes.    Please note portions of this assessment/plan may have been copied and pasted, but I have personally seen this patient and reviewed each line of this assessment and plan for accuracy and made updates to reflect my necessary changes     Assessment and Plan:  1.  Acute kidney injury chronic kidney disease stage III  "renal functions are stable    2.  Left hip and left leg decubitus wounds debrided on 12/30/2021 wound culture show Enterococcus and MRSA leg presently on Bactrim.  Discussed with surgery today about changing antibiotics she will readdress in further disposition will be made     3.   Congestive heart failure with preserved ejection fraction   stable    4.    Bradycardia improved remains off amiodarone        5.  Adult onset diabetes mellitus Accu-Chek sliding scale appears to be hyperglycemic since admission we will continue with home insulin Accu-Chek sliding scale insulin    6.    Paroxysmal atrial fibrillation continue Eliquis dose adjusted to due to renal insufficiency     7.  Ischemic cardiomyopathy echo done as admission showed improvement of the ventricular function with ejection fraction 55% with severe aortic valve calcification some right atrial dilatation..     8.  Hypothyroidism TSH is elevated likely due to patient noncompliance fall continue present dose and monitor     9.  Multiple falls at home patient is clearly unable to care for himself nursing home placement has been found but no bed available for another week.  He is not participating in therapies lumbarization patient about participating in therapies.  \"No will take me I will go home\" clearly is not able to care for himself noncompliance initiated signed out AMA previously white discharge planning addressed     10.  COPD nothing acute continue home medications     11.    Iron deficiency anemia started on iron infusions    12.    Possible urinary tract infection nitrite positive urine continues pending preliminary cultures growing out Candida start Diflucan    Much of this encounter note is an electronic transcription/translation of spoken language to printed text using Dragon Software          "

## 2022-01-04 NOTE — NURSING NOTE
"CWON follow up. Dr. Aguero and I assessed the left hip wounds together this am around 11. Wounds are stable. There is still some non-viable tissue noted to wound bases but significant improvement since debrided in OR. There is no purulence and no necrosis. Dr. Aguero requested a wound culture and we agree the current wound care is appropriate.   CWON performed wound care with Therahoney and Opticell Ag, securing with silicone border dressings. Patient tolerated well.    CWON also assessed gluteal wounds. There is improvement noted. Bilateral buttocks still with partial thickness wounds but these are healing and skin erosion is no longer present. Will dc [place] soaks and recommend continuing the Yaima's Magic Barrier.     CWON returned several hours later around 1430 and his BLE wounds were assessed. All wounds are stable and healing nicely. Left medial foot wound with a mix of yellow and red tissue and the others are with 100% healthy red tissue. There is no swelling to BLE and wounds are with minimal drainage. CWON recommends continuing current wound care of NS wet to dry. All wound care performed and pt tolerated very well, with no complaints.    There is no evidence of a fungal skin rash to BLE, so will dc the miconazole cream.    Of note, I did ask the patient if he plans to get out of bed and if he plans to walk again. He commented, \"Well, I suppose I should.\"  CWON offered encouragement and reassurance but patient non-committal with getting out of bed, working with therapy and going to rehab.    All discussed with RAVI Bolivar.  "

## 2022-01-04 NOTE — PROGRESS NOTES
[facility] Progress Note      Patient Name: Froilan Helton  : 1940  MRN: [id number]  Primary Care Physician:  Fortunato De Leon MD  Date of admission: 2021    Subjective     Interval History:   Feels fine; appetite good; no N/V  No SOB on RA  States he has made a lot of urine in past 24 hours; wt down 11# since arrival    Review of Systems:   14 point review of systems is otherwise negative except for mentioned above on HPI    Objective     Vitals:   Temp:  [97.7 °F (36.5 °C)-99.3 °F (37.4 °C)] 97.9 °F (36.6 °C)  Heart Rate:  [50-54] 50  Resp:  [18-20] 18  BP: (125-160)/(48-67) 160/65    Intake/Output Summary (Last 24 hours) at 2022 0813  Last data filed at 1/3/2022 2014  Gross per 24 hour   Intake 1100 ml   Output 200 ml   Net 900 ml       Physical Exam:    General Appearance: alert, appropriate, pale, NAD, chronically ill  Skin: warm and dry  HEENT: oral mucosa normal, nonicteric sclera  Neck: supple, no JVD  Lungs: Coarse BS but no crackles or wheezes; not labored on room air  Heart: Irregularly irregular, bradycardic, 2/6M  Abdomen: soft, nontender, nondistended, BS +  : no palpable bladder  Extremities: 2+ edema, lower legs wrapped  Neuro: normal speech and mental status; flat affect    Scheduled Meds:     aluminum sulfate-calcium acetate, , Topical, BID  apixaban, 2.5 mg, Oral, Q12H  atorvastatin, 10 mg, Oral, Nightly  budesonide-formoterol, 2 puff, Inhalation, BID - RT  bumetanide, 2 mg, Oral, BID  cholecalciferol, 2,000 Units, Oral, Daily  citalopram, 20 mg, Oral, Nightly  docusate sodium, 100 mg, Oral, BID  fluconazole, 200 mg, Oral, Q24H  fluticasone, 2 spray, Each Nare, Daily  hydrocortisone-bacitracin-zinc oxide-nystatin, 1 application, Topical, TID  influenza vaccine, 0.5 mL, Intramuscular, Once  insulin aspart, 0-24 Units, Subcutaneous, TID AC  insulin detemir, 25 Units, Subcutaneous, Nightly  iron sucrose, 200 mg, Intravenous, Q24H  levothyroxine, 224  mcg, Oral, Q AM  linagliptin, 5 mg, Oral, Daily  metroNIDAZOLE, 500 mg, Oral, Q8H  miconazole, 1 application, Topical, Q12H  O2, 2 L/min, Inhalation, Once  polyethylene glycol, 17 g, Oral, Daily  pregabalin, 75 mg, Oral, BID  QUEtiapine, 12.5 mg, Oral, Q PM  sodium zirconium cyclosilicate, 10 g, Oral, Daily  sulfamethoxazole-trimethoprim, 1 tablet, Oral, Q12H  tamsulosin, 0.4 mg, Oral, Daily  cyanocobalamin, 1,000 mcg, Oral, Daily      IV Meds:        Results Reviewed:   I have personally reviewed the results from the time of this admission to 1/4/2022 08:13 EST     Results from last 7 days   Lab Units 01/04/22  0413 01/03/22  0416 01/02/22  0527 01/01/22  0357 12/31/21  0415 12/29/21  0435 12/28/21  1521   SODIUM mmol/L 136 134* 135*   < > 132*   < > 132*   POTASSIUM mmol/L 4.8 5.3* 4.8   < > 4.7   < > 3.9   CHLORIDE mmol/L 102 103 105   < > 103   < > 98   CO2 mmol/L 25.6 24.3 21.6*   < > 19.4*   < > 21.0*   BUN mg/dL 32* 34* 39*   < > 38*   < > 48*   CREATININE mg/dL 1.80* 1.95* 2.00*   < > 2.54*   < > 3.24*   CALCIUM mg/dL 8.6 8.5* 8.6   < > 8.5*   < > 9.1   BILIRUBIN mg/dL  --   --  0.2  --  0.2  --  0.8   ALK PHOS U/L  --   --  81  --  74  --  81   ALT (SGPT) U/L  --   --  25  --  12  --  13   AST (SGOT) U/L  --   --  48*  --  30  --  16   GLUCOSE mg/dL 94 97 120*   < > 248*   < > 89    < > = values in this interval not displayed.     Estimated Creatinine Clearance: 40.4 mL/min (A) (by C-G formula based on SCr of 1.8 mg/dL (H)).  Results from last 7 days   Lab Units 01/04/22  0413   MAGNESIUM mg/dL 1.5*   PHOSPHORUS mg/dL 3.0     Results from last 7 days   Lab Units 12/29/21  0435   URIC ACID mg/dL 9.3*     Results from last 7 days   Lab Units 01/03/22  0416 01/02/22  0527 12/31/21  0415 12/30/21  0439 12/29/21  0435   WBC 10*3/mm3 6.69 6.36 5.06 7.24 9.26   HEMOGLOBIN g/dL 9.4* 9.1* 9.0* 8.8* 9.3*   PLATELETS 10*3/mm3 220 206 163 167 228           Assessment / Plan     ASSESSMENT:  1.  ROLA/CKD3, with UOP not  fully quantified, improving.  Likely prerenal due to CHF.  Peripheral edema present; hypervolemic hyponatremia, improving on diuretics; normal K on Bactrim  2.  Chronic CHF  3.  Edema-due to venous stasis  4.  Leg wounds  5.  PAF    PLAN:  1.  Oral bumetanide 2 mg twice daily  2.  If potassium again normal tomorrow, will stop Lokelma given recent removal of Bactrim  3.  Replace Mg    Thank you for involving us in the care of Froilan Helton.  Please feel free to call with any questions.    Ellis Centeno MD  01/04/22  08:13 [facility]    [facility]  [phone]

## 2022-01-04 NOTE — PROGRESS NOTES
Surgery Progress Note   Chief Complaint:  Left hip wound    Subjective     Interval History:     Froilan is doing well.  He says his left hip hurts occasionally.  The wound has grown out MRSA and enterococcus both sensitive to Vancomycin.        Objective     Vital Signs  Temp:  [97.7 °F (36.5 °C)-99.3 °F (37.4 °C)] 97.9 °F (36.6 °C)  Heart Rate:  [50-54] 50  Resp:  [18-20] 18  BP: (125-160)/(48-67) 160/65  Body mass index is 28.93 kg/m².    Intake/Output Summary (Last 24 hours) at 1/4/2022 1100  Last data filed at 1/3/2022 2014  Gross per 24 hour   Intake 680 ml   Output --   Net 680 ml     No intake/output data recorded.       Physical Exam:   General: patient awake, alert and cooperative   Extremities: I evaluated his hip wounds with Carole, there is some slough noted but both wounds look like they are healing well with the current wound care regimen   Neurologic: Normal mood and behavior     Results Review:     I reviewed the patient's new clinical results.      WBC No results found for: WBCS   HGB Hemoglobin   Date Value Ref Range Status   01/03/2022 9.4 (L) 13.0 - 17.7 g/dL Final   01/02/2022 9.1 (L) 13.0 - 17.7 g/dL Final      HCT Hematocrit   Date Value Ref Range Status   01/03/2022 30.9 (L) 37.5 - 51.0 % Final   01/02/2022 29.6 (L) 37.5 - 51.0 % Final      Platlets No results found for: LABPLAT     PT/INR:  No results found for: PROTIME/No results found for: INR    Sodium Sodium   Date Value Ref Range Status   01/04/2022 136 136 - 145 mmol/L Final   01/03/2022 134 (L) 136 - 145 mmol/L Final   01/02/2022 135 (L) 136 - 145 mmol/L Final      Potassium Potassium   Date Value Ref Range Status   01/04/2022 4.8 3.5 - 5.2 mmol/L Final   01/03/2022 5.3 (H) 3.5 - 5.2 mmol/L Final   01/02/2022 4.8 3.5 - 5.2 mmol/L Final      Chloride Chloride   Date Value Ref Range Status   01/04/2022 102 98 - 107 mmol/L Final   01/03/2022 103 98 - 107 mmol/L Final   01/02/2022 105 98 - 107 mmol/L Final      Bicarbonate No  results found for: PLASMABICARB   BUN BUN   Date Value Ref Range Status   01/04/2022 32 (H) 8 - 23 mg/dL Final   01/03/2022 34 (H) 8 - 23 mg/dL Final   01/02/2022 39 (H) 8 - 23 mg/dL Final      Creatinine Creatinine   Date Value Ref Range Status   01/04/2022 1.80 (H) 0.76 - 1.27 mg/dL Final   01/03/2022 1.95 (H) 0.76 - 1.27 mg/dL Final   01/02/2022 2.00 (H) 0.76 - 1.27 mg/dL Final      Calcium Calcium   Date Value Ref Range Status   01/04/2022 8.6 8.6 - 10.5 mg/dL Final   01/03/2022 8.5 (L) 8.6 - 10.5 mg/dL Final   01/02/2022 8.6 8.6 - 10.5 mg/dL Final      Magnesium  AST  ALT  Bilirubin, Total  AlkPhos  Albumin    Amylase  Lipase    Radiology: Magnesium   Date Value Ref Range Status   01/04/2022 1.5 (L) 1.6 - 2.4 mg/dL Final     No components found for: AST.*  No components found for: ALT.*  No results found for: BILIRUBIN    No components found for: ALKPHOS.*  No components found for: ALBUMIN.*      No components found for: AMYLASE.*  No components found for: LIPASE.*            Imaging Results (Most Recent)     Procedure Component Value Units Date/Time    US Renal Bilateral [[id number]] Collected: 12/28/21 1938     Updated: 12/28/21 1940    Narrative:      US Renal Comp    INDICATION:   Acute on chronic kidney disease    COMPARISON:   7/8/2021    FINDINGS:   KIDNEYS:  The right kidney measures 4.8 x 4.8 x 9.5 cm. The left kidney measures 4.7 x 5.4 x 11 cm. Renal cortical thickness and echogenicity is normal.  No hydronephrosis.    URINARY BLADDER:  The bladder is unremarkable.      Impression:      Negative renal ultrasound. No hydronephrosis. No change from the previous ultrasound study.    Signer Name: Jaylen Bhardwaj MD   Signed: 12/28/2021 7:38 PM   Workstation Name: [id number]    [facility]             Pharmacy to dose vancomycin,           Assessment/Plan     Patient Active Problem List   Diagnosis Code   • COPD (chronic obstructive pulmonary disease) ([facility]) J44.9   • Type 2 diabetes  mellitus with stage 4 chronic kidney disease, with long-term current use of insulin ([facility]) E11.22, N18.4, Z79.4   • Essential hypertension I10   • Primary osteoarthritis of left knee M17.12   • Chronic renal insufficiency, stage 4 (severe) ([facility]) N18.4   • Class 2 severe obesity due to excess calories with serious comorbidity in adult ([facility]) E66.01   • Physical debility R53.81   • Acute UTI (urinary tract infection) N39.0   • Permanent atrial fibrillation ([facility]) I48.21   • H/O noncompliance with medical treatment, presenting hazards to health Z91.19   • 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   • Acute on chronic combined systolic and diastolic CHF (congestive heart failure) ([facility]) I50.43   • Generalized weakness R53.1   • Recurrent left pleural effusion J90   • Fall on same level as cause of accidental injury W18.30XA   • Gross hematuria R31.0   • CAD (coronary artery disease) I25.10   • HLD (hyperlipidemia) E78.5   • Hypothyroidism E03.9   • CKD (chronic kidney disease) stage 3, GFR 30-59 ml/min ([facility]) N18.30   • Acute metabolic encephalopathy G93.41   • Cardiomyopathy ([facility]) I42.9   • Recurrent falls R29.6   • Acute renal failure superimposed on chronic kidney disease ([facility]) N17.9, N18.9   • Open wound of left foot S91.302A       Status post debridement  --will switch to Vancomycin.  Due to his renal failure I will have pharmacy dose it.  Will continue using the [place] and aquacel dressings.  I will order new cultures as well.      Judie Aguero DO  01/04/22  11:00 EST

## 2022-01-05 PROBLEM — E44.0 MODERATE MALNUTRITION (HCC): Status: ACTIVE | Noted: 2022-01-01

## 2022-01-05 NOTE — PLAN OF CARE
Goal Outcome Evaluation:  Plan of Care Reviewed With: patient        Progress: improving  Outcome Summary: Pt VSS, am labs pending. Pt continues maria a HR 50's-60's, continues room air, 02 sats 90%'s. Wound RN doing dressing changes on day shift and adjusting orders for dressing changes, see Wound RN note. Pt denies pain, n/v/d overnight. Pt resting at this time.

## 2022-01-05 NOTE — PROGRESS NOTES
Adult Nutrition  Assessment/PES    Patient Name:  Froilan Helton  YOB: 1940  MRN: [id number]  Admit Date:  12/28/2021    Assessment Date:  1/5/2022    Comments:  Good po intake. Edu on high potassium foods and list provided ( RN will give to pt)   Not certain pt will comply with diet at home. Will cont to follow.     This note completed with aid of nursing, review of medical record, and phone interview/education with pt permission.      Reason for Assessment     Row Name 01/05/22 1154          Reason for Assessment    Reason For Assessment follow-up protocol     Diagnosis renal disease; other (see comments)  A/CKD< L Hip/leg wound, s/p falls home hx HF DM                Nutrition/Diet History     Row Name 01/05/22 1153          Nutrition/Diet History    Typical Food/Fluid Intake spoke w pt persmisson via phone. reports eating good and denies issue. denies being on potassium restriction before, pt has been giving menu prefs.                Anthropometrics     Row Name 01/05/22 1156 01/05/22 0627       Anthropometrics    Weight --  210.5# 95.5 kg (210 lb 8 oz)       Body Mass Index (BMI)    BMI Assessment BMI 25-29.9: overweight --               Labs/Tests/Procedures/Meds     Row Name 01/05/22 1156          Labs/Procedures/Meds    Lab Results Reviewed reviewed     Lab Results Comments glu 185, BUn 30/creat 2 H            Diagnostic Tests/Procedures    Diagnostic Test/Procedure Reviewed reviewed            Medications    Pertinent Medications Reviewed reviewed     Pertinent Medications Comments diflucan, bumex, vitamin D, miralax, levemir, novolog, tradjenta                Physical Findings     Row Name 01/05/22 1157          Physical Findings    Skin other (see comments)  multiple issues see RN notes                  Nutrition Prescription Ordered     Row Name 01/05/22 1153          Nutrition Prescription PO    Supplement Frequency Other (comment)  high pro snacks BID     Common Modifiers Low Potassium   2 gm                Evaluation of Received Nutrient/Fluid Intake     Row Name 01/05/22 1159          Fluid Intake Evaluation    Oral Fluid (mL) 680  45%            PO Evaluation    Number of Meals 4     % PO Intake 94%                     Problem/Interventions:   Problem 1     Row Name 01/05/22 1200          Nutrition Diagnoses Problem 1    Problem 1 Knowledge Deficit     Etiology (related to) MNT for Treatment/Condition     Signs/Symptoms (evidenced by) Report/Observation                Problem 2     Row Name 01/05/22 1201          Nutrition Diagnoses Problem 2    Problem 2 Malnutrition     Etiology (related to) Factors Affecting Nutrition     Signs/Symptoms (evidenced by) Report/Observation                    Intervention Goal     Row Name 01/05/22 1201          Intervention Goal    General Maintain nutrition     PO PO intake (%)     PO Intake % 80 %  or greater                Nutrition Intervention     Row Name 01/05/22 1202          Nutrition Intervention    RD/Tech Action Interview for preference; Follow Tx progress                  Education/Evaluation     Row Name 01/05/22 1202          Education    Education Education topics  discussed high potassium foods and kidney function. pt listened not sure about his follow thru.     Education Topics Potassium  printed Potassium Info from NK[facility], RN will provide to pt            Monitor/Evaluation    Monitor Per protocol; I&O; Pertinent labs; Weight; Skin status; Symptoms     Education Follow-up Other (comment)  uncertain pt will comply                 Electronically signed by:  Laura Varma RD  01/05/22 12:04 EST

## 2022-01-05 NOTE — THERAPY EVALUATION
Patient Name: Froilan Helton  : 1940    MRN: [id number]                              Today's Date: 2022       Admit Date: 2021    Visit Dx:     ICD-10-CM ICD-9-CM   1. Acute renal failure superimposed on chronic kidney disease, unspecified CKD stage, unspecified acute renal failure type ([facility])  N17.9 584.9    N18.9 585.9   2. Decubitus ulcer of trochanteric region of left hip, unspecified ulcer stage  L89.229 707.04     707.20   3. Open wound of left foot, initial encounter  S91.302A 892.0     Patient Active Problem List   Diagnosis   • COPD (chronic obstructive pulmonary disease) ([facility])   • Type 2 diabetes mellitus with stage 4 chronic kidney disease, with long-term current use of insulin ([facility])   • Essential hypertension   • Primary osteoarthritis of left knee   • Chronic renal insufficiency, stage 4 (severe) ([facility])   • Class 2 severe obesity due to excess calories with serious comorbidity in adult ([facility])   • Physical debility   • Acute UTI (urinary tract infection)   • Permanent atrial fibrillation ([facility])   • H/O noncompliance with medical treatment, presenting hazards to health   • Myxedema   • Acute on chronic combined systolic and diastolic CHF (congestive heart failure) ([facility])   • Generalized weakness   • Recurrent left pleural effusion   • Fall on same level as cause of accidental injury   • Gross hematuria   • CAD (coronary artery disease)   • HLD (hyperlipidemia)   • Hypothyroidism   • CKD (chronic kidney disease) stage 3, GFR 30-59 ml/min ([facility])   • Acute metabolic encephalopathy   • Cardiomyopathy ([facility])   • Recurrent falls   • Acute renal failure superimposed on chronic kidney disease ([facility])   • Open wound of left foot     Past Medical History:   Diagnosis Date   • A-fib ([facility])    • Arthritis    • Asthma    • CAD (coronary artery disease)    • Cardiomyopathy ([facility])    • Cataract    • CHF (congestive heart failure) ([facility])    • CKD (chronic kidney disease), stage III ([facility])    • COPD (chronic  obstructive pulmonary disease) ([facility])    • Diabetes mellitus ([facility])    • Disease of thyroid gland    • Emphysema, unspecified (HCC)    • Glaucoma    • Hyperlipidemia    • Hypertension    • Kidney stone    • Myocardial infarct, old    • Neuropathy    • Osteoarthritis    • Osteoporosis    • Permanent atrial fibrillation (HCC) 6/30/2020   • PVD (peripheral vascular disease) ([facility])    • Renal disorder    • Shingles      Past Surgical History:   Procedure Laterality Date   • COLONOSCOPY     • EYE SURGERY     • INCISION AND DRAINAGE LEG Left 12/30/2021    Procedure: debridement of left hip wounds and left foot wound;  Surgeon: Judie Aguero DO;  Location: [facility];  Service: General;  Laterality: Left;   • JOINT REPLACEMENT      right   • KIDNEY STONE SURGERY     • REPLACEMENT TOTAL KNEE     • TOE SURGERY        General Information     [facility] Name 01/05/22 1030          Physical Therapy Time and Intention    Document Type evaluation  -     Mode of Treatment physical therapy  -[facility] Name 01/05/22 1030          General Information    Patient Profile Reviewed yes  -     Prior Level of Function --  pt states he performs stand pivot transfers to motorized scooter and occasionally ambulates short distances to bathroom and back with rolling walker.  Patient reports multiple falls at home prior to admission.  -     Existing Precautions/Restrictions fall  -     Barriers to Rehab previous functional deficit  -[facility] Name 01/05/22 1030          Living Environment    Lives With alone  -[facility] Name 01/05/22 1030          Home Main Entrance    Stair Railings, Main Entrance --  ramp to enter home  -[facility] Name 01/05/22 1030          Cognition    Orientation Status (Cognition) oriented x 3  -[facility] Name 01/05/22 1030          Safety Issues, Functional Mobility    Safety Issues Affecting Function (Mobility) insight into deficits/self-awareness  pt appears unaware how prolonged immobility may impact physical  function/independence  -           User Key  (r) = Recorded By, (t) = Taken By, (c) = Cosigned By    Initials Name Provider Type     Zoila Estrella PT Physical Therapist               Mobility     Row Name 01/05/22 1030          Bed Mobility    Bed Mobility other (see comments)  deferred up in chair  -     Row Name 01/05/22 1030          Transfers    Comment (Transfers) attempted sit to stand from recliner surface.  Patient able to stand approximately 50% of fully upright posture and returns to sitting.  Patient unable to obtain fully upright posture and reports increased knee pain.  -     Row Name 01/05/22 1030          Gait/Stairs (Locomotion)    Comment (Gait/Stairs) pt unable to safely attempt gait at this time  -           User Key  (r) = Recorded By, (t) = Taken By, (c) = Cosigned By    Initials Name Provider Type     Zoila Estrella, PT Physical Therapist               Obj/Interventions     Row Name 01/05/22 1030          Range of Motion Comprehensive    Comment, General Range of Motion difficult to formally assess due to hip/knee pain  -     Row Name 01/05/22 1030          Strength Comprehensive (MMT)    Comment, General Manual Muscle Testing (MMT) Assessment difficult to formally assess due to hip/knee pain  -     Row Name 01/05/22 1030          Sensory Assessment (Somatosensory)    Sensory Assessment (Somatosensory) other (see comments)  pt with history of neuropathy  -           User Key  (r) = Recorded By, (t) = Taken By, (c) = Cosigned By    Initials Name Provider Type     Zoila Estrella PT Physical Therapist               Goals/Plan     Row Name 01/05/22 1030          Bed Mobility Goal 1 (PT)    Activity/Assistive Device (Bed Mobility Goal 1, PT) bed mobility activities, all  -     [place] Level/Cues Needed (Bed Mobility Goal 1, PT) minimum assist (75% or more patient effort)  -     Time Frame (Bed Mobility Goal 1, PT) 3 days  -     Progress/Outcomes (Bed Mobility  "Goal 1, PT) goal ongoing  -     Row Name 01/05/22 1030          Transfer Goal 1 (PT)    Activity/Assistive Device (Transfer Goal 1, PT) transfers, all  -JW     [place] Level/Cues Needed (Transfer Goal 1, PT) minimum assist (75% or more patient effort)  -JW     Time Frame (Transfer Goal 1, PT) 3 days  -JW     Progress/Outcome (Transfer Goal 1, PT) goal ongoing  -     Row Name 01/05/22 1030          Gait Training Goal 1 (PT)    Activity/Assistive Device (Gait Training Goal 1, PT) gait (walking locomotion); assistive device use  -JW     [place] Level (Gait Training Goal 1, PT) minimum assist (75% or more patient effort)  -JW     Distance (Gait Training Goal 1, PT) 20  -JW     Time Frame (Gait Training Goal 1, PT) 3 days  -JW     Progress/Outcome (Gait Training Goal 1, PT) goal ongoing  -           User Key  (r) = Recorded By, (t) = Taken By, (c) = Cosigned By    Initials Name Provider Type    Zoila Cazares, PT Physical Therapist               Clinical Impression     Row Name 01/05/22 1030          Plan of Care Review    Plan of Care Reviewed With patient  -     Outcome Summary Physical therapy evaluation complete.  Patient initially refused to participate, however after significant encouragement/education patient willing to attempt.  Patient reports at baseline, he uses lift chair to assist with standing and performs stand pivot transfers to motorized scooter.  Patient reports he occasionally ambulates short distances with rolling walker \"if I can\".  Patient attempts sit to stand transfers from recliner surface x2, pt able to obtain approximately 50% upright posture before returning to sitting.  Patient reports increased knee pain with all activity, stating \"they have been bad for years\".  Discussed participation with physical therapy, however when asked if patient would be agreeable to participate, patient states \"well yes and no.\"  Patient appears hesitant to participate due to chronic knee " "pain and displays overall decreased insight into deficits and effects of prolonged immobility.  Will see patient on a trial basis for therapy, however will discharge if patient refuses to participate.  -     Row Name 01/05/22 1030          Therapy Assessment/Plan (PT)    Patient/Family Therapy Goals Statement (PT) \"go home\"  -     Rehab Potential (PT) fair, will monitor progress closely  -     Criteria for Skilled Interventions Met (PT) yes; meets criteria  -     Predicted Duration of Therapy Intervention (PT) 3 days  -     Row Name 01/05/22 1030          Positioning and Restraints    Pre-Treatment Position sitting in chair/recliner  -     Post Treatment Position chair  -JW     In Chair reclined; call light within reach; encouraged to call for assist; notified nsg  -           User Key  (r) = Recorded By, (t) = Taken By, (c) = Cosigned By    Initials Name Provider Type    Zoila Cazares PT Physical Therapist               Outcome Measures     Row Name 01/05/22 1030          How much help from another person do you currently need...    Turning from your back to your side while in flat bed without using bedrails? 3  -JW     Moving from lying on back to sitting on the side of a flat bed without bedrails? 3  -JW     Moving to and from a bed to a chair (including a wheelchair)? 2  -JW     Standing up from a chair using your arms (e.g., wheelchair, bedside chair)? 1  -JW     Climbing 3-5 steps with a railing? 1  -JW     To walk in hospital room? 1  -     AM-PAC 6 Clicks Score (PT) 11  -     Row Name 01/05/22 1030          Functional Assessment    Outcome Measure Options AM-PAC 6 Clicks Basic Mobility (PT)  -           User Key  (r) = Recorded By, (t) = Taken By, (c) = Cosigned By    Initials Name Provider Type    Zoila Cazares PT Physical Therapist                             Physical Therapy Education                 Title: PT OT SLP Therapies (Done)     Topic: Physical Therapy (Done)     " "Point: Mobility training (Done)     Learning Progress Summary           Patient Acceptance, E,TB, VU by  at 1/5/2022 1140      Show all documentation for this point (2)                 Point: Home exercise program (Done)     Learning Progress Summary           Patient Acceptance, E,TB, VU,DU by  at 1/3/2022 1607      Show all documentation for this point (1)                 Point: Body mechanics (Done)     Learning Progress Summary           Patient Acceptance, E,TB, VU,DU by  at 1/3/2022 1607                   Point: Precautions (Done)     Learning Progress Summary           Patient Acceptance, E,TB, VU,DU by  at 1/3/2022 1607                               User Key     Initials Effective Dates Name Provider Type Discipline     06/16/21 -  Zoila Estrella, LORENZO Physical Therapist PT     06/16/21 -  Katt Knight RN Registered Nurse Nurse              PT Recommendation and Plan  Planned Therapy Interventions (PT): balance training, bed mobility training, gait training, home exercise program, patient/family education, strengthening, transfer training  Plan of Care Reviewed With: patient  Outcome Summary: Physical therapy evaluation complete.  Patient initially refused to participate, however after significant encouragement/education patient willing to attempt.  Patient reports at baseline, he uses lift chair to assist with standing and performs stand pivot transfers to motorized scooter.  Patient reports he occasionally ambulates short distances with rolling walker \"if I can\".  Patient attempts sit to stand transfers from recliner surface x2, pt able to obtain approximately 50% upright posture before returning to sitting.  Patient reports increased knee pain with all activity, stating \"they have been bad for years\".  Discussed participation with physical therapy, however when asked if patient would be agreeable to participate, patient states \"well yes and no.\"  Patient appears hesitant to participate due to " chronic knee pain and displays overall decreased insight into deficits and effects of prolonged immobility.  Will see patient on a trial basis for therapy, however will discharge if patient refuses to participate.     Time Calculation:    PT Charges     Row Name 01/05/22 1030             Time Calculation    Start Time 1030  -      Stop Time 1100  -      Time Calculation (min) 30 min  -BA      PT Received On 01/05/22  -BA      PT - Next Appointment 01/06/22  -BA            User Key  (r) = Recorded By, (t) = Taken By, (c) = Cosigned By    Initials Name Provider Type    Zoila Cazares, PT Physical Therapist              Therapy Charges for Today     Code Description Service Date Service Provider Modifiers Qty    [id number] HC PT EVAL LOW COMPLEXITY 2 1/5/2022 Zoila Estrella, PT GP 1          PT G-Codes  Outcome Measure Options: AM-PAC 6 Clicks Basic Mobility (PT)  AM-PAC 6 Clicks Score (PT): 11    Zoila Estrella, LORENZO  1/5/2022

## 2022-01-05 NOTE — PLAN OF CARE
"Goal Outcome Evaluation:  Plan of Care Reviewed With: patient           Outcome Summary: Physical therapy evaluation complete.  Patient initially refused to participate, however after significant encouragement/education patient willing to attempt.  Patient reports at baseline, he uses lift chair to assist with standing and performs stand pivot transfers to motorized scooter.  Patient reports he occasionally ambulates short distances with rolling walker \"if I can\".  Patient attempts sit to stand transfers from recliner surface x2, pt able to obtain approximately 50% upright posture before returning to sitting.  Patient reports increased knee pain with all activity, stating \"they have been bad for years\".  Discussed participation with physical therapy, however when asked if patient would be agreeable to participate, patient states \"well yes and no.\"  Patient appears hesitant to participate due to chronic knee pain and displays overall decreased insight into deficits and effects of prolonged immobility.  Patient requesting ortho consult for knee pain, discussed with RN.  Will see patient on a trial basis for therapy, however will discharge if patient refuses to participate.  "

## 2022-01-05 NOTE — THERAPY EVALUATION
Patient Name: Froilan Helton  : 1940    MRN: [id number]                              Today's Date: 2022       Admit Date: 2021    Visit Dx:     ICD-10-CM ICD-9-CM   1. Acute renal failure superimposed on chronic kidney disease, unspecified CKD stage, unspecified acute renal failure type ([facility])  N17.9 584.9    N18.9 585.9   2. Decubitus ulcer of trochanteric region of left hip, unspecified ulcer stage  L89.229 707.04     707.20   3. Open wound of left foot, initial encounter  S91.302A 892.0     Patient Active Problem List   Diagnosis   • COPD (chronic obstructive pulmonary disease) ([facility])   • Type 2 diabetes mellitus with stage 4 chronic kidney disease, with long-term current use of insulin ([facility])   • Essential hypertension   • Primary osteoarthritis of left knee   • Chronic renal insufficiency, stage 4 (severe) ([facility])   • Class 2 severe obesity due to excess calories with serious comorbidity in adult ([facility])   • Physical debility   • Acute UTI (urinary tract infection)   • Permanent atrial fibrillation ([facility])   • H/O noncompliance with medical treatment, presenting hazards to health   • Myxedema   • Acute on chronic combined systolic and diastolic CHF (congestive heart failure) ([facility])   • Generalized weakness   • Recurrent left pleural effusion   • Fall on same level as cause of accidental injury   • Gross hematuria   • CAD (coronary artery disease)   • HLD (hyperlipidemia)   • Hypothyroidism   • CKD (chronic kidney disease) stage 3, GFR 30-59 ml/min ([facility])   • Acute metabolic encephalopathy   • Cardiomyopathy ([facility])   • Recurrent falls   • Acute renal failure superimposed on chronic kidney disease ([facility])   • Open wound of left foot     Past Medical History:   Diagnosis Date   • A-fib ([facility])    • Arthritis    • Asthma    • CAD (coronary artery disease)    • Cardiomyopathy ([facility])    • Cataract    • CHF (congestive heart failure) ([facility])    • CKD (chronic kidney disease), stage III ([facility])    • COPD (chronic  obstructive pulmonary disease) ([facility])    • Diabetes mellitus ([facility])    • Disease of thyroid gland    • Emphysema, unspecified ([facility])    • Glaucoma    • Hyperlipidemia    • Hypertension    • Kidney stone    • Myocardial infarct, old    • Neuropathy    • Osteoarthritis    • Osteoporosis    • Permanent atrial fibrillation ([facility]) 6/30/2020   • PVD (peripheral vascular disease) ([facility])    • Renal disorder    • Shingles      Past Surgical History:   Procedure Laterality Date   • COLONOSCOPY     • EYE SURGERY     • INCISION AND DRAINAGE LEG Left 12/30/2021    Procedure: debridement of left hip wounds and left foot wound;  Surgeon: Judie Aguero DO;  Location: [facility];  Service: General;  Laterality: Left;   • JOINT REPLACEMENT      right   • KIDNEY STONE SURGERY     • REPLACEMENT TOTAL KNEE     • TOE SURGERY        General Information     Row Name 01/05/22 1256          OT Time and Intention    Document Type evaluation  -SD     Mode of Treatment occupational therapy  -SD     Row Name 01/05/22 1259          General Information    Patient Profile Reviewed yes  Pt came to ER secondary to c/o weakness. Pt with recent hospital admission on 12-10 to 12-20 but he signed himself out AMA. Pt lives by himself and has  services.  -SD     Prior Level of Function --  Pt not specific regarding PLOF for adl's. Pt stated his mobility was limited to short distances in home. He typically used a scooter for mobility. Pt states his family assist with IADL activity  -SD     Existing Precautions/Restrictions fall  pt reports multiple falls at home  -SD     Barriers to Rehab previous functional deficit  -SD     Row Name 01/05/22 1256          Occupational Profile    Reason for Services/Referral (Occupational Profile) decreased adl function  -SD     Successful Occupations (Occupational Profile) Pt manages basicadl activity and light meal prep (microwave meals) from chair level.  -SD     Environmental Supports and Barriers (Occupational  Profile) pt states family helps with IADL activity  -SD     Patient Goals (Occupational Profile) go home  -SD     Row Name 01/05/22 1256          Living Environment    Lives With alone  -SD     Row Name 01/05/22 1256          Home Main Entrance    Stair Railings, Main Entrance --  ramp to enter home  -SD     Row Name 01/05/22 1256          Cognition    Orientation Status (Cognition) oriented x 3  -[facility] Name 01/05/22 1256          Safety Issues, Functional Mobility    Safety Issues Affecting Function (Mobility) insight into deficits/self-awareness; awareness of need for assistance  -SD     Impairments Affecting Function (Mobility) balance; strength  -SD     Comment, Safety Issues/Impairments (Mobility) Pt reports chronic knee pain and BLE weakness which limits his mobilty. Pt reports history of frequent falls  -SD           User Key  (r) = Recorded By, (t) = Taken By, (c) = Cosigned By    Initials Name Provider Type    Sebastian Greene, OTR Occupational Therapist                 Mobility/ADL's     [facility] Name 01/05/22 1304          Bed Mobility    Comment (Bed Mobility) deferred. up in recliner.  -[facility] Name 01/05/22 1304          Transfers    Comment (Transfers) attempted a sit to stand trabsfer from a recliner x 2. Patient able to stand approximately 50% of fully upright posture and returns to sitting. Patient unable to obtain fully upright posture and reports increased knee pain.  -SD     Row Name 01/05/22 1304          Functional Mobility    Functional Mobility- Comment pt unable ot ambuate during the evaluation.  -[facility] Name 01/05/22 1304          Activities of Daily Living    BADL Assessment/Intervention bathing; lower body dressing  -[facility] Name 01/05/22 1304          Bathing Assessment/Intervention    [place] Level (Bathing) bathing skills; moderate assist (50% patient effort)  -SD     Row Name 01/05/22 1304          Lower Body Dressing Assessment/Training    [place] Level  (Lower Body Dressing) lower body dressing skills; moderate assist (50% patient effort)  -SD     Comment (Lower Body Dressing) pt has LH AE at home  -SD           User Key  (r) = Recorded By, (t) = Taken By, (c) = Cosigned By    Initials Name Provider Type    Sebastian Greene OTR Occupational Therapist               Obj/Interventions     Row Name 01/05/22 1314          Range of Motion Comprehensive    Comment, General Range of Motion pt reports limited LUE rom from prior shoulder injury. RUE wfl  -SD     Row Name 01/05/22 1314          Strength Comprehensive (MMT)    Comment, General Manual Muscle Testing (MMT) Assessment RUE strength 3+/5, left shoulder 2+/5, distal LUE 3+/5  -SD           User Key  (r) = Recorded By, (t) = Taken By, (c) = Cosigned By    Initials Name Provider Type    Sebastian Greene OTR Occupational Therapist               Goals/Plan     Row Name 01/05/22 1322          Transfer Goal 1 (OT)    Activity/Assistive Device (Transfer Goal 1, OT) sit-to-stand/stand-to-sit; walker, rolling  x 2 minutes to allow for caregiver assist with adl's  -SD     [place] Level/Cues Needed (Transfer Goal 1, OT) moderate assist (50-74% patient effort)  -SD     Progress/Outcome (Transfer Goal 1, OT) other (see comments)  new goal  -SD     Row Name 01/05/22 1322          ROM Goal 1 (OT)    ROM Goal 1 (OT) Pt to participate in BUE HEP to address functional strength needed for basic adl tasks.  -SD     Time Frame (ROM Goal 1, OT) by discharge  -SD     Progress/Outcome (ROM Goal 1, OT) other (see comments)  new goal  -SD     Row Name 01/05/22 1322          Therapy Assessment/Plan (OT)    Planned Therapy Interventions (OT) patient/caregiver education/training; ROM/therapeutic exercise; BADL retraining; strengthening exercise  -SD           User Key  (r) = Recorded By, (t) = Taken By, (c) = Cosigned By    Initials Name Provider Type    Sebastian Greene OTR Occupational Therapist               Clinical  Impression     Row Name 01/05/22 1316          Pain Assessment    Additional Documentation Pain Scale: Numbers Pre/Post-Treatment (Group)  -SD     Row Name 01/05/22 1316          Pain Scale: Numbers Pre/Post-Treatment    Pre/Posttreatment Pain Comment pt report chronic knee pain  (pt did not rate pain level)  -SD     Pain Intervention(s) Repositioned; Ambulation/increased activity  -SD     Row Name 01/05/22 1316          Plan of Care Review    Plan of Care Reviewed With patient  -SD     Outcome Summary OT evaluation completed. Pt with a history of frequent falls at home. Pt lives alone, and he reports increasing difficulty with management of basic daily tasks. Pt states his grandaughter has been assists with IADL's, and he has home health services. Pt states he typically uses a rollator or a motorized scooter at home for mobility. Pt normally sleeps in a lift recliner, and he uses the lift mechanism to help him stand. Pt attempted to stand from a standard recliner today and was not able to come to a full upright position with mod assist x 2. Pt needs assistance with basic adl's for safety and to adequately manage his hygiene. Pt states he has chronic knee pain that limits his activity at home and makes him hesitant to participate with therapy, yet he was willing to try. Rec OT services during acute care stay to address safety/independence with basic adl tasks/transfers.  Pt does not appear safe to return home alone. Pt may benefit from SNF/ECF  -SD     Row Name 01/05/22 1316          Therapy Plan Review/Discharge Plan (OT)    Anticipated Discharge Disposition (OT) skilled nursing facility; extended care facility  -SD     Row Name 01/05/22 1316          Positioning and Restraints    Pre-Treatment Position sitting in chair/recliner  -SD     Post Treatment Position chair  -SD     In Chair reclined; call light within reach; encouraged to call for assist; notified nsg  -SD           User Key  (r) = Recorded By, (t) =  Taken By, (c) = Cosigned By    Initials Name Provider Type    Sebastian Greene OTR Occupational Therapist               Outcome Measures     Row Name 01/05/22 1327          How much help from another is currently needed...    Putting on and taking off regular lower body clothing? 2  -SD     Bathing (including washing, rinsing, and drying) 2  -SD     Toileting (which includes using toilet bed pan or urinal) 2  -SD     Putting on and taking off regular upper body clothing 3  -SD     Taking care of personal grooming (such as brushing teeth) 3  -SD     Eating meals 4  -SD     AM-PAC 6 Clicks Score (OT) 16  -SD     Row Name 01/05/22 1030          How much help from another person do you currently need...    Turning from your back to your side while in flat bed without using bedrails? 3  -JW     Moving from lying on back to sitting on the side of a flat bed without bedrails? 3  -JW     Moving to and from a bed to a chair (including a wheelchair)? 2  -JW     Standing up from a chair using your arms (e.g., wheelchair, bedside chair)? 1  -JW     Climbing 3-5 steps with a railing? 1  -JW     To walk in hospital room? 1  -JW     AM-PAC 6 Clicks Score (PT) 11  -JW     Row Name 01/05/22 1327 01/05/22 1030       Functional Assessment    Outcome Measure Options AM-PAC 6 Clicks Daily Activity (OT)  -SD AM-PAC 6 Clicks Basic Mobility (PT)  -          User Key  (r) = Recorded By, (t) = Taken By, (c) = Cosigned By    Initials Name Provider Type    Sebastian Greene OTR Occupational Therapist    Zoila Cazares, PT Physical Therapist                Occupational Therapy Education                 Title: PT OT SLP Therapies (Done)     Topic: Occupational Therapy (Done)     Point: ADL training (Done)     Description:   Instruct learner(s) on proper safety adaptation and remediation techniques during self care or transfers.   Instruct in proper use of assistive devices.              Learning Progress Summary           Patient  Acceptance, E, VU by SD at 1/5/2022 1327    Comment: Education regarding OT services, benefits of activity and safety with transfers.      Show all documentation for this point (2)                 Point: Precautions (Done)     Description:   Instruct learner(s) on prescribed precautions during self-care and functional transfers.              Learning Progress Summary           Patient Acceptance, E,TB, VU,DU by TF at 1/3/2022 1607      Show all documentation for this point (1)                 Point: Body mechanics (Done)     Description:   Instruct learner(s) on proper positioning and spine alignment during self-care, functional mobility activities and/or exercises.              Learning Progress Summary           Patient Acceptance, E,TB, VU,DU by TF at 1/3/2022 1607      Show all documentation for this point (1)                             User Key     Initials Effective Dates Name Provider Type Discipline    SD 06/16/21 -  Sebastian Wright OTR Occupational Therapist OT     06/16/21 -  Katt Knight RN Registered Nurse Nurse              OT Recommendation and Plan  Planned Therapy Interventions (OT): patient/caregiver education/training, ROM/therapeutic exercise, BADL retraining, strengthening exercise  Plan of Care Review  Plan of Care Reviewed With: patient  Outcome Summary: OT evaluation completed. Pt with a history of frequent falls at home. Pt lives alone, and he reports increasing difficulty with management of basic daily tasks. Pt states his grandaughter has been assists with IADL's, and he has home health services. Pt states he typically uses a rollator or a motorized scooter at home for mobility. Pt normally sleeps in a lift recliner, and he uses the lift mechanism to help him stand. Pt attempted to stand from a standard recliner today and was not able to come to a full upright position with mod assist x 2. Pt needs assistance with basic adl's for safety and to adequately manage his hygiene. Pt  states he has chronic knee pain that limits his activity at home and makes him hesitant to participate with therapy, yet he was willing to try. Rec OT services during acute care stay to address safety/independence with basic adl tasks/transfers.  Pt does not appear safe to return home alone. Pt may benefit from SNF/ECF        Time Calculation:    Time Calculation- OT     Row Name 01/05/22 1331             Time Calculation- OT    OT Start Time 1030  -SD      OT Stop Time 1100  -SD      OT Time Calculation (min) 30 min  -SD            User Key  (r) = Recorded By, (t) = Taken By, (c) = Cosigned By    Initials Name Provider Type    Sebastian Greene, OTDELTA Occupational Therapist              Therapy Charges for Today     Code Description Service Date Service Provider Modifiers Qty    [id number]  OT EVAL MOD COMPLEXITY 2 1/5/2022 Sebastian Wright OTR GO 1               BETH Melgar  1/5/2022

## 2022-01-05 NOTE — CASE MANAGEMENT/SOCIAL WORK
Continued Stay Note  MICHAEL Kincaid     Patient Name: Froilan Helton  MRN: [id number]  Today's Date: 1/5/2022    Admit Date: 12/28/2021     Discharge Plan     Row Name 01/05/22 1439       Plan    Plan Comments I called and left a message for the APS  who is following the patient. CM will continue to follow.                                     Discharge Codes    No documentation.                     Terri Martinez RN

## 2022-01-05 NOTE — PROGRESS NOTES
[facility] Progress Note      Patient Name: Froilan Helton  : 1940  MRN: [id number]  Primary Care Physician:  Fortunato De Leon MD  Date of admission: 2021    Subjective     Interval History:   Feels fine; appetite good; no N/V  Did not work with PT yesterday  No SOB on RA  Arm and leg swelling much better    Review of Systems:   14 point review of systems is otherwise negative except for mentioned above on HPI    Objective     Vitals:   Temp:  [97.6 °F (36.4 °C)-98.2 °F (36.8 °C)] 97.6 °F (36.4 °C)  Heart Rate:  [48-69] 48  Resp:  [18-20] 18  BP: (118-147)/(54-65) 126/55    Intake/Output Summary (Last 24 hours) at 2022 0859  Last data filed at 2022 0627  Gross per 24 hour   Intake 240 ml   Output 2700 ml   Net -2460 ml       Physical Exam:    General Appearance: alert, appropriate, pale, NAD, chronically ill  Skin: warm and dry  HEENT: oral mucosa normal, nonicteric sclera  Neck: supple, no JVD  Lungs: Crackles in bases but no wheezes; not labored on room air  Heart: Irregularly irregular, bradycardic, 2/6M  Abdomen: soft, nontender, nondistended, BS +  : no palpable bladder  Extremities: 1+ edema, lower legs wrapped  Neuro: normal speech and mental status; flat affect    Scheduled Meds:     apixaban, 2.5 mg, Oral, Q12H  atorvastatin, 10 mg, Oral, Nightly  budesonide-formoterol, 2 puff, Inhalation, BID - RT  bumetanide, 2 mg, Oral, BID  cholecalciferol, 2,000 Units, Oral, Daily  citalopram, 20 mg, Oral, Nightly  docusate sodium, 100 mg, Oral, BID  fluconazole, 200 mg, Oral, Q24H  fluticasone, 2 spray, Each Nare, Daily  hydrocortisone-bacitracin-zinc oxide-nystatin, 1 application, Topical, TID  influenza vaccine, 0.5 mL, Intramuscular, Once  insulin aspart, 0-24 Units, Subcutaneous, TID AC  insulin detemir, 25 Units, Subcutaneous, Nightly  iron sucrose, 200 mg, Intravenous, Q24H  levothyroxine, 224 mcg, Oral, Q AM  linagliptin, 5 mg, Oral, Daily  metroNIDAZOLE, 500  mg, Oral, Q8H  O2, 2 L/min, Inhalation, Once  polyethylene glycol, 17 g, Oral, Daily  pregabalin, 75 mg, Oral, BID  QUEtiapine, 12.5 mg, Oral, Q PM  sodium zirconium cyclosilicate, 10 g, Oral, Daily  tamsulosin, 0.4 mg, Oral, Daily  vancomycin, 1,000 mg, Intravenous, Q24H  cyanocobalamin, 1,000 mcg, Oral, Daily      IV Meds:   Pharmacy to dose vancomycin,         Results Reviewed:   I have personally reviewed the results from the time of this admission to 1/5/2022 08:59 EST     Results from last 7 days   Lab Units 01/05/22 0427 01/04/22  0413 01/03/22  0416 01/02/22  0527 01/02/22  0527 01/01/22  0357 12/31/21  0415   SODIUM mmol/L 136 136 134*   < > 135*   < > 132*   POTASSIUM mmol/L 4.3 4.8 5.3*   < > 4.8   < > 4.7   CHLORIDE mmol/L 99 102 103   < > 105   < > 103   CO2 mmol/L 26.6 25.6 24.3   < > 21.6*   < > 19.4*   BUN mg/dL 31* 32* 34*   < > 39*   < > 38*   CREATININE mg/dL 2.08* 1.80* 1.95*   < > 2.00*   < > 2.54*   CALCIUM mg/dL 8.6 8.6 8.5*   < > 8.6   < > 8.5*   BILIRUBIN mg/dL  --   --   --   --  0.2  --  0.2   ALK PHOS U/L  --   --   --   --  81  --  74   ALT (SGPT) U/L  --   --   --   --  25  --  12   AST (SGOT) U/L  --   --   --   --  48*  --  30   GLUCOSE mg/dL 92 94 97   < > 120*   < > 248*    < > = values in this interval not displayed.     Estimated Creatinine Clearance: 34.4 mL/min (A) (by C-G formula based on SCr of 2.08 mg/dL (H)).  Results from last 7 days   Lab Units 01/05/22 0427 01/04/22  0413   MAGNESIUM mg/dL 2.3 1.5*   PHOSPHORUS mg/dL 3.4 3.0         Results from last 7 days   Lab Units 01/05/22  0427 01/03/22  0416 01/02/22  0527 12/31/21  0415 12/30/21  0439   WBC 10*3/mm3 7.41 6.69 6.36 5.06 7.24   HEMOGLOBIN g/dL 10.0* 9.4* 9.1* 9.0* 8.8*   PLATELETS 10*3/mm3 243 220 206 163 167           Assessment / Plan     ASSESSMENT:  1.  ROLA/CKD3, nonoliguric, improving.  Likely prerenal due to CHF.  Peripheral edema present; hypervolemic hyponatremia, resolved with diuretics; normal K   2.   Chronic CHF  3.  Edema-due to venous stasis  4.  Leg wounds  5.  PAF    PLAN:  1.  Oral bumetanide 2 mg twice daily  2.  Discontinue Lokelma  3.  To nursing home anytime from [facility]    Thank you for involving us in the care of Froilan Helton.  Please feel free to call with any questions.    Ellis Centeno MD  01/05/22  08:59 EST    [facility]  [phone]

## 2022-01-05 NOTE — CASE MANAGEMENT/SOCIAL WORK
"Continued Stay Note   [place]     Patient Name: Froilan Helton  MRN: [id number]  Today's Date: 1/5/2022    Admit Date: 12/28/2021     Discharge Plan     Row Name 01/05/22 1419       Plan    Plan Discharge to SNF for STR    Plan Comments I spoke with the patient in his room.  He was sitting up in the recliner.  We discussed his discharge plan and he advised that he will \"just have to go home\".  I explained that he is not safe to go home and that per PT/OT, Dr. Tovar and nursing, he would need to go to short term rehab before he could return home.  Individually PT/OT, PARDEEP RN, Nursing and  discussed next level of care with the patient.  He advised that he is unable to pay for SNF and I explained that his Medicare would cover the first 20 days of his care in a facility.  He then stated that he didn't feel that he had any days left because he has been here since 12/28.  I advised that his acute hospital stay and a rehab stay are two different things.  We then discussed options for SNF placement and he is agreeable to a referral to [facility] and [facility]. The patient and I also discussed his refusal to work with PT/OT he stated that he worked with them today.  I advised that he continue to work with them and do his best.  He again stated that he would like to go home.  I asked if he had family that would stay with him and he stated \"no\", I then asked if he had the financial means to pay for private in home care and he again stated \"no\".  I asked how he would care for himself and he advised that he did it before and could make his way around his home.  I asked why he wasn't working with therapy and he stated that he \"couldn't\"  I once again advised him to do his best with therapy.  I went over his options for SNF placement once again and he again stated that he is agreeable to referrals to [facility] and [facility].  I then made a referral to Claudine with [facility] and Nayana with [facility]. "               Discharge Codes    No documentation.                     Terri Martinez RN

## 2022-01-05 NOTE — PROGRESS NOTES
"Pharmacy Antimicrobial Dosing Service    Subjective:  Froilan Helton is a 81 y.o.male admitted with acute renal failure w. Multiple wounds. Pharmacy has been consulted to dose Vancomycin for left hip and left leg decubitus wounds. S/p debridement 12/30/21    Wound Cx 12/30: MRSA, E. Faecalis  Anaerobic Cx 12/30: Prevotella spp.   Urine Cx 12/31: Yeast spp.    PMH: DM, CKD, CAD, right joint replacement, TKA      Assessment/Plan    1. Day #2 Vancomycin: Goal -600 mcg*h/mL and Goal trough 10-20 mcg/mL for uncomplicated SSTI. Patient received loading dose vancomycin 1500 mg (~15 mg/kg) IV x1 yesterday (1/4).   Patient now receiving vancomycin 1000 mg (~ 11 mg/kg) IV q24h. [facility] predicts therapeutic trough ~18 mcg/mL. Will obtain trough level prior to 4th dose, or if clinically indicated by decreased renal function will obtain random level in AM and switch to pulse dosing.    2. Day #4 Metronidazole: 500 mg PO q8h    3. Day #2 Fluconazole: 200 mg PO q24h    Will continue to monitor drug levels, renal function, culture and sensitivities, and patient clinical status.       Objective:  Relevant clinical data and objective history reviewed:  185 cm (72.84\")   95.5 kg (210 lb 8 oz)   Ideal body weight: 79.5 kg (175 lb 5 oz)  Adjusted ideal body weight: 85.9 kg (189 lb 6.2 oz)  Body mass index is 27.9 kg/m².        Results from last 7 days   Lab Units 01/05/22 0427 01/04/22 0413 01/03/22 0416   CREATININE mg/dL 2.08* 1.80* 1.95*     Estimated Creatinine Clearance: 34.4 mL/min (A) (by C-G formula based on SCr of 2.08 mg/dL (H)).  I/O last 3 completed shifts:  In: 480 [P.O.:480]  Out: 2700 [Urine:2700]    Results from last 7 days   Lab Units 01/05/22 0427 01/03/22 0416 01/02/22 0527   WBC 10*3/mm3 7.41 6.69 6.36     Temperature    01/04/22 0534 01/04/22 1959 01/05/22 0627   Temp: 97.9 °F (36.6 °C) 98.2 °F (36.8 °C) 97.6 °F (36.4 °C)     Baseline culture/source/susceptibility:  Microbiology Results (last 10 days)  "      Procedure Component Value - Date/Time    Wound Culture - Wound, Hip, Left [[id number]] Collected: 01/04/22 1106    Lab Status: Preliminary result Specimen: Wound from Hip, Left Updated: 01/04/22 1504     Gram Stain Many (4+) WBCs seen      Few (2+) Gram positive cocci    Urine Culture - Urine, Urine, Catheter In/Out [[id number]]  (Abnormal) Collected: 01/02/22 1856    Lab Status: Final result Specimen: Urine, Catheter In/Out Updated: 01/03/22 1507     Urine Culture Yeast isolated    Narrative:      No additional tests pending. Call if further workup needed.     Urine Culture - Urine, Urine, Random Void [[id number]]  (Abnormal) Collected: 12/31/21 1812    Lab Status: Final result Specimen: Urine, Random Void Updated: 01/02/22 1236     Urine Culture Yeast isolated     Comment: No further workup.         Anaerobic Culture - Wound, Hip, Left [[id number]]  (Abnormal) Collected: 12/30/21 1044    Lab Status: Final result Specimen: Wound from Hip, Left Updated: 01/02/22 0822     Anaerobic Culture Prevotella species    Wound Culture - Wound, Hip, Left [[id number]]  (Abnormal)  (Susceptibility) Collected: 12/30/21 1044    Lab Status: Final result Specimen: Wound from Hip, Left Updated: 01/02/22 0914     Wound Culture Heavy growth (4+) Staphylococcus aureus, MRSA     Comment: Methicillin resistant Staphylococcus aureus, Patient may be an isolation risk.         Heavy growth (4+) Enterococcus faecalis     Gram Stain Few (2+) WBCs seen      Rare (1+) Gram positive cocci in pairs, chains and clusters    Susceptibility      Staphylococcus aureus, MRSA  ALINE  Clindamycin  Resistant  Erythromycin  Resistant  Inducible Clindamycin Resistance  Negative  Oxacillin  Resistant  Rifampin  Susceptible  Tetracycline  Resistant  Trimethoprim + Sulfamethoxazole  Susceptible  Vancomycin  Susceptible             Susceptibility      Enterococcus faecalis  ALINE  Ampicillin  Susceptible  Vancomycin  Susceptible                 COVID PRE-OP /  PRE-PROCEDURE SCREENING ORDER (NO ISOLATION) - Swab, Nasal Cavity [[id number]]  (Normal) Collected: 12/28/21 2120    Lab Status: Final result Specimen: Swab from Nasal Cavity Updated: 12/28/21 2158    Narrative:      The following orders were created for panel order COVID PRE-OP / PRE-PROCEDURE SCREENING ORDER (NO ISOLATION) - Swab, Nasal Cavity.  Procedure                               Abnormality         Status                     ---------                               -----------         ------                     COVID-19,[facility]...[[id number]]  Normal              Final result                 Please view results for these tests on the individual orders.    COVID-19,[facility]HOU[facility],Nasal Swab No Transport Media 3-4 HR TAT - Swab, Nasal Cavity [[id number]]  (Normal) Collected: 12/28/21 2120    Lab Status: Final result Specimen: Swab from Nasal Cavity Updated: 12/28/21 2158     COVID19 Not Detected    Narrative:      Fact sheet for providers: https://www.fda.gov/media/729660/download     Fact sheet for patients: https://www.fda.gov/media/763512/download    Test performed by PCR.    Consider negative results in combination with clinical observations, patient history, and epidemiological information.    Wound Culture - Swab, Hip, Left [[id number]]  (Abnormal) Collected: 12/28/21 1534    Lab Status: Final result Specimen: Swab from Hip, Left Updated: 12/30/21 0733     Wound Culture Light growth (2+) Corynebacterium species     Comment: No definitive guidelines. All are susceptible to vancomycin. Resistance to penicillins & cephalosporins does occur.        Gram Stain Rare (1+) WBCs seen      No organisms seen            Anti-Infectives (From admission, onward)      Ordered     Dose/Rate Route Frequency Start Stop    01/04/22 1529  vancomycin (VANCOCIN) 1,000 mg in sodium chloride 0.9 % 250 mL IVPB        Ordering Provider: Judie Aguero,     1,000 mg Intravenous Every 24 Hours 01/05/22 1300 01/12/22  1259    01/04/22 1158  vancomycin (VANCOCIN) 1,500 mg in sodium chloride 0.9 % 500 mL IVPB        Ordering Provider: Judie Aguero DO    1,500 mg  over 90 Minutes Intravenous Once 01/04/22 1300 01/04/22 1508    01/04/22 1059  Pharmacy to dose vancomycin        Ordering Provider: Judie Aguero DO     Does not apply Continuous PRN 01/04/22 1059 01/18/22 1058    01/04/22 0744  fluconazole (DIFLUCAN) tablet 200 mg        Ordering Provider: Randolph Tovar MD    200 mg Oral Every 24 Hours 01/04/22 0830 01/11/22 0829    01/02/22 1253  metroNIDAZOLE (FLAGYL) tablet 500 mg        Ordering Provider: Judie Aguero DO    500 mg Oral Every 8 Hours Scheduled 01/02/22 1400 01/16/22 1359    12/30/21 1021  ceFAZolin Sodium-Dextrose (ANCEF) IVPB (duplex) 2 g        Ordering Provider: Judie Aguero DO    2 g Intravenous Once 12/30/21 1115 12/30/21 1026            Sena Jimenez PharmD, 1/5/202211:52 EST

## 2022-01-05 NOTE — PROGRESS NOTES
"Daily Progress Note:      Chief complaint: Follow-up of acute kidney injury, chronic systolic  Congestive heart failure, ischemic cardiomyopathy, atrial fibrillation, hypertension, multiple skin decubiti    Subjective: No overnight events noted.  Resting comfortably he is without complaints and he is not participating with therapies     LOS: 8 days     Vital Signs  Temp:  [97.6 °F (36.4 °C)-98.2 °F (36.8 °C)] 97.6 °F (36.4 °C)  Heart Rate:  [50-62] 57  Resp:  [18-20] 18  BP: (118-144)/(54-64) 128/63  Oxygen Therapy  SpO2: 95 %  Pulse Oximetry Type: Intermittent  Device (Oxygen Therapy): room air  Flow (L/min): 2  Oxygen Concentration (%): 95  ETCO2 (mmHg): 32 mmHg  Probe Placed On (Pulse Ox): Right:, finger}  Body mass index is 27.9 kg/m².  Flowsheet Rows      First Filed Value   Admission Height 185.4 cm (73\") Documented at 12/28/2021 1349   Admission Weight 104 kg (229 lb 12.8 oz) Documented at 12/28/2021 1349                   Documented weights    12/28/21 1349 12/28/21 2222 12/29/21 0643 12/29/21 1850   Weight: 104 kg (229 lb 12.8 oz) 102 kg (225 lb 4.8 oz) 102 kg (225 lb 6.4 oz) 102 kg (224 lb 13.9 oz)    12/30/21 0530 12/31/21 0539 01/02/22 0643 01/03/22 0550   Weight: 98.9 kg (218 lb 1.6 oz) 103 kg (226 lb) 103 kg (227 lb) 103 kg (227 lb 9.6 oz)    01/04/22 0534 01/05/22 0627   Weight: 99 kg (218 lb 4.8 oz) 95.5 kg (210 lb 8 oz)           Patient Vitals for the past 24 hrs:   BP Temp Temp src Pulse Resp SpO2 Weight   01/05/22 0627 128/63 97.6 °F (36.4 °C) Oral 57 18 95 % 95.5 kg (210 lb 8 oz)   01/05/22 0325 -- -- -- 59 18 92 % --   01/04/22 2300 -- -- -- 55 18 90 % --   01/04/22 2005 -- -- -- 60 20 -- --   01/04/22 2003 -- -- -- 62 20 -- --   01/04/22 1959 118/54 98.2 °F (36.8 °C) Oral 57 18 91 % --   01/04/22 1522 144/64 -- -- 59 18 92 % --   01/04/22 1012 -- -- -- 56 20 92 % --   01/04/22 1007 -- -- -- 56 20 92 % --   01/04/22 0817 -- -- -- 50 -- -- --       95.5 kg (210 lb 8 oz)    Intake/Output          "              01/03/22 0701 - 01/04/22 0700 01/04/22 0701 - 01/05/22 0700     4049-9310 5055-0221 Total 4767-6527 0564-9549 Total                 Intake    P.O.  860  240 1100  --  240 240    Total Intake  -- 240 240       Output    Urine  200  -- 200  200  2500 2700    Total Output 200 --  2700           Intake/Output Summary (Last 24 hours) at 1/5/2022 0742  Last data filed at 1/5/2022 0627  Gross per 24 hour   Intake 240 ml   Output 2700 ml   Net -2460 ml        Intake/Output Summary (Last 24 hours) at 1/5/2022 0742  Last data filed at 1/5/2022 0627  Gross per 24 hour   Intake 240 ml   Output 2700 ml   Net -2460 ml        Review of Systems   Constitutional: Positive for activity change. Negative for appetite change and fatigue.   HENT: Negative for congestion.    Respiratory: Negative for cough, chest tightness, shortness of breath and wheezing.    Cardiovascular: Positive for leg swelling. Negative for chest pain.   Gastrointestinal: Negative for abdominal distention, abdominal pain, diarrhea, nausea and vomiting.   Endocrine: Negative for polyphagia and polyuria.   Genitourinary: Negative for frequency.   Skin: Positive for color change and wound. Negative for rash.   Neurological: Negative for weakness and light-headedness.   Hematological: Does not bruise/bleed easily.   Psychiatric/Behavioral: Negative for agitation and behavioral problems.       Physical Exam  Vitals and nursing note reviewed.   Constitutional:       Appearance: He is well-developed. He is obese.   HENT:      Head: Normocephalic.   Eyes:      Conjunctiva/sclera: Conjunctivae normal.   Neck:      Thyroid: No thyromegaly.      Vascular: No JVD.   Cardiovascular:      Rate and Rhythm: Regular rhythm. Bradycardia present.      Heart sounds: Normal heart sounds. No murmur heard.      Pulmonary:      Effort: Pulmonary effort is normal. No respiratory distress.      Breath sounds: Normal breath sounds. No wheezing or rales.    Abdominal:      General: Bowel sounds are normal. There is no distension.      Palpations: Abdomen is soft.      Tenderness: There is no abdominal tenderness. There is no guarding.   Musculoskeletal:      Right lower leg: No edema.      Left lower leg: No edema.   Skin:     General: Skin is warm and dry.      Findings: No rash.      Comments: Lower extremities are in dressings   Neurological:      General: No focal deficit present.      Mental Status: He is alert and oriented to person, place, and time.         Medication Review:   I have reviewed the patient's current medication list  Scheduled Meds:apixaban, 2.5 mg, Oral, Q12H  atorvastatin, 10 mg, Oral, Nightly  budesonide-formoterol, 2 puff, Inhalation, BID - RT  bumetanide, 2 mg, Oral, BID  cholecalciferol, 2,000 Units, Oral, Daily  citalopram, 20 mg, Oral, Nightly  docusate sodium, 100 mg, Oral, BID  fluconazole, 200 mg, Oral, Q24H  fluticasone, 2 spray, Each Nare, Daily  hydrocortisone-bacitracin-zinc oxide-nystatin, 1 application, Topical, TID  influenza vaccine, 0.5 mL, Intramuscular, Once  insulin aspart, 0-24 Units, Subcutaneous, TID AC  insulin detemir, 25 Units, Subcutaneous, Nightly  iron sucrose, 200 mg, Intravenous, Q24H  levothyroxine, 224 mcg, Oral, Q AM  linagliptin, 5 mg, Oral, Daily  metroNIDAZOLE, 500 mg, Oral, Q8H  O2, 2 L/min, Inhalation, Once  polyethylene glycol, 17 g, Oral, Daily  pregabalin, 75 mg, Oral, BID  QUEtiapine, 12.5 mg, Oral, Q PM  sodium zirconium cyclosilicate, 10 g, Oral, Daily  tamsulosin, 0.4 mg, Oral, Daily  vancomycin, 1,000 mg, Intravenous, Q24H  cyanocobalamin, 1,000 mcg, Oral, Daily      Continuous Infusions:Pharmacy to dose vancomycin,       PRN Meds:.•  acetaminophen **OR** acetaminophen **OR** acetaminophen  •  albuterol sulfate HFA  •  dextrose  •  dextrose  •  glucagon (human recombinant)  •  magnesium sulfate **OR** magnesium sulfate **OR** magnesium sulfate  •  melatonin  •  Pharmacy to dose  vancomycin      Labs:  Results from last 7 days   Lab Units 01/05/22 0427 01/03/22 0416 01/02/22 0527 12/31/21 0415 12/31/21 0415 12/30/21 0439 12/30/21 0439   WBC 10*3/mm3 7.41 6.69 6.36  --  5.06  --  7.24   HEMOGLOBIN g/dL 10.0* 9.4* 9.1*   < > 9.0*   < > 8.8*   HEMATOCRIT % 32.1* 30.9* 29.6*  --  29.4*  --  28.0*   PLATELETS 10*3/mm3 243 220 206  --  163  --  167    < > = values in this interval not displayed.     Results from last 7 days   Lab Units 01/05/22 0427 01/04/22 0413 01/03/22 0416 01/02/22 0527 01/01/22 0357 12/31/21 0415 12/30/21 0439   SODIUM mmol/L 136 136 134* 135* 133* 132* 135*   POTASSIUM mmol/L 4.3 4.8 5.3* 4.8 5.0 4.7 4.1   CHLORIDE mmol/L 99 102 103 105 104 103 106   CO2 mmol/L 26.6 25.6 24.3 21.6* 18.8* 19.4* 20.1*   BUN mg/dL 31* 32* 34* 39* 41* 38* 42*   CREATININE mg/dL 2.08* 1.80* 1.95* 2.00* 2.15* 2.54* 2.66*   CALCIUM mg/dL 8.6 8.6 8.5* 8.6 8.5* 8.5* 8.2*   BILIRUBIN mg/dL  --   --   --  0.2  --  0.2  --    ALK PHOS U/L  --   --   --  81  --  74  --    ALT (SGPT) U/L  --   --   --  25  --  12  --    AST (SGOT) U/L  --   --   --  48*  --  30  --    GLUCOSE mg/dL 92 94 97 120* 170* 248* 105*     Results from last 7 days   Lab Units 01/05/22 0427 01/04/22 0413   MAGNESIUM mg/dL 2.3 1.5*       Results from last 7 days   Lab Units 01/05/22 0427 01/03/22 0416 01/02/22 0527 12/31/21 0415 12/31/21 0415   AST (SGOT) U/L  --   --  48*  --  30   ALT (SGPT) U/L  --   --  25  --  12   PLATELETS 10*3/mm3 243 220 206   < > 163    < > = values in this interval not displayed.         Lab Results (last 24 hours)     Procedure Component Value Units Date/Time    POC Glucose Once [[id number]]  (Normal) Collected: 01/05/22 0723    Specimen: Blood Updated: 01/05/22 0731     Glucose 95 mg/dL      Comment: Meter: [id number] : [id number] Chris Romeo CNA       Magnesium [[id number]]  (Normal) Collected: 01/05/22 0427    Specimen: Blood Updated: 01/05/22 0551     Magnesium 2.3 mg/dL      Renal Function Panel [[id number]]  (Abnormal) Collected: 01/05/22 0427    Specimen: Blood Updated: 01/05/22 0545     Glucose 92 mg/dL      BUN 31 mg/dL      Creatinine 2.08 mg/dL      Sodium 136 mmol/L      Potassium 4.3 mmol/L      Chloride 99 mmol/L      CO2 26.6 mmol/L      Calcium 8.6 mg/dL      Albumin 2.60 g/dL      Phosphorus 3.4 mg/dL      Anion Gap 10.4 mmol/L      BUN/Creatinine Ratio 14.9     eGFR Non African Amer 31 mL/min/1.73     Narrative:      GFR Normal >60  Chronic Kidney Disease <60  Kidney Failure <15      CBC (No Diff) [[id number]]  (Abnormal) Collected: 01/05/22 0427    Specimen: Blood Updated: 01/05/22 0526     WBC 7.41 10*3/mm3      RBC 3.62 10*6/mm3      Hemoglobin 10.0 g/dL      Hematocrit 32.1 %      MCV 88.7 fL      MCH 27.6 pg      MCHC 31.2 g/dL      RDW 14.8 %      RDW-SD 48.1 fl      MPV 9.8 fL      Platelets 243 10*3/mm3     POC Glucose Once [[id number]]  (Abnormal) Collected: 01/04/22 2040    Specimen: Blood Updated: 01/04/22 2046     Glucose 185 mg/dL      Comment: RN Notified R and V Meter: [id number] : [id number] Nikki KELLY       POC Glucose Once [[id number]]  (Abnormal) Collected: 01/04/22 1701    Specimen: Blood Updated: 01/04/22 1707     Glucose 134 mg/dL      Comment: Meter: [id number] : [id number] Mario HARKINS       POC Glucose Once [[id number]]  (Abnormal) Collected: 01/04/22 1521    Specimen: Blood Updated: 01/04/22 1528     Glucose 160 mg/dL      Comment: Meter: [id number] : [id number] Eugene Bolivar RN       Wound Culture - Wound, Hip, Left [[id number]] Collected: 01/04/22 1106    Specimen: Wound from Hip, Left Updated: 01/04/22 1504     Gram Stain Many (4+) WBCs seen      Few (2+) Gram positive cocci    POC Glucose Once [[id number]]  (Abnormal) Collected: 01/04/22 1132    Specimen: Blood Updated: 01/04/22 1142     Glucose 165 mg/dL      Comment: Meter: [id number] : [id number] Mario HARKINS       Anaerobic Culture - Swab, Hip, Left [[id number]]  Collected: 01/04/22 1106    Specimen: Swab from Hip, Left Updated: 01/04/22 1114    POC Glucose Once [[id number]]  (Normal) Collected: 01/04/22 0805    Specimen: Blood Updated: 01/04/22 0821     Glucose 84 mg/dL      Comment: Meter: [id number] : [id number] Mario HARKINS               Results from last 7 days   Lab Units 01/01/22  0357   TSH uIU/mL 7.100*             Results from last 7 days   Lab Units 01/05/22  0427 01/04/22  0413   MAGNESIUM mg/dL 2.3 1.5*                 Glucose   Date/Time Value Ref Range Status   01/05/2022 0723 95 70 - 130 mg/dL Final     Comment:     Meter: [id number] : [id number] Chris Romeo CNA   01/04/2022 2040 185 (H) 70 - 130 mg/dL Final     Comment:     RN Notified R and V Meter: [id number] : [id number] Nikki Avelar PCA   01/04/2022 1701 134 (H) 70 - 130 mg/dL Final     Comment:     Meter: [id number] : [id number] Mario Steinberg NA   01/04/2022 1521 160 (H) 70 - 130 mg/dL Final     Comment:     Meter: [id number] : [id number] Eugene Bolivar RN   01/04/2022 1132 165 (H) 70 - 130 mg/dL Final     Comment:     Meter: [id number] : [id number] Mario Steinberg NA   01/04/2022 0805 84 70 - 130 mg/dL Final     Comment:     Meter: [id number] : [id number] Mario Steinberg NA   01/03/2022 2012 127 70 - 130 mg/dL Final     Comment:     Meter: [id number] : [id number] Freddy Arellano NA   01/03/2022 1631 103 70 - 130 mg/dL Final     Comment:     Meter: [id number] : [id number] Casi Justin RN         Results from last 7 days   Lab Units 01/02/22  1856 12/31/21  1812 12/30/21  1044   WOUNDCX   --   --  Heavy growth (4+) Staphylococcus aureus, MRSA*  Heavy growth (4+) Enterococcus faecalis*   URINECX  Yeast isolated* Yeast isolated*  --      Results from last 7 days   Lab Units 01/02/22  1856   NITRITE UA  Negative   WBC UA /HPF Too Numerous to Count*   BACTERIA UA /HPF 1+*   SQUAM EPITHEL UA /HPF Unable to determine due to loaded field*   URINECX  Yeast  isolated*             Radiology:  Imaging Results (Last 24 Hours)     ** No results found for the last 24 hours. **          Cardiology:  ECG/EMG Results (last 24 hours)     ** No results found for the last 24 hours. **                I have reviewed recent labs results and consult notes.    Please note portions of this assessment/plan may have been copied and pasted, but I have personally seen this patient and reviewed each line of this assessment and plan for accuracy and made updates to reflect my necessary changes     Assessment and Plan:  1.  Acute kidney injury chronic kidney disease stage III renal functions are stable    2.  Left hip and left leg decubitus wounds debrided on 12/30/2021 wound culture show Enterococcus and MRSA-patient started on vancomycin IV  1/4/2022    3.   Congestive heart failure with preserved ejection fraction   stable    4.    Bradycardia improved remains off amiodarone        5.  Adult onset diabetes mellitus Accu-Chek sliding scale hypoglycemia is improved continue Accu-Chek/scale insulin    6.    Paroxysmal atrial fibrillation continue Eliquis dose adjusted to due to renal insufficiency     7.  Ischemic cardiomyopathy echo done as admission showed improvement of the ventricular function with ejection fraction 55% with severe aortic valve calcification some right atrial dilatation..     8.  Hypothyroidism TSH is elevated likely due to patient noncompliance fall continue present dose and monitor     9.  Multiple falls at home patient is clearly unable to care for himself he is not participating with therapies discussed with discharge planning today. Patient not able to go home current condition none no specific therapies and presently to be placed in a nursing home permanently discharge planning discussed with patient today    10.  COPD nothing acute continue home medications     11.    Iron deficiency anemia started on iron infusions    12.   Candida UTI on p.o. Diflucan    Much of  this encounter note is an electronic transcription/translation of spoken language to printed text using Dragon Software

## 2022-01-05 NOTE — PLAN OF CARE
Problem: Adult Inpatient Plan of Care  Goal: Plan of Care Review  Recent Flowsheet Documentation  Taken 1/5/2022 1316 by Sebastian Wright OTR  Plan of Care Reviewed With: patient  Outcome Summary: OT evaluation completed. Pt with a history of frequent falls at home. Pt lives alone, and he reports increasing difficulty with management of basic daily tasks. Pt states his grandaughter assists with IADL's, and he has home health services. Pt states he typically uses a rollator or a motorized scooter at home for mobility. Pt normally sleeps in a lift recliner, and he uses the lift mechanism to help him stand. Pt attempted to stand from a standard recliner today and was not able to come to a full upright position with mod assist x 2. Pt needs assistance with basic adl's for safety and to adequately manage his hygiene. Pt states he has chronic knee pain that limits his activity at home and makes him hesitant to participate with therapy, yet he was willing to try. Rec OT services during acute care stay to address safety/independence with basic adl tasks/transfers.  Pt does not appear safe to return home alone. Pt may benefit from SNF/ECF

## 2022-01-06 NOTE — PROGRESS NOTES
[facility] Progress Note      Patient Name: Froilan Helton  : 1940  MRN: [id number]  Primary Care Physician:  Fortunato De Leon MD  Date of admission: 2021    Subjective     Interval History:   Eating fine  No shortness of breath on room air  Getting ready to work with PT    Review of Systems:   14 point review of systems is otherwise negative except for mentioned above on HPI    Objective     Vitals:   Temp:  [95.7 °F (35.4 °C)-97.7 °F (36.5 °C)] 95.7 °F (35.4 °C)  Heart Rate:  [48-54] 48  Resp:  [20] 20  BP: (105-155)/(48-71) 143/64    Intake/Output Summary (Last 24 hours) at 2022 0929  Last data filed at 2022 0600  Gross per 24 hour   Intake 600 ml   Output 1290 ml   Net -690 ml       Physical Exam:    General Appearance: alert, appropriate, pale, NAD, chronically ill  Psychiatric: Flat affect and depressed mood  Skin: warm and dry  HEENT: oral mucosa normal, nonicteric sclera  Neck: supple, no JVD  Lungs: Crackles in bases but no wheezes; not labored on room air  Heart: Irregularly irregular, bradycardic, 2/6M  Abdomen: soft, nontender, nondistended, BS +  : no palpable bladder  Extremities: 1+ edema, lower legs wrapped  Neuro: normal speech and mental status; flat affect    Scheduled Meds:     apixaban, 2.5 mg, Oral, Q12H  atorvastatin, 10 mg, Oral, Nightly  budesonide-formoterol, 2 puff, Inhalation, BID - RT  bumetanide, 2 mg, Oral, Daily  cholecalciferol, 2,000 Units, Oral, Daily  citalopram, 20 mg, Oral, Nightly  docusate sodium, 100 mg, Oral, BID  fluconazole, 200 mg, Oral, Q24H  fluticasone, 2 spray, Each Nare, Daily  hydrocortisone-bacitracin-zinc oxide-nystatin, 1 application, Topical, TID  influenza vaccine, 0.5 mL, Intramuscular, Once  insulin aspart, 0-24 Units, Subcutaneous, TID AC  insulin detemir, 25 Units, Subcutaneous, Nightly  levothyroxine, 224 mcg, Oral, Q AM  linagliptin, 5 mg, Oral, Daily  metroNIDAZOLE, 500 mg, Oral, Q8H  O2, 2 L/min,  Inhalation, Once  polyethylene glycol, 17 g, Oral, Daily  pregabalin, 75 mg, Oral, BID  QUEtiapine, 12.5 mg, Oral, Q PM  tamsulosin, 0.4 mg, Oral, Daily  vancomycin, 1,000 mg, Intravenous, Q24H  cyanocobalamin, 1,000 mcg, Oral, Daily      IV Meds:   Pharmacy to dose vancomycin,         Results Reviewed:   I have personally reviewed the results from the time of this admission to 1/6/2022 09:29 EST     Results from last 7 days   Lab Units 01/06/22 0431 01/05/22 0427 01/04/22  0413 01/03/22  0416 01/02/22  0527 01/01/22  0357 12/31/21  0415   SODIUM mmol/L 135* 136 136   < > 135*   < > 132*   POTASSIUM mmol/L 4.5 4.3 4.8   < > 4.8   < > 4.7   CHLORIDE mmol/L 96* 99 102   < > 105   < > 103   CO2 mmol/L 30.1* 26.6 25.6   < > 21.6*   < > 19.4*   BUN mg/dL 34* 31* 32*   < > 39*   < > 38*   CREATININE mg/dL 2.30* 2.08* 1.80*   < > 2.00*   < > 2.54*   CALCIUM mg/dL 8.9 8.6 8.6   < > 8.6   < > 8.5*   BILIRUBIN mg/dL  --   --   --   --  0.2  --  0.2   ALK PHOS U/L  --   --   --   --  81  --  74   ALT (SGPT) U/L  --   --   --   --  25  --  12   AST (SGOT) U/L  --   --   --   --  48*  --  30   GLUCOSE mg/dL 75 92 94   < > 120*   < > 248*    < > = values in this interval not displayed.     Estimated Creatinine Clearance: 31.2 mL/min (A) (by C-G formula based on SCr of 2.3 mg/dL (H)).  Results from last 7 days   Lab Units 01/06/22 0431 01/05/22 0427 01/04/22  0413   MAGNESIUM mg/dL  --  2.3 1.5*   PHOSPHORUS mg/dL 3.8 3.4 3.0         Results from last 7 days   Lab Units 01/05/22  0427 01/03/22  0416 01/02/22  0527 12/31/21  0415   WBC 10*3/mm3 7.41 6.69 6.36 5.06   HEMOGLOBIN g/dL 10.0* 9.4* 9.1* 9.0*   PLATELETS 10*3/mm3 243 220 206 163           Assessment / Plan     ASSESSMENT:  1.  ROLA/CKD3, nonoliguric, improving.  Likely prerenal due to CHF.  Peripheral edema present; hypervolemic hyponatremia, stable; normal K   2.  Chronic CHF  3.  Edema-due to venous stasis  4.  Leg wounds  5.  PAF    PLAN:  1.  Oral bumetanide 2 mg  once daily for now.  Although if edema worsens, weight rises, and serum sodium falls will need to revert back to twice daily regimen  2.  To nursing home, if patient agreeable, anytime from renal view  3.  Consider Hospice evaluation    Thank you for involving us in the care of Froilan GABRIELA Henryross.  Please feel free to call with any questions.    Ellis Centeno MD  01/06/22  09:29 [facility]    [facility]  [phone]

## 2022-01-06 NOTE — PLAN OF CARE
Goal Outcome Evaluation:  Plan of Care Reviewed With: patient        Progress: no change  Outcome Summary: Pt assist X 2 with stand pivot. Pt reports no pain this shift. Dressings changed per order. Pt reports weakness in BLE. Pt tolerating diet well. Pt resting in bed at this time.

## 2022-01-06 NOTE — PLAN OF CARE
Goal Outcome Evaluation:  Plan of Care Reviewed With: patient           Outcome Summary: PT: Patient performs supine to sit with SBA and sit to stand with CGA from elevated bed surface.  Patient performs stand pivot from bed to recliner with CGA.  Patient continues to complain of chronic knee pain that impacts overall functional mobility/safety.  Patient is accustomed to use of lift mechanism in order to stand, anticipate increased assistance required from lower surfaces.  Recommend SNF/ECF at discharge due to difficulty safely caring for himself at home.

## 2022-01-06 NOTE — PROGRESS NOTES
"Daily Progress Note:      Chief complaint: Follow-up of acute kidney injury, chronic systolic  Congestive heart failure, ischemic cardiomyopathy, atrial fibrillation, hypertension, multiple skin decubiti    Subjective: No overnight events noted.  Resting comfortably he is without complaints.     LOS: 9 days     Vital Signs  Temp:  [95.7 °F (35.4 °C)-97.7 °F (36.5 °C)] 95.7 °F (35.4 °C)  Heart Rate:  [48-69] 52  Resp:  [18-20] 20  BP: (105-155)/(48-71) 143/64  Oxygen Therapy  SpO2: 96 %  Pulse Oximetry Type: Intermittent  Device (Oxygen Therapy): room air  Flow (L/min): 2  Oxygen Concentration (%): 95  ETCO2 (mmHg): 32 mmHg  Probe Placed On (Pulse Ox): Right:, finger}  Body mass index is 28.11 kg/m².  Flowsheet Rows      First Filed Value   Admission Height 185.4 cm (73\") Documented at 12/28/2021 1349   Admission Weight 104 kg (229 lb 12.8 oz) Documented at 12/28/2021 1349                   Documented weights    12/28/21 1349 12/28/21 2222 12/29/21 0643 12/29/21 1850   Weight: 104 kg (229 lb 12.8 oz) 102 kg (225 lb 4.8 oz) 102 kg (225 lb 6.4 oz) 102 kg (224 lb 13.9 oz)    12/30/21 0530 12/31/21 0539 01/02/22 0643 01/03/22 0550   Weight: 98.9 kg (218 lb 1.6 oz) 103 kg (226 lb) 103 kg (227 lb) 103 kg (227 lb 9.6 oz)    01/04/22 0534 01/05/22 0627 01/06/22 0643   Weight: 99 kg (218 lb 4.8 oz) 95.5 kg (210 lb 8 oz) 96.2 kg (212 lb 2 oz)           Patient Vitals for the past 24 hrs:   BP Temp Temp src Pulse Resp SpO2 Weight   01/06/22 0643 -- -- -- -- -- -- 96.2 kg (212 lb 2 oz)   01/06/22 0604 143/64 -- -- 52 -- -- --   01/06/22 0600 155/70 95.7 °F (35.4 °C) Axillary 51 20 96 % --   01/05/22 2315 129/62 96.8 °F (36 °C) Oral 54 20 94 % --   01/05/22 2007 113/56 97.6 °F (36.4 °C) Oral -- -- -- --   01/05/22 1953 -- -- -- 53 20 91 % --   01/05/22 1949 -- -- -- 52 20 92 % --   01/05/22 1508 105/48 97.5 °F (36.4 °C) Oral 52 20 90 % --   01/05/22 1142 145/71 97.7 °F (36.5 °C) Oral 52 20 93 % --   01/05/22 0936 -- -- -- (!) 49 " 20 95 % --   01/05/22 0802 -- -- -- (!) 48 18 93 % --   01/05/22 0758 126/55 -- -- 50 18 95 % --   01/05/22 0754 147/65 -- -- 69 18 97 % --       96.2 kg (212 lb 2 oz)    Intake/Output                       01/04/22 0701 - 01/05/22 0700 01/05/22 0701 - 01/06/22 0700     [id number] 3698-1455 Total 8734-7866 5031-2728 Total                 Intake    P.O.  --  240 240  600  -- 600    Total Intake -- 240 240 600 -- 600       Output    Urine  200  2500 2700  300  990 1290    Total Output 200 2500 2700            Intake/Output Summary (Last 24 hours) at 1/6/2022 0735  Last data filed at 1/6/2022 0600  Gross per 24 hour   Intake 600 ml   Output 1290 ml   Net -690 ml        Intake/Output Summary (Last 24 hours) at 1/6/2022 0735  Last data filed at 1/6/2022 0600  Gross per 24 hour   Intake 600 ml   Output 1290 ml   Net -690 ml        Review of Systems   Constitutional: Positive for activity change. Negative for appetite change and fatigue.   HENT: Negative for congestion.    Respiratory: Negative for cough, chest tightness, shortness of breath and wheezing.    Cardiovascular: Negative for chest pain and leg swelling.   Gastrointestinal: Negative for abdominal distention, abdominal pain, diarrhea, nausea and vomiting.   Endocrine: Negative for polyphagia and polyuria.   Genitourinary: Negative for frequency.   Skin: Positive for color change and wound. Negative for rash.   Neurological: Negative for weakness and light-headedness.   Hematological: Does not bruise/bleed easily.   Psychiatric/Behavioral: Negative for agitation and behavioral problems.       Physical Exam  Vitals and nursing note reviewed.   Constitutional:       Appearance: He is well-developed. He is obese.   HENT:      Head: Normocephalic.   Eyes:      Conjunctiva/sclera: Conjunctivae normal.   Neck:      Thyroid: No thyromegaly.      Vascular: No JVD.   Cardiovascular:      Rate and Rhythm: Regular rhythm. Bradycardia present.      Heart sounds:  Normal heart sounds. No murmur heard.      Pulmonary:      Effort: Pulmonary effort is normal. No respiratory distress.      Breath sounds: Normal breath sounds. No wheezing or rales.   Abdominal:      General: Bowel sounds are normal. There is no distension.      Palpations: Abdomen is soft.      Tenderness: There is no abdominal tenderness. There is no guarding.   Musculoskeletal:      Right lower leg: No edema.      Left lower leg: No edema.   Skin:     General: Skin is warm and dry.      Findings: No rash.      Comments: Lower extremities are in dressings   Neurological:      General: No focal deficit present.      Mental Status: He is alert and oriented to person, place, and time.         Medication Review:   I have reviewed the patient's current medication list  Scheduled Meds:apixaban, 2.5 mg, Oral, Q12H  atorvastatin, 10 mg, Oral, Nightly  budesonide-formoterol, 2 puff, Inhalation, BID - RT  bumetanide, 2 mg, Oral, Daily  cholecalciferol, 2,000 Units, Oral, Daily  citalopram, 20 mg, Oral, Nightly  docusate sodium, 100 mg, Oral, BID  fluconazole, 200 mg, Oral, Q24H  fluticasone, 2 spray, Each Nare, Daily  hydrocortisone-bacitracin-zinc oxide-nystatin, 1 application, Topical, TID  influenza vaccine, 0.5 mL, Intramuscular, Once  insulin aspart, 0-24 Units, Subcutaneous, TID AC  insulin detemir, 25 Units, Subcutaneous, Nightly  levothyroxine, 224 mcg, Oral, Q AM  linagliptin, 5 mg, Oral, Daily  metroNIDAZOLE, 500 mg, Oral, Q8H  O2, 2 L/min, Inhalation, Once  polyethylene glycol, 17 g, Oral, Daily  pregabalin, 75 mg, Oral, BID  QUEtiapine, 12.5 mg, Oral, Q PM  tamsulosin, 0.4 mg, Oral, Daily  vancomycin, 1,000 mg, Intravenous, Q24H  cyanocobalamin, 1,000 mcg, Oral, Daily      Continuous Infusions:Pharmacy to dose vancomycin,       PRN Meds:.•  acetaminophen **OR** acetaminophen **OR** acetaminophen  •  albuterol sulfate HFA  •  dextrose  •  dextrose  •  glucagon (human recombinant)  •  magnesium sulfate **OR**  magnesium sulfate **OR** magnesium sulfate  •  melatonin  •  Pharmacy to dose vancomycin      Labs:  Results from last 7 days   Lab Units 01/05/22 0427 01/03/22 0416 01/02/22 0527 12/31/21 0415 12/31/21 0415   WBC 10*3/mm3 7.41 6.69 6.36  --  5.06   HEMOGLOBIN g/dL 10.0* 9.4* 9.1*   < > 9.0*   HEMATOCRIT % 32.1* 30.9* 29.6*  --  29.4*   PLATELETS 10*3/mm3 243 220 206  --  163    < > = values in this interval not displayed.     Results from last 7 days   Lab Units 01/06/22 0431 01/05/22 0427 01/04/22 0413 01/03/22 0416 01/02/22 0527 01/01/22 0357 12/31/21 0415   SODIUM mmol/L 135* 136 136 134* 135* 133* 132*   POTASSIUM mmol/L 4.5 4.3 4.8 5.3* 4.8 5.0 4.7   CHLORIDE mmol/L 96* 99 102 103 105 104 103   CO2 mmol/L 30.1* 26.6 25.6 24.3 21.6* 18.8* 19.4*   BUN mg/dL 34* 31* 32* 34* 39* 41* 38*   CREATININE mg/dL 2.30* 2.08* 1.80* 1.95* 2.00* 2.15* 2.54*   CALCIUM mg/dL 8.9 8.6 8.6 8.5* 8.6 8.5* 8.5*   BILIRUBIN mg/dL  --   --   --   --  0.2  --  0.2   ALK PHOS U/L  --   --   --   --  81  --  74   ALT (SGPT) U/L  --   --   --   --  25  --  12   AST (SGOT) U/L  --   --   --   --  48*  --  30   GLUCOSE mg/dL 75 92 94 97 120* 170* 248*     Results from last 7 days   Lab Units 01/05/22 0427 01/04/22 0413   MAGNESIUM mg/dL 2.3 1.5*       Results from last 7 days   Lab Units 01/05/22 0427 01/03/22 0416 01/02/22  0527 12/31/21  0415 12/31/21 0415   AST (SGOT) U/L  --   --  48*  --  30   ALT (SGPT) U/L  --   --  25  --  12   PLATELETS 10*3/mm3 243 220 206   < > 163    < > = values in this interval not displayed.         Lab Results (last 24 hours)     Procedure Component Value Units Date/Time    POC Glucose Once [[id number]]  (Normal) Collected: 01/06/22 0726    Specimen: Blood Updated: 01/06/22 0732     Glucose 76 mg/dL      Comment: Meter: [id number] : [id number] Liliana HARKINS       Renal Function Panel [[id number]]  (Abnormal) Collected: 01/06/22 0431    Specimen: Blood Updated: 01/06/22 0529      Glucose 75 mg/dL      BUN 34 mg/dL      Creatinine 2.30 mg/dL      Sodium 135 mmol/L      Potassium 4.5 mmol/L      Chloride 96 mmol/L      CO2 30.1 mmol/L      Calcium 8.9 mg/dL      Albumin 2.80 g/dL      Phosphorus 3.8 mg/dL      Anion Gap 8.9 mmol/L      BUN/Creatinine Ratio 14.8     eGFR Non African Amer 27 mL/min/1.73     Narrative:      GFR Normal >60  Chronic Kidney Disease <60  Kidney Failure <15      POC Glucose Once [[id number]]  (Abnormal) Collected: 01/05/22 2009    Specimen: Blood Updated: 01/05/22 2015     Glucose 150 mg/dL      Comment: Meter: [id number] : [id number] Adam Ordaz CNA       POC Glucose Once [[id number]]  (Normal) Collected: 01/05/22 1642    Specimen: Blood Updated: 01/05/22 1648     Glucose 93 mg/dL      Comment: Meter: [id number] : [id number] Chris Romeo CNA       Wound Culture - Wound, Hip, Left [[id number]] Collected: 01/04/22 1106    Specimen: Wound from Hip, Left Updated: 01/05/22 1334     Wound Culture Growth present, too young to evaluate     Gram Stain Many (4+) WBCs seen      Few (2+) Gram positive cocci    POC Glucose Once [[id number]]  (Abnormal) Collected: 01/05/22 1139    Specimen: Blood Updated: 01/05/22 1146     Glucose 187 mg/dL      Comment: Meter: [id number] : [id number] Chris Romeo CNA               Results from last 7 days   Lab Units 01/01/22  0357   TSH uIU/mL 7.100*             Results from last 7 days   Lab Units 01/05/22  0427 01/04/22  0413   MAGNESIUM mg/dL 2.3 1.5*                 Glucose   Date/Time Value Ref Range Status   01/06/2022 0726 76 70 - 130 mg/dL Final     Comment:     Meter: [id number] : [id number] Liliana HARKINS   01/05/2022 2009 150 (H) 70 - 130 mg/dL Final     Comment:     Meter: [id number] : [id number] Adam Ordaz CNA   01/05/2022 1642 93 70 - 130 mg/dL Final     Comment:     Meter: [id number] : [id number] Chris Romeo [facility]   01/05/2022 1139 187 (H) 70 - 130 mg/dL Final     Comment:      Meter: [id number] : [id number] Chris Romeo CNA   01/05/2022 0723 95 70 - 130 mg/dL Final     Comment:     Meter: [id number] : [id number] Chris Romeo CNA   01/04/2022 2040 185 (H) 70 - 130 mg/dL Final     Comment:     RN Notified R and V Meter: [id number] : [id number] Bactodd Avelar PCA   01/04/2022 1701 134 (H) 70 - 130 mg/dL Final     Comment:     Meter: [id number] : [id number] Martin Maryan NA   01/04/2022 1521 160 (H) 70 - 130 mg/dL Final     Comment:     Meter: [id number] : [id number] Eugene Bolivar RN         Results from last 7 days   Lab Units 01/04/22  1106 01/02/22  1856 12/31/21  1812 12/30/21  1044   WOUNDCX  Growth present, too young to evaluate  --   --  Heavy growth (4+) Staphylococcus aureus, MRSA*  Heavy growth (4+) Enterococcus faecalis*   URINECX   --  Yeast isolated* Yeast isolated*  --      Results from last 7 days   Lab Units 01/02/22  1856   NITRITE UA  Negative   WBC UA /HPF Too Numerous to Count*   BACTERIA UA /HPF 1+*   SQUAM EPITHEL UA /HPF Unable to determine due to loaded field*   URINECX  Yeast isolated*             Radiology:  Imaging Results (Last 24 Hours)     ** No results found for the last 24 hours. **          Cardiology:  ECG/EMG Results (last 24 hours)     ** No results found for the last 24 hours. **                I have reviewed recent labs results and consult notes.    Please note portions of this assessment/plan may have been copied and pasted, but I have personally seen this patient and reviewed each line of this assessment and plan for accuracy and made updates to reflect my necessary changes     Assessment and Plan:  1.  Acute kidney injury chronic kidney disease stage III and gradually increasing will decrease Bumex to once daily    2.  Left hip and left leg decubitus wounds debrided on 12/30/2021 wound culture show Enterococcus and MRSA-patient started on vancomycin IV  1/4/2022    3.   Congestive heart failure with preserved  ejection fraction   stable appears to be euvolemic    4.    Bradycardia improved remains off amiodarone still bradycardic but in the 50s        5.  Adult onset diabetes mellitus Accu-Chek sliding scale hypoglycemia is improved continue Accu-Chek/scale insulin    6.    Paroxysmal atrial fibrillation continue Eliquis dose adjusted to due to renal insufficiency     7.  Ischemic cardiomyopathy echo done as admission showed improvement of the ventricular function with ejection fraction 55% with severe aortic valve calcification some right atrial dilatation..     8.  Hypothyroidism TSH is elevated likely due to patient noncompliance fall continue present dose and monitor     9.  Multiple falls at home patient is clearly unable to care for himself multidisciplinary note yesterday reviewed he is very poorly motivated do not think that he is in a participate with therapies long-term I discussed with him again today.  He complains of bilateral knee pain which is interfering with his ability to participate therapies we will get orthopedics evaluation    10.  COPD nothing acute continue home medications     11.    Iron deficiency anemia started on iron infusions    12.   Candida UTI on p.o. Diflucan    Much of this encounter note is an electronic transcription/translation of spoken language to printed text using Dragon Software

## 2022-01-06 NOTE — PLAN OF CARE
Goal Outcome Evaluation:              Pt seems willing to do PT/OT after numerous talks with MD, PT/OT, care management, and RN.  Pt was able to pivot to chair with maximum assistance.  Hip and bi-lat leg dressings changed.

## 2022-01-06 NOTE — PROGRESS NOTES
Pharmacy Vancomycin dosing consult  81yom  Wt99kg    Diagnosis- skin and soft tissue infection   MRSA and enterococcus    Labs 1/4/22  Scr-1.80, CrCl-40.34  Loading dose 1500mg     Started Vancomycin 1000mg q24h 1/5/22  Labs 1/5/22   Scr-2.08, CrCl-34.4    Labs 1/6/22   Scr-2.30, CrCl-31.2  Random level ordered 1200, 1/6/22 before scheduled dose.    Will continue to follow and adjust dose if needed.

## 2022-01-06 NOTE — CASE MANAGEMENT/SOCIAL WORK
Continued Stay Note  MICHAEL Kincaid     Patient Name: Froilan Helton  MRN: [id number]  Today's Date: 1/6/2022    Admit Date: 12/28/2021     Discharge Plan     Row Name 01/06/22 1335       Plan    Plan Comments I called and left a message for Dr. Tovar to discuss the patients discharge plan. CM will continue to follow.                                     Discharge Codes    No documentation.                     Terri Martinez RN

## 2022-01-06 NOTE — CASE MANAGEMENT/SOCIAL WORK
"Continued Stay Note  [place] [place]     Patient Name: Froilan Helton  MRN: [id number]  Today's Date: 1/6/2022    Admit Date: 12/28/2021     Discharge Plan     Row Name 01/06/22 1236       Plan    Plan Discharge home with home health    Plan Comments I spoke with Cole, the patients assisgned  through [facility].  He advised that he has been following the patient for several month.  He is aware of the patients home life.  He advised that from his standpoint, the patient has rights and if he wants to go home that is his right.  He further stated that he has had multiple conversations wtih the patient regarding this.  He explained that the patients granddaughter, Jaz assists him at home and checks on him frequently.  I then called and spoke with Jaz.  We discussed his discharge and she advised that she is in the patients home three times a day to deliver meals and assst him where she can.  She is willing to assist with any dressing changes if needed.  I then went and spoke with the patient and we again discussed his discharge plan.  He advised that he doesn't want to go to rehab.  He stated that he has been there 2 times before and they haven't done \"anything for me\".  He further advised that he worked hard all of his life and doesn't want his home and property taken away from him by the nursing home.  I explained that they can not do that without his permission and he stated that they had done that to his mother in 2004 and his roommate when he was in [place].  He advised that he wanted his home and property to go to his 4 sons when he dies.  He again stated that he is going home. I advised the patient that I didn't feel that home is a safe discharge plan and that I felt that short term rehab is his safest option.  I further explained that Dr. Tovar, nursing, physical and occupational therapy don't feel that he is safe to discharge home alone either. The patients verbalized understanding and stated the " "he is \"going home\".   In doing a chart review, I noted a progress note dated 12/19/2021 by Dr. Enrike Alonzo, Psychiatrist that stated that the patient is \"alert and oriented x3\" and that the patient's \"Thought processes are goal-directed.  Judgment and insight are fair\".  The patient will discharge home with family to assist and [facility] for home health and wound care and he is agreeable to that plan. CM will continue to follow               Discharge Codes    No documentation.                     Terri Martinez RN    "

## 2022-01-06 NOTE — THERAPY TREATMENT NOTE
Patient Name: Froilan Helton  : 1940    MRN: [id number]                              Today's Date: 2022       Admit Date: 2021    Visit Dx:     ICD-10-CM ICD-9-CM   1. Acute renal failure superimposed on chronic kidney disease, unspecified CKD stage, unspecified acute renal failure type ([facility])  N17.9 584.9    N18.9 585.9   2. Decubitus ulcer of trochanteric region of left hip, unspecified ulcer stage  L89.229 707.04     707.20   3. Open wound of left foot, initial encounter  S91.302A 892.0     Patient Active Problem List   Diagnosis   • COPD (chronic obstructive pulmonary disease) ([facility])   • Type 2 diabetes mellitus with stage 4 chronic kidney disease, with long-term current use of insulin ([facility])   • Essential hypertension   • Primary osteoarthritis of left knee   • Chronic renal insufficiency, stage 4 (severe) ([facility])   • Class 2 severe obesity due to excess calories with serious comorbidity in adult ([facility])   • Physical debility   • Acute UTI (urinary tract infection)   • Permanent atrial fibrillation ([facility])   • H/O noncompliance with medical treatment, presenting hazards to health   • Myxedema   • Acute on chronic combined systolic and diastolic CHF (congestive heart failure) ([facility])   • Generalized weakness   • Recurrent left pleural effusion   • Fall on same level as cause of accidental injury   • Gross hematuria   • CAD (coronary artery disease)   • HLD (hyperlipidemia)   • Hypothyroidism   • CKD (chronic kidney disease) stage 3, GFR 30-59 ml/min ([facility])   • Acute metabolic encephalopathy   • Cardiomyopathy ([facility])   • Recurrent falls   • Acute renal failure superimposed on chronic kidney disease ([facility])   • Open wound of left foot   • Moderate malnutrition ([facility])     Past Medical History:   Diagnosis Date   • A-fib ([facility])    • Arthritis    • Asthma    • CAD (coronary artery disease)    • Cardiomyopathy ([facility])    • Cataract    • CHF (congestive heart failure) ([facility])    • CKD (chronic kidney disease), stage III  ([facility])    • COPD (chronic obstructive pulmonary disease) ([facility])    • Diabetes mellitus ([facility])    • Disease of thyroid gland    • Emphysema, unspecified ([facility])    • Glaucoma    • Hyperlipidemia    • Hypertension    • Kidney stone    • Myocardial infarct, old    • Neuropathy    • Osteoarthritis    • Osteoporosis    • Permanent atrial fibrillation ([facility]) 6/30/2020   • PVD (peripheral vascular disease) ([facility])    • Renal disorder    • Shingles      Past Surgical History:   Procedure Laterality Date   • COLONOSCOPY     • EYE SURGERY     • INCISION AND DRAINAGE LEG Left 12/30/2021    Procedure: debridement of left hip wounds and left foot wound;  Surgeon: Judie Aguero DO;  Location: [facility];  Service: General;  Laterality: Left;   • JOINT REPLACEMENT      right   • KIDNEY STONE SURGERY     • REPLACEMENT TOTAL KNEE     • TOE SURGERY        General Information     Row Name 01/06/22 0929 01/06/22 0854       OT Time and Intention    Document Type therapy note (daily note)  -SD therapy note (daily note)  -SD    Mode of Treatment occupational therapy  -SD occupational therapy  -SD    Row Name 01/06/22 0929          General Information    Existing Precautions/Restrictions fall  -SD     Row Name 01/06/22 0854          Cognition    Orientation Status (Cognition) oriented x 3  -SD     Row Name 01/06/22 0929          Safety Issues, Functional Mobility    Impairments Affecting Function (Mobility) balance; strength  -SD     Comment, Safety Issues/Impairments (Mobility) Pt c/o chronic knee pain and BLE weakness which impacts his mobility. Pt only able to manage a stand step pivot transfer  -SD           User Key  (r) = Recorded By, (t) = Taken By, (c) = Cosigned By    Initials Name Provider Type    SD Sebastian Wright OTR Occupational Therapist                 Mobility/ADL's     Row Name 01/06/22 0930 01/06/22 0854       Bed Mobility    Bed Mobility supine-sit  -SD scooting/bridging  -SD    Scooting/Bridging [place] (Bed  Mobility) -- dependent (less than 25% patient effort); 2 person assist  -SD    Supine-Sit [place] (Bed Mobility) supervision  -SD --    Assistive Device (Bed Mobility) head of bed elevated; bed rails  -SD --    Comment (Bed Mobility) Pt finished with his breakfast and was willing to transfer to a chair with assistance  -SD pt declined to get up from bed to recliner for breakfast. Nursing assisted with scooting pt up in bed. HOB elevated for pt to eat breakfst  -SD    Row Name 01/06/22 0930 01/06/22 0854       Transfers    Comment (Transfers) bed surface elevated to increase pt independence. Pt normally uses a lift mechanism on a lift chair in order to stand.  -SD pt declined transfer  -SD    Row Name 01/06/22 0930          Functional Mobility    Functional Mobility- Ind. Level contact guard assist  -SD     Functional Mobility- Device rolling walker  -SD     Functional Mobility- Comment pt performed stand step pivot transfers  -SD     Row Name 01/06/22 0930 01/06/22 0854       Activities of Daily Living    BADL Assessment/Intervention lower body dressing  -SD feeding  -SD    Row Name 01/06/22 0930          Lower Body Dressing Assessment/Training    [place] Level (Lower Body Dressing) don; socks; dependent (less than 25% patient effort)  -SD     Position (Lower Body Dressing) supine  -SD     Row Name 01/06/22 0854          Self-Feeding Assessment/Training    [place] Level (Feeding) feeding skills; finger foods; liquids to mouth; prepare tray/open items; set up; independent  -SD     Position (Self-Feeding) sitting up in bed  -SD     Comment (Feeding) pt able to manage self feeding activity after set up of tray (open containers.)  -SD           User Key  (r) = Recorded By, (t) = Taken By, (c) = Cosigned By    Initials Name Provider Type    Sebastian Greene OTR Occupational Therapist               Obj/Interventions    No documentation.                Goals/Plan     Row Name 01/06/22 0937           Transfer Goal 1 (OT)    Activity/Assistive Device (Transfer Goal 1, OT) sit-to-stand/stand-to-sit; walker, rolling  x 2 minutes to allow for caregiver assist with adl's  -SD     [place] Level/Cues Needed (Transfer Goal 1, OT) moderate assist (50-74% patient effort)  -SD     Progress/Outcome (Transfer Goal 1, OT) continuing progress toward goal  CGA x 30 seconds today. Pt c/o fatigue/BLE weakness  -SD     Row Name 01/06/22 0937          Therapy Assessment/Plan (OT)    Planned Therapy Interventions (OT) patient/caregiver education/training; ROM/therapeutic exercise; transfer/mobility retraining; BADL retraining  -SD           User Key  (r) = Recorded By, (t) = Taken By, (c) = Cosigned By    Initials Name Provider Type    Sebastian Greene, OTR Occupational Therapist               Clinical Impression     Row Name 01/06/22 0947          Pain Scale: Numbers Pre/Post-Treatment    Pre/Posttreatment Pain Comment pt c/o chronic knee pain but did not rate pain.  -SD     Pain Intervention(s) Repositioned; Ambulation/increased activity  -SD     Row Name 01/06/22 0933          Plan of Care Review    Plan of Care Reviewed With patient  -SD     Outcome Summary OT: Pt was independent with self feeding this am after items/containers were opened on the breakfast tray. Pt was able to manage bed mobility with supervision using the bed rail with HOB elevated. Pt stood from EOB (with bed surface elevated with CGA). Pt stood at EOB for approx 30 seconds with support of walker with CGA. Pt c/o fatigue and BLE weakness and requested a rest break prior to transfering to a recliner. Pt managed a stand step pivot transfer from the bed to recliner with CGA. Pt's mobility is limited. He needs significant assistance for basic self care tasks. Rec SNF/ECF upon discharge.  -SD     Row Name 01/06/22 0942          Therapy Plan Review/Discharge Plan (OT)    Anticipated Discharge Disposition (OT) skilled nursing facility; extended care facility   -SD     Row Name 01/06/22 0933          Positioning and Restraints    Pre-Treatment Position in bed  -SD     Post Treatment Position chair  -SD     In Chair reclined; call light within reach; encouraged to call for assist  -SD           User Key  (r) = Recorded By, (t) = Taken By, (c) = Cosigned By    Initials Name Provider Type    Sebastian Greene OTR Occupational Therapist               Outcome Measures     Row Name 01/06/22 0939          How much help from another is currently needed...    Putting on and taking off regular lower body clothing? 2  -SD     Bathing (including washing, rinsing, and drying) 2  -SD     Toileting (which includes using toilet bed pan or urinal) 2  -SD     Putting on and taking off regular upper body clothing 3  -SD     Taking care of personal grooming (such as brushing teeth) 3  -SD     Eating meals 4  I after tray set up  -SD     AM-PAC 6 Clicks Score (OT) 16  -SD           User Key  (r) = Recorded By, (t) = Taken By, (c) = Cosigned By    Initials Name Provider Type    Sebastian Greene OTR Occupational Therapist                Occupational Therapy Education                 Title: PT OT SLP Therapies (Done)     Topic: Occupational Therapy (Done)     Point: ADL training (Done)     Description:   Instruct learner(s) on proper safety adaptation and remediation techniques during self care or transfers.   Instruct in proper use of assistive devices.              Learning Progress Summary           Patient Acceptance, E,TB,D, VU,DU by SD at 1/6/2022 0920    Comment: Education with safety with bed mobility and transfers.      Show all documentation for this point (4)                 Point: Precautions (Done)     Description:   Instruct learner(s) on prescribed precautions during self-care and functional transfers.              Learning Progress Summary           Patient Acceptance, E,TB, VU,DU by TF at 1/3/2022 1607      Show all documentation for this point (1)                  Point: Body mechanics (Done)     Description:   Instruct learner(s) on proper positioning and spine alignment during self-care, functional mobility activities and/or exercises.              Learning Progress Summary           Patient Acceptance, E,TB, VU,DU by TF at 1/3/2022 1607      Show all documentation for this point (1)                             User Key     Initials Effective Dates Name Provider Type Discipline    SD 06/16/21 -  Sebastian Wrigth OTR Occupational Therapist OT    TF 06/16/21 -  Katt Knight RN Registered Nurse Nurse              OT Recommendation and Plan  Planned Therapy Interventions (OT): patient/caregiver education/training, ROM/therapeutic exercise, transfer/mobility retraining, BADL retraining  Therapy Frequency (OT): 3 times/wk  Plan of Care Review  Plan of Care Reviewed With: patient  Outcome Summary: OT: Pt was independent with self feeding this am after items/containers were opened on the breakfast tray. Pt was able to manage bed mobility with supervision using the bed rail with HOB elevated. Pt stood from EOB (with bed surface elevated with CGA). Pt stood at EOB for approx 30 seconds with support of walker with CGA. Pt c/o fatigue and BLE weakness and requested a rest break prior to transfering to a recliner. Pt managed a stand step pivot transfer from the bed to recliner with CGA. Pt's mobility is limited. He needs significant assistance for basic self care tasks. Rec SNF/ECF upon discharge.     Time Calculation:    Time Calculation- OT     Row Name 01/06/22 0919 01/06/22 0852          Time Calculation- OT    OT Start Time 0902  -SD 0810  -SD     OT Stop Time 0916  -SD 0823  -SD     OT Time Calculation (min) 14 min  -SD 13 min  -SD            Timed Charges    67169 - OT Self Care/Mgmt Minutes 14  -SD 13  -SD            Total Minutes    Timed Charges Total Minutes 14  -SD 13  -SD      Total Minutes 14  -SD 13  -SD           User Key  (r) = Recorded By, (t) = Taken By, (c) =  Cosigned By    Initials Name Provider Type    SD Sebastian Wright, BETH Occupational Therapist              Therapy Charges for Today     Code Description Service Date Service Provider Modifiers Qty    [id number] HC OT SELF CARE/MGMT/TRAIN EA 15 MIN 1/6/2022 Sebastian Wright OTR GO 2               BETH Melgar  1/6/2022

## 2022-01-06 NOTE — THERAPY TREATMENT NOTE
Acute Care - Physical Therapy Treatment Note  MICHAEL Kincaid     Patient Name: Froilan Helton  : 1940  MRN: [id number]  Today's Date: 2022      Visit Dx:     ICD-10-CM ICD-9-CM   1. Acute renal failure superimposed on chronic kidney disease, unspecified CKD stage, unspecified acute renal failure type ([facility])  N17.9 584.9    N18.9 585.9   2. Decubitus ulcer of trochanteric region of left hip, unspecified ulcer stage  L89.229 707.04     707.20   3. Open wound of left foot, initial encounter  S91.302A 892.0     Patient Active Problem List   Diagnosis   • COPD (chronic obstructive pulmonary disease) ([facility])   • Type 2 diabetes mellitus with stage 4 chronic kidney disease, with long-term current use of insulin ([facility])   • Essential hypertension   • Primary osteoarthritis of left knee   • Chronic renal insufficiency, stage 4 (severe) ([facility])   • Class 2 severe obesity due to excess calories with serious comorbidity in adult ([facility])   • Physical debility   • Acute UTI (urinary tract infection)   • Permanent atrial fibrillation ([facility])   • H/O noncompliance with medical treatment, presenting hazards to health   • Myxedema   • Acute on chronic combined systolic and diastolic CHF (congestive heart failure) ([facility])   • Generalized weakness   • Recurrent left pleural effusion   • Fall on same level as cause of accidental injury   • Gross hematuria   • CAD (coronary artery disease)   • HLD (hyperlipidemia)   • Hypothyroidism   • CKD (chronic kidney disease) stage 3, GFR 30-59 ml/min ([facility])   • Acute metabolic encephalopathy   • Cardiomyopathy ([facility])   • Recurrent falls   • Acute renal failure superimposed on chronic kidney disease ([facility])   • Open wound of left foot   • Moderate malnutrition (CMS/[facility])     Past Medical History:   Diagnosis Date   • A-fib ([facility])    • Arthritis    • Asthma    • CAD (coronary artery disease)    • Cardiomyopathy ([facility])    • Cataract    • CHF (congestive heart failure) ([facility])    • CKD (chronic kidney disease),  stage III ([facility])    • COPD (chronic obstructive pulmonary disease) ([facility])    • Diabetes mellitus ([facility])    • Disease of thyroid gland    • Emphysema, unspecified ([facility])    • Glaucoma    • Hyperlipidemia    • Hypertension    • Kidney stone    • Myocardial infarct, old    • Neuropathy    • Osteoarthritis    • Osteoporosis    • Permanent atrial fibrillation ([facility]) 6/30/2020   • PVD (peripheral vascular disease) ([facility])    • Renal disorder    • Shingles      Past Surgical History:   Procedure Laterality Date   • COLONOSCOPY     • EYE SURGERY     • INCISION AND DRAINAGE LEG Left 12/30/2021    Procedure: debridement of left hip wounds and left foot wound;  Surgeon: Judie Aguero DO;  Location: [facility];  Service: General;  Laterality: Left;   • JOINT REPLACEMENT      right   • KIDNEY STONE SURGERY     • REPLACEMENT TOTAL KNEE     • TOE SURGERY       PT Assessment (last 12 hours)     PT Evaluation and Treatment     Row Name 01/06/22 0859          Physical Therapy Time and Intention    Subjective Information complains of; fatigue  -     Document Type therapy note (daily note)  -     Mode of Treatment physical therapy  -     Patient Effort adequate  -     Row Name 01/06/22 0859          General Information    Patient Observations alert; agree to therapy  -     Patient/Family/Caregiver Comments/Observations pt supine, agrees to therapy  -     Existing Precautions/Restrictions fall  -     Barriers to Rehab previous functional deficit  -     Row Name 01/06/22 0859          Cognition    Personal Safety Interventions gait belt; nonskid shoes/slippers when out of bed  -     Row Name 01/06/22 0859          Pain Scale: Word Pre/Post-Treatment    Pre/Posttreatment Pain Comment pt c/o chronic knee pain, does not rate  -     Row Name 01/06/22 0859          Bed Mobility    Bed Mobility supine-sit  -     Supine-Sit [place] (Bed Mobility) supervision  -     Assistive Device (Bed Mobility) bed rails; head of  bed elevated  -JW     Row Name 01/06/22 0859          Transfers    Transfers sit-stand transfer; stand-sit transfer; stand pivot/stand step transfer  -     Comment (Transfers) bed surface elevated to allow for increased independence to stand.  pt normally uses lift device to stand  -JW     Sit-Stand [place] (Transfers) contact guard; verbal cues  -JW     Stand-Sit [place] (Transfers) contact guard; verbal cues  -JW     Row Name 01/06/22 0859          Sit-Stand Transfer    Assistive Device (Sit-Stand Transfers) walker, front-wheeled  -JW     Row Name 01/06/22 0859          Stand-Sit Transfer    Assistive Device (Stand-Sit Transfers) walker, front-wheeled  -JW     Row Name 01/06/22 0859          Stand Pivot/Stand Step Transfer    Stand Pivot/Stand Step [place] (Transfers) contact guard; verbal cues  -     Assistive Device (Stand Pivot Stand Step Transfer) walker, front-wheeled  -BA     Row Name 01/06/22 0859          Safety Issues, Functional Mobility    Comment, Safety Issues/Impairments (Mobility) pt with chronic knee pain that impacts overall mobility.  pt only able to manage stand pivot transfer from bed to chair  -     Row Name             Wound 12/28/21 1800 Right medial leg Blisters    Wound - Properties Group Placement Date: 12/28/21  -LC Placement Time: 1800  -LC Present on Hospital Admission: Y  -LC Side: Right  -LC Orientation: medial  -LC Location: leg  -LC Primary Wound Type: Blisters  -LC     Retired Wound - Properties Group Date first assessed: 12/28/21  -LC Time first assessed: 1800  -LC Present on Hospital Admission: Y  -LC Side: Right  -LC Location: leg  -LC Primary Wound Type: Blisters  -LC     Row Name             Wound 12/28/21 1802 Left medial leg Blisters    Wound - Properties Group Placement Date: 12/28/21  -LC Placement Time: 1802  -LC Present on Hospital Admission: Y  -LC Side: Left  -LC Orientation: medial  -LC Location: leg  -LC Primary Wound Type: Blisters  -LC      Retired Wound - Properties Group Date first assessed: 12/28/21  -LC Time first assessed: 1802  -LC Present on Hospital Admission: Y  -LC Side: Left  -LC Location: leg  -LC Primary Wound Type: Blisters  -LC     Row Name             Wound 12/28/21 1803 Left medial foot Other (comment)    Wound - Properties Group Placement Date: 12/28/21  -LC Placement Time: 1803  -LC Present on Hospital Admission: Y  -LC Side: Left  -LC Orientation: medial  -LC Location: foot  -LC Primary Wound Type: Other  -LC     Retired Wound - Properties Group Date first assessed: 12/28/21  -LC Time first assessed: 1803  -LC Present on Hospital Admission: Y  -LC Side: Left  -LC Location: foot  -LC Primary Wound Type: Other  -LC     Row Name             Wound 12/28/21 1803 Left lateral thigh Traumatic    Wound - Properties Group Placement Date: 12/28/21  -LC Placement Time: 1803 -LC Present on Hospital Admission: Y  -LC Side: Left  -LC Orientation: lateral  -LC Location: thigh  -LC Primary Wound Type: Traumatic  -LC     Retired Wound - Properties Group Date first assessed: 12/28/21  -LC Time first assessed: 1803  -LC Present on Hospital Admission: Y  -LC Side: Left  -LC Location: thigh  -LC Primary Wound Type: Traumatic  -LC     Row Name             Wound 12/29/21 Right anterior greater trochanter    Wound - Properties Group Placement Date: 12/29/21  -RH Side: Right  -RH Orientation: anterior  -RH, LEFT Hip dressing with notable old  drainage on dressing  Location: greater trochanter  -RH     Retired Wound - Properties Group Date first assessed: 12/29/21  -RH Side: Right  -RH Location: greater trochanter  -RH     Row Name             Wound 12/31/21 2247 perineum    Wound - Properties Group Placement Date: 12/31/21  -AB Placement Time: 2247 -AB Location: perineum  -AB     Retired Wound - Properties Group Date first assessed: 12/31/21  -AB Time first assessed: 2247 -AB Location: perineum  -AB     Row Name 01/06/22 0859          Plan of Care Review     Outcome Summary PT: Patient performs supine to sit with SBA and sit to stand with CGA from elevated bed surface.  Patient performs stand pivot from bed to recliner with CGA.  Patient continues to complain of chronic knee pain that impacts overall functional mobility/safety.  Patient is accustomed to use of lift mechanism in order to stand, anticipate increased assistance required from lower surfaces.  Recommend SNF/ECF at discharge due to difficulty safely caring for himself at home.  -     Row Name 01/06/22 0859          Positioning and Restraints    Pre-Treatment Position in bed  -     Post Treatment Position chair  -JW     In Chair reclined; call light within reach; encouraged to call for assist  -     Row Name 01/06/22 0859          Progress Summary (PT)    Progress Toward Functional Goals (PT) progress toward functional goals is good  -     Barriers to Overall Progress (PT) PLOF  -           User Key  (r) = Recorded By, (t) = Taken By, (c) = Cosigned By    Initials Name Provider Type     Simeon Zuniga, RN Registered Nurse     Carole Fish RN, CWON Registered Nurse    Zoila Cazares, PT Physical Therapist    Aaliyah John, RN Registered Nurse                Physical Therapy Education                 Title: PT OT SLP Therapies (Done)     Topic: Physical Therapy (Done)     Point: Mobility training (Done)     Learning Progress Summary           Patient Acceptance, E,TB, VU by  at 1/6/2022 1023      Show all documentation for this point (3)                 Point: Home exercise program (Done)     Learning Progress Summary           Patient Acceptance, E,TB, VU,DU by  at 1/3/2022 1607      Show all documentation for this point (1)                 Point: Body mechanics (Done)     Learning Progress Summary           Patient Acceptance, E,TB, VU,DU by  at 1/3/2022 1607                   Point: Precautions (Done)     Learning Progress Summary           Patient Acceptance, E,TB, VU,DU  by TF at 1/3/2022 1607                               User Key     Initials Effective Dates Name Provider Type Discipline     06/16/21 -  Zoila Estrella, PT Physical Therapist PT    TF 06/16/21 -  Katt Knight, RN Registered Nurse Nurse              PT Recommendation and Plan  Anticipated Discharge Disposition (PT): skilled nursing facility, extended care facility  Planned Therapy Interventions (PT): balance training, bed mobility training, gait training, home exercise program, patient/family education, strengthening, transfer training  Therapy Frequency (PT): 5 times/wk  Progress Summary (PT)  Progress Toward Functional Goals (PT): progress toward functional goals is good  Barriers to Overall Progress (PT): PLOF  Plan of Care Reviewed With: patient  Outcome Summary: PT: Patient performs supine to sit with SBA and sit to stand with CGA from elevated bed surface.  Patient performs stand pivot from bed to recliner with CGA.  Patient continues to complain of chronic knee pain that impacts overall functional mobility/safety.  Patient is accustomed to use of lift mechanism in order to stand, anticipate increased assistance required from lower surfaces.  Recommend SNF/ECF at discharge due to difficulty safely caring for himself at home.   Outcome Measures     Row Name 01/06/22 0859             How much help from another person do you currently need...    Turning from your back to your side while in flat bed without using bedrails? 3  -JW      Moving from lying on back to sitting on the side of a flat bed without bedrails? 3  -JW      Moving to and from a bed to a chair (including a wheelchair)? 3  -JW      Standing up from a chair using your arms (e.g., wheelchair, bedside chair)? 1  -JW      Climbing 3-5 steps with a railing? 1  -JW      To walk in hospital room? 1  -JW      AM-PAC 6 Clicks Score (PT) 12  -              Functional Assessment    Outcome Measure Options AM-PAC 6 Clicks Basic Mobility (PT)  -JW             User Key  (r) = Recorded By, (t) = Taken By, (c) = Cosigned By    Initials Name Provider Type    Zoila Cazares PT Physical Therapist                 Time Calculation:    PT Charges     Row Name 01/06/22 1024             Time Calculation    Start Time 0859  -JW      Stop Time 0918  -JW      Time Calculation (min) 19 min  -JW      PT Received On 01/06/22  -JW      PT - Next Appointment 01/07/22  -JW              Timed Charges    42970 - PT Therapeutic Exercise Minutes 19  -JW              Total Minutes    Timed Charges Total Minutes 19  -JW       Total Minutes 19  -JW            User Key  (r) = Recorded By, (t) = Taken By, (c) = Cosigned By    Initials Name Provider Type    Zoila Cazares, LORENZO Physical Therapist              Therapy Charges for Today     Code Description Service Date Service Provider Modifiers Qty    70273105285 HC PT EVAL LOW COMPLEXITY 2 1/5/2022 Zoila Estrella, PT GP 1    51656712952 HC PT THER PROC EA 15 MIN 1/6/2022 Zoila Estrella, PT GP 1          PT G-Codes  Outcome Measure Options: AM-PAC 6 Clicks Basic Mobility (PT)  AM-PAC 6 Clicks Score (PT): 12  AM-PAC 6 Clicks Score (OT): 16    Zoila Estrella PT  1/6/2022

## 2022-01-06 NOTE — PROGRESS NOTES
Pharmacy vancomycin consult  Trough 1/6/22, 1200 -13.50  Continue Vancomycin 1000mg q24h. Recheck level 1/7/22.  Will continue to follow.

## 2022-01-06 NOTE — PROGRESS NOTES
Surgery Progress Note   Chief Complaint:  Left hip wounds    Subjective     Interval History:     Froilan is doing well.  His only complaint is knee pain.  He has no other complaints.      Objective     Vital Signs  Temp:  [95.7 °F (35.4 °C)-97.6 °F (36.4 °C)] 95.7 °F (35.4 °C)  Heart Rate:  [48-54] 48  Resp:  [20] 20  BP: (105-155)/(48-70) 143/64  Body mass index is 28.11 kg/m².    Intake/Output Summary (Last 24 hours) at 1/6/2022 1203  Last data filed at 1/6/2022 0957  Gross per 24 hour   Intake 360 ml   Output 1240 ml   Net -880 ml     I/O this shift:  In: -   Out: 250 [Urine:250]       Physical Exam:   General: patient awake, alert and cooperative   Extremities: both wounds have a large amount of drainage noted   Neurologic: Normal mood and behavior     Results Review:     I reviewed the patient's new clinical results.      WBC No results found for: WBCS   HGB Hemoglobin   Date Value Ref Range Status   01/05/2022 10.0 (L) 13.0 - 17.7 g/dL Final      HCT Hematocrit   Date Value Ref Range Status   01/05/2022 32.1 (L) 37.5 - 51.0 % Final      Platlets No results found for: LABPLAT     PT/INR:  No results found for: PROTIME/No results found for: INR    Sodium Sodium   Date Value Ref Range Status   01/06/2022 135 (L) 136 - 145 mmol/L Final   01/05/2022 136 136 - 145 mmol/L Final   01/04/2022 136 136 - 145 mmol/L Final      Potassium Potassium   Date Value Ref Range Status   01/06/2022 4.5 3.5 - 5.2 mmol/L Final   01/05/2022 4.3 3.5 - 5.2 mmol/L Final   01/04/2022 4.8 3.5 - 5.2 mmol/L Final      Chloride Chloride   Date Value Ref Range Status   01/06/2022 96 (L) 98 - 107 mmol/L Final   01/05/2022 99 98 - 107 mmol/L Final   01/04/2022 102 98 - 107 mmol/L Final      Bicarbonate No results found for: PLASMABICARB   BUN BUN   Date Value Ref Range Status   01/06/2022 34 (H) 8 - 23 mg/dL Final   01/05/2022 31 (H) 8 - 23 mg/dL Final   01/04/2022 32 (H) 8 - 23 mg/dL Final      Creatinine Creatinine   Date Value Ref Range  Status   01/06/2022 2.30 (H) 0.76 - 1.27 mg/dL Final   01/05/2022 2.08 (H) 0.76 - 1.27 mg/dL Final   01/04/2022 1.80 (H) 0.76 - 1.27 mg/dL Final      Calcium Calcium   Date Value Ref Range Status   01/06/2022 8.9 8.6 - 10.5 mg/dL Final   01/05/2022 8.6 8.6 - 10.5 mg/dL Final   01/04/2022 8.6 8.6 - 10.5 mg/dL Final      Magnesium  AST  ALT  Bilirubin, Total  AlkPhos  Albumin    Amylase  Lipase    Radiology: Magnesium   Date Value Ref Range Status   01/05/2022 2.3 1.6 - 2.4 mg/dL Final   01/04/2022 1.5 (L) 1.6 - 2.4 mg/dL Final     No components found for: AST.*  No components found for: ALT.*  No results found for: BILIRUBIN    No components found for: ALKPHOS.*  No components found for: ALBUMIN.*      No components found for: AMYLASE.*  No components found for: LIPASE.*            Imaging Results (Most Recent)     Procedure Component Value Units Date/Time    US Renal Bilateral [[id number]] Collected: 12/28/21 1938     Updated: 12/28/21 1940    Narrative:      US Renal Comp    INDICATION:   Acute on chronic kidney disease    COMPARISON:   7/8/2021    FINDINGS:   KIDNEYS:  The right kidney measures 4.8 x 4.8 x 9.5 cm. The left kidney measures 4.7 x 5.4 x 11 cm. Renal cortical thickness and echogenicity is normal.  No hydronephrosis.    URINARY BLADDER:  The bladder is unremarkable.      Impression:      Negative renal ultrasound. No hydronephrosis. No change from the previous ultrasound study.    Signer Name: Jaylen Bhardwaj MD   Signed: 12/28/2021 7:38 PM   Workstation Name: [id number]    [facility]             Pharmacy to dose vancomycin,           Assessment/Plan     Patient Active Problem List   Diagnosis Code   • COPD (chronic obstructive pulmonary disease) ([facility]) J44.9   • Type 2 diabetes mellitus with stage 4 chronic kidney disease, with long-term current use of insulin ([facility]) E11.22, N18.4, Z79.4   • Essential hypertension I10   • Primary osteoarthritis of left knee M17.12   • Chronic  renal insufficiency, stage 4 (severe) ([facility]) N18.4   • Class 2 severe obesity due to excess calories with serious comorbidity in adult ([facility]) E66.01   • Physical debility R53.81   • Acute UTI (urinary tract infection) N39.0   • Permanent atrial fibrillation ([facility]) I48.21   • H/O noncompliance with medical treatment, presenting hazards to health Z91.19   • 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   • Acute on chronic combined systolic and diastolic CHF (congestive heart failure) ([facility]) I50.43   • Generalized weakness R53.1   • Recurrent left pleural effusion J90   • Fall on same level as cause of accidental injury W18.30XA   • Gross hematuria R31.0   • CAD (coronary artery disease) I25.10   • HLD (hyperlipidemia) E78.5   • Hypothyroidism E03.9   • CKD (chronic kidney disease) stage 3, GFR 30-59 ml/min ([facility]) N18.30   • Acute metabolic encephalopathy G93.41   • Cardiomyopathy ([facility]) I42.9   • Recurrent falls R29.6   • Acute renal failure superimposed on chronic kidney disease ([facility]) N17.9, N18.9   • Open wound of left foot S91.302A   • Moderate malnutrition (CMS/[facility]) E44.0       Status post debridement of hip wounds  --will talk with Carole regarding the possibility of a wound vac to help with the increased drainage      Jduie Aguero DO  01/06/22  12:03 EST

## 2022-01-07 NOTE — PLAN OF CARE
Goal Outcome Evaluation:  Plan of Care Reviewed With: patient        Progress: no change  Outcome Summary: Pt continues on IV antibiotics. Pt dressing changes completed this shift per order. Pt required assist X2 for stand pivot transfer. Pt has severe weakness. Pt resting in bed at this time.

## 2022-01-07 NOTE — PROGRESS NOTES
Pharmacy Consult for Vancomycin Dosing:    Random level taken prior to 4th dose at expected steady state.  Level =18.9    Pharmacy will continue with same dose and continuie to monitor.    Thank-you,  Reshma Fontaine [facility].  01/07/22  13:20 EST.

## 2022-01-07 NOTE — THERAPY TREATMENT NOTE
Patient Name: Froilan Helton  : 1940    MRN: [id number]                              Today's Date: 2022       Admit Date: 2021    Visit Dx:     ICD-10-CM ICD-9-CM   1. Acute renal failure superimposed on chronic kidney disease, unspecified CKD stage, unspecified acute renal failure type ([facility])  N17.9 584.9    N18.9 585.9   2. Decubitus ulcer of trochanteric region of left hip, unspecified ulcer stage  L89.229 707.04     707.20   3. Open wound of left foot, initial encounter  S91.302A 892.0     Patient Active Problem List   Diagnosis   • COPD (chronic obstructive pulmonary disease) ([facility])   • Type 2 diabetes mellitus with stage 4 chronic kidney disease, with long-term current use of insulin ([facility])   • Essential hypertension   • Primary osteoarthritis of left knee   • Chronic renal insufficiency, stage 4 (severe) ([facility])   • Class 2 severe obesity due to excess calories with serious comorbidity in adult ([facility])   • Physical debility   • Acute UTI (urinary tract infection)   • Permanent atrial fibrillation ([facility])   • H/O noncompliance with medical treatment, presenting hazards to health   • Myxedema   • Acute on chronic combined systolic and diastolic CHF (congestive heart failure) ([facility])   • Generalized weakness   • Recurrent left pleural effusion   • Fall on same level as cause of accidental injury   • Gross hematuria   • CAD (coronary artery disease)   • HLD (hyperlipidemia)   • Hypothyroidism   • CKD (chronic kidney disease) stage 3, GFR 30-59 ml/min ([facility])   • Acute metabolic encephalopathy   • Cardiomyopathy ([facility])   • Recurrent falls   • Acute renal failure superimposed on chronic kidney disease ([facility])   • Open wound of left foot   • Moderate malnutrition (CMS/[facility])     Past Medical History:   Diagnosis Date   • A-fib ([facility])    • Arthritis    • Asthma    • CAD (coronary artery disease)    • Cardiomyopathy ([facility])    • Cataract    • CHF (congestive heart failure) ([facility])    • CKD (chronic kidney disease), stage III  ([facility])    • COPD (chronic obstructive pulmonary disease) ([facility])    • Diabetes mellitus (H[facility])    • Disease of thyroid gland    • Emphysema, unspecified (HCC)    • Glaucoma    • Hyperlipidemia    • Hypertension    • Kidney stone    • Myocardial infarct, old    • Neuropathy    • Osteoarthritis    • Osteoporosis    • Permanent atrial fibrillation (HCC) 6/30/2020   • PVD (peripheral vascular disease) ([facility])    • Renal disorder    • Shingles      Past Surgical History:   Procedure Laterality Date   • COLONOSCOPY     • EYE SURGERY     • INCISION AND DRAINAGE LEG Left 12/30/2021    Procedure: debridement of left hip wounds and left foot wound;  Surgeon: Judei Aguero DO;  Location: [facility];  Service: General;  Laterality: Left;   • JOINT REPLACEMENT      right   • KIDNEY STONE SURGERY     • REPLACEMENT TOTAL KNEE     • TOE SURGERY        General Information     Row Name 01/07/22 1259          OT Time and Intention    Document Type therapy note (daily note)  -SD     Mode of Treatment occupational therapy  -SD     Row Name 01/07/22 1259          General Information    Existing Precautions/Restrictions fall  -SD     Barriers to Rehab previous functional deficit  -SD     Row Name 01/07/22 1255          Safety Issues, Functional Mobility    Impairments Affecting Function (Mobility) balance; strength  -SD     Comment, Safety Issues/Impairments (Mobility) Pt with chronic knee pain and BLE weakness which impacts his mobility/safety. Pt with significant knee flexion when standing which worsened with fatigue (standing <30 sec) and while participating in a pivot transfer  -SD           User Key  (r) = Recorded By, (t) = Taken By, (c) = Cosigned By    Initials Name Provider Type    SD Sebastian Wright OTR Occupational Therapist                 Mobility/ADL's     Row Name 01/07/22 1303          Bed Mobility    Supine-Sit Christopher (Bed Mobility) minimum assist (75% patient effort); verbal cues  -SD     Assistive Device (Bed  Mobility) bed rails; head of bed elevated  -SD     Row Name 01/07/22 1302          Transfers    Comment (Transfers) bed surface elevated  -SD     Sit-Stand Esmond (Transfers) minimum assist (75% patient effort); verbal cues  -SD     Row Name 01/07/22 1302          Functional Mobility    Functional Mobility- Comment When standing pt demonstrated significant knee flexion and was only able to maintain static standing balance for a brief period using a walker due to BLE weakness and balance deficit.  Pt required max assist for stand pivot transfer from bed to recliner.  -SD           User Key  (r) = Recorded By, (t) = Taken By, (c) = Cosigned By    Initials Name Provider Type    Sebastian Greene, OTR Occupational Therapist               Obj/Interventions    No documentation.                Goals/Plan     Row Name 01/07/22 1308          Transfer Goal 1 (OT)    Activity/Assistive Device (Transfer Goal 1, OT) sit-to-stand/stand-to-sit; walker, rolling  -SD     Esmond Level/Cues Needed (Transfer Goal 1, OT) moderate assist (50-74% patient effort)  -SD     Progress/Outcome (Transfer Goal 1, OT) progress slower than expected  -SD     Row Name 01/07/22 1308          ROM Goal 1 (OT)    ROM Goal 1 (OT) Pt to participate in BUE HEP to address functional strength needed for basic adl tasks.  -SD     Time Frame (ROM Goal 1, OT) by discharge  -SD     Progress/Outcome (ROM Goal 1, OT) other (see comments)  pt has declined activity  -SD     Row Name 01/07/22 1308          Therapy Assessment/Plan (OT)    Planned Therapy Interventions (OT) patient/caregiver education/training; transfer/mobility retraining; BADL retraining  -SD           User Key  (r) = Recorded By, (t) = Taken By, (c) = Cosigned By    Initials Name Provider Type    Sebastian Greene OTR Occupational Therapist               Clinical Impression     Row Name 01/07/22 1306          Pain Scale: Numbers Pre/Post-Treatment    Pre/Posttreatment Pain Comment  pt c/o chronic knee pain, but did not rate pain  -SD     Pain Intervention(s) Repositioned  -SD     Row Name 01/07/22 1304          Plan of Care Review    Plan of Care Reviewed With patient  -SD     Outcome Summary OT: Pt continues to require significant assistance for functional transfers (max assist x 2 for stand pivot transfer from bed to recliner due to knee pain, BLE weakness and balance deficits). Pt also needs significant support for adl tasks for safety. Rec pt have 24 hour support/assistance for safety, yet pt states he is returning home with family support/HH services.  -SD     Row Name 01/07/22 1304          Therapy Plan Review/Discharge Plan (OT)    Anticipated Discharge Disposition (OT) --  SNF/ECF recommended, yet pt states he is returning home with family support/HH services.  -SD     Row Name 01/07/22 1304          Positioning and Restraints    Pre-Treatment Position in bed  -SD     Post Treatment Position chair  -SD     In Chair reclined; call light within reach; encouraged to call for assist  -SD           User Key  (r) = Recorded By, (t) = Taken By, (c) = Cosigned By    Initials Name Provider Type    Sebastian Greene, OTR Occupational Therapist               Outcome Measures     Row Name 01/07/22 1309          How much help from another is currently needed...    Putting on and taking off regular lower body clothing? 2  -SD     Bathing (including washing, rinsing, and drying) 2  -SD     Toileting (which includes using toilet bed pan or urinal) 2  -SD     Putting on and taking off regular upper body clothing 3  -SD     Taking care of personal grooming (such as brushing teeth) 3  -SD     Eating meals 4  -SD     AM-PAC 6 Clicks Score (OT) 16  -SD     Row Name 01/07/22 1300          How much help from another person do you currently need...    Turning from your back to your side while in flat bed without using bedrails? 3  -JW     Moving from lying on back to sitting on the side of a flat bed  without bedrails? 3  -JW     Moving to and from a bed to a chair (including a wheelchair)? 3  -JW     Standing up from a chair using your arms (e.g., wheelchair, bedside chair)? 3  -JW     Climbing 3-5 steps with a railing? 1  -JW     To walk in hospital room? 1  -JW     AM-PAC 6 Clicks Score (PT) 14  -JW     Row Name 01/07/22 1300          Functional Assessment    Outcome Measure Options AM-PAC 6 Clicks Basic Mobility (PT)  -           User Key  (r) = Recorded By, (t) = Taken By, (c) = Cosigned By    Initials Name Provider Type    Sebastian Greene OTR Occupational Therapist    Zoila Cazares PT Physical Therapist                Occupational Therapy Education                 Title: PT OT SLP Therapies (Done)     Topic: Occupational Therapy (Done)     Point: ADL training (Done)     Description:   Instruct learner(s) on proper safety adaptation and remediation techniques during self care or transfers.   Instruct in proper use of assistive devices.              Learning Progress Summary           Patient Acceptance, E, VU by SD at 1/7/2022 1309    Comment: Eduacation regarding safety with daily tasks, transfers and mobility. Pt with poor insight into his deficits and need for assistance.      Show all documentation for this point (5)                 Point: Precautions (Done)     Description:   Instruct learner(s) on prescribed precautions during self-care and functional transfers.              Learning Progress Summary           Patient Acceptance, E,TB, VU,DU by TF at 1/3/2022 1607      Show all documentation for this point (1)                 Point: Body mechanics (Done)     Description:   Instruct learner(s) on proper positioning and spine alignment during self-care, functional mobility activities and/or exercises.              Learning Progress Summary           Patient Acceptance, E,TB, VU,DU by TF at 1/3/2022 1607      Show all documentation for this point (1)                             User Key      Initials Effective Dates Name Provider Type Discipline    SD 06/16/21 -  Sebastian Wright OTR Occupational Therapist OT    TF 06/16/21 -  Katt Knight, RAVI Registered Nurse Nurse              OT Recommendation and Plan  Planned Therapy Interventions (OT): patient/caregiver education/training, transfer/mobility retraining, BADL retraining  Therapy Frequency (OT): 3 times/wk  Plan of Care Review  Plan of Care Reviewed With: patient  Outcome Summary: OT: Pt continues to require significant assistance for functional transfers (max assist x 2 for stand pivot transfer from bed to recliner due to knee pain, BLE weakness and balance deficits). Pt also needs significant support for adl tasks for safety. Rec pt have 24 hour support/assistance for safety, yet pt states he is returning home with family support/ services.     Time Calculation:    Time Calculation- OT     Row Name 01/07/22 1310             Time Calculation- OT    OT Start Time 1100  -SD              Timed Charges    49009 - OT Self Care/Mgmt Minutes 15  -SD              Total Minutes    Timed Charges Total Minutes 15  -SD       Total Minutes 15  -SD            User Key  (r) = Recorded By, (t) = Taken By, (c) = Cosigned By    Initials Name Provider Type    SD Sebastian Wright OTDELTA Occupational Therapist              Therapy Charges for Today     Code Description Service Date Service Provider Modifiers Qty    [id number] HC OT SELF CARE/MGMT/TRAIN EA 15 MIN 1/7/2022 Sebastian Wright OTR GO 1               BETH Melgar  1/7/2022

## 2022-01-07 NOTE — THERAPY TREATMENT NOTE
Acute Care - Physical Therapy Treatment Note  MICHAEL Kincaid     Patient Name: Froilan Helton  : 1940  MRN: [id number]  Today's Date: 2022      Visit Dx:     ICD-10-CM ICD-9-CM   1. Acute renal failure superimposed on chronic kidney disease, unspecified CKD stage, unspecified acute renal failure type ([facility])  N17.9 584.9    N18.9 585.9   2. Decubitus ulcer of trochanteric region of left hip, unspecified ulcer stage  L89.229 707.04     707.20   3. Open wound of left foot, initial encounter  S91.302A 892.0     Patient Active Problem List   Diagnosis   • COPD (chronic obstructive pulmonary disease) ([facility])   • Type 2 diabetes mellitus with stage 4 chronic kidney disease, with long-term current use of insulin ([facility])   • Essential hypertension   • Primary osteoarthritis of left knee   • Chronic renal insufficiency, stage 4 (severe) ([facility])   • Class 2 severe obesity due to excess calories with serious comorbidity in adult ([facility])   • Physical debility   • Acute UTI (urinary tract infection)   • Permanent atrial fibrillation ([facility])   • H/O noncompliance with medical treatment, presenting hazards to health   • Myxedema   • Acute on chronic combined systolic and diastolic CHF (congestive heart failure) ([facility])   • Generalized weakness   • Recurrent left pleural effusion   • Fall on same level as cause of accidental injury   • Gross hematuria   • CAD (coronary artery disease)   • HLD (hyperlipidemia)   • Hypothyroidism   • CKD (chronic kidney disease) stage 3, GFR 30-59 ml/min ([facility])   • Acute metabolic encephalopathy   • Cardiomyopathy ([facility])   • Recurrent falls   • Acute renal failure superimposed on chronic kidney disease ([facility])   • Open wound of left foot   • Moderate malnutrition (CMS/[facility])     Past Medical History:   Diagnosis Date   • A-fib ([facility])    • Arthritis    • Asthma    • CAD (coronary artery disease)    • Cardiomyopathy ([facility])    • Cataract    • CHF (congestive heart failure) ([facility])    • CKD (chronic kidney disease),  stage III ([facility])    • COPD (chronic obstructive pulmonary disease) ([facility])    • Diabetes mellitus ([facility])    • Disease of thyroid gland    • Emphysema, unspecified (HCC)    • Glaucoma    • Hyperlipidemia    • Hypertension    • Kidney stone    • Myocardial infarct, old    • Neuropathy    • Osteoarthritis    • Osteoporosis    • Permanent atrial fibrillation (HCC) 6/30/2020   • PVD (peripheral vascular disease) (HCC)    • Renal disorder    • Shingles      Past Surgical History:   Procedure Laterality Date   • COLONOSCOPY     • EYE SURGERY     • INCISION AND DRAINAGE LEG Left 12/30/2021    Procedure: debridement of left hip wounds and left foot wound;  Surgeon: Judie Aguero DO;  Location: [facility];  Service: General;  Laterality: Left;   • JOINT REPLACEMENT      right   • KIDNEY STONE SURGERY     • REPLACEMENT TOTAL KNEE     • TOE SURGERY       PT Assessment (last 12 hours)     PT Evaluation and Treatment     Row Name 01/07/22 1057          Physical Therapy Time and Intention    Subjective Information complains of; fatigue  -     Document Type therapy note (daily note)  -     Mode of Treatment physical therapy  -     Patient Effort adequate  -     Row Name 01/07/22 1057          General Information    Patient Observations alert; cooperative; agree to therapy  -     Patient/Family/Caregiver Comments/Observations pt supine, agrees to therapy with encouragement  -     Existing Precautions/Restrictions fall  -     Barriers to Rehab previous functional deficit  -     Row Name 01/07/22 1057          Cognition    Personal Safety Interventions gait belt; nonskid shoes/slippers when out of bed  -     Row Name 01/07/22 1057          Pain Scale: Word Pre/Post-Treatment    Pre/Posttreatment Pain Comment pt c/o chronic knee pain  -     Row Name 01/07/22 1057          Bed Mobility    Bed Mobility supine-sit  -     Supine-Sit [place] (Bed Mobility) minimum assist (75% patient effort); verbal cues  -      Assistive Device (Bed Mobility) bed rails; head of bed elevated  -     Row Name 01/07/22 1057          Transfers    Transfers sit-stand transfer; bed-chair transfer  -     Comment (Transfers) bed surface height elevated with sit to stand.  Attempted stand pivot transfer with walker, however unable to safely complete due to knee pain, requires sit pivot transfer  -     Bed-[place]air [place] (Transfers) maximum assist (25% patient effort); 2 person assist; verbal cues  -     Sit-Stand [place] (Transfers) minimum assist (75% patient effort); verbal cues  -     Stand-Sit [place] (Transfers) minimum assist (75% patient effort); verbal cues  -     Row Name 01/07/22 1057          Sit-Stand Transfer    Assistive Device (Sit-Stand Transfers) walker, front-wheeled  -     Row Name 01/07/22 1057          Stand-Sit Transfer    Assistive Device (Stand-Sit Transfers) walker, front-wheeled  -     Row Name 01/07/22 1057          Safety Issues, Functional Mobility    Safety Issues Affecting Function (Mobility) insight into deficits/self-awareness  -     Comment, Safety Issues/Impairments (Mobility) c/o chronic knee pain  -     Row Name             Wound 12/28/21 1800 Right medial leg Blisters    Wound - Properties Group Placement Date: 12/28/21  -LC Placement Time: 1800  -LC Present on Hospital Admission: Y  -LC Side: Right  -LC Orientation: medial  -LC Location: leg  -LC Primary Wound Type: Blisters  -LC     Retired Wound - Properties Group Date first assessed: 12/28/21  -LC Time first assessed: 1800  -LC Present on Hospital Admission: Y  -LC Side: Right  -LC Location: leg  -LC Primary Wound Type: Blisters  -LC     Row Name             Wound 12/28/21 1802 Left medial leg Blisters    Wound - Properties Group Placement Date: 12/28/21  -LC Placement Time: 1802  -LC Present on Hospital Admission: Y  -LC Side: Left  -LC Orientation: medial  -LC Location: leg  -LC Primary Wound Type: Blisters  -LC      Retired Wound - Properties Group Date first assessed: 12/28/21  -LC Time first assessed: 1802  -LC Present on Hospital Admission: Y  -LC Side: Left  -LC Location: leg  -LC Primary Wound Type: Blisters  -LC     Row Name             Wound 12/28/21 1803 Left medial foot Other (comment)    Wound - Properties Group Placement Date: 12/28/21  -LC Placement Time: 1803  -LC Present on Hospital Admission: Y  -LC Side: Left  -LC Orientation: medial  -LC Location: foot  -LC Primary Wound Type: Other  -LC     Retired Wound - Properties Group Date first assessed: 12/28/21  -LC Time first assessed: 1803  -LC Present on Hospital Admission: Y  -LC Side: Left  -LC Location: foot  -LC Primary Wound Type: Other  -LC     Row Name             Wound 12/28/21 1803 Left lateral thigh Traumatic    Wound - Properties Group Placement Date: 12/28/21  -LC Placement Time: 1803  -LC Present on Hospital Admission: Y  -LC Side: Left  -LC Orientation: lateral  -LC Location: thigh  -LC Primary Wound Type: Traumatic  -LC     Retired Wound - Properties Group Date first assessed: 12/28/21  -LC Time first assessed: 1803  -LC Present on Hospital Admission: Y  -LC Side: Left  -LC Location: thigh  -LC Primary Wound Type: Traumatic  -LC     Row Name             Wound 12/29/21 Right anterior greater trochanter    Wound - Properties Group Placement Date: 12/29/21  -RH Side: Right  -RH Orientation: anterior  -RH, LEFT Hip dressing with notable old  drainage on dressing  Location: greater trochanter  -RH     Retired Wound - Properties Group Date first assessed: 12/29/21  -RH Side: Right  -RH Location: greater trochanter  -RH     Row Name             Wound 12/31/21 2247 perineum    Wound - Properties Group Placement Date: 12/31/21  -AB Placement Time: 2247 -AB Location: perineum  -AB     Retired Wound - Properties Group Date first assessed: 12/31/21  -AB Time first assessed: 2247 -AB Location: perineum  -AB     Row Name 01/07/22 1057          Plan of Care Review     Outcome Summary PT: Patient agrees to therapy with encouragement.  Patient performs supine to sit with min assist and sit to stand with min assist from elevated surface.  Patient attempts stand pivot transfer with rolling walker, however unable to complete due to knee buckling and reported knee pain.  Patient performs sit pivot from bed to chair with max assist x2.  Discussed concerns regarding return home, however pt continues to report he is returning home and has family to assist.  -     Row Name 01/07/22 1057          Positioning and Restraints    Pre-Treatment Position in bed  -     Post Treatment Position chair  -JW     In Chair reclined; call light within reach; encouraged to call for assist  -     Row Name 01/07/22 1057          Progress Summary (PT)    Progress Toward Functional Goals (PT) progress toward functional goals is good  -     Barriers to Overall Progress (PT) PLOF  -           User Key  (r) = Recorded By, (t) = Taken By, (c) = Cosigned By    Initials Name Provider Type    RH Simeon Zuniga, RN Registered Nurse    Carole Taylor, RN, CWON Registered Nurse    Zoila Cazares, PT Physical Therapist    Aaliyah John, RN Registered Nurse                Physical Therapy Education                 Title: PT OT SLP Therapies (Done)     Topic: Physical Therapy (Done)     Point: Mobility training (Done)     Learning Progress Summary           Patient Acceptance, E,TB, VU by  at 1/7/2022 1303      Show all documentation for this point (5)                 Point: Home exercise program (Done)     Learning Progress Summary           Patient Acceptance, E,TB, VU,DU by  at 1/3/2022 1607      Show all documentation for this point (1)                 Point: Body mechanics (Done)     Learning Progress Summary           Patient Acceptance, E,TB, VU,DU by  at 1/3/2022 1607                   Point: Precautions (Done)     Learning Progress Summary           Patient Acceptance, E,TB, VU,DU  by TF at 1/3/2022 1607                               User Key     Initials Effective Dates Name Provider Type Discipline    JW 06/16/21 -  Zoila Estrella, LORENZO Physical Therapist PT    TF 06/16/21 -  Katt Knight, RN Registered Nurse Nurse              PT Recommendation and Plan  Anticipated Discharge Disposition (PT): skilled nursing facility, extended care facility  Planned Therapy Interventions (PT): balance training, bed mobility training, gait training, home exercise program, patient/family education, strengthening, transfer training  Therapy Frequency (PT): 5 times/wk  Progress Summary (PT)  Progress Toward Functional Goals (PT): progress toward functional goals is good  Barriers to Overall Progress (PT): PLOF  Plan of Care Reviewed With: patient  Outcome Summary: PT: Patient agrees to therapy with encouragement.  Patient performs supine to sit with min assist and sit to stand with min assist from elevated surface.  Patient attempts stand pivot transfer with rolling walker, however unable to complete due to knee buckling and reported knee pain.  Patient performs sit pivot from bed to chair with max assist x2.  Discussed concerns regarding return home, however pt continues to report he is returning home and has family to assist.   Outcome Measures     Row Name 01/07/22 1300 01/06/22 0859          How much help from another person do you currently need...    Turning from your back to your side while in flat bed without using bedrails? 3  -JW 3  -JW     Moving from lying on back to sitting on the side of a flat bed without bedrails? 3  -JW 3  -JW     Moving to and from a bed to a chair (including a wheelchair)? 3  -JW 3  -JW     Standing up from a chair using your arms (e.g., wheelchair, bedside chair)? 3  -JW 1  -JW     Climbing 3-5 steps with a railing? 1  -JW 1  -JW     To walk in hospital room? 1  -JW 1  -JW     AM-PAC 6 Clicks Score (PT) 14  -JW 12  -JW            Functional Assessment    Outcome Measure  Options AM-PAC 6 Clicks Basic Mobility (PT)  -JW AM-PAC 6 Clicks Basic Mobility (PT)  -JW           User Key  (r) = Recorded By, (t) = Taken By, (c) = Cosigned By    Initials Name Provider Type    Zoila Cazares PT Physical Therapist                 Time Calculation:    PT Charges     Row Name 01/07/22 1305             Time Calculation    Start Time 1057  -JW      Stop Time 1112  -JW      Time Calculation (min) 15 min  -JW      PT Received On 01/07/22  -JW      PT - Next Appointment 01/08/22  -              Timed Charges    17023 - PT Therapeutic Exercise Minutes 15  -JW              Total Minutes    Timed Charges Total Minutes 15  -JW       Total Minutes 15  -JW            User Key  (r) = Recorded By, (t) = Taken By, (c) = Cosigned By    Initials Name Provider Type    Zoila Cazares PT Physical Therapist              Therapy Charges for Today     Code Description Service Date Service Provider Modifiers Qty    59809972925 HC PT THER PROC EA 15 MIN 1/6/2022 Zoila Estrella, PT GP 1    86471649456 HC PT THER PROC EA 15 MIN 1/7/2022 Zoila Estrella, PT GP 1          PT G-Codes  Outcome Measure Options: AM-PAC 6 Clicks Basic Mobility (PT)  AM-PAC 6 Clicks Score (PT): 14  AM-PAC 6 Clicks Score (OT): 16    Zoila Estrella PT  1/7/2022

## 2022-01-07 NOTE — CASE MANAGEMENT/SOCIAL WORK
Continued Stay Note  MICHAEL Kincaid     Patient Name: Froilan Helton  MRN: [id number]  Today's Date: 1/7/2022    Admit Date: 12/28/2021     Discharge Plan     Row Name 01/07/22 1510       Plan    Plan Comments I spoke with the patient in his room.  He was sitting up in the chair.  He advised that he is looking forward to going home.  IMM updated. Pt denied any further needs.               Discharge Codes    No documentation.                     Terri Martinez RN

## 2022-01-07 NOTE — PLAN OF CARE
Goal Outcome Evaluation:  Plan of Care Reviewed With: patient        Progress: no change  Outcome Summary: pt assist x2, drsg intact, pt states he doesn't want to be bothered would like to rest, labs in am

## 2022-01-07 NOTE — PROGRESS NOTES
"Daily Progress Note:      Chief complaint: Follow-up of acute kidney injury, chronic systolic  Congestive heart failure, ischemic cardiomyopathy, atrial fibrillation, hypertension, multiple skin decubiti    Subjective: No overnight events noted.  Resting comfortably he is without complaints.     LOS: 10 days     Vital Signs  Temp:  [96.4 °F (35.8 °C)-97 °F (36.1 °C)] 97 °F (36.1 °C)  Heart Rate:  [47-68] 51  Resp:  [18-20] 18  BP: (109-128)/(49-58) 116/56  Oxygen Therapy  SpO2: 95 %  Pulse Oximetry Type: Intermittent  Device (Oxygen Therapy): room air  Flow (L/min): 2  Oxygen Concentration (%): 95  ETCO2 (mmHg): 32 mmHg  Probe Placed On (Pulse Ox): Right:, finger}  Body mass index is 28.35 kg/m².  Flowsheet Rows      First Filed Value   Admission Height 185.4 cm (73\") Documented at 12/28/2021 1349   Admission Weight 104 kg (229 lb 12.8 oz) Documented at 12/28/2021 1349                   Documented weights    12/28/21 1349 12/28/21 2222 12/29/21 0643 12/29/21 1850   Weight: 104 kg (229 lb 12.8 oz) 102 kg (225 lb 4.8 oz) 102 kg (225 lb 6.4 oz) 102 kg (224 lb 13.9 oz)    12/30/21 0530 12/31/21 0539 01/02/22 0643 01/03/22 0550   Weight: 98.9 kg (218 lb 1.6 oz) 103 kg (226 lb) 103 kg (227 lb) 103 kg (227 lb 9.6 oz)    01/04/22 0534 01/05/22 0627 01/06/22 0643 01/07/22 0601   Weight: 99 kg (218 lb 4.8 oz) 95.5 kg (210 lb 8 oz) 96.2 kg (212 lb 2 oz) 97 kg (213 lb 14.4 oz)           Patient Vitals for the past 24 hrs:   BP Temp Temp src Pulse Resp SpO2 Weight   01/07/22 0601 116/56 97 °F (36.1 °C) Axillary 51 18 95 % 97 kg (213 lb 14.4 oz)   01/06/22 2338 109/49 97 °F (36.1 °C) Oral 68 20 97 % --   01/06/22 2155 -- -- -- 60 20 95 % --   01/06/22 2148 -- -- -- 58 18 96 % --   01/06/22 1500 125/53 96.6 °F (35.9 °C) Oral 60 18 95 % --   01/06/22 1200 128/58 96.4 °F (35.8 °C) Oral (!) 47 20 96 % --   01/06/22 0924 -- -- -- (!) 48 20 -- --       97 kg (213 lb 14.4 oz)    Intake/Output                       01/05/22 0701 - " 01/06/22 0700 01/06/22 0701 - 01/07/22 0700     [id number] Total [id number] [id number] Total                 Intake    P.O.  600  -- 600  600  -- 600    Total Intake 600 -- 600 600 -- 600       Output    Urine  300  990 1290  900  300 1200    Total Output             Intake/Output Summary (Last 24 hours) at 1/7/2022 0743  Last data filed at 1/6/2022 2100  Gross per 24 hour   Intake 600 ml   Output 1200 ml   Net -600 ml        Intake/Output Summary (Last 24 hours) at 1/7/2022 0743  Last data filed at 1/6/2022 2100  Gross per 24 hour   Intake 600 ml   Output 1200 ml   Net -600 ml        Review of Systems   Constitutional: Positive for activity change. Negative for appetite change and fatigue.   HENT: Negative for congestion.    Respiratory: Negative for cough, chest tightness, shortness of breath and wheezing.    Cardiovascular: Negative for chest pain and leg swelling.   Gastrointestinal: Negative for abdominal distention, abdominal pain, diarrhea, nausea and vomiting.   Endocrine: Negative for polyphagia and polyuria.   Genitourinary: Negative for frequency.   Skin: Positive for color change and wound. Negative for rash.   Neurological: Negative for weakness and light-headedness.   Hematological: Does not bruise/bleed easily.   Psychiatric/Behavioral: Negative for agitation and behavioral problems.       Physical Exam  Vitals and nursing note reviewed.   Constitutional:       Appearance: He is well-developed. He is obese.   HENT:      Head: Normocephalic.   Eyes:      Conjunctiva/sclera: Conjunctivae normal.   Neck:      Thyroid: No thyromegaly.      Vascular: No JVD.   Cardiovascular:      Rate and Rhythm: Regular rhythm. Bradycardia present.      Heart sounds: Normal heart sounds. No murmur heard.      Pulmonary:      Effort: Pulmonary effort is normal. No respiratory distress.      Breath sounds: Normal breath sounds. No wheezing or rales.   Abdominal:      General: Bowel sounds are  normal. There is no distension.      Palpations: Abdomen is soft.      Tenderness: There is no abdominal tenderness. There is no guarding.   Musculoskeletal:      Right lower leg: No edema.      Left lower leg: No edema.   Skin:     General: Skin is warm and dry.      Findings: No rash.      Comments: Lower extremities are in dressings   Neurological:      General: No focal deficit present.      Mental Status: He is alert and oriented to person, place, and time.         Medication Review:   I have reviewed the patient's current medication list  Scheduled Meds:apixaban, 2.5 mg, Oral, Q12H  atorvastatin, 10 mg, Oral, Nightly  budesonide-formoterol, 2 puff, Inhalation, BID - RT  bumetanide, 2 mg, Oral, Daily  cholecalciferol, 2,000 Units, Oral, Daily  citalopram, 20 mg, Oral, Nightly  docusate sodium, 100 mg, Oral, BID  fluconazole, 200 mg, Oral, Q24H  fluticasone, 2 spray, Each Nare, Daily  hydrocortisone-bacitracin-zinc oxide-nystatin, 1 application, Topical, TID  influenza vaccine, 0.5 mL, Intramuscular, Once  insulin aspart, 0-24 Units, Subcutaneous, TID AC  insulin detemir, 25 Units, Subcutaneous, Nightly  levothyroxine, 224 mcg, Oral, Q AM  linagliptin, 5 mg, Oral, Daily  metroNIDAZOLE, 500 mg, Oral, Q8H  O2, 2 L/min, Inhalation, Once  polyethylene glycol, 17 g, Oral, Daily  pregabalin, 75 mg, Oral, BID  QUEtiapine, 12.5 mg, Oral, Q PM  tamsulosin, 0.4 mg, Oral, Daily  vancomycin, 1,000 mg, Intravenous, Q24H  cyanocobalamin, 1,000 mcg, Oral, Daily      Continuous Infusions:Pharmacy to dose vancomycin,       PRN Meds:.•  acetaminophen **OR** acetaminophen **OR** acetaminophen  •  albuterol sulfate HFA  •  dextrose  •  dextrose  •  glucagon (human recombinant)  •  magnesium sulfate **OR** magnesium sulfate **OR** magnesium sulfate  •  melatonin  •  Pharmacy to dose vancomycin      Labs:  Results from last 7 days   Lab Units 01/05/22  0427 01/03/22  0416 01/02/22  0527   WBC 10*3/mm3 7.41 6.69 6.36   HEMOGLOBIN g/dL  10.0* 9.4* 9.1*   HEMATOCRIT % 32.1* 30.9* 29.6*   PLATELETS 10*3/mm3 243 220 206     Results from last 7 days   Lab Units 01/07/22 0447 01/06/22  0431 01/05/22 0427 01/04/22  0413 01/03/22  0416 01/02/22  0527 01/01/22  0357   SODIUM mmol/L 133* 135* 136 136 134* 135* 133*   POTASSIUM mmol/L 4.3 4.5 4.3 4.8 5.3* 4.8 5.0   CHLORIDE mmol/L 95* 96* 99 102 103 105 104   CO2 mmol/L 28.5 30.1* 26.6 25.6 24.3 21.6* 18.8*   BUN mg/dL 34* 34* 31* 32* 34* 39* 41*   CREATININE mg/dL 2.24* 2.30* 2.08* 1.80* 1.95* 2.00* 2.15*   CALCIUM mg/dL 8.3* 8.9 8.6 8.6 8.5* 8.6 8.5*   BILIRUBIN mg/dL  --   --   --   --   --  0.2  --    ALK PHOS U/L  --   --   --   --   --  81  --    ALT (SGPT) U/L  --   --   --   --   --  25  --    AST (SGOT) U/L  --   --   --   --   --  48*  --    GLUCOSE mg/dL 62* 75 92 94 97 120* 170*     Results from last 7 days   Lab Units 01/07/22 0447 01/05/22 0427 01/04/22  0413   MAGNESIUM mg/dL 2.0 2.3 1.5*       Results from last 7 days   Lab Units 01/05/22 0427 01/03/22 0416 01/02/22  0527   AST (SGOT) U/L  --   --  48*   ALT (SGPT) U/L  --   --  25   PLATELETS 10*3/mm3 243 220 206         Lab Results (last 24 hours)     Procedure Component Value Units Date/Time    Anaerobic Culture - Swab, Hip, Left [[id number]]  (Normal) Collected: 01/04/22 1106    Specimen: Swab from Hip, Left Updated: 01/07/22 0659     Anaerobic Culture No anaerobes isolated at 3 days    POC Glucose Once [[id number]]  (Normal) Collected: 01/07/22 0616    Specimen: Blood Updated: 01/07/22 0624     Glucose 86 mg/dL      Comment: Meter: [id number] : [id number] Selene RODRIGUES       Renal Function Panel [[id number]]  (Abnormal) Collected: 01/07/22 0447    Specimen: Blood Updated: 01/07/22 0550     Glucose 62 mg/dL      BUN 34 mg/dL      Creatinine 2.24 mg/dL      Sodium 133 mmol/L      Potassium 4.3 mmol/L      Chloride 95 mmol/L      CO2 28.5 mmol/L      Calcium 8.3 mg/dL      Albumin 2.70 g/dL      Phosphorus 3.3 mg/dL      Anion  Gap 9.5 mmol/L      BUN/Creatinine Ratio 15.2     eGFR Non African Amer 28 mL/min/1.73     Narrative:      GFR Normal >60  Chronic Kidney Disease <60  Kidney Failure <15      Magnesium [[id number]]  (Normal) Collected: 01/07/22 0447    Specimen: Blood Updated: 01/07/22 0544     Magnesium 2.0 mg/dL     POC Glucose Once [[id number]]  (Abnormal) Collected: 01/06/22 2015    Specimen: Blood Updated: 01/06/22 2020     Glucose 190 mg/dL      Comment: Meter: [id number] : [id number] Hafsatodd Odell NA       POC Glucose Once [[id number]]  (Normal) Collected: 01/06/22 1629    Specimen: Blood Updated: 01/06/22 1635     Glucose 113 mg/dL      Comment: Meter: [id number] : [id number] Liliana Amna Float NA       Vancomycin, Random [[id number]]  (Normal) Collected: 01/06/22 1207    Specimen: Blood Updated: 01/06/22 1226     Vancomycin Random 13.90 mcg/mL     Narrative:      Therapeutic Ranges for Vancomycin    Vancomycin Random   5.0-40.0 mcg/mL  Vancomycin Trough   5.0-20.0 mcg/mL  Vancomycin Peak     20.0-40.0 mcg/mL    POC Glucose Once [[id number]]  (Abnormal) Collected: 01/06/22 1206    Specimen: Blood Updated: 01/06/22 1213     Glucose 151 mg/dL      Comment: Meter: [id number] : [id number] Liliana Amna Float NA       Wound Culture - Wound, Hip, Left [[id number]]  (Abnormal) Collected: 01/04/22 1106    Specimen: Wound from Hip, Left Updated: 01/06/22 1136     Wound Culture Moderate growth (3+) Gram Positive Cocci      Light growth (2+) Staphylococcus aureus, MRSA     Comment: Methicillin resistant Staphylococcus aureus, Patient may be an isolation risk.         Light growth (2+) Normal Skin Sinai     Gram Stain Many (4+) WBCs seen      Few (2+) Gram positive cocci            Results from last 7 days   Lab Units 01/01/22  0357   TSH uIU/mL 7.100*             Results from last 7 days   Lab Units 01/07/22  0447 01/05/22  0427 01/04/22  0413   MAGNESIUM mg/dL 2.0 2.3 1.5*                 Glucose   Date/Time Value Ref Range  Status   01/07/2022 0616 86 70 - 130 mg/dL Final     Comment:     Meter: [id number] : [id number] Selene RN   01/06/2022 2015 190 (H) 70 - 130 mg/dL Final     Comment:     Meter: [id number] : [id number] Ruthann Odell NA   01/06/2022 1629 113 70 - 130 mg/dL Final     Comment:     Meter: [id number] : [id number] Liliana Simsdie Float NA   01/06/2022 1206 151 (H) 70 - 130 mg/dL Final     Comment:     Meter: [id number] : [id number] Liliana Simsdie Float NA   01/06/2022 0726 76 70 - 130 mg/dL Final     Comment:     Meter: [id number] : [id number] Liliana Simsdie Float NA   01/05/2022 2009 150 (H) 70 - 130 mg/dL Final     Comment:     Meter: [id number] : [id number] Adam Ordaz CNA   01/05/2022 1642 93 70 - 130 mg/dL Final     Comment:     Meter: [id number] : [id number] Chris Romeo CNA   01/05/2022 1139 187 (H) 70 - 130 mg/dL Final     Comment:     Meter: [id number] : [id number] Chris Cazares Hanseljulián CNA         Results from last 7 days   Lab Units 01/04/22  1106 01/02/22  1856 12/31/21  1812   WOUNDCX  Moderate growth (3+) Gram Positive Cocci*  Light growth (2+) Staphylococcus aureus, MRSA*  Light growth (2+) Normal Skin Sinai  --   --    URINECX   --  Yeast isolated* Yeast isolated*     Results from last 7 days   Lab Units 01/02/22  1856   NITRITE UA  Negative   WBC UA /HPF Too Numerous to Count*   BACTERIA UA /HPF 1+*   SQUAM EPITHEL UA /HPF Unable to determine due to loaded field*   URINECX  Yeast isolated*             Radiology:  Imaging Results (Last 24 Hours)     Procedure Component Value Units Date/Time    XR Knee 4+ View Bilateral [[id number]] Collected: 01/06/22 1327     Updated: 01/06/22 1332    Narrative:      BILATERAL KNEE SERIES 01/06/2022     HISTORY:  81-year-old male with chronic bilateral knee pain. HISTORY of right knee  replacement 3 years ago     TECHNIQUE:  Frontal and lateral views of the knees were performed bilaterally.  Comparison study 07/18/2021      FINDINGS:  Left knee: There is tricompartmental degenerative change. There is  enthesopathic change of the patella. No acute fracture. Minimal if any  joint fluid present. Degenerative change is most prominent in the medial  compartment and there is mild osteophytic spurring.     Right knee: The patient is status post total right knee arthroplasty.  Surgical hardware appears intact and there is no acute fracture. There  is enthesopathic change of the quadriceps tendon insertion upon the  patella. No joint effusion.     There is atherosclerotic disease bilaterally.       Impression:      1. Moderate degenerative change of the left knee most prominent in the  medial compartment.  2. Status post total knee replacement on the right.     This report was finalized on 1/6/2022 1:30 PM by Dr. Iam Reyes MD.             Cardiology:  ECG/EMG Results (last 24 hours)     ** No results found for the last 24 hours. **                I have reviewed recent labs results and consult notes.    Please note portions of this assessment/plan may have been copied and pasted, but I have personally seen this patient and reviewed each line of this assessment and plan for accuracy and made updates to reflect my necessary changes     Assessment and Plan:  1.  Acute kidney injury chronic kidney disease stage III stable weight is mildly increased continue Bumex daily    2.  Left hip and left leg decubitus wounds debrided on 12/30/2021 wound culture show Enterococcus and MRSA-patient started on vancomycin IV  1/4/2022    3.   Congestive heart failure with preserved ejection fraction   stable appears to be euvolemic    4.    Bradycardia improved remains off amiodarone still bradycardic but in the 50s        5.  Adult onset diabetes mellitus Accu-Chek sliding scale hypoglycemia is improved continue Accu-Chek/scale insulin    6.    Paroxysmal atrial fibrillation continue Eliquis dose adjusted to due to renal insufficiency     7.  Ischemic  cardiomyopathy echo done as admission showed improvement of the ventricular function with ejection fraction 55% with severe aortic valve calcification some right atrial dilatation..     8.  Hypothyroidism TSH is elevated likely due to patient noncompliance fall continue present dose and monitor     9.  Multiple falls at home patient is clearly unable to care for himself.  Extensive discharge planning note from yesterday reviewed patient refuses nursing home placement insist on going home.  Will discuss with surgery about the duration of vancomycin treatment.  Plans on sending him home over the weekend discussed with discharge planning this morning    10.  COPD nothing acute continue home medications     11.    Iron deficiency anemia started on iron infusions    12.   Candida UTI on p.o. Diflucan    Much of this encounter note is an electronic transcription/translation of spoken language to printed text using Dragon Software

## 2022-01-07 NOTE — NURSING NOTE
CWON follow up. BLE wraps and dressing removed. All wound are stable and without s/s of infection. Minimal slough noted to left foot wound. Jolene wound skin fragile with multiple bony prominences that are red and blanchable. Wounds are fairly wet today.     BLE washed with cleansing foam and water. Lotion applied. Versatel dressing placed over each wound, 1 to right medial leg and 3 to LLE, including dorsal foot, medial foot and medial lower leg.  Opticell Ag was then placed over the Versatel followed by ABD pads to wick drainage and add padding.  Coflex 2 layer compression wraps were then applied, toes to knees. CWON will recommend these be changed three times weekly on MWF.  Patient tolerated well.    Left hip dressing with moderate to large amt of drainage over the last few days. Jolene wound skin now with a mild yeast rash r/t the moisture. The more proximal wound no longer needs Therahoney so this should help with drainage. Will recommend continuing Therahoney to distal wound slough and covering each wound with a sheet of Opticell.  I then covered entire wound with a large Optilock to help wick exudate. Yaima Magic was applied to jolene wound rash for moisture barrier protection.    I do not think a wound vac is appropriate for this wound as there is very little depth and insurance approval for a home vac requires a minimal depth and this wound does not meet that requirement.     I discussed the wound care with case management and patient reports that he is definitely going home and that he has a granddaughter that can help with daily dressing changes and HH can manage the BLE wound care and compression wraps.     Additional supplies left at bedside. Please call with any questions.    Discussed all with RAVI Guadalupe.

## 2022-01-07 NOTE — PROGRESS NOTES
Adult Nutrition  Assessment/PES    Patient Name:  Froilan Helton  YOB: 1940  MRN: [id number]  Admit Date:  12/28/2021    Assessment Date:  1/7/2022    Comments:  Pt intake improved.  Pt does report some difficulty chewing.  Recommend:  Add chopped meats to diet order.     Reason for Assessment     Row Name 01/07/22 1209          Reason for Assessment    Reason For Assessment follow-up protocol     Diagnosis --  ROLA/CKD, chronic CHF, left hip and leg wound                Nutrition/Diet History     Row Name 01/07/22 1212          Nutrition/Diet History    Typical Food/Fluid Intake pt reports eating better but some trouble chewing due to teeth are not good, wants to try chopped meats                Anthropometrics     Row Name 01/07/22 1214 01/07/22 0601       Anthropometrics    Current Weight Method --  213.9 lb  1.7.22 --    Weight -- 97 kg (213 lb 14.4 oz)       Admit Weight    Admit Weight --  225.3 lb --       Usual Body Weight (UBW)    % of Usual Body Weight Assessment --  wt down but treated with diuretics/CHF --               Labs/Tests/Procedures/Meds     Row Name 01/07/22 1215          Labs/Procedures/Meds    Lab Results Reviewed reviewed            Medications    Pertinent Medications Reviewed reviewed     Pertinent Medications Comments Bumex                Physical Findings     Row Name 01/07/22 1218          Physical Findings    Skin other (see comments)  per CWON 1.4.22 skin/wounds improved                  Nutrition Prescription Ordered     Row Name 01/07/22 1218          Nutrition Prescription PO    Common Modifiers Low Potassium                Evaluation of Received Nutrient/Fluid Intake     Row Name 01/07/22 1219          PO Evaluation    Number of Meals 5     % PO Intake 85%                     Problem/Interventions:   Problem 1     Row Name 01/07/22 1220          Nutrition Diagnoses Problem 1    Problem 1 Knowledge Deficit     Etiology (related to) MNT for Treatment/Condition      Signs/Symptoms (evidenced by) Potential Information Deficit                Problem 2     Row Name 01/07/22 1220          Nutrition Diagnoses Problem 2    Problem 2 Malnutrition     Etiology (related to) Factors Affecting Nutrition     Signs/Symptoms (evidenced by) Report/Observation                    Intervention Goal     Row Name 01/07/22 1220          Intervention Goal    PO Intake % 80 %  or greater of meals                Nutrition Intervention     Row Name 01/07/22 1221          Nutrition Intervention    RD/Tech Action Interview for preference; Encourage intake; Follow Tx progress                  Education/Evaluation     Row Name 01/07/22 1221          Education    Education Previous education by RD/LD            Monitor/Evaluation    Monitor I&O; PO intake; Supplement intake; Pertinent labs; Weight; Skin status                 Electronically signed by:  Tara Young RD  01/07/22 12:22 EST

## 2022-01-07 NOTE — CONSULTS
Orthopedic Consult      Patient: Froilan Helton    Date of Admission: 12/28/2021  1:42 PM    YOB: 1940    Medical Record Number: [id number]    Attending Physician: Helena Tovar*  Consulting Physician: Dr. Sammy Clifford      Chief Complaints: Open wound of left foot, initial encounter [S91.302A]  Acute renal failure superimposed on chronic kidney disease, unspecified CKD stage, unspecified acute renal failure type (HCC) [N17.9, N18.9]  Decubitus ulcer of trochanteric region of left hip, unspecified ulcer stage [L89.229]      History of Present Illness: 81 y.o. male admitted to [facility] to services of Helena Tovar* with Open wound of left foot, initial encounter [S91.302A]  Acute renal failure superimposed on chronic kidney disease, unspecified CKD stage, unspecified acute renal failure type (HCC) [N17.9, N18.9]  Decubitus ulcer of trochanteric region of left hip, unspecified ulcer stage [L89.229]. I was consulted for further evaluation and treatment of bilateral knee pain, patient has had a remote history of right total knee arthroplasty.  States he has had increasing levels of left knee pain greater than right, rates pain as a 6-7 out of 10 on the left 4-5 out of 10 on the right, aching in nature, exacerbated with prolonged weightbearing and activities, minimal improvement with activity modification and rest.  Denies redness or warmth, denies associated new numbness or tingling bilateral lower extremities.    Allergies   Allergen Reactions   • Morphine Unknown - High Severity   • Atorvastatin Unknown - Low Severity   • Oxycontin [Oxycodone Hcl] Confusion     'Makes me crazy and stand on my head'       Medications:   Home Medications:  Medications Prior to Admission   Medication Sig Dispense Refill Last Dose   • amiodarone (PACERONE) 200 MG tablet Every 12 (Twelve) Hours.   12/28/2021 at Unknown time   • atorvastatin (LIPITOR) 10 MG tablet Every Night.   12/28/2021 at  Unknown time   • carvedilol (COREG) 6.25 MG tablet Every 12 (Twelve) Hours.   12/28/2021 at Unknown time   • docusate sodium (COLACE) 100 MG capsule Take 100 mg by mouth 2 (Two) Times a Day.   12/28/2021 at Unknown time   • pregabalin (LYRICA) 75 MG capsule Take 1 capsule by mouth 2 (Two) Times a Day. 6 capsule 0 12/28/2021 at Unknown time   • QUEtiapine (SEROquel) 25 MG tablet Take 0.5 tablets by mouth Every Evening.      • tamsulosin (FLOMAX) 0.4 MG capsule 24 hr capsule Take 1 capsule by mouth Daily. 30 capsule 0 12/28/2021 at Unknown time   • acetaminophen (TYLENOL) 325 MG tablet Take 2 tablets by mouth Every 4 (Four) Hours As Needed for Mild Pain .   Unknown at Unknown time   • albuterol sulfate  (90 Base) MCG/ACT inhaler Inhale 2 puffs Every 4 (Four) Hours As Needed for Wheezing.   Unknown at Unknown time   • apixaban (ELIQUIS) 5 MG tablet tablet Take 1 tablet by mouth Every 12 (Twelve) Hours. Indications: Atrial Fibrillation 60 tablet  12/29/2021 at Unknown time   • Benzalkonium Chloride (REMEDY ANTIMICROBIAL CLEANSER EX) Apply  topically Every 1 (One) Hour As Needed.   Unknown at Unknown time   • bisacodyl (DULCOLAX) 10 MG suppository Insert 10 mg into the rectum Daily As Needed for Constipation.   Unknown at Unknown time   • budesonide-formoterol (SYMBICORT) 160-4.5 MCG/ACT inhaler Inhale 2 puffs 2 (Two) Times a Day. 1 inhaler 0 Unknown at Unknown time   • bumetanide (BUMEX) 2 MG tablet Take 1 tablet by mouth 2 (Two) Times a Day. (Patient taking differently: Take 1 mg by mouth 2 (Two) Times a Day.) 120 tablet 0 Unknown at Unknown time   • cholecalciferol 2000 units tablet Take 2,000 Units by mouth Daily. 30 each 0 Unknown at Unknown time   • citalopram (CeleXA) 10 MG tablet Take 20 mg by mouth Every Night.   Unknown at Unknown time   • fluticasone (FLONASE) 50 MCG/ACT nasal spray 2 sprays by Each Nare route Daily. 1 bottle 0 Unknown at Unknown time   • hydrocortisone 1 % cream Apply  topically to the  "appropriate area as directed 2 (Two) Times a Day.   Unknown at Unknown time   • hydrophor (AQUAPHOR) ointment ointment Apply  topically to the appropriate area as directed As Needed (Ulcerations lower extremities and edema). Lower extremities.   Unknown at Unknown time   • insulin aspart (novoLOG) 100 UNIT/ML injection Inject 1-14 Units under the skin into the appropriate area as directed 3 (Three) Times a Day Before Meals. As directed per sliding scale   Unknown at Unknown time   • insulin detemir (Levemir FlexTouch) 100 UNIT/ML injection 20 units   Unknown at Unknown time   • Insulin Syringe 30G X 5/16\" 1 ML misc U UTD WITH INSULIN UP TO 6 TIMES A DAY  3 Unknown at Unknown time   • ipratropium-albuterol (DUO-NEB) 0.5-2.5 mg/3 ml nebulizer Take 3 mL by nebulization Every 4 (Four) Hours As Needed for Wheezing.   Unknown at Unknown time   • lactulose (CHRONULAC) 10 GM/15ML solution Take 30 g by mouth Daily As Needed.   Unknown at Unknown time   • levothyroxine (SYNTHROID, LEVOTHROID) 112 MCG tablet Take 224 mcg by mouth Daily.   Unknown at Unknown time   • loratadine (CLARITIN) 5 MG chewable tablet Chew 10 mg Daily.   Unknown at Unknown time   • melatonin 5 MG tablet tablet Take 1 tablet by mouth At Night As Needed (sleep). Over the counter   Unknown at Unknown time   • O2 (OXYGEN) Inhale 1 L/min every night at bedtime. (Patient taking differently: Inhale 2 L/min every night at bedtime.) 1 L 0 Unknown at Unknown time   • Petrolatum 42 % ointment Apply  topically to the appropriate area as directed Daily. Send with patient   Unknown at Unknown time   • polyethylene glycol (MIRALAX) 17 g packet Take 17 g by mouth Daily.   Unknown at Unknown time   • SITagliptin (Januvia) 100 MG tablet Daily.   Unknown at Unknown time   • sodium chloride 0.65 % nasal spray 2 sprays into the nostril(s) as directed by provider As Needed for Congestion. Send with patient  12 Unknown at Unknown time   • vitamin B-12 (VITAMIN B-12) 1000 MCG " tablet Take 1 tablet by mouth Daily. 60 tablet 0 Unknown at Unknown time   • vitamin D (ERGOCALCIFEROL) 1.25 MG (79971 UT) capsule capsule Take 1,250 Units by mouth Daily.   Unknown at Unknown time       Current Medications:  Scheduled Meds:apixaban, 2.5 mg, Oral, Q12H  atorvastatin, 10 mg, Oral, Nightly  budesonide-formoterol, 2 puff, Inhalation, BID - RT  bumetanide, 2 mg, Oral, Daily  cholecalciferol, 2,000 Units, Oral, Daily  citalopram, 20 mg, Oral, Nightly  docusate sodium, 100 mg, Oral, BID  fluconazole, 200 mg, Oral, Q24H  fluticasone, 2 spray, Each Nare, Daily  hydrocortisone-bacitracin-zinc oxide-nystatin, 1 application, Topical, TID  influenza vaccine, 0.5 mL, Intramuscular, Once  insulin aspart, 0-24 Units, Subcutaneous, TID AC  insulin detemir, 25 Units, Subcutaneous, Nightly  levothyroxine, 224 mcg, Oral, Q AM  linagliptin, 5 mg, Oral, Daily  metroNIDAZOLE, 500 mg, Oral, Q8H  O2, 2 L/min, Inhalation, Once  polyethylene glycol, 17 g, Oral, Daily  pregabalin, 75 mg, Oral, BID  QUEtiapine, 12.5 mg, Oral, Q PM  tamsulosin, 0.4 mg, Oral, Daily  vancomycin, 1,000 mg, Intravenous, Q24H  cyanocobalamin, 1,000 mcg, Oral, Daily      Continuous Infusions:Pharmacy to dose vancomycin,       PRN Meds:.•  acetaminophen **OR** acetaminophen **OR** acetaminophen  •  albuterol sulfate HFA  •  dextrose  •  dextrose  •  glucagon (human recombinant)  •  magnesium sulfate **OR** magnesium sulfate **OR** magnesium sulfate  •  melatonin  •  Pharmacy to dose vancomycin       Past Medical History:   Diagnosis Date   • A-fib ([facility])    • Arthritis    • Asthma    • CAD (coronary artery disease)    • Cardiomyopathy (HCC)    • Cataract    • CHF (congestive heart failure) ([facility])    • CKD (chronic kidney disease), stage III ([facility])    • COPD (chronic obstructive pulmonary disease) ([facility])    • Diabetes mellitus (HCC)    • Disease of thyroid gland    • Emphysema, unspecified ([facility])    • Glaucoma    • Hyperlipidemia    • Hypertension    •  Kidney stone    • Myocardial infarct, old    • Neuropathy    • Osteoarthritis    • Osteoporosis    • Permanent atrial fibrillation (HCC) 6/30/2020   • PVD (peripheral vascular disease) (HCC)    • Renal disorder    • Shingles         Past Surgical History:   Procedure Laterality Date   • COLONOSCOPY     • EYE SURGERY     • INCISION AND DRAINAGE LEG Left 12/30/2021    Procedure: debridement of left hip wounds and left foot wound;  Surgeon: Judie Aguero DO;  Location: [facility];  Service: General;  Laterality: Left;   • JOINT REPLACEMENT      right   • KIDNEY STONE SURGERY     • REPLACEMENT TOTAL KNEE     • TOE SURGERY          Social History     Occupational History   • Not on file   Tobacco Use   • Smoking status: Former Smoker   • Smokeless tobacco: Never Used   • Tobacco comment: quit 1993   Vaping Use   • Vaping Use: Never used   Substance and Sexual Activity   • Alcohol use: No   • Drug use: No   • Sexual activity: Defer      Social History     Social History Narrative   • Not on file        Family History   Problem Relation Age of Onset   • COPD Mother    • Diabetes Father    • Malig Hyperthermia Neg Hx          Review of Systems:   14 point review of systems significant for bilateral knee pain as noted above in HPI    Physical Exam: 81 y.o. male  Vitals:    01/07/22 0944 01/07/22 0949 01/07/22 1100 01/07/22 1500   BP:   96/52 120/60   BP Location:   Left arm Left arm   Patient Position:   Sitting Lying   Pulse: 56 56 51 (!) 49   Resp: 20 20 19 18   Temp:   96.3 °F (35.7 °C) 96.4 °F (35.8 °C)   TempSrc:   Oral Oral   SpO2: 95% 95% 94% 96%   Weight:       Height:         General Appearance:    Alert, cooperative, in no acute distress                           Bilateral knees-maximal tenderness palpation over medial joint line bilaterally left greater than right.  Midline incision well-healed over right knee, mild effusion on the right, moderate effusion on the left, no significant erythema appreciated.   Stable to varus valgus stress 5 and 30 degrees, active range of motion 5 to 110 degrees 4-5 strength on flexion and extension.           Diagnostic Tests:  No results displayed because visit has over 200 results.        CT Head Without Contrast    Result Date: 12/10/2021  Impression: Unremarkable CT scan of the head without contrast.  This report was finalized on 12/10/2021 2:03 PM by Dr. Demario Davidson M.D.      XR Knee 4+ View Bilateral    Result Date: 1/6/2022  Impression: 1. Moderate degenerative change of the left knee most prominent in the medial compartment. 2. Status post total knee replacement on the right.  This report was finalized on 1/6/2022 1:30 PM by Dr. Iam Reyes MD.      US Renal Bilateral    Result Date: 12/28/2021  Impression: Negative renal ultrasound. No hydronephrosis. No change from the previous ultrasound study. Signer Name: Jaylen Bhardwaj MD  Signed: 12/28/2021 7:38 PM  Workstation Name: [id number]  [facility]    Review of outside x-rays bilateral knees indicate total knee arthroplasty component on the right with no evidence of loosening or osteolysis, moderate degenerative change left knee with overall varus alignment with moderate joint space narrowing particular in the medial compartment.  No evidence of fracture, dislocation, or subluxation.    Assessment:  Patient Active Problem List   Diagnosis   • COPD (chronic obstructive pulmonary disease) ([facility])   • Type 2 diabetes mellitus with stage 4 chronic kidney disease, with long-term current use of insulin ([facility])   • Essential hypertension   • Primary osteoarthritis of left knee   • Chronic renal insufficiency, stage 4 (severe) ([facility])   • Class 2 severe obesity due to excess calories with serious comorbidity in adult ([facility])   • Physical debility   • Acute UTI (urinary tract infection)   • Permanent atrial fibrillation ([facility])   • H/O noncompliance with medical treatment, presenting hazards to health   • Myxedema   •  Acute on chronic combined systolic and diastolic CHF (congestive heart failure) ([facility])   • Generalized weakness   • Recurrent left pleural effusion   • Fall on same level as cause of accidental injury   • Gross hematuria   • CAD (coronary artery disease)   • HLD (hyperlipidemia)   • Hypothyroidism   • CKD (chronic kidney disease) stage 3, GFR 30-59 ml/min ([facility])   • Acute metabolic encephalopathy   • Cardiomyopathy ([facility])   • Recurrent falls   • Acute renal failure superimposed on chronic kidney disease ([facility])   • Open wound of left foot   • Moderate malnutrition (CMS/HCC)           Plan:  The patient voiced understanding of the risks, benefits, and alternative forms of treatment that were discussed and the patient wants to stick with conservative treatment at this point time for his arthritis.  We did discuss option for injection, we will hold off on that at this time but I will reassess again tomorrow and see how he is doing with further mobilization.  I am concerned about risk for injection given the fact that he is on active antibiotic treatment for wounds particular in the left lower extremity.  He is not a candidate for right knee injection given his total knee arthroplasty.  All questions answered.      Sammy Clifford MD      Dictated utilizing Dragon dictation

## 2022-01-07 NOTE — PLAN OF CARE
Goal Outcome Evaluation:  Plan of Care Reviewed With: patient           Outcome Summary: PT: Patient agrees to therapy with encouragement.  Patient performs supine to sit with min assist and sit to stand with min assist from elevated surface.  Patient attempts stand pivot transfer with rolling walker, however unable to complete due to knee buckling and reported knee pain.  Patient performs sit pivot from bed to chair with max assist x2.  Discussed concerns regarding return home, however pt continues to report he is returning home and has family to assist.

## 2022-01-07 NOTE — PROGRESS NOTES
[facility] Progress Note      Patient Name: Froilan Helton  : 1940  MRN: [id number]  Primary Care Physician:  Fortunato De Leon MD  Date of admission: 2021    Subjective     Interval History:   No problems overnight; again expresses wish to go home  No shortness of breath on room air  Leg swelling improving daily  Appetite is fine; no N/V    Review of Systems:   14 point review of systems is otherwise negative except for mentioned above on HPI    Objective     Vitals:   Temp:  [96.4 °F (35.8 °C)-97 °F (36.1 °C)] 97 °F (36.1 °C)  Heart Rate:  [47-68] 51  Resp:  [18-20] 18  BP: (109-128)/(49-58) 116/56    Intake/Output Summary (Last 24 hours) at 2022 0919  Last data filed at 2022 0905  Gross per 24 hour   Intake 850 ml   Output 1450 ml   Net -600 ml       Physical Exam:    General Appearance: alert, appropriate, pale, NAD, chronically ill  Psychiatric: Flat affect; irritable mood  Skin: warm and dry  HEENT: oral mucosa normal, nonicteric sclera  Neck: supple, no JVD  Lungs: Crackles in bases but no wheezes; not labored on room air  Heart: Irregularly irregular, bradycardic, 2/6M  Abdomen: soft, nontender, nondistended, BS +  : no palpable bladder  Extremities: Trace to 1+ edema, lower legs wrapped  Neuro: normal speech and mental status; flat affect    Scheduled Meds:     apixaban, 2.5 mg, Oral, Q12H  atorvastatin, 10 mg, Oral, Nightly  budesonide-formoterol, 2 puff, Inhalation, BID - RT  bumetanide, 2 mg, Oral, Daily  cholecalciferol, 2,000 Units, Oral, Daily  citalopram, 20 mg, Oral, Nightly  docusate sodium, 100 mg, Oral, BID  fluconazole, 200 mg, Oral, Q24H  fluticasone, 2 spray, Each Nare, Daily  hydrocortisone-bacitracin-zinc oxide-nystatin, 1 application, Topical, TID  influenza vaccine, 0.5 mL, Intramuscular, Once  insulin aspart, 0-24 Units, Subcutaneous, TID AC  insulin detemir, 25 Units, Subcutaneous, Nightly  levothyroxine, 224 mcg, Oral, Q  AM  linagliptin, 5 mg, Oral, Daily  metroNIDAZOLE, 500 mg, Oral, Q8H  O2, 2 L/min, Inhalation, Once  polyethylene glycol, 17 g, Oral, Daily  pregabalin, 75 mg, Oral, BID  QUEtiapine, 12.5 mg, Oral, Q PM  tamsulosin, 0.4 mg, Oral, Daily  vancomycin, 1,000 mg, Intravenous, Q24H  cyanocobalamin, 1,000 mcg, Oral, Daily      IV Meds:   Pharmacy to dose vancomycin,         Results Reviewed:   I have personally reviewed the results from the time of this admission to 1/7/2022 09:19 EST     Results from last 7 days   Lab Units 01/07/22 0447 01/06/22 0431 01/05/22 0427 01/03/22 0416 01/02/22  0527   SODIUM mmol/L 133* 135* 136   < > 135*   POTASSIUM mmol/L 4.3 4.5 4.3   < > 4.8   CHLORIDE mmol/L 95* 96* 99   < > 105   CO2 mmol/L 28.5 30.1* 26.6   < > 21.6*   BUN mg/dL 34* 34* 31*   < > 39*   CREATININE mg/dL 2.24* 2.30* 2.08*   < > 2.00*   CALCIUM mg/dL 8.3* 8.9 8.6   < > 8.6   BILIRUBIN mg/dL  --   --   --   --  0.2   ALK PHOS U/L  --   --   --   --  81   ALT (SGPT) U/L  --   --   --   --  25   AST (SGOT) U/L  --   --   --   --  48*   GLUCOSE mg/dL 62* 75 92   < > 120*    < > = values in this interval not displayed.     Estimated Creatinine Clearance: 32.2 mL/min (A) (by C-G formula based on SCr of 2.24 mg/dL (H)).  Results from last 7 days   Lab Units 01/07/22 0447 01/06/22 0431 01/05/22 0427 01/04/22 0413 01/04/22 0413   MAGNESIUM mg/dL 2.0  --  2.3  --  1.5*   PHOSPHORUS mg/dL 3.3 3.8 3.4   < > 3.0    < > = values in this interval not displayed.         Results from last 7 days   Lab Units 01/05/22  0427 01/03/22  0416 01/02/22  0527   WBC 10*3/mm3 7.41 6.69 6.36   HEMOGLOBIN g/dL 10.0* 9.4* 9.1*   PLATELETS 10*3/mm3 243 220 206           Assessment / Plan     ASSESSMENT:  1.  ROLA/CKD3, nonoliguric, stable:  prerenal due to CHF.  Peripheral edema present but improving; hypervolemic hyponatremia, stable, all in all; normal K   2.  Chronic CHF  3.  Edema-due to venous stasis  4.  Leg wounds  5.  PAF    PLAN:  1.   Oral bumetanide 2 mg once daily for now.  Although if edema worsens, weight rises, and serum sodium falls will need to revert back to twice daily regimen  2.  Discharge anytime from renal view  3.  Consider Hospice evaluation  4.  Unable to see patient over the weekend, but available always for any questions during this time.  Will next see 1/10 if patient remains inpatient    Thank you for involving us in the care of Froilan Helton.  Please feel free to call with any questions.    Ellis Centeno MD  01/07/22  09:19 EST    [facility]  [phone]

## 2022-01-08 NOTE — CASE MANAGEMENT/SOCIAL WORK
"Continued Stay Note   Temitope Castillo     Patient Name: Froilan Helton  MRN: [id number]  Today's Date: 1/8/2022    Admit Date: 12/28/2021     Discharge Plan     Row Name 01/08/22 1644       Plan    Plan Home with [facility]     Plan Comments Call received from Dr. Tovar who states that the patient will discharge today but he will have to sign out AMA due to the fact he is not safe to go home alone. He then calls back to say patient will need IV Vanc for four more days and has that been arranged and I told him that was not mentioned in any previous CM notes and I was not aware of it. He states he will need a PICC to find out if he can do this at home. Dr. Tovar states he will not discharge patient this weekend until he gets recommendations form Dr. Aguero to see regarding antibiotics and she will not be back until Monday. CM did call Margarita with [facility]  to see if they can do the IV Vanc at home and she states \"she does not know if they will follow him since he is not safe at home by himself. I informed Margarita that he would not be discharged over the weekend and that CM will follow back up with her on Monday to see if they will follow for dressing changes and IV ABX. CM will continue to follow for needs.               Discharge Codes    No documentation.                     Elizabeth Arguello RN    "

## 2022-01-08 NOTE — PROGRESS NOTES
"Daily Progress Note:      Chief complaint: Follow-up of acute kidney injury, chronic systolic  Congestive heart failure, ischemic cardiomyopathy, atrial fibrillation, hypertension, multiple skin decubiti    Subjective: No overnight events noted.  Resting comfortably he is without complaints.     LOS: 11 days     Vital Signs  Temp:  [96.4 °F (35.8 °C)-97.2 °F (36.2 °C)] 97.2 °F (36.2 °C)  Heart Rate:  [49-57] 51  Resp:  [16-18] 16  BP: (120-153)/(53-69) 153/69  Oxygen Therapy  SpO2: 95 %  Pulse Oximetry Type: Intermittent  Device (Oxygen Therapy): room air  Flow (L/min): 2  Oxygen Concentration (%): 95  ETCO2 (mmHg): 32 mmHg  Probe Placed On (Pulse Ox): Right:, finger}  Body mass index is 29.14 kg/m².  Flowsheet Rows      First Filed Value   Admission Height 185.4 cm (73\") Documented at 12/28/2021 1349   Admission Weight 104 kg (229 lb 12.8 oz) Documented at 12/28/2021 1349                   Documented weights    12/28/21 1349 12/28/21 2222 12/29/21 0643 12/29/21 1850   Weight: 104 kg (229 lb 12.8 oz) 102 kg (225 lb 4.8 oz) 102 kg (225 lb 6.4 oz) 102 kg (224 lb 13.9 oz)    12/30/21 0530 12/31/21 0539 01/02/22 0643 01/03/22 0550   Weight: 98.9 kg (218 lb 1.6 oz) 103 kg (226 lb) 103 kg (227 lb) 103 kg (227 lb 9.6 oz)    01/04/22 0534 01/05/22 0627 01/06/22 0643 01/07/22 0601   Weight: 99 kg (218 lb 4.8 oz) 95.5 kg (210 lb 8 oz) 96.2 kg (212 lb 2 oz) 97 kg (213 lb 14.4 oz)    01/08/22 0015   Weight: 99.7 kg (219 lb 14.4 oz)           Patient Vitals for the past 24 hrs:   BP Temp Temp src Pulse Resp SpO2 Weight   01/08/22 0800 -- -- -- -- -- 95 % --   01/08/22 0651 153/69 -- -- 51 -- 95 % --   01/08/22 0646 146/65 -- -- 51 -- 94 % --   01/08/22 0015 -- -- -- -- -- -- 99.7 kg (219 lb 14.4 oz)   01/07/22 2018 -- -- -- 51 16 95 % --   01/07/22 2016 -- -- -- 51 16 95 % --   01/07/22 2006 128/53 97.2 °F (36.2 °C) -- 57 16 94 % --   01/07/22 1500 120/60 96.4 °F (35.8 °C) Oral (!) 49 18 96 % --       99.7 kg (219 lb 14.4 " oz)    Intake/Output                             01/06/22 0701 - 01/07/22 0700 01/07/22 0701 - 01/08/22 0700 01/08/22 0701 - 01/09/22 0700     [id number] 6609-4434 Total 4474-5416 2329-8816 Total 5631-1532 1435-7932 Total                    Intake    P.O.  600  -- 600  250  -- 250  730  -- 730    Total Intake 600 -- 600 250 -- 250 730 -- 730       Output    Urine  900  550 1450  --  -- --  --  -- --    Total Output  -- -- -- -- -- --           Intake/Output Summary (Last 24 hours) at 1/8/2022 1209  Last data filed at 1/8/2022 0924  Gross per 24 hour   Intake 730 ml   Output --   Net 730 ml        Intake/Output Summary (Last 24 hours) at 1/8/2022 1209  Last data filed at 1/8/2022 0924  Gross per 24 hour   Intake 730 ml   Output --   Net 730 ml        Review of Systems   Constitutional: Positive for activity change. Negative for appetite change and fatigue.   HENT: Negative for congestion.    Respiratory: Negative for cough, chest tightness, shortness of breath and wheezing.    Cardiovascular: Negative for chest pain and leg swelling.   Gastrointestinal: Negative for abdominal distention, abdominal pain, diarrhea, nausea and vomiting.   Endocrine: Negative for polyphagia and polyuria.   Genitourinary: Negative for frequency.   Skin: Positive for color change and wound. Negative for rash.   Neurological: Negative for weakness and light-headedness.   Hematological: Does not bruise/bleed easily.   Psychiatric/Behavioral: Negative for agitation and behavioral problems.       Physical Exam  Vitals and nursing note reviewed.   Constitutional:       Appearance: He is well-developed. He is obese.   HENT:      Head: Normocephalic.   Eyes:      Conjunctiva/sclera: Conjunctivae normal.   Neck:      Thyroid: No thyromegaly.      Vascular: No JVD.   Cardiovascular:      Rate and Rhythm: Regular rhythm. Bradycardia present.      Heart sounds: Normal heart sounds. No murmur heard.      Pulmonary:      Effort: Pulmonary  effort is normal. No respiratory distress.      Breath sounds: Normal breath sounds. No wheezing or rales.   Abdominal:      General: Bowel sounds are normal. There is no distension.      Palpations: Abdomen is soft.      Tenderness: There is no abdominal tenderness. There is no guarding.   Musculoskeletal:      Right lower leg: No edema.      Left lower leg: No edema.   Skin:     General: Skin is warm and dry.      Findings: No rash.      Comments: Lower extremities are in dressings   Neurological:      General: No focal deficit present.      Mental Status: He is alert and oriented to person, place, and time.         Medication Review:   I have reviewed the patient's current medication list  Scheduled Meds:apixaban, 2.5 mg, Oral, Q12H  atorvastatin, 10 mg, Oral, Nightly  budesonide-formoterol, 2 puff, Inhalation, BID - RT  bumetanide, 2 mg, Oral, Daily  cholecalciferol, 2,000 Units, Oral, Daily  citalopram, 20 mg, Oral, Nightly  docusate sodium, 100 mg, Oral, BID  fluconazole, 200 mg, Oral, Q24H  fluticasone, 2 spray, Each Nare, Daily  hydrocortisone-bacitracin-zinc oxide-nystatin, 1 application, Topical, TID  influenza vaccine, 0.5 mL, Intramuscular, Once  insulin aspart, 0-24 Units, Subcutaneous, TID AC  insulin detemir, 25 Units, Subcutaneous, Nightly  levothyroxine, 224 mcg, Oral, Q AM  linagliptin, 5 mg, Oral, Daily  metroNIDAZOLE, 500 mg, Oral, Q8H  O2, 2 L/min, Inhalation, Once  polyethylene glycol, 17 g, Oral, Daily  pregabalin, 75 mg, Oral, BID  QUEtiapine, 12.5 mg, Oral, Q PM  tamsulosin, 0.4 mg, Oral, Daily  vancomycin, 1,000 mg, Intravenous, Q24H  cyanocobalamin, 1,000 mcg, Oral, Daily      Continuous Infusions:Pharmacy to dose vancomycin,       PRN Meds:.•  acetaminophen **OR** acetaminophen **OR** acetaminophen  •  albuterol sulfate HFA  •  dextrose  •  dextrose  •  glucagon (human recombinant)  •  magnesium sulfate **OR** magnesium sulfate **OR** magnesium sulfate  •  melatonin  •  Pharmacy to dose  vancomycin      Labs:  Results from last 7 days   Lab Units 01/05/22 0427 01/03/22 0416 01/02/22 0527   WBC 10*3/mm3 7.41 6.69 6.36   HEMOGLOBIN g/dL 10.0* 9.4* 9.1*   HEMATOCRIT % 32.1* 30.9* 29.6*   PLATELETS 10*3/mm3 243 220 206     Results from last 7 days   Lab Units 01/08/22 0407 01/07/22 0447 01/06/22 0431 01/05/22 0427 01/04/22 0413 01/03/22 0416 01/02/22 0527   SODIUM mmol/L 135* 133* 135* 136 136 134* 135*   POTASSIUM mmol/L 4.4 4.3 4.5 4.3 4.8 5.3* 4.8   CHLORIDE mmol/L 98 95* 96* 99 102 103 105   CO2 mmol/L 30.1* 28.5 30.1* 26.6 25.6 24.3 21.6*   BUN mg/dL 39* 34* 34* 31* 32* 34* 39*   CREATININE mg/dL 2.28* 2.24* 2.30* 2.08* 1.80* 1.95* 2.00*   CALCIUM mg/dL 8.3* 8.3* 8.9 8.6 8.6 8.5* 8.6   BILIRUBIN mg/dL  --   --   --   --   --   --  0.2   ALK PHOS U/L  --   --   --   --   --   --  81   ALT (SGPT) U/L  --   --   --   --   --   --  25   AST (SGOT) U/L  --   --   --   --   --   --  48*   GLUCOSE mg/dL 85 62* 75 92 94 97 120*     Results from last 7 days   Lab Units 01/08/22 0407 01/07/22 0447 01/05/22 0427 01/04/22 0413   MAGNESIUM mg/dL 1.9 2.0 2.3 1.5*       Results from last 7 days   Lab Units 01/05/22 0427 01/03/22 0416 01/02/22 0527   AST (SGOT) U/L  --   --  48*   ALT (SGPT) U/L  --   --  25   PLATELETS 10*3/mm3 243 220 206         Lab Results (last 24 hours)     Procedure Component Value Units Date/Time    POC Glucose Once [[id number]]  (Normal) Collected: 01/08/22 0835    Specimen: Blood Updated: 01/08/22 0842     Glucose 97 mg/dL      Comment: Meter: [id number] : [id number] Madalyn BUCKNERA       POC Glucose Once [[id number]]  (Abnormal) Collected: 01/08/22 0805    Specimen: Blood Updated: 01/08/22 0811     Glucose 60 mg/dL      Comment: Meter: [id number] : [id number] Madalyn BUCKNERA       Renal Function Panel [[id number]]  (Abnormal) Collected: 01/08/22 0407    Specimen: Blood Updated: 01/08/22 0513     Glucose 85 mg/dL      BUN 39 mg/dL      Creatinine 2.28 mg/dL       Sodium 135 mmol/L      Potassium 4.4 mmol/L      Chloride 98 mmol/L      CO2 30.1 mmol/L      Calcium 8.3 mg/dL      Albumin 2.60 g/dL      Phosphorus 3.7 mg/dL      Anion Gap 6.9 mmol/L      BUN/Creatinine Ratio 17.1     eGFR Non African Amer 28 mL/min/1.73     Narrative:      GFR Normal >60  Chronic Kidney Disease <60  Kidney Failure <15      Magnesium [[id number]]  (Normal) Collected: 01/08/22 0407    Specimen: Blood Updated: 01/08/22 0513     Magnesium 1.9 mg/dL     POC Glucose Once [[id number]]  (Normal) Collected: 01/07/22 2156    Specimen: Blood Updated: 01/07/22 2203     Glucose 130 mg/dL      Comment: Meter: [id number] : [id number] Guerrero Keating PCA       POC Glucose Once [[id number]]  (Normal) Collected: 01/07/22 1656    Specimen: Blood Updated: 01/07/22 1702     Glucose 114 mg/dL      Comment: Meter: [id number] : [id number] Mario HARKINS       Wound Culture - Wound, Hip, Left [[id number]]  (Abnormal)  (Susceptibility) Collected: 01/04/22 1106    Specimen: Wound from Hip, Left Updated: 01/07/22 1311     Wound Culture Moderate growth (3+) Enterococcus faecalis      Light growth (2+) Staphylococcus aureus, MRSA     Comment: Methicillin resistant Staphylococcus aureus, Patient may be an isolation risk.         Light growth (2+) Normal Skin Sinai     Gram Stain Many (4+) WBCs seen      Few (2+) Gram positive cocci    Susceptibility      Enterococcus faecalis     ALINE     Ampicillin Susceptible     Vancomycin Susceptible                  Linear View               Susceptibility      Staphylococcus aureus, MRSA     ALINE     Clindamycin Resistant     Erythromycin Resistant     Inducible Clindamycin Resistance Negative     Oxacillin Resistant     Rifampin Susceptible     Tetracycline Resistant     Trimethoprim + Sulfamethoxazole Susceptible     Vancomycin Susceptible                  Linear View                   Vancomycin, Random [[id number]]  (Normal) Collected: 01/07/22 1242    Specimen: Blood Updated:  01/07/22 1311     Vancomycin Random 18.90 mcg/mL     Narrative:      Therapeutic Ranges for Vancomycin    Vancomycin Random   5.0-40.0 mcg/mL  Vancomycin Trough   5.0-20.0 mcg/mL  Vancomycin Peak     20.0-40.0 mcg/mL    POC Glucose Once [[id number]]  (Abnormal) Collected: 01/07/22 1217    Specimen: Blood Updated: 01/07/22 1229     Glucose 193 mg/dL      Comment: Meter: [id number] : [id number] Jacque Santamaria NURSING ASSISTANT                           Results from last 7 days   Lab Units 01/08/22  0407 01/07/22  0447 01/05/22  0427 01/04/22  0413   MAGNESIUM mg/dL 1.9 2.0 2.3 1.5*                 Glucose   Date/Time Value Ref Range Status   01/08/2022 0835 97 70 - 130 mg/dL Final     Comment:     Meter: [id number] : [id number] Madalyn Barnhart CNA   01/08/2022 0805 60 (L) 70 - 130 mg/dL Final     Comment:     Meter: [id number] : [id number] Madalyn Barnhart CNA   01/07/2022 2156 130 70 - 130 mg/dL Final     Comment:     Meter: [id number] : [id number] Guerrero KELLY   01/07/2022 1656 114 70 - 130 mg/dL Final     Comment:     Meter: [id number] : [id number] Mario Steinberg NA   01/07/2022 1217 193 (H) 70 - 130 mg/dL Final     Comment:     Meter: [id number] : [id number] Jacque Santamaria NURSING ASSISTANT   01/07/2022 0758 79 70 - 130 mg/dL Final     Comment:     Meter: [id number] : [id number] Mario Steinberg NA   01/07/2022 0616 86 70 - 130 mg/dL Final     Comment:     Meter: [id number] : [id number] Selene RODRIGUES   01/06/2022 2015 190 (H) 70 - 130 mg/dL Final     Comment:     Meter: [id number] : [id number] Ruthann HARKINS         Results from last 7 days   Lab Units 01/04/22  1106 01/02/22  1856   WOUNDCX  Moderate growth (3+) Enterococcus faecalis*  Light growth (2+) Staphylococcus aureus, MRSA*  Light growth (2+) Normal Skin Sinai  --    URINECX   --  Yeast isolated*     Results from last 7 days   Lab Units 01/02/22  1856   NITRITE UA  Negative   WBC UA /HPF Too Numerous to Count*   BACTERIA  UA /HPF 1+*   SQUAM EPITHEL UA /HPF Unable to determine due to loaded field*   URINECX  Yeast isolated*             Radiology:  Imaging Results (Last 24 Hours)     ** No results found for the last 24 hours. **          Cardiology:  ECG/EMG Results (last 24 hours)     ** No results found for the last 24 hours. **                I have reviewed recent labs results and consult notes.    Please note portions of this assessment/plan may have been copied and pasted, but I have personally seen this patient and reviewed each line of this assessment and plan for accuracy and made updates to reflect my necessary changes     Assessment and Plan:  1.  Acute kidney injury chronic kidney disease stage III stable weight is up 8 pounds from overnight.  Myla Villagran may need to increase his Bumex if repeat weight accurate    2.  Left hip and left leg decubitus wounds debrided on 12/30/2021 wound culture show Enterococcus and MRSA-patient started on vancomycin IV  1/4/2022.  Will need 7 to 10 days of IV antibiotics may need PICC line I will discuss his discharge planning.  His granddaughter not sure we will be able to give his vancomycin at home the patient certainly is not reliable to give it to himself    3.   Congestive heart failure with preserved ejection fraction   stable appears to be euvolemic    4.    Bradycardia improved remains off amiodarone still bradycardic but in the 50s        5.  Adult onset diabetes mellitus Accu-Chek sliding scale hypoglycemia is improved continue Accu-Chek/scale insulin    6.    Paroxysmal atrial fibrillation continue Eliquis dose adjusted to due to renal insufficiency     7.  Ischemic cardiomyopathy echo done as admission showed improvement of the ventricular function with ejection fraction 55% with severe aortic valve calcification some right atrial dilatation..     8.  Hypothyroidism TSH is elevated likely due to patient noncompliance fall continue present dose and monitor     9.  Multiple  falls at home patient is clearly unable to care for himself.  Extensive discharge planning note from yesterday reviewed patient refuses nursing home placement insist on going home.  Home once arrangement made about vancomycin    10.  COPD nothing acute continue home medications     11.    Iron deficiency anemia started on iron infusions    12.   Candida UTI on p.o. Diflucan    Much of this encounter note is an electronic transcription/translation of spoken language to printed text using Dragon Software

## 2022-01-08 NOTE — PLAN OF CARE
Goal Outcome Evaluation:  Plan of Care Reviewed With: patient        Progress: improving  Outcome Summary: Pt continues on IV antibiotics. Pt dressing changes completed this shift per order.  Pt has severe weakness. Pt resting in bed at this time. No other complaints at this time.

## 2022-01-08 NOTE — PLAN OF CARE
Goal Outcome Evaluation:              Outcome Summary: Pt alert and oriented, no complaints of pain during this shift. Wound dressings remain clean, dry, and intact. Remains in contact isolation for MRSA. VSS at this time, will continue to  monitor.

## 2022-01-09 NOTE — PROGRESS NOTES
"Daily Progress Note:      Chief complaint: Follow-up of acute kidney injury, chronic systolic  Congestive heart failure, ischemic cardiomyopathy, atrial fibrillation, hypertension, multiple skin decubiti    Subjective: No overnight events noted.  Resting comfortably he is without complaints.     LOS: 12 days     Vital Signs  Temp:  [96.7 °F (35.9 °C)-98.2 °F (36.8 °C)] 96.7 °F (35.9 °C)  Heart Rate:  [45-81] 53  Resp:  [16] 16  BP: ()/(43-62) 120/49  Oxygen Therapy  SpO2: 95 %  Pulse Oximetry Type: Intermittent  Device (Oxygen Therapy): room air  Flow (L/min): 2  Oxygen Concentration (%): 95  ETCO2 (mmHg): 32 mmHg  Probe Placed On (Pulse Ox): Right:, finger}  Body mass index is 29.54 kg/m².  Flowsheet Rows      First Filed Value   Admission Height 185.4 cm (73\") Documented at 12/28/2021 1349   Admission Weight 104 kg (229 lb 12.8 oz) Documented at 12/28/2021 1349                   Documented weights    12/28/21 1349 12/28/21 2222 12/29/21 0643 12/29/21 1850   Weight: 104 kg (229 lb 12.8 oz) 102 kg (225 lb 4.8 oz) 102 kg (225 lb 6.4 oz) 102 kg (224 lb 13.9 oz)    12/30/21 0530 12/31/21 0539 01/02/22 0643 01/03/22 0550   Weight: 98.9 kg (218 lb 1.6 oz) 103 kg (226 lb) 103 kg (227 lb) 103 kg (227 lb 9.6 oz)    01/04/22 0534 01/05/22 0627 01/06/22 0643 01/07/22 0601   Weight: 99 kg (218 lb 4.8 oz) 95.5 kg (210 lb 8 oz) 96.2 kg (212 lb 2 oz) 97 kg (213 lb 14.4 oz)    01/08/22 0015 01/08/22 1431   Weight: 99.7 kg (219 lb 14.4 oz) 101 kg (222 lb 14.4 oz)           Patient Vitals for the past 24 hrs:   BP Temp Temp src Pulse Resp SpO2 Weight   01/09/22 0955 -- -- -- 53 16 95 % --   01/09/22 0558 120/49 96.7 °F (35.9 °C) Oral (!) 49 16 96 % --   01/08/22 2117 90/43 96.8 °F (36 °C) Oral 50 16 96 % --   01/08/22 2040 -- -- -- 52 16 95 % --   01/08/22 2037 -- -- -- 52 16 95 % --   01/08/22 1524 116/54 98.2 °F (36.8 °C) Oral (!) 45 16 96 % --   01/08/22 1431 -- -- -- -- -- -- 101 kg (222 lb 14.4 oz)   01/08/22 1220 123/62 " 97.9 °F (36.6 °C) Oral 79 16 95 % --   01/08/22 1022 -- -- -- 81 16 96 % --       101 kg (222 lb 14.4 oz)    Intake/Output                             01/07/22 0701 - 01/08/22 0700 01/08/22 0701 - 01/09/22 0700 01/09/22 0701 - 01/10/22 0700     1092-7440 6660-1413 Total 4060-9012 4810-2344 Total 2459-6924 9840-1868 Total                    Intake    P.O.  250  -- 250  1210  -- 1210  720  -- 720    Total Intake 250 -- 250 1210 -- 1210 720 -- 720       Output    Urine  --  -- --  --  700 700  200  -- 200    Total Output -- -- -- -- 700 700 200 -- 200           Intake/Output Summary (Last 24 hours) at 1/9/2022 1006  Last data filed at 1/9/2022 0900  Gross per 24 hour   Intake 1200 ml   Output 900 ml   Net 300 ml        Intake/Output Summary (Last 24 hours) at 1/9/2022 1006  Last data filed at 1/9/2022 0900  Gross per 24 hour   Intake 1200 ml   Output 900 ml   Net 300 ml        Review of Systems   Constitutional: Positive for activity change. Negative for appetite change and fatigue.   HENT: Negative for congestion.    Respiratory: Negative for cough, chest tightness, shortness of breath and wheezing.    Cardiovascular: Negative for chest pain and leg swelling.   Gastrointestinal: Negative for abdominal distention, abdominal pain, diarrhea, nausea and vomiting.   Endocrine: Negative for polyphagia and polyuria.   Genitourinary: Negative for frequency.   Skin: Positive for color change and wound. Negative for rash.   Neurological: Negative for weakness and light-headedness.   Hematological: Does not bruise/bleed easily.   Psychiatric/Behavioral: Negative for agitation and behavioral problems.       Physical Exam  Vitals and nursing note reviewed.   Constitutional:       Appearance: He is well-developed. He is obese.   HENT:      Head: Normocephalic.   Eyes:      Conjunctiva/sclera: Conjunctivae normal.   Neck:      Thyroid: No thyromegaly.      Vascular: No JVD.   Cardiovascular:      Rate and Rhythm: Regular rhythm.  Bradycardia present.      Heart sounds: Normal heart sounds. No murmur heard.      Pulmonary:      Effort: Pulmonary effort is normal. No respiratory distress.      Breath sounds: Normal breath sounds. No wheezing or rales.   Abdominal:      General: Bowel sounds are normal. There is no distension.      Palpations: Abdomen is soft.      Tenderness: There is no abdominal tenderness. There is no guarding.   Musculoskeletal:      Right lower leg: No edema.      Left lower leg: No edema.   Skin:     General: Skin is warm and dry.      Findings: No rash.      Comments: Lower extremities are in dressings   Neurological:      General: No focal deficit present.      Mental Status: He is alert and oriented to person, place, and time.         Medication Review:   I have reviewed the patient's current medication list  Scheduled Meds:apixaban, 2.5 mg, Oral, Q12H  atorvastatin, 10 mg, Oral, Nightly  budesonide-formoterol, 2 puff, Inhalation, BID - RT  bumetanide, 2 mg, Oral, Daily  cholecalciferol, 2,000 Units, Oral, Daily  citalopram, 20 mg, Oral, Nightly  docusate sodium, 100 mg, Oral, BID  fluconazole, 200 mg, Oral, Q24H  fluticasone, 2 spray, Each Nare, Daily  hydrocortisone-bacitracin-zinc oxide-nystatin, 1 application, Topical, TID  influenza vaccine, 0.5 mL, Intramuscular, Once  insulin aspart, 0-24 Units, Subcutaneous, TID AC  insulin detemir, 25 Units, Subcutaneous, Nightly  levothyroxine, 224 mcg, Oral, Q AM  linagliptin, 5 mg, Oral, Daily  metroNIDAZOLE, 500 mg, Oral, Q8H  O2, 2 L/min, Inhalation, Once  polyethylene glycol, 17 g, Oral, Daily  pregabalin, 75 mg, Oral, BID  QUEtiapine, 12.5 mg, Oral, Q PM  tamsulosin, 0.4 mg, Oral, Daily  vancomycin, 1,000 mg, Intravenous, Q24H  cyanocobalamin, 1,000 mcg, Oral, Daily      Continuous Infusions:Pharmacy to dose vancomycin,       PRN Meds:.•  acetaminophen **OR** acetaminophen **OR** acetaminophen  •  albuterol sulfate HFA  •  dextrose  •  dextrose  •  glucagon (human  recombinant)  •  magnesium sulfate **OR** magnesium sulfate **OR** magnesium sulfate  •  melatonin  •  Pharmacy to dose vancomycin      Labs:  Results from last 7 days   Lab Units 01/05/22 0427 01/03/22 0416   WBC 10*3/mm3 7.41 6.69   HEMOGLOBIN g/dL 10.0* 9.4*   HEMATOCRIT % 32.1* 30.9*   PLATELETS 10*3/mm3 243 220     Results from last 7 days   Lab Units 01/08/22 0407 01/07/22 0447 01/06/22  0431 01/05/22 0427 01/04/22 0413 01/03/22  0416   SODIUM mmol/L 135* 133* 135* 136 136 134*   POTASSIUM mmol/L 4.4 4.3 4.5 4.3 4.8 5.3*   CHLORIDE mmol/L 98 95* 96* 99 102 103   CO2 mmol/L 30.1* 28.5 30.1* 26.6 25.6 24.3   BUN mg/dL 39* 34* 34* 31* 32* 34*   CREATININE mg/dL 2.28* 2.24* 2.30* 2.08* 1.80* 1.95*   CALCIUM mg/dL 8.3* 8.3* 8.9 8.6 8.6 8.5*   GLUCOSE mg/dL 85 62* 75 92 94 97     Results from last 7 days   Lab Units 01/09/22 0416 01/08/22 0407 01/07/22 0447 01/05/22 0427 01/04/22  0413   MAGNESIUM mg/dL 2.7* 1.9 2.0 2.3 1.5*       Results from last 7 days   Lab Units 01/05/22 0427 01/03/22 0416   PLATELETS 10*3/mm3 243 220         Lab Results (last 24 hours)     Procedure Component Value Units Date/Time    POC Glucose Once [[id number]]  (Normal) Collected: 01/09/22 0724    Specimen: Blood Updated: 01/09/22 0730     Glucose 82 mg/dL      Comment: Meter: [id number] : [id number] Armaan Bharath Beltran EFREM       Magnesium [[id number]]  (Abnormal) Collected: 01/09/22 0416    Specimen: Blood Updated: 01/09/22 0509     Magnesium 2.7 mg/dL     POC Glucose Once [[id number]]  (Normal) Collected: 01/08/22 2113    Specimen: Blood Updated: 01/08/22 2120     Glucose 101 mg/dL      Comment: Meter: [id number] : [id number] Guerrero KELLY       POC Glucose Once [[id number]]  (Abnormal) Collected: 01/08/22 1720    Specimen: Blood Updated: 01/08/22 1726     Glucose 168 mg/dL      Comment: Meter: [id number] : [id number] Armaan Beltran CNA       POC Glucose Once [[id number]]  (Abnormal) Collected: 01/08/22 1224    Specimen:  Blood Updated: 01/08/22 1231     Glucose 192 mg/dL      Comment: Meter: [id number] : [id number] Madalyn BUCKNERA                           Results from last 7 days   Lab Units 01/09/22  0416 01/08/22  0407 01/07/22  0447 01/05/22  0427 01/04/22  0413   MAGNESIUM mg/dL 2.7* 1.9 2.0 2.3 1.5*                 Glucose   Date/Time Value Ref Range Status   01/09/2022 0724 82 70 - 130 mg/dL Final     Comment:     Meter: [id number] : [id number] Armaan Beltran CNA   01/08/2022 2113 101 70 - 130 mg/dL Final     Comment:     Meter: [id number] : [id number] Guerrero Keating PCA   01/08/2022 1720 168 (H) 70 - 130 mg/dL Final     Comment:     Meter: [id number] : [id number] Armaan Beltran CNA   01/08/2022 1224 192 (H) 70 - 130 mg/dL Final     Comment:     Meter: [id number] : [id number] Madalyn Barnhart CNA   01/08/2022 0835 97 70 - 130 mg/dL Final     Comment:     Meter: [id number] : [id number] Madalyn Barnhart CNA   01/08/2022 0805 60 (L) 70 - 130 mg/dL Final     Comment:     Meter: [id number] : [id number] Madalyn Barnhart CNA   01/07/2022 2156 130 70 - 130 mg/dL Final     Comment:     Meter: [id number] : [id number] Guerrero Keating PCA   01/07/2022 1656 114 70 - 130 mg/dL Final     Comment:     Meter: [id number] : [id number] Mario HARKINS         Results from last 7 days   Lab Units 01/04/22  1106 01/02/22  1856   WOUNDCX  Moderate growth (3+) Enterococcus faecalis*  Light growth (2+) Staphylococcus aureus, MRSA*  Light growth (2+) Normal Skin [facility]  --    URINECX   --  Yeast isolated*     Results from last 7 days   Lab Units 01/02/22  1856   NITRITE UA  Negative   WBC UA /HPF Too Numerous to Count*   BACTERIA UA /HPF 1+*   SQUAM EPITHEL UA /HPF Unable to determine due to loaded field*   URINECX  Yeast isolated*             Radiology:  Imaging Results (Last 24 Hours)     ** No results found for the last 24 hours. **          Cardiology:  ECG/EMG Results (last 24 hours)     ** No results found for the  last 24 hours. **                I have reviewed recent labs results and consult notes.    Please note portions of this assessment/plan may have been copied and pasted, but I have personally seen this patient and reviewed each line of this assessment and plan for accuracy and made updates to reflect my necessary changes     Assessment and Plan:  1.  Acute kidney injury chronic kidney disease stage III stable his weight was not recorded this morning there was a 10 pound weight gain yesterday if his weight today is increased we will increase his Bumex no overt edema is noted    2.  Left hip and left leg decubitus wounds debrided on 12/30/2021 wound culture show Enterococcus and MRSA-patient started on vancomycin IV  1/4/2022.  Will need 7 to 10 days of IV antibiotics may need PICC line.  I spoke with discharge planner yesterday and the note was reviewed awaiting decision from home health if they will accept the patient on discharge    3.   Congestive heart failure with preserved ejection fraction   stable appears to be euvolemic    4.    Bradycardia recurrent sreekanth off amiodarone        5.  Adult onset diabetes mellitus Accu-Chek sliding scale hypoglycemia is improved continue Accu-Chek/scale insulin    6.    Paroxysmal atrial fibrillation continue Eliquis dose adjusted to due to renal insufficiency     7.  Ischemic cardiomyopathy echo done as admission showed improvement of the ventricular function with ejection fraction 55% with severe aortic valve calcification some right atrial dilatation..     8.  Hypothyroidism TSH is elevated likely due to patient noncompliance fall continue present dose and monitor     9.  Multiple falls at home patient is clearly unable to care for himself.  Extensive discharge planning note from yesterday reviewed patient refuses nursing home placement insist on going home.  Home once arrangement made about vancomycin    10.  COPD nothing acute continue home medications     11.    Iron  deficiency anemia started on iron infusions    12.   Candida UTI on p.o. Diflucan    Much of this encounter note is an electronic transcription/translation of spoken language to printed text using Dragon Software

## 2022-01-09 NOTE — DISCHARGE PLACEMENT REQUEST
"Joshua Helton P (81 y.o. Male)             Date of Birth Social Security Number Address Home Phone MRN    1940  [id number] [facility] [address] [phone] [id number]    Bahai Marital Status             None        Admission Date Admission Type Admitting Provider Attending Provider Department, Room/Bed    12/28/21 Emergency Randolph Tovar MD Kemparajurs, Plavakeerthi, MD [facility] SURG, 1415/1    Discharge Date Discharge Disposition Discharge Destination                         Attending Provider: Randolph Tovar MD    Allergies: Morphine, Atorvastatin, Oxycontin [Oxycodone Hcl]    Isolation: Contact   Infection: MRSA (01/01/22)   Code Status: CPR   Advance Care Planning Activity    Ht: 185 cm (72.84\")   Wt: 101 kg (222 lb 14.4 oz)    Admission Cmt: None   Principal Problem: Open wound of left foot [S91.302A] More...                 Active Insurance as of 12/28/2021     Primary Coverage     Payor Plan Insurance Group Employer/Plan Group    MEDICARE MEDICARE A & B      Payor Plan Address Payor Plan Phone Number Payor Plan Fax Number Effective Dates    [address] [phone]  11/1/2006 - None Entered    [facility] [id number]       Subscriber Name Subscriber Birth Date Member ID       JOSHUA HELTON P 1940 [id number]           Secondary Coverage     Payor Plan Insurance Group Employer/Plan Group    HUMANA HUMANA [id number]     Payor Plan Address Payor Plan Phone Number Payor Plan Fax Number Effective Dates    [address] [address] [phone]  1/1/2013 - None Entered    [facility] [id number]       Subscriber Name Subscriber Birth Date Member ID       JOSHUA HELTON P 1940 [id number]                 Emergency Contacts      (Rel.) Home Phone Work Phone Mobile Phone    Jaz Grigsby (Power of ) [phone] -- [phone]    ISAIAS HELTON (Son) [phone] -- --              "
no

## 2022-01-09 NOTE — PLAN OF CARE
Goal Outcome Evaluation:  Plan of Care Reviewed With: patient        Progress: improving  Outcome Summary: vss. Pt continues on IV antibiotics. Pt dressing changes completed this shift per order.  Pt up in chair, stand pivot to chair this a.m., stand/pivot back to bed this afternoon. Pt medicated for headache with po prn pain medication, states improvement. Pt resting in bed at this time. No other complaints at this time.

## 2022-01-09 NOTE — PLAN OF CARE
Goal Outcome Evaluation:  Plan of Care Reviewed With: patient           Outcome Summary: PT: Patient performs supine to sit transfer with modified independence, sit to/from stand transfers with CGA, and stand pivot transfer with CGA x 2 with use of FWW. Patient requires minimally elevated bed surface for sit to stand transfer. B knee in flexed position in standing however no knee buckling observed with static standing or during transfer. Patient does not complain of pain, reports improvement in knee pain today. Continue to recommend 24/7 care upon discharge, if this level of care is not available recommend SNF/ECF.

## 2022-01-09 NOTE — PLAN OF CARE
Goal Outcome Evaluation:  Plan of Care Reviewed With: patient        Progress: no change  Outcome Summary: Daily dressing changes, BLE wrapped - po bumex - Assist x2 - rested well through the night - repositioned - anticipates home with Gamal

## 2022-01-09 NOTE — CASE MANAGEMENT/SOCIAL WORK
"Continued Stay Note  MICHAEL Kincaid     Patient Name: Froilan Helton  MRN: [id number]  Today's Date: 1/9/2022    Admit Date: 12/28/2021     Discharge Plan     Row Name 01/09/22 1717       Plan    Plan Home with Gamal CARABALLO    Patient/Family in Agreement with Plan yes    Plan Comments Spoke with patient today regarding discharge plans. Patient is resting in bed watching TV and no complaints. CM informed patient that he would need IV Vanc antibiotic for seven more days and asked if she was still interested in going to the rehab facility in [place] which he was agreeable to on his admission. Patient states \"no I don't think so anymore\". CM discussed the importance of him receiving the antibiotic for the full course to help his wounds to heal and he states \"well I can be taught to do that at my house, I did it for my wife\". CM also discussed with him that I spoke with Gamal CARABALLO yesterday and they are unsure if they can even follow him at discharge and would he consider going to the rehab facility to get help get his wounds better and then he can go home in a healthier condition. Patient states \"you know I don't need anybody to do my dressing changes I can just rub ointment on them myself\". CM discussed it not being safe for him returning home if there would not be someone there to help with his care and he states \"I don't care I'm not going anywhere but home\". Patient ended the conversation stating \"I'm done discussing my discharge plan\". CM will continue to follow for needs.               Discharge Codes    No documentation.                     Elizabeth Arguello RN    "

## 2022-01-09 NOTE — THERAPY TREATMENT NOTE
Acute Care - Physical Therapy Treatment Note  MICHAEL Kincaid     Patient Name: Froilan Helton  : 1940  MRN: [id number]  Today's Date: 2022      Visit Dx:     ICD-10-CM ICD-9-CM   1. Acute renal failure superimposed on chronic kidney disease, unspecified CKD stage, unspecified acute renal failure type ([facility])  N17.9 584.9    N18.9 585.9   2. Decubitus ulcer of trochanteric region of left hip, unspecified ulcer stage  L89.229 707.04     707.20   3. Open wound of left foot, initial encounter  S91.302A 892.0     Patient Active Problem List   Diagnosis   • COPD (chronic obstructive pulmonary disease) ([facility])   • Type 2 diabetes mellitus with stage 4 chronic kidney disease, with long-term current use of insulin ([facility])   • Essential hypertension   • Primary osteoarthritis of left knee   • Chronic renal insufficiency, stage 4 (severe) ([facility])   • Class 2 severe obesity due to excess calories with serious comorbidity in adult ([facility])   • Physical debility   • Acute UTI (urinary tract infection)   • Permanent atrial fibrillation ([facility])   • H/O noncompliance with medical treatment, presenting hazards to health   • Myxedema   • Acute on chronic combined systolic and diastolic CHF (congestive heart failure) ([facility])   • Generalized weakness   • Recurrent left pleural effusion   • Fall on same level as cause of accidental injury   • Gross hematuria   • CAD (coronary artery disease)   • HLD (hyperlipidemia)   • Hypothyroidism   • CKD (chronic kidney disease) stage 3, GFR 30-59 ml/min ([facility])   • Acute metabolic encephalopathy   • Cardiomyopathy ([facility])   • Recurrent falls   • Acute renal failure superimposed on chronic kidney disease ([facility])   • Open wound of left foot   • Moderate malnutrition ([facility])     Past Medical History:   Diagnosis Date   • A-fib ([facility])    • Arthritis    • Asthma    • CAD (coronary artery disease)    • Cardiomyopathy ([facility])    • Cataract    • CHF (congestive heart failure) ([facility])    • CKD (chronic kidney disease),  stage III ([facility])    • COPD (chronic obstructive pulmonary disease) ([facility])    • Diabetes mellitus ([facility])    • Disease of thyroid gland    • Emphysema, unspecified ([facility])    • Glaucoma    • Hyperlipidemia    • Hypertension    • Kidney stone    • Myocardial infarct, old    • Neuropathy    • Osteoarthritis    • Osteoporosis    • Permanent atrial fibrillation ([facility]) 6/30/2020   • PVD (peripheral vascular disease) ([facility])    • Renal disorder    • Shingles      Past Surgical History:   Procedure Laterality Date   • COLONOSCOPY     • EYE SURGERY     • INCISION AND DRAINAGE LEG Left 12/30/2021    Procedure: debridement of left hip wounds and left foot wound;  Surgeon: Judie Aguero DO;  Location: [facility];  Service: General;  Laterality: Left;   • JOINT REPLACEMENT      right   • KIDNEY STONE SURGERY     • REPLACEMENT TOTAL KNEE     • TOE SURGERY       PT Assessment (last 12 hours)     PT Evaluation and Treatment     Row Name 01/09/22 0930          Physical Therapy Time and Intention    Subjective Information no complaints  -BP     Document Type therapy note (daily note)  -BP     Mode of Treatment physical therapy  -BP     Patient Effort adequate  -BP     Symptoms Noted During/After Treatment none  -BP     Comment Patient reports his knees are feeling better today  -BP     Row Name 01/09/22 0930          General Information    Patient/Family/Caregiver Comments/Observations Patient supine in bed with HOB elevated. Patient agreeable to PT treatment  -BP     Existing Precautions/Restrictions fall  -BP     Risks Reviewed patient:; LOB; increased discomfort  -BP     Benefits Reviewed patient:; improve function; increase independence; increase strength  -BP     Barriers to Rehab previous functional deficit  -BP     Row Name 01/09/22 0930          Pain Scale: Word Pre/Post-Treatment    Pre/Posttreatment Pain Comment Patient reports improvement in knee pain. Does not complain of pain today  -BP     Row Name 01/09/22 0930           Bed Mobility    Supine-Sit Barton (Bed Mobility) modified independence  -BP     Assistive Device (Bed Mobility) bed rails; head of bed elevated  -BP     Comment (Bed Mobility) Patient performs without cues or extended time  -BP     Row Name 01/09/22 0930          Transfers    Transfers sit-stand transfer; stand-sit transfer  -BP     Comment (Transfers) Verbal cues for hand placement required. Patient performs sit to/from stand from elevated bed surface with CGA and stand pivot bed to chair with CGA with cues for safety. B knees in flexed position however no buckling noted.  -BP     Sit-Stand Barton (Transfers) contact guard; verbal cues  -BP     Stand-Sit Barton (Transfers) contact guard; verbal cues  -BP     Row Name 01/09/22 0930          Sit-Stand Transfer    Assistive Device (Sit-Stand Transfers) walker, front-wheeled  -BP     Row Name 01/09/22 0930          Stand-Sit Transfer    Assistive Device (Stand-Sit Transfers) walker, front-wheeled  -BP     Row Name 01/09/22 0930          Stand Pivot/Stand Step Transfer    Stand Pivot/Stand Step Barton (Transfers) contact guard; verbal cues  -BP     Assistive Device (Stand Pivot Stand Step Transfer) walker, front-wheeled  -BP     Row Name 01/09/22 0930          Gait/Stairs (Locomotion)    Comment (Gait/Stairs) transfers only  -BP     Row Name 01/09/22 0930          Safety Issues, Functional Mobility    Safety Issues Affecting Function (Mobility) at risk behavior observed; insight into deficits/self-awareness  -BP     Row Name 01/09/22 0930          Balance    Comment, Balance sitting balance with supervision  -BP     Row Name             Wound 12/28/21 1800 Right medial leg Blisters    Wound - Properties Group Placement Date: 12/28/21  -LC Placement Time: 1800  -LC Present on Hospital Admission: Y  -LC Side: Right  -LC Orientation: medial  -LC Location: leg  -LC Primary Wound Type: Blisters  -LC     Retired Wound - Properties Group Date first  assessed: 12/28/21  -LC Time first assessed: 1800  -LC Present on Hospital Admission: Y  -LC Side: Right  -LC Location: leg  -LC Primary Wound Type: Blisters  -LC     Row Name             Wound 12/28/21 1802 Left medial leg Blisters    Wound - Properties Group Placement Date: 12/28/21  -LC Placement Time: 1802 -LC Present on Hospital Admission: Y  -LC Side: Left  -LC Orientation: medial  -LC Location: leg  -LC Primary Wound Type: Blisters  -LC     Retired Wound - Properties Group Date first assessed: 12/28/21  -LC Time first assessed: 1802 -LC Present on Hospital Admission: Y  -LC Side: Left  -LC Location: leg  -LC Primary Wound Type: Blisters  -LC     Row Name             Wound 12/28/21 1803 Left medial foot Other (comment)    Wound - Properties Group Placement Date: 12/28/21  -LC Placement Time: 1803  -LC Present on Hospital Admission: Y  -LC Side: Left  -LC Orientation: medial  -LC Location: foot  -LC Primary Wound Type: Other  -LC     Retired Wound - Properties Group Date first assessed: 12/28/21  -LC Time first assessed: 1803 -LC Present on Hospital Admission: Y  -LC Side: Left  -LC Location: foot  -LC Primary Wound Type: Other  -LC     Row Name             Wound 12/28/21 1803 Left lateral thigh Traumatic    Wound - Properties Group Placement Date: 12/28/21  -LC Placement Time: 1803  -LC Present on Hospital Admission: Y  -LC Side: Left  -LC Orientation: lateral  -LC Location: thigh  -LC Primary Wound Type: Traumatic  -LC     Retired Wound - Properties Group Date first assessed: 12/28/21  -LC Time first assessed: 1803 -LC Present on Hospital Admission: Y  -LC Side: Left  -LC Location: thigh  -LC Primary Wound Type: Traumatic  -LC     Row Name             Wound 12/29/21 Right anterior greater trochanter    Wound - Properties Group Placement Date: 12/29/21  -RH Side: Right  -RH Orientation: anterior  -RH, LEFT Hip dressing with notable old  drainage on dressing  Location: greater trochanter  -RH     Retired  Wound - Properties Group Date first assessed: 12/29/21  -RH Side: Right  -RH Location: greater trochanter  -RH     Row Name             Wound 12/31/21 2247 perineum    Wound - Properties Group Placement Date: 12/31/21  -AB Placement Time: 2247 -AB Location: perineum  -AB     Retired Wound - Properties Group Date first assessed: 12/31/21  -AB Time first assessed: 2247 -AB Location: perineum  -AB     Row Name 01/09/22 0930          Plan of Care Review    Plan of Care Reviewed With patient  -BP     Outcome Summary PT: Patient performs supine to sit transfer with modified independence, sit to/from stand transfers with CGA, and stand pivot transfer with CGA x 2 with use of FWW. Patient requires minimally elevated bed surface for sit to stand transfer. B knee in flexed position in standing however no knee buckling observed with static standing or during transfer. Patient does not complain of pain, reports improvement in knee pain today. Continue to recommend 24/7 care upon discharge, if this level of care is not available recommend SNF/ECF.  -BP     Row Name 01/09/22 0930          Positioning and Restraints    Pre-Treatment Position in bed  -BP     Post Treatment Position chair  -BP     In Chair notified nsg; reclined; call light within reach; encouraged to call for assist  -BP     Row Name 01/09/22 0930          Progress Summary (PT)    Progress Toward Functional Goals (PT) progress toward functional goals is gradual  -BP     Row Name 01/09/22 0930          Therapy Plan Review/Discharge Plan (PT)    Therapy Plan Review (PT) patient; participants included; risks/benefits reviewed; current/potential barriers reviewed  -BP           User Key  (r) = Recorded By, (t) = Taken By, (c) = Cosigned By    Initials Name Provider Type    RH Simeon Zuniga, RN Registered Nurse    Carole Taylor RN, CWON Registered Nurse    BP Chloe Coffman, PT Physical Therapist    AB Aaliyah Edwards RN Registered Nurse                 Physical Therapy Education                 Title: PT OT SLP Therapies (Done)     Topic: Physical Therapy (Done)     Point: Mobility training (Done)     Learning Progress Summary           Patient Acceptance, E,TB, VU,NR by  at 1/9/2022 1001      Show all documentation for this point (6)                 Point: Home exercise program (Done)     Learning Progress Summary           Patient Acceptance, E,TB, VU,DU by TF at 1/3/2022 1607      Show all documentation for this point (1)                 Point: Body mechanics (Done)     Learning Progress Summary           Patient Acceptance, E,TB, VU,DU by TF at 1/3/2022 1607                   Point: Precautions (Done)     Learning Progress Summary           Patient Acceptance, E,TB, VU,DU by TF at 1/3/2022 1607                               User Key     Initials Effective Dates Name Provider Type Discipline     06/16/21 -  Chloe Coffman, PT Physical Therapist PT    TF 06/16/21 -  Katt Knight RN Registered Nurse Nurse              PT Recommendation and Plan  Anticipated Discharge Disposition (PT): home with 24/7 care, skilled nursing facility, extended care facility  Progress Summary (PT)  Progress Toward Functional Goals (PT): progress toward functional goals is gradual  Plan of Care Reviewed With: patient  Outcome Summary: PT: Patient performs supine to sit transfer with modified independence, sit to/from stand transfers with CGA, and stand pivot transfer with CGA x 2 with use of FWW. Patient requires minimally elevated bed surface for sit to stand transfer. B knee in flexed position in standing however no knee buckling observed with static standing or during transfer. Patient does not complain of pain, reports improvement in knee pain today. Continue to recommend 24/7 care upon discharge, if this level of care is not available recommend SNF/ECF.   Outcome Measures     Row Name 01/09/22 0930 01/07/22 1300          How much help from another person do you  currently need...    Turning from your back to your side while in flat bed without using bedrails? 4  -BP 3  -JW     Moving from lying on back to sitting on the side of a flat bed without bedrails? 4  -BP 3  -JW     Moving to and from a bed to a chair (including a wheelchair)? 3  -BP 3  -JW     Standing up from a chair using your arms (e.g., wheelchair, bedside chair)? 3  -BP 3  -JW     Climbing 3-5 steps with a railing? 1  -BP 1  -JW     To walk in hospital room? 1  -BP 1  -JW     AM-PAC 6 Clicks Score (PT) 16  -BP 14  -JW            Functional Assessment    Outcome Measure Options AM-PAC 6 Clicks Basic Mobility (PT)  -BP AM-PAC 6 Clicks Basic Mobility (PT)  -JW           User Key  (r) = Recorded By, (t) = Taken By, (c) = Cosigned By    Initials Name Provider Type    Chloe Harmon, PT Physical Therapist    Zoila Cazares, LORENZO Physical Therapist                 Time Calculation:    PT Charges     Row Name 01/09/22 1002             Time Calculation    Start Time 0935  -BP      Stop Time 0950  -BP      Time Calculation (min) 15 min  -BP      PT Received On 01/09/22  -BP      PT - Next Appointment 01/10/22  -BP              Timed Charges    23345 - PT Therapeutic Exercise Minutes 15  -BP              Total Minutes    Timed Charges Total Minutes 15  -BP       Total Minutes 15  -BP            User Key  (r) = Recorded By, (t) = Taken By, (c) = Cosigned By    Initials Name Provider Type    BP Chloe Coffman, PT Physical Therapist              Therapy Charges for Today     Code Description Service Date Service Provider Modifiers Qty    22078419057 HC PT THER PROC EA 15 MIN 1/9/2022 Chloe Coffman, PT GP 1          PT G-Codes  Outcome Measure Options: AM-PAC 6 Clicks Basic Mobility (PT)  AM-PAC 6 Clicks Score (PT): 16  AM-PAC 6 Clicks Score (OT): 16    Chloe Coffman PT  1/9/2022

## 2022-01-09 NOTE — PLAN OF CARE
Goal Outcome Evaluation:  Plan of Care Reviewed With: patient           Outcome Summary: OT: Pt agreeable to participation with therapy this am, and he demonstrated functional improvement with transfers. Pt managed bed mobility independently using a bed rail for support with HOB elevated. Pt managed sitting balance at EOB independently. Pt performed a stand step pivot transfer from bed to a recliner with CGA using a rolling walker for support. Pt needs significant assistance to manage his basic self care tasks. Pt states his plan is to return home with family suppoprt/HH services. Rec 24 hour care. Pt could benefit from SNF/ECF due to his history of falls at home and need for wound care.

## 2022-01-09 NOTE — THERAPY TREATMENT NOTE
Patient Name: Froilan Helton  : 1940    MRN: [id number]                              Today's Date: 2022       Admit Date: 2021    Visit Dx:     ICD-10-CM ICD-9-CM   1. Acute renal failure superimposed on chronic kidney disease, unspecified CKD stage, unspecified acute renal failure type ([facility])  N17.9 584.9    N18.9 585.9   2. Decubitus ulcer of trochanteric region of left hip, unspecified ulcer stage  L89.229 707.04     707.20   3. Open wound of left foot, initial encounter  S91.302A 892.0     Patient Active Problem List   Diagnosis   • COPD (chronic obstructive pulmonary disease) ([facility])   • Type 2 diabetes mellitus with stage 4 chronic kidney disease, with long-term current use of insulin ([facility])   • Essential hypertension   • Primary osteoarthritis of left knee   • Chronic renal insufficiency, stage 4 (severe) ([facility])   • Class 2 severe obesity due to excess calories with serious comorbidity in adult ([facility])   • Physical debility   • Acute UTI (urinary tract infection)   • Permanent atrial fibrillation ([facility])   • H/O noncompliance with medical treatment, presenting hazards to health   • Myxedema   • Acute on chronic combined systolic and diastolic CHF (congestive heart failure) ([facility])   • Generalized weakness   • Recurrent left pleural effusion   • Fall on same level as cause of accidental injury   • Gross hematuria   • CAD (coronary artery disease)   • HLD (hyperlipidemia)   • Hypothyroidism   • CKD (chronic kidney disease) stage 3, GFR 30-59 ml/min ([facility])   • Acute metabolic encephalopathy   • Cardiomyopathy ([facility])   • Recurrent falls   • Acute renal failure superimposed on chronic kidney disease ([facility])   • Open wound of left foot   • Moderate malnutrition ([facility])     Past Medical History:   Diagnosis Date   • A-fib ([facility])    • Arthritis    • Asthma    • CAD (coronary artery disease)    • Cardiomyopathy ([facility])    • Cataract    • CHF (congestive heart failure) ([facility])    • CKD (chronic kidney disease), stage III  ([facility])    • COPD (chronic obstructive pulmonary disease) ([facility])    • Diabetes mellitus ([facility])    • Disease of thyroid gland    • Emphysema, unspecified ([facility])    • Glaucoma    • Hyperlipidemia    • Hypertension    • Kidney stone    • Myocardial infarct, old    • Neuropathy    • Osteoarthritis    • Osteoporosis    • Permanent atrial fibrillation (HCC) 6/30/2020   • PVD (peripheral vascular disease) ([facility])    • Renal disorder    • Shingles      Past Surgical History:   Procedure Laterality Date   • COLONOSCOPY     • EYE SURGERY     • INCISION AND DRAINAGE LEG Left 12/30/2021    Procedure: debridement of left hip wounds and left foot wound;  Surgeon: Judie Aguero DO;  Location: [facility];  Service: General;  Laterality: Left;   • JOINT REPLACEMENT      right   • KIDNEY STONE SURGERY     • REPLACEMENT TOTAL KNEE     • TOE SURGERY        General Information     Row Name 01/09/22 1029          OT Time and Intention    Document Type therapy note (daily note)  -SD     Mode of Treatment occupational therapy  -SD     Row Name 01/09/22 1029          General Information    Existing Precautions/Restrictions fall  -SD     Row Name 01/09/22 1029          Cognition    Orientation Status (Cognition) oriented x 3  -SD     Row Name 01/09/22 1029          Safety Issues, Functional Mobility    Impairments Affecting Function (Mobility) balance; strength  -SD           User Key  (r) = Recorded By, (t) = Taken By, (c) = Cosigned By    Initials Name Provider Type    SD Sebastian Wright OTR Occupational Therapist                 Mobility/ADL's     Row Name 01/09/22 1029          Bed Mobility    Bed Mobility supine-sit  -SD     Supine-Sit Martinez (Bed Mobility) modified independence  -SD     Assistive Device (Bed Mobility) bed rails; head of bed elevated  -SD     Row Name 01/09/22 1029          Transfers    Transfers sit-stand transfer  -SD     Sit-Stand Martinez (Transfers) contact guard; verbal cues  -SD     Row Name 01/09/22  1029          Sit-Stand Transfer    Assistive Device (Sit-Stand Transfers) walker, front-wheeled  -SD     Row Name 01/09/22 1029          Functional Mobility    Functional Mobility- Ind. Level contact guard assist  -SD     Functional Mobility- Device rolling walker  -SD     Functional Mobility- Comment pt performed stand step pivot transfer from EOB (bed surface elevated) to a recliner. pt states his mobility at home islimited to just stand pivot transfers  -SD     Row Name 01/09/22 1029          Lower Body Dressing Assessment/Training    Galt Level (Lower Body Dressing) don; socks; dependent (less than 25% patient effort)  -SD     Position (Lower Body Dressing) supine  -SD           User Key  (r) = Recorded By, (t) = Taken By, (c) = Cosigned By    Initials Name Provider Type    Sebastian Greene, OTR Occupational Therapist               Obj/Interventions    No documentation.                Goals/Plan     Row Name 01/09/22 1039          Transfer Goal 1 (OT)    Activity/Assistive Device (Transfer Goal 1, OT) sit-to-stand/stand-to-sit; walker, rolling  -SD     Galt Level/Cues Needed (Transfer Goal 1, OT) moderate assist (50-74% patient effort)  -SD     Progress/Outcome (Transfer Goal 1, OT) goal met; goal revised this date  to CGA from EOB and from recliner.  -SD     Row Name 01/09/22 1039          ROM Goal 1 (OT)    ROM Goal 1 (OT) Pt to participate in BUE HEP to address functional strength needed for basic adl tasks.  -SD     Time Frame (ROM Goal 1, OT) by discharge  -SD     Progress/Outcome (ROM Goal 1, OT) goal ongoing  -SD     Row Name 01/09/22 1039          Therapy Assessment/Plan (OT)    Planned Therapy Interventions (OT) patient/caregiver education/training; transfer/mobility retraining; BADL retraining  -SD           User Key  (r) = Recorded By, (t) = Taken By, (c) = Cosigned By    Initials Name Provider Type    Sebastian Greene OTR Occupational Therapist               Clinical  Impression     Row Name 01/09/22 1031          Pain Scale: Numbers Pre/Post-Treatment    Pre/Posttreatment Pain Comment pt did not c/o pain this am.  -SD     Row Name 01/09/22 1031          Plan of Care Review    Plan of Care Reviewed With patient  -SD     Outcome Summary OT: Pt agreeable to participation with therapy this am, and he demonstrated functional improvement with transfers. Pt managed bed mobility independently using a bed rail for support with HOB elevated. Pt managed sitting balance at EOB independently. Pt performed a stand step pivot transfer from bed to a recliner with CGA using a rolling walker for support. Pt needs significant assistance to manage his basic self care tasks. Pt states his plan is to return home with family suppoprt/HH services. Rec 24 hour care. Pt could benefit from SNF/ECF due to his history of falls at home and need for wound care.  -SD     Row Name 01/09/22 1031          Therapy Plan Review/Discharge Plan (OT)    Anticipated Discharge Disposition (OT) --  pt states he is returning home with family support/HH services. 24 hour care rec. Rec SNF/ECF for pt safety due to history of falls and wound care  -SD     Row Name 01/09/22 1031          Positioning and Restraints    Pre-Treatment Position in bed  -SD     Post Treatment Position chair  -SD     In Chair notified nsg; reclined; call light within reach; encouraged to call for assist  -SD           User Key  (r) = Recorded By, (t) = Taken By, (c) = Cosigned By    Initials Name Provider Type    Sebastian Greene, OTR Occupational Therapist               Outcome Measures     Row Name 01/09/22 1041          How much help from another is currently needed...    Putting on and taking off regular lower body clothing? 2  -SD     Bathing (including washing, rinsing, and drying) 2  -SD     Toileting (which includes using toilet bed pan or urinal) 2  -SD     Putting on and taking off regular upper body clothing 3  -SD     Taking care of  personal grooming (such as brushing teeth) 3  -SD     Eating meals 4  -SD     AM-PAC 6 Clicks Score (OT) 16  -SD     Row Name 01/09/22 0930          How much help from another person do you currently need...    Turning from your back to your side while in flat bed without using bedrails? 4  -BP     Moving from lying on back to sitting on the side of a flat bed without bedrails? 4  -BP     Moving to and from a bed to a chair (including a wheelchair)? 3  -BP     Standing up from a chair using your arms (e.g., wheelchair, bedside chair)? 3  -BP     Climbing 3-5 steps with a railing? 1  -BP     To walk in hospital room? 1  -BP     AM-PAC 6 Clicks Score (PT) 16  -BP     Row Name 01/09/22 0930          Functional Assessment    Outcome Measure Options AM-PAC 6 Clicks Basic Mobility (PT)  -BP           User Key  (r) = Recorded By, (t) = Taken By, (c) = Cosigned By    Initials Name Provider Type    Sebastian Greene OTR Occupational Therapist    BP Chloe Coffman, PT Physical Therapist                Occupational Therapy Education                 Title: PT OT SLP Therapies (Done)     Topic: Occupational Therapy (Done)     Point: ADL training (Done)     Description:   Instruct learner(s) on proper safety adaptation and remediation techniques during self care or transfers.   Instruct in proper use of assistive devices.              Learning Progress Summary           Patient Acceptance, E,TB,D, VU,DU by SD at 1/9/2022 1039    Comment: Education regarding safety with bed mobillity and transfers.      Show all documentation for this point (6)                 Point: Precautions (Done)     Description:   Instruct learner(s) on prescribed precautions during self-care and functional transfers.              Learning Progress Summary           Patient Acceptance, E,TB, VU,DU by TF at 1/3/2022 1607      Show all documentation for this point (1)                 Point: Body mechanics (Done)     Description:   Instruct learner(s)  on proper positioning and spine alignment during self-care, functional mobility activities and/or exercises.              Learning Progress Summary           Patient Acceptance, E,TB, VU,DU by TF at 1/3/2022 1607      Show all documentation for this point (1)                             User Key     Initials Effective Dates Name Provider Type Discipline    SD 06/16/21 -  Sebastian Wright OTR Occupational Therapist OT    TF 06/16/21 -  Katt Knight, RN Registered Nurse Nurse              OT Recommendation and Plan  Planned Therapy Interventions (OT): patient/caregiver education/training, transfer/mobility retraining, BADL retraining  Therapy Frequency (OT): 3 times/wk  Plan of Care Review  Plan of Care Reviewed With: patient  Outcome Summary: OT: Pt agreeable to participation with therapy this am, and he demonstrated functional improvement with transfers. Pt managed bed mobility independently using a bed rail for support with HOB elevated. Pt managed sitting balance at EOB independently. Pt performed a stand step pivot transfer from bed to a recliner with CGA using a rolling walker for support. Pt needs significant assistance to manage his basic self care tasks. Pt states his plan is to return home with family suppoprt/HH services. Rec 24 hour care. Pt could benefit from SNF/ECF due to his history of falls at home and need for wound care.     Time Calculation:    Time Calculation- OT     Row Name 01/09/22 1028             Time Calculation- OT    OT Start Time 0934  -SD              Timed Charges    54892 - OT Therapeutic Activity Minutes 15  -SD              Total Minutes    Timed Charges Total Minutes 15  -SD       Total Minutes 15  -SD            User Key  (r) = Recorded By, (t) = Taken By, (c) = Cosigned By    Initials Name Provider Type    SD Sebastian Wright OTR Occupational Therapist              Therapy Charges for Today     Code Description Service Date Service Provider Modifiers Qty    [id number]  OT  THERAPEUTIC ACT EA 15 MIN 1/9/2022 Sebastian Wright, OTR GO 1               BETH Melgar  1/9/2022

## 2022-01-10 NOTE — CASE MANAGEMENT/SOCIAL WORK
Continued Stay Note  MICHAEL Kincaid     Patient Name: Froilan Helton  MRN: [id number]  Today's Date: 1/10/2022    Admit Date: 12/28/2021     Discharge Plan     Row Name 01/10/22 1421       Plan    Plan Discharge home    Plan Comments I spoke with Margarita from [facility] and she advised that [facility] will not be able to accept the patient back due concerns regarding his safety at home.  CM will continue to follow.               Discharge Codes    No documentation.                     Terri Martinez RN

## 2022-01-10 NOTE — PLAN OF CARE
Goal Outcome Evaluation:  Plan of Care Reviewed With: patient        Progress: no change  Outcome Summary: VSS - pt finally had bowel movements today -  kamron leg dressings and left hip dressing (old drainage noted) in place - rested well - pt requested to sit in recliner around 0430 - three attempts made with three people and gait belt to pivot transfer from bed to recliner, pt unable to sit straight (states he has never been able to to straighten), pt unable to move his feet/legs at all, placed pt back in bed - pt states he wants to work with PT, it was speech therapy that he didn't want to work with

## 2022-01-10 NOTE — CASE MANAGEMENT/SOCIAL WORK
Continued Stay Note  MICHAEL Kincaid     Patient Name: Froilan Helton  MRN: [id number]  Today's Date: 1/10/2022    Admit Date: 12/28/2021     Discharge Plan     Row Name 01/10/22 1424       Plan    Plan Comments I placed referrals to [facility] and [facility]. CM awaiting response.                                     Discharge Codes    No documentation.                     Terri Martinez RN

## 2022-01-10 NOTE — PROGRESS NOTES
[facility] Progress Note      Patient Name: Froilan Helton  : 1940  MRN: [id number]  Primary Care Physician:  Fortunato De Leon MD  Date of admission: 2021    Subjective     Interval History:   Feels fine  No shortness of breath on room air  Leg swelling stable to better than last week  Appetite is fine; no N/V    Review of Systems:   14 point review of systems is otherwise negative except for mentioned above on HPI    Objective     Vitals:   Temp:  [96.9 °F (36.1 °C)-97.8 °F (36.6 °C)] 97.3 °F (36.3 °C)  Heart Rate:  [47-56] 50  Resp:  [16-18] 18  BP: ()/(43-65) 129/65    Intake/Output Summary (Last 24 hours) at 1/10/2022 1218  Last data filed at 1/10/2022 1100  Gross per 24 hour   Intake 600 ml   Output 250 ml   Net 350 ml       Physical Exam:    General Appearance: alert, appropriate, pale, NAD, chronically ill  Psychiatric: Flat affect  Skin: warm and dry  HEENT: oral mucosa normal, nonicteric sclera  Neck: supple, no JVD  Lungs: Crackles in bases but no wheezes; not labored on room air  Heart: Irregularly irregular, bradycardic, 2/6M  Abdomen: soft, nontender, nondistended, BS +  : no palpable bladder  Extremities: Trace to 1+ edema, lower legs wrapped  Neuro: normal speech and mental status; flat affect    Scheduled Meds:     apixaban, 2.5 mg, Oral, Q12H  atorvastatin, 10 mg, Oral, Nightly  budesonide-formoterol, 2 puff, Inhalation, BID - RT  bumetanide, 2 mg, Oral, BID  cholecalciferol, 2,000 Units, Oral, Daily  citalopram, 20 mg, Oral, Nightly  docusate sodium, 100 mg, Oral, BID  fluticasone, 2 spray, Each Nare, Daily  hydrocortisone-bacitracin-zinc oxide-nystatin, 1 application, Topical, TID  influenza vaccine, 0.5 mL, Intramuscular, Once  insulin aspart, 0-24 Units, Subcutaneous, TID AC  insulin detemir, 25 Units, Subcutaneous, Nightly  iron polysaccharides, 150 mg, Oral, Daily  levothyroxine, 224 mcg, Oral, Q AM  linagliptin, 5 mg, Oral,  Daily  metroNIDAZOLE, 500 mg, Oral, Q8H  O2, 2 L/min, Inhalation, Once  polyethylene glycol, 17 g, Oral, Daily  pregabalin, 75 mg, Oral, BID  QUEtiapine, 12.5 mg, Oral, Q PM  sodium chloride, 10 mL, Intravenous, Q12H  sodium chloride, 10 mL, Intravenous, Q12H  tamsulosin, 0.4 mg, Oral, Daily  vancomycin, 1,000 mg, Intravenous, Q24H  cyanocobalamin, 1,000 mcg, Oral, Daily      IV Meds:   Pharmacy to dose vancomycin,         Results Reviewed:   I have personally reviewed the results from the time of this admission to 1/10/2022 12:18 EST     Results from last 7 days   Lab Units 01/10/22  0730 01/08/22 0407 01/07/22 0447   SODIUM mmol/L 130* 135* 133*   POTASSIUM mmol/L 4.5 4.4 4.3   CHLORIDE mmol/L 95* 98 95*   CO2 mmol/L 25.5 30.1* 28.5   BUN mg/dL 38* 39* 34*   CREATININE mg/dL 2.14* 2.28* 2.24*   CALCIUM mg/dL 8.3* 8.3* 8.3*   GLUCOSE mg/dL 125* 85 62*     Estimated Creatinine Clearance: 34.5 mL/min (A) (by C-G formula based on SCr of 2.14 mg/dL (H)).  Results from last 7 days   Lab Units 01/10/22  0730 01/09/22  0416 01/08/22  0407 01/07/22  0447   MAGNESIUM mg/dL  --  2.7* 1.9 2.0   PHOSPHORUS mg/dL 3.2  --  3.7 3.3         Results from last 7 days   Lab Units 01/05/22  0427   WBC 10*3/mm3 7.41   HEMOGLOBIN g/dL 10.0*   PLATELETS 10*3/mm3 243           Assessment / Plan     ASSESSMENT:  1.  ROLA/CKD3, nonoliguric, stable:  prerenal due to CHF.  Peripheral edema present but improving; hypervolemic hyponatremia, worse; normal K   2.  Chronic CHF  3.  Edema-due to venous stasis  4.  Leg wounds  5.  PAF    PLAN:  1.  Oral bumetanide 2 mg twice daily for now.    2.  Add fluid restriction, 1200 mL daily  3.  Discharge anytime from renal view        Thank you for involving us in the care of Froilan Helton.  Please feel free to call with any questions.    Ellis Centeno MD  01/10/22  12:18 [facility]    [facility]  [phone]

## 2022-01-10 NOTE — NURSING NOTE
01/10/22 1701   Wound 12/28/21 1803 Left lateral thigh Traumatic   Placement Date/Time: 12/28/21 1803   Present on Hospital Admission: Yes  Side: Left  Orientation: lateral  Location: thigh  Primary Wound Type: Traumatic   Dressing Appearance intact; moist drainage   Closure None   Base subcutaneous; yellow; slough; red; moist; non-granulating   Periwound yeast; redness; blanchable   Drainage Characteristics/Odor tan; yellow   Drainage Amount moderate   Care, Wound cleansed with; wound cleanser   Dressing Care dressing changed; hydrofiber; other (see comments)  (Opticell sheet, ABD pad, silk tape)   Periwound Care dry periwound area maintained; barrier ointment applied; other (see comments)  (Yaima's Magic barrier applied)   Safety   Safety Factors wheels locked; upper side rails raised x 2; ID band on; call light in reach; bed in low position   Airway Safety Measures mask at bedside   Infection Prevention single patient room provided   Isolation Precautions contact precautions maintained; droplet precautions maintained   Additional Documentation Safety Precautions (Row)   Safety Management   Safety Promotion/Fall Prevention assistive device/personal items within reach; clutter free environment maintained; nonskid shoes/slippers when out of bed; safety round/check completed   Daily Care   Activity Management activity adjusted per tolerance   Symptoms Noted During/After Activity none   Positioning   Body Position weight shift assistance provided   Head of Bed (HOB) HOB elevated   Positioning/Transfer Devices pillows; in use     CWON follow up. Exact dc plans still pending but home likely with HH or with family to provide care. Will address BLE wound care in am once a definite plan is established.    Left hip wound continues with moderate drainage. Will dc the [place] and use only the hydrofiber for today's dsg change. Dr. Aguero to see tomorrow and I would like her input on the most distal wound as it still  appears to have non-viable tissue to base that I would like to see less of. Switching to Santyl ointment may be a good option at this point but hesitant to begin an expensive topical agent with dc plans pending and pt verbalizing threats of leaving AMA. Will discuss with Dr. Aguero once she examines patient.  All discussed with RAVI Guadalupe.  Please call with any questions.

## 2022-01-10 NOTE — NURSING NOTE
"1515:  Arrived in patient room to explain PICC procedure and obtain consent.  Patient stated he would \"not let me place one of those long lines\" in his arm.  Stated he had a PICC in the past and developed arm swelling, \"clot in my arm\" Explained to patient the need for IV antibiotics for at least 10 days.  Tried to convince patient to maybe consent to a midline catheter, explaining what it is.  Patient continued to refuse care.  Relayed all this to patient's nurse, Flaca Fernandez RN  "

## 2022-01-10 NOTE — PLAN OF CARE
"Goal Outcome Evaluation:  Plan of Care Reviewed With: patient           Outcome Summary: PT: Patient agrees to therapy.  Patient performs sit to stand from recliner surface with mod/max assist.  Patient performs stand pivot with rolling walker from recliner to bed surface with min assist.  Patient with bilateral knee flexion and forward flexed posture throughout transfer.  Patient is accustomed to lift mechanism to assist with standing, expected increased difficulty from lower surface heights.  Patient reports he plans to return home, stating \"I know everyone wants me to go somewhere else, but I am just not doing that.\"  Will continue to follow for therapy needs.  "

## 2022-01-10 NOTE — PROGRESS NOTES
Adult Nutrition  Assessment/PES    Patient Name:  Friolan Helton  YOB: 1940  MRN: [id number]  Admit Date:  12/28/2021    Assessment Date:  1/10/2022    Comments:  Excellent po intake 97% ave of meals. Snacks in place.  Will cont to follow and monitor.      Reason for Assessment     Row Name 01/10/22 1219          Reason for Assessment    Reason For Assessment follow-up protocol     Diagnosis renal disease  A/CKD, chronic L hip/leg wounds, CHF evolemic, DM , falls                Nutrition/Diet History     Row Name 01/10/22 1219          Nutrition/Diet History    Typical Food/Fluid Intake Spoke w pt from door, reports ate well could eat more, breakfast tray seemed lots empty dishes/containers.                Anthropometrics     Row Name 01/10/22 1220 01/10/22 0547       Anthropometrics    Weight --  224.6# 102 kg (224 lb 9.6 oz)       Body Mass Index (BMI)    BMI Assessment BMI 25-29.9: overweight --               Labs/Tests/Procedures/Meds     Row Name 01/10/22 1220          Labs/Procedures/Meds    Lab Results Reviewed reviewed     Lab Results Comments Na 130 L, glu 125 H, K 4.5 wnl, BUn 38/creat 2.1            Diagnostic Tests/Procedures    Diagnostic Test/Procedure Reviewed reviewed            Medications    Pertinent Medications Reviewed reviewed     Pertinent Medications Comments B12, miralax, tradjenta, novolog, levemir, bumex, vitamin D                Physical Findings     Row Name 01/10/22 1221          Physical Findings    Skin other (see comments)  multiple, L Hip/Leg wounds see notes                  Nutrition Prescription Ordered     Row Name 01/10/22 1221          Nutrition Prescription PO    Supplement Frequency Snack time  give high pro snack twice per day     Common Modifiers Low Potassium  chopp meats ( pt request)                Evaluation of Received Nutrient/Fluid Intake     Row Name 01/10/22 1222          Fluid Intake Evaluation    Oral Fluid (mL) 806  ave x 3, 54%            PO  Evaluation    Number of Meals 9     % PO Intake 97                     Problem/Interventions:   Problem 1     Row Name 01/10/22 1222          Nutrition Diagnoses Problem 1    Problem 1 Knowledge Deficit     Etiology (related to) MNT for Treatment/Condition     Signs/Symptoms (evidenced by) Report/Observation                Problem 2     Row Name 01/10/22 1222          Nutrition Diagnoses Problem 2    Problem 2 Malnutrition     Etiology (related to) Factors Affecting Nutrition; Medical Diagnosis     Signs/Symptoms (evidenced by) Report/Observation                    Intervention Goal     Row Name 01/10/22 1222          Intervention Goal    General Maintain nutrition     PO Maintain intake; PO intake (%)     PO Intake % 80 %  or greater     Weight No significant weight loss                Nutrition Intervention     Row Name 01/10/22 1223          Nutrition Intervention    RD/Tech Action Interview for preference; Encourage intake; Follow Tx progress                  Education/Evaluation     Row Name 01/10/22 1223          Education    Education Previous education by RD/LD; Education offered and refused            Monitor/Evaluation    Monitor Per protocol; I&O; PO intake; Pertinent labs; Weight; Symptoms; Skin status                 Electronically signed by:  Laura Varma RD  01/10/22 12:23 EST

## 2022-01-10 NOTE — THERAPY TREATMENT NOTE
Acute Care - Physical Therapy Treatment Note  MICHAEL Kincaid     Patient Name: Froilan Helton  : 1940  MRN: [id number]  Today's Date: 1/10/2022      Visit Dx:     ICD-10-CM ICD-9-CM   1. Acute renal failure superimposed on chronic kidney disease, unspecified CKD stage, unspecified acute renal failure type ([facility])  N17.9 584.9    N18.9 585.9   2. Decubitus ulcer of trochanteric region of left hip, unspecified ulcer stage  L89.229 707.04     707.20   3. Open wound of left foot, initial encounter  S91.302A 892.0     Patient Active Problem List   Diagnosis   • COPD (chronic obstructive pulmonary disease) ([facility])   • Type 2 diabetes mellitus with stage 4 chronic kidney disease, with long-term current use of insulin ([facility])   • Essential hypertension   • Primary osteoarthritis of left knee   • Chronic renal insufficiency, stage 4 (severe) ([facility])   • Class 2 severe obesity due to excess calories with serious comorbidity in adult ([facility])   • Physical debility   • Acute UTI (urinary tract infection)   • Permanent atrial fibrillation ([facility])   • H/O noncompliance with medical treatment, presenting hazards to health   • Myxedema   • Acute on chronic combined systolic and diastolic CHF (congestive heart failure) ([facility])   • Generalized weakness   • Recurrent left pleural effusion   • Fall on same level as cause of accidental injury   • Gross hematuria   • CAD (coronary artery disease)   • HLD (hyperlipidemia)   • Hypothyroidism   • CKD (chronic kidney disease) stage 3, GFR 30-59 ml/min ([facility])   • Acute metabolic encephalopathy   • Cardiomyopathy ([facility])   • Recurrent falls   • Acute renal failure superimposed on chronic kidney disease ([facility])   • Open wound of left foot   • Moderate malnutrition (CMS/[facility])     Past Medical History:   Diagnosis Date   • A-fib ([facility])    • Arthritis    • Asthma    • CAD (coronary artery disease)    • Cardiomyopathy ([facility])    • Cataract    • CHF (congestive heart failure) ([facility])    • CKD (chronic kidney disease),  stage III ([facility])    • COPD (chronic obstructive pulmonary disease) ([facility])    • Diabetes mellitus ([facility])    • Disease of thyroid gland    • Emphysema, unspecified (HCC)    • Glaucoma    • Hyperlipidemia    • Hypertension    • Kidney stone    • Myocardial infarct, old    • Neuropathy    • Osteoarthritis    • Osteoporosis    • Permanent atrial fibrillation (HCC) 6/30/2020   • PVD (peripheral vascular disease) (HCC)    • Renal disorder    • Shingles      Past Surgical History:   Procedure Laterality Date   • COLONOSCOPY     • EYE SURGERY     • INCISION AND DRAINAGE LEG Left 12/30/2021    Procedure: debridement of left hip wounds and left foot wound;  Surgeon: Judie Aguero DO;  Location: [facility];  Service: General;  Laterality: Left;   • JOINT REPLACEMENT      right   • KIDNEY STONE SURGERY     • REPLACEMENT TOTAL KNEE     • TOE SURGERY       PT Assessment (last 12 hours)     PT Evaluation and Treatment     Row Name 01/10/22 3164          Physical Therapy Time and Intention    Subjective Information complains of; pain  c/o neck pain  -     Document Type therapy note (daily note)  -     Mode of Treatment physical therapy  -     Patient Effort adequate  -     Symptoms Noted During/After Treatment none  -     Row Name 01/10/22 1303          General Information    Patient Observations alert; cooperative; agree to therapy  -     Patient/Family/Caregiver Comments/Observations pt sitting in recliner, agrees to therapy  -     Existing Precautions/Restrictions fall  -     Barriers to Rehab previous functional deficit  -     Row Name 01/10/22 1307          Cognition    Personal Safety Interventions gait belt; nonskid shoes/slippers when out of bed  -     Row Name 01/10/22 4967          Pain Scale: Word Pre/Post-Treatment    Pre/Posttreatment Pain Comment pt c/o neck pain, does not rate  -     Row Name 01/10/22 9134          Bed Mobility    Bed Mobility sit-supine  -     Sit-Supine [place] (Bed  Mobility) moderate assist (50% patient effort)  requires assistance for LEs into bed  -     Row Name 01/10/22 1305          Transfers    Transfers sit-stand transfer; stand-sit transfer  -     Comment (Transfers) verbal cues for hand placement and sequencing.  pt performs stand pivot transfer from recliner to bed with min assist once standing upright  -     Sit-Stand [place] (Transfers) moderate assist (50% patient effort); maximum assist (25% patient effort); verbal cues  -     Stand-Sit [place] (Transfers) contact guard; verbal cues  -     Row Name 01/10/22 1305          Sit-Stand Transfer    Assistive Device (Sit-Stand Transfers) walker, front-wheeled  -JW     Row Name 01/10/22 1305          Stand-Sit Transfer    Assistive Device (Stand-Sit Transfers) walker, 4-wheeled  -     Row Name 01/10/22 1305          Stand Pivot/Stand Step Transfer    Stand Pivot/Stand Step [place] (Transfers) minimum assist (75% patient effort); verbal cues  once standing upright  -     Assistive Device (Stand Pivot Stand Step Transfer) walker, front-wheeled  -     Row Name 01/10/22 1305          Safety Issues, Functional Mobility    Safety Issues Affecting Function (Mobility) insight into deficits/self-awareness  -     Row Name             Wound 12/28/21 1800 Right medial leg Blisters    Wound - Properties Group Placement Date: 12/28/21  -LC Placement Time: 1800  -LC Present on Hospital Admission: Y  -LC Side: Right  -LC Orientation: medial  -LC Location: leg  -LC Primary Wound Type: Blisters  -LC     Retired Wound - Properties Group Date first assessed: 12/28/21  -LC Time first assessed: 1800  -LC Present on Hospital Admission: Y  -LC Side: Right  -LC Location: leg  -LC Primary Wound Type: Blisters  -LC     Row Name             Wound 12/28/21 1802 Left medial leg Blisters    Wound - Properties Group Placement Date: 12/28/21  -LC Placement Time: 1802  -LC Present on Hospital Admission: Y  -LC Side: Left   -LC Orientation: medial  -LC Location: leg  -LC Primary Wound Type: Blisters  -LC     Retired Wound - Properties Group Date first assessed: 12/28/21  -LC Time first assessed: 1802  -LC Present on Hospital Admission: Y  -LC Side: Left  -LC Location: leg  -LC Primary Wound Type: Blisters  -LC     Row Name             Wound 12/28/21 1803 Left medial foot Other (comment)    Wound - Properties Group Placement Date: 12/28/21  -LC Placement Time: 1803  -LC Present on Hospital Admission: Y  -LC Side: Left  -LC Orientation: medial  -LC Location: foot  -LC Primary Wound Type: Other  -LC     Retired Wound - Properties Group Date first assessed: 12/28/21  -LC Time first assessed: 1803  -LC Present on Hospital Admission: Y  -LC Side: Left  -LC Location: foot  -LC Primary Wound Type: Other  -LC     Row Name             Wound 12/28/21 1803 Left lateral thigh Traumatic    Wound - Properties Group Placement Date: 12/28/21  -LC Placement Time: 1803  -LC Present on Hospital Admission: Y  -LC Side: Left  -LC Orientation: lateral  -LC Location: thigh  -LC Primary Wound Type: Traumatic  -LC     Retired Wound - Properties Group Date first assessed: 12/28/21  -LC Time first assessed: 1803  -LC Present on Hospital Admission: Y  -LC Side: Left  -LC Location: thigh  -LC Primary Wound Type: Traumatic  -LC     Row Name             Wound 12/29/21 Right anterior greater trochanter    Wound - Properties Group Placement Date: 12/29/21  -RH Side: Right  -RH Orientation: anterior  -RH, LEFT Hip dressing with notable old  drainage on dressing  Location: greater trochanter  -RH     Retired Wound - Properties Group Date first assessed: 12/29/21  -RH Side: Right  -RH Location: greater trochanter  -RH     Row Name             Wound 12/31/21 2247 perineum    Wound - Properties Group Placement Date: 12/31/21  -AB Placement Time: 2247 -AB Location: perineum  -AB     Retired Wound - Properties Group Date first assessed: 12/31/21  -AB Time first assessed:  "2247  -AB Location: perineum  -AB     Row Name 01/10/22 1305          Plan of Care Review    Outcome Summary PT: Patient agrees to therapy.  Patient performs sit to stand from recliner surface with mod/max assist.  Patient performs stand pivot with rolling walker from recliner to bed surface with min assist.  Patient with bilateral knee flexion and forward flexed posture throughout transfer.  Patient is accustomed to lift mechanism to assist with standing, expected increased difficulty from lower surface heights.  Patient reports he plans to return home, stating \"I know everyone wants me to go somewhere else, but I am just not doing that.\"  Will continue to follow for therapy needs.  -     Row Name 01/10/22 1305          Positioning and Restraints    Pre-Treatment Position sitting in chair/recliner  -     Post Treatment Position bed  -JW     In Bed supine; call light within reach; encouraged to call for assist  -     Row Name 01/10/22 1303          Progress Summary (PT)    Progress Toward Functional Goals (PT) progress toward functional goals is fair  -     Barriers to Overall Progress (PT) PLOF  -           User Key  (r) = Recorded By, (t) = Taken By, (c) = Cosigned By    Initials Name Provider Type     Simeon Zuniga, RN Registered Nurse    Carole Taylor, RN, CWON Registered Nurse    Zoila Cazares, PT Physical Therapist    Aaliyah John, RN Registered Nurse                Physical Therapy Education                 Title: PT OT SLP Therapies (Done)     Topic: Physical Therapy (Done)     Point: Mobility training (Done)     Learning Progress Summary           Patient Acceptance, E,TB, VU,NR by JW at 1/10/2022 1417      Show all documentation for this point (7)                 Point: Home exercise program (Done)     Learning Progress Summary           Patient Acceptance, E,TB, VU,DU by TF at 1/3/2022 1607      Show all documentation for this point (1)                 Point: Body mechanics " "(Done)     Learning Progress Summary           Patient Acceptance, E,TB, VU,DU by TF at 1/3/2022 1607                   Point: Precautions (Done)     Learning Progress Summary           Patient Acceptance, E,TB, VU,DU by TF at 1/3/2022 1607                               User Key     Initials Effective Dates Name Provider Type Discipline     06/16/21 -  Zoila Estrella, PT Physical Therapist PT     06/16/21 -  Katt Knight, RN Registered Nurse Nurse              PT Recommendation and Plan  Anticipated Discharge Disposition (PT): home with /7 care, skilled nursing facility, extended care facility  Planned Therapy Interventions (PT): balance training, bed mobility training, gait training, home exercise program, patient/family education, strengthening, transfer training  Therapy Frequency (PT): 5 times/wk  Progress Summary (PT)  Progress Toward Functional Goals (PT): progress toward functional goals is fair  Barriers to Overall Progress (PT): PLOF  Plan of Care Reviewed With: patient  Outcome Summary: PT: Patient agrees to therapy.  Patient performs sit to stand from recliner surface with mod/max assist.  Patient performs stand pivot with rolling walker from recliner to bed surface with min assist.  Patient with bilateral knee flexion and forward flexed posture throughout transfer.  Patient is accustomed to lift mechanism to assist with standing, expected increased difficulty from lower surface heights.  Patient reports he plans to return home, stating \"I know everyone wants me to go somewhere else, but I am just not doing that.\"  Will continue to follow for therapy needs.   Outcome Measures     Row Name 01/10/22 1305 01/09/22 7557          How much help from another person do you currently need...    Turning from your back to your side while in flat bed without using bedrails? 3  -JW 4  -BP     Moving from lying on back to sitting on the side of a flat bed without bedrails? 3  -JW 4  -BP     Moving to and from a " bed to a chair (including a wheelchair)? 3  -JW 3  -BP     Standing up from a chair using your arms (e.g., wheelchair, bedside chair)? 2  -JW 3  -BP     Climbing 3-5 steps with a railing? 1  -JW 1  -BP     To walk in hospital room? 1  -JW 1  -BP     AM-PAC 6 Clicks Score (PT) 13  -JW 16  -BP            Functional Assessment    Outcome Measure Options AM-PAC 6 Clicks Basic Mobility (PT)  -JW AM-PAC 6 Clicks Basic Mobility (PT)  -BP           User Key  (r) = Recorded By, (t) = Taken By, (c) = Cosigned By    Initials Name Provider Type    BP Chloe Coffman, PT Physical Therapist    Zoila Cazares, PT Physical Therapist                 Time Calculation:    PT Charges     Row Name 01/10/22 1418             Time Calculation    Start Time 1305  -      Stop Time 1320  -      Time Calculation (min) 15 min  -JW      PT Received On 01/10/22  -JW      PT - Next Appointment 01/11/22  -              Timed Charges    46654 - PT Therapeutic Exercise Minutes 15  -              Total Minutes    Timed Charges Total Minutes 15  -JW       Total Minutes 15  -JW            User Key  (r) = Recorded By, (t) = Taken By, (c) = Cosigned By    Initials Name Provider Type    Zoila Cazares, PT Physical Therapist              Therapy Charges for Today     Code Description Service Date Service Provider Modifiers Qty    [id number] HC PT THER PROC EA 15 MIN 1/10/2022 Zoila Estrella, PT GP 1          PT G-Codes  Outcome Measure Options: AM-PAC 6 Clicks Basic Mobility (PT)  AM-PAC 6 Clicks Score (PT): 13  AM-PAC 6 Clicks Score (OT): 16    Zoila Estrella PT  1/10/2022

## 2022-01-10 NOTE — PLAN OF CARE
"Goal Outcome Evaluation:  Plan of Care Reviewed With: patient, grandchild(nba) (granddaughter)        Progress: no change  Outcome Summary: Pt VSS this shift. This nurse was informed pt refused PICC line placement. MD notified. Alycia RODRIGUES, Terri RN, and myself at bedside with pt this shift discussing pt wishes. Pt states he does not want to go home with any \"lines\" in. Pt states he would be okay with a nurse coming a couple days a week from [facility].  explained to pt that she was in the process of attempting to get pt accepted by home health, but had not had any success at this time. Pt reminiscing on wife and dog several times during conversation and states he lost them both recently. Pt states \"what do I have to live for?\". All risks of going home and going against medical advice were explained to pt. Pt states he understands. Pt agreeable to stay at this time. MD aware of situation.  "

## 2022-01-10 NOTE — PLAN OF CARE
Goal Outcome Evaluation:  Plan of Care Reviewed With: patient           Outcome Summary: OT: Pt managed bed mobility (supine to sitting at EOB) independently using the bed rail with HOB slightly elevated. Pt stood from EOB with bed surface elevate and performed a stand step pivot transfer from the bed to a recliner with CGA using a rolling walker for support. Pt is managing transfers better, yet he has a history of falls at home.  Rec 24 hour assistance for pt safety due to history of falls and significant assistance required to manage self care activity.

## 2022-01-10 NOTE — CASE MANAGEMENT/SOCIAL WORK
"Continued Stay Note  MICHAEL [place]     Patient Name: Froilan Helton  MRN: [id number]  Today's Date: 1/10/2022    Admit Date: 2021     Discharge Plan     Row Name 01/10/22 1648       Plan    Plan Comments I spoke with the patient at bedside with Alycia RODRIGUES, Manager and RAVI Guadalupe.  We discussed safety and the patients PICC which he refused.  We discussed his refusal for STR as well as [facility] not accepting him back. I asked him what he wanted in regards to his medical care and during our conversation he stated that \"everyone has to die\" and further explained that his wife  in November and his dog  in December and stated \"I don't have anything to live for\".  He continued to reminisce about his wife and dog at home. He was  for 62 years and worked his whole life for what he has.   We explained that if he refuses IV ABX that the infection could worsen causing him to become very sick and he verbalized understanding.  I asked how he would get home and he stated his granddaughter or [facility]. We also discussed [facility] and [facility] and he advised that he could go to the \"health department and get that set up\".   He further advised that he is willing to leave AMA.  I then called his granddaughter to discuss our conversation as she is his POA and there was no answer.  I will attempt to reach her again in the morning. [facility] and [facility] have declined service. CM will continue to follow.                   Discharge Codes    No documentation.                     Terri Martinez RN    "

## 2022-01-10 NOTE — PROGRESS NOTES
"Daily Progress Note:      Chief complaint: Follow-up of acute kidney injury, chronic systolic  Congestive heart failure, ischemic cardiomyopathy, atrial fibrillation, hypertension, multiple skin decubiti    Subjective: No overnight events noted.  Resting comfortably he is without complaints.     LOS: 13 days     Vital Signs  Temp:  [96.9 °F (36.1 °C)-97.8 °F (36.6 °C)] 97.8 °F (36.6 °C)  Heart Rate:  [47-68] 47  Resp:  [16-18] 16  BP: ()/(43-61) 124/51  Oxygen Therapy  SpO2: 97 %  Pulse Oximetry Type: Intermittent  Device (Oxygen Therapy): room air  Flow (L/min): 2  Oxygen Concentration (%): 95  ETCO2 (mmHg): 32 mmHg  Probe Placed On (Pulse Ox): Right:, finger}  Body mass index is 29.77 kg/m².  Flowsheet Rows      First Filed Value   Admission Height 185.4 cm (73\") Documented at 12/28/2021 1349   Admission Weight 104 kg (229 lb 12.8 oz) Documented at 12/28/2021 1349                   Documented weights    12/28/21 1349 12/28/21 2222 12/29/21 0643 12/29/21 1850   Weight: 104 kg (229 lb 12.8 oz) 102 kg (225 lb 4.8 oz) 102 kg (225 lb 6.4 oz) 102 kg (224 lb 13.9 oz)    12/30/21 0530 12/31/21 0539 01/02/22 0643 01/03/22 0550   Weight: 98.9 kg (218 lb 1.6 oz) 103 kg (226 lb) 103 kg (227 lb) 103 kg (227 lb 9.6 oz)    01/04/22 0534 01/05/22 0627 01/06/22 0643 01/07/22 0601   Weight: 99 kg (218 lb 4.8 oz) 95.5 kg (210 lb 8 oz) 96.2 kg (212 lb 2 oz) 97 kg (213 lb 14.4 oz)    01/08/22 0015 01/08/22 1431 01/09/22 1504 01/10/22 0547   Weight: 99.7 kg (219 lb 14.4 oz) 101 kg (222 lb 14.4 oz) 103 kg (227 lb 14.4 oz) 102 kg (224 lb 9.6 oz)           Patient Vitals for the past 24 hrs:   BP Temp Temp src Pulse Resp SpO2 Weight   01/10/22 0547 124/51 97.8 °F (36.6 °C) Oral (!) 47 16 97 % 102 kg (224 lb 9.6 oz)   01/09/22 2056 -- -- -- 55 16 96 % --   01/09/22 2054 -- -- -- 56 16 96 % --   01/09/22 1933 121/61 97.2 °F (36.2 °C) Oral 50 18 96 % --   01/09/22 1504 99/43 96.9 °F (36.1 °C) Oral 50 18 96 % 103 kg (227 lb 14.4 oz) "   01/09/22 1029 129/58 -- -- 68 -- -- --   01/09/22 0955 -- -- -- 53 16 95 % --       102 kg (224 lb 9.6 oz)    Intake/Output                       01/08/22 0701 - 01/09/22 0700 01/09/22 0701 - 01/10/22 0700     9020-6017 0200-3615 Total 7475-5987 4644-1177 Total                 Intake    P.O.  1210  -- 1210  960  -- 960    Total Intake 1210 -- 1210 960 -- 960       Output    Urine  --  700 700  600  150 750    Total Output -- 700 700 600 150 750           Intake/Output Summary (Last 24 hours) at 1/10/2022 0739  Last data filed at 1/10/2022 0421  Gross per 24 hour   Intake 960 ml   Output 750 ml   Net 210 ml        Intake/Output Summary (Last 24 hours) at 1/10/2022 0739  Last data filed at 1/10/2022 0421  Gross per 24 hour   Intake 960 ml   Output 750 ml   Net 210 ml        Review of Systems   Constitutional: Positive for activity change. Negative for appetite change and fatigue.   HENT: Negative for congestion.    Respiratory: Negative for cough, chest tightness, shortness of breath and wheezing.    Cardiovascular: Negative for chest pain and leg swelling.   Gastrointestinal: Negative for abdominal distention, abdominal pain, diarrhea, nausea and vomiting.   Endocrine: Negative for polyphagia and polyuria.   Genitourinary: Negative for frequency.   Skin: Positive for color change and wound. Negative for rash.   Neurological: Negative for weakness and light-headedness.   Hematological: Does not bruise/bleed easily.   Psychiatric/Behavioral: Negative for agitation and behavioral problems.       Physical Exam  Vitals and nursing note reviewed.   Constitutional:       Appearance: He is well-developed. He is obese.   HENT:      Head: Normocephalic.   Eyes:      Conjunctiva/sclera: Conjunctivae normal.   Neck:      Thyroid: No thyromegaly.      Vascular: No JVD.   Cardiovascular:      Rate and Rhythm: Regular rhythm. Bradycardia present.      Heart sounds: Normal heart sounds. No murmur heard.      Pulmonary:       Effort: Pulmonary effort is normal. No respiratory distress.      Breath sounds: Normal breath sounds. No wheezing or rales.   Abdominal:      General: Bowel sounds are normal. There is no distension.      Palpations: Abdomen is soft.      Tenderness: There is no abdominal tenderness. There is no guarding.   Musculoskeletal:      Right lower leg: No edema.      Left lower leg: No edema.   Skin:     General: Skin is warm and dry.      Findings: No rash.      Comments: Lower extremities are in dressings   Neurological:      General: No focal deficit present.      Mental Status: He is alert and oriented to person, place, and time.         Medication Review:   I have reviewed the patient's current medication list  Scheduled Meds:apixaban, 2.5 mg, Oral, Q12H  atorvastatin, 10 mg, Oral, Nightly  budesonide-formoterol, 2 puff, Inhalation, BID - RT  bumetanide, 2 mg, Oral, BID  cholecalciferol, 2,000 Units, Oral, Daily  citalopram, 20 mg, Oral, Nightly  docusate sodium, 100 mg, Oral, BID  fluconazole, 200 mg, Oral, Q24H  fluticasone, 2 spray, Each Nare, Daily  hydrocortisone-bacitracin-zinc oxide-nystatin, 1 application, Topical, TID  influenza vaccine, 0.5 mL, Intramuscular, Once  insulin aspart, 0-24 Units, Subcutaneous, TID AC  insulin detemir, 25 Units, Subcutaneous, Nightly  levothyroxine, 224 mcg, Oral, Q AM  linagliptin, 5 mg, Oral, Daily  metroNIDAZOLE, 500 mg, Oral, Q8H  O2, 2 L/min, Inhalation, Once  polyethylene glycol, 17 g, Oral, Daily  pregabalin, 75 mg, Oral, BID  QUEtiapine, 12.5 mg, Oral, Q PM  tamsulosin, 0.4 mg, Oral, Daily  vancomycin, 1,000 mg, Intravenous, Q24H  cyanocobalamin, 1,000 mcg, Oral, Daily      Continuous Infusions:Pharmacy to dose vancomycin,       PRN Meds:.•  acetaminophen **OR** acetaminophen **OR** acetaminophen  •  albuterol sulfate HFA  •  dextrose  •  dextrose  •  glucagon (human recombinant)  •  magnesium sulfate **OR** magnesium sulfate **OR** magnesium sulfate  •  melatonin  •   Pharmacy to dose vancomycin      Labs:  Results from last 7 days   Lab Units 01/05/22  0427   WBC 10*3/mm3 7.41   HEMOGLOBIN g/dL 10.0*   HEMATOCRIT % 32.1*   PLATELETS 10*3/mm3 243     Results from last 7 days   Lab Units 01/08/22  0407 01/07/22  0447 01/06/22  0431 01/05/22  0427 01/04/22  0413   SODIUM mmol/L 135* 133* 135* 136 136   POTASSIUM mmol/L 4.4 4.3 4.5 4.3 4.8   CHLORIDE mmol/L 98 95* 96* 99 102   CO2 mmol/L 30.1* 28.5 30.1* 26.6 25.6   BUN mg/dL 39* 34* 34* 31* 32*   CREATININE mg/dL 2.28* 2.24* 2.30* 2.08* 1.80*   CALCIUM mg/dL 8.3* 8.3* 8.9 8.6 8.6   GLUCOSE mg/dL 85 62* 75 92 94     Results from last 7 days   Lab Units 01/09/22  0416 01/08/22  0407 01/07/22  0447 01/05/22  0427 01/04/22  0413   MAGNESIUM mg/dL 2.7* 1.9 2.0 2.3 1.5*       Results from last 7 days   Lab Units 01/05/22  0427   PLATELETS 10*3/mm3 243         Lab Results (last 24 hours)     Procedure Component Value Units Date/Time    POC Glucose Once [[id number]]  (Abnormal) Collected: 01/10/22 0710    Specimen: Blood Updated: 01/10/22 0716     Glucose 136 mg/dL      Comment: Meter: [id number] : [id number] Chris Romeo CNA       POC Glucose Once [[id number]]  (Abnormal) Collected: 01/09/22 1945    Specimen: Blood Updated: 01/09/22 1953     Glucose 150 mg/dL      Comment: Meter: [id number] : [id number] Ruthann HARKINS       POC Glucose Once [[id number]]  (Abnormal) Collected: 01/09/22 1642    Specimen: Blood Updated: 01/09/22 1648     Glucose 143 mg/dL      Comment: Meter: [id number] : [id number] Armaan Beltran CNA       POC Glucose Once [[id number]]  (Normal) Collected: 01/09/22 1136    Specimen: Blood Updated: 01/09/22 1145     Glucose 129 mg/dL      Comment: Meter: [id number] : [id number] Armaan Beltran CNA       Anaerobic Culture - Swab, Hip, Left [[id number]] Collected: 01/04/22 1106    Specimen: Swab from Hip, Left Updated: 01/09/22 1114     Anaerobic Culture No anaerobes isolated at 5 days                         Results from last 7 days   Lab Units 01/09/22  0416 01/08/22  0407 01/07/22  0447 01/05/22  0427 01/04/22  0413   MAGNESIUM mg/dL 2.7* 1.9 2.0 2.3 1.5*                 Glucose   Date/Time Value Ref Range Status   01/10/2022 0710 136 (H) 70 - 130 mg/dL Final     Comment:     Meter: [id number] : [id number] Chris Romeo CNA   01/09/2022 1945 150 (H) 70 - 130 mg/dL Final     Comment:     Meter: [id number] : [id number] Ruthann Nievesa NA   01/09/2022 1642 143 (H) 70 - 130 mg/dL Final     Comment:     Meter: [id number] : [id number] Armaan Sinha Ruby CNA   01/09/2022 1136 129 70 - 130 mg/dL Final     Comment:     Meter: [id number] : [id number] Crook Bharath Ruby CNA   01/09/2022 0724 82 70 - 130 mg/dL Final     Comment:     Meter: [id number] : [id number] Crook Bharath Ruby CNA   01/08/2022 2113 101 70 - 130 mg/dL Final     Comment:     Meter: [id number] : [id number] Masters Rishabh PCA   01/08/2022 1720 168 (H) 70 - 130 mg/dL Final     Comment:     Meter: [id number] : [id number] Armaan Sinha Ruby CNA   01/08/2022 1224 192 (H) 70 - 130 mg/dL Final     Comment:     Meter: [id number] : [id number] Madalyn Barnhart CNA         Results from last 7 days   Lab Units 01/04/22  1106   WOUNDCX  Moderate growth (3+) Enterococcus faecalis*  Light growth (2+) Staphylococcus aureus, MRSA*  Light growth (2+) Normal Skin [place]                 Radiology:  Imaging Results (Last 24 Hours)     ** No results found for the last 24 hours. **          Cardiology:  ECG/EMG Results (last 24 hours)     ** No results found for the last 24 hours. **                I have reviewed recent labs results and consult notes.    Please note portions of this assessment/plan may have been copied and pasted, but I have personally seen this patient and reviewed each line of this assessment and plan for accuracy and made updates to reflect my necessary changes     Assessment and Plan:  1.  Acute kidney injury chronic kidney disease stage III  stable Bumex was decreased to once daily weight gradually started increasing and Bumex increased to twice daily yesterday.  Weight is back down morning labs are pending      2.  Left hip and left leg decubitus wounds debrided on 12/30/2021 wound culture show Enterococcus and MRSA-patient started on vancomycin IV  1/4/2022.  Will need 7 to 10 days of IV vancomycin.  We will have Dr. Aguero reassess duration of antibiotic therapy    3.   Congestive heart failure with preserved ejection fraction   stable appears to be euvolemic    4.    Bradycardia persistent remains off amiodarone        5.  Adult onset diabetes mellitus Accu-Chek sliding scale hypoglycemia is improved continue Accu-Chek/scale insulin    6.    Paroxysmal atrial fibrillation continue Eliquis dose adjusted to due to renal insufficiency     7.  Ischemic cardiomyopathy echo done as admission showed improvement of the ventricular function with ejection fraction 55% with severe aortic valve calcification some right atrial dilatation..     8.  Hypothyroidism TSH is elevated likely due to patient noncompliance fall continue present dose and monitor     9.  Multiple falls at home patient is clearly unable to care for himself.  Extensive discharge planning note from yesterday reviewed patient refuses nursing home placement insist on going home.  Home once arrangement made about vancomycin    10.  COPD nothing acute continue home medications     11.    Iron deficiency anemia stable    12.   Candida UTI on p.o. Diflucan    Much of this encounter note is an electronic transcription/translation of spoken language to printed text using Dragon Software

## 2022-01-10 NOTE — THERAPY TREATMENT NOTE
Patient Name: Froilan Helton  : 1940    MRN: [id number]                              Today's Date: 1/10/2022       Admit Date: 2021    Visit Dx:     ICD-10-CM ICD-9-CM   1. Acute renal failure superimposed on chronic kidney disease, unspecified CKD stage, unspecified acute renal failure type ([facility])  N17.9 584.9    N18.9 585.9   2. Decubitus ulcer of trochanteric region of left hip, unspecified ulcer stage  L89.229 707.04     707.20   3. Open wound of left foot, initial encounter  S91.302A 892.0     Patient Active Problem List   Diagnosis   • COPD (chronic obstructive pulmonary disease) ([facility])   • Type 2 diabetes mellitus with stage 4 chronic kidney disease, with long-term current use of insulin ([facility])   • Essential hypertension   • Primary osteoarthritis of left knee   • Chronic renal insufficiency, stage 4 (severe) ([facility])   • Class 2 severe obesity due to excess calories with serious comorbidity in adult ([facility])   • Physical debility   • Acute UTI (urinary tract infection)   • Permanent atrial fibrillation ([facility])   • H/O noncompliance with medical treatment, presenting hazards to health   • Myxedema   • Acute on chronic combined systolic and diastolic CHF (congestive heart failure) ([facility])   • Generalized weakness   • Recurrent left pleural effusion   • Fall on same level as cause of accidental injury   • Gross hematuria   • CAD (coronary artery disease)   • HLD (hyperlipidemia)   • Hypothyroidism   • CKD (chronic kidney disease) stage 3, GFR 30-59 ml/min ([facility])   • Acute metabolic encephalopathy   • Cardiomyopathy ([facility])   • Recurrent falls   • Acute renal failure superimposed on chronic kidney disease ([facility])   • Open wound of left foot   • Moderate malnutrition ([facility])     Past Medical History:   Diagnosis Date   • A-fib ([facility])    • Arthritis    • Asthma    • CAD (coronary artery disease)    • Cardiomyopathy ([facility])    • Cataract    • CHF (congestive heart failure) ([facility])    • CKD (chronic kidney disease), stage  III ([facility])    • COPD (chronic obstructive pulmonary disease) ([facility])    • Diabetes mellitus ([facility])    • Disease of thyroid gland    • Emphysema, unspecified ([facility])    • Glaucoma    • Hyperlipidemia    • Hypertension    • Kidney stone    • Myocardial infarct, old    • Neuropathy    • Osteoarthritis    • Osteoporosis    • Permanent atrial fibrillation ([facility]) 6/30/2020   • PVD (peripheral vascular disease) ([facility])    • Renal disorder    • Shingles      Past Surgical History:   Procedure Laterality Date   • COLONOSCOPY     • EYE SURGERY     • INCISION AND DRAINAGE LEG Left 12/30/2021    Procedure: debridement of left hip wounds and left foot wound;  Surgeon: Judie Aguero DO;  Location: [facility];  Service: General;  Laterality: Left;   • JOINT REPLACEMENT      right   • KIDNEY STONE SURGERY     • REPLACEMENT TOTAL KNEE     • TOE SURGERY        General Information     Row Name 01/10/22 1411          OT Time and Intention    Document Type therapy note (daily note)  -SD     Mode of Treatment occupational therapy  -SD     Row Name 01/10/22 1411          General Information    Existing Precautions/Restrictions fall  -SD     Row Name 01/10/22 1411          Cognition    Orientation Status (Cognition) oriented x 3  -SD     Row Name 01/10/22 1411          Safety Issues, Functional Mobility    Impairments Affecting Function (Mobility) balance; strength  -SD           User Key  (r) = Recorded By, (t) = Taken By, (c) = Cosigned By    Initials Name Provider Type    SD Sebastian Wright OTR Occupational Therapist                 Mobility/ADL's     Row Name 01/10/22 1411          Bed Mobility    Bed Mobility supine-sit  -SD     Supine-Sit Grant (Bed Mobility) modified independence  -SD     Assistive Device (Bed Mobility) bed rails; head of bed elevated  -SD     Row Name 01/10/22 1411          Transfers    Transfers sit-stand transfer; other (see comments)  pt performed stand step pivot transfer  -SD     Sit-Stand Grant  (Transfers) contact guard; verbal cues  -SD     Row Name 01/10/22 1411          Sit-Stand Transfer    Assistive Device (Sit-Stand Transfers) walker, front-wheeled  -SD     Row Name 01/10/22 1411          Functional Mobility    Functional Mobility- Ind. Level contact guard assist  -SD     Functional Mobility- Device rolling walker  -SD     Functional Mobility- Comment pt performed stand step pivot transfer from EOB to a recliner.  -SD           User Key  (r) = Recorded By, (t) = Taken By, (c) = Cosigned By    Initials Name Provider Type    Sebastian Greene, OTR Occupational Therapist               Obj/Interventions    No documentation.                Goals/Plan     Row Name 01/10/22 1417          Transfer Goal 1 (OT)    Activity/Assistive Device (Transfer Goal 1, OT) sit-to-stand/stand-to-sit; walker, rolling  from EOB and from recliner  -SD     [place] Level/Cues Needed (Transfer Goal 1, OT) contact guard assist  -SD     Progress/Outcome (Transfer Goal 1, OT) goal ongoing; continuing progress toward goal  met from EOB, continue to address transfers from lower surfaces (recliner)  -SD     Row Name 01/10/22 1417          ROM Goal 1 (OT)    ROM Goal 1 (OT) Pt to participate in BUE HEP to address functional strength needed for basic adl tasks.  -SD     Time Frame (ROM Goal 1, OT) by discharge  -SD     Progress/Outcome (ROM Goal 1, OT) goal ongoing  -SD     Row Name 01/10/22 1417          Therapy Assessment/Plan (OT)    Planned Therapy Interventions (OT) patient/caregiver education/training; transfer/mobility retraining; BADL retraining  -SD           User Key  (r) = Recorded By, (t) = Taken By, (c) = Cosigned By    Initials Name Provider Type    Sebastian Greene, OTR Occupational Therapist               Clinical Impression     Row Name 01/10/22 1414          Pain Scale: Numbers Pre/Post-Treatment    Pre/Posttreatment Pain Comment no c/o pain  -[facility] Name 01/10/22 1414          Plan of Care Review     Plan of Care Reviewed With patient  -SD     Outcome Summary OT: Pt managed bed mobility (supine to sitting at EOB) independently using the bed rail with HOB slightly elevated. Pt stood from EOB with bed surface elevate and performed a stand step pivot transfer from the bed to a recliner with CGA using a rolling walker for support. Pt is managing transfers better, yet he has a history of falls at home.  Rec 24 hour assistance for pt safety due to history of falls and significant assistance required to manage self care activity.  -SD     Row Name 01/10/22 1414          Therapy Assessment/Plan (OT)    Therapy Frequency (OT) 5 times/wk  -SD     Row Name 01/10/22 1414          Positioning and Restraints    Pre-Treatment Position in bed  -SD     Post Treatment Position chair  -SD     In Chair reclined; call light within reach; encouraged to call for assist; with nsg  -SD           User Key  (r) = Recorded By, (t) = Taken By, (c) = Cosigned By    Initials Name Provider Type    SD Sebastian Wright, OTR Occupational Therapist               Outcome Measures     Row Name 01/10/22 1418          How much help from another is currently needed...    Putting on and taking off regular lower body clothing? 2  -SD     Bathing (including washing, rinsing, and drying) 2  -SD     Toileting (which includes using toilet bed pan or urinal) 2  -SD     Putting on and taking off regular upper body clothing 3  -SD     Taking care of personal grooming (such as brushing teeth) 3  -SD     Eating meals 4  -SD     AM-PAC 6 Clicks Score (OT) 16  -SD     Row Name 01/10/22 1305          How much help from another person do you currently need...    Turning from your back to your side while in flat bed without using bedrails? 3  -JW     Moving from lying on back to sitting on the side of a flat bed without bedrails? 3  -JW     Moving to and from a bed to a chair (including a wheelchair)? 3  -JW     Standing up from a chair using your arms (e.g.,  wheelchair, bedside chair)? 2  -JW     Climbing 3-5 steps with a railing? 1  -JW     To walk in hospital room? 1  -JW     AM-PAC 6 Clicks Score (PT) 13  -JW     Row Name 01/10/22 1305          Functional Assessment    Outcome Measure Options AM-PAC 6 Clicks Basic Mobility (PT)  -           User Key  (r) = Recorded By, (t) = Taken By, (c) = Cosigned By    Initials Name Provider Type    SD Sebastian Wright OTR Occupational Therapist    Zoila Cazares PT Physical Therapist                Occupational Therapy Education                 Title: PT OT SLP Therapies (Done)     Topic: Occupational Therapy (Done)     Point: ADL training (Done)     Description:   Instruct learner(s) on proper safety adaptation and remediation techniques during self care or transfers.   Instruct in proper use of assistive devices.              Learning Progress Summary           Patient Acceptance, E, VU by SD at 1/10/2022 1254    Comment: Education regarding safety with bed mobility, transfers and mobility      Show all documentation for this point (7)                 Point: Precautions (Done)     Description:   Instruct learner(s) on prescribed precautions during self-care and functional transfers.              Learning Progress Summary           Patient Acceptance, E,TB, VU,DU by  at 1/3/2022 1607      Show all documentation for this point (1)                 Point: Body mechanics (Done)     Description:   Instruct learner(s) on proper positioning and spine alignment during self-care, functional mobility activities and/or exercises.              Learning Progress Summary           Patient Acceptance, E,TB, VU,DU by  at 1/3/2022 1607      Show all documentation for this point (1)                             User Key     Initials Effective Dates Name Provider Type Discipline    SD 06/16/21 -  Sebastian Wright OTDELTA Occupational Therapist OT     06/16/21 -  Katt Knight, RAVI Registered Nurse Nurse              OT Recommendation  and Plan  Planned Therapy Interventions (OT): patient/caregiver education/training, transfer/mobility retraining, BADL retraining  Therapy Frequency (OT): 5 times/wk  Plan of Care Review  Plan of Care Reviewed With: patient  Outcome Summary: OT: Pt managed bed mobility (supine to sitting at EOB) independently using the bed rail with HOB slightly elevated. Pt stood from EOB with bed surface elevate and performed a stand step pivot transfer from the bed to a recliner with CGA using a rolling walker for support. Pt is managing transfers better, yet he has a history of falls at home.  Rec 24 hour assistance for pt safety due to history of falls and significant assistance required to manage self care activity.     Time Calculation:    Time Calculation- OT     Row Name 01/10/22 1253             Time Calculation- OT    OT Start Time 1030  -SD              Timed Charges    66915 - OT Therapeutic Activity Minutes 15  -SD              Total Minutes    Timed Charges Total Minutes 15  -SD       Total Minutes 15  -SD            User Key  (r) = Recorded By, (t) = Taken By, (c) = Cosigned By    Initials Name Provider Type    SD Sebastian Wright OTR Occupational Therapist              Therapy Charges for Today     Code Description Service Date Service Provider Modifiers Qty    80140209083 HC OT THERAPEUTIC ACT EA 15 MIN 1/10/2022 Sebastian Wright OTR GO 1               BETH Melgar  1/10/2022

## 2022-01-11 NOTE — CASE MANAGEMENT/SOCIAL WORK
Continued Stay Note   Temitope Castillo     Patient Name: Froilan Helton  MRN: [id number]  Today's Date: 1/11/2022    Admit Date: 12/28/2021     Discharge Plan     Row Name 01/11/22 1132       Plan    Plan Discharge home AMA    Plan Comments I received a call from RAVI Guadalupe, the patients primary RN who advised that the patient is willing to go to  W[facility] Care weekly.  She further advised that the patient may need transportation services.  I provided her with a list of transportation services in the area to place in the patients discharge instruction packet.  I spoke with Nikia from [facility] who also declined the patient.  I then contacted Angela with [employer] and I am waiting for her to call back. CM will continue to follow.               Discharge Codes    No documentation.               Expected Discharge Date and Time     Expected Discharge Date Expected Discharge Time    Jan 11, 2022             Terri Martinez RN

## 2022-01-11 NOTE — PLAN OF CARE
Goal Outcome Evaluation:  Plan of Care Reviewed With: patient        Progress: no change  Outcome Summary: VSS - rested well through the night - incontinent of bowel and bladder - talks on how pt will take care of himself at home to take place this morning

## 2022-01-11 NOTE — PROGRESS NOTES
Surgery Progress Note   Chief Complaint:  Left hip wounds    Subjective     Interval History:     Froilan is signing himself out AMA.  He has declined the PICC line.  A family member is going to try to do his wound care at home.  I discussed with him the possibility of going to the wound care center here weekly so his wounds can be monitored.  He said he was agreeable to that.      Objective     Vital Signs  Temp:  [97.3 °F (36.3 °C)-98.3 °F (36.8 °C)] 98.3 °F (36.8 °C)  Heart Rate:  [50-56] 50  Resp:  [16-20] 20  BP: (115-173)/(54-68) 115/54  Body mass index is 29.83 kg/m².    Intake/Output Summary (Last 24 hours) at 1/11/2022 1009  Last data filed at 1/11/2022 0018  Gross per 24 hour   Intake 840 ml   Output 875 ml   Net -35 ml     No intake/output data recorded.       Physical Exam:   General: patient awake, alert and cooperative   Extremities: the drainage has improved on his left hip wounds.  There is some good granulation tissue forming but there is approximately 50% slough noted.     Neurologic: Normal mood and behavior     Results Review:     I reviewed the patient's new clinical results.      WBC No results found for: WBCS   HGB Hemoglobin   Date Value Ref Range Status   01/11/2022 10.3 (L) 13.0 - 17.7 g/dL Final      HCT Hematocrit   Date Value Ref Range Status   01/11/2022 33.0 (L) 37.5 - 51.0 % Final      Platlets No results found for: LABPLAT     PT/INR:  No results found for: PROTIME/No results found for: INR    Sodium Sodium   Date Value Ref Range Status   01/11/2022 134 (L) 136 - 145 mmol/L Final   01/10/2022 130 (L) 136 - 145 mmol/L Final      Potassium Potassium   Date Value Ref Range Status   01/11/2022 4.2 3.5 - 5.2 mmol/L Final   01/10/2022 4.5 3.5 - 5.2 mmol/L Final      Chloride Chloride   Date Value Ref Range Status   01/11/2022 96 (L) 98 - 107 mmol/L Final   01/10/2022 95 (L) 98 - 107 mmol/L Final      Bicarbonate No results found for: PLASMABICARB   BUN BUN   Date Value Ref Range  Status   01/11/2022 46 (H) 8 - 23 mg/dL Final   01/10/2022 38 (H) 8 - 23 mg/dL Final      Creatinine Creatinine   Date Value Ref Range Status   01/11/2022 2.07 (H) 0.76 - 1.27 mg/dL Final   01/10/2022 2.14 (H) 0.76 - 1.27 mg/dL Final      Calcium Calcium   Date Value Ref Range Status   01/11/2022 8.5 (L) 8.6 - 10.5 mg/dL Final   01/10/2022 8.3 (L) 8.6 - 10.5 mg/dL Final      Magnesium  AST  ALT  Bilirubin, Total  AlkPhos  Albumin    Amylase  Lipase    Radiology: Magnesium   Date Value Ref Range Status   01/09/2022 2.7 (H) 1.6 - 2.4 mg/dL Final     No components found for: AST.*  No components found for: ALT.*  No results found for: BILIRUBIN    No components found for: ALKPHOS.*  No components found for: ALBUMIN.*      No components found for: AMYLASE.*  No components found for: LIPASE.*            Imaging Results (Most Recent)     Procedure Component Value Units Date/Time    XR Knee 4+ View Bilateral [[id number]] Collected: 01/06/22 1327     Updated: 01/06/22 1332    Narrative:      BILATERAL KNEE SERIES 01/06/2022     HISTORY:  81-year-old male with chronic bilateral knee pain. HISTORY of right knee  replacement 3 years ago     TECHNIQUE:  Frontal and lateral views of the knees were performed bilaterally.  Comparison study 07/18/2021     FINDINGS:  Left knee: There is tricompartmental degenerative change. There is  enthesopathic change of the patella. No acute fracture. Minimal if any  joint fluid present. Degenerative change is most prominent in the medial  compartment and there is mild osteophytic spurring.     Right knee: The patient is status post total right knee arthroplasty.  Surgical hardware appears intact and there is no acute fracture. There  is enthesopathic change of the quadriceps tendon insertion upon the  patella. No joint effusion.     There is atherosclerotic disease bilaterally.       Impression:      1. Moderate degenerative change of the left knee most prominent in the  medial compartment.  2.  Status post total knee replacement on the right.     This report was finalized on 1/6/2022 1:30 PM by Dr. Iam Reyes MD.       US Renal Bilateral [[id number]] Collected: 12/28/21 1938     Updated: 12/28/21 1940    Narrative:      US Renal Comp    INDICATION:   Acute on chronic kidney disease    COMPARISON:   7/8/2021    FINDINGS:   KIDNEYS:  The right kidney measures 4.8 x 4.8 x 9.5 cm. The left kidney measures 4.7 x 5.4 x 11 cm. Renal cortical thickness and echogenicity is normal.  No hydronephrosis.    URINARY BLADDER:  The bladder is unremarkable.      Impression:      Negative renal ultrasound. No hydronephrosis. No change from the previous ultrasound study.    Signer Name: Jaylen Bhardwaj MD   Signed: 12/28/2021 7:38 PM   Workstation Name: [id number]    [facility]             Pharmacy to dose vancomycin,           Assessment/Plan     Patient Active Problem List   Diagnosis Code   • COPD (chronic obstructive pulmonary disease) ([facility]) J44.9   • Type 2 diabetes mellitus with stage 4 chronic kidney disease, with long-term current use of insulin ([facility]) E11.22, N18.4, Z79.4   • Essential hypertension I10   • Primary osteoarthritis of left knee M17.12   • Chronic renal insufficiency, stage 4 (severe) ([facility]) N18.4   • Class 2 severe obesity due to excess calories with serious comorbidity in adult ([facility]) E66.01   • Physical debility R53.81   • Acute UTI (urinary tract infection) N39.0   • Permanent atrial fibrillation ([facility]) I48.21   • H/O noncompliance with medical treatment, presenting hazards to health Z91.19   • 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   • Acute on chronic combined systolic and diastolic CHF (congestive heart failure) ([facility]) I50.43   • Generalized weakness R53.1   • Recurrent left pleural effusion J90   • Fall on same level as cause of accidental injury W18.30XA   • Gross hematuria R31.0   • CAD (coronary artery disease) I25.10   • HLD (hyperlipidemia) E78.5   • Hypothyroidism E03.9   • CKD (chronic  kidney disease) stage 3, GFR 30-59 ml/min (HCC) N18.30   • Acute metabolic encephalopathy G93.41   • Cardiomyopathy (HCC) I42.9   • Recurrent falls R29.6   • Acute renal failure superimposed on chronic kidney disease (HCC) N17.9, N18.9   • Open wound of left foot S91.302A   • Moderate malnutrition (CMS/HCC) E44.0       Status post debridement of left hip wounds  --I discussed with Carole have him wash the area with Dakins solution daily since he has refused his PICC line.  I also discussed with case management trying to get him set up with the wound care center here so he can have his wounds followed closely.  I tried to talk him into staying in the hospital but he wants to go home.  Please call if he changes his mind.      Judie Aguero DO  01/11/22  10:09 EST

## 2022-01-11 NOTE — PROGRESS NOTES
[facility] Progress Note      Patient Name: Froilan Helton  : 1940  MRN: [id number]  Primary Care Physician:  Fortunato De Leon MD  Date of admission: 2021    Subjective     Interval History:   Feels fine and wants to go home  No shortness of breath on room air as he lies flat  Leg swelling stable   Appetite is fine; no N/V    Review of Systems:   14 point review of systems is otherwise negative except for mentioned above on HPI    Objective     Vitals:   Temp:  [97.3 °F (36.3 °C)-98.3 °F (36.8 °C)] 98.3 °F (36.8 °C)  Heart Rate:  [50-56] 50  Resp:  [16-20] 20  BP: (115-173)/(54-68) 115/54    Intake/Output Summary (Last 24 hours) at 2022 1010  Last data filed at 2022 0018  Gross per 24 hour   Intake 840 ml   Output 875 ml   Net -35 ml       Physical Exam:    General Appearance: alert, appropriate, pale, NAD, chronically ill  Psychiatric: Flat affect  Skin: warm and dry  HEENT: oral mucosa normal, nonicteric sclera  Neck: supple, no JVD  Lungs: Crackles in bases but no wheezes; not labored on room air  Heart: Irregularly irregular, bradycardic, 2/6M  Abdomen: soft, nontender, nondistended, BS +  : no palpable bladder  Extremities: 1+ edema, lower legs wrapped  Neuro: normal speech and mental status; flat affect    Scheduled Meds:     apixaban, 2.5 mg, Oral, Q12H  atorvastatin, 10 mg, Oral, Nightly  budesonide-formoterol, 2 puff, Inhalation, BID - RT  bumetanide, 2 mg, Oral, BID  cholecalciferol, 2,000 Units, Oral, Daily  citalopram, 20 mg, Oral, Nightly  docusate sodium, 100 mg, Oral, BID  fluticasone, 2 spray, Each Nare, Daily  hydrocortisone-bacitracin-zinc oxide-nystatin, 1 application, Topical, TID  influenza vaccine, 0.5 mL, Intramuscular, Once  insulin aspart, 0-24 Units, Subcutaneous, TID AC  insulin detemir, 25 Units, Subcutaneous, Nightly  iron polysaccharides, 150 mg, Oral, Daily  levothyroxine, 224 mcg, Oral, Q AM  linagliptin, 5 mg, Oral,  Daily  metroNIDAZOLE, 500 mg, Oral, Q8H  O2, 2 L/min, Inhalation, Once  polyethylene glycol, 17 g, Oral, Daily  pregabalin, 75 mg, Oral, BID  QUEtiapine, 12.5 mg, Oral, Q PM  sodium chloride, 10 mL, Intravenous, Q12H  sodium chloride, 10 mL, Intravenous, Q12H  tamsulosin, 0.4 mg, Oral, Daily  vancomycin, 1,000 mg, Intravenous, Q24H  cyanocobalamin, 1,000 mcg, Oral, Daily      IV Meds:   Pharmacy to dose vancomycin,         Results Reviewed:   I have personally reviewed the results from the time of this admission to 1/11/2022 10:10 EST     Results from last 7 days   Lab Units 01/11/22  0351 01/10/22  0730 01/08/22  0407   SODIUM mmol/L 134* 130* 135*   POTASSIUM mmol/L 4.2 4.5 4.4   CHLORIDE mmol/L 96* 95* 98   CO2 mmol/L 30.4* 25.5 30.1*   BUN mg/dL 46* 38* 39*   CREATININE mg/dL 2.07* 2.14* 2.28*   CALCIUM mg/dL 8.5* 8.3* 8.3*   GLUCOSE mg/dL 68 125* 85     Estimated Creatinine Clearance: 35.6 mL/min (A) (by C-G formula based on SCr of 2.07 mg/dL (H)).  Results from last 7 days   Lab Units 01/11/22  0351 01/10/22  0730 01/09/22  0416 01/08/22  0407 01/07/22  0447 01/07/22  0447   MAGNESIUM mg/dL  --   --  2.7* 1.9  --  2.0   PHOSPHORUS mg/dL 3.9 3.2  --  3.7   < > 3.3    < > = values in this interval not displayed.         Results from last 7 days   Lab Units 01/11/22 0351 01/05/22 0427   WBC 10*3/mm3 8.53 7.41   HEMOGLOBIN g/dL 10.3* 10.0*   PLATELETS 10*3/mm3 167 243           Assessment / Plan     ASSESSMENT:  1.  ROLA/CKD3, nonoliguric, stable:  prerenal due to CHF.  Peripheral edema present stable; hypervolemic hyponatremia, improving on higher dose of bumex; normal K   2.  Chronic CHF  3.  Edema, in part due to venous stasis  4.  Leg wounds  5.  PAF    PLAN:  1.  Oral bumetanide 2 mg twice daily for now.    2.  Continue fluid restriction, 1200 mL daily  3.  Encouraged him to continue IV abx at home; he is not interested        Thank you for involving us in the care of Froilan Helton.  Please feel free to call  with any questions.    Ellis Centeno MD  01/11/22  10:10 [facility]    [facility]  [phone]

## 2022-01-11 NOTE — PROGRESS NOTES
"Daily Progress Note:      Chief complaint: Follow-up of acute kidney injury, chronic systolic  Congestive heart failure, ischemic cardiomyopathy, atrial fibrillation, hypertension, multiple skin decubiti    Subjective: No overnight events noted.  Resting comfortably he is without complaints.     LOS: 14 days     Vital Signs  Temp:  [97.3 °F (36.3 °C)-98.3 °F (36.8 °C)] 98.3 °F (36.8 °C)  Heart Rate:  [50-56] 50  Resp:  [16-20] 20  BP: (115-173)/(54-68) 115/54  Oxygen Therapy  SpO2: 94 %  Pulse Oximetry Type: Intermittent  Device (Oxygen Therapy): room air  Flow (L/min): 2  Oxygen Concentration (%): 95  ETCO2 (mmHg): 32 mmHg  Probe Placed On (Pulse Ox): Right:, finger}  Body mass index is 29.83 kg/m².  Flowsheet Rows      First Filed Value   Admission Height 185.4 cm (73\") Documented at 12/28/2021 1349   Admission Weight 104 kg (229 lb 12.8 oz) Documented at 12/28/2021 1349                   Documented weights    12/28/21 1349 12/28/21 2222 12/29/21 0643 12/29/21 1850   Weight: 104 kg (229 lb 12.8 oz) 102 kg (225 lb 4.8 oz) 102 kg (225 lb 6.4 oz) 102 kg (224 lb 13.9 oz)    12/30/21 0530 12/31/21 0539 01/02/22 0643 01/03/22 0550   Weight: 98.9 kg (218 lb 1.6 oz) 103 kg (226 lb) 103 kg (227 lb) 103 kg (227 lb 9.6 oz)    01/04/22 0534 01/05/22 0627 01/06/22 0643 01/07/22 0601   Weight: 99 kg (218 lb 4.8 oz) 95.5 kg (210 lb 8 oz) 96.2 kg (212 lb 2 oz) 97 kg (213 lb 14.4 oz)    01/08/22 0015 01/08/22 1431 01/09/22 1504 01/10/22 0547   Weight: 99.7 kg (219 lb 14.4 oz) 101 kg (222 lb 14.4 oz) 103 kg (227 lb 14.4 oz) 102 kg (224 lb 9.6 oz)    01/11/22 0635   Weight: 102 kg (225 lb 1.6 oz)           Patient Vitals for the past 24 hrs:   BP Temp Temp src Pulse Resp SpO2 Weight   01/11/22 0635 115/54 98.3 °F (36.8 °C) Oral 50 20 94 % 102 kg (225 lb 1.6 oz)   01/10/22 2011 124/55 98.2 °F (36.8 °C) Axillary 52 20 95 % --   01/10/22 1951 -- -- -- 55 20 94 % --   01/10/22 1611 173/68 97.3 °F (36.3 °C) Oral 52 18 97 % --   01/10/22 " 1137 129/65 97.3 °F (36.3 °C) Oral 50 18 96 % --   01/10/22 1025 -- -- -- 56 16 97 % --   01/10/22 1020 -- -- -- 52 16 97 % --       102 kg (225 lb 1.6 oz)    Intake/Output                       01/09/22 0701 - 01/10/22 0700 01/10/22 0701 - 01/11/22 0700     [id number] 6635-2257 Total 9248-6765 2401-3986 Total                 Intake    P.O.  960  -- 960  840  -- 840    Total Intake 960 -- 960 840 -- 840       Output    Urine  600  150 750  400  475 875    Total Output 600 150 750 400 475 875           Intake/Output Summary (Last 24 hours) at 1/11/2022 0758  Last data filed at 1/11/2022 0018  Gross per 24 hour   Intake 840 ml   Output 875 ml   Net -35 ml        Intake/Output Summary (Last 24 hours) at 1/11/2022 0758  Last data filed at 1/11/2022 0018  Gross per 24 hour   Intake 840 ml   Output 875 ml   Net -35 ml        Review of Systems   Constitutional: Positive for activity change. Negative for appetite change and fatigue.   HENT: Negative for congestion.    Respiratory: Negative for cough, chest tightness, shortness of breath and wheezing.    Cardiovascular: Negative for chest pain and leg swelling.   Gastrointestinal: Negative for abdominal distention, abdominal pain, diarrhea, nausea and vomiting.   Endocrine: Negative for polyphagia and polyuria.   Genitourinary: Negative for frequency.   Skin: Positive for color change and wound. Negative for rash.   Neurological: Negative for weakness and light-headedness.   Hematological: Does not bruise/bleed easily.   Psychiatric/Behavioral: Negative for agitation and behavioral problems.       Physical Exam  Vitals and nursing note reviewed.   Constitutional:       Appearance: He is well-developed. He is obese.   HENT:      Head: Normocephalic.   Eyes:      Conjunctiva/sclera: Conjunctivae normal.   Neck:      Thyroid: No thyromegaly.      Vascular: No JVD.   Cardiovascular:      Rate and Rhythm: Regular rhythm. Bradycardia present.      Heart sounds: Normal heart sounds.  No murmur heard.      Pulmonary:      Effort: Pulmonary effort is normal. No respiratory distress.      Breath sounds: Normal breath sounds. No wheezing or rales.   Abdominal:      General: Bowel sounds are normal. There is no distension.      Palpations: Abdomen is soft.      Tenderness: There is no abdominal tenderness. There is no guarding.   Musculoskeletal:      Right lower leg: No edema.      Left lower leg: No edema.   Skin:     General: Skin is warm and dry.      Findings: No rash.      Comments: Lower extremities are in dressings   Neurological:      General: No focal deficit present.      Mental Status: He is alert and oriented to person, place, and time.         Medication Review:   I have reviewed the patient's current medication list  Scheduled Meds:apixaban, 2.5 mg, Oral, Q12H  atorvastatin, 10 mg, Oral, Nightly  budesonide-formoterol, 2 puff, Inhalation, BID - RT  bumetanide, 2 mg, Oral, BID  cholecalciferol, 2,000 Units, Oral, Daily  citalopram, 20 mg, Oral, Nightly  docusate sodium, 100 mg, Oral, BID  fluticasone, 2 spray, Each Nare, Daily  hydrocortisone-bacitracin-zinc oxide-nystatin, 1 application, Topical, TID  influenza vaccine, 0.5 mL, Intramuscular, Once  insulin aspart, 0-24 Units, Subcutaneous, TID AC  insulin detemir, 25 Units, Subcutaneous, Nightly  iron polysaccharides, 150 mg, Oral, Daily  levothyroxine, 224 mcg, Oral, Q AM  linagliptin, 5 mg, Oral, Daily  metroNIDAZOLE, 500 mg, Oral, Q8H  O2, 2 L/min, Inhalation, Once  polyethylene glycol, 17 g, Oral, Daily  pregabalin, 75 mg, Oral, BID  QUEtiapine, 12.5 mg, Oral, Q PM  sodium chloride, 10 mL, Intravenous, Q12H  sodium chloride, 10 mL, Intravenous, Q12H  tamsulosin, 0.4 mg, Oral, Daily  vancomycin, 1,000 mg, Intravenous, Q24H  cyanocobalamin, 1,000 mcg, Oral, Daily      Continuous Infusions:Pharmacy to dose vancomycin,       PRN Meds:.•  acetaminophen **OR** acetaminophen **OR** acetaminophen  •  albuterol sulfate HFA  •  dextrose  •   dextrose  •  glucagon (human recombinant)  •  magnesium sulfate **OR** magnesium sulfate **OR** magnesium sulfate  •  melatonin  •  Pharmacy to dose vancomycin  •  sodium chloride  •  sodium chloride      Labs:  Results from last 7 days   Lab Units 01/11/22 0351 01/05/22 0427   WBC 10*3/mm3 8.53 7.41   HEMOGLOBIN g/dL 10.3* 10.0*   HEMATOCRIT % 33.0* 32.1*   PLATELETS 10*3/mm3 167 243     Results from last 7 days   Lab Units 01/11/22  0351 01/10/22  0730 01/08/22  0407 01/07/22  0447 01/06/22  0431 01/05/22  0427   SODIUM mmol/L 134* 130* 135* 133* 135* 136   POTASSIUM mmol/L 4.2 4.5 4.4 4.3 4.5 4.3   CHLORIDE mmol/L 96* 95* 98 95* 96* 99   CO2 mmol/L 30.4* 25.5 30.1* 28.5 30.1* 26.6   BUN mg/dL 46* 38* 39* 34* 34* 31*   CREATININE mg/dL 2.07* 2.14* 2.28* 2.24* 2.30* 2.08*   CALCIUM mg/dL 8.5* 8.3* 8.3* 8.3* 8.9 8.6   GLUCOSE mg/dL 68 125* 85 62* 75 92     Results from last 7 days   Lab Units 01/09/22 0416 01/08/22 0407 01/07/22 0447 01/05/22 0427   MAGNESIUM mg/dL 2.7* 1.9 2.0 2.3       Results from last 7 days   Lab Units 01/11/22 0351 01/05/22 0427   PLATELETS 10*3/mm3 167 243         Lab Results (last 24 hours)     Procedure Component Value Units Date/Time    Renal Function Panel [[id number]]  (Abnormal) Collected: 01/11/22 0351    Specimen: Blood Updated: 01/11/22 0518     Glucose 68 mg/dL      BUN 46 mg/dL      Creatinine 2.07 mg/dL      Sodium 134 mmol/L      Potassium 4.2 mmol/L      Chloride 96 mmol/L      CO2 30.4 mmol/L      Calcium 8.5 mg/dL      Albumin 2.90 g/dL      Phosphorus 3.9 mg/dL      Anion Gap 7.6 mmol/L      BUN/Creatinine Ratio 22.2     eGFR Non African Amer 31 mL/min/1.73     Narrative:      GFR Normal >60  Chronic Kidney Disease <60  Kidney Failure <15      CBC (No Diff) [[id number]]  (Abnormal) Collected: 01/11/22 0351    Specimen: Blood Updated: 01/11/22 0448     WBC 8.53 10*3/mm3      RBC 3.67 10*6/mm3      Hemoglobin 10.3 g/dL      Hematocrit 33.0 %      MCV 89.9 fL      MCH 28.1  pg      MCHC 31.2 g/dL      RDW 15.6 %      RDW-SD 50.8 fl      MPV 10.4 fL      Platelets 167 10*3/mm3     POC Glucose Once [[id number]]  (Abnormal) Collected: 01/10/22 2010    Specimen: Blood Updated: 01/10/22 2015     Glucose 165 mg/dL      Comment: Meter: [id number] : [id number] Ruthann HARKINS       POC Glucose Once [[id number]]  (Abnormal) Collected: 01/10/22 1810    Specimen: Blood Updated: 01/10/22 1817     Glucose 151 mg/dL      Comment: Meter: [id number] : [id number] Chris Sage Deinse CNA       POC Glucose Once [[id number]]  (Normal) Collected: 01/10/22 1647    Specimen: Blood Updated: 01/10/22 1653     Glucose 125 mg/dL      Comment: Meter: [id number] : [id number] Chris Sage Deinse CNA       POC Glucose Once [[id number]]  (Abnormal) Collected: 01/10/22 1140    Specimen: Blood Updated: 01/10/22 1147     Glucose 164 mg/dL      Comment: Meter: [id number] : [id number] Chris Sage Deinse CNA       Renal Function Panel [[id number]]  (Abnormal) Collected: 01/10/22 0730    Specimen: Blood Updated: 01/10/22 0807     Glucose 125 mg/dL      BUN 38 mg/dL      Creatinine 2.14 mg/dL      Sodium 130 mmol/L      Potassium 4.5 mmol/L      Chloride 95 mmol/L      CO2 25.5 mmol/L      Calcium 8.3 mg/dL      Albumin 2.50 g/dL      Phosphorus 3.2 mg/dL      Anion Gap 9.5 mmol/L      BUN/Creatinine Ratio 17.8     eGFR Non African Amer 30 mL/min/1.73     Narrative:      GFR Normal >60  Chronic Kidney Disease <60  Kidney Failure <15                          Results from last 7 days   Lab Units 01/09/22  0416 01/08/22  0407 01/07/22  0447 01/05/22  0427   MAGNESIUM mg/dL 2.7* 1.9 2.0 2.3                 Glucose   Date/Time Value Ref Range Status   01/10/2022 2010 165 (H) 70 - 130 mg/dL Final     Comment:     Meter: [id number] : [id number] Ruthann HARKINS   01/10/2022 1810 151 (H) 70 - 130 mg/dL Final     Comment:     Meter: [id number] : [id number] Chris Romeo CNA   01/10/2022 1647 125 70 - 130 mg/dL  Final     Comment:     Meter: [id number] : [id number] Chris Romeo CNA   01/10/2022 1140 164 (H) 70 - 130 mg/dL Final     Comment:     Meter: [id number] : [id number] Chris Romeo CNA   01/10/2022 0710 136 (H) 70 - 130 mg/dL Final     Comment:     Meter: [id number] : [id number] Chris Romeo CNA   01/09/2022 1945 150 (H) 70 - 130 mg/dL Final     Comment:     Meter: [id number] : [id number] Plytodd Cass NA   01/09/2022 1642 143 (H) 70 - 130 mg/dL Final     Comment:     Meter: [id number] : [id number] Armaan Sinha Ruby CNA   01/09/2022 1136 129 70 - 130 mg/dL Final     Comment:     Meter: [id number] : [id number] Armaan Fitzpatricke Ruby CNA         Results from last 7 days   Lab Units 01/04/22  1106   WOUNDCX  Moderate growth (3+) Enterococcus faecalis*  Light growth (2+) Staphylococcus aureus, MRSA*  Light growth (2+) Normal Skin [facility]                 Radiology:  Imaging Results (Last 24 Hours)     ** No results found for the last 24 hours. **          Cardiology:  ECG/EMG Results (last 24 hours)     ** No results found for the last 24 hours. **                I have reviewed recent labs results and consult notes.    Please note portions of this assessment/plan may have been copied and pasted, but I have personally seen this patient and reviewed each line of this assessment and plan for accuracy and made updates to reflect my necessary changes     Assessment and Plan:  1.  Acute kidney injury chronic kidney disease stage III stable Bumex was decreased to once daily weight gradually started increasing and Bumex increased to twice daily yesterday.  Weight is back down morning labs are pending      2.  Left hip and left leg decubitus wounds debrided on 12/30/2021 wound culture show Enterococcus and MRSA-patient started on vancomycin IV  1/4/2022.  Will need 7 to 10 days of IV vancomycin.  We will have Dr. Aguero reassess duration of antibiotic therapy    3.   Congestive heart failure with  "preserved ejection fraction   stable appears to be euvolemic    4.    Bradycardia persistent remains off amiodarone        5.  Adult onset diabetes mellitus Accu-Chek sliding scale hypoglycemia is improved continue Accu-Chek/scale insulin    6.    Paroxysmal atrial fibrillation continue Eliquis dose adjusted to due to renal insufficiency     7.  Ischemic cardiomyopathy echo done as admission showed improvement of the ventricular function with ejection fraction 55% with severe aortic valve calcification some right atrial dilatation..     8.  Hypothyroidism TSH is elevated likely due to patient noncompliance fall continue present dose and monitor    9.  COPD nothing acute continue home medications     10.    Iron deficiency anemia stable    11.   Candida UTI Diflucan completed    12.  Disposition patient refuses to continue IV vancomycin he does not want to go to skilled nursing.  He is awake alert oriented x3 he understands if he goes home that he may die but he states that \"everyone has to die sometime\".  Discharge planning directive nursing has spoken to the patient and he still wishes to go home.  There is not a home health agency is going to accept the patient because of his noncompliance.  [facility] is an open case and is aware.  Discharge home AGAINST MEDICAL ADVICE today.  Spoke to patient at length again about consequences of going home with open wounds on no antibiotics and no dressing changes.  Patient refuses palliative or hospice consult referral      Much of this encounter note is an electronic transcription/translation of spoken language to printed text using Dragon Software          "

## 2022-01-14 PROBLEM — R00.1 SYMPTOMATIC BRADYCARDIA: Status: ACTIVE | Noted: 2022-01-01

## 2022-01-14 NOTE — CASE MANAGEMENT/SOCIAL WORK
Continued Stay Note  MICHAEL Kincaid     Patient Name: Froilan Helton  MRN: [id number]  Today's Date: 1/14/2022    Admit Date: 1/14/2022     Discharge Plan     Row Name 01/14/22 1338       Plan    Plan Comments Patient in ER this AM. CM was asked to reach out the facility patient was excepted at but refused to go to at previous discharge from this facility on Tuesday 1/11/2022. CM reviewed chart from last admission and patient had been accepted to [facility] in [place]. CM reached out to the facility and spoke with Anna Powers in admissions. Anna states that she currently does not have any beds available today but anticipates having one either Monday or Tuesday and could accept then. Referral given to Anna at [facility] and put in EPIC. Anna said she will follow up with us on Monday regarding bed availability. Spoke with Hanh cervantes and she states she has spoken with patient in the ER and he has agreed to accept treatment here at the hospital and is willing to go the [facility] when bed is available. Patient will be admitted to the hospital for treatment and RENITA will follow up with [facility] on Monday for bed availability. CM will continue to follow for needs.               Discharge Codes    No documentation.                     Elizabeth Arguello RN

## 2022-01-14 NOTE — CONSULTS
Date of Hospital Visit: [unfilled]ENC@  Encounter Provider: Aline Lopez MD  Place of Service: [facility] CARDIOLOGY  Patient Name: Froilan Helton  :1940  Referral Provider: No ref. provider found    Chief complaint    Consult for bradycardia    History of Present Illness    This is a chronically ill 81-year-old gentleman with a history of severe chronic lower extremity edema and cellulitis, atrial fibrillation, diabetes mellitus type 2, chronic HFpEF with acute exacerbations (had ischemic cardiomyopathy in the past and ejection action 36% which is now resolved on his last echo), hyperlipidemia, CAD, chronic short windedness, chronic renal failure, orthostatic hypotension, obesity, COPD and PETER.  He is unstable on his feet and has difficulty ambulating and so uses a scooter.  He does not take good care of himself and lives alone and refuses admissions to care facilities.  He has had multiple falls and almost chronic weeping wounds of his legs as well as currently an infected decubitus.      He really has been in the hospital admitted or in the emergency department almost constantly since 2021 but also had frequent admissions prior to that.  He was discharged I think on 2022 and is now back in the hospital again.  His last admission was for a fall with a left hip and left leg wound requiring debridement during Enterococcus and MRSA, treated with IV vancomycin.    He had a nonischemic stress nuclear perfusion study done 2018.  Echo done 2021 showed negative saline study, severe calcification of aortic valve, RVSP 44 mmHg, ejection fraction 55%, mild dilation of the aortic root and mild right ventricular dilation.    His atrial fibrillation has been treated with apixaban for anticoagulation and carvedilol and amiodarone for rate control.  He presented this time to the emergency department because he could not get up off the floor at his home.  He said he was  trying to move him his recliner to his scooter and his scooter was not closed up.  He felt weak and slid to the floor and could not get up.  He says been generally feeling very weak.  On arrival, his ECG showed sinus bradycardia with PACs, heart rate down into the 30s and 40s mostly.  He was given 0.5mg of atropine and his heart rate rebounds in his 60s.  Dr. Davenport was contacted patient was on call and she recommended they hold the amiodarone, we are seeing him for this.  His current ECG shows sinus rhythm with first-degree AV block.    His vitals on arrival showed a blood pressure of 81/69, pulse 42, afebrile, saturation 90% room air.  Labs showed a troponin of 0.129, proBNP 74 3, creatinine 2.78, potassium 4.8, sodium 135, otherwise normal CMP, lactate 2.2, white count 13, hemoglobin 11.5, otherwise normal CBC.    He does not have chest pain.  He does not have dyspnea but has a mild headache.  He has been having weeping legs and pain in left leg and hip pain.  He denies fever or cough.  He feels very weak fatigued.      Past Medical History:   Diagnosis Date   • A-fib ([facility])    • Arthritis    • Asthma    • CAD (coronary artery disease)    • Cardiomyopathy ([facility])    • Cataract    • CHF (congestive heart failure) ([facility])    • CKD (chronic kidney disease), stage III ([facility])    • COPD (chronic obstructive pulmonary disease) ([facility])    • Diabetes mellitus ([facility])    • Disease of thyroid gland    • Emphysema, unspecified ([facility])    • Glaucoma    • Hyperlipidemia    • Hypertension    • Kidney stone    • Myocardial infarct, old    • Neuropathy    • Osteoarthritis    • Osteoporosis    • Permanent atrial fibrillation ([facility]) 6/30/2020   • PVD (peripheral vascular disease) ([facility])    • Renal disorder    • Shingles        Past Surgical History:   Procedure Laterality Date   • COLONOSCOPY     • EYE SURGERY     • INCISION AND DRAINAGE LEG Left 12/30/2021    Procedure: debridement of left hip wounds and left foot wound;  Surgeon: More  "DO Judie;  Location: [facility];  Service: General;  Laterality: Left;   • JOINT REPLACEMENT      right   • KIDNEY STONE SURGERY     • REPLACEMENT TOTAL KNEE     • TOE SURGERY         (Not in a hospital admission)      Current Meds  Scheduled Meds:atropine, , ,       Continuous Infusions:   PRN Meds:.    Allergies as of 01/14/2022 - Reviewed 01/14/2022   Allergen Reaction Noted   • Morphine Unknown - High Severity 07/07/2021   • Atorvastatin Unknown - Low Severity 09/03/2019   • Oxycontin [oxycodone hcl] Confusion 11/21/2017       Social History     Socioeconomic History   • Marital status:    Tobacco Use   • Smoking status: Former Smoker   • Smokeless tobacco: Never Used   • Tobacco comment: quit 1993   Vaping Use   • Vaping Use: Never used   Substance and Sexual Activity   • Alcohol use: No   • Drug use: No   • Sexual activity: Defer       Family History   Problem Relation Age of Onset   • COPD Mother    • Diabetes Father    • Malig Hyperthermia Neg Hx        REVIEW OF SYSTEMS:   12 point ROS was performed and is negative except as outlined in HPI            Objective:   Temp:  [97.8 °F (36.6 °C)] 97.8 °F (36.6 °C)  Heart Rate:  [39-52] 52  Resp:  [18] 18  BP: ()/(55-69) 149/66  Body mass index is 29.48 kg/m².  Flowsheet Rows      First Filed Value   Admission Height 182.9 cm (72\") Documented at 01/14/2022 0618   Admission Weight 98.6 kg (217 lb 6 oz) Documented at 01/14/2022 0618        Vitals:    01/14/22 0759   BP: 149/66   Pulse: 52   Resp: 18   Temp:    SpO2: 100%       General Appearance:    Alert, cooperative, in no acute distress   Head:    Normocephalic, without obvious abnormality, atraumatic   Eyes:            Lids and lashes normal, conjunctivae and sclerae normal, no   icterus, no pallor, corneas clear   Ears:    Ears appear intact with no abnormalities noted   Throat:   No oral lesions, no thrush, oral mucosa moist   Neck:   No adenopathy, supple, trachea midline, no thyromegaly, no   " carotid bruit, no JVD   Back:     No kyphosis present, no scoliosis present, no skin lesions, erythema or scars, no tenderness to palpation, range of motion normal   Lungs:     Clear to auscultation,respirations regular, even and unlabored    Heart:    Regular rhythm and normal rate, normal S1 and S2, no murmur, no gallop, no rub, no click   Chest Wall:    No abnormalities observed   Abdomen:     Normal bowel sounds, no masses, no organomegaly, soft, nontender, nondistended, no guarding, no rebound  tenderness   Extremities:   Moves all extremities well, 2+ LE edema, no cyanosis, no redness   Pulses:   Pulses palpable and equal bilaterally. Normal radial, carotid, dorsalis pedis and posterior tibial pulses bilaterally.    Skin:  Psychiatric:  Bleeding left foot and legs are wrapped, wounds at the left hip.    Alert and oriented x 3, normal mood and affect                 Lab Review:      Results from last 7 days   Lab Units 01/14/22  0629   SODIUM mmol/L 135*   POTASSIUM mmol/L 4.8   CHLORIDE mmol/L 97*   CO2 mmol/L 25.8   BUN mg/dL 67*   CREATININE mg/dL 2.78*   CALCIUM mg/dL 9.2   BILIRUBIN mg/dL 0.3   ALK PHOS U/L 78   ALT (SGPT) U/L 10   AST (SGOT) U/L 20   GLUCOSE mg/dL 144*     Results from last 7 days   Lab Units 01/14/22  0629   TROPONIN T ng/mL 0.129*     @LABRCNTbnp@  Results from last 7 days   Lab Units 01/14/22  0629 01/11/22  0351   WBC 10*3/mm3 13.28* 8.53   HEMOGLOBIN g/dL 11.5* 10.3*   HEMATOCRIT % 36.7* 33.0*   PLATELETS 10*3/mm3 191 167         Results from last 7 days   Lab Units 01/09/22  0416   MAGNESIUM mg/dL 2.7*     @LABRCNTIP(chol,trig,hdl,ldl)    I personally viewed and interpreted the patient's EKG/Telemetry data  )  Patient Active Problem List   Diagnosis   • COPD (chronic obstructive pulmonary disease) ([facility])   • Type 2 diabetes mellitus with stage 4 chronic kidney disease, with long-term current use of insulin ([facility])   • Essential hypertension   • Primary osteoarthritis of left knee   •  Chronic renal insufficiency, stage 4 (severe) ([facility])   • Class 2 severe obesity due to excess calories with serious comorbidity in adult ([facility])   • Physical debility   • Acute UTI (urinary tract infection)   • Permanent atrial fibrillation ([facility])   • H/O noncompliance with medical treatment, presenting hazards to health   • Myxedema   • Acute on chronic combined systolic and diastolic CHF (congestive heart failure) ([facility])   • Generalized weakness   • Recurrent left pleural effusion   • Fall on same level as cause of accidental injury   • Gross hematuria   • CAD (coronary artery disease)   • HLD (hyperlipidemia)   • Hypothyroidism   • CKD (chronic kidney disease) stage 3, GFR 30-59 ml/min ([facility])   • Acute metabolic encephalopathy   • Cardiomyopathy ([facility])   • Recurrent falls   • Acute renal failure superimposed on chronic kidney disease ([facility])   • Open wound of left foot   • Moderate malnutrition ([facility])     Assessment and Plan:    1. Bradycardia with dizziness and weakness.  It looks like he may have had some junctional escape beats then sinus beats with first-degree AV block and PACs.  I am really not seeing a second-degree AV block.  Would recommend remaining off of amiodarone as this is likely the culprit.  I know this increases risk of having atrial fibrillation but we will have to just monitor for now.  2. Decubiti with recent admission for debridement and treated antibiotics.  3. CKD with ROLA  4. Chronic HFpEF    He is not a candidate for pacemaker because of his chronic infections.      His overall prognosis is very poor and likely he should be palliative.  He has been in the hospital or emergency department almost chronically since April 2021 and many times leaves AMA and has refused placement to his own detriment and health demise.  His home situation is dangerous, potentially lethal, because he has had recurrent falls there and no seeming support for his health needs and hence they worsen.  I think his case  poses several ethical dilemma's and I am not sure that he has the capacity to make favorable decisions for his own health, at this point, given the multiple health consequences that he has had by not following recommended therapies or treatment plans.    There is really nothing more that we have to offer from a cardiac standpoint.  I would keep him off the amiodarone at this point.  No other further testing is needed.  If he is admitted, we will see him Monday.    Aline Lopez MD  01/14/22  08:07 EST.  Time spent in reviewing chart, discussion and examination:

## 2022-01-14 NOTE — PLAN OF CARE
Goal Outcome Evaluation:  Plan of Care Reviewed With: patient        Progress: no change  Outcome Summary: patient admitted post fall at home for wound care and bradycardia. patient sinus maria a on tele in the 30-40's. no complaints of pain. wound consult complete and wounds dressed this shift- orders placed. fluids started, barajas in place.

## 2022-01-14 NOTE — PROGRESS NOTES
Adult Nutrition  Assessment/PES    Patient Name:  Froilan Helton  YOB: 1940  MRN: [id number]  Admit Date:  1/14/2022    Assessment Date:  1/14/2022    Comments:  Pt meets criteria for moderate malnutrition with muscle wasting, fat loss, reduced po.     Agree with snacks twice per day as ordered. Will cont to follow.      Reason for Assessment     Row Name 01/14/22 1418          Reason for Assessment    Reason For Assessment identified at risk by screening criteria  recent malnutrition     Diagnosis infection/sepsis; renal disease; diabetes diagnosis/complications  Chronic leg wounds IV ABX, CKD, Bradycardia, cCHF hx DM                Nutrition/Diet History     Row Name 01/14/22 1421          Nutrition/Diet History    Typical Food/Fluid Intake Spoke w pat bedside. NKFA. Noted poor dentition but denies issue chewing. Not a huge fan of roast beef. Likes ice tea. Pt agreeable to nutrition exam.                Anthropometrics     Row Name 01/14/22 1422          Anthropometrics    Weight --  217.3#            Body Mass Index (BMI)    BMI Assessment BMI 25-29.9: overweight                Labs/Tests/Procedures/Meds     Row Name 01/14/22 1422          Labs/Procedures/Meds    Lab Results Reviewed reviewed     Lab Results Comments glu 144, Bun 67/creat 2.7 c/w CKD            Diagnostic Tests/Procedures    Diagnostic Test/Procedure Reviewed reviewed            Medications    Pertinent Medications Reviewed reviewed     Pertinent Medications Comments B12, miralax, tradjenta, Vitamin D, novolog, levemir                Physical Findings     Row Name 01/14/22 1423          Physical Findings    Overall Physical Appearance loss of muscle mass; loss of subcutaneous fat     Skin other (see comments)  toe, foot, trochanter wounds see RN note                Estimated/Assessed Needs     Row Name 01/14/22 1430          Estimated/Assessed Needs    Additional Documentation Calorie Requirements (Group); Fluid Requirements  (Group); Protein Requirements (Group)            Calorie Requirements    Estimated Calorie Need Method [place]     Estimated Calorie Requirement Comment 2083 kcal ( mifflin 1.2)            Protein Requirements    Est Protein Requirement Amount (gms/kg) 0.8 gm protein   GM PRO ( .8-1.1 GM/KG) noted CKD & skin issues            Fluid Requirements    Estimated Fluid Requirement Method other (see comments)  0306-1847 ml CHF hx noted                Nutrition Prescription Ordered     Row Name 01/14/22 1431          Nutrition Prescription PO    Current PO Diet Soft Texture     Texture Chopped     Common Modifiers Low Potassium; Fluid Restriction     Fluid Restriction mL per Day --  1200 ml                Evaluation of Received Nutrient/Fluid Intake     Row Name 01/14/22 1432          Fluid Intake Evaluation    Oral Fluid (mL) --  insufficient data just admitted            PO Evaluation    Number of Days PO Intake Evaluated Insufficient Data                  Malnutrition Severity Assessment     Row Name 01/14/22 1432          Malnutrition Severity Assessment    Malnutrition Type Acute Disease or Injury - Related Malnutrition            Insufficient Energy Intake     Insufficient Energy Intake  --  reports poor at home            Unintentional Weight Loss     Unintentional Weight Loss  --  difficult to tell due to CHF            Muscle Loss    Temple Region Moderate - slight depression     Clavicle Bone Region Moderate - some protrusion in females, visible in males     Dorsal Hand Region Moderate - slight depression     Anterior Thigh Region Moderate - mild depression on inner thigh            Fat Loss    Orbital Region  Severe - pronounced hollowness/depression, dark circles, loose saggy skin     Upper Arm Region Moderate - some fat tissue, not ample            Criteria Met (Must meet criteria for severity in at least 2 of these categories: M Wasting, Fat Loss, Fluid, Secondary Signs, Wt. Status, Intake)     Patient meets criteria for  Moderate (non-severe) Malnutrition                 Problem/Interventions:   Problem 1     Row Name 01/14/22 1434          Nutrition Diagnoses Problem 1    Problem 1 Malnutrition     Etiology (related to) Factors Affecting Nutrition     Signs/Symptoms (evidenced by) Report/Observation  MSA                      Intervention Goal     Row Name 01/14/22 1435          Intervention Goal    General Meet nutritional needs for age/condition     PO Establish PO; PO intake (%)     PO Intake % 50 %  or greater                Nutrition Intervention     Row Name 01/14/22 1435          Nutrition Intervention    RD/Tech Action Encourage intake; Interview for preference; Follow Tx progress                  Education/Evaluation     Row Name 01/14/22 143          Education    Education Previous education by RD/LD; Education offered and refused            Monitor/Evaluation    Monitor Per protocol; I&O; PO intake; Pertinent labs; Weight; Skin status                 Electronically signed by:  Laura Varma RD  01/14/22 14:36 EST

## 2022-01-14 NOTE — PROGRESS NOTES
Pharmacokinetic Consult - Vancomycin Dosing    Froilan Helton is a 81 y.o. male who has been consulted for vancomycin dosing for skin and soft tissue infection. Patient received Vancomycin treatment during last admission. Vancomycin level = 15.9 mcg/ml today at 1350.    Will give Vancomycin 750 mg X 1 today will check level tomorrow.     Relevant clinical data and objective history reviewed:  Lab Results   Component Value Date/Time    CREATININE 2.78 (H) 01/14/2022 06:29 AM    CREATININE 2.07 (H) 01/11/2022 03:51 AM    CREATININE 2.14 (H) 01/10/2022 07:30 AM    BUN 67 (H) 01/14/2022 06:29 AM    BUN 46 (H) 01/11/2022 03:51 AM    BUN 38 (H) 01/10/2022 07:30 AM     Estimated Creatinine Clearance: 25.8 mL/min (A) (by C-G formula based on SCr of 2.78 mg/dL (H)).    Lab Results   Component Value Date/Time    WBC 13.28 (H) 01/14/2022 06:29 AM    HGB 11.5 (L) 01/14/2022 06:29 AM    HCT 36.7 (L) 01/14/2022 06:29 AM    MCV 91.3 01/14/2022 06:29 AM     01/14/2022 06:29 AM     Temp Readings from Last 3 Encounters:   01/14/22 97.1 °F (36.2 °C)   01/11/22 98.3 °F (36.8 °C) (Oral)   12/20/21 97.8 °F (36.6 °C) (Oral)     Weight:  (217.3#).    Pharmacy will continue to follow the patient’s clinical progress daily.    Thank you-  Javan Gamez, PharmD

## 2022-01-14 NOTE — ED TRIAGE NOTES
Patient called EMS due to sliding out of his wheelchair at home and he was unable to get up.    He does not have any complaints. He is alert and oriented in no distress.

## 2022-01-14 NOTE — NURSING NOTE
Voicemail left for wound nurse, Carole, to inquire about availability to assess wounds. Awaiting call back.

## 2022-01-14 NOTE — ED PROVIDER NOTES
Turnover note    I received care of this patient from my partner Dr. Garcia.  Patient has history of multiple hospitalizations and and continuously declines nursing home placement.  He was brought to the ED with generalized weakness.  He was found to have profound bradycardia with heart rate down to the 20s.    Dr. Garcia spoke with the on-call cardiologist, Dr. Lopez.  Patient to be evaluated in the ED by on-call cardiology as he is a poor pacemaker candidate.    0818  I spoke with Dr. Lopez who saw the patient in the ED today.  Dr. Ayala states that patient is not at all a pacemaker candidate due to multiple medical problems and open sores on his body.  She recommends admission to the medical service here in the [place].    0906  I spoke with hospitalist team.  They have been in conversation with Dr. Ayala.  This patient is a medical difficulty is he is spent significant time in the hospital and continues to refuse rehab placement.  He is not a candidate for aggressive interventions such as pacemaker placement.  Hospitalist team thinks that we need to look into palliative care.    Final diagnoses:   Symptomatic bradycardia   Acute renal failure superimposed on chronic kidney disease, unspecified CKD stage, unspecified acute renal failure type ([facility])       DISPOSITION - Admission       Demario Gutierrez MD  01/14/22 0684

## 2022-01-14 NOTE — NURSING NOTE
CWON consult received. Patient very familiar to me as he was just discharged home on 1/11. EMS was called to his home after a fall and pt could not get up.  Today he is agreeing to go to rehab with a probable discharge on Monday.    All wounds were POA and new wound photos were obtained and can be viewed under the media tab or flow sheets.    CWON assessed all wounds and provided wound care and dressing application per orders entered in Epic.Patient pleasant with me and cooperative. He tolerated wound care with no complaints.     All wounds are stable and without any outward s/s of infection. Jolene wound skin to left hip wounds x 2 with a persistant fungal rash r/t drainage. Drainage is appropriate now and erythema improving but still present. Will recommend continuing Yaima's Magic ointment to jolene wound skin with each daily dsg change of Dakins wet to dry.    Perineum with mild MASD related to incontinence and being unable to clean himself at home over the last 3 days. Continue Yaima's barrier ointment.     Bilateral buttock pressure injuries are clean and with less moisture but do seem to show more depth than previous noted and consistent with stage 3 PI's. R buttock wound measures 2.5 cm x 1.2 cm x 0.3 cm and L buttock with 2 open wounds, each measuring 1.0 cm x 0.8 cm x 0.3 cm and 1.0 cm x 1.0 cm x 0.3 cm.     Will recommend applying barrier directly to open wound and covering with Optifoam dressings. If dressings persistently are getting soiled, may leave natalie and treat only with moisture barrier. Continue Q2 turns and waffle surfaces to bed and chair for pressure redistribution.    BLE with 1+ edema. There are scattered abrasions to left knee and anterior LE r/t fall.     RLE wound is dry and shallow with epithelialization noted. Will recommend daily application of Aquaphor to RLE and to wound bed followed by Kerlix and ace.    L dorsal foot and medial lower leg wounds are also dry and shallow. Signs of healing  noted. L medial foot wound stable and progressing well. Will recommend Aquaphor to LLE including dorsal foot and medial lower leg wounds. Left medial foot wound would do best w applying Dakins moistened gauze and changing once daily.    Left heel deep tissue pressure injury, POA. Wound measures 4.0 cm x 5.0 cm with intact, non-blanchable maroon and purple skin. Recommend keeping heel well moisturized with Aquaphor and offloaded with pillows at all times. Patient refuses offloading sometimes and this was discussed with him and I tried to educate on importance of why we're wanting to float heel. Patient non-committal but did allow me to place pillows and float both heels.    All above discussed with RAVI Olmedo.    Will continue to follow.

## 2022-01-14 NOTE — ED PROVIDER NOTES
Subjective   History of Present Illness  Mr. Mcgovern is an 81-year-old male with multiple medical problems who presents to the emergency room when he could not get up from the floor at his home.  He says that he was trying to move from his recliner to his scooter scooter was not close enough and he felt weak and slid down to the floor.  When he could not get up he called EMS.  He says he feels weak in general and lightheaded but otherwise has no complaints.  Denies chest pain, denies shortness of breath, denies abdominal pain.  Denies have any fevers or chills.  Review of Systems   Constitutional: Negative for chills and fever.   Respiratory: Negative for chest tightness and shortness of breath.    Cardiovascular: Positive for leg swelling. Negative for chest pain.   Gastrointestinal: Negative for abdominal pain, nausea and vomiting.   Neurological: Positive for weakness and light-headedness. Negative for dizziness.       Past Medical History:   Diagnosis Date   • A-fib ([facility])    • Arthritis    • Asthma    • CAD (coronary artery disease)    • Cardiomyopathy ([facility])    • Cataract    • CHF (congestive heart failure) ([facility])    • CKD (chronic kidney disease), stage III ([facility])    • COPD (chronic obstructive pulmonary disease) ([facility])    • Diabetes mellitus ([facility])    • Disease of thyroid gland    • Emphysema, unspecified ([facility])    • Glaucoma    • Hyperlipidemia    • Hypertension    • Kidney stone    • Myocardial infarct, old    • Neuropathy    • Osteoarthritis    • Osteoporosis    • Permanent atrial fibrillation ([facility]) 6/30/2020   • PVD (peripheral vascular disease) ([facility])    • Renal disorder    • Shingles        Allergies   Allergen Reactions   • Morphine Unknown - High Severity   • Atorvastatin Unknown - Low Severity   • Oxycontin [Oxycodone Hcl] Confusion     'Makes me crazy and stand on my head'       Past Surgical History:   Procedure Laterality Date   • COLONOSCOPY     • EYE SURGERY     • INCISION AND DRAINAGE LEG Left 12/30/2021     Procedure: debridement of left hip wounds and left foot wound;  Surgeon: Judie Aguero DO;  Location: [facility] OR;  Service: General;  Laterality: Left;   • JOINT REPLACEMENT      right   • KIDNEY STONE SURGERY     • REPLACEMENT TOTAL KNEE     • TOE SURGERY         Family History   Problem Relation Age of Onset   • COPD Mother    • Diabetes Father    • Malig Hyperthermia Neg Hx        Social History     Socioeconomic History   • Marital status:    Tobacco Use   • Smoking status: Former Smoker   • Smokeless tobacco: Never Used   • Tobacco comment: quit 1993   Vaping Use   • Vaping Use: Never used   Substance and Sexual Activity   • Alcohol use: No   • Drug use: No   • Sexual activity: Defer           Objective    ED Triage Vitals [01/14/22 0618]   Temp Heart Rate Resp BP SpO2   97.8 °F (36.6 °C) (!) 42 18 (!) 81/69 98 %      Temp src Heart Rate Source Patient Position BP Location FiO2 (%)   Oral Monitor Lying Right arm --       Physical Exam  INITIAL VITAL SIGNS: Reviewed by me.  Pulse ox normal  GENERAL: Alert and interactive. No acute distress.  Converses normally  HEAD: Head is normocephalic.  EYES: EOMI. PERRL. No scleral icterus. No conjunctival injection.  ENT: Moist mucous membranes.   NECK: Supple. Full range of motion.  RESPIRATORY: No tachypnea.  Slight crackles in the lung bases  CV: Irregular bradycardic rhythm. No murmurs. No rubs or gallops.  ABDOMEN: Soft, nondistended, nontender. No guarding. No rebound. No masses.   BACK:  No obvious deformity.  EXTREMITIES: Edema of the bilateral lower extremities, legs are wrapped  SKIN: Warm and dry. No diaphoresis. No obvious rashes.   NEUROLOGIC: Alert and oriented. Face is symmetric. Speech is normal. Moves all extremities equally. Motor and sensory distally intact.     Procedures           ED Course  ED Course as of 01/15/22 0010   Fri Jan 14, 2022   0643 Initial EKG saying Mobitz type II block, rate of 47, repeat is rate of 55 in sinus rhythm.   Patient keeps going back and forth between significant bradycardia as low as the 20s and a mild bradycardia in the mid 50s.  He says his lightheadedness comes and goes occasionally, his blood pressure is varying from hypotension normal. [RO]   0648 Patient was down in the 20s, gave him 1/2 mg atropine increase his heart rate to the mid 60s [RO]   0716 Discussed with Dr. Davenport who is on-call for cardiology, she says that most likely he just needs to have his amiodarone held for a while and let it wear off, she also says that even if the EKG actually is Mobitz type II her EP colleagues will not put in a pacemaker because he just had a bunch of infected wound debridement and risk of infection to be too high.  She says that Dr. Lopez is rounding out here today and she will have her come evaluate the patient in the emergency room for final decision for disposition of staying here versus going to [place] [RO]   0722 Labs show slightly elevated white count at 13, and acute on chronic kidney injury, slightly elevated lactate of 2.2 but a normal procalcitonin.  Patient's BNP is 7200 which is a little higher than most recent couple of BMPs but he does not seem particularly fluid overloaded on exam, troponin is 0.129 which is a little bit higher than his more recent troponins.  X-ray read as potential right airspace disease, patient has no shortness of breath she is afebrile with a normal Pro-Hu think he is unlikely to have a bacterial pneumonia [RO]   0731 Care to Dr. Gutierrez pending cards eval for dispo [RO]      ED Course User Index  [RO] Jaylen Diaz MD                                                 [facility]    Final diagnoses:   Symptomatic bradycardia   Acute renal failure superimposed on chronic kidney disease, unspecified CKD stage, unspecified acute renal failure type (HCC)       ED Disposition  ED Disposition     ED Disposition Condition Comment    Decision to Admit  Level of Care: Telemetry [5]   Diagnosis:  Symptomatic bradycardia [047256]   Admitting Physician: DESIRE LAIRD [[id number]]   Attending Physician: DESIRE LAIRD [[id number]]   Isolate for COVID?: No [0]   Certification: I Certify That Inpatient Hospital Services Are Medically Necessary For Greater Than 2 Midnights            No follow-up provider specified.       Medication List      No changes were made to your prescriptions during this visit.          Jaylen Diaz MD  01/15/22 0010

## 2022-01-14 NOTE — SIGNIFICANT NOTE
01/14/22 1556   Provider Notification   Reason for Communication Critical lab value  (troponin)   Provider Name Liat Hood   Notification Route In person, on unit   Response No new orders

## 2022-01-14 NOTE — PROGRESS NOTES
Ethics committee met to discuss the patient's current situation.    The conclusion of the ethics committee is that we will follow the patient's decisions as long the patient is competent.    chaplain Elma, chair of ethics

## 2022-01-14 NOTE — DISCHARGE PLACEMENT REQUEST
"Froilan Ferguson P (81 y.o. Male)             Date of Birth Social Security Number Address Home Phone MRN    1940  [address] [facility] [phone] [id number]    [employer] Marital Status             None        Admission Date Admission Type Admitting Provider Attending Provider Department, Room/Bed    1/14/22 Emergency   [facility], 08/08    Discharge Date Discharge Disposition Discharge Destination                         Attending Provider: (none)   Allergies: Morphine, Atorvastatin, Oxycontin [Oxycodone Hcl]    Isolation: None   Infection: MRSA (01/01/22)   Code Status: Prior   Advance Care Planning Activity    Ht: 182.9 cm (72\")   Wt: 98.6 kg (217 lb 6 oz)    Admission Cmt: None   Principal Problem: None                Active Insurance as of 1/14/2022     Primary Coverage     Payor Plan Insurance Group Employer/Plan Group    MEDICARE MEDICARE A & B      Payor Plan Address Payor Plan Phone Number Payor Plan Fax Number Effective Dates    [address] [phone]  11/1/2006 - None Entered    [facility] [id number]       Subscriber Name Subscriber Birth Date Member ID       FROILAN FERGUSON P 1940 [id number]           Secondary Coverage     Payor Plan Insurance Group Employer/Plan Group    HUMANA HUMANA [id number]     Payor Plan Address Payor Plan Phone Number Payor Plan Fax Number Effective Dates    [address] [address] [phone]  1/1/2013 - None Entered    [facility] [id number]       Subscriber Name Subscriber Birth Date Member ID       FROILAN FERGUSON P 1940 [id number]                 Emergency Contacts      (Rel.) Home Phone Work Phone Mobile Phone    Jaz Grigsby (Power of ) [phone] -- [phone]    ISAIAS FERGUSON (Son) [phone] -- --              "

## 2022-01-14 NOTE — PROGRESS NOTES
After several conversations with patient, Elizabeth in Case Management, and Liat Hood, it was decided patient will be admitted for IV antibiotics with plan to transition to rehab facility on Monday. patient agreed to being admitted and receiving IV antibiotics but patient refuses a PICC line.  Patient also agreed to go to rehab facility. RENITA Johnson and Liat are aware of patient's refusal of PICC line..      Spoke with granddaughter, Jaz Herrera, who is POA and she is in agreement with the decisions.    Chaplain Elma

## 2022-01-14 NOTE — PROGRESS NOTES
Pharmacy dosing Vancomycin consult    Indication- skin and soft tissue infection    Patient was discharged on 1/11/22 and was receiving Vancomycin 1000mg q24h during that admission.     Labs 1/14/22   Scr-2.78, CrCl-25.80, WBC-13.28    Due to current renal function and previous Vancomycin levels, will not give a loading dose and resume at previous dosing regimen..     Start Vancomycin 1000mg IV q24h.  Random level on 1/17/22 before scheduled dose.    Will continue to follow daily while on Vancomycin.

## 2022-01-14 NOTE — PROGRESS NOTES
Moderate malnutrition noted per dietician    Discussion held with patient regarding code status with Shara Borja RN and Carole Fish RN present at time of discussion. Patient want to be a FULL CODE. Status entered accordingly

## 2022-01-14 NOTE — CONSULTS
[facility] Consult Note      Patient Name: Froilan Helton  : 1940  MRN: [id number]  Primary Care Physician:  Fortunato De Leon MD  Referring Physician: No ref. provider found  Date of admission: 2022    Subjective     Reason for Consult: ROLA on CKD stage IV    HPI:   Froilan Helton is a 81 y.o. male with underlying CKD stage IV with baseline creatinine around 1.8 to 2.0 mg/dL, recently discharged from the hospital about 3 days ago, comes back in because of not eating or drinking for few days since discharge.  Reportedly, left AGAINST MEDICAL ADVICE from this facility.  He was recommended for PICC line and IV antibiotics and transfer to SNF for wound care but was reluctant and opted for leaving AMA.  At that time, had I&D of left hip wounds by general surgery and a wound VAC was recommended.  Upon discharge, his serum creatinine was down to 2.0 to 2.1 mg/dL.  He is presenting today with a creatinine up to 2.8 mg/dL.  He does endorse significant improvement in lower extremity swelling.  He was receiving loop diuretics during this last hospital stay and upon discharge was recommended for Bumex 2 mg twice daily      Review of Systems:   14 point review of systems is otherwise negative except for mentioned above on HPI    Personal History     Past Medical History:   Diagnosis Date   • A-fib ([facility])    • Arthritis    • Asthma    • CAD (coronary artery disease)    • Cardiomyopathy ([facility])    • Cataract    • CHF (congestive heart failure) ([facility])    • CKD (chronic kidney disease), stage III ([facility])    • COPD (chronic obstructive pulmonary disease) ([facility])    • Diabetes mellitus ([facility])    • Disease of thyroid gland    • Emphysema, unspecified ([facility])    • Glaucoma    • Hyperlipidemia    • Hypertension    • Kidney stone    • Myocardial infarct, old    • Neuropathy    • Osteoarthritis    • Osteoporosis    • Permanent atrial fibrillation ([facility]) 2020   • PVD (peripheral vascular disease) ([facility])    •  Renal disorder    • Shingles        Past Surgical History:   Procedure Laterality Date   • COLONOSCOPY     • EYE SURGERY     • INCISION AND DRAINAGE LEG Left 12/30/2021    Procedure: debridement of left hip wounds and left foot wound;  Surgeon: Judie Aguero DO;  Location: [facility];  Service: General;  Laterality: Left;   • JOINT REPLACEMENT      right   • KIDNEY STONE SURGERY     • REPLACEMENT TOTAL KNEE     • TOE SURGERY         Family History: family history includes COPD in his mother; Diabetes in his father.    Social History:  reports that he has quit smoking. He has never used smokeless tobacco. He reports that he does not drink alcohol and does not use drugs.    Home Medications:  Prior to Admission medications    Medication Sig Start Date End Date Taking? Authorizing Provider   albuterol sulfate  (90 Base) MCG/ACT inhaler Inhale 2 puffs Every 4 (Four) Hours As Needed for Wheezing.   Yes ProviderCarmina MD   apixaban (ELIQUIS) 2.5 MG tablet tablet Take 1 tablet by mouth Every 12 (Twelve) Hours. Indications: Atrial Fibrillation 1/11/22  Yes Randolph Tovar MD   atorvastatin (LIPITOR) 10 MG tablet Take 10 mg by mouth Every Night.   Yes Provider, MD Carmina   Benzalkonium Chloride (REMEDY ANTIMICROBIAL CLEANSER EX) Apply  topically Every 1 (One) Hour As Needed.   Yes ProviderCarmina MD   budesonide-formoterol (SYMBICORT) 160-4.5 MCG/ACT inhaler Inhale 2 puffs 2 (Two) Times a Day. 2/19/19  Yes Santiago Powers DO   bumetanide (BUMEX) 2 MG tablet Take 1 tablet by mouth 2 (Two) Times a Day.  Patient taking differently: Take 1 mg by mouth 2 (Two) Times a Day. 7/2/20  Yes Pia Figueredo MD   cholecalciferol 2000 units tablet Take 2,000 Units by mouth Daily. 8/30/19  Yes Terri Chaudhry MD   citalopram (CeleXA) 10 MG tablet Take 20 mg by mouth Every Night.   Yes ProviderCarmina MD   fluticasone (FLONASE) 50 MCG/ACT nasal spray 2 sprays by Each Nare  "route Daily. 6/29/19  Yes Terri Chaudhry MD   insulin aspart (novoLOG) 100 UNIT/ML injection Inject 1-14 Units under the skin into the appropriate area as directed 3 (Three) Times a Day Before Meals. As directed per sliding scale   Yes Carmina Jain MD   insulin detemir (Levemir FlexTouch) 100 UNIT/ML injection Inject 25 Units under the skin into the appropriate area as directed Every Night. 1/11/22  Yes Randolph Tovar MD   Insulin Syringe 30G X 5/16\" 1 ML misc U UTD WITH INSULIN UP TO 6 TIMES A DAY 9/8/19  Yes Carmina Jain MD   ipratropium-albuterol (DUO-NEB) 0.5-2.5 mg/3 ml nebulizer Take 3 mL by nebulization Every 4 (Four) Hours As Needed for Wheezing.   Yes Carmina Jain MD   lactulose (CHRONULAC) 10 GM/15ML solution Take 30 g by mouth Daily As Needed.   Yes Carmina Jain MD   levothyroxine (SYNTHROID, LEVOTHROID) 112 MCG tablet Take 224 mcg by mouth Daily.   Yes Carmina Jain MD   loratadine (CLARITIN) 5 MG chewable tablet Chew 10 mg Daily.   Yes Carmina Jain MD   melatonin 5 MG tablet tablet Take 1 tablet by mouth At Night As Needed (sleep). Over the counter 11/27/19  Yes Terri Chaudhry MD   O2 (OXYGEN) Inhale 1 L/min every night at bedtime.  Patient taking differently: Inhale 2 L/min every night at bedtime. 8/29/19  Yes Terri Chaudhry MD   polyethylene glycol (MIRALAX) 17 g packet Take 17 g by mouth Daily.   Yes Carmina Jain MD   pregabalin (LYRICA) 75 MG capsule Take 1 capsule by mouth 2 (Two) Times a Day. 8/3/21  Yes Ellis Lombardo MD   QUEtiapine (SEROquel) 25 MG tablet Take 0.5 tablets by mouth Every Evening.   Yes Carmina Jain MD   SITagliptin (Januvia) 100 MG tablet Take 0.5 tablets by mouth Daily.  Patient taking differently: Take 50 mg by mouth Daily. 1/11/22  Yes Randolph Tovar MD   tamsulosin (FLOMAX) 0.4 MG capsule 24 hr capsule Take 1 capsule by mouth Daily. " 8/30/19  Yes Terri Chaudhry MD   vitamin B-12 (VITAMIN B-12) 1000 MCG tablet Take 1 tablet by mouth Daily. 8/30/19  Yes Terri Chaudhry MD   vitamin D (ERGOCALCIFEROL) 1.25 MG (56900 UT) capsule capsule Take 1,250 Units by mouth Daily.   Yes Provider, MD Carmina       Allergies:  Allergies   Allergen Reactions   • Morphine Unknown - High Severity   • Atorvastatin Unknown - Low Severity   • Oxycontin [Oxycodone Hcl] Confusion     'Makes me crazy and stand on my head'       Objective     Vitals:   Temp:  [96.8 °F (36 °C)-97.8 °F (36.6 °C)] 96.8 °F (36 °C)  Heart Rate:  [39-52] 49  Resp:  [18] 18  BP: ()/(50-73) 137/50  No intake or output data in the 24 hours ending 01/14/22 1338    Physical Exam:    General Appearance: alert, oriented x 3, no acute distress, ill-appearing  Skin: warm and dry  HEENT: oral mucosa normal, nonicteric sclera  Neck: supple, no JVD  Lungs: Trace right fine basilar crackles  Heart: Irregularly irregular  Abdomen: soft, nontender, nondistended  Extremities: No edema bilaterally  Neuro: Awake and alert and answering simple questions appropriately    Scheduled Meds:     apixaban, 2.5 mg, Oral, Q12H  Aquaphor Advanced Therapy, 1 application, Topical, Q12H  atorvastatin, 10 mg, Oral, Nightly  atropine, , ,   budesonide-formoterol, 2 puff, Inhalation, BID - RT  cholecalciferol, 2,000 Units, Oral, Daily  citalopram, 20 mg, Oral, Nightly  docusate sodium, 100 mg, Oral, BID  fluticasone, 2 spray, Each Nare, Daily  hydrocortisone-bacitracin-zinc oxide-nystatin, 1 application, Topical, TID  insulin aspart, 0-24 Units, Subcutaneous, TID AC  insulin detemir, 10 Units, Subcutaneous, Nightly  iron polysaccharides, 150 mg, Oral, Daily  [START ON 1/15/2022] levothyroxine, 224 mcg, Oral, Q AM  linagliptin, 5 mg, Oral, Daily  polyethylene glycol, 17 g, Oral, Daily  pregabalin, 75 mg, Oral, BID  QUEtiapine, 12.5 mg, Oral, Q PM  sodium hypochlorite, , Topical,  Daily  tamsulosin, 0.4 mg, Oral, Daily  vancomycin, 1,000 mg, Intravenous, Q24H  cyanocobalamin, 1,000 mcg, Oral, Daily      IV Meds:   Pharmacy to dose vancomycin,   sodium chloride, 75 mL/hr        Results Reviewed:   I have personally reviewed the results from the time of this admission to 1/14/2022 13:38 EST     Lab Results   Component Value Date    GLUCOSE 144 (H) 01/14/2022    CALCIUM 9.2 01/14/2022     (L) 01/14/2022    K 4.8 01/14/2022    CO2 25.8 01/14/2022    CL 97 (L) 01/14/2022    BUN 67 (H) 01/14/2022    CREATININE 2.78 (H) 01/14/2022    EGFRIFNONA 22 (L) 01/14/2022    BCR 24.1 01/14/2022    ANIONGAP 12.2 01/14/2022      Lab Results   Component Value Date    MG 2.7 (H) 01/09/2022    PHOS 3.9 01/11/2022    ALBUMIN 3.20 (L) 01/14/2022     US Renal Bilateral    Result Date: 12/28/2021  US Renal Comp INDICATION: Acute on chronic kidney disease COMPARISON: 7/8/2021 FINDINGS: KIDNEYS:  The right kidney measures 4.8 x 4.8 x 9.5 cm. The left kidney measures 4.7 x 5.4 x 11 cm. Renal cortical thickness and echogenicity is normal.  No hydronephrosis. URINARY BLADDER:  The bladder is unremarkable.     Impression: Negative renal ultrasound. No hydronephrosis. No change from the previous ultrasound study. Signer Name: Jaylen Bhardwaj MD  Signed: 12/28/2021 7:38 PM  Workstation Name: [id number]  [facility]       Assessment / Plan     ASSESSMENT:  -Acute kidney injury on CKD stage IV (baseline creatinine around 1.8 to 2.0 mg/dL), likely due to prerenal state versus ATN in the setting of what clinically appears to be volume depletion in the setting of poor p.o. intake and loop diuresis  -Chronic diastolic congestive heart failure  -Hypertension  -Underlying CAD  -A. fib, rate controlled  -Poor insight to health and medical noncompliance    PLAN:  -Agree with holding Bumex at this time.  Per patient, his lower extremity swelling is significantly better than prior and almost completely  resolved at this point.  -Gentle IV hydration with normal saline at 75 cc/h for the next 24 hours in light of poor p.o. intake.  Can discontinue if renal function improving to avoid fluid overload state  -Renal panel in a.m.    Thank you for involving us in the care of Froilan OCHOA Elaineross.  Please feel free to call with any questions.    Michael Louise MD  01/14/22  13:38 EST    [facility]  [phone]

## 2022-01-14 NOTE — NURSING NOTE
"Patient admitted with various wounds to BLE- consult placed for wound/ ostomy nurse eval. Also noted, a pressure injury forming on coccyx- several open areas of skin are present. Patient was admitted with left over old stool caked to buttocks and scrotum. Patient stated he \"just needs a woman's touch\" while staff was attempting to clean off said stool. Pictures of wounds placed on charting flowsheets.   "

## 2022-01-14 NOTE — CONSULTS
LOS: 0 days   Patient Care Team:  Fortunato De Leon MD as PCP - General (Family Medicine)        Subjective       Attending MD : Santiago Powers DO    Patient Complaints: fall         History of Present Illness  :The patient is an 81-year-old male who presented to the emergency department secondary to report of patient falling out of his wheelchair without injury.  He notes he was unable to get up for at least 1 to 2 days and has not eaten since discharge from this facility on 1/11/2021 (when he signed out AMA from this facility).  Provider in ER noted that patient had heart rate in the 30s and was hypotensive, gave atropine with resolution.  ER provider contacted cardiology to transfer for possible pacemaker implantation at [facility], however per cardiology this is not an option due to infection risk.      During recent admission he refused PICC line, IV antibiotics and transfer to SNF for wound care.  Since that time he notes he has been unable to take care of himself and home health did not come to his home.  He reports that his granddaughter who typically brings meals has not been to his home since that time either and he is not sure that she even was aware that he had left AMA. He states that he slept most of the entire time since discharge.  The patient's greatest concern at this moment is getting food for lunch.  He had I&D of left hip wounds during past admission per Dr. Aguero with wound [facility] recommended however patient declined. He was followed in consultation by wound care, OT, PT, surgery, nephrology, ID. All recommendations were discounted by the patient and, since he was deemed competent to make his own decisions, he left AMA on 1/11/2022.     Patient Denies:  NV    PMH :   Symptomatic bradycardia      Review of Systems:    weak    Objective     Vital Signs  Temp:  [96.8 °F (36 °C)-97.8 °F (36.6 °C)] 96.8 °F (36 °C)  Heart Rate:  [39-52] 49  Resp:  [18] 18  BP: ()/(50-73)  137/50    Physical Exam:     General Appearance:    Alert, cooperative, in no acute distress   Head:    Normocephalic, without obvious abnormality, atraumatic   Eyes:            Lids and lashes normal, conjunctivae and sclerae normal, no   icterus, no pallor, corneas clear, PERRLA   Ears:    Ears appear intact with no abnormalities noted   Throat:   No oral lesions, no thrush, oral mucosa moist   Neck:   No adenopathy, supple, trachea midline, no thyromegaly, no   carotid bruit, no JVD   Back:     No kyphosis present, no scoliosis present, no skin lesions,      erythema or scars, no tenderness to percussion or                   palpation,   range of motion normal   Lungs:     Clear to auscultation,respirations regular, even and                  unlabored    Heart:    Regular rhythm and normal rate, normal S1 and S2, no            murmur, no gallop, no rub, no click   Chest Wall:    No abnormalities observed   Abdomen:     Normal bowel sounds, no masses, no organomegaly, soft        non-tender, non-distended, no guarding, no rebound                tenderness   Rectal:     Deferred   Extremities:   Moves all extremities well, no edema, no cyanosis, no             redness   Pulses:   Pulses palpable and equal bilaterally   Skin:   No bleeding, bruising or rash   Lymph nodes:   No palpable adenopathy   Neurologic:   Cranial nerves 2 - 12 grossly intact, sensation intact, DTR       present and equal bilaterally          Results Review:    Lab Results (last 72 hours)     Procedure Component Value Units Date/Time    Vancomycin, Random [[id number]]  (Normal) Collected: 01/14/22 1350    Specimen: Blood Updated: 01/14/22 1414     Vancomycin Random 15.90 mcg/mL     Narrative:      Therapeutic Ranges for Vancomycin    Vancomycin Random   5.0-40.0 mcg/mL  Vancomycin Trough   5.0-20.0 mcg/mL  Vancomycin Peak     20.0-40.0 mcg/mL    CK [[id number]]  (Normal) Collected: 01/14/22 0629    Specimen: Blood Updated: 01/14/22 1323      Creatine Kinase 61 U/L     Urine Culture - Urine, Urine, Catheter [[id number]] Collected: 01/14/22 0757    Specimen: Urine, Catheter Updated: 01/14/22 1317    Wound Culture - Wound, Buttock, Left [[id number]] Collected: 01/14/22 1230    Specimen: Wound from Buttock, Left Updated: 01/14/22 1248    STAT Lactic Acid, Reflex [[id number]]  (Normal) Collected: 01/14/22 0950    Specimen: Blood Updated: 01/14/22 1025     Lactate 1.3 mmol/L     COVID PRE-OP / PRE-PROCEDURE SCREENING ORDER (NO ISOLATION) - Swab, Nasopharynx [[id number]]  (Normal) Collected: 01/14/22 0757    Specimen: Swab from Nasopharynx Updated: 01/14/22 0829    Narrative:      The following orders were created for panel order COVID PRE-OP / PRE-PROCEDURE SCREENING ORDER (NO ISOLATION) - Swab, Nasopharynx.  Procedure                               Abnormality         Status                     ---------                               -----------         ------                     COVID-19 and FLU A/B PCR...[[id number]]  Normal              Final result                 Please view results for these tests on the individual orders.    COVID-19 and FLU A/B PCR - Swab, Nasopharynx [[id number]]  (Normal) Collected: 01/14/22 0757    Specimen: Swab from Nasopharynx Updated: 01/14/22 0829     COVID19 Not Detected     Influenza A PCR Not Detected     Influenza B PCR Not Detected    Narrative:      Fact sheet for providers: https://www.fda.gov/media/888440/download    Fact sheet for patients: https://www.fda.gov/media/900946/download    Test performed by PCR.    Urinalysis, Microscopic Only - Urine, Catheter [[id number]]  (Abnormal) Collected: 01/14/22 0757    Specimen: Urine, Catheter Updated: 01/14/22 0826     RBC, UA 6-12 /HPF      WBC, UA 31-50 /HPF      Bacteria, UA Trace /HPF      Squamous Epithelial Cells, UA 0-2 /HPF      Hyaline Casts, UA None Seen /LPF      Methodology Manual Light Microscopy    Urinalysis With Microscopic If Indicated (No Culture) - Urine, Catheter  [[id number]]  (Abnormal) Collected: 01/14/22 0757    Specimen: Urine, Catheter Updated: 01/14/22 0812     Color, UA Other     Appearance, UA Slightly Cloudy     pH, UA 6.0     Specific Gravity, UA 1.015     Glucose, UA Negative     Ketones, UA Negative     Bilirubin, UA Negative     Blood, UA Moderate (2+)     Protein,  mg/dL (2+)     Leuk Esterase, UA Moderate (2+)     Nitrite, UA Negative     Urobilinogen, UA 0.2 E.U./dL    Troponin [[id number]]  (Abnormal) Collected: 01/14/22 0629    Specimen: Blood Updated: 01/14/22 0720     Troponin T 0.129 ng/mL     Narrative:      Troponin T Reference Range:  <= 0.03 ng/mL-   Negative for AMI  >0.03 ng/mL-     Abnormal for myocardial necrosis.  Clinicians would have to utilize clinical acumen, EKG, Troponin and serial changes to determine if it is an Acute Myocardial Infarction or myocardial injury due to an underlying chronic condition.       Results may be falsely decreased if patient taking Biotin.      Procalcitonin [[id number]]  (Normal) Collected: 01/14/22 0629    Specimen: Blood Updated: 01/14/22 0718     Procalcitonin 0.16 ng/mL     Blood Culture - Blood, Arm, Right [[id number]] Collected: 01/14/22 0718    Specimen: Blood from Arm, Right Updated: 01/14/22 0718    BNP [[id number]]  (Abnormal) Collected: 01/14/22 0629    Specimen: Blood Updated: 01/14/22 0717     proBNP 7,243.0 pg/mL     Narrative:      Among patients with dyspnea, NT-proBNP is highly sensitive for the detection of acute congestive heart failure. In addition NT-proBNP of <300 pg/ml effectively rules out acute congestive heart failure with 99% negative predictive value.    Results may be falsely decreased if patient taking Biotin.      Comprehensive Metabolic Panel [[id number]]  (Abnormal) Collected: 01/14/22 0629    Specimen: Blood Updated: 01/14/22 0712     Glucose 144 mg/dL      BUN 67 mg/dL      Creatinine 2.78 mg/dL      Sodium 135 mmol/L      Potassium 4.8 mmol/L      Chloride 97 mmol/L      CO2  25.8 mmol/L      Calcium 9.2 mg/dL      Total Protein 6.7 g/dL      Albumin 3.20 g/dL      ALT (SGPT) 10 U/L      AST (SGOT) 20 U/L      Alkaline Phosphatase 78 U/L      Total Bilirubin 0.3 mg/dL      eGFR Non African Amer 22 mL/min/1.73      Globulin 3.5 gm/dL      A/G Ratio 0.9 g/dL      BUN/Creatinine Ratio 24.1     Anion Gap 12.2 mmol/L     Narrative:      GFR Normal >60  Chronic Kidney Disease <60  Kidney Failure <15      Lactic Acid, Plasma [[id number]]  (Abnormal) Collected: 01/14/22 0637    Specimen: Blood Updated: 01/14/22 0712     Lactate 2.2 mmol/L     CBC & Differential [[id number]]  (Abnormal) Collected: 01/14/22 0629    Specimen: Blood Updated: 01/14/22 0647    Narrative:      The following orders were created for panel order CBC & Differential.  Procedure                               Abnormality         Status                     ---------                               -----------         ------                     CBC Auto Differential[[id number]]        Abnormal            Final result                 Please view results for these tests on the individual orders.    CBC Auto Differential [[id number]]  (Abnormal) Collected: 01/14/22 0629    Specimen: Blood Updated: 01/14/22 0647     WBC 13.28 10*3/mm3      RBC 4.02 10*6/mm3      Hemoglobin 11.5 g/dL      Hematocrit 36.7 %      MCV 91.3 fL      MCH 28.6 pg      MCHC 31.3 g/dL      RDW 16.1 %      RDW-SD 53.1 fl      MPV 10.1 fL      Platelets 191 10*3/mm3      Neutrophil % 80.0 %      Lymphocyte % 7.9 %      Monocyte % 8.0 %      Eosinophil % 2.9 %      Basophil % 0.7 %      Immature Grans % 0.5 %      Neutrophils, Absolute 10.63 10*3/mm3      Lymphocytes, Absolute 1.05 10*3/mm3      Monocytes, Absolute 1.06 10*3/mm3      Eosinophils, Absolute 0.38 10*3/mm3      Basophils, Absolute 0.09 10*3/mm3      Immature Grans, Absolute 0.07 10*3/mm3      nRBC 0.0 /100 WBC     Blood Culture - Blood, Hand, Right [[id number]] Collected: 01/14/22 0637    Specimen: Blood  "from Hand, Right Updated: 01/14/22 0642              Imaging Results (Last 72 Hours)     Procedure Component Value Units Date/Time    XR Chest 1 View [[id number]] Collected: 01/14/22 0714     Updated: 01/14/22 0716    Narrative:      PROCEDURE: CR Chest 1 Vw    COMPARISON:  October 2021     INDICATIONS: hypotension  Relevant clinical info: Generalized weakness. ;Pt slid out of his wheelchair at home and was unable to get himself back into it.;  TECHNIQUE: Single AP  view of the chest    FINDINGS:  Cardiomediastinal silhouette is stable and enlarged with left ventricular prominence. Lung volumes are low. Patchy subtle opacities seen in the right midlung and lung base. No consolidation. Osseous structures are grossly intact.      Impression:      Suggestion of airspace disease in the right lung, correlate clinically.         Signer Name: Maryanne Eubanks MD   Signed: 1/14/2022 7:14 AM   Workstation Name: [facility]-    [facility]            Medication Review:      Hospital Medications (active)       Dose Frequency Start End    acetaminophen (TYLENOL) 160 MG/5ML solution 650 mg 650 mg Every 4 Hours PRN 1/14/2022     Admin Instructions: Do not exceed 4 grams of acetaminophen in a 24 hr period.    If given for pain, use the following pain scale:   Mild Pain = Pain Score of 1-3, CPOT 1-2  Moderate Pain = Pain Score of 4-6, CPOT 3-4  Severe Pain = Pain Score of 7-10, CPOT 5-8  Do not exceed 4 grams of acetaminophen in a 24 hr period. Max dose of 2gm for AST/ALT greater than 120 units/L      If given for pain, use the following pain scale:   Mild Pain = Pain Score of 1-3, CPOT 1-2  Moderate Pain = Pain Score of 4-6, CPOT 3-4  Severe Pain = Pain Score of 7-10, CPOT 5-8    Route: Oral    Linked Group 1: \"Or\" Linked Group Details        acetaminophen (TYLENOL) suppository 650 mg 650 mg Every 4 Hours PRN 1/14/2022     Admin Instructions: Do not exceed 4 grams of acetaminophen in a 24 hr period. Max dose " "of 2gm for AST/ALT greater than 120 units/L      If given for pain, use the following pain scale:   Mild Pain = Pain Score of 1-3, CPOT 1-2  Moderate Pain = Pain Score of 4-6, CPOT 3-4  Severe Pain = Pain Score of 7-10, CPOT 5-8    Route: Rectal    Linked Group 1: \"Or\" Linked Group Details        acetaminophen (TYLENOL) tablet 650 mg 650 mg Every 4 Hours PRN 1/14/2022     Admin Instructions: Do not exceed 4 grams of acetaminophen in a 24 hr period.    If given for pain, use the following pain scale:   Mild Pain = Pain Score of 1-3, CPOT 1-2  Moderate Pain = Pain Score of 4-6, CPOT 3-4  Severe Pain = Pain Score of 7-10, CPOT 5-8  Do not exceed 4 grams of acetaminophen in a 24 hr period. Max dose of 2gm for AST/ALT greater than 120 units/L      If given for pain, use the following pain scale:   Mild Pain = Pain Score of 1-3, CPOT 1-2  Moderate Pain = Pain Score of 4-6, CPOT 3-4  Severe Pain = Pain Score of 7-10, CPOT 5-8    Route: Oral    Linked Group 1: \"Or\" Linked Group Details        albuterol (PROVENTIL) nebulizer solution 0.083% 2.5 mg/3mL 2.5 mg Every 6 Hours PRN 1/14/2022     Route: Nebulization    apixaban (ELIQUIS) tablet 2.5 mg 2.5 mg Every 12 Hours Scheduled 1/14/2022     Admin Instructions: Tablet may be crushed and suspended in 60 mL of water or D5W and immediately delivered via NG tube.  Avoid grapefruit juice.    Route: Oral    Aquaphor Advanced Therapy ointment 1 application 1 application Every 12 Hours Scheduled 1/14/2022     Admin Instructions: Dry areas    Route: Topical    atorvastatin (LIPITOR) tablet 10 mg 10 mg Nightly 1/14/2022     Admin Instructions: Avoid grapefruit juice.    Route: Oral    atropine 1 MG/ML injection  - ADS Override Pull   1/14/2022 1/14/2022    Admin Instructions: Created by cabinet override    Notes to Pharmacy: Created by cabinet override    budesonide-formoterol (SYMBICORT) 160-4.5 MCG/ACT inhaler 2 puff 2 puff 2 Times Daily - RT 1/14/2022     Admin Instructions:  Shake " well.  Rinse mouth after use, do not swallow water.  Send aerosols to pharmacy in ziplock bag for proper disposal.    Route: Inhalation    cholecalciferol (VITAMIN D3) tablet 2,000 Units 2,000 Units Daily 1/14/2022     Route: Oral    citalopram (CeleXA) tablet 20 mg 20 mg Nightly 1/14/2022     Admin Instructions: Caution: Look alike/sound alike drug alert.    Route: Oral    dextrose (D50W) (25 g/50 mL) IV injection 25 g 25 g Every 15 Minutes PRN 1/14/2022     Admin Instructions: Blood sugar less than 70; patient has IV access - Unresponsive, NPO or Unable To Safely Swallow    Route: Intravenous    dextrose (GLUTOSE) oral gel 15 g 15 g Every 15 Minutes PRN 1/14/2022     Admin Instructions: BS<70, Patient Alert, Is not NPO, Can safely swallow.    Route: Oral    docusate sodium (COLACE) capsule 100 mg 100 mg 2 Times Daily 1/14/2022     Admin Instructions: Swallow whole.  Do not open, crush, or chew capsule.    Route: Oral    fluticasone (FLONASE) 50 MCG/ACT nasal spray 2 spray 2 spray Daily 1/14/2022     Route: Each Nare    glucagon (GLUCAGEN) injection 1 mg 1 mg Every 15 Minutes PRN 1/14/2022     Admin Instructions: Blood Glucose Less Than 70 - Patient Without IV Access - Unresponsive, NPO or Unable To Safely Swallow    Route: Subcutaneous    hydrocortisone-bacitracin-zinc oxide-nystatin (MAGIC BARRIER) ointment 1 application 1 application 3 Times Daily 1/14/2022     Admin Instructions: Apply to gluteal wounds.  For topical use only    Route: Topical    insulin aspart (novoLOG) injection 0-24 Units 0-24 Units 3 Times Daily Before Meals 1/14/2022     Admin Instructions: Correction - High Dose.  Greater than 80 units/day total insulin dose or, on steroids, insulin resistant, infection.    Blood glucose 150-199 mg/dL - 4 units  Blood glucose 200-249 mg/dL - 8 units  Blood glucose 250-299 mg/dL - 12 units  Blood glucose 300-349 mg/dL - 16 units  Blood glucose 350-400 mg/dL - 20 units  Blood glucose greater than 400 mg/dL  "- 24 units and call provider      Route: Subcutaneous    insulin detemir (LEVEMIR) injection 10 Units 10 Units Nightly 1/14/2022     Admin Instructions:     Route: Subcutaneous    iron polysaccharides (NIFEREX) capsule 150 mg 150 mg Daily 1/14/2022     Route: Oral    levothyroxine (SYNTHROID, LEVOTHROID) tablet 224 mcg 224 mcg Every Early Morning 1/15/2022     Admin Instructions: Take on empty stomach.    Route: Oral    linagliptin (TRADJENTA) tablet 5 mg 5 mg Daily 1/14/2022     Route: Oral    Magnesium Sulfate 2 gram / 50mL Infusion (GIVE X 3 BAGS TO EQUAL 6GM TOTAL DOSE) - Mg 1.1 - 1.5 mg/dl 2 g As Needed 1/14/2022     Admin Instructions: Mg 1.1 -1.5 mg/dL. Infuse 2 grams over 2 hours for 3 doses (for a total Mg dose of 6 grams).  Recheck Mg level in the AM.    Route: Intravenous    Linked Group 2: \"Or\" Linked Group Details        Magnesium Sulfate 2 gram Bolus, followed by 8 gram infusion (total Mg dose 10 grams)- Mg less than or equal to 1mg/dL 2 g As Needed 1/14/2022     Admin Instructions: Mg less than or equal to 1mg/dL. Give 2 gm over 30 minutes as bolus, then infuse 2 gm over 2 hours for 4 doses (8 grams) for total dose of 10 grams.  Recheck Mg levels in the AM.    Route: Intravenous    Linked Group 2: \"Or\" Linked Group Details        Magnesium Sulfate 4 gram infusion- Mg 1.6-1.9 mg/dL 4 g As Needed 1/14/2022     Admin Instructions: Mg 1.6-1.9 mg/dL. Recheck Mg level in the AM.    Route: Intravenous    Linked Group 2: \"Or\" Linked Group Details        melatonin tablet 5 mg 5 mg Nightly PRN 1/14/2022     Route: Oral    Pharmacy to dose vancomycin  Continuous PRN 1/14/2022 1/21/2022    Route: Does not apply    polyethylene glycol (MIRALAX) packet 17 g 17 g Daily 1/14/2022     Admin Instructions: Use 4-8 ounces of water, tea, or juice for each 17 gram dose.    Route: Oral    pregabalin (LYRICA) capsule 75 mg 75 mg 2 Times Daily 1/14/2022     Admin Instructions:     Route: Oral    QUEtiapine (SEROquel) tablet " 12.5 mg 12.5 mg Every Evening 1/14/2022     Admin Instructions: Caution: Look alike/sound alike drug alert    Route: Oral    sodium chloride 0.9 % infusion 75 mL/hr Continuous 1/14/2022     Route: Intravenous    sodium hypochlorite (DAKIN'S 1/4 STRENGTH) 0.125 % topical solution 0.125% solution  Daily 1/14/2022     Admin Instructions: Apply to left hip and BLE wounds.    Route: Topical    Cosign for Ordering: Required by Liat Hood APRN    tamsulosin (FLOMAX) 24 hr capsule 0.4 mg 0.4 mg Daily 1/14/2022     Admin Instructions: Swallow whole. Do not crush or chew.    Route: Oral    vitamin B-12 (CYANOCOBALAMIN) tablet 1,000 mcg 1,000 mcg Daily 1/14/2022     Route: Oral          Assessment/Plan         Symptomatic bradycardia  -Chronic diastolic congestive heart failure  -Hypertension  -Underlying CAD  -A. fib, rate controlled  -Poor insight to health and medical noncompliance    Metabolic encephalopathy-  L  Hip wound C&S MRSA and E Faecalis  UTI    Plan :    IV vanc   diflucan  Local care  New C&S  Will follow  gerber Joe MD  01/14/22  14:46 EST

## 2022-01-14 NOTE — SIGNIFICANT NOTE
01/14/22 1503   Provider Notification   Reason for Communication Evaluate  (low HR)   Provider Name Liat Hood   Notification Route In person, on unit   Response Other (Comment)  (consulting with  for patient wishes)   HR 36 at this time, telemetry strips handed to LEATHA Hood on unit

## 2022-01-15 NOTE — PROGRESS NOTES
Pharmacokinetic Consult - Vancomycin Dosing    Froilan Helton is a 81 y.o. male who has been consulted for vancomycin dosing for skin / soft tissue infection.     Vancomycin level = 19.3 mcg/ml today at 1417    Will give Vancomycin 750 mg X 1 this evening (timed for 2300). Will continue to utilize pulse dosing regimen until renal function stabilizes further.   CrCl trend is improving daily.     Relevant clinical data and objective history reviewed:  Lab Results   Component Value Date/Time    CREATININE 2.61 (H) 01/15/2022 05:06 AM    CREATININE 2.78 (H) 01/14/2022 06:29 AM    CREATININE 2.07 (H) 01/11/2022 03:51 AM    BUN 60 (H) 01/15/2022 05:06 AM    BUN 67 (H) 01/14/2022 06:29 AM    BUN 46 (H) 01/11/2022 03:51 AM     Estimated Creatinine Clearance: 27.5 mL/min (A) (by C-G formula based on SCr of 2.61 mg/dL (H)). - improving    Lab Results   Component Value Date/Time    WBC 7.70 01/15/2022 05:06 AM    HGB 9.3 (L) 01/15/2022 05:06 AM    HCT 29.7 (L) 01/15/2022 05:06 AM    MCV 91.4 01/15/2022 05:06 AM     01/15/2022 05:06 AM     Temp Readings from Last 3 Encounters:   01/15/22 97.5 °F (36.4 °C) (Oral)   01/11/22 98.3 °F (36.8 °C) (Oral)   12/20/21 97.8 °F (36.6 °C) (Oral)     Weight:  (217.3#)    Pharmacy will continue to follow the patient’s culture results and clinical progress daily.    Agustina HernandezD

## 2022-01-15 NOTE — PLAN OF CARE
Goal Outcome Evaluation:  Plan of Care Reviewed With: patient        Progress: no change  Pt remains bradycardic on the monitor, as low as 28BPM, pt asymptomatic. Again, reviewed code status, pt wishes to be a FULL code with full interventions. Q2 HR turn, F/C remains in place. Afebrile.

## 2022-01-15 NOTE — CASE MANAGEMENT/SOCIAL WORK
Continued Stay Note  MICHAEL Kincaid     Patient Name: Froilan Helton  MRN: [id number]  Today's Date: 1/15/2022    Admit Date: 1/14/2022     Discharge Plan     Row Name 01/15/22 0959       Plan    Plan Comments I called [facility] to notify them of patients admission as patient has an active ongoing case with them.  CM will continue to follow.               Discharge Codes    No documentation.                     Terri Martinez RN

## 2022-01-15 NOTE — CONSULTS
General Surgery      Patient Care Team:  Fortunato De Leon MD as PCP - General (Family Medicine)    CHIEF COMPLAINT: Opinion regarding left hip wounds    HISTORY OF PRESENT ILLNESS:    Froilan is known to my service.  I saw him at his last hospitalization for multiple wounds.  His lower extremity wounds actually did not look bad but I did take him to the operating room to debride his left hip wounds.  Unfortunately he signed out AGAINST MEDICAL ADVICE on January 11.  He then went home and fell when he was attempting to move from his chair to his scooter.  He said he laid on the floor for about 6 to 8 hours and then he finally called EMS to pick him up.  He says his left hip is sore.  He has a whole host of other issues going on.  It has definitely been established that he is not safe to take care of himself.  On discussion with Froilan he is planning on going to rehab after this stay.      Past Medical History:   Diagnosis Date   • A-fib ([facility])    • Arthritis    • Asthma    • CAD (coronary artery disease)    • Cardiomyopathy ([facility])    • Cataract    • CHF (congestive heart failure) ([facility])    • CKD (chronic kidney disease), stage III ([facility])    • COPD (chronic obstructive pulmonary disease) ([facility])    • Diabetes mellitus ([facility])    • Disease of thyroid gland    • Emphysema, unspecified ([facility])    • Glaucoma    • Hyperlipidemia    • Hypertension    • Kidney stone    • Myocardial infarct, old    • Neuropathy    • Osteoarthritis    • Osteoporosis    • Permanent atrial fibrillation ([facility]) 6/30/2020   • PVD (peripheral vascular disease) ([facility])    • Renal disorder    • Shingles      Past Surgical History:   Procedure Laterality Date   • COLONOSCOPY     • EYE SURGERY     • INCISION AND DRAINAGE LEG Left 12/30/2021    Procedure: debridement of left hip wounds and left foot wound;  Surgeon: Judie Aguero DO;  Location: [facility];  Service: General;  Laterality: Left;   • JOINT REPLACEMENT      right   • KIDNEY STONE SURGERY     •  REPLACEMENT TOTAL KNEE     • TOE SURGERY       Family History   Problem Relation Age of Onset   • COPD Mother    • Diabetes Father    • Malig Hyperthermia Neg Hx      Social History     Tobacco Use   • Smoking status: Former Smoker   • Smokeless tobacco: Never Used   • Tobacco comment: quit 1993   Vaping Use   • Vaping Use: Never used   Substance Use Topics   • Alcohol use: No   • Drug use: No     Medications Prior to Admission   Medication Sig Dispense Refill Last Dose   • albuterol sulfate  (90 Base) MCG/ACT inhaler Inhale 2 puffs Every 4 (Four) Hours As Needed for Wheezing.   Past Week at Unknown time   • apixaban (ELIQUIS) 2.5 MG tablet tablet Take 1 tablet by mouth Every 12 (Twelve) Hours. Indications: Atrial Fibrillation 180 tablet 0 1/13/2022 at Unknown time   • atorvastatin (LIPITOR) 10 MG tablet Take 10 mg by mouth Every Night.   1/13/2022 at Unknown time   • Benzalkonium Chloride (REMEDY ANTIMICROBIAL CLEANSER EX) Apply  topically Every 1 (One) Hour As Needed.   Past Week at Unknown time   • budesonide-formoterol (SYMBICORT) 160-4.5 MCG/ACT inhaler Inhale 2 puffs 2 (Two) Times a Day. 1 inhaler 0 1/13/2022 at Unknown time   • bumetanide (BUMEX) 2 MG tablet Take 1 tablet by mouth 2 (Two) Times a Day. (Patient taking differently: Take 1 mg by mouth 2 (Two) Times a Day.) 120 tablet 0 1/13/2022 at Unknown time   • cholecalciferol 2000 units tablet Take 2,000 Units by mouth Daily. 30 each 0 1/13/2022 at Unknown time   • citalopram (CeleXA) 10 MG tablet Take 20 mg by mouth Every Night.   1/13/2022 at Unknown time   • fluticasone (FLONASE) 50 MCG/ACT nasal spray 2 sprays by Each Nare route Daily. 1 bottle 0 1/13/2022 at Unknown time   • insulin aspart (novoLOG) 100 UNIT/ML injection Inject 1-14 Units under the skin into the appropriate area as directed 3 (Three) Times a Day Before Meals. As directed per sliding scale   1/13/2022 at Unknown time   • insulin detemir (Levemir FlexTouch) 100 UNIT/ML injection  "Inject 25 Units under the skin into the appropriate area as directed Every Night.   1/13/2022 at Unknown time   • Insulin Syringe 30G X 5/16\" 1 ML misc U UTD WITH INSULIN UP TO 6 TIMES A DAY  3 1/13/2022 at Unknown time   • ipratropium-albuterol (DUO-NEB) 0.5-2.5 mg/3 ml nebulizer Take 3 mL by nebulization Every 4 (Four) Hours As Needed for Wheezing.   1/13/2022 at Unknown time   • lactulose (CHRONULAC) 10 GM/15ML solution Take 30 g by mouth Daily As Needed.   Past Week at Unknown time   • levothyroxine (SYNTHROID, LEVOTHROID) 112 MCG tablet Take 224 mcg by mouth Daily.   1/13/2022 at Unknown time   • loratadine (CLARITIN) 5 MG chewable tablet Chew 10 mg Daily.   1/13/2022 at Unknown time   • melatonin 5 MG tablet tablet Take 1 tablet by mouth At Night As Needed (sleep). Over the counter   Past Week at Unknown time   • O2 (OXYGEN) Inhale 1 L/min every night at bedtime. (Patient taking differently: Inhale 2 L/min every night at bedtime.) 1 L 0 1/14/2022 at Unknown time   • polyethylene glycol (MIRALAX) 17 g packet Take 17 g by mouth Daily.   Past Week at Unknown time   • pregabalin (LYRICA) 75 MG capsule Take 1 capsule by mouth 2 (Two) Times a Day. 6 capsule 0 1/13/2022 at Unknown time   • QUEtiapine (SEROquel) 25 MG tablet Take 0.5 tablets by mouth Every Evening.   1/13/2022 at Unknown time   • SITagliptin (Januvia) 100 MG tablet Take 0.5 tablets by mouth Daily. (Patient taking differently: Take 50 mg by mouth Daily.)   1/13/2022 at Unknown time   • tamsulosin (FLOMAX) 0.4 MG capsule 24 hr capsule Take 1 capsule by mouth Daily. 30 capsule 0 1/13/2022 at Unknown time   • vitamin B-12 (VITAMIN B-12) 1000 MCG tablet Take 1 tablet by mouth Daily. 60 tablet 0 1/13/2022 at Unknown time   • vitamin D (ERGOCALCIFEROL) 1.25 MG (30493 UT) capsule capsule Take 1,250 Units by mouth Daily.   1/13/2022 at Unknown time     Allergies:  Morphine, Atorvastatin, and Oxycontin [oxycodone hcl]    REVIEW OF SYSTEMS:  Please see the above " "history of present illness for pertinent positives and negatives.  The remainder of the patient's systems have been reviewed and are negative.     Vital Signs  Temp:  [97.1 °F (36.2 °C)-97.5 °F (36.4 °C)] 97.5 °F (36.4 °C)  Heart Rate:  [36-65] 50  Resp:  [18-20] 18  BP: ()/(53-67) 127/60    Flowsheet Rows      First Filed Value   Admission Height 182.9 cm (72\") Documented at 01/14/2022 0618   Admission Weight 98.6 kg (217 lb 6 oz) Documented at 01/14/2022 0618           Physical Exam:  Physical Exam   Constitutional: Patient appears well-developed and well-nourished and in no acute distress   HEENT:   Head: Normocephalic and atraumatic.   Mouth and Throat: Patient has moist mucous membranes. Oropharynx is clear of any erythema or exudate.     Neck: Neck supple. No JVD present. No thyromegaly present. No lymphadenopathy present.  Musculoskeletal: poor posture  Extremities: His lower extremity edema is significantly improved.  On evaluation of his hip wounds they surprisingly look very good.  There is excellent granulation tissue forming.  There are no signs of infection.  Neurological: Patient is alert and oriented.  Psychological:   Mood and behavior appropriate.  Skin: Skin is warm and dry.    Debilities/Disabilities Identified: Non-Ambulatory    Emotional Behavior: Appropriate           Results Review:    I reviewed the patient's new clinical results.  Lab Results (most recent)     Procedure Component Value Units Date/Time    POC Glucose Once [[id number]]  (Normal) Collected: 01/15/22 1135    Specimen: Blood Updated: 01/15/22 1152     Glucose 114 mg/dL      Comment: Meter: [id number] : [id number] Mings Hina NURSING ASSISTANT       POC Glucose Once [[id number]]  (Normal) Collected: 01/15/22 0733    Specimen: Blood Updated: 01/15/22 0741     Glucose 99 mg/dL      Comment: Meter: [id number] : [id number] Mings Hina NURSING ASSISTANT       Blood Culture - Blood, Arm, Right [[id number]]  (Normal) " Collected: 01/14/22 0718    Specimen: Blood from Arm, Right Updated: 01/15/22 0731     Blood Culture No growth at 24 hours    Blood Culture - Blood, Hand, Right [[id number]]  (Normal) Collected: 01/14/22 0637    Specimen: Blood from Hand, Right Updated: 01/15/22 0645     Blood Culture No growth at 24 hours    Renal Function Panel [[id number]]  (Abnormal) Collected: 01/15/22 0506    Specimen: Blood Updated: 01/15/22 0547     Glucose 89 mg/dL      BUN 60 mg/dL      Creatinine 2.61 mg/dL      Sodium 135 mmol/L      Potassium 4.3 mmol/L      Chloride 100 mmol/L      CO2 24.5 mmol/L      Calcium 8.8 mg/dL      Albumin 2.70 g/dL      Phosphorus 4.4 mg/dL      Anion Gap 10.5 mmol/L      BUN/Creatinine Ratio 23.0     eGFR Non African Amer 24 mL/min/1.73     Narrative:      GFR Normal >60  Chronic Kidney Disease <60  Kidney Failure <15      Magnesium [[id number]]  (Normal) Collected: 01/15/22 0506    Specimen: Blood Updated: 01/15/22 0547     Magnesium 2.4 mg/dL     CBC & Differential [[id number]]  (Abnormal) Collected: 01/15/22 0506    Specimen: Blood Updated: 01/15/22 0527    Narrative:      The following orders were created for panel order CBC & Differential.  Procedure                               Abnormality         Status                     ---------                               -----------         ------                     CBC Auto Differential[[id number]]        Abnormal            Final result                 Please view results for these tests on the individual orders.    CBC Auto Differential [[id number]]  (Abnormal) Collected: 01/15/22 0506    Specimen: Blood Updated: 01/15/22 0527     WBC 7.70 10*3/mm3      RBC 3.25 10*6/mm3      Hemoglobin 9.3 g/dL      Hematocrit 29.7 %      MCV 91.4 fL      MCH 28.6 pg      MCHC 31.3 g/dL      RDW 16.0 %      RDW-SD 53.1 fl      MPV 10.5 fL      Platelets 147 10*3/mm3      Neutrophil % 69.9 %      Lymphocyte % 13.1 %      Monocyte % 10.0 %      Eosinophil % 6.0 %      Basophil  % 0.6 %      Immature Grans % 0.4 %      Neutrophils, Absolute 5.38 10*3/mm3      Lymphocytes, Absolute 1.01 10*3/mm3      Monocytes, Absolute 0.77 10*3/mm3      Eosinophils, Absolute 0.46 10*3/mm3      Basophils, Absolute 0.05 10*3/mm3      Immature Grans, Absolute 0.03 10*3/mm3      nRBC 0.0 /100 WBC     Troponin [[id number]]  (Abnormal) Collected: 01/15/22 0018    Specimen: Blood Updated: 01/15/22 0048     Troponin T 0.121 ng/mL     Narrative:      Troponin T Reference Range:  <= 0.03 ng/mL-   Negative for AMI  >0.03 ng/mL-     Abnormal for myocardial necrosis.  Clinicians would have to utilize clinical acumen, EKG, Troponin and serial changes to determine if it is an Acute Myocardial Infarction or myocardial injury due to an underlying chronic condition.       Results may be falsely decreased if patient taking Biotin.      Troponin [[id number]]  (Abnormal) Collected: 01/14/22 1824    Specimen: Blood Updated: 01/14/22 1927     Troponin T 0.126 ng/mL     Narrative:      Troponin T Reference Range:  <= 0.03 ng/mL-   Negative for AMI  >0.03 ng/mL-     Abnormal for myocardial necrosis.  Clinicians would have to utilize clinical acumen, EKG, Troponin and serial changes to determine if it is an Acute Myocardial Infarction or myocardial injury due to an underlying chronic condition.       Results may be falsely decreased if patient taking Biotin.      Wound Culture - Wound, Buttock, Left [[id number]] Collected: 01/14/22 1230    Specimen: Wound from Buttock, Left Updated: 01/14/22 1528     Gram Stain Rare (1+) WBCs seen      No organisms seen    Vancomycin, Random [[id number]]  (Normal) Collected: 01/14/22 1350    Specimen: Blood Updated: 01/14/22 1414     Vancomycin Random 15.90 mcg/mL     Narrative:      Therapeutic Ranges for Vancomycin    Vancomycin Random   5.0-40.0 mcg/mL  Vancomycin Trough   5.0-20.0 mcg/mL  Vancomycin Peak     20.0-40.0 mcg/mL    CK [[id number]]  (Normal) Collected: 01/14/22 0629    Specimen: Blood  Updated: 01/14/22 1323     Creatine Kinase 61 U/L     Urine Culture - Urine, Urine, Catheter [[id number]] Collected: 01/14/22 0757    Specimen: Urine, Catheter Updated: 01/14/22 1317    STAT Lactic Acid, Reflex [[id number]]  (Normal) Collected: 01/14/22 0950    Specimen: Blood Updated: 01/14/22 1025     Lactate 1.3 mmol/L     COVID PRE-OP / PRE-PROCEDURE SCREENING ORDER (NO ISOLATION) - Swab, Nasopharynx [[id number]]  (Normal) Collected: 01/14/22 0757    Specimen: Swab from Nasopharynx Updated: 01/14/22 0829    Narrative:      The following orders were created for panel order COVID PRE-OP / PRE-PROCEDURE SCREENING ORDER (NO ISOLATION) - Swab, Nasopharynx.  Procedure                               Abnormality         Status                     ---------                               -----------         ------                     COVID-19 and FLU A/B PCR...[[id number]]  Normal              Final result                 Please view results for these tests on the individual orders.    COVID-19 and FLU A/B PCR - Swab, Nasopharynx [[id number]]  (Normal) Collected: 01/14/22 0757    Specimen: Swab from Nasopharynx Updated: 01/14/22 0829     COVID19 Not Detected     Influenza A PCR Not Detected     Influenza B PCR Not Detected    Narrative:      Fact sheet for providers: https://www.fda.gov/media/405987/download    Fact sheet for patients: https://www.fda.gov/media/297922/download    Test performed by PCR.    Urinalysis, Microscopic Only - Urine, Catheter [[id number]]  (Abnormal) Collected: 01/14/22 0757    Specimen: Urine, Catheter Updated: 01/14/22 0826     RBC, UA 6-12 /HPF      WBC, UA 31-50 /HPF      Bacteria, UA Trace /HPF      Squamous Epithelial Cells, UA 0-2 /HPF      Hyaline Casts, UA None Seen /LPF      Methodology Manual Light Microscopy    Urinalysis With Microscopic If Indicated (No Culture) - Urine, Catheter [[id number]]  (Abnormal) Collected: 01/14/22 0757    Specimen: Urine, Catheter Updated: 01/14/22 0812     Color,  UA Other     Appearance, UA Slightly Cloudy     pH, UA 6.0     Specific Gravity, UA 1.015     Glucose, UA Negative     Ketones, UA Negative     Bilirubin, UA Negative     Blood, UA Moderate (2+)     Protein,  mg/dL (2+)     Leuk Esterase, UA Moderate (2+)     Nitrite, UA Negative     Urobilinogen, UA 0.2 E.U./dL    Procalcitonin [[id number]]  (Normal) Collected: 01/14/22 0629    Specimen: Blood Updated: 01/14/22 0718     Procalcitonin 0.16 ng/mL     BNP [[id number]]  (Abnormal) Collected: 01/14/22 0629    Specimen: Blood Updated: 01/14/22 0717     proBNP 7,243.0 pg/mL     Narrative:      Among patients with dyspnea, NT-proBNP is highly sensitive for the detection of acute congestive heart failure. In addition NT-proBNP of <300 pg/ml effectively rules out acute congestive heart failure with 99% negative predictive value.    Results may be falsely decreased if patient taking Biotin.      Comprehensive Metabolic Panel [[id number]]  (Abnormal) Collected: 01/14/22 0629    Specimen: Blood Updated: 01/14/22 0712     Glucose 144 mg/dL      BUN 67 mg/dL      Creatinine 2.78 mg/dL      Sodium 135 mmol/L      Potassium 4.8 mmol/L      Chloride 97 mmol/L      CO2 25.8 mmol/L      Calcium 9.2 mg/dL      Total Protein 6.7 g/dL      Albumin 3.20 g/dL      ALT (SGPT) 10 U/L      AST (SGOT) 20 U/L      Alkaline Phosphatase 78 U/L      Total Bilirubin 0.3 mg/dL      eGFR Non African Amer 22 mL/min/1.73      Globulin 3.5 gm/dL      A/G Ratio 0.9 g/dL      BUN/Creatinine Ratio 24.1     Anion Gap 12.2 mmol/L     Narrative:      GFR Normal >60  Chronic Kidney Disease <60  Kidney Failure <15      Lactic Acid, Plasma [[id number]]  (Abnormal) Collected: 01/14/22 0637    Specimen: Blood Updated: 01/14/22 0712     Lactate 2.2 mmol/L     CBC & Differential [[id number]]  (Abnormal) Collected: 01/14/22 0629    Specimen: Blood Updated: 01/14/22 0647    Narrative:      The following orders were created for panel order CBC &  Differential.  Procedure                               Abnormality         Status                     ---------                               -----------         ------                     CBC Auto Differential[[id number]]        Abnormal            Final result                 Please view results for these tests on the individual orders.    CBC Auto Differential [[id number]]  (Abnormal) Collected: 01/14/22 0629    Specimen: Blood Updated: 01/14/22 0647     WBC 13.28 10*3/mm3      RBC 4.02 10*6/mm3      Hemoglobin 11.5 g/dL      Hematocrit 36.7 %      MCV 91.3 fL      MCH 28.6 pg      MCHC 31.3 g/dL      RDW 16.1 %      RDW-SD 53.1 fl      MPV 10.1 fL      Platelets 191 10*3/mm3      Neutrophil % 80.0 %      Lymphocyte % 7.9 %      Monocyte % 8.0 %      Eosinophil % 2.9 %      Basophil % 0.7 %      Immature Grans % 0.5 %      Neutrophils, Absolute 10.63 10*3/mm3      Lymphocytes, Absolute 1.05 10*3/mm3      Monocytes, Absolute 1.06 10*3/mm3      Eosinophils, Absolute 0.38 10*3/mm3      Basophils, Absolute 0.09 10*3/mm3      Immature Grans, Absolute 0.07 10*3/mm3      nRBC 0.0 /100 WBC           Imaging Results (Most Recent)     Procedure Component Value Units Date/Time    XR Chest 1 View [[id number]] Collected: 01/14/22 0714     Updated: 01/14/22 0716    Narrative:      PROCEDURE: CR Chest 1 Vw    COMPARISON:  October 2021     INDICATIONS: hypotension  Relevant clinical info: Generalized weakness. ;Pt slid out of his wheelchair at home and was unable to get himself back into it.;  TECHNIQUE: Single AP  view of the chest    FINDINGS:  Cardiomediastinal silhouette is stable and enlarged with left ventricular prominence. Lung volumes are low. Patchy subtle opacities seen in the right midlung and lung base. No consolidation. Osseous structures are grossly intact.      Impression:      Suggestion of airspace disease in the right lung, correlate clinically.         Signer Name: Maryanne Eubanks MD   Signed: 1/14/2022 7:14  AM   Work[facility]    [facility]        reviewed    ECG/EMG Results (most recent)     Procedure Component Value Units Date/Time    ECG 12 Lead [[id number]] Collected: 01/14/22 0628     Updated: 01/14/22 0827     QT Interval 511 ms     Narrative:      HEART RATE= 55  bpm  RR Interval= 1084  ms  NV Interval= 48  ms  P Horizontal Axis= -25  deg  P Front Axis= 0  deg  QRSD Interval= 113  ms  QT Interval= 511  ms  QRS Axis= 8  deg  T Wave Axis= 116  deg  - BORDERLINE ECG -  Sinus rhythm  Prolonged NV interval  c/w prior ecg, bradycardia no longer seen  Electronically Signed By: Aline Lopez ([facility]) 14-Jan-2022 08:26:43  Date and Time of Study: 2022-01-14 06:28:44    ECG 12 Lead [[id number]] Collected: 01/14/22 0621     Updated: 01/14/22 0827     QT Interval 622 ms     Narrative:      HEART RATE= 47  bpm  RR Interval= 1273  ms  NV Interval= 168  ms  P Horizontal Axis= -17  deg  P Front Axis= 0  deg  QRSD Interval= 116  ms  QT Interval= 622  ms  QRS Axis= -9  deg  T Wave Axis= 60  deg  - ABNORMAL ECG -  Supraventricular bigeminy  Nonspecific intraventricular conduction delay  Inferior infarct, old  Prolonged QT interval  Sinus bradycardia  c/w prior ecg, bradycardia has worsened.  Electronically Signed By: Aline Lopez ([facility]) 14-Jan-2022 08:27:25  Date and Time of Study: 2022-01-14 06:21:49    ECG 12 Lead [[id number]] Collected: 01/15/22 1027     Updated: 01/15/22 1031     QT Interval 514 ms     Narrative:      HEART RATE= 50  bpm  RR Interval= 1196  ms  NV Interval= 288  ms  P Horizontal Axis= 34  deg  P Front Axis= 41  deg  QRSD Interval= 125  ms  QT Interval= 514  ms  QRS Axis= -6  deg  T Wave Axis= 73  deg  - ABNORMAL ECG -  Sinus rhythm  Prolonged NV interval  LVH with secondary repolarization abnormality  Inferior infarct, old  Electronically Signed By:   Date and Time of Study: 2022-01-15 10:27:37            Assessment/Plan     Left hip wounds    --I have discussed his  wound care with Carole.  The [employer] solution is working very nicely.  Continue his current care at this time.        Judie Aguero DO  01/15/22  13:46 EST

## 2022-01-15 NOTE — PROGRESS NOTES
"Hospital Medicine Team    LOS 1 days      Patient Care Team:  Fortunato De Leon MD as PCP - General (Family Medicine)          Chief Complaint:  Low HR    Subjective      Heart rate still dropping at times while asleep but when awake and active has been stable.  He reports just feeling generally weak.  Did discuss CODE STATUS with patient with charge nurse in room.  Review of Systems   Constitutional: Positive for fatigue.   Cardiovascular: Negative for chest pain.       Objective    Vital Signs  Temp:  [96.8 °F (36 °C)-97.4 °F (36.3 °C)] 97.4 °F (36.3 °C)  Heart Rate:  [36-65] 36  Resp:  [18-20] 18  BP: ()/(50-73) 105/57  Oxygen Therapy  SpO2: 94 %  Pulse Oximetry Type: Intermittent  Device (Oxygen Therapy): room air  Flowsheet Rows      First Filed Value   Admission Height 182.9 cm (72\") Documented at 2022   Admission Weight 98.6 kg (217 lb 6 oz) Documented at 2022              Physical Exam:  Physical Exam  Vitals reviewed.   Cardiovascular:      Rate and Rhythm: Bradycardia present.   Pulmonary:      Effort: Pulmonary effort is normal. No respiratory distress.   Abdominal:      Palpations: Abdomen is soft.   Musculoskeletal:      Right lower le+ Pitting Edema present.      Left lower le+ Pitting Edema present.   Neurological:      Mental Status: He is alert. Mental status is at baseline.   Psychiatric:         Behavior: Behavior normal.           Results Review:    I reviewed the patient's new clinical results.    Results from last 7 days   Lab Units 01/15/22  0506 22  0351   WBC 10*3/mm3 7.70 13.28* 8.53   HEMOGLOBIN g/dL 9.3* 11.5* 10.3*   HEMATOCRIT % 29.7* 36.7* 33.0*   PLATELETS 10*3/mm3 147 191 167     Results from last 7 days   Lab Units 01/15/22  0506 22  0629 22  0351   SODIUM mmol/L 135* 135* 134*   POTASSIUM mmol/L 4.3 4.8 4.2   CHLORIDE mmol/L 100 97* 96*   CO2 mmol/L 24.5 25.8 30.4*   BUN mg/dL 60* 67* 46*   CREATININE mg/dL 2.61* " 2.78* 2.07*   CALCIUM mg/dL 8.8 9.2 8.5*   BILIRUBIN mg/dL  --  0.3  --    ALK PHOS U/L  --  78  --    ALT (SGPT) U/L  --  10  --    AST (SGOT) U/L  --  20  --    GLUCOSE mg/dL 89 144* 68     Results from last 7 days   Lab Units 01/15/22  0506 01/09/22  0416   MAGNESIUM mg/dL 2.4 2.7*       Microbiology Results (last 10 days)     Procedure Component Value - Date/Time    Wound Culture - Wound, Buttock, Left [[id number]] Collected: 01/14/22 1230    Lab Status: Preliminary result Specimen: Wound from Buttock, Left Updated: 01/14/22 1528     Gram Stain Rare (1+) WBCs seen      No organisms seen    COVID PRE-OP / PRE-PROCEDURE SCREENING ORDER (NO ISOLATION) - Swab, Nasopharynx [[id number]]  (Normal) Collected: 01/14/22 0757    Lab Status: Final result Specimen: Swab from Nasopharynx Updated: 01/14/22 0829    Narrative:      The following orders were created for panel order COVID PRE-OP / PRE-PROCEDURE SCREENING ORDER (NO ISOLATION) - Swab, Nasopharynx.  Procedure                               Abnormality         Status                     ---------                               -----------         ------                     COVID-19 and FLU A/B PCR...[[id number]]  Normal              Final result                 Please view results for these tests on the individual orders.    COVID-19 and FLU A/B PCR - Swab, Nasopharynx [[id number]]  (Normal) Collected: 01/14/22 0757    Lab Status: Final result Specimen: Swab from Nasopharynx Updated: 01/14/22 0829     COVID19 Not Detected     Influenza A PCR Not Detected     Influenza B PCR Not Detected    Narrative:      Fact sheet for providers: https://www.fda.gov/media/996580/download    Fact sheet for patients: https://www.fda.gov/media/945099/download    Test performed by PCR.    Blood Culture - Blood, Arm, Right [[id number]]  (Normal) Collected: 01/14/22 0718    Lab Status: Preliminary result Specimen: Blood from Arm, Right Updated: 01/15/22 0731     Blood Culture No growth at 24  hours    Blood Culture - Blood, Hand, Right [[id number]]  (Normal) Collected: 01/14/22 0637    Lab Status: Preliminary result Specimen: Blood from Hand, Right Updated: 01/15/22 0645     Blood Culture No growth at 24 hours              Results for orders placed during the hospital encounter of 12/28/21    Adult Transthoracic Echo Complete W/ Cont if Necessary Per Protocol    Interpretation Summary  · Saline test results are negative.  · There is severe calcification of the aortic valve mainly affecting the left coronary and right coronary cusp(s).  · Calculated right ventricular systolic pressure from tricuspid regurgitation is 44 mmHg.  · Mild pulmonary hypertension is present.  · Estimated left ventricular EF = 55% Left ventricular systolic function is normal.  · The right ventricular cavity is mildly dilated.  · Mild dilation of the aortic root is present.      XR Chest 1 View    Result Date: 1/14/2022  Suggestion of airspace disease in the right lung, correlate clinically.  Signer Name: Maryanne Eubanks MD  Signed: 1/14/2022 7:14 AM  Workstation Name: [facility]      ECG/EMG Results (most recent)     Procedure Component Value Units Date/Time    ECG 12 Lead [[id number]] Collected: 01/14/22 0628     Updated: 01/14/22 0827     QT Interval 511 ms     Narrative:      HEART RATE= 55  bpm  RR Interval= 1084  ms  IA Interval= 48  ms  P Horizontal Axis= -25  deg  P Front Axis= 0  deg  QRSD Interval= 113  ms  QT Interval= 511  ms  QRS Axis= 8  deg  T Wave Axis= 116  deg  - BORDERLINE ECG -  Sinus rhythm  Prolonged IA interval  c/w prior ecg, bradycardia no longer seen  Electronically Signed By: Aline Lopez ([facility]) 14-Jan-2022 08:26:43  Date and Time of Study: 2022-01-14 06:28:44    ECG 12 Lead [[id number]] Collected: 01/14/22 0621     Updated: 01/14/22 0827     QT Interval 622 ms     Narrative:      HEART RATE= 47  bpm  RR Interval= 1273  ms  IA Interval= 168  ms  P Horizontal Axis=  -17  deg  P Front Axis= 0  deg  QRSD Interval= 116  ms  QT Interval= 622  ms  QRS Axis= -9  deg  T Wave Axis= 60  deg  - ABNORMAL ECG -  Supraventricular bigeminy  Nonspecific intraventricular conduction delay  Inferior infarct, old  Prolonged QT interval  Sinus bradycardia  c/w prior ecg, bradycardia has worsened.  Electronically Signed By: Aline Lopez ([facility]) 14-Jan-2022 08:27:25  Date and Time of Study: 2022-01-14 06:21:49    ECG 12 Lead [[id number]] Collected: 01/15/22 1027     Updated: 01/15/22 1031     QT Interval 514 ms     Narrative:      HEART RATE= 50  bpm  RR Interval= 1196  ms  CT Interval= 288  ms  P Horizontal Axis= 34  deg  P Front Axis= 41  deg  QRSD Interval= 125  ms  QT Interval= 514  ms  QRS Axis= -6  deg  T Wave Axis= 73  deg  - ABNORMAL ECG -  Sinus rhythm  Prolonged CT interval  LVH with secondary repolarization abnormality  Inferior infarct, old  Electronically Signed By:   Date and Time of Study: 2022-01-15 10:27:37            X-rays, labs reviewed personally by physician.    Medication Review:   I have reviewed the patient's current medication list    Scheduled Meds  apixaban, 2.5 mg, Oral, Q12H  Aquaphor Advanced Therapy, 1 application, Topical, Q12H  atorvastatin, 10 mg, Oral, Nightly  budesonide-formoterol, 2 puff, Inhalation, BID - RT  cholecalciferol, 2,000 Units, Oral, Daily  citalopram, 20 mg, Oral, Nightly  docusate sodium, 100 mg, Oral, BID  fluconazole, 200 mg, Oral, Q24H  fluticasone, 2 spray, Each Nare, Daily  hydrocortisone-bacitracin-zinc oxide-nystatin, 1 application, Topical, TID  insulin aspart, 0-24 Units, Subcutaneous, TID AC  insulin detemir, 10 Units, Subcutaneous, Nightly  iron polysaccharides, 150 mg, Oral, Daily  levothyroxine, 224 mcg, Oral, Q AM  polyethylene glycol, 17 g, Oral, Daily  pregabalin, 75 mg, Oral, BID  QUEtiapine, 12.5 mg, Oral, Q PM  sodium hypochlorite, , Topical, Daily  tamsulosin, 0.4 mg, Oral, Daily  cyanocobalamin, 1,000 mcg, Oral,  Daily        Meds Infusions  Pharmacy to dose vancomycin,   sodium chloride, 75 mL/hr, Last Rate: 75 mL/hr (01/15/22 0334)        Meds PRN  •  acetaminophen **OR** acetaminophen **OR** acetaminophen  •  albuterol  •  dextrose  •  dextrose  •  glucagon (human recombinant)  •  magnesium sulfate **OR** magnesium sulfate **OR** magnesium sulfate  •  melatonin  •  Pharmacy to dose vancomycin            Assessment/Plan  (MDM)    Active Hospital Problems:  Active Hospital Problems    Diagnosis  POA   • Symptomatic bradycardia [R00.1]  Yes      Resolved Hospital Problems   No resolved problems to display.     Extensive lower extremity wounds/left hip/buttock wound:  Leukocytosis:  -Consulted wound RN, general surgery, ID  -Resume vancomycin as previously recommended  -Wound and blood cultures pending  -CK and procalcitonin normal     ROLA on CKD stage IV:  Hyponatremia:  Lactic acidosis: Suspected secondary to dehydration  -Baseline creatinine appears 2.0-2.2, currently 2.6  -Holding home Bumex and nephrology managing with low IV fluid  -BMP in a.m.     Acute urinary retention, rule out UTI:  -Urine culture still pending      Permanent A. Fib:  Chronic HFpEF:  Symptomatic bradycardia:  Overall stable  CAD/HLD:  Elevated troponin:   -Cardiology following, discussed with them and he is poor candidate for pacemaker placement given medical noncompliance issues and infection issues  -Troponin trend flat  -Continue home Lipitor   -last echo 12/29/2021 with normal EF, trace AR, mild TR, mild dilation of aortic root  -Daily weight/strict I&O     Frequent falls:  Chronic immobility:  -Patient is immobile at his baseline, falls precautions, no PT/OT indicated     Hypothyroidism:   -Last TSH elevated, medication adjusted last admission     DM2 in obese with hyperglycemia:  - Last A1c 5.3% on 12/28/2021  -long acting, Levemir 10 units nightly  - high-dose SSI      COPD without exacerbation:   Continue home Symbicort with albuterol as  needed     BENJA:   -Hemoglobin stable at 9.3  -Follow CBC  -No active blood loss noted at although will trend due to Eliquis  -Continue Niferex and B12 supplements     Recurrent medical noncompliance:  -has been counseled and ethic committee meeting noted 1/15/21     CODE STATUS: Today he reports that he absolutely would not want intubation and does not want CPR but wants full treatment otherwise.  Changes made in chart.          This patient has been examined wearing appropriate Personal Protective Equipment . 01/15/22      Plan for disposition: Reportedly agreeable to SNF placement possibly on Monday    Electronically signed by Santiago Powers DO, 01/15/22, 10:36 EST.

## 2022-01-15 NOTE — PROGRESS NOTES
LOS: 1 day   Patient Care Team:  Fortunato De Leon MD as PCP - General (Family Medicine)        Subjective     Interval History:     Patient Complaints:   Patient Denies:  .NV       Review of Systems:  weak       Objective     Vital Signs  Temp:  [97.1 °F (36.2 °C)-97.5 °F (36.4 °C)] 97.5 °F (36.4 °C)  Heart Rate:  [36-65] 50  Resp:  [18-20] 18  BP: ()/(53-67) 127/60    Physical Exam:     General Appearance:    Alert, cooperative, in no acute distress   Head:    Normocephalic, without obvious abnormality, atraumatic   Eyes:            Lids and lashes normal, conjunctivae and sclerae normal, no   icterus, no pallor, corneas clear, PERRLA   Ears:    Ears appear intact with no abnormalities noted   Throat:   No oral lesions, no thrush, oral mucosa moist   Neck:   No adenopathy, supple, trachea midline, no thyromegaly, no   carotid bruit, no JVD   Back:     No kyphosis present, no scoliosis present, no skin lesions,      erythema or scars, no tenderness to percussion or                   palpation,   range of motion normal   Lungs:     Clear to auscultation,respirations regular, even and                  unlabored    Heart:    Regular rhythm and normal rate, normal S1 and S2, no            murmur, no gallop, no rub, no click   Chest Wall:    No abnormalities observed   Abdomen:     Normal bowel sounds, no masses, no organomegaly, soft        non-tender, non-distended, no guarding, no rebound                tenderness   Rectal:     Deferred   Extremities:   Moves all extremities well, no edema, no cyanosis, no             redness   Pulses:   Pulses palpable and equal bilaterally   Skin:   No bleeding, bruising or rash   Lymph nodes:   No palpable adenopathy   Neurologic:   Cranial nerves 2 - 12 grossly intact, sensation intact, DTR       present and equal bilaterally          Results Review:      Lab Results (last 72 hours)     Procedure Component Value Units Date/Time    POC Glucose Once [[id number]]  (Normal)  Collected: 01/15/22 1135    Specimen: Blood Updated: 01/15/22 1152     Glucose 114 mg/dL      Comment: Meter: [id number] : [id number] Mings Hina NURSING ASSISTANT       POC Glucose Once [[id number]]  (Normal) Collected: 01/15/22 0733    Specimen: Blood Updated: 01/15/22 0741     Glucose 99 mg/dL      Comment: Meter: [id number] : [id number] Mings Hina NURSING ASSISTANT       Blood Culture - Blood, Arm, Right [[id number]]  (Normal) Collected: 01/14/22 0718    Specimen: Blood from Arm, Right Updated: 01/15/22 0731     Blood Culture No growth at 24 hours    Blood Culture - Blood, Hand, Right [[id number]]  (Normal) Collected: 01/14/22 0637    Specimen: Blood from Hand, Right Updated: 01/15/22 0645     Blood Culture No growth at 24 hours    Renal Function Panel [[id number]]  (Abnormal) Collected: 01/15/22 0506    Specimen: Blood Updated: 01/15/22 0547     Glucose 89 mg/dL      BUN 60 mg/dL      Creatinine 2.61 mg/dL      Sodium 135 mmol/L      Potassium 4.3 mmol/L      Chloride 100 mmol/L      CO2 24.5 mmol/L      Calcium 8.8 mg/dL      Albumin 2.70 g/dL      Phosphorus 4.4 mg/dL      Anion Gap 10.5 mmol/L      BUN/Creatinine Ratio 23.0     eGFR Non African Amer 24 mL/min/1.73     Narrative:      GFR Normal >60  Chronic Kidney Disease <60  Kidney Failure <15      Magnesium [[id number]]  (Normal) Collected: 01/15/22 0506    Specimen: Blood Updated: 01/15/22 0547     Magnesium 2.4 mg/dL     CBC & Differential [[id number]]  (Abnormal) Collected: 01/15/22 0506    Specimen: Blood Updated: 01/15/22 0527    Narrative:      The following orders were created for panel order CBC & Differential.  Procedure                               Abnormality         Status                     ---------                               -----------         ------                     CBC Auto Differential[[id number]]        Abnormal            Final result                 Please view results for these tests on the individual orders.    CBC Auto  Differential [[id number]]  (Abnormal) Collected: 01/15/22 0506    Specimen: Blood Updated: 01/15/22 0527     WBC 7.70 10*3/mm3      RBC 3.25 10*6/mm3      Hemoglobin 9.3 g/dL      Hematocrit 29.7 %      MCV 91.4 fL      MCH 28.6 pg      MCHC 31.3 g/dL      RDW 16.0 %      RDW-SD 53.1 fl      MPV 10.5 fL      Platelets 147 10*3/mm3      Neutrophil % 69.9 %      Lymphocyte % 13.1 %      Monocyte % 10.0 %      Eosinophil % 6.0 %      Basophil % 0.6 %      Immature Grans % 0.4 %      Neutrophils, Absolute 5.38 10*3/mm3      Lymphocytes, Absolute 1.01 10*3/mm3      Monocytes, Absolute 0.77 10*3/mm3      Eosinophils, Absolute 0.46 10*3/mm3      Basophils, Absolute 0.05 10*3/mm3      Immature Grans, Absolute 0.03 10*3/mm3      nRBC 0.0 /100 WBC     Troponin [[id number]]  (Abnormal) Collected: 01/15/22 0018    Specimen: Blood Updated: 01/15/22 0048     Troponin T 0.121 ng/mL     Narrative:      Troponin T Reference Range:  <= 0.03 ng/mL-   Negative for AMI  >0.03 ng/mL-     Abnormal for myocardial necrosis.  Clinicians would have to utilize clinical acumen, EKG, Troponin and serial changes to determine if it is an Acute Myocardial Infarction or myocardial injury due to an underlying chronic condition.       Results may be falsely decreased if patient taking Biotin.      POC Glucose Once [[id number]]  (Abnormal) Collected: 01/14/22 2321    Specimen: Blood Updated: 01/14/22 2327     Glucose 139 mg/dL      Comment: Meter: [id number] : [id number] Guerrero Keating [facility]       Troponin [[id number]]  (Abnormal) Collected: 01/14/22 1824    Specimen: Blood Updated: 01/14/22 1927     Troponin T 0.126 ng/mL     Narrative:      Troponin T Reference Range:  <= 0.03 ng/mL-   Negative for AMI  >0.03 ng/mL-     Abnormal for myocardial necrosis.  Clinicians would have to utilize clinical acumen, EKG, Troponin and serial changes to determine if it is an Acute Myocardial Infarction or myocardial injury due to an underlying chronic condition.        Results may be falsely decreased if patient taking Biotin.      POC Glucose Once [[id number]]  (Abnormal) Collected: 01/14/22 1728    Specimen: Blood Updated: 01/14/22 1734     Glucose 145 mg/dL      Comment: Meter: [id number] : [id number] Jonh Olmedo RN       Troponin [[id number]]  (Abnormal) Collected: 01/14/22 1350    Specimen: Blood Updated: 01/14/22 1548     Troponin T 0.130 ng/mL     Narrative:      Troponin T Reference Range:  <= 0.03 ng/mL-   Negative for AMI  >0.03 ng/mL-     Abnormal for myocardial necrosis.  Clinicians would have to utilize clinical acumen, EKG, Troponin and serial changes to determine if it is an Acute Myocardial Infarction or myocardial injury due to an underlying chronic condition.       Results may be falsely decreased if patient taking Biotin.      Wound Culture - Wound, Buttock, Left [[id number]] Collected: 01/14/22 1230    Specimen: Wound from Buttock, Left Updated: 01/14/22 1528     Gram Stain Rare (1+) WBCs seen      No organisms seen    Vancomycin, Random [[id number]]  (Normal) Collected: 01/14/22 1350    Specimen: Blood Updated: 01/14/22 1414     Vancomycin Random 15.90 mcg/mL     Narrative:      Therapeutic Ranges for Vancomycin    Vancomycin Random   5.0-40.0 mcg/mL  Vancomycin Trough   5.0-20.0 mcg/mL  Vancomycin Peak     20.0-40.0 mcg/mL    CK [[id number]]  (Normal) Collected: 01/14/22 0629    Specimen: Blood Updated: 01/14/22 1323     Creatine Kinase 61 U/L     Urine Culture - Urine, Urine, Catheter [[id number]] Collected: 01/14/22 0757    Specimen: Urine, Catheter Updated: 01/14/22 1317    STAT Lactic Acid, Reflex [[id number]]  (Normal) Collected: 01/14/22 0950    Specimen: Blood Updated: 01/14/22 1025     Lactate 1.3 mmol/L     COVID PRE-OP / PRE-PROCEDURE SCREENING ORDER (NO ISOLATION) - Swab, Nasopharynx [[id number]]  (Normal) Collected: 01/14/22 0757    Specimen: Swab from Nasopharynx Updated: 01/14/22 0829    Narrative:      The following orders were created for  panel order COVID PRE-OP / PRE-PROCEDURE SCREENING ORDER (NO ISOLATION) - Swab, Nasopharynx.  Procedure                               Abnormality         Status                     ---------                               -----------         ------                     COVID-19 and FLU A/B PCR...[[id number]]  Normal              Final result                 Please view results for these tests on the individual orders.    COVID-19 and FLU A/B PCR - Swab, Nasopharynx [[id number]]  (Normal) Collected: 01/14/22 0757    Specimen: Swab from Nasopharynx Updated: 01/14/22 0829     COVID19 Not Detected     Influenza A PCR Not Detected     Influenza B PCR Not Detected    Narrative:      Fact sheet for providers: https://www.fda.gov/media/440487/download    Fact sheet for patients: https://www.fda.gov/media/561413/download    Test performed by PCR.    Urinalysis, Microscopic Only - Urine, Catheter [[id number]]  (Abnormal) Collected: 01/14/22 0757    Specimen: Urine, Catheter Updated: 01/14/22 0826     RBC, UA 6-12 /HPF      WBC, UA 31-50 /HPF      Bacteria, UA Trace /HPF      Squamous Epithelial Cells, UA 0-2 /HPF      Hyaline Casts, UA None Seen /LPF      Methodology Manual Light Microscopy    Urinalysis With Microscopic If Indicated (No Culture) - Urine, Catheter [[id number]]  (Abnormal) Collected: 01/14/22 0757    Specimen: Urine, Catheter Updated: 01/14/22 0812     Color, UA Other     Appearance, UA Slightly Cloudy     pH, UA 6.0     Specific Gravity, UA 1.015     Glucose, UA Negative     Ketones, UA Negative     Bilirubin, UA Negative     Blood, UA Moderate (2+)     Protein,  mg/dL (2+)     Leuk Esterase, UA Moderate (2+)     Nitrite, UA Negative     Urobilinogen, UA 0.2 E.U./dL    Troponin [[id number]]  (Abnormal) Collected: 01/14/22 0629    Specimen: Blood Updated: 01/14/22 0720     Troponin T 0.129 ng/mL     Narrative:      Troponin T Reference Range:  <= 0.03 ng/mL-   Negative for AMI  >0.03 ng/mL-     Abnormal for  myocardial necrosis.  Clinicians would have to utilize clinical acumen, EKG, Troponin and serial changes to determine if it is an Acute Myocardial Infarction or myocardial injury due to an underlying chronic condition.       Results may be falsely decreased if patient taking Biotin.      Procalcitonin [[id number]]  (Normal) Collected: 01/14/22 0629    Specimen: Blood Updated: 01/14/22 0718     Procalcitonin 0.16 ng/mL     BNP [[id number]]  (Abnormal) Collected: 01/14/22 0629    Specimen: Blood Updated: 01/14/22 0717     proBNP 7,243.0 pg/mL     Narrative:      Among patients with dyspnea, NT-proBNP is highly sensitive for the detection of acute congestive heart failure. In addition NT-proBNP of <300 pg/ml effectively rules out acute congestive heart failure with 99% negative predictive value.    Results may be falsely decreased if patient taking Biotin.      Comprehensive Metabolic Panel [[id number]]  (Abnormal) Collected: 01/14/22 0629    Specimen: Blood Updated: 01/14/22 0712     Glucose 144 mg/dL      BUN 67 mg/dL      Creatinine 2.78 mg/dL      Sodium 135 mmol/L      Potassium 4.8 mmol/L      Chloride 97 mmol/L      CO2 25.8 mmol/L      Calcium 9.2 mg/dL      Total Protein 6.7 g/dL      Albumin 3.20 g/dL      ALT (SGPT) 10 U/L      AST (SGOT) 20 U/L      Alkaline Phosphatase 78 U/L      Total Bilirubin 0.3 mg/dL      eGFR Non African Amer 22 mL/min/1.73      Globulin 3.5 gm/dL      A/G Ratio 0.9 g/dL      BUN/Creatinine Ratio 24.1     Anion Gap 12.2 mmol/L     Narrative:      GFR Normal >60  Chronic Kidney Disease <60  Kidney Failure <15      Lactic Acid, Plasma [[id number]]  (Abnormal) Collected: 01/14/22 0637    Specimen: Blood Updated: 01/14/22 0712     Lactate 2.2 mmol/L     CBC & Differential [[id number]]  (Abnormal) Collected: 01/14/22 0629    Specimen: Blood Updated: 01/14/22 0647    Narrative:      The following orders were created for panel order CBC & Differential.  Procedure                                Abnormality         Status                     ---------                               -----------         ------                     CBC Auto Differential[[id number]]        Abnormal            Final result                 Please view results for these tests on the individual orders.    CBC Auto Differential [[id number]]  (Abnormal) Collected: 01/14/22 0629    Specimen: Blood Updated: 01/14/22 0647     WBC 13.28 10*3/mm3      RBC 4.02 10*6/mm3      Hemoglobin 11.5 g/dL      Hematocrit 36.7 %      MCV 91.3 fL      MCH 28.6 pg      MCHC 31.3 g/dL      RDW 16.1 %      RDW-SD 53.1 fl      MPV 10.1 fL      Platelets 191 10*3/mm3      Neutrophil % 80.0 %      Lymphocyte % 7.9 %      Monocyte % 8.0 %      Eosinophil % 2.9 %      Basophil % 0.7 %      Immature Grans % 0.5 %      Neutrophils, Absolute 10.63 10*3/mm3      Lymphocytes, Absolute 1.05 10*3/mm3      Monocytes, Absolute 1.06 10*3/mm3      Eosinophils, Absolute 0.38 10*3/mm3      Basophils, Absolute 0.09 10*3/mm3      Immature Grans, Absolute 0.07 10*3/mm3      nRBC 0.0 /100 WBC           Imaging Results (Last 72 Hours)     Procedure Component Value Units Date/Time    XR Chest 1 View [[id number]] Collected: 01/14/22 0714     Updated: 01/14/22 0716    Narrative:      PROCEDURE: CR Chest 1 Vw    COMPARISON:  October 2021     INDICATIONS: hypotension  Relevant clinical info: Generalized weakness. ;Pt slid out of his wheelchair at home and was unable to get himself back into it.;  TECHNIQUE: Single AP  view of the chest    FINDINGS:  Cardiomediastinal silhouette is stable and enlarged with left ventricular prominence. Lung volumes are low. Patchy subtle opacities seen in the right midlung and lung base. No consolidation. Osseous structures are grossly intact.      Impression:      Suggestion of airspace disease in the right lung, correlate clinically.         Signer Name: Maryanne Eubanks MD   Signed: 1/14/2022 7:14 AM   Workstation Name: [id number]    Radiology  "Specialists [facility]            Medication Review:     Hospital Medications (active)       Dose Frequency Start End    acetaminophen (TYLENOL) 160 MG/5ML solution 650 mg 650 mg Every 4 Hours PRN 1/14/2022     Admin Instructions: Do not exceed 4 grams of acetaminophen in a 24 hr period.    If given for pain, use the following pain scale:   Mild Pain = Pain Score of 1-3, CPOT 1-2  Moderate Pain = Pain Score of 4-6, CPOT 3-4  Severe Pain = Pain Score of 7-10, CPOT 5-8  Do not exceed 4 grams of acetaminophen in a 24 hr period. Max dose of 2gm for AST/ALT greater than 120 units/L      If given for pain, use the following pain scale:   Mild Pain = Pain Score of 1-3, CPOT 1-2  Moderate Pain = Pain Score of 4-6, CPOT 3-4  Severe Pain = Pain Score of 7-10, CPOT 5-8    Route: Oral    Linked Group 1: \"Or\" Linked Group Details        acetaminophen (TYLENOL) suppository 650 mg 650 mg Every 4 Hours PRN 1/14/2022     Admin Instructions: Do not exceed 4 grams of acetaminophen in a 24 hr period. Max dose of 2gm for AST/ALT greater than 120 units/L      If given for pain, use the following pain scale:   Mild Pain = Pain Score of 1-3, CPOT 1-2  Moderate Pain = Pain Score of 4-6, CPOT 3-4  Severe Pain = Pain Score of 7-10, CPOT 5-8    Route: Rectal    Linked Group 1: \"Or\" Linked Group Details        acetaminophen (TYLENOL) tablet 650 mg 650 mg Every 4 Hours PRN 1/14/2022     Admin Instructions: Do not exceed 4 grams of acetaminophen in a 24 hr period.    If given for pain, use the following pain scale:   Mild Pain = Pain Score of 1-3, CPOT 1-2  Moderate Pain = Pain Score of 4-6, CPOT 3-4  Severe Pain = Pain Score of 7-10, CPOT 5-8  Do not exceed 4 grams of acetaminophen in a 24 hr period. Max dose of 2gm for AST/ALT greater than 120 units/L      If given for pain, use the following pain scale:   Mild Pain = Pain Score of 1-3, CPOT 1-2  Moderate Pain = Pain Score of 4-6, CPOT 3-4  Severe Pain = Pain Score of 7-10, CPOT 5-8    " "Route: Oral    Linked Group 1: \"Or\" Linked Group Details        albuterol (PROVENTIL) nebulizer solution 0.083% 2.5 mg/3mL 2.5 mg Every 6 Hours PRN 1/14/2022     Route: Nebulization    apixaban (ELIQUIS) tablet 2.5 mg 2.5 mg Every 12 Hours Scheduled 1/14/2022     Admin Instructions: Tablet may be crushed and suspended in 60 mL of water or D5W and immediately delivered via NG tube.  Avoid grapefruit juice.    Route: Oral    Aquaphor Advanced Therapy ointment 1 application 1 application Every 12 Hours Scheduled 1/14/2022     Admin Instructions: Dry areas    Route: Topical    atorvastatin (LIPITOR) tablet 10 mg 10 mg Nightly 1/14/2022     Admin Instructions: Avoid grapefruit juice.    Route: Oral    budesonide-formoterol (SYMBICORT) 160-4.5 MCG/ACT inhaler 2 puff 2 puff 2 Times Daily - RT 1/14/2022     Admin Instructions:  Shake well.  Rinse mouth after use, do not swallow water.  Send aerosols to pharmacy in ziplock bag for proper disposal.    Route: Inhalation    cholecalciferol (VITAMIN D3) tablet 2,000 Units 2,000 Units Daily 1/14/2022     Route: Oral    citalopram (CeleXA) tablet 20 mg 20 mg Nightly 1/14/2022     Admin Instructions: Caution: Look alike/sound alike drug alert.    Route: Oral    dextrose (D50W) (25 g/50 mL) IV injection 25 g 25 g Every 15 Minutes PRN 1/14/2022     Admin Instructions: Blood sugar less than 70; patient has IV access - Unresponsive, NPO or Unable To Safely Swallow    Route: Intravenous    dextrose (GLUTOSE) oral gel 15 g 15 g Every 15 Minutes PRN 1/14/2022     Admin Instructions: BS<70, Patient Alert, Is not NPO, Can safely swallow.    Route: Oral    docusate sodium (COLACE) capsule 100 mg 100 mg 2 Times Daily 1/14/2022     Admin Instructions: Swallow whole.  Do not open, crush, or chew capsule.    Route: Oral    fluconazole (DIFLUCAN) tablet 200 mg 200 mg Every 24 Hours 1/14/2022 1/17/2022    Route: Oral    fluticasone (FLONASE) 50 MCG/ACT nasal spray 2 spray 2 spray Daily 1/14/2022  " "   Route: Each Nare    glucagon (GLUCAGEN) injection 1 mg 1 mg Every 15 Minutes PRN 1/14/2022     Admin Instructions: Blood Glucose Less Than 70 - Patient Without IV Access - Unresponsive, NPO or Unable To Safely Swallow    Route: Subcutaneous    hydrocortisone-bacitracin-zinc oxide-nystatin (MAGIC BARRIER) ointment 1 application 1 application 3 Times Daily 1/14/2022     Admin Instructions: Apply to gluteal wounds.  For topical use only    Route: Topical    insulin aspart (novoLOG) injection 0-24 Units 0-24 Units 3 Times Daily Before Meals 1/14/2022     Admin Instructions: Correction - High Dose.  Greater than 80 units/day total insulin dose or, on steroids, insulin resistant, infection.    Blood glucose 150-199 mg/dL - 4 units  Blood glucose 200-249 mg/dL - 8 units  Blood glucose 250-299 mg/dL - 12 units  Blood glucose 300-349 mg/dL - 16 units  Blood glucose 350-400 mg/dL - 20 units  Blood glucose greater than 400 mg/dL - 24 units and call provider      Route: Subcutaneous    insulin detemir (LEVEMIR) injection 10 Units 10 Units Nightly 1/14/2022     Admin Instructions:     Route: Subcutaneous    iron polysaccharides (NIFEREX) capsule 150 mg 150 mg Daily 1/14/2022     Route: Oral    levothyroxine (SYNTHROID, LEVOTHROID) tablet 224 mcg 224 mcg Every Early Morning 1/15/2022     Admin Instructions: Take on empty stomach.    Route: Oral    Magnesium Sulfate 2 gram / 50mL Infusion (GIVE X 3 BAGS TO EQUAL 6GM TOTAL DOSE) - Mg 1.1 - 1.5 mg/dl 2 g As Needed 1/14/2022     Admin Instructions: Mg 1.1 -1.5 mg/dL. Infuse 2 grams over 2 hours for 3 doses (for a total Mg dose of 6 grams).  Recheck Mg level in the AM.    Route: Intravenous    Linked Group 2: \"Or\" Linked Group Details        Magnesium Sulfate 2 gram Bolus, followed by 8 gram infusion (total Mg dose 10 grams)- Mg less than or equal to 1mg/dL 2 g As Needed 1/14/2022     Admin Instructions: Mg less than or equal to 1mg/dL. Give 2 gm over 30 minutes as bolus, then " "infuse 2 gm over 2 hours for 4 doses (8 grams) for total dose of 10 grams.  Recheck Mg levels in the AM.    Route: Intravenous    Linked Group 2: \"Or\" Linked Group Details        Magnesium Sulfate 4 gram infusion- Mg 1.6-1.9 mg/dL 4 g As Needed 1/14/2022     Admin Instructions: Mg 1.6-1.9 mg/dL. Recheck Mg level in the AM.    Route: Intravenous    Linked Group 2: \"Or\" Linked Group Details        melatonin tablet 5 mg 5 mg Nightly PRN 1/14/2022     Route: Oral    Pharmacy to dose vancomycin  Continuous PRN 1/14/2022 1/21/2022    Route: Does not apply    polyethylene glycol (MIRALAX) packet 17 g 17 g Daily 1/14/2022     Admin Instructions: Use 4-8 ounces of water, tea, or juice for each 17 gram dose.    Route: Oral    pregabalin (LYRICA) capsule 75 mg 75 mg 2 Times Daily 1/14/2022     Admin Instructions:     Route: Oral    QUEtiapine (SEROquel) tablet 12.5 mg 12.5 mg Every Evening 1/14/2022     Admin Instructions: Caution: Look alike/sound alike drug alert    Route: Oral    sodium chloride 0.9 % infusion 75 mL/hr Continuous 1/14/2022     Route: Intravenous    sodium hypochlorite (DAKIN'S 1/4 STRENGTH) 0.125 % topical solution 0.125% solution  Daily 1/14/2022     Admin Instructions: Apply to left hip and left foot wounds.    Route: Topical    Cosign for Ordering: Accepted by Liat Hood APRN on 1/14/2022  3:13 PM    tamsulosin (FLOMAX) 24 hr capsule 0.4 mg 0.4 mg Daily 1/14/2022     Admin Instructions: Swallow whole. Do not crush or chew.    Route: Oral    vitamin B-12 (CYANOCOBALAMIN) tablet 1,000 mcg 1,000 mcg Daily 1/14/2022     Route: Oral        apixaban, 2.5 mg, Oral, Q12H  Aquaphor Advanced Therapy, 1 application, Topical, Q12H  atorvastatin, 10 mg, Oral, Nightly  budesonide-formoterol, 2 puff, Inhalation, BID - RT  cholecalciferol, 2,000 Units, Oral, Daily  citalopram, 20 mg, Oral, Nightly  docusate sodium, 100 mg, Oral, BID  fluconazole, 200 mg, Oral, Q24H  fluticasone, 2 spray, Each Nare, " Daily  hydrocortisone-bacitracin-zinc oxide-nystatin, 1 application, Topical, TID  insulin aspart, 0-24 Units, Subcutaneous, TID AC  insulin detemir, 10 Units, Subcutaneous, Nightly  iron polysaccharides, 150 mg, Oral, Daily  levothyroxine, 224 mcg, Oral, Q AM  polyethylene glycol, 17 g, Oral, Daily  pregabalin, 75 mg, Oral, BID  QUEtiapine, 12.5 mg, Oral, Q PM  sodium hypochlorite, , Topical, Daily  tamsulosin, 0.4 mg, Oral, Daily  cyanocobalamin, 1,000 mcg, Oral, Daily        Assessment/Plan         Symptomatic bradycardia    .  -Chronic diastolic congestive heart failure  -Hypertension  -Underlying CAD  -A. fib, rate controlled  -Poor insight to health and medical noncompliance     Metabolic encephalopathy-  L  Hip wound C&S MRSA and E Faecalis  UTI     Plan :     IV vanc   diflucan  Local care  I&D  New C&S  Will follow  gerber alba  .    .        Rocky Joe MD  01/15/22  13:04 EST

## 2022-01-15 NOTE — PROGRESS NOTES
"[facility]    Patient Name: Froilan Helton  :1940  81 y.o.  LOS: 1  Encounter Provider: Den Raphael Jr, MD      Patient Care Team:  Fortunato De Leon MD as PCP - General (Family Medicine)    Chief Complaint: Follow-up symptomatic bradycardia, PAF, acute on chronic kidney disease, sacral decubitus, type II NSTEMI, chronic diastolic heart failure    Interval History: Telemetry reviewed.  Appears to be AV block at this time with junctional escape rhythm.       Objective   Vital Signs  Temp:  [96.8 °F (36 °C)-97.4 °F (36.3 °C)] 97.4 °F (36.3 °C)  Heart Rate:  [36-65] 36  Resp:  [18-20] 18  BP: ()/(50-73) 105/57    Intake/Output Summary (Last 24 hours) at 1/15/2022 1043  Last data filed at 1/15/2022 0944  Gross per 24 hour   Intake 1240 ml   Output 450 ml   Net 790 ml     Flowsheet Rows      First Filed Value   Admission Height 182.9 cm (72\") Documented at 2022 0618   Admission Weight 98.6 kg (217 lb 6 oz) Documented at 2022 0618            Vitals reviewed.   Constitutional:       Appearance: Frail. Chronically ill-appearing.   Eyes:      Conjunctiva/sclera: Conjunctivae normal.      Pupils: Pupils are equal, round, and reactive to light.   Neck:      Vascular: No JVR. JVD normal.   Pulmonary:      Effort: Pulmonary effort is normal.      Breath sounds: No wheezing. No rhonchi. No rales.   Chest:      Chest wall: Not tender to palpatation.   Cardiovascular:      PMI at left midclavicular line. Bradycardia present. Regularly irregular rhythm. Normal S1. Normal S2.      Murmurs: There is no murmur.      No gallop. No click. No rub.   Pulses:     Intact distal pulses.   Edema:     Peripheral edema absent.   Abdominal:      General: Bowel sounds are normal.      Palpations: Abdomen is soft.      Tenderness: There is no abdominal tenderness.   Musculoskeletal: Normal range of motion.         General: No tenderness. Skin:     General: Skin is warm and dry.   Neurological:      " General: No focal deficit present.      Mental Status: Alert and oriented to person, place and time.           Pertinent Test Results:  Results from last 7 days   Lab Units 01/15/22  0506 01/14/22  0629 01/11/22  0351 01/10/22  0730   SODIUM mmol/L 135* 135* 134* 130*   POTASSIUM mmol/L 4.3 4.8 4.2 4.5   CHLORIDE mmol/L 100 97* 96* 95*   CO2 mmol/L 24.5 25.8 30.4* 25.5   BUN mg/dL 60* 67* 46* 38*   CREATININE mg/dL 2.61* 2.78* 2.07* 2.14*   GLUCOSE mg/dL 89 144* 68 125*   CALCIUM mg/dL 8.8 9.2 8.5* 8.3*   AST (SGOT) U/L  --  20  --   --    ALT (SGPT) U/L  --  10  --   --      Results from last 7 days   Lab Units 01/15/22  0018 01/14/22  1824 01/14/22  1350 01/14/22  0629   CK TOTAL U/L  --   --   --  61   TROPONIN T ng/mL 0.121* 0.126* 0.130* 0.129*     Results from last 7 days   Lab Units 01/15/22  0506 01/14/22  0629 01/11/22  0351   WBC 10*3/mm3 7.70 13.28* 8.53   HEMOGLOBIN g/dL 9.3* 11.5* 10.3*   HEMATOCRIT % 29.7* 36.7* 33.0*   PLATELETS 10*3/mm3 147 191 167         Results from last 7 days   Lab Units 01/15/22  0506 01/09/22  0416   MAGNESIUM mg/dL 2.4 2.7*           Invalid input(s): LDLCALC  Results from last 7 days   Lab Units 01/14/22  0629   PROBNP pg/mL 7,243.0*               Medication Review:   apixaban, 2.5 mg, Oral, Q12H  Aquaphor Advanced Therapy, 1 application, Topical, Q12H  atorvastatin, 10 mg, Oral, Nightly  budesonide-formoterol, 2 puff, Inhalation, BID - RT  cholecalciferol, 2,000 Units, Oral, Daily  citalopram, 20 mg, Oral, Nightly  docusate sodium, 100 mg, Oral, BID  fluconazole, 200 mg, Oral, Q24H  fluticasone, 2 spray, Each Nare, Daily  hydrocortisone-bacitracin-zinc oxide-nystatin, 1 application, Topical, TID  insulin aspart, 0-24 Units, Subcutaneous, TID AC  insulin detemir, 10 Units, Subcutaneous, Nightly  iron polysaccharides, 150 mg, Oral, Daily  levothyroxine, 224 mcg, Oral, Q AM  polyethylene glycol, 17 g, Oral, Daily  pregabalin, 75 mg, Oral, BID  QUEtiapine, 12.5 mg, Oral, Q  PM  sodium hypochlorite, , Topical, Daily  tamsulosin, 0.4 mg, Oral, Daily  cyanocobalamin, 1,000 mcg, Oral, Daily         Pharmacy to dose vancomycin,   sodium chloride, 75 mL/hr, Last Rate: 75 mL/hr (01/15/22 0334)        Assessment/Plan   1. Bradycardia -dizziness resolved.  Telemetry reviewed and does appear to be significant AV block.  Repeat EKG this morning with sinus rhythm and first-degree AV block.  Patient is not a candidate for pacemaker due to his chronic infections.  We had a long discussion about goals of care and the patient would still want CPR but is questioning being on a ventilator.  Case discussed with internal medicine physician on-call.  He will have another conversation with the patient today.  Heart rate remains in the 30s to 40s with patient being normotensive.  Continue supportive care.  2. Chronic HFpEF -euvolemic  3. Posterior decubitus with recent surgical intervention and antibiotics  4. Acute on chronic kidney disease -marginal improvement  5. Type II NSTEMI -in the setting of diastolic dysfunction and acute kidney injury.  No angina.  6. Paroxysmal atrial fibrillation -sinus versus junctional rhythm.  Low-dose apixaban due to renal dysfunction and age.  Holding amiodarone and negative chronotropic therapy.    Overall patient has poor insight into his debilitating medical conditions and poor prognosis.  Patient is not a candidate for pacemaker as stated above.  He is not willing to move towards comfort care at this time.    Den Raphael Jr, MD  [facility]  01/15/22  10:43 EST

## 2022-01-15 NOTE — CASE MANAGEMENT/SOCIAL WORK
Discharge Planning Assessment   Temitope Castillo     Patient Name: Froilan Helton  MRN: [id number]  Today's Date: 1/15/2022    Admit Date: 1/14/2022     Discharge Needs Assessment     Row Name 01/15/22 1347       Living Environment    Lives With alone    Current Living Arrangements home/apartment/condo    Potentially Unsafe Housing Conditions --  none    Primary Care Provided by self    Provides Primary Care For no one    Family Caregiver if Needed grandchild(nba), adult    Family Caregiver Names Jaz, dania, CYNTHIA and son Agustin.    Quality of Family Relationships supportive; helpful    Able to Return to Prior Arrangements no       Resource/Environmental Concerns    Resource/Environmental Concerns none    Transportation Concerns rides, unreliable from others       Transition Planning    Patient/Family Anticipates Transition to inpatient rehabilitation facility    Patient/Family Anticipated Services at Transition skilled nursing    Transportation Anticipated family or friend will provide       Discharge Needs Assessment    Readmission Within the Last 30 Days previous discharge plan unsuccessful    Current Outpatient/Agency/Support Group homecare agency    Equipment Currently Used at Home walker, rolling; wheelchair, motorized; commode; lift device    Concerns to be Addressed discharge planning    Anticipated Changes Related to Illness inability to care for self    Equipment Needed After Discharge none    Outpatient/Agency/Support Group Needs inpatient rehabilitation facility    Discharge Facility/Level of Care Needs nursing facility, skilled    Provided Post Acute Provider List? Yes    Post Acute Provider List Inpatient Rehab; Nursing Home    Provided Post Acute Provider Quality & Resource List? Yes    Post Acute Provider Quality and Resource List Inpatient Rehab; Nursing Home    Delivered To Patient    Method of Delivery In person    Patient's Choice of Community Agency(s) [facility]    Current Discharge  Risk dependent with mobility/activities of daily living; chronically ill; lives alone; physical impairment               Discharge Plan     Row Name 01/15/22 5755       Plan    Plan Discharge to SNF for STR    Plan Comments I spoke with the patient at bedside with his permission.  I introduced myself and explained my role as .  The patient was lying in bed with his eyes closed.  He answered all questions appropriately and verified his name and date of birth.   He advised that he didn’t sleep very well last night and is tired. I verified the information on the facesheet and his PCP as Dr. Fortunato De Leon.  The patient lives in a single story home.  He lives alone. .  The patient is able to maneuver around the home via his motorized wheelchair but has had multiple falls in the past several weeks.  The patient’s granddaughter/POA assists the patient with meals and some ADL’s but is unable to stay with the patient 24/7.  The patient was recently seen here and was discharged home against medical advice. He has multiple wounds on his legs and is reported to have fallen at home prior to this admission.  The patient is agreeable to discharge to a SNF for STR and a referral was placed to [facility] by Elziabeth HUGHES  on 1/14.   The patient has a lift chair, motorized wheelchair and walker at home and denied needing any further DME at discharge.  The patient will discharge to SNF for STR and he is agreeable to that plan.  He denied having and further questions or concerns at this time.  CM will continue to follow for further needs.              Continued Care and Services - Admitted Since 1/14/2022     Destination     Service Provider Request Status Selected Services Address Phone Fax Patient Preferred    [facility]  Pending - Request Sent N/A [address] [place] [address] [phone] --            Selected Continued Care - Prior Encounters Includes selections from  prior encounters from 10/16/2021 to 1/15/2022    Discharged on 1/11/2022 Admission date: 12/28/2021 - Discharge disposition: Left Against Medical Advice    Home Medical Care     Service Provider Selected Services Address Phone Fax Patient Preferred    [facility] - [facility] [id number] [id number] [phone] --                       Demographic Summary     Row Name 01/15/22 1345       General Information    Admission Type inpatient    Arrived From home    Required Notices Provided Observation Status Notice    Referral Source admission list    Reason for Consult discharge planning    Preferred Language English     Used During This Interaction no       Contact Information    Permission Granted to Share Info With                Functional Status    No documentation.                Psychosocial    No documentation.                Abuse/Neglect    No documentation.                Legal    No documentation.                Substance Abuse    No documentation.                Patient Forms    No documentation.                   Terri Martinez RN

## 2022-01-16 NOTE — PROGRESS NOTES
"[facility]    Patient Name: Froilan Helton  :1940  81 y.o.  LOS: 2  Encounter Provider: Den Raphael Jr, MD      Patient Care Team:  Fortunato De Leon MD as PCP - General (Family Medicine)    Chief Complaint: Follow-up bradycardia, sacral decubitus, chronic diastolic heart failure, paroxysmal atrial fibrillation    Interval History: Sinus bradycardia with and hemodynamically stable patient.       Objective   Vital Signs  Temp:  [97.5 °F (36.4 °C)-97.9 °F (36.6 °C)] 97.9 °F (36.6 °C)  Heart Rate:  [36-50] 48  Resp:  [16-20] 16  BP: (123-155)/(45-70) 132/55    Intake/Output Summary (Last 24 hours) at 2022 0856  Last data filed at 2022 0709  Gross per 24 hour   Intake 1210 ml   Output 1625 ml   Net -415 ml     Flowsheet Rows      First Filed Value   Admission Height 182.9 cm (72\") Documented at 2022 0618   Admission Weight 98.6 kg (217 lb 6 oz) Documented at 2022 0618            Physical Exam  Vitals reviewed.   Constitutional:       Appearance: Frail. Chronically ill-appearing.   Eyes:      Conjunctiva/sclera: Conjunctivae normal.      Pupils: Pupils are equal, round, and reactive to light.   Neck:      Vascular: No JVR. JVD normal.   Pulmonary:      Effort: Pulmonary effort is normal.      Breath sounds: No wheezing. No rhonchi. No rales.   Chest:      Chest wall: Not tender to palpatation.   Cardiovascular:      PMI at left midclavicular line. Bradycardia present. Regularly irregular rhythm. Normal S1. Normal S2.      Murmurs: There is no murmur.      No gallop. No click. No rub.   Pulses:     Intact distal pulses.   Edema:     Peripheral edema absent.   Abdominal:      General: Bowel sounds are normal.      Palpations: Abdomen is soft.      Tenderness: There is no abdominal tenderness.   Musculoskeletal: Normal range of motion.         General: No tenderness. Skin:     General: Skin is warm and dry.   Neurological:      General: No focal deficit present.      " Mental Status: Alert and oriented to person, place and time.       Physical exam was reviewed, updated and amended when necessary.    Pertinent Test Results:  Results from last 7 days   Lab Units 01/16/22  0456 01/15/22  0506 01/14/22  0629 01/11/22  0351 01/10/22  0730   SODIUM mmol/L 135* 135* 135* 134* 130*   POTASSIUM mmol/L 4.7 4.3 4.8 4.2 4.5   CHLORIDE mmol/L 101 100 97* 96* 95*   CO2 mmol/L 23.6 24.5 25.8 30.4* 25.5   BUN mg/dL 54* 60* 67* 46* 38*   CREATININE mg/dL 2.18* 2.61* 2.78* 2.07* 2.14*   GLUCOSE mg/dL 85 89 144* 68 125*   CALCIUM mg/dL 8.6 8.8 9.2 8.5* 8.3*   AST (SGOT) U/L  --   --  20  --   --    ALT (SGPT) U/L  --   --  10  --   --      Results from last 7 days   Lab Units 01/15/22  0018 01/14/22  1824 01/14/22  1350 01/14/22  0629   CK TOTAL U/L  --   --   --  61   TROPONIN T ng/mL 0.121* 0.126* 0.130* 0.129*     Results from last 7 days   Lab Units 01/15/22  0506 01/14/22  0629 01/11/22  0351   WBC 10*3/mm3 7.70 13.28* 8.53   HEMOGLOBIN g/dL 9.3* 11.5* 10.3*   HEMATOCRIT % 29.7* 36.7* 33.0*   PLATELETS 10*3/mm3 147 191 167         Results from last 7 days   Lab Units 01/15/22  0506   MAGNESIUM mg/dL 2.4           Invalid input(s): LDLCALC  Results from last 7 days   Lab Units 01/14/22  0629   PROBNP pg/mL 7,243.0*               Medication Review:   apixaban, 2.5 mg, Oral, Q12H  Aquaphor Advanced Therapy, 1 application, Topical, Q12H  atorvastatin, 10 mg, Oral, Nightly  budesonide-formoterol, 2 puff, Inhalation, BID - RT  cholecalciferol, 2,000 Units, Oral, Daily  citalopram, 20 mg, Oral, Nightly  docusate sodium, 100 mg, Oral, BID  fluconazole, 200 mg, Oral, Q24H  fluticasone, 2 spray, Each Nare, Daily  hydrocortisone-bacitracin-zinc oxide-nystatin, 1 application, Topical, TID  insulin aspart, 0-24 Units, Subcutaneous, TID AC  insulin detemir, 10 Units, Subcutaneous, Nightly  iron polysaccharides, 150 mg, Oral, Daily  levothyroxine, 224 mcg, Oral, Q AM  polyethylene glycol, 17 g, Oral,  Daily  pregabalin, 75 mg, Oral, BID  QUEtiapine, 12.5 mg, Oral, Q PM  sodium hypochlorite, , Topical, Daily  tamsulosin, 0.4 mg, Oral, Daily  cyanocobalamin, 1,000 mcg, Oral, Daily         Pharmacy to dose vancomycin,         Assessment/Plan   1. Bradycardia -dizziness resolved. Repeat EKG yesterday morning with sinus rhythm and first-degree AV block.  Patient is not a candidate for pacemaker due to his chronic infections.  Continue supportive care.  2. Chronic HFpEF -euvolemic  3. Posterior decubitus with recent surgical intervention and antibiotics  4. Acute on chronic kidney disease -marginal improvement  5. Type II NSTEMI -in the setting of diastolic dysfunction and acute kidney injury.  No angina.  6. Paroxysmal atrial fibrillation -sinus bradycardia with first-degree AV block.  Low-dose apixaban due to renal dysfunction and age.  Holding amiodarone and negative chronotropic therapy.     Overall patient has poor insight into his debilitating medical conditions and poor prognosis.  Patient is not a candidate for pacemaker as stated above.  He is not willing to move towards comfort care at this time.    Den Raphael Jr, MD  [facility]  01/16/22  08:56 EST

## 2022-01-16 NOTE — PROGRESS NOTES
Pharmacokinetic Consult - Vancomycin Dosing    Froilan Helton is a 81 y.o. male who has been consulted for vancomycin dosing for skin / soft tissue infection. Pharmacy utilizing pulse dosing at this time.    CrCl = 32.9 ml/min - improving.    Vancomycin trough level = 19.4 mcg/ml - on upper end of therapeutic.   Will redraw level in am to confirm expected kinetics. Will re-dose as indicated.     Relevant clinical data and objective history reviewed:  Lab Results   Component Value Date/Time    CREATININE 2.18 (H) 01/16/2022 04:56 AM    CREATININE 2.61 (H) 01/15/2022 05:06 AM    CREATININE 2.78 (H) 01/14/2022 06:29 AM    BUN 54 (H) 01/16/2022 04:56 AM    BUN 60 (H) 01/15/2022 05:06 AM    BUN 67 (H) 01/14/2022 06:29 AM     Estimated Creatinine Clearance: 32.9 mL/min (A) (by C-G formula based on SCr of 2.18 mg/dL (H)).    Lab Results   Component Value Date/Time    WBC 7.70 01/15/2022 05:06 AM    HGB 9.3 (L) 01/15/2022 05:06 AM    HCT 29.7 (L) 01/15/2022 05:06 AM    MCV 91.4 01/15/2022 05:06 AM     01/15/2022 05:06 AM     Temp Readings from Last 3 Encounters:   01/16/22 97.9 °F (36.6 °C) (Oral)   01/11/22 98.3 °F (36.8 °C) (Oral)   12/20/21 97.8 °F (36.6 °C) (Oral)     Weight:  (217.3#)   Pharmacy will continue to follow the patient’s culture results and clinical progress daily.    Thank you-  Javan Gamez, AgustinaD

## 2022-01-16 NOTE — PROGRESS NOTES
Surgery Progress Note   Chief Complaint:  Left hip wound    Subjective     Interval History:     Froilan is doing well today.  He has no complaints.      Objective     Vital Signs  Temp:  [97.5 °F (36.4 °C)-97.9 °F (36.6 °C)] 97.9 °F (36.6 °C)  Heart Rate:  [36-54] 52  Resp:  [16-18] 16  BP: (123-155)/(45-70) 132/55  Body mass index is 29.48 kg/m².    Intake/Output Summary (Last 24 hours) at 1/16/2022 1350  Last data filed at 1/16/2022 0915  Gross per 24 hour   Intake 830 ml   Output 1625 ml   Net -795 ml     I/O this shift:  In: 340 [P.O.:340]  Out: 475 [Urine:475]       Physical Exam:   General: patient awake, alert and cooperative   Extremities: left hip wound unchanged.     Neurologic: Normal mood and behavior     Results Review:     I reviewed the patient's new clinical results.      WBC No results found for: WBCS   HGB Hemoglobin   Date Value Ref Range Status   01/15/2022 9.3 (L) 13.0 - 17.7 g/dL Final   01/14/2022 11.5 (L) 13.0 - 17.7 g/dL Final      HCT Hematocrit   Date Value Ref Range Status   01/15/2022 29.7 (L) 37.5 - 51.0 % Final   01/14/2022 36.7 (L) 37.5 - 51.0 % Final      Platlets No results found for: LABPLAT     PT/INR:  No results found for: PROTIME/No results found for: INR    Sodium Sodium   Date Value Ref Range Status   01/16/2022 135 (L) 136 - 145 mmol/L Final   01/15/2022 135 (L) 136 - 145 mmol/L Final   01/14/2022 135 (L) 136 - 145 mmol/L Final      Potassium Potassium   Date Value Ref Range Status   01/16/2022 4.7 3.5 - 5.2 mmol/L Final   01/15/2022 4.3 3.5 - 5.2 mmol/L Final   01/14/2022 4.8 3.5 - 5.2 mmol/L Final      Chloride Chloride   Date Value Ref Range Status   01/16/2022 101 98 - 107 mmol/L Final   01/15/2022 100 98 - 107 mmol/L Final   01/14/2022 97 (L) 98 - 107 mmol/L Final      Bicarbonate No results found for: PLASMABICARB   BUN BUN   Date Value Ref Range Status   01/16/2022 54 (H) 8 - 23 mg/dL Final   01/15/2022 60 (H) 8 - 23 mg/dL Final   01/14/2022 67 (H) 8 - 23 mg/dL  Final      Creatinine Creatinine   Date Value Ref Range Status   01/16/2022 2.18 (H) 0.76 - 1.27 mg/dL Final   01/15/2022 2.61 (H) 0.76 - 1.27 mg/dL Final   01/14/2022 2.78 (H) 0.76 - 1.27 mg/dL Final      Calcium Calcium   Date Value Ref Range Status   01/16/2022 8.6 8.6 - 10.5 mg/dL Final   01/15/2022 8.8 8.6 - 10.5 mg/dL Final   01/14/2022 9.2 8.6 - 10.5 mg/dL Final      Magnesium  AST  ALT  Bilirubin, Total  AlkPhos  Albumin    Amylase  Lipase    Radiology: Magnesium   Date Value Ref Range Status   01/15/2022 2.4 1.6 - 2.4 mg/dL Final     No components found for: AST.*  No components found for: ALT.*  No results found for: BILIRUBIN    No components found for: ALKPHOS.*  No components found for: ALBUMIN.*      No components found for: AMYLASE.*  No components found for: LIPASE.*            Imaging Results (Most Recent)     Procedure Component Value Units Date/Time    XR Chest 1 View [[id number]] Collected: 01/14/22 0714     Updated: 01/14/22 0716    Narrative:      PROCEDURE: CR Chest 1 Vw    COMPARISON:  October 2021     INDICATIONS: hypotension  Relevant clinical info: Generalized weakness. ;Pt slid out of his wheelchair at home and was unable to get himself back into it.;  TECHNIQUE: Single AP  view of the chest    FINDINGS:  Cardiomediastinal silhouette is stable and enlarged with left ventricular prominence. Lung volumes are low. Patchy subtle opacities seen in the right midlung and lung base. No consolidation. Osseous structures are grossly intact.      Impression:      Suggestion of airspace disease in the right lung, correlate clinically.         Signer Name: Maryanne Eubanks MD   Signed: 1/14/2022 7:14 AM   Workstation Name: [facility]-    [facility]             Pharmacy to dose vancomycin,           Assessment/Plan     Patient Active Problem List   Diagnosis Code   • COPD (chronic obstructive pulmonary disease) ([facility]) J44.9   • Type 2 diabetes mellitus with stage 4 chronic kidney  disease, with long-term current use of insulin ([facility]) E11.22, N18.4, Z79.4   • Essential hypertension I10   • Primary osteoarthritis of left knee M17.12   • Chronic renal insufficiency, stage 4 (severe) ([facility]) N18.4   • Class 2 severe obesity due to excess calories with serious comorbidity in adult ([facility]) E66.01   • Physical debility R53.81   • Acute UTI (urinary tract infection) N39.0   • Permanent atrial fibrillation ([facility]) I48.21   • H/O noncompliance with medical treatment, presenting hazards to health Z91.19   • 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   • Acute on chronic combined systolic and diastolic CHF (congestive heart failure) ([facility]) I50.43   • Generalized weakness R53.1   • Recurrent left pleural effusion J90   • Fall on same level as cause of accidental injury W18.30XA   • Gross hematuria R31.0   • CAD (coronary artery disease) I25.10   • HLD (hyperlipidemia) E78.5   • Hypothyroidism E03.9   • CKD (chronic kidney disease) stage 3, GFR 30-59 ml/min ([facility]) N18.30   • Acute metabolic encephalopathy G93.41   • Cardiomyopathy ([facility]) I42.9   • Recurrent falls R29.6   • Acute renal failure superimposed on chronic kidney disease ([facility]) N17.9, N18.9   • Open wound of left foot S91.302A   • Moderate malnutrition (CMS/[facility]) E44.0   • Symptomatic bradycardia R00.1       Left hip wounds  --continue current care  --please call if further assistance is needed      Judie Aguero DO  01/16/22  13:50 EST

## 2022-01-16 NOTE — PROGRESS NOTES
"Hospital Medicine Team    LOS 2 days      Patient Care Team:  Fortunato De Leon MD as PCP - General (Family Medicine)          Chief Complaint:  Low HR    Subjective      No real changes and no new issues today remains weak.  Review of Systems   Constitutional: Positive for fatigue.   Cardiovascular: Negative for chest pain.       Objective    Vital Signs  Temp:  [97.5 °F (36.4 °C)-97.9 °F (36.6 °C)] 97.9 °F (36.6 °C)  Heart Rate:  [36-54] 52  Resp:  [16-18] 16  BP: (123-155)/(45-70) 132/55  Oxygen Therapy  SpO2: 94 %  Pulse Oximetry Type: Intermittent  Device (Oxygen Therapy): room air  Flowsheet Rows      First Filed Value   Admission Height 182.9 cm (72\") Documented at 2022   Admission Weight 98.6 kg (217 lb 6 oz) Documented at 2022              Physical Exam:  Physical Exam  Vitals reviewed.   Cardiovascular:      Rate and Rhythm: Bradycardia present.   Pulmonary:      Effort: Pulmonary effort is normal. No respiratory distress.   Abdominal:      Palpations: Abdomen is soft.   Musculoskeletal:      Right lower le+ Pitting Edema present.      Left lower le+ Pitting Edema present.   Neurological:      Mental Status: He is alert. Mental status is at baseline.   Psychiatric:         Behavior: Behavior normal.           Results Review:    I reviewed the patient's new clinical results.    Results from last 7 days   Lab Units 01/15/22  0506 22  0629 22  0351   WBC 10*3/mm3 7.70 13.28* 8.53   HEMOGLOBIN g/dL 9.3* 11.5* 10.3*   HEMATOCRIT % 29.7* 36.7* 33.0*   PLATELETS 10*3/mm3 147 191 167     Results from last 7 days   Lab Units 22  0456 01/15/22  0506 22  0629   SODIUM mmol/L 135* 135* 135*   POTASSIUM mmol/L 4.7 4.3 4.8   CHLORIDE mmol/L 101 100 97*   CO2 mmol/L 23.6 24.5 25.8   BUN mg/dL 54* 60* 67*   CREATININE mg/dL 2.18* 2.61* 2.78*   CALCIUM mg/dL 8.6 8.8 9.2   BILIRUBIN mg/dL  --   --  0.3   ALK PHOS U/L  --   --  78   ALT (SGPT) U/L  --   --  10   AST (SGOT) " U/L  --   --  20   GLUCOSE mg/dL 85 89 144*     Results from last 7 days   Lab Units 01/15/22  0506   MAGNESIUM mg/dL 2.4       Microbiology Results (last 10 days)     Procedure Component Value - Date/Time    Wound Culture - Wound, Buttock, Left [[id number]]  (Abnormal) Collected: 01/14/22 1230    Lab Status: Preliminary result Specimen: Wound from Buttock, Left Updated: 01/15/22 1403     Wound Culture Moderate growth (3+) Gram Negative Bacilli     Gram Stain Rare (1+) WBCs seen      No organisms seen    COVID PRE-OP / PRE-PROCEDURE SCREENING ORDER (NO ISOLATION) - Swab, Nasopharynx [[id number]]  (Normal) Collected: 01/14/22 0757    Lab Status: Final result Specimen: Swab from Nasopharynx Updated: 01/14/22 0829    Narrative:      The following orders were created for panel order COVID PRE-OP / PRE-PROCEDURE SCREENING ORDER (NO ISOLATION) - Swab, Nasopharynx.  Procedure                               Abnormality         Status                     ---------                               -----------         ------                     COVID-19 and FLU A/B PCR...[[id number]]  Normal              Final result                 Please view results for these tests on the individual orders.    COVID-19 and FLU A/B PCR - Swab, Nasopharynx [[id number]]  (Normal) Collected: 01/14/22 0757    Lab Status: Final result Specimen: Swab from Nasopharynx Updated: 01/14/22 0829     COVID19 Not Detected     Influenza A PCR Not Detected     Influenza B PCR Not Detected    Narrative:      Fact sheet for providers: https://www.fda.gov/media/790709/download    Fact sheet for patients: https://www.fda.gov/media/290976/download    Test performed by PCR.    Urine Culture - Urine, Urine, Catheter [[id number]]  (Normal) Collected: 01/14/22 0757    Lab Status: Final result Specimen: Urine, Catheter Updated: 01/15/22 1445     Urine Culture No growth    Blood Culture - Blood, Arm, Right [[id number]]  (Normal) Collected: 01/14/22 0718    Lab Status:  Preliminary result Specimen: Blood from Arm, Right Updated: 01/16/22 0730     Blood Culture No growth at 2 days    Blood Culture - Blood, Hand, Right [[id number]]  (Normal) Collected: 01/14/22 0637    Lab Status: Preliminary result Specimen: Blood from Hand, Right Updated: 01/16/22 0645     Blood Culture No growth at 2 days              Results for orders placed during the hospital encounter of 12/28/21    Adult Transthoracic Echo Complete W/ Cont if Necessary Per Protocol    Interpretation Summary  · Saline test results are negative.  · There is severe calcification of the aortic valve mainly affecting the left coronary and right coronary cusp(s).  · Calculated right ventricular systolic pressure from tricuspid regurgitation is 44 mmHg.  · Mild pulmonary hypertension is present.  · Estimated left ventricular EF = 55% Left ventricular systolic function is normal.  · The right ventricular cavity is mildly dilated.  · Mild dilation of the aortic root is present.      XR Chest 1 View    Result Date: 1/14/2022  Suggestion of airspace disease in the right lung, correlate clinically.  Signer Name: Maryanne Eubanks MD  Signed: 1/14/2022 7:14 AM  Workstation Name: [facility]-  [facility]      ECG/EMG Results (most recent)     Procedure Component Value Units Date/Time    ECG 12 Lead [[id number]] Collected: 01/14/22 0628     Updated: 01/14/22 0827     QT Interval 511 ms     Narrative:      HEART RATE= 55  bpm  RR Interval= 1084  ms  NE Interval= 48  ms  P Horizontal Axis= -25  deg  P Front Axis= 0  deg  QRSD Interval= 113  ms  QT Interval= 511  ms  QRS Axis= 8  deg  T Wave Axis= 116  deg  - BORDERLINE ECG -  Sinus rhythm  Prolonged NE interval  c/w prior ecg, bradycardia no longer seen  Electronically Signed By: Aline Lopez ([facility]) 14-Jan-2022 08:26:43  Date and Time of Study: 2022-01-14 06:28:44    ECG 12 Lead [[id number]] Collected: 01/14/22 0621     Updated: 01/14/22 0827     QT Interval 622 ms      Narrative:      HEART RATE= 47  bpm  RR Interval= 1273  ms  CO Interval= 168  ms  P Horizontal Axis= -17  deg  P Front Axis= 0  deg  QRSD Interval= 116  ms  QT Interval= 622  ms  QRS Axis= -9  deg  T Wave Axis= 60  deg  - ABNORMAL ECG -  Supraventricular bigeminy  Nonspecific intraventricular conduction delay  Inferior infarct, old  Prolonged QT interval  Sinus bradycardia  c/w prior ecg, bradycardia has worsened.  Electronically Signed By: Aline Lopez ([facility]) 14-Jan-2022 08:27:25  Date and Time of Study: 2022-01-14 06:21:49    ECG 12 Lead [[id number]] Collected: 01/15/22 1027     Updated: 01/15/22 1031     QT Interval 514 ms     Narrative:      HEART RATE= 50  bpm  RR Interval= 1196  ms  CO Interval= 288  ms  P Horizontal Axis= 34  deg  P Front Axis= 41  deg  QRSD Interval= 125  ms  QT Interval= 514  ms  QRS Axis= -6  deg  T Wave Axis= 73  deg  - ABNORMAL ECG -  Sinus rhythm  Prolonged CO interval  LVH with secondary repolarization abnormality  Inferior infarct, old  Electronically Signed By:   Date and Time of Study: 2022-01-15 10:27:37            X-rays, labs reviewed personally by physician.    Medication Review:   I have reviewed the patient's current medication list    Scheduled Meds  apixaban, 2.5 mg, Oral, Q12H  Aquaphor Advanced Therapy, 1 application, Topical, Q12H  atorvastatin, 10 mg, Oral, Nightly  budesonide-formoterol, 2 puff, Inhalation, BID - RT  cholecalciferol, 2,000 Units, Oral, Daily  citalopram, 20 mg, Oral, Nightly  docusate sodium, 100 mg, Oral, BID  fluconazole, 200 mg, Oral, Q24H  fluticasone, 2 spray, Each Nare, Daily  hydrocortisone-bacitracin-zinc oxide-nystatin, 1 application, Topical, TID  insulin aspart, 0-24 Units, Subcutaneous, TID AC  insulin detemir, 10 Units, Subcutaneous, Nightly  iron polysaccharides, 150 mg, Oral, Daily  levothyroxine, 224 mcg, Oral, Q AM  polyethylene glycol, 17 g, Oral, Daily  pregabalin, 75 mg, Oral, BID  QUEtiapine, 12.5 mg, Oral, Q PM  sodium  hypochlorite, , Topical, Daily  tamsulosin, 0.4 mg, Oral, Daily  cyanocobalamin, 1,000 mcg, Oral, Daily        Meds Infusions  Pharmacy to dose vancomycin,         Meds PRN  •  acetaminophen **OR** acetaminophen **OR** acetaminophen  •  albuterol  •  dextrose  •  dextrose  •  glucagon (human recombinant)  •  magnesium sulfate **OR** magnesium sulfate **OR** magnesium sulfate  •  melatonin  •  Pharmacy to dose vancomycin            Assessment/Plan  (MDM)    Active Hospital Problems:  Active Hospital Problems    Diagnosis  POA   • Symptomatic bradycardia [R00.1]  Yes      Resolved Hospital Problems   No resolved problems to display.     Extensive lower extremity wounds/left hip/buttock wound:  Leukocytosis:  -wound RN, general surgery, ID following  -Resume vancomycin as previously recommended  -Wound and blood cultures remain pending  -CK and procalcitonin normal     ROLA on CKD stage IV:  Hyponatremia:  Lactic acidosis: Suspected secondary to dehydration  -Baseline creatinine appears 2.0-2.2, currently 2.1  -Continue holding home Bumex   -Will stop IV fluid today to prevent volume overload  -BMP in a.m.     Acute urinary retention, ruled out UTI:  -Urine culture no growth     Permanent A. Fib:  Chronic HFpEF:  Symptomatic bradycardia:  Overall stable  CAD/HLD:  Elevated troponin:   -Cardiology following, per their note he is poor candidate for pacemaker placement given medical noncompliance issues and infection issues  -Troponin trend flat  -Continue home Lipitor   -last echo 12/29/2021 with normal EF, trace AR, mild TR, mild dilation of aortic root  -Daily weight/strict I&O     Frequent falls:  Chronic immobility:  -Patient is immobile at his baseline, falls precautions, no PT/OT indicated     Hypothyroidism:   -Last TSH elevated, medication adjusted last admission     DM2 in obese with hyperglycemia:  - Last A1c 5.3% on 12/28/2021  -long acting, Levemir 10 units nightly  - high-dose SSI      COPD without  exacerbation:   Continue home Symbicort with albuterol as needed     BENJA:   -Follow CBC  -No active blood loss noted at although will trend due to Eliquis  -Continue Niferex and B12 supplements     Recurrent medical noncompliance:  -has been counseled and ethic committee meeting noted 1/15/21     CODE STATUS: 1/15/2022 he reported that he absolutely would not want intubation and does not want CPR but wants full treatment otherwise.  Changes made in chart.          This patient has been examined wearing appropriate Personal Protective Equipment . 01/16/22      Plan for disposition: Reportedly agreeable to SNF placement possibly on Monday    Electronically signed by Santiago Powers DO, 01/16/22, 12:25 EST.

## 2022-01-16 NOTE — PLAN OF CARE
Problem: Adult Inpatient Plan of Care  Goal: Plan of Care Review  Outcome: Ongoing, Not Progressing  Flowsheets (Taken 1/16/2022 0448)  Progress: no change  Plan of Care Reviewed With: patient  Outcome Summary: Pt is still bradycardic on tele. Pt is DNR now. F/C in place, pt had BM this shift. Will monitor. On IV ATB, IVF infusing.   Goal Outcome Evaluation:  Plan of Care Reviewed With: patient        Progress: no change  Outcome Summary: Pt is still bradycardic on tele. Pt is DNR now. F/C in place, pt had BM this shift. Will monitor. On IV ATB, IVF infusing.

## 2022-01-17 NOTE — PROGRESS NOTES
"SERVICE: [facility] HOSPITALIST    CONSULTANTS: Cardiology, wound, ID    CHIEF COMPLAINT: Follow-up bradycardia, wound infections    SUBJECTIVE: Patient reports he is overall feeling well.  He remains agreeable to rehab and has not heard whether or not he can go yet today.  He denies concerns overnight.    OBJECTIVE:    /60 (BP Location: Left arm, Patient Position: Lying)   Pulse (!) 44   Temp 97.2 °F (36.2 °C) (Oral)   Resp 16   Ht 182.9 cm (72\")   Wt 101 kg (222 lb 2 oz)   SpO2 96%   BMI 30.13 kg/m²     MEDS/LABS REVIEWED AND ORDERED    apixaban, 2.5 mg, Oral, Q12H  Aquaphor Advanced Therapy, 1 application, Topical, Q12H  atorvastatin, 10 mg, Oral, Nightly  budesonide-formoterol, 2 puff, Inhalation, BID - RT  [START ON 1/18/2022] bumetanide, 1 mg, Oral, Daily  cholecalciferol, 2,000 Units, Oral, Daily  citalopram, 20 mg, Oral, Nightly  docusate sodium, 100 mg, Oral, BID  fluticasone, 2 spray, Each Nare, Daily  hydrocortisone-bacitracin-zinc oxide-nystatin, 1 application, Topical, TID  insulin aspart, 0-24 Units, Subcutaneous, TID AC  insulin detemir, 10 Units, Subcutaneous, Nightly  iron polysaccharides, 150 mg, Oral, Daily  levothyroxine, 224 mcg, Oral, Q AM  minocycline, 100 mg, Oral, Q12H  polyethylene glycol, 17 g, Oral, Daily  pregabalin, 75 mg, Oral, BID  QUEtiapine, 12.5 mg, Oral, Q PM  sodium hypochlorite, , Topical, Daily  tamsulosin, 0.4 mg, Oral, Daily  cyanocobalamin, 1,000 mcg, Oral, Daily      Physical Exam  Vitals reviewed.   Constitutional:       General: He is not in acute distress.     Appearance: He is obese.      Comments: Chronically ill appearance   HENT:      Head: Normocephalic and atraumatic.      Mouth/Throat:      Mouth: Mucous membranes are moist.      Comments: Poor dentition  Eyes:      Extraocular Movements: Extraocular movements intact.      Pupils: Pupils are equal, round, and reactive to light.   Cardiovascular:      Rate and Rhythm: Regular rhythm. " Bradycardia present.   Pulmonary:      Effort: Pulmonary effort is normal. No respiratory distress.      Breath sounds: Normal breath sounds. No wheezing or rales.   Abdominal:      General: Abdomen is flat. There is no distension.      Palpations: Abdomen is soft.      Tenderness: There is no abdominal tenderness. There is no guarding.   Musculoskeletal:      Comments: Much improved lower extremity edema with leg wraps in place   Skin:     General: Skin is warm and dry.      Capillary Refill: Capillary refill takes less than 2 seconds.      Findings: No erythema.   Neurological:      General: No focal deficit present.      Mental Status: He is alert and oriented to person, place, and time.   Psychiatric:         Mood and Affect: Mood normal.         Behavior: Behavior normal.       LAB/DIAGNOSTICS:    Lab Results (last 24 hours)     Procedure Component Value Units Date/Time    Wound Culture - Wound, Buttock, Left [[id number]]  (Abnormal)  (Susceptibility) Collected: 01/14/22 1230    Specimen: Wound from Buttock, Left Updated: 01/17/22 0901     Wound Culture Moderate growth (3+) Pseudomonas aeruginosa     Gram Stain Rare (1+) WBCs seen      No organisms seen    Narrative:      Pip/tazo to follow.     Susceptibility      Pseudomonas aeruginosa     ALINE     Cefepime Susceptible     Ceftazidime Susceptible     Ciprofloxacin Susceptible     Gentamicin Susceptible     Levofloxacin Susceptible     Piperacillin + Tazobactam Intermediate  [1]                  [1]  Appended report. These results have been appended to a previously preliminary verified report.          Linear View               Susceptibility Comments     Pseudomonas aeruginosa    Pip/tazo to follow             POC Glucose Once [[id number]]  (Normal) Collected: 01/17/22 0727    Specimen: Blood Updated: 01/17/22 0733     Glucose 100 mg/dL      Comment: Meter: [id number] : [id number] Chris Romeo CNA       Blood Culture - Blood, Arm, Right [[id number]]   (Normal) Collected: 01/14/22 0718    Specimen: Blood from Arm, Right Updated: 01/17/22 0730     Blood Culture No growth at 3 days    Blood Culture - Blood, Hand, Right [[id number]]  (Normal) Collected: 01/14/22 0637    Specimen: Blood from Hand, Right Updated: 01/17/22 0645     Blood Culture No growth at 3 days    Vancomycin, Random [[id number]]  (Normal) Collected: 01/17/22 0428    Specimen: Blood Updated: 01/17/22 0532     Vancomycin Random 15.40 mcg/mL     Narrative:      Therapeutic Ranges for Vancomycin    Vancomycin Random   5.0-40.0 mcg/mL  Vancomycin Trough   5.0-20.0 mcg/mL  Vancomycin Peak     20.0-40.0 mcg/mL    Renal Function Panel [[id number]]  (Abnormal) Collected: 01/17/22 0428    Specimen: Blood Updated: 01/17/22 0530     Glucose 88 mg/dL      BUN 47 mg/dL      Creatinine 1.99 mg/dL      Sodium 137 mmol/L      Potassium 5.2 mmol/L      Chloride 102 mmol/L      CO2 26.2 mmol/L      Calcium 9.1 mg/dL      Albumin 2.70 g/dL      Phosphorus 3.7 mg/dL      Anion Gap 8.8 mmol/L      BUN/Creatinine Ratio 23.6     eGFR Non African Amer 32 mL/min/1.73     Narrative:      GFR Normal >60  Chronic Kidney Disease <60  Kidney Failure <15      CBC (No Diff) [[id number]]  (Abnormal) Collected: 01/17/22 0428    Specimen: Blood Updated: 01/17/22 0502     WBC 7.07 10*3/mm3      RBC 3.50 10*6/mm3      Hemoglobin 9.9 g/dL      Hematocrit 32.7 %      MCV 93.4 fL      MCH 28.3 pg      MCHC 30.3 g/dL      RDW 15.7 %      RDW-SD 54.8 fl      MPV 10.6 fL      Platelets 154 10*3/mm3     POC Glucose Once [[id number]]  (Abnormal) Collected: 01/16/22 2053    Specimen: Blood Updated: 01/16/22 2100     Glucose 238 mg/dL      Comment: Meter: [id number] : [id number]       POC Glucose Once [[id number]]  (Normal) Collected: 01/16/22 1711    Specimen: Blood Updated: 01/16/22 1717     Glucose 112 mg/dL      Comment: Meter: [id number] : [id number] Mario HARKINS       Vancomycin, Random [[id number]]  (Normal) Collected: 01/16/22 1419     Specimen: Blood Updated: 01/16/22 1451     Vancomycin Random 19.40 mcg/mL     Narrative:      Therapeutic Ranges for Vancomycin    Vancomycin Random   5.0-40.0 mcg/mL  Vancomycin Trough   5.0-20.0 mcg/mL  Vancomycin Peak     20.0-40.0 mcg/mL    POC Glucose Once [[id number]]  (Normal) Collected: 01/16/22 1153    Specimen: Blood Updated: 01/16/22 1200     Glucose 118 mg/dL      Comment: Meter: [id number] : [id number] Martin Maryan SHAHANA           ECG 12 Lead   Preliminary Result   HEART RATE= 50  bpm   RR Interval= 1196  ms   SC Interval= 288  ms   P Horizontal Axis= 34  deg   P Front Axis= 41  deg   QRSD Interval= 125  ms   QT Interval= 514  ms   QRS Axis= -6  deg   T Wave Axis= 73  deg   - ABNORMAL ECG -   Sinus rhythm   Prolonged SC interval   LVH with secondary repolarization abnormality   Inferior infarct, old   Electronically Signed By:    Date and Time of Study: 2022-01-15 10:27:37      ECG 12 Lead   Final Result   HEART RATE= 55  bpm   RR Interval= 1084  ms   SC Interval= 48  ms   P Horizontal Axis= -25  deg   P Front Axis= 0  deg   QRSD Interval= 113  ms   QT Interval= 511  ms   QRS Axis= 8  deg   T Wave Axis= 116  deg   - BORDERLINE ECG -   Sinus rhythm   Prolonged SC interval   c/w prior ecg, bradycardia no longer seen   Electronically Signed By: Aline Lopez ([facility]) 14-Jan-2022 08:26:43   Date and Time of Study: 2022-01-14 06:28:44      ECG 12 Lead   Final Result   HEART RATE= 47  bpm   RR Interval= 1273  ms   SC Interval= 168  ms   P Horizontal Axis= -17  deg   P Front Axis= 0  deg   QRSD Interval= 116  ms   QT Interval= 622  ms   QRS Axis= -9  deg   T Wave Axis= 60  deg   - ABNORMAL ECG -   Supraventricular bigeminy   Nonspecific intraventricular conduction delay   Inferior infarct, old   Prolonged QT interval   Sinus bradycardia   c/w prior ecg, bradycardia has worsened.   Electronically Signed By: Aline Lopez ([facility]) 14-Jan-2022 08:27:25   Date and Time of Study: 2022-01-14 06:21:49         Results for orders placed during the hospital encounter of 12/28/21    Adult Transthoracic Echo Complete W/ Cont if Necessary Per Protocol    Interpretation Summary  · Saline test results are negative.  · There is severe calcification of the aortic valve mainly affecting the left coronary and right coronary cusp(s).  · Calculated right ventricular systolic pressure from tricuspid regurgitation is 44 mmHg.  · Mild pulmonary hypertension is present.  · Estimated left ventricular EF = 55% Left ventricular systolic function is normal.  · The right ventricular cavity is mildly dilated.  · Mild dilation of the aortic root is present.    No radiology results for the last day    ASSESSMENT/PLAN:  Bradycardia:  PAF:  Chronic HFpEF:  NSTEMI type II:  CAD/HLD: Cardiology following  Not a candidate for PPM due to recurrent infections, cardiology signed off  BP near goal without medication  Heart rate remains 40s, first-degree AV block  Continue daily weight, strict I&O  Continue home lipitor, eliquis  Euvolemic on exam    Left hip/buttock/lower extremity wound infections secondary to MRSA, Enterococcus and Pseudomonas:  Leukocytosis: ID and surgery following, wound RN  No surgical intervention planned  Continue vancomycin, add Cefepime after d/w pharmacist and since patient going to facility  D/W ID regarding possible levaquin and minocycline for oral option, sed rate pending  Continue daily weight, strict I&O    ROLA on CKD stage IV:  Acute urinary retention, ruled out UTI:  Lactic acidosis: secondary to dehydration, resolved  Creatinine today 1.99, baseline 2.0-2.2, was 2.78 on admission  Resolved holding home Bumex and Cavanaugh placement  Continues home flomax  Monitor     Chronic immobility with frequent falls:  No PT/OT indicated as patient is immobile at his baseline and refuses therapy    DM2 in obese with hyperglycemia: A1c 5.3% last checked 12/28/2021  A.m. glucose at goal on Levemir 10 units nightly, high-dose  "SSI    COPD without exacerbation:  Nothing acute currently on symbicort and prn albuterol    Hypothyroidism: continues levothyroxine 224 mcg daily  Repeat TFTs 6 weeks    Iron deficiency anemia:  Continues home niferex, B12 supplements without active blood loss    Recurrent medical noncompliance: counseled extensively    Reportedly changed to DNR status over the weekend    PLAN FOR DISPOSITION: awaiting placement    LEATHA Ford  Hospitalist, [facility]  01/17/22  09:23 EST    \"Dictated utilizing Dragon dictation\"    Addendum:  10:30 AM:  Discussed with ID, changing patient to oral minocycline and oral Levaquin due to patient refusing PICC line.    "

## 2022-01-17 NOTE — CASE MANAGEMENT/SOCIAL WORK
"Continued Stay Note  MICHAEL Kincaid     Patient Name: Froilan Helton  MRN: [id number]  Today's Date: 1/17/2022    Admit Date: 1/14/2022     Discharge Plan     Row Name 01/17/22 1422       Plan    Plan Discharge to SNF for STR    Plan Comments Call received from Kaylan with [facility] and she states that they are not accepting patients at this time. Kaylan states she still has him in [facility] and if a bed does open up she will give us a call. CM followed up with patient today regarding discharge plans. Patient is currently sitting up in the chair with no complaints. Cm informed patient that [facility] in [place] did not have a bed available today and will not have one until next week. CM also informed patient that I made a referral to [facility] in [facility] and they are reviewing his clinicals and will get back to be with bed availability and if they can accept and he is agreeable to this. CM also discussed with patient that beds are tight at all facilities and that I will continue to call facilities and make referrals and he states \"that doesn't mean I'll go if I don't like them\". CM made referrals to [facility] in [place] and [facility] in [place]. Patient also states that home health never came to the house. He states that they called his granddaughter and they were supposed to come on 1/12 @ 4:00pm but never showed and I told patient that I would call and check into the matter. RENITA called Angela with [facility] to see if they saw the patient and she states that when they ran the insurance he still showed current with [facility] and they could not see until he was discharged from their services. RENITA called Margarita with [facility] and discussed this with her and she states she will call the office and have him discharged from their service. Margarita with return call and states he has been discharged from their service. Patient had no other questions or concerns regarding discharge needs. CM will continue to " follow for needs.               Discharge Codes    No documentation.                     Elizabeth Arguello RN

## 2022-01-17 NOTE — PROGRESS NOTES
Adult Nutrition  Assessment/PES    Patient Name:  Froilan Helton  YOB: 1940  MRN: [id number]  Admit Date:  1/14/2022    Assessment Date:  1/17/2022    Comments:  Good po 88% ave of meals. Will cont to follow.      Reason for Assessment     Row Name 01/17/22 1318          Reason for Assessment    Reason For Assessment follow-up protocol     Diagnosis infection/sepsis; cardiac disease; diabetes diagnosis/complications; renal disease  Bradycardia, cCHF, L Hip/leg wounds MRSA A/CKD IV DM                Nutrition/Diet History     Row Name 01/17/22 1319          Nutrition/Diet History    Typical Food/Fluid Intake Pt receiving care at visit to room. Per rounds plan for rehab/placement.                Anthropometrics     Row Name 01/17/22 1319 01/17/22 0729       Anthropometrics    Weight --  222.1# 101 kg (222 lb 2 oz)       Body Mass Index (BMI)    BMI Assessment BMI 30-34.9: obesity grade I --               Labs/Tests/Procedures/Meds     Row Name 01/17/22 1319          Labs/Procedures/Meds    Lab Results Reviewed reviewed     Lab Results Comments BUn 47/Creat 1.9 h            Diagnostic Tests/Procedures    Diagnostic Test/Procedure Reviewed reviewed            Medications    Pertinent Medications Reviewed reviewed     Pertinent Medications Comments B12, vitamin D, miralax, novolog, levemir                Physical Findings     Row Name 01/17/22 1320          Physical Findings    Skin other (see comments)  multiple: L trochaneter, L heel PI, R foot/toe PI                  Nutrition Prescription Ordered     Row Name 01/17/22 1321          Nutrition Prescription PO    Current PO Diet --  chopped     Supplement Frequency Snack time  BID     Common Modifiers Low Potassium  2 gm K     Fluid Restriction mL per Day Other (comment)  1200 ml                Evaluation of Received Nutrient/Fluid Intake     Row Name 01/17/22 1321          Fluid Intake Evaluation    Oral Fluid (mL) 949  ave x 2, 63%            PO  Evaluation    Number of Meals 4     % PO Intake 88                     Problem/Interventions:   Problem 1     Row Name 01/17/22 1322          Nutrition Diagnoses Problem 1    Problem 1 Malnutrition     Etiology (related to) Factors Affecting Nutrition; Medical Diagnosis     Signs/Symptoms (evidenced by) Report/Observation                      Intervention Goal     Row Name 01/17/22 1322          Intervention Goal    General Meet nutritional needs for age/condition     PO Maintain intake; PO intake (%)     PO Intake % 75 %  or greater                Nutrition Intervention     Row Name 01/17/22 1322          Nutrition Intervention    RD/Tech Action Encourage intake; Follow Tx progress                  Education/Evaluation     Row Name 01/17/22 1322          Education    Education Education not appropriate at this time            Monitor/Evaluation    Monitor Per protocol; I&O; PO intake; Pertinent labs; Weight; Skin status                 Electronically signed by:  Laura Varma RD  01/17/22 13:23 EST

## 2022-01-17 NOTE — DISCHARGE PLACEMENT REQUEST
"Joshua Ferguson P (81 y.o. Male)             Date of Birth Social Security Number Address Home Phone MRN    1940  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)   Code Status: No CPR   Advance Care Planning Activity    Ht: 182.9 cm (72\")   Wt: 101 kg (222 lb 2 oz)    Admission Cmt: None   Principal Problem: None                Active Insurance as of 1/14/2022     Primary Coverage     Payor Plan Insurance Group Employer/Plan Group    MEDICARE MEDICARE A & B      Payor Plan Address Payor Plan Phone Number Payor Plan Fax Number Effective Dates    [address] [phone]  11/1/2006 - None Entered    [facility] [id number]       Subscriber Name Subscriber Birth Date Member ID       JOSHUA FERGUSON P 1940 [id number]           Secondary Coverage     Payor Plan Insurance Group Employer/Plan Group    HUMANA HUMANA [id number]     Payor Plan Address Payor Plan Phone Number Payor Plan Fax Number Effective Dates    [address] [address] [phone]  1/1/2013 - None Entered    [facility] [address]       Subscriber Name Subscriber Birth Date Member ID       JOSHUA FERGUSON P 1940 [id number]                 Emergency Contacts      (Rel.) Home Phone Work Phone Mobile Phone    Jaz Grigsby (Power of ) [phone] -- [phone]    ISAIAS FERGUSON (Son) [phone] -- --              "

## 2022-01-17 NOTE — PROGRESS NOTES
Pharmacy consulted to dose vancomycin for skin and soft tissue infection    Currently pulse dosing    Vancomycin level drawn 1/17 was 15.4. Goal is 15-20.    Reordered Vancomycin 750mg IV x1 for 1/18 @0800

## 2022-01-17 NOTE — CASE MANAGEMENT/SOCIAL WORK
Continued Stay Note  MICHAEL Kincaid     Patient Name: Froilan Helton  MRN: [id number]  Today's Date: 1/17/2022    Admit Date: 1/14/2022     Discharge Plan     Row Name 01/17/22 1041       Plan    Plan Discharge to SNF for STR    Plan Comments CM called and spoke with Anna [facility] with [facility] and she states they do not have any beds available today and won't until next week. Anna did give me the name of their sister facility in [place], [facility] to see if they had a bed. CM called [facility], [phone], and spoke with Leslee Do and referral made and entered in EPIC. CM also made referrals to [facility] and am awaiting Kaylan to return phone call, these were also entered in EPIC. CM will continue to follow for needs.               Discharge Codes    No documentation.                     Elizabeth Arguello RN

## 2022-01-17 NOTE — PROGRESS NOTES
LOS: 3 days   Patient Care Team:  Fortunato De Leon MD as PCP - General (Family Medicine)        Subjective     Interval History:     Patient Complaints:   Patient Denies:  .NV       Review of Systems:  weak       Objective     Vital Signs  Temp:  [97 °F (36.1 °C)-99 °F (37.2 °C)] 97 °F (36.1 °C)  Heart Rate:  [42-58] 48  Resp:  [16-18] 16  BP: (116-147)/(55-62) 134/62    Physical Exam:     General Appearance:    Alert, cooperative, in no acute distress   Head:    Normocephalic, without obvious abnormality, atraumatic   Eyes:            Lids and lashes normal, conjunctivae and sclerae normal, no   icterus, no pallor, corneas clear, PERRLA   Ears:    Ears appear intact with no abnormalities noted   Throat:   No oral lesions, no thrush, oral mucosa moist   Neck:   No adenopathy, supple, trachea midline, no thyromegaly, no   carotid bruit, no JVD   Back:     No kyphosis present, no scoliosis present, no skin lesions,      erythema or scars, no tenderness to percussion or                   palpation,   range of motion normal   Lungs:     Clear to auscultation,respirations regular, even and                  unlabored    Heart:    Regular rhythm and normal rate, normal S1 and S2, no            murmur, no gallop, no rub, no click   Chest Wall:    No abnormalities observed   Abdomen:     Normal bowel sounds, no masses, no organomegaly, soft        non-tender, non-distended, no guarding, no rebound                tenderness   Rectal:     Deferred   Extremities:   Moves all extremities well, no edema, no cyanosis, no             redness   Pulses:   Pulses palpable and equal bilaterally   Skin:   No bleeding, bruising or rash   Lymph nodes:   No palpable adenopathy   Neurologic:   Cranial nerves 2 - 12 grossly intact, sensation intact, DTR       present and equal bilaterally          Results Review:      Lab Results (last 72 hours)     Procedure Component Value Units Date/Time    POC Glucose Once [[id number]]  (Normal)  Collected: 01/15/22 1135    Specimen: Blood Updated: 01/15/22 1152     Glucose 114 mg/dL      Comment: Meter: [id number] : [id number] Mings Hina NURSING ASSISTANT       POC Glucose Once [[id number]]  (Normal) Collected: 01/15/22 0733    Specimen: Blood Updated: 01/15/22 0741     Glucose 99 mg/dL      Comment: Meter: [id number] : [id number] Mings Hina NURSING ASSISTANT       Blood Culture - Blood, Arm, Right [[id number]]  (Normal) Collected: 01/14/22 0718    Specimen: Blood from Arm, Right Updated: 01/15/22 0731     Blood Culture No growth at 24 hours    Blood Culture - Blood, Hand, Right [[id number]]  (Normal) Collected: 01/14/22 0637    Specimen: Blood from Hand, Right Updated: 01/15/22 0645     Blood Culture No growth at 24 hours    Renal Function Panel [[id number]]  (Abnormal) Collected: 01/15/22 0506    Specimen: Blood Updated: 01/15/22 0547     Glucose 89 mg/dL      BUN 60 mg/dL      Creatinine 2.61 mg/dL      Sodium 135 mmol/L      Potassium 4.3 mmol/L      Chloride 100 mmol/L      CO2 24.5 mmol/L      Calcium 8.8 mg/dL      Albumin 2.70 g/dL      Phosphorus 4.4 mg/dL      Anion Gap 10.5 mmol/L      BUN/Creatinine Ratio 23.0     eGFR Non African Amer 24 mL/min/1.73     Narrative:      GFR Normal >60  Chronic Kidney Disease <60  Kidney Failure <15      Magnesium [[id number]]  (Normal) Collected: 01/15/22 0506    Specimen: Blood Updated: 01/15/22 0547     Magnesium 2.4 mg/dL     CBC & Differential [[id number]]  (Abnormal) Collected: 01/15/22 0506    Specimen: Blood Updated: 01/15/22 0527    Narrative:      The following orders were created for panel order CBC & Differential.  Procedure                               Abnormality         Status                     ---------                               -----------         ------                     CBC Auto Differential[[id number]]        Abnormal            Final result                 Please view results for these tests on the individual orders.    CBC Auto  Differential [[id number]]  (Abnormal) Collected: 01/15/22 0506    Specimen: Blood Updated: 01/15/22 0527     WBC 7.70 10*3/mm3      RBC 3.25 10*6/mm3      Hemoglobin 9.3 g/dL      Hematocrit 29.7 %      MCV 91.4 fL      MCH 28.6 pg      MCHC 31.3 g/dL      RDW 16.0 %      RDW-SD 53.1 fl      MPV 10.5 fL      Platelets 147 10*3/mm3      Neutrophil % 69.9 %      Lymphocyte % 13.1 %      Monocyte % 10.0 %      Eosinophil % 6.0 %      Basophil % 0.6 %      Immature Grans % 0.4 %      Neutrophils, Absolute 5.38 10*3/mm3      Lymphocytes, Absolute 1.01 10*3/mm3      Monocytes, Absolute 0.77 10*3/mm3      Eosinophils, Absolute 0.46 10*3/mm3      Basophils, Absolute 0.05 10*3/mm3      Immature Grans, Absolute 0.03 10*3/mm3      nRBC 0.0 /100 WBC     Troponin [[id number]]  (Abnormal) Collected: 01/15/22 0018    Specimen: Blood Updated: 01/15/22 0048     Troponin T 0.121 ng/mL     Narrative:      Troponin T Reference Range:  <= 0.03 ng/mL-   Negative for AMI  >0.03 ng/mL-     Abnormal for myocardial necrosis.  Clinicians would have to utilize clinical acumen, EKG, Troponin and serial changes to determine if it is an Acute Myocardial Infarction or myocardial injury due to an underlying chronic condition.       Results may be falsely decreased if patient taking Biotin.      POC Glucose Once [[id number]]  (Abnormal) Collected: 01/14/22 2321    Specimen: Blood Updated: 01/14/22 2327     Glucose 139 mg/dL      Comment: Meter: [id number] : [id number] Guerrero Keating [facility]       Troponin [[id number]]  (Abnormal) Collected: 01/14/22 1824    Specimen: Blood Updated: 01/14/22 1927     Troponin T 0.126 ng/mL     Narrative:      Troponin T Reference Range:  <= 0.03 ng/mL-   Negative for AMI  >0.03 ng/mL-     Abnormal for myocardial necrosis.  Clinicians would have to utilize clinical acumen, EKG, Troponin and serial changes to determine if it is an Acute Myocardial Infarction or myocardial injury due to an underlying chronic condition.        Results may be falsely decreased if patient taking Biotin.      POC Glucose Once [[id number]]  (Abnormal) Collected: 01/14/22 1728    Specimen: Blood Updated: 01/14/22 1734     Glucose 145 mg/dL      Comment: Meter: [id number] : [id number] Jonh Olmedo RN       Troponin [[id number]]  (Abnormal) Collected: 01/14/22 1350    Specimen: Blood Updated: 01/14/22 1548     Troponin T 0.130 ng/mL     Narrative:      Troponin T Reference Range:  <= 0.03 ng/mL-   Negative for AMI  >0.03 ng/mL-     Abnormal for myocardial necrosis.  Clinicians would have to utilize clinical acumen, EKG, Troponin and serial changes to determine if it is an Acute Myocardial Infarction or myocardial injury due to an underlying chronic condition.       Results may be falsely decreased if patient taking Biotin.      Wound Culture - Wound, Buttock, Left [[id number]] Collected: 01/14/22 1230    Specimen: Wound from Buttock, Left Updated: 01/14/22 1528     Gram Stain Rare (1+) WBCs seen      No organisms seen    Vancomycin, Random [[id number]]  (Normal) Collected: 01/14/22 1350    Specimen: Blood Updated: 01/14/22 1414     Vancomycin Random 15.90 mcg/mL     Narrative:      Therapeutic Ranges for Vancomycin    Vancomycin Random   5.0-40.0 mcg/mL  Vancomycin Trough   5.0-20.0 mcg/mL  Vancomycin Peak     20.0-40.0 mcg/mL    CK [[id number]]  (Normal) Collected: 01/14/22 0629    Specimen: Blood Updated: 01/14/22 1323     Creatine Kinase 61 U/L     Urine Culture - Urine, Urine, Catheter [[id number]] Collected: 01/14/22 0757    Specimen: Urine, Catheter Updated: 01/14/22 1317    STAT Lactic Acid, Reflex [[id number]]  (Normal) Collected: 01/14/22 0950    Specimen: Blood Updated: 01/14/22 1025     Lactate 1.3 mmol/L     COVID PRE-OP / PRE-PROCEDURE SCREENING ORDER (NO ISOLATION) - Swab, Nasopharynx [[id number]]  (Normal) Collected: 01/14/22 0757    Specimen: Swab from Nasopharynx Updated: 01/14/22 0829    Narrative:      The following orders were created for  panel order COVID PRE-OP / PRE-PROCEDURE SCREENING ORDER (NO ISOLATION) - Swab, Nasopharynx.  Procedure                               Abnormality         Status                     ---------                               -----------         ------                     COVID-19 and FLU A/B PCR...[[id number]]  Normal              Final result                 Please view results for these tests on the individual orders.    COVID-19 and FLU A/B PCR - Swab, Nasopharynx [[id number]]  (Normal) Collected: 01/14/22 0757    Specimen: Swab from Nasopharynx Updated: 01/14/22 0829     COVID19 Not Detected     Influenza A PCR Not Detected     Influenza B PCR Not Detected    Narrative:      Fact sheet for providers: https://www.fda.gov/media/079822/download    Fact sheet for patients: https://www.fda.gov/media/700530/download    Test performed by PCR.    Urinalysis, Microscopic Only - Urine, Catheter [[id number]]  (Abnormal) Collected: 01/14/22 0757    Specimen: Urine, Catheter Updated: 01/14/22 0826     RBC, UA 6-12 /HPF      WBC, UA 31-50 /HPF      Bacteria, UA Trace /HPF      Squamous Epithelial Cells, UA 0-2 /HPF      Hyaline Casts, UA None Seen /LPF      Methodology Manual Light Microscopy    Urinalysis With Microscopic If Indicated (No Culture) - Urine, Catheter [[id number]]  (Abnormal) Collected: 01/14/22 0757    Specimen: Urine, Catheter Updated: 01/14/22 0812     Color, UA Other     Appearance, UA Slightly Cloudy     pH, UA 6.0     Specific Gravity, UA 1.015     Glucose, UA Negative     Ketones, UA Negative     Bilirubin, UA Negative     Blood, UA Moderate (2+)     Protein,  mg/dL (2+)     Leuk Esterase, UA Moderate (2+)     Nitrite, UA Negative     Urobilinogen, UA 0.2 E.U./dL    Troponin [[id number]]  (Abnormal) Collected: 01/14/22 0629    Specimen: Blood Updated: 01/14/22 0720     Troponin T 0.129 ng/mL     Narrative:      Troponin T Reference Range:  <= 0.03 ng/mL-   Negative for AMI  >0.03 ng/mL-     Abnormal for  myocardial necrosis.  Clinicians would have to utilize clinical acumen, EKG, Troponin and serial changes to determine if it is an Acute Myocardial Infarction or myocardial injury due to an underlying chronic condition.       Results may be falsely decreased if patient taking Biotin.      Procalcitonin [[id number]]  (Normal) Collected: 01/14/22 0629    Specimen: Blood Updated: 01/14/22 0718     Procalcitonin 0.16 ng/mL     BNP [[id number]]  (Abnormal) Collected: 01/14/22 0629    Specimen: Blood Updated: 01/14/22 0717     proBNP 7,243.0 pg/mL     Narrative:      Among patients with dyspnea, NT-proBNP is highly sensitive for the detection of acute congestive heart failure. In addition NT-proBNP of <300 pg/ml effectively rules out acute congestive heart failure with 99% negative predictive value.    Results may be falsely decreased if patient taking Biotin.      Comprehensive Metabolic Panel [[id number]]  (Abnormal) Collected: 01/14/22 0629    Specimen: Blood Updated: 01/14/22 0712     Glucose 144 mg/dL      BUN 67 mg/dL      Creatinine 2.78 mg/dL      Sodium 135 mmol/L      Potassium 4.8 mmol/L      Chloride 97 mmol/L      CO2 25.8 mmol/L      Calcium 9.2 mg/dL      Total Protein 6.7 g/dL      Albumin 3.20 g/dL      ALT (SGPT) 10 U/L      AST (SGOT) 20 U/L      Alkaline Phosphatase 78 U/L      Total Bilirubin 0.3 mg/dL      eGFR Non African Amer 22 mL/min/1.73      Globulin 3.5 gm/dL      A/G Ratio 0.9 g/dL      BUN/Creatinine Ratio 24.1     Anion Gap 12.2 mmol/L     Narrative:      GFR Normal >60  Chronic Kidney Disease <60  Kidney Failure <15      Lactic Acid, Plasma [[id number]]  (Abnormal) Collected: 01/14/22 0637    Specimen: Blood Updated: 01/14/22 0712     Lactate 2.2 mmol/L     CBC & Differential [[id number]]  (Abnormal) Collected: 01/14/22 0629    Specimen: Blood Updated: 01/14/22 0647    Narrative:      The following orders were created for panel order CBC & Differential.  Procedure                                "Abnormality         Status                     ---------                               -----------         ------                     CBC Auto Differential[[id number]]        Abnormal            Final result                 Please view results for these tests on the individual orders.    CBC Auto Differential [[id number]]  (Abnormal) Collected: 01/14/22 0629    Specimen: Blood Updated: 01/14/22 0647     WBC 13.28 10*3/mm3      RBC 4.02 10*6/mm3      Hemoglobin 11.5 g/dL      Hematocrit 36.7 %      MCV 91.3 fL      MCH 28.6 pg      MCHC 31.3 g/dL      RDW 16.1 %      RDW-SD 53.1 fl      MPV 10.1 fL      Platelets 191 10*3/mm3      Neutrophil % 80.0 %      Lymphocyte % 7.9 %      Monocyte % 8.0 %      Eosinophil % 2.9 %      Basophil % 0.7 %      Immature Grans % 0.5 %      Neutrophils, Absolute 10.63 10*3/mm3      Lymphocytes, Absolute 1.05 10*3/mm3      Monocytes, Absolute 1.06 10*3/mm3      Eosinophils, Absolute 0.38 10*3/mm3      Basophils, Absolute 0.09 10*3/mm3      Immature Grans, Absolute 0.07 10*3/mm3      nRBC 0.0 /100 WBC           Imaging Results (Last 72 Hours)     ** No results found for the last 72 hours. **            Medication Review:     Hospital Medications (active)       Dose Frequency Start End    acetaminophen (TYLENOL) 160 MG/5ML solution 650 mg 650 mg Every 4 Hours PRN 1/14/2022     Admin Instructions: Do not exceed 4 grams of acetaminophen in a 24 hr period.    If given for pain, use the following pain scale:   Mild Pain = Pain Score of 1-3, CPOT 1-2  Moderate Pain = Pain Score of 4-6, CPOT 3-4  Severe Pain = Pain Score of 7-10, CPOT 5-8  Do not exceed 4 grams of acetaminophen in a 24 hr period. Max dose of 2gm for AST/ALT greater than 120 units/L      If given for pain, use the following pain scale:   Mild Pain = Pain Score of 1-3, CPOT 1-2  Moderate Pain = Pain Score of 4-6, CPOT 3-4  Severe Pain = Pain Score of 7-10, CPOT 5-8    Route: Oral    Linked Group 1: \"Or\" Linked Group Details        " "acetaminophen (TYLENOL) suppository 650 mg 650 mg Every 4 Hours PRN 1/14/2022     Admin Instructions: Do not exceed 4 grams of acetaminophen in a 24 hr period. Max dose of 2gm for AST/ALT greater than 120 units/L      If given for pain, use the following pain scale:   Mild Pain = Pain Score of 1-3, CPOT 1-2  Moderate Pain = Pain Score of 4-6, CPOT 3-4  Severe Pain = Pain Score of 7-10, CPOT 5-8    Route: Rectal    Linked Group 1: \"Or\" Linked Group Details        acetaminophen (TYLENOL) tablet 650 mg 650 mg Every 4 Hours PRN 1/14/2022     Admin Instructions: Do not exceed 4 grams of acetaminophen in a 24 hr period.    If given for pain, use the following pain scale:   Mild Pain = Pain Score of 1-3, CPOT 1-2  Moderate Pain = Pain Score of 4-6, CPOT 3-4  Severe Pain = Pain Score of 7-10, CPOT 5-8  Do not exceed 4 grams of acetaminophen in a 24 hr period. Max dose of 2gm for AST/ALT greater than 120 units/L      If given for pain, use the following pain scale:   Mild Pain = Pain Score of 1-3, CPOT 1-2  Moderate Pain = Pain Score of 4-6, CPOT 3-4  Severe Pain = Pain Score of 7-10, CPOT 5-8    Route: Oral    Linked Group 1: \"Or\" Linked Group Details        albuterol (PROVENTIL) nebulizer solution 0.083% 2.5 mg/3mL 2.5 mg Every 6 Hours PRN 1/14/2022     Route: Nebulization    apixaban (ELIQUIS) tablet 2.5 mg 2.5 mg Every 12 Hours Scheduled 1/14/2022     Admin Instructions: Tablet may be crushed and suspended in 60 mL of water or D5W and immediately delivered via NG tube.  Avoid grapefruit juice.    Route: Oral    Aquaphor Advanced Therapy ointment 1 application 1 application Every 12 Hours Scheduled 1/14/2022     Admin Instructions: Dry areas    Route: Topical    atorvastatin (LIPITOR) tablet 10 mg 10 mg Nightly 1/14/2022     Admin Instructions: Avoid grapefruit juice.    Route: Oral    budesonide-formoterol (SYMBICORT) 160-4.5 MCG/ACT inhaler 2 puff 2 puff 2 Times Daily - RT 1/14/2022     Admin Instructions:  Shake well.  " Rinse mouth after use, do not swallow water.  Send aerosols to pharmacy in ziplock bag for proper disposal.    Route: Inhalation    cholecalciferol (VITAMIN D3) tablet 2,000 Units 2,000 Units Daily 1/14/2022     Route: Oral    citalopram (CeleXA) tablet 20 mg 20 mg Nightly 1/14/2022     Admin Instructions: Caution: Look alike/sound alike drug alert.    Route: Oral    dextrose (D50W) (25 g/50 mL) IV injection 25 g 25 g Every 15 Minutes PRN 1/14/2022     Admin Instructions: Blood sugar less than 70; patient has IV access - Unresponsive, NPO or Unable To Safely Swallow    Route: Intravenous    dextrose (GLUTOSE) oral gel 15 g 15 g Every 15 Minutes PRN 1/14/2022     Admin Instructions: BS<70, Patient Alert, Is not NPO, Can safely swallow.    Route: Oral    docusate sodium (COLACE) capsule 100 mg 100 mg 2 Times Daily 1/14/2022     Admin Instructions: Swallow whole.  Do not open, crush, or chew capsule.    Route: Oral    fluconazole (DIFLUCAN) tablet 200 mg 200 mg Every 24 Hours 1/14/2022 1/17/2022    Route: Oral    fluticasone (FLONASE) 50 MCG/ACT nasal spray 2 spray 2 spray Daily 1/14/2022     Route: Each Nare    glucagon (GLUCAGEN) injection 1 mg 1 mg Every 15 Minutes PRN 1/14/2022     Admin Instructions: Blood Glucose Less Than 70 - Patient Without IV Access - Unresponsive, NPO or Unable To Safely Swallow    Route: Subcutaneous    hydrocortisone-bacitracin-zinc oxide-nystatin (MAGIC BARRIER) ointment 1 application 1 application 3 Times Daily 1/14/2022     Admin Instructions: Apply to gluteal wounds.  For topical use only    Route: Topical    insulin aspart (novoLOG) injection 0-24 Units 0-24 Units 3 Times Daily Before Meals 1/14/2022     Admin Instructions: Correction - High Dose.  Greater than 80 units/day total insulin dose or, on steroids, insulin resistant, infection.    Blood glucose 150-199 mg/dL - 4 units  Blood glucose 200-249 mg/dL - 8 units  Blood glucose 250-299 mg/dL - 12 units  Blood glucose 300-349 mg/dL  "- 16 units  Blood glucose 350-400 mg/dL - 20 units  Blood glucose greater than 400 mg/dL - 24 units and call provider      Route: Subcutaneous    insulin detemir (LEVEMIR) injection 10 Units 10 Units Nightly 1/14/2022     Admin Instructions:     Route: Subcutaneous    iron polysaccharides (NIFEREX) capsule 150 mg 150 mg Daily 1/14/2022     Route: Oral    levothyroxine (SYNTHROID, LEVOTHROID) tablet 224 mcg 224 mcg Every Early Morning 1/15/2022     Admin Instructions: Take on empty stomach.    Route: Oral    Magnesium Sulfate 2 gram / 50mL Infusion (GIVE X 3 BAGS TO EQUAL 6GM TOTAL DOSE) - Mg 1.1 - 1.5 mg/dl 2 g As Needed 1/14/2022     Admin Instructions: Mg 1.1 -1.5 mg/dL. Infuse 2 grams over 2 hours for 3 doses (for a total Mg dose of 6 grams).  Recheck Mg level in the AM.    Route: Intravenous    Linked Group 2: \"Or\" Linked Group Details        Magnesium Sulfate 2 gram Bolus, followed by 8 gram infusion (total Mg dose 10 grams)- Mg less than or equal to 1mg/dL 2 g As Needed 1/14/2022     Admin Instructions: Mg less than or equal to 1mg/dL. Give 2 gm over 30 minutes as bolus, then infuse 2 gm over 2 hours for 4 doses (8 grams) for total dose of 10 grams.  Recheck Mg levels in the AM.    Route: Intravenous    Linked Group 2: \"Or\" Linked Group Details        Magnesium Sulfate 4 gram infusion- Mg 1.6-1.9 mg/dL 4 g As Needed 1/14/2022     Admin Instructions: Mg 1.6-1.9 mg/dL. Recheck Mg level in the AM.    Route: Intravenous    Linked Group 2: \"Or\" Linked Group Details        melatonin tablet 5 mg 5 mg Nightly PRN 1/14/2022     Route: Oral    Pharmacy to dose vancomycin  Continuous PRN 1/14/2022 1/21/2022    Route: Does not apply    polyethylene glycol (MIRALAX) packet 17 g 17 g Daily 1/14/2022     Admin Instructions: Use 4-8 ounces of water, tea, or juice for each 17 gram dose.    Route: Oral    pregabalin (LYRICA) capsule 75 mg 75 mg 2 Times Daily 1/14/2022     Admin Instructions:     Route: Oral    QUEtiapine " (SEROquel) tablet 12.5 mg 12.5 mg Every Evening 1/14/2022     Admin Instructions: Caution: Look alike/sound alike drug alert    Route: Oral    sodium chloride 0.9 % infusion 75 mL/hr Continuous 1/14/2022     Route: Intravenous    sodium hypochlorite (DAKIN'S 1/4 STRENGTH) 0.125 % topical solution 0.125% solution  Daily 1/14/2022     Admin Instructions: Apply to left hip and left foot wounds.    Route: Topical    Cosign for Ordering: Accepted by Liat Hood APRN on 1/14/2022  3:13 PM    tamsulosin (FLOMAX) 24 hr capsule 0.4 mg 0.4 mg Daily 1/14/2022     Admin Instructions: Swallow whole. Do not crush or chew.    Route: Oral    vitamin B-12 (CYANOCOBALAMIN) tablet 1,000 mcg 1,000 mcg Daily 1/14/2022     Route: Oral        apixaban, 2.5 mg, Oral, Q12H  Aquaphor Advanced Therapy, 1 application, Topical, Q12H  atorvastatin, 10 mg, Oral, Nightly  budesonide-formoterol, 2 puff, Inhalation, BID - RT  [START ON 1/18/2022] bumetanide, 1 mg, Oral, Daily  cefepime, 2 g, Intravenous, Q12H  cholecalciferol, 2,000 Units, Oral, Daily  citalopram, 20 mg, Oral, Nightly  docusate sodium, 100 mg, Oral, BID  fluticasone, 2 spray, Each Nare, Daily  hydrocortisone-bacitracin-zinc oxide-nystatin, 1 application, Topical, TID  insulin aspart, 0-24 Units, Subcutaneous, TID AC  insulin detemir, 10 Units, Subcutaneous, Nightly  iron polysaccharides, 150 mg, Oral, Daily  levoFLOXacin, 500 mg, Oral, Q24H  levothyroxine, 224 mcg, Oral, Q AM  polyethylene glycol, 17 g, Oral, Daily  pregabalin, 75 mg, Oral, BID  QUEtiapine, 12.5 mg, Oral, Q PM  sodium hypochlorite, , Topical, Daily  sulfamethoxazole-trimethoprim, 1 tablet, Oral, Q24H  tamsulosin, 0.4 mg, Oral, Daily  cyanocobalamin, 1,000 mcg, Oral, Daily        Assessment/Plan         Symptomatic bradycardia    .  -Chronic diastolic congestive heart failure  -Hypertension  -Underlying CAD  -A. fib, rate controlled  -Poor insight to health and medical noncompliance     Metabolic  encephalopathy-  L  Hip wound C&S MRSA and E Faecalis and PA  UTI      Plan :     DC IV vanc  and cefepim  PO levaquine and bactrim   Local care  I&D as needed  New C&S  Will follow  Thank you             Rocky Joe MD  01/17/22  14:10 EST

## 2022-01-17 NOTE — PROGRESS NOTES
"    Patient Name: Froilan Helton  :1940  81 y.o.      Patient Care Team:  Fortunato De Leon MD as PCP - General (Family Medicine)    Chief Complaint: follow up bradycardia, sacral decubitus, chronic dCHF, PAF    Interval History: He is sitting up in bed eating breakfast and states \"I feel pretty good\".  I did discuss the fact that he had decided to go to subacute rehab at discharge and his answer was \"I might\".       Objective   Vital Signs  Temp:  [97.2 °F (36.2 °C)-99 °F (37.2 °C)] 97.2 °F (36.2 °C)  Heart Rate:  [42-58] 44  Resp:  [16-18] 16  BP: (116-147)/(55-60) 144/60    Intake/Output Summary (Last 24 hours) at 2022 0804  Last data filed at 2022 0558  Gross per 24 hour   Intake 938 ml   Output 2025 ml   Net -1087 ml     Flowsheet Rows      First Filed Value   Admission Height 182.9 cm (72\") Documented at 2022 0618   Admission Weight 98.6 kg (217 lb 6 oz) Documented at 2022 0618          Physical Exam:   General Appearance:    Alert, cooperative, in no acute distress   Lungs:     Clear to auscultation.  Normal respiratory effort and rate.      Heart:    Regular rhythm and bradycardy, normal S1 and S2, no murmurs, gallops or rubs.     Chest Wall:    No abnormalities observed   Abdomen:     Soft, nontender, positive bowel sounds.     Extremities:   no cyanosis, clubbing.  LE wrapped with ace bandages, edema has improved.       Results Review:    Results from last 7 days   Lab Units 22  0428   SODIUM mmol/L 137   POTASSIUM mmol/L 5.2   CHLORIDE mmol/L 102   CO2 mmol/L 26.2   BUN mg/dL 47*   CREATININE mg/dL 1.99*   GLUCOSE mg/dL 88   CALCIUM mg/dL 9.1     Results from last 7 days   Lab Units 01/15/22  0018 22  1824 22  1350 22  0629 22  0629   CK TOTAL U/L  --   --   --   --  61   TROPONIN T ng/mL 0.121* 0.126* 0.130*   < > 0.129*    < > = values in this interval not displayed.     Results from last 7 days   Lab Units 22  0428   WBC 10*3/mm3 7.07 "   HEMOGLOBIN g/dL 9.9*   HEMATOCRIT % 32.7*   PLATELETS 10*3/mm3 154         Results from last 7 days   Lab Units 01/15/22  0506   MAGNESIUM mg/dL 2.4                   Medication Review:   apixaban, 2.5 mg, Oral, Q12H  Aquaphor Advanced Therapy, 1 application, Topical, Q12H  atorvastatin, 10 mg, Oral, Nightly  budesonide-formoterol, 2 puff, Inhalation, BID - RT  cholecalciferol, 2,000 Units, Oral, Daily  citalopram, 20 mg, Oral, Nightly  docusate sodium, 100 mg, Oral, BID  fluticasone, 2 spray, Each Nare, Daily  hydrocortisone-bacitracin-zinc oxide-nystatin, 1 application, Topical, TID  insulin aspart, 0-24 Units, Subcutaneous, TID AC  insulin detemir, 10 Units, Subcutaneous, Nightly  iron polysaccharides, 150 mg, Oral, Daily  levothyroxine, 224 mcg, Oral, Q AM  polyethylene glycol, 17 g, Oral, Daily  pregabalin, 75 mg, Oral, BID  QUEtiapine, 12.5 mg, Oral, Q PM  sodium hypochlorite, , Topical, Daily  tamsulosin, 0.4 mg, Oral, Daily  vancomycin, 750 mg, Intravenous, Once  cyanocobalamin, 1,000 mcg, Oral, Daily         Pharmacy to dose vancomycin,         Assessment/Plan     1. Bradycardia - Remains bradycardic, 49 bpm. Repeat EKG  with sinus rhythm and first-degree AV block.  Patient is not a candidate for pacemaker due to his chronic infections.  Continue supportive care. Asymptomatic  2. Chronic HFpEF - appears euvolemic  3. Posterior decubitus with recent surgical intervention and antibiotics.  Surgery has signed off.    4. Acute on chronic kidney disease - slow trend downwards, now 1.99  5. Type II NSTEMI -in the setting of diastolic dysfunction and acute kidney injury. No angina  6. Paroxysmal atrial fibrillation -sinus bradycardia with first-degree AV block.  Low-dose apixaban due to renal dysfunction and age.  Holding amiodarone and negative chronotropic therapy. Sinus maria a     Overall patient has poor insight into his debilitating medical conditions and poor prognosis.  Patient is not a candidate for  "pacemaker as stated above.  Note from case management showed that patient was willing to go to SNF.  I did asked patient if that was the plan he stated \"it might be\".    Nothing to change from a cardiac standpoint.  We will sign off.     LEATHA Dow  [facility]  01/17/22  08:04 EST      "

## 2022-01-17 NOTE — PROGRESS NOTES
[facility] Progress Note      Patient Name: Froilan Helton  : 1940  MRN: [id number]  Primary Care Physician:  Fortunato De Leon MD  Date of admission: 2022    Subjective     Interval History:   Feels fine; no SOB or CP  Appetite is good; no N/V  Denies any bottom pain    Review of Systems:   As noted above    Objective     Vitals:   Temp:  [97.2 °F (36.2 °C)-99 °F (37.2 °C)] 97.2 °F (36.2 °C)  Heart Rate:  [42-58] 44  Resp:  [16-18] 16  BP: (116-147)/(55-60) 144/60    Intake/Output Summary (Last 24 hours) at 2022 0941  Last data filed at 2022 0558  Gross per 24 hour   Intake 718 ml   Output 2025 ml   Net -1307 ml       Physical Exam:    General Appearance: alert, oriented x 3, NAD, chr ill, pale  Skin: warm and dry  HEENT: oral mucosa normal, nonicteric sclera, AT/NC  Neck: supple, no JVD  Lungs: crackles in bases; not labored on RA  Heart: irreg irreg, maria a, no rub  Abdomen: soft, nontender, nondistended  : no palpable bladder; F/C anchored  Extremities: +1 edema  Neuro: normal speech; moves all extremities  Psych:  Flat affect and depressed mood    Scheduled Meds:     apixaban, 2.5 mg, Oral, Q12H  Aquaphor Advanced Therapy, 1 application, Topical, Q12H  atorvastatin, 10 mg, Oral, Nightly  budesonide-formoterol, 2 puff, Inhalation, BID - RT  cholecalciferol, 2,000 Units, Oral, Daily  citalopram, 20 mg, Oral, Nightly  docusate sodium, 100 mg, Oral, BID  fluticasone, 2 spray, Each Nare, Daily  hydrocortisone-bacitracin-zinc oxide-nystatin, 1 application, Topical, TID  insulin aspart, 0-24 Units, Subcutaneous, TID AC  insulin detemir, 10 Units, Subcutaneous, Nightly  iron polysaccharides, 150 mg, Oral, Daily  levothyroxine, 224 mcg, Oral, Q AM  polyethylene glycol, 17 g, Oral, Daily  pregabalin, 75 mg, Oral, BID  QUEtiapine, 12.5 mg, Oral, Q PM  sodium hypochlorite, , Topical, Daily  tamsulosin, 0.4 mg, Oral, Daily  vancomycin, 750 mg, Intravenous,  Once  cyanocobalamin, 1,000 mcg, Oral, Daily      IV Meds:   Pharmacy to Dose LevoFLOXacin (LEVAQUIN),   Pharmacy to dose vancomycin,         Results Reviewed:   I have personally reviewed the results from the time of this admission to 1/17/2022 09:41 EST     Results from last 7 days   Lab Units 01/17/22 0428 01/16/22 0456 01/15/22  0506 01/14/22 0629 01/14/22 0629   SODIUM mmol/L 137 135* 135*   < > 135*   POTASSIUM mmol/L 5.2 4.7 4.3   < > 4.8   CHLORIDE mmol/L 102 101 100   < > 97*   CO2 mmol/L 26.2 23.6 24.5   < > 25.8   BUN mg/dL 47* 54* 60*   < > 67*   CREATININE mg/dL 1.99* 2.18* 2.61*   < > 2.78*   CALCIUM mg/dL 9.1 8.6 8.8   < > 9.2   BILIRUBIN mg/dL  --   --   --   --  0.3   ALK PHOS U/L  --   --   --   --  78   ALT (SGPT) U/L  --   --   --   --  10   AST (SGOT) U/L  --   --   --   --  20   GLUCOSE mg/dL 88 85 89   < > 144*    < > = values in this interval not displayed.       Estimated Creatinine Clearance: 36.4 mL/min (A) (by C-G formula based on SCr of 1.99 mg/dL (H)).    Results from last 7 days   Lab Units 01/17/22  0428 01/16/22  0456 01/15/22  0506   MAGNESIUM mg/dL  --   --  2.4   PHOSPHORUS mg/dL 3.7 4.1 4.4             Results from last 7 days   Lab Units 01/17/22  0428 01/15/22  0506 01/14/22  0629 01/11/22  0351   WBC 10*3/mm3 7.07 7.70 13.28* 8.53   HEMOGLOBIN g/dL 9.9* 9.3* 11.5* 10.3*   PLATELETS 10*3/mm3 154 147 191 167             Assessment / Plan     ASSESSMENT:  1.  ROLA on CKD4 (baseline creatinine around 2.0 mg/dL), non-oliguric, stable: previous ROLA likely due to prerenal state from volume depletion in the setting of poor PO intake and loop diuretic.  Central vol acceptable; chr leg edema; stable lytes  2.  Chronic diastolic congestive heart failure  3.  Hypertension  4.  Underlying CAD  5.  Atiral fib with slow rate  6.  Left hip/buttock/lower extremity wound, polymicrobial (MRSA, Enterococcus, and Pseudomonas)   7.  Poor insight to health and overall medical  noncompliance    PLAN:  1.  Continue to hold bumex, tho anticipate re-starting at lower dose (just 1 mg daily) tomorrow  2.  NH anytime from renal view    Thank you for involving us in the care of Froilan Helton.  Please feel free to call with any questions.    Ellis Centeno MD  01/17/22  09:41 EST    [facility]  [phone]      Much of this encounter note is an electronic transcription/translation of spoken language to printed text. The electronic translation of spoken language may permit erroneous, or at times, nonsensical words or phrases to be inadvertently transcribed; Although I have reviewed the note for such errors, some may still exist.

## 2022-01-17 NOTE — PLAN OF CARE
Goal Outcome Evaluation:  Plan of Care Reviewed With: patient           Outcome Summary: pt is resting in bed with eyes closed and resting well, pt has bradycardia on tele, Cavanaugh draining to bedside. pt took medications as ordered with no issues

## 2022-01-18 NOTE — CASE MANAGEMENT/SOCIAL WORK
Continued Stay Note  MICHAEL Kincaid     Patient Name: Froilan Helton  MRN: [id number]  Today's Date: 1/18/2022    Admit Date: 1/14/2022     Discharge Plan     Row Name 01/18/22 1356       Plan    Plan SNF for STR vs home with HH    Plan Comments CM made 30 referrals to STR today and await acceptance. CM will continue to follow for needs.    Row Name 01/18/22 1259       Plan    Plan Comments CM called [facility] and  left regarding referral and referral put in [facility]. CM also called [facility] and spoke with Fernie she states that the hospital liason will look at the referral in [facility]. CM called [facility] and spoke with Darcy and she states he will review in EPIC and get back to me. CM will continue to follow for needs.    Row Name 01/18/22 9892       Plan    Plan SNF for STR vs home with HH    Patient/Family in Agreement with Plan yes    Plan Comments Called Leslee at [facility] in [place] and she states they can not accept at this time. Call placed to [facility] with [facility] and she states she can not accept patient at any of their facilities due to document non complaince by patient. Polina with with [facility] called and spoke with Chadd Steinberg CM and states they could not accept patient at [facility] but could take patient at [facility] on Thrusday. CM will follow up with patient today regaridng discharge plans. CM will continue to follow for needs.               Discharge Codes    No documentation.                     Elizabeth Arguello, RN

## 2022-01-18 NOTE — PROGRESS NOTES
[facility] Progress Note      Patient Name: Froilan Helton  : 1940  MRN: [id number]  Primary Care Physician:  Fortunato De Leon MD  Date of admission: 2022    Subjective     Interval History:   Feels fine; no SOB or CP  Appetite is good; wants to eat more food (complains about small portions)      Review of Systems:   As noted above    Objective     Vitals:   Temp:  [97 °F (36.1 °C)-98.1 °F (36.7 °C)] 98.1 °F (36.7 °C)  Heart Rate:  [47-58] 50  Resp:  [16-18] 18  BP: (123-148)/(57-69) 131/57    Intake/Output Summary (Last 24 hours) at 2022 0815  Last data filed at 2022 0518  Gross per 24 hour   Intake 1020 ml   Output 2400 ml   Net -1380 ml       Physical Exam:    General Appearance: alert, NAD, chr ill, pale, lying nearly flat without tachypnea  Skin: warm and dry  HEENT: oral mucosa normal, nonicteric sclera, AT/NC  Neck: supple, no JVD  Lungs: Mostly clear with a few crackles in bases, less than yesterday; not labored on RA  Heart: RR, maria a, no rub  Abdomen: soft, nontender, nondistended, BS +  : no palpable bladder; F/C anchored  Extremities: +1 edema; lower legs wrapped  Neuro: normal speech; moves all extremities  Psych:  Flat affect and depressed mood    Scheduled Meds:     apixaban, 2.5 mg, Oral, Q12H  Aquaphor Advanced Therapy, 1 application, Topical, Q12H  atorvastatin, 10 mg, Oral, Nightly  budesonide-formoterol, 2 puff, Inhalation, BID - RT  bumetanide, 1 mg, Oral, Daily  cefepime, 2 g, Intravenous, Q12H  cholecalciferol, 2,000 Units, Oral, Daily  citalopram, 20 mg, Oral, Nightly  docusate sodium, 100 mg, Oral, BID  fluticasone, 2 spray, Each Nare, Daily  hydrocortisone-bacitracin-zinc oxide-nystatin, 1 application, Topical, TID  insulin aspart, 0-24 Units, Subcutaneous, TID AC  insulin detemir, 10 Units, Subcutaneous, Nightly  iron polysaccharides, 150 mg, Oral, Daily  levoFLOXacin, 500 mg, Oral, Q24H  levothyroxine, 224 mcg, Oral, Q AM  polyethylene  glycol, 17 g, Oral, Daily  pregabalin, 75 mg, Oral, BID  QUEtiapine, 12.5 mg, Oral, Q PM  sodium hypochlorite, , Topical, Daily  sulfamethoxazole-trimethoprim, 1 tablet, Oral, Q24H  tamsulosin, 0.4 mg, Oral, Daily  cyanocobalamin, 1,000 mcg, Oral, Daily      IV Meds:   Pharmacy to dose vancomycin,         Results Reviewed:   I have personally reviewed the results from the time of this admission to 1/18/2022 08:15 EST     Results from last 7 days   Lab Units 01/18/22  0439 01/17/22  0428 01/16/22  0456 01/15/22  0506 01/14/22  0629   SODIUM mmol/L 135* 137 135*   < > 135*   POTASSIUM mmol/L 5.5* 5.2 4.7   < > 4.8   CHLORIDE mmol/L 102 102 101   < > 97*   CO2 mmol/L 24.2 26.2 23.6   < > 25.8   BUN mg/dL 41* 47* 54*   < > 67*   CREATININE mg/dL 1.86* 1.99* 2.18*   < > 2.78*   CALCIUM mg/dL 9.0 9.1 8.6   < > 9.2   BILIRUBIN mg/dL  --   --   --   --  0.3   ALK PHOS U/L  --   --   --   --  78   ALT (SGPT) U/L  --   --   --   --  10   AST (SGOT) U/L  --   --   --   --  20   GLUCOSE mg/dL 84 88 85   < > 144*    < > = values in this interval not displayed.       Estimated Creatinine Clearance: 39.9 mL/min (A) (by C-G formula based on SCr of 1.86 mg/dL (H)).    Results from last 7 days   Lab Units 01/18/22  0439 01/17/22  0428 01/16/22  0456 01/15/22  0506 01/15/22  0506   MAGNESIUM mg/dL  --   --   --   --  2.4   PHOSPHORUS mg/dL 3.5 3.7 4.1   < > 4.4    < > = values in this interval not displayed.             Results from last 7 days   Lab Units 01/17/22  0428 01/15/22  0506 01/14/22  0629   WBC 10*3/mm3 7.07 7.70 13.28*   HEMOGLOBIN g/dL 9.9* 9.3* 11.5*   PLATELETS 10*3/mm3 154 147 191             Assessment / Plan     ASSESSMENT:  1.  ROLA on CKD4 (baseline creatinine around 2.0 mg/dL), non-oliguric, stable: previous ROLA likely due to prerenal state from volume depletion in the setting of poor PO intake and loop diuretic.  Central vol acceptable; chr leg edema; stable lytes other than mild hyperkalemia  2.  Chronic  diastolic congestive heart failure  3.  Hypertension  4.  Underlying CAD  5.  Atiral fib with slow rate  6.  Left hip/buttock/lower extremity wound, polymicrobial (MRSA, Enterococcus, and Pseudomonas)   7.  Poor insight to health and overall medical noncompliance    PLAN:  1.  Resume today oral bumetanide at 1 mg BID  2.  Lokelma for 1 dose today    Thank you for involving us in the care of Froilan Helton.  Please feel free to call with any questions.    Ellis Centeno MD  01/18/22  08:15 [facility]    [facility]  [phone]      Much of this encounter note is an electronic transcription/translation of spoken language to printed text. The electronic translation of spoken language may permit erroneous, or at times, nonsensical words or phrases to be inadvertently transcribed; Although I have reviewed the note for such errors, some may still exist.

## 2022-01-18 NOTE — PROGRESS NOTES
"SERVICE: [facility] HOSPITALIST    CONSULTANTS: ID, nephrology, surgery, cardiology    CHIEF COMPLAINT: f/u bradycardia, infected chronic wounds    SUBJECTIVE: The patient reports he is feeling well this morning.  At time of exam earlier, patient had finished nearly all of his breakfast.  Discussed dilemma with SNF availability, patient continues to agree to go to rehab facility.  Lab abnormality noted this morning.  The patient denies other new concerns.    OBJECTIVE:    /57 (BP Location: Left arm, Patient Position: Lying)   Pulse 50   Temp 98.1 °F (36.7 °C) (Oral)   Resp 18   Ht 182.9 cm (72\")   Wt 106 kg (233 lb 12.8 oz)   SpO2 94%   BMI 31.71 kg/m²     MEDS/LABS REVIEWED AND ORDERED    apixaban, 2.5 mg, Oral, Q12H  Aquaphor Advanced Therapy, 1 application, Topical, Q12H  atorvastatin, 10 mg, Oral, Nightly  budesonide-formoterol, 2 puff, Inhalation, BID - RT  bumetanide, 1 mg, Oral, BID  cholecalciferol, 2,000 Units, Oral, Daily  citalopram, 20 mg, Oral, Nightly  docusate sodium, 100 mg, Oral, BID  fluticasone, 2 spray, Each Nare, Daily  hydrocortisone-bacitracin-zinc oxide-nystatin, 1 application, Topical, TID  insulin detemir, 10 Units, Subcutaneous, Nightly  iron polysaccharides, 150 mg, Oral, Daily  levoFLOXacin, 500 mg, Oral, Q24H  levothyroxine, 224 mcg, Oral, Q AM  polyethylene glycol, 17 g, Oral, Daily  pregabalin, 75 mg, Oral, BID  QUEtiapine, 12.5 mg, Oral, Q PM  sodium hypochlorite, , Topical, Daily  sulfamethoxazole-trimethoprim, 1 tablet, Oral, Q24H  tamsulosin, 0.4 mg, Oral, Daily  cyanocobalamin, 1,000 mcg, Oral, Daily      Physical Exam  Vitals reviewed.   Constitutional:       General: He is not in acute distress.     Appearance: He is obese. He is not ill-appearing.   HENT:      Head: Normocephalic and atraumatic.      Mouth/Throat:      Mouth: Mucous membranes are moist.      Comments: Poor dentition  Eyes:      Extraocular Movements: Extraocular movements intact. "      Pupils: Pupils are equal, round, and reactive to light.   Cardiovascular:      Rate and Rhythm: Bradycardia present. Rhythm irregular.   Pulmonary:      Effort: Pulmonary effort is normal. No respiratory distress.      Breath sounds: Normal breath sounds. No wheezing or rales.   Abdominal:      General: Abdomen is flat. Bowel sounds are normal. There is no distension.      Palpations: Abdomen is soft.      Tenderness: There is no abdominal tenderness. There is no guarding.   Musculoskeletal:      Comments: Chronic bilateral LE edema, better than baseline   Skin:     General: Skin is warm and dry.      Capillary Refill: Capillary refill takes less than 2 seconds.      Findings: No erythema.   Neurological:      General: No focal deficit present.      Mental Status: He is alert and oriented to person, place, and time.   Psychiatric:         Mood and Affect: Mood normal.         Behavior: Behavior normal.       LAB/DIAGNOSTICS:    Lab Results (last 24 hours)     Procedure Component Value Units Date/Time    Wound Culture - Wound, Buttock, Left [[id number]]  (Abnormal)  (Susceptibility) Collected: 01/14/22 1230    Specimen: Wound from Buttock, Left Updated: 01/18/22 1027     Wound Culture Moderate growth (3+) Pseudomonas aeruginosa     Gram Stain Rare (1+) WBCs seen      No organisms seen    Susceptibility      Pseudomonas aeruginosa     ALINE     Cefepime Susceptible     Ceftazidime Susceptible     Ciprofloxacin Susceptible     Gentamicin Susceptible     Levofloxacin Susceptible     Piperacillin + Tazobactam Intermediate  [1]                  [1]  Appended report. These results have been appended to a previously preliminary verified report.          Linear View               Susceptibility Comments     Pseudomonas aeruginosa    Pip/tazo to follow             POC Glucose Once [[id number]]  (Normal) Collected: 01/18/22 0736    Specimen: Blood Updated: 01/18/22 0743     Glucose 93 mg/dL      Comment: Meter: [id number]  : [id number] Cherrie Barbykrysten HARKINS Float       Blood Culture - Blood, Arm, Right [[id number]]  (Normal) Collected: 01/14/22 0718    Specimen: Blood from Arm, Right Updated: 01/18/22 0730     Blood Culture No growth at 4 days    Blood Culture - Blood, Hand, Right [[id number]]  (Normal) Collected: 01/14/22 0637    Specimen: Blood from Hand, Right Updated: 01/18/22 0645     Blood Culture No growth at 4 days    Renal Function Panel [[id number]]  (Abnormal) Collected: 01/18/22 0439    Specimen: Blood Updated: 01/18/22 0518     Glucose 84 mg/dL      BUN 41 mg/dL      Creatinine 1.86 mg/dL      Sodium 135 mmol/L      Potassium 5.5 mmol/L      Chloride 102 mmol/L      CO2 24.2 mmol/L      Calcium 9.0 mg/dL      Albumin 2.60 g/dL      Phosphorus 3.5 mg/dL      Anion Gap 8.8 mmol/L      BUN/Creatinine Ratio 22.0     eGFR Non African Amer 35 mL/min/1.73     Narrative:      GFR Normal >60  Chronic Kidney Disease <60  Kidney Failure <15      POC Glucose Once [[id number]]  (Abnormal) Collected: 01/17/22 2033    Specimen: Blood Updated: 01/17/22 2039     Glucose 143 mg/dL      Comment: Meter: [id number] : [id number] Hafsatodd Odell SHAHANA       POC Glucose Once [[id number]]  (Normal) Collected: 01/17/22 1636    Specimen: Blood Updated: 01/17/22 1643     Glucose 106 mg/dL      Comment: Meter: [id number] : [id number] Chris Sagenegro Hamme CNA       POC Glucose Once [[id number]]  (Normal) Collected: 01/17/22 1134    Specimen: Blood Updated: 01/17/22 1140     Glucose 119 mg/dL      Comment: Meter: [id number] : [id number] Perez Sage Deinse CNA       Sedimentation Rate [[id number]]  (Abnormal) Collected: 01/17/22 0428    Specimen: Blood Updated: 01/17/22 1137     Sed Rate 34 mm/hr         ECG 12 Lead   Final Result   HEART RATE= 46  bpm   RR Interval= 1304  ms   WY Interval= 91  ms   P Horizontal Axis= 13  deg   P Front Axis= 0  deg   QRSD Interval= 123  ms   QT Interval= 527  ms   QRS Axis= -9  deg   T Wave Axis= 85  deg   - ABNORMAL ECG  -   Sinus bradycardia   Nonspecific intraventricular conduction delay   Non-specific STT wave change   NO SIGNIFICANT CHANGE FROM PREVIOUS ECG   Electronically Signed By: Darwin Monaco ([facility]) 18-Jan-2022 11:06:39   Date and Time of Study: 2022-01-18 06:59:37      ECG 12 Lead   Final Result   HEART RATE= 50  bpm   RR Interval= 1196  ms   PA Interval= 288  ms   P Horizontal Axis= 34  deg   P Front Axis= 41  deg   QRSD Interval= 125  ms   QT Interval= 514  ms   QRS Axis= -6  deg   T Wave Axis= 73  deg   - ABNORMAL ECG -   Sinus rhythm/bradycardia   Prolonged PA interval   LVH with secondary repolarization abnormality   NO SIGNIFICANT CHANGE FROM PREVIOUS ECG   Electronically Signed By: Darwin Monaco ([facility]) 18-Jan-2022 11:06:17   Date and Time of Study: 2022-01-15 10:27:37      ECG 12 Lead   Final Result   HEART RATE= 55  bpm   RR Interval= 1084  ms   PA Interval= 48  ms   P Horizontal Axis= -25  deg   P Front Axis= 0  deg   QRSD Interval= 113  ms   QT Interval= 511  ms   QRS Axis= 8  deg   T Wave Axis= 116  deg   - BORDERLINE ECG -   Sinus rhythm   Prolonged PA interval   c/w prior ecg, bradycardia no longer seen   Electronically Signed By: Aline Lopez ([facility]) 14-Jan-2022 08:26:43   Date and Time of Study: 2022-01-14 06:28:44      ECG 12 Lead   Final Result   HEART RATE= 47  bpm   RR Interval= 1273  ms   PA Interval= 168  ms   P Horizontal Axis= -17  deg   P Front Axis= 0  deg   QRSD Interval= 116  ms   QT Interval= 622  ms   QRS Axis= -9  deg   T Wave Axis= 60  deg   - ABNORMAL ECG -   Supraventricular bigeminy   Nonspecific intraventricular conduction delay   Inferior infarct, old   Prolonged QT interval   Sinus bradycardia   c/w prior ecg, bradycardia has worsened.   Electronically Signed By: Aline Lopez ([facility]) 14-Jan-2022 08:27:25   Date and Time of Study: 2022-01-14 06:21:49        Results for orders placed during the hospital encounter of 12/28/21    Adult Transthoracic Echo Complete W/ Cont if Necessary  Per Protocol    Interpretation Summary  · Saline test results are negative.  · There is severe calcification of the aortic valve mainly affecting the left coronary and right coronary cusp(s).  · Calculated right ventricular systolic pressure from tricuspid regurgitation is 44 mmHg.  · Mild pulmonary hypertension is present.  · Estimated left ventricular EF = 55% Left ventricular systolic function is normal.  · The right ventricular cavity is mildly dilated.  · Mild dilation of the aortic root is present.    No radiology results for the last day    ASSESSMENT/PLAN:  Bradycardia:  PAF:  Chronic HFpEF:  NSTEMI type II:  CAD/HLD: Cardiology following  Not a candidate for PPM due to recurrent infections, cardiology signed off  BP at goal, no medications, heart rate 50s  Remains on home Lipitor and Eliquis  Questionable weight accuracy, +5 pounds overnight?  Continue strict I&O, daily weight  Clinically euvolemic    Left hip/buttock/lower extremity wound infections secondary to MRSA, Enterococcus and Pseudomonas:  Leukocytosis: ID and surgery following, wound RN  No surgical intervention planned   Now on bactrim/levaquin, d/c'd vanc/cefepime  monitor     ROLA on CKD stage IV:  Hyperkalemia  Acute urinary retention, ruled out UTI: nephrology following  Lactic acidosis: secondary to dehydration, resolved  Monitor on oral bumex, bactrim  Continue SUREKHA barajas when able  Creatinine today 1.86, baseline 2.0-2.2, was 2.78 on admission  Continue home flomax      Chronic immobility with frequent falls:  No PT/OT indicated as patient is immobile at his baseline and refuses therapy each admit     DM2 in obese with hyperglycemia: A1c 5.3% last checked 12/28/2021  AM glucose remains at goal on Levemir 10 units nightly  Discontinue further Accu-Cheks and insulin supplement    COPD without exacerbation:  Nothing acute currently on symbicort and prn albuterol     Hypothyroidism: continues levothyroxine 224 mcg daily  Repeat TFTs 6  "weeks     Chronic Iron deficiency anemia:  Continues home niferex, B12 supplements without active blood loss  Hgb stable at 9.9 last check, recheck in a.m.     Recurrent medical noncompliance: counseled extensively this and each prior admission     DNR status    PLAN FOR DISPOSITION: Awaiting placement for continued wound care    LEATHA Ford  Hospitalist, [facility]  01/18/22  09:52 EST    \"Dictated utilizing Dragon dictation\"      "

## 2022-01-18 NOTE — CASE MANAGEMENT/SOCIAL WORK
Continued Stay Note  MICHAEL Kincaid     Patient Name: Froilan Helton  MRN: [id number]  Today's Date: 1/18/2022    Admit Date: 1/14/2022     Discharge Plan     Row Name 01/18/22 1259       Plan                                     STR vs home with home health    Plan Comments CM called [facility] and  left regarding referral and referral put in [facility]. CM also called [facility] and spoke with Fernie she states that the hospital liaison will look at the referral in [facility]. CM called [facility] and spoke with Darcy and she states he will review in EPIC and get back to me. CM will continue to follow for needs.    Row Name 01/18/22 1158       Plan    Plan SNF for STR vs home with HH    Patient/Family in Agreement with Plan yes    Plan Comments Called Leslee at [facility] in [place] and she states they can not accept at this time. Call placed to [facility] with [facility] and she states she can not accept patient at any of their facilities due to document non complaince by patient. Polina sanchez with [facility] called and spoke with Chadd Steinberg CM and states they could not accept patient at [facility] but could take patient at [facility] on Thrusday. CM will follow up with patient today regaridng discharge plans. CM will continue to follow for needs.               Discharge Codes    No documentation.                     Elizabeth Arguello RN

## 2022-01-18 NOTE — CASE MANAGEMENT/SOCIAL WORK
Continued Stay Note  MICHAEL Kincaid     Patient Name: Froilan Helton  MRN: [id number]  Today's Date: 1/18/2022    Admit Date: 1/14/2022     Discharge Plan     Row Name 01/18/22 1430       Plan    Plan [facility] for STR    Patient/Family in Agreement with Plan yes    Plan Comments Spoke with patient today and informed him that I have put referrals out to 30 facilities and as of now [facility] can accept him on Thursday. Patient is agreeable to this facility. CM called Polina at [facility] and left VM that patient is agreeable to come to their facility on Thursday. Provider updated. CM will continue to follow for needs.    Row Name 01/18/22 1356       Plan    Plan SNF for STR vs home with HH    Plan Comments CM made 30 referrals to STR today and await acceptance. CM will continue to follow for needs.    Row Name 01/18/22 1259       Plan    Plan Comments CM called [facility] and VM left regarding referral and referral put in [facility]. CM also called [facility] and spoke with Fernie she states that the hospital liason will look at the referral in [facility]. CM called [facility] and spoke with Darcy and she states he will review in EPIC and get back to me. CM will continue to follow for needs.    Row Name 01/18/22 115       Plan    Plan SNF for STR vs home with HH    Patient/Family in Agreement with Plan yes    Plan Comments Called Leslee at [facility] in [place] and she states they can not accept at this time. Call placed to [facility] with [facility] and she states she can not accept patient at any of their facilities due to document non complaince by patient. Polina with with [facility] called and spoke with Chadd Steinberg CM and states they could not accept patient at [facility] but could take patient at [facility] on Thrusday. CM will follow up with patient today regaridng discharge plans. CM will continue to follow for needs.               Discharge Codes    No documentation.                     Elizabeth  RIN Arguello, RN

## 2022-01-18 NOTE — CASE MANAGEMENT/SOCIAL WORK
Continued Stay Note  MICHAEL Kincaid     Patient Name: Froilan Helton  MRN: [id number]  Today's Date: 1/18/2022    Admit Date: 1/14/2022     Discharge Plan     Row Name 01/18/22 1447       Plan    Plan [facility] for STR    Patient/Family in Agreement with Plan yes    Plan Comments Polina with [facility] call back to say that the facility will have an isolation room available on Thursday and they can accept then. CM will continue to follow for needs.    Row Name 01/18/22 1430       Plan    Plan [facility] for STR    Patient/Family in Agreement with Plan yes    Plan Comments Spoke with patient today and informed him that I have put referrals out to 30 facilities and as of now [facility] can accept him on Thursday. Patient is agreeable to this facility. CM called Polina at [facility] and left VM that patient is agreeable to come to their facility on Thursday. Provider updated. CM will continue to follow for needs.    Row Name 01/18/22 1356       Plan    Plan SNF for STR vs home with HH    Plan Comments CM made 30 referrals to STR today and await acceptance. CM will continue to follow for needs.    Row Name 01/18/22 1259       Plan    Plan Comments CM called [facility] and VM left regarding referral and referral put in Psychiatric. CM also called [facility] and spoke with Fernie she states that the hospital liason will look at the referral in Psychiatric. CM called [facility] and spoke with Darcy and she states he will review in EPIC and get back to me. CM will continue to follow for needs.    Row Name 01/18/22 1157       Plan    Plan SNF for STR vs home with HH    Patient/Family in Agreement with Plan yes    Plan Comments Called Leslee at [facility] in [place] and she states they can not accept at this time. Call placed to Madelin with [facility] and she states she can not accept patient at any of their facilities due to document non complaince by patient. Polina with with [facility] called and  spoke with Chadd Steinberg CM and states they could not accept patient at [facility] but could take patient at [facility] on Thrusday. CM will follow up with patient today regaridng discharge plans. CM will continue to follow for needs.               Discharge Codes    No documentation.                     Elizabeth Arguello RN

## 2022-01-18 NOTE — CASE MANAGEMENT/SOCIAL WORK
Continued Stay Note  MICHAEL Kincaid     Patient Name: Froilan Helton  MRN: [id number]  Today's Date: 1/18/2022    Admit Date: 1/14/2022     Discharge Plan     Row Name 01/18/22 1159       Plan    Plan SNF for STR vs home with HH    Patient/Family in Agreement with Plan yes    Plan Comments Called Leslee at [facility] in [place] and she states they can not accept at this time. Call placed to [facility] with [facility] and she states she can not accept patient at any of their facilities due to document non compliance by patient. Polina with [facility] called and spoke with Chadd Steinberg CM and states they could not accept patient at [facility] but could take patient at [facility] on Thursday. CM will follow up with patient today regarding discharge plans. CM will continue to follow for needs.               Discharge Codes    No documentation.                     Elizabeth Arguello RN

## 2022-01-19 NOTE — PROGRESS NOTES
[facility] Progress Note      Patient Name: Froilan Helton  : 1940  MRN: [id number]  Primary Care Physician:  Fortunato De Leon MD  Date of admission: 2022    Subjective     Interval History:   Feels fine; no SOB or CP  Appetite is good; no N/V  Reports that he is too weak to get out of bed      Review of Systems:   As noted above    Objective     Vitals:   Temp:  [97.3 °F (36.3 °C)-98.9 °F (37.2 °C)] 98.9 °F (37.2 °C)  Heart Rate:  [50-51] 50  Resp:  [16-18] 18  BP: (115-142)/(56-68) 142/65    Intake/Output Summary (Last 24 hours) at 2022 0843  Last data filed at 2022 0537  Gross per 24 hour   Intake 480 ml   Output 4600 ml   Net -4120 ml       Physical Exam:    General Appearance: alert, NAD, chr ill, pale  Skin: warm and dry  HEENT: oral mucosa normal, nonicteric sclera, AT/NC  Neck: supple, no JVD  Lungs: Mostly clear; not labored on RA  Heart: RR, maria a, no rub  Abdomen: soft, nontender, nondistended, BS +  : no palpable bladder; F/C anchored  Extremities: +1 edema  Neuro: normal speech; moves all extremities  Psych:  Flat affect and depressed mood    Scheduled Meds:     apixaban, 2.5 mg, Oral, Q12H  Aquaphor Advanced Therapy, 1 application, Topical, Q12H  atorvastatin, 10 mg, Oral, Nightly  budesonide-formoterol, 2 puff, Inhalation, BID - RT  bumetanide, 1 mg, Oral, BID  cholecalciferol, 2,000 Units, Oral, Daily  citalopram, 20 mg, Oral, Nightly  docusate sodium, 100 mg, Oral, BID  fluticasone, 2 spray, Each Nare, Daily  hydrocortisone-bacitracin-zinc oxide-nystatin, 1 application, Topical, TID  insulin detemir, 10 Units, Subcutaneous, Nightly  iron polysaccharides, 150 mg, Oral, Daily  levoFLOXacin, 500 mg, Oral, Q24H  levothyroxine, 224 mcg, Oral, Q AM  polyethylene glycol, 17 g, Oral, Daily  pregabalin, 75 mg, Oral, BID  QUEtiapine, 12.5 mg, Oral, Q PM  sodium hypochlorite, , Topical, Daily  sulfamethoxazole-trimethoprim, 1 tablet, Oral, Q24H  tamsulosin,  0.4 mg, Oral, Daily  cyanocobalamin, 1,000 mcg, Oral, Daily      IV Meds:        Results Reviewed:   I have personally reviewed the results from the time of this admission to 1/19/2022 08:43 EST     Results from last 7 days   Lab Units 01/19/22  0522 01/18/22  0439 01/17/22  0428 01/15/22  0506 01/14/22  0629   SODIUM mmol/L 134* 135* 137   < > 135*   POTASSIUM mmol/L 5.0 5.5* 5.2   < > 4.8   CHLORIDE mmol/L 100 102 102   < > 97*   CO2 mmol/L 25.3 24.2 26.2   < > 25.8   BUN mg/dL 41* 41* 47*   < > 67*   CREATININE mg/dL 2.12* 1.86* 1.99*   < > 2.78*   CALCIUM mg/dL 9.2 9.0 9.1   < > 9.2   BILIRUBIN mg/dL  --   --   --   --  0.3   ALK PHOS U/L  --   --   --   --  78   ALT (SGPT) U/L  --   --   --   --  10   AST (SGOT) U/L  --   --   --   --  20   GLUCOSE mg/dL 94 84 88   < > 144*    < > = values in this interval not displayed.       Estimated Creatinine Clearance: 34.4 mL/min (A) (by C-G formula based on SCr of 2.12 mg/dL (H)).    Results from last 7 days   Lab Units 01/19/22  0522 01/18/22  0439 01/17/22  0428 01/16/22  0456 01/15/22  0506   MAGNESIUM mg/dL  --   --   --   --  2.4   PHOSPHORUS mg/dL 4.0 3.5 3.7   < > 4.4    < > = values in this interval not displayed.             Results from last 7 days   Lab Units 01/19/22  0522 01/17/22  0428 01/15/22  0506 01/14/22  0629   WBC 10*3/mm3 7.05 7.07 7.70 13.28*   HEMOGLOBIN g/dL 9.9* 9.9* 9.3* 11.5*   PLATELETS 10*3/mm3 145 154 147 191             Assessment / Plan     ASSESSMENT:  1.  ROLA on CKD4 (baseline creatinine around 2.0 mg/dL), non-oliguric, stable: previous ROLA likely due to prerenal state from volume depletion in the setting of poor PO intake and loop diuretic.  Central vol acceptable; chr leg edema; stable lytes with resolved hyperkalemia  2.  Chronic diastolic congestive heart failure  3.  Hypertension  4.  Underlying CAD  5.  Atiral fib with slow rate  6.  Left hip/buttock/lower extremity wound, polymicrobial (MRSA, Enterococcus, and Pseudomonas)   7.   Poor insight to health and overall medical noncompliance    PLAN:  1.  Continue oral bumetanide 1 mg BID  2.  NH anytime from renal view    Thank you for involving us in the care of Froilan Helton.  Please feel free to call with any questions.    Ellis Centeno MD  01/19/22  08:43 EST    [facility]  [phone]      Much of this encounter note is an electronic transcription/translation of spoken language to printed text. The electronic translation of spoken language may permit erroneous, or at times, nonsensical words or phrases to be inadvertently transcribed; Although I have reviewed the note for such errors, some may still exist.

## 2022-01-19 NOTE — PLAN OF CARE
Goal Outcome Evaluation:  Plan of Care Reviewed With: patient           Outcome Summary: PT Evaluation Complete: Patient performs supine to/from sit transfer with modified independence, sit to/from stand transfers with CGA from elevated bed surface and min A x 2 from recliner. Patient performs stand pivot transfer bed to/from chair with CGA x 2 with use of FWW. Patient manages device safely during transfer, no knee buckling noted however patient keeps B knee in flexed position in standing. Patient is oriented x 3 and follows all commands. Patient previously unreceptive and uncooperative with therapy at times however patient very pleasant agreeable to everything asked of him during evaluation today. Patient would benefit from physical therapy to maximize functional independence and safety with functional transfers. Recommend SNF/ECF at discharge. Patient currently agreeable.

## 2022-01-19 NOTE — PLAN OF CARE
Goal Outcome Evaluation:  Plan of Care Reviewed With: patient           Outcome Summary: OT evaluation completed. pt modified independent for bed mobility with bed rail and HOB elevated. pt required CGA x2 for functional transfers from elevated bed surface with RW and stand pivot transfers to recliner chair with RW. pt required min assist x 2 for functional transfers from recliner chair. anticipate pt will require mod-max assist for lb adls. pt cooperative and agreeable to work with therapies. pt would benefit from skilled OT services to address adl retraining, education with HEP and functional transfers. anticipate discharge to SNF

## 2022-01-19 NOTE — CASE MANAGEMENT/SOCIAL WORK
"Continued Stay Note   Temitope Castillo     Patient Name: Froilan Helton  MRN: [id number]  Today's Date: 1/19/2022    Admit Date: 1/14/2022     Discharge Plan     Row Name 01/19/22 1625       Plan    Plan [facility]    Plan Comments Rec'd call from Polina/Hannah Kim who states they need an updated PT note and can accept patient tomorrow afternoon. MD updated. RENITA will follow up in am.    Row Name 01/19/22 1507       Plan    Plan Comments Call received from Ela in PT and she states that  patient did work with her today and there are notes documented. RENITA called and Spoke with Polina with [facility] and updated her of such. She states that if patient would work with them again in the morning she will talk with the facility and possibly accept in the afternoon tormorrow. RENITA also informed Polina that Carmelina Jacob in out billing department and she states that patient's [employer] A[employer] is primary and [employer] is secondary. Polina states she will call back tomorrow to see if they can accept. CM will continue to follow for needs.    Row Name 01/19/22 1413       Plan    Plan Comments Call received from Polina with [facility] and she states that she was at the facility today and after speaking with the staff she now states since he won't participate with PT they can not accept because there would be no skill. RENITA asked if wound care was a skill and she states \"no\". Polina also states that their office ran his insurance and he has [employer] as primary and they would need a pre cert and if there were no PT notes they can not accept. RENITA assured Polina that patient has [employer] A[employer] and [employer] is secondary. RENITA also asked if they were honoring the 3 midnight stay for admission and she states \"no he has to have a skill\". RENITA informed Polina that I would have the provider order a PT eval and that I would talk with the patient regarding such. Polina asked if she could call the patient to ask him if he would participate in PT while at " the facility and CM provided her with patient's phone number in his room. CM also spoke with patient at bedside and he states that Polina did call into the room and he was getting his dressings changed and she was asked to call back. Patient also states he is agreable to participate wiht PT at the facility. Provider Adriana Hood updated and order PT eval. CM will continue to follow for needs.               Discharge Codes    No documentation.                     Titi Ramirez RN

## 2022-01-19 NOTE — PROGRESS NOTES
"SERVICE: [facility] HOSPITALIST    CONSULTANTS: Nephrology    CHIEF COMPLAINT: Follow-up infected chronic wounds, bradycardia    SUBJECTIVE: Patient awakened for exam.  Notes he is sleeping well, eating well and having no pain.  He notes no bowel movement since admission but taking MiraLAX regularly.  He reports urinary incontinence without a Cavanaugh catheter in place.  He otherwise denies acute concerns, agreeable to go to rehab when able    OBJECTIVE:    /65 (BP Location: Left arm, Patient Position: Lying)   Pulse 50   Temp 98.9 °F (37.2 °C) (Oral)   Resp 18   Ht 182.9 cm (72\")   Wt 102 kg (224 lb 8 oz)   SpO2 94%   BMI 30.45 kg/m²     MEDS/LABS REVIEWED AND ORDERED    apixaban, 2.5 mg, Oral, Q12H  Aquaphor Advanced Therapy, 1 application, Topical, Q12H  atorvastatin, 10 mg, Oral, Nightly  budesonide-formoterol, 2 puff, Inhalation, BID - RT  bumetanide, 1 mg, Oral, BID  cholecalciferol, 2,000 Units, Oral, Daily  citalopram, 20 mg, Oral, Nightly  docusate sodium, 100 mg, Oral, BID  fluticasone, 2 spray, Each Nare, Daily  hydrocortisone-bacitracin-zinc oxide-nystatin, 1 application, Topical, TID  insulin detemir, 10 Units, Subcutaneous, Nightly  iron polysaccharides, 150 mg, Oral, Daily  levoFLOXacin, 500 mg, Oral, Q24H  levothyroxine, 224 mcg, Oral, Q AM  polyethylene glycol, 17 g, Oral, Daily  pregabalin, 75 mg, Oral, BID  QUEtiapine, 12.5 mg, Oral, Q PM  sodium hypochlorite, , Topical, Daily  sulfamethoxazole-trimethoprim, 1 tablet, Oral, Q24H  tamsulosin, 0.4 mg, Oral, Daily  cyanocobalamin, 1,000 mcg, Oral, Daily      Physical Exam  Vitals reviewed.   Constitutional:       General: He is not in acute distress.     Appearance: He is not ill-appearing.      Comments: Elderly, frail, pale   HENT:      Head: Normocephalic and atraumatic.      Mouth/Throat:      Mouth: Mucous membranes are moist.      Comments: Poor dentition  Eyes:      Extraocular Movements: Extraocular movements " intact.      Pupils: Pupils are equal, round, and reactive to light.   Cardiovascular:      Rate and Rhythm: Regular rhythm. Bradycardia present.   Pulmonary:      Effort: Pulmonary effort is normal. No respiratory distress.      Breath sounds: Normal breath sounds. No wheezing or rales.   Abdominal:      General: Abdomen is flat. Bowel sounds are normal. There is no distension.      Palpations: Abdomen is soft.      Tenderness: There is no abdominal tenderness. There is no guarding.   Musculoskeletal:      Comments: 1+ nonpitting edema bilateral lower extremities   Skin:     General: Skin is warm and dry.      Capillary Refill: Capillary refill takes less than 2 seconds.      Findings: No erythema.   Neurological:      General: No focal deficit present.      Mental Status: He is alert and oriented to person, place, and time.   Psychiatric:         Mood and Affect: Mood normal.         Behavior: Behavior normal.       LAB/DIAGNOSTICS:    Lab Results (last 24 hours)     Procedure Component Value Units Date/Time    POC Glucose Once [[id number]]  (Normal) Collected: 01/19/22 0721    Specimen: Blood Updated: 01/19/22 0731     Glucose 95 mg/dL      Comment: Meter: [id number] : [id number] Jacque Santamaria NURSING ASSISTANT       Blood Culture - Blood, Arm, Right [[id number]]  (Normal) Collected: 01/14/22 0718    Specimen: Blood from Arm, Right Updated: 01/19/22 0731     Blood Culture No growth at 5 days    Blood Culture - Blood, Hand, Right [[id number]]  (Normal) Collected: 01/14/22 0637    Specimen: Blood from Hand, Right Updated: 01/19/22 0646     Blood Culture No growth at 5 days    Renal Function Panel [[id number]]  (Abnormal) Collected: 01/19/22 0522    Specimen: Blood Updated: 01/19/22 0557     Glucose 94 mg/dL      BUN 41 mg/dL      Creatinine 2.12 mg/dL      Sodium 134 mmol/L      Potassium 5.0 mmol/L      Chloride 100 mmol/L      CO2 25.3 mmol/L      Calcium 9.2 mg/dL      Albumin 2.70 g/dL      Phosphorus 4.0 mg/dL       Anion Gap 8.7 mmol/L      BUN/Creatinine Ratio 19.3     eGFR Non African Amer 30 mL/min/1.73     Narrative:      GFR Normal >60  Chronic Kidney Disease <60  Kidney Failure <15      CBC & Differential [[id number]]  (Abnormal) Collected: 01/19/22 0522    Specimen: Blood Updated: 01/19/22 0529    Narrative:      The following orders were created for panel order CBC & Differential.  Procedure                               Abnormality         Status                     ---------                               -----------         ------                     CBC Auto Differential[[id number]]        Abnormal            Final result                 Please view results for these tests on the individual orders.    CBC Auto Differential [[id number]]  (Abnormal) Collected: 01/19/22 0522    Specimen: Blood Updated: 01/19/22 0529     WBC 7.05 10*3/mm3      RBC 3.47 10*6/mm3      Hemoglobin 9.9 g/dL      Hematocrit 31.5 %      MCV 90.8 fL      MCH 28.5 pg      MCHC 31.4 g/dL      RDW 16.1 %      RDW-SD 52.7 fl      MPV 10.7 fL      Platelets 145 10*3/mm3      Neutrophil % 65.5 %      Lymphocyte % 11.8 %      Monocyte % 11.9 %      Eosinophil % 9.6 %      Basophil % 0.9 %      Immature Grans % 0.3 %      Neutrophils, Absolute 4.62 10*3/mm3      Lymphocytes, Absolute 0.83 10*3/mm3      Monocytes, Absolute 0.84 10*3/mm3      Eosinophils, Absolute 0.68 10*3/mm3      Basophils, Absolute 0.06 10*3/mm3      Immature Grans, Absolute 0.02 10*3/mm3      nRBC 0.0 /100 WBC     POC Glucose Once [[id number]]  (Normal) Collected: 01/18/22 2108    Specimen: Blood Updated: 01/18/22 2114     Glucose 122 mg/dL      Comment: Meter: [id number] : [id number] Ruthann HARKINS       Wound Culture - Wound, Buttock, Left [[id number]]  (Abnormal)  (Susceptibility) Collected: 01/14/22 1230    Specimen: Wound from Buttock, Left Updated: 01/18/22 1027     Wound Culture Moderate growth (3+) Pseudomonas aeruginosa     Gram Stain Rare (1+) WBCs seen      No  organisms seen    Susceptibility      Pseudomonas aeruginosa     ALINE     Cefepime Susceptible     Ceftazidime Susceptible     Ciprofloxacin Susceptible     Gentamicin Susceptible     Levofloxacin Susceptible     Piperacillin + Tazobactam Intermediate  [1]                  [1]  Appended report. These results have been appended to a previously preliminary verified report.          Linear View               Susceptibility Comments     Pseudomonas aeruginosa    Pip/tazo to follow                 ECG 12 Lead   Final Result   HEART RATE= 46  bpm   RR Interval= 1304  ms   ID Interval= 91  ms   P Horizontal Axis= 13  deg   P Front Axis= 0  deg   QRSD Interval= 123  ms   QT Interval= 527  ms   QRS Axis= -9  deg   T Wave Axis= 85  deg   - ABNORMAL ECG -   Sinus bradycardia   Nonspecific intraventricular conduction delay   Non-specific STT wave change   NO SIGNIFICANT CHANGE FROM PREVIOUS ECG   Electronically Signed By: Darwin Monaco ([facility]) 18-Jan-2022 11:06:39   Date and Time of Study: 2022-01-18 06:59:37      ECG 12 Lead   Final Result   HEART RATE= 50  bpm   RR Interval= 1196  ms   ID Interval= 288  ms   P Horizontal Axis= 34  deg   P Front Axis= 41  deg   QRSD Interval= 125  ms   QT Interval= 514  ms   QRS Axis= -6  deg   T Wave Axis= 73  deg   - ABNORMAL ECG -   Sinus rhythm/bradycardia   Prolonged ID interval   LVH with secondary repolarization abnormality   NO SIGNIFICANT CHANGE FROM PREVIOUS ECG   Electronically Signed By: Darwin Monaco ([facility]) 18-Jan-2022 11:06:17   Date and Time of Study: 2022-01-15 10:27:37      ECG 12 Lead   Final Result   HEART RATE= 55  bpm   RR Interval= 1084  ms   ID Interval= 48  ms   P Horizontal Axis= -25  deg   P Front Axis= 0  deg   QRSD Interval= 113  ms   QT Interval= 511  ms   QRS Axis= 8  deg   T Wave Axis= 116  deg   - BORDERLINE ECG -   Sinus rhythm   Prolonged ID interval   c/w prior ecg, bradycardia no longer seen   Electronically Signed By: Aline Lopez ([facility]) 14-Jan-2022 08:26:43    Date and Time of Study: 2022-01-14 06:28:44      ECG 12 Lead   Final Result   HEART RATE= 47  bpm   RR Interval= 1273  ms   IA Interval= 168  ms   P Horizontal Axis= -17  deg   P Front Axis= 0  deg   QRSD Interval= 116  ms   QT Interval= 622  ms   QRS Axis= -9  deg   T Wave Axis= 60  deg   - ABNORMAL ECG -   Supraventricular bigeminy   Nonspecific intraventricular conduction delay   Inferior infarct, old   Prolonged QT interval   Sinus bradycardia   c/w prior ecg, bradycardia has worsened.   Electronically Signed By: Aline Lopez ([facility]) 14-Jan-2022 08:27:25   Date and Time of Study: 2022-01-14 06:21:49        Results for orders placed during the hospital encounter of 12/28/21    Adult Transthoracic Echo Complete W/ Cont if Necessary Per Protocol    Interpretation Summary  · Saline test results are negative.  · There is severe calcification of the aortic valve mainly affecting the left coronary and right coronary cusp(s).  · Calculated right ventricular systolic pressure from tricuspid regurgitation is 44 mmHg.  · Mild pulmonary hypertension is present.  · Estimated left ventricular EF = 55% Left ventricular systolic function is normal.  · The right ventricular cavity is mildly dilated.  · Mild dilation of the aortic root is present.    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12 which is baseline for this patient  Continue Flomax  Remains on 2 g low potassium diet with 1200 mL fluid restriction  Continue to monitor    Chronic immobility with frequent falls:  Mobility is unchanged     DM2 in obese with hyperglycemia: A1c 5.3% last checked 12/28/2021  No need for Accu-Cheks and sliding scale insulin with glucose at goal each check on Levemir 10 units nightly     COPD without exacerbation:  Nothing acute currently on symbicort and prn albuterol     Hypothyroidism: continues levothyroxine 224 mcg daily  Repeat TFTs 6 weeks     Chronic Iron deficiency anemia:  Continues home niferex, B12 supplements without active blood loss  Hemoglobin remained stable at 9.9, monitor at intervals    Recurrent medical noncompliance: counseled extensively this and each prior admission     DNR status    PLAN FOR DISPOSITION: Awaiting placement    LEATHA Ford  Hospitalist, [facility]  01/19/22  08:10 EST    \"Dictated utilizing Dragon dictation\"'  "

## 2022-01-19 NOTE — NURSING NOTE
CWON follow up. BLE wounds are healing nicely and the left hip wounds looks about the best I have seen so far. Healthy granulation tissue is noted to both wound bases. There is no necrosis and no purulence.     RLE requires no topical treatment other than Aquaphor ointment.    Left heel deep tissue injury improving, remains intact with purple and red color fading. Continue to moisturize and offload with pillows.    Left foot wound filling in and base is clean and moist.  Recommend to continue dakins fluffed gauze and Aquaphor to all other healing wounds and skin to LLE.    All wound care performed and pt tolerated well. Able to stand and pivot with 2 assist.     Probable dc to STR tomorrow.

## 2022-01-19 NOTE — CASE MANAGEMENT/SOCIAL WORK
"Continued Stay Note   Temitope Castillo     Patient Name: Froilan Helton  MRN: [id number]  Today's Date: 1/19/2022    Admit Date: 1/14/2022     Discharge Plan     Row Name 01/19/22 1507       Plan    Plan Comments Call received from Ela in PT and she states that  patient did work with her today and there are notes documented. RENITA called and Spoke with Polina with [facility] and updated her of such. She states that if patient would work with them again in the morning she will talk with the facility and possibly accept in the afternoon tomorrow. RENITA also informed Polina that Carmelina Jacob in out billing department and she states that patient's [employer] is primary and [employer] is secondary. Polina states she will call back tomorrow to see if they can accept. CM will continue to follow for needs.    Row Name 01/19/22 9146       Plan    Plan Comments Call received from Polina with [facility] and she states that she was at the facility today and after speaking with the staff she now states since he won't participate with PT they can not accept because there would be no skill. RENITA asked if wound care was a skill and she states \"no\". Polina also states that their office ran his insurance and he has [employer] as primary and they would need a pre cert and if there were no PT notes they can not accept. CM assured Polina that patient has [employer] and [employer] is secondary. RENITA also asked if they were honoring the 3 midnight stay for admission and she states \"no he has to have a skill\". RENITA informed Polina that I would have the provider order a PT eval and that I would talk with the patient regarding such. Polina asked if she could call the patient to ask him if he would participate in PT while at the facility and CM provided her with patient's phone number in his room. RENITA also spoke with patient at bedside and he states that Polina did call into the room and he was getting his dressings changed and she was asked to call back. Patient " also states he is agreable to participate wiht PT at the facility. Provider Adriana Hood updated and order PT eval. CM will continue to follow for needs.               Discharge Codes    No documentation.                     Elizabeth Arguello RN

## 2022-01-19 NOTE — THERAPY EVALUATION
Patient Name: Froilan Helton  : 1940    MRN: [id number]                              Today's Date: 2022       Admit Date: 2022    Visit Dx:     ICD-10-CM ICD-9-CM   1. Symptomatic bradycardia  R00.1 427.89   2. Acute renal failure superimposed on chronic kidney disease, unspecified CKD stage, unspecified acute renal failure type ([facility])  N17.9 584.9    N18.9 585.9     Patient Active Problem List   Diagnosis   • COPD (chronic obstructive pulmonary disease) ([facility])   • Type 2 diabetes mellitus with stage 4 chronic kidney disease, with long-term current use of insulin ([facility])   • Essential hypertension   • Primary osteoarthritis of left knee   • Chronic renal insufficiency, stage 4 (severe) ([facility])   • Class 2 severe obesity due to excess calories with serious comorbidity in adult ([facility])   • Physical debility   • Acute UTI (urinary tract infection)   • Permanent atrial fibrillation ([facility])   • H/O noncompliance with medical treatment, presenting hazards to health   • Myxedema   • Acute on chronic combined systolic and diastolic CHF (congestive heart failure) ([facility])   • Generalized weakness   • Recurrent left pleural effusion   • Fall on same level as cause of accidental injury   • Gross hematuria   • CAD (coronary artery disease)   • HLD (hyperlipidemia)   • Hypothyroidism   • CKD (chronic kidney disease) stage 3, GFR 30-59 ml/min ([facility])   • Acute metabolic encephalopathy   • Cardiomyopathy ([facility])   • Recurrent falls   • Acute renal failure superimposed on chronic kidney disease ([facility])   • Open wound of left foot   • Moderate malnutrition (CMS/[facility])   • Symptomatic bradycardia     Past Medical History:   Diagnosis Date   • A-fib ([facility])    • Arthritis    • Asthma    • CAD (coronary artery disease)    • Cardiomyopathy ([facility])    • Cataract    • CHF (congestive heart failure) ([facility])    • CKD (chronic kidney disease), stage III ([facility])    • COPD (chronic obstructive pulmonary disease) ([facility])    • Diabetes mellitus ([facility])    •  Disease of thyroid gland    • Emphysema, unspecified ([facility])    • Glaucoma    • Hyperlipidemia    • Hypertension    • Kidney stone    • Myocardial infarct, old    • Neuropathy    • Osteoarthritis    • Osteoporosis    • Permanent atrial fibrillation (HCC) 6/30/2020   • PVD (peripheral vascular disease) ([facility])    • Renal disorder    • Shingles      Past Surgical History:   Procedure Laterality Date   • COLONOSCOPY     • EYE SURGERY     • INCISION AND DRAINAGE LEG Left 12/30/2021    Procedure: debridement of left hip wounds and left foot wound;  Surgeon: Judie Aguero DO;  Location: [facility];  Service: General;  Laterality: Left;   • JOINT REPLACEMENT      right   • KIDNEY STONE SURGERY     • REPLACEMENT TOTAL KNEE     • TOE SURGERY        General Information     Row Name 01/19/22 1446          Physical Therapy Time and Intention    Document Type evaluation  -BP     Mode of Treatment physical therapy  -BP     Row Name 01/19/22 1446          General Information    Patient Profile Reviewed yes  Patient well known to therapy staff due to frequent recent admissions. Patient recently discharged on 1/11-patient signed out AMA. Per chart review, patient fell at home and was down per his report 1-2 days. Pt had not eaten in three days.  -BP     Prior Level of Function --  Patient now agreeable to SNF at discharge. Patient reports at baseline he performs stand pivot transfers to/from motorized w/c and uses w/c for mobility throughout the home. Pt unable to adequately care for himself between most recent two admissions.  -BP     Existing Precautions/Restrictions fall  Patient has been non compliant in the past however agreeable to PT evaluation and everything asked of him today.  -BP     Barriers to Rehab previous functional deficit  Patient has been previously uncooperative and not receptive however agreeable to evaluation and everthing asked of him during evaluation.  -BP     Row Name 01/19/22 1446          Living  Environment    Lives With alone  -BP     Row Name 01/19/22 1446          Home Main Entrance    Number of Stairs, Main Entrance --  ramp to enter home  -BP     Row Name 01/19/22 1446          Stairs Within Home, Primary    Stairs, Within Home, Primary one story home  -BP     Row Name 01/19/22 1446          Cognition    Orientation Status (Cognition) oriented x 3  -BP     Row Name 01/19/22 1446          Safety Issues, Functional Mobility    Safety Issues Affecting Function (Mobility) judgment  -BP           User Key  (r) = Recorded By, (t) = Taken By, (c) = Cosigned By    Initials Name Provider Type    Chloe Harmon, PT Physical Therapist               Mobility     Row Name 01/19/22 1452          Bed Mobility    Bed Mobility supine-sit; sit-supine  -BP     Supine-Sit [place] (Bed Mobility) modified independence  -BP     Sit-Supine [place] (Bed Mobility) modified independence  -BP     Row Name 01/19/22 1452          Transfers    Comment (Transfers) Verbal cues for hand placement. Patient requires CGA for transfer from elevated bed surface and min A x 2 from recliner. Patient performs stand pivot transfer bed to/from chair with walker. No knee buckling or loss of balance noted however patient keeps B knees in flexed position  -BP     Row Name 01/19/22 1452          Bed-Chair Transfer    Bed-Chair [place] (Transfers) contact guard; 2 person assist  -BP     Assistive Device (Bed-Chair Transfers) walker, front-wheeled  -BP     Row Name 01/19/22 1452          Sit-Stand Transfer    Sit-Stand [place] (Transfers) contact guard; minimum assist (75% patient effort)  -BP     Assistive Device (Sit-Stand Transfers) walker, front-wheeled  -BP     Row Name 01/19/22 1452          Gait/Stairs (Locomotion)    Comment (Gait/Stairs) Transfers only. Patient does not ambulate at baseline  -BP           User Key  (r) = Recorded By, (t) = Taken By, (c) = Cosigned By    Initials Name Provider Type    BP Coffman  Chloe, PT Physical Therapist               Obj/Interventions     Row Name 01/19/22 1450          Range of Motion Comprehensive    Comment, General Range of Motion B LE AROM WFL  -BP     Row Name 01/19/22 9259          Strength Comprehensive (MMT)    Comment, General Manual Muscle Testing (MMT) Assessment B LE strength not formerly assessed. Strength functional. No kne buckling during transfer noted. Patient with chronic mobility deficits due to B knee OA  -BP     Row Name 01/19/22 6736          Balance    Comment, Balance independent for static sitting balance. CGA for standing with walker  -BP           User Key  (r) = Recorded By, (t) = Taken By, (c) = Cosigned By    Initials Name Provider Type    Chloe Hramon, PT Physical Therapist               Goals/Plan     Row Name 01/19/22 0986          Transfer Goal 1 (PT)    Activity/Assistive Device (Transfer Goal 1, PT) sit-to-stand/stand-to-sit; bed-to-chair/chair-to-bed  -BP     [place] Level/Cues Needed (Transfer Goal 1, PT) supervision required  -BP     Time Frame (Transfer Goal 1, PT) 3 days  -BP     Progress/Outcome (Transfer Goal 1, PT) goal ongoing  -BP           User Key  (r) = Recorded By, (t) = Taken By, (c) = Cosigned By    Initials Name Provider Type    Chloe Harmon, PT Physical Therapist               Clinical Impression     Row Name 01/19/22 8329          Pain    Additional Documentation Pain Scale: Numbers Pre/Post-Treatment (Group)  -BP     Row Name 01/19/22 6051          Pain Scale: Numbers Pre/Post-Treatment    Pretreatment Pain Rating 3/10  -BP     Posttreatment Pain Rating 3/10  -BP     Pain Location - Side Left  -BP     Pain Location hip  -BP     Pre/Posttreatment Pain Comment Patient with wounds to L hip, buttock and LE extremity. Patient just had L hip wound dressing changed prior to evaluation.  -BP     Pain Intervention(s) Repositioned  -BP     Row Name 01/19/22 9661          Plan of Care Review    Plan of Care Reviewed  With patient  -BP     Outcome Summary PT Evaluation Complete: Patient performs supine to/from sit transfer with modified independence, sit to/from stand transfers with CGA from elevated bed surface and min A x 2 from recliner. Patient performs stand pivot transfer bed to/from chair with CGA x 2 with use of FWW. Patient manages device safely during transfer, no knee buckling noted however patient keeps B knee in flexed position in standing. Patient is oriented x 3 and follows all commands. Patient previously unreceptive and uncooperative with therapy at times however patient very pleasant agreeable to everything asked of him during evaluation today. Patient would benefit from physical therapy to maximize functional independence and safety with functional transfers. Recommend SNF/ECF at discharge. Patient currently agreeable.  -BP     Row Name 01/19/22 9434          Therapy Assessment/Plan (PT)    Rehab Potential (PT) good, to achieve stated therapy goals  -BP     Criteria for Skilled Interventions Met (PT) yes; meets criteria  -BP     Predicted Duration of Therapy Intervention (PT) 3 days  -BP     Row Name 01/19/22 8169          Positioning and Restraints    Pre-Treatment Position in bed  -BP     Post Treatment Position bed  -BP     In Bed notified nsg; supine; call light within reach; encouraged to call for assist; exit alarm on  -BP           User Key  (r) = Recorded By, (t) = Taken By, (c) = Cosigned By    Initials Name Provider Type    BP Chloe Coffman, PT Physical Therapist               Outcome Measures     Row Name 01/19/22 1500          How much help from another person do you currently need...    Turning from your back to your side while in flat bed without using bedrails? 4  -BP     Moving from lying on back to sitting on the side of a flat bed without bedrails? 4  -BP     Moving to and from a bed to a chair (including a wheelchair)? 3  -BP     Standing up from a chair using your arms (e.g., wheelchair,  bedside chair)? 3  -BP     Climbing 3-5 steps with a railing? 1  -BP     To walk in hospital room? 2  -BP     AM-PAC 6 Clicks Score (PT) 17  -BP     Row Name 01/19/22 1500          Functional Assessment    Outcome Measure Options AM-PAC 6 Clicks Basic Mobility (PT)  -BP           User Key  (r) = Recorded By, (t) = Taken By, (c) = Cosigned By    Initials Name Provider Type    BP Chloe Coffman, PT Physical Therapist                             Physical Therapy Education                 Title: PT OT SLP Therapies (Done)     Topic: Physical Therapy (Done)     Point: Mobility training (Done)     Learning Progress Summary           Patient Acceptance, E,TB, VU by BP at 1/19/2022 1500                               User Key     Initials Effective Dates Name Provider Type Discipline     06/16/21 -  Chloe Coffman, PT Physical Therapist PT              PT Recommendation and Plan  Planned Therapy Interventions (PT): balance training, bed mobility training, home exercise program, patient/family education, transfer training, strengthening  Plan of Care Reviewed With: patient  Outcome Summary: PT Evaluation Complete: Patient performs supine to/from sit transfer with modified independence, sit to/from stand transfers with CGA from elevated bed surface and min A x 2 from recliner. Patient performs stand pivot transfer bed to/from chair with CGA x 2 with use of FWW. Patient manages device safely during transfer, no knee buckling noted however patient keeps B knee in flexed position in standing. Patient is oriented x 3 and follows all commands. Patient previously unreceptive and uncooperative with therapy at times however patient very pleasant agreeable to everything asked of him during evaluation today. Patient would benefit from physical therapy to maximize functional independence and safety with functional transfers. Recommend SNF/ECF at discharge. Patient currently agreeable.     Time Calculation:    PT Charges     Row  Name 01/19/22 1501             Time Calculation    Start Time 1400  -BP      PT Received On 01/19/22  -BP      PT - Next Appointment 01/20/22  -BP            User Key  (r) = Recorded By, (t) = Taken By, (c) = Cosigned By    Initials Name Provider Type    Chloe Harmon, PT Physical Therapist              Therapy Charges for Today     Code Description Service Date Service Provider Modifiers Qty    44712382769 HC PT EVAL LOW COMPLEXITY 2 1/19/2022 Chloe Coffman, PT GP 1          PT G-Codes  Outcome Measure Options: AM-PAC 6 Clicks Basic Mobility (PT)  AM-PAC 6 Clicks Score (PT): 17    Chloe Coffman, PT  1/19/2022

## 2022-01-19 NOTE — PLAN OF CARE
Goal Outcome Evaluation:  Plan of Care Reviewed With: patient        Progress: improving  Outcome Summary: Pt VSS this shift. Wound care complete per order. Pt tolerating diet well. Pt reports no pain this shift. Pt plan to discharge to SNF and agreeable with plan.

## 2022-01-19 NOTE — PLAN OF CARE
Goal Outcome Evaluation:              Outcome Summary: Patient VSS. Denied pain or discomfort this shift. Dressings CDI. Resting quietly in bed at this time.

## 2022-01-19 NOTE — CASE MANAGEMENT/SOCIAL WORK
Continued Stay Note  MICHAEL Kincaid     Patient Name: Froilan Helton  MRN: [id number]  Today's Date: 1/19/2022    Admit Date: 1/14/2022     Discharge Plan    Late entry - Rec'd call yesterday afternoon from Angela/[facility] CARABALLO. She states she has talked with the patients granddaughter  and was informed the granddaughter will not be available to learn/assist with dressing changes. [facility] will not be able to accept patient as there is no one that will be able to assist with dressing changes/wound care. CM will continue to follow.                Discharge Codes    No documentation.                     Titi Ramirez RN

## 2022-01-19 NOTE — THERAPY EVALUATION
Acute Care - Occupational Therapy Initial Evaluation  MICHAEL Kincaid     Patient Name: Froilan Helton  : 1940  MRN: [id number]  Today's Date: 2022  Onset of Illness/Injury or Date of Surgery: 22  Date of Referral to OT: 22  Referring Physician: Iglesia Hood APRN    Admit Date: 2022       ICD-10-CM ICD-9-CM   1. Symptomatic bradycardia  R00.1 427.89   2. Acute renal failure superimposed on chronic kidney disease, unspecified CKD stage, unspecified acute renal failure type ([facility])  N17.9 584.9    N18.9 585.9     Patient Active Problem List   Diagnosis   • COPD (chronic obstructive pulmonary disease) ([facility])   • Type 2 diabetes mellitus with stage 4 chronic kidney disease, with long-term current use of insulin ([facility])   • Essential hypertension   • Primary osteoarthritis of left knee   • Chronic renal insufficiency, stage 4 (severe) ([facility])   • Class 2 severe obesity due to excess calories with serious comorbidity in adult ([facility])   • Physical debility   • Acute UTI (urinary tract infection)   • Permanent atrial fibrillation ([facility])   • H/O noncompliance with medical treatment, presenting hazards to health   • Myxedema   • Acute on chronic combined systolic and diastolic CHF (congestive heart failure) ([facility])   • Generalized weakness   • Recurrent left pleural effusion   • Fall on same level as cause of accidental injury   • Gross hematuria   • CAD (coronary artery disease)   • HLD (hyperlipidemia)   • Hypothyroidism   • CKD (chronic kidney disease) stage 3, GFR 30-59 ml/min ([facility])   • Acute metabolic encephalopathy   • Cardiomyopathy ([facility])   • Recurrent falls   • Acute renal failure superimposed on chronic kidney disease ([facility])   • Open wound of left foot   • Moderate malnutrition (CMS/[facility])   • Symptomatic bradycardia     Past Medical History:   Diagnosis Date   • A-fib ([facility])    • Arthritis    • Asthma    • CAD (coronary artery disease)    • Cardiomyopathy ([facility])    • Cataract    • CHF (congestive heart  failure) ([facility])    • CKD (chronic kidney disease), stage III ([facility])    • COPD (chronic obstructive pulmonary disease) ([facility])    • Diabetes mellitus ([facility])    • Disease of thyroid gland    • Emphysema, unspecified ([facility])    • Glaucoma    • Hyperlipidemia    • Hypertension    • Kidney stone    • Myocardial infarct, old    • Neuropathy    • Osteoarthritis    • Osteoporosis    • Permanent atrial fibrillation ([facility]) 6/30/2020   • PVD (peripheral vascular disease) ([facility])    • Renal disorder    • Shingles      Past Surgical History:   Procedure Laterality Date   • COLONOSCOPY     • EYE SURGERY     • INCISION AND DRAINAGE LEG Left 12/30/2021    Procedure: debridement of left hip wounds and left foot wound;  Surgeon: Judie Aguero DO;  Location: [facility];  Service: General;  Laterality: Left;   • JOINT REPLACEMENT      right   • KIDNEY STONE SURGERY     • REPLACEMENT TOTAL KNEE     • TOE SURGERY           OT ASSESSMENT FLOWSHEET (last 12 hours)     OT Evaluation and Treatment     Row Name 01/19/22 1415                   OT Time and Intention    Subjective Information complains of; fatigue  -JJ        Document Type evaluation  -JJ        Mode of Treatment occupational therapy  -JJ        Patient Effort adequate  -JJ        Symptoms Noted During/After Treatment none  -JJ                  General Information    Patient Profile Reviewed yes  -JJ        Onset of Illness/Injury or Date of Surgery 01/14/22  -JJ        Referring Physician Iglesia Hood APRN  -JJ        General Observations of Patient pt supine in bed, agreeable to evaluation  -JJ        Prior Level of Function --  see pertinent hx of current problem  -JJ        Equipment Currently Used at Home commode, bedside; wheelchair, motorized; walker, rolling; lift device  lift chair  -JJ        Pertinent History of Current Functional Problem pt admitted to hospital sp fall, generalized weakness. pt with multiple recent hospital admissions. pt states he is independent  with stand pivot transfers to motorized scooter with RW. pt states he uses lift chair to assist with sit to stand transfers. pt states independent with adls however states has been having increased difficulty performing recently.  -JJ        Existing Precautions/Restrictions fall  -JJ        Risks Reviewed patient:; LOB  -JJ        Benefits Reviewed patient:; improve function; increase independence  -JJ        Barriers to Rehab previous functional deficit  -JJ                  Living Environment    Current Living Arrangements home/apartment/condo  -JJ        Home Accessibility --  ramp to enter  -JJ        Lives With alone  -JJ                  Cognition    Orientation Status (Cognition) oriented x 3  -JJ        Follows Commands (Cognition) WFL  -JJ        Personal Safety Interventions gait belt; nonskid shoes/slippers when out of bed  -JJ                  Pain Assessment    Additional Documentation Pain Scale: Numbers Pre/Post-Treatment (Group)  -JJ                  Pain Scale: Numbers Pre/Post-Treatment    Pretreatment Pain Rating 3/10  -JJ        Posttreatment Pain Rating 3/10  -JJ        Pain Location - Side Left  -JJ        Pain Location hip  -JJ        Pain Intervention(s) Repositioned  -JJ                  Range of Motion (ROM)    Range of Motion bilateral upper extremities; ROM is WFL  with exception of L shoulder leandra 50%, chronic  -JJ                  Strength (Manual Muscle Testing)    Strength (Manual Muscle Testing) bilateral upper extremities; strength is WFL  with exception of L shoulder not tested  -JJ                  Bed Mobility    Bed Mobility supine-sit  -JJ        Supine-Sit [place] (Bed Mobility) modified independence  -JJ        Assistive Device (Bed Mobility) bed rails; head of bed elevated  -JJ                  Transfer Assessment/Treatment    Transfers sit-stand transfer; stand-sit transfer; stand pivot/stand step transfer  -JJ        Comment (Transfers) pt required CGA to stand from  elevated bed surface and min assist from recliner chair with RW  -JJ                  Transfers    Sit-Stand [place] (Transfers) contact guard; minimum assist (75% patient effort); verbal cues  -JJ        Stand-Sit [place] (Transfers) contact guard; verbal cues; 2 person assist  -JJ                  Sit-Stand Transfer    Assistive Device (Sit-Stand Transfers) walker, front-wheeled  -JJ                  Stand-Sit Transfer    Assistive Device (Stand-Sit Transfers) walker, front-wheeled  -JJ                  Stand Pivot/Stand Step Transfer    Stand Pivot/Stand Step [place] (Transfers) contact guard; verbal cues; 2 person assist  -JJ        Assistive Device (Stand Pivot Stand Step Transfer) walker, front-wheeled  -JJ                  Bathing Assessment/Intervention    [place] Level (Bathing) lower body; moderate assist (50% patient effort)  -JJ                  Lower Body Dressing Assessment/Training    [place] Level (Lower Body Dressing) lower body dressing skills; moderate assist (50% patient effort); maximum assist (25% patient effort)  -JJ                  Wound 12/28/21 1800 Right medial leg Blisters    Wound - Properties Group Placement Date: 12/28/21  - Placement Time: 1800  -LC Present on Hospital Admission: Y  -LC Side: Right  -LC Orientation: medial  -LC Location: leg  -LC Primary Wound Type: Blisters  -LC        Retired Wound - Properties Group Date first assessed: 12/28/21  - Time first assessed: 1800  -LC Present on Hospital Admission: Y  -LC Side: Right  -LC Location: leg  -LC Primary Wound Type: Blisters  -LC                  Wound 12/28/21 1802 Left medial leg Blisters    Wound - Properties Group Placement Date: 12/28/21  - Placement Time: 1802  -LC Present on Hospital Admission: Y  -LC Side: Left  -LC Orientation: medial  -LC Location: leg  -LC Primary Wound Type: Blisters  -LC        Retired Wound - Properties Group Date first assessed: 12/28/21  - Time first assessed:  1802  -LC Present on Hospital Admission: Y  -LC Side: Left  -LC Location: leg  -LC Primary Wound Type: Blisters  -LC                  Wound 12/28/21 1803 Left medial foot Other (comment)    Wound - Properties Group Placement Date: 12/28/21  -LC Placement Time: 1803 -LC Present on Hospital Admission: Y  -LC Side: Left  -LC Orientation: medial  -LC Location: foot  -LC Primary Wound Type: Other  -LC        Retired Wound - Properties Group Date first assessed: 12/28/21  -LC Time first assessed: 1803 -LC Present on Hospital Admission: Y  -LC Side: Left  -LC Location: foot  -LC Primary Wound Type: Other  -LC                  Wound 12/28/21 1803 Left lateral thigh Traumatic    Wound - Properties Group Placement Date: 12/28/21  -LC Placement Time: 1803 -LC Present on Hospital Admission: Y  -LC Side: Left  -LC Orientation: lateral  -LC Location: thigh  -LC Primary Wound Type: Traumatic  -LC        Retired Wound - Properties Group Date first assessed: 12/28/21  -LC Time first assessed: 1803 -LC Present on Hospital Admission: Y  -LC Side: Left  -LC Location: thigh  -LC Primary Wound Type: Traumatic  -LC                  Wound 12/31/21 2247 Bilateral perineum Pressure Injury    Wound - Properties Group Placement Date: 12/31/21  -AB Placement Time: 2247  -AB Present on Hospital Admission: Y  -LC Side: Bilateral  -LC Location: perineum  -AB Primary Wound Type: Pressure inj  -LC Stage, Pressure Injury : Stage 3  -LC Additional Comments: bilateral buttock pressure injuries  -LC        Retired Wound - Properties Group Date first assessed: 12/31/21  -AB Time first assessed: 2247  -AB Present on Hospital Admission: Y  -LC Side: Bilateral  -LC Location: perineum  -AB Primary Wound Type: Pressure inj  -LC Additional Comments: bilateral buttock pressure injuries  -LC                  Wound 01/11/22 1201 Left heel Pressure Injury    Wound - Properties Group Placement Date: 01/11/22  -DP Placement Time: 1201  -DP Present on Hospital  Admission: N  -DP Side: Left  -DP Location: heel  -DP Primary Wound Type: Pressure inj  -DP Stage, Pressure Injury : deep tissue injury  -DP        Retired Wound - Properties Group Date first assessed: 01/11/22  -DP Time first assessed: 1201  -DP Present on Hospital Admission: N  -DP Side: Left  -DP Location: heel  -DP Primary Wound Type: Pressure inj  -DP                  Wound 01/14/22 1216 Left anterior greater trochanter    Wound - Properties Group Placement Date: 01/14/22  -HB Placement Time: 1216  -HB Present on Hospital Admission: Y  -HB Side: Left  -HB Orientation: anterior  -HB Location: greater trochanter  -HB        Retired Wound - Properties Group Date first assessed: 01/14/22  -HB Time first assessed: 1216  -HB Present on Hospital Admission: Y  -HB Side: Left  -HB Location: greater trochanter  -HB                  Wound 01/14/22 1218 Right anterior foot    Wound - Properties Group Placement Date: 01/14/22  -HB Placement Time: 1218  -HB Side: Right  -HB Orientation: anterior  -HB Location: foot  -HB        Retired Wound - Properties Group Date first assessed: 01/14/22  -HB Time first assessed: 1218  -HB Side: Right  -HB Location: foot  -HB                  Wound 01/14/22 1220 Right anterior fifth toe Pressure Injury    Wound - Properties Group Placement Date: 01/14/22  -HB Placement Time: 1220  -HB Present on Hospital Admission: Y  -LC Side: Right  -HB Orientation: anterior  -HB Location: fifth toe  -HB Primary Wound Type: Pressure inj  -LC Stage, Pressure Injury : Stage 1  -LC        Retired Wound - Properties Group Date first assessed: 01/14/22  -HB Time first assessed: 1220  -HB Present on Hospital Admission: Y  -LC Side: Right  -HB Location: fifth toe  -HB Primary Wound Type: Pressure inj  -LC                  Plan of Care Review    Plan of Care Reviewed With patient  -JJ        Outcome Summary OT evaluation completed. pt modified independent for bed mobility with bed rail and HOB elevated. pt  required CGA x2 for functional transfers from elevated bed surface with RW and stand pivot transfers to recliner chair with RW. pt required min assist x 2 for functional transfers from recliner chair. anticipate pt will require mod-max assist for lb adls. pt cooperative and agreeable to work with therapies. pt would benefit from skilled OT services to address adl retraining, education with HEP and functional transfers. anticipate discharge to SNF  -JJ                  Transfer Goal 1 (OT)    Activity/Assistive Device (Transfer Goal 1, OT) toilet; commode, 3-in-1; walker, rolling  -JJ        [place] Level/Cues Needed (Transfer Goal 1, OT) supervision required  -JJ        Time Frame (Transfer Goal 1, OT) 5 days  -JJ        Progress/Outcome (Transfer Goal 1, OT) --  new goal  -JJ                  Dressing Goal 1 (OT)    Activity/Device (Dressing Goal 1, OT) upper body dressing  -JJ        [place]/Cues Needed (Dressing Goal 1, OT) supervision required  -JJ        Time Frame (Dressing Goal 1, OT) 5 days  -JJ        Progress/Outcome (Dressing Goal 1, OT) --  new goal  -JJ                  Patient Education Goal (OT)    Activity (Patient Education Goal, OT) pt will demonstrate I with B UE AROM HEP  -JJ        Time Frame (Patient Education Goal, OT) 5 days  -JJ        Progress/Outcome (Patient Education Goal, OT) --  new goal  -JJ                  Positioning and Restraints    Pre-Treatment Position in bed  -JJ        Post Treatment Position bed  -JJ        In Bed supine; call light within reach; encouraged to call for assist; exit alarm on  -J                  Therapy Assessment/Plan (OT)    Date of Referral to OT 01/19/22  -        Patient/Family Therapy Goal Statement (OT) pt states plan is to discharge from rehab facility  -        OT Diagnosis weakness sp fall and recent hospitalization  -        Rehab Potential (OT) good, to achieve stated therapy goals  -        Criteria for Skilled Therapeutic  Interventions Met (OT) yes; skilled treatment is necessary  -        Therapy Frequency (OT) 5 times/wk  -        Predicted Duration of Therapy Intervention (OT) x 1 week  -        Problem List (OT) balance; mobility; range of motion (ROM); strength; coordination  -        Activity Limitations Related to Problem List (OT) unable to ambulate safely; unable to transfer safely; BADLs not performed adequately or safely; IADLs not performed adequately or safely  -        Planned Therapy Interventions (OT) BADL retraining; transfer/mobility retraining; patient/caregiver education/training  -                  Therapy Plan Review/Discharge Plan (OT)    Anticipated Discharge Disposition (OT) skilled nursing facility; extended care facility  -              User Key  (r) = Recorded By, (t) = Taken By, (c) = Cosigned By    Initials Name Effective Dates    Carole Taylor, RN, CWON 07/26/21 -     Aislinn Painter, OTR 06/16/21 -     Aaliyah John RN 06/16/21 -     Shara Silva RN 06/16/21 -     Eileen Nickerson RN 07/02/21 -                  Occupational Therapy Education                 Title: PT OT SLP Therapies (In Progress)     Topic: Occupational Therapy (In Progress)     Point: ADL training (Done)     Description:   Instruct learner(s) on proper safety adaptation and remediation techniques during self care or transfers.   Instruct in proper use of assistive devices.              Learning Progress Summary           Patient Acceptance, E,TB, VU by  at 1/19/2022 1510    Comment: pt educated on adls, benefits of activity and safety with functional transfers and mobility                   Point: Home exercise program (Not Started)     Description:   Instruct learner(s) on appropriate technique for monitoring, assisting and/or progressing therapeutic exercises/activities.              Learner Progress:  Not documented in this visit.                      User Key     Initials Effective  Dates Name Provider Type Discipline     06/16/21 -  Aislinn Aponte, OTR Occupational Therapist OT                  OT Recommendation and Plan  Planned Therapy Interventions (OT): BADL retraining, transfer/mobility retraining, patient/caregiver education/training  Therapy Frequency (OT): 5 times/wk  Plan of Care Review  Plan of Care Reviewed With: patient  Outcome Summary: OT evaluation completed. pt modified independent for bed mobility with bed rail and HOB elevated. pt required CGA x2 for functional transfers from elevated bed surface with RW and stand pivot transfers to recliner chair with RW. pt required min assist x 2 for functional transfers from recliner chair. anticipate pt will require mod-max assist for lb adls. pt cooperative and agreeable to work with therapies. pt would benefit from skilled OT services to address adl retraining, education with HEP and functional transfers. anticipate discharge to SNF  Plan of Care Reviewed With: patient  Outcome Summary: OT evaluation completed. pt modified independent for bed mobility with bed rail and HOB elevated. pt required CGA x2 for functional transfers from elevated bed surface with RW and stand pivot transfers to recliner chair with RW. pt required min assist x 2 for functional transfers from recliner chair. anticipate pt will require mod-max assist for lb adls. pt cooperative and agreeable to work with therapies. pt would benefit from skilled OT services to address adl retraining, education with HEP and functional transfers. anticipate discharge to SNF     Outcome Measures     Row Name 01/19/22 7495             How much help from another is currently needed...    Putting on and taking off regular lower body clothing? 2  -JJ      Bathing (including washing, rinsing, and drying) 2  -JJ      Toileting (which includes using toilet bed pan or urinal) 2  -JJ      Putting on and taking off regular upper body clothing 3  -JJ      Taking care of personal  grooming (such as brushing teeth) 4  -JJ      Eating meals 4  -JJ      AM-PAC 6 Clicks Score (OT) 17  -              Functional Assessment    Outcome Measure Options AM-PAC 6 Clicks Daily Activity (OT)  -            User Key  (r) = Recorded By, (t) = Taken By, (c) = Cosigned By    Initials Name Provider Type    Aislinn Troy, OTDELTA Occupational Therapist                Time Calculation:    Time Calculation- OT     Row Name 01/19/22 1511             Time Calculation- OT    OT Start Time 1420  -            User Key  (r) = Recorded By, (t) = Taken By, (c) = Cosigned By    Initials Name Provider Type    Aislinn Troy OTDELTA Occupational Therapist              Therapy Charges for Today     Code Description Service Date Service Provider Modifiers Qty    [id number]  OT EVAL LOW COMPLEXITY 2 1/19/2022 Aislinn Aponte OTR GO 1               BETH Rocha  1/19/2022

## 2022-01-20 NOTE — PLAN OF CARE
Goal Outcome Evaluation:  Plan of Care Reviewed With: patient        Progress: improving  Outcome Summary: VSS. Rested well during the night. Cavanaugh catheter remains in place at this time. No complaints of pain or discomfort.

## 2022-01-20 NOTE — DISCHARGE SUMMARY
"Froilan Helton  1940  [id number]    Hospitalists Discharge Summary    Date of Admission: 1/14/2022  Date of Discharge:  1/20/2022    History of Present Illness from \[facility]\"" on admit:   \"The patient is an 81-year-old male who presented to the emergency department secondary to report of patient falling out of his wheelchair without injury.  He notes he was unable to get up for at least 1 to 2 days and has not eaten since discharge from this facility on 1/11/2021 (when he signed out AMA from this facility).  Provider in ER noted that patient had heart rate in the 30s and was hypotensive, gave atropine with resolution.  ER provider contacted cardiology to transfer for possible pacemaker implantation at [facility], however per cardiology this is not an option due to infection risk.      During recent admission he refused PICC line, IV antibiotics and transfer to SNF for wound care.  Since that time he notes he has been unable to take care of himself and home health did not come to his home.  He reports that his granddaughter who typically brings meals has not been to his home since that time either and he is not sure that she even was aware that he had left AMA. He states that he slept most of the entire time since discharge.  The patient's greatest concern at this moment is getting food for lunch.  He had I&D of left hip wounds during past admission per Dr. Aguero with wound VAC recommended however patient declined. He was followed in consultation by wound care, OT, PT, surgery, nephrology, ID. All recommendations were discounted by the patient and, since he was deemed competent to make his own decisions, he left AMA on 1/11/2022.     In the ER, spiritual care provider/ethics committee was contacted to attempt to send patient to SNF that had agreed to accept him during last admission.  Currently this facility does not have a bed available until Monday and therefore hospital admission was requested.     In " "ER diagnostics showed troponin 0.129, WBC 13.28, hemoglobin 11.5, proBNP 7243.0, creatinine 2.78.  A Cavanaugh was placed in ER with elevated WBCs and RBCs and purulent appearing urine.  Lactate 2.2. procalcitonin is negative.       On 1/4/2022 wound cultures resulted Enterococcus faecalis and MRSA that were sensitive to vancomycin.     He has a history of complicated wound infection, COPD, combined systolic and diastolic CHF, CKD stage IV, cardiomyopathy, CAD, hypertension, obesity, hyperlipidemia, hypothyroidism, chronic immobility, recurrent falls, recurrent medical noncompliance, permanent A. fib, DM2, obesity     He currently denies f/c/headache/rhinorrhea/nasal congestion/lightheadedness/syncopal sensation/cough/soa/n/v/d/chest pain/abdominal pain/recent illness/sick exposures/change in bowel or bladder habits/no weight change/bloody emesis or bloody stools/change in medications or any other new concerns.\"    Primary Discharge diagnoses:  Bradycardia  Left hip/buttock/lower extremity wound infections secondary to MRSA, Enterococcus and Pseudomonas    Secondary discharge diagnoses:   PAF  Chronic HFpEF  NSTEMI type II  CAD/HLD   Leukocytosis   ROLA on CKD stage IV  Hyperkalemia  Hyponatremia  Acute urinary retention, ruled out UTI  Lactic acidosis   Chronic immobility with frequent falls  DM2 in obese with hyperglycemia   COPD without exacerbation   Hypothyroidism   Chronic Iron deficiency anemia   Recurrent medical noncompliance   DNR status    Hospital Course Summary:   As per HPI, the patient was readmitted quickly after last discharge.  He has thrived in a hospital environment with staff assistance, PT/OT, wound care, meals provided for him and it is felt that he is best served in an environment such as this.  He has had recurrent admissions with similar concerns and question his ability to care for himself at home.  Thankfully after much discussion, patient has agreed to attend rehab.      ID, surgery and Wound " RN managed his chronic hip/buttock and lower extremity wound infections.  ID followed along initially on IV antibiotics and changed to oral Bactrim and Levaquin.  He will require ongoing antibiotic therapy as well as wound care. Surgery evaluated and signed off as there was no surgical intervention planned.     Cardiology followed for bradycardia. He is chronically bradycardic and is asymptomatic with this. Per cardiology, he is not a candidate for PPM due to recurrent skin infections.    Nephrology followed for initially elevated creatinine, felt secondary to mild dehydration and urinary retention, as his renal function improved with a Barajas catheter and fluid restriction off his home Bumex.  In the past 2 days his Bumex was resumed per nephrology, today changed to once daily due to hypotension.  His barajas was removed today.     PT/OT followed. He has been chronically immobile with frequent falls and PT/OT recommended continued therapy to maximize his function.     His glucose has been at goal on levemir 10 units nightly without need for SSI. Other medical conditions remained stable.   F/U nephrology 2 weeks  f/u pcp 1 week after discharge from facility.    PCP  Patient Care Team:  Fortunato De Leon MD as PCP - General (Family Medicine)    Consults:   Consults     Date and Time Order Name Status Description    1/14/2022  1:01 PM Inpatient Nephrology Consult Completed     1/14/2022 12:59 PM Inpatient Nephrology Consult Completed     1/14/2022 12:26 PM Inpatient Infectious Diseases Consult      1/14/2022 12:26 PM Inpatient General Surgery Consult Completed     12/29/2021 11:41 AM Inpatient Cardiology Consult Completed     12/29/2021  8:11 AM Inpatient General Surgery Consult Completed     12/16/2021 10:37 AM Inpatient Psychiatrist Consult Completed     12/11/2021  2:03 PM Inpatient Cardiology Consult Completed           Operations and Procedures Performed:       Adult Transthoracic Echo Complete W/ Cont if Necessary  Per Protocol    Result Date: 12/30/2021  Narrative: · Saline test results are negative. · There is severe calcification of the aortic valve mainly affecting the left coronary and right coronary cusp(s). · Calculated right ventricular systolic pressure from tricuspid regurgitation is 44 mmHg. · Mild pulmonary hypertension is present. · Estimated left ventricular EF = 55% Left ventricular systolic function is normal. · The right ventricular cavity is mildly dilated. · Mild dilation of the aortic root is present.      XR Knee 4+ View Bilateral    Result Date: 1/6/2022  Narrative: BILATERAL KNEE SERIES 01/06/2022  HISTORY: 81-year-old male with chronic bilateral knee pain. HISTORY of right knee replacement 3 years ago  TECHNIQUE: Frontal and lateral views of the knees were performed bilaterally. Comparison study 07/18/2021  FINDINGS: Left knee: There is tricompartmental degenerative change. There is enthesopathic change of the patella. No acute fracture. Minimal if any joint fluid present. Degenerative change is most prominent in the medial compartment and there is mild osteophytic spurring.  Right knee: The patient is status post total right knee arthroplasty. Surgical hardware appears intact and there is no acute fracture. There is enthesopathic change of the quadriceps tendon insertion upon the patella. No joint effusion.  There is atherosclerotic disease bilaterally.      Impression: 1. Moderate degenerative change of the left knee most prominent in the medial compartment. 2. Status post total knee replacement on the right.  This report was finalized on 1/6/2022 1:30 PM by Dr. Iam Reyes MD.      XR Chest 1 View    Result Date: 1/14/2022  Narrative: PROCEDURE: CR Chest 1 Vw COMPARISON:  October 2021 INDICATIONS: hypotension Relevant clinical info: Generalized weakness. ;Pt slid out of his wheelchair at home and was unable to get himself back into it.; TECHNIQUE: Single AP  view of the chest FINDINGS:   Cardiomediastinal silhouette is stable and enlarged with left ventricular prominence. Lung volumes are low. Patchy subtle opacities seen in the right midlung and lung base. No consolidation. Osseous structures are grossly intact.     Impression: Suggestion of airspace disease in the right lung, correlate clinically.  Signer Name: Maryanne Eubanks MD  Signed: 1/14/2022 7:14 AM  Workstation Name: [facility]  [facility] Renal Bilateral    Result Date: 12/28/2021  Narrative: US Renal Comp INDICATION: Acute on chronic kidney disease COMPARISON: 7/8/2021 FINDINGS: KIDNEYS:  The right kidney measures 4.8 x 4.8 x 9.5 cm. The left kidney measures 4.7 x 5.4 x 11 cm. Renal cortical thickness and echogenicity is normal.  No hydronephrosis. URINARY BLADDER:  The bladder is unremarkable.     Impression: Negative renal ultrasound. No hydronephrosis. No change from the previous ultrasound study. Signer Name: Jaylen Bhardwaj MD  Signed: 12/28/2021 7:38 PM  Workstation Name: [facility]  [facility]    Allergies:  is allergic to morphine, atorvastatin, and oxycontin [oxycodone hcl].    Joseph  reviewed    Discharge Medications:     Discharge Medications      New Medications      Instructions Start Date   acetaminophen 325 MG tablet  Commonly known as: TYLENOL   650 mg, Oral, Every 4 Hours PRN      Aquaphor Advanced Therapy ointment ointment   1 application, Topical, Every 12 Hours Scheduled      bisacodyl 5 MG EC tablet  Commonly known as: DULCOLAX   5 mg, Oral, Daily PRN      bisacodyl 10 MG suppository  Commonly known as: DULCOLAX   10 mg, Rectal, Daily PRN      docusate sodium 100 MG capsule  Commonly known as: COLACE   100 mg, Oral, 2 Times Daily      hydrocortisone-bacitracin-zinc oxide-nystatin  Commonly known as: MAGIC BARRIER   1 application, Topical, 3 Times Daily      insulin detemir 100 UNIT/ML injection  Commonly known as: LEVEMIR  Replaces: Levemir FlexTouch 100  "UNIT/ML injection   10 Units, Subcutaneous, Nightly      iron polysaccharides 150 MG capsule  Commonly known as: NIFEREX   150 mg, Oral, Daily   Start Date: January 21, 2022     levoFLOXacin 500 MG tablet  Commonly known as: LEVAQUIN   500 mg, Oral, Every 24 Hours      sodium hypochlorite 0.125 % solution topical solution 0.125%  Commonly known as: DAKIN'S 1/4 STRENGTH   473 mL, Topical, Daily   Start Date: January 21, 2022     sulfamethoxazole-trimethoprim 800-160 MG per tablet  Commonly known as: BACTRIM DS,SEPTRA DS   1 tablet, Oral, Every 24 Hours Scheduled   Start Date: January 21, 2022        Changes to Medications      Instructions Start Date   bumetanide 1 MG tablet  Commonly known as: BUMEX  What changed:   · medication strength  · how much to take  · when to take this   1 mg, Oral, Daily   Start Date: January 21, 2022        Continue These Medications      Instructions Start Date   albuterol sulfate  (90 Base) MCG/ACT inhaler  Commonly known as: PROVENTIL HFA;VENTOLIN HFA;PROAIR HFA   2 puffs, Inhalation, Every 4 Hours PRN      apixaban 2.5 MG tablet tablet  Commonly known as: ELIQUIS   2.5 mg, Oral, Every 12 Hours Scheduled      atorvastatin 10 MG tablet  Commonly known as: LIPITOR   10 mg, Oral, Nightly      budesonide-formoterol 160-4.5 MCG/ACT inhaler  Commonly known as: Symbicort   2 puffs, Inhalation, 2 Times Daily - RT      Cholecalciferol 50 MCG (2000 UT) tablet   2,000 Units, Oral, Daily      citalopram 10 MG tablet  Commonly known as: CeleXA   20 mg, Oral, Nightly      cyanocobalamin 1000 MCG tablet  Commonly known as: VITAMIN B-12   1,000 mcg, Oral, Daily      fluticasone 50 MCG/ACT nasal spray  Commonly known as: FLONASE   2 sprays, Each Nare, Daily      Insulin Syringe 30G X 5/16\" 1 ML misc   U UTD WITH INSULIN UP TO 6 TIMES A DAY      ipratropium-albuterol 0.5-2.5 mg/3 ml nebulizer  Commonly known as: DUO-NEB   3 mL, Nebulization, Every 4 Hours PRN      lactulose 10 GM/15ML " solution  Commonly known as: CHRONULAC   30 g, Oral, Daily PRN      levothyroxine 112 MCG tablet  Commonly known as: SYNTHROID, LEVOTHROID   224 mcg, Oral, Daily      melatonin 5 MG tablet tablet   5 mg, Oral, Nightly PRN, Over the counter      polyethylene glycol 17 g packet  Commonly known as: MIRALAX   17 g, Oral, Daily      pregabalin 75 MG capsule  Commonly known as: LYRICA   75 mg, Oral, 2 Times Daily      QUEtiapine 25 MG tablet  Commonly known as: SEROquel   0.5 tablets, Oral, Every Evening      tamsulosin 0.4 MG capsule 24 hr capsule  Commonly known as: FLOMAX   0.4 mg, Oral, Daily         Stop These Medications    insulin aspart 100 UNIT/ML injection  Commonly known as: novoLOG     Levemir FlexTouch 100 UNIT/ML injection  Generic drug: insulin detemir  Replaced by: insulin detemir 100 UNIT/ML injection     loratadine 5 MG chewable tablet  Commonly known as: CLARITIN     O2  Commonly known as: OXYGEN     REMEDY ANTIMICROBIAL CLEANSER EX     SITagliptin 100 MG tablet  Commonly known as: Januvia     vitamin D 1.25 MG (47372 UT) capsule capsule  Commonly known as: ERGOCALCIFEROL            Last Lab Results:   Lab Results (most recent)     Procedure Component Value Units Date/Time    POC Glucose Once [[id number]]  (Abnormal) Collected: 01/20/22 1034    Specimen: Blood Updated: 01/20/22 1040     Glucose 157 mg/dL      Comment: Meter: [id number] : [id number] Siri Guadalupe RN       POC Glucose Once [[id number]]  (Normal) Collected: 01/20/22 0730    Specimen: Blood Updated: 01/20/22 0737     Glucose 96 mg/dL      Comment: Meter: [id number] : [id number] Jacque Santamaria NURSING ASSISTANT       Renal Function Panel [[id number]]  (Abnormal) Collected: 01/20/22 0412    Specimen: Blood Updated: 01/20/22 0515     Glucose 111 mg/dL      BUN 42 mg/dL      Creatinine 2.30 mg/dL      Sodium 133 mmol/L      Potassium 4.9 mmol/L      Chloride 96 mmol/L      CO2 24.7 mmol/L      Calcium 9.0 mg/dL      Albumin 2.90 g/dL       Phosphorus 3.9 mg/dL      Anion Gap 12.3 mmol/L      BUN/Creatinine Ratio 18.3     eGFR Non African Amer 27 mL/min/1.73     Narrative:      GFR Normal >60  Chronic Kidney Disease <60  Kidney Failure <15      Blood Culture - Blood, Arm, Right [[id number]]  (Normal) Collected: 01/14/22 0718    Specimen: Blood from Arm, Right Updated: 01/19/22 0731     Blood Culture No growth at 5 days    Blood Culture - Blood, Hand, Right [[id number]]  (Normal) Collected: 01/14/22 0637    Specimen: Blood from Hand, Right Updated: 01/19/22 0646     Blood Culture No growth at 5 days    Renal Function Panel [[id number]]  (Abnormal) Collected: 01/19/22 0522    Specimen: Blood Updated: 01/19/22 0557     Glucose 94 mg/dL      BUN 41 mg/dL      Creatinine 2.12 mg/dL      Sodium 134 mmol/L      Potassium 5.0 mmol/L      Chloride 100 mmol/L      CO2 25.3 mmol/L      Calcium 9.2 mg/dL      Albumin 2.70 g/dL      Phosphorus 4.0 mg/dL      Anion Gap 8.7 mmol/L      BUN/Creatinine Ratio 19.3     eGFR Non African Amer 30 mL/min/1.73     Narrative:      GFR Normal >60  Chronic Kidney Disease <60  Kidney Failure <15      CBC & Differential [[id number]]  (Abnormal) Collected: 01/19/22 0522    Specimen: Blood Updated: 01/19/22 0529    Narrative:      The following orders were created for panel order CBC & Differential.  Procedure                               Abnormality         Status                     ---------                               -----------         ------                     CBC Auto Differential[[id number]]        Abnormal            Final result                 Please view results for these tests on the individual orders.    CBC Auto Differential [[id number]]  (Abnormal) Collected: 01/19/22 0522    Specimen: Blood Updated: 01/19/22 0529     WBC 7.05 10*3/mm3      RBC 3.47 10*6/mm3      Hemoglobin 9.9 g/dL      Hematocrit 31.5 %      MCV 90.8 fL      MCH 28.5 pg      MCHC 31.4 g/dL      RDW 16.1 %      RDW-SD 52.7 fl      MPV 10.7 fL       Platelets 145 10*3/mm3      Neutrophil % 65.5 %      Lymphocyte % 11.8 %      Monocyte % 11.9 %      Eosinophil % 9.6 %      Basophil % 0.9 %      Immature Grans % 0.3 %      Neutrophils, Absolute 4.62 10*3/mm3      Lymphocytes, Absolute 0.83 10*3/mm3      Monocytes, Absolute 0.84 10*3/mm3      Eosinophils, Absolute 0.68 10*3/mm3      Basophils, Absolute 0.06 10*3/mm3      Immature Grans, Absolute 0.02 10*3/mm3      nRBC 0.0 /100 WBC     Wound Culture - Wound, Buttock, Left [[id number]]  (Abnormal)  (Susceptibility) Collected: 01/14/22 1230    Specimen: Wound from Buttock, Left Updated: 01/18/22 1027     Wound Culture Moderate growth (3+) Pseudomonas aeruginosa     Gram Stain Rare (1+) WBCs seen      No organisms seen    Susceptibility      Pseudomonas aeruginosa     ALINE     Cefepime Susceptible     Ceftazidime Susceptible     Ciprofloxacin Susceptible     Gentamicin Susceptible     Levofloxacin Susceptible     Piperacillin + Tazobactam Intermediate  [1]                  [1]  Appended report. These results have been appended to a previously preliminary verified report.          Linear View               Susceptibility Comments     Pseudomonas aeruginosa    Pip/tazo to follow             Sedimentation Rate [[id number]]  (Abnormal) Collected: 01/17/22 0428    Specimen: Blood Updated: 01/17/22 1137     Sed Rate 34 mm/hr     Vancomycin, Random [[id number]]  (Normal) Collected: 01/17/22 0428    Specimen: Blood Updated: 01/17/22 0532     Vancomycin Random 15.40 mcg/mL     Narrative:      Therapeutic Ranges for Vancomycin    Vancomycin Random   5.0-40.0 mcg/mL  Vancomycin Trough   5.0-20.0 mcg/mL  Vancomycin Peak     20.0-40.0 mcg/mL    CBC (No Diff) [[id number]]  (Abnormal) Collected: 01/17/22 0428    Specimen: Blood Updated: 01/17/22 0502     WBC 7.07 10*3/mm3      RBC 3.50 10*6/mm3      Hemoglobin 9.9 g/dL      Hematocrit 32.7 %      MCV 93.4 fL      MCH 28.3 pg      MCHC 30.3 g/dL      RDW 15.7 %      RDW-SD 54.8 fl       MPV 10.6 fL      Platelets 154 10*3/mm3     Vancomycin, Random [[id number]]  (Normal) Collected: 01/16/22 1418    Specimen: Blood Updated: 01/16/22 1451     Vancomycin Random 19.40 mcg/mL     Narrative:      Therapeutic Ranges for Vancomycin    Vancomycin Random   5.0-40.0 mcg/mL  Vancomycin Trough   5.0-20.0 mcg/mL  Vancomycin Peak     20.0-40.0 mcg/mL    Urine Culture - Urine, Urine, Catheter [[id number]]  (Normal) Collected: 01/14/22 0757    Specimen: Urine, Catheter Updated: 01/15/22 1445     Urine Culture No growth    Magnesium [[id number]]  (Normal) Collected: 01/15/22 0506    Specimen: Blood Updated: 01/15/22 0547     Magnesium 2.4 mg/dL     CBC & Differential [[id number]]  (Abnormal) Collected: 01/15/22 0506    Specimen: Blood Updated: 01/15/22 0527    Narrative:      The following orders were created for panel order CBC & Differential.  Procedure                               Abnormality         Status                     ---------                               -----------         ------                     CBC Auto Differential[[id number]]        Abnormal            Final result                 Please view results for these tests on the individual orders.    CBC Auto Differential [[id number]]  (Abnormal) Collected: 01/15/22 0506    Specimen: Blood Updated: 01/15/22 0527     WBC 7.70 10*3/mm3      RBC 3.25 10*6/mm3      Hemoglobin 9.3 g/dL      Hematocrit 29.7 %      MCV 91.4 fL      MCH 28.6 pg      MCHC 31.3 g/dL      RDW 16.0 %      RDW-SD 53.1 fl      MPV 10.5 fL      Platelets 147 10*3/mm3      Neutrophil % 69.9 %      Lymphocyte % 13.1 %      Monocyte % 10.0 %      Eosinophil % 6.0 %      Basophil % 0.6 %      Immature Grans % 0.4 %      Neutrophils, Absolute 5.38 10*3/mm3      Lymphocytes, Absolute 1.01 10*3/mm3      Monocytes, Absolute 0.77 10*3/mm3      Eosinophils, Absolute 0.46 10*3/mm3      Basophils, Absolute 0.05 10*3/mm3      Immature Grans, Absolute 0.03 10*3/mm3      nRBC 0.0 /100 WBC      Troponin [[id number]]  (Abnormal) Collected: 01/15/22 0018    Specimen: Blood Updated: 01/15/22 0048     Troponin T 0.121 ng/mL     Narrative:      Troponin T Reference Range:  <= 0.03 ng/mL-   Negative for AMI  >0.03 ng/mL-     Abnormal for myocardial necrosis.  Clinicians would have to utilize clinical acumen, EKG, Troponin and serial changes to determine if it is an Acute Myocardial Infarction or myocardial injury due to an underlying chronic condition.       Results may be falsely decreased if patient taking Biotin.      Troponin [[id number]]  (Abnormal) Collected: 01/14/22 1824    Specimen: Blood Updated: 01/14/22 1927     Troponin T 0.126 ng/mL     Narrative:      Troponin T Reference Range:  <= 0.03 ng/mL-   Negative for AMI  >0.03 ng/mL-     Abnormal for myocardial necrosis.  Clinicians would have to utilize clinical acumen, EKG, Troponin and serial changes to determine if it is an Acute Myocardial Infarction or myocardial injury due to an underlying chronic condition.       Results may be falsely decreased if patient taking Biotin.      CK [[id number]]  (Normal) Collected: 01/14/22 0629    Specimen: Blood Updated: 01/14/22 1323     Creatine Kinase 61 U/L     STAT Lactic Acid, Reflex [[id number]]  (Normal) Collected: 01/14/22 0950    Specimen: Blood Updated: 01/14/22 1025     Lactate 1.3 mmol/L     COVID PRE-OP / PRE-PROCEDURE SCREENING ORDER (NO ISOLATION) - Swab, Nasopharynx [[id number]]  (Normal) Collected: 01/14/22 0757    Specimen: Swab from Nasopharynx Updated: 01/14/22 0829    Narrative:      The following orders were created for panel order COVID PRE-OP / PRE-PROCEDURE SCREENING ORDER (NO ISOLATION) - Swab, Nasopharynx.  Procedure                               Abnormality         Status                     ---------                               -----------         ------                     COVID-19 and FLU A/B PCR...[[id number]]  Normal              Final result                 Please view results for  these tests on the individual orders.    COVID-19 and FLU A/B PCR - Swab, Nasopharynx [[id number]]  (Normal) Collected: 01/14/22 0757    Specimen: Swab from Nasopharynx Updated: 01/14/22 0829     COVID19 Not Detected     Influenza A PCR Not Detected     Influenza B PCR Not Detected    Narrative:      Fact sheet for providers: https://www.fda.gov/media/663878/download    Fact sheet for patients: https://www.fda.gov/media/428777/download    Test performed by PCR.    Urinalysis, Microscopic Only - Urine, Catheter [[id number]]  (Abnormal) Collected: 01/14/22 0757    Specimen: Urine, Catheter Updated: 01/14/22 0826     RBC, UA 6-12 /HPF      WBC, UA 31-50 /HPF      Bacteria, UA Trace /HPF      Squamous Epithelial Cells, UA 0-2 /HPF      Hyaline Casts, UA None Seen /LPF      Methodology Manual Light Microscopy    Urinalysis With Microscopic If Indicated (No Culture) - Urine, Catheter [[id number]]  (Abnormal) Collected: 01/14/22 0757    Specimen: Urine, Catheter Updated: 01/14/22 0812     Color, UA Other     Appearance, UA Slightly Cloudy     pH, UA 6.0     Specific Gravity, UA 1.015     Glucose, UA Negative     Ketones, UA Negative     Bilirubin, UA Negative     Blood, UA Moderate (2+)     Protein,  mg/dL (2+)     Leuk Esterase, UA Moderate (2+)     Nitrite, UA Negative     Urobilinogen, UA 0.2 E.U./dL    Procalcitonin [[id number]]  (Normal) Collected: 01/14/22 0629    Specimen: Blood Updated: 01/14/22 0718     Procalcitonin 0.16 ng/mL     BNP [[id number]]  (Abnormal) Collected: 01/14/22 0629    Specimen: Blood Updated: 01/14/22 0717     proBNP 7,243.0 pg/mL     Narrative:      Among patients with dyspnea, NT-proBNP is highly sensitive for the detection of acute congestive heart failure. In addition NT-proBNP of <300 pg/ml effectively rules out acute congestive heart failure with 99% negative predictive value.    Results may be falsely decreased if patient taking Biotin.      Comprehensive Metabolic Panel [[id number]]   (Abnormal) Collected: 01/14/22 0629    Specimen: Blood Updated: 01/14/22 0712     Glucose 144 mg/dL      BUN 67 mg/dL      Creatinine 2.78 mg/dL      Sodium 135 mmol/L      Potassium 4.8 mmol/L      Chloride 97 mmol/L      CO2 25.8 mmol/L      Calcium 9.2 mg/dL      Total Protein 6.7 g/dL      Albumin 3.20 g/dL      ALT (SGPT) 10 U/L      AST (SGOT) 20 U/L      Alkaline Phosphatase 78 U/L      Total Bilirubin 0.3 mg/dL      eGFR Non African Amer 22 mL/min/1.73      Globulin 3.5 gm/dL      A/G Ratio 0.9 g/dL      BUN/Creatinine Ratio 24.1     Anion Gap 12.2 mmol/L     Narrative:      GFR Normal >60  Chronic Kidney Disease <60  Kidney Failure <15      Lactic Acid, Plasma [[id number]]  (Abnormal) Collected: 01/14/22 0637    Specimen: Blood Updated: 01/14/22 0712     Lactate 2.2 mmol/L         Imaging Results (Most Recent)     Procedure Component Value Units Date/Time    XR Chest 1 View [[id number]] Collected: 01/14/22 0714     Updated: 01/14/22 0716    Narrative:      PROCEDURE: CR Chest 1 Vw    COMPARISON:  October 2021     INDICATIONS: hypotension  Relevant clinical info: Generalized weakness. ;Pt slid out of his wheelchair at home and was unable to get himself back into it.;  TECHNIQUE: Single AP  view of the chest    FINDINGS:  Cardiomediastinal silhouette is stable and enlarged with left ventricular prominence. Lung volumes are low. Patchy subtle opacities seen in the right midlung and lung base. No consolidation. Osseous structures are grossly intact.      Impression:      Suggestion of airspace disease in the right lung, correlate clinically.         Signer Name: Maryanne Eubanks MD   Signed: 1/14/2022 7:14 AM   Workstation Name: [facility]    [facility]          PROCEDURES: NONE    Condition on Discharge:  stable    Physical Exam at Discharge  Vital Signs  Temp:  [97.5 °F (36.4 °C)-98.5 °F (36.9 °C)] 97.8 °F (36.6 °C)  Heart Rate:  [47-56] 51  Resp:  [16-20] 18  BP: ()/(46-68)  110/54   Body mass index is 30.45 kg/m².    Physical Exam  Vitals reviewed.   Constitutional:       Appearance: He is obese.      Comments: Pale, elderly, chronically ill appearance   HENT:      Head: Normocephalic and atraumatic.      Mouth/Throat:      Mouth: Mucous membranes are moist.   Eyes:      Extraocular Movements: Extraocular movements intact.      Pupils: Pupils are equal, round, and reactive to light.   Cardiovascular:      Rate and Rhythm: Regular rhythm. Bradycardia present.   Pulmonary:      Effort: Pulmonary effort is normal. No respiratory distress.      Breath sounds: Normal breath sounds. No wheezing or rales.   Abdominal:      General: Abdomen is flat. Bowel sounds are normal. There is no distension.      Palpations: Abdomen is soft.      Tenderness: There is no abdominal tenderness. There is no guarding.   Genitourinary:     Comments: Mao  Musculoskeletal:      Comments: Chronic 1+ bilateral lower extremity edema   Skin:     General: Skin is warm and dry.      Capillary Refill: Capillary refill takes less than 2 seconds.      Comments: Wound dressings clean and dry. See pictures of wounds in epic   Neurological:      General: No focal deficit present.      Mental Status: He is alert and oriented to person, place, and time.   Psychiatric:         Mood and Affect: Mood normal.         Behavior: Behavior normal.     Discharge Disposition  [facility]    Visiting Nurse:    Yes     Home PT/OT:  Yes     Home Safety Evaluation:  Yes     DME  TBD    Discharge Diet:      Dietary Orders (From admission, onward)     Start     Ordered    01/14/22 1800  Dietary Nutrition Supplement: Other (See Comment); at 10 am and 2 pm  2 Times Daily      Comments: Offer high protein snacks twice daily (1/2 sandwich or cereals with milk)   Question Answer Comment   Select Supplement: Other (See Comment)    Other at 10 am and 2 pm        01/14/22 1259    01/14/22 1258  DIET MESSAGE Pt having some trouble chewing and  requests meats be chopped.  Once        Comments: Pt having some trouble chewing and requests meats be chopped.    01/14/22 1259    01/14/22 1258  Diet Regular; Low Potassium, Daily Fluid Restriction; Other; 1,200; 2 gm (50 mEq)  Diet Effective Now        Question Answer Comment   Diet Texture / Consistency Regular    Common Modifiers Low Potassium    Common Modifiers Daily Fluid Restriction    Fluid Restriction Per Day: Other    mL Per Day 1200    Potassium Restriction: 2 gm (50 mEq)        01/14/22 1259                Activity at Discharge:  As tolerated with assistance    Pre-discharge education  Diabetic, Cardiac, Wound Care, Injectables, medications, follow-up    Follow-up Appointments  No future appointments.  Additional Instructions for the Follow-ups that You Need to Schedule     Discharge Follow-up with PCP   As directed       Currently Documented PCP:    Fortunato De Leon MD    PCP Phone Number:    [phone]     Follow Up Details: 1 week         Discharge Follow-up with Specified Provider: Nephrology; 2 Weeks   As directed      To: Nephrology    Follow Up: 2 Weeks               Test Results Pending at Discharge: None       Liat Hood, LEATHA  01/20/22  14:33 EST    Time: Discharge Over 30 min (if over 30 minutes give explanation as to why it took greater than 30 minutes)  Secondary to:   Coordination of care/follow up  Medication reconciliation  D/W patient and case management

## 2022-01-20 NOTE — THERAPY TREATMENT NOTE
Patient Name: Froilan Helton  : 1940    MRN: [id number]                              Today's Date: 2022       Admit Date: 2022    Visit Dx:     ICD-10-CM ICD-9-CM   1. Symptomatic bradycardia  R00.1 427.89   2. Acute renal failure superimposed on chronic kidney disease, unspecified CKD stage, unspecified acute renal failure type ([facility])  N17.9 584.9    N18.9 585.9     Patient Active Problem List   Diagnosis   • COPD (chronic obstructive pulmonary disease) ([facility])   • Type 2 diabetes mellitus with stage 4 chronic kidney disease, with long-term current use of insulin ([facility])   • Essential hypertension   • Primary osteoarthritis of left knee   • Chronic renal insufficiency, stage 4 (severe) ([facility])   • Class 2 severe obesity due to excess calories with serious comorbidity in adult ([facility])   • Physical debility   • Acute UTI (urinary tract infection)   • Permanent atrial fibrillation ([facility])   • H/O noncompliance with medical treatment, presenting hazards to health   • Myxedema   • Acute on chronic combined systolic and diastolic CHF (congestive heart failure) ([facility])   • Generalized weakness   • Recurrent left pleural effusion   • Fall on same level as cause of accidental injury   • Gross hematuria   • CAD (coronary artery disease)   • HLD (hyperlipidemia)   • Hypothyroidism   • CKD (chronic kidney disease) stage 3, GFR 30-59 ml/min ([facility])   • Acute metabolic encephalopathy   • Cardiomyopathy ([facility])   • Recurrent falls   • Acute renal failure superimposed on chronic kidney disease ([facility])   • Open wound of left foot   • Moderate malnutrition (CMS/[facility])   • Symptomatic bradycardia     Past Medical History:   Diagnosis Date   • A-fib ([facility])    • Arthritis    • Asthma    • CAD (coronary artery disease)    • Cardiomyopathy ([facility])    • Cataract    • CHF (congestive heart failure) ([facility])    • CKD (chronic kidney disease), stage III ([facility])    • COPD (chronic obstructive pulmonary disease) ([facility])    • Diabetes mellitus ([facility])    •  Disease of thyroid gland    • Emphysema, unspecified ([facility])    • Glaucoma    • Hyperlipidemia    • Hypertension    • Kidney stone    • Myocardial infarct, old    • Neuropathy    • Osteoarthritis    • Osteoporosis    • Permanent atrial fibrillation (HCC) 6/30/2020   • PVD (peripheral vascular disease) ([facility])    • Renal disorder    • Shingles      Past Surgical History:   Procedure Laterality Date   • COLONOSCOPY     • EYE SURGERY     • INCISION AND DRAINAGE LEG Left 12/30/2021    Procedure: debridement of left hip wounds and left foot wound;  Surgeon: Judie Aguero DO;  Location: [facility];  Service: General;  Laterality: Left;   • JOINT REPLACEMENT      right   • KIDNEY STONE SURGERY     • REPLACEMENT TOTAL KNEE     • TOE SURGERY        General Information     Row Name 01/20/22 0853          OT Time and Intention    Document Type therapy note (daily note)  -SD     Mode of Treatment occupational therapy  -SD     Row Name 01/20/22 0853          General Information    Existing Precautions/Restrictions fall  -SD     Row Name 01/20/22 0853          Cognition    Orientation Status (Cognition) oriented x 3  -SD     Row Name 01/20/22 0853          Safety Issues, Functional Mobility    Impairments Affecting Function (Mobility) balance; strength  -SD     Comment, Safety Issues/Impairments (Mobility) Pt with BLE weakness and history of chronic knee pain. Pt did report pain was better in his knees today.  -SD           User Key  (r) = Recorded By, (t) = Taken By, (c) = Cosigned By    Initials Name Provider Type    SD Sebastian Wright OTR Occupational Therapist                 Mobility/ADL's     Row Name 01/20/22 0854          Bed Mobility    Bed Mobility supine-sit  -SD     Supine-Sit [place] (Bed Mobility) contact guard  -SD     Assistive Device (Bed Mobility) head of bed elevated; bed rails  -SD     Row Name 01/20/22 0854          Transfers    Transfers sit-stand transfer  -SD     Bed-Chair [place]  (Transfers) minimum assist (75% patient effort); 2 person assist  -SD     Assistive Device (Bed-Chair Transfers) walker, front-wheeled  -SD     Sit-Stand [place] (Transfers) contact guard; minimum assist (75% patient effort)  -SD     Row Name 01/20/22 0854          Sit-Stand Transfer    Assistive Device (Sit-Stand Transfers) walker, front-wheeled  -SD     Row Name 01/20/22 0854          Functional Mobility    Functional Mobility- Comment Pt managed a stand step transfer with min assist. Pt's knees were in a significantly flexed position when standing from EOB and during step pivot transfer. Pt need verbal cues for safety when turing to sit in chair.  -SD     Row Name 01/20/22 0854          Activities of Daily Living    BADL Assessment/Intervention feeding  -[facility] Name 01/20/22 0854          Self-Feeding Assessment/Training    [place] Level (Feeding) feeding skills; prepare tray/open items; independent; liquids to mouth; scoop food and bring to mouth  -SD     Position (Self-Feeding) supported sitting  -SD     Comment (Feeding) Pt stated he has difficulty opening some of the food containers. Breakfast tray was set up and items were opened for pt. Pt was able to manage self feeding activity independently after set up.  -SD           User Key  (r) = Recorded By, (t) = Taken By, (c) = Cosigned By    Initials Name Provider Type    Sebastian Greene, BETH Occupational Therapist               Obj/Interventions     Row Name 01/20/22 0831          Balance    Comment, Balance I with sitting balance at EOB. CGA for static standing and min assist for dynamic standing.  -SD     Row Name 01/20/22 0858          Therapeutic Exercise    Therapeutic Exercise --  Education with pt regarding benefits of arom of BUE's. Pt with history of limited bilateral shoulder arom.  -SD           User Key  (r) = Recorded By, (t) = Taken By, (c) = Cosigned By    Initials Name Provider Type    Sebastian Greene OTR Occupational  Therapist               Goals/Plan     Row Name 01/20/22 0904          Transfer Goal 1 (OT)    Activity/Assistive Device (Transfer Goal 1, OT) toilet; commode, 3-in-1; walker, rolling  -SD     [place] Level/Cues Needed (Transfer Goal 1, OT) supervision required  -SD     Time Frame (Transfer Goal 1, OT) 5 days  -SD     Progress/Outcome (Transfer Goal 1, OT) goal ongoing  -SD     Row Name 01/20/22 0904          Dressing Goal 1 (OT)    Activity/Device (Dressing Goal 1, OT) upper body dressing  -SD     [place]/Cues Needed (Dressing Goal 1, OT) supervision required  -SD     Time Frame (Dressing Goal 1, OT) 5 days  -SD     Progress/Outcome (Dressing Goal 1, OT) goal ongoing  -SD           User Key  (r) = Recorded By, (t) = Taken By, (c) = Cosigned By    Initials Name Provider Type    Sebastian Greene, OTR Occupational Therapist               Clinical Impression     Row Name 01/20/22 0901          Pain Scale: Numbers Pre/Post-Treatment    Pre/Posttreatment Pain Comment Pt reports no pain today. He stated his knees (chronic issues) felt better today.  -SD     Pain Intervention(s) Repositioned  -SD     Row Name 01/20/22 0901          Plan of Care Review    Plan of Care Reviewed With patient  -SD     Outcome Summary OT: Pt cooperative with therapy. Pt managed bed mobility with CGA. Pt required CGA/Min assist to stand from EOB (with bed surface elevated). Pt required min assist/cues for stand step pivot transfer to a recliner. Pt required set up assistance to open containers for his breakfast tray. Pt able to manage self feeding independently after set up assistance. Plan is for transfer to SNF upon discharge.  -SD     Row Name 01/20/22 0901          Therapy Assessment/Plan (OT)    Patient/Family Therapy Goal Statement (OT) Pt states he is going to a rehab facility upon discharge  -SD     Row Name 01/20/22 0901          Therapy Plan Review/Discharge Plan (OT)    Anticipated Discharge Disposition (OT) skilled  nursing facility; extended care facility  -SD     Row Name 01/20/22 0901          Positioning and Restraints    Pre-Treatment Position in bed  -SD     Post Treatment Position chair  -SD     In Chair reclined; call light within reach; encouraged to call for assist  -SD           User Key  (r) = Recorded By, (t) = Taken By, (c) = Cosigned By    Initials Name Provider Type    Sebastian Greene OTR Occupational Therapist               Outcome Measures     Row Name 01/20/22 0905          How much help from another is currently needed...    Putting on and taking off regular lower body clothing? 2  -SD     Bathing (including washing, rinsing, and drying) 2  -SD     Toileting (which includes using toilet bed pan or urinal) 2  -SD     Putting on and taking off regular upper body clothing 3  -SD     Taking care of personal grooming (such as brushing teeth) 3  -SD     Eating meals 3  I after set up assistance of breakfast tray.  -SD     AM-PAC 6 Clicks Score (OT) 15  -SD        Functional Assessment    Outcome Measure Options AM-PAC 6 Clicks Daily Activity (OT)  -SD AM-PAC 6 Clicks Basic Mobility (PT)  -JW          User Key  (r) = Recorded By, (t) = Taken By, (c) = Cosigned By    Initials Name Provider Type    Sebastian Greene OTR Occupational Therapist                Occupational Therapy Education                 Title: PT OT SLP Therapies (In Progress)     Topic: Occupational Therapy (In Progress)     Point: ADL training (Done)     Description:   Instruct learner(s) on proper safety adaptation and remediation techniques during self care or transfers.   Instruct in proper use of assistive devices.              Learning Progress Summary           Patient Acceptance, E,TB,D, VU,NR by SD at 1/20/2022 0848    Comment: Education regarding benefits of activity and safety with bed mobility and transfers. Pt cooperative, yet he needs physical assistance/cues to manage stand pivot transfers safely.    Acceptance, E,TB, VU by  DOROTA at 1/19/2022 1510    Comment: pt educated on adls, benefits of activity and safety with functional transfers and mobility                   Point: Home exercise program (Not Started)     Description:   Instruct learner(s) on appropriate technique for monitoring, assisting and/or progressing therapeutic exercises/activities.              Learner Progress:  Not documented in this visit.                      User Key     Initials Effective Dates Name Provider Type Discipline    SD 06/16/21 -  Sebastian Wright OTR Occupational Therapist ADOLPH RAYA 06/16/21 -  Aislinn Aponte OTR Occupational Therapist OT              OT Recommendation and Plan     Plan of Care Review  Plan of Care Reviewed With: patient  Outcome Summary: OT: Pt cooperative with therapy. Pt managed bed mobility with CGA. Pt required CGA/Min assist to stand from EOB (with bed surface elevated). Pt required min assist/cues for stand step pivot transfer to a recliner. Pt required set up assistance to open containers for his breakfast tray. Pt able to manage self feeding independently after set up assistance. Plan is for transfer to SNF upon discharge.     Time Calculation:    Time Calculation- OT     Row Name 01/20/22 0907             Time Calculation- OT    OT Start Time 0824  -SD              Timed Charges    32120 - OT Therapeutic Activity Minutes 20  -SD              Total Minutes    Timed Charges Total Minutes 20  -SD       Total Minutes 20  -SD            User Key  (r) = Recorded By, (t) = Taken By, (c) = Cosigned By    Initials Name Provider Type    SD Sebastian Wright OTR Occupational Therapist              Therapy Charges for Today     Code Description Service Date Service Provider Modifiers Qty    [id number]  OT THERAPEUTIC ACT EA 15 MIN 1/20/2022 Sebastian Wright OTR GO 1               BETH Melgar  1/20/2022

## 2022-01-20 NOTE — PROGRESS NOTES
Contacted by staff regarding patient low blood pressure this a.m.  Change Bumex to once daily to resume tomorrow, suspect this is secondary to diuretic.  Give fluid now, monitor

## 2022-01-20 NOTE — PLAN OF CARE
Goal Outcome Evaluation:              Outcome Summary: PT: Patient agreeable to therapy.  Patient performs supine to sit with CGA and sit to stand with min assist from elevated bed surface. Patient attempts to take steps forward, however unable to safely due to significant bilateral knee flexion.  Patient able to complete stand pivot with rolling walker with min assist, requiring verbal cues for sequencing and safety during controlled descent.  Continue to recommend SNF at discharge, patient in agreement.

## 2022-01-20 NOTE — PROGRESS NOTES
[facility] Progress Note      Patient Name: Froilan Helton  : 1940  MRN: [id number]  Primary Care Physician:  Fortunato De Leon MD  Date of admission: 2022    Subjective     Interval History:   Feels fine; no SOB or CP  Appetite is good; no N/V  Was able to stand with PT earlier  Cavanaugh catheter remains in place; UOP 3.6 L yesterday      Review of Systems:   As noted above    Objective     Vitals:   Temp:  [97.5 °F (36.4 °C)-98.5 °F (36.9 °C)] 98.1 °F (36.7 °C)  Heart Rate:  [47-60] 49  Resp:  [16-20] 16  BP: (108-155)/(48-78) 135/60    Intake/Output Summary (Last 24 hours) at 2022 0847  Last data filed at 2022 0545  Gross per 24 hour   Intake 720 ml   Output 3550 ml   Net -2830 ml       Physical Exam:    General Appearance: alert, NAD, chr ill, pale  Skin: warm and dry  HEENT: oral mucosa normal, nonicteric sclera, AT/NC  Neck: supple, no JVD  Lungs: Mostly clear; not labored on RA  Heart: RR, maria a, no rub  Abdomen: soft, nontender, nondistended, BS +  : no palpable bladder; F/C anchored  Extremities: +1 edema  Neuro: normal speech; moves all extremities  Psych:  Flat affect and depressed mood    Scheduled Meds:     apixaban, 2.5 mg, Oral, Q12H  Aquaphor Advanced Therapy, 1 application, Topical, Q12H  atorvastatin, 10 mg, Oral, Nightly  budesonide-formoterol, 2 puff, Inhalation, BID - RT  bumetanide, 1 mg, Oral, BID  cholecalciferol, 2,000 Units, Oral, Daily  citalopram, 20 mg, Oral, Nightly  docusate sodium, 100 mg, Oral, BID  fluticasone, 2 spray, Each Nare, Daily  hydrocortisone-bacitracin-zinc oxide-nystatin, 1 application, Topical, TID  insulin detemir, 10 Units, Subcutaneous, Nightly  iron polysaccharides, 150 mg, Oral, Daily  levoFLOXacin, 500 mg, Oral, Q24H  levothyroxine, 224 mcg, Oral, Q AM  polyethylene glycol, 17 g, Oral, Daily  pregabalin, 75 mg, Oral, BID  QUEtiapine, 12.5 mg, Oral, Q PM  sodium hypochlorite, , Topical,  Daily  sulfamethoxazole-trimethoprim, 1 tablet, Oral, Q24H  tamsulosin, 0.4 mg, Oral, Daily  cyanocobalamin, 1,000 mcg, Oral, Daily      IV Meds:        Results Reviewed:   I have personally reviewed the results from the time of this admission to 1/20/2022 08:47 EST     Results from last 7 days   Lab Units 01/20/22 0412 01/19/22 0522 01/18/22  0439 01/15/22  0506 01/14/22  0629   SODIUM mmol/L 133* 134* 135*   < > 135*   POTASSIUM mmol/L 4.9 5.0 5.5*   < > 4.8   CHLORIDE mmol/L 96* 100 102   < > 97*   CO2 mmol/L 24.7 25.3 24.2   < > 25.8   BUN mg/dL 42* 41* 41*   < > 67*   CREATININE mg/dL 2.30* 2.12* 1.86*   < > 2.78*   CALCIUM mg/dL 9.0 9.2 9.0   < > 9.2   BILIRUBIN mg/dL  --   --   --   --  0.3   ALK PHOS U/L  --   --   --   --  78   ALT (SGPT) U/L  --   --   --   --  10   AST (SGOT) U/L  --   --   --   --  20   GLUCOSE mg/dL 111* 94 84   < > 144*    < > = values in this interval not displayed.       Estimated Creatinine Clearance: 31.7 mL/min (A) (by C-G formula based on SCr of 2.3 mg/dL (H)).    Results from last 7 days   Lab Units 01/20/22 0412 01/19/22 0522 01/18/22 0439 01/16/22  0456 01/15/22  0506   MAGNESIUM mg/dL  --   --   --   --  2.4   PHOSPHORUS mg/dL 3.9 4.0 3.5   < > 4.4    < > = values in this interval not displayed.             Results from last 7 days   Lab Units 01/19/22 0522 01/17/22  0428 01/15/22  0506 01/14/22  0629   WBC 10*3/mm3 7.05 7.07 7.70 13.28*   HEMOGLOBIN g/dL 9.9* 9.9* 9.3* 11.5*   PLATELETS 10*3/mm3 145 154 147 191             Assessment / Plan     ASSESSMENT:  1.  ROLA on CKD4 (baseline creatinine around 2.0 mg/dL), non-oliguric, stable, all and all: previous ROLA likely due to prerenal state from volume depletion in the setting of poor PO intake and loop diuretic.  Central vol acceptable; chr leg edema; stable lytes with resolved hyperkalemia  2.  Chronic diastolic congestive heart failure  3.  Hypertension  4.  Underlying CAD  5.  Atiral fib with slow rate  6.  Left  hip/buttock/lower extremity wound, polymicrobial (MRSA, Enterococcus, and Pseudomonas)   7.  Poor insight to health and overall medical noncompliance    PLAN:  1.  Continue oral bumetanide 1 mg BID.  Will need to tolerate degree of prerenal azotemia to preclude CHF  2.  Continue Cavanaugh catheter  3.  Nursing home soon  4.  Discussed with LEATHA Ford, earlier this morning    Thank you for involving us in the care of Froilan Helton.  Please feel free to call with any questions.    Ellis Centeno MD  01/20/22  08:47 [facility]    [facility]  [phone]      Much of this encounter note is an electronic transcription/translation of spoken language to printed text. The electronic translation of spoken language may permit erroneous, or at times, nonsensical words or phrases to be inadvertently transcribed; Although I have reviewed the note for such errors, some may still exist.

## 2022-01-20 NOTE — CASE MANAGEMENT/SOCIAL WORK
Continued Stay Note  MICHAEL Kincaid     Patient Name: Froilan Helton  MRN: [id number]  Today's Date: 1/20/2022    Admit Date: 1/14/2022     Discharge Plan     Row Name 01/20/22 1019       Plan    Plan [facility] STR, skilled    Patient/Family in Agreement with Plan yes    Plan Comments Call received from Polina at [facility] and state they are able to accept patient today after 4:00pm. CM updated provider of such. CM will continue to follow for needs.               Discharge Codes    No documentation.                     Elizabeth Arguello RN

## 2022-01-20 NOTE — PLAN OF CARE
Problem: Adult Inpatient Plan of Care  Goal: Plan of Care Review  Recent Flowsheet Documentation  Taken 1/20/2022 0901 by Sebastian Wright OTR  Plan of Care Reviewed With: patient  Outcome Summary: OT: Pt cooperative with therapy. Pt managed bed mobility with CGA. Pt required CGA/Min assist to stand from EOB (with bed surface elevated). Pt required min assist/cues for stand step pivot transfer to a recliner. Pt required set up assistance to open containers for his breakfast tray. Pt able to manage self feeding independently after set up assistance. Plan is for transfer to SNF upon discharge.

## 2022-01-20 NOTE — PROGRESS NOTES
"SERVICE: [facility] HOSPITALIST    CONSULTANTS: nephrology, ID    CHIEF COMPLAINT: f/u chronic wound infections, ROLA    SUBJECTIVE: The patient remains agreeable to rehab. PT/OT feels patient could benefit from therapy. Trialling catheter removal today. Tolerating oral diet without difficulty, sleeping well. Good spirits. Awaiting SNF for STR.     OBJECTIVE:    /60 (BP Location: Left arm, Patient Position: Lying)   Pulse (!) 49   Temp 98.1 °F (36.7 °C) (Oral)   Resp 16   Ht 182.9 cm (72\")   Wt 102 kg (224 lb 8 oz)   SpO2 96%   BMI 30.45 kg/m²     MEDS/LABS REVIEWED AND ORDERED    apixaban, 2.5 mg, Oral, Q12H  Aquaphor Advanced Therapy, 1 application, Topical, Q12H  atorvastatin, 10 mg, Oral, Nightly  budesonide-formoterol, 2 puff, Inhalation, BID - RT  bumetanide, 1 mg, Oral, BID  cholecalciferol, 2,000 Units, Oral, Daily  citalopram, 20 mg, Oral, Nightly  docusate sodium, 100 mg, Oral, BID  fluticasone, 2 spray, Each Nare, Daily  hydrocortisone-bacitracin-zinc oxide-nystatin, 1 application, Topical, TID  insulin detemir, 10 Units, Subcutaneous, Nightly  iron polysaccharides, 150 mg, Oral, Daily  levoFLOXacin, 500 mg, Oral, Q24H  levothyroxine, 224 mcg, Oral, Q AM  polyethylene glycol, 17 g, Oral, Daily  pregabalin, 75 mg, Oral, BID  QUEtiapine, 12.5 mg, Oral, Q PM  sodium hypochlorite, , Topical, Daily  sulfamethoxazole-trimethoprim, 1 tablet, Oral, Q24H  tamsulosin, 0.4 mg, Oral, Daily  cyanocobalamin, 1,000 mcg, Oral, Daily      Physical Exam  Vitals reviewed.   Constitutional:       Appearance: He is obese.      Comments: Pale, elderly, chronically ill appearance   HENT:      Head: Normocephalic and atraumatic.      Mouth/Throat:      Mouth: Mucous membranes are moist.   Eyes:      Extraocular Movements: Extraocular movements intact.      Pupils: Pupils are equal, round, and reactive to light.   Cardiovascular:      Rate and Rhythm: Regular rhythm. Bradycardia present.   Pulmonary: "      Effort: Pulmonary effort is normal. No respiratory distress.      Breath sounds: Normal breath sounds. No wheezing or rales.   Abdominal:      General: Abdomen is flat. Bowel sounds are normal. There is no distension.      Palpations: Abdomen is soft.      Tenderness: There is no abdominal tenderness. There is no guarding.   Genitourinary:     Comments: Cavanaugh  Musculoskeletal:      Comments: Chronic 1+ bilateral lower extremity edema   Skin:     General: Skin is warm and dry.      Capillary Refill: Capillary refill takes less than 2 seconds.      Comments: Wound dressings clean and dry   Neurological:      General: No focal deficit present.      Mental Status: He is alert and oriented to person, place, and time.   Psychiatric:         Mood and Affect: Mood normal.         Behavior: Behavior normal.       LAB/DIAGNOSTICS:    Lab Results (last 24 hours)     Procedure Component Value Units Date/Time    POC Glucose Once [[id number]]  (Normal) Collected: 01/20/22 0730    Specimen: Blood Updated: 01/20/22 0737     Glucose 96 mg/dL      Comment: Meter: [id number] : [id number] Jacque Santamaria NURSING ASSISTANT       Renal Function Panel [[id number]]  (Abnormal) Collected: 01/20/22 0412    Specimen: Blood Updated: 01/20/22 0515     Glucose 111 mg/dL      BUN 42 mg/dL      Creatinine 2.30 mg/dL      Sodium 133 mmol/L      Potassium 4.9 mmol/L      Chloride 96 mmol/L      CO2 24.7 mmol/L      Calcium 9.0 mg/dL      Albumin 2.90 g/dL      Phosphorus 3.9 mg/dL      Anion Gap 12.3 mmol/L      BUN/Creatinine Ratio 18.3     eGFR Non African Amer 27 mL/min/1.73     Narrative:      GFR Normal >60  Chronic Kidney Disease <60  Kidney Failure <15      POC Glucose Once [[id number]]  (Abnormal) Collected: 01/19/22 1939    Specimen: Blood Updated: 01/19/22 1945     Glucose 151 mg/dL      Comment: Meter: [id number] : [id number] Oliver Cazares CNA       POC Glucose Once [[id number]]  (Normal) Collected: 01/19/22 1623    Specimen: Blood  Updated: 01/19/22 1631     Glucose 125 mg/dL      Comment: Meter: [id number] : [id number] Mings Hina NURSING ASSISTANT       POC Glucose Once [[id number]]  (Normal) Collected: 01/19/22 1133    Specimen: Blood Updated: 01/19/22 1149     Glucose 120 mg/dL      Comment: Meter: [id number] : [id number] Mings Hina NURSING ASSISTANT           ECG 12 Lead   Preliminary Result   HEART RATE= 56  bpm   RR Interval= 1064  ms   ME Interval=   ms   P Horizontal Axis=   deg   P Front Axis=   deg   QRSD Interval= 108  ms   QT Interval= 492  ms   QRS Axis= -24  deg   T Wave Axis= 52  deg   - ABNORMAL ECG -   Atrial fibrillation   Probable LVH with secondary repol abnrm   Inferior infarct, old   Electronically Signed By:    Date and Time of Study: 2022-01-20 09:35:13      ECG 12 Lead   Final Result   HEART RATE= 46  bpm   RR Interval= 1304  ms   ME Interval= 91  ms   P Horizontal Axis= 13  deg   P Front Axis= 0  deg   QRSD Interval= 123  ms   QT Interval= 527  ms   QRS Axis= -9  deg   T Wave Axis= 85  deg   - ABNORMAL ECG -   Sinus bradycardia   Nonspecific intraventricular conduction delay   Non-specific STT wave change   NO SIGNIFICANT CHANGE FROM PREVIOUS ECG   Electronically Signed By: Darwin Monaco ([facility]) 18-Jan-2022 11:06:39   Date and Time of Study: 2022-01-18 06:59:37      ECG 12 Lead   Final Result   HEART RATE= 50  bpm   RR Interval= 1196  ms   ME Interval= 288  ms   P Horizontal Axis= 34  deg   P Front Axis= 41  deg   QRSD Interval= 125  ms   QT Interval= 514  ms   QRS Axis= -6  deg   T Wave Axis= 73  deg   - ABNORMAL ECG -   Sinus rhythm/bradycardia   Prolonged ME interval   LVH with secondary repolarization abnormality   NO SIGNIFICANT CHANGE FROM PREVIOUS ECG   Electronically Signed By: Darwin Monaco ([facility]) 18-Jan-2022 11:06:17   Date and Time of Study: 2022-01-15 10:27:37      ECG 12 Lead   Final Result   HEART RATE= 55  bpm   RR Interval= 1084  ms   ME Interval= 48  ms   P Horizontal Axis= -25  deg   P Front  Axis= 0  deg   QRSD Interval= 113  ms   QT Interval= 511  ms   QRS Axis= 8  deg   T Wave Axis= 116  deg   - BORDERLINE ECG -   Sinus rhythm   Prolonged OH interval   c/w prior ecg, bradycardia no longer seen   Electronically Signed By: Aline Lopez ([facility]) 14-Jan-2022 08:26:43   Date and Time of Study: 2022-01-14 06:28:44      ECG 12 Lead   Final Result   HEART RATE= 47  bpm   RR Interval= 1273  ms   OH Interval= 168  ms   P Horizontal Axis= -17  deg   P Front Axis= 0  deg   QRSD Interval= 116  ms   QT Interval= 622  ms   QRS Axis= -9  deg   T Wave Axis= 60  deg   - ABNORMAL ECG -   Supraventricular bigeminy   Nonspecific intraventricular conduction delay   Inferior infarct, old   Prolonged QT interval   Sinus bradycardia   c/w prior ecg, bradycardia has worsened.   Electronically Signed By: Aline Lopez ([facility]) 14-Jan-2022 08:27:25   Date and Time of Study: 2022-01-14 06:21:49        Results for orders placed during the hospital encounter of 12/28/21    Adult Transthoracic Echo Complete W/ Cont if Necessary Per Protocol    Interpretation Summary  · Saline test results are negative.  · There is severe calcification of the aortic valve mainly affecting the left coronary and right coronary cusp(s).  · Calculated right ventricular systolic pressure from tricuspid regurgitation is 44 mmHg.  · Mild pulmonary hypertension is present.  · Estimated left ventricular EF = 55% Left ventricular systolic function is normal.  · The right ventricular cavity is mildly dilated.  · Mild dilation of the aortic root is present.    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, Enterococcus and Pseudomonas:  Leukocytosis: ID and surgery followed, wound RN  Continues to do well on oral Bactrim and Levaquin  Will need ongoing wound care  Follow-up with surgery as needed    ROLA on CKD stage IV:  Hyperkalemia:  Hyponatremia  Acute urinary retention, ruled out UTI: nephrology following  Lactic acidosis: secondary to dehydration, resolved  Creatinine increased again back to closer to patient's baseline at 2.3 today back on home Bumex, also on Bactrim  Removing Cavanaugh today, urinary retention orders placed with bladder scans  Continue 2 g low potassium diet, 1200 mL fluid restriction  Continue home Flomax  Monitor     Chronic immobility with frequent falls:  Working with PT/OT well, recommendation is for rehab, continue  Awaiting possible placement     DM2 in obese with hyperglycemia: A1c 5.3% last checked 12/28/2021  A.m. glucose continues at goal on Levemir 10 units nightly, continue  No further need for Accu-Cheks    Stable diagnoses:     COPD without exacerbation:  Nothing acute currently on symbicort and prn albuterol     Hypothyroidism: continues levothyroxine 224 mcg daily  Repeat TFTs 6 weeks     Chronic Iron deficiency anemia:  Continues home niferex, B12 supplements without active blood loss  Hemoglobin remained stable at 9.9, last check 1/19/2022, monitor at intervals     Recurrent medical noncompliance: counseled extensively this and each prior admission  Currently highly agreeable to rehab setting     DNR status    PLAN FOR DISPOSITION: Awaiting placement    LEATHA Ford  Hospitalist, [facility]  01/20/22  09:35 EST    \"Dictated utilizing Dragon dictation\"    "

## 2022-01-20 NOTE — THERAPY TREATMENT NOTE
Acute Care - Physical Therapy Treatment Note  MICHAEL Kincaid     Patient Name: Froilan Helton  : 1940  MRN: [id number]  Today's Date: 2022   Onset of Illness/Injury or Date of Surgery: 22  Visit Dx:     ICD-10-CM ICD-9-CM   1. Symptomatic bradycardia  R00.1 427.89   2. Acute renal failure superimposed on chronic kidney disease, unspecified CKD stage, unspecified acute renal failure type ([facility])  N17.9 584.9    N18.9 585.9     Patient Active Problem List   Diagnosis   • COPD (chronic obstructive pulmonary disease) ([facility])   • Type 2 diabetes mellitus with stage 4 chronic kidney disease, with long-term current use of insulin ([facility])   • Essential hypertension   • Primary osteoarthritis of left knee   • Chronic renal insufficiency, stage 4 (severe) ([facility])   • Class 2 severe obesity due to excess calories with serious comorbidity in adult ([facility])   • Physical debility   • Acute UTI (urinary tract infection)   • Permanent atrial fibrillation ([facility])   • H/O noncompliance with medical treatment, presenting hazards to health   • Myxedema   • Acute on chronic combined systolic and diastolic CHF (congestive heart failure) ([facility])   • Generalized weakness   • Recurrent left pleural effusion   • Fall on same level as cause of accidental injury   • Gross hematuria   • CAD (coronary artery disease)   • HLD (hyperlipidemia)   • Hypothyroidism   • CKD (chronic kidney disease) stage 3, GFR 30-59 ml/min ([facility])   • Acute metabolic encephalopathy   • Cardiomyopathy ([facility])   • Recurrent falls   • Acute renal failure superimposed on chronic kidney disease ([facility])   • Open wound of left foot   • Moderate malnutrition (CMS/[facility])   • Symptomatic bradycardia     Past Medical History:   Diagnosis Date   • A-fib ([facility])    • Arthritis    • Asthma    • CAD (coronary artery disease)    • Cardiomyopathy ([facility])    • Cataract    • CHF (congestive heart failure) ([facility])    • CKD (chronic kidney disease), stage III ([facility])    • COPD (chronic obstructive  pulmonary disease) ([facility])    • Diabetes mellitus ([facility])    • Disease of thyroid gland    • Emphysema, unspecified (HCC)    • Glaucoma    • Hyperlipidemia    • Hypertension    • Kidney stone    • Myocardial infarct, old    • Neuropathy    • Osteoarthritis    • Osteoporosis    • Permanent atrial fibrillation (HCC) 6/30/2020   • PVD (peripheral vascular disease) (HCC)    • Renal disorder    • Shingles      Past Surgical History:   Procedure Laterality Date   • COLONOSCOPY     • EYE SURGERY     • INCISION AND DRAINAGE LEG Left 12/30/2021    Procedure: debridement of left hip wounds and left foot wound;  Surgeon: Judie Aguero DO;  Location: [facility];  Service: General;  Laterality: Left;   • JOINT REPLACEMENT      right   • KIDNEY STONE SURGERY     • REPLACEMENT TOTAL KNEE     • TOE SURGERY       PT Assessment (last 12 hours)     PT Evaluation and Treatment     Row Name 01/20/22 0824          Physical Therapy Time and Intention    Subjective Information complains of; fatigue  -     Document Type therapy note (daily note)  -     Mode of Treatment physical therapy  -     Patient Effort adequate  -     Symptoms Noted During/After Treatment none  -     Row Name 01/20/22 0824          General Information    General Observations of Patient pt supine, agrees to therapy  -     Existing Precautions/Restrictions fall  -     Row Name 01/20/22 0824          Cognition    Personal Safety Interventions gait belt; nonskid shoes/slippers when out of bed  -     Row Name 01/20/22 0824          Pain Scale: Numbers Pre/Post-Treatment    Pre/Posttreatment Pain Comment pt reports no pain this morning.  Pt states his knees feel much better today  -     Row Name 01/20/22 0824          Bed Mobility    Bed Mobility supine-sit  -     Supine-Sit [place] (Bed Mobility) contact guard  -     Assistive Device (Bed Mobility) bed rails; head of bed elevated  -     Row Name 01/20/22 0824          Transfers    Transfers  sit-stand transfer; stand-sit transfer; bed-chair transfer  -     Comment (Transfers) verbal cues for hand placement  -     Bed-Chair [place] (Transfers) minimum assist (75% patient effort); verbal cues  -     Assistive Device (Bed-Chair Transfers) walker, front-wheeled  -JW     Sit-Stand [place] (Transfers) minimum assist (75% patient effort); verbal cues  -     Stand-Sit [place] (Transfers) verbal cues; minimum assist (75% patient effort)  requires assistance for controlled descent into chair  -     Row Name 01/20/22 0824          Sit-Stand Transfer    Assistive Device (Sit-Stand Transfers) walker, front-wheeled  -BA     Row Name 01/20/22 0824          Stand-Sit Transfer    Assistive Device (Stand-Sit Transfers) walker, front-wheeled  -BA     Row Name 01/20/22 0824          Stand Pivot/Stand Step Transfer    Stand Pivot/Stand Step [place] (Transfers) minimum assist (75% patient effort); verbal cues  -     Assistive Device (Stand Pivot Stand Step Transfer) walker, front-wheeled  -BA     Row Name 01/20/22 0824          Gait/Stairs (Locomotion)    Comment (Gait/Stairs) attempted to take steps however unable to perform due to significant bilateral knee flexion  -     Row Name 01/20/22 0824          Safety Issues, Functional Mobility    Safety Issues Affecting Function (Mobility) judgment; positioning of assistive device  -     Row Name             Wound 12/28/21 1800 Right medial leg Blisters    Wound - Properties Group Placement Date: 12/28/21  - Placement Time: 1800  -LC Present on Hospital Admission: Y  -LC Side: Right  -LC Orientation: medial  -LC Location: leg  -LC Primary Wound Type: Blisters  -LC     Retired Wound - Properties Group Date first assessed: 12/28/21  - Time first assessed: 1800  -LC Present on Hospital Admission: Y  -LC Side: Right  -LC Location: leg  -LC Primary Wound Type: Blisters  -LC     Row Name             Wound 12/28/21 1802 Left medial leg Blisters     Wound - Properties Group Placement Date: 12/28/21  -LC Placement Time: 1802 -LC Present on Hospital Admission: Y  -LC Side: Left  -LC Orientation: medial  -LC Location: leg  -LC Primary Wound Type: Blisters  -LC     Retired Wound - Properties Group Date first assessed: 12/28/21  -LC Time first assessed: 1802 -LC Present on Hospital Admission: Y  -LC Side: Left  -LC Location: leg  -LC Primary Wound Type: Blisters  -LC     Row Name             Wound 12/28/21 1803 Left medial foot Other (comment)    Wound - Properties Group Placement Date: 12/28/21  -LC Placement Time: 1803 -LC Present on Hospital Admission: Y  -LC Side: Left  -LC Orientation: medial  -LC Location: foot  -LC Primary Wound Type: Other  -LC     Retired Wound - Properties Group Date first assessed: 12/28/21  -LC Time first assessed: 1803 -LC Present on Hospital Admission: Y  -LC Side: Left  -LC Location: foot  -LC Primary Wound Type: Other  -LC     Row Name             Wound 12/28/21 1803 Left lateral thigh Traumatic    Wound - Properties Group Placement Date: 12/28/21  -LC Placement Time: 1803 -LC Present on Hospital Admission: Y  -LC Side: Left  -LC Orientation: lateral  -LC Location: thigh  -LC Primary Wound Type: Traumatic  -LC     Retired Wound - Properties Group Date first assessed: 12/28/21  -LC Time first assessed: 1803 -LC Present on Hospital Admission: Y  -LC Side: Left  -LC Location: thigh  -LC Primary Wound Type: Traumatic  -LC     Row Name             Wound 12/31/21 2247 Bilateral perineum Pressure Injury    Wound - Properties Group Placement Date: 12/31/21  -AB Placement Time: 2247 -AB Present on Hospital Admission: Y  -LC Side: Bilateral  -LC Location: perineum  -AB Primary Wound Type: Pressure inj  -LC Stage, Pressure Injury : Stage 3  -LC Additional Comments: bilateral buttock pressure injuries  -LC     Retired Wound - Properties Group Date first assessed: 12/31/21  -AB Time first assessed: 2247 -AB Present on Hospital  Admission: Y  -LC Side: Bilateral  -LC Location: perineum  -AB Primary Wound Type: Pressure inj  -LC Additional Comments: bilateral buttock pressure injuries  -LC     Row Name             Wound 01/11/22 1201 Left heel Pressure Injury    Wound - Properties Group Placement Date: 01/11/22  -DP Placement Time: 1201  -DP Present on Hospital Admission: N  -DP Side: Left  -DP Location: heel  -DP Primary Wound Type: Pressure inj  -DP Stage, Pressure Injury : deep tissue injury  -DP     Retired Wound - Properties Group Date first assessed: 01/11/22  -DP Time first assessed: 1201  -DP Present on Hospital Admission: N  -DP Side: Left  -DP Location: heel  -DP Primary Wound Type: Pressure inj  -DP     Row Name             Wound 01/14/22 1216 Left anterior greater trochanter    Wound - Properties Group Placement Date: 01/14/22  -HB Placement Time: 1216  -HB Present on Hospital Admission: Y  -HB Side: Left  -HB Orientation: anterior  -HB Location: greater trochanter  -HB     Retired Wound - Properties Group Date first assessed: 01/14/22  -HB Time first assessed: 1216  -HB Present on Hospital Admission: Y  -HB Side: Left  -HB Location: greater trochanter  -HB     Row Name             Wound 01/14/22 1218 Right anterior foot    Wound - Properties Group Placement Date: 01/14/22  -HB Placement Time: 1218  -HB Side: Right  -HB Orientation: anterior  -HB Location: foot  -HB     Retired Wound - Properties Group Date first assessed: 01/14/22  -HB Time first assessed: 1218  -HB Side: Right  -HB Location: foot  -HB     Row Name             Wound 01/14/22 1220 Right anterior fifth toe Pressure Injury    Wound - Properties Group Placement Date: 01/14/22  -HB Placement Time: 1220  -HB Present on Hospital Admission: Y  -LC Side: Right  -HB Orientation: anterior  -HB Location: fifth toe  -HB Primary Wound Type: Pressure inj  -LC Stage, Pressure Injury : Stage 1  -LC     Retired Wound - Properties Group Date first assessed: 01/14/22  -HB Time  first assessed: 1220  -HB Present on Hospital Admission: Y  -LC Side: Right  -HB Location: fifth toe  -HB Primary Wound Type: Pressure inj  -LC     Row Name 01/20/22 0824          Plan of Care Review    Outcome Summary PT: Patient agreeable to therapy.  Patient performs supine to sit with CGA and sit to stand with min assist from elevated bed surface. Patient attempts to take steps forward, however unable to safely due to significant bilateral knee flexion.  Patient able to complete stand pivot with rolling walker with min assist, requiring verbal cues for sequencing and safety during controlled descent.  Continue to recommend SNF at discharge, patient in agreement.  -     Row Name 01/20/22 0824          Positioning and Restraints    Pre-Treatment Position in bed  -     Post Treatment Position chair  -     In Chair reclined; call light within reach; encouraged to call for assist; with other staff  MD present  -     Row Name 01/20/22 0824          Progress Summary (PT)    Progress Toward Functional Goals (PT) progress toward functional goals is good  -           User Key  (r) = Recorded By, (t) = Taken By, (c) = Cosigned By    Initials Name Provider Type     Carole Fish, RN, CWON Registered Nurse    Zoila Cazares, PT Physical Therapist    Aaliyah John, RN Registered Nurse    Shara Silva, RN Registered Nurse    Eileen Nickerson, RN Registered Nurse                Physical Therapy Education                 Title: PT OT SLP Therapies (In Progress)     Topic: Physical Therapy (Done)     Point: Mobility training (Done)     Learning Progress Summary           Patient Acceptance, E,TB, VU by  at 1/20/2022 0855    Acceptance, E,TB, VU by  at 1/19/2022 1500                               User Key     Initials Effective Dates Name Provider Type Discipline     06/16/21 -  Chloe Coffman, PT Physical Therapist PT    BA 06/16/21 -  Zoila Estrella PT Physical Therapist PT              PT  Recommendation and Plan  Anticipated Discharge Disposition (PT): skilled nursing facility, extended care facility  Progress Summary (PT)  Progress Toward Functional Goals (PT): progress toward functional goals is good  Outcome Summary: PT: Patient agreeable to therapy.  Patient performs supine to sit with CGA and sit to stand with min assist from elevated bed surface. Patient attempts to take steps forward, however unable to safely due to significant bilateral knee flexion.  Patient able to complete stand pivot with rolling walker with min assist, requiring verbal cues for sequencing and safety during controlled descent.  Continue to recommend SNF at discharge, patient in agreement.   Outcome Measures     Row Name 01/20/22 0824 01/19/22 1080          How much help from another person do you currently need...    Turning from your back to your side while in flat bed without using bedrails? 4  -JW --     Moving from lying on back to sitting on the side of a flat bed without bedrails? 3  -JW --     Moving to and from a bed to a chair (including a wheelchair)? 3  -JW --     Standing up from a chair using your arms (e.g., wheelchair, bedside chair)? 3  -JW --     Climbing 3-5 steps with a railing? 1  -JW --     To walk in hospital room? 1  -JW --     AM-PAC 6 Clicks Score (PT) 15  -JW --            How much help from another is currently needed...    Putting on and taking off regular lower body clothing? -- 2  -JJ     Bathing (including washing, rinsing, and drying) -- 2  -JJ     Toileting (which includes using toilet bed pan or urinal) -- 2  -JJ     Putting on and taking off regular upper body clothing -- 3  -JJ     Taking care of personal grooming (such as brushing teeth) -- 4  -JJ     Eating meals -- 4  -JJ     AM-PAC 6 Clicks Score (OT) -- 17  -JJ            Functional Assessment    Outcome Measure Options AM-PAC 6 Clicks Basic Mobility (PT)  -JW AM-PAC 6 Clicks Daily Activity (OT)  -JJ           User Key  (r) =  Recorded By, (t) = Taken By, (c) = Cosigned By    Initials Name Provider Type    Aislinn Troy, OTR Occupational Therapist    Zoila Cazares, LORENZO Physical Therapist                 Time Calculation:    PT Charges     Row Name 01/20/22 0856             Time Calculation    Start Time 0824  -      Stop Time 0838  -      Time Calculation (min) 14 min  -JW      PT Received On 01/20/22  -JW      PT - Next Appointment 01/21/22  -              Timed Charges    87358 - PT Therapeutic Exercise Minutes 14  -              Total Minutes    Timed Charges Total Minutes 14  -       Total Minutes 14  -            User Key  (r) = Recorded By, (t) = Taken By, (c) = Cosigned By    Initials Name Provider Type    Zoila Cazares PT Physical Therapist              Therapy Charges for Today     Code Description Service Date Service Provider Modifiers Qty    [id number] HC PT THER PROC EA 15 MIN 1/20/2022 Zoila Estrella, LORENZO GP 1          PT G-Codes  Outcome Measure Options: AM-PAC 6 Clicks Basic Mobility (PT)  AM-PAC 6 Clicks Score (PT): 15  AM-PAC 6 Clicks Score (OT): 17    Zoila Estrella PT  1/20/2022

## 2022-01-21 NOTE — CASE MANAGEMENT/SOCIAL WORK
Case Management Discharge Note      Final Note: Discharged to [facility]    Provided Post Acute Provider List?: Yes  Post Acute Provider List: Inpatient Rehab, Nursing Home  Provided Post Acute Provider Quality & Resource List?: Yes  Post Acute Provider Quality and Resource List: Inpatient Rehab, Nursing Home  Delivered To: Patient  Method of Delivery: In person    Selected Continued Care - Discharged on 1/20/2022 Admission date: 1/14/2022 - Discharge disposition: Rehab Facility or Unit (DC - External)    Destination Coordination complete.    Service Provider Selected Services Address Phone Fax [facility]    [facility] [address] [facility] [address] [phone] --          Durable Medical Equipment    No services have been selected for the patient.              Dialysis/Infusion    No services have been selected for the patient.              Home Medical Care    No services have been selected for the patient.              Therapy    No services have been selected for the patient.              Community Resources    No services have been selected for the patient.              Community & DME    No services have been selected for the patient.                Selected Continued Care - Prior Encounters Includes selections from prior encounters from 10/16/2021 to 1/20/2022    Discharged on 1/11/2022 Admission date: 12/28/2021 - Discharge disposition: Left Against Medical Advice    Home Medical Care     Service Provider Selected Services Address Phone Fax Patient Preferred    [facility] [address] [facility] [address] [address] [phone] --                         Final Discharge Disposition Code: 03 - skilled nursing facility (SNF)

## 2022-01-26 NOTE — DISCHARGE SUMMARY
Froilan Helton  1940  [id number]        Discharge Summary    Date of Admission: 12/28/2021  Date of Discharge:  1/11/2022    Primary Discharge Diagnoses:     Acute kidney injury chronic disease stage III  Left hip leg decubitus  Chronic systolic congestive heart failure decompensated  Adult-onset diabetes mellitus  Impaired mobility with inability to care for himself at home with multiple medical problems  Noncompliance with treatment/ therapies    Secondary Discharge Diagnoses:     Chronic atrial fibrillation on anticoagulation  Ischemic cardiomyopathy  Hypothyroidism  Multiple falls  Immobility syndrome  COPD  GERD  Hyperlipidemia    PCP  Patient Care Team:  Fortunato De Leon MD as PCP - General (Family Medicine)    Consults:   Consults     Date and Time Order Name Status Description    12/29/2021 11:41 AM Inpatient Cardiology Consult Completed     12/29/2021  8:11 AM Inpatient General Surgery Consult Completed     12/16/2021 10:37 AM Inpatient Psychiatrist Consult Completed     12/11/2021  2:03 PM Inpatient Cardiology Consult Completed             History of Present Illness:    81-year-old white male lives alone at home with admitted to [facility] from 12/10 to 12/20/2021 with metabolic encephalopathy severe depression anxiety himself out AMA.  Recommended he go to a skilled nursing facility but he refused.  He brings himself to emergency room today because of increased lower extremity weakness swelling and questionable new ulcers.   He complains of increasing shortness of breath weakness difficulty ambulating with use of a scooter at home.  He has home health at home but states that they do not do much for him.  Patient had left hip ulcer for several weeks and he thinks he new foot ulcer secondary to recent fall    Hospital Course      Patient mid to the hospital start IV fluids IV antibiotics and surgical consultation obtained. He underwent a debridement of his wound on 12/30/2021 wound culture grew  out MRSA Enterococcus and appropriate implants were started. He is acute kidney injury improved fluid status improved. He underwent a second debridement and general surgery felt he required 10 to 14 days of IV vancomycin. Patient mobility was less than optimal felt that he cannot take care of himself at home. Multiple discussions between discharge planning is daughter and the patient were done. Patient refused to have a PICC line placed and refused to have any home therapies refused to go to a skilled nursing facility he was discharged AGAINST MEDICAL ADVICE    Operations and Procedures Performed:  Procedure(s):  debridement of left hip wounds and left foot wound     Adult Transthoracic Echo Complete W/ Cont if Necessary Per Protocol    Result Date: 12/30/2021  Narrative: · Saline test results are negative. · There is severe calcification of the aortic valve mainly affecting the left coronary and right coronary cusp(s). · Calculated right ventricular systolic pressure from tricuspid regurgitation is 44 mmHg. · Mild pulmonary hypertension is present. · Estimated left ventricular EF = 55% Left ventricular systolic function is normal. · The right ventricular cavity is mildly dilated. · Mild dilation of the aortic root is present.      XR Knee 4+ View Bilateral    Result Date: 1/6/2022  Narrative: BILATERAL KNEE SERIES 01/06/2022  HISTORY: 81-year-old male with chronic bilateral knee pain. HISTORY of right knee replacement 3 years ago  TECHNIQUE: Frontal and lateral views of the knees were performed bilaterally. Comparison study 07/18/2021  FINDINGS: Left knee: There is tricompartmental degenerative change. There is enthesopathic change of the patella. No acute fracture. Minimal if any joint fluid present. Degenerative change is most prominent in the medial compartment and there is mild osteophytic spurring.  Right knee: The patient is status post total right knee arthroplasty. Surgical hardware appears intact and there  is no acute fracture. There is enthesopathic change of the quadriceps tendon insertion upon the patella. No joint effusion.  There is atherosclerotic disease bilaterally.      Impression: 1. Moderate degenerative change of the left knee most prominent in the medial compartment. 2. Status post total knee replacement on the right.  This report was finalized on 1/6/2022 1:30 PM by Dr. Iam Reyes MD.      XR Chest 1 View    Result Date: 1/14/2022  Narrative: PROCEDURE: CR Chest 1 Vw COMPARISON:  October 2021 INDICATIONS: hypotension Relevant clinical info: Generalized weakness. ;Pt slid out of his wheelchair at home and was unable to get himself back into it.; TECHNIQUE: Single AP  view of the chest FINDINGS:  Cardiomediastinal silhouette is stable and enlarged with left ventricular prominence. Lung volumes are low. Patchy subtle opacities seen in the right midlung and lung base. No consolidation. Osseous structures are grossly intact.     Impression: Suggestion of airspace disease in the right lung, correlate clinically.  Signer Name: Maryanne Eubanks MD  Signed: 1/14/2022 7:14 AM  Workstation Name: [email or web address]  [facility]    US Renal Bilateral    Result Date: 12/28/2021  Narrative: US Renal Comp INDICATION: Acute on chronic kidney disease COMPARISON: 7/8/2021 FINDINGS: KIDNEYS:  The right kidney measures 4.8 x 4.8 x 9.5 cm. The left kidney measures 4.7 x 5.4 x 11 cm. Renal cortical thickness and echogenicity is normal.  No hydronephrosis. URINARY BLADDER:  The bladder is unremarkable.     Impression: Negative renal ultrasound. No hydronephrosis. No change from the previous ultrasound study. Signer Name: Jaylen Bhardwaj MD  Signed: 12/28/2021 7:38 PM  Workstation Name: [email or web address]  [facility]      Labs:        Invalid input(s): NEUTOPHILPCT  Results from last 7 days   Lab Units 01/20/22  0412   SODIUM mmol/L 133*   POTASSIUM mmol/L 4.9   CHLORIDE mmol/L 96*   CO2 mmol/L  24.7   BUN mg/dL 42*   CREATININE mg/dL 2.30*   CALCIUM mg/dL 9.0   GLUCOSE mg/dL 111*           Lab Results (last 24 hours)     Procedure Component Value Units Date/Time    POC Glucose Once [[id number]]  (Abnormal) Collected: 01/11/22 0821    Specimen: Blood Updated: 01/11/22 0828     Glucose 69 mg/dL      Comment: Meter: [id number] : [id number] Mario Maryan NA       Renal Function Panel [[id number]]  (Abnormal) Collected: 01/11/22 0351    Specimen: Blood Updated: 01/11/22 0518     Glucose 68 mg/dL      BUN 46 mg/dL      Creatinine 2.07 mg/dL      Sodium 134 mmol/L      Potassium 4.2 mmol/L      Chloride 96 mmol/L      CO2 30.4 mmol/L      Calcium 8.5 mg/dL      Albumin 2.90 g/dL      Phosphorus 3.9 mg/dL      Anion Gap 7.6 mmol/L      BUN/Creatinine Ratio 22.2     eGFR Non African Amer 31 mL/min/1.73     Narrative:      GFR Normal >60  Chronic Kidney Disease <60  Kidney Failure <15      CBC (No Diff) [[id number]]  (Abnormal) Collected: 01/11/22 0351    Specimen: Blood Updated: 01/11/22 0448     WBC 8.53 10*3/mm3      RBC 3.67 10*6/mm3      Hemoglobin 10.3 g/dL      Hematocrit 33.0 %      MCV 89.9 fL      MCH 28.1 pg      MCHC 31.2 g/dL      RDW 15.6 %      RDW-SD 50.8 fl      MPV 10.4 fL      Platelets 167 10*3/mm3     POC Glucose Once [[id number]]  (Abnormal) Collected: 01/10/22 2010    Specimen: Blood Updated: 01/10/22 2015     Glucose 165 mg/dL      Comment: Meter: [id number] : [id number] Ruthann Cass NA       POC Glucose Once [[id number]]  (Abnormal) Collected: 01/10/22 1810    Specimen: Blood Updated: 01/10/22 1817     Glucose 151 mg/dL      Comment: Meter: [id number] : [id number] Chris Romeo CNA       POC Glucose Once [[id number]]  (Normal) Collected: 01/10/22 1647    Specimen: Blood Updated: 01/10/22 1653     Glucose 125 mg/dL      Comment: Meter: [id number] : [id number] Chris Romeo CNA                                     Invalid input(s): LDLCALC          No results found for:  POCGLU                      Radiology:  Imaging Results (Last 24 Hours)     ** No results found for the last 24 hours. **          PROCEDURES  Procedure(s):  debridement of left hip wounds and left foot wound        Allergies:  is allergic to morphine, atorvastatin, and oxycontin [oxycodone hcl].      Discharge Medications:     Your medication list      START taking these medications      Instructions Last Dose Given Next Dose Due   sodium hypochlorite 0.25 % topical solution  Commonly known as: DAKIN'S      Apply  topically to the appropriate area as directed 1 (One) Time for 1 dose.          CHANGE how you take these medications      Instructions Last Dose Given Next Dose Due   Eliquis 2.5 MG tablet tablet  Generic drug: apixaban  What changed:   · medication strength  · how much to take      Take 1 tablet by mouth Every 12 (Twelve) Hours. Indications: Atrial Fibrillation       bumetanide 2 MG tablet  Commonly known as: BUMEX  What changed: how much to take      Take 1 tablet by mouth 2 (Two) Times a Day.       SITagliptin 100 MG tablet  Commonly known as: Januvia  What changed:   · how much to take  · how to take this      Take 0.5 tablets by mouth Daily.       Levemir FlexTouch 100 UNIT/ML injection  Generic drug: insulin detemir  What changed: See the new instructions.      Inject 25 Units under the skin into the appropriate area as directed Every Night.       O2  Commonly known as: OXYGEN  What changed: how much to take      Inhale 1 L/min every night at bedtime.          CONTINUE taking these medications      Instructions Last Dose Given Next Dose Due   albuterol sulfate  (90 Base) MCG/ACT inhaler  Commonly known as: PROVENTIL HFA;VENTOLIN HFA;PROAIR HFA      Inhale 2 puffs Every 4 (Four) Hours As Needed for Wheezing.       atorvastatin 10 MG tablet  Commonly known as: LIPITOR      Every Night.       budesonide-formoterol 160-4.5 MCG/ACT inhaler  Commonly known as: Symbicort      Inhale 2 puffs 2 (Two)  "Times a Day.       Cholecalciferol 50 MCG (2000 UT) tablet      Take 2,000 Units by mouth Daily.       citalopram 10 MG tablet  Commonly known as: CeleXA      Take 20 mg by mouth Every Night.       cyanocobalamin 1000 MCG tablet  Commonly known as: VITAMIN B-12      Take 1 tablet by mouth Daily.       fluticasone 50 MCG/ACT nasal spray  Commonly known as: FLONASE      2 sprays by Each Nare route Daily.       insulin aspart 100 UNIT/ML injection  Commonly known as: novoLOG      Inject 1-14 Units under the skin into the appropriate area as directed 3 (Three) Times a Day Before Meals. As directed per sliding scale       Insulin Syringe 30G X 5/16\" 1 ML misc      U UTD WITH INSULIN UP TO 6 TIMES A DAY       ipratropium-albuterol 0.5-2.5 mg/3 ml nebulizer  Commonly known as: DUO-NEB      Take 3 mL by nebulization Every 4 (Four) Hours As Needed for Wheezing.       lactulose 10 GM/15ML solution  Commonly known as: CHRONULAC      Take 30 g by mouth Daily As Needed.       levothyroxine 112 MCG tablet  Commonly known as: SYNTHROID, LEVOTHROID      Take 224 mcg by mouth Daily.       loratadine 5 MG chewable tablet  Commonly known as: CLARITIN      Chew 10 mg Daily.       melatonin 5 MG tablet tablet      Take 1 tablet by mouth At Night As Needed (sleep). Over the counter       polyethylene glycol 17 g packet  Commonly known as: MIRALAX      Take 17 g by mouth Daily.       pregabalin 75 MG capsule  Commonly known as: LYRICA      Take 1 capsule by mouth 2 (Two) Times a Day.       QUEtiapine 25 MG tablet  Commonly known as: SEROquel      Take 0.5 tablets by mouth Every Evening.       REMEDY ANTIMICROBIAL CLEANSER EX      Apply  topically Every 1 (One) Hour As Needed.       tamsulosin 0.4 MG capsule 24 hr capsule  Commonly known as: FLOMAX      Take 1 capsule by mouth Daily.       vitamin D 1.25 MG (42516 UT) capsule capsule  Commonly known as: ERGOCALCIFEROL      Take 1,250 Units by mouth Daily.          STOP taking these " medications    acetaminophen 325 MG tablet  Commonly known as: TYLENOL        amiodarone 200 MG tablet  Commonly known as: PACERONE        bisacodyl 10 MG suppository  Commonly known as: DULCOLAX        carvedilol 6.25 MG tablet  Commonly known as: COREG        docusate sodium 100 MG capsule  Commonly known as: COLACE        hydrocortisone 1 % cream        hydrophor ointment ointment        Petrolatum 42 % ointment        sodium chloride 0.65 % nasal spray              Where to Get Your Medications      These medications were sent to [address] [address]    Hours: 7:00AM TO 5:00PM MON TO FRI Phone: [phone]   · Eliquis 2.5 MG tablet tablet     These medications were sent to [place] [address] [address] - [phone]  - [phone] [place] [address] [facility] [address]    Phone: [phone]   · sodium hypochlorite 0.25 % topical solution     Information about where to get these medications is not yet available    Ask your nurse or doctor about these medications  · Levemir FlexTouch 100 UNIT/ML injection  · SITagliptin 100 MG tablet         Home medications and discharge medications reconciled with patient      Condition on Discharge: Not stable for discharge    Discharge Disposition  Home    Visiting Nurse:    Yes     Home PT/OT:  Yes     Home Safety Evaluation:  Yes     DME  None    Discharge Diet: Cardiac consistent carb       Activity at Discharge:          Follow-up Appointments:  Additional Instructions for the Follow-ups that You Need to Schedule     Call MD for problems / concerns.   As directed      Discharge Follow-up with PCP   As directed       Currently Documented PCP:    Fortunato De Leon MD    PCP Phone Number:    [phone]     Follow Up Details: one  week            Contact information for follow-up providers     Fortunato De Leon MD .    Specialty: Family Medicine  Why: one  week  Contact  information:  [address] [facility] [address]  [phone]             Fortunato De Leon MD .    Specialty: Family Medicine  Contact information:  [address] [place] [address]  [phone]                   Contact information for after-discharge care     Home Medical Care     [facility] .    Service: Home Health Services  Contact information:  [address]  [facility] [address]  [phone]                             Test Results Pending at Discharge       Randolph Tovar MD  01/26/22  07:44 EST          Much of this encounter note is an electronic transcription/translation of spoken language to printed text. The electronic translation of spoken language may permit erroneous, or at times, nonsensical words or phrases to be inadvertently transcribed; Although I have reviewed the note for such errors, some may still exist.

## 2022-04-15 PROBLEM — M62.82 RHABDOMYOLYSIS: Status: ACTIVE | Noted: 2022-01-01

## 2022-04-16 PROBLEM — Z91.199 PATIENT'S NONCOMPLIANCE WITH OTHER MEDICAL TREATMENT AND REGIMEN: Status: ACTIVE | Noted: 2022-01-01

## 2022-04-16 PROBLEM — B95.62 METHICILLIN RESISTANT STAPHYLOCOCCUS AUREUS INFECTION AS THE CAUSE OF DISEASES CLASSIFIED ELSEWHERE: Status: ACTIVE | Noted: 2022-01-01

## 2022-04-16 PROBLEM — M62.3 PARAPLEGIC IMMOBILITY SYNDROME: Status: ACTIVE | Noted: 2022-01-01

## 2022-04-16 PROBLEM — E87.1 HYPO-OSMOLALITY AND HYPONATREMIA: Status: ACTIVE | Noted: 2022-01-01

## 2022-04-16 PROBLEM — D50.0 IRON DEFICIENCY ANEMIA SECONDARY TO BLOOD LOSS (CHRONIC): Status: ACTIVE | Noted: 2022-01-01

## 2022-04-16 PROBLEM — E55.9 VITAMIN D DEFICIENCY: Status: ACTIVE | Noted: 2022-01-01

## 2022-04-16 PROBLEM — E11.40 DIABETIC NEUROPATHY (HCC): Status: ACTIVE | Noted: 2022-01-01

## 2022-04-16 PROBLEM — R32 URINARY INCONTINENCE: Status: ACTIVE | Noted: 2022-01-01

## 2022-04-16 PROBLEM — L97.821 NON-PRESSURE CHRONIC ULCER OF OTHER PART OF LEFT LOWER LEG LIMITED TO BREAKDOWN OF SKIN (HCC): Status: ACTIVE | Noted: 2022-01-01

## 2022-04-16 PROBLEM — U07.1 COVID-19: Status: ACTIVE | Noted: 2022-01-01

## 2022-04-16 PROBLEM — R60.9 EDEMA: Status: ACTIVE | Noted: 2022-01-01

## 2022-04-16 PROBLEM — I87.2 PERIPHERAL VENOUS INSUFFICIENCY: Status: ACTIVE | Noted: 2022-01-01

## 2022-04-16 PROBLEM — B96.5 PSEUDOMONAS (AERUGINOSA) (MALLEI) (PSEUDOMALLEI) AS THE CAUSE OF DISEASES CLASSIFIED ELSEWHERE: Status: ACTIVE | Noted: 2022-01-01

## 2022-04-16 PROBLEM — L97.512 NON-PRESSURE CHRONIC ULCER OF OTHER PART OF RIGHT FOOT WITH FAT LAYER EXPOSED (HCC): Status: ACTIVE | Noted: 2022-01-01

## 2022-04-16 PROBLEM — E11.21 DIABETIC RENAL DISEASE: Status: ACTIVE | Noted: 2022-01-01

## 2022-04-16 PROBLEM — I13.0 HYPERTENSIVE HEART DISEASE WITH CONGESTIVE HEART FAILURE AND CHRONIC KIDNEY DISEASE (HCC): Status: ACTIVE | Noted: 2022-01-01

## 2022-04-16 PROBLEM — L89.309 PRESSURE ULCER OF BUTTOCK: Status: ACTIVE | Noted: 2022-01-01

## 2022-04-16 PROBLEM — B95.2 ENTEROCOCCUS AS THE CAUSE OF DISEASES CLASSIFIED ELSEWHERE: Status: ACTIVE | Noted: 2022-01-01

## 2022-04-16 PROBLEM — R33.9 RETENTION OF URINE: Status: ACTIVE | Noted: 2022-01-01

## 2022-04-16 PROBLEM — N40.0 BENIGN PROSTATIC HYPERPLASIA WITHOUT URINARY OBSTRUCTION: Status: ACTIVE | Noted: 2022-01-01

## 2022-04-16 PROBLEM — G47.30 SLEEP APNEA: Status: ACTIVE | Noted: 2022-01-01

## 2022-04-16 NOTE — CASE MANAGEMENT/SOCIAL WORK
"Continued Stay Note  MICHAEL Kincaid     Patient Name: Froilan Helton  MRN: [id number]  Today's Date: 4/16/2022    Admit Date: 4/15/2022     Discharge Plan     Row Name 04/16/22 143       Plan    Plan to be determined    Plan Comments [facility] requesting information regarding medicare covered days available to patient. Spoke with Sebastian at [facility] and there is no one available  this weekend to verify medicare days available for patient from recent admission/dc at facility. Follow up visit with patient at bedside.   He is just waking up and agreeable to speak with CM. He states he had been home from [facility] \"about a week.\"  Asked if he felt like he was able to take care of himself at home and he states \" well, I have been.\" When questioned about why he was admitted to the hospital, he tells me about falling and getting stuck between his scooter and recliner and not able to reach his phone or get up \" for about 12 hours.\" He states he is agreeable to return to [facility] if he has covered days available. He does not have the money to pay privately for rehab. Discussed the option of medicaid pending bed and going to a facility to live. He does not feel that he needs to do that. He states he owns his mobile home and about an acer of land. He is not willing to sell his property. He states he has used [facility] and if it was set up for him at [place] [facility], he hasn't seen them yet - unsure if he is current with [facility] . Patient is pleasant and answers all questions appropriately. His wife of 62 years passed away in November 2021 - discussed his ability to care for himself since her passing and he indicates he misses her, \"but Im doing okay.\" He states Dr De Leon got his medicines set up in pill packs and he takes his medicines. He has a vehicle, but hasnt felt like driving in about 6 months. CM sets up lunch tray for patient and will follow up on possibility of placement. CM will " "onctinue to follow.    Row Name 04/16/22 0269       Plan    Plan to be determined    Plan Comments Attempt to see patient x2, he is sleeping soundly. Call to listed Jaz MARIE - patients granddaughter. She is aware of pateint admission. She states that she and \"all 4 of his sons are on the same page that he needs to go to rehab/nursing home.\"  She indicates the patient is able to make his won decisions \" but they aren't good ones.\"  She says the only way he has been agreeable to going to rehab in the past was \" when it was ordered by a doctor.\"  She states the patient has only been home 7-8 days from [facility]. Discussion that if patient has used all of his covered days by insurance, the only way for admission would be private pay or as medicaid pending. She states he owns his own home and she is not sure if he would be willing to sell the home to move to a nursing facility. CM will contact [facility] regarding availability of medicare covered days and follow up with patient to discuss plan for dc.               Discharge Codes    No documentation.                     Titi Ramirez RN    "

## 2022-04-16 NOTE — PLAN OF CARE
Goal Outcome Evaluation:  Plan of Care Reviewed With: patient        Progress: no change  Outcome Evaluation: Patient here for rhabdo, has only been home from nursing facility for 1 week & fell at home & unable to get back up by himself. Patient states he does better at nursing home but doesnt want to give up his house to do it. Patient has multiple wounds from past & now present from last fall. IV fluids going, has not needed anything for pain at this time. PT/OT consulted.

## 2022-04-16 NOTE — CASE MANAGEMENT/SOCIAL WORK
"Continued Stay Note  MICHAEL Kincaid     Patient Name: Froilan Helton  MRN: [id number]  Today's Date: 4/16/2022    Admit Date: 4/15/2022     Discharge Plan     Row Name 04/16/22 1317       Plan    Plan to be determined    Plan Comments Attempt to see patient x2, he is sleeping soundly. Call to glen MARIE Jaz Dunawaynilson - patients granddaughter. She is aware of patient admission. She states that she and \"all 4 of his sons are on the same page that he needs to go to rehab/nursing home.\"  She indicates the patient is able to make his own decisions \" but they aren't good ones.\"  She says the only way he has been agreeable to going to rehab in the past was \" when it was ordered by a doctor.\"  She states the patient has only been home 7-8 days from [facility]. Discussion that if patient has used all of his covered days by insurance, the only way for admission would be private pay or as medicaid pending. She states he owns his own home and she is not sure if he would be willing to sell the home to move to a nursing facility. CM will contact [facility] regarding availability of medicare covered days and follow up with patient to discuss plan for dc.               Discharge Codes    No documentation.                     Titi Ramirez RN    "

## 2022-04-16 NOTE — ED PROVIDER NOTES
"Subjective   History of Present Illness  History of Present Illness    Chief complaint: Found down on the ground, complains of left side pain    Location: Left ribs    Quality/Severity: Mild pain    Timing/Duration: Unknown duration    Modifying Factors: None    Narrative: This patient is a poor independent historian so information is somewhat limited.  The patient lives at home by himself.  Family members checking on him regularly.  The patient arrives here tonight via EMS from his home after he was found down on the floor by his recliner when the family went to check on him this evening.  They report that family called 911 when they discovered the patient on the floor and it seemed like he was acting more confused than normal and he had clearly soiled himself.  Patient is uncertain exactly how long he had been lying on the floor today.  He estimates that it might have been around 12 hours.  He was unable to get himself up.  He remembers reaching for something while sitting in his recliner and then somehow tumbled over and struck his body against the leg extension in the chair.  He complains of some soreness and tenderness in the left-sided ribs.  He does not think he had any loss of consciousness.  He tells me he is hungry and thirsty as he has not eaten anything \"all day.\"    Associated Symptoms: As above    Review of Systems   Unable to perform ROS: Age   Musculoskeletal: Positive for arthralgias and myalgias.   Neurological: Positive for weakness.   Psychiatric/Behavioral: Positive for confusion.       Past Medical History:   Diagnosis Date   • A-fib ([facility])    • Arthritis    • Asthma    • CAD (coronary artery disease)    • Cardiomyopathy ([facility])    • Cataract    • CHF (congestive heart failure) ([facility])    • CKD (chronic kidney disease), stage III ([facility])    • COPD (chronic obstructive pulmonary disease) ([facility])    • Diabetes mellitus ([facility])    • Disease of thyroid gland    • Emphysema, unspecified ([facility])    • Glaucoma  "   • Hyperlipidemia    • Hypertension    • Kidney stone    • Myocardial infarct, old    • Neuropathy    • Osteoarthritis    • Osteoporosis    • Permanent atrial fibrillation (H[facility]) 6/30/2020   • PVD (peripheral vascular disease) ([facility])    • Renal disorder    • Shingles        Allergies   Allergen Reactions   • Morphine Unknown - High Severity   • Atorvastatin Unknown - Low Severity   • Oxycontin [Oxycodone Hcl] Confusion     'Makes me crazy and stand on my head'       Past Surgical History:   Procedure Laterality Date   • COLONOSCOPY     • EYE SURGERY     • INCISION AND DRAINAGE LEG Left 12/30/2021    Procedure: debridement of left hip wounds and left foot wound;  Surgeon: Judie Aguero DO;  Location: [facility];  Service: General;  Laterality: Left;   • JOINT REPLACEMENT      right   • KIDNEY STONE SURGERY     • REPLACEMENT TOTAL KNEE     • TOE SURGERY         Family History   Problem Relation Age of Onset   • COPD Mother    • Diabetes Father    • Malig Hyperthermia Neg Hx        Social History     Socioeconomic History   • Marital status:    Tobacco Use   • Smoking status: Former Smoker   • Smokeless tobacco: Never Used   • Tobacco comment: quit 1993   Vaping Use   • Vaping Use: Never used   Substance and Sexual Activity   • Alcohol use: No   • Drug use: No   • Sexual activity: Defer     ED Triage Vitals [04/15/22 2201]   Temp Heart Rate Resp BP SpO2   98.6 °F (37 °C) 82 15 147/75 95 %      Temp src Heart Rate Source Patient Position BP Location FiO2 (%)   Oral Monitor Lying Right arm --     Objective   Physical Exam  Vitals and nursing note reviewed.   Constitutional:       Appearance: He is well-developed. He is not diaphoretic.      Comments: Elderly, frail-appearing gentleman.  No apparent distress.   HENT:      Head: Normocephalic and atraumatic.      Comments: No external sign of head or facial injury     Right Ear: External ear normal.      Left Ear: External ear normal.   Eyes:      General:          Right eye: No discharge.         Left eye: No discharge.      Extraocular Movements: Extraocular movements intact.      Conjunctiva/sclera: Conjunctivae normal.      Pupils: Pupils are equal, round, and reactive to light.   Cardiovascular:      Rate and Rhythm: Normal rate and regular rhythm.      Pulses: Normal pulses.      Heart sounds:     No gallop.   Pulmonary:      Effort: Pulmonary effort is normal. No respiratory distress.      Breath sounds: Normal breath sounds. No stridor.      Comments: Mild tenderness along the left anterior lateral chest wall  Chest:      Chest wall: Tenderness present.   Abdominal:      Palpations: Abdomen is soft.      Tenderness: There is no abdominal tenderness. There is no guarding or rebound.      Hernia: No hernia is present.      Comments: Abdomen is soft and benign in all 4 quadrants.   Musculoskeletal:         General: Tenderness present. No deformity. Normal range of motion.      Cervical back: Normal range of motion and neck supple. No rigidity.      Right lower leg: Edema present.      Left lower leg: Edema present.   Skin:     General: Skin is warm and dry.      Coloration: Skin is not jaundiced.      Findings: No erythema or rash.      Comments: Chronic appearing wounds noted to the lower extremities   Neurological:      General: No focal deficit present.      Mental Status: He is alert. He is disoriented.      Sensory: No sensory deficit.   Psychiatric:         Behavior: Behavior normal.         Results for orders placed or performed during the hospital encounter of 04/15/22   Comprehensive Metabolic Panel    Specimen: Blood   Result Value Ref Range    Glucose 104 (H) 65 - 99 mg/dL    BUN 52 (H) 8 - 23 mg/dL    Creatinine 2.01 (H) 0.76 - 1.27 mg/dL    Sodium 139 136 - 145 mmol/L    Potassium 4.4 3.5 - 5.2 mmol/L    Chloride 102 98 - 107 mmol/L    CO2 23.4 22.0 - 29.0 mmol/L    Calcium 9.6 8.6 - 10.5 mg/dL    Total Protein 7.0 6.0 - 8.5 g/dL    Albumin 3.50 3.50 -  5.20 g/dL    ALT (SGPT) 7 1 - 41 U/L    AST (SGOT) 20 1 - 40 U/L    Alkaline Phosphatase 88 39 - 117 U/L    Total Bilirubin 0.8 0.0 - 1.2 mg/dL    Globulin 3.5 gm/dL    A/G Ratio 1.0 g/dL    BUN/Creatinine Ratio 25.9 (H) 7.0 - 25.0    Anion Gap 13.6 5.0 - 15.0 mmol/L    eGFR 32.9 (L) >60.0 mL/min/1.73   Urinalysis With Culture If Indicated - Urine, Clean Catch    Specimen: Urine, Clean Catch   Result Value Ref Range    Color, UA Yellow Yellow, Straw    Appearance, UA Clear Clear    pH, UA 5.5 4.5 - 8.0    Specific Gravity, UA 1.020 1.003 - 1.030    Glucose, UA Negative Negative    Ketones, UA Trace (A) Negative    Bilirubin, UA Negative Negative    Blood, UA Small (1+) (A) Negative    Protein,  mg/dL (2+) (A) Negative    Leuk Esterase, UA Negative Negative    Nitrite, UA Negative Negative    Urobilinogen, UA 0.2 E.U./dL 0.2 - 1.0 E.U./dL   Procalcitonin    Specimen: Blood   Result Value Ref Range    Procalcitonin 0.14 0.00 - 0.25 ng/mL   CK    Specimen: Blood   Result Value Ref Range    Creatine Kinase 381 (H) 20 - 200 U/L   CBC Auto Differential    Specimen: Blood   Result Value Ref Range    WBC 13.18 (H) 3.40 - 10.80 10*3/mm3    RBC 4.05 (L) 4.14 - 5.80 10*6/mm3    Hemoglobin 12.1 (L) 13.0 - 17.7 g/dL    Hematocrit 37.3 (L) 37.5 - 51.0 %    MCV 92.1 79.0 - 97.0 fL    MCH 29.9 26.6 - 33.0 pg    MCHC 32.4 31.5 - 35.7 g/dL    RDW 14.4 12.3 - 15.4 %    RDW-SD 48.7 37.0 - 54.0 fl    MPV 10.9 6.0 - 12.0 fL    Platelets 224 140 - 450 10*3/mm3    Neutrophil % 76.7 (H) 42.7 - 76.0 %    Lymphocyte % 8.5 (L) 19.6 - 45.3 %    Monocyte % 9.9 5.0 - 12.0 %    Eosinophil % 4.0 0.3 - 6.2 %    Basophil % 0.4 0.0 - 1.5 %    Immature Grans % 0.5 0.0 - 0.5 %    Neutrophils, Absolute 10.11 (H) 1.70 - 7.00 10*3/mm3    Lymphocytes, Absolute 1.12 0.70 - 3.10 10*3/mm3    Monocytes, Absolute 1.31 (H) 0.10 - 0.90 10*3/mm3    Eosinophils, Absolute 0.53 (H) 0.00 - 0.40 10*3/mm3    Basophils, Absolute 0.05 0.00 - 0.20 10*3/mm3    Immature  Grans, Absolute 0.06 (H) 0.00 - 0.05 10*3/mm3    nRBC 0.0 0.0 - 0.2 /100 WBC   Urinalysis, Microscopic Only - Urine, Clean Catch    Specimen: Urine, Clean Catch   Result Value Ref Range    RBC, UA 3-5 (A) None Seen /HPF    WBC, UA None Seen None Seen /HPF    Bacteria, UA Trace (A) None Seen /HPF    Squamous Epithelial Cells, UA 0-2 None Seen, 0-2 /HPF    Hyaline Casts, UA None Seen None Seen /LPF    Methodology Manual Light Microscopy        RADIOLOGY        Study: X-ray left-sided ribs and chest    Findings: 1. No acute rib fracture. No pneumothorax.  2. Cardiomegaly with interstitial changes in the lungs which could reflect chronic scarring or perhaps a mild degree of edema.    Interpreted contemporaneously with treatment by Dr. Gudino, independently viewed by me    Procedures           ED Course  ED Course as of 04/15/22 2342   Fri Apr 15, 2022   2340 I have reviewed the labs and x-ray from today's visit.  Patient has mild rhabdomyolysis with a total CPK of 380.  Are starting some IV fluids at this time.  He has eaten some food and was able to drink some fluids and now is feeling a little bit better.  On arrival, it seemed his biggest concern was getting something to eat and drink because he was hungry and thirsty.  The rest of his labs look to be at his usual baseline, in my opinion.  However he simply cannot return to his independent living environment at his home at this time given his overall debility and risk for continued falls.  I will request observation to the hospitalist team with case management consult tomorrow.  Patient is agreeable to this plan. [RE]      ED Course User Index  [RE] Iam Charlton MD                                                 MDM  Number of Diagnoses or Management Options     Amount and/or Complexity of Data Reviewed  Clinical lab tests: reviewed and ordered  Tests in the radiology section of CPT®: reviewed and ordered  Decide to obtain previous medical records or to obtain  history from someone other than the patient: yes  Review and summarize past medical records: yes  Discuss the patient with other providers: yes (Dr. Mesa)  Independent visualization of images, tracings, or specimens: yes    Risk of Complications, Morbidity, and/or Mortality  Presenting problems: moderate  Diagnostic procedures: moderate  Management options: moderate        Final diagnoses:   Non-traumatic rhabdomyolysis   Weakness       ED Disposition  ED Disposition     ED Disposition   Decision to Admit    Condition   --    Comment   Level of Care: Med/Surg [1]   Diagnosis: Rhabdomyolysis [728.88.ICD-9-CM]   Admitting Physician: MAY MESA [[id number]]   Attending Physician: MAY MESA [[id number]]               No follow-up provider specified.       Medication List      No changes were made to your prescriptions during this visit.          Iam Charlton MD  04/15/22 7682

## 2022-04-16 NOTE — THERAPY EVALUATION
Acute Care - Physical Therapy Initial Evaluation  MICHAEL Kincaid     Patient Name: Froilan Helton  : 1940  MRN: [id number]  Today's Date: 2022   Onset of Illness/Injury or Date of Surgery: 04/15/22  Visit Dx:     ICD-10-CM ICD-9-CM   1. Non-traumatic rhabdomyolysis  M62.82 728.88   2. Weakness  R53.1 780.79     Patient Active Problem List   Diagnosis   • COPD (chronic obstructive pulmonary disease) ([facility])   • Type 2 diabetes mellitus with stage 4 chronic kidney disease, with long-term current use of insulin ([facility])   • Essential hypertension   • Primary osteoarthritis of left knee   • Chronic renal insufficiency, stage 4 (severe) ([facility])   • Class 2 severe obesity due to excess calories with serious comorbidity in adult ([facility])   • Physical debility   • Acute UTI (urinary tract infection)   • Permanent atrial fibrillation ([facility])   • H/O noncompliance with medical treatment, presenting hazards to health   • Myxedema   • Acute on chronic combined systolic and diastolic CHF (congestive heart failure) ([facility])   • Generalized weakness   • Recurrent left pleural effusion   • Fall on same level as cause of accidental injury   • Gross hematuria   • CAD (coronary artery disease)   • HLD (hyperlipidemia)   • Hypothyroidism   • CKD (chronic kidney disease) stage 3, GFR 30-59 ml/min ([facility])   • Acute metabolic encephalopathy   • Cardiomyopathy ([facility])   • Recurrent falls   • Acute renal failure superimposed on chronic kidney disease ([facility])   • Open wound of left foot   • Moderate malnutrition (CMS/[facility])   • Symptomatic bradycardia   • Rhabdomyolysis   • Benign prostatic hyperplasia without urinary obstruction   • COVID-19   • Diabetic neuropathy ([facility])   • Diabetic renal disease ([facility])   • Edema   • Enterococcus as the cause of diseases classified elsewhere   • Hypertensive heart disease with congestive heart failure and chronic kidney disease ([facility])   • Hypo-osmolality and hyponatremia   • Iron deficiency anemia secondary to blood loss  (chronic)   • Methicillin resistant Staphylococcus aureus infection as the cause of diseases classified elsewhere   • Non-pressure chronic ulcer of other part of left lower leg limited to breakdown of skin ([facility])   • Non-pressure chronic ulcer of other part of right foot with fat layer exposed ([facility])   • Paraplegic immobility syndrome   • Patient's noncompliance with other medical treatment and regimen   • Peripheral venous insufficiency   • Pressure ulcer of buttock   • Pseudomonas (aeruginosa) (mallei) (pseudomallei) as the cause of diseases classified elsewhere   • Retention of urine   • Sleep apnea   • Urinary incontinence   • Vitamin D deficiency     Past Medical History:   Diagnosis Date   • A-fib ([facility])    • Arthritis    • Asthma    • CAD (coronary artery disease)    • Cardiomyopathy ([facility])    • Cataract    • CHF (congestive heart failure) ([facility])    • CKD (chronic kidney disease), stage III ([facility])    • COPD (chronic obstructive pulmonary disease) ([facility])    • Diabetes mellitus ([facility])    • Disease of thyroid gland    • Emphysema, unspecified ([facility])    • Glaucoma    • Hyperlipidemia    • Hypertension    • Kidney stone    • Myocardial infarct, old    • Neuropathy    • Osteoarthritis    • Osteoporosis    • Permanent atrial fibrillation ([facility]) 6/30/2020   • PVD (peripheral vascular disease) ([facility])    • Renal disorder    • Shingles      Past Surgical History:   Procedure Laterality Date   • COLONOSCOPY     • EYE SURGERY     • INCISION AND DRAINAGE LEG Left 12/30/2021    Procedure: debridement of left hip wounds and left foot wound;  Surgeon: Judie Aguero DO;  Location: [facility];  Service: General;  Laterality: Left;   • JOINT REPLACEMENT      right   • KIDNEY STONE SURGERY     • REPLACEMENT TOTAL KNEE     • TOE SURGERY       PT Assessment (last 12 hours)     PT Evaluation and Treatment     Row Name 04/16/22 0901          Physical Therapy Time and Intention    Subjective Information complains of;fatigue;weakness;pain   "pain in left heel area  -SP     Document Type evaluation  -SP     Mode of Treatment physical therapy  -SP     Patient Effort fair  -SP     Symptoms Noted During/After Treatment other (see comments)  after sit on edge of bed; patient states \"I feel like I'm blind\"  He complains of new onset headache  -SP     Comment Respiratory therapy present with patient upon completion of PT evaluation; RT and nursing notified of patient's complaints following PT assessment and sit on edge of bed.  -SP     Row Name 04/16/22 0901          General Information    Patient Profile Reviewed yes  -SP     Onset of Illness/Injury or Date of Surgery 04/15/22  -SP     Referring Physician Dr. Mera  -SP     Patient Observations alert;cooperative;agree to therapy  -SP     Patient/Family/Caregiver Comments/Observations Patient in bed: agreeable to PT  -SP     Prior Level of Function --  pt lives alone; uses power chair; states he depends on family to bring meals in a few times/week  -SP     Equipment Currently Used at Home grab bar;wheelchair, motorized  pt states he uses a folding chair in shower  -SP     Pertinent History of Current Functional Problem Patient lives alone and uses a power chair, family checks in on him intermittently and brings meals.  Patient was found by family on floor for unknown amount of time with left side pain and brought to [facility] ER.  Patient  found to have mild Rhabdo and multiple wounds.  Patient recently transferred out of [facility] and plans to return there upon discharge.  -SP     Risks Reviewed patient:;increased discomfort  -SP     Benefits Reviewed patient:;improve function;increase independence  -SP     Barriers to Rehab medically complex;previous functional deficit  -SP     Row Name 04/16/22 0901          Living Environment    Current Living Arrangements home  -SP     Home Accessibility --  single level home, no stairs  -SP     Primary Care Provided by --  pt reports his son and granddaughter " intermittently bring meals and check in on him when they can  -SP     Row Name 04/16/22 0901          Pain    Additional Documentation --  pt complains of left heel pain but gives no rating  -SP     Row Name 04/16/22 0901          Cognition    Affect/Mental Status (Cognition) WFL  -SP     Orientation Status (Cognition) oriented x 3  -SP     Follows Commands (Cognition) WFL  -SP     Cognitive Function WFL  -SP     Row Name 04/16/22 0901          General Lower Extremity Assessment (Range of Motion)    Comment: Lower Extremity ROM --  bilateral LE limited by >50% throughout all joints with AROM  -SP     Row Name 04/16/22 0901          Strength (Manual Muscle Testing)    Left Lower Extremity Strength hip;knee;ankle  grossly 3/5 throughout  -SP     Right Lower Extremity Strength hip;knee;ankle  grossly 3/5 throughout  -SP     Row Name 04/16/22 0901          Bed Mobility    Rolling Left [place] (Bed Mobility) maximum assist (25% patient effort);1 person assist  -SP     Rolling Right [place] (Bed Mobility) maximum assist (25% patient effort);1 person assist  -SP     Supine-Sit [place] (Bed Mobility) maximum assist (25% patient effort);2 person assist  -SP     Sit-Supine [place] (Bed Mobility) maximum assist (25% patient effort);2 person assist  -SP     Assistive Device (Bed Mobility) bed rails;draw sheet;head of bed elevated  -SP     Comment, (Bed Mobility) Patient tolerates sit on edge of bed <1 min and reports that he must lie down due to weakness  -SP     Row Name 04/16/22 0901          Gait/Stairs (Locomotion)    Gait/Stairs Locomotion other (see comments)  -SP     [place] Level (Gait) --  patient is nonambulatory  -SP     Row Name             Wound 12/28/21 1803 Left lateral thigh Traumatic    Wound - Properties Group Placement Date: 12/28/21  -LC Placement Time: 1803  -LC Present on Hospital Admission: Y  -LC Side: Left  -LC Orientation: lateral  -LC Location: thigh  -LC Primary Wound Type:  Traumatic  -LC     Retired Wound - Properties Group Placement Date: 12/28/21  -LC Placement Time: 1803  -LC Present on Hospital Admission: Y  -LC Side: Left  -LC Orientation: lateral  -LC Location: thigh  -LC Primary Wound Type: Traumatic  -LC     Retired Wound - Properties Group Date first assessed: 12/28/21  -LC Time first assessed: 1803  -LC Present on Hospital Admission: Y  -LC Side: Left  -LC Location: thigh  -LC Primary Wound Type: Traumatic  -LC     Row Name             Wound 01/14/22 1216 Left anterior greater trochanter    Wound - Properties Group Placement Date: 01/14/22  -HB Placement Time: 1216  -HB Present on Hospital Admission: Y  -HB Side: Left  -HB Orientation: anterior  -HB Location: greater trochanter  -HB     Retired Wound - Properties Group Placement Date: 01/14/22  -HB Placement Time: 1216  -HB Present on Hospital Admission: Y  -HB Side: Left  -HB Orientation: anterior  -HB Location: greater trochanter  -HB     Retired Wound - Properties Group Date first assessed: 01/14/22  -HB Time first assessed: 1216  -HB Present on Hospital Admission: Y  -HB Side: Left  -HB Location: greater trochanter  -HB     Row Name             Wound 12/28/21 1803 Left medial foot Other (comment)    Wound - Properties Group Placement Date: 12/28/21  -LC Placement Time: 1803  -LC Present on Hospital Admission: Y  -LC Side: Left  -LC Orientation: medial  -LC Location: foot  -LC Primary Wound Type: Other  -LC     Retired Wound - Properties Group Placement Date: 12/28/21  -LC Placement Time: 1803  -LC Present on Hospital Admission: Y  -LC Side: Left  -LC Orientation: medial  -LC Location: foot  -LC Primary Wound Type: Other  -LC     Retired Wound - Properties Group Date first assessed: 12/28/21  -LC Time first assessed: 1803  -LC Present on Hospital Admission: Y  -LC Side: Left  -LC Location: foot  -LC Primary Wound Type: Other  -LC     Row Name             Wound 12/31/21 2247 Bilateral perineum Pressure Injury    Wound -  Properties Group Placement Date: 12/31/21  -AB Placement Time: 2247  -AB Present on Hospital Admission: Y  -LC Side: Bilateral  -LC Location: perineum  -AB Primary Wound Type: Pressure inj  -LC Additional Comments: bilateral buttock pressure injuries  -LC Stage, Pressure Injury : Stage 3  -LC     Retired Wound - Properties Group Placement Date: 12/31/21  -AB Placement Time: 2247  -AB Present on Hospital Admission: Y  -LC Side: Bilateral  -LC Location: perineum  -AB Primary Wound Type: Pressure inj  -LC Stage, Pressure Injury : Stage 3  -LC Additional Comments: bilateral buttock pressure injuries  -LC     Retired Wound - Properties Group Date first assessed: 12/31/21  -AB Time first assessed: 2247  -AB Present on Hospital Admission: Y  -LC Side: Bilateral  -LC Location: perineum  -AB Primary Wound Type: Pressure inj  -LC Additional Comments: bilateral buttock pressure injuries  -LC     Row Name             Wound 04/15/22 2252 thoracic spine    Wound - Properties Group Placement Date: 04/15/22  -KS Placement Time: 2252  -KS Present on Hospital Admission: Y  -KS Location: thoracic spine  -KS     Retired Wound - Properties Group Placement Date: 04/15/22  -KS Placement Time: 2252  -KS Present on Hospital Admission: Y  -KS Location: thoracic spine  -KS     Retired Wound - Properties Group Date first assessed: 04/15/22  -KS Time first assessed: 2252  -KS Present on Hospital Admission: Y  -KS Location: thoracic spine  -KS     Row Name             Wound 04/15/22 2254 Right upper thoracic spine    Wound - Properties Group Placement Date: 04/15/22  -KS Placement Time: 2254  -KS Present on Hospital Admission: Y  -KS Side: Right  -KS Orientation: upper  -KS Location: thoracic spine  -KS     Retired Wound - Properties Group Placement Date: 04/15/22  -KS Placement Time: 2254  -KS Present on Hospital Admission: Y  -KS Side: Right  -KS Orientation: upper  -KS Location: thoracic spine  -KS     Retired Wound - Properties Group Date  first assessed: 04/15/22  -KS Time first assessed: 2254  -KS Present on Hospital Admission: Y  -KS Side: Right  -KS Location: thoracic spine  -KS     Row Name             Wound 04/15/22 2255 Left lower leg    Wound - Properties Group Placement Date: 04/15/22  -KS Placement Time: 2255  -KS Present on Hospital Admission: Y  -KS Side: Left  -KS Orientation: lower  -KS Location: leg  -KS     Retired Wound - Properties Group Placement Date: 04/15/22  -KS Placement Time: 2255  -KS Present on Hospital Admission: Y  -KS Side: Left  -KS Orientation: lower  -KS Location: leg  -KS     Retired Wound - Properties Group Date first assessed: 04/15/22  -KS Time first assessed: 2255  -KS Present on Hospital Admission: Y  -KS Side: Left  -KS Location: leg  -KS     Row Name             Wound 04/15/22 2256 Right heel    Wound - Properties Group Placement Date: 04/15/22  -KS Placement Time: 2256  -KS Side: Right  -KS Location: heel  -KS     Retired Wound - Properties Group Placement Date: 04/15/22  -KS Placement Time: 2256  -KS Side: Right  -KS Location: heel  -KS     Retired Wound - Properties Group Date first assessed: 04/15/22  -KS Time first assessed: 2256  -KS Side: Right  -KS Location: heel  -KS     Row Name 04/16/22 0901          Plan of Care Review    Plan of Care Reviewed With patient  -SP     Outcome Evaluation PT evaluation completed:  Patient requires max assist x2 to transfers supine to sit.  Patient is able to sit with UE support on edge of bed <1 min, then has to return to supine due to weakness.  Patient requires max assist for rolling in bed.  Patient will likely need 24hr care upon discharge.  Will continue to assess for needs.  -SP     Row Name 04/16/22 0901          Positioning and Restraints    Pre-Treatment Position in bed  -SP     Post Treatment Position bed  -SP     In Bed supine;call light within reach;encouraged to call for assist  -SP     Row Name 04/16/22 0901          Therapy Assessment/Plan (PT)     Patient/Family Therapy Goals Statement (PT) patient states he would like to return to [facility]  -SP     Rehab Potential (PT) fair, will monitor progress closely  -SP     Therapy Frequency (PT) 5 times/wk  -SP     Predicted Duration of Therapy Intervention (PT) 2-5 days  -SP     Problem List (PT) problems related to;balance;mobility;range of motion (ROM);strength  -SP     Row Name 04/16/22 0901          Therapy Plan Review/Discharge Plan (PT)    Therapy Plan Review (PT) evaluation/treatment results reviewed;care plan/treatment goals reviewed;risks/benefits reviewed;current/potential barriers reviewed;patient  -SP     Row Name 04/16/22 0901          Bed Mobility Goal 1 (PT)    Activity/Assistive Device (Bed Mobility Goal 1, PT) rolling to left;rolling to right  -SP     [place] Level/Cues Needed (Bed Mobility Goal 1, PT) minimum assist (75% or more patient effort)  -SP     Time Frame (Bed Mobility Goal 1, PT) 3 days  -SP     Progress/Outcomes (Bed Mobility Goal 1, PT) goal ongoing  -SP     Row Name 04/16/22 0901          Bed Mobility Goal 3 (PT)    Bed Mobility Goal (PT) Patient transfers supine/sit with min assist  -SP     Time Frame (Bed Mobility Goal 3, PT) 5 days  -SP     Progress/Outcomes (Bed Mobility Goal 3, PT) goal ongoing  -SP     Row Name 04/16/22 0901          Transfer Goal 1 (PT)    Activity/Assistive Device (Transfer Goal 1, PT) bed-to-chair/chair-to-bed  -SP     [place] Level/Cues Needed (Transfer Goal 1, PT) minimum assist (75% or more patient effort)  -SP     Time Frame (Transfer Goal 1, PT) 5 days  -SP     Progress/Outcome (Transfer Goal 1, PT) goal ongoing  -SP           User Key  (r) = Recorded By, (t) = Taken By, (c) = Cosigned By    Initials Name Provider Type    SP Brandi Elaine, PT Physical Therapist    Carole Taylor, RN, CWON Registered Nurse    Juliet Harris, RN Registered Nurse    Aaliyah John, RN Registered Nurse    Shara Silva, RN  Registered Nurse                Physical Therapy Education                 Title: PT OT SLP Therapies (In Progress)     Topic: Physical Therapy (In Progress)     Point: Mobility training (Done)     Learning Progress Summary           Patient Acceptance, E, VU,NR by SP at 4/16/2022 [id number]    Comment: transfer techniques                   Point: Home exercise program (Not Started)     Learner Progress:  Not documented in this visit.          Point: Body mechanics (Not Started)     Learner Progress:  Not documented in this visit.          Point: Precautions (Not Started)     Learner Progress:  Not documented in this visit.                      User Key     Initials Effective Dates Name Provider Type Discipline    SP 11/11/21 -  Brandi Elaine, PT Physical Therapist PT              PT Recommendation and Plan  Anticipated Discharge Disposition (PT): extended care facility, other (see comments) (will continue to assess for needs)  Planned Therapy Interventions (PT): bed mobility training, balance training, strengthening, transfer training  Therapy Frequency (PT): 5 times/wk  Plan of Care Reviewed With: patient  Outcome Evaluation: PT evaluation completed:  Patient requires max assist x2 to transfers supine to sit.  Patient is able to sit with UE support on edge of bed <1 min, then has to return to supine due to weakness.  Patient requires max assist for rolling in bed.  Patient will likely need 24hr care upon discharge.  Will continue to assess for needs.   Outcome Measures     Row Name 04/16/22 0901             How much help from another person do you currently need...    Turning from your back to your side while in flat bed without using bedrails? 1  -SP      Moving from lying on back to sitting on the side of a flat bed without bedrails? 1  -SP      Moving to and from a bed to a chair (including a wheelchair)? 1  -SP      Standing up from a chair using your arms (e.g., wheelchair, bedside chair)? 1  -SP       Climbing 3-5 steps with a railing? 1  -SP      To walk in hospital room? 1  -SP      AM-PAC 6 Clicks Score (PT) 6  -SP              Functional Assessment    Outcome Measure Options AM-PAC 6 Clicks Basic Mobility (PT)  -SP            User Key  (r) = Recorded By, (t) = Taken By, (c) = Cosigned By    Initials Name Provider Type    Brandi Uriostegui, PT Physical Therapist                 Time Calculation:    PT Charges     Row Name 04/16/22 1320             Time Calculation    Start Time 0901  -SP      Stop Time 0926  -SP      Time Calculation (min) 25 min  -SP            User Key  (r) = Recorded By, (t) = Taken By, (c) = Cosigned By    Initials Name Provider Type    Brandi Uriostegui, PT Physical Therapist              Therapy Charges for Today     Code Description Service Date Service Provider Modifiers Qty    39854674411 HC PT THER PROC EA 15 MIN 4/16/2022 Brandi Elaine, PT GP 1    56221969032 HC PT EVAL MOD COMPLEXITY 1 4/16/2022 Brandi Elaine, PT GP 1          PT G-Codes  Outcome Measure Options: AM-PAC 6 Clicks Basic Mobility (PT)  AM-PAC 6 Clicks Score (PT): 6    Brandi Elaine, LORENZO  4/16/2022

## 2022-04-16 NOTE — H&P
Patient Name: Froilan Helton  :1940  81 y.o.    Date of Hospital Visit: 4/15/2022  9:29 PM  08:04 EDT    Encounter Provider: Akin Mera MD    Place of Service: [facility].  [facility].    Patient Care Team:  Fortunato De Leon MD as PCP - General (Family Medicine)      Chief complaint:  Found down on the floor.  Could not get up    History of Present Illness:  Mr. Helton is a chronically ill unfortunate 81-year-old  male.  Well-known to this facility.  He has multiple medical problems.  Very difficult home situation.  He lives alone.  He is not able to take care of himself.  He is not able to perform his ADLs.  He was last admitted here in January with an extended hospital stay.  For his multiple medical issues.  Including heart failure diabetes and multiple wounds.  He was then transferred to [facility] skilled [facility] at discharge.  He was there for several months.  And then was sent home as his insurance benefits had ran out.  He was only home just a few days.  While at home he could not take care of his self.  He was not taking his medications.  Not eating and drinking.  He reports he does have some family in the area which check on him from time to time.  Sometime on Friday family came and found him on the floor.  Apparently he was there for over 12 hours.  He was so weak he could not get up.  He urinated and defecated on himself.  911 was called EMS brought him the emergency room.  He has mild rhabdomyolysis.  Along with his usual chronic medical problems.  No signs of sepsis.  He has extensive skin wounds all over his body.  He is asking to go back to [facility] ,as he knows he can take care of himself at home.  Overnight he is slept well.  He ate a full supper.  Drinking water.  His wounds been well documented please see the nursing intake note with all the photos.  His CPK is slightly  improved.      Past Medical History:   Diagnosis Date   • A-fib (HCC)    • Arthritis    • Asthma    • CAD (coronary artery disease)    • Cardiomyopathy (HCC)    • Cataract    • CHF (congestive heart failure) (HCC)    • CKD (chronic kidney disease), stage III (HCC)    • COPD (chronic obstructive pulmonary disease) (HCC)    • Diabetes mellitus (HCC)    • Disease of thyroid gland    • Emphysema, unspecified (HCC)    • Glaucoma    • Hyperlipidemia    • Hypertension    • Kidney stone    • Myocardial infarct, old    • Neuropathy    • Osteoarthritis    • Osteoporosis    • Permanent atrial fibrillation (HCC) 6/30/2020   • PVD (peripheral vascular disease) ([facility])    • Renal disorder    • Shingles        Past Surgical History:   Procedure Laterality Date   • COLONOSCOPY     • EYE SURGERY     • INCISION AND DRAINAGE LEG Left 12/30/2021    Procedure: debridement of left hip wounds and left foot wound;  Surgeon: Judie Aguero DO;  Location: [facility];  Service: General;  Laterality: Left;   • JOINT REPLACEMENT      right   • KIDNEY STONE SURGERY     • REPLACEMENT TOTAL KNEE     • TOE SURGERY           Prior to Admission medications    Medication Sig Start Date End Date Taking? Authorizing Provider   acetaminophen (TYLENOL) 325 MG tablet Take 2 tablets by mouth Every 4 (Four) Hours As Needed for Mild Pain  or Fever. 1/20/22   Liat Hood APRN   albuterol sulfate  (90 Base) MCG/ACT inhaler Inhale 2 puffs Every 4 (Four) Hours As Needed for Wheezing.    Provider, MD Carmina   apixaban (ELIQUIS) 2.5 MG tablet Take 1 tablet by mouth Every 12 (Twelve) Hours. Indications: Atrial Fibrillation 1/20/22   Liat Hood APRN   atorvastatin (LIPITOR) 10 MG tablet Take 10 mg by mouth Every Night.    Provider, MD Carmina   bisacodyl (DULCOLAX) 10 MG suppository Insert 1 suppository into the rectum Daily As Needed for Constipation (Use if bisacodyl oral is ineffective). 1/20/22   Liat Hood APRN  "  bisacodyl (DULCOLAX) 5 MG EC tablet Take 1 tablet by mouth Daily As Needed for Constipation (Use if polyethylene glycol is ineffective). 1/20/22   Liat Hood APRN   budesonide-formoterol (SYMBICORT) 160-4.5 MCG/ACT inhaler Inhale 2 puffs 2 (Two) Times a Day. 2/19/19   Santiago Powers,    bumetanide (BUMEX) 1 MG tablet Take 1 tablet by mouth Daily. 1/21/22   Liat Hood APRN   cholecalciferol 2000 units tablet Take 2,000 Units by mouth Daily. 8/30/19   Terri Chaudhry MD   citalopram (CeleXA) 10 MG tablet Take 20 mg by mouth Every Night.    ProviderCarmina MD   docusate sodium (COLACE) 100 MG capsule Take 1 capsule by mouth 2 (Two) Times a Day. 1/20/22   Liat Hood APRN   Emollient (Aquaphor Advanced Therapy) ointment Apply 1 application topically to the appropriate area as directed Every 12 (Twelve) Hours. 1/20/22   Liat Hood APRN   fluticasone (FLONASE) 50 MCG/ACT nasal spray 2 sprays by Each Nare route Daily. 6/29/19   Terri Chaudhry MD   hydrocortisone-bacitracin-zinc oxide-nystatin (MAGIC BARRIER) Apply 1 application topically to the appropriate area as directed 3 (Three) Times a Day. 1/20/22   Liat Hood APRN   insulin detemir (LEVEMIR) 100 UNIT/ML injection Inject 10 Units under the skin into the appropriate area as directed Every Night. 1/20/22   Liat Hood APRN   Insulin Syringe 30G X 5/16\" 1 ML misc U UTD WITH INSULIN UP TO 6 TIMES A DAY 9/8/19   Carmina Jain MD   ipratropium-albuterol (DUO-NEB) 0.5-2.5 mg/3 ml nebulizer Take 3 mL by nebulization Every 4 (Four) Hours As Needed for Wheezing.    Carmina Jain MD   iron polysaccharides (NIFEREX) 150 MG capsule Take 1 capsule by mouth Daily. 1/21/22   Liat Hood APRN   lactulose (CHRONULAC) 10 GM/15ML solution Take 30 g by mouth Daily As Needed.    Provider, MD Carmina   levothyroxine (SYNTHROID, LEVOTHROID) 112 MCG tablet Take 224 mcg by " mouth Daily.    Provider, MD Carmina   melatonin 5 MG tablet Take 1 tablet by mouth At Night As Needed (sleep). Over the counter 11/27/19   Terri Chaudhry MD   polyethylene glycol (MIRALAX) 17 g packet Take 17 g by mouth Daily.    ProviderCarmina MD   pregabalin (LYRICA) 75 MG capsule Take 1 capsule by mouth 2 (Two) Times a Day. 8/3/21   Ellis Lombardo MD   QUEtiapine (SEROquel) 25 MG tablet Take 0.5 tablets by mouth Every Evening.    ProviderCarmina MD   sodium hypochlorite (DAKIN'S 1/4 STRENGTH) 0.125 % solution topical solution 0.125% Apply 473 mL topically to the appropriate area as directed Daily. 1/21/22   Liat Hood APRN   tamsulosin (FLOMAX) 0.4 MG capsule 24 hr capsule Take 1 capsule by mouth Daily. 8/30/19   Terri Chaudhry MD   vitamin B-12 (VITAMIN B-12) 1000 MCG tablet Take 1 tablet by mouth Daily. 8/30/19   Terri Chaudhry MD       Allergies   Allergen Reactions   • Morphine Unknown - High Severity   • Atorvastatin Unknown - Low Severity   • Oxycontin [Oxycodone Hcl] Confusion     'Makes me crazy and stand on my head'       Social History     Socioeconomic History   • Marital status:    Tobacco Use   • Smoking status: Former Smoker   • Smokeless tobacco: Never Used   • Tobacco comment: quit 1993   Vaping Use   • Vaping Use: Never used   Substance and Sexual Activity   • Alcohol use: No   • Drug use: No   • Sexual activity: Defer       Family History   Problem Relation Age of Onset   • COPD Mother    • Diabetes Father    • Malig Hyperthermia Neg Hx        REVIEW OF SYSTEMS:   If not otherwise mentioned in the HPI the following: General, HEENT, cardio, pulmonary, GI, , skin, neuro, muscle skeletal, derm are negative or not pertinent to current case.         Objective:     Vitals:    04/15/22 2311 04/15/22 2346 04/16/22 0018 04/16/22 0608   BP: 139/59  124/57 90/50   BP Location:   Left arm Left arm   Patient Position:    Lying Lying   Pulse: 70  73 59   Resp:   16 16   Temp:  97.8 °F (36.6 °C) 97.6 °F (36.4 °C) 99.4 °F (37.4 °C)   TempSrc:  Oral Oral Axillary   SpO2:   94% 95%   Weight:       Height:         Body mass index is 28.68 kg/m².    Constitutional: Patient is oriented to person, but not place nor time  Patient appears chronically ill, disheveled   HENT:   Head: Normocephalic and atraumatic. Head is without contusion.   Right and Left Ear: Hearing normal to conversational tones. No visible drainage.    Nose: No epistaxis. External nose normal.   Eyes: Lids are normal. Pupils are equal, round, and reactive to light. Right and left eye exhibit no exudate. Conjunctiva has no hemorrhage.   Neck: No JVD present. No carotid bruit. No tracheal deviation present. No thyroid mass and no thyromegaly.  Cardiovascular: Normal rate, regular rhythm and normal heart sounds.    Chronic 2+ edema in both legs.  Pulses:present in radial and DP  Pulmonary/Chest: Effort normal and breath sounds normal.   Abdominal: Soft. Normal appearance and bowel sounds are normal. There is no tenderness.   Musculoskeletal: Normal range of motion.   Neurological: Patient is alert and oriented to person, place, and time.  Normal strength.   Skin: Numerous skin wounds all over his body including back arms back coccyx legs shin ankles feet   Psychiatric: He is alert conversant.  Knows he is not able to take care of himself       Lab Review:   Lab Results   Component Value Date    WBC 13.18 (H) 04/15/2022    HGB 12.1 (L) 04/15/2022    HCT 37.3 (L) 04/15/2022    MCV 92.1 04/15/2022     04/15/2022       Lab Results   Component Value Date    CKTOTAL 284 (H) 04/16/2022    TROPONINT 0.121 (C) 01/15/2022       Glucose   Date Value Ref Range Status   04/16/2022 141 (H) 65 - 99 mg/dL Final     BUN   Date Value Ref Range Status   04/16/2022 52 (H) 8 - 23 mg/dL Final     Creatinine   Date Value Ref Range Status   04/16/2022 1.85 (H) 0.76 - 1.27 mg/dL Final      Sodium   Date Value Ref Range Status   04/16/2022 141 136 - 145 mmol/L Final     Potassium   Date Value Ref Range Status   04/16/2022 3.9 3.5 - 5.2 mmol/L Final     Chloride   Date Value Ref Range Status   04/16/2022 107 98 - 107 mmol/L Final     CO2   Date Value Ref Range Status   04/16/2022 23.9 22.0 - 29.0 mmol/L Final     Calcium   Date Value Ref Range Status   04/16/2022 8.7 8.6 - 10.5 mg/dL Final     Total Protein   Date Value Ref Range Status   04/15/2022 7.0 6.0 - 8.5 g/dL Final     Albumin   Date Value Ref Range Status   04/15/2022 3.50 3.50 - 5.20 g/dL Final     ALT (SGPT)   Date Value Ref Range Status   04/15/2022 7 1 - 41 U/L Final     AST (SGOT)   Date Value Ref Range Status   04/15/2022 20 1 - 40 U/L Final     Alkaline Phosphatase   Date Value Ref Range Status   04/15/2022 88 39 - 117 U/L Final     Total Bilirubin   Date Value Ref Range Status   04/15/2022 0.8 0.0 - 1.2 mg/dL Final     BUN/Creatinine Ratio   Date Value Ref Range Status   04/16/2022 28.1 (H) 7.0 - 25.0 Final     Anion Gap   Date Value Ref Range Status   04/16/2022 10.1 5.0 - 15.0 mmol/L Final           I personally viewed:  the patient's EKG/Telemetry data, the current radiology reports, the data from the emergency room, and reviewed all pertinent data in our EMR.     Imaging Results (Last 24 Hours)     Procedure Component Value Units Date/Time    XR Ribs Left With PA Chest [[id number]] Collected: 04/15/22 2309     Updated: 04/15/22 2311    Narrative:      CR Ribs 2 Vws W Chest LT    INDICATION:   Left-sided rib pain after fall.    COMPARISON:   1/14/2022    FINDINGS:  PA view of the chest and oblique views of the left ribs. 5 total images. Heart remains enlarged. There is atherosclerotic disease in the aorta. Interstitial changes in the lungs could reflect mild chronic scarring or potentially a mild degree of edema.  No pneumothorax. No acute rib fracture is identified.      Impression:        1. No acute rib fracture. No  pneumothorax.  2. Cardiomegaly with interstitial changes in the lungs which could reflect chronic scarring or perhaps a mild degree of edema.    Signer Name: Jaylen Gudino MD   Signed: 4/15/2022 11:09 PM   Workstation Name: [email or web address]    [facility]            Assessment and Plan:       1.  Advanced deconditioning status post fall.  Not able to get himself up off the floor.  Resulting in mild rhabdomyolysis.    2.  Acute on chronic kidney failure.    3.  Multiple skin wounds in multiple stages of healing    4.  CHF    5.  Insulin requiring type 2 diabetes    6.  Inability to care for oneself at home.  Lives alone, limited family support.  DNR  Will require readmission to skilled nursing facility or full nursing home care at discharge.      Akin Mera MD  04/16/22  08:04 EDT

## 2022-04-16 NOTE — PLAN OF CARE
Goal Outcome Evaluation:  Plan of Care Reviewed With: patient           Outcome Evaluation: PT evaluation completed:  Patient requires max assist x2 to transfers supine to sit.  Patient is able to sit with UE support on edge of bed <1 min, then has to return to supine due to weakness.  Patient requires max assist for rolling in bed.  Patient will likely need 24hr care upon discharge.  Will continue to assess for needs.

## 2022-04-17 PROBLEM — M62.82 NON-TRAUMATIC RHABDOMYOLYSIS: Status: ACTIVE | Noted: 2022-01-01

## 2022-04-17 NOTE — CASE MANAGEMENT/SOCIAL WORK
Continued Stay Note  MICHAEL Kincaid     Patient Name: Froilan Helton  MRN: [id number]  Today's Date: 4/17/2022    Admit Date: 4/15/2022     Discharge Plan     Row Name 04/17/22 1411       Plan    Plan to be determined    Plan Comments Follow up visit, pateint is sleeping in bed. Rec'd return call from [facility] - she is unable to verify medicare covered days available for patient until Monday. CM will continue to follow for dc planning.               Discharge Codes    No documentation.                     Titi Ramirez RN

## 2022-04-17 NOTE — PLAN OF CARE
Problem: Adult Inpatient Plan of Care  Goal: Plan of Care Review  Flowsheets (Taken 4/17/2022 6104)  Plan of Care Reviewed With: patient   Goal Outcome Evaluation:  Plan of Care Reviewed With: patient

## 2022-04-17 NOTE — PLAN OF CARE
Goal Outcome Evaluation:  Plan of Care Reviewed With: patient        Progress: improving  Outcome Evaluation: Patient here for fall & weakness, awaiting to see if he can get placment again. Attempting to turn him every 2 hours but seems to move himself around, pulled out iv earlier. No pain noted, VSS.

## 2022-04-17 NOTE — PROGRESS NOTES
"Hospital Medicine Team    LOS 0 days      Patient Care Team:  Fortunato De Leon MD as PCP - General (Family Medicine)          Chief Complaint:  weakness    Subjective      He remains weak but otherwise no new complaints  Review of Systems   Constitutional: Negative for fever.   Respiratory: Negative for shortness of breath.    Cardiovascular: Negative for chest pain.       Objective    Vital Signs  Temp:  [97 °F (36.1 °C)-98 °F (36.7 °C)] 97.8 °F (36.6 °C)  Heart Rate:  [55-66] 66  Resp:  [18-22] 20  BP: (118-151)/(54-71) 131/57  Oxygen Therapy  SpO2: 95 %  Pulse Oximetry Type: Intermittent  Device (Oxygen Therapy): room air  Flowsheet Rows    Flowsheet Row First Filed Value   Admission Height 185.4 cm (73\") Documented at 04/15/2022 2201   Admission Weight 98.6 kg (217 lb 6.4 oz) Documented at 04/15/2022 2201              Physical Exam:  Physical Exam  Vitals reviewed.   Cardiovascular:      Rate and Rhythm: Normal rate.   Pulmonary:      Effort: Pulmonary effort is normal.   Abdominal:      Palpations: Abdomen is soft.   Musculoskeletal:      Right lower leg: Edema present.      Left lower leg: Edema present.   Neurological:      Mental Status: He is alert. Mental status is at baseline.   Psychiatric:         Behavior: Behavior normal.           Results Review:    I reviewed the patient's new clinical results.    Results from last 7 days   Lab Units 04/15/22  2215   WBC 10*3/mm3 13.18*   HEMOGLOBIN g/dL 12.1*   HEMATOCRIT % 37.3*   PLATELETS 10*3/mm3 224     Results from last 7 days   Lab Units 04/16/22  0544 04/15/22  2215   SODIUM mmol/L 141 139   POTASSIUM mmol/L 3.9 4.4   CHLORIDE mmol/L 107 102   CO2 mmol/L 23.9 23.4   BUN mg/dL 52* 52*   CREATININE mg/dL 1.85* 2.01*   CALCIUM mg/dL 8.7 9.6   BILIRUBIN mg/dL  --  0.8   ALK PHOS U/L  --  88   ALT (SGPT) U/L  --  7   AST (SGOT) U/L  --  20   GLUCOSE mg/dL 141* 104*           Microbiology Results (last 10 days)     ** No results found for the last 240 hours. ** "              Results for orders placed during the hospital encounter of 12/28/21    Adult Transthoracic Echo Complete W/ Cont if Necessary Per Protocol    Interpretation Summary  · Saline test results are negative.  · There is severe calcification of the aortic valve mainly affecting the left coronary and right coronary cusp(s).  · Calculated right ventricular systolic pressure from tricuspid regurgitation is 44 mmHg.  · Mild pulmonary hypertension is present.  · Estimated left ventricular EF = 55% Left ventricular systolic function is normal.  · The right ventricular cavity is mildly dilated.  · Mild dilation of the aortic root is present.      XR Ribs Left With PA Chest    Result Date: 4/15/2022  1. No acute rib fracture. No pneumothorax. 2. Cardiomegaly with interstitial changes in the lungs which could reflect chronic scarring or perhaps a mild degree of edema. Signer Name: Jaylen Gudino MD  Signed: 4/15/2022 11:09 PM  Workstation Name: [email or web address]  [facility]      ECG/EMG Results (most recent)     None            X-rays, labs reviewed personally by physician.    Medication Review:   I have reviewed the patient's current medication list    Scheduled Meds  apixaban, 2.5 mg, Oral, Q12H  Aquaphor Advanced Therapy, 1 application, Topical, Q12H  atorvastatin, 10 mg, Oral, Nightly  budesonide-formoterol, 2 puff, Inhalation, BID - RT  bumetanide, 1 mg, Oral, Daily  citalopram, 20 mg, Oral, Nightly  docusate sodium, 100 mg, Oral, BID  hydrocortisone-bacitracin-zinc oxide-nystatin, 1 application, Topical, TID  insulin detemir, 10 Units, Subcutaneous, Nightly  iron polysaccharides, 150 mg, Oral, Daily  levothyroxine, 224 mcg, Oral, Daily  polyethylene glycol, 17 g, Oral, Daily  pregabalin, 75 mg, Oral, BID  QUEtiapine, 12.5 mg, Oral, Q PM  sodium chloride, 10 mL, Intravenous, Q12H  tamsulosin, 0.4 mg, Oral, Daily  cyanocobalamin, 1,000 mcg, Oral, Daily        Meds Infusions  sodium chloride, 125  mL/hr, Last Rate: Stopped (04/16/22 0045)        Meds PRN  •  acetaminophen  •  bisacodyl  •  bisacodyl  •  ipratropium-albuterol  •  melatonin  •  [COMPLETED] Insert peripheral IV **AND** sodium chloride  •  sodium chloride  •  sodium chloride            Assessment/Plan  (MDM)    Active Hospital Problems:  Active Hospital Problems    Diagnosis  POA   • Rhabdomyolysis [M62.82]  Yes      Resolved Hospital Problems   No resolved problems to display.     1.  Advanced deconditioning status post fall.  mild rhabdomyolysis.  -improving  -continue PT/OT     2.  Acute on chronic kidney failure.  -Cr improved and now better than baseline  -stop IVF  -follow BMP     3.  Multiple skin wounds in multiple stages of healing  -wound care while here     4.  Chronic HFpEF     5.  Insulin requiring type 2 diabetes     6.Recurrent medical noncompliance      DVT ppx-eliquis    This patient has been examined wearing appropriate Personal Protective Equipment. 04/17/22      Plan for disposition:really needs permanent ECF placement,  trying to verify if he has and medicare covered STR days    Electronically signed by Santiago Powers DO, 04/17/22, 13:50 EDT.

## 2022-04-17 NOTE — PLAN OF CARE
Goal Outcome Evaluation:  Plan of Care Reviewed With: patient           Outcome Evaluation: Patient VSS. Denied pain or discomfort this shift. Patient resting comfortably in bed at this time.

## 2022-04-18 NOTE — PLAN OF CARE
Goal Outcome Evaluation:  Plan of Care Reviewed With: patient           Outcome Evaluation: OT: Pt required max assist x 2 for bed mobility (rolling to right, supine to sittting and sitting to supine). Pt c/o pain in left hip/buttock from his fall and extended time on floor after his fall at home. Pt needs assistance to manage basic self care tasks. Pt states he had assistance from the nursing staff at the SNF for bathing/dressing, yet he was attempting to manage adl's himself since being discharged home. Pt realizes that he is not able to adequately manage his personal self care. Pt had some family support at home, but he was alone most of the time.  Pt needs more support than is available at home and would like to return to ECF is possible.

## 2022-04-18 NOTE — PLAN OF CARE
"Goal Outcome Evaluation:              Outcome Evaluation: PT: Patient requires max assist x2 for supine to sit transfer.  Patient tolerates sitting EOB x5 minutes, however increased right lateral lean noted due to pain in left hip/buttocks area.  Patient unable to tolerate proper midline positioning and declines standing attempts due to pain/fatigue, stating \"it isn't going to happen.\" Patient is not safe to return home at this time and requires considerable assistance for all functional tasks.  Would recommend return to SNF with potential to transition to ECF at discharge.  "

## 2022-04-18 NOTE — THERAPY EVALUATION
Patient Name: Froilan Helton  : 1940    MRN: [id number]                              Today's Date: 2022       Admit Date: 4/15/2022    Visit Dx:     ICD-10-CM ICD-9-CM   1. Non-traumatic rhabdomyolysis  M62.82 728.88   2. Weakness  R53.1 780.79     Patient Active Problem List   Diagnosis   • COPD (chronic obstructive pulmonary disease) ([facility])   • Type 2 diabetes mellitus with stage 4 chronic kidney disease, with long-term current use of insulin ([facility])   • Essential hypertension   • Primary osteoarthritis of left knee   • Chronic renal insufficiency, stage 4 (severe) ([facility])   • Class 2 severe obesity due to excess calories with serious comorbidity in adult ([facility])   • Physical debility   • Acute UTI (urinary tract infection)   • Permanent atrial fibrillation ([facility])   • H/O noncompliance with medical treatment, presenting hazards to health   • Myxedema   • Acute on chronic combined systolic and diastolic CHF (congestive heart failure) ([facility])   • Generalized weakness   • Recurrent left pleural effusion   • Fall on same level as cause of accidental injury   • Gross hematuria   • CAD (coronary artery disease)   • HLD (hyperlipidemia)   • Hypothyroidism   • CKD (chronic kidney disease) stage 3, GFR 30-59 ml/min ([facility])   • Acute metabolic encephalopathy   • Cardiomyopathy ([facility])   • Recurrent falls   • Acute renal failure superimposed on chronic kidney disease ([facility])   • Open wound of left foot   • Moderate malnutrition (CMS/[facility])   • Symptomatic bradycardia   • Rhabdomyolysis   • Benign prostatic hyperplasia without urinary obstruction   • COVID-19   • Diabetic neuropathy ([facility])   • Diabetic renal disease ([facility])   • Edema   • Enterococcus as the cause of diseases classified elsewhere   • Hypertensive heart disease with congestive heart failure and chronic kidney disease ([facility])   • Hypo-osmolality and hyponatremia   • Iron deficiency anemia secondary to blood loss (chronic)   • Methicillin resistant Staphylococcus aureus  infection as the cause of diseases classified elsewhere   • Non-pressure chronic ulcer of other part of left lower leg limited to breakdown of skin ([facility])   • Non-pressure chronic ulcer of other part of right foot with fat layer exposed ([facility])   • Paraplegic immobility syndrome   • Patient's noncompliance with other medical treatment and regimen   • Peripheral venous insufficiency   • Pressure ulcer of buttock   • Pseudomonas (aeruginosa) (mallei) (pseudomallei) as the cause of diseases classified elsewhere   • Retention of urine   • Sleep apnea   • Urinary incontinence   • Vitamin D deficiency   • Non-traumatic rhabdomyolysis     Past Medical History:   Diagnosis Date   • A-fib ([facility])    • Arthritis    • Asthma    • CAD (coronary artery disease)    • Cardiomyopathy ([facility])    • Cataract    • CHF (congestive heart failure) ([facility])    • CKD (chronic kidney disease), stage III ([facility])    • COPD (chronic obstructive pulmonary disease) ([facility])    • Diabetes mellitus ([facility])    • Disease of thyroid gland    • Emphysema, unspecified ([facility])    • Glaucoma    • Hyperlipidemia    • Hypertension    • Kidney stone    • Myocardial infarct, old    • Neuropathy    • Osteoarthritis    • Osteoporosis    • Permanent atrial fibrillation ([facility]) 6/30/2020   • PVD (peripheral vascular disease) ([facility])    • Renal disorder    • Shingles      Past Surgical History:   Procedure Laterality Date   • COLONOSCOPY     • EYE SURGERY     • INCISION AND DRAINAGE LEG Left 12/30/2021    Procedure: debridement of left hip wounds and left foot wound;  Surgeon: Judie Aguero DO;  Location: [facility];  Service: General;  Laterality: Left;   • JOINT REPLACEMENT      right   • KIDNEY STONE SURGERY     • REPLACEMENT TOTAL KNEE     • TOE SURGERY        General Information     Row Name 04/18/22 1045          OT Time and Intention    Mode of Treatment occupational therapy  -SD     Row Name 04/18/22 1045          General Information    Patient Profile Reviewed yes  pt  with a fall at home and was unable to get up from the ground for several hours. Pt was recently discharged home from a SNF after a lenghty stay.  -SD     Prior Level of Function --  pt uses a scooter chair for mobility at home. Family brings meals to pt, and he will re-heat them in the microwave. Pt stated nurses at SNF were providing A with adl's, but he had to manage at home himself. Pt not able to manage his self care adequately.  -SD     Existing Precautions/Restrictions fall  -SD     Barriers to Rehab medically complex;previous functional deficit  -SD     Row Name 04/18/22 1045          Occupational Profile    Reason for Services/Referral (Occupational Profile) decreased adl functioin  -SD     Successful Occupations (Occupational Profile) Pt needs support for adl's. Nursing staff at SNF were providing A with adl's daily, yet he has attempted to manage adl's himself since being discharged from the SNF (home less than a week before falling). Family brings in meals for pt every few days, and he reheats them in the micowave as needed.  -SD     Environmental Supports and Barriers (Occupational Profile) family provides some support at home  -SD     Row Name 04/18/22 1045          Living Environment    People in Home alone  -SD     Row Name 04/18/22 1045          Cognition    Orientation Status (Cognition) oriented x 3  oriented to month/year (not day)  -SD     Row Name 04/18/22 1045          Safety Issues, Functional Mobility    Comment, Safety Issues/Impairments (Mobility) Pt c/o soreness from he fall at home .  -SD           User Key  (r) = Recorded By, (t) = Taken By, (c) = Cosigned By    Initials Name Provider Type    Sebastian Greene OTR Occupational Therapist                 Mobility/ADL's     Row Name 04/18/22 1137          Bed Mobility    Rolling Right [place] (Bed Mobility) maximum assist (25% patient effort);1 person assist;verbal cues  -SD     Supine-Sit [place] (Bed Mobility) maximum assist  (25% patient effort);2 person assist;verbal cues;nonverbal cues (demo/gesture)  -SD     Sit-Supine [place] (Bed Mobility) maximum assist (25% patient effort);2 person assist;verbal cues;nonverbal cues (demo/gesture)  -SD     Assistive Device (Bed Mobility) bed rails;draw sheet;head of bed elevated  -[facility] Name 04/18/22 1138          Transfers    Comment, (Transfers) Pt sat on EOB for > 5 minutes, he c/o soreness in left hip from his fall and leaned to his right to avoid pressure on the left hip after a few minutes. Pt stated he was too sore and weak to attempt to stand or transfer to a chair.  -SD     Row Name 04/18/22 1138          Activities of Daily Living    BADL Assessment/Intervention bathing;lower body dressing  -SD     Row Name 04/18/22 1138          Bathing Assessment/Intervention    [place] Level (Bathing) maximum assist (25% patient effort)  -SD     Row Name 04/18/22 1138          Lower Body Dressing Assessment/Training    [place] Level (Lower Body Dressing) lower body dressing skills;don;socks;dependent (less than 25% patient effort)  -SD           User Key  (r) = Recorded By, (t) = Taken By, (c) = Cosigned By    Initials Name Provider Type    SD Sebastian Wright, OTR Occupational Therapist               Obj/Interventions     [facility] Name 04/18/22 1141          Range of Motion Comprehensive    Comment, General Range of Motion pt with limited bilateral shoulder rom (approx 90 degrees flexion). Pt with functional rom of BUE's distal to shoulders  -[facility] Name 04/18/22 1141          Strength Comprehensive (MMT)    Comment, General Manual Muscle Testing (MMT) Assessment bilateral shoulder MMT 2+/5, distal to shoulders BUE's 3+/5  -[facility] Name 04/18/22 1141          Balance    Balance Assessment sitting static balance  -SD     Static Sitting Balance contact guard;minimal assist  -SD     Comment, Balance Pt c/o soreness at his left hip from his fall (and being on ground for several  hours) and leaned to his right to avoid pressure on left hip/buttock while sitting at EOB.  -SD           User Key  (r) = Recorded By, (t) = Taken By, (c) = Cosigned By    Initials Name Provider Type    Sebastian Greene OTR Occupational Therapist               Goals/Plan     Row Name 04/18/22 1152          Grooming Goal 1 (OT)    Activity/Device (Grooming Goal 1, OT) hair care;wash face, hands  at EOB  -SD     [place] (Grooming Goal 1, OT) supervision required;set-up required  -SD     Time Frame (Grooming Goal 1, OT) by discharge  -SD     Progress/Outcome (Grooming Goal 1, OT) other (see comments)  new goal  -SD     Row Name 04/18/22 1152          ROM Goal 1 (OT)    ROM Goal 1 (OT) Pt to particpate in BUE HEP to address strength needed for basic daily tasks.  -SD     Time Frame (ROM Goal 1, OT) by discharge  -SD     Progress/Outcome (ROM Goal 1, OT) other (see comments)  new goal  -SD     Row Name 04/18/22 1152          Problem Specific Goal 1 (OT)    Problem Specific Goal 1 (OT) Pt to maintain his sitting balance at EOB with supervision x 5 minutes to allow for participation in adl tasks.  -SD     Time Frame (Problem Specific Goal 1, OT) by discharge  -SD     Strategies/Barriers (Problem Specific Goal 1, OT) barrier - pain in left hip/buttock from fall/extended period on ground after fall  -SD     Progress/Outcome (Problem Specific Goal 1, OT) other (see comments)  new goal  -SD     Row Name 04/18/22 1152          Therapy Assessment/Plan (OT)    Planned Therapy Interventions (OT) patient/caregiver education/training;transfer/mobility retraining;activity tolerance training;ROM/therapeutic exercise  -SD           User Key  (r) = Recorded By, (t) = Taken By, (c) = Cosigned By    Initials Name Provider Type    Sebastian Greene OTR Occupational Therapist               Clinical Impression     Row Name 04/18/22 1144          Pain Assessment    Pretreatment Pain Rating 5/10  -SD     Posttreatment Pain Rating  6/10  -SD     Pain Location - Side/Orientation Left  -SD     Pain Location - hip;buttock  -SD     Pre/Posttreatment Pain Comment Pt c/o soreness in left hip/buttock from his fall and extended time on the floor after his fall.  -SD     Row Name 04/18/22 1144          Plan of Care Review    Plan of Care Reviewed With patient  -SD     Outcome Evaluation OT: Pt required max assist x 2 for bed mobility (rolling to right, supine to sittting and sitting to supine). Pt c/o pain in left hip/buttock from his fall and extended time on floor after the fall. Pt needs assistance to manage basic self care tasks. Pt states he had assistance from the nursing staff at the SNF for bathing/dressing, yet he was attempting to manage adl's himself since being discharged home. Pt realizes that he is not able to adequately manage his personal self care. Pt had some family support at home, but he was alone most of the time.  Pt needs more support than is available at home and would like to return [facility] is possible.  -SD     Row Name 04/18/22 1144          Therapy Assessment/Plan (OT)    Patient/Family Therapy Goal Statement (OT) go back to SNF if possible  -SD     Rehab Potential (OT) fair, will monitor progress closely  -SD     Criteria for Skilled Therapeutic Interventions Met (OT) yes  -SD     Therapy Frequency (OT) 5 times/wk  -SD     Predicted Duration of Therapy Intervention (OT) 1 week  -SD     Row Name 04/18/22 1144          Therapy Plan Review/Discharge Plan (OT)    Anticipated Discharge Disposition (OT) skilled nursing facility;extended care facility  Skilled nursing facility with transition to LTC  -SD     Row Name 04/18/22 1144          Positioning and Restraints    Pre-Treatment Position in bed  -SD     Post Treatment Position bed  -SD     In Bed side lying right;call light within reach;encouraged to call for assist  heep protector  -SD           User Key  (r) = Recorded By, (t) = Taken By, (c) = Cosigned By    Initials Name  Provider Type    Sebastian Greene OTR Occupational Therapist               Outcome Measures     Row Name 04/18/22 1155          How much help from another is currently needed...    Putting on and taking off regular lower body clothing? 2  -SD     Bathing (including washing, rinsing, and drying) 2  -SD     Toileting (which includes using toilet bed pan or urinal) 2  -SD     Putting on and taking off regular upper body clothing 3  -SD     Taking care of personal grooming (such as brushing teeth) 3  -SD     Eating meals 3  -SD     AM-PAC 6 Clicks Score (OT) 15  -SD     Row Name 04/18/22 1155          Functional Assessment    Outcome Measure Options AM-PAC 6 Clicks Daily Activity (OT)  -SD           User Key  (r) = Recorded By, (t) = Taken By, (c) = Cosigned By    Initials Name Provider Type    Sebastian Greene OTR Occupational Therapist                Occupational Therapy Education                 Title: PT OT SLP Therapies (In Progress)     Topic: Occupational Therapy (In Progress)     Point: ADL training (Done)     Description:   Instruct learner(s) on proper safety adaptation and remediation techniques during self care or transfers.   Instruct in proper use of assistive devices.              Learning Progress Summary           Patient Acceptance, E, VU by SD at 4/18/2022 1155    Comment: Education regarding OT services, benefits of activity and safety with bed mobility.                   Point: Home exercise program (Not Started)     Description:   Instruct learner(s) on appropriate technique for monitoring, assisting and/or progressing therapeutic exercises/activities.              Learner Progress:  Not documented in this visit.                      User Key     Initials Effective Dates Name Provider Type Discipline    SD 06/16/21 -  Sebastian Wright OTR Occupational Therapist OT              OT Recommendation and Plan  Planned Therapy Interventions (OT): patient/caregiver education/training,  transfer/mobility retraining, activity tolerance training, ROM/therapeutic exercise  Therapy Frequency (OT): 5 times/wk  Plan of Care Review  Plan of Care Reviewed With: patient  Outcome Evaluation: OT: Pt required max assist x 2 for bed mobility (rolling to right, supine to sittting and sitting to supine). Pt c/o pain in left hip/buttock from his fall and extended time on floor after the fall. Pt needs assistance to manage basic self care tasks. Pt states he had assistance from the nursing staff at the SNF for bathing/dressing, yet he was attempting to manage adl's himself since being discharged home. Pt realizes that he is not able to adequately manage his personal self care. Pt had some family support at home, but he was alone most of the time.  Pt needs more support than is available at home and would like to return to [facility] is possible.     Time Calculation:    Time Calculation- OT     Row Name 04/18/22 1128             Time Calculation- OT    OT Start Time 1020  -SD            User Key  (r) = Recorded By, (t) = Taken By, (c) = Cosigned By    Initials Name Provider Type    Sebastian Greene OTR Occupational Therapist              Therapy Charges for Today     Code Description Service Date Service Provider Modifiers Qty    [id number]  OT EVAL MOD COMPLEXITY 2 4/18/2022 Sebastian Wright OTR GO 1               BETH Melgar  4/18/2022

## 2022-04-18 NOTE — CASE MANAGEMENT/SOCIAL WORK
Continued Stay Note  MICHAEL Kincaid     Patient Name: Froilan Helton  MRN: [id number]  Today's Date: 4/18/2022    Admit Date: 4/15/2022     Discharge Plan     Row Name 04/18/22 1310       Plan    Plan Comments I spoke with Polina from [facility] who advised that they can accept the patient into their service.  I notified Dr. Briggs of same. CM will continue to follow.               Discharge Codes    No documentation.                     Terri Martinez RN

## 2022-04-18 NOTE — PROGRESS NOTES
Adult Nutrition  Assessment/PES    Patient Name:  Froilan Helton  YOB: 1940  MRN: [id number]  Admit Date:  4/15/2022    Assessment Date:  4/18/2022    Comments:   Multiple skin issues noted.   Recommend: consider adding multivitamin to aid with skin healing    Consider cardiac consistent CHO diet with hx of  DM/CHF noted   Will cont to follow and monitor.      Reason for Assessment     Row Name 04/18/22 1411          Reason for Assessment    Reason For Assessment diagnosis/disease state  hx CHF     Diagnosis renal disease;cardiac disease;diabetes diagnosis/complications  mild Rhabdo, CKD IV, Deconditioning, DM                Nutrition/Diet History     Row Name 04/18/22 1412          Nutrition/Diet History    Typical Intake (Food/Fluid/EN/PN) Pt receiving care at visit x 2 visits to room                Anthropometrics     Row Name 04/18/22 1413 04/18/22 0621       Anthropometrics    Weight --  222.3# 100 kg (221 lb 4.8 oz)               Labs/Tests/Procedures/Meds     Row Name 04/18/22 1413          Labs/Procedures/Meds    Lab Results Reviewed reviewed     Lab Results Comments bun 35/creat 1.7 H            Diagnostic Tests/Procedures    Diagnostic Test/Procedure Reviewed reviewed            Medications    Pertinent Medications Reviewed reviewed     Pertinent Medications Comments B12, bumex, novolog/levemir, miralax                Physical Findings     Row Name 04/18/22 1413          Physical Findings    Overall Physical Appearance R & L heel, thoracic spine skin issues noted RN notes                Estimated/Assessed Needs - Anthropometrics     Row Name 04/18/22 1413 04/18/22 0621       Anthropometrics    Weight --  222.3# 100 kg (221 lb 4.8 oz)       Estimated/Assessed Needs    Additional Documentation Estimated Calorie Needs (Group);Fluid Requirements (Group);Protein Requirements (Group) --       Estimated Calorie Needs    Estimated Calorie Requirement (kcal/day) 2123 kcal ( miflfin 1.2 ) --        Protein Requirements    Est Protein Requirement Amount (gms/kg) 0.8 gm protein  80 gm pro --       Fluid Requirements    Estimated Fluid Requirement Method other (see comments)  2123 ml --               Nutrition Prescription Ordered     Row Name 04/18/22 1414          Nutrition Prescription PO    Current PO Diet Regular                Evaluation of Received Nutrient/Fluid Intake     Row Name 04/18/22 1414          Fluid Intake Evaluation    Oral Fluid (mL) 1040  ave x 3, 49%            PO Evaluation    Number of Meals 7     % PO Intake 68                     Problem/Interventions:   Problem 1     Row Name 04/18/22 1415          Nutrition Diagnoses Problem 1    Problem 1 Overweight/Obesity     Etiology (related to) Functional Diagnosis     Functional Diagnosis Mobility limitation     Signs/Symptoms (evidenced by) Report/Observation;BMI     BMI 25 - 29.9                      Intervention Goal     Row Name 04/18/22 1417          Intervention Goal    General Meet nutritional needs for age/condition     PO PO intake (%)     PO Intake % 50 %  or greater     Weight Appropriate weight loss  bumex noted                Nutrition Intervention     Row Name 04/18/22 1417          Nutrition Intervention    RD/Tech Action Encourage intake;Follow Tx progress                  Education/Evaluation     Row Name 04/18/22 1417          Education    Education Education not appropriate at this time            Monitor/Evaluation    Monitor Per protocol;I&O;PO intake;Pertinent labs;Weight;Symptoms                 Electronically signed by:  Laura Varma RD  04/18/22 14:17 EDT

## 2022-04-18 NOTE — PLAN OF CARE
Goal Outcome Evaluation:  Plan of Care Reviewed With: patient           Outcome Evaluation: VSS, room air. Patient denied pain or discomfort. Weight shift assistance provided. Patient incontinent of bladder. Resting comfortably in bed at this time.

## 2022-04-18 NOTE — PROGRESS NOTES
"SERVICE: [facility] HOSPITALIST    CONSULTANTS:    CHIEF COMPLAINT: Follow-up mild rhabdomyolysis    SUBJECTIVE: Upon entry to room, Mr. Helton is eating his breakfast.  He reports that he plans to go back to rehab however has multiple day stay required prior to this being a possibility due to recent STIR.  He notes continued weakness, general fatigue however improved.  He complains of posterior head ache after falling.  He reports recurrent falls at home.    OBJECTIVE:    /59 (BP Location: Left arm, Patient Position: Lying)   Pulse 56   Temp 97.9 °F (36.6 °C) (Oral)   Resp 16   Ht 185.4 cm (73\")   Wt 100 kg (221 lb 4.8 oz)   SpO2 95%   BMI 29.20 kg/m²     MEDS/LABS REVIEWED AND ORDERED    apixaban, 2.5 mg, Oral, Q12H  Aquaphor Advanced Therapy, 1 application, Topical, Q12H  atorvastatin, 10 mg, Oral, Nightly  budesonide-formoterol, 2 puff, Inhalation, BID - RT  bumetanide, 1 mg, Oral, Daily  citalopram, 20 mg, Oral, Nightly  docusate sodium, 100 mg, Oral, BID  hydrocortisone-bacitracin-zinc oxide-nystatin, 1 application, Topical, TID  insulin aspart, 0-9 Units, Subcutaneous, TID AC  insulin detemir, 10 Units, Subcutaneous, Nightly  iron polysaccharides, 150 mg, Oral, Daily  levothyroxine, 224 mcg, Oral, Daily  polyethylene glycol, 17 g, Oral, Daily  pregabalin, 75 mg, Oral, BID  QUEtiapine, 12.5 mg, Oral, Q PM  sodium chloride, 10 mL, Intravenous, Q12H  tamsulosin, 0.4 mg, Oral, Daily  cyanocobalamin, 1,000 mcg, Oral, Daily      Physical Exam  Vitals reviewed.   Constitutional:       General: He is not in acute distress.     Appearance: He is not ill-appearing.      Comments: Elderly   HENT:      Head: Normocephalic and atraumatic.      Mouth/Throat:      Mouth: Mucous membranes are moist.      Comments: Extremely poor dentition with multiple cracked teeth with caries noted  Eyes:      Extraocular Movements: Extraocular movements intact.      Pupils: Pupils are equal, round, and " reactive to light.   Cardiovascular:      Rate and Rhythm: Normal rate and regular rhythm.   Pulmonary:      Effort: Pulmonary effort is normal. No respiratory distress.      Breath sounds: Normal breath sounds. No wheezing.   Abdominal:      General: Abdomen is flat. Bowel sounds are normal. There is no distension.      Palpations: Abdomen is soft.      Tenderness: There is no abdominal tenderness. There is no guarding.   Musculoskeletal:         General: No swelling.      Comments: Bilateral feet in boots   Skin:     General: Skin is warm and dry.      Capillary Refill: Capillary refill takes less than 2 seconds.      Findings: No erythema.   Neurological:      General: No focal deficit present.      Mental Status: He is alert and oriented to person, place, and time.   Psychiatric:         Mood and Affect: Mood normal.         Behavior: Behavior normal.       LAB/DIAGNOSTICS:    Lab Results (last 24 hours)     Procedure Component Value Units Date/Time    Ferritin [[id number]] Collected: 04/18/22 0351    Specimen: Blood Updated: 04/18/22 1058    Iron Profile [[id number]] Collected: 04/18/22 0351    Specimen: Blood Updated: 04/18/22 1058    TSH [[id number]] Collected: 04/18/22 0351    Specimen: Blood Updated: 04/18/22 1056    POC Glucose Once [[id number]]  (Normal) Collected: 04/18/22 0723    Specimen: Blood Updated: 04/18/22 0746     Glucose 120 mg/dL      Comment: Meter: [id number] : [id number] Jacque Santamaria NURSING ASSISTANT       Basic Metabolic Panel [[id number]]  (Abnormal) Collected: 04/18/22 0351    Specimen: Blood Updated: 04/18/22 0457     Glucose 137 mg/dL      BUN 35 mg/dL      Creatinine 1.72 mg/dL      Sodium 132 mmol/L      Potassium 3.9 mmol/L      Chloride 100 mmol/L      CO2 24.0 mmol/L      Calcium 8.1 mg/dL      BUN/Creatinine Ratio 20.3     Anion Gap 8.0 mmol/L      eGFR 39.7 mL/min/1.73      Comment: National Kidney Foundation and American Society of Nephrology (ASN) Task Force recommended  calculation based on the Chronic Kidney Disease Epidemiology Collaboration (CKD-EPI) equation refit without adjustment for race.       Narrative:      GFR Normal >60  Chronic Kidney Disease <60  Kidney Failure <15      CANDIDA AURIS SCREEN - Swab, Axilla Right, Axilla Left and Groin [[id number]]  (Normal) Collected: 04/16/22 0050    Specimen: Swab from Axilla Right, Axilla Left and Groin Updated: 04/18/22 0101     Candida Auris Screen Culture No Candida auris isolated at 2 days    POC Glucose Once [[id number]]  (Normal) Collected: 04/17/22 1721    Specimen: Blood Updated: 04/17/22 1728     Glucose 115 mg/dL      Comment: Meter: [id number] : [id number] Edwin HARKINS           No orders to display     Results for orders placed during the hospital encounter of 12/28/21    Adult Transthoracic Echo Complete W/ Cont if Necessary Per Protocol    Interpretation Summary  · Saline test results are negative.  · There is severe calcification of the aortic valve mainly affecting the left coronary and right coronary cusp(s).  · Calculated right ventricular systolic pressure from tricuspid regurgitation is 44 mmHg.  · Mild pulmonary hypertension is present.  · Estimated left ventricular EF = 55% Left ventricular systolic function is normal.  · The right ventricular cavity is mildly dilated.  · Mild dilation of the aortic root is present.    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  Check TSH/A1C  A.m. glucose at goal on Levemir 10 units nightly, moderate dose SSI, check Accu-Cheks  "AC/HS    Hypothyroidism: TSH pending on levothyroxine 224 mcg daily    Normocytic anemia: No active blood loss noted, baseline hemoglobin previously 9, currently 12.1  Check anemia panels, continue B12 and iron supplements    PAF:   CAD/HLD:   Chronic HFpEF: Clinically euvolemic, no current acute issues  Continues home dose Eliquis    Multiple chronic skin wounds: Local wound care    COPD: No current acute issues    Chronic bradycardia: Nothing acute, heart rate 50s and 60s    Recurrent medical noncompliance: Mr. Helton has a long history of medical noncompliance with refusal to go to rehab, then when at home falls and requires readmissions    DNR status    PLAN FOR DISPOSITION: Awaiting placement    LEATHA Ford  Hospitalist, [facility]  04/18/22  10:38 EDT    As of April 2021, as required by the Federal 21st Century Cures Act, medical records (including provider notes and laboratory/imaging results) are to be made available to patients and/or their designees as soon as the documents are signed/resulted. While the intention is to ensure transparency and to engage patients in their healthcare, this immediate access may create unintended consequences because this document uses language intended for communication between medical providers for interpretation with the entirety of the patient's clinical picture in mind. It is recommended that patients and/or their designees review all available information with their primary or specialist providers for explanation and to avoid misinterpretation of this information.    \"Dictated utilizing Dragon dictation\"      "

## 2022-04-18 NOTE — PLAN OF CARE
Problem: Adult Inpatient Plan of Care  Goal: Plan of Care Review  Flowsheets (Taken 4/18/2022 0121)  Plan of Care Reviewed With: patient   Goal Outcome Evaluation:  Plan of Care Reviewed With: patient

## 2022-04-18 NOTE — DISCHARGE SUMMARY
"Froilan Helton  1940  [id number]    Hospitalists Discharge Summary    Date of Admission: 4/15/2022  Date of Discharge:  4/18/2022    History of Present Illness from [facility] [place] on admit:   \"Mr. Helton is a chronically ill unfortunate 81-year-old  male.  Well-known to this facility.  He has multiple medical problems.  Very difficult home situation.  He lives alone.  He is not able to take care of himself.  He is not able to perform his ADLs.  He was last admitted here in January with an extended hospital stay.  For his multiple medical issues.  Including heart failure diabetes and multiple wounds.  He was then transferred to [facility] nursing [facility] at discharge.  He was there for several months.  And then was sent home as his insurance benefits had ran out.  He was only home just a few days.  While at home he could not take care of his self.  He was not taking his medications.  Not eating and drinking.  He reports he does have some family in the area which check on him from time to time.  Sometime on Friday family came and found him on the floor.  Apparently he was there for over 12 hours.  He was so weak he could not get up.  He urinated and defecated on himself.  911 was called EMS brought him the emergency room.  He has mild rhabdomyolysis.  Along with his usual chronic medical problems.  No signs of sepsis.  He has extensive skin wounds all over his body.  He is asking to go back to [facility] ,as he knows he can take care of himself at home.  Overnight he is slept well.  He ate a full supper.  Drinking water.  His wounds been well documented please see the nursing intake note with all the photos.  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 Helton has failed to be able to care for himself at home.  After admission, he quickly rebounded while here without any medical intervention. He remains weak and deconditioned and PT/OT evaluation recommended return to SNF with potential to transition to ECF at discharge as he is continually too weak and unsteady to care for himself at home, with recurrent falls.  He is agreeable to return to SNF who is able to accept him today.  His routine medical problems were addressed and remained stable at discharge.  He complained of posterior occipital area headache after his fall.  CT head was checked due to his chronic anticoagulation status on eliquis. CT head showed _______    PCP  Patient Care Team:  Fortunato De Leon MD as PCP - General (Family Medicine)    Consults:   Consults     No orders found from 3/17/2022 to 4/16/2022.        Operations and Procedures Performed:     XR Ribs Left With PA Chest    Result Date: 4/15/2022  Narrative: CR Ribs 2 Vws W Chest LT INDICATION: Left-sided rib pain after fall. COMPARISON: 1/14/2022 FINDINGS: PA view of the chest and oblique views of the left ribs. 5 total images. Heart remains enlarged. There is atherosclerotic disease in the aorta. Interstitial changes in the lungs could reflect mild chronic scarring or potentially a mild degree of edema. No pneumothorax. No acute rib fracture is identified.     Impression: 1. No acute rib fracture. No pneumothorax. 2. Cardiomegaly with interstitial changes in the lungs which could reflect chronic scarring or perhaps a mild degree of edema. Signer Name: Jaylen Gudino MD  Signed: 4/15/2022 11:09 PM  Workstation Name: [email or web address]  [facility]    Allergies:  is allergic to morphine, atorvastatin, and oxycontin [oxycodone hcl].    Joseph Gallagher 3/2022 per report, reviewed by me    Discharge Medications:     Discharge Medications      New Medications      Instructions Start Date   dextrose 40 % gel  Commonly known as: GLUTOSE   15 g, Oral, Every 15 Minutes PRN      insulin aspart 100 UNIT/ML injection  Commonly known as: novoLOG   0-9 Units, Subcutaneous, 3 Times Daily Before Meals         Continue These Medications      Instructions Start Date   acetaminophen 325 MG tablet  Commonly known as: TYLENOL   650 mg, Oral, Every 4 Hours PRN      albuterol sulfate  (90 Base) MCG/ACT inhaler  Commonly known as: PROVENTIL HFA;VENTOLIN HFA;PROAIR HFA   2 puffs, Inhalation, Every 4 Hours PRN      apixaban 2.5 MG tablet tablet  Commonly known as: ELIQUIS   2.5 mg, Oral, Every 12 Hours Scheduled      Aquaphor Advanced Therapy ointment ointment   1 application, Topical, Every 12 Hours Scheduled      atorvastatin 10 MG tablet  Commonly known as: LIPITOR   10 mg, Oral, Nightly      bisacodyl 5 MG EC tablet  Commonly known as: DULCOLAX   5 mg, Oral, Daily PRN      bisacodyl 10 MG suppository  Commonly known as: DULCOLAX   10 mg, Rectal, Daily PRN      budesonide-formoterol 160-4.5 MCG/ACT inhaler  Commonly known as: Symbicort   2 puffs, Inhalation, 2 Times Daily - RT      bumetanide 1 MG tablet  Commonly known as: BUMEX   1 mg, Oral, Daily      Cholecalciferol 50 MCG (2000 UT) tablet   2,000 Units, Oral, Daily      citalopram 10 MG tablet  Commonly known as: CeleXA   20 mg, Oral, Nightly      cyanocobalamin 1000 MCG tablet  Commonly known as: VITAMIN B-12   1,000 mcg, Oral, Daily      docusate sodium 100 MG capsule  Commonly known as: COLACE   100 mg, Oral, 2 Times Daily      fluticasone 50 MCG/ACT nasal spray  Commonly known as: FLONASE   2 sprays, Each Nare, Daily      hydrocortisone-bacitracin-zinc oxide-nystatin  Commonly known as: MAGIC BARRIER   1  "application, Topical, 3 Times Daily      insulin detemir 100 UNIT/ML injection  Commonly known as: LEVEMIR   10 Units, Subcutaneous, Nightly      Insulin Syringe 30G X 5/16\" 1 ML misc   U UTD WITH INSULIN UP TO 6 TIMES A DAY      ipratropium-albuterol 0.5-2.5 mg/3 ml nebulizer  Commonly known as: DUO-NEB   3 mL, Nebulization, Every 4 Hours PRN      iron polysaccharides 150 MG capsule  Commonly known as: NIFEREX   150 mg, Oral, Daily      lactulose 10 GM/15ML solution  Commonly known as: CHRONULAC   30 g, Oral, Daily PRN      levothyroxine 112 MCG tablet  Commonly known as: SYNTHROID, LEVOTHROID   224 mcg, Oral, Daily      melatonin 5 MG tablet tablet   5 mg, Oral, Nightly PRN, Over the counter      polyethylene glycol 17 g packet  Commonly known as: MIRALAX   17 g, Oral, Daily      pregabalin 75 MG capsule  Commonly known as: LYRICA   75 mg, Oral, 2 Times Daily      QUEtiapine 25 MG tablet  Commonly known as: SEROquel   0.5 tablets, Oral, Every Evening      sodium hypochlorite 0.125 % solution topical solution 0.125%  Commonly known as: DAKIN'S 1/4 STRENGTH   473 mL, Topical, Daily      tamsulosin 0.4 MG capsule 24 hr capsule  Commonly known as: FLOMAX   0.4 mg, Oral, Daily             Last Lab Results:   Lab Results (most recent)     Procedure Component Value Units Date/Time    POC Glucose Once [[id number]]  (Abnormal) Collected: 04/18/22 1149    Specimen: Blood Updated: 04/18/22 1154     Glucose 165 mg/dL      Comment: Meter: [id number] : [id number] Wilian Tao CNA       Hemoglobin A1c [[id number]]  (Normal) Collected: 04/18/22 0351    Specimen: Blood Updated: 04/18/22 1119     Hemoglobin A1C 5.30 %     Narrative:      Hemoglobin A1C Ranges:    Increased Risk for Diabetes  5.7% to 6.4%  Diabetes                     >= 6.5%  Diabetic Goal                < 7.0%    TSH [[id number]]  (Normal) Collected: 04/18/22 0351    Specimen: Blood Updated: 04/18/22 1117     TSH 1.300 uIU/mL     Iron Profile [[id number]]  " (Abnormal) Collected: 04/18/22 0351    Specimen: Blood Updated: 04/18/22 1110     Iron 27 mcg/dL      Iron Saturation 20 %      UIBC 108 mcg/dL      TIBC 135 mcg/dL     Ferritin [[id number]]  (Abnormal) Collected: 04/18/22 0351    Specimen: Blood Updated: 04/18/22 1110     Ferritin 427.00 ng/mL     Narrative:      Results may be falsely decreased if patient taking Biotin.      Vitamin B12 [[id number]] Collected: 04/18/22 0351    Specimen: Blood Updated: 04/18/22 1109    Folate [[id number]] Collected: 04/18/22 0351    Specimen: Blood Updated: 04/18/22 1109    POC Glucose Once [[id number]]  (Normal) Collected: 04/18/22 0723    Specimen: Blood Updated: 04/18/22 0746     Glucose 120 mg/dL      Comment: Meter: [id number] : [id number] Mings Hina NURSING ASSISTANT       Basic Metabolic Panel [[id number]]  (Abnormal) Collected: 04/18/22 0351    Specimen: Blood Updated: 04/18/22 0457     Glucose 137 mg/dL      BUN 35 mg/dL      Creatinine 1.72 mg/dL      Sodium 132 mmol/L      Potassium 3.9 mmol/L      Chloride 100 mmol/L      CO2 24.0 mmol/L      Calcium 8.1 mg/dL      BUN/Creatinine Ratio 20.3     Anion Gap 8.0 mmol/L      eGFR 39.7 mL/min/1.73      Comment: National Kidney Foundation and American Society of Nephrology (ASN) Task Force recommended calculation based on the Chronic Kidney Disease Epidemiology Collaboration (CKD-EPI) equation refit without adjustment for race.       Narrative:      GFR Normal >60  Chronic Kidney Disease <60  Kidney Failure <15      CANDIDA AURIS SCREEN - Swab, Axilla Right, Axilla Left and Groin [[id number]]  (Normal) Collected: 04/16/22 0050    Specimen: Swab from Axilla Right, Axilla Left and Groin Updated: 04/18/22 0101     Candida Auris Screen Culture No Candida auris isolated at 2 days    Basic Metabolic Panel [[id number]]  (Abnormal) Collected: 04/16/22 0544    Specimen: Blood Updated: 04/16/22 0630     Glucose 141 mg/dL      BUN 52 mg/dL      Creatinine 1.85 mg/dL      Sodium 141 mmol/L       Potassium 3.9 mmol/L      Chloride 107 mmol/L      CO2 23.9 mmol/L      Calcium 8.7 mg/dL      BUN/Creatinine Ratio 28.1     Anion Gap 10.1 mmol/L      eGFR 36.4 mL/min/1.73      Comment: National Kidney Foundation and American Society of Nephrology (ASN) Task Force recommended calculation based on the Chronic Kidney Disease Epidemiology Collaboration (CKD-EPI) equation refit without adjustment for race.       Narrative:      GFR Normal >60  Chronic Kidney Disease <60  Kidney Failure <15      CK [[id number]]  (Abnormal) Collected: 04/16/22 0544    Specimen: Blood Updated: 04/16/22 0630     Creatine Kinase 284 U/L     Urinalysis With Culture If Indicated - Urine, Clean Catch [[id number]]  (Abnormal) Collected: 04/15/22 2313    Specimen: Urine, Clean Catch Updated: 04/15/22 2334     Color, UA Yellow     Appearance, UA Clear     pH, UA 5.5     Specific Gravity, UA 1.020     Glucose, UA Negative     Ketones, UA Trace     Bilirubin, UA Negative     Blood, UA Small (1+)     Protein,  mg/dL (2+)     Leuk Esterase, UA Negative     Nitrite, UA Negative     Urobilinogen, UA 0.2 E.U./dL    Urinalysis, Microscopic Only - Urine, Clean Catch [[id number]]  (Abnormal) Collected: 04/15/22 2313    Specimen: Urine, Clean Catch Updated: 04/15/22 2334     RBC, UA 3-5 /HPF      WBC, UA None Seen /HPF      Bacteria, UA Trace /HPF      Squamous Epithelial Cells, UA 0-2 /HPF      Hyaline Casts, UA None Seen /LPF      Methodology Manual Light Microscopy    Procalcitonin [[id number]]  (Normal) Collected: 04/15/22 2215    Specimen: Blood Updated: 04/15/22 2300     Procalcitonin 0.14 ng/mL     Narrative:      As a Marker for Sepsis (Non-Neonates):    1. <0.5 ng/mL represents a low risk of severe sepsis and/or septic shock.  2. >2 ng/mL represents a high risk of severe sepsis and/or septic shock.    As a Marker for Lower Respiratory Tract Infections that require antibiotic therapy:    PCT on Admission    Antibiotic Therapy       6-12 Hrs  "later    >0.5                Strongly Recommended  >0.25 - <0.5        Recommended   0.1 - 0.25          Discouraged              Remeasure/reassess PCT  <0.1                Strongly Discouraged     Remeasure/reassess PCT    As 28 day mortality risk marker: \"Change in Procalcitonin Result\" (>80% or <=80%) if Day 0 (or Day 1) and Day 4 values are available. Refer to http://www.[employer].pwCancer [facility] Centers of [facility] – [facility]-pct-calculator.com    Change in PCT <=80%  A decrease of PCT levels below or equal to 80% defines a positive change in PCT test result representing a higher risk for 28-day all-cause mortality of patients diagnosed with severe sepsis for septic shock.    Change in PCT >80%  A decrease of PCT levels of more than 80% defines a negative change in PCT result representing a lower risk for 28-day all-cause mortality of patients diagnosed with severe sepsis or septic shock.       Comprehensive Metabolic Panel [[id number]]  (Abnormal) Collected: 04/15/22 2215    Specimen: Blood Updated: 04/15/22 2258     Glucose 104 mg/dL      BUN 52 mg/dL      Creatinine 2.01 mg/dL      Sodium 139 mmol/L      Potassium 4.4 mmol/L      Chloride 102 mmol/L      CO2 23.4 mmol/L      Calcium 9.6 mg/dL      Total Protein 7.0 g/dL      Albumin 3.50 g/dL      ALT (SGPT) 7 U/L      AST (SGOT) 20 U/L      Alkaline Phosphatase 88 U/L      Total Bilirubin 0.8 mg/dL      Globulin 3.5 gm/dL      A/G Ratio 1.0 g/dL      BUN/Creatinine Ratio 25.9     Anion Gap 13.6 mmol/L      eGFR 32.9 mL/min/1.73      Comment: National Kidney Foundation and American Society of Nephrology (ASN) Task Force recommended calculation based on the Chronic Kidney Disease Epidemiology Collaboration (CKD-EPI) equation refit without adjustment for race.       Narrative:      GFR Normal >60  Chronic Kidney Disease <60  Kidney Failure <15      CK [[id number]]  (Abnormal) Collected: 04/15/22 2215    Specimen: Blood Updated: 04/15/22 2258     Creatine Kinase 381 U/L     CBC & Differential [[id number]]  " (Abnormal) Collected: 04/15/22 2215    Specimen: Blood Updated: 04/15/22 2232    Narrative:      The following orders were created for panel order CBC & Differential.  Procedure                               Abnormality         Status                     ---------                               -----------         ------                     CBC Auto Differential[[id number]]        Abnormal            Final result                 Please view results for these tests on the individual orders.    CBC Auto Differential [[id number]]  (Abnormal) Collected: 04/15/22 2215    Specimen: Blood Updated: 04/15/22 2232     WBC 13.18 10*3/mm3      RBC 4.05 10*6/mm3      Hemoglobin 12.1 g/dL      Hematocrit 37.3 %      MCV 92.1 fL      MCH 29.9 pg      MCHC 32.4 g/dL      RDW 14.4 %      RDW-SD 48.7 fl      MPV 10.9 fL      Platelets 224 10*3/mm3      Neutrophil % 76.7 %      Lymphocyte % 8.5 %      Monocyte % 9.9 %      Eosinophil % 4.0 %      Basophil % 0.4 %      Immature Grans % 0.5 %      Neutrophils, Absolute 10.11 10*3/mm3      Lymphocytes, Absolute 1.12 10*3/mm3      Monocytes, Absolute 1.31 10*3/mm3      Eosinophils, Absolute 0.53 10*3/mm3      Basophils, Absolute 0.05 10*3/mm3      Immature Grans, Absolute 0.06 10*3/mm3      nRBC 0.0 /100 WBC         Imaging Results (Most Recent)     Procedure Component Value Units Date/Time    XR Ribs Left With PA Chest [[id number]] Collected: 04/15/22 2309     Updated: 04/15/22 2311    Narrative:      CR Ribs 2 Vws W Chest LT    INDICATION:   Left-sided rib pain after fall.    COMPARISON:   1/14/2022    FINDINGS:  PA view of the chest and oblique views of the left ribs. 5 total images. Heart remains enlarged. There is atherosclerotic disease in the aorta. Interstitial changes in the lungs could reflect mild chronic scarring or potentially a mild degree of edema.  No pneumothorax. No acute rib fracture is identified.      Impression:        1. No acute rib fracture. No pneumothorax.  2.  Cardiomegaly with interstitial changes in the lungs which could reflect chronic scarring or perhaps a mild degree of edema.    Signer Name: Jaylen Gudino MD   Signed: 4/15/2022 11:09 PM   Workstation Name: [email or web address]    [facility]          PROCEDURES: none    Condition on Discharge:  stable    Physical Exam at Discharge  Vital Signs  Temp:  [97.2 °F (36.2 °C)-99.2 °F (37.3 °C)] 97.8 °F (36.6 °C)  Heart Rate:  [56-66] 59  Resp:  [16-22] 18  BP: (121-157)/(51-66) 121/51   Body mass index is 29.2 kg/m².      Physical Exam  Vitals reviewed.   Constitutional:       General: He is not in acute distress.     Appearance: He is not ill-appearing.      Comments: Elderly   HENT:      Head: Normocephalic and atraumatic.      Mouth/Throat:      Mouth: Mucous membranes are moist.      Comments: Extremely poor dentition with multiple cracked teeth with caries noted  Eyes:      Extraocular Movements: Extraocular movements intact.      Pupils: Pupils are equal, round, and reactive to light.   Cardiovascular:      Rate and Rhythm: Normal rate and regular rhythm.   Pulmonary:      Effort: Pulmonary effort is normal. No respiratory distress.      Breath sounds: Normal breath sounds. No wheezing.   Abdominal:      General: Abdomen is flat. Bowel sounds are normal. There is no distension.      Palpations: Abdomen is soft.      Tenderness: There is no abdominal tenderness. There is no guarding.   Musculoskeletal:         General: No swelling.      Comments: Bilateral feet in boots   Skin:     General: Skin is warm and dry.      Capillary Refill: Capillary refill takes less than 2 seconds.      Findings: No erythema.   Neurological:      General: No focal deficit present.      Mental Status: He is alert and oriented to person, place, and time.   Psychiatric:         Mood and Affect: Mood normal.         Behavior: Behavior normal.     Discharge Disposition  [facility]    Visiting Nurse:    Yes     Home  "PT/OT:  Yes     Home Safety Evaluation:  Yes     DME  TBD    Discharge Diet:      Dietary Orders (From admission, onward)     Start     Ordered    04/16/22 0041  Diet Regular  Diet Effective Now        Question:  Diet Texture / Consistency  Answer:  Regular    04/16/22 0045                Activity at Discharge:  As tolerated    Pre-discharge education  Diabetic, Cardiac, Wound Care, Injectables, medications, follow-up    Follow-up Appointments  No future appointments.  Additional Instructions for the Follow-ups that You Need to Schedule     Discharge Follow-up with PCP   As directed       Currently Documented PCP:    Fortunato De Leon MD    PCP Phone Number:    [phone]     Follow Up Details: 1 week               Test Results Pending at Discharge: To be followed up by PCP at [facility]  Pending Labs     Order Current Status    Folate In process    Vitamin B12 In process    CANDIDA AURIS SCREEN - Swab, Axilla Right, Axilla Left and Groin Preliminary result           Liat Hood, APRN  04/18/22  13:23 EDT    Time: Discharge 30 min (if over 30 minutes give explanation as to why it took greater than 30 minutes)    As of April 2021, as required by the Federal 21st Century Cures Act, medical records (including provider notes and laboratory/imaging results) are to be made available to patients and/or their designees as soon as the documents are signed/resulted. While the intention is to ensure transparency and to engage patients in their healthcare, this immediate access may create unintended consequences because this document uses language intended for communication between medical providers for interpretation with the entirety of the patient's clinical picture in mind. It is recommended that patients and/or their designees review all available information with their primary or specialist providers for explanation and to avoid misinterpretation of this information.     \"Dictated utilizing Dragon dictation\"      "

## 2022-04-18 NOTE — THERAPY TREATMENT NOTE
Acute Care - Physical Therapy Treatment Note  MICHAEL Kincaid     Patient Name: Froilan Helton  : 1940  MRN: [id number]  Today's Date: 2022   Onset of Illness/Injury or Date of Surgery: 04/15/22  Visit Dx:     ICD-10-CM ICD-9-CM   1. Non-traumatic rhabdomyolysis  M62.82 728.88   2. Weakness  R53.1 780.79     Patient Active Problem List   Diagnosis   • COPD (chronic obstructive pulmonary disease) ([facility])   • Type 2 diabetes mellitus with stage 4 chronic kidney disease, with long-term current use of insulin ([facility])   • Essential hypertension   • Primary osteoarthritis of left knee   • Chronic renal insufficiency, stage 4 (severe) ([facility])   • Class 2 severe obesity due to excess calories with serious comorbidity in adult ([facility])   • Physical debility   • Acute UTI (urinary tract infection)   • Permanent atrial fibrillation ([facility])   • H/O noncompliance with medical treatment, presenting hazards to health   • Myxedema   • Acute on chronic combined systolic and diastolic CHF (congestive heart failure) ([facility])   • Generalized weakness   • Recurrent left pleural effusion   • Fall on same level as cause of accidental injury   • Gross hematuria   • CAD (coronary artery disease)   • HLD (hyperlipidemia)   • Hypothyroidism   • CKD (chronic kidney disease) stage 3, GFR 30-59 ml/min ([facility])   • Acute metabolic encephalopathy   • Cardiomyopathy ([facility])   • Recurrent falls   • Acute renal failure superimposed on chronic kidney disease ([facility])   • Open wound of left foot   • Moderate malnutrition (CMS/[facility])   • Symptomatic bradycardia   • Rhabdomyolysis   • Benign prostatic hyperplasia without urinary obstruction   • COVID-19   • Diabetic neuropathy ([facility])   • Diabetic renal disease ([facility])   • Edema   • Enterococcus as the cause of diseases classified elsewhere   • Hypertensive heart disease with congestive heart failure and chronic kidney disease ([facility])   • Hypo-osmolality and hyponatremia   • Iron deficiency anemia secondary to blood loss  (chronic)   • Methicillin resistant Staphylococcus aureus infection as the cause of diseases classified elsewhere   • Non-pressure chronic ulcer of other part of left lower leg limited to breakdown of skin ([facility])   • Non-pressure chronic ulcer of other part of right foot with fat layer exposed ([facility])   • Paraplegic immobility syndrome   • Patient's noncompliance with other medical treatment and regimen   • Peripheral venous insufficiency   • Pressure ulcer of buttock   • Pseudomonas (aeruginosa) (mallei) (pseudomallei) as the cause of diseases classified elsewhere   • Retention of urine   • Sleep apnea   • Urinary incontinence   • Vitamin D deficiency   • Non-traumatic rhabdomyolysis     Past Medical History:   Diagnosis Date   • A-fib ([facility])    • Arthritis    • Asthma    • CAD (coronary artery disease)    • Cardiomyopathy ([facility])    • Cataract    • CHF (congestive heart failure) ([facility])    • CKD (chronic kidney disease), stage III ([facility])    • COPD (chronic obstructive pulmonary disease) ([facility])    • Diabetes mellitus ([facility])    • Disease of thyroid gland    • Emphysema, unspecified ([facility])    • Glaucoma    • Hyperlipidemia    • Hypertension    • Kidney stone    • Myocardial infarct, old    • Neuropathy    • Osteoarthritis    • Osteoporosis    • Permanent atrial fibrillation ([facility]) 6/30/2020   • PVD (peripheral vascular disease) ([facility])    • Renal disorder    • Shingles      Past Surgical History:   Procedure Laterality Date   • COLONOSCOPY     • EYE SURGERY     • INCISION AND DRAINAGE LEG Left 12/30/2021    Procedure: debridement of left hip wounds and left foot wound;  Surgeon: Judie Aguero DO;  Location: [facility];  Service: General;  Laterality: Left;   • JOINT REPLACEMENT      right   • KIDNEY STONE SURGERY     • REPLACEMENT TOTAL KNEE     • TOE SURGERY       PT Assessment (last 12 hours)     PT Evaluation and Treatment     Row Name 04/18/22 1020          Physical Therapy Time and Intention    Subjective Information  "complains of;weakness;fatigue;pain  -     Document Type therapy note (daily note)  -     Mode of Treatment physical therapy  -     Patient Effort fair  -JW     Comment pt reports increased pain on left buttocks when attempting sitting EOB  -     Row Name 04/18/22 1020          General Information    Patient Observations alert;cooperative;agree to therapy  -     Patient/Family/Caregiver Comments/Observations pt supine, agrees to therapy with encouragement  -     Existing Precautions/Restrictions fall  -     Barriers to Rehab previous functional deficit  -     Row Name 04/18/22 1020          Pain    Pretreatment Pain Rating 5/10  -     Posttreatment Pain Rating 6/10  -     Pain Location - Side/Orientation Left  -     Pain Location - buttock;hip  -     Pain Intervention(s) Repositioned  -     Row Name 04/18/22 1020          Cognition    Personal Safety Interventions gait belt;nonskid shoes/slippers when out of bed  -     Row Name 04/18/22 1020          Bed Mobility    Bed Mobility scooting/bridging;supine-sit;sit-supine  -     Scooting/Bridging [place] (Bed Mobility) dependent (less than 25% patient effort);2 person assist  -     Supine-Sit [place] (Bed Mobility) maximum assist (25% patient effort);2 person assist  -     Sit-Supine [place] (Bed Mobility) maximum assist (25% patient effort);2 person assist  -     Assistive Device (Bed Mobility) bed rails;draw sheet;head of bed elevated  -     Row Name 04/18/22 1020          Transfers    Comment, (Transfers) pt able to tolerate sitting EOB x5 minutes, increased right lateral lean due to pain in left buttocks/hip.  pt unable to tolerate sitting with proper posture and reports significant fatigue/weakness, stating \"standing isn't going to happen\"  -     Row Name 04/18/22 1020          Gait/Stairs (Locomotion)    Comment, (Gait/Stairs) pt performs transfers only at baseline  -     Row Name             [REMOVED] Wound " 12/28/21 1803 Left lateral thigh Traumatic    Wound - Properties Group Placement Date: 12/28/21  -LC Placement Time: 1803  -LC Present on Hospital Admission: Y  -LC Side: Left  -LC Orientation: lateral  -LC Location: thigh  -LC Primary Wound Type: Traumatic  -LC Removal Date: 04/18/22  -AS Removal Time: 1041  -AS     Retired Wound - Properties Group Placement Date: 12/28/21  -LC Placement Time: 1803  -LC Present on Hospital Admission: Y  -LC Side: Left  -LC Orientation: lateral  -LC Location: thigh  -LC Primary Wound Type: Traumatic  -LC Removal Date: 04/18/22  -AS Removal Time: 1041  -AS     Retired Wound - Properties Group Date first assessed: 12/28/21  -LC Time first assessed: 1803  -LC Present on Hospital Admission: Y  -LC Side: Left  -LC Location: thigh  -LC Primary Wound Type: Traumatic  -LC Resolution Date: 04/18/22  -AS Resolution Time: 1041  -AS     Row Name             Wound 01/14/22 1216 Left anterior greater trochanter    Wound - Properties Group Placement Date: 01/14/22  -HB Placement Time: 1216  -HB Present on Hospital Admission: Y  -HB Side: Left  -HB Orientation: anterior  -HB Location: greater trochanter  -HB     Retired Wound - Properties Group Placement Date: 01/14/22  -HB Placement Time: 1216  -HB Present on Hospital Admission: Y  -HB Side: Left  -HB Orientation: anterior  -HB Location: greater trochanter  -HB     Retired Wound - Properties Group Date first assessed: 01/14/22  -HB Time first assessed: 1216  -HB Present on Hospital Admission: Y  -HB Side: Left  -HB Location: greater trochanter  -HB     Row Name             Wound 12/28/21 1803 Left medial foot Other (comment)    Wound - Properties Group Placement Date: 12/28/21  -LC Placement Time: 1803  -LC Present on Hospital Admission: Y  -LC Side: Left  -LC Orientation: medial  -LC Location: foot  -LC Primary Wound Type: Other  -LC     Retired Wound - Properties Group Placement Date: 12/28/21  -LC Placement Time: 1803  -LC Present on Hospital  Admission: Y  -LC Side: Left  -LC Orientation: medial  -LC Location: foot  -LC Primary Wound Type: Other  -LC     Retired Wound - Properties Group Date first assessed: 12/28/21  -LC Time first assessed: 1803  -LC Present on Hospital Admission: Y  -LC Side: Left  -LC Location: foot  -LC Primary Wound Type: Other  -LC     Row Name             [REMOVED] Wound 12/31/21 2247 Bilateral perineum Pressure Injury    Wound - Properties Group Placement Date: 12/31/21  -AB Placement Time: 2247 -AB Present on Hospital Admission: Y  -LC Side: Bilateral  -LC Location: perineum  -AB Primary Wound Type: Pressure inj  -LC Additional Comments: bilateral buttock pressure injuries  -LC Removal Date: 04/18/22  -AS Removal Time: 1040  -AS Stage, Pressure Injury : Stage 3  -LC     Retired Wound - Properties Group Placement Date: 12/31/21  -AB Placement Time: 2247 -AB Present on Hospital Admission: Y  -LC Side: Bilateral  -LC Location: perineum  -AB Primary Wound Type: Pressure inj  -LC Stage, Pressure Injury : Stage 3  -LC Additional Comments: bilateral buttock pressure injuries  -LC Removal Date: 04/18/22  -AS Removal Time: 1040  -AS     Retired Wound - Properties Group Date first assessed: 12/31/21  -AB Time first assessed: 2247 -AB Present on Hospital Admission: Y  -LC Side: Bilateral  -LC Location: perineum  -AB Primary Wound Type: Pressure inj  -LC Additional Comments: bilateral buttock pressure injuries  -LC Resolution Date: 04/18/22  -AS Resolution Time: 1040  -AS     Row Name             Wound 04/15/22 2252 thoracic spine    Wound - Properties Group Placement Date: 04/15/22  -KS Placement Time: 2252  -KS Present on Hospital Admission: Y  -KS Location: thoracic spine  -KS     Retired Wound - Properties Group Placement Date: 04/15/22  -KS Placement Time: 2252 -KS Present on Hospital Admission: Y  -KS Location: thoracic spine  -KS     Retired Wound - Properties Group Date first assessed: 04/15/22  -KS Time first assessed: 2252   -KS Present on Hospital Admission: Y  -KS Location: thoracic spine  -KS     Row Name             Wound 04/15/22 2254 Right upper thoracic spine    Wound - Properties Group Placement Date: 04/15/22  -KS Placement Time: 2254  -KS Present on Hospital Admission: Y  -KS Side: Right  -KS Orientation: upper  -KS Location: thoracic spine  -KS     Retired Wound - Properties Group Placement Date: 04/15/22  -KS Placement Time: 2254  -KS Present on Hospital Admission: Y  -KS Side: Right  -KS Orientation: upper  -KS Location: thoracic spine  -KS     Retired Wound - Properties Group Date first assessed: 04/15/22  -KS Time first assessed: 2254  -KS Present on Hospital Admission: Y  -KS Side: Right  -KS Location: thoracic spine  -KS     Row Name             Wound 04/15/22 2255 Left lower leg    Wound - Properties Group Placement Date: 04/15/22  -KS Placement Time: 2255  -KS Present on Hospital Admission: Y  -KS Side: Left  -KS Orientation: lower  -KS Location: leg  -KS     Retired Wound - Properties Group Placement Date: 04/15/22  -KS Placement Time: 2255  -KS Present on Hospital Admission: Y  -KS Side: Left  -KS Orientation: lower  -KS Location: leg  -KS     Retired Wound - Properties Group Date first assessed: 04/15/22  -KS Time first assessed: 2255  -KS Present on Hospital Admission: Y  -KS Side: Left  -KS Location: leg  -KS     Row Name             [REMOVED] Wound 04/15/22 2256 Right heel    Wound - Properties Group Placement Date: 04/15/22  -KS Placement Time: 2256  -KS Side: Right  -KS Location: heel  -KS Removal Date: 04/18/22  -AS Removal Time: 1055  -AS     Retired Wound - Properties Group Placement Date: 04/15/22  -KS Placement Time: 2256  -KS Side: Right  -KS Location: heel  -KS Removal Date: 04/18/22  -AS Removal Time: 1055  -AS     Retired Wound - Properties Group Date first assessed: 04/15/22  -KS Time first assessed: 2256  -KS Side: Right  -KS Location: heel  -KS Resolution Date: 04/18/22  -AS Resolution Time: 1055   -AS     Row Name             Wound 01/11/22 1201 Left heel Pressure Injury    Wound - Properties Group Placement Date: 01/11/22  -DP Placement Time: 1201  -DP Present on Hospital Admission: N  -DP Side: Left  -DP Location: heel  -DP Primary Wound Type: Pressure inj  -DP Stage, Pressure Injury : deep tissue injury  -DP     Retired Wound - Properties Group Placement Date: 01/11/22  -DP Placement Time: 1201  -DP Present on Hospital Admission: N  -DP Side: Left  -DP Location: heel  -DP Primary Wound Type: Pressure inj  -DP Stage, Pressure Injury : deep tissue injury  -DP     Retired Wound - Properties Group Date first assessed: 01/11/22  -DP Time first assessed: 1201  -DP Present on Hospital Admission: N  -DP Side: Left  -DP Location: heel  -DP Primary Wound Type: Pressure inj  -DP     Row Name             [REMOVED] Wound 04/15/22 0911 Right heel    Wound - Properties Group Placement Date: 04/15/22  -AS Placement Time: 0911  -AS Present on Hospital Admission: Y  -AS Side: Right  -AS Location: heel  -AS Removal Date: 04/18/22  -AS Removal Time: 1055  -AS     Retired Wound - Properties Group Placement Date: 04/15/22  -AS Placement Time: 0911  -AS Present on Hospital Admission: Y  -AS Side: Right  -AS Location: heel  -AS Removal Date: 04/18/22  -AS Removal Time: 1055  -AS     Retired Wound - Properties Group Date first assessed: 04/15/22  -AS Time first assessed: 0911  -AS Present on Hospital Admission: Y  -AS Side: Right  -AS Location: heel  -AS Resolution Date: 04/18/22  -AS Resolution Time: 1055  -AS     Row Name             [REMOVED] Wound 04/16/22 1050 Right heel    Wound - Properties Group Placement Date: 04/16/22  -AS Placement Time: 1050  -AS Present on Hospital Admission: Y  -AS Side: Right  -AS Location: heel  -AS Removal Date: 04/18/22  -AS Removal Time: 1057  -AS Wound Outcome: Other  -AS     Retired Wound - Properties Group Placement Date: 04/16/22  -AS Placement Time: 1050  -AS Present on Hospital Admission:  "Y  -AS Side: Right  -AS Location: heel  -AS Removal Date: 04/18/22  -AS Removal Time: 1057  -AS Wound Outcome: Other  -AS     Retired Wound - Properties Group Date first assessed: 04/16/22  -AS Time first assessed: 1050  -AS Present on Hospital Admission: Y  -AS Side: Right  -AS Location: heel  -AS Resolution Date: 04/18/22  -AS Resolution Time: 1057  -AS Wound Outcome: Other  -AS     Row Name             [REMOVED] Wound 04/16/22 0852 Left heel    Wound - Properties Group Placement Date: 04/16/22  -AS Placement Time: 0852  -AS Present on Hospital Admission: Y  -AS Side: Left  -AS Location: heel  -AS Removal Date: 04/18/22  -AS Removal Time: 1000  -AS     Retired Wound - Properties Group Placement Date: 04/16/22  -AS Placement Time: 0852  -AS Present on Hospital Admission: Y  -AS Side: Left  -AS Location: heel  -AS Removal Date: 04/18/22  -AS Removal Time: 1000  -AS     Retired Wound - Properties Group Date first assessed: 04/16/22  -AS Time first assessed: 0852  -AS Present on Hospital Admission: Y  -AS Side: Left  -AS Location: heel  -AS Resolution Date: 04/18/22  -AS Resolution Time: 1000  -AS     Row Name 04/18/22 1020          Plan of Care Review    Outcome Evaluation PT: Patient requires max assist x2 for supine to sit transfer.  Patient tolerates sitting EOB x5 minutes, however increased right lateral lean noted due to pain in left hip/buttocks area.  Patient unable to tolerate proper midline positioning and declines standing attempts due to pain/fatigue, stating \"it isn't going to happen.\" Patient is not safe to return home at this time and requires considerable assistance for all functional tasks.  Would recommend return to SNF with potential to transition to ECF at discharge.  -JW     Row Name 04/18/22 1020          Positioning and Restraints    Pre-Treatment Position in bed  -JW     Post Treatment Position bed  -JW     In Bed side lying right;encouraged to call for assist;call light within reach  -JW     " Row Name 04/18/22 1020          Progress Summary (PT)    Progress Toward Functional Goals (PT) progress toward functional goals is gradual  -     Barriers to Overall Progress (PT) pain, PLOF  -           User Key  (r) = Recorded By, (t) = Taken By, (c) = Cosigned By    Initials Name Provider Type    AS Betty Masters, RN Registered Nurse    Carole Taylor RN, CWON Registered Nurse    Juliet Harris, RN Registered Nurse    Zoila Cazares, PT Physical Therapist    Aaliyah John, RN Registered Nurse    Shara Silva, RN Registered Nurse    Eileen Nickerson RN Registered Nurse                Physical Therapy Education                 Title: PT OT SLP Therapies (In Progress)     Topic: Physical Therapy (In Progress)     Point: Mobility training (Done)     Learning Progress Summary           Patient Acceptance, E,TB, VU by BA at 4/18/2022 1242    Acceptance, E, VU,NR by TAMIA at 4/16/2022 1318    Comment: transfer techniques                   Point: Home exercise program (Not Started)     Learner Progress:  Not documented in this visit.          Point: Body mechanics (Not Started)     Learner Progress:  Not documented in this visit.          Point: Precautions (Not Started)     Learner Progress:  Not documented in this visit.                      User Key     Initials Effective Dates Name Provider Type Discipline    TAMIA 11/11/21 -  Brandi Elaine, PT Physical Therapist PT    BA 06/16/21 -  Zoila Estrella, LORENZO Physical Therapist PT              PT Recommendation and Plan  Anticipated Discharge Disposition (PT): skilled nursing facility, extended care facility  Progress Summary (PT)  Progress Toward Functional Goals (PT): progress toward functional goals is gradual  Barriers to Overall Progress (PT): pain, PLOF  Outcome Evaluation: PT: Patient requires max assist x2 for supine to sit transfer.  Patient tolerates sitting EOB x5 minutes, however increased right lateral lean noted due to pain  "in left hip/buttocks area.  Patient unable to tolerate proper midline positioning and declines standing attempts due to pain/fatigue, stating \"it isn't going to happen.\" Patient is not safe to return home at this time and requires considerable assistance for all functional tasks.  Would recommend return to SNF with potential to transition to ECF at discharge.   Outcome Measures     Row Name 04/18/22 1020 04/16/22 0901          How much help from another person do you currently need...    Turning from your back to your side while in flat bed without using bedrails? 1  -JW 1  -SP     Moving from lying on back to sitting on the side of a flat bed without bedrails? 1  -JW 1  -SP     Moving to and from a bed to a chair (including a wheelchair)? 1  -JW 1  -SP     Standing up from a chair using your arms (e.g., wheelchair, bedside chair)? 1  - 1  -SP     Climbing 3-5 steps with a railing? 1  -JW 1  -SP     To walk in hospital room? 1  -JW 1  -SP     AM-PAC 6 Clicks Score (PT) 6  - 6  -SP            Functional Assessment    Outcome Measure Options AM-PAC 6 Clicks Basic Mobility (PT)  -JW AM-PAC 6 Clicks Basic Mobility (PT)  -SP           User Key  (r) = Recorded By, (t) = Taken By, (c) = Cosigned By    Initials Name Provider Type    Brandi Uriostegui, PT Physical Therapist    Zoila Cazares, PT Physical Therapist                 Time Calculation:    PT Charges     Row Name 04/18/22 1242             Time Calculation    Start Time 1020  -      Stop Time 1045  -JW      Time Calculation (min) 25 min  -JW      PT Received On 04/18/22  -JW      PT - Next Appointment 04/19/22  -              Timed Charges    01125 - PT Therapeutic Exercise Minutes 25  -JW              Total Minutes    Timed Charges Total Minutes 25  -JW       Total Minutes 25  -JW            User Key  (r) = Recorded By, (t) = Taken By, (c) = Cosigned By    Initials Name Provider Type    Zoila Cazares, PT Physical Therapist          "     Therapy Charges for Today     Code Description Service Date Service Provider Modifiers Qty    [id number] HC PT THER PROC EA 15 MIN 4/18/2022 Zoila Estrella, PT GP 2          PT G-Codes  Outcome Measure Options: AM-PAC 6 Clicks Daily Activity (OT)  AM-PAC 6 Clicks Score (PT): 6  AM-PAC 6 Clicks Score (OT): 15    Zoila Estrella, LORENZO  4/18/2022

## 2022-04-19 NOTE — CASE MANAGEMENT/SOCIAL WORK
Case Management Discharge Note      Final Note: dc to [facility] SNF         Selected Continued Care - Discharged on 4/18/2022 Admission date: 4/15/2022 - Discharge disposition: Skilled Nursing Facility (DC - External)    Destination Coordination complete.    Service Provider Selected Services Address Phone Fax Patient Preferred    [facility]  Skilled Nursing [address] [place] [address] [phone] --          Durable Medical Equipment    No services have been selected for the patient.              Dialysis/Infusion    No services have been selected for the patient.              Home Medical Care    No services have been selected for the patient.              Therapy    No services have been selected for the patient.              Community Resources    No services have been selected for the patient.              Community & DME    No services have been selected for the patient.                Selected Continued Care - Prior Encounters Includes selections from prior encounters from 1/15/2022 to 4/18/2022    Discharged on 1/20/2022 Admission date: 1/14/2022 - Discharge disposition: Skilled Nursing Facility (DC - External)    Destination     Service Provider Selected Services Address Phone Fax Patient Preferred    [facility]  Skilled Nursing [address] [place] [address] [phone] --                         Final Discharge Disposition Code: 03 - skilled nursing facility (SNF)   Spoke with patient. She states that her insurance told her that her Dr. Urbina needs to call her insurance to expedite the PA for her CT scan that she is scheduled for on 5/29/18. Explained that Dr. Urbina does not do the PA and that the PA is done by the authorization department. She stated that Dr. Urbina told her when he did her colonoscopy on Wednesday that he would expedite the PA for her. Explained that I did not have any knowledge of this and I would send a message asking him about it. I requested the phone number for him to call to talk to the insurance company. Patient refused to give me the number stating that she was going to be homeless and I did not care about her. She requested the CT scan be ordered at [facility] instead since no one here cares about her. I stated I was very sorry to hear that and she said that she didn't think I cared at all.

## 2022-05-21 PROBLEM — A41.9 SEPSIS: Status: ACTIVE | Noted: 2022-01-01

## 2022-05-21 PROBLEM — E87.20 SEVERE SEPSIS WITH LACTIC ACIDOSIS: Status: ACTIVE | Noted: 2022-01-01

## 2022-05-21 PROBLEM — Z91.81 HISTORY OF FALLING: Status: ACTIVE | Noted: 2022-01-01

## 2022-05-21 PROBLEM — R65.20 SEVERE SEPSIS WITH LACTIC ACIDOSIS: Status: ACTIVE | Noted: 2022-01-01

## 2022-05-21 PROBLEM — A41.9 SEVERE SEPSIS WITH LACTIC ACIDOSIS (HCC): Status: ACTIVE | Noted: 2022-01-01

## 2022-05-22 PROBLEM — Z51.5 COMFORT MEASURES ONLY STATUS: Status: ACTIVE | Noted: 2022-01-01

## 2022-05-23 PROBLEM — Z51.5 END OF LIFE CARE: Status: ACTIVE | Noted: 2022-01-01

## 2022-05-25 LAB
BACTERIA SPEC AEROBE CULT: ABNORMAL
GRAM STN SPEC: ABNORMAL
ISOLATED FROM: ABNORMAL
ISOLATED FROM: ABNORMAL

## 2022-06-03 NOTE — DISCHARGE SUMMARY
Froilan Helton  1940  [id number]    Hospitalists Death Summary    Date of Admission: 5/23/2022  Date of Death: 5/24/2022 at 03:10     Note taken from on-call provider.  I did not see the patient at the time of death:    Notified by the nurse that the patient was unresponsive. Patient is DNR/DNI on comfort care.   Patient seen and examined at. Patient was unresponsive to painful stimulation.  Heart and lung sounds are absent. No spontaneous cardiac or respiratory activity. No corneal pupillary reflex present. Pupils fixed an dilated. Patient pronounced dead at 0310 on 05/24/2022. Cause of death: Renal failure and septic shock.       Ozzie Briggs MD  06/03/22  10:12 EDT

## 2023-05-24 NOTE — PLAN OF CARE
Goal Outcome Evaluation:  Plan of Care Reviewed With: patient        Progress: improving  Outcome Summary: Patient is more alert and oriented than yesterday. No compliants of pain.Denies SOB. q2 turns. remains on IVF and antibitic treamtment. Cardiology cansulted. VSS on 2 L Oxygen. will continue to monitor.   Franko Avila

## 2024-05-31 NOTE — PLAN OF CARE
Goal Outcome Evaluation:  Plan of Care Reviewed With: patient           Outcome Summary: OT: Pt continues to require significant assistance for functional transfers (max assist x 2 for stand pivot transfer from bed to recliner due to knee pain, BLE weakness and balance deficits). Pt also needs significant support for adl tasks for safety. Rec pt have 24 hour support/assistance for safety, yet pt states he is returning home with family support/ services.   h/o kidney stones, s/p lithotripsy

## 2024-12-23 NOTE — OUTREACH NOTE
Problem: Knowledge Deficit-ECT Treatment  Goal: Complete pre-procedure education for ECT  Description: Interventions:  - ECT pre-procedure preparation  12/23/2024 1038 by Juliana Madera RN  Outcome: Adequate for discharge  12/23/2024 0839 by Juliana Madera RN  Outcome: Monitoring/Evaluating progress  Goal: Demonstrate an appropriate level of understanding of ECT procedure and preparation  Description: Interventions:  - ECT teaching  - ECT pre-procedure preparation  12/23/2024 1038 by Juliana Madera RN  Outcome: Adequate for discharge  12/23/2024 0839 by Juliana Madera RN  Outcome: Monitoring/Evaluating progress     Problem: Untoward Effects related to ECT Procedure  Goal: Maintained in a stable condition during and immediately following ECT procedure  Description: Interventions:  - ECT management  - Medical clearance and surgical preparation  - Aspiration precautions  - Oxygen therapy  - Seizure management  - Post-procedure recovery management  - Reality therapy  12/23/2024 1038 by Juliana Madera RN  Outcome: Adequate for discharge  12/23/2024 0839 by Juliana Madera RN  Outcome: Monitoring/Evaluating progress  Goal: Untoward effects of ECT minimized allowing for discharge to an appropriate level of care to meet needs  Description: Interventions:  - ECT management  - Post-procedure recovery management  - Discharge criteria met  - Discharge instructions provided    12/23/2024 1038 by Juliana Madera RN  Outcome: Adequate for discharge  12/23/2024 0839 by Juliana Madera RN  Outcome: Monitoring/Evaluating progress     Problem: Pain  Goal: Acceptable pain level achieved/maintained at rest using appropriate pain scale for the patient  12/23/2024 1038 by Juliana Madera RN  Outcome: Adequate for discharge  12/23/2024 0839 by Juliana Madera RN  Outcome: Monitoring/Evaluating progress  Goal: Acceptable pain level achieved/maintained with activity using appropriate pain scale for the patient  12/23/2024 1038 by Juliana Madera  COPD/PN Week 1 Survey      Responses   Facility patient discharged from?  [facility]   Does the patient have one of the following disease processes/diagnoses(primary or secondary)?  COPD/Pneumonia   Is there a successful TCM telephone encounter documented?  No   Was the primary reason for admission:  COPD exacerbation   Week 1 attempt successful?  Yes   Call start time  1020   Call end time  1030   Discharge diagnosis  Acute hypoxic, hypercapnic resp. failure, d/t CHF and possible pneumonia, suspected sepsis d/t pneumonia, cardiogenic shock, A/C systolic CHF, hypertensive urgency, acute metabolic encephalopathy, chronically elevated troponin, COPD   Meds reviewed with patient/caregiver?  Yes   Is the patient having any side effects they believe may be caused by any medication additions or changes?  No   Does the patient have all medications ordered at discharge?  Yes   Is the patient taking all medications as directed (includes completed medication regime)?  Yes   Does the patient have a primary care provider?   Yes   Does the patient have an appointment with their PCP or pulmonologist within 7 days of discharge?  Yes   Has the patient kept scheduled appointments due by today?  Yes   Psychosocial issues?  No   Comments  states breathing is comfortable with O2 devices, states appetite is low   Did the patient receive a copy of their discharge instructions?  Yes   Nursing interventions  Reviewed instructions with patient   What is the patient's perception of their health status since discharge?  Improving   Nursing Interventions  Nurse provided patient education   Is the patient/caregiver able to teach back the hierarchy of who to call/visit for symptoms/problems? PCP, Specialist, Home health nurse, Urgent Care, ED, 911  Yes   Additional teach back comments  states gets dizzy when standing, advised pt to practice fall prevention, pt agrees and uses walker when getting up   Is the patient able to teach back COPD zones?   RN  Outcome: Adequate for discharge  12/23/2024 0839 by Juliana Madera RN  Outcome: Monitoring/Evaluating progress  Goal: Acceptable pain level achieved/maintained without oversedation  12/23/2024 1038 by Juliana Madera RN  Outcome: Adequate for discharge  12/23/2024 0839 by Juliana Madera RN  Outcome: Monitoring/Evaluating progress      Yes   Nursing interventions  Education provided on various zones   Patient reports what zone on this call?  Green Zone   Green Zone  Reports doing well, Breathing without shortness of breath, Usual activity and exercise level, Usual amount of phlegm/mucus without difficulty coughing up, Sleeping well, Appetite is good   Green Zone interventions:  Take daily medications, Use oxygen as prescribed, Continue regular exercise/diet plan, Avoid indoor/outdoor triggers [quit smoking in 1993]   Week 1 call completed?  Yes          Ruby Ibrahim RN

## (undated) DEVICE — GLV SURG SENSICARE W/ALOE PF LF 6.5 STRL

## (undated) DEVICE — PATIENT RETURN ELECTRODE, SINGLE-USE, CONTACT QUALITY MONITORING, ADULT, WITH 9FT CORD, FOR PATIENTS WEIGING OVER 33LBS. (15KG): Brand: MEGADYNE

## (undated) DEVICE — BANDAGE,GAUZE,BULKEE II,4.5"X4.1YD,STRL: Brand: MEDLINE

## (undated) DEVICE — DRAPE,REIN 53X77,STERILE: Brand: MEDLINE

## (undated) DEVICE — SUT MNCRYL 2/0 SH 27IN Y417H

## (undated) DEVICE — TRY SKINPREP DRYPREP

## (undated) DEVICE — SPNG GZ WOVN 4X4IN 12PLY 10/BX STRL

## (undated) DEVICE — PENCL E/S ULTRAVAC TELESCP NOSE HOLSTR 10FT

## (undated) DEVICE — GAUZE,SPONGE,FLUFF,6"X6.75",STRL,5/TRAY: Brand: MEDLINE

## (undated) DEVICE — DRAPE,U/ SHT,SPLIT,PLAS,STERIL: Brand: MEDLINE

## (undated) DEVICE — SOL ANTISEP SCRB PVPI 7.5PCT 4OZ

## (undated) DEVICE — LAG MINOR PROCEDURE: Brand: MEDLINE INDUSTRIES, INC.

## (undated) DEVICE — TOWEL,OR,DSP,ST,NATURAL,DLX,4/PK,20PK/CS: Brand: MEDLINE

## (undated) DEVICE — PREP SOL POVIDONE/IODINE BT 4OZ

## (undated) DEVICE — CULT AER/ANAEROB FASTIDIOUS BACT

## (undated) DEVICE — TRAP FLD MINIVAC MEGADYNE 100ML